# Patient Record
Sex: MALE | Race: WHITE | NOT HISPANIC OR LATINO | Employment: OTHER | URBAN - METROPOLITAN AREA
[De-identification: names, ages, dates, MRNs, and addresses within clinical notes are randomized per-mention and may not be internally consistent; named-entity substitution may affect disease eponyms.]

---

## 2017-01-03 ENCOUNTER — APPOINTMENT (OUTPATIENT)
Dept: PHYSICAL THERAPY | Facility: REHABILITATION | Age: 62
End: 2017-01-03
Attending: ORTHOPAEDIC SURGERY
Payer: COMMERCIAL

## 2017-01-03 ENCOUNTER — HOSPITAL ENCOUNTER (OUTPATIENT)
Dept: RADIOLOGY | Facility: HOSPITAL | Age: 62
Discharge: HOME | End: 2017-01-03
Attending: ORTHOPAEDIC SURGERY
Payer: COMMERCIAL

## 2017-01-03 ENCOUNTER — APPOINTMENT (OUTPATIENT)
Dept: LAB | Facility: HOSPITAL | Age: 62
End: 2017-01-03
Attending: ORTHOPAEDIC SURGERY
Payer: COMMERCIAL

## 2017-01-03 ENCOUNTER — ALLSCRIPTS OFFICE VISIT (OUTPATIENT)
Dept: OTHER | Facility: OTHER | Age: 62
End: 2017-01-03

## 2017-01-03 ENCOUNTER — TRANSCRIBE ORDERS (OUTPATIENT)
Dept: LAB | Facility: HOSPITAL | Age: 62
End: 2017-01-03

## 2017-01-03 DIAGNOSIS — E11.9 TYPE 2 DIABETES MELLITUS WITHOUT COMPLICATION, WITHOUT LONG-TERM CURRENT USE OF INSULIN (HCC): ICD-10-CM

## 2017-01-03 DIAGNOSIS — R07.9 CHEST PAIN: ICD-10-CM

## 2017-01-03 DIAGNOSIS — E11.9 TYPE 2 DIABETES MELLITUS WITHOUT COMPLICATIONS (HCC): ICD-10-CM

## 2017-01-03 DIAGNOSIS — R07.9 CHEST PAIN, UNSPECIFIED: ICD-10-CM

## 2017-01-03 DIAGNOSIS — M17.12 PRIMARY OSTEOARTHRITIS OF LEFT KNEE: ICD-10-CM

## 2017-01-03 DIAGNOSIS — Z47.1 AFTERCARE FOLLOWING JOINT REPLACEMENT SURGERY: ICD-10-CM

## 2017-01-03 DIAGNOSIS — R07.9 CHEST PAIN, UNSPECIFIED: Primary | ICD-10-CM

## 2017-01-03 LAB
ABO GROUP BLD: NORMAL
ALBUMIN SERPL BCP-MCNC: 3.6 G/DL (ref 3.5–5)
ALP SERPL-CCNC: 74 U/L (ref 46–116)
ALT SERPL W P-5'-P-CCNC: 25 U/L (ref 12–78)
ANION GAP SERPL CALCULATED.3IONS-SCNC: 8 MMOL/L (ref 4–13)
AST SERPL W P-5'-P-CCNC: 12 U/L (ref 5–45)
BASOPHILS # BLD AUTO: 0.06 THOUSANDS/ΜL (ref 0–0.1)
BASOPHILS NFR BLD AUTO: 1 % (ref 0–1)
BILIRUB SERPL-MCNC: 0.44 MG/DL (ref 0.2–1)
BLD GP AB SCN SERPL QL: NEGATIVE
BUN SERPL-MCNC: 16 MG/DL (ref 5–25)
CALCIUM SERPL-MCNC: 9.2 MG/DL (ref 8.3–10.1)
CHLORIDE SERPL-SCNC: 103 MMOL/L (ref 100–108)
CO2 SERPL-SCNC: 27 MMOL/L (ref 21–32)
CREAT SERPL-MCNC: 0.94 MG/DL (ref 0.6–1.3)
EOSINOPHIL # BLD AUTO: 0.96 THOUSAND/ΜL (ref 0–0.61)
EOSINOPHIL NFR BLD AUTO: 11 % (ref 0–6)
ERYTHROCYTE [DISTWIDTH] IN BLOOD BY AUTOMATED COUNT: 13.2 % (ref 11.6–15.1)
EST. AVERAGE GLUCOSE BLD GHB EST-MCNC: 160 MG/DL
GFR SERPL CREATININE-BSD FRML MDRD: >60 ML/MIN/1.73SQ M
GLUCOSE SERPL-MCNC: 170 MG/DL (ref 65–140)
HBA1C MFR BLD: 7.2 % (ref 4.2–6.3)
HCT VFR BLD AUTO: 41.5 % (ref 36.5–49.3)
HGB BLD-MCNC: 14.5 G/DL (ref 12–17)
LYMPHOCYTES # BLD AUTO: 2.39 THOUSANDS/ΜL (ref 0.6–4.47)
LYMPHOCYTES NFR BLD AUTO: 27 % (ref 14–44)
MCH RBC QN AUTO: 29.7 PG (ref 26.8–34.3)
MCHC RBC AUTO-ENTMCNC: 34.9 G/DL (ref 31.4–37.4)
MCV RBC AUTO: 85 FL (ref 82–98)
MONOCYTES # BLD AUTO: 0.56 THOUSAND/ΜL (ref 0.17–1.22)
MONOCYTES NFR BLD AUTO: 6 % (ref 4–12)
NEUTROPHILS # BLD AUTO: 4.93 THOUSANDS/ΜL (ref 1.85–7.62)
NEUTS SEG NFR BLD AUTO: 55 % (ref 43–75)
NRBC BLD AUTO-RTO: 0 /100 WBCS
PLATELET # BLD AUTO: 277 THOUSANDS/UL (ref 149–390)
PMV BLD AUTO: 9 FL (ref 8.9–12.7)
POTASSIUM SERPL-SCNC: 4.1 MMOL/L (ref 3.5–5.3)
PROT SERPL-MCNC: 6.9 G/DL (ref 6.4–8.2)
RBC # BLD AUTO: 4.89 MILLION/UL (ref 3.88–5.62)
RH BLD: NEGATIVE
SODIUM SERPL-SCNC: 138 MMOL/L (ref 136–145)
WBC # BLD AUTO: 8.94 THOUSAND/UL (ref 4.31–10.16)

## 2017-01-03 PROCEDURE — 73560 X-RAY EXAM OF KNEE 1 OR 2: CPT

## 2017-01-03 PROCEDURE — 85025 COMPLETE CBC W/AUTO DIFF WBC: CPT

## 2017-01-03 PROCEDURE — 97110 THERAPEUTIC EXERCISES: CPT

## 2017-01-03 PROCEDURE — 97112 NEUROMUSCULAR REEDUCATION: CPT

## 2017-01-03 PROCEDURE — 86901 BLOOD TYPING SEROLOGIC RH(D): CPT

## 2017-01-03 PROCEDURE — 83036 HEMOGLOBIN GLYCOSYLATED A1C: CPT

## 2017-01-03 PROCEDURE — 86850 RBC ANTIBODY SCREEN: CPT

## 2017-01-03 PROCEDURE — 86900 BLOOD TYPING SEROLOGIC ABO: CPT

## 2017-01-03 PROCEDURE — 36415 COLL VENOUS BLD VENIPUNCTURE: CPT

## 2017-01-03 PROCEDURE — 80053 COMPREHEN METABOLIC PANEL: CPT

## 2017-01-05 ENCOUNTER — APPOINTMENT (OUTPATIENT)
Dept: PHYSICAL THERAPY | Facility: REHABILITATION | Age: 62
End: 2017-01-05
Attending: ORTHOPAEDIC SURGERY
Payer: COMMERCIAL

## 2017-01-05 PROCEDURE — 97110 THERAPEUTIC EXERCISES: CPT

## 2017-01-05 PROCEDURE — 97112 NEUROMUSCULAR REEDUCATION: CPT

## 2017-01-10 ENCOUNTER — APPOINTMENT (OUTPATIENT)
Dept: PHYSICAL THERAPY | Facility: REHABILITATION | Age: 62
End: 2017-01-10
Attending: ORTHOPAEDIC SURGERY
Payer: COMMERCIAL

## 2017-01-10 PROCEDURE — 97112 NEUROMUSCULAR REEDUCATION: CPT

## 2017-01-10 PROCEDURE — 97110 THERAPEUTIC EXERCISES: CPT

## 2017-01-12 ENCOUNTER — APPOINTMENT (OUTPATIENT)
Dept: PHYSICAL THERAPY | Facility: REHABILITATION | Age: 62
End: 2017-01-12
Attending: ORTHOPAEDIC SURGERY
Payer: COMMERCIAL

## 2017-01-12 ENCOUNTER — GENERIC CONVERSION - ENCOUNTER (OUTPATIENT)
Dept: OTHER | Facility: OTHER | Age: 62
End: 2017-01-12

## 2017-01-12 PROCEDURE — 97112 NEUROMUSCULAR REEDUCATION: CPT

## 2017-01-12 PROCEDURE — 97140 MANUAL THERAPY 1/> REGIONS: CPT

## 2017-01-12 PROCEDURE — 97110 THERAPEUTIC EXERCISES: CPT

## 2017-01-17 ENCOUNTER — APPOINTMENT (OUTPATIENT)
Dept: PHYSICAL THERAPY | Facility: REHABILITATION | Age: 62
End: 2017-01-17
Attending: ORTHOPAEDIC SURGERY
Payer: COMMERCIAL

## 2017-01-17 PROCEDURE — 97110 THERAPEUTIC EXERCISES: CPT

## 2017-01-17 PROCEDURE — 97112 NEUROMUSCULAR REEDUCATION: CPT

## 2017-01-19 ENCOUNTER — APPOINTMENT (OUTPATIENT)
Dept: PHYSICAL THERAPY | Facility: REHABILITATION | Age: 62
End: 2017-01-19
Attending: ORTHOPAEDIC SURGERY
Payer: COMMERCIAL

## 2017-01-19 ENCOUNTER — GENERIC CONVERSION - ENCOUNTER (OUTPATIENT)
Dept: OTHER | Facility: OTHER | Age: 62
End: 2017-01-19

## 2017-01-19 PROCEDURE — 97110 THERAPEUTIC EXERCISES: CPT

## 2017-01-19 PROCEDURE — 97112 NEUROMUSCULAR REEDUCATION: CPT

## 2017-01-24 ENCOUNTER — APPOINTMENT (OUTPATIENT)
Dept: PHYSICAL THERAPY | Facility: REHABILITATION | Age: 62
End: 2017-01-24
Attending: ORTHOPAEDIC SURGERY
Payer: COMMERCIAL

## 2017-01-24 PROCEDURE — 97110 THERAPEUTIC EXERCISES: CPT

## 2017-01-24 PROCEDURE — 97112 NEUROMUSCULAR REEDUCATION: CPT

## 2017-01-26 ENCOUNTER — APPOINTMENT (OUTPATIENT)
Dept: PHYSICAL THERAPY | Facility: REHABILITATION | Age: 62
End: 2017-01-26
Attending: ORTHOPAEDIC SURGERY
Payer: COMMERCIAL

## 2017-01-26 ENCOUNTER — HOSPITAL ENCOUNTER (OUTPATIENT)
Dept: RADIOLOGY | Age: 62
Discharge: HOME | End: 2017-01-26
Payer: COMMERCIAL

## 2017-01-26 ENCOUNTER — TRANSCRIBE ORDERS (OUTPATIENT)
Dept: ADMINISTRATIVE | Age: 62
End: 2017-01-26

## 2017-01-26 DIAGNOSIS — M17.12 PRIMARY OSTEOARTHRITIS OF LEFT KNEE: ICD-10-CM

## 2017-01-26 PROCEDURE — 97112 NEUROMUSCULAR REEDUCATION: CPT

## 2017-01-26 PROCEDURE — 97110 THERAPEUTIC EXERCISES: CPT

## 2017-01-26 PROCEDURE — 71020 HB CHEST X-RAY 2VW FRONTAL&LATL: CPT

## 2017-01-31 ENCOUNTER — APPOINTMENT (OUTPATIENT)
Dept: PHYSICAL THERAPY | Facility: REHABILITATION | Age: 62
End: 2017-01-31
Attending: ORTHOPAEDIC SURGERY
Payer: COMMERCIAL

## 2017-01-31 PROCEDURE — 97112 NEUROMUSCULAR REEDUCATION: CPT

## 2017-01-31 PROCEDURE — 97110 THERAPEUTIC EXERCISES: CPT

## 2017-02-02 ENCOUNTER — APPOINTMENT (OUTPATIENT)
Dept: PHYSICAL THERAPY | Facility: REHABILITATION | Age: 62
End: 2017-02-02
Attending: ORTHOPAEDIC SURGERY
Payer: COMMERCIAL

## 2017-02-02 PROCEDURE — 97112 NEUROMUSCULAR REEDUCATION: CPT

## 2017-02-02 PROCEDURE — 97110 THERAPEUTIC EXERCISES: CPT

## 2017-02-03 RX ORDER — OXYCODONE HYDROCHLORIDE 5 MG/1
5 CAPSULE ORAL EVERY 4 HOURS PRN
COMMUNITY
End: 2017-02-17 | Stop reason: HOSPADM

## 2017-02-03 RX ORDER — ASCORBIC ACID 500 MG
500 TABLET ORAL
COMMUNITY
End: 2019-05-02 | Stop reason: ALTCHOICE

## 2017-02-03 RX ORDER — FOLIC ACID 1 MG/1
1 TABLET ORAL
COMMUNITY
End: 2017-08-20 | Stop reason: ALTCHOICE

## 2017-02-03 RX ORDER — FERROUS SULFATE 325(65) MG
325 TABLET ORAL
COMMUNITY
End: 2017-08-20 | Stop reason: ALTCHOICE

## 2017-02-03 RX ORDER — EZETIMIBE 10 MG/1
5 TABLET ORAL
COMMUNITY
End: 2018-03-19 | Stop reason: SDUPTHER

## 2017-02-03 RX ORDER — FLUOXETINE HYDROCHLORIDE 20 MG/1
CAPSULE ORAL DAILY
COMMUNITY
End: 2018-04-17 | Stop reason: SDUPTHER

## 2017-02-03 RX ORDER — OLANZAPINE 5 MG/1
5 TABLET ORAL
COMMUNITY
End: 2018-03-02 | Stop reason: SDUPTHER

## 2017-02-07 ENCOUNTER — ALLSCRIPTS OFFICE VISIT (OUTPATIENT)
Dept: OTHER | Facility: OTHER | Age: 62
End: 2017-02-07

## 2017-02-07 ENCOUNTER — APPOINTMENT (OUTPATIENT)
Dept: PHYSICAL THERAPY | Facility: REHABILITATION | Age: 62
End: 2017-02-07
Attending: ORTHOPAEDIC SURGERY
Payer: COMMERCIAL

## 2017-02-07 PROCEDURE — 97110 THERAPEUTIC EXERCISES: CPT

## 2017-02-07 PROCEDURE — 97140 MANUAL THERAPY 1/> REGIONS: CPT

## 2017-02-07 PROCEDURE — 97112 NEUROMUSCULAR REEDUCATION: CPT

## 2017-02-09 ENCOUNTER — APPOINTMENT (OUTPATIENT)
Dept: PHYSICAL THERAPY | Facility: REHABILITATION | Age: 62
End: 2017-02-09
Attending: ORTHOPAEDIC SURGERY
Payer: COMMERCIAL

## 2017-02-10 ENCOUNTER — ANESTHESIA EVENT (OUTPATIENT)
Dept: PERIOP | Facility: HOSPITAL | Age: 62
DRG: 470 | End: 2017-02-10
Payer: COMMERCIAL

## 2017-02-10 ENCOUNTER — GENERIC CONVERSION - ENCOUNTER (OUTPATIENT)
Dept: OTHER | Facility: OTHER | Age: 62
End: 2017-02-10

## 2017-02-10 ENCOUNTER — APPOINTMENT (OUTPATIENT)
Dept: PHYSICAL THERAPY | Facility: REHABILITATION | Age: 62
End: 2017-02-10
Attending: ORTHOPAEDIC SURGERY
Payer: COMMERCIAL

## 2017-02-10 PROCEDURE — 97112 NEUROMUSCULAR REEDUCATION: CPT

## 2017-02-10 PROCEDURE — 97140 MANUAL THERAPY 1/> REGIONS: CPT

## 2017-02-10 PROCEDURE — 97110 THERAPEUTIC EXERCISES: CPT

## 2017-02-15 ENCOUNTER — ANESTHESIA (OUTPATIENT)
Dept: PERIOP | Facility: HOSPITAL | Age: 62
DRG: 470 | End: 2017-02-15
Payer: COMMERCIAL

## 2017-02-15 ENCOUNTER — HOSPITAL ENCOUNTER (INPATIENT)
Facility: HOSPITAL | Age: 62
LOS: 2 days | Discharge: HOME HEALTH | DRG: 470 | End: 2017-02-17
Attending: ORTHOPAEDIC SURGERY | Admitting: ORTHOPAEDIC SURGERY
Payer: COMMERCIAL

## 2017-02-15 DIAGNOSIS — E13.8 DIABETES MELLITUS OF OTHER TYPE WITH COMPLICATION: Primary | ICD-10-CM

## 2017-02-15 LAB
ABO GROUP BLD: NORMAL
BLD GP AB SCN SERPL QL: NEGATIVE
ERYTHROCYTE [DISTWIDTH] IN BLOOD BY AUTOMATED COUNT: 13.4 % (ref 11.6–15.1)
GLUCOSE SERPL-MCNC: 177 MG/DL (ref 65–140)
GLUCOSE SERPL-MCNC: 197 MG/DL (ref 65–140)
HCT VFR BLD AUTO: 39.7 % (ref 36.5–49.3)
HGB BLD-MCNC: 14.1 G/DL (ref 12–17)
MCH RBC QN AUTO: 29.6 PG (ref 26.8–34.3)
MCHC RBC AUTO-ENTMCNC: 35.5 G/DL (ref 31.4–37.4)
MCV RBC AUTO: 83 FL (ref 82–98)
PLATELET # BLD AUTO: 231 THOUSANDS/UL (ref 149–390)
PMV BLD AUTO: 8.8 FL (ref 8.9–12.7)
RBC # BLD AUTO: 4.77 MILLION/UL (ref 3.88–5.62)
RH BLD: NEGATIVE
WBC # BLD AUTO: 6.42 THOUSAND/UL (ref 4.31–10.16)

## 2017-02-15 PROCEDURE — C1776 JOINT DEVICE (IMPLANTABLE): HCPCS | Performed by: ORTHOPAEDIC SURGERY

## 2017-02-15 PROCEDURE — 86923 COMPATIBILITY TEST ELECTRIC: CPT

## 2017-02-15 PROCEDURE — 86901 BLOOD TYPING SEROLOGIC RH(D): CPT | Performed by: ORTHOPAEDIC SURGERY

## 2017-02-15 PROCEDURE — C1713 ANCHOR/SCREW BN/BN,TIS/BN: HCPCS | Performed by: ORTHOPAEDIC SURGERY

## 2017-02-15 PROCEDURE — 82948 REAGENT STRIP/BLOOD GLUCOSE: CPT

## 2017-02-15 PROCEDURE — 0SRD0J9 REPLACEMENT OF LEFT KNEE JOINT WITH SYNTHETIC SUBSTITUTE, CEMENTED, OPEN APPROACH: ICD-10-PCS | Performed by: ORTHOPAEDIC SURGERY

## 2017-02-15 PROCEDURE — 86900 BLOOD TYPING SEROLOGIC ABO: CPT | Performed by: ORTHOPAEDIC SURGERY

## 2017-02-15 PROCEDURE — 86850 RBC ANTIBODY SCREEN: CPT | Performed by: ORTHOPAEDIC SURGERY

## 2017-02-15 PROCEDURE — 85027 COMPLETE CBC AUTOMATED: CPT | Performed by: ANESTHESIOLOGY

## 2017-02-15 DEVICE — ATTUNE KNEE SYSTEM TIBIAL INSERT ROTATING PLATFORM POSTERIOR STABILIZED 8 5MM AOX
Type: IMPLANTABLE DEVICE | Site: KNEE | Status: FUNCTIONAL
Brand: ATTUNE

## 2017-02-15 DEVICE — ATTUNE KNEE SYSTEM FEMORAL POSTERIOR STABILIZED SIZE 8 LEFT CEMENTED
Type: IMPLANTABLE DEVICE | Site: KNEE | Status: FUNCTIONAL
Brand: ATTUNE

## 2017-02-15 DEVICE — SMARTSET HIGH PERFORMANCE MV MEDIUM VISCOSITY BONE CEMENT 40G
Type: IMPLANTABLE DEVICE | Site: KNEE | Status: FUNCTIONAL
Brand: SMARTSET

## 2017-02-15 DEVICE — ATTUNE PATELLA MEDIALIZED DOME 41MM CEMENTED AOX
Type: IMPLANTABLE DEVICE | Site: KNEE | Status: FUNCTIONAL
Brand: ATTUNE

## 2017-02-15 DEVICE — ATTUNE KNEE SYSTEM TIBIAL BASE ROTATING PLATFORM SIZE 7 CEMENTED
Type: IMPLANTABLE DEVICE | Site: KNEE | Status: FUNCTIONAL
Brand: ATTUNE

## 2017-02-15 RX ORDER — OLANZAPINE 5 MG/1
5 TABLET ORAL
Status: DISCONTINUED | OUTPATIENT
Start: 2017-02-15 | End: 2017-02-17 | Stop reason: HOSPADM

## 2017-02-15 RX ORDER — MAGNESIUM HYDROXIDE/ALUMINUM HYDROXICE/SIMETHICONE 120; 1200; 1200 MG/30ML; MG/30ML; MG/30ML
30 SUSPENSION ORAL EVERY 6 HOURS PRN
Status: DISCONTINUED | OUTPATIENT
Start: 2017-02-15 | End: 2017-02-17 | Stop reason: HOSPADM

## 2017-02-15 RX ORDER — ACETAMINOPHEN 325 MG/1
650 TABLET ORAL EVERY 6 HOURS PRN
Status: DISCONTINUED | OUTPATIENT
Start: 2017-02-15 | End: 2017-02-17 | Stop reason: HOSPADM

## 2017-02-15 RX ORDER — FENTANYL CITRATE 50 UG/ML
INJECTION, SOLUTION INTRAMUSCULAR; INTRAVENOUS AS NEEDED
Status: DISCONTINUED | OUTPATIENT
Start: 2017-02-15 | End: 2017-02-15 | Stop reason: SURG

## 2017-02-15 RX ORDER — ROPIVACAINE HYDROCHLORIDE 5 MG/ML
INJECTION, SOLUTION EPIDURAL; INFILTRATION; PERINEURAL AS NEEDED
Status: DISCONTINUED | OUTPATIENT
Start: 2017-02-15 | End: 2017-02-15 | Stop reason: SURG

## 2017-02-15 RX ORDER — PANTOPRAZOLE SODIUM 40 MG/1
40 TABLET, DELAYED RELEASE ORAL
Status: DISCONTINUED | OUTPATIENT
Start: 2017-02-16 | End: 2017-02-17 | Stop reason: HOSPADM

## 2017-02-15 RX ORDER — KETAMINE HYDROCHLORIDE 50 MG/ML
INJECTION, SOLUTION, CONCENTRATE INTRAMUSCULAR; INTRAVENOUS AS NEEDED
Status: DISCONTINUED | OUTPATIENT
Start: 2017-02-15 | End: 2017-02-15 | Stop reason: SURG

## 2017-02-15 RX ORDER — PRAVASTATIN SODIUM 40 MG
40 TABLET ORAL
Status: DISCONTINUED | OUTPATIENT
Start: 2017-02-15 | End: 2017-02-17 | Stop reason: HOSPADM

## 2017-02-15 RX ORDER — SODIUM CHLORIDE 9 MG/ML
INJECTION, SOLUTION INTRAVENOUS CONTINUOUS PRN
Status: DISCONTINUED | OUTPATIENT
Start: 2017-02-15 | End: 2017-02-15 | Stop reason: SURG

## 2017-02-15 RX ORDER — SODIUM CHLORIDE 9 MG/ML
125 INJECTION, SOLUTION INTRAVENOUS CONTINUOUS
Status: DISCONTINUED | OUTPATIENT
Start: 2017-02-15 | End: 2017-02-17 | Stop reason: HOSPADM

## 2017-02-15 RX ORDER — ZOLPIDEM TARTRATE 5 MG/1
10 TABLET ORAL
Status: DISCONTINUED | OUTPATIENT
Start: 2017-02-15 | End: 2017-02-17 | Stop reason: HOSPADM

## 2017-02-15 RX ORDER — HYDROCHLOROTHIAZIDE 12.5 MG/1
12.5 TABLET ORAL DAILY
Status: DISCONTINUED | OUTPATIENT
Start: 2017-02-16 | End: 2017-02-17 | Stop reason: HOSPADM

## 2017-02-15 RX ORDER — FERROUS SULFATE 325(65) MG
325 TABLET ORAL
Status: DISCONTINUED | OUTPATIENT
Start: 2017-02-16 | End: 2017-02-17 | Stop reason: HOSPADM

## 2017-02-15 RX ORDER — ASCORBIC ACID 500 MG
500 TABLET ORAL DAILY
Status: DISCONTINUED | OUTPATIENT
Start: 2017-02-16 | End: 2017-02-17 | Stop reason: HOSPADM

## 2017-02-15 RX ORDER — DOCUSATE SODIUM 100 MG/1
100 CAPSULE, LIQUID FILLED ORAL 2 TIMES DAILY
Status: DISCONTINUED | OUTPATIENT
Start: 2017-02-15 | End: 2017-02-17 | Stop reason: HOSPADM

## 2017-02-15 RX ORDER — MEPERIDINE HYDROCHLORIDE 25 MG/ML
12.5 INJECTION INTRAMUSCULAR; INTRAVENOUS; SUBCUTANEOUS AS NEEDED
Status: DISCONTINUED | OUTPATIENT
Start: 2017-02-15 | End: 2017-02-15 | Stop reason: HOSPADM

## 2017-02-15 RX ORDER — BUPIVACAINE HYDROCHLORIDE 7.5 MG/ML
INJECTION, SOLUTION INTRASPINAL AS NEEDED
Status: DISCONTINUED | OUTPATIENT
Start: 2017-02-15 | End: 2017-02-15 | Stop reason: SURG

## 2017-02-15 RX ORDER — ACETAMINOPHEN 325 MG/1
975 TABLET ORAL ONCE
Status: COMPLETED | OUTPATIENT
Start: 2017-02-15 | End: 2017-02-15

## 2017-02-15 RX ORDER — ONDANSETRON 2 MG/ML
INJECTION INTRAMUSCULAR; INTRAVENOUS AS NEEDED
Status: DISCONTINUED | OUTPATIENT
Start: 2017-02-15 | End: 2017-02-15 | Stop reason: SURG

## 2017-02-15 RX ORDER — GLYCOPYRROLATE 0.2 MG/ML
INJECTION INTRAMUSCULAR; INTRAVENOUS AS NEEDED
Status: DISCONTINUED | OUTPATIENT
Start: 2017-02-15 | End: 2017-02-15 | Stop reason: SURG

## 2017-02-15 RX ORDER — EZETIMIBE 10 MG/1
5 TABLET ORAL
Status: DISCONTINUED | OUTPATIENT
Start: 2017-02-15 | End: 2017-02-17 | Stop reason: HOSPADM

## 2017-02-15 RX ORDER — ONDANSETRON 2 MG/ML
4 INJECTION INTRAMUSCULAR; INTRAVENOUS EVERY 4 HOURS PRN
Status: DISCONTINUED | OUTPATIENT
Start: 2017-02-15 | End: 2017-02-15 | Stop reason: HOSPADM

## 2017-02-15 RX ORDER — ALPRAZOLAM 0.5 MG/1
0.25 TABLET ORAL AS NEEDED
Status: DISCONTINUED | OUTPATIENT
Start: 2017-02-15 | End: 2017-02-17 | Stop reason: HOSPADM

## 2017-02-15 RX ORDER — LOSARTAN POTASSIUM 50 MG/1
100 TABLET ORAL DAILY
Status: DISCONTINUED | OUTPATIENT
Start: 2017-02-16 | End: 2017-02-17 | Stop reason: HOSPADM

## 2017-02-15 RX ORDER — DEXMEDETOMIDINE HYDROCHLORIDE 100 UG/ML
INJECTION, SOLUTION INTRAVENOUS AS NEEDED
Status: DISCONTINUED | OUTPATIENT
Start: 2017-02-15 | End: 2017-02-15 | Stop reason: SURG

## 2017-02-15 RX ORDER — TRANEXAMIC ACID 100 MG/ML
INJECTION, SOLUTION INTRAVENOUS AS NEEDED
Status: DISCONTINUED | OUTPATIENT
Start: 2017-02-15 | End: 2017-02-15 | Stop reason: SURG

## 2017-02-15 RX ORDER — FENTANYL CITRATE/PF 50 MCG/ML
50 SYRINGE (ML) INJECTION
Status: DISCONTINUED | OUTPATIENT
Start: 2017-02-15 | End: 2017-02-15 | Stop reason: HOSPADM

## 2017-02-15 RX ORDER — FLUOXETINE HYDROCHLORIDE 20 MG/1
60 CAPSULE ORAL DAILY
Status: DISCONTINUED | OUTPATIENT
Start: 2017-02-16 | End: 2017-02-17 | Stop reason: HOSPADM

## 2017-02-15 RX ORDER — OXYCODONE HYDROCHLORIDE 10 MG/1
10 TABLET ORAL EVERY 4 HOURS PRN
Status: DISCONTINUED | OUTPATIENT
Start: 2017-02-15 | End: 2017-02-17 | Stop reason: HOSPADM

## 2017-02-15 RX ORDER — METOPROLOL SUCCINATE 25 MG/1
25 TABLET, EXTENDED RELEASE ORAL DAILY
Status: DISCONTINUED | OUTPATIENT
Start: 2017-02-16 | End: 2017-02-17 | Stop reason: HOSPADM

## 2017-02-15 RX ORDER — SODIUM CHLORIDE, SODIUM LACTATE, POTASSIUM CHLORIDE, CALCIUM CHLORIDE 600; 310; 30; 20 MG/100ML; MG/100ML; MG/100ML; MG/100ML
20 INJECTION, SOLUTION INTRAVENOUS CONTINUOUS
Status: DISCONTINUED | OUTPATIENT
Start: 2017-02-15 | End: 2017-02-15

## 2017-02-15 RX ORDER — GABAPENTIN 300 MG/1
300 CAPSULE ORAL ONCE
Status: COMPLETED | OUTPATIENT
Start: 2017-02-15 | End: 2017-02-15

## 2017-02-15 RX ORDER — OXYCODONE HYDROCHLORIDE 5 MG/1
5 TABLET ORAL EVERY 4 HOURS PRN
Status: DISCONTINUED | OUTPATIENT
Start: 2017-02-15 | End: 2017-02-17 | Stop reason: HOSPADM

## 2017-02-15 RX ORDER — GABAPENTIN 100 MG/1
100 CAPSULE ORAL EVERY 8 HOURS SCHEDULED
Status: DISCONTINUED | OUTPATIENT
Start: 2017-02-15 | End: 2017-02-17 | Stop reason: HOSPADM

## 2017-02-15 RX ORDER — MIDAZOLAM HYDROCHLORIDE 1 MG/ML
INJECTION INTRAMUSCULAR; INTRAVENOUS AS NEEDED
Status: DISCONTINUED | OUTPATIENT
Start: 2017-02-15 | End: 2017-02-15 | Stop reason: SURG

## 2017-02-15 RX ORDER — ALBUTEROL SULFATE 2.5 MG/3ML
2.5 SOLUTION RESPIRATORY (INHALATION) EVERY 4 HOURS PRN
Status: DISCONTINUED | OUTPATIENT
Start: 2017-02-15 | End: 2017-02-15 | Stop reason: HOSPADM

## 2017-02-15 RX ORDER — PROPOFOL 10 MG/ML
INJECTION, EMULSION INTRAVENOUS CONTINUOUS PRN
Status: DISCONTINUED | OUTPATIENT
Start: 2017-02-15 | End: 2017-02-15 | Stop reason: SURG

## 2017-02-15 RX ORDER — SODIUM CHLORIDE, SODIUM LACTATE, POTASSIUM CHLORIDE, CALCIUM CHLORIDE 600; 310; 30; 20 MG/100ML; MG/100ML; MG/100ML; MG/100ML
100 INJECTION, SOLUTION INTRAVENOUS CONTINUOUS
Status: DISCONTINUED | OUTPATIENT
Start: 2017-02-15 | End: 2017-02-17 | Stop reason: HOSPADM

## 2017-02-15 RX ORDER — TRAMADOL HYDROCHLORIDE 50 MG/1
50 TABLET ORAL EVERY 6 HOURS SCHEDULED
Status: DISCONTINUED | OUTPATIENT
Start: 2017-02-15 | End: 2017-02-17 | Stop reason: HOSPADM

## 2017-02-15 RX ORDER — MAGNESIUM HYDROXIDE 1200 MG/15ML
LIQUID ORAL AS NEEDED
Status: DISCONTINUED | OUTPATIENT
Start: 2017-02-15 | End: 2017-02-15 | Stop reason: HOSPADM

## 2017-02-15 RX ORDER — FOLIC ACID 1 MG/1
1 TABLET ORAL DAILY
Status: DISCONTINUED | OUTPATIENT
Start: 2017-02-16 | End: 2017-02-17 | Stop reason: HOSPADM

## 2017-02-15 RX ORDER — MORPHINE SULFATE 2 MG/ML
2 INJECTION, SOLUTION INTRAMUSCULAR; INTRAVENOUS EVERY 2 HOUR PRN
Status: DISCONTINUED | OUTPATIENT
Start: 2017-02-15 | End: 2017-02-17 | Stop reason: HOSPADM

## 2017-02-15 RX ORDER — EPHEDRINE SULFATE 50 MG/ML
INJECTION, SOLUTION INTRAVENOUS AS NEEDED
Status: DISCONTINUED | OUTPATIENT
Start: 2017-02-15 | End: 2017-02-15 | Stop reason: SURG

## 2017-02-15 RX ORDER — SENNOSIDES 8.6 MG
1 TABLET ORAL DAILY
Status: DISCONTINUED | OUTPATIENT
Start: 2017-02-16 | End: 2017-02-17 | Stop reason: HOSPADM

## 2017-02-15 RX ADMIN — TRAMADOL HYDROCHLORIDE 50 MG: 50 TABLET, COATED ORAL at 23:40

## 2017-02-15 RX ADMIN — DEXMEDETOMIDINE HYDROCHLORIDE 4 MCG: 100 INJECTION, SOLUTION INTRAVENOUS at 13:57

## 2017-02-15 RX ADMIN — SODIUM CHLORIDE, SODIUM LACTATE, POTASSIUM CHLORIDE, AND CALCIUM CHLORIDE: .6; .31; .03; .02 INJECTION, SOLUTION INTRAVENOUS at 12:26

## 2017-02-15 RX ADMIN — DOCUSATE SODIUM 100 MG: 100 CAPSULE, LIQUID FILLED ORAL at 17:32

## 2017-02-15 RX ADMIN — GABAPENTIN 300 MG: 300 CAPSULE ORAL at 10:49

## 2017-02-15 RX ADMIN — DEXMEDETOMIDINE HYDROCHLORIDE 4 MCG: 100 INJECTION, SOLUTION INTRAVENOUS at 13:10

## 2017-02-15 RX ADMIN — DEXMEDETOMIDINE HYDROCHLORIDE 4 MCG: 100 INJECTION, SOLUTION INTRAVENOUS at 13:29

## 2017-02-15 RX ADMIN — MORPHINE SULFATE 2 MG: 2 INJECTION, SOLUTION INTRAMUSCULAR; INTRAVENOUS at 19:54

## 2017-02-15 RX ADMIN — MIDAZOLAM HYDROCHLORIDE 2 MG: 1 INJECTION, SOLUTION INTRAMUSCULAR; INTRAVENOUS at 12:10

## 2017-02-15 RX ADMIN — EPHEDRINE SULFATE 5 MG: 50 INJECTION, SOLUTION INTRAMUSCULAR; INTRAVENOUS; SUBCUTANEOUS at 13:05

## 2017-02-15 RX ADMIN — PHENYLEPHRINE HYDROCHLORIDE 20 MCG/MIN: 10 INJECTION INTRAVENOUS at 12:48

## 2017-02-15 RX ADMIN — DEXMEDETOMIDINE HYDROCHLORIDE 4 MCG: 100 INJECTION, SOLUTION INTRAVENOUS at 13:05

## 2017-02-15 RX ADMIN — FENTANYL CITRATE 50 MCG: 50 INJECTION, SOLUTION INTRAMUSCULAR; INTRAVENOUS at 12:10

## 2017-02-15 RX ADMIN — EPHEDRINE SULFATE 5 MG: 50 INJECTION, SOLUTION INTRAMUSCULAR; INTRAVENOUS; SUBCUTANEOUS at 12:49

## 2017-02-15 RX ADMIN — GLYCOPYRROLATE 0.2 MG: 0.2 INJECTION INTRAMUSCULAR; INTRAVENOUS at 12:54

## 2017-02-15 RX ADMIN — CEFAZOLIN SODIUM 1000 MG: 1 SOLUTION INTRAVENOUS at 22:19

## 2017-02-15 RX ADMIN — DEXMEDETOMIDINE HYDROCHLORIDE 4 MCG: 100 INJECTION, SOLUTION INTRAVENOUS at 12:50

## 2017-02-15 RX ADMIN — ROPIVACAINE HYDROCHLORIDE 20 ML: 5 INJECTION, SOLUTION EPIDURAL; INFILTRATION; PERINEURAL at 12:11

## 2017-02-15 RX ADMIN — TRANEXAMIC ACID 1 G: 100 INJECTION, SOLUTION INTRAVENOUS at 12:49

## 2017-02-15 RX ADMIN — OXYCODONE HYDROCHLORIDE 10 MG: 10 TABLET ORAL at 17:32

## 2017-02-15 RX ADMIN — DEXMEDETOMIDINE HYDROCHLORIDE 4 MCG: 100 INJECTION, SOLUTION INTRAVENOUS at 13:00

## 2017-02-15 RX ADMIN — OXYCODONE HYDROCHLORIDE 10 MG: 10 TABLET ORAL at 22:18

## 2017-02-15 RX ADMIN — FENTANYL CITRATE 50 MCG: 50 INJECTION, SOLUTION INTRAMUSCULAR; INTRAVENOUS at 13:54

## 2017-02-15 RX ADMIN — DEXMEDETOMIDINE HYDROCHLORIDE 4 MCG: 100 INJECTION, SOLUTION INTRAVENOUS at 14:10

## 2017-02-15 RX ADMIN — PRAVASTATIN SODIUM 40 MG: 40 TABLET ORAL at 17:32

## 2017-02-15 RX ADMIN — ONDANSETRON 4 MG: 2 INJECTION INTRAMUSCULAR; INTRAVENOUS at 15:18

## 2017-02-15 RX ADMIN — EZETIMIBE 5 MG: 10 TABLET ORAL at 22:17

## 2017-02-15 RX ADMIN — OLANZAPINE 5 MG: 5 TABLET, FILM COATED ORAL at 22:19

## 2017-02-15 RX ADMIN — KETAMINE HYDROCHLORIDE 25 MG: 50 INJECTION, SOLUTION INTRAMUSCULAR; INTRAVENOUS at 12:48

## 2017-02-15 RX ADMIN — GLYCOPYRROLATE 0.2 MG: 0.2 INJECTION INTRAMUSCULAR; INTRAVENOUS at 12:59

## 2017-02-15 RX ADMIN — ACETAMINOPHEN 975 MG: 325 TABLET, FILM COATED ORAL at 10:49

## 2017-02-15 RX ADMIN — CEFAZOLIN SODIUM 1000 MG: 2 SOLUTION INTRAVENOUS at 13:20

## 2017-02-15 RX ADMIN — GLYCOPYRROLATE 0.2 MG: 0.2 INJECTION INTRAMUSCULAR; INTRAVENOUS at 12:49

## 2017-02-15 RX ADMIN — SODIUM CHLORIDE, SODIUM LACTATE, POTASSIUM CHLORIDE, AND CALCIUM CHLORIDE 20 ML/HR: .6; .31; .03; .02 INJECTION, SOLUTION INTRAVENOUS at 10:55

## 2017-02-15 RX ADMIN — GLYCOPYRROLATE 0.2 MG: 0.2 INJECTION INTRAMUSCULAR; INTRAVENOUS at 13:04

## 2017-02-15 RX ADMIN — EPHEDRINE SULFATE 5 MG: 50 INJECTION, SOLUTION INTRAMUSCULAR; INTRAVENOUS; SUBCUTANEOUS at 12:58

## 2017-02-15 RX ADMIN — ZOLPIDEM TARTRATE 10 MG: 5 TABLET, FILM COATED ORAL at 22:18

## 2017-02-15 RX ADMIN — PROPOFOL 60 MCG/KG/MIN: 10 INJECTION, EMULSION INTRAVENOUS at 12:48

## 2017-02-15 RX ADMIN — KETAMINE HYDROCHLORIDE 25 MG: 50 INJECTION, SOLUTION INTRAMUSCULAR; INTRAVENOUS at 13:05

## 2017-02-15 RX ADMIN — SODIUM CHLORIDE, SODIUM LACTATE, POTASSIUM CHLORIDE, AND CALCIUM CHLORIDE: .6; .31; .03; .02 INJECTION, SOLUTION INTRAVENOUS at 12:56

## 2017-02-15 RX ADMIN — TRAMADOL HYDROCHLORIDE 50 MG: 50 TABLET, COATED ORAL at 17:32

## 2017-02-15 RX ADMIN — DEXMEDETOMIDINE HYDROCHLORIDE 4 MCG: 100 INJECTION, SOLUTION INTRAVENOUS at 12:55

## 2017-02-15 RX ADMIN — CEFAZOLIN SODIUM 2000 MG: 2 SOLUTION INTRAVENOUS at 12:42

## 2017-02-15 RX ADMIN — BUPIVACAINE HYDROCHLORIDE IN DEXTROSE 1.6 ML: 7.5 INJECTION, SOLUTION SUBARACHNOID at 12:48

## 2017-02-15 RX ADMIN — SODIUM CHLORIDE: 0.9 INJECTION, SOLUTION INTRAVENOUS at 13:06

## 2017-02-15 RX ADMIN — GABAPENTIN 100 MG: 100 CAPSULE ORAL at 22:18

## 2017-02-15 RX ADMIN — DEXMEDETOMIDINE HYDROCHLORIDE 4 MCG: 100 INJECTION, SOLUTION INTRAVENOUS at 13:15

## 2017-02-15 RX ADMIN — DEXMEDETOMIDINE HYDROCHLORIDE 4 MCG: 100 INJECTION, SOLUTION INTRAVENOUS at 14:03

## 2017-02-15 RX ADMIN — SODIUM CHLORIDE 125 ML/HR: 0.9 INJECTION, SOLUTION INTRAVENOUS at 16:30

## 2017-02-15 RX ADMIN — SODIUM CHLORIDE: 0.9 INJECTION, SOLUTION INTRAVENOUS at 12:48

## 2017-02-16 LAB
ANION GAP SERPL CALCULATED.3IONS-SCNC: 9 MMOL/L (ref 4–13)
BUN SERPL-MCNC: 8 MG/DL (ref 5–25)
CALCIUM SERPL-MCNC: 8.4 MG/DL (ref 8.3–10.1)
CHLORIDE SERPL-SCNC: 103 MMOL/L (ref 100–108)
CO2 SERPL-SCNC: 25 MMOL/L (ref 21–32)
CREAT SERPL-MCNC: 0.9 MG/DL (ref 0.6–1.3)
ERYTHROCYTE [DISTWIDTH] IN BLOOD BY AUTOMATED COUNT: 13.5 % (ref 11.6–15.1)
GFR SERPL CREATININE-BSD FRML MDRD: >60 ML/MIN/1.73SQ M
GLUCOSE SERPL-MCNC: 162 MG/DL (ref 65–140)
GLUCOSE SERPL-MCNC: 168 MG/DL (ref 65–140)
GLUCOSE SERPL-MCNC: 251 MG/DL (ref 65–140)
GLUCOSE SERPL-MCNC: 257 MG/DL (ref 65–140)
GLUCOSE SERPL-MCNC: 267 MG/DL (ref 65–140)
HCT VFR BLD AUTO: 36 % (ref 36.5–49.3)
HGB BLD-MCNC: 12.5 G/DL (ref 12–17)
MCH RBC QN AUTO: 29.2 PG (ref 26.8–34.3)
MCHC RBC AUTO-ENTMCNC: 34.7 G/DL (ref 31.4–37.4)
MCV RBC AUTO: 84 FL (ref 82–98)
PLATELET # BLD AUTO: 239 THOUSANDS/UL (ref 149–390)
PMV BLD AUTO: 8.8 FL (ref 8.9–12.7)
POTASSIUM SERPL-SCNC: 4.5 MMOL/L (ref 3.5–5.3)
RBC # BLD AUTO: 4.28 MILLION/UL (ref 3.88–5.62)
SODIUM SERPL-SCNC: 137 MMOL/L (ref 136–145)
WBC # BLD AUTO: 11.13 THOUSAND/UL (ref 4.31–10.16)

## 2017-02-16 PROCEDURE — G8988 SELF CARE GOAL STATUS: HCPCS

## 2017-02-16 PROCEDURE — G8978 MOBILITY CURRENT STATUS: HCPCS

## 2017-02-16 PROCEDURE — 80048 BASIC METABOLIC PNL TOTAL CA: CPT | Performed by: ORTHOPAEDIC SURGERY

## 2017-02-16 PROCEDURE — 82948 REAGENT STRIP/BLOOD GLUCOSE: CPT

## 2017-02-16 PROCEDURE — 97166 OT EVAL MOD COMPLEX 45 MIN: CPT

## 2017-02-16 PROCEDURE — 85027 COMPLETE CBC AUTOMATED: CPT | Performed by: ORTHOPAEDIC SURGERY

## 2017-02-16 PROCEDURE — G8979 MOBILITY GOAL STATUS: HCPCS

## 2017-02-16 PROCEDURE — 97116 GAIT TRAINING THERAPY: CPT

## 2017-02-16 PROCEDURE — G8987 SELF CARE CURRENT STATUS: HCPCS

## 2017-02-16 PROCEDURE — 97530 THERAPEUTIC ACTIVITIES: CPT

## 2017-02-16 PROCEDURE — 97162 PT EVAL MOD COMPLEX 30 MIN: CPT

## 2017-02-16 RX ADMIN — SENNOSIDES 8.6 MG: 8.6 TABLET, FILM COATED ORAL at 09:20

## 2017-02-16 RX ADMIN — OXYCODONE HYDROCHLORIDE 5 MG: 5 TABLET ORAL at 09:21

## 2017-02-16 RX ADMIN — OXYCODONE HYDROCHLORIDE AND ACETAMINOPHEN 500 MG: 500 TABLET ORAL at 09:20

## 2017-02-16 RX ADMIN — PRAVASTATIN SODIUM 40 MG: 40 TABLET ORAL at 17:09

## 2017-02-16 RX ADMIN — INSULIN LISPRO 1 UNITS: 100 INJECTION, SOLUTION INTRAVENOUS; SUBCUTANEOUS at 17:56

## 2017-02-16 RX ADMIN — ACETAMINOPHEN 650 MG: 325 TABLET, FILM COATED ORAL at 11:57

## 2017-02-16 RX ADMIN — OLANZAPINE 5 MG: 5 TABLET, FILM COATED ORAL at 21:21

## 2017-02-16 RX ADMIN — Medication 1 TABLET: at 09:20

## 2017-02-16 RX ADMIN — TRAMADOL HYDROCHLORIDE 50 MG: 50 TABLET, COATED ORAL at 11:58

## 2017-02-16 RX ADMIN — EZETIMIBE 5 MG: 10 TABLET ORAL at 21:21

## 2017-02-16 RX ADMIN — OXYCODONE HYDROCHLORIDE 10 MG: 10 TABLET ORAL at 02:52

## 2017-02-16 RX ADMIN — GABAPENTIN 100 MG: 100 CAPSULE ORAL at 21:21

## 2017-02-16 RX ADMIN — TRAMADOL HYDROCHLORIDE 50 MG: 50 TABLET, COATED ORAL at 17:09

## 2017-02-16 RX ADMIN — METOPROLOL SUCCINATE 25 MG: 25 TABLET, EXTENDED RELEASE ORAL at 09:20

## 2017-02-16 RX ADMIN — INSULIN LISPRO 2 UNITS: 100 INJECTION, SOLUTION INTRAVENOUS; SUBCUTANEOUS at 09:07

## 2017-02-16 RX ADMIN — MORPHINE SULFATE 2 MG: 2 INJECTION, SOLUTION INTRAMUSCULAR; INTRAVENOUS at 20:11

## 2017-02-16 RX ADMIN — FOLIC ACID 1 MG: 1 TABLET ORAL at 09:20

## 2017-02-16 RX ADMIN — LOSARTAN POTASSIUM 100 MG: 50 TABLET, FILM COATED ORAL at 09:20

## 2017-02-16 RX ADMIN — Medication 325 MG: at 09:20

## 2017-02-16 RX ADMIN — OXYCODONE HYDROCHLORIDE 10 MG: 10 TABLET ORAL at 13:34

## 2017-02-16 RX ADMIN — HYDROCHLOROTHIAZIDE 12.5 MG: 12.5 TABLET ORAL at 09:19

## 2017-02-16 RX ADMIN — GABAPENTIN 100 MG: 100 CAPSULE ORAL at 05:25

## 2017-02-16 RX ADMIN — DOCUSATE SODIUM 100 MG: 100 CAPSULE, LIQUID FILLED ORAL at 09:20

## 2017-02-16 RX ADMIN — INSULIN LISPRO 2 UNITS: 100 INJECTION, SOLUTION INTRAVENOUS; SUBCUTANEOUS at 11:53

## 2017-02-16 RX ADMIN — MORPHINE SULFATE 2 MG: 2 INJECTION, SOLUTION INTRAMUSCULAR; INTRAVENOUS at 15:41

## 2017-02-16 RX ADMIN — OXYCODONE HYDROCHLORIDE 10 MG: 10 TABLET ORAL at 18:01

## 2017-02-16 RX ADMIN — CEFAZOLIN SODIUM 1000 MG: 1 SOLUTION INTRAVENOUS at 05:25

## 2017-02-16 RX ADMIN — FLUOXETINE 60 MG: 20 CAPSULE ORAL at 09:11

## 2017-02-16 RX ADMIN — GABAPENTIN 100 MG: 100 CAPSULE ORAL at 13:34

## 2017-02-16 RX ADMIN — DOCUSATE SODIUM 100 MG: 100 CAPSULE, LIQUID FILLED ORAL at 17:09

## 2017-02-16 RX ADMIN — SODIUM CHLORIDE 125 ML/HR: 0.9 INJECTION, SOLUTION INTRAVENOUS at 02:59

## 2017-02-16 RX ADMIN — TRAMADOL HYDROCHLORIDE 50 MG: 50 TABLET, COATED ORAL at 05:25

## 2017-02-16 RX ADMIN — PANTOPRAZOLE SODIUM 40 MG: 40 TABLET, DELAYED RELEASE ORAL at 05:25

## 2017-02-16 RX ADMIN — ENOXAPARIN SODIUM 40 MG: 40 INJECTION SUBCUTANEOUS at 09:10

## 2017-02-17 VITALS
OXYGEN SATURATION: 94 % | HEART RATE: 95 BPM | HEIGHT: 69 IN | BODY MASS INDEX: 45.91 KG/M2 | DIASTOLIC BLOOD PRESSURE: 75 MMHG | RESPIRATION RATE: 18 BRPM | SYSTOLIC BLOOD PRESSURE: 142 MMHG | TEMPERATURE: 99 F | WEIGHT: 310 LBS

## 2017-02-17 LAB
GLUCOSE SERPL-MCNC: 182 MG/DL (ref 65–140)
GLUCOSE SERPL-MCNC: 189 MG/DL (ref 65–140)

## 2017-02-17 PROCEDURE — 97535 SELF CARE MNGMENT TRAINING: CPT

## 2017-02-17 PROCEDURE — 82948 REAGENT STRIP/BLOOD GLUCOSE: CPT

## 2017-02-17 PROCEDURE — 97530 THERAPEUTIC ACTIVITIES: CPT

## 2017-02-17 PROCEDURE — 97116 GAIT TRAINING THERAPY: CPT

## 2017-02-17 RX ORDER — TRAMADOL HYDROCHLORIDE 50 MG/1
50 TABLET ORAL EVERY 6 HOURS SCHEDULED
Qty: 40 TABLET | Refills: 0 | Status: ON HOLD
Start: 2017-02-17 | End: 2017-02-22

## 2017-02-17 RX ORDER — ACETAMINOPHEN 325 MG/1
650 TABLET ORAL EVERY 6 HOURS PRN
Qty: 30 TABLET | Refills: 0
Start: 2017-02-17 | End: 2017-02-18

## 2017-02-17 RX ORDER — OXYCODONE HYDROCHLORIDE 5 MG/1
5 TABLET ORAL EVERY 4 HOURS PRN
Qty: 30 TABLET | Refills: 0
Start: 2017-02-17 | End: 2017-02-22 | Stop reason: HOSPADM

## 2017-02-17 RX ADMIN — OXYCODONE HYDROCHLORIDE 5 MG: 5 TABLET ORAL at 08:43

## 2017-02-17 RX ADMIN — OXYCODONE HYDROCHLORIDE 10 MG: 10 TABLET ORAL at 16:11

## 2017-02-17 RX ADMIN — OXYCODONE HYDROCHLORIDE 5 MG: 5 TABLET ORAL at 10:25

## 2017-02-17 RX ADMIN — Medication 325 MG: at 08:37

## 2017-02-17 RX ADMIN — METOPROLOL SUCCINATE 25 MG: 25 TABLET, EXTENDED RELEASE ORAL at 08:37

## 2017-02-17 RX ADMIN — ZOLPIDEM TARTRATE 10 MG: 5 TABLET, FILM COATED ORAL at 00:03

## 2017-02-17 RX ADMIN — GABAPENTIN 100 MG: 100 CAPSULE ORAL at 14:44

## 2017-02-17 RX ADMIN — INSULIN LISPRO 1 UNITS: 100 INJECTION, SOLUTION INTRAVENOUS; SUBCUTANEOUS at 08:41

## 2017-02-17 RX ADMIN — GABAPENTIN 100 MG: 100 CAPSULE ORAL at 05:46

## 2017-02-17 RX ADMIN — ENOXAPARIN SODIUM 40 MG: 40 INJECTION SUBCUTANEOUS at 08:38

## 2017-02-17 RX ADMIN — Medication 1 TABLET: at 08:37

## 2017-02-17 RX ADMIN — INSULIN LISPRO 1 UNITS: 100 INJECTION, SOLUTION INTRAVENOUS; SUBCUTANEOUS at 12:47

## 2017-02-17 RX ADMIN — TRAMADOL HYDROCHLORIDE 50 MG: 50 TABLET, COATED ORAL at 12:10

## 2017-02-17 RX ADMIN — FOLIC ACID 1 MG: 1 TABLET ORAL at 08:37

## 2017-02-17 RX ADMIN — OXYCODONE HYDROCHLORIDE 10 MG: 10 TABLET ORAL at 04:05

## 2017-02-17 RX ADMIN — OXYCODONE HYDROCHLORIDE 10 MG: 10 TABLET ORAL at 00:04

## 2017-02-17 RX ADMIN — TRAMADOL HYDROCHLORIDE 50 MG: 50 TABLET, COATED ORAL at 05:47

## 2017-02-17 RX ADMIN — OXYCODONE HYDROCHLORIDE AND ACETAMINOPHEN 500 MG: 500 TABLET ORAL at 08:37

## 2017-02-17 RX ADMIN — TRAMADOL HYDROCHLORIDE 50 MG: 50 TABLET, COATED ORAL at 00:04

## 2017-02-17 RX ADMIN — LOSARTAN POTASSIUM 100 MG: 50 TABLET, FILM COATED ORAL at 08:37

## 2017-02-17 RX ADMIN — FLUOXETINE 60 MG: 20 CAPSULE ORAL at 08:40

## 2017-02-17 RX ADMIN — SENNOSIDES 8.6 MG: 8.6 TABLET, FILM COATED ORAL at 08:37

## 2017-02-17 RX ADMIN — HYDROCHLOROTHIAZIDE 12.5 MG: 12.5 TABLET ORAL at 08:38

## 2017-02-17 RX ADMIN — DOCUSATE SODIUM 100 MG: 100 CAPSULE, LIQUID FILLED ORAL at 08:37

## 2017-02-17 RX ADMIN — PANTOPRAZOLE SODIUM 40 MG: 40 TABLET, DELAYED RELEASE ORAL at 05:47

## 2017-02-18 ENCOUNTER — HOSPITAL ENCOUNTER (OUTPATIENT)
Facility: HOSPITAL | Age: 62
Setting detail: OBSERVATION
Discharge: NURSING, REHAB OR LONG-TERM CARE | End: 2017-02-22
Attending: EMERGENCY MEDICINE | Admitting: INTERNAL MEDICINE
Payer: COMMERCIAL

## 2017-02-18 ENCOUNTER — APPOINTMENT (EMERGENCY)
Dept: RADIOLOGY | Facility: HOSPITAL | Age: 62
End: 2017-02-18
Payer: COMMERCIAL

## 2017-02-18 ENCOUNTER — GENERIC CONVERSION - ENCOUNTER (OUTPATIENT)
Dept: OTHER | Facility: OTHER | Age: 62
End: 2017-02-18

## 2017-02-18 DIAGNOSIS — R26.2 AMBULATORY DYSFUNCTION: Primary | ICD-10-CM

## 2017-02-18 PROBLEM — S89.90XA KNEE INJURY: Status: ACTIVE | Noted: 2017-02-18

## 2017-02-18 LAB
ABO GROUP BLD BPU: NORMAL
ABO GROUP BLD BPU: NORMAL
ANION GAP SERPL CALCULATED.3IONS-SCNC: 10 MMOL/L (ref 4–13)
ATRIAL RATE: 106 BPM
BASOPHILS # BLD AUTO: 0.02 THOUSANDS/ΜL (ref 0–0.1)
BASOPHILS NFR BLD AUTO: 0 % (ref 0–1)
BPU ID: NORMAL
BPU ID: NORMAL
BUN SERPL-MCNC: 10 MG/DL (ref 5–25)
CALCIUM SERPL-MCNC: 9.1 MG/DL (ref 8.3–10.1)
CHLORIDE SERPL-SCNC: 99 MMOL/L (ref 100–108)
CO2 SERPL-SCNC: 26 MMOL/L (ref 21–32)
CREAT SERPL-MCNC: 0.88 MG/DL (ref 0.6–1.3)
EOSINOPHIL # BLD AUTO: 0.15 THOUSAND/ΜL (ref 0–0.61)
EOSINOPHIL NFR BLD AUTO: 1 % (ref 0–6)
ERYTHROCYTE [DISTWIDTH] IN BLOOD BY AUTOMATED COUNT: 13.5 % (ref 11.6–15.1)
GFR SERPL CREATININE-BSD FRML MDRD: >60 ML/MIN/1.73SQ M
GLUCOSE SERPL-MCNC: 174 MG/DL (ref 65–140)
HCT VFR BLD AUTO: 35.6 % (ref 36.5–49.3)
HGB BLD-MCNC: 12.3 G/DL (ref 12–17)
LYMPHOCYTES # BLD AUTO: 1.33 THOUSANDS/ΜL (ref 0.6–4.47)
LYMPHOCYTES NFR BLD AUTO: 13 % (ref 14–44)
MCH RBC QN AUTO: 28.9 PG (ref 26.8–34.3)
MCHC RBC AUTO-ENTMCNC: 34.6 G/DL (ref 31.4–37.4)
MCV RBC AUTO: 84 FL (ref 82–98)
MONOCYTES # BLD AUTO: 0.95 THOUSAND/ΜL (ref 0.17–1.22)
MONOCYTES NFR BLD AUTO: 9 % (ref 4–12)
NEUTROPHILS # BLD AUTO: 7.95 THOUSANDS/ΜL (ref 1.85–7.62)
NEUTS SEG NFR BLD AUTO: 77 % (ref 43–75)
NRBC BLD AUTO-RTO: 0 /100 WBCS
P AXIS: 45 DEGREES
PLATELET # BLD AUTO: 240 THOUSANDS/UL (ref 149–390)
PMV BLD AUTO: 9.2 FL (ref 8.9–12.7)
POTASSIUM SERPL-SCNC: 3.6 MMOL/L (ref 3.5–5.3)
PR INTERVAL: 166 MS
QRS AXIS: 31 DEGREES
QRSD INTERVAL: 92 MS
QT INTERVAL: 332 MS
QTC INTERVAL: 441 MS
RBC # BLD AUTO: 4.26 MILLION/UL (ref 3.88–5.62)
SODIUM SERPL-SCNC: 135 MMOL/L (ref 136–145)
SPECIMEN SOURCE: NORMAL
T WAVE AXIS: 28 DEGREES
TROPONIN I BLD-MCNC: 0 NG/ML (ref 0–0.08)
UNIT DISPENSE STATUS: NORMAL
UNIT DISPENSE STATUS: NORMAL
UNIT PRODUCT CODE: NORMAL
UNIT PRODUCT CODE: NORMAL
UNIT RH: NORMAL
UNIT RH: NORMAL
VENTRICULAR RATE: 106 BPM
WBC # BLD AUTO: 10.43 THOUSAND/UL (ref 4.31–10.16)

## 2017-02-18 PROCEDURE — 80048 BASIC METABOLIC PNL TOTAL CA: CPT | Performed by: EMERGENCY MEDICINE

## 2017-02-18 PROCEDURE — 85025 COMPLETE CBC W/AUTO DIFF WBC: CPT | Performed by: EMERGENCY MEDICINE

## 2017-02-18 PROCEDURE — 36415 COLL VENOUS BLD VENIPUNCTURE: CPT | Performed by: EMERGENCY MEDICINE

## 2017-02-18 PROCEDURE — 73560 X-RAY EXAM OF KNEE 1 OR 2: CPT

## 2017-02-18 PROCEDURE — 99285 EMERGENCY DEPT VISIT HI MDM: CPT

## 2017-02-18 PROCEDURE — 93005 ELECTROCARDIOGRAM TRACING: CPT | Performed by: EMERGENCY MEDICINE

## 2017-02-18 PROCEDURE — 84484 ASSAY OF TROPONIN QUANT: CPT

## 2017-02-18 RX ORDER — TRAMADOL HYDROCHLORIDE 50 MG/1
50 TABLET ORAL EVERY 6 HOURS PRN
Status: DISCONTINUED | OUTPATIENT
Start: 2017-02-18 | End: 2017-02-22 | Stop reason: HOSPADM

## 2017-02-18 RX ORDER — LOSARTAN POTASSIUM 50 MG/1
100 TABLET ORAL DAILY
Status: DISCONTINUED | OUTPATIENT
Start: 2017-02-19 | End: 2017-02-22 | Stop reason: HOSPADM

## 2017-02-18 RX ORDER — ZOLPIDEM TARTRATE 5 MG/1
10 TABLET ORAL
Status: DISCONTINUED | OUTPATIENT
Start: 2017-02-18 | End: 2017-02-22 | Stop reason: HOSPADM

## 2017-02-18 RX ORDER — ONDANSETRON 2 MG/ML
4 INJECTION INTRAMUSCULAR; INTRAVENOUS EVERY 6 HOURS PRN
Status: DISCONTINUED | OUTPATIENT
Start: 2017-02-18 | End: 2017-02-22 | Stop reason: HOSPADM

## 2017-02-18 RX ORDER — PANTOPRAZOLE SODIUM 40 MG/1
40 TABLET, DELAYED RELEASE ORAL
Status: DISCONTINUED | OUTPATIENT
Start: 2017-02-19 | End: 2017-02-22 | Stop reason: HOSPADM

## 2017-02-18 RX ORDER — ACETAMINOPHEN 325 MG/1
650 TABLET ORAL EVERY 6 HOURS PRN
Status: DISCONTINUED | OUTPATIENT
Start: 2017-02-18 | End: 2017-02-22 | Stop reason: HOSPADM

## 2017-02-18 RX ORDER — PRAVASTATIN SODIUM 40 MG
40 TABLET ORAL
Status: DISCONTINUED | OUTPATIENT
Start: 2017-02-19 | End: 2017-02-22 | Stop reason: HOSPADM

## 2017-02-18 RX ORDER — EZETIMIBE 10 MG/1
5 TABLET ORAL
Status: DISCONTINUED | OUTPATIENT
Start: 2017-02-18 | End: 2017-02-22 | Stop reason: HOSPADM

## 2017-02-18 RX ORDER — OLANZAPINE 5 MG/1
5 TABLET ORAL
Status: DISCONTINUED | OUTPATIENT
Start: 2017-02-18 | End: 2017-02-22 | Stop reason: HOSPADM

## 2017-02-18 RX ORDER — MAGNESIUM HYDROXIDE/ALUMINUM HYDROXICE/SIMETHICONE 120; 1200; 1200 MG/30ML; MG/30ML; MG/30ML
30 SUSPENSION ORAL EVERY 6 HOURS PRN
Status: DISCONTINUED | OUTPATIENT
Start: 2017-02-18 | End: 2017-02-22 | Stop reason: HOSPADM

## 2017-02-18 RX ORDER — GABAPENTIN 100 MG/1
100 CAPSULE ORAL EVERY 8 HOURS SCHEDULED
Status: DISCONTINUED | OUTPATIENT
Start: 2017-02-18 | End: 2017-02-22 | Stop reason: HOSPADM

## 2017-02-18 RX ORDER — METOPROLOL SUCCINATE 25 MG/1
25 TABLET, EXTENDED RELEASE ORAL DAILY
Status: DISCONTINUED | OUTPATIENT
Start: 2017-02-19 | End: 2017-02-22 | Stop reason: HOSPADM

## 2017-02-18 RX ORDER — FOLIC ACID 1 MG/1
1 TABLET ORAL DAILY
Status: DISCONTINUED | OUTPATIENT
Start: 2017-02-19 | End: 2017-02-22 | Stop reason: HOSPADM

## 2017-02-18 RX ORDER — ALPRAZOLAM 0.25 MG/1
0.25 TABLET ORAL 3 TIMES DAILY PRN
Status: DISCONTINUED | OUTPATIENT
Start: 2017-02-18 | End: 2017-02-22 | Stop reason: HOSPADM

## 2017-02-18 RX ORDER — HYDROCHLOROTHIAZIDE 12.5 MG/1
12.5 TABLET ORAL 2 TIMES DAILY
Status: DISCONTINUED | OUTPATIENT
Start: 2017-02-19 | End: 2017-02-22 | Stop reason: HOSPADM

## 2017-02-18 RX ORDER — CHLORAL HYDRATE 500 MG
1000 CAPSULE ORAL DAILY
Status: DISCONTINUED | OUTPATIENT
Start: 2017-02-19 | End: 2017-02-22 | Stop reason: HOSPADM

## 2017-02-18 RX ORDER — FLUOXETINE HYDROCHLORIDE 20 MG/1
60 CAPSULE ORAL DAILY
Status: DISCONTINUED | OUTPATIENT
Start: 2017-02-19 | End: 2017-02-22 | Stop reason: HOSPADM

## 2017-02-18 RX ORDER — OXYCODONE HYDROCHLORIDE 5 MG/1
5 TABLET ORAL EVERY 4 HOURS PRN
Status: DISCONTINUED | OUTPATIENT
Start: 2017-02-18 | End: 2017-02-22 | Stop reason: HOSPADM

## 2017-02-18 RX ORDER — DOCUSATE SODIUM 100 MG/1
100 CAPSULE, LIQUID FILLED ORAL 2 TIMES DAILY PRN
Status: DISCONTINUED | OUTPATIENT
Start: 2017-02-18 | End: 2017-02-22 | Stop reason: HOSPADM

## 2017-02-18 RX ORDER — FERROUS SULFATE 325(65) MG
325 TABLET ORAL
Status: DISCONTINUED | OUTPATIENT
Start: 2017-02-19 | End: 2017-02-22 | Stop reason: HOSPADM

## 2017-02-18 RX ORDER — ASCORBIC ACID 500 MG
500 TABLET ORAL DAILY
Status: DISCONTINUED | OUTPATIENT
Start: 2017-02-19 | End: 2017-02-22 | Stop reason: HOSPADM

## 2017-02-18 RX ORDER — FLUOXETINE HYDROCHLORIDE 20 MG/1
20 CAPSULE ORAL DAILY
Status: DISCONTINUED | OUTPATIENT
Start: 2017-02-19 | End: 2017-02-18 | Stop reason: DRUGHIGH

## 2017-02-18 RX ADMIN — GABAPENTIN 100 MG: 100 CAPSULE ORAL at 23:17

## 2017-02-18 RX ADMIN — ZOLPIDEM TARTRATE 10 MG: 5 TABLET, FILM COATED ORAL at 23:17

## 2017-02-19 LAB
GLUCOSE SERPL-MCNC: 153 MG/DL (ref 65–140)
GLUCOSE SERPL-MCNC: 187 MG/DL (ref 65–140)
GLUCOSE SERPL-MCNC: 257 MG/DL (ref 65–140)

## 2017-02-19 PROCEDURE — G8979 MOBILITY GOAL STATUS: HCPCS

## 2017-02-19 PROCEDURE — G8978 MOBILITY CURRENT STATUS: HCPCS

## 2017-02-19 PROCEDURE — 82948 REAGENT STRIP/BLOOD GLUCOSE: CPT

## 2017-02-19 PROCEDURE — 97161 PT EVAL LOW COMPLEX 20 MIN: CPT

## 2017-02-19 RX ADMIN — Medication 1 TABLET: at 10:15

## 2017-02-19 RX ADMIN — LOSARTAN POTASSIUM 100 MG: 50 TABLET, FILM COATED ORAL at 10:15

## 2017-02-19 RX ADMIN — OLANZAPINE 5 MG: 5 TABLET, FILM COATED ORAL at 22:25

## 2017-02-19 RX ADMIN — Medication 1000 MG: at 10:16

## 2017-02-19 RX ADMIN — OXYCODONE HYDROCHLORIDE AND ACETAMINOPHEN 500 MG: 500 TABLET ORAL at 12:32

## 2017-02-19 RX ADMIN — FLUOXETINE 60 MG: 20 CAPSULE ORAL at 10:16

## 2017-02-19 RX ADMIN — METOPROLOL SUCCINATE 25 MG: 25 TABLET, EXTENDED RELEASE ORAL at 10:16

## 2017-02-19 RX ADMIN — INSULIN LISPRO 1 UNITS: 100 INJECTION, SOLUTION INTRAVENOUS; SUBCUTANEOUS at 22:27

## 2017-02-19 RX ADMIN — PRAVASTATIN SODIUM 40 MG: 40 TABLET ORAL at 17:16

## 2017-02-19 RX ADMIN — PANTOPRAZOLE SODIUM 40 MG: 40 TABLET, DELAYED RELEASE ORAL at 06:58

## 2017-02-19 RX ADMIN — EZETIMIBE 5 MG: 10 TABLET ORAL at 22:25

## 2017-02-19 RX ADMIN — ACETAMINOPHEN 650 MG: 325 TABLET, FILM COATED ORAL at 22:24

## 2017-02-19 RX ADMIN — OLANZAPINE 5 MG: 5 TABLET, FILM COATED ORAL at 00:34

## 2017-02-19 RX ADMIN — EZETIMIBE 5 MG: 10 TABLET ORAL at 00:35

## 2017-02-19 RX ADMIN — ACETAMINOPHEN 650 MG: 325 TABLET, FILM COATED ORAL at 15:28

## 2017-02-19 RX ADMIN — TRAMADOL HYDROCHLORIDE 50 MG: 50 TABLET, COATED ORAL at 18:13

## 2017-02-19 RX ADMIN — ACETAMINOPHEN 650 MG: 325 TABLET, FILM COATED ORAL at 10:19

## 2017-02-19 RX ADMIN — HYDROCHLOROTHIAZIDE 12.5 MG: 12.5 TABLET ORAL at 10:16

## 2017-02-19 RX ADMIN — GABAPENTIN 100 MG: 100 CAPSULE ORAL at 15:34

## 2017-02-19 RX ADMIN — HYDROCHLOROTHIAZIDE 12.5 MG: 12.5 TABLET ORAL at 17:16

## 2017-02-19 RX ADMIN — INSULIN LISPRO 2 UNITS: 100 INJECTION, SOLUTION INTRAVENOUS; SUBCUTANEOUS at 12:29

## 2017-02-19 RX ADMIN — Medication 325 MG: at 10:16

## 2017-02-19 RX ADMIN — ZOLPIDEM TARTRATE 10 MG: 5 TABLET, FILM COATED ORAL at 22:24

## 2017-02-19 RX ADMIN — GABAPENTIN 100 MG: 100 CAPSULE ORAL at 22:24

## 2017-02-19 RX ADMIN — INSULIN LISPRO 1 UNITS: 100 INJECTION, SOLUTION INTRAVENOUS; SUBCUTANEOUS at 17:16

## 2017-02-19 RX ADMIN — FOLIC ACID 1 MG: 1 TABLET ORAL at 10:16

## 2017-02-19 RX ADMIN — TRAMADOL HYDROCHLORIDE 50 MG: 50 TABLET, COATED ORAL at 12:27

## 2017-02-19 RX ADMIN — ENOXAPARIN SODIUM 40 MG: 40 INJECTION SUBCUTANEOUS at 10:14

## 2017-02-19 RX ADMIN — GABAPENTIN 100 MG: 100 CAPSULE ORAL at 06:58

## 2017-02-20 LAB
GLUCOSE SERPL-MCNC: 144 MG/DL (ref 65–140)
GLUCOSE SERPL-MCNC: 152 MG/DL (ref 65–140)
GLUCOSE SERPL-MCNC: 236 MG/DL (ref 65–140)

## 2017-02-20 PROCEDURE — 97166 OT EVAL MOD COMPLEX 45 MIN: CPT

## 2017-02-20 PROCEDURE — G8987 SELF CARE CURRENT STATUS: HCPCS

## 2017-02-20 PROCEDURE — 97116 GAIT TRAINING THERAPY: CPT

## 2017-02-20 PROCEDURE — 82948 REAGENT STRIP/BLOOD GLUCOSE: CPT

## 2017-02-20 PROCEDURE — G8988 SELF CARE GOAL STATUS: HCPCS

## 2017-02-20 PROCEDURE — 97110 THERAPEUTIC EXERCISES: CPT

## 2017-02-20 PROCEDURE — 97530 THERAPEUTIC ACTIVITIES: CPT

## 2017-02-20 RX ORDER — TRAMADOL HYDROCHLORIDE 50 MG/1
50 TABLET ORAL EVERY 6 HOURS SCHEDULED
Qty: 40 TABLET | Refills: 0 | Status: CANCELLED | OUTPATIENT
Start: 2017-02-20 | End: 2017-03-02

## 2017-02-20 RX ORDER — OXYCODONE HYDROCHLORIDE 5 MG/1
5 TABLET ORAL EVERY 4 HOURS PRN
Qty: 30 TABLET | Refills: 0 | Status: CANCELLED | OUTPATIENT
Start: 2017-02-20 | End: 2017-03-02

## 2017-02-20 RX ADMIN — TRAMADOL HYDROCHLORIDE 50 MG: 50 TABLET, COATED ORAL at 22:09

## 2017-02-20 RX ADMIN — OXYCODONE HYDROCHLORIDE 5 MG: 5 TABLET ORAL at 13:42

## 2017-02-20 RX ADMIN — PRAVASTATIN SODIUM 40 MG: 40 TABLET ORAL at 22:12

## 2017-02-20 RX ADMIN — TRAMADOL HYDROCHLORIDE 50 MG: 50 TABLET, COATED ORAL at 01:37

## 2017-02-20 RX ADMIN — Medication 1000 MG: at 08:30

## 2017-02-20 RX ADMIN — Medication 325 MG: at 08:29

## 2017-02-20 RX ADMIN — HYDROCHLOROTHIAZIDE 12.5 MG: 12.5 TABLET ORAL at 08:32

## 2017-02-20 RX ADMIN — HYDROCHLOROTHIAZIDE 12.5 MG: 12.5 TABLET ORAL at 16:41

## 2017-02-20 RX ADMIN — Medication 1 TABLET: at 08:30

## 2017-02-20 RX ADMIN — INSULIN LISPRO 1 UNITS: 100 INJECTION, SOLUTION INTRAVENOUS; SUBCUTANEOUS at 08:37

## 2017-02-20 RX ADMIN — EZETIMIBE 5 MG: 10 TABLET ORAL at 22:40

## 2017-02-20 RX ADMIN — INSULIN LISPRO 1 UNITS: 100 INJECTION, SOLUTION INTRAVENOUS; SUBCUTANEOUS at 16:42

## 2017-02-20 RX ADMIN — OLANZAPINE 5 MG: 5 TABLET, FILM COATED ORAL at 22:40

## 2017-02-20 RX ADMIN — ENOXAPARIN SODIUM 40 MG: 40 INJECTION SUBCUTANEOUS at 08:29

## 2017-02-20 RX ADMIN — ACETAMINOPHEN 650 MG: 325 TABLET, FILM COATED ORAL at 05:59

## 2017-02-20 RX ADMIN — METFORMIN HYDROCHLORIDE 500 MG: 500 TABLET, FILM COATED ORAL at 08:29

## 2017-02-20 RX ADMIN — FOLIC ACID 1 MG: 1 TABLET ORAL at 08:29

## 2017-02-20 RX ADMIN — FLUOXETINE 60 MG: 20 CAPSULE ORAL at 08:29

## 2017-02-20 RX ADMIN — METOPROLOL SUCCINATE 25 MG: 25 TABLET, EXTENDED RELEASE ORAL at 08:32

## 2017-02-20 RX ADMIN — ZOLPIDEM TARTRATE 10 MG: 5 TABLET, FILM COATED ORAL at 22:08

## 2017-02-20 RX ADMIN — METFORMIN HYDROCHLORIDE 500 MG: 500 TABLET, FILM COATED ORAL at 16:41

## 2017-02-20 RX ADMIN — GABAPENTIN 100 MG: 100 CAPSULE ORAL at 13:42

## 2017-02-20 RX ADMIN — OXYCODONE HYDROCHLORIDE 5 MG: 5 TABLET ORAL at 08:32

## 2017-02-20 RX ADMIN — PANTOPRAZOLE SODIUM 40 MG: 40 TABLET, DELAYED RELEASE ORAL at 05:59

## 2017-02-20 RX ADMIN — INSULIN LISPRO 2 UNITS: 100 INJECTION, SOLUTION INTRAVENOUS; SUBCUTANEOUS at 11:54

## 2017-02-20 RX ADMIN — GABAPENTIN 100 MG: 100 CAPSULE ORAL at 05:59

## 2017-02-20 RX ADMIN — LOSARTAN POTASSIUM 100 MG: 50 TABLET, FILM COATED ORAL at 08:33

## 2017-02-20 RX ADMIN — GABAPENTIN 100 MG: 100 CAPSULE ORAL at 22:08

## 2017-02-21 LAB
GLUCOSE SERPL-MCNC: 142 MG/DL (ref 65–140)
GLUCOSE SERPL-MCNC: 155 MG/DL (ref 65–140)
GLUCOSE SERPL-MCNC: 163 MG/DL (ref 65–140)
GLUCOSE SERPL-MCNC: 253 MG/DL (ref 65–140)

## 2017-02-21 PROCEDURE — 97116 GAIT TRAINING THERAPY: CPT

## 2017-02-21 PROCEDURE — 97110 THERAPEUTIC EXERCISES: CPT

## 2017-02-21 PROCEDURE — 97530 THERAPEUTIC ACTIVITIES: CPT

## 2017-02-21 PROCEDURE — 82948 REAGENT STRIP/BLOOD GLUCOSE: CPT

## 2017-02-21 PROCEDURE — 97535 SELF CARE MNGMENT TRAINING: CPT

## 2017-02-21 RX ORDER — TRAMADOL HYDROCHLORIDE 50 MG/1
TABLET ORAL
Status: COMPLETED
Start: 2017-02-21 | End: 2017-02-21

## 2017-02-21 RX ORDER — GABAPENTIN 100 MG/1
CAPSULE ORAL
Status: COMPLETED
Start: 2017-02-21 | End: 2017-02-21

## 2017-02-21 RX ORDER — ZOLPIDEM TARTRATE 5 MG/1
TABLET ORAL
Status: COMPLETED
Start: 2017-02-21 | End: 2017-02-21

## 2017-02-21 RX ORDER — OXYCODONE HYDROCHLORIDE 5 MG/1
TABLET ORAL
Status: COMPLETED
Start: 2017-02-21 | End: 2017-02-21

## 2017-02-21 RX ORDER — NYSTATIN 100000 [USP'U]/G
1 POWDER TOPICAL 3 TIMES DAILY PRN
Status: DISCONTINUED | OUTPATIENT
Start: 2017-02-21 | End: 2017-02-22 | Stop reason: HOSPADM

## 2017-02-21 RX ADMIN — OXYCODONE HYDROCHLORIDE 5 MG: 5 TABLET ORAL at 21:52

## 2017-02-21 RX ADMIN — GABAPENTIN 100 MG: 100 CAPSULE ORAL at 22:01

## 2017-02-21 RX ADMIN — DOCUSATE SODIUM 100 MG: 100 CAPSULE, LIQUID FILLED ORAL at 08:37

## 2017-02-21 RX ADMIN — Medication 1 TABLET: at 08:35

## 2017-02-21 RX ADMIN — METFORMIN HYDROCHLORIDE 500 MG: 500 TABLET, FILM COATED ORAL at 16:43

## 2017-02-21 RX ADMIN — INSULIN LISPRO 1 UNITS: 100 INJECTION, SOLUTION INTRAVENOUS; SUBCUTANEOUS at 08:30

## 2017-02-21 RX ADMIN — ZOLPIDEM TARTRATE 10 MG: 5 TABLET, FILM COATED ORAL at 22:01

## 2017-02-21 RX ADMIN — OXYCODONE HYDROCHLORIDE 5 MG: 5 TABLET ORAL at 12:48

## 2017-02-21 RX ADMIN — DOCUSATE SODIUM 100 MG: 100 CAPSULE, LIQUID FILLED ORAL at 16:43

## 2017-02-21 RX ADMIN — FOLIC ACID 1 MG: 1 TABLET ORAL at 08:36

## 2017-02-21 RX ADMIN — GABAPENTIN 100 MG: 100 CAPSULE ORAL at 14:16

## 2017-02-21 RX ADMIN — HYDROCHLOROTHIAZIDE 12.5 MG: 12.5 TABLET ORAL at 08:42

## 2017-02-21 RX ADMIN — OXYCODONE HYDROCHLORIDE 5 MG: 5 TABLET ORAL at 16:19

## 2017-02-21 RX ADMIN — OLANZAPINE 5 MG: 5 TABLET, FILM COATED ORAL at 22:00

## 2017-02-21 RX ADMIN — INSULIN LISPRO 1 UNITS: 100 INJECTION, SOLUTION INTRAVENOUS; SUBCUTANEOUS at 16:43

## 2017-02-21 RX ADMIN — HYDROCHLOROTHIAZIDE 12.5 MG: 12.5 TABLET ORAL at 16:45

## 2017-02-21 RX ADMIN — ENOXAPARIN SODIUM 40 MG: 40 INJECTION SUBCUTANEOUS at 08:35

## 2017-02-21 RX ADMIN — PANTOPRAZOLE SODIUM 40 MG: 40 TABLET, DELAYED RELEASE ORAL at 05:47

## 2017-02-21 RX ADMIN — OXYCODONE HYDROCHLORIDE 5 MG: 5 TABLET ORAL at 08:36

## 2017-02-21 RX ADMIN — METFORMIN HYDROCHLORIDE 500 MG: 500 TABLET, FILM COATED ORAL at 08:35

## 2017-02-21 RX ADMIN — NYSTATIN 1 APPLICATION: 100000 POWDER TOPICAL at 16:18

## 2017-02-21 RX ADMIN — Medication 1000 MG: at 08:37

## 2017-02-21 RX ADMIN — LOSARTAN POTASSIUM 100 MG: 50 TABLET, FILM COATED ORAL at 08:42

## 2017-02-21 RX ADMIN — METOPROLOL SUCCINATE 25 MG: 25 TABLET, EXTENDED RELEASE ORAL at 08:42

## 2017-02-21 RX ADMIN — GABAPENTIN 100 MG: 100 CAPSULE ORAL at 05:47

## 2017-02-21 RX ADMIN — PRAVASTATIN SODIUM 40 MG: 40 TABLET ORAL at 16:43

## 2017-02-21 RX ADMIN — EZETIMIBE 5 MG: 10 TABLET ORAL at 22:00

## 2017-02-21 RX ADMIN — Medication 325 MG: at 08:36

## 2017-02-21 RX ADMIN — FLUOXETINE 60 MG: 20 CAPSULE ORAL at 08:37

## 2017-02-21 RX ADMIN — TRAMADOL HYDROCHLORIDE 50 MG: 50 TABLET, COATED ORAL at 19:47

## 2017-02-21 RX ADMIN — INSULIN LISPRO 2 UNITS: 100 INJECTION, SOLUTION INTRAVENOUS; SUBCUTANEOUS at 12:05

## 2017-02-22 VITALS
HEIGHT: 68 IN | TEMPERATURE: 98.7 F | SYSTOLIC BLOOD PRESSURE: 121 MMHG | DIASTOLIC BLOOD PRESSURE: 93 MMHG | BODY MASS INDEX: 46.11 KG/M2 | WEIGHT: 304.24 LBS | RESPIRATION RATE: 18 BRPM | OXYGEN SATURATION: 97 % | HEART RATE: 97 BPM

## 2017-02-22 LAB
GLUCOSE SERPL-MCNC: 172 MG/DL (ref 65–140)
GLUCOSE SERPL-MCNC: 200 MG/DL (ref 65–140)

## 2017-02-22 PROCEDURE — 97116 GAIT TRAINING THERAPY: CPT

## 2017-02-22 PROCEDURE — 82948 REAGENT STRIP/BLOOD GLUCOSE: CPT

## 2017-02-22 PROCEDURE — 97110 THERAPEUTIC EXERCISES: CPT

## 2017-02-22 PROCEDURE — 97535 SELF CARE MNGMENT TRAINING: CPT

## 2017-02-22 RX ORDER — POTASSIUM CHLORIDE 20 MEQ/1
40 TABLET, EXTENDED RELEASE ORAL ONCE
Status: CANCELLED | OUTPATIENT
Start: 2017-02-22 | End: 2017-02-22

## 2017-02-22 RX ORDER — ALPRAZOLAM 0.25 MG/1
0.25 TABLET ORAL 3 TIMES DAILY PRN
Qty: 15 TABLET | Refills: 0 | Status: SHIPPED | OUTPATIENT
Start: 2017-02-22 | End: 2018-04-26 | Stop reason: SDUPTHER

## 2017-02-22 RX ORDER — ZOLPIDEM TARTRATE 10 MG/1
10 TABLET ORAL
Qty: 20 TABLET | Refills: 0 | Status: SHIPPED | OUTPATIENT
Start: 2017-02-22 | End: 2018-04-09 | Stop reason: SDUPTHER

## 2017-02-22 RX ORDER — TRAMADOL HYDROCHLORIDE 50 MG/1
50 TABLET ORAL EVERY 6 HOURS SCHEDULED
Qty: 40 TABLET | Refills: 0
Start: 2017-02-22 | End: 2017-03-04

## 2017-02-22 RX ORDER — GABAPENTIN 100 MG/1
100 CAPSULE ORAL EVERY 8 HOURS SCHEDULED
Qty: 90 CAPSULE | Refills: 0
Start: 2017-02-22 | End: 2017-08-20 | Stop reason: ALTCHOICE

## 2017-02-22 RX ORDER — PANTOPRAZOLE SODIUM 40 MG/1
40 TABLET, DELAYED RELEASE ORAL
Qty: 30 TABLET | Refills: 0 | Status: SHIPPED | OUTPATIENT
Start: 2017-02-22 | End: 2017-08-20 | Stop reason: ALTCHOICE

## 2017-02-22 RX ORDER — OXYCODONE HYDROCHLORIDE 5 MG/1
5 TABLET ORAL EVERY 4 HOURS PRN
Qty: 30 TABLET | Refills: 0 | Status: SHIPPED | OUTPATIENT
Start: 2017-02-22 | End: 2017-02-27

## 2017-02-22 RX ADMIN — HYDROCHLOROTHIAZIDE 12.5 MG: 12.5 TABLET ORAL at 08:20

## 2017-02-22 RX ADMIN — Medication 1 TABLET: at 08:20

## 2017-02-22 RX ADMIN — OXYCODONE HYDROCHLORIDE 5 MG: 5 TABLET ORAL at 11:50

## 2017-02-22 RX ADMIN — METOPROLOL SUCCINATE 25 MG: 25 TABLET, EXTENDED RELEASE ORAL at 08:20

## 2017-02-22 RX ADMIN — TRAMADOL HYDROCHLORIDE 50 MG: 50 TABLET, COATED ORAL at 08:17

## 2017-02-22 RX ADMIN — FLUOXETINE 60 MG: 20 CAPSULE ORAL at 08:20

## 2017-02-22 RX ADMIN — PANTOPRAZOLE SODIUM 40 MG: 40 TABLET, DELAYED RELEASE ORAL at 05:06

## 2017-02-22 RX ADMIN — ACETAMINOPHEN 650 MG: 325 TABLET, FILM COATED ORAL at 11:50

## 2017-02-22 RX ADMIN — INSULIN LISPRO 1 UNITS: 100 INJECTION, SOLUTION INTRAVENOUS; SUBCUTANEOUS at 11:53

## 2017-02-22 RX ADMIN — INSULIN LISPRO 1 UNITS: 100 INJECTION, SOLUTION INTRAVENOUS; SUBCUTANEOUS at 08:21

## 2017-02-22 RX ADMIN — TRAMADOL HYDROCHLORIDE 50 MG: 50 TABLET, COATED ORAL at 01:17

## 2017-02-22 RX ADMIN — Medication 1000 MG: at 08:21

## 2017-02-22 RX ADMIN — OXYCODONE HYDROCHLORIDE AND ACETAMINOPHEN 500 MG: 500 TABLET ORAL at 11:50

## 2017-02-22 RX ADMIN — LOSARTAN POTASSIUM 100 MG: 50 TABLET, FILM COATED ORAL at 08:20

## 2017-02-22 RX ADMIN — DOCUSATE SODIUM 100 MG: 100 CAPSULE, LIQUID FILLED ORAL at 08:27

## 2017-02-22 RX ADMIN — ENOXAPARIN SODIUM 40 MG: 40 INJECTION SUBCUTANEOUS at 08:20

## 2017-02-22 RX ADMIN — GABAPENTIN 100 MG: 100 CAPSULE ORAL at 05:07

## 2017-02-22 RX ADMIN — METFORMIN HYDROCHLORIDE 500 MG: 500 TABLET, FILM COATED ORAL at 08:20

## 2017-02-22 RX ADMIN — Medication 325 MG: at 08:20

## 2017-02-22 RX ADMIN — FOLIC ACID 1 MG: 1 TABLET ORAL at 08:20

## 2017-02-22 RX ADMIN — TRAMADOL HYDROCHLORIDE 50 MG: 50 TABLET, COATED ORAL at 13:59

## 2017-02-24 ENCOUNTER — ALLSCRIPTS OFFICE VISIT (OUTPATIENT)
Dept: OTHER | Facility: OTHER | Age: 62
End: 2017-02-24

## 2017-02-24 ENCOUNTER — HOSPITAL ENCOUNTER (OUTPATIENT)
Dept: RADIOLOGY | Facility: HOSPITAL | Age: 62
Discharge: HOME | End: 2017-02-24
Attending: ORTHOPAEDIC SURGERY
Payer: COMMERCIAL

## 2017-02-24 DIAGNOSIS — Z48.89 ENCOUNTER FOR OTHER SPECIFIED SURGICAL AFTERCARE: ICD-10-CM

## 2017-02-24 PROCEDURE — 73560 X-RAY EXAM OF KNEE 1 OR 2: CPT

## 2017-03-02 ENCOUNTER — ALLSCRIPTS OFFICE VISIT (OUTPATIENT)
Dept: OTHER | Facility: OTHER | Age: 62
End: 2017-03-02

## 2017-03-07 ENCOUNTER — ALLSCRIPTS OFFICE VISIT (OUTPATIENT)
Dept: OTHER | Facility: OTHER | Age: 62
End: 2017-03-07

## 2017-03-14 ENCOUNTER — APPOINTMENT (OUTPATIENT)
Dept: PHYSICAL THERAPY | Facility: REHABILITATION | Age: 62
End: 2017-03-14
Attending: ORTHOPAEDIC SURGERY
Payer: COMMERCIAL

## 2017-03-15 ENCOUNTER — APPOINTMENT (OUTPATIENT)
Dept: PHYSICAL THERAPY | Facility: REHABILITATION | Age: 62
End: 2017-03-15
Attending: ORTHOPAEDIC SURGERY
Payer: COMMERCIAL

## 2017-03-20 ENCOUNTER — APPOINTMENT (OUTPATIENT)
Dept: LAB | Age: 62
End: 2017-03-20
Payer: COMMERCIAL

## 2017-03-20 ENCOUNTER — TRANSCRIBE ORDERS (OUTPATIENT)
Dept: ADMINISTRATIVE | Age: 62
End: 2017-03-20

## 2017-03-20 DIAGNOSIS — I10 ESSENTIAL HYPERTENSION, MALIGNANT: ICD-10-CM

## 2017-03-20 DIAGNOSIS — I10 ESSENTIAL HYPERTENSION, MALIGNANT: Primary | ICD-10-CM

## 2017-03-20 LAB
ALBUMIN SERPL BCP-MCNC: 3.5 G/DL (ref 3.5–5)
ALP SERPL-CCNC: 84 U/L (ref 46–116)
ALT SERPL W P-5'-P-CCNC: 28 U/L (ref 12–78)
ANION GAP SERPL CALCULATED.3IONS-SCNC: 8 MMOL/L (ref 4–13)
AST SERPL W P-5'-P-CCNC: 14 U/L (ref 5–45)
BILIRUB SERPL-MCNC: 0.52 MG/DL (ref 0.2–1)
BUN SERPL-MCNC: 7 MG/DL (ref 5–25)
CALCIUM SERPL-MCNC: 9.8 MG/DL (ref 8.3–10.1)
CHLORIDE SERPL-SCNC: 106 MMOL/L (ref 100–108)
CHOLEST SERPL-MCNC: 134 MG/DL (ref 50–200)
CO2 SERPL-SCNC: 27 MMOL/L (ref 21–32)
CREAT SERPL-MCNC: 0.88 MG/DL (ref 0.6–1.3)
CREAT UR-MCNC: 179 MG/DL
EST. AVERAGE GLUCOSE BLD GHB EST-MCNC: 148 MG/DL
GFR SERPL CREATININE-BSD FRML MDRD: >60 ML/MIN/1.73SQ M
GLUCOSE P FAST SERPL-MCNC: 152 MG/DL (ref 65–99)
HBA1C MFR BLD: 6.8 % (ref 4.2–6.3)
HDLC SERPL-MCNC: 56 MG/DL (ref 40–60)
LDLC SERPL CALC-MCNC: 63 MG/DL (ref 0–100)
MICROALBUMIN UR-MCNC: 5.7 MG/L (ref 0–20)
MICROALBUMIN/CREAT 24H UR: 3 MG/G CREATININE (ref 0–30)
POTASSIUM SERPL-SCNC: 4.5 MMOL/L (ref 3.5–5.3)
PROT SERPL-MCNC: 7.1 G/DL (ref 6.4–8.2)
PSA SERPL-MCNC: <0.1 NG/ML (ref 0–4)
SODIUM SERPL-SCNC: 141 MMOL/L (ref 136–145)
TRIGL SERPL-MCNC: 75 MG/DL

## 2017-03-20 PROCEDURE — 36415 COLL VENOUS BLD VENIPUNCTURE: CPT

## 2017-03-20 PROCEDURE — 82043 UR ALBUMIN QUANTITATIVE: CPT | Performed by: NURSE PRACTITIONER

## 2017-03-20 PROCEDURE — 80061 LIPID PANEL: CPT

## 2017-03-20 PROCEDURE — 83036 HEMOGLOBIN GLYCOSYLATED A1C: CPT

## 2017-03-20 PROCEDURE — 82570 ASSAY OF URINE CREATININE: CPT | Performed by: NURSE PRACTITIONER

## 2017-03-20 PROCEDURE — 84153 ASSAY OF PSA TOTAL: CPT

## 2017-03-20 PROCEDURE — 80053 COMPREHEN METABOLIC PANEL: CPT

## 2017-03-21 ENCOUNTER — APPOINTMENT (OUTPATIENT)
Dept: PHYSICAL THERAPY | Facility: REHABILITATION | Age: 62
End: 2017-03-21
Attending: ORTHOPAEDIC SURGERY
Payer: COMMERCIAL

## 2017-03-21 ENCOUNTER — GENERIC CONVERSION - ENCOUNTER (OUTPATIENT)
Dept: OTHER | Facility: OTHER | Age: 62
End: 2017-03-21

## 2017-03-21 DIAGNOSIS — Z48.89 ENCOUNTER FOR OTHER SPECIFIED SURGICAL AFTERCARE: ICD-10-CM

## 2017-03-21 PROCEDURE — 97161 PT EVAL LOW COMPLEX 20 MIN: CPT

## 2017-03-21 PROCEDURE — 97110 THERAPEUTIC EXERCISES: CPT

## 2017-03-22 ENCOUNTER — APPOINTMENT (OUTPATIENT)
Dept: PHYSICAL THERAPY | Facility: REHABILITATION | Age: 62
End: 2017-03-22
Attending: ORTHOPAEDIC SURGERY
Payer: COMMERCIAL

## 2017-03-23 ENCOUNTER — ALLSCRIPTS OFFICE VISIT (OUTPATIENT)
Dept: OTHER | Facility: OTHER | Age: 62
End: 2017-03-23

## 2017-03-23 ENCOUNTER — APPOINTMENT (OUTPATIENT)
Dept: PHYSICAL THERAPY | Facility: REHABILITATION | Age: 62
End: 2017-03-23
Attending: ORTHOPAEDIC SURGERY
Payer: COMMERCIAL

## 2017-03-23 PROCEDURE — 97110 THERAPEUTIC EXERCISES: CPT

## 2017-03-23 PROCEDURE — 97140 MANUAL THERAPY 1/> REGIONS: CPT

## 2017-03-23 PROCEDURE — 97112 NEUROMUSCULAR REEDUCATION: CPT

## 2017-03-29 ENCOUNTER — GENERIC CONVERSION - ENCOUNTER (OUTPATIENT)
Dept: OTHER | Facility: OTHER | Age: 62
End: 2017-03-29

## 2017-03-29 ENCOUNTER — APPOINTMENT (OUTPATIENT)
Dept: PHYSICAL THERAPY | Facility: REHABILITATION | Age: 62
End: 2017-03-29
Attending: ORTHOPAEDIC SURGERY
Payer: COMMERCIAL

## 2017-03-29 PROCEDURE — 97140 MANUAL THERAPY 1/> REGIONS: CPT

## 2017-03-29 PROCEDURE — 97112 NEUROMUSCULAR REEDUCATION: CPT

## 2017-03-29 PROCEDURE — 97110 THERAPEUTIC EXERCISES: CPT

## 2017-03-30 ENCOUNTER — APPOINTMENT (OUTPATIENT)
Dept: PHYSICAL THERAPY | Facility: REHABILITATION | Age: 62
End: 2017-03-30
Attending: ORTHOPAEDIC SURGERY
Payer: COMMERCIAL

## 2017-04-03 ENCOUNTER — APPOINTMENT (OUTPATIENT)
Dept: PHYSICAL THERAPY | Facility: REHABILITATION | Age: 62
End: 2017-04-03
Attending: ORTHOPAEDIC SURGERY
Payer: COMMERCIAL

## 2017-04-03 PROCEDURE — 97140 MANUAL THERAPY 1/> REGIONS: CPT

## 2017-04-03 PROCEDURE — 97110 THERAPEUTIC EXERCISES: CPT

## 2017-04-03 PROCEDURE — 97112 NEUROMUSCULAR REEDUCATION: CPT

## 2017-04-05 ENCOUNTER — GENERIC CONVERSION - ENCOUNTER (OUTPATIENT)
Dept: OTHER | Facility: OTHER | Age: 62
End: 2017-04-05

## 2017-04-05 ENCOUNTER — APPOINTMENT (OUTPATIENT)
Dept: PHYSICAL THERAPY | Facility: REHABILITATION | Age: 62
End: 2017-04-05
Attending: ORTHOPAEDIC SURGERY
Payer: COMMERCIAL

## 2017-04-05 PROCEDURE — 97112 NEUROMUSCULAR REEDUCATION: CPT

## 2017-04-05 PROCEDURE — 97110 THERAPEUTIC EXERCISES: CPT

## 2017-04-05 PROCEDURE — 97140 MANUAL THERAPY 1/> REGIONS: CPT

## 2017-04-07 ENCOUNTER — APPOINTMENT (OUTPATIENT)
Dept: PHYSICAL THERAPY | Facility: REHABILITATION | Age: 62
End: 2017-04-07
Attending: ORTHOPAEDIC SURGERY
Payer: COMMERCIAL

## 2017-04-07 PROCEDURE — 97110 THERAPEUTIC EXERCISES: CPT

## 2017-04-07 PROCEDURE — 97112 NEUROMUSCULAR REEDUCATION: CPT

## 2017-04-10 ENCOUNTER — APPOINTMENT (OUTPATIENT)
Dept: PHYSICAL THERAPY | Facility: REHABILITATION | Age: 62
End: 2017-04-10
Attending: ORTHOPAEDIC SURGERY
Payer: COMMERCIAL

## 2017-04-10 PROCEDURE — 97112 NEUROMUSCULAR REEDUCATION: CPT

## 2017-04-10 PROCEDURE — 97140 MANUAL THERAPY 1/> REGIONS: CPT

## 2017-04-10 PROCEDURE — 97110 THERAPEUTIC EXERCISES: CPT

## 2017-04-11 ENCOUNTER — GENERIC CONVERSION - ENCOUNTER (OUTPATIENT)
Dept: OTHER | Facility: OTHER | Age: 62
End: 2017-04-11

## 2017-04-11 ENCOUNTER — ALLSCRIPTS OFFICE VISIT (OUTPATIENT)
Dept: OTHER | Facility: OTHER | Age: 62
End: 2017-04-11

## 2017-04-12 ENCOUNTER — APPOINTMENT (OUTPATIENT)
Dept: PHYSICAL THERAPY | Facility: REHABILITATION | Age: 62
End: 2017-04-12
Attending: ORTHOPAEDIC SURGERY
Payer: COMMERCIAL

## 2017-04-12 PROCEDURE — 97110 THERAPEUTIC EXERCISES: CPT

## 2017-04-12 PROCEDURE — 97140 MANUAL THERAPY 1/> REGIONS: CPT

## 2017-04-13 ENCOUNTER — APPOINTMENT (OUTPATIENT)
Dept: PHYSICAL THERAPY | Facility: REHABILITATION | Age: 62
End: 2017-04-13
Attending: ORTHOPAEDIC SURGERY
Payer: COMMERCIAL

## 2017-04-17 ENCOUNTER — GENERIC CONVERSION - ENCOUNTER (OUTPATIENT)
Dept: OTHER | Facility: OTHER | Age: 62
End: 2017-04-17

## 2017-04-17 ENCOUNTER — APPOINTMENT (OUTPATIENT)
Dept: PHYSICAL THERAPY | Facility: REHABILITATION | Age: 62
End: 2017-04-17
Attending: ORTHOPAEDIC SURGERY
Payer: COMMERCIAL

## 2017-04-17 PROCEDURE — 97110 THERAPEUTIC EXERCISES: CPT

## 2017-04-17 PROCEDURE — 97140 MANUAL THERAPY 1/> REGIONS: CPT

## 2017-04-17 PROCEDURE — 97112 NEUROMUSCULAR REEDUCATION: CPT

## 2017-04-18 ENCOUNTER — ALLSCRIPTS OFFICE VISIT (OUTPATIENT)
Dept: OTHER | Facility: OTHER | Age: 62
End: 2017-04-18

## 2017-04-18 ENCOUNTER — HOSPITAL ENCOUNTER (OUTPATIENT)
Dept: RADIOLOGY | Facility: HOSPITAL | Age: 62
Discharge: HOME | End: 2017-04-18
Attending: ORTHOPAEDIC SURGERY
Payer: COMMERCIAL

## 2017-04-18 DIAGNOSIS — Z47.1 AFTERCARE FOLLOWING JOINT REPLACEMENT SURGERY: ICD-10-CM

## 2017-04-18 PROCEDURE — 73560 X-RAY EXAM OF KNEE 1 OR 2: CPT

## 2017-04-19 ENCOUNTER — APPOINTMENT (OUTPATIENT)
Dept: PHYSICAL THERAPY | Facility: REHABILITATION | Age: 62
End: 2017-04-19
Attending: ORTHOPAEDIC SURGERY
Payer: COMMERCIAL

## 2017-04-19 PROCEDURE — 97110 THERAPEUTIC EXERCISES: CPT

## 2017-04-19 PROCEDURE — 97140 MANUAL THERAPY 1/> REGIONS: CPT

## 2017-04-19 PROCEDURE — 97112 NEUROMUSCULAR REEDUCATION: CPT

## 2017-04-20 ENCOUNTER — APPOINTMENT (OUTPATIENT)
Dept: PHYSICAL THERAPY | Facility: REHABILITATION | Age: 62
End: 2017-04-20
Attending: ORTHOPAEDIC SURGERY
Payer: COMMERCIAL

## 2017-04-24 ENCOUNTER — APPOINTMENT (OUTPATIENT)
Dept: PHYSICAL THERAPY | Facility: REHABILITATION | Age: 62
End: 2017-04-24
Attending: ORTHOPAEDIC SURGERY
Payer: COMMERCIAL

## 2017-04-24 ENCOUNTER — GENERIC CONVERSION - ENCOUNTER (OUTPATIENT)
Dept: OTHER | Facility: OTHER | Age: 62
End: 2017-04-24

## 2017-04-24 PROCEDURE — 97110 THERAPEUTIC EXERCISES: CPT

## 2017-04-24 PROCEDURE — 97140 MANUAL THERAPY 1/> REGIONS: CPT

## 2017-04-24 PROCEDURE — 97112 NEUROMUSCULAR REEDUCATION: CPT

## 2017-04-26 ENCOUNTER — APPOINTMENT (OUTPATIENT)
Dept: PHYSICAL THERAPY | Facility: REHABILITATION | Age: 62
End: 2017-04-26
Attending: ORTHOPAEDIC SURGERY
Payer: COMMERCIAL

## 2017-04-26 PROCEDURE — 97112 NEUROMUSCULAR REEDUCATION: CPT

## 2017-04-26 PROCEDURE — 97140 MANUAL THERAPY 1/> REGIONS: CPT

## 2017-04-26 PROCEDURE — 97110 THERAPEUTIC EXERCISES: CPT

## 2017-04-27 ENCOUNTER — ALLSCRIPTS OFFICE VISIT (OUTPATIENT)
Dept: OTHER | Facility: OTHER | Age: 62
End: 2017-04-27

## 2017-04-28 ENCOUNTER — APPOINTMENT (OUTPATIENT)
Dept: PHYSICAL THERAPY | Facility: REHABILITATION | Age: 62
End: 2017-04-28
Attending: ORTHOPAEDIC SURGERY
Payer: COMMERCIAL

## 2017-04-28 PROCEDURE — 97112 NEUROMUSCULAR REEDUCATION: CPT

## 2017-04-28 PROCEDURE — 97140 MANUAL THERAPY 1/> REGIONS: CPT

## 2017-04-28 PROCEDURE — 97110 THERAPEUTIC EXERCISES: CPT

## 2017-05-01 ENCOUNTER — APPOINTMENT (OUTPATIENT)
Dept: PHYSICAL THERAPY | Facility: REHABILITATION | Age: 62
End: 2017-05-01
Attending: ORTHOPAEDIC SURGERY
Payer: COMMERCIAL

## 2017-05-01 PROCEDURE — 97140 MANUAL THERAPY 1/> REGIONS: CPT

## 2017-05-01 PROCEDURE — 97112 NEUROMUSCULAR REEDUCATION: CPT

## 2017-05-01 PROCEDURE — 97110 THERAPEUTIC EXERCISES: CPT

## 2017-05-03 ENCOUNTER — APPOINTMENT (OUTPATIENT)
Dept: PHYSICAL THERAPY | Facility: REHABILITATION | Age: 62
End: 2017-05-03
Attending: ORTHOPAEDIC SURGERY
Payer: COMMERCIAL

## 2017-05-03 PROCEDURE — 97110 THERAPEUTIC EXERCISES: CPT

## 2017-05-03 PROCEDURE — 97112 NEUROMUSCULAR REEDUCATION: CPT

## 2017-05-03 PROCEDURE — 97140 MANUAL THERAPY 1/> REGIONS: CPT

## 2017-05-05 ENCOUNTER — APPOINTMENT (OUTPATIENT)
Dept: PHYSICAL THERAPY | Facility: REHABILITATION | Age: 62
End: 2017-05-05
Attending: ORTHOPAEDIC SURGERY
Payer: COMMERCIAL

## 2017-05-05 PROCEDURE — 97112 NEUROMUSCULAR REEDUCATION: CPT

## 2017-05-05 PROCEDURE — 97110 THERAPEUTIC EXERCISES: CPT

## 2017-05-05 PROCEDURE — 97140 MANUAL THERAPY 1/> REGIONS: CPT

## 2017-05-08 ENCOUNTER — APPOINTMENT (OUTPATIENT)
Dept: PHYSICAL THERAPY | Facility: REHABILITATION | Age: 62
End: 2017-05-08
Attending: ORTHOPAEDIC SURGERY
Payer: COMMERCIAL

## 2017-05-08 PROCEDURE — 97110 THERAPEUTIC EXERCISES: CPT

## 2017-05-08 PROCEDURE — 97140 MANUAL THERAPY 1/> REGIONS: CPT

## 2017-05-08 PROCEDURE — 97112 NEUROMUSCULAR REEDUCATION: CPT

## 2017-05-10 ENCOUNTER — APPOINTMENT (OUTPATIENT)
Dept: PHYSICAL THERAPY | Facility: REHABILITATION | Age: 62
End: 2017-05-10
Attending: ORTHOPAEDIC SURGERY
Payer: COMMERCIAL

## 2017-05-12 ENCOUNTER — APPOINTMENT (OUTPATIENT)
Dept: PHYSICAL THERAPY | Facility: REHABILITATION | Age: 62
End: 2017-05-12
Attending: ORTHOPAEDIC SURGERY
Payer: COMMERCIAL

## 2017-05-12 PROCEDURE — 97140 MANUAL THERAPY 1/> REGIONS: CPT

## 2017-05-12 PROCEDURE — 97110 THERAPEUTIC EXERCISES: CPT

## 2017-05-12 PROCEDURE — 97112 NEUROMUSCULAR REEDUCATION: CPT

## 2017-05-15 ENCOUNTER — APPOINTMENT (OUTPATIENT)
Dept: PHYSICAL THERAPY | Facility: REHABILITATION | Age: 62
End: 2017-05-15
Attending: ORTHOPAEDIC SURGERY
Payer: COMMERCIAL

## 2017-05-15 PROCEDURE — 97140 MANUAL THERAPY 1/> REGIONS: CPT

## 2017-05-15 PROCEDURE — 97112 NEUROMUSCULAR REEDUCATION: CPT

## 2017-05-17 ENCOUNTER — APPOINTMENT (OUTPATIENT)
Dept: PHYSICAL THERAPY | Facility: REHABILITATION | Age: 62
End: 2017-05-17
Attending: ORTHOPAEDIC SURGERY
Payer: COMMERCIAL

## 2017-05-17 PROCEDURE — 97110 THERAPEUTIC EXERCISES: CPT

## 2017-05-17 PROCEDURE — 97140 MANUAL THERAPY 1/> REGIONS: CPT

## 2017-05-19 ENCOUNTER — APPOINTMENT (OUTPATIENT)
Dept: PHYSICAL THERAPY | Facility: REHABILITATION | Age: 62
End: 2017-05-19
Attending: ORTHOPAEDIC SURGERY
Payer: COMMERCIAL

## 2017-05-19 PROCEDURE — 97110 THERAPEUTIC EXERCISES: CPT

## 2017-05-19 PROCEDURE — 97140 MANUAL THERAPY 1/> REGIONS: CPT

## 2017-05-22 ENCOUNTER — APPOINTMENT (OUTPATIENT)
Dept: PHYSICAL THERAPY | Facility: REHABILITATION | Age: 62
End: 2017-05-22
Attending: ORTHOPAEDIC SURGERY
Payer: COMMERCIAL

## 2017-05-22 PROCEDURE — 97110 THERAPEUTIC EXERCISES: CPT

## 2017-05-22 PROCEDURE — 97140 MANUAL THERAPY 1/> REGIONS: CPT

## 2017-05-24 ENCOUNTER — APPOINTMENT (OUTPATIENT)
Dept: PHYSICAL THERAPY | Facility: REHABILITATION | Age: 62
End: 2017-05-24
Attending: ORTHOPAEDIC SURGERY
Payer: COMMERCIAL

## 2017-05-24 PROCEDURE — 97140 MANUAL THERAPY 1/> REGIONS: CPT

## 2017-05-24 PROCEDURE — 97110 THERAPEUTIC EXERCISES: CPT

## 2017-05-26 ENCOUNTER — APPOINTMENT (OUTPATIENT)
Dept: PHYSICAL THERAPY | Facility: REHABILITATION | Age: 62
End: 2017-05-26
Attending: ORTHOPAEDIC SURGERY
Payer: COMMERCIAL

## 2017-05-29 ENCOUNTER — GENERIC CONVERSION - ENCOUNTER (OUTPATIENT)
Dept: OTHER | Facility: OTHER | Age: 62
End: 2017-05-29

## 2017-05-30 ENCOUNTER — APPOINTMENT (OUTPATIENT)
Dept: PHYSICAL THERAPY | Facility: REHABILITATION | Age: 62
End: 2017-05-30
Attending: ORTHOPAEDIC SURGERY
Payer: COMMERCIAL

## 2017-05-30 PROCEDURE — 97140 MANUAL THERAPY 1/> REGIONS: CPT

## 2017-05-30 PROCEDURE — 97110 THERAPEUTIC EXERCISES: CPT

## 2017-06-01 ENCOUNTER — APPOINTMENT (OUTPATIENT)
Dept: PHYSICAL THERAPY | Facility: REHABILITATION | Age: 62
End: 2017-06-01
Attending: ORTHOPAEDIC SURGERY
Payer: COMMERCIAL

## 2017-06-01 PROCEDURE — 97140 MANUAL THERAPY 1/> REGIONS: CPT

## 2017-06-01 PROCEDURE — 97110 THERAPEUTIC EXERCISES: CPT

## 2017-06-06 ENCOUNTER — APPOINTMENT (OUTPATIENT)
Dept: PHYSICAL THERAPY | Facility: REHABILITATION | Age: 62
End: 2017-06-06
Attending: ORTHOPAEDIC SURGERY
Payer: COMMERCIAL

## 2017-06-06 PROCEDURE — 97140 MANUAL THERAPY 1/> REGIONS: CPT

## 2017-06-06 PROCEDURE — 97110 THERAPEUTIC EXERCISES: CPT

## 2017-06-06 PROCEDURE — 97010 HOT OR COLD PACKS THERAPY: CPT

## 2017-06-08 ENCOUNTER — APPOINTMENT (OUTPATIENT)
Dept: PHYSICAL THERAPY | Facility: REHABILITATION | Age: 62
End: 2017-06-08
Attending: ORTHOPAEDIC SURGERY
Payer: COMMERCIAL

## 2017-06-08 PROCEDURE — 97140 MANUAL THERAPY 1/> REGIONS: CPT

## 2017-06-08 PROCEDURE — 97110 THERAPEUTIC EXERCISES: CPT

## 2017-06-13 ENCOUNTER — GENERIC CONVERSION - ENCOUNTER (OUTPATIENT)
Dept: OTHER | Facility: OTHER | Age: 62
End: 2017-06-13

## 2017-06-13 ENCOUNTER — APPOINTMENT (OUTPATIENT)
Dept: PHYSICAL THERAPY | Facility: REHABILITATION | Age: 62
End: 2017-06-13
Attending: ORTHOPAEDIC SURGERY
Payer: COMMERCIAL

## 2017-06-13 PROCEDURE — 97110 THERAPEUTIC EXERCISES: CPT

## 2017-06-13 PROCEDURE — 97140 MANUAL THERAPY 1/> REGIONS: CPT

## 2017-06-15 ENCOUNTER — APPOINTMENT (OUTPATIENT)
Dept: PHYSICAL THERAPY | Facility: REHABILITATION | Age: 62
End: 2017-06-15
Attending: ORTHOPAEDIC SURGERY
Payer: COMMERCIAL

## 2017-06-15 PROCEDURE — 97110 THERAPEUTIC EXERCISES: CPT

## 2017-06-15 PROCEDURE — 97140 MANUAL THERAPY 1/> REGIONS: CPT

## 2017-06-20 ENCOUNTER — APPOINTMENT (OUTPATIENT)
Dept: PHYSICAL THERAPY | Facility: REHABILITATION | Age: 62
End: 2017-06-20
Attending: ORTHOPAEDIC SURGERY
Payer: COMMERCIAL

## 2017-06-20 ENCOUNTER — HOSPITAL ENCOUNTER (OUTPATIENT)
Dept: RADIOLOGY | Facility: HOSPITAL | Age: 62
Discharge: HOME | End: 2017-06-20
Attending: ORTHOPAEDIC SURGERY
Payer: COMMERCIAL

## 2017-06-20 ENCOUNTER — ALLSCRIPTS OFFICE VISIT (OUTPATIENT)
Dept: OTHER | Facility: OTHER | Age: 62
End: 2017-06-20

## 2017-06-20 DIAGNOSIS — Z47.1 AFTERCARE FOLLOWING JOINT REPLACEMENT SURGERY: ICD-10-CM

## 2017-06-20 PROCEDURE — 73560 X-RAY EXAM OF KNEE 1 OR 2: CPT

## 2017-06-20 PROCEDURE — 97140 MANUAL THERAPY 1/> REGIONS: CPT

## 2017-06-20 PROCEDURE — 97110 THERAPEUTIC EXERCISES: CPT

## 2017-06-22 ENCOUNTER — APPOINTMENT (OUTPATIENT)
Dept: PHYSICAL THERAPY | Facility: REHABILITATION | Age: 62
End: 2017-06-22
Attending: ORTHOPAEDIC SURGERY
Payer: COMMERCIAL

## 2017-08-20 ENCOUNTER — HOSPITAL ENCOUNTER (EMERGENCY)
Facility: HOSPITAL | Age: 62
Discharge: HOME | End: 2017-08-20
Attending: EMERGENCY MEDICINE
Payer: COMMERCIAL

## 2017-08-20 ENCOUNTER — APPOINTMENT (EMERGENCY)
Dept: CT IMAGING | Facility: HOSPITAL | Age: 62
End: 2017-08-20
Payer: COMMERCIAL

## 2017-08-20 VITALS
WEIGHT: 310 LBS | RESPIRATION RATE: 18 BRPM | BODY MASS INDEX: 47.14 KG/M2 | TEMPERATURE: 98.7 F | OXYGEN SATURATION: 98 % | HEART RATE: 83 BPM | SYSTOLIC BLOOD PRESSURE: 160 MMHG | DIASTOLIC BLOOD PRESSURE: 89 MMHG

## 2017-08-20 DIAGNOSIS — K57.92 ACUTE DIVERTICULITIS: Primary | ICD-10-CM

## 2017-08-20 LAB
ANION GAP SERPL CALCULATED.3IONS-SCNC: 8 MMOL/L (ref 4–13)
BASOPHILS # BLD AUTO: 0.07 THOUSANDS/ΜL (ref 0–0.1)
BASOPHILS NFR BLD AUTO: 1 % (ref 0–1)
BUN SERPL-MCNC: 14 MG/DL (ref 5–25)
CALCIUM SERPL-MCNC: 9.6 MG/DL (ref 8.3–10.1)
CHLORIDE SERPL-SCNC: 102 MMOL/L (ref 100–108)
CO2 SERPL-SCNC: 27 MMOL/L (ref 21–32)
CREAT SERPL-MCNC: 0.93 MG/DL (ref 0.6–1.3)
EOSINOPHIL # BLD AUTO: 0.65 THOUSAND/ΜL (ref 0–0.61)
EOSINOPHIL NFR BLD AUTO: 6 % (ref 0–6)
ERYTHROCYTE [DISTWIDTH] IN BLOOD BY AUTOMATED COUNT: 13.3 % (ref 11.6–15.1)
GFR SERPL CREATININE-BSD FRML MDRD: 87 ML/MIN/1.73SQ M
GLUCOSE SERPL-MCNC: 168 MG/DL (ref 65–140)
HCT VFR BLD AUTO: 42.8 % (ref 36.5–49.3)
HGB BLD-MCNC: 14.8 G/DL (ref 12–17)
LYMPHOCYTES # BLD AUTO: 2.12 THOUSANDS/ΜL (ref 0.6–4.47)
LYMPHOCYTES NFR BLD AUTO: 19 % (ref 14–44)
MCH RBC QN AUTO: 29.1 PG (ref 26.8–34.3)
MCHC RBC AUTO-ENTMCNC: 34.6 G/DL (ref 31.4–37.4)
MCV RBC AUTO: 84 FL (ref 82–98)
MONOCYTES # BLD AUTO: 1.07 THOUSAND/ΜL (ref 0.17–1.22)
MONOCYTES NFR BLD AUTO: 9 % (ref 4–12)
NEUTROPHILS # BLD AUTO: 7.55 THOUSANDS/ΜL (ref 1.85–7.62)
NEUTS SEG NFR BLD AUTO: 65 % (ref 43–75)
PLATELET # BLD AUTO: 262 THOUSANDS/UL (ref 149–390)
PMV BLD AUTO: 8.8 FL (ref 8.9–12.7)
POTASSIUM SERPL-SCNC: 4.1 MMOL/L (ref 3.5–5.3)
RBC # BLD AUTO: 5.08 MILLION/UL (ref 3.88–5.62)
SODIUM SERPL-SCNC: 137 MMOL/L (ref 136–145)
WBC # BLD AUTO: 11.46 THOUSAND/UL (ref 4.31–10.16)

## 2017-08-20 PROCEDURE — 74177 CT ABD & PELVIS W/CONTRAST: CPT

## 2017-08-20 PROCEDURE — 99284 EMERGENCY DEPT VISIT MOD MDM: CPT

## 2017-08-20 PROCEDURE — 85025 COMPLETE CBC W/AUTO DIFF WBC: CPT | Performed by: EMERGENCY MEDICINE

## 2017-08-20 PROCEDURE — 36415 COLL VENOUS BLD VENIPUNCTURE: CPT | Performed by: EMERGENCY MEDICINE

## 2017-08-20 PROCEDURE — 80048 BASIC METABOLIC PNL TOTAL CA: CPT | Performed by: EMERGENCY MEDICINE

## 2017-08-20 PROCEDURE — 96361 HYDRATE IV INFUSION ADD-ON: CPT

## 2017-08-20 PROCEDURE — 96360 HYDRATION IV INFUSION INIT: CPT

## 2017-08-20 RX ORDER — CIPROFLOXACIN 500 MG/1
500 TABLET, FILM COATED ORAL EVERY 12 HOURS
Qty: 20 TABLET | Refills: 0 | Status: SHIPPED | OUTPATIENT
Start: 2017-08-20 | End: 2017-08-30

## 2017-08-20 RX ORDER — CIPROFLOXACIN 500 MG/1
TABLET, FILM COATED ORAL
Status: DISCONTINUED
Start: 2017-08-20 | End: 2017-08-20 | Stop reason: HOSPADM

## 2017-08-20 RX ORDER — METRONIDAZOLE 500 MG/1
TABLET ORAL
Status: DISCONTINUED
Start: 2017-08-20 | End: 2017-08-20 | Stop reason: HOSPADM

## 2017-08-20 RX ORDER — METRONIDAZOLE 500 MG/1
500 TABLET ORAL ONCE
Status: COMPLETED | OUTPATIENT
Start: 2017-08-20 | End: 2017-08-20

## 2017-08-20 RX ORDER — METRONIDAZOLE 500 MG/1
500 TABLET ORAL 3 TIMES DAILY
Qty: 30 TABLET | Refills: 0 | Status: SHIPPED | OUTPATIENT
Start: 2017-08-20 | End: 2017-08-30

## 2017-08-20 RX ORDER — CIPROFLOXACIN 500 MG/1
500 TABLET, FILM COATED ORAL ONCE
Status: COMPLETED | OUTPATIENT
Start: 2017-08-20 | End: 2017-08-20

## 2017-08-20 RX ADMIN — SODIUM CHLORIDE 1000 ML: 0.9 INJECTION, SOLUTION INTRAVENOUS at 16:54

## 2017-08-20 RX ADMIN — METRONIDAZOLE 500 MG: 500 TABLET ORAL at 18:25

## 2017-08-20 RX ADMIN — CIPROFLOXACIN 500 MG: 500 TABLET, FILM COATED ORAL at 18:25

## 2017-08-20 RX ADMIN — IOHEXOL 100 ML: 350 INJECTION, SOLUTION INTRAVENOUS at 17:38

## 2017-09-01 DIAGNOSIS — I10 ESSENTIAL (PRIMARY) HYPERTENSION: ICD-10-CM

## 2017-09-01 DIAGNOSIS — E11.65 TYPE 2 DIABETES MELLITUS WITH HYPERGLYCEMIA (HCC): ICD-10-CM

## 2017-09-01 DIAGNOSIS — E78.5 HYPERLIPIDEMIA: ICD-10-CM

## 2017-09-13 ENCOUNTER — GENERIC CONVERSION - ENCOUNTER (OUTPATIENT)
Dept: OTHER | Facility: OTHER | Age: 62
End: 2017-09-13

## 2017-10-05 ENCOUNTER — APPOINTMENT (OUTPATIENT)
Dept: LAB | Age: 62
End: 2017-10-05
Payer: COMMERCIAL

## 2017-10-05 ENCOUNTER — TRANSCRIBE ORDERS (OUTPATIENT)
Dept: ADMINISTRATIVE | Age: 62
End: 2017-10-05

## 2017-10-05 DIAGNOSIS — E11.9 TYPE 2 DIABETES MELLITUS WITHOUT COMPLICATIONS (HCC): ICD-10-CM

## 2017-10-05 DIAGNOSIS — Z47.1 AFTERCARE FOLLOWING JOINT REPLACEMENT SURGERY: ICD-10-CM

## 2017-10-05 DIAGNOSIS — R07.9 CHEST PAIN: ICD-10-CM

## 2017-10-05 DIAGNOSIS — E78.5 HYPERLIPIDEMIA: ICD-10-CM

## 2017-10-05 DIAGNOSIS — F41.8 OTHER SPECIFIED ANXIETY DISORDERS: ICD-10-CM

## 2017-10-05 DIAGNOSIS — Z48.89 OTHER SPECIFIED AFTERCARE FOLLOWING SURGERY: ICD-10-CM

## 2017-10-05 DIAGNOSIS — M17.12 PRIMARY OSTEOARTHRITIS OF LEFT KNEE: ICD-10-CM

## 2017-10-05 DIAGNOSIS — Z00.00 ENCOUNTER FOR GENERAL ADULT MEDICAL EXAMINATION WITHOUT ABNORMAL FINDINGS: ICD-10-CM

## 2017-10-05 DIAGNOSIS — I10 ESSENTIAL (PRIMARY) HYPERTENSION: ICD-10-CM

## 2017-10-05 DIAGNOSIS — Z88.9 ALLERGY STATUS TO UNSPECIFIED DRUGS, MEDICAMENTS AND BIOLOGICAL SUBSTANCES STATUS: ICD-10-CM

## 2017-10-05 DIAGNOSIS — E11.65 TYPE 2 DIABETES MELLITUS WITH HYPERGLYCEMIA (HCC): ICD-10-CM

## 2017-10-05 DIAGNOSIS — B37.2 CANDIDIASIS OF SKIN AND NAIL: ICD-10-CM

## 2017-10-05 LAB
ALBUMIN SERPL BCP-MCNC: 3.6 G/DL (ref 3.5–5)
ALP SERPL-CCNC: 64 U/L (ref 46–116)
ALT SERPL W P-5'-P-CCNC: 31 U/L (ref 12–78)
ANION GAP SERPL CALCULATED.3IONS-SCNC: 9 MMOL/L (ref 4–13)
AST SERPL W P-5'-P-CCNC: 19 U/L (ref 5–45)
BILIRUB SERPL-MCNC: 0.58 MG/DL (ref 0.2–1)
BUN SERPL-MCNC: 13 MG/DL (ref 5–25)
CALCIUM SERPL-MCNC: 9 MG/DL (ref 8.3–10.1)
CHLORIDE SERPL-SCNC: 105 MMOL/L (ref 100–108)
CHOLEST SERPL-MCNC: 136 MG/DL (ref 50–200)
CK SERPL-CCNC: 108 U/L (ref 39–308)
CO2 SERPL-SCNC: 24 MMOL/L (ref 21–32)
CREAT SERPL-MCNC: 0.84 MG/DL (ref 0.6–1.3)
GFR SERPL CREATININE-BSD FRML MDRD: 93 ML/MIN/1.73SQ M
GLUCOSE P FAST SERPL-MCNC: 159 MG/DL (ref 65–99)
HDLC SERPL-MCNC: 58 MG/DL (ref 40–60)
LDLC SERPL CALC-MCNC: 64 MG/DL (ref 0–100)
MAGNESIUM SERPL-MCNC: 1.9 MG/DL (ref 1.6–2.6)
POTASSIUM SERPL-SCNC: 3.8 MMOL/L (ref 3.5–5.3)
PROT SERPL-MCNC: 7 G/DL (ref 6.4–8.2)
SODIUM SERPL-SCNC: 138 MMOL/L (ref 136–145)
TRIGL SERPL-MCNC: 70 MG/DL

## 2017-10-05 PROCEDURE — 36415 COLL VENOUS BLD VENIPUNCTURE: CPT

## 2017-10-05 PROCEDURE — 80061 LIPID PANEL: CPT

## 2017-10-05 PROCEDURE — 83036 HEMOGLOBIN GLYCOSYLATED A1C: CPT

## 2017-10-05 PROCEDURE — 82550 ASSAY OF CK (CPK): CPT

## 2017-10-05 PROCEDURE — 83735 ASSAY OF MAGNESIUM: CPT

## 2017-10-05 PROCEDURE — 80053 COMPREHEN METABOLIC PANEL: CPT

## 2017-10-06 ENCOUNTER — TRANSCRIBE ORDERS (OUTPATIENT)
Dept: ADMINISTRATIVE | Facility: HOSPITAL | Age: 62
End: 2017-10-06

## 2017-10-06 DIAGNOSIS — Z47.1 AFTERCARE FOLLOWING JOINT REPLACEMENT SURGERY, UNSPECIFIED JOINT: ICD-10-CM

## 2017-10-06 DIAGNOSIS — B37.2 CANDIDIASIS OF SKIN AND NAILS: ICD-10-CM

## 2017-10-06 DIAGNOSIS — R07.9 CHEST PAIN, UNSPECIFIED TYPE: ICD-10-CM

## 2017-10-06 DIAGNOSIS — Z48.89 AFTERCARE FOLLOWING SURGERY FOR INJURY AND TRAUMA: Primary | ICD-10-CM

## 2017-10-06 DIAGNOSIS — E13.8 DIABETES MELLITUS OF OTHER TYPE WITH COMPLICATION, UNSPECIFIED LONG TERM INSULIN USE STATUS: ICD-10-CM

## 2017-10-06 DIAGNOSIS — M17.12 OSTEOARTHRITIS OF LEFT KNEE, UNSPECIFIED OSTEOARTHRITIS TYPE: ICD-10-CM

## 2017-10-06 LAB
EST. AVERAGE GLUCOSE BLD GHB EST-MCNC: 157 MG/DL
HBA1C MFR BLD: 7.1 % (ref 4.2–6.3)

## 2017-10-07 ENCOUNTER — GENERIC CONVERSION - ENCOUNTER (OUTPATIENT)
Dept: OTHER | Facility: OTHER | Age: 62
End: 2017-10-07

## 2017-10-19 ENCOUNTER — HOSPITAL ENCOUNTER (OUTPATIENT)
Dept: NON INVASIVE DIAGNOSTICS | Facility: CLINIC | Age: 62
Discharge: HOME | End: 2017-10-19
Payer: COMMERCIAL

## 2017-10-19 DIAGNOSIS — E13.8 DIABETES MELLITUS OF OTHER TYPE WITH COMPLICATION, UNSPECIFIED LONG TERM INSULIN USE STATUS: ICD-10-CM

## 2017-10-19 DIAGNOSIS — M17.12 OSTEOARTHRITIS OF LEFT KNEE, UNSPECIFIED OSTEOARTHRITIS TYPE: ICD-10-CM

## 2017-10-19 DIAGNOSIS — Z47.1 AFTERCARE FOLLOWING JOINT REPLACEMENT SURGERY, UNSPECIFIED JOINT: ICD-10-CM

## 2017-10-19 DIAGNOSIS — R07.9 CHEST PAIN, UNSPECIFIED TYPE: ICD-10-CM

## 2017-10-19 DIAGNOSIS — Z48.89 AFTERCARE FOLLOWING SURGERY FOR INJURY AND TRAUMA: ICD-10-CM

## 2017-10-19 DIAGNOSIS — B37.2 CANDIDIASIS OF SKIN AND NAILS: ICD-10-CM

## 2017-10-19 PROCEDURE — 93306 TTE W/DOPPLER COMPLETE: CPT

## 2017-10-26 ENCOUNTER — ALLSCRIPTS OFFICE VISIT (OUTPATIENT)
Dept: OTHER | Facility: OTHER | Age: 62
End: 2017-10-26

## 2017-10-27 NOTE — PROGRESS NOTES
Assessment  1  Controlled type 2 diabetes mellitus without complication (252 89) (M13 1)   2  Benign essential hypertension (401 1) (I10)   3  Hyperlipidemia (272 4) (E78 5)   4  Stuttering (315 35) (F80 81)   5  Need for pneumococcal vaccination (V03 82) (Z23)   6  Need for influenza vaccination (V04 81) (Z23)   7  Morbid or severe obesity due to excess calories (278 01) (E66 01)   8  Pain in left knee (719 46) (M25 562)    Plan  Aftercare following left knee joint replacement surgery    · From  OxyCODONE HCl - 5 MG Oral Tablet take 1 tablet daily prn To  OxyCODONE HCl - 5 MG Oral Tablet TAKE 1 TABLET Daily PRN severe pain  Benign essential hypertension, Controlled type 2 diabetes mellitus without complication    · (1) BASIC METABOLIC PROFILE; Status:Active; Requested for:01Apr2018; Benign essential hypertension, Controlled type 2 diabetes mellitus without complication,  Hyperlipidemia    · Follow-up visit in 6 months Evaluation and Treatment  Follow-up  Status: Hold For -  Scheduling  Requested for: 26Oct2017  Controlled type 2 diabetes mellitus without complication    · MetFORMIN HCl - 500 MG Oral Tablet; Take 1 tablet in morning and 2 tablets in  the evening daily   · (1) HEMOGLOBIN A1C; Status:Active; Requested for:01Apr2018;    · *VB - Foot Exam; Status:Complete;   Done: 02TPC3871 12:00AM   · [facility] (PODIATRY ) Co-Management  *Diabetic foot care  Status: Hold For  - Scheduling  Requested for: 95OZI0186  Care Summary provided  : Yes  Controlled type 2 diabetes mellitus without complication, Onychomycosis    · 2 - Olegario Perez DPM  (Podiatry) Co-Management  *Diabetic foot care  Status: Hold  For - Scheduling  Requested for: 26Oct2017  Care Summary provided  : Yes  Depression with anxiety, Stuttering    · OLANZapine 5 MG Oral Tablet; Take 1 tablet daily  Malignant neoplasm of prostate    · (1) PSA, DIAGNOSTIC (FOLLOW-UP); Status:Active;  Requested for:01Apr2018;   Need for influenza vaccination    · Flulaval Quadrivalent 0 5 ML Intramuscular Suspension Prefilled Syringe;  give 0 5 ml IM; To Be Done: 37LKY4660  Need for pneumococcal vaccination    · Prevnar 13 Intramuscular Suspension; INJECT 0 5  ML Intramuscular; To Be  Done: 21VMH7044    Discussion/Summary    Reviewed his recent labsnot at target at 7  2optimal OK  will increase his metformin to 500mg each morning and 1000mg each eveningwill continue to walk - he has a fit doron struggle but seems motivated  opioid use and new guidelines as he requested a refill on oxycodonewill give him 20 oxycodone for periodic use but cautioned that I cannot support long term use of thiswill still use Tylenol and NSAID meds as first line therapy as well as nonpharmacologic modalitiescontinued issues then he will see ortho again  have suggested he see podiatry as his nails are very thickcourse of Lamisil  shot today13 in the next 2 weeksPneumovax last year  otherwise same  urology in the spring follow-up prostate cancerhim script for his PSA  Self Referrals: No      Chief Complaint  patient is here for a follow up for hyperlipidemia, diabetes and hypertension      Advance Directives  Advance Directive [facility]:   YES - Patient has an advance health care directive  Active Problems  1  Aftercare following left knee joint replacement surgery (V54 81,V43 65) (Z47 1,Z96 652)   2  Aftercare following right knee joint replacement surgery (V54 81,V43 65) (Z47 1,Z96 651)   3  Aftercare following surgery (V58 89) (Z48 89)   4  Benign essential hypertension (401 1) (I10)   5  Candidal intertrigo (112 3) (B37 2)   6  Controlled type 2 diabetes mellitus without complication (522 28) (Y87 0)   7  Depression with anxiety (300 4) (F41 8)   8  History of allergy (V15 09) (Z88 9)   9  Hyperlipidemia (272 4) (E78 5)   10  Incisional hernia, without obstruction or gangrene (553 21) (K43 2)   11  Insomnia (780 52) (G47 00)   12  Malignant neoplasm of prostate (185) (C61)   13   Morbid or severe obesity due to excess calories (278 01) (E66 01)   14  Need for pneumococcal vaccination (V03 82) (Z23)   15  Onychomycosis (110 1) (B35 1)   16  Premature ventricular contraction (427 69) (I49 3)   17  Primary localized osteoarthritis of right knee (715 16) (M17 11)   18  Primary localized osteoarthrosis of the knee, left (715 16) (M17 12)   19  Stuttering (315 35) (F80 81)   20  Tinea pedis of both feet (110 4) (B35 3)    Past Medical History  1  History of Anal fissure (565 0) (K60 2)   2  History of acute pharyngitis (V12 69) (Z87 09)   3  History of cataract (V12 49) (Z86 69)   4  History of chest pain (V13 89) (Z87 898)   5  History of constipation (V12 79) (Z87 19)   6  History of diverticulitis of colon (V12 79) (Z87 19)   7  History of epididymitis (V13 89) (Z87 438)   8  History of hiatal hernia (V12 79) (Z87 19)   9  History of infectious mononucleosis (V12 09) (Z86 19)   10  History of retinal detachment (V12 49) (Z86 69)   11  History of Pain of right heel (729 5) (M79 671)   12  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)   13  History of Prostate Cancer (V10 46)   14  History of Prostate Cancer (V10 46)   15  History of Rectal pain (569 42) (K62 89)    Surgical History  1  History of Cataract Surgery   2  History of Colonoscopy (Fiberoptic)   3  History of Knee Arthroscopy (Therapeutic)   4  History of Laparosc Gastric Restrictive Proc By Adjustable Gastric Band   5  History of Needle Biopsy Of Prostate   6  History of Prostatectomy Robotic-Assisted   7  History of Repair Of Retinal Detachment   8  History of Septoplasty   9  History of Surgery Vas Deferens Vasectomy   10  History of Tonsillectomy Over Age 13   8  History of Total Knee Replacement Right   12  History of Umbilical Hernia Repair    Family History  Mother    1  Family history of Diabetes Mellitus (V18 0)  Father    2  Family history of Coronary Artery Disease (V17 49)   3   Family history of Prostate Cancer (V16 42)  Maternal Grandmother    4  Family history of Diabetes Mellitus (V18 0)  Maternal Grandfather    5  Family history of Coronary Artery Disease (V17 49)  Paternal Grandfather    6  Family history of Coronary Artery Disease (V17 49)    Social History   · Being A Social Drinker   · Denied: History of Drug Use   · Never A Smoker    Current Meds   1  ALPRAZolam 0 25 MG Oral Tablet; My take 1/2 tablet up to TID prn; Therapy: 04Xcw6984 to (Last Rx:25Mar2017)  Requested for: 25Mar2017 Ordered   2  Centrum Oral Tablet; TAKE 1 TABLET DAILY; Therapy: 48TXJ6714 to Recorded   3  Ferrous Sulfate 325 (65 Fe) MG Oral Tablet; TAKE 1 TABLET TWICE DAILY WITH MEALS; Therapy: 70YAA3278 to (Evaluate:25Apr2017)  Requested for: 36NVZ2856; Last   Rx:24Feb2017 Ordered   4  FLUoxetine HCl - 20 MG Oral Capsule; TAKE 3 CAPSULES BY MOUTH EVERY DAY; Therapy: 80XYV8123 to (Evaluate:18Jan2018)  Requested for: 00TAE6309; Last   Rx:23Jan2017 Ordered   5  Folic Acid 1 MG Oral Tablet; TAKE 1 TABLET DAILY; Therapy: 45PWD7962 to (Evaluate:25Apr2017)  Requested for: 03GLF4429; Last   Rx:05Bhp2935 Ordered   6  HydroCHLOROthiazide 12 5 MG Oral Tablet; TAKE 1 TABLET THREE TIMES A WEEK ON   MONDAY, THURSDAY, AND SATURDAY; Therapy: 51ADI7500 to (Last Rx:05Oct2017)  Requested for: 59JNS8617 Ordered   7  Losartan Potassium 100 MG Oral Tablet; Take 1 tablet daily; Therapy: 63KIV0440 to (Last Rx:02Oct2017)  Requested for: 11NWZ6287 Ordered   8  MetFORMIN HCl - 500 MG Oral Tablet; Take 1 tablet twice daily; Therapy: 64RIC4791 to (Evaluate:03Ypr3060)  Requested for: 52NFD4339; Last   Rx:07Mar2017 Ordered   9  Metoprolol Succinate ER 25 MG Oral Tablet Extended Release 24 Hour; Take 1 tablet   daily  Requested for: 99Kjg8555; Last Rx:46Zml6391 Ordered   10  Nystatin 312704 UNIT/GM External Powder; apply sparingly to affected area(s) twice    daily;     Therapy: 24CCS8871 to (Evaluate:93Gzl6299)  Requested for: 55XWA3090; Last    Richard Eagle Ordered   11  OLANZapine 5 MG Oral Tablet; Take 1 tablet daily; Therapy: 50PTJ9492 to (Evaluate:82Hgp0308)  Requested for: 23Mar2017; Last    Rx:23Mar2017 Ordered   12  Omega 3 1200 MG Oral Capsule; Take 2 daily; Therapy: 69QFB3393 to Recorded   13  OxyCODONE HCl - 5 MG Oral Tablet; take 1 tablet daily prn; Therapy: 41NXG0995 to (Evaluate:30Jun2017); Last Rx:20Jun2017 Ordered   14  Rosuvastatin Calcium 5 MG Oral Tablet; TAKE 1 TABLET TWICE WEEKLY, MONDAY AND    FRIDAY; Therapy: 08GHV7927 to (Last Rx:22Mar2017)  Requested for: 22Mar2017 Ordered   15  TraMADol HCl - 50 MG Oral Tablet; Therapy: (Recorded:17Udv7802) to Recorded   16  Vitamin C 500 MG Oral Tablet; Take 1 tablet twice daily; Therapy: 28VNO5408 to (Evaluate:25Apr2017)  Requested for: 47GDU4930; Last    Rx:67Jwn7526 Ordered   17  Zetia 10 MG Oral Tablet; TAKE 1 TABLET DAILY  Requested for: 02Fbn5475; Last    Rx:75Bhg4437 Ordered   18  Zolpidem Tartrate 10 MG Oral Tablet; TAKE 1 TABLET AT BEDTIME AS NEEDED FOR    SLEEP; Therapy: 45LWM1021 to (Evaluate:08Jan2018)  Requested for: 80Nmz1616; Last    Rx:22Kpq3837 Ordered    Allergies  1  No Known Drug Allergies    Vitals  Vital Signs    Recorded: 15GOS0484 03:26PM   Temperature 97 9 F   Heart Rate 60   Respiration 16   Systolic 088   Diastolic 82   Height 5 ft 7 in   Weight 315 lb    BMI Calculated 49 34   BSA Calculated 2 45     Physical Exam    Constitutional   General appearance: No acute distress, well appearing and well nourished  Eyes glasses  Pulmonary   Respiratory effort: No increased work of breathing or signs of respiratory distress  Auscultation of lungs: Clear to auscultation, equal breath sounds bilaterally, no wheezes, no rales, no rhonci  Cardiovascular   Auscultation of heart: Normal rate and rhythm, normal S1 and S2, without murmurs      Examination of extremities for edema and/or varicosities: Normal     Carotid pulses: Normal     Psychiatric   Orientation to person, place and time: Normal     Mood and affect: Normal         Socks and shoes removed, Right Foot Findings: some peeling of skin and odor  The right toes were discolored thickened nails  Normal tactile sensation with monofilament testing throughout the right foot  Socks and shoes removed, Left Foot Findings: some peeling of skin and odor  The left toes were same  Normal tactile sensation with monofilament testing throughout the left foot  Pulses:   2+ in the posterior tibialis on the right   2+ in the dorsalis pedis on the right  Pulses:   2+ in the posterior tibialis on the left   2+ in the dorsalis pedis on the left  Assign Risk Category: 0: No loss of protective sensation, no deformity  No present risk  Results/Data  *VB - Foot Exam 23DBI3950 12:00AM Neel Long     Test Name Result Flag Reference   FOOT EXAM 72STU0934       (1) HEMOGLOBIN A1C 63NEL0967 02:49PM Elvis Ortiz     Test Name Result Flag Reference   HEMOGLOBIN A1C 7 1 % H 4 2-6 3   EST  AVG  GLUCOSE 157 mg/dl       (1) LIPID PANEL, FASTING 95QCE3408 02:49PM Elvis Ortiz     Test Name Result Flag Reference   CHOLESTEROL 136 mg/dL     HDL,DIRECT 58 mg/dL  40-60   Specimen collection should occur prior to Metamizole administration due to the potential for falsley depressed results  LDL CHOLESTEROL CALCULATED 64 mg/dL  0-100   - Patient Instructions: This is a fasting blood test  Water,black tea or black  coffee only after 9:00pm the night before test   Drink 2 glasses of water the morning of test       Triglyceride:        Normal <150 mg/dl   Borderline High 150-199 mg/dl   High 200-499 mg/dl   Very High >499 mg/dl      Cholesterol:       Desirable <200 mg/dl    Borderline High 200-239 mg/dl    High >239 mg/dl      HDL Cholesterol:       High>59 mg/dL    Low <41 mg/dL      This screening LDL is a calculated result     It does not have the accuracy of the Direct Measured LDL in the monitoring of patients with hyperlipidemia and/or statin therapy  Direct Measure LDL (QPF195) must be ordered separately in these patients  TRIGLYCERIDES 70 mg/dL  <=150   Specimen collection should occur prior to N-Acetylcysteine or Metamizole administration due to the potential for falsely depressed results  (1) MAGNESIUM 17UCL3519 02:49PM Lorene Ortiz     Test Name Result Flag Reference   MAGNESIUM 1 9 mg/dL  1 6-2 6     (1) CK (CPK) 42GPB8367 02:49PM [facility]     Test Name Result Flag Reference   CK (CPK) 108 U/L       (1) COMPREHENSIVE METABOLIC PANEL 30SUT6950 81:47RA [facility]     Test Name Result Flag Reference   SODIUM 138 mmol/L  136-145   POTASSIUM 3 8 mmol/L  3 5-5 3   CHLORIDE 105 mmol/L  100-108   CARBON DIOXIDE 24 mmol/L  21-32   ANION GAP (CALC) 9 mmol/L  4-13   BLOOD UREA NITROGEN 13 mg/dL  5-25   CREATININE 0 84 mg/dL  0 60-1 30   Standardized to IDMS reference method   CALCIUM 9 0 mg/dL  8 3-10 1   BILI, TOTAL 0 58 mg/dL  0 20-1 00   ALK PHOSPHATAS 64 U/L     ALT (SGPT) 31 U/L  12-78   Specimen collection should occur prior to Sulfasalazine and/or Sulfapyridine administration due to the potential for falsely depressed results  AST(SGOT) 19 U/L  5-45   Specimen collection should occur prior to Sulfasalazine administration due to the potential for falsely depressed results  ALBUMIN 3 6 g/dL  3 5-5 0   TOTAL PROTEIN 7 0 g/dL  6 4-8 2   eGFR 93 ml/min/1 73sq m     National Kidney Disease Education Program recommendations are as follows:  GFR calculation is accurate only with a steady state creatinine  Chronic Kidney disease less than 60 ml/min/1 73 sq  meters  Kidney failure less than 15 ml/min/1 73 sq  meters  GLUCOSE FASTING 159 mg/dL H 65-99   Specimen collection should occur prior to Sulfasalazine administration due to the potential for falsely depressed results  Specimen collection should occur prior to Sulfapyridine administration due to the potential for falsely elevated results  Health Management  History of Screening for colorectal cancer   COLONOSCOPY; every 5 years; Last 91Kdp3041; Next Due: 77Shp3998; Active    Future Appointments    Date/Time Provider Specialty Site   12/21/2017 11:15 AM COLLEEN Mosley  Orthopedic Surgery [facility] INPATIENT REHABILITATION [address] [place] [address]   11/21/2017 02:30 PM COLLEEN Addison   Cardiology [facility] CARDIOLOGY [place]   04/27/2018 01:15 PM Citlaly Diaz, [address]  Urology [address] FOR UROLOGY [address] [place] [address]     Signatures   Electronically signed by : COLLEEN Chicas ; Oct 26 2017  4:23PM EST                       (Author)

## 2017-11-07 DIAGNOSIS — E78.5 HYPERLIPIDEMIA: ICD-10-CM

## 2017-11-07 DIAGNOSIS — Z47.1 AFTERCARE FOLLOWING JOINT REPLACEMENT SURGERY: ICD-10-CM

## 2017-11-07 DIAGNOSIS — M17.12 PRIMARY OSTEOARTHRITIS OF LEFT KNEE: ICD-10-CM

## 2017-11-07 DIAGNOSIS — I77.89 OTHER SPECIFIED DISORDERS OF ARTERIES AND ARTERIOLES (CODE): ICD-10-CM

## 2017-11-07 DIAGNOSIS — F41.8 OTHER SPECIFIED ANXIETY DISORDERS: ICD-10-CM

## 2017-11-07 DIAGNOSIS — Z00.00 ENCOUNTER FOR GENERAL ADULT MEDICAL EXAMINATION WITHOUT ABNORMAL FINDINGS: ICD-10-CM

## 2017-11-07 DIAGNOSIS — Z88.9 ALLERGY STATUS TO UNSPECIFIED DRUGS, MEDICAMENTS AND BIOLOGICAL SUBSTANCES STATUS: ICD-10-CM

## 2017-11-07 DIAGNOSIS — Z48.89 OTHER SPECIFIED AFTERCARE FOLLOWING SURGERY: ICD-10-CM

## 2017-11-07 DIAGNOSIS — I49.3 VENTRICULAR PREMATURE DEPOLARIZATION: ICD-10-CM

## 2017-11-07 DIAGNOSIS — R07.9 CHEST PAIN: ICD-10-CM

## 2017-11-07 DIAGNOSIS — B37.2 CANDIDIASIS OF SKIN AND NAIL: ICD-10-CM

## 2017-11-07 DIAGNOSIS — E11.9 TYPE 2 DIABETES MELLITUS WITHOUT COMPLICATIONS (HCC): ICD-10-CM

## 2017-11-21 ENCOUNTER — ALLSCRIPTS OFFICE VISIT (OUTPATIENT)
Dept: OTHER | Facility: OTHER | Age: 62
End: 2017-11-21

## 2017-11-21 ENCOUNTER — TRANSCRIBE ORDERS (OUTPATIENT)
Dept: ADMINISTRATIVE | Facility: HOSPITAL | Age: 62
End: 2017-11-21

## 2017-11-21 DIAGNOSIS — I77.89 HYPERPLASIA OF RENAL ARTERY (HCC): Primary | ICD-10-CM

## 2017-11-22 NOTE — PROGRESS NOTES
Assessment  Assessed    1  Benign essential hypertension (401 1) (I10)   2  Hyperlipidemia (272 4) (E78 5)    Plan  Hyperlipidemia    · Follow Up After Tests Complete Evaluation and Treatment  Follow-up  Status: Hold For -Scheduling  Requested for: 09UJC2553   Ordered; Hyperlipidemia; Ordered By: Phoebe Macdonald Performed:  Due: 20XIS9237    Discussion/Summary  Discussion Summary:   As noted above, he is status post gastric binding but continues to have significant weight  He is down to 288 pounds which is a loss of 20 pounds  Hemoglobin A1c 6 is below 6 5  His last cholesterol level was done in 2014 shows an LDL of 86  No vascular disease documented  He is on a losartanjust ordered blood work for next year  seen August 18, 2015  He is asymptomatic  Hemoglobin A1c 6 6  I am going to    start a low dose of statin  His very positive family history his LDL is greater than 100 and is borderline diabetic   seen February 3  Diagnosis listed in the history of present illness  The issue today is gaining weight, intolerant to pravastatin, and chest pain  Studies being ordered  Hopefully he can tolerate Crestor 2 or 3 times a week at a low dose  Thus far we have not documented vascular disease  seen February 22, 2016  The issues are he is moving towards diabetes  his LDL lower  He is statin sensitive  I'm starting the beta blocker causes PVCs and couplets  His studies are summarized in the history of present illness  should see July 18, 2016  The issue his blood pressure to high  I'm adding being hydrochlorothiazide and I'll see him in several months  seen December 19, 2016  Diagnosis in the history of present illness  A1c is down to 7  He lost 4 pounds  Blood pressure is better with the hydrochlorothiazide  I've no changes to make  I did ask him not to take Celebrex with his risk factors  seen April 11, 2017  Blood pressure and cholesterol levels are excellent  His major concern right now is pain in his knee   He still taking some codeine derivatives  seen November 21, 2017  We will get a CTA of the ascending aorta  Echocardiogram showed enlarged aorta root  Labs are summarized in the HPI he is asymptomatic  He is gaining weight despite gastric bypass  Chief Complaint  Chief Complaint Free Text Note Form: Patient presents for a routine follow up with test results  History of Present Illness  HPI: Patient seen on the 25th of thousand 14is a following history significant obesitybypassdiabetesblood work showing a hemoglobin A1c is 6  3under good controlcarcinoma  Status post prostatectomy  Done July 2011  Most recent PSA less than 0 1hyperlipidemia not on medication no diagnosis of vascular disease  been stable  seen August 18, 2015 has a history of gastric binding, borderline diabetes, elevated cholesterol, positive family history coronary disease, mild hypertension, and prostate carcinoma with removal in July of 2011  His LDL is 93  Hemoglobin A1c is 6 6  He is asymptomatic  His medications include a moore  He is not on a statin drug at this time  This is something to consider  I'm going to start him on a low-dose pravastatin 20 mg grams 3 times a week  He has a 10-12% 10 year she is of a coronary event  seen February 3, 2016  His problems have been morbid obesity, status post gastric binding, hyperlipidemia, borderline diabetes, hyperlipidemia, unable to take pravastatin due to muscle aches, very positive family history of coronary disease, history of prostate carcinoma, hypertension,new symptom for him as he is having some chest pain  Does not appear to clearly be related to exertion  However we will do a stress test addition we'll try Crestor 2 5 mg twice a week and see if he can tolerate that before going on to Zetia or T parenteral form of cholesterol treatment  full blood work Holter and stress test  Resting electrocardiogram shows premature ventricular contractions as well  seen July 18, 2016   He has a history of gastric bypass, hyperlipidemia, very positive family history coronary disease, diabetes, prostate carcinoma, hypertension, and PVCs  LDL is 63  His CBC and metabolic panel are normal  A1c 7 1   counter shows PACs and no PVCs  This is much better  only issue is his blood pressures running proximally 150 even at home  Therefore I added hydrochlorothiazide 12 53 times a week  seen December 19, 2016  He is status post gastric bypass, right knee recently done November 2016, hyperlipidemia, family history coronary disease, hypertension and this patient, PVCs, diabetes relatively well controlled, statin intolerant, and he recently lost 7 pounds  His A1c is down to 7  Blood pressures well controlled since I added hydrochlorothiazide  offers no new complaints  seen April 11, 2017  His had 2 knees replaced now  Her gastric bypass  His hyperlipidemia, positive family history coronary disease, hypertension, PVCs, diabetes, and relative statin intolerance  with the increase in statin at the last visit he is doing well with it  His metabolic panel is normal his LDL is down to 63  He will A1c is 6  8 will get labs on him and get an echocardiogram on him before the end of 2017  Last echocardiogram was  seen November 21, 2017 has a diagnosis of 2 knee operations, gastric bypass, hyperlipidemia, hypertension, PVCs, diabetes, and some degree of statin intolerance echocardiogram shows aortic to be enlarged it over 4 cm  We will do a CTA of his chest LDL is 64  He is taking Crestor and Zetia  His A1c is 7  1 echocardiogram shows normal left ventricular function  will see him after his CTA of his chest 2010      Review of Systems  Cardiology Male ROS:    Cardiac: rhythm problems  Skin: No complaints of nonhealing sores or skin rash  Genitourinary: prostate problems-- History of prostate cancer  Psychological: No complaints of feeling depressed, anxiety, panic attacks, or difficulty concentrating    HEENT: No complaints of serious problems, hearing problems, nose problems, throat problems, or snoring  Gastrointestinal: Status post gastric bypass  Musculoskeletal: Status post 2 knee replacements and still taking some pain medication  Active Problems  Problems    1  Aftercare following left knee joint replacement surgery (V54 81,V43 65) (Z47 1,Z96 652)   2  Aftercare following right knee joint replacement surgery (V54 81,V43 65) (Z47 1,Z96 651)   3  Aftercare following surgery (V58 89) (Z48 89)   4  Benign essential hypertension (401 1) (I10)   5  Candidal intertrigo (112 3) (B37 2)   6  Controlled type 2 diabetes mellitus without complication (556 33) (V49 9)   7  Depression with anxiety (300 4) (F41 8)   8  History of allergy (V15 09) (Z88 9)   9  Hyperlipidemia (272 4) (E78 5)   10  Incisional hernia, without obstruction or gangrene (553 21) (K43 2)   11  Insomnia (780 52) (G47 00)   12  Malignant neoplasm of prostate (185) (C61)   13  Morbid or severe obesity due to excess calories (278 01) (E66 01)   14  Need for influenza vaccination (V04 81) (Z23)   15  Need for pneumococcal vaccination (V03 82) (Z23)   16  Onychomycosis (110 1) (B35 1)   17  Pain in left knee (719 46) (M25 562)   18  Premature ventricular contraction (427 69) (I49 3)   19  Primary localized osteoarthritis of right knee (715 16) (M17 11)   20  Primary localized osteoarthrosis of the knee, left (715 16) (M17 12)   21  Stuttering (315 35) (F80 81)   22  Tinea pedis of both feet (110 4) (B35 3)    Past Medical History  Problems    1  History of Anal fissure (565 0) (K60 2)   2  History of acute pharyngitis (V12 69) (Z87 09)   3  History of cataract (V12 49) (Z86 69)   4  History of chest pain (V13 89) (Z87 898)   5  History of constipation (V12 79) (Z87 19)   6  History of diverticulitis of colon (V12 79) (Z87 19)   7  History of epididymitis (V13 89) (Z87 438)   8  History of hiatal hernia (V12 79) (Z87 19)   9  History of infectious mononucleosis (V12 09) (Z86 19)   10  History of retinal detachment (V12 49) (Z86 69)   11  History of Pain of right heel (729 5) (M79 871)   12  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)   13  History of Prostate Cancer (V10 46)   14  History of Prostate Cancer (V10 46)   15  History of Rectal pain (569 42) (K62 89)    Surgical History  Problems    1  History of Cataract Surgery   2  History of Colonoscopy (Fiberoptic)   3  History of Knee Arthroscopy (Therapeutic)   4  History of Laparosc Gastric Restrictive Proc By Adjustable Gastric Band   5  History of Needle Biopsy Of Prostate   6  History of Prostatectomy Robotic-Assisted   7  History of Repair Of Retinal Detachment   8  History of Septoplasty   9  History of Surgery Vas Deferens Vasectomy   10  History of Tonsillectomy Over Age 13   8  History of Total Knee Replacement Right   12  History of Umbilical Hernia Repair    Family History  Mother    1  Family history of Diabetes Mellitus (V18 0)  Father    2  Family history of Coronary Artery Disease (V17 49)   3  Family history of Prostate Cancer (V16 42)  Maternal Grandmother    4  Family history of Diabetes Mellitus (V18 0)  Maternal Grandfather    5  Family history of Coronary Artery Disease (V17 49)  Paternal Grandfather    6  Family history of Coronary Artery Disease (V17 49)    Social History  Problems    · Being A Social Drinker   · Denied: History of Drug Use   · Never A Smoker  Social History Reviewed: The social history was reviewed and is unchanged  Current Meds   1  ALPRAZolam 0 25 MG Oral Tablet; My take 1/2 tablet up to TID prn; Therapy: 63Kro8461 to (Last Rx:25Mar2017)  Requested for: 25Mar2017 Ordered   2  Centrum Oral Tablet; TAKE 1 TABLET DAILY; Therapy: 51VJV7485 to Recorded   3  FLUoxetine HCl - 20 MG Oral Capsule; TAKE 3 CAPSULES BY MOUTH EVERY DAY; Therapy: 52BTH8033 to (Evaluate:18Jan2018)  Requested for: 29YQB8879; Last Rx:23Jan2017 Ordered   4   HydroCHLOROthiazide 12 5 MG Oral Tablet; TAKE 1 TABLET THREE TIMES A WEEK ON MONDAY, [facility], AND SATURDAY; Therapy: 03SAH0819 to (Last Rx:05Oct2017)  Requested for: 37RHQ5422 Ordered   5  Losartan Potassium 100 MG Oral Tablet; Take 1 tablet daily; Therapy: 66PLB2899 to (Last Rx:69Tgd5401)  Requested for: 69AAG1584 Ordered   6  MetFORMIN HCl - 500 MG Oral Tablet; Take 1 tablet in morning and 2 tablets in the evening daily; Therapy: 36OZX9952 to ([phone])  Requested for: 26Oct2017; Last Rx:94Awe1272 Ordered   7  Metoprolol Succinate ER 25 MG Oral Tablet Extended Release 24 Hour; Take 1 tablet daily  Requested for: 54Crv8555; Last Rx:80Amq3759 Ordered   8  OLANZapine 5 MG Oral Tablet; Take 1 tablet daily; Therapy: 81VNI6832 to (Evaluate:24Apr2018)  Requested for: 26Oct2017; Last Rx:44Gqd7047 Ordered   9  Omega 3 1200 MG Oral Capsule; Take 2 daily; Therapy: 43RJQ9074 to Recorded   10  Rosuvastatin Calcium 5 MG Oral Tablet; TAKE 1 TABLET TWICE WEEKLY, MONDAY AND  FRIDAY; Therapy: 25XYK6530 to (Last Rx:22Mar2017)  Requested for: 22Mar2017 Ordered   11  TraMADol HCl - 50 MG Oral Tablet; Therapy: (Recorded:82Dun3836) to Recorded   12  Zetia 10 MG Oral Tablet; TAKE 1 TABLET DAILY  Requested for: 54Xpj8967; Last  Rx:59Ihc3333 Ordered   13  Zolpidem Tartrate 10 MG Oral Tablet; TAKE 1 TABLET AT BEDTIME AS NEEDED FOR  SLEEP; Therapy: 58CCR0266 to (Evaluate:08Jan2018)  Requested for: 80Yrv9431; Last  Rx:87Kad6546 Ordered  Medication List Reviewed: The medication list was reviewed and updated today  Allergies  Medication    1  No Known Drug Allergies    Vitals  Vital Signs    Recorded: 21Nov2017 02:43PM   Heart Rate 65   Systolic 288, LUE, Sitting   Diastolic 72, LUE, Sitting   BP CUFF SIZE Large   Height 5 ft 7 in   Weight 313 lb 1 oz   BMI Calculated 49 03   BSA Calculated 2 45   O2 Saturation 96       Physical Exam   Constitutional  General appearance: Abnormal  -- Severely overweight even after gastric bypass    Pulmonary  Auscultation of lungs: Clear to auscultation, no rales, no rhonchi, no wheezing, good air movement  Cardiovascular  Auscultation of heart: Normal rate and rhythm, normal S1 and S2, no murmurs  Examination of extremities for edema and/or varicosities: Normal    Musculoskeletal Gait and station: Abnormal -- Has had 2 knees done  Skin - Skin and subcutaneous tissue: Normal without rashes or lesions  Skin is warm and well perfused, normal turgor  Psychiatric - Orientation to person, place, and time: Normal -- Mood and affect: Normal       Health Management  History of Screening for colorectal cancer   COLONOSCOPY; every 5 years; Last 01Sbb3137; Next Due: 71Yqy1207; Active    Future Appointments    Date/Time Provider Specialty Site   04/26/2018 03:00 PM COLLEEN Wan  [facility]   12/21/2017 11:15 AM COLLEEN Milton   Orthopedic Surgery [facility] [place]   04/27/2018 01:15 PM Callie An, [address]  Urology [facility] [place]       Signatures   Electronically signed by : COLLEEN Heller ; Nov 21 2017  3:05PM EST                       (Author)

## 2017-11-27 ENCOUNTER — HOSPITAL ENCOUNTER (OUTPATIENT)
Dept: RADIOLOGY | Age: 62
Discharge: HOME | End: 2017-11-27
Payer: COMMERCIAL

## 2017-11-27 DIAGNOSIS — I77.89 HYPERPLASIA OF RENAL ARTERY (HCC): ICD-10-CM

## 2017-11-27 PROCEDURE — 71275 CT ANGIOGRAPHY CHEST: CPT

## 2017-11-27 RX ADMIN — IOHEXOL 100 ML: 350 INJECTION, SOLUTION INTRAVENOUS at 12:22

## 2017-12-02 ENCOUNTER — ALLSCRIPTS OFFICE VISIT (OUTPATIENT)
Dept: OTHER | Facility: OTHER | Age: 62
End: 2017-12-02

## 2017-12-05 NOTE — PROGRESS NOTES
Assessment    1  Acute maxillary sinusitis (461 0) (J01 00)   2  Benign essential hypertension (401 1) (I10)   3  Controlled type 2 diabetes mellitus without complication (939 75) (R88 8)    Plan  Acute maxillary sinusitis    · Moxifloxacin HCl - 400 MG Oral Tablet; TAKE 1 TABLET ONCE DAILY FOR !) DAYS   · PredniSONE 10 MG Oral Tablet; Take daily in decreasing dose of 5 tabs, 4 tabs, 3tabs, 2 tabs, 1 tab    Discussion/Summary    Discussed sinusitissinus / nasal rinsesMucinex  has had poor results in past with many antibiotics and we try to avoid decongestants given HTN, etc and prednisone has been effectiveincreased risk of tendon ruptureproblems in the pastavoid activities that increase risk  aware that prednisone can increase BS  Chief Complaint  Patient presents with a sinus infection that came on Wednesday night  History of Present Illness  HPI: Began with sinus congestion and pressure several days agoimproving and now some facial pain especially his cheeks  turning colored  been taking MucinexNetipot  of sinus infections and has needed antibiotics and prednisone to clear      Review of Systems   Constitutional: feeling poorly, but-- as noted in HPI   ENT: as noted in HPI  Cardiovascular: no chest pain-- and-- no palpitations  Active Problems  1  Aftercare following left knee joint replacement surgery (V54 81,V43 65) (Z47 1,Z96 652)   2  Aftercare following right knee joint replacement surgery (V54 81,V43 65) (Z47 1,Z96 651)   3  Aftercare following surgery (V58 89) (Z48 89)   4  Benign essential hypertension (401 1) (I10)   5  Candidal intertrigo (112 3) (B37 2)   6  Controlled type 2 diabetes mellitus without complication (169 27) (R15 5)   7  Depression with anxiety (300 4) (F41 8)   8  Enlarged thoracic aorta (447 8) (I77 89)   9  History of allergy (V15 09) (Z88 9)   10  Hyperlipidemia (272 4) (E78 5)   11  Incisional hernia, without obstruction or gangrene (553 21) (K43 2)   12   Insomnia (780 52) (G47 00)   13  Malignant neoplasm of prostate (185) (C61)   14  Morbid or severe obesity due to excess calories (278 01) (E66 01)   15  Need for influenza vaccination (V04 81) (Z23)   16  Need for pneumococcal vaccination (V03 82) (Z23)   17  Onychomycosis (110 1) (B35 1)   18  Pain in left knee (719 46) (M25 562)   19  Premature ventricular contraction (427 69) (I49 3)   20  Primary localized osteoarthritis of right knee (715 16) (M17 11)   21  Primary localized osteoarthrosis of the knee, left (715 16) (M17 12)   22  Stuttering (315 35) (F80 81)   23  Tinea pedis of both feet (110 4) (B35 3)    Past Medical History  1  History of Anal fissure (565 0) (K60 2)   2  History of acute pharyngitis (V12 69) (Z87 09)   3  History of cataract (V12 49) (Z86 69)   4  History of chest pain (V13 89) (Z87 898)   5  History of constipation (V12 79) (Z87 19)   6  History of diverticulitis of colon (V12 79) (Z87 19)   7  History of epididymitis (V13 89) (Z87 438)   8  History of hiatal hernia (V12 79) (Z87 19)   9  History of infectious mononucleosis (V12 09) (Z86 19)   10  History of retinal detachment (V12 49) (Z86 69)   11  History of Pain of right heel (729 5) (M79 671)   12  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)   13  History of Prostate Cancer (V10 46)   14  History of Prostate Cancer (V10 46)   15  History of Rectal pain (569 42) (K62 89)    Family History  Mother    1  Family history of Diabetes Mellitus (V18 0)  Father    2  Family history of Coronary Artery Disease (V17 49)   3  Family history of Prostate Cancer (V16 42)  Maternal Grandmother    4  Family history of Diabetes Mellitus (V18 0)  Maternal Grandfather    5  Family history of Coronary Artery Disease (V17 49)  Paternal Grandfather    6  Family history of Coronary Artery Disease (V17 49)    Social History   · Being A Social Drinker   · Denied: History of Drug Use   · Never A Smoker    Surgical History    1   History of Cataract Surgery   2  History of Colonoscopy (Fiberoptic)   3  History of Knee Arthroscopy (Therapeutic)   4  History of Laparosc Gastric Restrictive Proc By Adjustable Gastric Band   5  History of Needle Biopsy Of Prostate   6  History of Prostatectomy Robotic-Assisted   7  History of Repair Of Retinal Detachment   8  History of Septoplasty   9  History of Surgery Vas Deferens Vasectomy   10  History of Tonsillectomy Over Age 13   8  History of Total Knee Replacement Right   12  History of Umbilical Hernia Repair    Current Meds   1  ALPRAZolam 0 25 MG Oral Tablet; My take 1/2 tablet up to TID prn; Therapy: 26Upc4531 to (Last Rx:25Mar2017)  Requested for: 25Mar2017 Ordered   2  Centrum Oral Tablet; TAKE 1 TABLET DAILY; Therapy: 96XWJ0434 to Recorded   3  FLUoxetine HCl - 20 MG Oral Capsule; TAKE 3 CAPSULES BY MOUTH EVERY DAY; Therapy: 06WRG3972 to (Evaluate:18Jan2018)  Requested for: 02ZIF2741; Last Rx:23Jan2017 Ordered   4  HydroCHLOROthiazide 12 5 MG Oral Tablet; TAKE 1 TABLET THREE TIMES A WEEK ON MONDAY, THURSDAY, AND SATURDAY; Therapy: 03GIW8544 to (Last Rx:05Oct2017)  Requested for: 96MPQ1921 Ordered   5  Losartan Potassium 100 MG Oral Tablet; Take 1 tablet daily; Therapy: 27DXM0935 to (Last Rx:02Oct2017)  Requested for: 35LIS5730 Ordered   6  MetFORMIN HCl - 500 MG Oral Tablet; Take 1 tablet in morning and 2 tablets in the evening daily; Therapy: 43XWE0772 to (Jorge Dao)  Requested for: 26Oct2017; Last Rx:26Oct2017 Ordered   7  Metoprolol Succinate ER 25 MG Oral Tablet Extended Release 24 Hour; Take 1 tablet daily  Requested for: 67Alq8828; Last Rx:62Yhk3614 Ordered   8  OLANZapine 5 MG Oral Tablet; Take 1 tablet daily; Therapy: 19WBM4668 to (Evaluate:24Apr2018)  Requested for: 26Oct2017; Last Rx:26Oct2017 Ordered   9  Omega 3 1200 MG Oral Capsule; Take 2 daily; Therapy: 63OZA2314 to Recorded   10  Rosuvastatin Calcium 5 MG Oral Tablet; TAKE 1 TABLET TWICE WEEKLY, MONDAY  AND FRIDAY;   Therapy: 70RQK0011 to (Last Rx:22Mar2017)  Requested for: 22Mar2017 Ordered   11  TraMADol HCl - 50 MG Oral Tablet; Therapy: (Recorded:76Yqf2010) to Recorded   12  Zetia 10 MG Oral Tablet; TAKE 1 TABLET DAILY  Requested for: 92Ucy0915; Last  Rx:34Esk3406 Ordered   13  Zolpidem Tartrate 10 MG Oral Tablet; TAKE 1 TABLET AT BEDTIME AS NEEDED FOR  SLEEP; Therapy: 39SWA0830 to (Evaluate:08Jan2018)  Requested for: 19Qqx1042; Last  Rx:14Yzm4918 Ordered    The medication list was reviewed and updated today  Allergies  1  No Known Drug Allergies    Vitals   Recorded: 02Dec2017 09:03AM   Temperature 100 8 F, Tympanic   Heart Rate 80, L Radial   Pulse Quality Normal, L Radial   Respiration Quality Normal   Respiration 16   Systolic 322, LUE, Sitting   Diastolic 82, LUE, Sitting   Height 5 ft 7 in   Weight 316 lb 2 oz   BMI Calculated 49 51   BSA Calculated 2 46   O2 Saturation 96   Pain Scale 4       Physical Exam   Constitutional  General appearance: No acute distress, well appearing and well nourished  Ears, Nose, Mouth, and Throat  External inspection of ears and nose: Normal    Otoscopic examination: Tympanic membrance translucent with normal light reflex  Canals patent without erythema  Nasal mucosa, septum, and turbinates: Abnormal  -- boggy turbinates  Oropharynx: Abnormal  -- post nasal drip  Pulmonary  Respiratory effort: No increased work of breathing or signs of respiratory distress  Auscultation of lungs: Clear to auscultation, equal breath sounds bilaterally, no wheezes, no rales, no rhonci  Cardiovascular  Auscultation of heart: Normal rate and rhythm, normal S1 and S2, without murmurs  Future Appointments    Date/Time Provider Specialty Site   04/26/2018 03:00 PM COLLEEN Pollard  [address] [facility]   12/21/2017 11:15 AM COLLEEN Hdez  Orthopedic Surgery [facility]   12/06/2017 03:30 PM COLLEEN Tariq   Cardiology [facility] 04/27/2018 01:15 PM [facility] [place]       Signatures   Electronically signed by : COLLEEN Chicas ; Dec  2 2017  9:46AM EST                       (Author)

## 2017-12-06 ENCOUNTER — GENERIC CONVERSION - ENCOUNTER (OUTPATIENT)
Dept: OTHER | Facility: OTHER | Age: 62
End: 2017-12-06

## 2017-12-06 ENCOUNTER — ALLSCRIPTS OFFICE VISIT (OUTPATIENT)
Dept: OTHER | Facility: OTHER | Age: 62
End: 2017-12-06

## 2017-12-21 ENCOUNTER — GENERIC CONVERSION - ENCOUNTER (OUTPATIENT)
Dept: OTHER | Facility: OTHER | Age: 62
End: 2017-12-21

## 2017-12-21 ENCOUNTER — HOSPITAL ENCOUNTER (OUTPATIENT)
Dept: RADIOLOGY | Facility: HOSPITAL | Age: 62
Discharge: HOME | End: 2017-12-21
Attending: ORTHOPAEDIC SURGERY
Payer: COMMERCIAL

## 2017-12-21 DIAGNOSIS — Z47.1 AFTERCARE FOLLOWING JOINT REPLACEMENT SURGERY: ICD-10-CM

## 2017-12-21 PROCEDURE — 73562 X-RAY EXAM OF KNEE 3: CPT

## 2018-01-10 NOTE — PROGRESS NOTES
Chief Complaint  Chief Complaint Free Text Note Form: since last here patient has experienced a severe gambling addiction, but has put both he and his wife on a national list that will not allow them to go to casinos  Upon quitting his gambling, patient experienced a 60 pound weight gain  He is now experiencing difficulties with his knees and is seeing his blood sugar rising  Patient is willing to take necessary action to rectify this situation  he will be meeting with dietitian today also and will be seeing both SW and dietitian at next scheduled appointment on 7/22/2016 at 11am  Patient is able to state that he will be more accountable to himself if he has a scheduled appointment with SW, dietitian or physician  Active Problems    1  Benign essential hypertension (401 1) (I10)   2  Chest pain (786 50) (R07 9)   3  Controlled type 2 diabetes mellitus without complication (310 27) (F86 7)   4  Depression with anxiety (300 4) (F41 8)   5  Gastric banding status (V45 86) (Z98 84)   6  History of allergy (V15 09) (Z88 9)   7  Hyperlipidemia (272 4) (E78 5)   8  Incisional hernia, without obstruction or gangrene (553 21) (K43 2)   9  Insomnia (780 52) (G47 00)   10  Leg pain, posterior, right (729 5) (M79 604)   11  Malignant neoplasm of prostate (185) (C61)   12  Morbid or severe obesity due to excess calories (278 01) (E66 01)   13  Observation For Suspected Condition (V71 89)   14  Onychomycosis (110 1) (B35 1)   15  Premature ventricular contraction (427 69) (I49 3)   16  Stuttering (315 35) (F80 81)   17  Weight gain (783 1) (R63 5)    Past Medical History    1  History of Anal fissure (565 0) (K60 2)   2  History of cataract (V12 49) (Z86 69)   3  History of constipation (V12 79) (Z87 19)   4  History of diverticulitis of colon (V12 79) (Z87 19)   5  History of epididymitis (V13 89) (Z87 438)   6  History of hiatal hernia (V12 79) (Z87 19)   7   History of infectious mononucleosis (V12 09) (Z86 19)   8  History of retinal detachment (V12 49) (Z86 69)   9  History of Need for influenza vaccination (V04 81) (Z23)   10  History of Pain of right heel (729 5) (M79 671)   11  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)   12  History of Prostate Cancer (V10 46)   13  History of Prostate Cancer (V10 46)   14  History of Rectal pain (569 42) (K62 89)    Surgical History    1  History of Cataract Surgery   2  History of Colonoscopy (Fiberoptic)   3  History of Knee Arthroscopy   4  History of Laparosc Gastric Restrictive Proc By Adjustable Gastric Band   5  History of Needle Biopsy Of Prostate   6  Observation For Suspected Condition (V71 89)   7  History of Prostatectomy Robotic-Assisted   8  History of Repair Of Retinal Detachment   9  History of Septoplasty   10  History of Surgery Vas Deferens Vasectomy   11  History of Tonsillectomy Over Age 13   14  History of Umbilical Hernia Repair    Family History  Mother    1  Family history of Diabetes Mellitus (V18 0)  Father    2  Family history of Coronary Artery Disease (V17 49)   3  Family history of Prostate Cancer (V16 42)  Maternal Grandmother    4  Family history of Diabetes Mellitus (V18 0)  Maternal Grandfather    5  Family history of Coronary Artery Disease (V17 49)  Paternal Grandfather    6  Family history of Coronary Artery Disease (V17 49)    Social History    · Being A Social Drinker   · Denied: History of Drug Use   · Never A Smoker    Current Meds   1  ALPRAZolam 0 25 MG Oral Tablet (Xanax); Take 1/2 to 1 tablet 2-3 times daily as needed   for anxiety; Therapy: 02Yny2676 to (Last Rx:19Irm6416)  Requested for: 21Jun2016 Ordered   2  Ambien TABS (Zolpidem Tartrate); Therapy: (Recorded:47Jez8932) to Recorded   3  Centrum Oral Tablet; TAKE 1 TABLET DAILY; Therapy: 58UDC7358 to Recorded   4   Crestor 5 MG Oral Tablet (Rosuvastatin Calcium); take  1/2 tab every Monday, and   Saturday of every week  Requested for: 21Jun2016; Last Rx: 81KFJ1528 Ordered   5  FLUoxetine HCl - 20 MG Oral Capsule; TAKE 3 CAPSULES BY MOUTH EVERY DAY; Therapy: 73QOR3185 to (Evaluate:11Nov2016)  Requested for: 21Jun2016; Last   Rx:41Xio4514 Ordered   6  Losartan Potassium 100 MG Oral Tablet; take 1 tablet every day; Therapy: 53RMG8181 to (Evaluate:02Jan2017)  Requested for: 34TIU2905; Last   Rx:56Eaz1187 Ordered   7  Metoprolol Succinate ER 25 MG Oral Tablet Extended Release 24 Hour; Take 1 tablet   daily  Requested for: 21Jun2016; Last Rx:56Rpp0337 Ordered   8  OLANZapine 2 5 MG Oral Tablet; take 1 to 2 tablets at bedtime; Therapy: 66CFN8740 to (Evaluate:90Zdf3549)  Requested for: 21Jun2016; Last   Rx:10Yop2491 Ordered   9  Omega 3 1200 MG Oral Capsule; Take 2 daily; Therapy: 53AYL1735 to Recorded   10  Zetia 10 MG Oral Tablet; take 1/2 tab daily  Requested for: 21Jun2016; Last    Rx:27Mrm1956 Ordered   11  Zolpidem Tartrate 10 MG Oral Tablet; TAKE 1 TABLET AT BEDTIME AS NEEDED FOR    SLEEP; Therapy: 18AFB5773 to (Evaluate:54Biv2053)  Requested for: 42BEB7524; Last    Rx:94Abv2646 Ordered    Allergies    1  No Known Drug Allergies    Future Appointments    Date/Time Provider Specialty Site   10/20/2016 06:40 PM COLLEEN Fishman  [facility]   07/11/2016 01:00 PM Cardiology, Holter Tyler  [facility]   07/18/2016 02:00 PM Jerie Leventhal, M D  Cardiology [facility]   04/27/2017 01:15 PM Cleve Donovan, [address] [address] [address] [place]     Signatures   Electronically signed by :  HONG ReddingWMSWMSSAIGE BURLESON; Jul 8 2016 11:37AM EST                       (Author)    Electronically signed by : COLLEEN Banda ; Jul 8 2016 12:41PM EST                       (Validation)

## 2018-01-10 NOTE — RESULT NOTES
Message   Please tell him his HbA1C has come down from 7 5 to 7 0   Goal is below 7 and normal below 6   Continue to work on his diet and weight reduction   Good progress on his blood sugar! Verified Results  (1) HEMOGLOBIN A1C 85OWN0221 01:43PM [address]     Test Name Result Flag Reference   HEMOGLOBIN A1C 7 0 % H 4 2-6 3   EST  AVG   GLUCOSE 154 mg/dl

## 2018-01-11 NOTE — MISCELLANEOUS
Assessment    1  Hospital discharge follow-up (V67 59) (Z09)   2  Aftercare following right knee joint replacement surgery (V54 81,V43 65) (Z47 1,Z96 651)   3  Diabetes mellitus type 2 in obese (250 00,278 00) (E11 9,E66 9)   4  Morbid or severe obesity due to excess calories (278 01) (E66 01)   5  Stuttering (315 35) (F80 81)    Plan  Diabetes mellitus type 2 in obese    · MetFORMIN HCl - 500 MG Oral Tablet; Take 1 tablet twice daily   Rx By: Sailaja Ware; Dispense: 90 Days ; #:180 Tablet; Refill: 1; For: Diabetes mellitus type 2 in obese; RADHA = N; Verified Transmission to [employer]; Last Updated By: System, [employer]; 3/7/2017 2:54:19 PM    Discussion/Summary  Discussion Summary:   Continue Metformin 500mg one tab BID  Will check A1C level with blood work he has to get done   Follow a low carb diet, watch sweets  Continue with regular exercise     [facility]NA and Home PT following with patient   Encouraged exercises on own at home    Follow up with Dr Esteban Moscoso as scheduled on 4/6/17    I did reprint blood work which was ordered for 2/2017- he should get this done approximately one week prior to his routine follow up appt with Dr Maya Perea at the end of this month  Medication SE Review and Pt Understands Tx: Possible side effects of new medications were reviewed with the patient/guardian today  The treatment plan was reviewed with the patient/guardian  The patient/guardian understands and agrees with the treatment plan      Chief Complaint  Chief Complaint Free Text Note Form: ambulatory dysfunction      History of Present Illness  TCM Communication [facility]: The patient is being contacted for follow-up after hospitalization  He was hospitalized at [place] [address]  The dates of hospitalization: 4 days, date of admission: 2/18/2017, date of discharge: 2/22/2017  He was discharged to a nursing home, [facility]  Medications were not reviewed today     He scheduled a follow up appointment  Counseling was provided to the patient  Communication performed and completed by Sanjeev Garner   HPI: Pt presents by himself today for a GOPAL visit  He had elective L TKA with Dr Cora Brown on 2/15/17  Was discharged to home on 2/17/17- he unfortunately, had a fall that same day  Was admitted to [facility] on 2/17/17 to 2/28/17 for rehab  During his hospitalization, he was started on Metformin 500mg BID for elevated blood glucoses  He has been tolerating this well, no SE  Has seen Dr Cora Brown on 2/24/17 and 3/2/17 for post op follow ups  He has recently completed a course of Keflex, prophylactically     Is using Loven injections until 3/15/17 per orthopedics     [facility] Home Nurses and PT are coming to his home twice weekly   He will be starting outpatient PT shortly     Today, he reports he is feeling well  A little disappointed with how this surgery went compared to his R TKA  Is taking Tramadol, Oxycodone and Gabapentin prn for pain- has noticed he is requiring less and less of these   Incision is healing well, no drainage, some slight redness  Is trying to watch his diet/ stay active- has lost 11 pounds      Review of Systems  Complete-Male:   Constitutional: no fever, not feeling poorly, no chills and not feeling tired  Cardiovascular: no chest pain, no intermittent leg claudication, no palpitations and no extremity edema  Respiratory: no shortness of breath, no cough and no wheezing  Musculoskeletal: arthralgias and joint stiffness, but as noted in HPI  Integumentary: skin wound, but as noted in HPI  Neurological: no headache, no numbness, no tingling and no dizziness  Psychiatric: anxiety, sleep disturbances and depression, but not suicidal    ROS Reviewed:   ROS reviewed  Active Problems    1  Aftercare following right knee joint replacement surgery (V54 81,V43 65) (Z47 1,Z96 651)   2  Aftercare following surgery (V58 89) (Z48 89)   3   Arthritis of left knee (716 96) (M17 12)   4  Benign essential hypertension (401 1) (I10)   5  Candidal intertrigo (112 3) (B37 2)   6  Chest pain (786 50) (R07 9)   7  Depression with anxiety (300 4) (F41 8)   8  Diabetes mellitus type 2 in obese (250 00,278 00) (E11 9,E66 9)   9  History of allergy (V15 09) (Z88 9)   10  Hyperlipidemia (272 4) (E78 5)   11  Incisional hernia, without obstruction or gangrene (553 21) (K43 2)   12  Insomnia (780 52) (G47 00)   13  Maculopapular rash, localized (782 1) (R21)   14  Malignant neoplasm of prostate (185) (C61)   15  Morbid or severe obesity due to excess calories (278 01) (E66 01)   16  Need for pneumococcal vaccination (V03 82) (Z23)   17  Needs flu shot (V04 81) (Z23)   18  Observation For Suspected Condition (V71 89)   19  Onychomycosis (110 1) (B35 1)   20  Premature ventricular contraction (427 69) (I49 3)   21  Preoperative clearance (V72 84) (Z01 818)   22  Primary localized osteoarthritis of right knee (715 16) (M17 11)   23  Primary localized osteoarthrosis of the knee, left (715 16) (M17 12)   24  Right knee pain (719 46) (M25 561)   25  Stuttering (315 35) (F80 81)   26  Tinea pedis of both feet (110 4) (B35 3)   27  Uncontrolled diabetes mellitus type 2 without complications (705 03) (I05 80)    Past Medical History    1  History of Anal fissure (565 0) (K60 2)   2  History of acute pharyngitis (V12 69) (Z87 09)   3  History of cataract (V12 49) (Z86 69)   4  History of constipation (V12 79) (Z87 19)   5  History of diverticulitis of colon (V12 79) (Z87 19)   6  History of epididymitis (V13 89) (Z87 438)   7  History of hiatal hernia (V12 79) (Z87 19)   8  History of infectious mononucleosis (V12 09) (Z86 19)   9  History of retinal detachment (V12 49) (Z86 69)   10  History of Need for influenza vaccination (V04 81) (Z23)   11  History of Pain of right heel (729 5) (M79 531)   12  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)   13   History of Prostate Cancer (V10 46)   14  History of Prostate Cancer (V10 46)   15  History of Rectal pain (569 42) (K62 89)    Surgical History    1  History of Cataract Surgery   2  History of Colonoscopy (Fiberoptic)   3  History of Knee Arthroscopy (Therapeutic)   4  History of Laparosc Gastric Restrictive Proc By Adjustable Gastric Band   5  History of Needle Biopsy Of Prostate   6  Observation For Suspected Condition (V71 89)   7  History of Prostatectomy Robotic-Assisted   8  History of Repair Of Retinal Detachment   9  History of Septoplasty   10  History of Surgery Vas Deferens Vasectomy   11  History of Tonsillectomy Over Age 13   14  History of Total Knee Replacement Right   13  History of Umbilical Hernia Repair    Family History  Mother    1  Family history of Diabetes Mellitus (V18 0)  Father    2  Family history of Coronary Artery Disease (V17 49)   3  Family history of Prostate Cancer (V16 42)  Maternal Grandmother    4  Family history of Diabetes Mellitus (V18 0)  Maternal Grandfather    5  Family history of Coronary Artery Disease (V17 49)  Paternal Grandfather    6  Family history of Coronary Artery Disease (V17 49)    Social History    · Being A Social Drinker   · Denied: History of Drug Use   · Never A Smoker  Social History Reviewed: The social history was reviewed and updated today  Current Meds   1  ALPRAZolam 0 25 MG Oral Tablet; My take 1/2 tablet up to TID prn; Therapy: 85Laj2947 to (Last Rx:23Jan2017)  Requested for: 23Jan2017 Ordered   2  Ambien TABS; Therapy: (Recorded:95Xsy6950) to Recorded   3  Centrum Oral Tablet; TAKE 1 TABLET DAILY; Therapy: 08KXE5293 to Recorded   4  Ferrous Sulfate 325 (65 Fe) MG Oral Tablet; TAKE 1 TABLET TWICE DAILY WITH MEALS; Therapy: 18WLX8888 to (Evaluate:25Apr2017)  Requested for: 99OZY9999; Last   Rx:19Dew3455 Ordered   5  FLUoxetine HCl - 20 MG Oral Capsule; TAKE 3 CAPSULES BY MOUTH EVERY DAY;    Therapy: 41GMO3634 to (Evaluate:18Jan2018)  Requested for: 64UBS8651; Last   Rx:23Jan2017 Ordered   6  Folic Acid 1 MG Oral Tablet; TAKE 1 TABLET DAILY; Therapy: 03WMF6264 to (Evaluate:25Apr2017)  Requested for: 22PHC1052; Last   Rx:24Feb2017 Ordered   7  Gabapentin 100 MG Oral Capsule; TAKE 1 CAPSULE 3 TIMES DAILY; Therapy: 10XVS0734 to (Lila Schwab)  Requested for: 66MEY5419; Last   Rx:03Jan2017 Ordered   8  Gabapentin 100 MG Oral Capsule; TAKE 1 CAPSULE 3 TIMES DAILY; Therapy: 57OHC9353 to (Evaluate:27Jan2017)  Requested for: 28RXA2626; Last   Rx:28Nov2016 Ordered   9  HydroCHLOROthiazide 12 5 MG Oral Tablet; take 1 tablet three times per week, Monday,   Thursday, and Saturday  Requested for: 74Nhh9762; Last Rx:53Ryh0204 Ordered   10  Losartan Potassium 100 MG Oral Tablet; TAKE 1 TABLET ONCE DAILY; Therapy: 53KOG9826 to (WAOZEGGJ:11LZJ1388)  Requested for: 19MDZ8160; Last    Rx:23Jan2017 Ordered   11  Metoprolol Succinate ER 25 MG Oral Tablet Extended Release 24 Hour; Take 1 tablet    daily  Requested for: 23Dtd2032; Last Rx:19Dec2016 Ordered   12  Nystatin 711648 UNIT/GM External Powder; apply sparingly to affected area(s) twice    daily; Therapy: 60ROB4137 to (Evaluate:16Nov2016)  Requested for: 27Oct2016; Last    Rx:27Oct2016 Ordered   13  OLANZapine 2 5 MG Oral Tablet; TAKE 1 TABLET AT BEDTIME; Therapy: 54BAW2625 to (Evaluate:42Iqd3726)  Requested for: 33XSS3806; Last    Rx:03Feb2017 Ordered   14  Omega 3 1200 MG Oral Capsule; Take 2 daily; Therapy: 79PNK1170 to Recorded   15  OxyCODONE HCl - 5 MG Oral Tablet; TAKE 1 TO 2 TABLETS EVERY 4 TO 6 HOURS AS    NEEDED FOR PAIN;    Therapy: 54QLY7054 to (Evaluate:62Ifc3301); Last Rx:24Ezm7689 Ordered   16  Rosuvastatin Calcium 5 MG Oral Tablet; take 1 tablet twice per week, Monday and Friday     Requested for: 32Iwo8340; Last Rx:67Mqr0734 Ordered   17  Vitamin C 500 MG Oral Tablet; Take 1 tablet twice daily;     Therapy: 22SWC4203 to (Evaluate:25Apr2017)  Requested for: 00GPE1736; Last    Rx:19Cnl0053 Ordered 18  Zetia 10 MG Oral Tablet; TAKE 1 TABLET DAILY  Requested for: 50Gqe3328; Last    Rx:21Mkp5730 Ordered   19  Zolpidem Tartrate 10 MG Oral Tablet; TAKE 1 TABLET AT BEDTIME AS NEEDED FOR    SLEEP; Therapy: 27KRX2622 to (Wisam Shrestha)  Requested for: 96UGF6257; Last    UV:96NLT4656 Ordered  Medication List Reviewed: The medication list was reviewed and updated today  Allergies    1  No Known Drug Allergies    Vitals  Signs   Recorded: 32JVJ3021 02:17PM   Temperature: 97 9 F  Heart Rate: 60  Respiration: 16  Systolic: 611  Diastolic: 80  Height: 5 ft 7 in  Weight: 297 lb   BMI Calculated: 46 52  BSA Calculated: 2 39    Physical Exam    Constitutional   General appearance: No acute distress, well appearing and well nourished  Pulmonary   Respiratory effort: No increased work of breathing or signs of respiratory distress  Auscultation of lungs: Clear to auscultation, equal breath sounds bilaterally, no wheezes, no rales, no rhonci  Cardiovascular   Auscultation of heart: Normal rate and rhythm, normal S1 and S2, without murmurs  Examination of extremities for edema and/or varicosities: Normal     Lymphatic   Palpation of lymph nodes in neck: No lymphadenopathy  Musculoskeletal   Gait and station: Abnormal   Ambulates with a cane  Digits and nails: Normal without clubbing or cyanosis  Inspection/palpation of joints, bones, and muscles: Abnormal   Left knee with mild swelling, no warmth or erythema  Skin   Skin and subcutaneous tissue: Abnormal   Left anterior knee surgical incision is healing well  Psychiatric   Orientation to person, place and time: Normal     Mood and affect: Normal          Health Management  History of Screening for colorectal cancer   COLONOSCOPY; every 5 years; Last 91Odo9839; Next Due: 97Hoh0150;  Active    Attending Note  Collaborating Physician Note: Collaborating Physician: I did not interview and examine the patient and I agree with the Advanced Practitioner note  Future Appointments    Date/Time Provider Specialty Site   03/23/2017 12:20 PM COLLEEN Dumont  [address] [place] [address] PRACTICE   04/06/2017 01:30 PM COLLEEN Echevarria  Orthopedic Surgery [facility]   04/27/2017 01:15 PM Silvia Page, [address]  [facility]rology [facility]     Signatures   Electronically signed by : ED Forman; Mar  7 2017  3:09PM EST                       (Author)    Electronically signed by :  Marla Costa MD; Mar  7 2017  3:32PM EST                       (Author)

## 2018-01-11 NOTE — CONSULTS
Chief Complaint  Patient presents for a routine follow up with test results  History of Present Illness  Patient seen on the 25th of thousand 14  He is a following history significant obesity  Gastric bypass  Borderline diabetes  2014 blood work showing a hemoglobin A1c is 6 3  Hypertension under good control  Prostate carcinoma  Status post prostatectomy  Done July 2011  Most recent PSA less than 0 1  Borderline hyperlipidemia not on medication no diagnosis of vascular disease    His been stable    Patient seen August 18, 2015  He has a history of gastric binding, borderline diabetes, elevated cholesterol, positive family history coronary disease, mild hypertension, and prostate carcinoma with removal in July of 2011  His LDL is 93  Hemoglobin A1c is 6 6  He is asymptomatic  His medications include a moore  He is not on a statin drug at this time  This is something to consider  I'm going to start him on a low-dose pravastatin 20 mg grams 3 times a week  He has a 10-12% 10 year she is of a coronary event  Patient seen February 3, 2016  His problems have been morbid obesity, status post gastric binding, hyperlipidemia, borderline diabetes, hyperlipidemia, unable to take pravastatin due to muscle aches, very positive family history of coronary disease, history of prostate carcinoma, hypertension,  A new symptom for him as he is having some chest pain  Does not appear to clearly be related to exertion  However we will do a stress test   In addition we'll try Crestor 2 5 mg twice a week and see if he can tolerate that before going on to Zetia or T parenteral form of cholesterol treatment  Plan full blood work Holter and stress test  Resting electrocardiogram shows premature ventricular contractions as well    Should seen July 18, 2016  He has a history of gastric bypass, hyperlipidemia, very positive family history coronary disease, diabetes, prostate carcinoma, hypertension, and PVCs  His LDL is 63   His CBC and metabolic panel are normal  A1c 7 1  Holter counter shows PACs and no PVCs  This is much better  The only issue is his blood pressures running proximally 150 even at home  Therefore I added hydrochlorothiazide 12 53 times a week  Patient seen December 19, 2016  He is status post gastric bypass, right knee recently done November 2016, hyperlipidemia, family history coronary disease, hypertension and this patient, PVCs, diabetes relatively well controlled, statin intolerant, and he recently lost 7 pounds  His A1c is down to 7  Blood pressures well controlled since I added hydrochlorothiazide  He offers no new complaints  Patient seen April 11, 2017  His had 2 knees replaced now  Her gastric bypass  His hyperlipidemia, positive family history coronary disease, hypertension, PVCs, diabetes, and relative statin intolerance  However with the increase in statin at the last visit he is doing well with it  His metabolic panel is normal his LDL is down to 63  He will A1c is 6 8  We will get labs on him and get an echocardiogram on him before the end of 2017  Last echocardiogram was     Patient seen November 21, 2017  He has a diagnosis of 2 knee operations, gastric bypass, hyperlipidemia, hypertension, PVCs, diabetes, and some degree of statin intolerance  Recent echocardiogram shows aortic to be enlarged it over 4 cm  We will do a CTA of his chest   His LDL is 64  He is taking Crestor and Zetia  His A1c is 7 1  His echocardiogram shows normal left ventricular function  I will see him after his CTA of his chest 2010       Review of Systems      Cardiac: rhythm problems  Skin: No complaints of nonhealing sores or skin rash  Genitourinary: prostate problems History of prostate cancer  Psychological: No complaints of feeling depressed, anxiety, panic attacks, or difficulty concentrating  HEENT: No complaints of serious problems, hearing problems, nose problems, throat problems, or snoring  Gastrointestinal: Status post gastric bypass  Musculoskeletal: Status post 2 knee replacements and still taking some pain medication  Active Problems    1  Aftercare following left knee joint replacement surgery (V54 81,V43 65) (Z47 1,Z96 652)   2  Aftercare following right knee joint replacement surgery (V54 81,V43 65) (Z47 1,Z96 651)   3  Aftercare following surgery (V58 89) (Z48 89)   4  Benign essential hypertension (401 1) (I10)   5  Candidal intertrigo (112 3) (B37 2)   6  Controlled type 2 diabetes mellitus without complication (061 43) (O17 2)   7  Depression with anxiety (300 4) (F41 8)   8  History of allergy (V15 09) (Z88 9)   9  Hyperlipidemia (272 4) (E78 5)   10  Incisional hernia, without obstruction or gangrene (553 21) (K43 2)   11  Insomnia (780 52) (G47 00)   12  Malignant neoplasm of prostate (185) (C61)   13  Morbid or severe obesity due to excess calories (278 01) (E66 01)   14  Need for influenza vaccination (V04 81) (Z23)   15  Need for pneumococcal vaccination (V03 82) (Z23)   16  Onychomycosis (110 1) (B35 1)   17  Pain in left knee (719 46) (M25 562)   18  Premature ventricular contraction (427 69) (I49 3)   19  Primary localized osteoarthritis of right knee (715 16) (M17 11)   20  Primary localized osteoarthrosis of the knee, left (715 16) (M17 12)   21  Stuttering (315 35) (F80 81)   22   Tinea pedis of both feet (110 4) (B35 3)    Past Medical History    · History of Anal fissure (565 0) (K60 2)   · History of acute pharyngitis (V12 69) (Z87 09)   · History of cataract (V12 49) (Z86 69)   · History of chest pain (V13 89) (R04 186)   · History of constipation (V12 79) (Z87 19)   · History of diverticulitis of colon (V12 79) (Z87 19)   · History of epididymitis (V13 89) (M53 334)   · History of hiatal hernia (V12 79) (Z87 19)   · History of infectious mononucleosis (V12 09) (Z86 19)   · History of retinal detachment (V12 49) (Z86 69)   · History of Pain of right heel (729 5) (A81 410)   · 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)   · History of Prostate Cancer (V10 46)   · History of Prostate Cancer (V10 46)   · History of Rectal pain (569 42) (K62 89)    Surgical History    · History of Cataract Surgery   · History of Colonoscopy (Fiberoptic)   · History of Knee Arthroscopy (Therapeutic)   · History of Laparosc Gastric Restrictive Proc By Adjustable Gastric Band   · History of Needle Biopsy Of Prostate   · History of Prostatectomy Robotic-Assisted   · History of Repair Of Retinal Detachment   · History of Septoplasty   · History of Surgery Vas Deferens Vasectomy   · History of Tonsillectomy Over Age 13   · History of Total Knee Replacement Right   · History of Umbilical Hernia Repair    Family History    · Family history of Diabetes Mellitus (V18 0)    · Family history of Coronary Artery Disease (V17 49)   · Family history of Prostate Cancer (V16 42)    · Family history of Diabetes Mellitus (V18 0)    · Family history of Coronary Artery Disease (V17 49)    · Family history of Coronary Artery Disease (V17 49)    Social History    · Being A Social Drinker   · Denied: History of Drug Use   · Never A Smoker  The social history was reviewed and is unchanged  Current Meds   1  ALPRAZolam 0 25 MG Oral Tablet; My take 1/2 tablet up to TID prn; Therapy: 56Qga7172 to (Last Rx:25Mar2017)  Requested for: 25Mar2017 Ordered   2  Centrum Oral Tablet; TAKE 1 TABLET DAILY; Therapy: 92TVJ8167 to Recorded   3  FLUoxetine HCl - 20 MG Oral Capsule; TAKE 3 CAPSULES BY MOUTH EVERY DAY; Therapy: 31IFL1585 to (Evaluate:18Jan2018)  Requested for: 71CUA3646; Last   Rx:23Jan2017 Ordered   4  HydroCHLOROthiazide 12 5 MG Oral Tablet; TAKE 1 TABLET THREE TIMES A WEEK ON   MONDAY, THURSDAY, AND SATURDAY; Therapy: 69EYM2811 to (Last Rx:05Oct2017)  Requested for: 52KNR7107 Ordered   5  Losartan Potassium 100 MG Oral Tablet; Take 1 tablet daily;    Therapy: 63EBR9098 to (Last Rx:02Oct2017) Requested for: 63KIF6082 Ordered   6  MetFORMIN HCl - 500 MG Oral Tablet; Take 1 tablet in morning and 2 tablets in the   evening daily; Therapy: 44AVL9443 to (78 609 806)  Requested for: 26Oct2017; Last   Rx:26Oct2017 Ordered   7  Metoprolol Succinate ER 25 MG Oral Tablet Extended Release 24 Hour; Take 1 tablet   daily  Requested for: 32Ltq0293; Last Rx:77Pbr4484 Ordered   8  OLANZapine 5 MG Oral Tablet; Take 1 tablet daily; Therapy: 21RKO8727 to (Evaluate:24Apr2018)  Requested for: 26Oct2017; Last   Rx:26Oct2017 Ordered   9  Omega 3 1200 MG Oral Capsule; Take 2 daily; Therapy: 17UAV8710 to Recorded   10  Rosuvastatin Calcium 5 MG Oral Tablet; TAKE 1 TABLET TWICE WEEKLY, MONDAY AND    FRIDAY; Therapy: 84VIM1099 to (Last Rx:22Mar2017)  Requested for: 22Mar2017 Ordered   11  TraMADol HCl - 50 MG Oral Tablet; Therapy: (Recorded:21Kzm2545) to Recorded   12  Zetia 10 MG Oral Tablet; TAKE 1 TABLET DAILY  Requested for: 73Tyw4825; Last    Rx:40Uni9426 Ordered   13  Zolpidem Tartrate 10 MG Oral Tablet; TAKE 1 TABLET AT BEDTIME AS NEEDED FOR    SLEEP; Therapy: 88YFV8260 to (Evaluate:08Jan2018)  Requested for: 43Yjo9758; Last    Rx:26Bbz8324 Ordered    The medication list was reviewed and updated today  Allergies    1  No Known Drug Allergies    Vitals   Recorded: 21Nov2017 02:43PM   Heart Rate 65   Systolic 605, LUE, Sitting   Diastolic 72, LUE, Sitting   BP CUFF SIZE Large   Height 5 ft 7 in   Weight 313 lb 1 oz   BMI Calculated 49 03   BSA Calculated 2 45   O2 Saturation 96     Physical Exam    Constitutional   General appearance: Abnormal   Severely overweight even after gastric bypass  Pulmonary   Auscultation of lungs: Clear to auscultation, no rales, no rhonchi, no wheezing, good air movement  Cardiovascular   Auscultation of heart: Normal rate and rhythm, normal S1 and S2, no murmurs      Examination of extremities for edema and/or varicosities: Normal     Musculoskeletal Gait and station: Abnormal  Has had 2 knees done  Skin - Skin and subcutaneous tissue: Normal without rashes or lesions  Skin is warm and well perfused, normal turgor  Psychiatric - Orientation to person, place, and time: Normal  Mood and affect: Normal       Assessment    1  Benign essential hypertension (401 1) (I10)   2  Hyperlipidemia (272 4) (E78 5)    Plan  Hyperlipidemia    · Follow Up After Tests Complete Evaluation and Treatment  Follow-up  Status: Hold For -  Scheduling  Requested for: 87PNK9224   Ordered; For: Hyperlipidemia; Ordered By: Loli Donahue Performed:  Due: 68NOZ4702    Discussion/Summary    As noted above, he is status post gastric binding but continues to have significant weight  He is down to 288 pounds which is a loss of 20 pounds  Hemoglobin A1c 6 is below 6 5  His last cholesterol level was done in 2014 shows an LDL of 86  No vascular disease documented  He is on a losartan  I just ordered blood work for next year    Patient seen August 18, 2015  He is asymptomatic  Hemoglobin A1c 6 6  I am going to     start a low dose of statin  His very positive family history his LDL is greater than 100 and is borderline diabetic  Patient seen February 3  Diagnosis listed in the history of present illness  The issue today is gaining weight, intolerant to pravastatin, and chest pain  Studies being ordered  Hopefully he can tolerate Crestor 2 or 3 times a week at a low dose  Thus far we have not documented vascular disease    Patient seen February 22, 2016  The issues are he is moving towards diabetes  Get his LDL lower  He is statin sensitive  I'm starting the beta blocker causes PVCs and couplets  His studies are summarized in the history of present illness  He should see July 18, 2016  The issue his blood pressure to high  I'm adding being hydrochlorothiazide and I'll see him in several months  Patient seen December 19, 2016  Diagnosis in the history of present illness  A1c is down to 7   He lost 4 pounds  Blood pressure is better with the hydrochlorothiazide  I've no changes to make  I did ask him not to take Celebrex with his risk factors    Patient seen April 11, 2017  Blood pressure and cholesterol levels are excellent  His major concern right now is pain in his knee  He still taking some codeine derivatives    Patient seen November 21, 2017  We will get a CTA of the ascending aorta  Echocardiogram showed enlarged aorta root  Labs are summarized in the HPI he is asymptomatic  He is gaining weight despite gastric bypass        Future Appointments    Signatures   Electronically signed by : COLLEEN Cabral ; Nov 21 2017  3:05PM EST                       (Author)

## 2018-01-11 NOTE — RESULT NOTES
Verified Results  (1) MICROALBUMIN CREATININE RATIO, RANDOM URINE 60TQR7969 11:35AM Abgott, Isidoro Gottron     Test Name Result Flag Reference   MICROALBUMIN/ CREAT R 3 mg/g creatinine  0-30   MICROALBUMIN,URINE 5 2 mg/L  0 0-20 0   CREATININE URINE 183 0 mg/dL       (1) TSH 43PKQ0545 11:35AM Abgott, Isidoro Gottron     Test Name Result Flag Reference   TSH 1 620 uIU/mL  0 358-3 740   Patients undergoing fluorescein dye angiography may retain small amounts of fluorescein in the body for 48-72 hours post procedure  Samples containing fluorescein can produce falsely depressed TSH values  If the patient had this procedure,a specimen should be resubmitted post fluorescein clearance  (1) LIPID PANEL, FASTING 12YMQ8599 11:35AM Abgott, Isidoro Gottron     Test Name Result Flag Reference   CHOLESTEROL 144 mg/dL     HDL,DIRECT 62 mg/dL H 40-60   Specimen collection should occur prior to Metamizole administration due to the potential for falsely depressed results  LDL CHOLESTEROL CALCULATED 63 mg/dL  0-100   Triglyceride:         Normal              <150 mg/dl       Borderline High    150-199 mg/dl       High               200-499 mg/dl       Very High          >499 mg/dl  Cholesterol:         Desirable        <200 mg/dl      Borderline High  200-239 mg/dl      High             >239 mg/dl  HDL Cholesterol:        High    >59 mg/dL      Low     <41 mg/dL  LDL CALCULATED:    This screening LDL is a calculated result  It does not have the accuracy of the Direct Measured LDL in the monitoring of patients with hyperlipidemia and/or statin therapy  Direct Measure LDL (GZS326) must be ordered separately in these patients  TRIGLYCERIDES 96 mg/dL  <=150   Specimen collection should occur prior to N-Acetylcysteine or Metamizole administration due to the potential for falsely depressed results       (1) CBC/PLT/DIFF 28ABI3055 11:35AM Abgott, Isidoro Gottron     Test Name Result Flag Reference   WBC COUNT 8 71 Thousand/uL  4 31-10 16   RBC COUNT 4 89 Million/uL  3 88-5 62   HEMOGLOBIN 14 8 g/dL  12 0-17 0   HEMATOCRIT 42 1 %  36 5-49 3   MCV 86 fL  82-98   MCH 30 3 pg  26 8-34 3   MCHC 35 2 g/dL  31 4-37 4   RDW 13 2 %  11 6-15 1   MPV 9 4 fL  8 9-12 7   PLATELET COUNT 069 Thousands/uL  149-390   nRBC AUTOMATED 0 /100 WBCs     NEUTROPHILS RELATIVE PERCENT 50 %  43-75   LYMPHOCYTES RELATIVE PERCENT 30 %  14-44   MONOCYTES RELATIVE PERCENT 7 %  4-12   EOSINOPHILS RELATIVE PERCENT 12 % H 0-6   BASOPHILS RELATIVE PERCENT 1 %  0-1   NEUTROPHILS ABSOLUTE COUNT 4 35 Thousands/?L  1 85-7 62   LYMPHOCYTES ABSOLUTE COUNT 2 65 Thousands/?L  0 60-4 47   MONOCYTES ABSOLUTE COUNT 0 62 Thousand/?L  0 17-1 22   EOSINOPHILS ABSOLUTE COUNT 1 00 Thousand/?L H 0 00-0 61   BASOPHILS ABSOLUTE COUNT 0 06 Thousands/?L  0 00-0 10

## 2018-01-11 NOTE — RESULT NOTES
Message   Please let the patient know the U/S of his RLE (which he also had done yesterday) was normal   No solid or cystic mass was seen  They recommended to possibly have an MRI if pain/symptoms persist   He should speak to ortho about this at his appointment today  If he needs us to send copies of the two tests he had done yesterday, we can fax them to ortho  Thank you  Verified Results  US EXTREMITY SOFT TISSUE 21Jun2016 04:19PM Asa Edward Order Number: [id number]   Performing Comments: Please focus on right inferior, posterior tigh and popliteal region   - Patient Instructions: To schedule this appointment, please contact Central Scheduling at [phone]  Test Name Result Flag Reference   US EXTREMITY SOFT TISSUE (Report)     LIMITED/TARGETED ULTRASOUND RIGHT THIGH     TECHNIQUE:  Using a high frequency transducer, the posterior/inferior right thigh was scanned to evaluate a palpable abnormality  INDICATION: Palpable lump  COMPARISON: None available     FINDINGS:   Only normal fascial planes are identified  No solid or cystic mass identified  IMPRESSION:   No solid or cystic mass identified by ultrasound  Management should be based on the clinical scenario: Consider MRI if symptoms persist            Workstation performed: [id number]     Signed by:    [employer]   6/21/16

## 2018-01-11 NOTE — MISCELLANEOUS
Message   Recorded as Task   Date: 10/31/2016 11:00 AM, Created By: Roxanne Ball   Task Name: Care Coordination   Assigned To: Sorin Ortiz   Regarding Patient: Yaron Cleary, Status: Active   Comment:    Roxanne Ball - 31 Oct 2016 11:00 AM     TASK CREATED  Caller: [facility] Care Coordinator; Jeff Al from [facility] called to say patient was discharged from [facility] and will be doing PT starting 11/2          Signatures   Electronically signed by : COLLEEN Fung ; Oct 31 2016 12:50PM EST                       (Author)

## 2018-01-12 VITALS
DIASTOLIC BLOOD PRESSURE: 82 MMHG | BODY MASS INDEX: 49.44 KG/M2 | HEIGHT: 67 IN | TEMPERATURE: 97.9 F | SYSTOLIC BLOOD PRESSURE: 140 MMHG | RESPIRATION RATE: 16 BRPM | HEART RATE: 60 BPM | WEIGHT: 315 LBS

## 2018-01-12 VITALS
HEIGHT: 67 IN | BODY MASS INDEX: 48.53 KG/M2 | WEIGHT: 309.2 LBS | RESPIRATION RATE: 16 BRPM | SYSTOLIC BLOOD PRESSURE: 120 MMHG | DIASTOLIC BLOOD PRESSURE: 80 MMHG | HEART RATE: 72 BPM | TEMPERATURE: 98.7 F

## 2018-01-12 VITALS — HEART RATE: 71 BPM | SYSTOLIC BLOOD PRESSURE: 165 MMHG | HEIGHT: 67 IN | DIASTOLIC BLOOD PRESSURE: 101 MMHG

## 2018-01-12 NOTE — MISCELLANEOUS
Message  Rec'd call from patient stating that he needed to be seen for abdominal hernia consult  (He states not umbilical - but higher ) Patient states that he's had no testing done     is not having pain with hernia  Scheduled consultation with Dr Rafiq Rowland in the [facility] office on 6/16/2016  Patient aware of office location  Mailed packet to patients home address  Active Problems    1  Benign essential hypertension (401 1) (I10)   2  Chest pain (786 50) (R07 9)   3  Controlled type 2 diabetes mellitus without complication (968 03) (L64 6)   4  Depression with anxiety (300 4) (F41 8)   5  Gastric banding status (V45 86) (Z98 84)   6  History of allergy (V15 09) (Z88 9)   7  Hypercholesterolemia (272 0) (E78 0)   8  Hyperlipidemia (272 4) (E78 5)   9  Insomnia (780 52) (G47 00)   10  Malignant neoplasm of prostate (185) (C61)   11  Morbid or severe obesity due to excess calories (278 01) (E66 01)   12  Need for influenza vaccination (V04 81) (Z23)   13  Observation For Suspected Condition (V71 89)   14  Onychomycosis (110 1) (B35 1)   15  Premature ventricular contraction (427 69) (I49 3)   16  Screening for colorectal cancer (V76 51) (Z12 11,Z12 12)   17  Stuttering (315 35) (F80 81)   18  Testicular pain, left (608 9) (N50 8)    Current Meds   1  ALPRAZolam 0 25 MG Oral Tablet (Xanax); Take 1/2 to 1 tablet 2-3 times daily as   needed for anxiety; Therapy: 46Dtm4435 to (Last Rx:31Ooq8636)  Requested for: 59OIB0133 Ordered   2  Centrum Oral Tablet; TAKE 1 TABLET DAILY; Therapy: 44AQT9429 to Recorded   3  Crestor 5 MG Oral Tablet (Rosuvastatin Calcium); take  1/2 tab every Monday, and   Saturday of every week  Requested for: 51Dyg0864; Last Rx:61Cyr0034 Ordered   4  FLUoxetine HCl - 20 MG Oral Capsule; TAKE 3 CAPSULES BY MOUTH EVERY DAY; Therapy: 09BWH4558 to (Evaluate:26Tls4670)  Requested for: 09MNI2047; Last   Rx:75Jip8301 Ordered   5   Losartan Potassium 100 MG Oral Tablet; take 1 tablet every day;   Therapy: 02LWR4204 to (Evaluate:95Kfx2994)  Requested for: 85THV6226; Last   Rx:17Jan2016 Ordered   6  Metoprolol Succinate ER 25 MG Oral Tablet Extended Release 24 Hour; Take 1 tablet   daily  Requested for: 58Fbh5981; Last Rx:22Feb2016 Ordered   7  OLANZapine 2 5 MG Oral Tablet; take 1 to 2 tablets at bedtime; Therapy: 09YEG4464 to (Evaluate:90Mkp8673)  Requested for: 22Feb2016; Last   Rx:22Oyr9108 Ordered   8  Omega 3 1200 MG Oral Capsule; Take 2 daily; Therapy: 15BDT2392 to Recorded   9  Zetia 10 MG Oral Tablet; take 1/2 tab daily  Requested for: 23Feb2016; Last   Rx:22Feb2016 Ordered   10  Zolpidem Tartrate 10 MG Oral Tablet; TAKE 1 TABLET AT BEDTIME AS NEEDED FOR    SLEEP; Therapy: 35EKK9502 to (Evaluate:11Jun2016)  Requested for: 56PSZ9720; Last    Rx:62Laz6170 Ordered    Allergies    1   Pravastatin Sodium TABS    Signatures   Electronically signed by : Kiesha Henry, ; Jun 7 2016  1:46PM EST                       (Author)

## 2018-01-12 NOTE — RESULT NOTES
Verified Results  (1) COMPREHENSIVE METABOLIC PANEL 16FCL5466 09:85MB Reynaldo Schmidt     Test Name Result Flag Reference   GLUCOSE,RANDM 147 mg/dL H    If the patient is fasting, the ADA then defines impaired fasting glucose as > 100 mg/dL and diabetes as > or equal to 123 mg/dL  SODIUM 136 mmol/L  136-145   POTASSIUM 4 1 mmol/L  3 5-5 3   CHLORIDE 102 mmol/L  100-108   CARBON DIOXIDE 27 mmol/L  21-32   ANION GAP (CALC) 7 mmol/L  4-13   BLOOD UREA NITROGEN 13 mg/dL  5-25   CREATININE 0 96 mg/dL  0 60-1 30   Standardized to IDMS reference method   CALCIUM 8 7 mg/dL  8 3-10 1   BILI, TOTAL 0 76 mg/dL  0 20-1 00   ALK PHOSPHATAS 70 U/L     ALT (SGPT) 42 U/L  12-78   AST(SGOT) 20 U/L  5-45   ALBUMIN 3 5 g/dL  3 5-5 0   TOTAL PROTEIN 6 6 g/dL  6 4-8 2   eGFR Non-African American      >60 0 ml/min/1 73sq [facility] [employer] Disease Education Program recommendations are as follows:  GFR calculation is accurate only with a steady state creatinine  Chronic Kidney disease less than 60 ml/min/1 73 sq  meters  Kidney failure less than 15 ml/min/1 73 sq  meters

## 2018-01-12 NOTE — MISCELLANEOUS
Provider Comments  Provider Comments:     Dear Rohit Arce had a scheduled appointment at our office for 10/13/2016 that you called to cancel but did not reschedule for another day  It is very important that you follow up with us so that we can assess your physical and nutritional safety after your surgery  Please call our office at [phone] to reschedule your appointment       Sincerely,     [facility]            Signatures   Electronically signed by : Tereza Julien, ; Oct 12 2016  7:49AM EST                       (Author)

## 2018-01-12 NOTE — RESULT NOTES
Message   Please call   Labs OK except elevate blood sugar which is similar to before    and A1C 6 4   No changes except diet, exercise, weight reduction   Sees cardiology soon and me in May     Verified Results  (1) CBC/PLT/DIFF [id number] 03:31PM Elliott Alcala     Test Name Result Flag Reference   WBC COUNT 7 23 Thousand/uL  4 31-10 16   RBC COUNT 4 95 Million/uL  3 88-5 62   HEMOGLOBIN 15 0 g/dL  12 0-17 0   HEMATOCRIT 43 3 %  36 5-49 3   MCV 88 fL  82-98   MCH 30 3 pg  26 8-34 3   MCHC 34 6 g/dL  31 4-37 4   RDW 13 4 %  11 6-15 1   MPV 9 4 fL  8 9-12 7   PLATELET COUNT 540 Thousands/uL  149-390   nRBC AUTOMATED 0 /100 WBCs     NEUTROPHILS RELATIVE PERCENT 64 %  43-75   LYMPHOCYTES RELATIVE PERCENT 22 %  14-44   MONOCYTES RELATIVE PERCENT 7 %  4-12   EOSINOPHILS RELATIVE PERCENT 6 %  0-6   BASOPHILS RELATIVE PERCENT 1 %  0-1   NEUTROPHILS ABSOLUTE COUNT 4 60 Thousands/µL  1 85-7 62   LYMPHOCYTES ABSOLUTE COUNT 1 61 Thousands/µL  0 60-4 47   MONOCYTES ABSOLUTE COUNT 0 52 Thousand/µL  0 17-1 22   EOSINOPHILS ABSOLUTE COUNT 0 44 Thousand/µL  0 00-0 61   BASOPHILS ABSOLUTE COUNT 0 04 Thousands/µL  0 00-0 10     (1) COMPREHENSIVE METABOLIC PANEL [id number] 89:31MV [facility] Education [facility] recommendations are as follows:  GFR calculation is accurate only with a steady state creatinine  Chronic Kidney disease less than 60 ml/min/1 73 sq  meters  Kidney failure less than 15 ml/min/1 73 sq  meters       Test Name Result Flag Reference   GLUCOSE,RANDM 142 mg/dL H    SODIUM 139 mmol/L  136-145   POTASSIUM 4 4 mmol/L  3 5-5 3   CHLORIDE 105 mmol/L  100-108   CARBON DIOXIDE 26 mmol/L  21-32   ANION GAP (CALC) 8 mmol/L  4-13   BLOOD UREA NITROGEN 12 mg/dL  5-25   CREATININE 1 04 mg/dL  0 60-1 30   Standardized to IDMS reference method   CALCIUM 9 0 mg/dL  8 3-10 1   BILI, TOTAL 0 59 mg/dL  0 20-1 00   ALK PHOSPHATAS 75 U/L     ALT (SGPT) 37 U/L  12-78   AST(SGOT) 18 U/L 5-45   ALBUMIN 4 1 g/dL  3 5-5 0   TOTAL PROTEIN 7 2 g/dL  6 4-8 2   eGFR Non-African American      >60 0 ml/min/1 73sq m     (1) HEMOGLOBIN A1C [id number] 03:31PM Sorin Ortiz   5 7-6 4% impaired fasting glucose  >=6 5% diagnosis of diabetes    Falsely low levels are seen in conditions linked to short RBC life span-  hemolytic anemia, and splenomegaly  Falsely elevated levels are seen in situations where there is an increased production of RBC- receipt of erythropoietin or blood transfusions  Adopted from ADA-Clinical Practice Recommendations     Test Name Result Flag Reference   HEMOGLOBIN A1C 6 4 % H 4 0-5 6   EST  AVG   GLUCOSE 137 mg/dl

## 2018-01-12 NOTE — RESULT NOTES
Verified Results  (1) HEMOGLOBIN A1C [id number] 11:35AM [employer]     Test Name Result Flag Reference   HEMOGLOBIN A1C 7 1 % H 4 2-6 3   EST  AVG   GLUCOSE 157 mg/dl       (1) CK (CPK) [id number] 11:35AM [employer]     Test Name Result Flag Reference   CK (CPK) 87 U/L

## 2018-01-13 VITALS
HEIGHT: 67 IN | BODY MASS INDEX: 46.84 KG/M2 | HEART RATE: 86 BPM | OXYGEN SATURATION: 96 % | DIASTOLIC BLOOD PRESSURE: 62 MMHG | WEIGHT: 298.44 LBS | SYSTOLIC BLOOD PRESSURE: 110 MMHG

## 2018-01-13 VITALS
RESPIRATION RATE: 16 BRPM | HEART RATE: 60 BPM | HEIGHT: 67 IN | SYSTOLIC BLOOD PRESSURE: 124 MMHG | DIASTOLIC BLOOD PRESSURE: 80 MMHG | BODY MASS INDEX: 46.62 KG/M2 | WEIGHT: 297 LBS | TEMPERATURE: 97.9 F

## 2018-01-13 VITALS
HEIGHT: 67 IN | TEMPERATURE: 98 F | DIASTOLIC BLOOD PRESSURE: 80 MMHG | RESPIRATION RATE: 16 BRPM | WEIGHT: 296 LBS | SYSTOLIC BLOOD PRESSURE: 124 MMHG | HEART RATE: 80 BPM | BODY MASS INDEX: 46.46 KG/M2

## 2018-01-13 VITALS
SYSTOLIC BLOOD PRESSURE: 132 MMHG | HEIGHT: 67 IN | DIASTOLIC BLOOD PRESSURE: 80 MMHG | HEART RATE: 84 BPM | WEIGHT: 303 LBS | BODY MASS INDEX: 47.56 KG/M2

## 2018-01-13 VITALS — DIASTOLIC BLOOD PRESSURE: 86 MMHG | SYSTOLIC BLOOD PRESSURE: 136 MMHG | HEIGHT: 67 IN | HEART RATE: 64 BPM

## 2018-01-13 VITALS — HEIGHT: 67 IN | DIASTOLIC BLOOD PRESSURE: 84 MMHG | HEART RATE: 90 BPM | SYSTOLIC BLOOD PRESSURE: 150 MMHG

## 2018-01-13 NOTE — RESULT NOTES
Message   Please let the patient know his venous duplex was negative for any blood clots  He should schedule the U/S of his RLE to assess for any abnormalities  May take Vicodin prn as we discussed  Call our office with any changes  Thank you  Verified Results  VAS LOWER LIMB VENOUS DUPLEX STUDY, UNILATERAL/LIMITED [id number] 03:54PM Radu Arauz     Test Name Result Flag Reference   VAS LOWER LIMB VENOUS DUPLEX STUDY, UNILATERAL/LIMITED (Report)     [facility] REPORT   CLINICAL:   Indications: Edema, unspecified [R60 9]  Operative History:   Cataract   Hernia repair   Rt  Knee surgery   Risk Factors: The patient has history of obesity  He has no history of DVT or   malignancy  The patient current BMI is 48 8, Weight is 321 lb and Height is 68   in  Clinical: There is no edema and tenderness  CONCLUSION:   Impression:   RIGHT LOWER LIMB: NORMAL   No evidence of acute or chronic deep vein thrombosis   No evidence of superficial thrombophlebitis noted  Doppler evaluation shows a normal response to augmentation maneuvers  Popliteal, posterior tibial and anterior tibial arterial Doppler waveforms are   triphasic  LEFT LOWER LIMB LIMITED: NORMAL   Evaluation shows no evidence of thrombus in the common femoral vein  Doppler evaluation shows a normal response to augmentation maneuvers        Tech note: Sharif Leblanc from qualifyor office notified of preliminary results   at 4:25 PM       SIGNATURE:   Electronically Signed by: Lloyd Henson on 2016-06-21 05:05:19 PM

## 2018-01-14 VITALS
HEIGHT: 67 IN | HEART RATE: 65 BPM | SYSTOLIC BLOOD PRESSURE: 126 MMHG | OXYGEN SATURATION: 96 % | WEIGHT: 313.06 LBS | BODY MASS INDEX: 49.13 KG/M2 | DIASTOLIC BLOOD PRESSURE: 72 MMHG

## 2018-01-15 VITALS — SYSTOLIC BLOOD PRESSURE: 131 MMHG | DIASTOLIC BLOOD PRESSURE: 90 MMHG | HEIGHT: 67 IN | HEART RATE: 82 BPM

## 2018-01-15 NOTE — RESULT NOTES
Verified Results  (1) HEMOGLOBIN A1C [id number] 02:49PM Lisa Ortiz     Test Name Result Flag Reference   HEMOGLOBIN A1C 7 1 % H 4 2-6 3   EST  AVG  GLUCOSE 157 mg/dl       (1) LIPID PANEL, FASTING [id number] 02:49PM Lisa Ortiz     Test Name Result Flag Reference   CHOLESTEROL 136 mg/dL     HDL,DIRECT 58 mg/dL  40-60   Specimen collection should occur prior to Metamizole administration due to the potential for falsley depressed results  LDL CHOLESTEROL CALCULATED 64 mg/dL  0-100   - Patient Instructions: This is a fasting blood test  Water,black tea or black  coffee only after 9:00pm the night before test   Drink 2 glasses of water the morning of test       Triglyceride:        Normal <150 mg/dl   Borderline High 150-199 mg/dl   High 200-499 mg/dl   Very High >499 mg/dl      Cholesterol:       Desirable <200 mg/dl    Borderline High 200-239 mg/dl    High >239 mg/dl      HDL Cholesterol:       High>59 mg/dL    Low <41 mg/dL      This screening LDL is a calculated result  It does not have the accuracy of the Direct Measured LDL in the monitoring of patients with hyperlipidemia and/or statin therapy  Direct Measure LDL (RNI790) must be ordered separately in these patients  TRIGLYCERIDES 70 mg/dL  <=150   Specimen collection should occur prior to N-Acetylcysteine or Metamizole administration due to the potential for falsely depressed results       (1) MAGNESIUM [id number] 02:49PM AbLisa rodgers     Test Name Result Flag Reference   MAGNESIUM 1 9 mg/dL  1 6-2 6

## 2018-01-15 NOTE — PROGRESS NOTES
Active Problems    1  Benign essential hypertension (401 1) (I10)   2  Chest pain (786 50) (R07 9)   3  Controlled type 2 diabetes mellitus without complication (095 10) (A36 9)   4  Depression with anxiety (300 4) (F41 8)   5  Gastric banding status (V45 86) (Z98 84)   6  History of allergy (V15 09) (Z88 9)   7  Hyperlipidemia (272 4) (E78 5)   8  Incisional hernia, without obstruction or gangrene (553 21) (K43 2)   9  Insomnia (780 52) (G47 00)   10  Leg pain, posterior, right (729 5) (M79 604)   11  Malignant neoplasm of prostate (185) (C61)   12  Morbid or severe obesity due to excess calories (278 01) (E66 01)   13  Observation For Suspected Condition (V71 89)   14  Onychomycosis (110 1) (B35 1)   15  Premature ventricular contraction (427 69) (I49 3)   16  Stuttering (315 35) (F80 81)   17  Weight gain (783 1) (R63 5)    Current Meds   1  ALPRAZolam 0 25 MG Oral Tablet (Xanax); Take 1/2 to 1 tablet 2-3 times daily as   needed for anxiety; Therapy: 16Fxj9935 to (Last Rx:20Zwp6953)  Requested for: 21Jun2016 Ordered   2  Ambien TABS (Zolpidem Tartrate); Therapy: (Recorded:28Kmv2309) to Recorded   3  Centrum Oral Tablet; TAKE 1 TABLET DAILY; Therapy: 22LMY4540 to Recorded   4  Crestor 5 MG Oral Tablet (Rosuvastatin Calcium); take  1/2 tab every Monday, and   Saturday of every week  Requested for: 21Jun2016; Last Rx:62Lgy2063 Ordered   5  FLUoxetine HCl - 20 MG Oral Capsule; TAKE 3 CAPSULES BY MOUTH EVERY DAY; Therapy: 88LKN4549 to (Evaluate:11Nov2016)  Requested for: 21Jun2016; Last   Rx:78Tyd9262 Ordered   6  Losartan Potassium 100 MG Oral Tablet; take 1 tablet every day; Therapy: 19ZBI7227 to (Evaluate:02Jan2017)  Requested for: 44ERN4631; Last   Rx:17Kpn5121 Ordered   7  Metoprolol Succinate ER 25 MG Oral Tablet Extended Release 24 Hour; Take 1 tablet   daily  Requested for: 21Jun2016; Last Rx:67Ksa8918 Ordered   8  OLANZapine 2 5 MG Oral Tablet; take 1 to 2 tablets at bedtime;    Therapy: 40HEG4307 to (Evaluate:73Cdk1846)  Requested for: 21Jun2016; Last   Rx:49Fqv2662 Ordered   9  Omega 3 1200 MG Oral Capsule; Take 2 daily; Therapy: 24FIS6227 to Recorded   10  Zetia 10 MG Oral Tablet; take 1/2 tab daily  Requested for: 21Jun2016; Last    Rx:55Xlh0892 Ordered   11  Zolpidem Tartrate 10 MG Oral Tablet; TAKE 1 TABLET AT BEDTIME AS NEEDED FOR    SLEEP; Therapy: 29HAH2881 to (Evaluate:94Pbo8028)  Requested for: 98QGJ8399; Last    Rx:13Ist1390 Ordered    Allergies    1  No Known Drug Allergies    Discussion/Summary    Received referral from Dr Ruben Diop to schedule pt for RD+SW follow-up for weight regain  Called and spoke to pt, pt scheduled follow-up for today at 10:30 with SAIGE Garner and 11:00am with myself        Signatures   Electronically signed by : KT Raya RD; Jul 8 2016  9:48AM EST                       (Author)

## 2018-01-16 NOTE — PROGRESS NOTES
Discussion/Summary  Discussion Summary:   Assess: 3 9# wt loss from previous visit  Net 6  2# wt gain  No changes in Dx or Rx noted  Pt reports that he has consistently journaled all foods and beverages consumed in [employer] smartphone sandee since his last appointment  Pt is sticking to approx  9801-3361 kcals per day  Diagnose: Overweight/Obesity related to positive energy balance as evidenced by BMI >30 and pt's self-reported energy intake  (Continued-Improving)  Intervene: Discussed importance of food journaling, measuring portions, and physical activity for weight maintenance  Reviewed post-operative nutrient goals with pt  Goals: Pt to continue to food journal daily for review at next appointment  Pt verbalized understanding, no further questions at present, expect good compliance  Monitor/Evaluate: Will monitor food journals, weight, pt's reported compliance with goals at next appointment  Follow-up scheduled for: Thursday, 8/4/16 at 1:00pm       Active Problems    1  Benign essential hypertension (401 1) (I10)   2  Chest pain (786 50) (R07 9)   3  Controlled type 2 diabetes mellitus without complication (835 89) (O18 3)   4  Depression with anxiety (300 4) (F41 8)   5  Gastric banding status (V45 86) (Z98 84)   6  History of allergy (V15 09) (Z88 9)   7  Hyperlipidemia (272 4) (E78 5)   8  Incisional hernia, without obstruction or gangrene (553 21) (K43 2)   9  Insomnia (780 52) (G47 00)   10  Leg pain, posterior, right (729 5) (M79 604)   11  Malignant neoplasm of prostate (185) (C61)   12  Morbid or severe obesity due to excess calories (278 01) (E66 01)   13  Observation For Suspected Condition (V71 89)   14  Onychomycosis (110 1) (B35 1)   15  Premature ventricular contraction (427 69) (I49 3)   16  Stuttering (315 35) (F80 81)   17  Weight gain (783 1) (R63 5)    Past Medical History    1  History of Anal fissure (565 0) (K60 2)   2  History of cataract (V12 49) (Z86 69)   3   History of constipation (V12 79) (Z87 19)   4  History of diverticulitis of colon (V12 79) (Z87 19)   5  History of epididymitis (V13 89) (Z87 438)   6  History of hiatal hernia (V12 79) (Z87 19)   7  History of infectious mononucleosis (V12 09) (Z86 19)   8  History of retinal detachment (V12 49) (Z86 69)   9  History of Need for influenza vaccination (V04 81) (Z23)   10  History of Pain of right heel (729 5) (M79 671)   11  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)   12  History of Prostate Cancer (V10 46)   13  History of Prostate Cancer (V10 46)   14  History of Rectal pain (569 42) (K62 89)    Surgical History    1  History of Cataract Surgery   2  History of Colonoscopy (Fiberoptic)   3  History of Knee Arthroscopy   4  History of Laparosc Gastric Restrictive Proc By Adjustable Gastric Band   5  History of Needle Biopsy Of Prostate   6  Observation For Suspected Condition (V71 89)   7  History of Prostatectomy Robotic-Assisted   8  History of Repair Of Retinal Detachment   9  History of Septoplasty   10  History of Surgery Vas Deferens Vasectomy   11  History of Tonsillectomy Over Age 13   14  History of Umbilical Hernia Repair    Family History  Mother    1  Family history of Diabetes Mellitus (V18 0)  Father    2  Family history of Coronary Artery Disease (V17 49)   3  Family history of Prostate Cancer (V16 42)  Maternal Grandmother    4  Family history of Diabetes Mellitus (V18 0)  Maternal Grandfather    5  Family history of Coronary Artery Disease (V17 49)  Paternal Grandfather    6  Family history of Coronary Artery Disease (V17 49)    Social History    · Being A Social Drinker   · Denied: History of Drug Use   · Never A Smoker    Current Meds   1  ALPRAZolam 0 25 MG Oral Tablet (Xanax); Take 1/2 to 1 tablet 2-3 times daily as needed   for anxiety; Therapy: 32Sdx6004 to (Last Rx:48Pdd2381)  Requested for: 21Jun2016 Ordered   2  Ambien TABS (Zolpidem Tartrate);    Therapy: (Recorded:96Fim7107) to Recorded   3  Centrum Oral Tablet; TAKE 1 TABLET DAILY; Therapy: 99QMA1102 to Recorded   4  FLUoxetine HCl - 20 MG Oral Capsule; TAKE 3 CAPSULES BY MOUTH EVERY DAY; Therapy: 80UDD8240 to (Evaluate:11Nov2016)  Requested for: 21Jun2016; Last   Rx:24Nji2166 Ordered   5  Hydrochlorothiazide 12 5 MG Oral Tablet; take 1 tablet three times per week, Monday,   Thursday, and Saturday  Requested for: 58UNW3744; Last Rx:56Vsv8541 Ordered   6  Losartan Potassium 100 MG Oral Tablet; take 1 tablet every day; Therapy: 65KYI7486 to (Evaluate:02Jan2017)  Requested for: 10OPE3491; Last   Rx:86Mmf7822 Ordered   7  Metoprolol Succinate ER 25 MG Oral Tablet Extended Release 24 Hour; Take 1 tablet   daily  Requested for: 21Jun2016; Last Rx:55Qel1665 Ordered   8  OLANZapine 2 5 MG Oral Tablet; take 1 to 2 tablets at bedtime; Therapy: 26GTA8584 to (Evaluate:76Pck7181)  Requested for: 21Jun2016; Last   Rx:09Xxr3585 Ordered   9  Omega 3 1200 MG Oral Capsule; Take 2 daily; Therapy: 20ZDL8707 to Recorded   10  Rosuvastatin Calcium 5 MG Oral Tablet; TAKE ONE-HALF (1/2) TABLET THREE TIMES A    WEEK  Requested for: 22Dag4812; Last Rx:48Baj7788 Ordered   11  Zetia 10 MG Oral Tablet; take 1/2 tab daily  Requested for: 21Jun2016; Last    Rx:36Jdd5083 Ordered   12  Zolpidem Tartrate 10 MG Oral Tablet; TAKE 1 TABLET AT BEDTIME AS NEEDED FOR    SLEEP; Therapy: 58GTG9840 to (Evaluate:43Oxf2180)  Requested for: 34ZBO4207; Last    Rx:22Bhj9118 Ordered    Allergies    1  No Known Drug Allergies    Vitals  Signs   Recorded: 22Jul2016 03:30PM   Height: 5 ft 7 in  Weight: 316 lb 9 6 oz  BMI Calculated: 49 59  BSA Calculated: 2 46    Future Appointments    Date/Time Provider Specialty Site   10/20/2016 06:40 PM Leopoldo Dixons, M D  [facility]   08/18/2016 02:00 PM COLLEEN Harley   Bariatric Medicine [facility]   04/27/2017 01:15 PM Monse Abebe, [address]  Urology [facility]   Electronically signed by : KT Manuel RD; Jul 25 2016  1:29PM EST                       (Author)    Electronically signed by : COLLEEN Mckeon ; Jul 28 2016 12:17PM EST                       (Validation)

## 2018-01-16 NOTE — RESULT NOTES
Verified Results  HOLTER MONITOR - 24 HOUR 76LXM8362 08:02AM Wang Smith     Test Name Result Flag Reference   HOLTER MONITOR - 24 HOUR (Report)     Interpreting Physician: Merary Everett  [facility] MD SARAH, [facility]   The patient was monitored for a total of 24 hours  Average heart rate was 72 bpm  Heart rate varied between 48 bpm and 128 bpm      VENTRICULAR ECTOPY - 4 0% of all monitored beats   There were 3715 ventricular ectopic beats, of which, 10 were in couplets, and the rest were single premature ventricular contractions  47 were in bigeminy, and 92 were in trigeminy  SUPRAVENTRICULAR ECTOPY - 0 1% of all monitored beats   There were 60 supraventricular ectopic beats, of which 10 were in couplets, and the rest were single premature supraventricular contractions  BRADYCARDIA   The patient's rhythm included 4 hrs 28 mins and 51 secs of bradycardia  The slowest episode occurred at 7:22 am, with a minimum heart rate of 48 bpm  This corresponded with sinus bradycardia with no evidence of heart block  TACHYCARDIA   The patient's rhythm included 17 mins and 52 secs of tachycardia  The fastest episode occurred at 3:18 pm, with a maximum heart rate of 128 bpm  This corresponded with sinus tachycardia  SYMPTOMS   The patient experienced no significant symptoms during the monitoring time period  1) Borderline Holter monitor of 24 hrs duration  2) Normal burden of supraventricular ectopic beats  3) Slightly increased burden of ventricular ectopic beats

## 2018-01-16 NOTE — MISCELLANEOUS
Message   Recorded as Task   Date: 10/27/2016 04:07 PM, Created By: Tuan Harris   Task Name: Medical Complaint Callback   Assigned To: Mckenzie Cohen   Regarding Patient: Sunil Lara, Status: Active   CommentPearline Fogo - 27 Oct 2016 4:07 PM     TASK CREATED  Caller: Mrs Hennie Mortimer; [phone]  Pt's wife left message on nurse line stating pt developed rash on groin area since hospitalization that are beng treated with cornstarch and baby powder however seems to be the same, no improvement  Requesting and order of Nystatin powder or cream to be send  to [facility]   Please advise!   will try Nystatin powder as likely is candida given his DM and location  Good hygeine    See if persists to reassess      Plan  Candidal intertrigo    · Nystatin 238608 UNIT/GM External Powder; apply sparingly to affected area(s)  twice daily    Signatures   Electronically signed by : COLLEEN Owen ; Oct 27 2016  9:07PM EST                       (Author)

## 2018-01-17 NOTE — MISCELLANEOUS
Message  Patient communicated with me that he has been having increasing issues with his left knee  He was seen at [facility] and received injections that have not been effective  It was suggested that he needed a TKR but they declined surgery based on his weight  He is working with our Bariatric weight management group and has had gastric banding done already in the past (see notes)  He prefers to keep his care within [facility] and requested a referral to our orthopedic group  Subsequent to our conversation he called and has an appt in early September on 9/6 with Dr Myah Sanchez  Plan  Arthritis of left knee    · *1 - [facility] Referral  Consult    **He has called himself and has an appt 9/6/16 already scheduled    Status: Active   Requested for: 10Osj3307  Care Summary provided  : Yes    Signatures   Electronically signed by : COLLEEN Gonzalez ; Aug 25 2016 12:06PM EST                       (Author)

## 2018-01-17 NOTE — PROGRESS NOTES
Discussion/Summary  Discussion Summary:   Assess: 2 5# wt gain from previous office visit  50 5# wt gain from lowest weight of 270# on 4/1/2011  Pt is now 10# heavier than starting weight from 5/27/2010  Pt seen for post-op weigh regain s/p lap band placement on 7/26/2010  Pt currently has 6 5mL fill in band  Pt reports the he had a recent struggle with prostate cancer in the past few years, has since had total prostatectomy and is in remission  Pt states this cause him to develop addiction of going to the casino, eating out at restaurants, drinking, etc, return to old habits and emotionally eating  Pt reports he has not been exercising, mainly due to significant B/L knee osteoarthritis pain  Pt has a stationary bike at home  Pt currently takes a Centrum silver multivitamin, Fish Oil TID, Vitamin A once daily, Vitamin C BID, and Glucosamin BID  Pt states he wakes at noon  At 1:00-2:00pm he will eat lobster bisque soup and diet Coke  At 5:00-6:00pm he will go out to eat at a restaurant with his wife, or will make pulled pork or pasta at home  Pt states he goes out to eat approx 5 days per week  From 10:00pm until bed time at 3:00am pt will graze/snack on pretzels, crackers, ice cream, etc   Diagnose: Overweight/Obesity related to positive energy balance as evidenced by BMI >30 and pt's self-reported energy intake  (Continued)  Intervene: Provided pt with new bariatric manual  Reviewed pre-op bariatric checklist with pt  Reviewed post-op vitamin recommendations  Recommended pt increase to 2-3 servings dairy per day for increased calcium  Reviewed food journaling smartphone apps with pt and provided handout on [employer] sandee for pt  Pt will food journal daily from now until next follow-up  Discussed pt's night grazing  Instructed pt to eat an apple with peanut butter at 10:00pm for his third meal of the day in lieu of grazing  Pt will begin using his stationary bike for 5 minutes 2-3 times per day   Also provided pt with sit and be fit website information  Pt verbalized understanding, no further questions at present, expect good compliance  Monitor/Evaluate: Will monitor food journals, weight, pt's reported compliance with goals at next appointment  Follow-up scheduled for: Friday, 7/22/16 at 11:00am       Active Problems    1  Benign essential hypertension (401 1) (I10)   2  Chest pain (786 50) (R07 9)   3  Controlled type 2 diabetes mellitus without complication (488 87) (P71 0)   4  Depression with anxiety (300 4) (F41 8)   5  Gastric banding status (V45 86) (Z98 84)   6  History of allergy (V15 09) (Z88 9)   7  Hyperlipidemia (272 4) (E78 5)   8  Incisional hernia, without obstruction or gangrene (553 21) (K43 2)   9  Insomnia (780 52) (G47 00)   10  Leg pain, posterior, right (729 5) (M79 604)   11  Malignant neoplasm of prostate (185) (C61)   12  Morbid or severe obesity due to excess calories (278 01) (E66 01)   13  Observation For Suspected Condition (V71 89)   14  Onychomycosis (110 1) (B35 1)   15  Premature ventricular contraction (427 69) (I49 3)   16  Stuttering (315 35) (F80 81)   17  Weight gain (783 1) (R63 5)    Past Medical History    1  History of Anal fissure (565 0) (K60 2)   2  History of cataract (V12 49) (Z86 69)   3  History of constipation (V12 79) (Z87 19)   4  History of diverticulitis of colon (V12 79) (Z87 19)   5  History of epididymitis (V13 89) (Z87 438)   6  History of hiatal hernia (V12 79) (Z87 19)   7  History of infectious mononucleosis (V12 09) (Z86 19)   8  History of retinal detachment (V12 49) (Z86 69)   9  History of Need for influenza vaccination (V04 81) (Z23)   10  History of Pain of right heel (729 5) (M79 671)   11  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)   12  History of Prostate Cancer (V10 46)   13  History of Prostate Cancer (V10 46)   14  History of Rectal pain (569 42) (K62 89)    Surgical History    1  History of Cataract Surgery   2   History of Colonoscopy (Fiberoptic)   3  History of Knee Arthroscopy   4  History of Laparosc Gastric Restrictive Proc By Adjustable Gastric Band   5  History of Needle Biopsy Of Prostate   6  Observation For Suspected Condition (V71 89)   7  History of Prostatectomy Robotic-Assisted   8  History of Repair Of Retinal Detachment   9  History of Septoplasty   10  History of Surgery Vas Deferens Vasectomy   11  History of Tonsillectomy Over Age 13   14  History of Umbilical Hernia Repair    Family History  Mother    1  Family history of Diabetes Mellitus (V18 0)  Father    2  Family history of Coronary Artery Disease (V17 49)   3  Family history of Prostate Cancer (V16 42)  Maternal Grandmother    4  Family history of Diabetes Mellitus (V18 0)  Maternal Grandfather    5  Family history of Coronary Artery Disease (V17 49)  Paternal Grandfather    6  Family history of Coronary Artery Disease (V17 49)    Social History    · Being A Social Drinker   · Denied: History of Drug Use   · Never A Smoker    Current Meds   1  ALPRAZolam 0 25 MG Oral Tablet; Take 1/2 to 1 tablet 2-3 times daily as needed for   anxiety; Therapy: 58Yfw5938 to (Last Rx:21Ewi5824)  Requested for: 21Jun2016 Ordered   2  Ambien TABS; Therapy: (Recorded:57Lma2392) to Recorded   3  Centrum Oral Tablet; TAKE 1 TABLET DAILY; Therapy: 37KIJ6430 to Recorded   4  Crestor 5 MG Oral Tablet; take  1/2 tab every Monday, and Saturday of every week    Requested for: 21Jun2016; Last Rx:43Ame9712 Ordered   5  FLUoxetine HCl - 20 MG Oral Capsule; TAKE 3 CAPSULES BY MOUTH EVERY DAY; Therapy: 96NJD3833 to (Evaluate:11Nov2016)  Requested for: 21Jun2016; Last   Rx:88Ejh3779 Ordered   6  Losartan Potassium 100 MG Oral Tablet; take 1 tablet every day; Therapy: 81KLK4576 to (Evaluate:02Jan2017)  Requested for: 52VWJ2183; Last   Rx:14Qsc3229 Ordered   7  Metoprolol Succinate ER 25 MG Oral Tablet Extended Release 24 Hour;  Take 1 tablet   daily  Requested for: 21Jun2016; Last Rx: 22EAY0231 Ordered   8  OLANZapine 2 5 MG Oral Tablet; take 1 to 2 tablets at bedtime; Therapy: 75RZI4487 to (Evaluate:05Gdx8536)  Requested for: 21Jun2016; Last   Rx:92Unr2504 Ordered   9  Omega 3 1200 MG Oral Capsule; Take 2 daily; Therapy: 52DSU3497 to Recorded   10  Zetia 10 MG Oral Tablet; take 1/2 tab daily  Requested for: 21Jun2016; Last    Rx:26Mvf0844 Ordered   11  Zolpidem Tartrate 10 MG Oral Tablet; TAKE 1 TABLET AT BEDTIME AS NEEDED FOR    SLEEP; Therapy: 55NBG0189 to (Evaluate:66App1116)  Requested for: 75VJK9075; Last    Rx:87Lqa0590 Ordered    Allergies    1  No Known Drug Allergies    Vitals  Signs [Data Includes: Current Encounter]   Recorded: 68OEW0355 12:22PM   Height: 5 ft 9 in  Weight: 320 lb 8 oz  BMI Calculated: 47 33  BSA Calculated: 2 52    Future Appointments    Date/Time Provider Specialty Site   10/20/2016 06:40 PM COLLEEN Sweeney  [address]   07/11/2016 01:00 PM Cardiology, Holter Geoff  [facility]   07/18/2016 02:00 PM Gayleen Siemens, M D  Cardiology [facility]   08/18/2016 02:00 PM COLLEEN Wing   Bariatric Medicine [facility]   04/27/2017 01:15 PM Katlyn Novak, [address] Urology [address]     Signatures   Electronically signed by : KT Gross RD; Jul 8 2016 12:21PM EST                       (Author)    Electronically signed by : COLLEEN Henderson ; Jul 8 2016 12:41PM EST                       (Validation)

## 2018-01-23 VITALS
BODY MASS INDEX: 49.44 KG/M2 | DIASTOLIC BLOOD PRESSURE: 82 MMHG | RESPIRATION RATE: 16 BRPM | HEIGHT: 67 IN | SYSTOLIC BLOOD PRESSURE: 130 MMHG | WEIGHT: 315 LBS | TEMPERATURE: 100.8 F | OXYGEN SATURATION: 96 % | HEART RATE: 80 BPM

## 2018-01-23 NOTE — CONSULTS
Chief Complaint  Patient presents to follow up with CTA results  History of Present Illness  Patient seen on the 25th of thousand 14  He is a following history significant obesity  Gastric bypass  Borderline diabetes  2014 blood work showing a hemoglobin A1c is 6 3  Hypertension under good control  Prostate carcinoma  Status post prostatectomy  Done July 2011  Most recent PSA less than 0 1  Borderline hyperlipidemia not on medication no diagnosis of vascular disease    His been stable    Patient seen August 18, 2015  He has a history of gastric binding, borderline diabetes, elevated cholesterol, positive family history coronary disease, mild hypertension, and prostate carcinoma with removal in July of 2011  His LDL is 93  Hemoglobin A1c is 6 6  He is asymptomatic  His medications include a moore  He is not on a statin drug at this time  This is something to consider  I'm going to start him on a low-dose pravastatin 20 mg grams 3 times a week  He has a 10-12% 10 year she is of a coronary event  Patient seen February 3, 2016  His problems have been morbid obesity, status post gastric binding, hyperlipidemia, borderline diabetes, hyperlipidemia, unable to take pravastatin due to muscle aches, very positive family history of coronary disease, history of prostate carcinoma, hypertension,  A new symptom for him as he is having some chest pain  Does not appear to clearly be related to exertion  However we will do a stress test   In addition we'll try Crestor 2 5 mg twice a week and see if he can tolerate that before going on to Zetia or T parenteral form of cholesterol treatment  Plan full blood work Holter and stress test  Resting electrocardiogram shows premature ventricular contractions as well    Should seen July 18, 2016  He has a history of gastric bypass, hyperlipidemia, very positive family history coronary disease, diabetes, prostate carcinoma, hypertension, and PVCs  His LDL is 63   His CBC and metabolic panel are normal  A1c 7 1  Holter counter shows PACs and no PVCs  This is much better  The only issue is his blood pressures running proximally 150 even at home  Therefore I added hydrochlorothiazide 12 53 times a week  Patient seen December 19, 2016  He is status post gastric bypass, right knee recently done November 2016, hyperlipidemia, family history coronary disease, hypertension and this patient, PVCs, diabetes relatively well controlled, statin intolerant, and he recently lost 7 pounds  His A1c is down to 7  Blood pressures well controlled since I added hydrochlorothiazide  He offers no new complaints  Patient seen April 11, 2017  His had 2 knees replaced now  Her gastric bypass  His hyperlipidemia, positive family history coronary disease, hypertension, PVCs, diabetes, and relative statin intolerance  However with the increase in statin at the last visit he is doing well with it  His metabolic panel is normal his LDL is down to 63  He will A1c is 6 8  We will get labs on him and get an echocardiogram on him before the end of 2017  Last echocardiogram was     Patient seen November 21, 2017  He has a diagnosis of 2 knee operations, gastric bypass, hyperlipidemia, hypertension, PVCs, diabetes, and some degree of statin intolerance  Recent echocardiogram shows aortic to be enlarged it over 4 cm  We will do a CTA of his chest   His LDL is 64  He is taking Crestor and Zetia  His A1c is 7 1  His echocardiogram shows normal left ventricular function  I will see him after his CTA of his chest 2010    Patient seen December 6, 2017  We brought him back to discuss his ascending aorta  His aorta on CTA is 37 millimeters  I cannot reproduce to 41 millimeters noted on echo  He is delighted with these results  We will not see him for 6 months  Active Problems    1  Acute maxillary sinusitis (461 0) (J01 00)   2   Aftercare following left knee joint replacement surgery (V54 81,V43 65) (Z47 1,Z96 652)   3  Aftercare following right knee joint replacement surgery (V54 81,V43 65) (Z47 1,Z96 651)   4  Aftercare following surgery (V58 89) (Z48 89)   5  Benign essential hypertension (401 1) (I10)   6  Candidal intertrigo (112 3) (B37 2)   7  Controlled type 2 diabetes mellitus without complication (645 19) (V26 1)   8  Depression with anxiety (300 4) (F41 8)   9  Enlarged thoracic aorta (447 8) (I77 89)   10  History of allergy (V15 09) (Z88 9)   11  Hyperlipidemia (272 4) (E78 5)   12  Incisional hernia, without obstruction or gangrene (553 21) (K43 2)   13  Insomnia (780 52) (G47 00)   14  Malignant neoplasm of prostate (185) (C61)   15  Morbid or severe obesity due to excess calories (278 01) (E66 01)   16  Need for influenza vaccination (V04 81) (Z23)   17  Need for pneumococcal vaccination (V03 82) (Z23)   18  Onychomycosis (110 1) (B35 1)   19  Pain in left knee (719 46) (M25 562)   20  Premature ventricular contraction (427 69) (I49 3)   21  Primary localized osteoarthritis of right knee (715 16) (M17 11)   22  Primary localized osteoarthrosis of the knee, left (715 16) (M17 12)   23  Stuttering (315 35) (F80 81)   24   Tinea pedis of both feet (110 4) (B35 3)    Past Medical History    · History of Anal fissure (565 0) (K60 2)   · History of acute pharyngitis (V12 69) (Z87 09)   · History of cataract (V12 49) (Z86 69)   · History of chest pain (V13 89) (U66 728)   · History of constipation (V12 79) (Z87 19)   · History of diverticulitis of colon (V12 79) (Z87 19)   · History of epididymitis (V13 89) (I43 539)   · History of hiatal hernia (V12 79) (Z87 19)   · History of infectious mononucleosis (V12 09) (Z86 19)   · History of retinal detachment (V12 49) (Z86 69)   · History of Pain of right heel (729 5) (M79 671)   · 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)   · History of Prostate Cancer (V10 46)   · History of Prostate Cancer (V10 46)   · History of Rectal pain (569 42) (K09 89)    Surgical History    · History of Cataract Surgery   · History of Colonoscopy (Fiberoptic)   · History of Knee Arthroscopy (Therapeutic)   · History of Laparosc Gastric Restrictive Proc By Adjustable Gastric Band   · History of Needle Biopsy Of Prostate   · History of Prostatectomy Robotic-Assisted   · History of Repair Of Retinal Detachment   · History of Septoplasty   · History of Surgery Vas Deferens Vasectomy   · History of Tonsillectomy Over Age 13   · History of Total Knee Replacement Right   · History of Umbilical Hernia Repair    Family History    · Family history of Diabetes Mellitus (V18 0)    · Family history of Coronary Artery Disease (V17 49)   · Family history of Prostate Cancer (V16 42)    · Family history of Diabetes Mellitus (V18 0)    · Family history of Coronary Artery Disease (V17 49)    · Family history of Coronary Artery Disease (V17 49)    Social History    · Being A Social Drinker   · Denied: History of Drug Use   · Never A Smoker    Current Meds   1  ALPRAZolam 0 25 MG Oral Tablet; My take 1/2 tablet up to TID prn; Therapy: 12Fwx5153 to (Last Rx:25Mar2017)  Requested for: 25Mar2017 Ordered   2  Centrum Oral Tablet; TAKE 1 TABLET DAILY; Therapy: 98XCJ6540 to Recorded   3  FLUoxetine HCl - 20 MG Oral Capsule; TAKE 3 CAPSULES BY MOUTH EVERY DAY; Therapy: 00RMC9053 to (Evaluate:18Jan2018)  Requested for: 90GWI9399; Last   Rx:23Jan2017 Ordered   4  HydroCHLOROthiazide 12 5 MG Oral Tablet; TAKE 1 TABLET THREE TIMES A WEEK ON   MONDAY, THURSDAY, AND SATURDAY; Therapy: 90WAE7292 to (Last Rx:05Oct2017)  Requested for: 41DYN5101 Ordered   5  Losartan Potassium 100 MG Oral Tablet; Take 1 tablet daily; Therapy: 92QXR9260 to (Last Rx:02Oct2017)  Requested for: 72OCS3631 Ordered   6  MetFORMIN HCl - 500 MG Oral Tablet; Take 1 tablet in morning and 2 tablets in the   evening daily; Therapy: 14JCO3367 to ([id number])  Requested for: 26Oct2017; Last   Rx:26Oct2017 Ordered   7  Metoprolol Succinate ER 25 MG Oral Tablet Extended Release 24 Hour; Take 1 tablet   daily  Requested for: 70Yhi5361; Last Rx:18Nid7288 Ordered   8  Moxifloxacin HCl - 400 MG Oral Tablet; TAKE 1 TABLET ONCE DAILY FOR !) DAYS; Therapy: 71Zet7688-03Gpz4974  Requested for: 71Ias0803; Last Rx:21Fxg7028   Ordered   9  OLANZapine 5 MG Oral Tablet; Take 1 tablet daily; Therapy: 20LHB4019 to (Evaluate:24Apr2018)  Requested for: 26Oct2017; Last   Rx:26Oct2017 Ordered   10  Omega 3 1200 MG Oral Capsule; Take 2 daily; Therapy: 01BND5424 to Recorded   11  PredniSONE 10 MG Oral Tablet; Take daily in decreasing dose of 5 tabs, 4 tabs, 3 tabs, 2    tabs, 1 tab; Therapy: 87SAB7754 to (Last Rx:28Moh8652)  Requested for: 93Iiu9624 Ordered   12  Rosuvastatin Calcium 5 MG Oral Tablet; TAKE 1 TABLET TWICE WEEKLY, MONDAY AND    FRIDAY; Therapy: 91HQQ1164 to (Last Phyllistine Bas)  Requested for: 22Mar2017 Ordered   13  TraMADol HCl - 50 MG Oral Tablet; Therapy: (Recorded:97Jbu0037) to Recorded   14  Zetia 10 MG Oral Tablet; TAKE 1 TABLET DAILY  Requested for: 98Ifw8763; Last    Rx:74Vyf5462 Ordered   15  Zolpidem Tartrate 10 MG Oral Tablet; TAKE 1 TABLET AT BEDTIME AS NEEDED FOR    SLEEP; Therapy: 40VJH5493 to (Evaluate:08Jan2018)  Requested for: 18Ilo1076; Last    Rx:69Zpq3314 Ordered    Allergies    1  No Known Drug Allergies    Vitals   Recorded: 31ABB6757 03:46PM   Heart Rate 67   Systolic 008, LUE, Sitting   Diastolic 62, LUE, Sitting   BP CUFF SIZE Large   Height 5 ft 7 in   Weight 316 lb 3 oz   BMI Calculated 49 52   BSA Calculated 2 46   O2 Saturation 95     Physical Exam    Cardiovascular   Auscultation of heart: Normal rate and rhythm, normal S1 and S2, no murmurs  Assessment    1  Enlarged thoracic aorta (447 8) (I77 89)   2  Hyperlipidemia (272 4) (E78 5)    Plan  Controlled type 2 diabetes mellitus without complication, Hyperlipidemia    · (1) HEMOGLOBIN A1C; Status:Active;  Requested for:96Kxx0845; Perform:[facility]; [id number];ONZIFTA; For:Controlled type 2 diabetes mellitus without complication, Hyperlipidemia; Ordered By:Tony Bragg; Hyperlipidemia    · (1) CBC/ PLT (NO DIFF); Status:Active; Requested for:2018; Perform:[facility]; WCZ:47WDV2079;UKSMTHL;  [place]; Ordered By:Taco Bragg;   · (1) CK (CPK); Status:Active; Requested for:2018; Perform:[facility]; ROS:77KYH9425;VMEMPET;  [place]; Ordered By:Taco Bragg;   · (1) COMPREHENSIVE METABOLIC PANEL; Status:Active; Requested for:2018; Perform:[facility]; ALB:05QND7076;ZAFVJLU;  [place]; Ordered By:Taco Bragg;   · (1) LIPID PANEL, FASTING; Status:Active; Requested for:2018; Perform:[facility]; NADINE:42THS6455;VGJBADU;  [place]; Ordered By:Taco Bragg;   · (1) URIC ACID; Status:Active; Requested for:2018; Perform:[facility]; KUT:70DFK3307;NGRBJFY;  [place]; Ordered By:Tony Bragg;   · EKG/ECG- POC; Status:Active - Perform Order; Requested for: With next appointment;    Perform: In Office; [phone]; Ordered;  [place]; Ordered By:Taco Bragg;   · Follow Up After Tests Complete Evaluation and Treatment  Follow-up  Status: Hold For -  Scheduling  Requested for: 56GXX6935   Ordered; For: Hyperlipidemia; Ordered By: Mindi Manzano Performed:  Due: 72TAA3435    Discussion/Summary    As noted above, he is status post gastric binding but continues to have significant weight  He is down to 288 pounds which is a loss of 20 pounds  Hemoglobin A1c 6 is below 6 5  His last cholesterol level was done in  shows an LDL of 86  No vascular disease documented  He is on a losartan  I just ordered blood work for next year    Patient seen 2015  He is asymptomatic  Hemoglobin A1c 6 6  I am going to     start a low dose of statin   His very positive family history his LDL is greater than 100 and is borderline diabetic  Patient seen February 3  Diagnosis listed in the history of present illness  The issue today is gaining weight, intolerant to pravastatin, and chest pain  Studies being ordered  Hopefully he can tolerate Crestor 2 or 3 times a week at a low dose  Thus far we have not documented vascular disease    Patient seen February 22, 2016  The issues are he is moving towards diabetes  Get his LDL lower  He is statin sensitive  I'm starting the beta blocker causes PVCs and couplets  His studies are summarized in the history of present illness  He should see July 18, 2016  The issue his blood pressure to high  I'm adding being hydrochlorothiazide and I'll see him in several months  Patient seen December 19, 2016  Diagnosis in the history of present illness  A1c is down to 7  He lost 4 pounds  Blood pressure is better with the hydrochlorothiazide  I've no changes to make  I did ask him not to take Celebrex with his risk factors    Patient seen April 11, 2017  Blood pressure and cholesterol levels are excellent  His major concern right now is pain in his knee  He still taking some codeine derivatives    Patient seen November 21, 2017  We will get a CTA of the ascending aorta  Echocardiogram showed enlarged aorta root  Labs are summarized in the HPI he is asymptomatic  He is gaining weight despite gastric bypass    Patient seen December 6, 2017  CTA of the chest shows aorta 37 millimeters  Will not see him for 6-8 months  He is stable  This was good results for him        Future Appointments    Signatures   Electronically signed by : COLLEEN Dunlap ; Dec  6 2017  3:58PM EST                       (Author)

## 2018-01-24 VITALS
BODY MASS INDEX: 49.44 KG/M2 | SYSTOLIC BLOOD PRESSURE: 110 MMHG | DIASTOLIC BLOOD PRESSURE: 62 MMHG | OXYGEN SATURATION: 95 % | WEIGHT: 315 LBS | HEIGHT: 67 IN | HEART RATE: 67 BPM

## 2018-01-24 VITALS
HEART RATE: 71 BPM | WEIGHT: 315 LBS | HEIGHT: 67 IN | SYSTOLIC BLOOD PRESSURE: 127 MMHG | BODY MASS INDEX: 49.44 KG/M2 | DIASTOLIC BLOOD PRESSURE: 77 MMHG

## 2018-03-02 DIAGNOSIS — F80.81 STUTTERING: ICD-10-CM

## 2018-03-02 DIAGNOSIS — E11.9 CONTROLLED TYPE 2 DIABETES MELLITUS WITHOUT COMPLICATION, WITHOUT LONG-TERM CURRENT USE OF INSULIN (HCC): Primary | ICD-10-CM

## 2018-03-03 RX ORDER — OLANZAPINE 5 MG/1
TABLET ORAL
Qty: 90 TABLET | Refills: 3 | Status: SHIPPED | OUTPATIENT
Start: 2018-03-03 | End: 2019-03-28 | Stop reason: SDUPTHER

## 2018-03-19 DIAGNOSIS — I10 HYPERTENSION, UNSPECIFIED TYPE: ICD-10-CM

## 2018-03-19 DIAGNOSIS — E78.5 HYPERLIPIDEMIA, UNSPECIFIED HYPERLIPIDEMIA TYPE: Primary | ICD-10-CM

## 2018-03-22 RX ORDER — EZETIMIBE 10 MG/1
5 TABLET ORAL DAILY
Qty: 30 TABLET | Refills: 0 | Status: SHIPPED | OUTPATIENT
Start: 2018-03-22 | End: 2018-06-10 | Stop reason: SDUPTHER

## 2018-04-01 DIAGNOSIS — C61 MALIGNANT NEOPLASM OF PROSTATE (HCC): ICD-10-CM

## 2018-04-01 DIAGNOSIS — I10 ESSENTIAL (PRIMARY) HYPERTENSION: ICD-10-CM

## 2018-04-01 DIAGNOSIS — E11.9 TYPE 2 DIABETES MELLITUS WITHOUT COMPLICATIONS (HCC): ICD-10-CM

## 2018-04-03 DIAGNOSIS — I10 ESSENTIAL HYPERTENSION: Primary | ICD-10-CM

## 2018-04-03 RX ORDER — HYDROCHLOROTHIAZIDE 12.5 MG/1
TABLET ORAL
Qty: 45 TABLET | Refills: 1 | Status: SHIPPED | OUTPATIENT
Start: 2018-04-03 | End: 2018-04-17 | Stop reason: SDUPTHER

## 2018-04-09 ENCOUNTER — TELEPHONE (OUTPATIENT)
Dept: FAMILY MEDICINE CLINIC | Facility: CLINIC | Age: 63
End: 2018-04-09

## 2018-04-09 DIAGNOSIS — G47.00 INSOMNIA, UNSPECIFIED TYPE: Primary | ICD-10-CM

## 2018-04-09 RX ORDER — ZOLPIDEM TARTRATE 10 MG/1
10 TABLET ORAL
Qty: 90 TABLET | Refills: 0 | Status: SHIPPED | OUTPATIENT
Start: 2018-04-09 | End: 2018-04-18 | Stop reason: SDUPTHER

## 2018-04-17 ENCOUNTER — OFFICE VISIT (OUTPATIENT)
Dept: FAMILY MEDICINE CLINIC | Facility: CLINIC | Age: 63
End: 2018-04-17
Payer: COMMERCIAL

## 2018-04-17 VITALS
RESPIRATION RATE: 16 BRPM | BODY MASS INDEX: 47.74 KG/M2 | HEIGHT: 68 IN | DIASTOLIC BLOOD PRESSURE: 82 MMHG | HEART RATE: 60 BPM | OXYGEN SATURATION: 97 % | TEMPERATURE: 98.7 F | SYSTOLIC BLOOD PRESSURE: 130 MMHG | WEIGHT: 315 LBS

## 2018-04-17 DIAGNOSIS — F41.8 DEPRESSION WITH ANXIETY: Primary | ICD-10-CM

## 2018-04-17 DIAGNOSIS — E13.8 OTHER SPECIFIED DIABETES MELLITUS WITH COMPLICATION, WITHOUT LONG-TERM CURRENT USE OF INSULIN: ICD-10-CM

## 2018-04-17 DIAGNOSIS — J20.9 ACUTE BRONCHITIS, UNSPECIFIED ORGANISM: Primary | ICD-10-CM

## 2018-04-17 PROCEDURE — 99213 OFFICE O/P EST LOW 20 MIN: CPT | Performed by: FAMILY MEDICINE

## 2018-04-17 RX ORDER — PREDNISONE 10 MG/1
TABLET ORAL
Qty: 20 TABLET | Refills: 0 | Status: SHIPPED | OUTPATIENT
Start: 2018-04-17 | End: 2018-06-26

## 2018-04-17 RX ORDER — MELOXICAM 7.5 MG/1
1 TABLET ORAL DAILY
COMMUNITY
Start: 2017-12-21 | End: 2018-11-01

## 2018-04-17 RX ORDER — MOXIFLOXACIN HYDROCHLORIDE 400 MG/1
400 TABLET ORAL DAILY
Qty: 5 TABLET | Refills: 0 | Status: SHIPPED | OUTPATIENT
Start: 2018-04-17 | End: 2018-04-20 | Stop reason: SDUPTHER

## 2018-04-17 RX ORDER — FLUOXETINE HYDROCHLORIDE 20 MG/1
3 CAPSULE ORAL DAILY
COMMUNITY
Start: 2011-11-30 | End: 2018-04-17 | Stop reason: SDUPTHER

## 2018-04-17 RX ORDER — TRAMADOL HYDROCHLORIDE 50 MG/1
TABLET ORAL
COMMUNITY
End: 2018-04-17 | Stop reason: HOSPADM

## 2018-04-17 NOTE — PROGRESS NOTES
Chief Complaint   Patient presents with    Sinusitis     post-nasal drip, chest and nasal congestion x3 days     Assessment/Plan: We will start a 5 day course of Moxifloxacin (has had poor responses with other abx in the past)  PO Prednisone taper- SE reviewed, take with food, no NSAIDs while on this   We discussed the increase risk of tendon rupture with this combination of meds- he is well aware and will avoid strenuous activities that would increase this risk   He is also aware that Prednisone can increase blood sugars- continue Metformin  I also gave him a sample of Advair 250/50 (one puff BID) for the next week or so given his wheezing today  He is aware of how to properly use this (has done so in the past), rinse mouth after using    Increase PO fluids and rest  Nasal rinses as needed  Warm salt water gargles, throat lozenges   Call office if symptoms worsen or persist   Follow up with Dr Manuel Rosa as scheduled 4/26/18       Diagnoses and all orders for this visit:    Acute bronchitis, unspecified organism  -     moxifloxacin (AVELOX) 400 MG tablet; Take 1 tablet (400 mg total) by mouth daily for 5 days  -     predniSONE 10 mg tablet; Take 4 tabs for 2 days, 3 tabs for 2 days, 2 tabs for 2 days and 1 tab for 2 days    Other specified diabetes mellitus with complication, without long-term current use of insulin (HCC)          Subjective:      Patient ID: Hiram Stanton is a 57 y o  male      HPI   Pt presents by himself today for an acute visit   C/o head and chest congestion for the the past 3 days   Moist cough with PND   Slight wheezing but no SOB  No fevers, chills  No N/V/D, appetite is normal     He does follow with an Allergist regularly, Dr Marci Johns did call their office today but Dr Molly Conway is out of town   Notes this typically happens annually and Dr Molly Conway puts him on Avelox with good results     DM type 2- currently taking Metformin daily   Last A1C at 7 1% in 10/2017- has labs to do for his upcoming appt with Dr Tayler Hamilton at the end of this month     The following portions of the patient's history were reviewed and updated as appropriate: allergies, current medications, past medical history, past social history and problem list     Review of Systems   Constitutional: Negative for appetite change, chills, diaphoresis and fever  HENT: Positive for congestion, postnasal drip, sinus pressure and sore throat  Negative for ear pain and rhinorrhea  Eyes: Negative for discharge and itching  Respiratory: Positive for cough and wheezing  Negative for shortness of breath  Cardiovascular: Negative for chest pain, palpitations and leg swelling  Gastrointestinal: Negative for abdominal pain, constipation, diarrhea and nausea  Genitourinary: Negative for difficulty urinating  Musculoskeletal: Negative for myalgias  Skin: Negative for rash  Neurological: Negative for dizziness and headaches  Hematological: Negative for adenopathy  Objective:      /82 (BP Location: Left arm, Patient Position: Sitting, Cuff Size: Adult)   Pulse 60   Temp 98 7 °F (37 1 °C) (Tympanic)   Resp 16   Ht 5' 8" (1 727 m)   Wt (!) 144 kg (318 lb 8 oz)   SpO2 97%   BMI 48 43 kg/m²          Physical Exam   Constitutional: He is oriented to person, place, and time  He appears well-developed and well-nourished  No distress  HENT:   Head: Normocephalic and atraumatic  Right Ear: Tympanic membrane, external ear and ear canal normal    Left Ear: Tympanic membrane, external ear and ear canal normal    Nose: Rhinorrhea present  Mouth/Throat: Mucous membranes are normal  No posterior oropharyngeal erythema (PND+)  Mild Frontal sinus pressure on palpation    Eyes: Pupils are equal, round, and reactive to light  Neck: Normal range of motion  Neck supple  Cardiovascular: Normal rate, regular rhythm and normal heart sounds  No murmur heard    Pulmonary/Chest: Effort normal  No respiratory distress  He has no decreased breath sounds  He has wheezes (faint scattered wheezes, cleared with coughing )  He has no rhonchi  He has no rales  Abdominal: Soft  Bowel sounds are normal  There is no tenderness  Lymphadenopathy:     He has no cervical adenopathy  Neurological: He is alert and oriented to person, place, and time  Skin: Skin is warm and dry  He is not diaphoretic  Psychiatric: He has a normal mood and affect

## 2018-04-17 NOTE — PATIENT INSTRUCTIONS
Moxifloxacin and Prednisone are at your pharmacy   Take with food, no NSAIDs while on Prednisone- OTC Tylenol is fine if needed (max of 3g/24 hours)  Start Advair if no improvement in a day or two  Nasal rinses as needed  Warm salt water gargles, throat lozenges   Call office if symptoms worsen or persist

## 2018-04-18 DIAGNOSIS — G47.00 INSOMNIA, UNSPECIFIED TYPE: ICD-10-CM

## 2018-04-19 RX ORDER — ZOLPIDEM TARTRATE 10 MG/1
10 TABLET ORAL
Qty: 90 TABLET | Refills: 0 | Status: SHIPPED | OUTPATIENT
Start: 2018-04-19 | End: 2018-07-26 | Stop reason: SDUPTHER

## 2018-04-19 RX ORDER — FLUOXETINE HYDROCHLORIDE 20 MG/1
CAPSULE ORAL
Qty: 270 CAPSULE | Refills: 3 | Status: SHIPPED | OUTPATIENT
Start: 2018-04-19 | End: 2019-05-20 | Stop reason: SDUPTHER

## 2018-04-20 DIAGNOSIS — J20.9 ACUTE BRONCHITIS, UNSPECIFIED ORGANISM: ICD-10-CM

## 2018-04-20 RX ORDER — MOXIFLOXACIN HYDROCHLORIDE 400 MG/1
400 TABLET ORAL DAILY
Qty: 5 TABLET | Refills: 0 | Status: SHIPPED | OUTPATIENT
Start: 2018-04-20 | End: 2018-04-25

## 2018-04-20 RX ORDER — PREDNISONE 10 MG/1
TABLET ORAL
Qty: 20 TABLET | Refills: 0 | OUTPATIENT
Start: 2018-04-20

## 2018-04-20 NOTE — TELEPHONE ENCOUNTER
I sent a refill of the Avelox, but he should really try to avoid using this unless symptoms are worsening- at a higher risk of having GI upset with 2 rounds of this antibiotic  If he ends up needing to take the second round, he should definitely be taking a Probiotic or eating yogurt with a live culture to help replace the good gut bacteria     As for the Prednisone, he should have enough until Tuesday, 4/24/18    If on Monday he still feels that he needs the Prednisone, please have him give us a call     Thank you

## 2018-04-20 NOTE — TELEPHONE ENCOUNTER
I called Stacy Roman, he said he did request those refills as the weekend is coming up, and he is just starting to notice the effects today  He has 1 Avelox pill left and 5 Prednisone left  He did not call his allergist as he was comfortable with your orders and recommendations

## 2018-04-20 NOTE — TELEPHONE ENCOUNTER
Can you please reach out to [facility]? I just filled these meds on Tuesday, 4/17  He shouldn't be out of them? Was he able to reach his allergist's office?

## 2018-04-23 LAB
LEFT EYE DIABETIC RETINOPATHY: NORMAL
RIGHT EYE DIABETIC RETINOPATHY: NORMAL

## 2018-04-24 ENCOUNTER — TRANSCRIBE ORDERS (OUTPATIENT)
Dept: ADMINISTRATIVE | Age: 63
End: 2018-04-24

## 2018-04-24 ENCOUNTER — APPOINTMENT (OUTPATIENT)
Dept: LAB | Age: 63
End: 2018-04-24
Payer: COMMERCIAL

## 2018-04-24 DIAGNOSIS — C61 MALIGNANT NEOPLASM OF PROSTATE (HCC): ICD-10-CM

## 2018-04-24 DIAGNOSIS — K43.2 INCISIONAL HERNIA, WITHOUT OBSTRUCTION OR GANGRENE: ICD-10-CM

## 2018-04-24 DIAGNOSIS — E11.9 TYPE 2 DIABETES MELLITUS WITHOUT COMPLICATIONS (HCC): ICD-10-CM

## 2018-04-24 DIAGNOSIS — I10 ESSENTIAL (PRIMARY) HYPERTENSION: ICD-10-CM

## 2018-04-24 LAB
ANION GAP SERPL CALCULATED.3IONS-SCNC: 7 MMOL/L (ref 4–13)
BUN SERPL-MCNC: 16 MG/DL (ref 5–25)
CALCIUM SERPL-MCNC: 9.7 MG/DL (ref 8.3–10.1)
CHLORIDE SERPL-SCNC: 102 MMOL/L (ref 100–108)
CO2 SERPL-SCNC: 29 MMOL/L (ref 21–32)
CREAT SERPL-MCNC: 0.88 MG/DL (ref 0.6–1.3)
EST. AVERAGE GLUCOSE BLD GHB EST-MCNC: 169 MG/DL
GFR SERPL CREATININE-BSD FRML MDRD: 91 ML/MIN/1.73SQ M
GLUCOSE SERPL-MCNC: 147 MG/DL (ref 65–140)
HBA1C MFR BLD: 7.5 % (ref 4.2–6.3)
POTASSIUM SERPL-SCNC: 4.1 MMOL/L (ref 3.5–5.3)
PSA SERPL-MCNC: <0.1 NG/ML (ref 0–4)
SODIUM SERPL-SCNC: 138 MMOL/L (ref 136–145)

## 2018-04-24 PROCEDURE — 36415 COLL VENOUS BLD VENIPUNCTURE: CPT

## 2018-04-24 PROCEDURE — 80048 BASIC METABOLIC PNL TOTAL CA: CPT

## 2018-04-24 PROCEDURE — 83036 HEMOGLOBIN GLYCOSYLATED A1C: CPT

## 2018-04-24 PROCEDURE — 84153 ASSAY OF PSA TOTAL: CPT

## 2018-04-26 ENCOUNTER — OFFICE VISIT (OUTPATIENT)
Dept: FAMILY MEDICINE CLINIC | Facility: CLINIC | Age: 63
End: 2018-04-26
Payer: COMMERCIAL

## 2018-04-26 VITALS
DIASTOLIC BLOOD PRESSURE: 78 MMHG | HEIGHT: 68 IN | WEIGHT: 315 LBS | BODY MASS INDEX: 47.74 KG/M2 | OXYGEN SATURATION: 94 % | RESPIRATION RATE: 16 BRPM | TEMPERATURE: 98.1 F | SYSTOLIC BLOOD PRESSURE: 130 MMHG | HEART RATE: 62 BPM

## 2018-04-26 DIAGNOSIS — F80.81 STUTTERING: ICD-10-CM

## 2018-04-26 DIAGNOSIS — E66.01 MORBID OBESITY DUE TO EXCESS CALORIES (HCC): ICD-10-CM

## 2018-04-26 DIAGNOSIS — E78.2 MIXED HYPERLIPIDEMIA: ICD-10-CM

## 2018-04-26 DIAGNOSIS — F41.8 DEPRESSION WITH ANXIETY: ICD-10-CM

## 2018-04-26 DIAGNOSIS — F51.04 PSYCHOPHYSIOLOGICAL INSOMNIA: ICD-10-CM

## 2018-04-26 DIAGNOSIS — I10 BENIGN ESSENTIAL HYPERTENSION: ICD-10-CM

## 2018-04-26 DIAGNOSIS — E11.9 CONTROLLED TYPE 2 DIABETES MELLITUS WITHOUT COMPLICATION, WITHOUT LONG-TERM CURRENT USE OF INSULIN (HCC): Primary | ICD-10-CM

## 2018-04-26 PROCEDURE — 99214 OFFICE O/P EST MOD 30 MIN: CPT | Performed by: FAMILY MEDICINE

## 2018-04-26 RX ORDER — ALPRAZOLAM 0.25 MG/1
0.25 TABLET ORAL 3 TIMES DAILY PRN
Qty: 50 TABLET | Refills: 0 | Status: SHIPPED | OUTPATIENT
Start: 2018-04-26 | End: 2018-07-26 | Stop reason: SDUPTHER

## 2018-04-26 NOTE — ASSESSMENT & PLAN NOTE
He requires Ambien on a nightly basis and we have tried to wean him down on this he becomes increasingly insomnic and that aggravates his underlying depression and anxiety  Continue current nightly dose and except the risks involved with this

## 2018-04-26 NOTE — ASSESSMENT & PLAN NOTE
Currently having a mild flare and is being treated by his allergist  He is on a prednisone taper  He has a rescue inhaler which she has not needed to use  He has Symbicort which he uses b i d

## 2018-04-26 NOTE — ASSESSMENT & PLAN NOTE
Had this diagnosis while back and was prescribed CPAP but was not able to tolerate this and has not used    Did advise him in regard to cardiovascular risks of obstructive sleep apnea

## 2018-04-26 NOTE — ASSESSMENT & PLAN NOTE
Recent A1c is not at target and is slightly higher than last time at 7 5  He will increase his metformin to 2 tablets b i d    He will try harder with his diet and exercise  He will recheck labs before next visit

## 2018-04-26 NOTE — PROGRESS NOTES
Assessment/Plan:    He is up-to-date on his immunizations but will need a Prevnar 13 when he turns 63 later this year  He should also consider the new zoster vaccine when available  He will continue his current medications and get labs before next visit    We discussed this chest tightness which may be related to his asthma but was also consider reflux since it occurs often he lays down at night  I have advised him not to eat within 2 hr of going to bed, to eat smaller meals, and to avoid caffeine and alcoholic beverages in excess  If this continues we will consult do workup  I do not believe there is any cardiac related issue causing this  Controlled type 2 diabetes mellitus without complication (HCC)  Recent A1c is not at target and is slightly higher than last time at 7 5  He will increase his metformin to 2 tablets b i d  He will try harder with his diet and exercise  He will recheck labs before next visit    Sleep apnea  Had this diagnosis while back and was prescribed CPAP but was not able to tolerate this and has not used  Did advise him in regard to cardiovascular risks of obstructive sleep apnea    Asthma  Currently having a mild flare and is being treated by his allergist  He is on a prednisone taper  He has a rescue inhaler which she has not needed to use  He has Symbicort which he uses b i d  Stuttering  Stable on current medication    Hyperlipidemia  He is on a statin medication  Will get labs before next visit    Morbid obesity due to excess calories [facility])  He understands the significant impact his morbid obesity has on his overall health as well as specific conditions that he is being treated for  I have suggested he again consider working with the medical weight management    He did have a gastric banding in the past     Insomnia  He requires Ambien on a nightly basis and we have tried to wean him down on this he becomes increasingly insomnic and that aggravates his underlying depression and anxiety  Continue current nightly dose and except the risks involved with this  Depression with anxiety  His mood is actually quite good  Continue current medication       Diagnoses and all orders for this visit:    Controlled type 2 diabetes mellitus without complication, without long-term current use of insulin (HCC)  -     Comprehensive metabolic panel; Future  -     HEMOGLOBIN A1C W/ EAG ESTIMATION; Future  -     Microalbumin / creatinine urine ratio; Future  -     TSH, 3rd generation; Future    Benign essential hypertension  -     Comprehensive metabolic panel; Future    Mixed hyperlipidemia  -     Comprehensive metabolic panel; Future  -     Lipid panel; Future    Stuttering    Psychophysiological insomnia    Depression with anxiety  -     ALPRAZolam (XANAX) 0 25 mg tablet; Take 1 tablet (0 25 mg total) by mouth 3 (three) times a day as needed for anxiety for up to 10 days    Morbid obesity due to excess calories (HCC)          Subjective:      Patient ID: Kd Nina is a 57 y o  male      He is here today for follow-up on his chronic conditions    Reports he is doing generally well    At last visit I had given him a few oxycodone to use for severe pain and he has used minimal amount of this  He reports he continues to use Ambien every night for sleep  He does not use CPAP for his sleep apnea because he is was unable to sleep using the machine    Knee is doing much better on but he does state that he still has a little bit of unsteady gait at times    He recently he has had sinus problems again is currently on prednisone  He also has been using asthma medication  This urea also has been having trouble with allergies which had not bothered him over the last 4-5 years    He remains active and had his 1st round of golf with his score under 100  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 [address] to struggle with his diet and weight        The following portions of the patient's history were reviewed and updated as appropriate: allergies, current medications, past medical history, past social history and problem list     Review of Systems   Constitutional:        See HPI   HENT:        See HPI   Respiratory: Positive for cough, chest tightness (gets some of this when he lays down - no exertional symptoms  ), shortness of breath and wheezing  Cardiovascular: Negative for chest pain, palpitations and leg swelling  Gastrointestinal:        BM regular  No dyspepsia or GERD   Genitourinary:        Urology follows for prostate cancer (2013) on an annual basis   Musculoskeletal: Positive for arthralgias and gait problem  Negative for back pain  Skin: Negative for rash  Neurological: Negative for dizziness, tremors, seizures and headaches  Psychiatric/Behavioral: Negative for agitation, behavioral problems, confusion and decreased concentration  The patient is nervous/anxious  Stuttering is stable    Depression and anxiety is stable on meds         Objective:      /78 (BP Location: Left arm, Patient Position: Sitting, Cuff Size: Large)   Pulse 62   Temp 98 1 °F (36 7 °C) (Tympanic)   Resp 16   Ht 5' 8" (1 727 m)   Wt (!) 143 kg (315 lb 9 6 oz)   SpO2 94%   BMI 47 99 kg/m²          Physical Exam   Constitutional: He is oriented to person, place, and time  He appears well-developed  obese   Neck: No JVD present  No thyromegaly present  Cardiovascular: Normal rate and regular rhythm  No murmur heard  Pulmonary/Chest: Effort normal  He has wheezes (fine end expiratory wheezes)  Musculoskeletal: He exhibits no edema  Lymphadenopathy:     He has no cervical adenopathy  Neurological: He is alert and oriented to person, place, and time  stutters   Skin: No rash noted  Psychiatric: He has a normal mood and affect  His behavior is normal  Judgment and thought content normal    Nursing note and vitals reviewed

## 2018-04-26 NOTE — ASSESSMENT & PLAN NOTE
He understands the significant impact his morbid obesity has on his overall health as well as specific conditions that he is being treated for  I have suggested he again consider working with the medical weight management    He did have a gastric banding in the past

## 2018-04-27 ENCOUNTER — OFFICE VISIT (OUTPATIENT)
Dept: UROLOGY | Facility: AMBULATORY SURGERY CENTER | Age: 63
End: 2018-04-27
Payer: COMMERCIAL

## 2018-04-27 VITALS
HEART RATE: 84 BPM | WEIGHT: 315 LBS | SYSTOLIC BLOOD PRESSURE: 140 MMHG | HEIGHT: 70 IN | DIASTOLIC BLOOD PRESSURE: 80 MMHG | BODY MASS INDEX: 45.1 KG/M2

## 2018-04-27 DIAGNOSIS — E78.5 HYPERLIPIDEMIA: ICD-10-CM

## 2018-04-27 DIAGNOSIS — K43.2 INCISIONAL HERNIA, WITHOUT OBSTRUCTION OR GANGRENE: ICD-10-CM

## 2018-04-27 DIAGNOSIS — C61 MALIGNANT NEOPLASM OF PROSTATE (HCC): Primary | ICD-10-CM

## 2018-04-27 DIAGNOSIS — E11.9 TYPE 2 DIABETES MELLITUS WITHOUT COMPLICATIONS (HCC): ICD-10-CM

## 2018-04-27 DIAGNOSIS — C61 MALIGNANT NEOPLASM OF PROSTATE (HCC): ICD-10-CM

## 2018-04-27 PROCEDURE — 99213 OFFICE O/P EST LOW 20 MIN: CPT | Performed by: NURSE PRACTITIONER

## 2018-04-27 NOTE — PROGRESS NOTES
4/27/2018    Melyssa Lyn  1955  [id number]        Assessment  Gl 7 (3+4) with tertiary 5 prostate cancer s/p DVP (2011)    Omaira Green is a 57 y o  male being managed by Dr Tay  PSA remains undetectable and there is no evidence of disease recurrence  He is doing well with no urinary complaints  He will return in 1 year for follow up with a PSA  All questions answered  History of Present Illness  57 y o  male with a history of Gl 7 (3+4) with tertiary 5 prostate cancer s/p DVP (2011) presents today for 1 year follow up  He denies any lower urinary tract symptoms except for occasional mild stress incontinence  He does not require the use of a sanitary pad  He denies any gross hematuria  He denies any changes in his overall health except for weight gain  Review of Systems  Review of Systems   Constitutional: Negative  HENT: Negative  Respiratory: Negative  Cardiovascular: Negative  Gastrointestinal: Negative  Genitourinary:        As per HPI   Musculoskeletal: Negative  Skin: Negative  Neurological: Negative  Hematological: Negative  Urinary Incontinence Screening      Most Recent Value   Urinary Incontinence   Urinary Incontinence? No   Incomplete emptying? No   Urinary frequency? No   Urinary urgency? No   Urinary hesitancy? No   Dysuria (painful difficult urination)? No   Nocturia (waking up to use the bathroom)? Yes [1 time]   Straining (having to push to go)? No   Weak stream?  No   Intermittent stream?  No   Post void dribbling?   No [Sometimes]          Past Medical History  Past Medical History:   Diagnosis Date    Acute blood loss anemia 10/13/2016    Anxiety     Arthritis     knees    Asthma     stable for > 10 years    Cancer [facility])     prostate    Cataract     Depression     Diabetes mellitus ([address] )     pre diabetes    Diverticulitis of colon     Hiatal hernia     History of anal fissures     Hyperlipidemia     Hypertension     Incisional hernia     Insomnia     Kidney stone     Morbid obesity (HCC)     PONV (postoperative nausea and vomiting)     Prolapsing mitral valve     prolapsing mitral valve leaflet syndrome    Prostate cancer ([address] )     Retinal detachment     treated surgically at [facility] eye; resolved: 10/10/2012    Sleep apnea     diagnosed but unable to tolerate cpap       Stuttering        Past Surgical History  Past Surgical History:   Procedure Laterality Date    ABDOMINAL SURGERY      gastric lap band    ARTHROSCOPIC REPAIR ACL Right     BIOPSY CORE NEEDLE      prostate    CATARACT EXTRACTION Bilateral     COLONOSCOPY      EYE SURGERY      each eye had a detached retina and cataract removed, Right eye has a buckle    GASTRIC BYPASS      pt had LAP BAND SURGERY not gastric bypass    HERNIA REPAIR      naval    JOINT REPLACEMENT      B/L knee    KNEE ARTHROSCOPY Left     TN TOTAL KNEE ARTHROPLASTY Left 2/15/2017    Procedure: TOTAL KNEE ARTHROPLASTY ;  Surgeon: Josephine Dominguez MD;  [facility];  Service: Orthopedics    TN TOTAL KNEE ARTHROPLASTY Right 10/10/2016    Procedure: ARTHROPLASTY KNEE TOTAL;  Surgeon: Josephine Dominguez MD;  [facility];  Service: Orthopedics    PROSTATECTOMY      robotic-assisted; [facility]Dr Jose Delgado RETINAL DETACHMENT SURGERY Bilateral     [facility]    SEPTOPLASTY      SINUS SURGERY      TONSILLECTOMY      over age 13    VASECTOMY      vas deferens        Past Family History  Family History   Problem Relation Age of Onset    Heart disease Father     Coronary artery disease Father     Prostate cancer Father     Coronary artery disease Mother     Diabetes Mother      mellitus    Diabetes Maternal Grandmother      mellitus    Coronary artery disease Maternal Grandfather     Coronary artery disease Paternal Grandfather        Past Social history  Social History     Social History    Marital status: /Civil Union     Spouse name: N/A    Number of children: 0    Years of education: N/A     Occupational History          Social History Main Topics    Smoking status: Never Smoker    Smokeless tobacco: Never Used    Alcohol use 0 6 oz/week     1 Cans of beer per week      Comment: rare ; being a social drinker (as per allscripts)    Drug use: No    Sexual activity: Not on file     Other Topics Concern    Not on file     Social History Narrative    No narrative on file       Current Medications  Current Outpatient Prescriptions   Medication Sig Dispense Refill    ALPRAZolam (XANAX) 0 25 mg tablet Take 1 tablet (0 25 mg total) by mouth 3 (three) times a day as needed for anxiety for up to 10 days 50 tablet 0    ascorbic acid (VITAMIN C) 500 mg tablet Take 500 mg by mouth      Docusate Calcium ([employer] STOOL SOFTENER PO) Take by mouth as needed      ezetimibe (ZETIA) 10 mg tablet Take 0 5 tablets (5 mg total) by mouth daily Pt takes 1/2 pill every day except saturdays 30 tablet 0    FLUoxetine (PROzac) 20 mg capsule TAKE 3 CAPSULES DAILY 270 capsule 3    hydrochlorothiazide (MICROZIDE) 12 5 mg capsule Take 12 5 mg by mouth 3 (three) times a week M-W-F       losartan (COZAAR) 100 MG tablet Take 100 mg by mouth daily      meloxicam (MOBIC) 7 5 mg tablet Take 1 tablet by mouth Daily      metFORMIN (GLUCOPHAGE) 500 mg tablet TAKE 1 TABLET IN THE MORNING AND 2 TABLETS IN THE EVENING DAILY (INCREASED DOSE) 270 tablet 3    metoprolol succinate (TOPROL-XL) 25 mg 24 hr tablet Take 25 mg by mouth daily      Multiple Vitamins-Minerals (CENTRUM ADULTS PO) Take by mouth      MULTIPLE VITAMINS-MINERALS ER PO Take by mouth      OLANZapine (ZyPREXA) 5 mg tablet TAKE 1 TABLET DAILY 90 tablet 3    Omega-3 Fatty Acids (OMEGA 3 PO) Take by mouth      predniSONE 10 mg tablet Take 4 tabs for 2 days, 3 tabs for 2 days, 2 tabs for 2 days and 1 tab for 2 days 20 tablet 0    rosuvastatin (CRESTOR) 5 mg tablet Take 5 mg by mouth once a week Only on saturdays  zolpidem (AMBIEN) 10 mg tablet Take 1 tablet (10 mg total) by mouth daily at bedtime for 90 days 90 tablet 0     No current facility-administered medications for this visit  Allergies  Allergies   Allergen Reactions    Celebrex [Celecoxib] Other (See Comments)     Cardiologist stated he should not take      Prednisone Other (See Comments)     Prednisone eye drops- eye pressure increases       Past Medical History, Social History, Family History, medications and allergies were reviewed  Vitals  Vitals:    04/27/18 1317   BP: 140/80   BP Location: Left arm   Patient Position: Sitting   Cuff Size: Adult   Pulse: 84   Weight: (!) 145 kg (319 lb)   Height: 5' 9 5" (1 765 m)       Physical Exam  Skin: warm, dry, intact  Cardiac: Peripheral edema: negative  Pulmonary: Non-labored breathing  Abdomen: Soft, non-tender, non-distended  Musculoskeletal: AROM with no joint deformity or tenderness    Neurology: Alert and oriented      Results  Lab Results   Component Value Date    PSA <0 1 04/24/2018    PSA <0 1 03/20/2017    PSA <0 1 04/21/2016     Lab Results   Component Value Date    GLUCOSE 147 (H) 04/24/2018    CALCIUM 9 7 04/24/2018     04/24/2018    K 4 1 04/24/2018    CO2 29 04/24/2018     04/24/2018    BUN 16 04/24/2018    CREATININE 0 88 04/24/2018     Lab Results   Component Value Date    WBC 11 46 (H) 08/20/2017    HGB 14 8 08/20/2017    HCT 42 8 08/20/2017    MCV 84 08/20/2017     08/20/2017

## 2018-05-15 ENCOUNTER — OFFICE VISIT (OUTPATIENT)
Dept: OBGYN CLINIC | Facility: HOSPITAL | Age: 63
End: 2018-05-15
Payer: COMMERCIAL

## 2018-05-15 ENCOUNTER — HOSPITAL ENCOUNTER (OUTPATIENT)
Dept: RADIOLOGY | Facility: HOSPITAL | Age: 63
Discharge: HOME | End: 2018-05-15
Attending: ORTHOPAEDIC SURGERY
Payer: COMMERCIAL

## 2018-05-15 VITALS — HEART RATE: 73 BPM | SYSTOLIC BLOOD PRESSURE: 141 MMHG | DIASTOLIC BLOOD PRESSURE: 85 MMHG

## 2018-05-15 DIAGNOSIS — Z47.1 AFTERCARE FOLLOWING LEFT KNEE JOINT REPLACEMENT SURGERY: ICD-10-CM

## 2018-05-15 DIAGNOSIS — Z96.652 STATUS POST TOTAL LEFT KNEE REPLACEMENT: ICD-10-CM

## 2018-05-15 DIAGNOSIS — Z96.652 AFTERCARE FOLLOWING LEFT KNEE JOINT REPLACEMENT SURGERY: Primary | ICD-10-CM

## 2018-05-15 DIAGNOSIS — Z96.652 AFTERCARE FOLLOWING LEFT KNEE JOINT REPLACEMENT SURGERY: ICD-10-CM

## 2018-05-15 DIAGNOSIS — Z47.1 AFTERCARE FOLLOWING LEFT KNEE JOINT REPLACEMENT SURGERY: Primary | ICD-10-CM

## 2018-05-15 PROCEDURE — 99213 OFFICE O/P EST LOW 20 MIN: CPT | Performed by: ORTHOPAEDIC SURGERY

## 2018-05-15 PROCEDURE — 73560 X-RAY EXAM OF KNEE 1 OR 2: CPT

## 2018-05-15 NOTE — PROGRESS NOTES
57 y o male presents to the office 15 months status post left TKA on 02/15/2018  Patient doing well at this time  He denies any pain or problems with the left or right knee replacements  Patient is able to ambulate regularly  He is able to do regular golfing activities and recreational activities  He denies any functional limitations from the knees  Patient denies any other acute or associated complaints  Review of Systems  Review of systems negative unless otherwise specified in HPI    Past Medical History  Past Medical History:   Diagnosis Date    Acute blood loss anemia 10/13/2016    Anxiety     Arthritis     knees    Asthma     stable for > 10 years    Cancer [facility])     prostate    Cataract     Depression     Diabetes mellitus ([facility] [address] )     pre diabetes    Diverticulitis of colon     Hiatal hernia     History of anal fissures     Hyperlipidemia     Hypertension     Incisional hernia     Insomnia     Kidney stone     Morbid obesity (HCC)     PONV (postoperative nausea and vomiting)     Prolapsing mitral valve     prolapsing mitral valve leaflet syndrome    Prostate cancer ([facility] [address] )     Retinal detachment     [facility] at [facility]; resolved: 10/10/2012    Sleep apnea     diagnosed but unable to tolerate cpap       Stuttering        Past Surgical History  Past Surgical History:   Procedure Laterality Date    ABDOMINAL SURGERY      gastric lap band    ARTHROSCOPIC REPAIR ACL Right     BIOPSY CORE NEEDLE      prostate    CATARACT EXTRACTION Bilateral     COLONOSCOPY      EYE SURGERY      each eye had a detached retina and cataract removed, Right eye has a buckle    GASTRIC BYPASS      pt had LAP BAND SURGERY not gastric bypass    HERNIA REPAIR      naval    JOINT REPLACEMENT      B/L knee    KNEE ARTHROSCOPY Left     MS TOTAL KNEE ARTHROPLASTY Left 2/15/2017    Procedure: TOTAL KNEE ARTHROPLASTY ;  Surgeon: Josephine Dominguez MD;  [facility];  Service: Orthopedics    MS TOTAL KNEE ARTHROPLASTY Right 10/10/2016    Procedure: ARTHROPLASTY KNEE TOTAL;  Surgeon: Mustapha Tracey MD;  [facility];  Service: Orthopedics    PROSTATECTOMY      robotic-assisted; [facility]Dr Carlos Gan RETINAL DETACHMENT SURGERY Bilateral     [facility]    SEPTOPLASTY      SINUS SURGERY      TONSILLECTOMY      over age 15    VASECTOMY      vas deferens       Current Medications  Current Outpatient Prescriptions on File Prior to Visit   Medication Sig Dispense Refill    ALPRAZolam (XANAX) 0 25 mg tablet Take 1 tablet (0 25 mg total) by mouth 3 (three) times a day as needed for anxiety for up to 10 days 50 tablet 0    ascorbic acid (VITAMIN C) 500 mg tablet Take 500 mg by mouth      Docusate Calcium ([employer] STOOL SOFTENER PO) Take by mouth as needed      ezetimibe (ZETIA) 10 mg tablet Take 0 5 tablets (5 mg total) by mouth daily Pt takes 1/2 pill every day except saturdays 30 tablet 0    FLUoxetine (PROzac) 20 mg capsule TAKE 3 CAPSULES DAILY 270 capsule 3    hydrochlorothiazide (MICROZIDE) 12 5 mg capsule Take 12 5 mg by mouth 3 (three) times a week M-W-F       losartan (COZAAR) 100 MG tablet Take 100 mg by mouth daily      meloxicam (MOBIC) 7 5 mg tablet Take 1 tablet by mouth Daily      metFORMIN (GLUCOPHAGE) 500 mg tablet TAKE 1 TABLET IN THE MORNING AND 2 TABLETS IN THE EVENING DAILY (INCREASED DOSE) 270 tablet 3    metoprolol succinate (TOPROL-XL) 25 mg 24 hr tablet Take 25 mg by mouth daily      Multiple Vitamins-Minerals (CENTRUM ADULTS PO) Take by mouth      MULTIPLE VITAMINS-MINERALS ER PO Take by mouth      OLANZapine (ZyPREXA) 5 mg tablet TAKE 1 TABLET DAILY 90 tablet 3    Omega-3 Fatty Acids (OMEGA 3 PO) Take by mouth      predniSONE 10 mg tablet Take 4 tabs for 2 days, 3 tabs for 2 days, 2 tabs for 2 days and 1 tab for 2 days 20 tablet 0    rosuvastatin (CRESTOR) 5 mg tablet Take 5 mg by mouth once a week Only on saturdays       zolpidem (AMBIEN) 10 mg tablet Take 1 tablet (10 mg total) by mouth daily at bedtime for 90 days 90 tablet 0     No current facility-administered medications on file prior to visit  Recent Labs [facility])    0  Lab Value Date/Time   HCT 42 8 08/20/2017 1650   HCT 40 2 08/16/2015 1007   HGB 14 8 08/20/2017 1650   HGB 14 2 08/16/2015 1007   WBC 11 46 (H) 08/20/2017 1650   WBC 6 35 08/16/2015 1007   GLUCOSE 147 (H) 04/24/2018 1411   GLUCOSE 137 08/16/2015 1007   HGBA1C 7 5 (H) 04/24/2018 1411   HGBA1C 6 2 08/16/2015 1001         Physical exam  · General: Awake, Alert, Oriented  · Eyes: Pupils equal, round and reactive to light  · Heart: regular rate and rhythm  · Lungs: No audible wheezing  · Abdomen: soft  left lower extremity  · Well-healed anterior surgical scar without erythema or induration  · Extension 0° flexion 120°  · Knee stable to varus and valgus stress  · Motor and sensation intact distally      Imaging  X-rays of left knee demonstrate joint prosthesis in good position    Procedure  None    Assessment/Plan:   62 y o male is 15 months status post left TKA    - continue with full activities as tolerated  - continue antibiotics prior to any dental appointments  - follow up as needed

## 2018-06-10 DIAGNOSIS — E78.5 HYPERLIPIDEMIA, UNSPECIFIED HYPERLIPIDEMIA TYPE: ICD-10-CM

## 2018-06-12 RX ORDER — EZETIMIBE 10 MG/1
5 TABLET ORAL DAILY
Qty: 30 TABLET | Refills: 0 | Status: SHIPPED | OUTPATIENT
Start: 2018-06-12 | End: 2018-08-12 | Stop reason: SDUPTHER

## 2018-06-26 ENCOUNTER — OFFICE VISIT (OUTPATIENT)
Dept: CARDIOLOGY CLINIC | Facility: CLINIC | Age: 63
End: 2018-06-26
Payer: COMMERCIAL

## 2018-06-26 VITALS
DIASTOLIC BLOOD PRESSURE: 84 MMHG | HEIGHT: 67 IN | SYSTOLIC BLOOD PRESSURE: 126 MMHG | HEART RATE: 64 BPM | OXYGEN SATURATION: 95 % | BODY MASS INDEX: 49.44 KG/M2 | WEIGHT: 315 LBS

## 2018-06-26 DIAGNOSIS — I10 ESSENTIAL HYPERTENSION: ICD-10-CM

## 2018-06-26 DIAGNOSIS — E78.5 HYPERLIPIDEMIA, UNSPECIFIED HYPERLIPIDEMIA TYPE: Primary | ICD-10-CM

## 2018-06-26 PROCEDURE — 99214 OFFICE O/P EST MOD 30 MIN: CPT | Performed by: INTERNAL MEDICINE

## 2018-06-26 NOTE — PROGRESS NOTES
Follow-up - Cardiology   Maria L Fletcher 57 y o  male MRN: [id number]        Problems    Problem List Items Addressed This Visit     Hyperlipidemia - Primary      Other Visit Diagnoses     Essential hypertension                Plan because of shortness of breath which could be an anginal equivalent, we will proceed with a nuclear stress test   He can now walk on a treadmill very well because he is at 2 knee surgery these over 300 lb  HPI: Maria L Fletcher is a 57y o  year old male History of Present Illness  Patient seen on the 25th of thousand 14  He is a following history significant obesity  Gastric bypass  Borderline diabetes  2014 blood work showing a hemoglobin A1c is 6 3  Hypertension under good control  Prostate carcinoma  Status post prostatectomy  Done July 2011  Most recent PSA less than 0 1  Borderline hyperlipidemia not on medication no diagnosis of vascular disease     His been stable     Patient seen August 18, 2015  He has a history of gastric binding, borderline diabetes, elevated cholesterol, positive family history coronary disease, mild hypertension, and prostate carcinoma with removal in July of 2011  His LDL is 93  Hemoglobin A1c is 6 6  He is asymptomatic  His medications include a moore  He is not on a statin drug at this time  This is something to consider  I'm going to start him on a low-dose pravastatin 20 mg grams 3 times a week  He has a 10-12% 10 year she is of a coronary event      Patient seen February 3, 2016  His problems have been morbid obesity, status post gastric binding, hyperlipidemia, borderline diabetes, hyperlipidemia, unable to take pravastatin due to muscle aches, very positive family history of coronary disease, history of prostate carcinoma, hypertension,  A new symptom for him as he is having some chest pain  Does not appear to clearly be related to exertion   However we will do a stress test   In addition we'll try Crestor 2 5 mg twice a week and see if he can tolerate that before going on to Zetia or T parenteral form of cholesterol treatment  Plan full blood work Holter and stress test  Resting electrocardiogram shows premature ventricular contractions as well     Should seen July 18, 2016  He has a history of gastric bypass, hyperlipidemia, very positive family history coronary disease, diabetes, prostate carcinoma, hypertension, and PVCs  His LDL is 63  His CBC and metabolic panel are normal  A1c 7 1      Holter counter shows PACs and no PVCs  This is much better  The only issue is his blood pressures running proximally 150 even at home  Therefore I added hydrochlorothiazide 12 53 times a week      Patient seen December 19, 2016  He is status post gastric bypass, right knee recently done November 2016, hyperlipidemia, family history coronary disease, hypertension and this patient, PVCs, diabetes relatively well controlled, statin intolerant, and he recently lost 7 pounds  His A1c is down to 7  Blood pressures well controlled since I added hydrochlorothiazide  He offers no new complaints      Patient seen April 11, 2017  His had 2 knees replaced now  Her gastric bypass  His hyperlipidemia, positive family history coronary disease, hypertension, PVCs, diabetes, and relative statin intolerance  However with the increase in statin at the last visit he is doing well with it  His metabolic panel is normal his LDL is down to 63  He will A1c is 6 8  We will get labs on him and get an echocardiogram on him before the end of 2017  Last echocardiogram was      Patient seen November 21, 2017  He has a diagnosis of 2 knee operations, gastric bypass, hyperlipidemia, hypertension, PVCs, diabetes, and some degree of statin intolerance  Recent echocardiogram shows aortic to be enlarged it over 4 cm  We will do a CTA of his chest   His LDL is 64  He is taking Crestor and Zetia  His A1c is 7 1  His echocardiogram shows normal left ventricular function    I will see him after his CTA of his chest 2010     Patient seen December 6, 2017  We brought him back to discuss his ascending aorta  His aorta on CTA is 37 millimeters  I cannot reproduce to 41 millimeters noted on echo  He is delighted with these results  Patient seen 06/26/2018  All scripts HPI above  First visit with epic  Issue with the are as follows  He has had 2 knees  Very positive family history coronary disease  Hyperlipidemia  Morbid obesity  Hypertension  PVCs  Diabetes  Prostate carcinoma removed  This visit is because he having increasing shortness of breath  I do not think he is having true angina but he is has a change in his limitation of exercise  He will not be able walk on a treadmill very well  Therefore have to get a nuclear stress test   His A1c is 7 5  PSA is less than 0 1  In        Review of Systems   Constitutional: Positive for fatigue  Eyes: Negative  Respiratory: Positive for shortness of breath  Endocrine:        Diabetes         Past Medical History:   Diagnosis Date    Acute blood loss anemia 10/13/2016    Anxiety     Arthritis     knees    Asthma     stable for > 10 years    Cancer [facility])     prostate    Cataract     Depression     Diabetes mellitus ([facility] [address] )     pre diabetes    Diverticulitis of colon     Hiatal hernia     History of anal fissures     Hyperlipidemia     Hypertension     Incisional hernia     Insomnia     Kidney stone     Morbid obesity (HCC)     PONV (postoperative nausea and vomiting)     Prolapsing mitral valve     prolapsing mitral valve leaflet syndrome    Prostate cancer ([facility] [address] )     Retinal detachment     [facility] at [facility]; resolved: 10/10/2012    Sleep apnea     diagnosed but unable to tolerate cpap       Stuttering      History   Alcohol Use    0 6 oz/week    1 Cans of beer per week     Comment: rare ; being a social drinker (as per allscripts)     History   Drug Use No     History   Smoking Status    Never Smoker   Smokeless Tobacco    Never Used       Allergies:   Allergies   Allergen Reactions    Celebrex [Celecoxib] Other (See Comments)     Cardiologist stated he should not take      Prednisone Other (See Comments)     Prednisone eye drops- eye pressure increases       Medications:     Current Outpatient Prescriptions:     ascorbic acid (VITAMIN C) 500 mg tablet, Take 500 mg by mouth, Disp: , Rfl:     Docusate Calcium ([employer] STOOL SOFTENER PO), Take by mouth as needed, Disp: , Rfl:     ezetimibe (ZETIA) 10 mg tablet, TAKE 0 5 TABLETS (5 MG TOTAL) BY MOUTH DAILY PT TAKES 1/2 PILL EVERY DAY EXCEPT SATURDAYS (Patient taking differently: Take 5 mg by mouth daily  ), Disp: 30 tablet, Rfl: 0    FLUoxetine (PROzac) 20 mg capsule, TAKE 3 CAPSULES DAILY, Disp: 270 capsule, Rfl: 3    hydrochlorothiazide (MICROZIDE) 12 5 mg capsule, Take 12 5 mg by mouth 3 (three) times a week M-W-F , Disp: , Rfl:     losartan (COZAAR) 100 MG tablet, Take 100 mg by mouth daily, Disp: , Rfl:     meloxicam (MOBIC) 7 5 mg tablet, Take 1 tablet by mouth Daily, Disp: , Rfl:     metFORMIN (GLUCOPHAGE) 500 mg tablet, TAKE 1 TABLET IN THE MORNING AND 2 TABLETS IN THE EVENING DAILY (INCREASED DOSE) (Patient taking differently: take 2 tablet twice per day), Disp: 270 tablet, Rfl: 3    metoprolol succinate (TOPROL-XL) 25 mg 24 hr tablet, Take 25 mg by mouth daily, Disp: , Rfl:     Multiple Vitamins-Minerals (CENTRUM ADULTS PO), Take by mouth, Disp: , Rfl:     MULTIPLE VITAMINS-MINERALS ER PO, Take by mouth, Disp: , Rfl:     OLANZapine (ZyPREXA) 5 mg tablet, TAKE 1 TABLET DAILY, Disp: 90 tablet, Rfl: 3    Omega-3 Fatty Acids (OMEGA 3 PO), Take by mouth, Disp: , Rfl:     rosuvastatin (CRESTOR) 5 mg tablet, Take 5 mg by mouth once a week Only on saturdays , Disp: , Rfl:     zolpidem (AMBIEN) 10 mg tablet, Take 1 tablet (10 mg total) by mouth daily at bedtime for 90 days, Disp: 90 tablet, Rfl: 0    ALPRAZolam (XANAX) 0 25 mg tablet, Take 1 tablet (0 25 mg total) by mouth 3 (three) times a day as needed for anxiety for up to 10 days, Disp: 50 tablet, Rfl: 0      Physical Exam   Constitutional:   Over 300 lb   Cardiovascular: Regular rhythm  No murmur heard  Pulmonary/Chest: Breath sounds normal    Musculoskeletal: He exhibits no edema  Laboratory Studies:  CMP:    NT-proBNP: No results found for: NTBNP   Coags:    Lipid Profile:   Lab Results   Component Value Date    CHOL 136 10/05/2017     Lab Results   Component Value Date    HDL 58 10/05/2017     Lab Results   Component Value Date    LDLCALC 64 10/05/2017     Lab Results   Component Value Date    TRIG 70 10/05/2017       Cardiac testing:     EKG reviewed personally:     Results for orders placed during the hospital encounter of 10/19/17   Echo complete with contrast if indicated    Narrative [address]  [phone]    Transthoracic Echocardiogram  2D, M-mode, Doppler, and Color Doppler    Study date:  19-Oct-2017    Patient: Marquis Hurtado  MR number: [id number]  Account number: [de-identified]  : 1955  Age: 59 years  Gender: Male  Status: Outpatient  [facility]: [address] [place] [address] [facility]  Height: 67 in  Weight: 303 lb  BP: 136/ 86 mmHg    Indications: Hypertension, hyperlipidemia, chest pain    Diagnoses: E78 5 - Hyperlipidemia, unspecified, I10  - Essential (primary) hypertension, R07 9 - Chest pain, unspecified    Sonographer:  NEAL Hernadez  Primary Physician:  Juanito Talavera MD  Referring Physician:  Josefina Moore MD  Group:  [facility]  Cardiology Fellow:  Lana Orozco MD  Interpreting Physician:  Laura Garrett MD    SUMMARY    LEFT VENTRICLE:  Size was normal   Systolic function was normal  Ejection fraction was estimated to be 65 %  Doppler parameters were consistent with abnormal left ventricular relaxation (grade 1 diastolic dysfunction)      RIGHT VENTRICLE:  The size was at the upper limits of normal   Systolic function was normal     MITRAL VALVE:  There was trace regurgitation  TRICUSPID VALVE:  There was trace regurgitation  Pulmonary artery systolic pressure was within the normal range  AORTA:  There was mild dilatation of the ascending aorta with maximum anteroposterior diameter of 41 mm  HISTORY: PRIOR HISTORY: Hypertension, hyperlipidemia, PVCs, DM2, gastric band    PROCEDURE: The study was performed in the [address]  This was a routine study  The transthoracic approach was used  The study included complete 2D imaging, M-mode, complete spectral Doppler, and color Doppler  Image  quality was adequate  LEFT VENTRICLE: Size was normal  Systolic function was normal  Ejection fraction was estimated to be 65 %  There were no regional wall motion abnormalities  Wall thickness was normal  DOPPLER: Doppler parameters were consistent with  abnormal left ventricular relaxation (grade 1 diastolic dysfunction)  RIGHT VENTRICLE: The size was at the upper limits of normal  Systolic function was normal  Wall thickness was normal     LEFT ATRIUM: Size was normal     RIGHT ATRIUM: Size was normal     MITRAL VALVE: Valve structure was normal  There was normal leaflet separation  DOPPLER: The transmitral velocity was within the normal range  There was no evidence for stenosis  There was trace regurgitation  AORTIC VALVE: The valve was trileaflet  Leaflets exhibited normal thickness and normal cuspal separation  DOPPLER: Transaortic velocity was within the normal range  There was no evidence for stenosis  There was no regurgitation  TRICUSPID VALVE: The valve structure was normal  There was normal leaflet separation  DOPPLER: The transtricuspid velocity was within the normal range  There was no evidence for stenosis  There was trace regurgitation  Pulmonary artery  systolic pressure was within the normal range      PULMONIC VALVE: Leaflets exhibited normal thickness, no calcification, and normal cuspal separation  DOPPLER: The transpulmonic velocity was within the normal range  There was trace regurgitation  PERICARDIUM: There was no pericardial effusion  AORTA: The root exhibited normal size  There was mild dilatation of the ascending aorta with maximum anteroposterior diameter of 41 mm  SYSTEMIC VEINS: IVC: The inferior vena cava was not well visualized  SYSTEM MEASUREMENT TABLES    2D  %FS: 40 77 %  AV Diam: 3 8 cm  EDV(Teich): 136 43 ml  EF(Cube): 79 22 %  EF(Teich): 71 1 %  ESV(Cube): 31 26 ml  ESV(Teich): 39 43 ml  IVSd: 1 1 cm  LA Area: 21 57 cm2  LA Diam: 3 77 cm  LVEDV MOD A4C: 144 51 ml  LVEF MOD A4C: 63 7 %  LVESV MOD A4C: 52 45 ml  LVIDd: 5 32 cm  LVIDs: 3 15 cm  LVLd A4C: 8 79 cm  LVLs A4C: 6 84 cm  LVPWd: 1 15 cm  RA Area: 14 88 cm2  RV Diam : 3 23 cm  SV MOD A4C: 92 06 ml  SV(Cube): 119 17 ml  SV(Teich): 97 ml    CW  TR Vmax: 2 5 m/s  TR maxP 02 mmHg    MM  TAPSE: 1 76 cm    PW  E': 0 05 m/s  E/E': 11 56  MV A Luis Armando: 0 71 m/s  MV Dec Logan: 2 2 m/s2  MV DecT: 249 86 ms  MV E Luis Armando: 0 55 m/s  MV E/A Ratio: 0 77    [employer] Accredited Echocardiography Laboratory    Prepared and electronically signed by    Akila Peters MD  Signed 19-Oct-2017 16:05:16       No results found for this or any previous visit  No results found for this or any previous visit  No results found for this or any previous visit  Sameer Dorman MD    Portions of the record may have been created with voice recognition software   Occasional wrong word or "sound a like" substitutions may have occurred due to the inherent limitations of voice recognition software   Read the chart carefully and recognize, using context, where substitutions have occurred

## 2018-07-02 ENCOUNTER — HOSPITAL ENCOUNTER (OUTPATIENT)
Dept: NON INVASIVE DIAGNOSTICS | Facility: CLINIC | Age: 63
Discharge: HOME | End: 2018-07-02
Payer: COMMERCIAL

## 2018-07-02 DIAGNOSIS — E78.5 HYPERLIPIDEMIA, UNSPECIFIED HYPERLIPIDEMIA TYPE: ICD-10-CM

## 2018-07-02 DIAGNOSIS — I10 ESSENTIAL HYPERTENSION: ICD-10-CM

## 2018-07-02 LAB
CHEST PAIN STATEMENT: NORMAL
MAX DIASTOLIC BP: 80 MMHG
MAX HEART RATE: 120 BPM
MAX PREDICTED HEART RATE: 156 BPM
MAX. SYSTOLIC BP: 152 MMHG
PROTOCOL NAME: NORMAL
REASON FOR TERMINATION: NORMAL
TARGET HR FORMULA: NORMAL
TEST INDICATION: NORMAL
TIME IN EXERCISE PHASE: NORMAL

## 2018-07-02 PROCEDURE — 93017 CV STRESS TEST TRACING ONLY: CPT

## 2018-07-02 PROCEDURE — A9502 TC99M TETROFOSMIN: HCPCS

## 2018-07-02 PROCEDURE — 78452 HT MUSCLE IMAGE SPECT MULT: CPT

## 2018-07-02 RX ADMIN — REGADENOSON 0.4 MG: 0.08 INJECTION, SOLUTION INTRAVENOUS at 13:35

## 2018-07-26 DIAGNOSIS — F41.8 DEPRESSION WITH ANXIETY: ICD-10-CM

## 2018-07-26 DIAGNOSIS — G47.00 INSOMNIA, UNSPECIFIED TYPE: ICD-10-CM

## 2018-07-26 DIAGNOSIS — E78.2 MIXED HYPERLIPIDEMIA: Primary | ICD-10-CM

## 2018-07-26 RX ORDER — ALPRAZOLAM 0.25 MG/1
0.25 TABLET ORAL 3 TIMES DAILY PRN
Qty: 270 TABLET | Refills: 0 | Status: SHIPPED | OUTPATIENT
Start: 2018-07-26 | End: 2019-05-02 | Stop reason: SDUPTHER

## 2018-07-26 RX ORDER — ZOLPIDEM TARTRATE 10 MG/1
10 TABLET ORAL
Qty: 90 TABLET | Refills: 0 | Status: SHIPPED | OUTPATIENT
Start: 2018-07-26 | End: 2018-11-21 | Stop reason: SDUPTHER

## 2018-07-27 RX ORDER — ROSUVASTATIN CALCIUM 5 MG/1
TABLET, COATED ORAL
Qty: 90 TABLET | Refills: 3 | Status: SHIPPED | OUTPATIENT
Start: 2018-07-27 | End: 2019-08-25 | Stop reason: SDUPTHER

## 2018-08-12 DIAGNOSIS — E78.5 HYPERLIPIDEMIA, UNSPECIFIED HYPERLIPIDEMIA TYPE: ICD-10-CM

## 2018-08-13 RX ORDER — EZETIMIBE 10 MG/1
5 TABLET ORAL DAILY
Qty: 30 TABLET | Refills: 0 | Status: SHIPPED | OUTPATIENT
Start: 2018-08-13 | End: 2018-09-27 | Stop reason: SDUPTHER

## 2018-08-27 ENCOUNTER — OFFICE VISIT (OUTPATIENT)
Dept: CARDIOLOGY CLINIC | Facility: CLINIC | Age: 63
End: 2018-08-27
Payer: COMMERCIAL

## 2018-08-27 VITALS
WEIGHT: 312.5 LBS | SYSTOLIC BLOOD PRESSURE: 130 MMHG | HEART RATE: 66 BPM | DIASTOLIC BLOOD PRESSURE: 88 MMHG | OXYGEN SATURATION: 94 % | BODY MASS INDEX: 49.05 KG/M2 | HEIGHT: 67 IN

## 2018-08-27 DIAGNOSIS — E78.2 MIXED HYPERLIPIDEMIA: ICD-10-CM

## 2018-08-27 DIAGNOSIS — E78.5 HYPERLIPIDEMIA, UNSPECIFIED HYPERLIPIDEMIA TYPE: Primary | ICD-10-CM

## 2018-08-27 DIAGNOSIS — I10 ESSENTIAL HYPERTENSION: ICD-10-CM

## 2018-08-27 PROCEDURE — 99214 OFFICE O/P EST MOD 30 MIN: CPT | Performed by: INTERNAL MEDICINE

## 2018-08-27 NOTE — PROGRESS NOTES
Follow-up - Cardiology   Jonah Maxwell 57 y o  male MRN: [id number]        Problems    Problem List Items Addressed This Visit     Hyperlipidemia - Primary      Other Visit Diagnoses     Mixed hyperlipidemia        Essential hypertension                Plan no change in medication  His nuclear stress test was normal  His LDL is 64  His echocardiogram done in 2017 with normal as well  He tells me that his A1c is less than 8 as well  He has no cardiac symptoms        HPI: Jonah Maxwell is a 57y o  year old male    HPI: Jonah Maxwell is a 57y o  year old male History of Present Illness  Patient seen on the 25th of thousand 14  He is a following history significant obesity  Gastric bypass  Borderline diabetes  2014 blood work showing a hemoglobin A1c is 6 3  Hypertension under good control  Prostate carcinoma  Status post prostatectomy  Done July 2011  Most recent PSA less than 0 1  Borderline hyperlipidemia not on medication no diagnosis of vascular disease     His been stable     Patient seen August 18, 2015  He has a history of gastric binding, borderline diabetes, elevated cholesterol, positive family history coronary disease, mild hypertension, and prostate carcinoma with removal in July of 2011  His LDL is 93  Hemoglobin A1c is 6 6  He is asymptomatic  His medications include a moore  He is not on a statin drug at this time  This is something to consider  I'm going to start him on a low-dose pravastatin 20 mg grams 3 times a week  He has a 10-12% 10 year she is of a coronary event      Patient seen February 3, 2016  His problems have been morbid obesity, status post gastric binding, hyperlipidemia, borderline diabetes, hyperlipidemia, unable to take pravastatin due to muscle aches, very positive family history of coronary disease, history of prostate carcinoma, hypertension,  A new symptom for him as he is having some chest pain  Does not appear to clearly be related to exertion   However we will do a stress test   In addition we'll try Crestor 2 5 mg twice a week and see if he can tolerate that before going on to Zetia or T parenteral form of cholesterol treatment  Plan full blood work Holter and stress test  Resting electrocardiogram shows premature ventricular contractions as well     Should seen July 18, 2016  He has a history of gastric bypass, hyperlipidemia, very positive family history coronary disease, diabetes, prostate carcinoma, hypertension, and PVCs  His LDL is 63  His CBC and metabolic panel are normal  A1c 7 1      Holter counter shows PACs and no PVCs  This is much better  The only issue is his blood pressures running proximally 150 even at home  Therefore I added hydrochlorothiazide 12 53 times a week      Patient seen December 19, 2016  He is status post gastric bypass, right knee recently done November 2016, hyperlipidemia, family history coronary disease, hypertension and this patient, PVCs, diabetes relatively well controlled, statin intolerant, and he recently lost 7 pounds  His A1c is down to 7  Blood pressures well controlled since I added hydrochlorothiazide  He offers no new complaints      Patient seen April 11, 2017  His had 2 knees replaced now  Her gastric bypass  His hyperlipidemia, positive family history coronary disease, hypertension, PVCs, diabetes, and relative statin intolerance  However with the increase in statin at the last visit he is doing well with it  His metabolic panel is normal his LDL is down to 63  He will A1c is 6 8  We will get labs on him and get an echocardiogram on him before the end of 2017  Last echocardiogram was      Patient seen November 21, 2017  He has a diagnosis of 2 knee operations, gastric bypass, hyperlipidemia, hypertension, PVCs, diabetes, and some degree of statin intolerance  Recent echocardiogram shows aortic to be enlarged it over 4 cm  We will do a CTA of his chest   His LDL is 64  He is taking Crestor and Zetia  His A1c is 7 1    His echocardiogram shows normal left ventricular function  I will see him after his CTA of his chest 2010     Patient seen December 6, 2017  We brought him back to discuss his ascending aorta  His aorta on CTA is 37 millimeters  I cannot reproduce to 41 millimeters noted on echo  He is delighted with these results      Patient seen 06/26/2018  All scripts HPI above  First visit with epic  Issue with the are as follows  He has had 2 knees  Very positive family history coronary disease  Hyperlipidemia  Morbid obesity  Hypertension  PVCs  Diabetes  Prostate carcinoma removed  This visit is because he having increasing shortness of breath  I do not think he is having true angina but he is has a change in his limitation of exercise  He will not be able walk on a treadmill very well  Therefore have to get a nuclear stress test   His A1c is 7 5  PSA is less than 0 1  In        Review of Systems   Constitutional: Negative  Respiratory: Negative  Endocrine:        Diabetic         Past Medical History:   Diagnosis Date    Acute blood loss anemia 10/13/2016    Anxiety     Arthritis     knees    Asthma     stable for > 10 years    Cancer [facility])     prostate    Cataract     Depression     Diabetes mellitus ([facility] [address] )     pre diabetes    Diverticulitis of colon     Hiatal hernia     History of anal fissures     Hyperlipidemia     Hypertension     Incisional hernia     Insomnia     Kidney stone     Morbid obesity (HCC)     PONV (postoperative nausea and vomiting)     Prolapsing mitral valve     prolapsing mitral valve leaflet syndrome    Prostate cancer ([facility] [address] )     Retinal detachment     [facility]; resolved: 10/10/2012    Sleep apnea     diagnosed but unable to tolerate cpap       Stuttering      History   Alcohol Use    0 6 oz/week    1 Cans of beer per week     Comment: rare ; being a social drinker (as per allscripts)     History   Drug Use No     History   Smoking Status    Never Smoker   Smokeless Tobacco    Never Used       Allergies:   Allergies   Allergen Reactions    Celebrex [Celecoxib] Other (See Comments)     Cardiologist stated he should not take      Prednisone Other (See Comments)     Prednisone eye drops- eye pressure increases       Medications:     Current Outpatient Prescriptions:     ALPRAZolam (XANAX) 0 25 mg tablet, Take 1 tablet (0 25 mg total) by mouth 3 (three) times a day as needed for anxiety for up to 90 days, Disp: 270 tablet, Rfl: 0    ascorbic acid (VITAMIN C) 500 mg tablet, Take 500 mg by mouth, Disp: , Rfl:     Docusate Calcium ([employer] STOOL SOFTENER PO), Take by mouth as needed, Disp: , Rfl:     ezetimibe (ZETIA) 10 mg tablet, TAKE 0 5 TABLETS (5 MG TOTAL) BY MOUTH DAILY PT TAKES 1/2 PILL EVERY DAY EXCEPT SATURDAYS, Disp: 30 tablet, Rfl: 0    FLUoxetine (PROzac) 20 mg capsule, TAKE 3 CAPSULES DAILY, Disp: 270 capsule, Rfl: 3    hydrochlorothiazide (MICROZIDE) 12 5 mg capsule, Take 12 5 mg by mouth 3 (three) times a week M-W-F , Disp: , Rfl:     losartan (COZAAR) 100 MG tablet, Take 100 mg by mouth daily, Disp: , Rfl:     meloxicam (MOBIC) 7 5 mg tablet, Take 1 tablet by mouth Daily, Disp: , Rfl:     metFORMIN (GLUCOPHAGE) 500 mg tablet, TAKE 1 TABLET IN THE MORNING AND 2 TABLETS IN THE EVENING DAILY (INCREASED DOSE) (Patient taking differently: take 2 tablet twice per day), Disp: 270 tablet, Rfl: 3    metoprolol succinate (TOPROL-XL) 25 mg 24 hr tablet, Take 25 mg by mouth daily, Disp: , Rfl:     Multiple Vitamins-Minerals (CENTRUM ADULTS PO), Take by mouth, Disp: , Rfl:     OLANZapine (ZyPREXA) 5 mg tablet, TAKE 1 TABLET DAILY, Disp: 90 tablet, Rfl: 3    Omega-3 Fatty Acids (OMEGA 3 PO), Take by mouth, Disp: , Rfl:     rosuvastatin (CRESTOR) 5 mg tablet, TAKE 1 TABLET TWICE A WEEK ON MONDAY AND FRIDAY, Disp: 90 tablet, Rfl: 3    zolpidem (AMBIEN) 10 mg tablet, Take 1 tablet (10 mg total) by mouth daily at bedtime for 90 days, Disp: 90 tablet, Rfl: 0      Physical Exam   Constitutional: He is oriented to person, place, and time  He appears well-developed  Cardiovascular: Regular rhythm  No murmur heard  Pulmonary/Chest: Breath sounds normal    Musculoskeletal: He exhibits no edema  Neurological: He is oriented to person, place, and time  Laboratory Studies:  CMP:    NT-proBNP: No results found for: NTBNP   Coags:    Lipid Profile:   Lab Results   Component Value Date    CHOL 171 2015     Lab Results   Component Value Date    HDL 58 10/05/2017     Lab Results   Component Value Date    LDLCALC 64 10/05/2017     Lab Results   Component Value Date    TRIG 70 10/05/2017       Cardiac testing:     EKG reviewed personally:     Results for orders placed during the hospital encounter of 10/19/17   Echo complete with contrast if indicated    Narrative [address]  [address]  [phone]    Transthoracic Echocardiogram  2D, M-mode, Doppler, and Color Doppler    Study date:  19-Oct-2017    Patient: Beau Wilson  MR number: [id number]  Account number: [de-identified]  : 1955  Age: 59 years  Gender: Male  Status: Outpatient  Location: [address] [place] [address] [facility]  Height: 67 in  Weight: 303 lb  BP: 136/ 86 mmHg    Indications: Hypertension, hyperlipidemia, chest pain    Diagnoses: E78 5 - Hyperlipidemia, unspecified, I10  - Essential (primary) hypertension, R07 9 - Chest pain, unspecified    Sonographer:  NEAL Mendez  Primary Physician:  Vincenza Lefort, MD  Referring Physician:  Adeline Pruitt MD  Group:  [facility]  Cardiology Fellow:  Alexia Hendricks MD  Interpreting Physician:  Desi Bosch MD    SUMMARY    LEFT VENTRICLE:  Size was normal   Systolic function was normal  Ejection fraction was estimated to be 65 %  Doppler parameters were consistent with abnormal left ventricular relaxation (grade 1 diastolic dysfunction)      RIGHT VENTRICLE:  The size was at the upper limits of normal   Systolic function was normal     MITRAL VALVE:  There was trace regurgitation  TRICUSPID VALVE:  There was trace regurgitation  Pulmonary artery systolic pressure was within the normal range  AORTA:  There was mild dilatation of the ascending aorta with maximum anteroposterior diameter of 41 mm  HISTORY: PRIOR HISTORY: Hypertension, hyperlipidemia, PVCs, DM2, gastric band    PROCEDURE: The study was performed in the [address]  This was a routine study  The transthoracic approach was used  The study included complete 2D imaging, M-mode, complete spectral Doppler, and color Doppler  Image  quality was adequate  LEFT VENTRICLE: Size was normal  Systolic function was normal  Ejection fraction was estimated to be 65 %  There were no regional wall motion abnormalities  Wall thickness was normal  DOPPLER: Doppler parameters were consistent with  abnormal left ventricular relaxation (grade 1 diastolic dysfunction)  RIGHT VENTRICLE: The size was at the upper limits of normal  Systolic function was normal  Wall thickness was normal     LEFT ATRIUM: Size was normal     RIGHT ATRIUM: Size was normal     MITRAL VALVE: Valve structure was normal  There was normal leaflet separation  DOPPLER: The transmitral velocity was within the normal range  There was no evidence for stenosis  There was trace regurgitation  AORTIC VALVE: The valve was trileaflet  Leaflets exhibited normal thickness and normal cuspal separation  DOPPLER: Transaortic velocity was within the normal range  There was no evidence for stenosis  There was no regurgitation  TRICUSPID VALVE: The valve structure was normal  There was normal leaflet separation  DOPPLER: The transtricuspid velocity was within the normal range  There was no evidence for stenosis  There was trace regurgitation  Pulmonary artery  systolic pressure was within the normal range      PULMONIC VALVE: Leaflets exhibited normal thickness, no calcification, and normal cuspal separation  DOPPLER: The transpulmonic velocity was within the normal range  There was trace regurgitation  PERICARDIUM: There was no pericardial effusion  AORTA: The root exhibited normal size  There was mild dilatation of the ascending aorta with maximum anteroposterior diameter of 41 mm  SYSTEMIC VEINS: IVC: The inferior vena cava was not well visualized  SYSTEM MEASUREMENT TABLES    2D  %FS: 40 77 %  AV Diam: 3 8 cm  EDV(Teich): 136 43 ml  EF(Cube): 79 22 %  EF(Teich): 71 1 %  ESV(Cube): 31 26 ml  ESV(Teich): 39 43 ml  IVSd: 1 1 cm  LA Area: 21 57 cm2  LA Diam: 3 77 cm  LVEDV MOD A4C: 144 51 ml  LVEF MOD A4C: 63 7 %  LVESV MOD A4C: 52 45 ml  LVIDd: 5 32 cm  LVIDs: 3 15 cm  LVLd A4C: 8 79 cm  LVLs A4C: 6 84 cm  LVPWd: 1 15 cm  RA Area: 14 88 cm2  RV Diam : 3 23 cm  SV MOD A4C: 92 06 ml  SV(Cube): 119 17 ml  SV(Teich): 97 ml    CW  TR Vmax: 2 5 m/s  TR maxP 02 mmHg    MM  TAPSE: 1 76 cm    PW  E': 0 05 m/s  E/E': 11 56  MV A Luis Armando: 0 71 m/s  MV Dec [place]: 2 2 m/s2  MV DecT: 249 86 ms  MV E Luis Armando: 0 55 m/s  MV E/A Ratio: 0 77    [employer] Accredited Echocardi[facility]    Prepared and electronically signed by    Duyen Montero MD  Signed 19-Oct-2017 16:05:16       No results found for this or any previous visit  No results found for this or any previous visit  No results found for this or any previous visit  Renny Godinez MD    Portions of the record may have been created with voice recognition software   Occasional wrong word or "sound a like" substitutions may have occurred due to the inherent limitations of voice recognition software   Read the chart carefully and recognize, using context, where substitutions have occurred

## 2018-09-25 DIAGNOSIS — I10 ESSENTIAL HYPERTENSION: Primary | ICD-10-CM

## 2018-09-25 PROCEDURE — 4010F ACE/ARB THERAPY RXD/TAKEN: CPT | Performed by: FAMILY MEDICINE

## 2018-09-25 RX ORDER — LOSARTAN POTASSIUM 100 MG/1
TABLET ORAL
Qty: 90 TABLET | Refills: 3 | Status: SHIPPED | OUTPATIENT
Start: 2018-09-25 | End: 2020-11-04 | Stop reason: SDUPTHER

## 2018-09-27 DIAGNOSIS — E78.5 HYPERLIPIDEMIA, UNSPECIFIED HYPERLIPIDEMIA TYPE: ICD-10-CM

## 2018-09-30 DIAGNOSIS — I10 ESSENTIAL HYPERTENSION: ICD-10-CM

## 2018-10-01 RX ORDER — HYDROCHLOROTHIAZIDE 12.5 MG/1
TABLET ORAL
Qty: 45 TABLET | Refills: 1 | Status: SHIPPED | OUTPATIENT
Start: 2018-10-01 | End: 2018-11-01

## 2018-10-01 RX ORDER — EZETIMIBE 10 MG/1
5 TABLET ORAL DAILY
Qty: 30 TABLET | Refills: 11 | Status: SHIPPED | OUTPATIENT
Start: 2018-10-01 | End: 2018-11-15 | Stop reason: SDUPTHER

## 2018-10-17 LAB
LEFT EYE DIABETIC RETINOPATHY: NORMAL
RIGHT EYE DIABETIC RETINOPATHY: NORMAL
SEVERITY (EYE EXAM): NORMAL

## 2018-10-29 ENCOUNTER — APPOINTMENT (OUTPATIENT)
Dept: LAB | Age: 63
End: 2018-10-29

## 2018-10-29 DIAGNOSIS — I10 BENIGN ESSENTIAL HYPERTENSION: ICD-10-CM

## 2018-10-29 DIAGNOSIS — E11.9 CONTROLLED TYPE 2 DIABETES MELLITUS WITHOUT COMPLICATION, WITHOUT LONG-TERM CURRENT USE OF INSULIN (HCC): ICD-10-CM

## 2018-10-29 DIAGNOSIS — E78.2 MIXED HYPERLIPIDEMIA: ICD-10-CM

## 2018-10-29 LAB
ALBUMIN SERPL BCP-MCNC: 3.7 G/DL (ref 3.5–5)
ALP SERPL-CCNC: 77 U/L (ref 46–116)
ALT SERPL W P-5'-P-CCNC: 32 U/L (ref 12–78)
ANION GAP SERPL CALCULATED.3IONS-SCNC: 9 MMOL/L (ref 4–13)
AST SERPL W P-5'-P-CCNC: 15 U/L (ref 5–45)
BILIRUB SERPL-MCNC: 0.61 MG/DL (ref 0.2–1)
BUN SERPL-MCNC: 11 MG/DL (ref 5–25)
CALCIUM SERPL-MCNC: 9.2 MG/DL (ref 8.3–10.1)
CHLORIDE SERPL-SCNC: 103 MMOL/L (ref 100–108)
CHOLEST SERPL-MCNC: 124 MG/DL (ref 50–200)
CO2 SERPL-SCNC: 26 MMOL/L (ref 21–32)
CREAT SERPL-MCNC: 0.97 MG/DL (ref 0.6–1.3)
EST. AVERAGE GLUCOSE BLD GHB EST-MCNC: 146 MG/DL
GFR SERPL CREATININE-BSD FRML MDRD: 82 ML/MIN/1.73SQ M
GLUCOSE P FAST SERPL-MCNC: 143 MG/DL (ref 65–99)
HBA1C MFR BLD: 6.7 % (ref 4.2–6.3)
HDLC SERPL-MCNC: 56 MG/DL (ref 40–60)
LDLC SERPL CALC-MCNC: 53 MG/DL (ref 0–100)
NONHDLC SERPL-MCNC: 68 MG/DL
POTASSIUM SERPL-SCNC: 4.1 MMOL/L (ref 3.5–5.3)
PROT SERPL-MCNC: 6.9 G/DL (ref 6.4–8.2)
SODIUM SERPL-SCNC: 138 MMOL/L (ref 136–145)
TRIGL SERPL-MCNC: 77 MG/DL
TSH SERPL DL<=0.05 MIU/L-ACNC: 1.35 UIU/ML (ref 0.36–3.74)

## 2018-10-29 PROCEDURE — 84443 ASSAY THYROID STIM HORMONE: CPT

## 2018-10-29 PROCEDURE — 83036 HEMOGLOBIN GLYCOSYLATED A1C: CPT

## 2018-10-29 PROCEDURE — 80061 LIPID PANEL: CPT

## 2018-10-29 PROCEDURE — 36415 COLL VENOUS BLD VENIPUNCTURE: CPT

## 2018-10-29 PROCEDURE — 80053 COMPREHEN METABOLIC PANEL: CPT

## 2018-11-01 ENCOUNTER — OFFICE VISIT (OUTPATIENT)
Dept: FAMILY MEDICINE CLINIC | Facility: CLINIC | Age: 63
End: 2018-11-01
Payer: MEDICARE

## 2018-11-01 VITALS
HEIGHT: 68 IN | RESPIRATION RATE: 16 BRPM | TEMPERATURE: 98.5 F | SYSTOLIC BLOOD PRESSURE: 110 MMHG | WEIGHT: 313.2 LBS | BODY MASS INDEX: 47.47 KG/M2 | HEART RATE: 68 BPM | DIASTOLIC BLOOD PRESSURE: 70 MMHG

## 2018-11-01 DIAGNOSIS — F51.04 PSYCHOPHYSIOLOGICAL INSOMNIA: ICD-10-CM

## 2018-11-01 DIAGNOSIS — F41.8 DEPRESSION WITH ANXIETY: ICD-10-CM

## 2018-11-01 DIAGNOSIS — E78.2 MIXED HYPERLIPIDEMIA: ICD-10-CM

## 2018-11-01 DIAGNOSIS — G47.33 OBSTRUCTIVE SLEEP APNEA SYNDROME: ICD-10-CM

## 2018-11-01 DIAGNOSIS — E11.9 CONTROLLED TYPE 2 DIABETES MELLITUS WITHOUT COMPLICATION, WITHOUT LONG-TERM CURRENT USE OF INSULIN (HCC): Primary | ICD-10-CM

## 2018-11-01 DIAGNOSIS — E66.01 MORBID OBESITY DUE TO EXCESS CALORIES (HCC): ICD-10-CM

## 2018-11-01 DIAGNOSIS — F80.81 STUTTERING: ICD-10-CM

## 2018-11-01 DIAGNOSIS — Z23 FLU VACCINE NEED: ICD-10-CM

## 2018-11-01 DIAGNOSIS — I10 BENIGN ESSENTIAL HYPERTENSION: ICD-10-CM

## 2018-11-01 PROCEDURE — 99214 OFFICE O/P EST MOD 30 MIN: CPT | Performed by: FAMILY MEDICINE

## 2018-11-01 PROCEDURE — 90471 IMMUNIZATION ADMIN: CPT

## 2018-11-01 PROCEDURE — 90682 RIV4 VACC RECOMBINANT DNA IM: CPT

## 2018-11-01 NOTE — PROGRESS NOTES
Assessment/Plan:    In regard to the foot odor I suggest that he take vinegar and water foot baths and see if this helps  If it does not then treating his chronic fungal infection is toenails may be of benefit  Would consider using pulse dosing with oral Lamisil  Colonoscopy is up-to-date in 2015    He had a flu shot today  He will turn 63 shortly and will begin with Prevnar 13 and then 1 year later get a Pneumovax as a booster to the 1 he had previously  He is interested in the Shingrix vaccine will check his insurance for coverage and where he can get it  He also will need to check for whether a Tdap would be covered    Labs before next visit    Next visit in 6 months for his Welcome to Medicare exam and we should consider getting a vascular screening study at that time    Controlled type 2 diabetes mellitus without complication ([facility] [address] )  Lab Results   Component Value Date    HGBA1C 6 7 (H) 10/29/2018       No results for input(s): POCGLU in the last 72 hours  Blood Sugar Average: Last 72 hrs:    Improved control with A1c decreased from prior 7 5  Continue same medication    He had an eye exam but report is not in chart so we will get this    He did not see a podiatrist so I did a foot exam today    We will get labs for his next visit in 6 months including A1c and microalbumin      Sleep apnea  He does not use his CPAP as he did not tolerate it    Depression with anxiety  Mood is stable on meds    Hyperlipidemia  Recent total cholesterol was 124 triglycerides 77 HDL 56 and LDL 53 on current statin medication  Continue same    Insomnia  He continues to need nightly dose of ambien    Morbid obesity due to excess calories ([facility] [address] )  He is lost 13 lb on his current diet and exercise regimen    New line continue same    Stuttering  He continues to do reasonably well on Zyprexa  Continue same       Diagnoses and all orders for this visit:    Controlled type 2 diabetes mellitus without complication, without long-term current use of insulin ([facility] [address] )    Benign essential hypertension    Mixed hyperlipidemia    Stuttering    Depression with anxiety    Psychophysiological insomnia    Morbid obesity due to excess calories (HCC)    Flu vaccine need  -     influenza vaccine, [id number], quadrivalent, recombinant, PF, 0 5 mL, for patients 18 yr+ (FLUBLOK)    Obstructive sleep apnea syndrome          Subjective:      Patient ID: Randall Dejesus is a 57 y o  male  He is here today for follow-up on his chronic conditions    Great news is that he has lost 13 lb  He is using a ap on his phone to log calories and the keep on his diet  He also has a fit track his activity  On an average day gets about 3000 steps and on the days that he golfs which is twice a week he gets over 6000 steps    He also notes that he no longer has pain in his knees as he has had bilateral total knee replacements  Sometimes in the morning is a little bit stiff but is relieved that there is no pain is there had been before which allows him to be more active    He complains of foot odor    He and his wife for running a house in [place] for the month of January and he is excited about this            The following portions of the patient's history were reviewed and updated as appropriate: allergies, current medications, past medical history, past social history, past surgical history and problem list     Review of Systems   Constitutional:        See HPI   HENT: Negative for dental problem and hearing loss  Eyes:        He was had 2 eye exams this year 1 by his usual ophthalmologist and another by and retinal specialist since he had bilateral retinal detachments that were fixed  He notes that his visual acuity is 20/20 with correction bilaterally   Respiratory: Negative for cough and shortness of breath  Cardiovascular: Negative for chest pain, palpitations and leg swelling     Gastrointestinal:        He has noted that there has been a change in his bowel movements were he does not go every day anymore but generally every other day  He states that the stool is not harder difficult to evacuate in general but occasionally if he has any trouble to use a stool softener which works  Noted heartburn or GERD symptoms with his change in diet and activity   Genitourinary:        He reports no problems  He sees a urologist for follow-up from his prostate cancer  Recent PSA was less than 0 1   Musculoskeletal:        See HPI   Skin: Negative for rash  He follows with Dr Rebecca Amaro for Dermatology   Neurological: Negative for dizziness, tremors and headaches  Stuttering is same   Psychiatric/Behavioral: Positive for sleep disturbance (His nonmedicated sleep pattern would be to go to sleep at 10 o'clock at night and awake at 1 o'clock in the morning and not be able to go back to sleep and feels if it is about 9 o'clock in the morning  He therefore uses Ambien on a nightly basis in order)  Negative for confusion, decreased concentration and dysphoric mood  The patient is not nervous/anxious  Patient's shoes and socks removed  Right Foot/Ankle   Right Foot Inspection      Sensory       Monofilament testing: intact  Vascular  Capillary refills: < 3 seconds  The right DP pulse is 2+  The right PT pulse is 2+  Left Foot/Ankle  Left Foot Inspection                                           Sensory       Monofilament: intact  Vascular  Capillary refills: < 3 seconds  The left DP pulse is 2+  The left PT pulse is 2+  Assign Risk Category:  No deformity present; No loss of protective sensation; No weak pulses       Risk: 0      Objective:      /70   Pulse 68   Temp 98 5 °F (36 9 °C) (Tympanic)   Resp 16   Ht 5' 8" (1 727 m)   Wt (!) 142 kg (313 lb 3 2 oz)   BMI 47 62 kg/m²          Physical Exam   Constitutional: He is oriented to person, place, and time  He appears well-developed and well-nourished  No distress  Neck: No JVD present   No thyromegaly present  Cardiovascular: Normal rate and regular rhythm  Pulses are no weak pulses  No murmur heard  Pulses:       Dorsalis pedis pulses are 2+ on the right side, and 2+ on the left side  Posterior tibial pulses are 2+ on the right side, and 2+ on the left side  Pulmonary/Chest: Effort normal and breath sounds normal    Musculoskeletal: He exhibits no edema  Neurological: He is alert and oriented to person, place, and time  stutters   Skin: No rash noted  Psychiatric: He has a normal mood and affect  His behavior is normal  Judgment and thought content normal    Nursing note and vitals reviewed  - - -

## 2018-11-01 NOTE — ASSESSMENT & PLAN NOTE
Lab Results   Component Value Date    HGBA1C 6 7 (H) 10/29/2018       No results for input(s): POCGLU in the last 72 hours      Blood Sugar Average: Last 72 hrs:    Improved control with A1c decreased from prior 7 5  Continue same medication    He had an eye exam but report is not in chart so we will get this    He did not see a podiatrist so I did a foot exam today    We will get labs for his next visit in 6 months including A1c and microalbumin

## 2018-11-01 NOTE — ASSESSMENT & PLAN NOTE
Recent total cholesterol was 124 triglycerides 77 HDL 56 and LDL 53 on current statin medication    Continue same

## 2018-11-15 DIAGNOSIS — E78.5 HYPERLIPIDEMIA, UNSPECIFIED HYPERLIPIDEMIA TYPE: ICD-10-CM

## 2018-11-15 RX ORDER — EZETIMIBE 10 MG/1
5 TABLET ORAL DAILY
Qty: 45 TABLET | Refills: 2 | Status: SHIPPED | OUTPATIENT
Start: 2018-11-15 | End: 2019-05-20 | Stop reason: SDUPTHER

## 2018-11-21 DIAGNOSIS — G47.00 INSOMNIA, UNSPECIFIED TYPE: ICD-10-CM

## 2018-11-21 RX ORDER — ZOLPIDEM TARTRATE 10 MG/1
10 TABLET ORAL
Qty: 90 TABLET | Refills: 1 | Status: SHIPPED | OUTPATIENT
Start: 2018-11-21 | End: 2019-05-20 | Stop reason: SDUPTHER

## 2019-02-06 DIAGNOSIS — I10 ESSENTIAL HYPERTENSION: Primary | ICD-10-CM

## 2019-02-06 RX ORDER — METOPROLOL SUCCINATE 25 MG/1
TABLET, EXTENDED RELEASE ORAL
Qty: 90 TABLET | Refills: 3 | Status: SHIPPED | OUTPATIENT
Start: 2019-02-06 | End: 2020-07-06

## 2019-03-11 DIAGNOSIS — I10 ESSENTIAL HYPERTENSION: Primary | ICD-10-CM

## 2019-03-11 RX ORDER — HYDROCHLOROTHIAZIDE 12.5 MG/1
TABLET ORAL
Qty: 45 TABLET | Refills: 1 | Status: SHIPPED | OUTPATIENT
Start: 2019-03-11 | End: 2019-05-02 | Stop reason: ALTCHOICE

## 2019-03-28 DIAGNOSIS — F80.81 STUTTERING: ICD-10-CM

## 2019-03-28 DIAGNOSIS — E11.9 CONTROLLED TYPE 2 DIABETES MELLITUS WITHOUT COMPLICATION, WITHOUT LONG-TERM CURRENT USE OF INSULIN (HCC): ICD-10-CM

## 2019-03-28 RX ORDER — OLANZAPINE 5 MG/1
TABLET ORAL
Qty: 90 TABLET | Refills: 3 | Status: SHIPPED | OUTPATIENT
Start: 2019-03-28 | End: 2020-03-13

## 2019-04-24 ENCOUNTER — APPOINTMENT (OUTPATIENT)
Dept: LAB | Age: 64
End: 2019-04-24
Payer: MEDICARE

## 2019-04-24 DIAGNOSIS — C61 MALIGNANT NEOPLASM OF PROSTATE (HCC): ICD-10-CM

## 2019-04-24 LAB — PSA SERPL-MCNC: <0.1 NG/ML (ref 0–4)

## 2019-04-24 PROCEDURE — 84153 ASSAY OF PSA TOTAL: CPT

## 2019-04-26 ENCOUNTER — APPOINTMENT (OUTPATIENT)
Dept: LAB | Age: 64
End: 2019-04-26
Payer: MEDICARE

## 2019-04-26 DIAGNOSIS — E78.2 MIXED HYPERLIPIDEMIA: ICD-10-CM

## 2019-04-26 DIAGNOSIS — I10 BENIGN ESSENTIAL HYPERTENSION: ICD-10-CM

## 2019-04-26 DIAGNOSIS — E11.9 CONTROLLED TYPE 2 DIABETES MELLITUS WITHOUT COMPLICATION, WITHOUT LONG-TERM CURRENT USE OF INSULIN (HCC): ICD-10-CM

## 2019-04-26 LAB
ALBUMIN SERPL BCP-MCNC: 3.9 G/DL (ref 3.5–5)
ALP SERPL-CCNC: 76 U/L (ref 46–116)
ALT SERPL W P-5'-P-CCNC: 33 U/L (ref 12–78)
ANION GAP SERPL CALCULATED.3IONS-SCNC: 5 MMOL/L (ref 4–13)
AST SERPL W P-5'-P-CCNC: 20 U/L (ref 5–45)
BASOPHILS # BLD AUTO: 0.1 THOUSANDS/ΜL (ref 0–0.1)
BASOPHILS NFR BLD AUTO: 1 % (ref 0–1)
BILIRUB SERPL-MCNC: 0.57 MG/DL (ref 0.2–1)
BUN SERPL-MCNC: 13 MG/DL (ref 5–25)
CALCIUM SERPL-MCNC: 9.5 MG/DL (ref 8.3–10.1)
CHLORIDE SERPL-SCNC: 103 MMOL/L (ref 100–108)
CHOLEST SERPL-MCNC: 177 MG/DL (ref 50–200)
CK SERPL-CCNC: 105 U/L (ref 39–308)
CO2 SERPL-SCNC: 26 MMOL/L (ref 21–32)
CREAT SERPL-MCNC: 1.02 MG/DL (ref 0.6–1.3)
CREAT UR-MCNC: 108 MG/DL
EOSINOPHIL # BLD AUTO: 1.02 THOUSAND/ΜL (ref 0–0.61)
EOSINOPHIL NFR BLD AUTO: 11 % (ref 0–6)
ERYTHROCYTE [DISTWIDTH] IN BLOOD BY AUTOMATED COUNT: 13.2 % (ref 11.6–15.1)
EST. AVERAGE GLUCOSE BLD GHB EST-MCNC: 160 MG/DL
GFR SERPL CREATININE-BSD FRML MDRD: 77 ML/MIN/1.73SQ M
GLUCOSE P FAST SERPL-MCNC: 145 MG/DL (ref 65–99)
HBA1C MFR BLD: 7.2 % (ref 4.2–6.3)
HCT VFR BLD AUTO: 42.5 % (ref 36.5–49.3)
HDLC SERPL-MCNC: 58 MG/DL (ref 40–60)
HGB BLD-MCNC: 14.6 G/DL (ref 12–17)
IMM GRANULOCYTES # BLD AUTO: 0.04 THOUSAND/UL (ref 0–0.2)
IMM GRANULOCYTES NFR BLD AUTO: 0 % (ref 0–2)
LDLC SERPL CALC-MCNC: 104 MG/DL (ref 0–100)
LYMPHOCYTES # BLD AUTO: 1.9 THOUSANDS/ΜL (ref 0.6–4.47)
LYMPHOCYTES NFR BLD AUTO: 21 % (ref 14–44)
MCH RBC QN AUTO: 29.7 PG (ref 26.8–34.3)
MCHC RBC AUTO-ENTMCNC: 34.4 G/DL (ref 31.4–37.4)
MCV RBC AUTO: 86 FL (ref 82–98)
MICROALBUMIN UR-MCNC: <5 MG/L (ref 0–20)
MICROALBUMIN/CREAT 24H UR: <5 MG/G CREATININE (ref 0–30)
MONOCYTES # BLD AUTO: 0.67 THOUSAND/ΜL (ref 0.17–1.22)
MONOCYTES NFR BLD AUTO: 8 % (ref 4–12)
NEUTROPHILS # BLD AUTO: 5.18 THOUSANDS/ΜL (ref 1.85–7.62)
NEUTS SEG NFR BLD AUTO: 59 % (ref 43–75)
NONHDLC SERPL-MCNC: 119 MG/DL
NRBC BLD AUTO-RTO: 0 /100 WBCS
PLATELET # BLD AUTO: 285 THOUSANDS/UL (ref 149–390)
PMV BLD AUTO: 9.3 FL (ref 8.9–12.7)
POTASSIUM SERPL-SCNC: 4.2 MMOL/L (ref 3.5–5.3)
PROT SERPL-MCNC: 7.2 G/DL (ref 6.4–8.2)
RBC # BLD AUTO: 4.92 MILLION/UL (ref 3.88–5.62)
SODIUM SERPL-SCNC: 134 MMOL/L (ref 136–145)
TRIGL SERPL-MCNC: 77 MG/DL
WBC # BLD AUTO: 8.91 THOUSAND/UL (ref 4.31–10.16)

## 2019-04-26 PROCEDURE — 82570 ASSAY OF URINE CREATININE: CPT

## 2019-04-26 PROCEDURE — 80053 COMPREHEN METABOLIC PANEL: CPT

## 2019-04-26 PROCEDURE — 80061 LIPID PANEL: CPT | Performed by: INTERNAL MEDICINE

## 2019-04-26 PROCEDURE — 82550 ASSAY OF CK (CPK): CPT

## 2019-04-26 PROCEDURE — 36415 COLL VENOUS BLD VENIPUNCTURE: CPT

## 2019-04-26 PROCEDURE — 83036 HEMOGLOBIN GLYCOSYLATED A1C: CPT

## 2019-04-26 PROCEDURE — 82043 UR ALBUMIN QUANTITATIVE: CPT

## 2019-04-26 PROCEDURE — 85025 COMPLETE CBC W/AUTO DIFF WBC: CPT

## 2019-04-29 ENCOUNTER — OFFICE VISIT (OUTPATIENT)
Dept: UROLOGY | Facility: AMBULATORY SURGERY CENTER | Age: 64
End: 2019-04-29
Payer: MEDICARE

## 2019-04-29 VITALS
BODY MASS INDEX: 47.74 KG/M2 | WEIGHT: 315 LBS | DIASTOLIC BLOOD PRESSURE: 80 MMHG | HEART RATE: 86 BPM | SYSTOLIC BLOOD PRESSURE: 130 MMHG | HEIGHT: 68 IN

## 2019-04-29 DIAGNOSIS — Z85.46 HISTORY OF PROSTATE CANCER: Primary | ICD-10-CM

## 2019-04-29 DIAGNOSIS — R39.15 URINARY URGENCY: ICD-10-CM

## 2019-04-29 PROCEDURE — 99214 OFFICE O/P EST MOD 30 MIN: CPT | Performed by: NURSE PRACTITIONER

## 2019-05-02 ENCOUNTER — OFFICE VISIT (OUTPATIENT)
Dept: FAMILY MEDICINE CLINIC | Facility: CLINIC | Age: 64
End: 2019-05-02
Payer: MEDICARE

## 2019-05-02 VITALS
RESPIRATION RATE: 18 BRPM | HEART RATE: 62 BPM | WEIGHT: 315 LBS | SYSTOLIC BLOOD PRESSURE: 132 MMHG | DIASTOLIC BLOOD PRESSURE: 78 MMHG | TEMPERATURE: 97.8 F | HEIGHT: 69 IN | BODY MASS INDEX: 46.65 KG/M2

## 2019-05-02 DIAGNOSIS — I77.89 HYPERPLASIA OF RENAL ARTERY (HCC): ICD-10-CM

## 2019-05-02 DIAGNOSIS — G47.33 OBSTRUCTIVE SLEEP APNEA SYNDROME: ICD-10-CM

## 2019-05-02 DIAGNOSIS — B35.1 ONYCHOMYCOSIS: ICD-10-CM

## 2019-05-02 DIAGNOSIS — Z23 NEED FOR PNEUMOCOCCAL VACCINATION: ICD-10-CM

## 2019-05-02 DIAGNOSIS — F51.04 PSYCHOPHYSIOLOGICAL INSOMNIA: ICD-10-CM

## 2019-05-02 DIAGNOSIS — Z00.00 MEDICARE ANNUAL WELLNESS VISIT, INITIAL: ICD-10-CM

## 2019-05-02 DIAGNOSIS — Z11.59 NEED FOR HEPATITIS C SCREENING TEST: ICD-10-CM

## 2019-05-02 DIAGNOSIS — E11.9 CONTROLLED TYPE 2 DIABETES MELLITUS WITHOUT COMPLICATION, WITHOUT LONG-TERM CURRENT USE OF INSULIN (HCC): Primary | ICD-10-CM

## 2019-05-02 DIAGNOSIS — E78.2 MIXED HYPERLIPIDEMIA: ICD-10-CM

## 2019-05-02 DIAGNOSIS — I10 BENIGN ESSENTIAL HYPERTENSION: ICD-10-CM

## 2019-05-02 DIAGNOSIS — F80.81 STUTTERING: ICD-10-CM

## 2019-05-02 DIAGNOSIS — E66.01 MORBID OBESITY DUE TO EXCESS CALORIES (HCC): ICD-10-CM

## 2019-05-02 DIAGNOSIS — F41.8 DEPRESSION WITH ANXIETY: ICD-10-CM

## 2019-05-02 DIAGNOSIS — E13.8 OTHER SPECIFIED DIABETES MELLITUS WITH COMPLICATION, WITHOUT LONG-TERM CURRENT USE OF INSULIN: ICD-10-CM

## 2019-05-02 PROCEDURE — 99214 OFFICE O/P EST MOD 30 MIN: CPT | Performed by: FAMILY MEDICINE

## 2019-05-02 PROCEDURE — 90670 PCV13 VACCINE IM: CPT | Performed by: FAMILY MEDICINE

## 2019-05-02 PROCEDURE — G0009 ADMIN PNEUMOCOCCAL VACCINE: HCPCS | Performed by: FAMILY MEDICINE

## 2019-05-02 PROCEDURE — G0402 INITIAL PREVENTIVE EXAM: HCPCS | Performed by: FAMILY MEDICINE

## 2019-05-02 RX ORDER — ALPRAZOLAM 0.25 MG/1
0.25 TABLET ORAL 3 TIMES DAILY PRN
Qty: 270 TABLET | Refills: 0 | Status: SHIPPED | OUTPATIENT
Start: 2019-05-02 | End: 2019-05-09 | Stop reason: SDUPTHER

## 2019-05-02 RX ORDER — TERBINAFINE HYDROCHLORIDE 250 MG/1
TABLET ORAL
Qty: 28 TABLET | Refills: 0 | Status: SHIPPED | OUTPATIENT
Start: 2019-05-02 | End: 2019-07-02

## 2019-05-06 DIAGNOSIS — F41.8 DEPRESSION WITH ANXIETY: ICD-10-CM

## 2019-05-09 RX ORDER — ALPRAZOLAM 0.25 MG/1
0.25 TABLET ORAL 3 TIMES DAILY PRN
Qty: 270 TABLET | Refills: 0 | Status: SHIPPED | OUTPATIENT
Start: 2019-05-09 | End: 2020-05-14 | Stop reason: SDUPTHER

## 2019-05-20 DIAGNOSIS — E78.5 HYPERLIPIDEMIA, UNSPECIFIED HYPERLIPIDEMIA TYPE: ICD-10-CM

## 2019-05-20 DIAGNOSIS — F41.8 DEPRESSION WITH ANXIETY: ICD-10-CM

## 2019-05-20 DIAGNOSIS — G47.00 INSOMNIA, UNSPECIFIED TYPE: ICD-10-CM

## 2019-05-20 RX ORDER — FLUOXETINE HYDROCHLORIDE 20 MG/1
CAPSULE ORAL
Qty: 270 CAPSULE | Refills: 3 | Status: SHIPPED | OUTPATIENT
Start: 2019-05-20 | End: 2020-05-13

## 2019-05-21 RX ORDER — EZETIMIBE 10 MG/1
TABLET ORAL
Qty: 90 TABLET | Refills: 1 | Status: SHIPPED | OUTPATIENT
Start: 2019-05-21 | End: 2019-12-06 | Stop reason: SDUPTHER

## 2019-05-21 RX ORDER — ZOLPIDEM TARTRATE 10 MG/1
10 TABLET ORAL
Qty: 90 TABLET | Refills: 1 | Status: SHIPPED | OUTPATIENT
Start: 2019-05-21 | End: 2019-10-28 | Stop reason: SDUPTHER

## 2019-05-28 ENCOUNTER — HOSPITAL ENCOUNTER (OUTPATIENT)
Dept: RADIOLOGY | Facility: HOSPITAL | Age: 64
Discharge: HOME | End: 2019-05-28
Attending: ORTHOPAEDIC SURGERY
Payer: MEDICARE

## 2019-05-28 ENCOUNTER — OFFICE VISIT (OUTPATIENT)
Dept: OBGYN CLINIC | Facility: HOSPITAL | Age: 64
End: 2019-05-28
Payer: MEDICARE

## 2019-05-28 VITALS
DIASTOLIC BLOOD PRESSURE: 79 MMHG | HEIGHT: 67 IN | WEIGHT: 315 LBS | HEART RATE: 63 BPM | BODY MASS INDEX: 49.44 KG/M2 | SYSTOLIC BLOOD PRESSURE: 150 MMHG

## 2019-05-28 DIAGNOSIS — Z96.651 HX OF TOTAL KNEE REPLACEMENT, RIGHT: Primary | ICD-10-CM

## 2019-05-28 DIAGNOSIS — Z96.651 HX OF TOTAL KNEE REPLACEMENT, RIGHT: ICD-10-CM

## 2019-05-28 PROCEDURE — 4040F PNEUMOC VAC/ADMIN/RCVD: CPT | Performed by: PHYSICIAN ASSISTANT

## 2019-05-28 PROCEDURE — 3079F DIAST BP 80-89 MM HG: CPT | Performed by: PHYSICIAN ASSISTANT

## 2019-05-28 PROCEDURE — 73560 X-RAY EXAM OF KNEE 1 OR 2: CPT

## 2019-05-28 PROCEDURE — 1036F TOBACCO NON-USER: CPT | Performed by: PHYSICIAN ASSISTANT

## 2019-05-28 PROCEDURE — 99213 OFFICE O/P EST LOW 20 MIN: CPT | Performed by: PHYSICIAN ASSISTANT

## 2019-05-28 PROCEDURE — 3075F SYST BP GE 130 - 139MM HG: CPT | Performed by: PHYSICIAN ASSISTANT

## 2019-06-10 ENCOUNTER — HOSPITAL ENCOUNTER (EMERGENCY)
Facility: HOSPITAL | Age: 64
Discharge: HOME | End: 2019-06-10
Attending: EMERGENCY MEDICINE | Admitting: EMERGENCY MEDICINE
Payer: MEDICARE

## 2019-06-10 VITALS
HEART RATE: 68 BPM | OXYGEN SATURATION: 94 % | TEMPERATURE: 98.6 F | BODY MASS INDEX: 51.45 KG/M2 | SYSTOLIC BLOOD PRESSURE: 163 MMHG | DIASTOLIC BLOOD PRESSURE: 94 MMHG | RESPIRATION RATE: 18 BRPM | WEIGHT: 315 LBS

## 2019-06-10 DIAGNOSIS — K57.92 DIVERTICULITIS: Primary | ICD-10-CM

## 2019-06-10 LAB
ALBUMIN SERPL BCP-MCNC: 3.7 G/DL (ref 3.5–5)
ALP SERPL-CCNC: 81 U/L (ref 46–116)
ALT SERPL W P-5'-P-CCNC: 29 U/L (ref 12–78)
ANION GAP SERPL CALCULATED.3IONS-SCNC: 7 MMOL/L (ref 4–13)
AST SERPL W P-5'-P-CCNC: 23 U/L (ref 5–45)
BASOPHILS # BLD AUTO: 0.08 THOUSANDS/ΜL (ref 0–0.1)
BASOPHILS NFR BLD AUTO: 1 % (ref 0–1)
BILIRUB SERPL-MCNC: 0.4 MG/DL (ref 0.2–1)
BILIRUB UR QL STRIP: NEGATIVE
BUN SERPL-MCNC: 11 MG/DL (ref 5–25)
CALCIUM SERPL-MCNC: 9.4 MG/DL (ref 8.3–10.1)
CHLORIDE SERPL-SCNC: 102 MMOL/L (ref 100–108)
CLARITY UR: CLEAR
CO2 SERPL-SCNC: 29 MMOL/L (ref 21–32)
COLOR UR: YELLOW
CREAT SERPL-MCNC: 0.97 MG/DL (ref 0.6–1.3)
EOSINOPHIL # BLD AUTO: 0.85 THOUSAND/ΜL (ref 0–0.61)
EOSINOPHIL NFR BLD AUTO: 8 % (ref 0–6)
ERYTHROCYTE [DISTWIDTH] IN BLOOD BY AUTOMATED COUNT: 13.2 % (ref 11.6–15.1)
GFR SERPL CREATININE-BSD FRML MDRD: 82 ML/MIN/1.73SQ M
GLUCOSE SERPL-MCNC: 143 MG/DL (ref 65–140)
GLUCOSE UR STRIP-MCNC: NEGATIVE MG/DL
HCT VFR BLD AUTO: 42.5 % (ref 36.5–49.3)
HGB BLD-MCNC: 14.5 G/DL (ref 12–17)
HGB UR QL STRIP.AUTO: NEGATIVE
IMM GRANULOCYTES # BLD AUTO: 0.04 THOUSAND/UL (ref 0–0.2)
IMM GRANULOCYTES NFR BLD AUTO: 0 % (ref 0–2)
KETONES UR STRIP-MCNC: NEGATIVE MG/DL
LEUKOCYTE ESTERASE UR QL STRIP: NEGATIVE
LIPASE SERPL-CCNC: 106 U/L (ref 73–393)
LYMPHOCYTES # BLD AUTO: 2.02 THOUSANDS/ΜL (ref 0.6–4.47)
LYMPHOCYTES NFR BLD AUTO: 18 % (ref 14–44)
MCH RBC QN AUTO: 29.7 PG (ref 26.8–34.3)
MCHC RBC AUTO-ENTMCNC: 34.1 G/DL (ref 31.4–37.4)
MCV RBC AUTO: 87 FL (ref 82–98)
MONOCYTES # BLD AUTO: 0.82 THOUSAND/ΜL (ref 0.17–1.22)
MONOCYTES NFR BLD AUTO: 7 % (ref 4–12)
NEUTROPHILS # BLD AUTO: 7.22 THOUSANDS/ΜL (ref 1.85–7.62)
NEUTS SEG NFR BLD AUTO: 66 % (ref 43–75)
NITRITE UR QL STRIP: NEGATIVE
NRBC BLD AUTO-RTO: 0 /100 WBCS
PH UR STRIP.AUTO: 7 [PH] (ref 4.5–8)
PLATELET # BLD AUTO: 261 THOUSANDS/UL (ref 149–390)
PMV BLD AUTO: 8.6 FL (ref 8.9–12.7)
POTASSIUM SERPL-SCNC: 4.7 MMOL/L (ref 3.5–5.3)
PROT SERPL-MCNC: 7.3 G/DL (ref 6.4–8.2)
PROT UR STRIP-MCNC: NEGATIVE MG/DL
RBC # BLD AUTO: 4.88 MILLION/UL (ref 3.88–5.62)
SODIUM SERPL-SCNC: 138 MMOL/L (ref 136–145)
SP GR UR STRIP.AUTO: 1.01 (ref 1–1.03)
UROBILINOGEN UR QL STRIP.AUTO: 0.2 E.U./DL
WBC # BLD AUTO: 11.03 THOUSAND/UL (ref 4.31–10.16)

## 2019-06-10 PROCEDURE — 99284 EMERGENCY DEPT VISIT MOD MDM: CPT | Performed by: EMERGENCY MEDICINE

## 2019-06-10 PROCEDURE — 99284 EMERGENCY DEPT VISIT MOD MDM: CPT

## 2019-06-10 PROCEDURE — 85025 COMPLETE CBC W/AUTO DIFF WBC: CPT | Performed by: EMERGENCY MEDICINE

## 2019-06-10 PROCEDURE — 36415 COLL VENOUS BLD VENIPUNCTURE: CPT | Performed by: EMERGENCY MEDICINE

## 2019-06-10 PROCEDURE — 83690 ASSAY OF LIPASE: CPT | Performed by: EMERGENCY MEDICINE

## 2019-06-10 PROCEDURE — 80053 COMPREHEN METABOLIC PANEL: CPT | Performed by: EMERGENCY MEDICINE

## 2019-06-10 PROCEDURE — 81003 URINALYSIS AUTO W/O SCOPE: CPT

## 2019-06-10 RX ORDER — CIPROFLOXACIN 500 MG/1
500 TABLET, FILM COATED ORAL 2 TIMES DAILY
Qty: 14 TABLET | Refills: 0 | Status: SHIPPED | OUTPATIENT
Start: 2019-06-10 | End: 2019-06-17

## 2019-06-10 RX ORDER — CIPROFLOXACIN 500 MG/1
500 TABLET, FILM COATED ORAL ONCE
Status: COMPLETED | OUTPATIENT
Start: 2019-06-10 | End: 2019-06-10

## 2019-06-10 RX ORDER — METRONIDAZOLE 500 MG/1
500 TABLET ORAL EVERY 8 HOURS SCHEDULED
Qty: 21 TABLET | Refills: 0 | Status: SHIPPED | OUTPATIENT
Start: 2019-06-10 | End: 2019-06-17

## 2019-06-10 RX ORDER — METRONIDAZOLE 500 MG/1
500 TABLET ORAL ONCE
Status: COMPLETED | OUTPATIENT
Start: 2019-06-10 | End: 2019-06-10

## 2019-06-10 RX ADMIN — METRONIDAZOLE 500 MG: 500 TABLET, FILM COATED ORAL at 22:39

## 2019-06-10 RX ADMIN — CIPROFLOXACIN HYDROCHLORIDE 500 MG: 500 TABLET, FILM COATED ORAL at 22:39

## 2019-06-11 ENCOUNTER — OFFICE VISIT (OUTPATIENT)
Dept: FAMILY MEDICINE CLINIC | Facility: CLINIC | Age: 64
End: 2019-06-11
Payer: MEDICARE

## 2019-06-11 VITALS
HEIGHT: 69 IN | HEART RATE: 92 BPM | TEMPERATURE: 99.7 F | RESPIRATION RATE: 20 BRPM | OXYGEN SATURATION: 94 % | BODY MASS INDEX: 46.65 KG/M2 | WEIGHT: 315 LBS | DIASTOLIC BLOOD PRESSURE: 86 MMHG | SYSTOLIC BLOOD PRESSURE: 150 MMHG

## 2019-06-11 DIAGNOSIS — E11.9 CONTROLLED TYPE 2 DIABETES MELLITUS WITHOUT COMPLICATION, WITHOUT LONG-TERM CURRENT USE OF INSULIN (HCC): ICD-10-CM

## 2019-06-11 DIAGNOSIS — K57.92 DIVERTICULITIS: Primary | ICD-10-CM

## 2019-06-11 PROCEDURE — 99214 OFFICE O/P EST MOD 30 MIN: CPT | Performed by: NURSE PRACTITIONER

## 2019-06-11 RX ORDER — OXYCODONE HCL 10 MG/1
10 TABLET, FILM COATED, EXTENDED RELEASE ORAL EVERY 12 HOURS SCHEDULED
Qty: 14 TABLET | Refills: 0 | Status: SHIPPED | OUTPATIENT
Start: 2019-06-11 | End: 2019-06-11 | Stop reason: ALTCHOICE

## 2019-06-11 RX ORDER — OXYCODONE HYDROCHLORIDE 5 MG/1
5 TABLET ORAL EVERY 6 HOURS PRN
Qty: 28 TABLET | Refills: 0 | Status: SHIPPED | OUTPATIENT
Start: 2019-06-11 | End: 2019-10-16

## 2019-06-13 ENCOUNTER — OFFICE VISIT (OUTPATIENT)
Dept: CARDIOLOGY CLINIC | Facility: CLINIC | Age: 64
End: 2019-06-13
Payer: MEDICARE

## 2019-06-13 VITALS
DIASTOLIC BLOOD PRESSURE: 76 MMHG | SYSTOLIC BLOOD PRESSURE: 130 MMHG | HEART RATE: 76 BPM | OXYGEN SATURATION: 94 % | HEIGHT: 67 IN | WEIGHT: 315 LBS | BODY MASS INDEX: 49.44 KG/M2

## 2019-06-13 DIAGNOSIS — E78.5 HYPERLIPIDEMIA, UNSPECIFIED HYPERLIPIDEMIA TYPE: Primary | ICD-10-CM

## 2019-06-13 DIAGNOSIS — I49.3 FREQUENT PVCS: ICD-10-CM

## 2019-06-13 DIAGNOSIS — I10 ESSENTIAL HYPERTENSION: ICD-10-CM

## 2019-06-13 DIAGNOSIS — E78.2 MIXED HYPERLIPIDEMIA: ICD-10-CM

## 2019-06-13 PROCEDURE — 99214 OFFICE O/P EST MOD 30 MIN: CPT | Performed by: INTERNAL MEDICINE

## 2019-07-26 ENCOUNTER — HOSPITAL ENCOUNTER (OUTPATIENT)
Dept: NON INVASIVE DIAGNOSTICS | Facility: CLINIC | Age: 64
Discharge: HOME | End: 2019-07-26
Payer: MEDICARE

## 2019-07-26 DIAGNOSIS — E78.2 MIXED HYPERLIPIDEMIA: ICD-10-CM

## 2019-07-26 DIAGNOSIS — I10 ESSENTIAL HYPERTENSION: ICD-10-CM

## 2019-07-26 PROCEDURE — 93306 TTE W/DOPPLER COMPLETE: CPT | Performed by: INTERNAL MEDICINE

## 2019-07-26 PROCEDURE — 93306 TTE W/DOPPLER COMPLETE: CPT

## 2019-07-26 PROCEDURE — 93226 XTRNL ECG REC<48 HR SCAN A/R: CPT

## 2019-07-26 PROCEDURE — 93225 XTRNL ECG REC<48 HRS REC: CPT

## 2019-07-31 PROCEDURE — 93227 XTRNL ECG REC<48 HR R&I: CPT | Performed by: INTERNAL MEDICINE

## 2019-08-05 DIAGNOSIS — E11.9 CONTROLLED TYPE 2 DIABETES MELLITUS WITHOUT COMPLICATION, WITHOUT LONG-TERM CURRENT USE OF INSULIN (HCC): ICD-10-CM

## 2019-08-05 NOTE — TELEPHONE ENCOUNTER
Refill Metformin 500 mg   2 tabs morning                                            2 tabs evening     3 month supply  #360   [facility]

## 2019-08-15 ENCOUNTER — TELEPHONE (OUTPATIENT)
Dept: CARDIOLOGY CLINIC | Facility: CLINIC | Age: 64
End: 2019-08-15

## 2019-08-15 NOTE — TELEPHONE ENCOUNTER
Patient called for holter results  He does say his episodes of fluttering is a little worse  Please advise

## 2019-08-16 NOTE — TELEPHONE ENCOUNTER
His last Holter counter was in June  There were very few PVCs    If he is having a lot more palpitations he should have another Holter

## 2019-08-25 DIAGNOSIS — I25.10 CORONARY ARTERY DISEASE INVOLVING NATIVE CORONARY ARTERY OF NATIVE HEART WITHOUT ANGINA PECTORIS: ICD-10-CM

## 2019-08-25 DIAGNOSIS — E78.2 MIXED HYPERLIPIDEMIA: Primary | ICD-10-CM

## 2019-08-26 RX ORDER — HYDROCHLOROTHIAZIDE 12.5 MG/1
TABLET ORAL
Qty: 45 TABLET | Refills: 4 | Status: SHIPPED | OUTPATIENT
Start: 2019-08-26 | End: 2020-12-21

## 2019-08-26 RX ORDER — ROSUVASTATIN CALCIUM 5 MG/1
TABLET, COATED ORAL
Qty: 90 TABLET | Refills: 4 | Status: SHIPPED | OUTPATIENT
Start: 2019-08-26 | End: 2020-10-19

## 2019-10-16 ENCOUNTER — OFFICE VISIT (OUTPATIENT)
Dept: CARDIOLOGY CLINIC | Facility: CLINIC | Age: 64
End: 2019-10-16
Payer: MEDICARE

## 2019-10-16 VITALS
DIASTOLIC BLOOD PRESSURE: 82 MMHG | HEIGHT: 67 IN | OXYGEN SATURATION: 95 % | WEIGHT: 315 LBS | SYSTOLIC BLOOD PRESSURE: 126 MMHG | BODY MASS INDEX: 49.44 KG/M2 | HEART RATE: 73 BPM

## 2019-10-16 DIAGNOSIS — E13.9 DIABETES 1.5, MANAGED AS TYPE 1 (HCC): ICD-10-CM

## 2019-10-16 DIAGNOSIS — I10 ESSENTIAL HYPERTENSION: ICD-10-CM

## 2019-10-16 DIAGNOSIS — E78.5 HYPERLIPIDEMIA, UNSPECIFIED HYPERLIPIDEMIA TYPE: ICD-10-CM

## 2019-10-16 DIAGNOSIS — Z85.46 PERSONAL HISTORY OF PROSTATE CANCER: ICD-10-CM

## 2019-10-16 DIAGNOSIS — E78.2 MIXED HYPERLIPIDEMIA: Primary | ICD-10-CM

## 2019-10-16 DIAGNOSIS — I49.3 FREQUENT PVCS: ICD-10-CM

## 2019-10-16 PROCEDURE — 99214 OFFICE O/P EST MOD 30 MIN: CPT | Performed by: INTERNAL MEDICINE

## 2019-10-16 NOTE — PROGRESS NOTES
Follow-up - Cardiology   Angel Marte 70 y o  male MRN: [id number]        Problems    Problem List Items Addressed This Visit        Other    Hyperlipidemia - Primary      Other Visit Diagnoses     Essential hypertension        Frequent PVCs        Diabetes 1 5, managed as type 1 [facility])        Personal history of prostate cancer                Plan patient seen October 16, 2019  In the past he has had highly symptomatic PVCs  48 hour Holter recently showed only 27 PVCs less than 100 PACs  He does feel each 1 of them  I mostly read short him  He has had gastric bypass be still over 300 lb  He is status post prostate carcinoma which is being followed  He has a positive family history coronary artery disease  His LDL is well controlled at less than 70  He had an echocardiogram done July of 2019  Ejection fraction 65%  His aorta is 4 1 cm but he has no significant valvar disease  Summary therefore from a cardiac standpoint he is really doing very well  Mostly reassured him  He is diabetic  He has no documented coronary disease  He is on a statin  He is on 100 mg of losartan  He is also on a beta-blocker  Will do another Holter counter next year  Do not think he needs another echo  HPI: Angel Marte is a 70y o  year old male    Plan patient seen June 13, 2019  This patient seen for hypertension  Blood pressures been excellent  He is on 100 mg of losartan  He also has diabetes  A1c is very well controlled 7 2  He has had a gastric bypass but his weight is presently over 120 lb  He has had a prostatectomy  His PSA is less than 0 1  The most a common issue that he has it is bothering him is that he has highly symptomatic extrasystoles  With the put a 48 hour Holter  In addition his LDL is 104  He is a diabetic with a for a positive family history  I will increase his Crestor to 5 mg twice a week    He was supposed to be on twice a week but somehow other he is only taking it once a week  After the Holter counter repeat lipid profile will review his meds               Review of Systems   Constitutional: Negative  Respiratory: Negative  Cardiovascular: Positive for palpitations  Past Medical History:   Diagnosis Date    Acute blood loss anemia 10/13/2016    Anxiety     Arthritis     knees    Asthma     stable for > 10 years    Cancer [facility])     prostate    Cataract     Depression     Diabetes mellitus ([facility] [address] )     pre diabetes    Diverticulitis of colon     Hiatal hernia     History of anal fissures     Hyperlipidemia     Hypertension     Incisional hernia     Insomnia     Kidney stone     Morbid obesity (HCC)     PONV (postoperative nausea and vomiting)     Prolapsing mitral valve     prolapsing mitral valve leaflet syndrome    Prostate cancer ([facility] [address] )     Retinal detachment     treated surgically at [facility] eye; resolved: 10/10/2012    Sleep apnea     diagnosed but unable to tolerate cpap   Stuttering      Social History     Substance and Sexual Activity   Alcohol Use Yes    Alcohol/week: 1 0 standard drinks    Types: 1 Cans of beer per week    Comment: rare ; being a social drinker (as per allscripts)     Social History     Substance and Sexual Activity   Drug Use No     Social History     Tobacco Use   Smoking Status Never Smoker   Smokeless Tobacco Never Used       Allergies:   Allergies   Allergen Reactions    Celebrex [Celecoxib] Other (See Comments)     Cardiologist stated he should not take      Prednisone Other (See Comments)     Prednisone eye drops- eye pressure increases       Medications:     Current Outpatient Medications:     ALPRAZolam (XANAX) 0 25 mg tablet, Take 1 tablet (0 25 mg total) by mouth 3 (three) times a day as needed for anxiety for up to 280 days, Disp: 270 tablet, Rfl: 0    ezetimibe (ZETIA) 10 mg tablet, TAKE 1 TABLET DAILY AS DIRECTED, Disp: 90 tablet, Rfl: 1    FLUoxetine (PROzac) 20 mg capsule, TAKE 3 CAPSULES DAILY, Disp: 270 capsule, Rfl: 3    hydrochlorothiazide (HYDRODIURIL) 12 5 mg tablet, TAKE 1 TABLET THREE TIMES A WEEK ON MONDAY, THURSDAY AND SATURDAY, Disp: 45 tablet, Rfl: 4    losartan (COZAAR) 100 MG tablet, TAKE 1 TABLET DAILY, Disp: 90 tablet, Rfl: 3    metFORMIN (GLUCOPHAGE) 500 mg tablet, Take 2 tablets (1,000 mg total) by mouth 2 (two) times a day with meals for 90 days, Disp: 360 tablet, Rfl: 3    metoprolol succinate (TOPROL-XL) 25 mg 24 hr tablet, TAKE 1 TABLET DAILY, Disp: 90 tablet, Rfl: 3    Multiple Vitamins-Minerals (CENTRUM ADULTS PO), Take by mouth, Disp: , Rfl:     OLANZapine (ZyPREXA) 5 mg tablet, TAKE 1 TABLET DAILY, Disp: 90 tablet, Rfl: 3    oxyCODONE (ROXICODONE) 5 mg immediate release tablet, Take 1 tablet (5 mg total) by mouth every 6 (six) hours as needed for severe painMax Daily Amount: 20 mg, Disp: 28 tablet, Rfl: 0    rosuvastatin (CRESTOR) 5 mg tablet, TAKE 1 TABLET TWICE A WEEK ON MONDAY AND FRIDAY, Disp: 90 tablet, Rfl: 4    zolpidem (AMBIEN) 10 mg tablet, Take 1 tablet (10 mg total) by mouth daily at bedtime for 90 days, Disp: 90 tablet, Rfl: 1      Physical Exam   Constitutional: He is oriented to person, place, and time  He appears well-developed and well-nourished  No distress  Overweight   Cardiovascular: Normal rate, regular rhythm and normal heart sounds  Exam reveals no friction rub  No murmur heard  Pulmonary/Chest: Effort normal and breath sounds normal  No stridor  No respiratory distress  Musculoskeletal: Normal range of motion  He exhibits no edema, tenderness or deformity  Neurological: He is oriented to person, place, and time  Skin: Skin is warm and dry  No rash noted  He is not diaphoretic  No erythema  No pallor           Laboratory Studies:  CMP:      Invalid input(s): ALBUMIN  NT-proBNP: No results found for: NTBNP   Coags:    Lipid Profile:   Lab Results   Component Value Date    CHOL 171 08/16/2015     Lab Results   Component Value Date HDL 58 2019     Lab Results   Component Value Date    LDLCALC 104 (H) 2019     Lab Results   Component Value Date    TRIG 77 2019       Cardiac testing:     EKG reviewed personally:     Results for orders placed during the hospital encounter of 19   Echo complete with contrast if indicated    [facility] [address]  [phone]    Transthoracic Echocardiogram  2D, M-mode, Doppler, and Color Doppler    Study date:  2019    Patient: Audrey Izaguirre  MR number: [id number]  Account number: [de-identified]  : 1955  Age: 70 years  Gender: Male  Status: Outpatient  Location: [address] [place] [address] [facility]  Height: 67 in  Weight: 323 lb  BP: 130/ 76 mmHg    Indications: essential hypertension    Diagnoses: I10  - Essential (primary) hypertension    Sonographer:  Lonnie Almanza, [address] [facility]  Primary Physician:  Tequila Navarrete MD  Referring Physician:  Vanessa Carreno MD  Group:  [facility] [id number] [facility]  Cardiology Fellow:  Bryant Metz MD  Interpreting Physician:  Dania Combs MD    SUMMARY    LEFT VENTRICLE:  Systolic function was normal  Ejection fraction was estimated to be 65 %  Doppler parameters were consistent with abnormal left ventricular relaxation (grade 1 diastolic dysfunction)  RIGHT VENTRICLE:  The size was normal   Systolic function was normal     COMPARISONS:  There has been no significant interval change  Comparison was made with the previous study of 19-Oct-2017  HISTORY: PRIOR HISTORY: hypertension, hyperlipidemia, PVC's, DM    PROCEDURE: The study was performed in the [address] [facility]  This was a routine study  The transthoracic approach was used  The study included complete 2D imaging, M-mode, complete spectral Doppler, and color Doppler  The  heart rate was 68 bpm, at the start of the study   Images were obtained from the parasternal, apical, subcostal, and suprasternal notch acoustic windows  Image quality was adequate  LEFT VENTRICLE: Size was normal  Systolic function was normal  Ejection fraction was estimated to be 65 %  There were no regional wall motion abnormalities  Wall thickness was normal  There was no evidence of concentric hypertrophy  DOPPLER: Doppler parameters were consistent with abnormal left ventricular relaxation (grade 1 diastolic dysfunction)  RIGHT VENTRICLE: The size was normal  Systolic function was normal     LEFT ATRIUM: The atrium was mildly dilated  RIGHT ATRIUM: Size was normal     MITRAL VALVE: Valve structure was normal  There was normal leaflet separation  DOPPLER: The transmitral velocity was within the normal range  There was no evidence for stenosis  There was trace regurgitation  AORTIC VALVE: The valve was trileaflet  Leaflets exhibited normal cuspal separation and sclerosis  DOPPLER: Transaortic velocity was within the normal range  There was no evidence for stenosis  There was no regurgitation  TRICUSPID VALVE: The valve structure was normal  There was normal leaflet separation  DOPPLER: The transtricuspid velocity was within the normal range  There was no evidence for stenosis  There was trace regurgitation  The tricuspid jet  envelope definition was inadequate for estimation of RV systolic pressure  PULMONIC VALVE: Leaflets exhibited normal thickness, no calcification, and normal cuspal separation  DOPPLER: The transpulmonic velocity was within the normal range  There was no evidence for stenosis  There was no regurgitation  PERICARDIUM: There was no pericardial effusion  The pericardium was normal in appearance  AORTA: The root exhibited normal size  The ascending aorta was normal in size for body surface area at 4 cm (16 mm/m2)  SYSTEMIC VEINS: IVC: The inferior vena cava was normal in size and course   Respirophasic changes were normal     SYSTEM MEASUREMENT TABLES    2D  %FS: 44 25 %  Ao Diam: 3 61 cm  EDV(Teich): 149 41 ml  EF(Cube): 82 67 %  EF(Teich): 74 93 %  ESV(Cube): 29 34 ml  ESV(Teich): 37 45 ml  IVSd: 1 15 cm  LA Area: 24 76 cm2  LA Diam: 4 25 cm  LVEDV MOD A4C: 157 35 ml  LVEF MOD A4C: 67 57 %  LVESV MOD A4C: 51 03 ml  LVIDd: 5 53 cm  LVIDs: 3 08 cm  LVLd A4C: 8 71 cm  LVLs A4C: 7 1 cm  LVPWd: 1 18 cm  RA Area: 17 62 cm2  RV Diam : 3 14 cm  SV MOD A4C: 106 32 ml  SV(Cube): 139 96 ml  SV(Teich): 111 96 ml    MM  TAPSE: 2 17 cm    PW  AVC: 318 86 ms  E': 0 07 m/s  E/E': 8 26  MV A Luis Armando: 0 71 m/s  MV Dec [place]: 1 85 m/s2  MV DecT: 328 49 ms  MV E Luis Armando: 0 61 m/s  MV E/A Ratio: 0 86    [employer] Accredited Echocardiography [facility]    Prepared and electronically signed by    Julia Purcell MD  Signed 26-Jul-2019 14:19:43       No results found for this or any previous visit  No results found for this or any previous visit  No results found for this or any previous visit  Augusto Vasquez MD    Portions of the record may have been created with voice recognition software   Occasional wrong word or "sound a like" substitutions may have occurred due to the inherent limitations of voice recognition software   Read the chart carefully and recognize, using context, where substitutions have occurred

## 2019-10-21 ENCOUNTER — IMMUNIZATIONS (OUTPATIENT)
Dept: FAMILY MEDICINE CLINIC | Facility: CLINIC | Age: 64
End: 2019-10-21
Payer: MEDICARE

## 2019-10-21 DIAGNOSIS — Z23 ENCOUNTER FOR IMMUNIZATION: ICD-10-CM

## 2019-10-21 PROCEDURE — G0008 ADMIN INFLUENZA VIRUS VAC: HCPCS

## 2019-10-21 PROCEDURE — 90662 IIV NO PRSV INCREASED AG IM: CPT

## 2019-10-28 DIAGNOSIS — G47.00 INSOMNIA, UNSPECIFIED TYPE: ICD-10-CM

## 2019-10-28 RX ORDER — ZOLPIDEM TARTRATE 10 MG/1
10 TABLET ORAL
Qty: 90 TABLET | Refills: 0 | Status: SHIPPED | OUTPATIENT
Start: 2019-10-28 | End: 2020-01-30 | Stop reason: SDUPTHER

## 2019-10-28 NOTE — TELEPHONE ENCOUNTER
Patient wants a 90 day refill on Generic Ambien 10 mg tablets called in to [facility]  Charly Martin can be reached at [phone] any questions

## 2019-11-05 ENCOUNTER — APPOINTMENT (OUTPATIENT)
Dept: LAB | Age: 64
End: 2019-11-05
Payer: MEDICARE

## 2019-11-05 DIAGNOSIS — E11.9 CONTROLLED TYPE 2 DIABETES MELLITUS WITHOUT COMPLICATION, WITHOUT LONG-TERM CURRENT USE OF INSULIN (HCC): ICD-10-CM

## 2019-11-05 DIAGNOSIS — E78.2 MIXED HYPERLIPIDEMIA: ICD-10-CM

## 2019-11-05 LAB
CHOLEST SERPL-MCNC: 133 MG/DL (ref 50–200)
EST. AVERAGE GLUCOSE BLD GHB EST-MCNC: 177 MG/DL
HBA1C MFR BLD: 7.8 % (ref 4.2–6.3)
HDLC SERPL-MCNC: 60 MG/DL
LDLC SERPL CALC-MCNC: 57 MG/DL (ref 0–100)
NONHDLC SERPL-MCNC: 73 MG/DL
TRIGL SERPL-MCNC: 78 MG/DL

## 2019-11-05 PROCEDURE — 36415 COLL VENOUS BLD VENIPUNCTURE: CPT

## 2019-11-05 PROCEDURE — 83036 HEMOGLOBIN GLYCOSYLATED A1C: CPT

## 2019-11-05 PROCEDURE — 80061 LIPID PANEL: CPT

## 2019-11-07 ENCOUNTER — OFFICE VISIT (OUTPATIENT)
Dept: FAMILY MEDICINE CLINIC | Facility: CLINIC | Age: 64
End: 2019-11-07
Payer: MEDICARE

## 2019-11-07 VITALS
RESPIRATION RATE: 16 BRPM | BODY MASS INDEX: 49.44 KG/M2 | SYSTOLIC BLOOD PRESSURE: 120 MMHG | WEIGHT: 315 LBS | DIASTOLIC BLOOD PRESSURE: 80 MMHG | HEIGHT: 67 IN | HEART RATE: 80 BPM | TEMPERATURE: 99.2 F

## 2019-11-07 DIAGNOSIS — F41.8 DEPRESSION WITH ANXIETY: ICD-10-CM

## 2019-11-07 DIAGNOSIS — E66.01 MORBID OBESITY DUE TO EXCESS CALORIES (HCC): ICD-10-CM

## 2019-11-07 DIAGNOSIS — G47.33 OBSTRUCTIVE SLEEP APNEA SYNDROME: ICD-10-CM

## 2019-11-07 DIAGNOSIS — C61 MALIGNANT NEOPLASM OF PROSTATE (HCC): ICD-10-CM

## 2019-11-07 DIAGNOSIS — E78.2 MIXED HYPERLIPIDEMIA: ICD-10-CM

## 2019-11-07 DIAGNOSIS — I10 BENIGN ESSENTIAL HYPERTENSION: Primary | ICD-10-CM

## 2019-11-07 DIAGNOSIS — F51.04 PSYCHOPHYSIOLOGICAL INSOMNIA: ICD-10-CM

## 2019-11-07 DIAGNOSIS — E11.9 CONTROLLED TYPE 2 DIABETES MELLITUS WITHOUT COMPLICATION, WITHOUT LONG-TERM CURRENT USE OF INSULIN (HCC): ICD-10-CM

## 2019-11-07 DIAGNOSIS — F80.81 STUTTERING: ICD-10-CM

## 2019-11-07 PROCEDURE — 99214 OFFICE O/P EST MOD 30 MIN: CPT | Performed by: FAMILY MEDICINE

## 2019-11-07 NOTE — PROGRESS NOTES
Chief Complaint   Patient presents with    Follow-up     review labs     Health Maintenance   Topic Date Due    Hepatitis C Screening  1955    HEPATITIS B VACCINES (1 of 3 - Risk 3-dose series) 11/25/1972    DTaP,Tdap,and Td Vaccines (1 - Tdap) 07/17/1999    DM Eye Exam  10/17/2019    URINE MICROALBUMIN  04/26/2020    Fall Risk  05/02/2020    Medicare Annual Wellness Visit (AWV)  05/02/2020    BMI: Followup Plan  05/02/2020    Diabetic Foot Exam  05/02/2020    HEMOGLOBIN A1C  05/05/2020    CRC Screening: Colonoscopy  08/25/2020    BMI: Adult  10/16/2020    Pneumococcal Vaccine: 65+ Years (2 of 2 - PPSV23) 10/20/2021    INFLUENZA VACCINE  Completed    Pneumococcal Vaccine: Pediatrics (0 to 5 Years) and At-Risk Patients (6 to 59 Years)  Aged Out     Assessment/Plan:    He had his flu shot in October  He has had both pneumococcal vaccines but would be due for a booster for the Pneumovax next year  He had both Shingrix vaccines  He would be due for a Tdap and will have him get this next visit if his insurance covers it  Colonoscopy is up-to-date in 2015    He will get labs before next visit    He continues to follow up with Cardiology and Urology    Controlled type 2 diabetes mellitus without complication ([facility] )    Lab Results   Component Value Date    HGBA1C 7 8 (H) 11/05/2019     A1c is slightly higher than prior 7 3    Diabetic foot exam is current    He has an appointment with Dr Hemanth Martinez for an eye exam later this month  I gave him a form for Dr Hemanth Martinez to complete and return    Labs are ordered for next time including microalbumin    He really needs to watch his diet at this point much better or we may need to add a 2nd agent    Continue current med for now    Sleep apnea  He did not tolerate CPAP and therefore this is untreated  Weight reduction would be the key    Benign essential hypertension  Blood pressure controlled on current meds  Continue same    Labs ordered for next visit    Morbid obesity due to excess calories [facility])  He has gained weight this time    This is been a lifelong struggle for him in even has a gastric band in place  He really needs to make a significant commitment  Can continue to watch diet carefully and consider much more plant based diet  He may also benefit if we need to add another medication for his diabetes to 1 that is weight friendly    Insomnia  Uses Ambien daily  Has poor sleep habits and cycle  Discussed ways to improve this    Hyperlipidemia  Total cholesterol 133 HDL 60 LDL 57 triglycerides 78  Lipids optimal on current statin  Continue same    Depression with anxiety  Remains stable on current medications  Continue same    Stuttering  Remains stable on current medication  Continue same       Diagnoses and all orders for this visit:    Benign essential hypertension  -     CBC; Future  -     Comprehensive metabolic panel; Future    Controlled type 2 diabetes mellitus without complication, without long-term current use of insulin (HCC)  -     Comprehensive metabolic panel; Future  -     Hemoglobin A1C; Future  -     Microalbumin / creatinine urine ratio; Future  -     TSH, 3rd generation; Future    Mixed hyperlipidemia  -     Comprehensive metabolic panel; Future  -     Lipid panel; Future    Morbid obesity due to excess calories ([facility] [address] )    Obstructive sleep apnea syndrome    Depression with anxiety    Psychophysiological insomnia    Stuttering    Malignant neoplasm of prostate ([facility] [address] )          Subjective:      Patient ID: Ivy Mosley is a 70 y o  male      He is here today for follow-up on his chronic conditions    He reports that he has not been very good with his diet and has gained some weight (approximately 5 lb) and subsequently his glucose control has been poor    He tries to be active  He has been playing golf on a regular basis and really enjoys this albeit he states that he is really bad     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  When he is taking pictures there for his own pleasure    In general he is enjoying himself  They are leaving on vacation tomorrow for [place] and [address]    His knees are doing well  He is status post bilateral total knee replacement  He does note that in general he is not as flexible as he use to be and has some difficulty at times twisting turning or getting in and out of things such as better car    No particular change in habits her activities      The following portions of the patient's history were reviewed and updated as appropriate: allergies, current medications, past family history, past medical history, past social history, past surgical history and problem list     Review of Systems   Constitutional:        See HPI   HENT: Negative for dental problem  Eyes:        Eye exam yearly  Will have visual field test   Night vision right eye worsening    Sees opth as well as retinal specialist    He had prior bilateral cataract surgery followed by laser    Respiratory: Negative for cough and shortness of breath  Cardiovascular: Negative for chest pain, palpitations and leg swelling  Gastrointestinal:        BM regular    No dyspepsia or GERD    Had diverticulitis this past summer and was on Cipro  OK since   Genitourinary:        Voids 2 times a night    Urology follows   Musculoskeletal:        See HPI    Skin: Negative for rash  Neurological: Negative for dizziness, light-headedness and headaches  Stutters   Psychiatric/Behavioral: Positive for sleep disturbance (He has continued trouble with with sleepy goes to bed usually around midnight falls asleep but then wakes up around 0200 hours and watches TV then falls asleep again and gets up around 0500 hours gradually going back to sleep somewhere around 0700 hours an)           Objective:      /80   Pulse 80   Temp 99 2 °F (37 3 °C) (Tympanic)   Resp 16   Ht 5' 7" (1 702 m)   Wt (!) 149 kg (328 lb)   BMI 51 37 kg/m²          Physical Exam   Constitutional: He is oriented to person, place, and time  He appears well-developed and well-nourished  Obese   Eyes:   Glasses   Neck: No JVD present  No thyromegaly present  Cardiovascular: Normal rate and regular rhythm  No murmur heard  No carotid bruits   Pulmonary/Chest: Effort normal and breath sounds normal    Musculoskeletal: He exhibits no edema  Lymphadenopathy:     He has no cervical adenopathy  Neurological: He is alert and oriented to person, place, and time  Stutters   Skin: No rash noted  Psychiatric: He has a normal mood and affect  His behavior is normal  Judgment and thought content normal    Nursing note and vitals reviewed

## 2019-11-07 NOTE — ASSESSMENT & PLAN NOTE
Lab Results   Component Value Date    HGBA1C 7 8 (H) 11/05/2019     A1c is slightly higher than prior 7 3    Diabetic foot exam is current    He has an appointment with Dr Nils Priest for an eye exam later this month  I gave him a form for Dr Nils Priest to complete and return    Labs are ordered for next time including microalbumin    He really needs to watch his diet at this point much better or we may need to add a 2nd agent    Continue current med for now

## 2019-11-07 NOTE — ASSESSMENT & PLAN NOTE
He has gained weight this time    This is been a lifelong struggle for him in even has a gastric band in place  He really needs to make a significant commitment  Can continue to watch diet carefully and consider much more plant based diet    He may also benefit if we need to add another medication for his diabetes to 1 that is weight friendly

## 2019-11-07 NOTE — ASSESSMENT & PLAN NOTE
Total cholesterol 133 HDL 60 LDL 57 triglycerides 78  Lipids optimal on current statin  Continue same

## 2019-12-06 DIAGNOSIS — E78.5 HYPERLIPIDEMIA, UNSPECIFIED HYPERLIPIDEMIA TYPE: ICD-10-CM

## 2019-12-10 RX ORDER — EZETIMIBE 10 MG/1
10 TABLET ORAL DAILY
Qty: 90 TABLET | Refills: 1 | Status: SHIPPED | OUTPATIENT
Start: 2019-12-10 | End: 2020-10-19

## 2020-01-30 DIAGNOSIS — G47.00 INSOMNIA, UNSPECIFIED TYPE: ICD-10-CM

## 2020-01-30 RX ORDER — ZOLPIDEM TARTRATE 10 MG/1
10 TABLET ORAL
Qty: 90 TABLET | Refills: 0 | Status: SHIPPED | OUTPATIENT
Start: 2020-01-30 | End: 2020-04-24 | Stop reason: SDUPTHER

## 2020-02-13 ENCOUNTER — OFFICE VISIT (OUTPATIENT)
Dept: FAMILY MEDICINE CLINIC | Facility: CLINIC | Age: 65
End: 2020-02-13
Payer: MEDICARE

## 2020-02-13 VITALS
WEIGHT: 315 LBS | OXYGEN SATURATION: 97 % | SYSTOLIC BLOOD PRESSURE: 130 MMHG | HEART RATE: 60 BPM | RESPIRATION RATE: 16 BRPM | DIASTOLIC BLOOD PRESSURE: 80 MMHG | TEMPERATURE: 97.9 F | BODY MASS INDEX: 49.44 KG/M2 | HEIGHT: 67 IN

## 2020-02-13 DIAGNOSIS — R13.19 OTHER DYSPHAGIA: Primary | ICD-10-CM

## 2020-02-13 DIAGNOSIS — R22.1 LUMP IN NECK: ICD-10-CM

## 2020-02-13 DIAGNOSIS — J45.20 MILD INTERMITTENT ASTHMA WITHOUT COMPLICATION: ICD-10-CM

## 2020-02-13 DIAGNOSIS — R04.2 COUGHING UP BLOOD: ICD-10-CM

## 2020-02-13 DIAGNOSIS — T17.908A ASPIRATION INTO AIRWAY, INITIAL ENCOUNTER: ICD-10-CM

## 2020-02-13 PROCEDURE — 99214 OFFICE O/P EST MOD 30 MIN: CPT | Performed by: NURSE PRACTITIONER

## 2020-02-13 PROCEDURE — 1160F RVW MEDS BY RX/DR IN RCRD: CPT | Performed by: NURSE PRACTITIONER

## 2020-02-13 PROCEDURE — 3008F BODY MASS INDEX DOCD: CPT | Performed by: NURSE PRACTITIONER

## 2020-02-13 PROCEDURE — 1036F TOBACCO NON-USER: CPT | Performed by: NURSE PRACTITIONER

## 2020-02-13 PROCEDURE — 3075F SYST BP GE 130 - 139MM HG: CPT | Performed by: NURSE PRACTITIONER

## 2020-02-13 PROCEDURE — 3079F DIAST BP 80-89 MM HG: CPT | Performed by: NURSE PRACTITIONER

## 2020-02-13 PROCEDURE — 4040F PNEUMOC VAC/ADMIN/RCVD: CPT | Performed by: NURSE PRACTITIONER

## 2020-02-13 NOTE — PROGRESS NOTES
Chief Complaint   Patient presents with    Cough     patient reported coughing up brown flem, but reports no SOB, Chest pain or sinus issues     Health Maintenance   Topic Date Due    Hepatitis C Screening  1955    DTaP,Tdap,and Td Vaccines (1 - Tdap) 11/25/1964    DM Eye Exam  10/17/2019    Fall Risk  05/02/2020    Medicare Annual Wellness Visit (AWV)  05/02/2020    BMI: Followup Plan  05/02/2020    Diabetic Foot Exam  05/02/2020    HEMOGLOBIN A1C  05/05/2020    CRC Screening: Colonoscopy  08/25/2020    BMI: Adult  11/07/2020    Pneumococcal Vaccine: 65+ Years (2 of 2 - PPSV23) 10/20/2021    Influenza Vaccine  Completed    Pneumococcal Vaccine: Pediatrics (0 to 5 Years) and At-Risk Patients (6 to 59 Years)  Aged Out    HIB Vaccine  Aged Out    Hepatitis B Vaccine  Aged Out    IPV Vaccine  Aged Out    Hepatitis A Vaccine  Aged Out    Meningococcal ACWY Vaccine  Aged Out    HPV Vaccine  Aged Out     Assessment/Plan:    Dysphagia- Barium sallow study ordered and referred to GI as he may need an EGD  Recommended sticking with thickened liquids in the interm as these do not seem to be problematic     Coughing up blood- one episode during an episode of incorrectly swallowing a beverage  Likely secondary to forceful coughing  Referred to GI  ED precautions reviewed today     Aspiration- aspiration precautions, recommended thickened liquids until the swallow study/ GI evaluation  Increased risk of aspiration pneumonia  He declined an order for a CXR today  Lungs are CTA, afebrile    Lump of neck- this likely represents a lymph node  He has no signs of an infection on exam today  I recommended watchful waiting but he is quite anxious and would prefer to have an ultrasound which I think is reasonable     Mild, intermittent asthma- stable, no current exacerbation        Diagnoses and all orders for this visit:    Other dysphagia  -     Ambulatory referral to Gastroenterology;  Future  - FL barium swallow; Future  -     US head neck soft tissue; Future    Coughing up blood  -     Ambulatory referral to Gastroenterology; Future  -     FL barium swallow; Future    Aspiration into airway, initial encounter    Lump in neck    Mild intermittent asthma without complication          Subjective:      Patient ID: Moreno Christy is a 75 y o  male  HPI     Pt presents by himself today for an acute visit     C/O feeling like he is beverages are going "down the wrong way" with swallowing  Issue has been going on for the past 1-2 years  He doesn't feel it is happening more frequently but it seems to be more bothersome over the past few weeks  Last evening, he had an episode of swallowing a beverage, ended up choking and coughing and eventually coughed up the liquid but it had the appearance of "old, brown blood"  Reports that he typically has no issue with coughing up the beverage, is able to do so with only a mild to moderate cough   Issue never happens with food  Denies globus sensation   He does note a "lump" in the right side of his throat right above his thyroid  No known thyroid issues   He denies reflux, PND  Denies coughing, SOB, wheezing, fevers, chills  Does not follow with GI    Pt does have asthma, no recent exacerbations     The following portions of the patient's history were reviewed and updated as appropriate: allergies, current medications, past family history, past medical history, past social history and problem list     Review of Systems   Constitutional: Negative for chills, fatigue and fever  Respiratory: Positive for choking  Negative for apnea, cough, chest tightness, shortness of breath, wheezing and stridor  Cardiovascular: Negative for chest pain, palpitations and leg swelling  Gastrointestinal: Negative for abdominal pain, blood in stool, constipation, diarrhea and nausea  Genitourinary: Negative for dysuria  Musculoskeletal: Negative for arthralgias and myalgias  Skin: Negative for rash and wound  Neurological: Negative for dizziness, weakness, numbness and headaches  Psychiatric/Behavioral: Negative for sleep disturbance and suicidal ideas  Objective:      /80   Pulse 60   Temp 97 9 °F (36 6 °C) (Tympanic)   Resp 16   Ht 5' 7" (1 702 m)   Wt (!) 146 kg (322 lb)   SpO2 97%   BMI 50 43 kg/m²          Physical Exam   Constitutional: He is oriented to person, place, and time  He appears well-developed and well-nourished  No distress  Morbidly obese    HENT:   Head: Normocephalic and atraumatic  Mouth/Throat: Uvula is midline, oropharynx is clear and moist and mucous membranes are normal    Eyes: Pupils are equal, round, and reactive to light  Conjunctivae are normal    Neck: Normal range of motion  Neck supple  No thyromegaly present  Cardiovascular: Normal rate, regular rhythm and normal heart sounds  No murmur heard  No carotid bruits    Pulmonary/Chest: Effort normal and breath sounds normal  No accessory muscle usage or stridor  No respiratory distress  He has no decreased breath sounds  He has no wheezes  He has no rhonchi  He has no rales  Abdominal: Soft  Bowel sounds are normal  He exhibits no distension  There is no tenderness  Musculoskeletal: Normal range of motion  Lymphadenopathy:     He has no cervical adenopathy  Neurological: He is alert and oriented to person, place, and time  +stutter    Skin: Skin is warm and dry  He is not diaphoretic  Psychiatric: He has a normal mood and affect

## 2020-02-20 ENCOUNTER — HOSPITAL ENCOUNTER (OUTPATIENT)
Dept: RADIOLOGY | Age: 65
Discharge: HOME | End: 2020-02-20
Payer: MEDICARE

## 2020-02-20 DIAGNOSIS — R13.19 OTHER DYSPHAGIA: ICD-10-CM

## 2020-02-20 PROCEDURE — 76536 US EXAM OF HEAD AND NECK: CPT

## 2020-02-27 ENCOUNTER — HOSPITAL ENCOUNTER (OUTPATIENT)
Dept: RADIOLOGY | Facility: HOSPITAL | Age: 65
Discharge: HOME | End: 2020-02-27
Payer: MEDICARE

## 2020-02-27 DIAGNOSIS — R04.2 COUGHING UP BLOOD: ICD-10-CM

## 2020-02-27 DIAGNOSIS — R13.19 OTHER DYSPHAGIA: ICD-10-CM

## 2020-02-27 PROCEDURE — 74220 X-RAY XM ESOPHAGUS 1CNTRST: CPT

## 2020-03-12 DIAGNOSIS — F80.81 STUTTERING: ICD-10-CM

## 2020-03-13 RX ORDER — OLANZAPINE 5 MG/1
TABLET ORAL
Qty: 90 TABLET | Refills: 3 | Status: SHIPPED | OUTPATIENT
Start: 2020-03-13 | End: 2021-02-15

## 2020-03-16 ENCOUNTER — TRANSCRIBE ORDERS (OUTPATIENT)
Dept: ADMINISTRATIVE | Facility: HOSPITAL | Age: 65
End: 2020-03-16

## 2020-03-16 DIAGNOSIS — R13.10 PROBLEMS WITH SWALLOWING AND MASTICATION: Primary | ICD-10-CM

## 2020-04-23 ENCOUNTER — TELEPHONE (OUTPATIENT)
Dept: UROLOGY | Facility: AMBULATORY SURGERY CENTER | Age: 65
End: 2020-04-23

## 2020-04-24 DIAGNOSIS — G47.00 INSOMNIA, UNSPECIFIED TYPE: ICD-10-CM

## 2020-04-24 RX ORDER — ZOLPIDEM TARTRATE 10 MG/1
10 TABLET ORAL
Qty: 90 TABLET | Refills: 0 | Status: SHIPPED | OUTPATIENT
Start: 2020-04-24 | End: 2020-07-21 | Stop reason: SDUPTHER

## 2020-05-11 ENCOUNTER — APPOINTMENT (OUTPATIENT)
Dept: LAB | Age: 65
End: 2020-05-11
Payer: MEDICARE

## 2020-05-11 DIAGNOSIS — E11.9 CONTROLLED TYPE 2 DIABETES MELLITUS WITHOUT COMPLICATION, WITHOUT LONG-TERM CURRENT USE OF INSULIN (HCC): ICD-10-CM

## 2020-05-11 DIAGNOSIS — I10 ESSENTIAL HYPERTENSION: ICD-10-CM

## 2020-05-11 DIAGNOSIS — E78.2 MIXED HYPERLIPIDEMIA: ICD-10-CM

## 2020-05-11 DIAGNOSIS — I10 BENIGN ESSENTIAL HYPERTENSION: ICD-10-CM

## 2020-05-11 DIAGNOSIS — I49.3 FREQUENT PVCS: ICD-10-CM

## 2020-05-11 LAB
ALBUMIN SERPL BCP-MCNC: 3.6 G/DL (ref 3.5–5)
ALP SERPL-CCNC: 78 U/L (ref 46–116)
ALT SERPL W P-5'-P-CCNC: 37 U/L (ref 12–78)
ANION GAP SERPL CALCULATED.3IONS-SCNC: 7 MMOL/L (ref 4–13)
AST SERPL W P-5'-P-CCNC: 18 U/L (ref 5–45)
BASOPHILS # BLD AUTO: 0.08 THOUSANDS/ΜL (ref 0–0.1)
BASOPHILS NFR BLD AUTO: 1 % (ref 0–1)
BILIRUB SERPL-MCNC: 0.46 MG/DL (ref 0.2–1)
BUN SERPL-MCNC: 15 MG/DL (ref 5–25)
CALCIUM SERPL-MCNC: 9.6 MG/DL (ref 8.3–10.1)
CHLORIDE SERPL-SCNC: 106 MMOL/L (ref 100–108)
CHOLEST SERPL-MCNC: 162 MG/DL (ref 50–200)
CO2 SERPL-SCNC: 25 MMOL/L (ref 21–32)
CREAT SERPL-MCNC: 1.02 MG/DL (ref 0.6–1.3)
CREAT UR-MCNC: 398 MG/DL
EOSINOPHIL # BLD AUTO: 0.89 THOUSAND/ΜL (ref 0–0.61)
EOSINOPHIL NFR BLD AUTO: 11 % (ref 0–6)
ERYTHROCYTE [DISTWIDTH] IN BLOOD BY AUTOMATED COUNT: 12.9 % (ref 11.6–15.1)
EST. AVERAGE GLUCOSE BLD GHB EST-MCNC: 169 MG/DL
GFR SERPL CREATININE-BSD FRML MDRD: 76 ML/MIN/1.73SQ M
GLUCOSE P FAST SERPL-MCNC: 245 MG/DL (ref 65–99)
HBA1C MFR BLD: 7.5 %
HCT VFR BLD AUTO: 42.6 % (ref 36.5–49.3)
HDLC SERPL-MCNC: 53 MG/DL
HGB BLD-MCNC: 14.5 G/DL (ref 12–17)
IMM GRANULOCYTES # BLD AUTO: 0.04 THOUSAND/UL (ref 0–0.2)
IMM GRANULOCYTES NFR BLD AUTO: 1 % (ref 0–2)
LDLC SERPL CALC-MCNC: 95 MG/DL (ref 0–100)
LYMPHOCYTES # BLD AUTO: 1.83 THOUSANDS/ΜL (ref 0.6–4.47)
LYMPHOCYTES NFR BLD AUTO: 24 % (ref 14–44)
MCH RBC QN AUTO: 29.9 PG (ref 26.8–34.3)
MCHC RBC AUTO-ENTMCNC: 34 G/DL (ref 31.4–37.4)
MCV RBC AUTO: 88 FL (ref 82–98)
MICROALBUMIN UR-MCNC: 23.7 MG/L (ref 0–20)
MICROALBUMIN/CREAT 24H UR: 6 MG/G CREATININE (ref 0–30)
MONOCYTES # BLD AUTO: 0.61 THOUSAND/ΜL (ref 0.17–1.22)
MONOCYTES NFR BLD AUTO: 8 % (ref 4–12)
NEUTROPHILS # BLD AUTO: 4.33 THOUSANDS/ΜL (ref 1.85–7.62)
NEUTS SEG NFR BLD AUTO: 55 % (ref 43–75)
NONHDLC SERPL-MCNC: 109 MG/DL
NRBC BLD AUTO-RTO: 0 /100 WBCS
PLATELET # BLD AUTO: 276 THOUSANDS/UL (ref 149–390)
PMV BLD AUTO: 9.2 FL (ref 8.9–12.7)
POTASSIUM SERPL-SCNC: 4.2 MMOL/L (ref 3.5–5.3)
PROT SERPL-MCNC: 7 G/DL (ref 6.4–8.2)
RBC # BLD AUTO: 4.85 MILLION/UL (ref 3.88–5.62)
SODIUM SERPL-SCNC: 138 MMOL/L (ref 136–145)
TRIGL SERPL-MCNC: 72 MG/DL
TSH SERPL DL<=0.05 MIU/L-ACNC: 1.27 UIU/ML (ref 0.36–3.74)
WBC # BLD AUTO: 7.78 THOUSAND/UL (ref 4.31–10.16)

## 2020-05-11 PROCEDURE — 82570 ASSAY OF URINE CREATININE: CPT

## 2020-05-11 PROCEDURE — 82043 UR ALBUMIN QUANTITATIVE: CPT

## 2020-05-11 PROCEDURE — 84443 ASSAY THYROID STIM HORMONE: CPT

## 2020-05-11 PROCEDURE — 36415 COLL VENOUS BLD VENIPUNCTURE: CPT

## 2020-05-11 PROCEDURE — 85025 COMPLETE CBC W/AUTO DIFF WBC: CPT

## 2020-05-11 PROCEDURE — 83036 HEMOGLOBIN GLYCOSYLATED A1C: CPT

## 2020-05-11 PROCEDURE — 80061 LIPID PANEL: CPT

## 2020-05-11 PROCEDURE — 80053 COMPREHEN METABOLIC PANEL: CPT

## 2020-05-12 DIAGNOSIS — F41.8 DEPRESSION WITH ANXIETY: ICD-10-CM

## 2020-05-13 RX ORDER — FLUOXETINE HYDROCHLORIDE 20 MG/1
CAPSULE ORAL
Qty: 270 CAPSULE | Refills: 3 | Status: SHIPPED | OUTPATIENT
Start: 2020-05-13 | End: 2021-04-26

## 2020-05-14 ENCOUNTER — TELEMEDICINE (OUTPATIENT)
Dept: FAMILY MEDICINE CLINIC | Facility: CLINIC | Age: 65
End: 2020-05-14
Payer: MEDICARE

## 2020-05-14 VITALS
OXYGEN SATURATION: 98 % | DIASTOLIC BLOOD PRESSURE: 74 MMHG | WEIGHT: 315 LBS | HEART RATE: 63 BPM | SYSTOLIC BLOOD PRESSURE: 136 MMHG | HEIGHT: 67 IN | TEMPERATURE: 98.5 F | BODY MASS INDEX: 49.44 KG/M2

## 2020-05-14 DIAGNOSIS — E78.2 MIXED HYPERLIPIDEMIA: ICD-10-CM

## 2020-05-14 DIAGNOSIS — F41.8 DEPRESSION WITH ANXIETY: ICD-10-CM

## 2020-05-14 DIAGNOSIS — F80.81 STUTTERING: ICD-10-CM

## 2020-05-14 DIAGNOSIS — F51.04 PSYCHOPHYSIOLOGICAL INSOMNIA: ICD-10-CM

## 2020-05-14 DIAGNOSIS — E66.01 MORBID OBESITY DUE TO EXCESS CALORIES (HCC): ICD-10-CM

## 2020-05-14 DIAGNOSIS — I10 BENIGN ESSENTIAL HYPERTENSION: Primary | ICD-10-CM

## 2020-05-14 DIAGNOSIS — E11.9 CONTROLLED TYPE 2 DIABETES MELLITUS WITHOUT COMPLICATION, WITHOUT LONG-TERM CURRENT USE OF INSULIN (HCC): ICD-10-CM

## 2020-05-14 DIAGNOSIS — C61 MALIGNANT NEOPLASM OF PROSTATE (HCC): ICD-10-CM

## 2020-05-14 DIAGNOSIS — G47.33 OBSTRUCTIVE SLEEP APNEA SYNDROME: ICD-10-CM

## 2020-05-14 PROCEDURE — G0438 PPPS, INITIAL VISIT: HCPCS | Performed by: FAMILY MEDICINE

## 2020-05-14 PROCEDURE — 4040F PNEUMOC VAC/ADMIN/RCVD: CPT | Performed by: FAMILY MEDICINE

## 2020-05-14 PROCEDURE — 1036F TOBACCO NON-USER: CPT | Performed by: FAMILY MEDICINE

## 2020-05-14 PROCEDURE — 1170F FXNL STATUS ASSESSED: CPT | Performed by: FAMILY MEDICINE

## 2020-05-14 PROCEDURE — 3075F SYST BP GE 130 - 139MM HG: CPT | Performed by: FAMILY MEDICINE

## 2020-05-14 PROCEDURE — 3008F BODY MASS INDEX DOCD: CPT | Performed by: FAMILY MEDICINE

## 2020-05-14 PROCEDURE — 3060F POS MICROALBUMINURIA REV: CPT | Performed by: FAMILY MEDICINE

## 2020-05-14 PROCEDURE — 3078F DIAST BP <80 MM HG: CPT | Performed by: FAMILY MEDICINE

## 2020-05-14 PROCEDURE — 1123F ACP DISCUSS/DSCN MKR DOCD: CPT | Performed by: FAMILY MEDICINE

## 2020-05-14 PROCEDURE — 1160F RVW MEDS BY RX/DR IN RCRD: CPT | Performed by: FAMILY MEDICINE

## 2020-05-14 PROCEDURE — 1125F AMNT PAIN NOTED PAIN PRSNT: CPT | Performed by: FAMILY MEDICINE

## 2020-05-14 PROCEDURE — 3051F HG A1C>EQUAL 7.0%<8.0%: CPT | Performed by: FAMILY MEDICINE

## 2020-05-14 PROCEDURE — 99214 OFFICE O/P EST MOD 30 MIN: CPT | Performed by: FAMILY MEDICINE

## 2020-05-14 RX ORDER — ALPRAZOLAM 0.25 MG/1
0.25 TABLET ORAL 3 TIMES DAILY PRN
Qty: 270 TABLET | Refills: 1 | Status: SHIPPED | OUTPATIENT
Start: 2020-05-14 | End: 2020-11-19 | Stop reason: SDUPTHER

## 2020-06-02 ENCOUNTER — HOSPITAL ENCOUNTER (OUTPATIENT)
Dept: RADIOLOGY | Facility: HOSPITAL | Age: 65
Discharge: HOME | End: 2020-06-02
Payer: MEDICARE

## 2020-06-02 DIAGNOSIS — R13.10 PROBLEMS WITH SWALLOWING AND MASTICATION: ICD-10-CM

## 2020-06-02 PROCEDURE — 74230 X-RAY XM SWLNG FUNCJ C+: CPT

## 2020-06-02 PROCEDURE — 92611 MOTION FLUOROSCOPY/SWALLOW: CPT

## 2020-06-17 DIAGNOSIS — Z11.59 SCREENING FOR VIRAL DISEASE: Primary | ICD-10-CM

## 2020-06-24 DIAGNOSIS — Z11.59 SCREENING FOR VIRAL DISEASE: ICD-10-CM

## 2020-06-24 PROCEDURE — U0003 INFECTIOUS AGENT DETECTION BY NUCLEIC ACID (DNA OR RNA); SEVERE ACUTE RESPIRATORY SYNDROME CORONAVIRUS 2 (SARS-COV-2) (CORONAVIRUS DISEASE [COVID-19]), AMPLIFIED PROBE TECHNIQUE, MAKING USE OF HIGH THROUGHPUT TECHNOLOGIES AS DESCRIBED BY CMS-2020-01-R: HCPCS

## 2020-06-25 LAB — SARS-COV-2 RNA SPEC QL NAA+PROBE: NOT DETECTED

## 2020-06-30 ENCOUNTER — ANESTHESIA EVENT (OUTPATIENT)
Dept: GASTROENTEROLOGY | Facility: HOSPITAL | Age: 65
End: 2020-06-30

## 2020-06-30 RX ORDER — SODIUM CHLORIDE 9 MG/ML
100 INJECTION, SOLUTION INTRAVENOUS CONTINUOUS
Status: CANCELLED | OUTPATIENT
Start: 2020-06-30

## 2020-06-30 NOTE — ANESTHESIA PREPROCEDURE EVALUATION
Review of Systems/Medical History  Patient summary reviewed  Chart reviewed  History of anesthetic complications PONV    Cardiovascular  EKG reviewed, Hyperlipidemia, Hypertension controlled,    Pulmonary  Asthma , well controlled/ stable , Sleep apnea (Does not tolerate CPAP) Sleep Study completed,        GI/Hepatic     Hiatal hernia, Bariatric surgery (s/p gastric band),        Negative  ROS        Endo/Other  Diabetes (Hgb A1C 7 5 5/11/20) poorly controlled type 2 Oral agent,   Obesity (BMI 48 66)    GYN       Hematology  Negative hematology ROS      Musculoskeletal    Arthritis     Neurology  Negative neurology ROS      Psychology   Anxiety, Depression , being treated for depression,              Physical Exam    Airway    Mallampati score: II  TM Distance: >3 FB  Neck ROM: full     Dental   No notable dental hx     Cardiovascular  Rhythm: regular, Rate: normal, Cardiovascular exam normal    Pulmonary  Pulmonary exam normal Breath sounds clear to auscultation,     Other Findings        Anesthesia Plan  ASA Score- 3     Anesthesia Type- IV sedation with anesthesia with ASA Monitors  Additional Monitors:   Airway Plan:         Plan Factors-    Induction- intravenous  Postoperative Plan-     Informed Consent- Anesthetic plan and risks discussed with patient  I personally reviewed this patient with the CRNA  Discussed and agreed on the Anesthesia Plan with the CRNA             NPO and allergies verified  Preop glucose was 177 today  Patient normally takes metoprolol XL daily in the afternoon  Last dose was yesterday afternoon, 6/30/20  Plan:  IV sedation/MAC; GA as backup    Risks and benefits discussed with patient including possibility of recall under sedation  Questions answered  Patient consented

## 2020-07-01 ENCOUNTER — HOSPITAL ENCOUNTER (OUTPATIENT)
Dept: GASTROENTEROLOGY | Facility: HOSPITAL | Age: 65
Setting detail: OUTPATIENT SURGERY
Discharge: HOME | End: 2020-07-01
Attending: INTERNAL MEDICINE | Admitting: INTERNAL MEDICINE
Payer: MEDICARE

## 2020-07-01 ENCOUNTER — ANESTHESIA (OUTPATIENT)
Dept: GASTROENTEROLOGY | Facility: HOSPITAL | Age: 65
End: 2020-07-01

## 2020-07-01 VITALS
SYSTOLIC BLOOD PRESSURE: 100 MMHG | RESPIRATION RATE: 16 BRPM | DIASTOLIC BLOOD PRESSURE: 56 MMHG | BODY MASS INDEX: 47.74 KG/M2 | HEART RATE: 57 BPM | HEIGHT: 68 IN | TEMPERATURE: 98.6 F | OXYGEN SATURATION: 97 % | WEIGHT: 315 LBS

## 2020-07-01 DIAGNOSIS — R13.10 DYSPHAGIA, UNSPECIFIED: ICD-10-CM

## 2020-07-01 DIAGNOSIS — Z11.59 SCREENING FOR VIRAL DISEASE: Primary | ICD-10-CM

## 2020-07-01 LAB — GLUCOSE SERPL-MCNC: 177 MG/DL (ref 65–140)

## 2020-07-01 PROCEDURE — 88312 SPECIAL STAINS GROUP 1: CPT | Performed by: PATHOLOGY

## 2020-07-01 PROCEDURE — 82948 REAGENT STRIP/BLOOD GLUCOSE: CPT

## 2020-07-01 PROCEDURE — 88305 TISSUE EXAM BY PATHOLOGIST: CPT | Performed by: PATHOLOGY

## 2020-07-01 RX ORDER — LABETALOL 20 MG/4 ML (5 MG/ML) INTRAVENOUS SYRINGE
AS NEEDED
Status: DISCONTINUED | OUTPATIENT
Start: 2020-07-01 | End: 2020-07-01 | Stop reason: SURG

## 2020-07-01 RX ORDER — LIDOCAINE HYDROCHLORIDE 10 MG/ML
INJECTION, SOLUTION EPIDURAL; INFILTRATION; INTRACAUDAL; PERINEURAL AS NEEDED
Status: DISCONTINUED | OUTPATIENT
Start: 2020-07-01 | End: 2020-07-01 | Stop reason: SURG

## 2020-07-01 RX ORDER — PROPOFOL 10 MG/ML
INJECTION, EMULSION INTRAVENOUS AS NEEDED
Status: DISCONTINUED | OUTPATIENT
Start: 2020-07-01 | End: 2020-07-01 | Stop reason: SURG

## 2020-07-01 RX ORDER — SODIUM CHLORIDE 9 MG/ML
INJECTION, SOLUTION INTRAVENOUS CONTINUOUS PRN
Status: DISCONTINUED | OUTPATIENT
Start: 2020-07-01 | End: 2020-07-01 | Stop reason: SURG

## 2020-07-01 RX ADMIN — PROPOFOL 40 MG: 10 INJECTION, EMULSION INTRAVENOUS at 14:41

## 2020-07-01 RX ADMIN — PROPOFOL 40 MG: 10 INJECTION, EMULSION INTRAVENOUS at 14:47

## 2020-07-01 RX ADMIN — PROPOFOL 40 MG: 10 INJECTION, EMULSION INTRAVENOUS at 14:45

## 2020-07-01 RX ADMIN — PROPOFOL 40 MG: 10 INJECTION, EMULSION INTRAVENOUS at 14:36

## 2020-07-01 RX ADMIN — PROPOFOL 40 MG: 10 INJECTION, EMULSION INTRAVENOUS at 14:34

## 2020-07-01 RX ADMIN — PROPOFOL 40 MG: 10 INJECTION, EMULSION INTRAVENOUS at 14:38

## 2020-07-01 RX ADMIN — PROPOFOL 120 MG: 10 INJECTION, EMULSION INTRAVENOUS at 14:32

## 2020-07-01 RX ADMIN — PROPOFOL 40 MG: 10 INJECTION, EMULSION INTRAVENOUS at 14:43

## 2020-07-01 RX ADMIN — SODIUM CHLORIDE: 0.9 INJECTION, SOLUTION INTRAVENOUS at 14:27

## 2020-07-01 RX ADMIN — LABETALOL 20 MG/4 ML (5 MG/ML) INTRAVENOUS SYRINGE 2.5 MG: at 14:37

## 2020-07-01 RX ADMIN — LIDOCAINE HYDROCHLORIDE 100 MG: 10 INJECTION, SOLUTION EPIDURAL; INFILTRATION; INTRACAUDAL; PERINEURAL at 14:32

## 2020-07-01 NOTE — H&P
History and Physical - [facility]  Peng Mccray 75 y o  male MRN: [id number]                  HPI: Peng Mccray is a 75y o  year old male who presents for upper endoscopy  The indications for the procedure:  Dysphagia, choking      REVIEW OF SYSTEMS: Per the HPI, and otherwise unremarkable  Historical Information   Past Medical History:   Diagnosis Date    Acute blood loss anemia 10/13/2016    Anxiety     Arthritis     knees    Asthma     stable for > 10 years    Cancer [facility])     prostate    Cataract     Depression     Diabetes mellitus ([facility] [address] )     pre diabetes    Diverticulitis of colon     Hiatal hernia     History of anal fissures     Hyperlipidemia     Hypertension     Incisional hernia     Insomnia     Kidney stone     Morbid obesity (HCC)     PONV (postoperative nausea and vomiting)     Prolapsing mitral valve     prolapsing mitral valve leaflet syndrome    Prostate cancer ([facility] )     Retinal detachment     treated surgically at [facility]; resolved: 10/10/2012    Sleep apnea     diagnosed but unable to tolerate cpap       Stuttering      Past Surgical History:   Procedure Laterality Date    ABDOMINAL SURGERY      gastric lap band    ARTHROSCOPIC REPAIR ACL Right     BIOPSY CORE NEEDLE      prostate    CATARACT EXTRACTION Bilateral     COLONOSCOPY      EYE SURGERY      each eye had a detached retina and cataract removed, Right eye has a buckle    GASTRIC BYPASS      pt had LAP BAND SURGERY not gastric bypass    HERNIA REPAIR      naval    JOINT REPLACEMENT      B/L knee    KNEE ARTHROSCOPY Left     ID TOTAL KNEE ARTHROPLASTY Left 2/15/2017    Procedure: TOTAL KNEE ARTHROPLASTY ;  Surgeon: Med Mitchell MD;  Location: [facility];  Service: Orthopedics    ID TOTAL KNEE ARTHROPLASTY Right 10/10/2016    Procedure: ARTHROPLASTY KNEE TOTAL;  Surgeon: Med Mitchell MD;  [facility]: [facility];  Service: Orthopedics    PROSTATECTOMY      robotic-assisted; TEODORO-Dr Destiny Gaona RETINAL DETACHMENT SURGERY Bilateral     [facility]    SEPTOPLASTY      SINUS SURGERY      TONSILLECTOMY      over age 13    VASECTOMY      vas deferens     Social History   Social History     Substance and Sexual Activity   Alcohol Use Yes    Alcohol/week: 1 0 standard drinks    Types: 1 Cans of beer per week    Frequency: Monthly or less    Drinks per session: 1 or 2    Binge frequency: Never    Comment: rare ; being a social drinker (as per allscripts)     Social History     Substance and Sexual Activity   Drug Use No     Social History     Tobacco Use   Smoking Status Former Smoker   Smokeless Tobacco Never Used   Tobacco Comment    quit 45 years ago     Family History   Problem Relation Age of Onset    Heart disease Father     Coronary artery disease Father     Prostate cancer Father     Coronary artery disease Mother     Diabetes Mother         mellitus    Diabetes Maternal Grandmother         mellitus    Coronary artery disease Maternal Grandfather     Coronary artery disease Paternal Grandfather        Meds/Allergies       (Not in a hospital admission)    Allergies   Allergen Reactions    Celebrex [Celecoxib] Other (See Comments)     Cardiologist stated he should not take      Prednisone Other (See Comments)     Prednisone eye drops- eye pressure increases       Objective     /69   Pulse 61   Temp 98 6 °F (37 °C) (Oral)   Resp 16   Ht 5' 8" (1 727 m)   Wt (!) 145 kg (320 lb)   SpO2 94%   BMI 48 66 kg/m²       PHYSICAL EXAM    Gen: NAD  CV: RRR  CHEST: Clear  ABD: soft, NT/ND  EXT: no edema      ASSESSMENT/PLAN:  This is a 75y o  year old male here for EGD, and he is stable and optimized for his procedure

## 2020-07-01 NOTE — ANESTHESIA POSTPROCEDURE EVALUATION
Post-Op Assessment Note    CV Status:  Stable  Pain Score: 0    Pain management: adequate     Mental Status:  Sleepy and arousable   Hydration Status:  Stable   PONV Controlled:  None   Airway Patency:  Patent   Post Op Vitals Reviewed: Yes      Staff: CRNA           BP 97/57 (07/01/20 1453)    Temp      Pulse 64 (07/01/20 1453)   Resp 16 (07/01/20 1453)    SpO2 96

## 2020-07-02 ENCOUNTER — OFFICE VISIT (OUTPATIENT)
Dept: PODIATRY | Facility: CLINIC | Age: 65
End: 2020-07-02
Payer: MEDICARE

## 2020-07-02 VITALS
HEIGHT: 69 IN | BODY MASS INDEX: 46.65 KG/M2 | DIASTOLIC BLOOD PRESSURE: 90 MMHG | WEIGHT: 315 LBS | HEART RATE: 69 BPM | SYSTOLIC BLOOD PRESSURE: 153 MMHG | TEMPERATURE: 98.3 F

## 2020-07-02 DIAGNOSIS — E11.9 CONTROLLED TYPE 2 DIABETES MELLITUS WITHOUT COMPLICATION, WITHOUT LONG-TERM CURRENT USE OF INSULIN (HCC): ICD-10-CM

## 2020-07-02 DIAGNOSIS — R61 HYPERHIDROSIS: Primary | ICD-10-CM

## 2020-07-02 DIAGNOSIS — B35.1 ONYCHOMYCOSIS: ICD-10-CM

## 2020-07-02 PROCEDURE — 99202 OFFICE O/P NEW SF 15 MIN: CPT | Performed by: PODIATRIST

## 2020-07-02 PROCEDURE — 3051F HG A1C>EQUAL 7.0%<8.0%: CPT | Performed by: PODIATRIST

## 2020-07-02 NOTE — PROGRESS NOTES
Assessment/Plan:    Discussed principles of diabetic foot care  Vascular status is within normal limits and sensorium is intact  Discussed treatment options for onychomycosis  Patient will need to return to oral Lamisil for 84 days to treat the mycotic nails  This is not recommended due to paucity of symptoms and the fact that patient is on medications for cholesterol  Treatment consisted of nail trimming  Patient urged to refrain from walking barefoot  He was also told to wear socks and to decrease his perspiration and bed foot odor  He was also placed on Drysol  He is rescheduled in 10 weeks for palliative nail care  No problem-specific Assessment & Plan notes found for this encounter  Diagnoses and all orders for this visit:    Hyperhidrosis  -     aluminum chloride (DRYSOL) 20 % external solution; Apply topically daily at bedtime    Controlled type 2 diabetes mellitus without complication, without long-term current use of insulin ([facility] [address] )  -     Ambulatory referral to Podiatry    Onychomycosis          Subjective:      Patient ID: Javier Chavarria is a 75 y o  male  HPI     Patient, a 49-year-old type 2 diabetic presents for examination and foot care  Chief complaint are thickened yellow great toenails that he is unable to cut  Also a bad foot odor  Patient was diagnosed with diabetes approximately 2 years ago  He relates no numbness or tingling in his feet  Patient states that he had been placed on Lamisil in the past with pulse dosing to described  Unfortunately, each great toenail remains fungal     The following portions of the patient's history were reviewed and updated as appropriate: allergies, current medications, past family history, past medical history, past social history, past surgical history and problem list     Review of Systems   Constitutional: Negative  Respiratory: Negative  Gastrointestinal: Negative  Musculoskeletal: Negative      Neurological: Negative for numbness  Objective:      /90   Pulse 69   Temp 98 3 °F (36 8 °C) (Tympanic)   Ht 5' 8 5" (1 74 m)   Wt (!) 148 kg (326 lb)   BMI 48 85 kg/m²          Physical Exam   Constitutional: He is oriented to person, place, and time  Cardiovascular: Regular rhythm and intact distal pulses  Pulses are no weak pulses  Pulses:       Dorsalis pedis pulses are 2+ on the right side, and 2+ on the left side  Posterior tibial pulses are 2+ on the right side, and 2+ on the left side  Musculoskeletal: Normal range of motion  He exhibits no edema or deformity  Feet:   Right Foot:   Skin Integrity: Negative for ulcer, skin breakdown, erythema, warmth, callus or dry skin  Left Foot:   Skin Integrity: Negative for ulcer, skin breakdown, erythema, warmth, callus or dry skin  Neurological: He is alert and oriented to person, place, and time  No sensory deficit  He exhibits normal muscle tone  Skin: Skin is warm  No erythema  Each great toenail is thickened yellow with subungual debris  There is no pain with palpation  Soles of both feet are moist secondary to hyperhidrosis  Diabetic Foot Exam    Patient's shoes and socks removed  Right Foot/Ankle   Right Foot Inspection  Skin Exam: skin normal and skin intact no dry skin, no warmth, no callus, no erythema, no maceration, no abnormal color, no pre-ulcer, no ulcer and no callus                          Toe Exam: ROM and strength within normal limits  Sensory   Vibration: intact  Proprioception: intact   Monofilament testing: intact  Vascular  Capillary refills: < 3 seconds  The right DP pulse is 2+  The right PT pulse is 2+     Right Toe  - Comprehensive Exam  Ecchymosis: none  Arch: normal  Hammertoes: absent  Claw Toes: absent  Swelling: none   Tenderness: none         Left Foot/Ankle  Left Foot Inspection  Skin Exam: skin normal and skin intactno dry skin, no warmth, no erythema, no maceration, normal color, no pre-ulcer, no ulcer and no callus                         Toe Exam: ROM and strength within normal limits                   Sensory   Vibration: intact  Proprioception: intact  Monofilament: intact  Vascular  Capillary refills: < 3 seconds  The left DP pulse is 2+  The left PT pulse is 2+  Left Toe  - Comprehensive Exam  Ecchymosis: none  Arch: normal  Hammertoes: absent  Claw toes: absent  Swelling: none   Tenderness: none       Assign Risk Category:  No deformity present; No loss of protective sensation;  No weak pulses       Risk: 0

## 2020-07-03 DIAGNOSIS — I10 ESSENTIAL HYPERTENSION: ICD-10-CM

## 2020-07-06 RX ORDER — METOPROLOL SUCCINATE 25 MG/1
TABLET, EXTENDED RELEASE ORAL
Qty: 90 TABLET | Refills: 3 | Status: SHIPPED | OUTPATIENT
Start: 2020-07-06 | End: 2020-11-04 | Stop reason: SDUPTHER

## 2020-07-21 DIAGNOSIS — G47.00 INSOMNIA, UNSPECIFIED TYPE: ICD-10-CM

## 2020-07-21 RX ORDER — ZOLPIDEM TARTRATE 10 MG/1
10 TABLET ORAL
Qty: 90 TABLET | Refills: 0 | Status: SHIPPED | OUTPATIENT
Start: 2020-07-21 | End: 2020-10-26 | Stop reason: SDUPTHER

## 2020-09-02 ENCOUNTER — OFFICE VISIT (OUTPATIENT)
Dept: FAMILY MEDICINE CLINIC | Facility: CLINIC | Age: 65
End: 2020-09-02
Payer: MEDICARE

## 2020-09-02 ENCOUNTER — HOSPITAL ENCOUNTER (OUTPATIENT)
Dept: RADIOLOGY | Age: 65
Discharge: HOME | End: 2020-09-02
Payer: MEDICARE

## 2020-09-02 VITALS
OXYGEN SATURATION: 97 % | WEIGHT: 315 LBS | HEART RATE: 60 BPM | TEMPERATURE: 97.9 F | HEIGHT: 69 IN | DIASTOLIC BLOOD PRESSURE: 78 MMHG | BODY MASS INDEX: 46.65 KG/M2 | RESPIRATION RATE: 16 BRPM | SYSTOLIC BLOOD PRESSURE: 130 MMHG

## 2020-09-02 DIAGNOSIS — N50.812 LEFT TESTICULAR PAIN: Primary | ICD-10-CM

## 2020-09-02 DIAGNOSIS — I10 BENIGN ESSENTIAL HYPERTENSION: ICD-10-CM

## 2020-09-02 DIAGNOSIS — N50.812 LEFT TESTICULAR PAIN: ICD-10-CM

## 2020-09-02 LAB
BACTERIA UR QL AUTO: NORMAL /HPF
BILIRUB UR QL STRIP: NEGATIVE
CLARITY UR: CLEAR
COLOR UR: NORMAL
GLUCOSE UR STRIP-MCNC: NEGATIVE MG/DL
HGB UR QL STRIP.AUTO: NEGATIVE
HYALINE CASTS #/AREA URNS LPF: NORMAL /LPF
KETONES UR STRIP-MCNC: NEGATIVE MG/DL
LEUKOCYTE ESTERASE UR QL STRIP: NEGATIVE
NITRITE UR QL STRIP: NEGATIVE
NON-SQ EPI CELLS URNS QL MICRO: NORMAL /HPF
PH UR STRIP.AUTO: 6 [PH]
PROT UR STRIP-MCNC: NEGATIVE MG/DL
RBC #/AREA URNS AUTO: NORMAL /HPF
SL AMB  POCT GLUCOSE, UA: NORMAL
SL AMB LEUKOCYTE ESTERASE,UA: NORMAL
SL AMB POCT BILIRUBIN,UA: NORMAL
SL AMB POCT BLOOD,UA: NORMAL
SL AMB POCT CLARITY,UA: CLEAR
SL AMB POCT COLOR,UA: YELLOW
SL AMB POCT KETONES,UA: NORMAL
SL AMB POCT NITRITE,UA: NORMAL
SL AMB POCT PH,UA: 6
SL AMB POCT SPECIFIC GRAVITY,UA: 1.03
SL AMB POCT URINE PROTEIN: NORMAL
SL AMB POCT UROBILINOGEN: NORMAL
SP GR UR STRIP.AUTO: 1.02 (ref 1–1.03)
UROBILINOGEN UR QL STRIP.AUTO: 0.2 E.U./DL
WBC #/AREA URNS AUTO: NORMAL /HPF

## 2020-09-02 PROCEDURE — 99213 OFFICE O/P EST LOW 20 MIN: CPT | Performed by: FAMILY MEDICINE

## 2020-09-02 PROCEDURE — 81002 URINALYSIS NONAUTO W/O SCOPE: CPT | Performed by: FAMILY MEDICINE

## 2020-09-02 PROCEDURE — 81001 URINALYSIS AUTO W/SCOPE: CPT | Performed by: FAMILY MEDICINE

## 2020-09-02 PROCEDURE — 76870 US EXAM SCROTUM: CPT

## 2020-09-02 PROCEDURE — 87086 URINE CULTURE/COLONY COUNT: CPT | Performed by: FAMILY MEDICINE

## 2020-09-02 RX ORDER — PANTOPRAZOLE SODIUM 40 MG/1
40 TABLET, DELAYED RELEASE ORAL DAILY
COMMUNITY
Start: 2020-07-02 | End: 2021-05-10

## 2020-09-02 RX ORDER — CIPROFLOXACIN 500 MG/1
TABLET, FILM COATED ORAL
Qty: 20 TABLET | Refills: 0 | Status: SHIPPED | OUTPATIENT
Start: 2020-09-02 | End: 2020-09-12

## 2020-09-02 NOTE — PROGRESS NOTES
Chief Complaint   Patient presents with    Groin Pain     Health Maintenance   Topic Date Due    Hepatitis C Screening  1955    DTaP,Tdap,and Td Vaccines (1 - Tdap) 11/25/1974    DM Eye Exam  10/17/2019    Influenza Vaccine  07/01/2020    Colonoscopy Surveillance  08/25/2020    HEMOGLOBIN A1C  11/11/2020    Fall Risk  05/14/2021    Medicare Annual Wellness Visit (AWV)  05/14/2021    BMI: Followup Plan  05/14/2021    Diabetic Foot Exam  07/02/2021    BMI: Adult  09/02/2021    Pneumococcal Vaccine: 65+ Years (2 of 2 - PPSV23) 10/20/2021    Colorectal Cancer Screening  08/25/2025    HIB Vaccine  Aged Out    Hepatitis B Vaccine  Aged Out    IPV Vaccine  Aged Out    Hepatitis A Vaccine  Aged Out    Meningococcal ACWY Vaccine  Aged Out    HPV Vaccine  Aged Out     Assessment/Plan:    Left testicular pain  R/o epididymitis  Will order ultrasound of the scrotum, check urinalysis  Start Cipro 500 mg 1 tablet twice daily with food 10 days  TakeIbuprofen 200 mg 2 tablets every 8 hours PRN for pain  Call office if symptoms persist or worsen  Recommended to follow-up with urology  Benign essential hypertension  BP is stable  Continue current medical regimen  Diagnoses and all orders for this visit:    Left testicular pain  -     US scrotum and testicles; Future  -     ciprofloxacin (CIPRO) 500 mg tablet; Take 1 tab  twice daily with food  -     Urinalysis with microscopic  -     Urine culture; Future  -     Urine culture  -     POCT urine dip    Benign essential hypertension    Other orders  -     pantoprazole (PROTONIX) 40 mg tablet; Take 40 mg by mouth daily          Subjective:      Patient ID: Ricci Soto is a 75 y o  male  HPI     Patient presents to the office today c/o left testicular pain, swelling,  left lower back pain for the last few days  Reports no fever, chills  Denies blood in urine  No dysuria        Patient states that he had epididymitis in the past       PMHx: HTN, Type 2 DM, Hyperlipidemia, morbid obesity  Reviewed current medications  Last blood test done in May 2020  Creatinine 1 02  Hb A1C 7 5  Patient has H/o prostate, S/P DVP in 2011  [facility]  The following portions of the patient's history were reviewed and updated as appropriate: allergies, current medications, past family history, past social history, past surgical history and problem list     Review of Systems   Constitutional: Negative for activity change, appetite change, chills, fatigue and fever  HENT: Negative for congestion and sore throat  Respiratory: Negative for cough, chest tightness, shortness of breath and wheezing  Cardiovascular: Negative for chest pain, palpitations and leg swelling  Gastrointestinal: Negative for abdominal pain, blood in stool, constipation, diarrhea, nausea and vomiting  Genitourinary: Positive for testicular pain (left testicle)  Negative for difficulty urinating, dysuria, frequency, hematuria and urgency  Musculoskeletal: Positive for arthralgias and back pain (left-sided low back pain)  Negative for joint swelling  Skin: Negative for rash and wound  Neurological: Negative for dizziness and headaches  Hematological: Negative  Psychiatric/Behavioral: Negative  Objective:      /78 (BP Location: Left arm, Patient Position: Sitting, Cuff Size: Large)   Pulse 60   Temp 97 9 °F (36 6 °C) (Oral)   Resp 16   Ht 5' 8 5" (1 74 m)   Wt (!) 145 kg (319 lb)   SpO2 97%   BMI 47 80 kg/m²          Physical Exam  Vitals signs and nursing note reviewed  Constitutional:       Appearance: Normal appearance  Comments: Morbidly obese   HENT:      Head: Normocephalic and atraumatic  Eyes:      Conjunctiva/sclera: Conjunctivae normal       Pupils: Pupils are equal, round, and reactive to light  Cardiovascular:      Rate and Rhythm: Normal rate and regular rhythm  Heart sounds: No murmur  Pulmonary:      Effort: Pulmonary effort is normal       Breath sounds: Normal breath sounds  Abdominal:      General: Bowel sounds are normal  There is no distension  Palpations: Abdomen is soft  Tenderness: There is no abdominal tenderness  There is no left CVA tenderness  Musculoskeletal:      Comments: Low back: decreased ROM with flexion , extension  No spinal tenderness  Skin:     General: Skin is warm and dry  Findings: No rash  Neurological:      Mental Status: He is alert     Psychiatric:         Mood and Affect: Mood normal

## 2020-09-02 NOTE — ASSESSMENT & PLAN NOTE
R/o epididymitis  Will order ultrasound of the scrotum, check urinalysis  Start Cipro 500 mg 1 tablet twice daily with food 10 days  TakeIbuprofen 200 mg 2 tablets every 8 hours PRN for pain  Call office if symptoms persist or worsen  Recommended to follow-up with urology

## 2020-09-03 LAB — BACTERIA UR CULT: NORMAL

## 2020-09-08 ENCOUNTER — ANESTHESIA EVENT (OUTPATIENT)
Dept: GASTROENTEROLOGY | Facility: HOSPITAL | Age: 65
End: 2020-09-08

## 2020-09-08 PROBLEM — E66.01 MORBID OBESITY WITH BMI OF 45.0-49.9, ADULT (HCC): Status: ACTIVE | Noted: 2020-09-08

## 2020-09-09 ENCOUNTER — HOSPITAL ENCOUNTER (OUTPATIENT)
Dept: GASTROENTEROLOGY | Facility: HOSPITAL | Age: 65
Setting detail: OUTPATIENT SURGERY
Discharge: HOME | End: 2020-09-09
Attending: INTERNAL MEDICINE
Payer: MEDICARE

## 2020-09-09 ENCOUNTER — ANESTHESIA (OUTPATIENT)
Dept: GASTROENTEROLOGY | Facility: HOSPITAL | Age: 65
End: 2020-09-09

## 2020-09-09 VITALS
RESPIRATION RATE: 17 BRPM | HEART RATE: 60 BPM | OXYGEN SATURATION: 94 % | SYSTOLIC BLOOD PRESSURE: 125 MMHG | BODY MASS INDEX: 46.65 KG/M2 | TEMPERATURE: 97.6 F | DIASTOLIC BLOOD PRESSURE: 70 MMHG | HEIGHT: 69 IN | WEIGHT: 315 LBS

## 2020-09-09 VITALS — HEART RATE: 60 BPM

## 2020-09-09 DIAGNOSIS — R13.10 DYSPHAGIA, UNSPECIFIED: ICD-10-CM

## 2020-09-09 PROCEDURE — 88312 SPECIAL STAINS GROUP 1: CPT | Performed by: PATHOLOGY

## 2020-09-09 PROCEDURE — 88342 IMHCHEM/IMCYTCHM 1ST ANTB: CPT | Performed by: PATHOLOGY

## 2020-09-09 PROCEDURE — 88305 TISSUE EXAM BY PATHOLOGIST: CPT | Performed by: PATHOLOGY

## 2020-09-09 RX ORDER — PROPOFOL 10 MG/ML
INJECTION, EMULSION INTRAVENOUS CONTINUOUS PRN
Status: DISCONTINUED | OUTPATIENT
Start: 2020-09-09 | End: 2020-09-09

## 2020-09-09 RX ORDER — LIDOCAINE HYDROCHLORIDE 10 MG/ML
INJECTION, SOLUTION EPIDURAL; INFILTRATION; INTRACAUDAL; PERINEURAL AS NEEDED
Status: DISCONTINUED | OUTPATIENT
Start: 2020-09-09 | End: 2020-09-09

## 2020-09-09 RX ORDER — PROPOFOL 10 MG/ML
INJECTION, EMULSION INTRAVENOUS AS NEEDED
Status: DISCONTINUED | OUTPATIENT
Start: 2020-09-09 | End: 2020-09-09

## 2020-09-09 RX ORDER — SODIUM CHLORIDE 9 MG/ML
INJECTION, SOLUTION INTRAVENOUS CONTINUOUS PRN
Status: DISCONTINUED | OUTPATIENT
Start: 2020-09-09 | End: 2020-09-09

## 2020-09-09 RX ORDER — SODIUM CHLORIDE 9 MG/ML
125 INJECTION, SOLUTION INTRAVENOUS CONTINUOUS
Status: DISCONTINUED | OUTPATIENT
Start: 2020-09-09 | End: 2020-09-13 | Stop reason: HOSPADM

## 2020-09-09 RX ADMIN — SODIUM CHLORIDE: 0.9 INJECTION, SOLUTION INTRAVENOUS at 12:56

## 2020-09-09 RX ADMIN — PROPOFOL 100 MCG/KG/MIN: 10 INJECTION, EMULSION INTRAVENOUS at 13:28

## 2020-09-09 RX ADMIN — LIDOCAINE HYDROCHLORIDE 150 MG: 10 INJECTION, SOLUTION EPIDURAL; INFILTRATION; INTRACAUDAL; PERINEURAL at 13:28

## 2020-09-09 RX ADMIN — PHENYLEPHRINE HYDROCHLORIDE 10 MCG: 10 INJECTION INTRAVENOUS at 13:38

## 2020-09-09 RX ADMIN — SODIUM CHLORIDE 125 ML/HR: 0.9 INJECTION, SOLUTION INTRAVENOUS at 13:17

## 2020-09-09 RX ADMIN — PROPOFOL 150 MG: 10 INJECTION, EMULSION INTRAVENOUS at 13:28

## 2020-09-09 NOTE — ANESTHESIA PREPROCEDURE EVALUATION
Procedure:  EGD    Relevant Problems   CARDIO   (+) Benign essential hypertension   (+) Hyperlipidemia      ENDO   (+) Controlled type 2 diabetes mellitus without complication (HCC)      /RENAL   (+) Malignant neoplasm of prostate (HCC)      NEURO/PSYCH   (+) Depression with anxiety      PULMONARY   (+) Asthma   (+) Sleep apnea      Other   (+) Morbid obesity with BMI of 45 0-49 9, adult (HCC)   STUTTERS   2019-EF-60%, NO PROLAPSING MITRAL VALVE BARIATRIC SURGERY >15 YEARS AGO  Physical Exam    Airway    Mallampati score: I  TM Distance: >3 FB  Neck ROM: full     Dental   No notable dental hx     Cardiovascular      Pulmonary  Breath sounds clear to auscultation,     Other Findings        Anesthesia Plan  ASA Score- 3     Anesthesia Type- IV sedation with anesthesia with ASA Monitors  Additional Monitors:   Airway Plan:           Plan Factors-Exercise tolerance (METS): >4 METS  Chart reviewed  Patient is not a current smoker  Patient not instructed to abstain from smoking on day of procedure  Patient did not smoke on day of surgery  Induction- intravenous  Postoperative Plan-     Informed Consent- Anesthetic plan and risks discussed with patient  I personally reviewed this patient with the CRNA  Discussed and agreed on the Anesthesia Plan with the CRNA Gerhardt Sep

## 2020-09-09 NOTE — H&P
History and Physical - [facility]  Moe Carvalho 75 y o  male MRN: [id number]                  HPI: Moe Carvalho is a 75y o  year old male who presents for an upper endoscopy  Indications for the procedure:  Molina's esophagus with abnormal appearing mucosa as seen on a prior study on July 1, 2020  Biopsies from this area showed Molina's mucosa without dysplasia  REVIEW OF SYSTEMS: Per the HPI, and otherwise unremarkable  Historical Information   Past Medical History:   Diagnosis Date    Acute blood loss anemia 10/13/2016    Anxiety     Arthritis     knees    Asthma     stable for > 10 years    Cancer [facility])     prostate    Cataract     Depression     Diabetes mellitus ([facility] )     pre diabetes    Diverticulitis of colon     Hiatal hernia     History of anal fissures     Hyperlipidemia     Hypertension     Incisional hernia     Insomnia     Kidney stone     Morbid obesity (HCC)     PONV (postoperative nausea and vomiting)     Prolapsing mitral valve     prolapsing mitral valve leaflet syndrome    Prostate cancer ([facility] )     Retinal detachment     treated surgically at [facility] eye; resolved: 10/10/2012    Sleep apnea     diagnosed but unable to tolerate cpap       Stuttering      Past Surgical History:   Procedure Laterality Date    ABDOMINAL SURGERY      gastric lap band    ARTHROSCOPIC REPAIR ACL Right     BIOPSY CORE NEEDLE      prostate    CATARACT EXTRACTION Bilateral     COLONOSCOPY      EYE SURGERY      each eye had a detached retina and cataract removed, Right eye has a buckle    GASTRIC BYPASS      pt had LAP BAND SURGERY not gastric bypass    HERNIA REPAIR      naval    JOINT REPLACEMENT      B/L knee    KNEE ARTHROSCOPY Left     RI TOTAL KNEE ARTHROPLASTY Left 2/15/2017    Procedure: TOTAL KNEE ARTHROPLASTY ;  Surgeon: Katherine Villagran MD;  [facility]: [facility];  Service: Orthopedics    RI TOTAL KNEE ARTHROPLASTY Right 10/10/2016    Procedure: ARTHROPLASTY KNEE TOTAL;  Surgeon: Mary Rubio MD;  [facility];  Service: Orthopedics    PROSTATECTOMY      robotic-assisted; [facility] Marlo Alpers RETINAL DETACHMENT SURGERY Bilateral     [facility]    SEPTOPLASTY      SINUS SURGERY      TONSILLECTOMY      over age 13    VASECTOMY      vas deferens     Social History   Social History     Substance and Sexual Activity   Alcohol Use Yes    Alcohol/week: 1 0 standard drinks    Types: 1 Cans of beer per week    Frequency: Monthly or less    Drinks per session: 1 or 2    Binge frequency: Never    Comment: rare ; being a social drinker (as per allscripts)     Social History     Substance and Sexual Activity   Drug Use No     Social History     Tobacco Use   Smoking Status Former Smoker   Smokeless Tobacco Never Used   Tobacco Comment    quit 45 years ago     Family History   Problem Relation Age of Onset    Heart disease Father     Coronary artery disease Father     Prostate cancer Father     Coronary artery disease Mother     Diabetes Mother         mellitus    Diabetes Maternal Grandmother         mellitus    Coronary artery disease Maternal Grandfather     Coronary artery disease Paternal Grandfather        Meds/Allergies     (Not in a hospital admission)      Allergies   Allergen Reactions    Celebrex [Celecoxib] Other (See Comments)     Cardiologist stated he should not take      Prednisone Other (See Comments)     Prednisone eye drops- eye pressure increases       Objective     /76   Pulse 68   Temp 97 6 °F (36 4 °C) (Oral)   Resp 16   Ht 5' 9" (1 753 m)   Wt (!) 145 kg (319 lb)   SpO2 94%   BMI 47 11 kg/m²       PHYSICAL EXAM    Gen: NAD  CV: RRR  CHEST: Clear  ABD: soft, NT/ND  EXT: no edema      ASSESSMENT/PLAN:  This is a 75y o  year old male here for EGD, and he is stable and optimized for his procedure

## 2020-09-09 NOTE — ANESTHESIA POSTPROCEDURE EVALUATION
Post-Op Assessment Note    CV Status:  Stable    Pain management: adequate     Mental Status:  Lethargic and sleepy   Hydration Status:  Stable   PONV Controlled:  None   Airway Patency:  Patent      Post Op Vitals Reviewed: Yes      Staff: CRNA         No complications documented      BP      Temp      Pulse     Resp      SpO2

## 2020-09-11 ENCOUNTER — TELEPHONE (OUTPATIENT)
Dept: FAMILY MEDICINE CLINIC | Facility: CLINIC | Age: 65
End: 2020-09-11

## 2020-09-11 ENCOUNTER — NURSE TRIAGE (OUTPATIENT)
Dept: OTHER | Facility: OTHER | Age: 65
End: 2020-09-11

## 2020-09-11 ENCOUNTER — DOCUMENTATION (OUTPATIENT)
Dept: FAMILY MEDICINE CLINIC | Facility: CLINIC | Age: 65
End: 2020-09-11

## 2020-09-11 DIAGNOSIS — N50.812 LEFT TESTICULAR PAIN: Primary | ICD-10-CM

## 2020-09-11 RX ORDER — CIPROFLOXACIN 500 MG/1
500 TABLET, FILM COATED ORAL EVERY 12 HOURS SCHEDULED
Qty: 8 TABLET | Refills: 0 | Status: SHIPPED | OUTPATIENT
Start: 2020-09-11 | End: 2020-09-15

## 2020-09-11 NOTE — TELEPHONE ENCOUNTER
Reason for Disposition   [1] Pain comes and goes (intermittent) AND [2] present > 24 hours    Answer Assessment - Initial Assessment Questions  1  LOCATION and RADIATION: "Where is the pain located?"       Scrotum Left lower testicle  2  QUALITY: "What does the pain feel like?"  (e g , sharp, dull, aching, burning)      aching  3  SEVERITY: "How bad is the pain?"  (Scale 1-10; or mild, moderate, severe)    - MILD (1-3): doesn't interfere with normal activities     - MODERATE (4-7): interferes with normal activities (e g , work or school) or awakens from sleep    - SEVERE (8-10): excruciating pain, unable to do any normal activities, difficulty walking      3-4  4  ONSET: "When did the pain start?"      10 days ago  5  PATTERN: "Does it come and go, or has it been constant since it started?"      Steady ache  6  SCROTAL APPEARANCE: "What does the scrotum look like?" "Is there any swelling or redness?"       Slightly swollen  7  HERNIA: "Has a doctor ever told you that you have a hernia?"      no  8  OTHER SYMPTOMS: "Do you have any other symptoms?" (e g , fever, abdominal pain, vomiting, difficulty passing urine)     Slight pain in left kidney      Protocols used: SCROTAL PAIN-ADULT-

## 2020-09-11 NOTE — TELEPHONE ENCOUNTER
Seen for epididymitis 10 days ago  States he is 80% better  Finished Cipro  Continue with more Cipro?

## 2020-09-11 NOTE — TELEPHONE ENCOUNTER
Regarding: UTI  ----- Message from Frances Gordillo sent at 9/11/2020  6:12 PM EDT -----  Pt states that they have a UTI and they are on antibiotics that he has finished   He has improved but still isnt feeling 100% would like to know if he can get another order for the antibiotics

## 2020-09-12 ENCOUNTER — TELEPHONE (OUTPATIENT)
Dept: FAMILY MEDICINE CLINIC | Facility: CLINIC | Age: 65
End: 2020-09-12

## 2020-09-12 NOTE — TELEPHONE ENCOUNTER
I saw Dr Ordaz's note and ultrasound  Should be OK with having had the 10 days of antibiotic for infection  Inflammation will take time to completely resolve    Let us know if seems to be worsening again or not gone in another week    Sorry for late reply but I am off / away today

## 2020-09-12 NOTE — TELEPHONE ENCOUNTER
Health Call  Spoke with Yinka Levi who is a pleasant 69yo M with a ho prostate cancer  He was diagnosed with epididymitis 9/2 and was treated with cipro 500mg BID for 10 days  He feels sig better with less left sided testicular pain after completion of abx however is still having mild pain and wants an extension of abx  Reviewed US scrotum which did show epididymitis  He has an appt with urology 9/15  Gave pt an extension of cipro until evaluation by urology

## 2020-09-15 ENCOUNTER — OFFICE VISIT (OUTPATIENT)
Dept: UROLOGY | Facility: AMBULATORY SURGERY CENTER | Age: 65
End: 2020-09-15
Payer: MEDICARE

## 2020-09-15 VITALS
TEMPERATURE: 97.3 F | BODY MASS INDEX: 46.65 KG/M2 | DIASTOLIC BLOOD PRESSURE: 88 MMHG | HEIGHT: 69 IN | HEART RATE: 60 BPM | WEIGHT: 315 LBS | SYSTOLIC BLOOD PRESSURE: 148 MMHG

## 2020-09-15 DIAGNOSIS — C61 MALIGNANT NEOPLASM OF PROSTATE (HCC): Primary | ICD-10-CM

## 2020-09-15 DIAGNOSIS — N50.812 LEFT TESTICULAR PAIN: ICD-10-CM

## 2020-09-15 PROCEDURE — 99214 OFFICE O/P EST MOD 30 MIN: CPT | Performed by: UROLOGY

## 2020-09-15 NOTE — PROGRESS NOTES
Assessment/Plan:    Malignant neoplasm of prostate [facility])  He is overdue for his PSA  He has a script for this  I will order PSA for next urine he will get his PSA for this year done soon  Left testicular pain  We discussed this is likely a resolving epididymitis  I would like to hold off on any further antibiotics at this point and see if his symptoms continue to improve  He is to contact us if they worsen and we can put him back on antibiotics but I would like to switch him to a different medication given the risk of tendon rupture  Diagnoses and all orders for this visit:    Malignant neoplasm of prostate ([facility] )  -     PSA Total, Diagnostic; Future    Left testicular pain          Total visit time was 25 minutes of which over 50% was spent on counseling  Subjective:     Patient ID: Peng Mccray is a 75 y o  male    57-year-old male presents for follow-up prostate cancer status post robotic prostatectomy  The patient also has some urinary urgency  He did recently have an episode of left-sided testicular pain and was placed on a 2 week course of ciprofloxacin which is finishing today  His symptoms have significantly improved but he still does have some mild discomfort  He is otherwise doing well  The following portions of the patient's history were reviewed and updated as appropriate: allergies, current medications, past family history, past medical history, past social history, past surgical history and problem list     Review of Systems   Constitutional: Negative  HENT: Negative  Eyes: Negative  Respiratory: Negative  Cardiovascular: Negative  Gastrointestinal: Negative  Endocrine: Negative  Genitourinary:        As noted per HPI   Musculoskeletal: Negative  Skin: Negative  Allergic/Immunologic: Negative  Neurological: Negative  Hematological: Negative  Psychiatric/Behavioral: Negative          AUA SYMPTOM SCORE      Most Recent Value   AUA SYMPTOM SCORE   How often have you had a sensation of not emptying your bladder completely after you finished urinating? 0   How often have you had to urinate again less than two hours after you finished urinating? 2   How often have you found you stopped and started again several times when you urinate?  0   How often have you found it difficult to postpone urination? 2   How often have you had a weak urinary stream?  0   How often have you had to push or strain to begin urination? 1   How many times did you most typically get up to urinate from the time you went to bed at night until the time you got up in the morning? 2   Quality of Life: If you were to spend the rest of your life with your urinary condition just the way it is now, how would you feel about that?  2   AUA SYMPTOM SCORE  7              Objective:    Physical Exam  Vitals signs reviewed  Constitutional:       Appearance: He is well-developed  Neck:      Musculoskeletal: Normal range of motion  Pulmonary:      Effort: Pulmonary effort is normal    Abdominal:      General: Bowel sounds are normal  There is no distension  Palpations: Abdomen is soft  There is no mass  Tenderness: There is no abdominal tenderness  There is no guarding or rebound  Genitourinary:     Comments: Left epididymis is mildly tender to palpation  Both testes are normal to palpation with no masses  Musculoskeletal: Normal range of motion  Skin:     General: Skin is warm and dry  Neurological:      Mental Status: He is alert and oriented to person, place, and time             Results  Lab Results   Component Value Date    PSA <0 1 04/24/2019    PSA <0 1 04/24/2018    PSA <0 1 03/20/2017     Lab Results   Component Value Date    GLUCOSE 137 08/16/2015    CALCIUM 9 6 05/11/2020     08/16/2015    K 4 2 05/11/2020    CO2 25 05/11/2020     05/11/2020    BUN 15 05/11/2020    CREATININE 1 02 05/11/2020     Lab Results   Component Value Date    WBC 7 78 05/11/2020    HGB 14 5 05/11/2020    HCT 42 6 05/11/2020    MCV 88 05/11/2020     05/11/2020       No results found for this or any previous visit (from the past 1 hour(s)) ]

## 2020-09-15 NOTE — ASSESSMENT & PLAN NOTE
He is overdue for his PSA  He has a script for this  I will order PSA for next urine he will get his PSA for this year done soon

## 2020-09-15 NOTE — ASSESSMENT & PLAN NOTE
We discussed this is likely a resolving epididymitis  I would like to hold off on any further antibiotics at this point and see if his symptoms continue to improve  He is to contact us if they worsen and we can put him back on antibiotics but I would like to switch him to a different medication given the risk of tendon rupture

## 2020-09-28 ENCOUNTER — OFFICE VISIT (OUTPATIENT)
Dept: FAMILY MEDICINE CLINIC | Facility: CLINIC | Age: 65
End: 2020-09-28
Payer: MEDICARE

## 2020-09-28 VITALS
WEIGHT: 313 LBS | HEIGHT: 69 IN | RESPIRATION RATE: 16 BRPM | HEART RATE: 76 BPM | BODY MASS INDEX: 46.36 KG/M2 | DIASTOLIC BLOOD PRESSURE: 100 MMHG | SYSTOLIC BLOOD PRESSURE: 140 MMHG | TEMPERATURE: 97.9 F

## 2020-09-28 DIAGNOSIS — B37.2 YEAST DERMATITIS: Primary | ICD-10-CM

## 2020-09-28 DIAGNOSIS — Z23 NEED FOR INFLUENZA VACCINATION: ICD-10-CM

## 2020-09-28 PROCEDURE — 90662 IIV NO PRSV INCREASED AG IM: CPT

## 2020-09-28 PROCEDURE — G0008 ADMIN INFLUENZA VIRUS VAC: HCPCS

## 2020-09-28 PROCEDURE — 99213 OFFICE O/P EST LOW 20 MIN: CPT | Performed by: NURSE PRACTITIONER

## 2020-09-28 RX ORDER — NYSTATIN 100000 U/G
CREAM TOPICAL 2 TIMES DAILY
Qty: 30 G | Refills: 2 | Status: SHIPPED | OUTPATIENT
Start: 2020-09-28 | End: 2021-10-28 | Stop reason: SDUPTHER

## 2020-09-28 RX ORDER — NYSTATIN 100000 [USP'U]/G
POWDER TOPICAL 4 TIMES DAILY
Qty: 60 G | Refills: 2 | Status: SHIPPED | OUTPATIENT
Start: 2020-09-28 | End: 2021-06-23 | Stop reason: SDUPTHER

## 2020-09-28 NOTE — PROGRESS NOTES
Assessment/Plan:    Yeast dermatitis- for his abdominal region, I asked him to start using Nystatin powder 3-4 times per day  Keep area clean and dry  Once this area dries out more, he can switch to the Nystatin cream   He can also use the cream in his groin region as it is already more dry there  Avoid tight fitting clothing  Encouraged to work on weight loss  No signs of infection today so no need for an antibiotic at this time  We did review signs of an infection and he knows to call us with any  He also just completed a 2 week course of Cipro for epididymitis       Diagnoses and all orders for this visit:    Yeast dermatitis  -     nystatin (MYCOSTATIN) powder; Apply topically 4 (four) times a day  -     nystatin (MYCOSTATIN) cream; Apply topically 2 (two) times a day    Need for influenza vaccination  -     influenza vaccine, high-dose, PF 0 7 mL (FLUZONE HIGH-DOSE)          Subjective:      Patient ID: Nirali Owens is a 75 y o  male  HPI     Pt presents by himself today for an acute visit   C/o a rash underneath his abdominal fold, left side  He isn't sure when it started, but he first noticed it a few weeks ago  He feels it is worsening overall  Area is painful when he tries to wash the rash   The area tends to be moist with sweat but he doesn't think the rash is draining   He also has the same rash lower in his groin, but this site tends to be more dry    He just completed a 2 week course of Cipro for epididymitis with symptom resolution and he is following with Urology now       The following portions of the patient's history were reviewed and updated as appropriate: allergies, current medications, past family history, past medical history, past social history, past surgical history and problem list     Review of Systems   Constitutional: Negative for chills, fatigue and fever  Respiratory: Negative for cough, shortness of breath and wheezing      Cardiovascular: Negative for chest pain, palpitations and leg swelling  Gastrointestinal: Negative for abdominal pain, blood in stool, constipation, diarrhea and nausea  Genitourinary: Negative for dysuria  Musculoskeletal: Positive for arthralgias  Negative for myalgias  Skin: Positive for rash  Negative for wound  Neurological: Negative for dizziness, weakness, numbness and headaches  Psychiatric/Behavioral: Negative for sleep disturbance and suicidal ideas  Objective:      /100   Pulse 76   Temp 97 9 °F (36 6 °C) (Oral)   Resp 16   Ht 5' 9" (1 753 m)   Wt (!) 142 kg (313 lb)   BMI 46 22 kg/m²          Physical Exam  Constitutional:       General: He is not in acute distress  Appearance: He is well-developed  He is obese  He is not ill-appearing, toxic-appearing or diaphoretic  HENT:      Head: Normocephalic and atraumatic  Eyes:      Conjunctiva/sclera: Conjunctivae normal       Pupils: Pupils are equal, round, and reactive to light  Neck:      Musculoskeletal: Normal range of motion and neck supple  Thyroid: No thyromegaly  Cardiovascular:      Rate and Rhythm: Normal rate and regular rhythm  Heart sounds: Normal heart sounds  No murmur  Pulmonary:      Effort: Pulmonary effort is normal  No respiratory distress  Breath sounds: Normal breath sounds  No wheezing  Abdominal:      General: Bowel sounds are normal  There is no distension  Palpations: Abdomen is soft  Tenderness: There is no abdominal tenderness  Musculoskeletal: Normal range of motion  Lymphadenopathy:      Cervical: No cervical adenopathy  Skin:     General: Skin is warm and dry  Comments:  Lower B/L groin region also with mild excoriated skin, more dry compared to his abdomen    Neurological:      General: No focal deficit present  Mental Status: He is alert and oriented to person, place, and time     Psychiatric:         Mood and Affect: Mood normal          Behavior: Behavior normal  Thought Content:  Thought content normal          Judgment: Judgment normal

## 2020-10-01 ENCOUNTER — HOSPITAL ENCOUNTER (OUTPATIENT)
Dept: NON INVASIVE DIAGNOSTICS | Facility: CLINIC | Age: 65
Discharge: HOME | End: 2020-10-01
Payer: MEDICARE

## 2020-10-01 ENCOUNTER — LAB (OUTPATIENT)
Dept: LAB | Age: 65
End: 2020-10-01
Payer: MEDICARE

## 2020-10-01 DIAGNOSIS — E11.9 CONTROLLED TYPE 2 DIABETES MELLITUS WITHOUT COMPLICATION, WITHOUT LONG-TERM CURRENT USE OF INSULIN (HCC): ICD-10-CM

## 2020-10-01 DIAGNOSIS — I10 ESSENTIAL HYPERTENSION: ICD-10-CM

## 2020-10-01 DIAGNOSIS — I49.3 FREQUENT PVCS: ICD-10-CM

## 2020-10-01 DIAGNOSIS — E78.5 HYPERLIPIDEMIA, UNSPECIFIED HYPERLIPIDEMIA TYPE: ICD-10-CM

## 2020-10-01 DIAGNOSIS — E78.2 MIXED HYPERLIPIDEMIA: ICD-10-CM

## 2020-10-01 DIAGNOSIS — C61 MALIGNANT NEOPLASM OF PROSTATE (HCC): ICD-10-CM

## 2020-10-01 DIAGNOSIS — I10 BENIGN ESSENTIAL HYPERTENSION: ICD-10-CM

## 2020-10-01 DIAGNOSIS — E13.9 DIABETES 1.5, MANAGED AS TYPE 1 (HCC): ICD-10-CM

## 2020-10-01 LAB
ALBUMIN SERPL BCP-MCNC: 3.8 G/DL (ref 3.5–5)
ALP SERPL-CCNC: 68 U/L (ref 46–116)
ALT SERPL W P-5'-P-CCNC: 25 U/L (ref 12–78)
ANION GAP SERPL CALCULATED.3IONS-SCNC: 3 MMOL/L (ref 4–13)
AST SERPL W P-5'-P-CCNC: 9 U/L (ref 5–45)
BASOPHILS # BLD AUTO: 0.06 THOUSANDS/ΜL (ref 0–0.1)
BASOPHILS NFR BLD AUTO: 1 % (ref 0–1)
BILIRUB SERPL-MCNC: 0.45 MG/DL (ref 0.2–1)
BUN SERPL-MCNC: 11 MG/DL (ref 5–25)
CALCIUM SERPL-MCNC: 9.6 MG/DL (ref 8.3–10.1)
CHLORIDE SERPL-SCNC: 105 MMOL/L (ref 100–108)
CHOLEST SERPL-MCNC: 138 MG/DL (ref 50–200)
CK SERPL-CCNC: 69 U/L (ref 39–308)
CO2 SERPL-SCNC: 29 MMOL/L (ref 21–32)
CREAT SERPL-MCNC: 1.01 MG/DL (ref 0.6–1.3)
CREAT UR-MCNC: 206 MG/DL
EOSINOPHIL # BLD AUTO: 0.82 THOUSAND/ΜL (ref 0–0.61)
EOSINOPHIL NFR BLD AUTO: 11 % (ref 0–6)
ERYTHROCYTE [DISTWIDTH] IN BLOOD BY AUTOMATED COUNT: 13.2 % (ref 11.6–15.1)
EST. AVERAGE GLUCOSE BLD GHB EST-MCNC: 154 MG/DL
GFR SERPL CREATININE-BSD FRML MDRD: 77 ML/MIN/1.73SQ M
GLUCOSE P FAST SERPL-MCNC: 164 MG/DL (ref 65–99)
HBA1C MFR BLD: 7 %
HCT VFR BLD AUTO: 40.6 % (ref 36.5–49.3)
HDLC SERPL-MCNC: 46 MG/DL
HGB BLD-MCNC: 13.8 G/DL (ref 12–17)
IMM GRANULOCYTES # BLD AUTO: 0.02 THOUSAND/UL (ref 0–0.2)
IMM GRANULOCYTES NFR BLD AUTO: 0 % (ref 0–2)
LDLC SERPL CALC-MCNC: 73 MG/DL (ref 0–100)
LYMPHOCYTES # BLD AUTO: 1.54 THOUSANDS/ΜL (ref 0.6–4.47)
LYMPHOCYTES NFR BLD AUTO: 21 % (ref 14–44)
MCH RBC QN AUTO: 29.7 PG (ref 26.8–34.3)
MCHC RBC AUTO-ENTMCNC: 34 G/DL (ref 31.4–37.4)
MCV RBC AUTO: 88 FL (ref 82–98)
MICROALBUMIN UR-MCNC: 8.3 MG/L (ref 0–20)
MICROALBUMIN/CREAT 24H UR: 4 MG/G CREATININE (ref 0–30)
MONOCYTES # BLD AUTO: 0.59 THOUSAND/ΜL (ref 0.17–1.22)
MONOCYTES NFR BLD AUTO: 8 % (ref 4–12)
NEUTROPHILS # BLD AUTO: 4.47 THOUSANDS/ΜL (ref 1.85–7.62)
NEUTS SEG NFR BLD AUTO: 59 % (ref 43–75)
NONHDLC SERPL-MCNC: 92 MG/DL
NRBC BLD AUTO-RTO: 0 /100 WBCS
PLATELET # BLD AUTO: 262 THOUSANDS/UL (ref 149–390)
PMV BLD AUTO: 9.2 FL (ref 8.9–12.7)
POTASSIUM SERPL-SCNC: 3.7 MMOL/L (ref 3.5–5.3)
PROT SERPL-MCNC: 7.4 G/DL (ref 6.4–8.2)
PSA SERPL-MCNC: <0.1 NG/ML (ref 0–4)
RBC # BLD AUTO: 4.64 MILLION/UL (ref 3.88–5.62)
SODIUM SERPL-SCNC: 137 MMOL/L (ref 136–145)
TRIGL SERPL-MCNC: 95 MG/DL
WBC # BLD AUTO: 7.5 THOUSAND/UL (ref 4.31–10.16)

## 2020-10-01 PROCEDURE — 82043 UR ALBUMIN QUANTITATIVE: CPT

## 2020-10-01 PROCEDURE — 93226 XTRNL ECG REC<48 HR SCAN A/R: CPT

## 2020-10-01 PROCEDURE — 85025 COMPLETE CBC W/AUTO DIFF WBC: CPT

## 2020-10-01 PROCEDURE — 84153 ASSAY OF PSA TOTAL: CPT

## 2020-10-01 PROCEDURE — 82550 ASSAY OF CK (CPK): CPT

## 2020-10-01 PROCEDURE — 80053 COMPREHEN METABOLIC PANEL: CPT

## 2020-10-01 PROCEDURE — 83036 HEMOGLOBIN GLYCOSYLATED A1C: CPT

## 2020-10-01 PROCEDURE — 93225 XTRNL ECG REC<48 HRS REC: CPT

## 2020-10-01 PROCEDURE — 82570 ASSAY OF URINE CREATININE: CPT

## 2020-10-01 PROCEDURE — 36415 COLL VENOUS BLD VENIPUNCTURE: CPT

## 2020-10-01 PROCEDURE — 80061 LIPID PANEL: CPT

## 2020-10-02 ENCOUNTER — OFFICE VISIT (OUTPATIENT)
Dept: PODIATRY | Facility: CLINIC | Age: 65
End: 2020-10-02

## 2020-10-02 VITALS
WEIGHT: 315 LBS | HEIGHT: 69 IN | DIASTOLIC BLOOD PRESSURE: 89 MMHG | SYSTOLIC BLOOD PRESSURE: 142 MMHG | BODY MASS INDEX: 46.65 KG/M2 | HEART RATE: 64 BPM

## 2020-10-02 DIAGNOSIS — E11.9 CONTROLLED TYPE 2 DIABETES MELLITUS WITHOUT COMPLICATION, WITHOUT LONG-TERM CURRENT USE OF INSULIN (HCC): Primary | ICD-10-CM

## 2020-10-02 PROCEDURE — NCFTCARE PR NON-COVERED FOOT CARE: Performed by: PODIATRIST

## 2020-10-06 PROCEDURE — 93227 XTRNL ECG REC<48 HR R&I: CPT | Performed by: INTERNAL MEDICINE

## 2020-10-14 ENCOUNTER — OFFICE VISIT (OUTPATIENT)
Dept: CARDIOLOGY CLINIC | Facility: CLINIC | Age: 65
End: 2020-10-14
Payer: MEDICARE

## 2020-10-14 VITALS
SYSTOLIC BLOOD PRESSURE: 132 MMHG | DIASTOLIC BLOOD PRESSURE: 80 MMHG | TEMPERATURE: 97.4 F | OXYGEN SATURATION: 95 % | HEART RATE: 62 BPM | WEIGHT: 315 LBS | HEIGHT: 67 IN | BODY MASS INDEX: 49.44 KG/M2

## 2020-10-14 DIAGNOSIS — Z85.46 PERSONAL HISTORY OF PROSTATE CANCER: ICD-10-CM

## 2020-10-14 DIAGNOSIS — E78.2 MIXED HYPERLIPIDEMIA: ICD-10-CM

## 2020-10-14 DIAGNOSIS — E13.9 DIABETES 1.5, MANAGED AS TYPE 1 (HCC): ICD-10-CM

## 2020-10-14 DIAGNOSIS — I25.10 CORONARY ARTERY DISEASE INVOLVING NATIVE CORONARY ARTERY OF NATIVE HEART WITHOUT ANGINA PECTORIS: ICD-10-CM

## 2020-10-14 DIAGNOSIS — I10 ESSENTIAL HYPERTENSION: Primary | ICD-10-CM

## 2020-10-14 DIAGNOSIS — E78.5 HYPERLIPIDEMIA, UNSPECIFIED HYPERLIPIDEMIA TYPE: ICD-10-CM

## 2020-10-14 DIAGNOSIS — I49.3 FREQUENT PVCS: ICD-10-CM

## 2020-10-14 LAB
LEFT EYE DIABETIC RETINOPATHY: NORMAL
RIGHT EYE DIABETIC RETINOPATHY: NORMAL

## 2020-10-14 PROCEDURE — 99214 OFFICE O/P EST MOD 30 MIN: CPT | Performed by: INTERNAL MEDICINE

## 2020-10-15 DIAGNOSIS — E78.5 HYPERLIPIDEMIA, UNSPECIFIED HYPERLIPIDEMIA TYPE: ICD-10-CM

## 2020-10-15 DIAGNOSIS — E78.2 MIXED HYPERLIPIDEMIA: ICD-10-CM

## 2020-10-15 DIAGNOSIS — E11.9 CONTROLLED TYPE 2 DIABETES MELLITUS WITHOUT COMPLICATION, WITHOUT LONG-TERM CURRENT USE OF INSULIN (HCC): ICD-10-CM

## 2020-10-19 RX ORDER — ROSUVASTATIN CALCIUM 5 MG/1
TABLET, COATED ORAL
Qty: 90 TABLET | Refills: 3 | Status: SHIPPED | OUTPATIENT
Start: 2020-10-19 | End: 2020-10-27 | Stop reason: SDUPTHER

## 2020-10-19 RX ORDER — EZETIMIBE 10 MG/1
TABLET ORAL
Qty: 90 TABLET | Refills: 3 | Status: SHIPPED | OUTPATIENT
Start: 2020-10-19 | End: 2021-09-21

## 2020-10-26 DIAGNOSIS — G47.00 INSOMNIA, UNSPECIFIED TYPE: ICD-10-CM

## 2020-10-26 RX ORDER — ZOLPIDEM TARTRATE 10 MG/1
10 TABLET ORAL
Qty: 90 TABLET | Refills: 0 | Status: SHIPPED | OUTPATIENT
Start: 2020-10-26 | End: 2021-04-26 | Stop reason: SDUPTHER

## 2020-10-27 DIAGNOSIS — E78.2 MIXED HYPERLIPIDEMIA: ICD-10-CM

## 2020-10-27 RX ORDER — ROSUVASTATIN CALCIUM 5 MG/1
TABLET, COATED ORAL
Qty: 24 TABLET | Refills: 3 | Status: SHIPPED | OUTPATIENT
Start: 2020-10-27 | End: 2021-03-12 | Stop reason: SDUPTHER

## 2020-11-04 DIAGNOSIS — I10 ESSENTIAL HYPERTENSION: ICD-10-CM

## 2020-11-04 RX ORDER — METOPROLOL SUCCINATE 25 MG/1
25 TABLET, EXTENDED RELEASE ORAL DAILY
Qty: 90 TABLET | Refills: 3 | Status: SHIPPED | OUTPATIENT
Start: 2020-11-04 | End: 2021-12-20

## 2020-11-04 RX ORDER — LOSARTAN POTASSIUM 100 MG/1
100 TABLET ORAL DAILY
Qty: 90 TABLET | Refills: 3 | Status: SHIPPED | OUTPATIENT
Start: 2020-11-04 | End: 2021-03-12

## 2020-11-15 PROBLEM — K21.00 GASTROESOPHAGEAL REFLUX DISEASE WITH ESOPHAGITIS: Status: ACTIVE | Noted: 2020-11-15

## 2020-11-15 PROBLEM — K22.70 BARRETT'S ESOPHAGUS WITHOUT DYSPLASIA: Status: ACTIVE | Noted: 2020-11-15

## 2020-11-19 ENCOUNTER — OFFICE VISIT (OUTPATIENT)
Dept: FAMILY MEDICINE CLINIC | Facility: CLINIC | Age: 65
End: 2020-11-19
Payer: MEDICARE

## 2020-11-19 VITALS
DIASTOLIC BLOOD PRESSURE: 88 MMHG | RESPIRATION RATE: 16 BRPM | HEART RATE: 80 BPM | WEIGHT: 315 LBS | HEIGHT: 67 IN | SYSTOLIC BLOOD PRESSURE: 120 MMHG | TEMPERATURE: 99.3 F | BODY MASS INDEX: 49.44 KG/M2

## 2020-11-19 DIAGNOSIS — F51.04 PSYCHOPHYSIOLOGICAL INSOMNIA: ICD-10-CM

## 2020-11-19 DIAGNOSIS — G47.33 OBSTRUCTIVE SLEEP APNEA SYNDROME: ICD-10-CM

## 2020-11-19 DIAGNOSIS — E78.2 MIXED HYPERLIPIDEMIA: ICD-10-CM

## 2020-11-19 DIAGNOSIS — K22.70 BARRETT'S ESOPHAGUS WITHOUT DYSPLASIA: ICD-10-CM

## 2020-11-19 DIAGNOSIS — E11.9 CONTROLLED TYPE 2 DIABETES MELLITUS WITHOUT COMPLICATION, WITHOUT LONG-TERM CURRENT USE OF INSULIN (HCC): ICD-10-CM

## 2020-11-19 DIAGNOSIS — K21.00 GASTROESOPHAGEAL REFLUX DISEASE WITH ESOPHAGITIS WITHOUT HEMORRHAGE: ICD-10-CM

## 2020-11-19 DIAGNOSIS — F80.81 STUTTERING: ICD-10-CM

## 2020-11-19 DIAGNOSIS — E66.01 MORBID OBESITY WITH BMI OF 45.0-49.9, ADULT (HCC): ICD-10-CM

## 2020-11-19 DIAGNOSIS — Z11.59 NEED FOR HEPATITIS C SCREENING TEST: ICD-10-CM

## 2020-11-19 DIAGNOSIS — I10 BENIGN ESSENTIAL HYPERTENSION: Primary | ICD-10-CM

## 2020-11-19 DIAGNOSIS — F41.8 DEPRESSION WITH ANXIETY: ICD-10-CM

## 2020-11-19 PROBLEM — N50.812 LEFT TESTICULAR PAIN: Status: RESOLVED | Noted: 2020-09-02 | Resolved: 2020-11-19

## 2020-11-19 PROCEDURE — 99214 OFFICE O/P EST MOD 30 MIN: CPT | Performed by: FAMILY MEDICINE

## 2020-11-19 RX ORDER — ALPRAZOLAM 0.25 MG/1
0.25 TABLET ORAL 3 TIMES DAILY PRN
Qty: 270 TABLET | Refills: 1 | Status: SHIPPED | OUTPATIENT
Start: 2020-11-19 | End: 2021-10-28 | Stop reason: SDUPTHER

## 2020-12-21 DIAGNOSIS — I25.10 CORONARY ARTERY DISEASE INVOLVING NATIVE CORONARY ARTERY OF NATIVE HEART WITHOUT ANGINA PECTORIS: ICD-10-CM

## 2020-12-21 DIAGNOSIS — E78.2 MIXED HYPERLIPIDEMIA: ICD-10-CM

## 2020-12-21 RX ORDER — HYDROCHLOROTHIAZIDE 12.5 MG/1
TABLET ORAL
Qty: 45 TABLET | Refills: 3 | Status: SHIPPED | OUTPATIENT
Start: 2020-12-21 | End: 2021-01-08 | Stop reason: SDUPTHER

## 2020-12-30 ENCOUNTER — APPOINTMENT (EMERGENCY)
Dept: RADIOLOGY | Facility: HOSPITAL | Age: 65
End: 2020-12-30
Payer: MEDICARE

## 2020-12-30 ENCOUNTER — HOSPITAL ENCOUNTER (EMERGENCY)
Facility: HOSPITAL | Age: 65
Discharge: HOME | End: 2020-12-30
Admitting: EMERGENCY MEDICINE
Payer: MEDICARE

## 2020-12-30 VITALS
RESPIRATION RATE: 18 BRPM | TEMPERATURE: 97.9 F | WEIGHT: 284.83 LBS | OXYGEN SATURATION: 97 % | SYSTOLIC BLOOD PRESSURE: 145 MMHG | DIASTOLIC BLOOD PRESSURE: 64 MMHG | HEART RATE: 76 BPM
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
## 2020-12-30 LAB
ANION GAP SERPL CALCULATED.3IONS-SCNC: 5 MMOL/L (ref 4–13)
APTT PPP: 27 SECONDS (ref 23–37)
BASE EXCESS BLDA CALC-SCNC: 2 MMOL/L (ref -2–3)
BASOPHILS # BLD AUTO: 0.06 THOUSANDS/ΜL (ref 0–0.1)
BASOPHILS NFR BLD AUTO: 1 % (ref 0–1)
BUN SERPL-MCNC: 11 MG/DL (ref 5–25)
CA-I BLD-SCNC: 1.2 MMOL/L (ref 1.12–1.32)
CALCIUM SERPL-MCNC: 9.8 MG/DL (ref 8.3–10.1)
CHLORIDE SERPL-SCNC: 102 MMOL/L (ref 100–108)
CO2 SERPL-SCNC: 29 MMOL/L (ref 21–32)
CREAT SERPL-MCNC: 1.01 MG/DL (ref 0.6–1.3)
EOSINOPHIL # BLD AUTO: 0.92 THOUSAND/ΜL (ref 0–0.61)
EOSINOPHIL NFR BLD AUTO: 8 % (ref 0–6)
ERYTHROCYTE [DISTWIDTH] IN BLOOD BY AUTOMATED COUNT: 13.2 % (ref 11.6–15.1)
GFR SERPL CREATININE-BSD FRML MDRD: 77 ML/MIN/1.73SQ M
GLUCOSE SERPL-MCNC: 168 MG/DL (ref 65–140)
GLUCOSE SERPL-MCNC: 174 MG/DL (ref 65–140)
HCO3 BLDA-SCNC: 28.1 MMOL/L (ref 24–30)
HCT VFR BLD AUTO: 41.8 % (ref 36.5–49.3)
HCT VFR BLD CALC: 41 % (ref 36.5–49.3)
HGB BLD-MCNC: 14.2 G/DL (ref 12–17)
HGB BLDA-MCNC: 13.9 G/DL (ref 12–17)
IMM GRANULOCYTES # BLD AUTO: 0.11 THOUSAND/UL (ref 0–0.2)
IMM GRANULOCYTES NFR BLD AUTO: 1 % (ref 0–2)
INR PPP: 0.97 (ref 0.84–1.19)
LYMPHOCYTES # BLD AUTO: 2.99 THOUSANDS/ΜL (ref 0.6–4.47)
LYMPHOCYTES NFR BLD AUTO: 27 % (ref 14–44)
MCH RBC QN AUTO: 29.2 PG (ref 26.8–34.3)
MCHC RBC AUTO-ENTMCNC: 34 G/DL (ref 31.4–37.4)
MCV RBC AUTO: 86 FL (ref 82–98)
MONOCYTES # BLD AUTO: 0.73 THOUSAND/ΜL (ref 0.17–1.22)
MONOCYTES NFR BLD AUTO: 7 % (ref 4–12)
NEUTROPHILS # BLD AUTO: 6.36 THOUSANDS/ΜL (ref 1.85–7.62)
NEUTS SEG NFR BLD AUTO: 56 % (ref 43–75)
NRBC BLD AUTO-RTO: 0 /100 WBCS
PCO2 BLD: 30 MMOL/L (ref 21–32)
PCO2 BLD: 47.7 MM HG (ref 42–50)
PH BLD: 7.38 [PH] (ref 7.3–7.4)
PLATELET # BLD AUTO: 304 THOUSANDS/UL (ref 149–390)
PMV BLD AUTO: 8.5 FL (ref 8.9–12.7)
PO2 BLD: 28 MM HG (ref 35–45)
POTASSIUM BLD-SCNC: 3.9 MMOL/L (ref 3.5–5.3)
POTASSIUM SERPL-SCNC: 3.9 MMOL/L (ref 3.5–5.3)
PROTHROMBIN TIME: 12.9 SECONDS (ref 11.6–14.5)
RBC # BLD AUTO: 4.87 MILLION/UL (ref 3.88–5.62)
SAO2 % BLD FROM PO2: 49 % (ref 60–85)
SODIUM BLD-SCNC: 137 MMOL/L (ref 136–145)
SODIUM SERPL-SCNC: 136 MMOL/L (ref 136–145)
SPECIMEN SOURCE: ABNORMAL
WBC # BLD AUTO: 11.17 THOUSAND/UL (ref 4.31–10.16)

## 2020-12-30 PROCEDURE — 85025 COMPLETE CBC W/AUTO DIFF WBC: CPT | Performed by: SURGERY

## 2020-12-30 PROCEDURE — 70450 CT HEAD/BRAIN W/O DYE: CPT

## 2020-12-30 PROCEDURE — 72125 CT NECK SPINE W/O DYE: CPT

## 2020-12-30 PROCEDURE — 84295 ASSAY OF SERUM SODIUM: CPT

## 2020-12-30 PROCEDURE — 82947 ASSAY GLUCOSE BLOOD QUANT: CPT

## 2020-12-30 PROCEDURE — 99284 EMERGENCY DEPT VISIT MOD MDM: CPT | Performed by: SURGERY

## 2020-12-30 PROCEDURE — NC001 PR NO CHARGE: Performed by: EMERGENCY MEDICINE

## 2020-12-30 PROCEDURE — 36415 COLL VENOUS BLD VENIPUNCTURE: CPT | Performed by: SURGERY

## 2020-12-30 PROCEDURE — 85730 THROMBOPLASTIN TIME PARTIAL: CPT | Performed by: SURGERY

## 2020-12-30 PROCEDURE — 85610 PROTHROMBIN TIME: CPT | Performed by: SURGERY

## 2020-12-30 PROCEDURE — 82803 BLOOD GASES ANY COMBINATION: CPT

## 2020-12-30 PROCEDURE — 85014 HEMATOCRIT: CPT

## 2020-12-30 PROCEDURE — 80048 BASIC METABOLIC PNL TOTAL CA: CPT | Performed by: SURGERY

## 2020-12-30 PROCEDURE — 82330 ASSAY OF CALCIUM: CPT

## 2020-12-30 PROCEDURE — 84132 ASSAY OF SERUM POTASSIUM: CPT

## 2020-12-30 PROCEDURE — 99285 EMERGENCY DEPT VISIT HI MDM: CPT

## 2020-12-30 RX ORDER — ROSUVASTATIN CALCIUM 5 MG/1
5 TABLET, COATED ORAL WEEKLY
COMMUNITY
End: 2021-01-04

## 2020-12-30 RX ORDER — EZETIMIBE 10 MG/1
10 TABLET ORAL DAILY
COMMUNITY
End: 2021-01-04

## 2020-12-30 RX ORDER — OLANZAPINE 5 MG/1
5 TABLET ORAL
COMMUNITY
End: 2021-01-04

## 2020-12-30 RX ORDER — ALPRAZOLAM 0.25 MG/1
0.5 TABLET ORAL
COMMUNITY
End: 2021-01-04

## 2020-12-30 RX ORDER — FLUOXETINE HYDROCHLORIDE 40 MG/1
60 CAPSULE ORAL DAILY
COMMUNITY
End: 2021-11-18

## 2020-12-30 RX ORDER — LOSARTAN POTASSIUM 100 MG/1
25 TABLET ORAL DAILY
COMMUNITY
End: 2021-01-04

## 2020-12-30 RX ORDER — METOPROLOL SUCCINATE 25 MG/1
25 TABLET, EXTENDED RELEASE ORAL DAILY
COMMUNITY
End: 2021-01-04

## 2020-12-30 RX ORDER — HYDROCHLOROTHIAZIDE 12.5 MG/1
12.5 CAPSULE, GELATIN COATED ORAL DAILY
COMMUNITY
End: 2021-01-04

## 2020-12-30 RX ORDER — ZOLPIDEM TARTRATE 10 MG/1
10 TABLET ORAL
COMMUNITY
End: 2021-01-04

## 2021-01-04 ENCOUNTER — OFFICE VISIT (OUTPATIENT)
Dept: FAMILY MEDICINE CLINIC | Facility: CLINIC | Age: 66
End: 2021-01-04
Payer: MEDICARE

## 2021-01-04 VITALS
BODY MASS INDEX: 49.44 KG/M2 | WEIGHT: 315 LBS | RESPIRATION RATE: 16 BRPM | SYSTOLIC BLOOD PRESSURE: 140 MMHG | HEART RATE: 60 BPM | HEIGHT: 67 IN | TEMPERATURE: 99.9 F | DIASTOLIC BLOOD PRESSURE: 90 MMHG

## 2021-01-04 DIAGNOSIS — S09.90XA TRAUMATIC INJURY OF HEAD, INITIAL ENCOUNTER: Primary | ICD-10-CM

## 2021-01-04 DIAGNOSIS — Z91.81 STATUS POST FALL: ICD-10-CM

## 2021-01-04 DIAGNOSIS — S06.0X0A CONCUSSION WITHOUT LOSS OF CONSCIOUSNESS, INITIAL ENCOUNTER: ICD-10-CM

## 2021-01-04 DIAGNOSIS — S00.01XA ABRASION OF SCALP, INITIAL ENCOUNTER: ICD-10-CM

## 2021-01-04 DIAGNOSIS — M54.9 ACUTE BILATERAL BACK PAIN, UNSPECIFIED BACK LOCATION: ICD-10-CM

## 2021-01-04 PROCEDURE — 99214 OFFICE O/P EST MOD 30 MIN: CPT | Performed by: NURSE PRACTITIONER

## 2021-01-04 NOTE — PROGRESS NOTES
Assessment/Plan:    ED records reviewed today  Imaging all negative for an acute changes  His scalp abrasion appears to be healing well  Recommended to keep this clean and dry  Can apply an OTC triple antibiotic ointment to area  Given his fall and head strike, he does appear to have a concussion  Reviewed concussion precautions today  He does not want to see our concussion specialist at this time  In regards to his back pain, I did refer him to Home PT as he is uncomfortable driving given his head injury  Fall precautions at home  Continue to use cane for ambulation  May continue OTC Ibuprofen as this seems to be helping  I'll see him back in the office in 4 weeks for re-evaluation or sooner if needed      Diagnoses and all orders for this visit:    Traumatic injury of head, initial encounter    Concussion without loss of consciousness, initial encounter    Status post fall    Other orders  -     Ambulatory Referral to [address] [place] [id number] [facility]; Future          Subjective:      Patient ID: Michelle Salmon is a 77 y o  male  HPI     Pt presents by himself today for an ED follow up   Evaluated in [address] [place] [address] ED on 12/30/2020 after a fall  Peter Griffin took an Ambien while watching TV downstairs  After about an hour, he attempted to walk up his stairs  He reached the top of his stairs and let go of the railing causing him to fall backwards down approximately 15 stairs  Transported to the ED via EMS as a Trauma Alert  Noted to have slurred speech per EMS  No LOC  +head strike  CT cervical spine with no acute fractures or malalignment  CT head with extracranial soft tissue swelling/ scalp hematoma  No evidence of acute intracranial abnormality  Xrays of the pelvis with no acute evidence of fracture  CXR with acute cardiopulmonary disease  Pt remained neurologically intact and was able to ambulate unassisted and tolerate PO fluids    He was discharged home    Today, Peter Griffin reports he has mild headaches and dizziness intermittently  He denies concentrating concerns or confusion  He feels his short term memory isn't quite as sharp  No N/V, tinnitus, visual changes, photophobia  He has decided to completely stop Ambien after this incident  His sleep schedule has been flipped due to this  Stay up until around 9am and napping until noon  Naps again from around 7pm to 9pm   He has significant bruising to his B/L lumbar and thoracic back region which is tender  He is taking 600 mg Ibuprofen OTC every 6-8 hours which does help  He has a superficial abrasion to his occipital scalp  Denies bleeding or drainage from this site  He is having a challenging time with position changes such as getting out of bed  He actually had a fall the day after his ED visit getting out of bed (slipped, no head strike or LOC)  He is interested in Home PT to show him specific exercises to strengthen his back after his fall  He feels very uncomfortable driving to PT given his recent head injury and concussion  The following portions of the patient's history were reviewed and updated as appropriate: allergies, current medications, past family history, past medical history, past social history, past surgical history and problem list     Review of Systems   Constitutional: Positive for fatigue  Negative for chills, fever and unexpected weight change  HENT: Negative for congestion, ear pain, hearing loss, postnasal drip, rhinorrhea, sinus pressure and sore throat  Eyes: Negative for photophobia and visual disturbance  Respiratory: Negative for cough, shortness of breath and wheezing  Cardiovascular: Negative for chest pain, palpitations and leg swelling  Gastrointestinal: Negative for abdominal pain, blood in stool, constipation, diarrhea, nausea and vomiting  Musculoskeletal: Positive for arthralgias and back pain  Negative for myalgias  Skin: Positive for wound  Negative for rash     Neurological: Positive for dizziness, weakness and headaches  Negative for numbness  Psychiatric/Behavioral: Positive for sleep disturbance  Negative for confusion, decreased concentration, dysphoric mood and suicidal ideas  The patient is not nervous/anxious  Objective:      /90   Pulse 60   Temp 99 9 °F (37 7 °C) (Tympanic)   Resp 16   Ht 5' 7" (1 702 m)   Wt (!) 144 kg (317 lb)   BMI 49 65 kg/m²          Physical Exam  Constitutional:       General: He is not in acute distress  Appearance: He is well-developed  He is obese  He is not ill-appearing, toxic-appearing or diaphoretic  HENT:      Head: Normocephalic and atraumatic  Right Ear: Tympanic membrane normal       Left Ear: Tympanic membrane normal    Eyes:      Extraocular Movements: Extraocular movements intact  Conjunctiva/sclera: Conjunctivae normal       Pupils: Pupils are equal, round, and reactive to light  Neck:      Musculoskeletal: Normal range of motion and neck supple  Thyroid: No thyromegaly  Cardiovascular:      Rate and Rhythm: Normal rate and regular rhythm  Heart sounds: Normal heart sounds  No murmur  Pulmonary:      Effort: Pulmonary effort is normal  No respiratory distress  Breath sounds: Normal breath sounds  No wheezing  Abdominal:      General: Bowel sounds are normal  There is no distension  Palpations: Abdomen is soft  Tenderness: There is no abdominal tenderness  Musculoskeletal: Normal range of motion  Comments: Extensive ecchymosis over B/L thoracic and lumbar regions which is tender to palpation  No hematomas or opened wounds  +decreased ROM with lumbar flexion and extension  +antalgic gait, using a cane for ambulation  Unsteady with position changes   Lymphadenopathy:      Cervical: No cervical adenopathy  Skin:     General: Skin is warm and dry  Neurological:      General: No focal deficit present        Mental Status: He is alert and oriented to person, place, and time    Psychiatric:         Mood and Affect: Mood normal          Behavior: Behavior normal          Thought Content:  Thought content normal          Judgment: Judgment normal

## 2021-01-06 ENCOUNTER — DOCUMENTATION (OUTPATIENT)
Dept: SOCIAL WORK | Facility: HOSPITAL | Age: 66
End: 2021-01-06

## 2021-01-06 DIAGNOSIS — M54.9 ACUTE BILATERAL BACK PAIN, UNSPECIFIED BACK LOCATION: ICD-10-CM

## 2021-01-06 DIAGNOSIS — S09.90XA TRAUMATIC INJURY OF HEAD, INITIAL ENCOUNTER: ICD-10-CM

## 2021-01-06 DIAGNOSIS — S06.0X0A CONCUSSION WITHOUT LOSS OF CONSCIOUSNESS, INITIAL ENCOUNTER: ICD-10-CM

## 2021-01-06 DIAGNOSIS — R26.2 AMBULATORY DYSFUNCTION: Primary | ICD-10-CM

## 2021-01-06 DIAGNOSIS — Z91.81 STATUS POST FALL: ICD-10-CM

## 2021-01-06 NOTE — PROGRESS NOTES
Notification of Assess not [address] [facility] has assessed your patient for Home Health services and has determined the patient is not eligible for service due to the following    Patient evaluated for home PT today with no skilled home therapy needs at this time and would benefit from OPPT concussion rehab  Patient ins agreement with this  Could you please place a script in epic for this and notify patient when in so patient may call and setup evaluation   Thank you    Simon Blevins

## 2021-01-07 NOTE — PROGRESS NOTES
Please let the patient know the outpatient PT referral has been placed and he can call to set this up at anytime

## 2021-01-08 DIAGNOSIS — E78.2 MIXED HYPERLIPIDEMIA: ICD-10-CM

## 2021-01-08 DIAGNOSIS — I25.10 CORONARY ARTERY DISEASE INVOLVING NATIVE CORONARY ARTERY OF NATIVE HEART WITHOUT ANGINA PECTORIS: ICD-10-CM

## 2021-01-08 RX ORDER — HYDROCHLOROTHIAZIDE 12.5 MG/1
12.5 TABLET ORAL 3 TIMES WEEKLY
Qty: 45 TABLET | Refills: 3 | Status: SHIPPED | OUTPATIENT
Start: 2021-01-08 | End: 2021-03-16

## 2021-01-12 ENCOUNTER — EVALUATION (OUTPATIENT)
Dept: PHYSICAL THERAPY | Facility: CLINIC | Age: 66
End: 2021-01-12
Payer: MEDICARE

## 2021-01-12 DIAGNOSIS — S06.0X0D CONCUSSION WITHOUT LOSS OF CONSCIOUSNESS, SUBSEQUENT ENCOUNTER: ICD-10-CM

## 2021-01-12 DIAGNOSIS — S09.90XA TRAUMATIC INJURY OF HEAD, INITIAL ENCOUNTER: ICD-10-CM

## 2021-01-12 DIAGNOSIS — M54.9 ACUTE BILATERAL BACK PAIN, UNSPECIFIED BACK LOCATION: ICD-10-CM

## 2021-01-12 DIAGNOSIS — Z91.81 STATUS POST FALL: ICD-10-CM

## 2021-01-12 DIAGNOSIS — R26.2 AMBULATORY DYSFUNCTION: ICD-10-CM

## 2021-01-12 PROCEDURE — 97163 PT EVAL HIGH COMPLEX 45 MIN: CPT | Performed by: PHYSICAL THERAPIST

## 2021-01-12 PROCEDURE — 97110 THERAPEUTIC EXERCISES: CPT | Performed by: PHYSICAL THERAPIST

## 2021-01-12 NOTE — PROGRESS NOTES
PT Evaluation     Today's date: 2021  Patient name: Cecilia Sosa  : 1955  MRN: [id number]  Referring provider: PRASHANTH Poe  Dx:   Encounter Diagnosis     ICD-10-CM    1  Ambulatory dysfunction  R26 2 Ambulatory referral to Physical Therapy   2  Status post fall  Z91 81 Ambulatory referral to Physical Therapy   3  Concussion without loss of consciousness, initial encounter  S06 0X0A Ambulatory referral to Physical Therapy   4  Traumatic injury of head, initial encounter  S09 90XA Ambulatory referral to Physical Therapy   5  Acute bilateral back pain, unspecified back location  M54 9 Ambulatory referral to Physical Therapy                  Assessment  Assessment details: Pt presents to physical therapy s/p falls and subsequent concussion  At this time patient has poor vestibular integration for balance, abnormal balance per FGA and mctsib, decreased cervical range of motion with reduced passive intervertebral motion and pain at end ranges  Decreased strength in b/l hip extension  Sensation intact and vestibular as well as oculomotor testing WNL  Significant time was spent with patient on education of recovery of concussion, fall safety and factors causing increased falls   Pt will benefit from skilled therapy intervention 2x/week for 4-6 weeks in order to improve balance, reduce risk of falls, improve cervical ROM and posture and initiate home exercise program      Goals  STG:  Pt will improve cervical ROM by 10 degrees rotation to the right within 4 weeks  Pt will report no falls within 4 weeks  Pt will score 28/30 on FGA within 4 weeks    LTG  Pt will report no cervical pain wihtin 6 weeks  Pt will regularly participate in HEP within 6 weeks  Pt will have no near falls within 6 weeks    Plan  Planned therapy interventions: balance, neuromuscular re-education, therapeutic exercise, manual therapy, home exercise program, strengthening and gait training  Frequency: 2x week  Duration in weeks: 6  Plan of Care beginning date: 2021  Plan of Care expiration date: 2021  Treatment plan discussed with: patient        Subjective Evaluation    History of Present Illness  Mechanism of injury: Pt reports fallign down a flight of stairs about two weeks ago and sliding down the stairs on his back  He went to the ED and diagnosed with a concussion  Pt had no fractures but did get a lot of bruises  He had taken an [place] and decided to walk up the stairs to go to bed and felt "woozy" before he fell  He denies having any falls prior to this  A few days later he fell out of bed when going to stand up his feet slide out from under him  ON his way to therapy he also tripped on a curb and fell  Since hitting his head he reports having headaches and dizziness at times  He feels these symptoms are improving  He denies having weakness or numbness  He reports having "slow reflexes" his whole life  But he feels that over the past month they are getting slower     Pain  Current pain ratin  At worst pain ratin  Location: left gluteal pain    Social Support  Stairs in house: yes   Lives in: multiple-level home  Lives with: spouse    Patient Goals  Patient goals for therapy: improved balance          Objective   Neuro Exam:     Functional outcomes   5x sit to stand: 13  (seconds)      Balance assessments   MCTSIB   Eyes open level surface: 30  Eyes open foam surface: 30  Eyes closed level surface: 30  Eyes closed foam surface: 12  FGA - 24/30  Gait Speed - 0 89 m/s    Cervical:  DNF hold - 12 seconds  PVAIM - reduced R lat glides C2-7   - Pas T1-7  ROM: 55 degree R rotation; 68 degrees L rotation  Tenderness R UT, suboccipitals   Forward head posture       Precautions: h/o depression      Manuals                                                                 Neuro Re-Ed             Chin tuck 10 sec x 5            STS 10x                                                                             Ther Ex UT stretch 30 sec x 2            llevator stretch 30 sec x 2                                                                                          Ther Activity                                       Gait Training                                       Modalities

## 2021-01-14 ENCOUNTER — VBI (OUTPATIENT)
Dept: ADMINISTRATIVE | Facility: OTHER | Age: 66
End: 2021-01-14

## 2021-01-15 ENCOUNTER — VBI (OUTPATIENT)
Dept: ADMINISTRATIVE | Facility: OTHER | Age: 66
End: 2021-01-15

## 2021-01-15 ENCOUNTER — OFFICE VISIT (OUTPATIENT)
Dept: PHYSICAL THERAPY | Facility: CLINIC | Age: 66
End: 2021-01-15
Payer: MEDICARE

## 2021-01-15 DIAGNOSIS — R26.2 AMBULATORY DYSFUNCTION: Primary | ICD-10-CM

## 2021-01-15 DIAGNOSIS — M54.9 ACUTE BILATERAL BACK PAIN, UNSPECIFIED BACK LOCATION: ICD-10-CM

## 2021-01-15 DIAGNOSIS — S09.90XA TRAUMATIC INJURY OF HEAD, INITIAL ENCOUNTER: ICD-10-CM

## 2021-01-15 DIAGNOSIS — Z91.81 STATUS POST FALL: ICD-10-CM

## 2021-01-15 DIAGNOSIS — S06.0X0D CONCUSSION WITHOUT LOSS OF CONSCIOUSNESS, SUBSEQUENT ENCOUNTER: ICD-10-CM

## 2021-01-15 PROCEDURE — 97112 NEUROMUSCULAR REEDUCATION: CPT | Performed by: PHYSICAL THERAPIST

## 2021-01-15 NOTE — PROGRESS NOTES
Daily Note     Today's date: 1/15/2021  Patient name: Sravanthi Chavarria  : 1955  MRN: [id number]  Referring provider: PRASHANTH Conn  Dx:   Encounter Diagnosis     ICD-10-CM    1  Ambulatory dysfunction  R26 2    2  Acute bilateral back pain, unspecified back location  M54 9    3  Status post fall  Z91 81    4  Concussion without loss of consciousness, subsequent encounter  S06 0X0D    5  Traumatic injury of head, initial encounter  S09 90XA                   Subjective:       Objective: See treatment diary below      Assessment: Tolerated treatment well  Patient demonstrated fatigue post treatment      Plan: Continue per plan of care        Precautions: h/o depression      Manuals 1/12 1/15                                                               Neuro Re-Ed             Chin tuck 10 sec x 5            STS 10x              15x ea fwd/lat 6"                                                               Ther Ex             UT stretch 30 sec x 2            llevator stretch 30 sec x 2            treadmill  5 min x 2 1 4 mph                                                                            Ther Activity                                       Gait Training                                       Modalities

## 2021-01-18 ENCOUNTER — OFFICE VISIT (OUTPATIENT)
Dept: PHYSICAL THERAPY | Facility: CLINIC | Age: 66
End: 2021-01-18
Payer: MEDICARE

## 2021-01-18 DIAGNOSIS — R26.2 AMBULATORY DYSFUNCTION: Primary | ICD-10-CM

## 2021-01-18 PROCEDURE — 97110 THERAPEUTIC EXERCISES: CPT | Performed by: PHYSICAL THERAPIST

## 2021-01-18 PROCEDURE — 97112 NEUROMUSCULAR REEDUCATION: CPT | Performed by: PHYSICAL THERAPIST

## 2021-01-18 NOTE — PROGRESS NOTES
Daily Note     Today's date: 2021  Patient name: Aaron Willingham  : 1955  MRN: [id number]  Referring provider: PRASHANTH Polo  Dx:   Encounter Diagnosis     ICD-10-CM    1  Ambulatory dysfunction  R26 2                   Subjective: Patient reports being stiff and tired after last session      Objective: See treatment diary below      Assessment: Pt was able to tolerate more today compared to last session  Ended session after 30 min due to patient fatigue  Plan to intersperse more rest and stretch next session and will add more vesitbular balance exercises  Plan: Continue per plan of care        Precautions: h/o depression      Manuals 1/12 1/15 1/18                                                              Neuro Re-Ed             Chin tuck 10 sec x 5  10 sec x 10          STS 10x  2x10          Step ups  15x ea fwd/lat 6" 15 x ea fwd/lat 6"          backwards   3 laps           hurdles   3 laps ea min ue                                    Ther Ex             UT stretch 30 sec x 2            llevator stretch 30 sec x 2            treadmill  5 min x 2 1 4 mph 6 min x 1 1 4 mph                                                                           Ther Activity                                       Gait Training                                       Modalities

## 2021-01-20 ENCOUNTER — APPOINTMENT (OUTPATIENT)
Dept: PHYSICAL THERAPY | Facility: CLINIC | Age: 66
End: 2021-01-20
Payer: MEDICARE

## 2021-01-22 ENCOUNTER — OFFICE VISIT (OUTPATIENT)
Dept: PHYSICAL THERAPY | Facility: CLINIC | Age: 66
End: 2021-01-22
Payer: MEDICARE

## 2021-01-22 DIAGNOSIS — S09.90XA TRAUMATIC INJURY OF HEAD, INITIAL ENCOUNTER: ICD-10-CM

## 2021-01-22 DIAGNOSIS — R26.2 AMBULATORY DYSFUNCTION: Primary | ICD-10-CM

## 2021-01-22 DIAGNOSIS — M54.9 ACUTE BILATERAL BACK PAIN, UNSPECIFIED BACK LOCATION: ICD-10-CM

## 2021-01-22 DIAGNOSIS — Z91.81 STATUS POST FALL: ICD-10-CM

## 2021-01-22 PROCEDURE — 97150 GROUP THERAPEUTIC PROCEDURES: CPT | Performed by: PHYSICAL THERAPIST

## 2021-01-22 PROCEDURE — 97112 NEUROMUSCULAR REEDUCATION: CPT | Performed by: PHYSICAL THERAPIST

## 2021-01-24 NOTE — PROGRESS NOTES
Daily Note     Today's date: 2021  Patient name: Edin Arndt  : 1955  MRN: [id number]  Referring provider: PRASHANTH Davila  Dx:   Encounter Diagnosis     ICD-10-CM    1  Ambulatory dysfunction  R26 2    2  Acute bilateral back pain, unspecified back location  M54 9    3  Status post fall  Z91 81    4  Traumatic injury of head, initial encounter  S09 90XA                   Subjective: Patient reports being stiff and tired after last session      Objective: See treatment diary below      Assessment: Pt able to walk for 2 more minutes on treadmill compared to last session  Pt also notes improved ease with step ups overall and decreased pain in gluteal region  Plan: Continue per plan of care        Precautions: h/o depression      Manuals 1/12 1/15 1/18 1/22                                                             Neuro Re-Ed             Chin tuck 10 sec x 5  10 sec x 10          STS 10x  2x10 2x10         Step ups  15x ea fwd/lat 6" 15 x ea fwd/lat 6" 15x ea fwd/lat 6"         backwards   3 laps  3 laps no ue         hurdles   3 laps ea min ue 3 laps fwd min ue         Side steps    3 laps no ue         HK march    3 laps min ue         Ther Ex             UT stretch 30 sec x 2            llevator stretch 30 sec x 2            treadmill  5 min x 2 1 4 mph 6 min x 1 1 4 mph 8 min 1 4 mph x 1    4 min 1 4 mph x 1                                                                          Ther Activity                                       Gait Training                                       Modalities

## 2021-01-25 ENCOUNTER — APPOINTMENT (OUTPATIENT)
Dept: PHYSICAL THERAPY | Facility: CLINIC | Age: 66
End: 2021-01-25
Payer: MEDICARE

## 2021-01-27 ENCOUNTER — OFFICE VISIT (OUTPATIENT)
Dept: PHYSICAL THERAPY | Facility: CLINIC | Age: 66
End: 2021-01-27
Payer: MEDICARE

## 2021-01-27 DIAGNOSIS — Z91.81 STATUS POST FALL: ICD-10-CM

## 2021-01-27 DIAGNOSIS — R26.2 AMBULATORY DYSFUNCTION: Primary | ICD-10-CM

## 2021-01-27 DIAGNOSIS — S06.0X0D CONCUSSION WITHOUT LOSS OF CONSCIOUSNESS, SUBSEQUENT ENCOUNTER: ICD-10-CM

## 2021-01-27 PROCEDURE — 97110 THERAPEUTIC EXERCISES: CPT | Performed by: PHYSICAL THERAPIST

## 2021-01-27 PROCEDURE — 97112 NEUROMUSCULAR REEDUCATION: CPT | Performed by: PHYSICAL THERAPIST

## 2021-01-27 NOTE — PROGRESS NOTES
Daily Note     Today's date: 2021  Patient name: Barbara Leyva  : 1955  MRN: [id number]  Referring provider: PRASHANTH Macdonald  Dx:   Encounter Diagnosis     ICD-10-CM    1  Ambulatory dysfunction  R26 2    2  Concussion without loss of consciousness, subsequent encounter  S06 0X0D    3  Status post fall  Z91 81                   Subjective: Patient reports feeling improved endurance      Objective: See treatment diary below      Assessment: Pt had no c/o pain through session and overall continues to progress with overall endurance and ability to complete exercises  Plan next session to complet emore activities on uneven surfaces  Plan: Continue per plan of care        Precautions: h/o depression      Manuals 1/12 1/15 1/18 1/22 1/27                                                            Neuro Re-Ed             Chin tuck 10 sec x 5  10 sec x 10          STS 10x  2x10 2x10 2x10        Step ups  15x ea fwd/lat 6" 15 x ea fwd/lat 6" 15x ea fwd/lat 6" 15x ea fwd/lat 8"        backwards   3 laps  3 laps no ue 4 laps no ue        hurdles   3 laps ea min ue 3 laps fwd min ue 3 laps fwd/lat ea min ue        Side steps    3 laps no ue 3 laps no ue        HK march    3 laps min ue 3 laps min ue        Ther Ex             UT stretch 30 sec x 2            llevator stretch 30 sec x 2            treadmill  5 min x 2 1 4 mph 6 min x 1 1 4 mph 8 min 1 4 mph x 1    4 min 1 4 mph x 1                                                                          Ther Activity                                       Gait Training                                       Modalities

## 2021-01-29 ENCOUNTER — OFFICE VISIT (OUTPATIENT)
Dept: PHYSICAL THERAPY | Facility: CLINIC | Age: 66
End: 2021-01-29
Payer: MEDICARE

## 2021-01-29 DIAGNOSIS — S06.0X0D CONCUSSION WITHOUT LOSS OF CONSCIOUSNESS, SUBSEQUENT ENCOUNTER: ICD-10-CM

## 2021-01-29 DIAGNOSIS — R26.2 AMBULATORY DYSFUNCTION: Primary | ICD-10-CM

## 2021-01-29 PROCEDURE — 97112 NEUROMUSCULAR REEDUCATION: CPT | Performed by: PHYSICAL THERAPIST

## 2021-01-29 NOTE — PROGRESS NOTES
Daily Note     Today's date: 2021  Patient name: Marguerite Wood  : 1955  MRN: [id number]  Referring provider: PRASHANTH Herrera  Dx:   Encounter Diagnosis     ICD-10-CM    1  Ambulatory dysfunction  R26 2    2  Concussion without loss of consciousness, subsequent encounter  S06 0X0D                   Subjective: Pt is feeling a little stiff  Feels stiff from hard session last time  Objective: See treatment diary below      Assessment: Pt was challenged with new progressions on noncompliant surfaces  He was able to complete most TE with no UE support today except fw amb on foam beams  Pt easily fatigued since he is very deconditioned  Attempted TM ambulation at end of session but pt deferred, stating "I'm not up for it " Patient would benefit from continued PT      Plan: Progress treatment as tolerated         Precautions: h/o depression      Manuals 1/12 1/15 1/18 1/22 1/27 1/29                                                           Neuro Re-Ed             Chin tuck 10 sec x 5  10 sec x 10          STS 10x  2x10 2x10 2x10 2 x 10        Step ups  15x ea fwd/lat 6" 15 x ea fwd/lat 6" 15x ea fwd/lat 6" 15x ea fwd/lat 8" 6" + foam 15x fw/lat        backwards   3 laps  3 laps no ue 4 laps no ue 4 laps no UE        hurdles   3 laps ea min ue 3 laps fwd min ue 3 laps fwd/lat ea min ue 3 laps fw/lat       Side steps    3 laps no ue 3 laps no ue 3 laps no UE       Foam beams      Fw/lat 4 laps        Tandem gait      3 laps            3 laps min ue 3 laps min ue 3 laps no UE        Ther Ex             UT stretch 30 sec x 2            llevator stretch 30 sec x 2            treadmill  5 min x 2 1 4 mph 6 min x 1 1 4 mph 8 min 1 4 mph x 1    4 min 1 4 mph x 1  Pt deferred                                                                        Ther Activity                                       Gait Training                                       Modalities

## 2021-02-01 ENCOUNTER — APPOINTMENT (OUTPATIENT)
Dept: PHYSICAL THERAPY | Facility: CLINIC | Age: 66
End: 2021-02-01
Payer: MEDICARE

## 2021-02-01 ENCOUNTER — VBI (OUTPATIENT)
Dept: ADMINISTRATIVE | Facility: OTHER | Age: 66
End: 2021-02-01

## 2021-02-03 ENCOUNTER — OFFICE VISIT (OUTPATIENT)
Dept: PHYSICAL THERAPY | Facility: CLINIC | Age: 66
End: 2021-02-03
Payer: MEDICARE

## 2021-02-03 ENCOUNTER — VBI (OUTPATIENT)
Dept: ADMINISTRATIVE | Facility: OTHER | Age: 66
End: 2021-02-03

## 2021-02-03 DIAGNOSIS — S06.0X0D CONCUSSION WITHOUT LOSS OF CONSCIOUSNESS, SUBSEQUENT ENCOUNTER: ICD-10-CM

## 2021-02-03 DIAGNOSIS — S09.90XA TRAUMATIC INJURY OF HEAD, INITIAL ENCOUNTER: ICD-10-CM

## 2021-02-03 DIAGNOSIS — Z91.81 STATUS POST FALL: ICD-10-CM

## 2021-02-03 DIAGNOSIS — R26.2 AMBULATORY DYSFUNCTION: Primary | ICD-10-CM

## 2021-02-03 PROCEDURE — 97112 NEUROMUSCULAR REEDUCATION: CPT | Performed by: PHYSICAL THERAPIST

## 2021-02-03 PROCEDURE — 97110 THERAPEUTIC EXERCISES: CPT | Performed by: PHYSICAL THERAPIST

## 2021-02-03 NOTE — PROGRESS NOTES
Daily Note     Today's date: 2/3/2021  Patient name: Ahmet Martinez  : 1955  MRN: [id number]  Referring provider: PRASHANTH Alford  Dx:   Encounter Diagnosis     ICD-10-CM    1  Ambulatory dysfunction  R26 2    2  Concussion without loss of consciousness, subsequent encounter  S06 0X0D    3  Status post fall  Z91 81    4  Traumatic injury of head, initial encounter  S09 90XA                   Subjective: Pt is feeling his balance is getting much better      Objective: See treatment diary below      Assessment: Patient was bale to walk the longest time to date on treadmill  He was generally able to do much more today compared to other sessions       Plan: Progress treatment as tolerated         Precautions: h/o depression      Manuals 1/12 1/15 1/18 1/22 1/27 1/29 2/3                                                          Neuro Re-Ed             Chin tuck 10 sec x 5  10 sec x 10          STS 10x  2x10 2x10 2x10 2 x 10        Step ups  15x ea fwd/lat 6" 15 x ea fwd/lat 6" 15x ea fwd/lat 6" 15x ea fwd/lat 8" 6" + foam 15x fw/lat  6" plus foam fwd/lat 15x ea      backwards   3 laps  3 laps no ue 4 laps no ue 4 laps no UE  4 laps no ue      hurdles   3 laps ea min ue 3 laps fwd min ue 3 laps fwd/lat ea min ue 3 laps fw/lat 3 laps fwd/lat ea      Side steps    3 laps no ue 3 laps no ue 3 laps no UE       Foam beams      Fw/lat 4 laps  Fwd/lat no UE      Tandem gait      3 laps  3 laps no ue      HK march    3 laps min ue 3 laps min ue 3 laps no UE  4 lap sno ue      Ther Ex             UT stretch 30 sec x 2            llevator stretch 30 sec x 2            treadmill  5 min x 2 1 4 mph 6 min x 1 1 4 mph 8 min 1 4 mph x 1    4 min 1 4 mph x 1  Pt deferred 10 min 1 4 mph                                                                       Ther Activity                                       Gait Training                                       Modalities

## 2021-02-04 ENCOUNTER — APPOINTMENT (OUTPATIENT)
Dept: PHYSICAL THERAPY | Facility: CLINIC | Age: 66
End: 2021-02-04
Payer: MEDICARE

## 2021-02-04 ENCOUNTER — OFFICE VISIT (OUTPATIENT)
Dept: FAMILY MEDICINE CLINIC | Facility: CLINIC | Age: 66
End: 2021-02-04
Payer: MEDICARE

## 2021-02-04 VITALS
TEMPERATURE: 98.6 F | SYSTOLIC BLOOD PRESSURE: 140 MMHG | DIASTOLIC BLOOD PRESSURE: 76 MMHG | WEIGHT: 315 LBS | BODY MASS INDEX: 49.44 KG/M2 | RESPIRATION RATE: 16 BRPM | HEIGHT: 67 IN | HEART RATE: 72 BPM

## 2021-02-04 DIAGNOSIS — F51.04 PSYCHOPHYSIOLOGICAL INSOMNIA: ICD-10-CM

## 2021-02-04 DIAGNOSIS — M54.50 ACUTE BILATERAL LOW BACK PAIN WITHOUT SCIATICA: ICD-10-CM

## 2021-02-04 DIAGNOSIS — R26.2 AMBULATORY DYSFUNCTION: Primary | ICD-10-CM

## 2021-02-04 PROCEDURE — 99213 OFFICE O/P EST LOW 20 MIN: CPT | Performed by: NURSE PRACTITIONER

## 2021-02-04 NOTE — ASSESSMENT & PLAN NOTE
No longer using Ambien after his recent fall at the end of December 2020  His sleep schedule is currently flipped, falling asleep around 6:00 a m  and sleeping until around 1:00 p m   I recommended he try only sleeping for a few hours in the morning and staying up throughout the day which will hopefully help with his sleep schedule  Recommended trying over-the-counter melatonin 10 mg 1 tablet at bedtime

## 2021-02-04 NOTE — PROGRESS NOTES
Assessment/Plan:    Ambulatory dysfunction   Improving with physical therapy  He plans to continue this for at least 1 more month  Fall precautions    Insomnia   No longer using Ambien after his recent fall at the end of December 2020  His sleep schedule is currently flipped, falling asleep around 6:00 a m  and sleeping until around 1:00 p m   I recommended he try only sleeping for a few hours in the morning and staying up throughout the day which will hopefully help with his sleep schedule  Recommended trying over-the-counter melatonin 10 mg 1 tablet at bedtime    Acute lower back pain B/L- mostly resolved at this time  Continue with PT and home exercises      Diagnoses and all orders for this visit:    Ambulatory dysfunction    Psychophysiological insomnia    Acute bilateral low back pain without sciatica    Other orders  -     Cancel: Hepatitis C Antibody ([facility]); Future  -     Cancel: Ambulatory referral to Gastroenterology; Future  -     Cancel: Hemoglobin A1C ([facility]); Future          Subjective:      Patient ID: Angel Marte is a 77 y o  male  HPI       Patient presents by himself today for a 4 week follow-up  He was evaluated in [facility] on 12/30/2020 after a fall down approximately 15 stairs while at home  Over the past 4 weeks, he has started going to outpatient physical therapy to work on balance and stamina  When he started with physical therapy, he was only able to ambulate on a treadmill for 4 minutes  He is now up to 10 minutes and feels good doing so  When I last saw him, he had significant ecchymosis does entire back region  He notes that back has improved significantly and the bruising has mostly resolved  He does have some back stiffness upon waking in the morning this quickly improves with the movement  From a neurological standpoint, he feels he has improved and is about 90% at his baseline  His headaches and dizziness have almost entirely resolved    He denies trouble concentrating or confusion  No nausea, vomiting, tinnitus, visual changes, photophobia  Since his fall, he has not taken any Ambien  His sleep schedule is still full left at the moment, falls asleep around 6:00 a m  and sleeps until about 1:00 p m  The following portions of the patient's history were reviewed and updated as appropriate: allergies, current medications, past family history, past medical history, past social history, past surgical history and problem list     Review of Systems   Constitutional: Positive for fatigue  Negative for chills, fever and unexpected weight change  HENT: Negative for congestion, ear pain, hearing loss, postnasal drip, rhinorrhea, sinus pressure and sore throat  Respiratory: Negative for cough, shortness of breath and wheezing  Cardiovascular: Negative for chest pain, palpitations and leg swelling  Gastrointestinal: Negative for abdominal pain, blood in stool, constipation, diarrhea, nausea and vomiting  Musculoskeletal: Positive for arthralgias, back pain and gait problem  Negative for myalgias  Skin: Negative for rash and wound  Neurological: Negative for dizziness, weakness, numbness and headaches  Psychiatric/Behavioral: Positive for sleep disturbance  Negative for dysphoric mood and suicidal ideas  The patient is not nervous/anxious  Objective:      /76   Pulse 72   Temp 98 6 °F (37 °C) (Tympanic)   Resp 16   Ht 5' 7" (1 702 m)   Wt (!) 143 kg (315 lb)   BMI 49 34 kg/m²          Physical Exam  Constitutional:       General: He is not in acute distress  Appearance: He is well-developed  He is obese  He is not diaphoretic  HENT:      Head: Normocephalic and atraumatic  Eyes:      Conjunctiva/sclera: Conjunctivae normal       Pupils: Pupils are equal, round, and reactive to light  Neck:      Musculoskeletal: Normal range of motion and neck supple  Thyroid: No thyromegaly     Cardiovascular:      Rate and Rhythm: Normal rate and regular rhythm  Heart sounds: Normal heart sounds  No murmur  Pulmonary:      Effort: Pulmonary effort is normal  No respiratory distress  Breath sounds: Normal breath sounds  No wheezing  Abdominal:      General: Bowel sounds are normal  There is no distension  Palpations: Abdomen is soft  Tenderness: There is no abdominal tenderness  Musculoskeletal: Normal range of motion  Comments: Generalized mild tenderness over the mid lumber region  Very faint ecchymosis noted  Pt is ambulating with no assistive devices    Lymphadenopathy:      Cervical: No cervical adenopathy  Skin:     General: Skin is warm and dry  Neurological:      General: No focal deficit present  Mental Status: He is alert and oriented to person, place, and time  Psychiatric:         Mood and Affect: Mood normal          Behavior: Behavior normal          Thought Content:  Thought content normal          Judgment: Judgment normal

## 2021-02-04 NOTE — ASSESSMENT & PLAN NOTE
Improving with physical therapy  He plans to continue this for at least 1 more month  Fall precautions

## 2021-02-06 ENCOUNTER — APPOINTMENT (OUTPATIENT)
Dept: PHYSICAL THERAPY | Facility: CLINIC | Age: 66
End: 2021-02-06
Payer: MEDICARE

## 2021-02-08 ENCOUNTER — OFFICE VISIT (OUTPATIENT)
Dept: PHYSICAL THERAPY | Facility: CLINIC | Age: 66
End: 2021-02-08
Payer: MEDICARE

## 2021-02-08 DIAGNOSIS — M54.9 ACUTE BILATERAL BACK PAIN, UNSPECIFIED BACK LOCATION: ICD-10-CM

## 2021-02-08 DIAGNOSIS — Z91.81 STATUS POST FALL: ICD-10-CM

## 2021-02-08 DIAGNOSIS — R26.2 AMBULATORY DYSFUNCTION: Primary | ICD-10-CM

## 2021-02-08 DIAGNOSIS — S09.90XA TRAUMATIC INJURY OF HEAD, INITIAL ENCOUNTER: ICD-10-CM

## 2021-02-08 PROCEDURE — 97110 THERAPEUTIC EXERCISES: CPT | Performed by: PHYSICAL THERAPIST

## 2021-02-08 PROCEDURE — 97112 NEUROMUSCULAR REEDUCATION: CPT | Performed by: PHYSICAL THERAPIST

## 2021-02-08 PROCEDURE — 97150 GROUP THERAPEUTIC PROCEDURES: CPT | Performed by: PHYSICAL THERAPIST

## 2021-02-08 NOTE — PROGRESS NOTES
Daily Note     Today's date: 2021  Patient name: Barbara Leyva  : 1955  MRN: [id number]  Referring provider: PRASHANTH Macdonald  Dx:   Encounter Diagnosis     ICD-10-CM    1  Ambulatory dysfunction  R26 2    2  Acute bilateral back pain, unspecified back location  M54 9    3  Traumatic injury of head, initial encounter  S09 90XA    4  Status post fall  Z91 81                   Subjective:       Objective: See treatment diary below      Assessment:       Plan: Progress treatment as tolerated         Precautions: h/o depression      Manuals 1/12 1/15 1/18 1/22 1/27 1/29 2/3 2/8                                                         Neuro Re-Ed             Chin tuck 10 sec x 5  10 sec x 10          STS 10x  2x10 2x10 2x10 2 x 10        Step ups  15x ea fwd/lat 6" 15 x ea fwd/lat 6" 15x ea fwd/lat 6" 15x ea fwd/lat 8" 6" + foam 15x fw/lat  6" plus foam fwd/lat 15x ea 6" plus foam fwd/lat 15xea     backwards   3 laps  3 laps no ue 4 laps no ue 4 laps no UE  4 laps no ue 4 laps no ue     hurdles   3 laps ea min ue 3 laps fwd min ue 3 laps fwd/lat ea min ue 3 laps fw/lat 3 laps fwd/lat ea 3 laps fwd/lat 12' and 4 " ea     Side steps    3 laps no ue 3 laps no ue 3 laps no UE  3 laps no ue     Foam beams      Fw/lat 4 laps  Fwd/lat no UE      Tandem gait      3 laps  3 laps no ue 3 laps no ue     HK march    3 laps min ue 3 laps min ue 3 laps no UE  4 lap sno ue 4 laps no ue     Cone taps         40x no Ue foam                               Ther Ex             UT stretch 30 sec x 2            llevator stretch 30 sec x 2            treadmill  5 min x 2 1 4 mph 6 min x 1 1 4 mph 8 min 1 4 mph x 1    4 min 1 4 mph x 1  Pt deferred 10 min 1 4 mph 3 standing rest breaks    10 min 1 4 mph                                                                      Ther Activity                                       Gait Training                                       Modalities

## 2021-02-09 ENCOUNTER — VBI (OUTPATIENT)
Dept: ADMINISTRATIVE | Facility: OTHER | Age: 66
End: 2021-02-09

## 2021-02-10 ENCOUNTER — OFFICE VISIT (OUTPATIENT)
Dept: PHYSICAL THERAPY | Facility: CLINIC | Age: 66
End: 2021-02-10
Payer: MEDICARE

## 2021-02-10 DIAGNOSIS — M54.9 ACUTE BILATERAL BACK PAIN, UNSPECIFIED BACK LOCATION: ICD-10-CM

## 2021-02-10 DIAGNOSIS — R26.2 AMBULATORY DYSFUNCTION: Primary | ICD-10-CM

## 2021-02-10 DIAGNOSIS — S06.0X0D CONCUSSION WITHOUT LOSS OF CONSCIOUSNESS, SUBSEQUENT ENCOUNTER: ICD-10-CM

## 2021-02-10 DIAGNOSIS — S09.90XA TRAUMATIC INJURY OF HEAD, INITIAL ENCOUNTER: ICD-10-CM

## 2021-02-10 DIAGNOSIS — Z91.81 STATUS POST FALL: ICD-10-CM

## 2021-02-10 PROCEDURE — 97112 NEUROMUSCULAR REEDUCATION: CPT | Performed by: PHYSICAL THERAPIST

## 2021-02-10 PROCEDURE — 97116 GAIT TRAINING THERAPY: CPT | Performed by: PHYSICAL THERAPIST

## 2021-02-10 NOTE — PROGRESS NOTES
Daily Note     Today's date: 2/10/2021  Patient name: Rosanna Freeman  : 1955  MRN: [id number]  Referring provider: PRASHANTH Tolliver  Dx:   Encounter Diagnosis     ICD-10-CM    1  Ambulatory dysfunction  R26 2    2  Acute bilateral back pain, unspecified back location  M54 9    3  Traumatic injury of head, initial encounter  S09 90XA    4  Status post fall  Z91 81    5  Concussion without loss of consciousness, subsequent encounter  S06 0X0D                   Subjective: No complaints of changes upon arrival       Objective: See treatment diary below      Assessment: Continues to be limited by fatigue requiring frequent rest breaks  Requires consistent cues to keep head up and for upright posture during activities  Able to complete most activities in // bars without UE support, occasionally requires to correct LOBs  Plan: Progress update next week       Precautions: h/o depression      Manuals 1/12 1/15 1/18 1/22 1/27 1/29 2/3 2/8 2/10                                                        Neuro Re-Ed             Chin tuck 10 sec x 5  10 sec x 10          STS 10x  2x10 2x10 2x10 2 x 10        Step ups  15x ea fwd/lat 6" 15 x ea fwd/lat 6" 15x ea fwd/lat 6" 15x ea fwd/lat 8" 6" + foam 15x fw/lat  6" plus foam fwd/lat 15x ea 6" plus foam fwd/lat 15xea 8" no UE with holds x20 ea    backwards   3 laps  3 laps no ue 4 laps no ue 4 laps no UE  4 laps no ue 4 laps no ue 3 laps no ue    hurdles   3 laps ea min ue 3 laps fwd min ue 3 laps fwd/lat ea min ue 3 laps fw/lat 3 laps fwd/lat ea 3 laps fwd/lat 12' and 4 " ea 4 laps fwd/lat ea high hurdles    Side steps    3 laps no ue 3 laps no ue 3 laps no UE  3 laps no ue 3 laps no ue    Foam beams      Fw/lat 4 laps  Fwd/lat no UE      Tandem gait      3 laps  3 laps no ue 3 laps no ue 4 laps no ue        3 laps min ue 3 laps min ue 3 laps no UE  4 lap sno ue 4 laps no ue 4 laps no ue    Cone taps         40x no Ue foam Ther Ex             UT stretch 30 sec x 2            llevator stretch 30 sec x 2            treadmill  5 min x 2 1 4 mph 6 min x 1 1 4 mph 8 min 1 4 mph x 1    4 min 1 4 mph x 1  Pt deferred 10 min 1 4 mph 3 standing rest breaks    10 min 1 4 mph 1 4 mph BUE 2x5min                                                                     Ther Activity                                       Gait Training                                       Modalities

## 2021-02-15 ENCOUNTER — EVALUATION (OUTPATIENT)
Dept: PHYSICAL THERAPY | Facility: CLINIC | Age: 66
End: 2021-02-15
Payer: MEDICARE

## 2021-02-15 DIAGNOSIS — S06.0X0D CONCUSSION WITHOUT LOSS OF CONSCIOUSNESS, SUBSEQUENT ENCOUNTER: ICD-10-CM

## 2021-02-15 DIAGNOSIS — F80.81 STUTTERING: ICD-10-CM

## 2021-02-15 DIAGNOSIS — M54.9 ACUTE BILATERAL BACK PAIN, UNSPECIFIED BACK LOCATION: ICD-10-CM

## 2021-02-15 DIAGNOSIS — S09.90XA TRAUMATIC INJURY OF HEAD, INITIAL ENCOUNTER: ICD-10-CM

## 2021-02-15 DIAGNOSIS — R26.2 AMBULATORY DYSFUNCTION: Primary | ICD-10-CM

## 2021-02-15 DIAGNOSIS — Z91.81 STATUS POST FALL: ICD-10-CM

## 2021-02-15 PROCEDURE — 97110 THERAPEUTIC EXERCISES: CPT | Performed by: PHYSICAL THERAPIST

## 2021-02-15 PROCEDURE — 97112 NEUROMUSCULAR REEDUCATION: CPT | Performed by: PHYSICAL THERAPIST

## 2021-02-15 RX ORDER — OLANZAPINE 5 MG/1
TABLET ORAL
Qty: 90 TABLET | Refills: 3 | Status: SHIPPED | OUTPATIENT
Start: 2021-02-15 | End: 2022-05-09

## 2021-02-15 NOTE — PROGRESS NOTES
Progress Note     Today's date: 2/15/2021  Patient name: Ahmet Martinez  : 1955  MRN: [id number]  Referring provider: PRASHANTH Alford  Dx:   Encounter Diagnosis     ICD-10-CM    1  Ambulatory dysfunction  R26 2    2  Acute bilateral back pain, unspecified back location  M54 9    3  Traumatic injury of head, initial encounter  S09 90XA    4  Status post fall  Z91 81    5  Concussion without loss of consciousness, subsequent encounter  S06 0X0D                   Subjective: P tfeels he has made great progress  He reports no neck pain or headaches and is feeling much better  He does still feel limited by endurance and mobility  Objective: See treatment diary below    Functional Outcomes: 2/15  6 mwt - 950 ft   5x sts : 10 26 sec  FGA -   Gait speed: 0 95 m/s    Functional outcomes (IE)  5x sit to stand: 13  (seconds)  FGA -   Gait Speed - 0 89 m/s        Assessment: Pt has made significant improvements with no reports of neck pain, has improved significantly with balance outside of fall risk categories, however he is signifcantly limited by endurance, knowledge of exercises for HEP, continues to have decreased balance and will benefit from continued skilled physical therapy at 2x/week for 4 more weeks  STG:  Pt will improve cervical ROM by 10 degrees rotation to the right within 4 weeks - WNL  Pt will report no falls within 4 weeks - MET  Pt will score 28/30 on FGA within 4 weeks - MET    LTG  Pt will report no cervical pain wihtin 6 weeks -MET  Pt will regularly participate in HEP within 6 weeks - progressing  Pt will have no near falls within 6 weeks - met    Updated goals:2/15/21  Pt will complete 6 mwt in 1200 ft within 4 weeks  Pt will report no near falls within 4 weeks  Pt will reprot greater ease of getting in and out of bed within 4 weeks  Pt will score 30/30 on FGA within 4 weeks    Plan: Progress update next week       Precautions: h/o depression      Manuals 1/12 1/15 1/18 1/22 1/27 1/29 2/3 2/8 2/10 2/12                                                       Neuro Re-Ed             Chin tuck 10 sec x 5  10 sec x 10          STS 10x  2x10 2x10 2x10 2 x 10        Step ups  15x ea fwd/lat 6" 15 x ea fwd/lat 6" 15x ea fwd/lat 6" 15x ea fwd/lat 8" 6" + foam 15x fw/lat  6" plus foam fwd/lat 15x ea 6" plus foam fwd/lat 15xea 8" no UE with holds x20 ea 8" no UE 15x ea   backwards   3 laps  3 laps no ue 4 laps no ue 4 laps no UE  4 laps no ue 4 laps no ue 3 laps no ue 3 laps no ue   hurdles   3 laps ea min ue 3 laps fwd min ue 3 laps fwd/lat ea min ue 3 laps fw/lat 3 laps fwd/lat ea 3 laps fwd/lat 12' and 4 " ea 4 laps fwd/lat ea high hurdles    Side steps    3 laps no ue 3 laps no ue 3 laps no UE  3 laps no ue 3 laps no ue 3 laps no ue   Foam beams      Fw/lat 4 laps  Fwd/lat no UE      Tandem gait      3 laps  3 laps no ue 3 laps no ue 4 laps no ue 4 lap sn oue   HK march    3 laps min ue 3 laps min ue 3 laps no UE  4 lap sno ue 4 laps no ue 4 laps no ue 4 laps no ue   Cone taps         40x no Ue foam                               Ther Ex             UT stretch 30 sec x 2            llevator stretch 30 sec x 2            treadmill  5 min x 2 1 4 mph 6 min x 1 1 4 mph 8 min 1 4 mph x 1    4 min 1 4 mph x 1  Pt deferred 10 min 1 4 mph 3 standing rest breaks    10 min 1 4 mph 1 4 mph BUE 2x5min                                                                     Ther Activity                                       Gait Training                                       Modalities

## 2021-02-17 ENCOUNTER — APPOINTMENT (OUTPATIENT)
Dept: PHYSICAL THERAPY | Facility: CLINIC | Age: 66
End: 2021-02-17
Payer: MEDICARE

## 2021-02-18 NOTE — TELEPHONE ENCOUNTER
02/18/21 2:53 PM     See documentation in the VB [employer] SmartForm       [facility], [address] [facility] [place]

## 2021-02-19 ENCOUNTER — OFFICE VISIT (OUTPATIENT)
Dept: PHYSICAL THERAPY | Facility: CLINIC | Age: 66
End: 2021-02-19
Payer: MEDICARE

## 2021-02-19 DIAGNOSIS — S06.0X0D CONCUSSION WITHOUT LOSS OF CONSCIOUSNESS, SUBSEQUENT ENCOUNTER: Primary | ICD-10-CM

## 2021-02-19 DIAGNOSIS — R26.2 AMBULATORY DYSFUNCTION: ICD-10-CM

## 2021-02-19 DIAGNOSIS — Z91.81 STATUS POST FALL: ICD-10-CM

## 2021-02-19 PROCEDURE — 97110 THERAPEUTIC EXERCISES: CPT | Performed by: PHYSICAL THERAPIST

## 2021-02-19 PROCEDURE — 97112 NEUROMUSCULAR REEDUCATION: CPT | Performed by: PHYSICAL THERAPIST

## 2021-02-20 ENCOUNTER — IMMUNIZATIONS (OUTPATIENT)
Dept: FAMILY MEDICINE CLINIC | Facility: HOSPITAL | Age: 66
End: 2021-02-20

## 2021-02-20 ENCOUNTER — OFFICE VISIT (OUTPATIENT)
Dept: FAMILY MEDICINE CLINIC | Facility: CLINIC | Age: 66
End: 2021-02-20
Payer: MEDICARE

## 2021-02-20 VITALS
HEART RATE: 62 BPM | HEIGHT: 67 IN | TEMPERATURE: 98.7 F | WEIGHT: 315 LBS | DIASTOLIC BLOOD PRESSURE: 78 MMHG | SYSTOLIC BLOOD PRESSURE: 120 MMHG | BODY MASS INDEX: 49.44 KG/M2 | RESPIRATION RATE: 16 BRPM
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
## 2021-02-20 DIAGNOSIS — R26.2 AMBULATORY DYSFUNCTION: ICD-10-CM

## 2021-02-20 DIAGNOSIS — G47.33 OBSTRUCTIVE SLEEP APNEA SYNDROME: ICD-10-CM

## 2021-02-20 DIAGNOSIS — K21.00 GASTROESOPHAGEAL REFLUX DISEASE WITH ESOPHAGITIS WITHOUT HEMORRHAGE: ICD-10-CM

## 2021-02-20 DIAGNOSIS — R53.83 DECREASED STAMINA: ICD-10-CM

## 2021-02-20 DIAGNOSIS — F41.8 DEPRESSION WITH ANXIETY: ICD-10-CM

## 2021-02-20 DIAGNOSIS — R06.00 EXERTIONAL DYSPNEA: Primary | ICD-10-CM

## 2021-02-20 DIAGNOSIS — Z23 ENCOUNTER FOR IMMUNIZATION: Primary | ICD-10-CM

## 2021-02-20 DIAGNOSIS — F80.81 STUTTERING: ICD-10-CM

## 2021-02-20 DIAGNOSIS — E78.2 MIXED HYPERLIPIDEMIA: ICD-10-CM

## 2021-02-20 DIAGNOSIS — I10 BENIGN ESSENTIAL HYPERTENSION: ICD-10-CM

## 2021-02-20 DIAGNOSIS — E66.01 MORBID OBESITY WITH BMI OF 45.0-49.9, ADULT (HCC): ICD-10-CM

## 2021-02-20 DIAGNOSIS — F51.04 PSYCHOPHYSIOLOGICAL INSOMNIA: ICD-10-CM

## 2021-02-20 DIAGNOSIS — E11.9 CONTROLLED TYPE 2 DIABETES MELLITUS WITHOUT COMPLICATION, WITHOUT LONG-TERM CURRENT USE OF INSULIN (HCC): ICD-10-CM

## 2021-02-20 PROCEDURE — 99215 OFFICE O/P EST HI 40 MIN: CPT | Performed by: FAMILY MEDICINE

## 2021-02-20 PROCEDURE — 0011A SARS-COV-2 / COVID-19 MRNA VACCINE (MODERNA) 100 MCG: CPT

## 2021-02-20 PROCEDURE — 91301 SARS-COV-2 / COVID-19 MRNA VACCINE (MODERNA) 100 MCG: CPT

## 2021-02-20 NOTE — ASSESSMENT & PLAN NOTE
Multifactorial including obesity, arthritis, and recent concussion causing balance problems  Continue physical therapy and appropriate caution

## 2021-02-20 NOTE — ASSESSMENT & PLAN NOTE
Lab Results   Component Value Date    HGBA1C 7 0 (H) 23/77/4318      he certainly could improve his control with better diet and weight reduction  He is currently on metformin    He has an interest in beginning a medication such as Ozempic, Trulicity, Saxenda, or Victoza as they would benefit his diabetes and also possible weight reduction  He is going to check with his insurance and we will prescribe based on coverage and preferred drug  He will call back within the week to let me know        He also is interested in getting a glucometer and will prescribe that at the same time

## 2021-02-20 NOTE — PROGRESS NOTES
Progress Note     Today's date: 2021  Patient name: Dora Atkins  : 1955  MRN: [id number]  Referring provider: PRASHANTH Bhatti  Dx:   Encounter Diagnosis     ICD-10-CM    1  Concussion without loss of consciousness, subsequent encounter  S06 0X0D    2  Ambulatory dysfunction  R26 2    3  Status post fall  Z91 81                   Subjective: P tfeels he has made great progress  He reports no neck pain or headaches and is feeling much better  He does still feel limited by endurance and mobility  Objective: See treatment diary below    Functional Outcomes: 2/15  6 mwt - 950 ft   5x sts : 10 26 sec  FGA -   Gait speed: 0 95 m/s    Functional outcomes (IE)  5x sit to stand: 13  (seconds)  FGA -   Gait Speed - 0 89 m/s        Assessment: Pt has made significant improvements with no reports of neck pain, has improved significantly with balance outside of fall risk categories, however he is signifcantly limited by endurance, knowledge of exercises for HEP, continues to have decreased balance and will benefit from continued skilled physical therapy at 2x/week for 4 more weeks  STG:  Pt will improve cervical ROM by 10 degrees rotation to the right within 4 weeks - WNL  Pt will report no falls within 4 weeks - MET  Pt will score 28/30 on FGA within 4 weeks - MET    LTG  Pt will report no cervical pain wihtin 6 weeks -MET  Pt will regularly participate in HEP within 6 weeks - progressing  Pt will have no near falls within 6 weeks - met    Updated goals:2/15/21  Pt will complete 6 mwt in 1200 ft within 4 weeks  Pt will report no near falls within 4 weeks  Pt will reprot greater ease of getting in and out of bed within 4 weeks  Pt will score 30/30 on FGA within 4 weeks    Plan: Progress update next week       Precautions: h/o depression      Manuals 1/12 1/15 1/18 1/22 1/27 1/29 2/3 2/8 2/10 2/12                                                       Neuro Re-Ed             Chin tuck 10 sec x 5  10 sec x 10          STS 10x  2x10 2x10 2x10 2 x 10        Step ups  15x ea fwd/lat 6" 15 x ea fwd/lat 6" 15x ea fwd/lat 6" 15x ea fwd/lat 8" 6" + foam 15x fw/lat  6" plus foam fwd/lat 15x ea 6" plus foam fwd/lat 15xea 8" no UE with holds x20 ea 8" no UE 15x ea   backwards   3 laps  3 laps no ue 4 laps no ue 4 laps no UE  4 laps no ue 4 laps no ue 3 laps no ue 3 laps no ue   hurdles   3 laps ea min ue 3 laps fwd min ue 3 laps fwd/lat ea min ue 3 laps fw/lat 3 laps fwd/lat ea 3 laps fwd/lat 12' and 4 " ea 4 laps fwd/lat ea high hurdles    Side steps    3 laps no ue 3 laps no ue 3 laps no UE  3 laps no ue 3 laps no ue 3 laps no ue   Foam beams      Fw/lat 4 laps  Fwd/lat no UE      Tandem gait      3 laps  3 laps no ue 3 laps no ue 4 laps no ue 4 lap sn oue   HK march    3 laps min ue 3 laps min ue 3 laps no UE  4 lap sno ue 4 laps no ue 4 laps no ue 4 laps no ue   Cone taps         40x no Ue foam                               Ther Ex             UT stretch 30 sec x 2            llevator stretch 30 sec x 2            treadmill  5 min x 2 1 4 mph 6 min x 1 1 4 mph 8 min 1 4 mph x 1    4 min 1 4 mph x 1  Pt deferred 10 min 1 4 mph 3 standing rest breaks    10 min 1 4 mph 1 4 mph BUE 2x5min                                                                     Ther Activity                                       Gait Training                                       Modalities

## 2021-02-20 NOTE — ASSESSMENT & PLAN NOTE
Blood pressure is controlled on current medication including hydrochlorothiazide, losartan, and metoprolol

## 2021-02-20 NOTE — ASSESSMENT & PLAN NOTE
I suspect he continues to have significant ANABELL  It may be impacting his overall sense of fatigue    It was almost 8 years now that he tried the CPAP so he may be more tolerant at this point to some of the newer devices or nasal pillows  He also could explore other options to help with this including a mouth guard and/or some nor devices  He will work on his diet weight reduction at this point and will consider this further and review it another visit

## 2021-02-20 NOTE — ASSESSMENT & PLAN NOTE
We will decrease his Zyprexa from 5 mg daily to initially alternating 5 mg and 2 5 mg  Daily and then decrease to 2 5 mg daily until seen again    We will not change his fluoxetine dose at this point as it was prescribed for both stuttering and depression / anxiety

## 2021-02-20 NOTE — ASSESSMENT & PLAN NOTE
He had a fall earlier this year causing him to strike his head and have a concussion  He continues to have balance problems as well as short-term memory loss  He continues to go to physical therapy at this point

## 2021-02-20 NOTE — ASSESSMENT & PLAN NOTE
This is an ongoing problem for him and he needs to refocus and be very consistently compliant to both his diet and exercise  I suspected his ex significant contributing to his exertional dyspnea and lack of stamina    Strategies were reviewed today

## 2021-02-20 NOTE — ASSESSMENT & PLAN NOTE
This is multifactorial including his anxiety, habits, and obstructive sleep apnea  Continue to avoid use of Ambien  Will discuss further next visit in regard to possible supplement such as melatonin or valerian root

## 2021-02-20 NOTE — PROGRESS NOTES
BMI Counseling: Body mass index is 49 34 kg/m²  The BMI is above normal  Nutrition recommendations include decreasing portion sizes, encouraging healthy choices of fruits and vegetables, consuming healthier snacks, moderation in carbohydrate intake and increasing intake of lean protein  Exercise recommendations include exercising 3-5 times per week  No pharmacotherapy was ordered  Patient referred to PCP due to patient being overweight  He needs to continue to watch overall calories   I explained to him that drinking lots of diet drinks may have an adverse affect of increasing his appetite due to the artificial sweeteners  Decreasing simple carbohydrates and replacing complex carbohydrates      Depression Screening and Follow-up Plan: Patient assessed for underlying major depression  Brief counseling provided and recommend additional follow-up/re-evaluation next office visit  Patient is relatively stable on current medications for both depression and anxiety and will continue the same    Falls Plan of Care: balance, strength, and gait training instructions were provided and referral to physical therapy  Medications that increase falls were reviewed  Assessed feet and footwear    He is currently going to physical therapy       Chief Complaint   Patient presents with   [place]     on going      Health Maintenance   Topic Date Due    Hepatitis C Screening  1955    COVID-19 Vaccine (1 of 2) 11/25/1969    DTaP,Tdap,and Td Vaccines (1 - Tdap) 11/25/1974    Falls: Plan of Care  11/25/2018    DM Eye Exam  10/17/2019    Colonoscopy Surveillance  08/25/2020    HEMOGLOBIN A1C  04/01/2021    Medicare Annual Wellness Visit (AWV)  05/14/2021    BMI: Followup Plan  05/14/2021    PT PLAN OF CARE  03/17/2021    Diabetic Foot Exam  07/02/2021    Pneumococcal Vaccine: 65+ Years (2 of 2 - PPSV23) 10/20/2021    Fall Risk  01/12/2022    BMI: Adult  02/20/2022    Colorectal Cancer Screening  08/25/2025    Influenza Vaccine  Completed    HIB Vaccine  Aged Out    Hepatitis B Vaccine  Aged Out    IPV Vaccine  Aged Out    Hepatitis A Vaccine  Aged Out    Meningococcal ACWY Vaccine  Aged Out    HPV Vaccine  Aged Out     Assessment/Plan:     I believe his exertional dyspnea and lack of stamina are multifactorial but primarily due to obesity and deconditioning  He had cardiac testing done in 2019 including an echocardiogram that showed EF of 65% and normal valves as well as a Holter monitor showing rare ectopy and underlying normal sinus rhythm  He had a nuclear stress test in 2018 that did not show any evidence of ischemia  He has a questionable history of some asthma in the past and consideration of doing pulmonary function testing could be considered in the future but I do not believe his current symptoms are asthmatic in nature  Labs done in October of this year showed normal hemoglobin of 14 2 as well as a normal CMP, microalbumin, PSA, and CBC  We discussed these things at length and spent approximately 45 minutes with this visit  Currently he will check his insurance and call me back next week in regard to starting an injectable medication for his diabetes that might also improve his weight loss efforts  Will order a glucometer at that time as well  Will begin to adjust his Zyprexa as noted  Below  Will continue physical therapy for both balance and conditioning  Will continue to observe in regard other postconcussion symptoms including his short-term memory  He has an appointment scheduled in about 4 months and he will keep that getting blood test before that time  He has increasing or changing symptoms he will call  He is getting his 1st COVID vaccine today through [address] [facility] [address] [facility]      Gastroesophageal reflux disease with esophagitis   Do not believe this is contributing to his current symptoms  Continue daily PPI    Controlled type 2 diabetes mellitus without complication ([facility] [address] )    Lab Results   Component Value Date    HGBA1C 7 0 (H) 06/79/7630      he certainly could improve his control with better diet and weight reduction  He is currently on metformin    He has an interest in beginning a medication such as Ozempic, Trulicity, Saxenda, or Victoza as they would benefit his diabetes and also possible weight reduction  He is going to check with his insurance and we will prescribe based on coverage and preferred drug  He will call back within the week to let me know  He also is interested in getting a glucometer and will prescribe that at the same time    Sleep apnea   I suspect he continues to have significant ANABELL  It may be impacting his overall sense of fatigue    It was almost 8 years now that he tried the CPAP so he may be more tolerant at this point to some of the newer devices or nasal pillows  He also could explore other options to help with this including a mouth guard and/or some nor devices  He will work on his diet weight reduction at this point and will consider this further and review it another visit  Benign essential hypertension   Blood pressure is controlled on current medication including hydrochlorothiazide, losartan, and metoprolol    Stuttering   We will decrease his Zyprexa from 5 mg daily to initially alternating 5 mg and 2 5 mg  Daily and then decrease to 2 5 mg daily until seen again  We will not change his fluoxetine dose at this point as it was prescribed for both stuttering and depression / anxiety    Morbid obesity with BMI of 45 0-49 9, adult [facility])   This is an ongoing problem for him and he needs to refocus and be very consistently compliant to both his diet and exercise  I suspected his ex significant contributing to his exertional dyspnea and lack of stamina  Strategies were reviewed today    Insomnia   This is multifactorial including his anxiety, habits, and obstructive sleep apnea    Continue to avoid use of Ambien  Will discuss further next visit in regard to possible supplement such as melatonin or valerian root  Hyperlipidemia  Lipids are at target on current medication of Zetia and rosuvastatin  Fall    He had a fall earlier this year causing him to strike his head and have a concussion  He continues to have balance problems as well as short-term memory loss  He continues to go to physical therapy at this point  Depression with anxiety    Stable on current medication including fluoxetine and as needed alprazolam    Ambulatory dysfunction   Multifactorial including obesity, arthritis, and recent concussion causing balance problems  Continue physical therapy and appropriate caution  Diagnoses and all orders for this visit:    Exertional dyspnea  Comments:  He had cardiac testing within the last 2 years that was non-remarkable  Questionable history of asthma but no symptoms suggestive of that  Likely multifactoral    Decreased stamina  Comments:   this is multifactorial including obesity and deconditioning  Must rule out other pathology    Controlled type 2 diabetes mellitus without complication, without long-term current use of insulin ([facility])    Obstructive sleep apnea syndrome    Benign essential hypertension    Morbid obesity with BMI of 45 0-49 9, adult [facility])    Ambulatory dysfunction    Psychophysiological insomnia    Stuttering    Depression with anxiety    Gastroesophageal reflux disease with esophagitis without hemorrhage    Mixed hyperlipidemia    Fall, initial encounter          Subjective:      Patient ID: Ivy Mosley is a 77 y o  male       He is here today with a complaint of not having any stamina    He feels this may be in part due to his long term use of Zyprexa which was prescribed years ago for his stuttering  Currently uses 5 mg daily and wonders if decreasing this might help him feel more energetic    He recognizes that his obesity plays a role in his stamina as well as exertional huffing and puffing  He asked about possible use of Ozempic or similar injectable medication for his diabetes which also may benefit obesity  He previously had gastric banding done and it is still in place  His most recent peak weight prior to COVID was around 335# although it had been higher prior to his gastric banding new line he currently has not been going out to eat and his diet has been better since they are eating at home with a current weight of 315# and BMI of 49 34    He had a significant fall with closed head injury earlier this year   He is continuing to go to physical therapy in his balance has been improving  He reports he is exhausted after coming home and takes a nap    He feels a short term memory is very poor    He notes that on a flight of stairs he gets huffing and puffing but is able to continue walking down the hallway and recovers in approximately 15-20 seconds to his baseline breathing  He does not experience any chest / neck / jaw /arm pain or tightness or diaphoresis or weakness related to this      His sleep patterns are poor any does not tend to go to better fall asleep till about 0400 hours and gets up around noon  He has essentially stopped using Ambien and this taken only about 4 since the 1st of the year  He has known obstructive sleep apnea but did not tolerate CPAP in the past    I believe it is about a year or so ago that he retired from his [employer] business    He does not drink coffee   He drinks alcoholic beverages infrequently and never in excess  Denies any current use of marijuana or other substances new line he has never been a smoker      The following portions of the patient's history were reviewed and updated as appropriate: allergies, current medications, past family history, past medical history, past social history, past surgical history and problem list     Review of Systems   Constitutional:         See HPI   Respiratory: Positive for apnea ( has known ANABELL), cough ( when he chokes), choking ( especially when he drinks carbonated beverages) and shortness of breath  Negative for wheezing  Stridor:  with exertion  Cardiovascular: Positive for palpitations  Negative for chest pain and leg swelling ( occasional irregular beat which is not new)  Gastrointestinal:         Has known reflux and Molina's esophagus but has not had any change in symptoms  He continues on daily PPI therapy    Bowel movements are regular and he has not experienced any melena or blood in his stool   Musculoskeletal: Positive for arthralgias, back pain and gait problem  Neurological: Positive for speech difficulty ( he has a chronic stutter which is not new)  Negative for dizziness, light-headedness and headaches  Short-term memory loss    Balance issues but no vertigo   Psychiatric/Behavioral: Positive for sleep disturbance ( were since discontinuation of zolpidem)  Negative for confusion ( short-term memory loss as noted above), decreased concentration ( has some difficulty staying on task since his fall and head injury with concussion), dysphoric mood ( not currently depressed but concern) and suicidal ideas  The patient is not nervous/anxious (  Stable on meds)  Generally stable on medication including Zyprexa, fluoxetine, and alprazolam   The Zyprexa on fluoxetine her used for his stuttering, the fluoxetine for his depression and anxiety, and the alprazolam for rescue related to his anxiety  Objective:      /78   Pulse 62   Temp 98 7 °F (37 1 °C) (Tympanic)   Resp 16   Ht 5' 7" (1 702 m)   Wt (!) 143 kg (315 lb)   BMI 49 34 kg/m²          Physical Exam  Vitals signs and nursing note reviewed  Constitutional:       General: He is not in acute distress  Appearance: Normal appearance  He is obese  Comments:  He is wearing a mask   Eyes:      Comments:  He is wearing glasses   Neck:      Musculoskeletal: Neck supple  Vascular: No carotid bruit  Comments:  No increased JVD  Cardiovascular:      Rate and Rhythm: Normal rate and regular rhythm  Heart sounds: No murmur  Comments:  No ectopy appreciated  Pulmonary:      Effort: Pulmonary effort is normal       Breath sounds: Normal breath sounds  No stridor  No wheezing, rhonchi or rales  Musculoskeletal:      Right lower leg: No edema  Left lower leg: No edema  Neurological:      Mental Status: He is alert and oriented to person, place, and time  Comments:  Stuttering is present and at baseline   Psychiatric:         Mood and Affect: Mood normal          Behavior: Behavior normal          Thought Content:  Thought content normal          Judgment: Judgment normal

## 2021-02-22 ENCOUNTER — APPOINTMENT (OUTPATIENT)
Dept: PHYSICAL THERAPY | Facility: CLINIC | Age: 66
End: 2021-02-22
Payer: MEDICARE

## 2021-02-24 ENCOUNTER — OFFICE VISIT (OUTPATIENT)
Dept: PHYSICAL THERAPY | Facility: CLINIC | Age: 66
End: 2021-02-24
Payer: MEDICARE

## 2021-02-24 DIAGNOSIS — R26.2 AMBULATORY DYSFUNCTION: Primary | ICD-10-CM

## 2021-02-24 DIAGNOSIS — Z91.81 STATUS POST FALL: ICD-10-CM

## 2021-02-24 PROCEDURE — 97112 NEUROMUSCULAR REEDUCATION: CPT | Performed by: PHYSICAL THERAPIST

## 2021-02-24 PROCEDURE — 97150 GROUP THERAPEUTIC PROCEDURES: CPT | Performed by: PHYSICAL THERAPIST

## 2021-02-25 NOTE — PROGRESS NOTES
Daily Note     Today's date: 2021  Patient name: Scout Degroot  : 1955  MRN: [id number]  Referring provider: PRASHANTH Raphale  Dx:   Encounter Diagnosis     ICD-10-CM    1  Ambulatory dysfunction  R26 2    2  Status post fall  Z91 81        Start Time: 410  Stop Time: 0510  Total time in clinic (min): 60 minutes    Subjective: Feeling fine today  No new changes to report from last session  Objective: See treatment diary below      Assessment: Pt was very tired by end of session once again  Was able to wlak for 10 minutes on treadmill consecutively today  Minimal seated rest breaks needed  Plan: Progress update next week       Precautions: h/o depression      Manuals 2/24       2/8 2/10 2/12                                                       Neuro Re-Ed             Chin tuck             STS 3x10            Step ups 8" no ue 15x ea fwd/lat       6" plus foam fwd/lat 15xea 8" no UE with holds x20 ea 8" no UE 15x ea   backwards 3 laps       4 laps no ue 3 laps no ue 3 laps no ue   hurdles 12" fwd/lat 4 laps ea       3 laps fwd/lat 12' and 4 " ea 4 laps fwd/lat ea high hurdles    Side steps 4 laps no ue       3 laps no ue 3 laps no ue 3 laps no ue   Foam beams             Tandem gait 3 laps no ue       3 laps no ue 4 laps no ue 4 lap sn oue   HK march 4 laps no ue       4 laps no ue 4 laps no ue 4 laps no ue   Cone taps  Step taps 30x       40x no Ue foam                               Ther Ex             UT stretch             llevator stretch             treadmill 1 4 mph 10 min       3 standing rest breaks    10 min 1 4 mph 1 4 mph BUE 2x5min                                                                     Ther Activity                                       Gait Training                                       Modalities

## 2021-03-03 ENCOUNTER — OFFICE VISIT (OUTPATIENT)
Dept: PHYSICAL THERAPY | Facility: CLINIC | Age: 66
End: 2021-03-03
Payer: MEDICARE

## 2021-03-03 DIAGNOSIS — R26.2 AMBULATORY DYSFUNCTION: Primary | ICD-10-CM

## 2021-03-03 DIAGNOSIS — S06.0X0D CONCUSSION WITHOUT LOSS OF CONSCIOUSNESS, SUBSEQUENT ENCOUNTER: ICD-10-CM

## 2021-03-03 PROCEDURE — 97112 NEUROMUSCULAR REEDUCATION: CPT | Performed by: PHYSICAL THERAPIST

## 2021-03-03 PROCEDURE — 97110 THERAPEUTIC EXERCISES: CPT | Performed by: PHYSICAL THERAPIST

## 2021-03-03 NOTE — PROGRESS NOTES
Daily Note     Today's date: 3/3/2021  Patient name: Rosanna Freeman  : 1955  MRN: [id number]  Referring provider: PRASHANTH Tolliver  Dx:   Encounter Diagnosis     ICD-10-CM    1  Ambulatory dysfunction  R26 2    2  Concussion without loss of consciousness, subsequent encounter  S06 0X0D                   Subjective: Pt is doing good today  Objective: See treatment diary below      Assessment: All exercises were completed by unilateral bar which pt felt challenged by and enjoyed since he felt less inclined to grab onto bars  He continued to require multiple rest breaks due to fatigue though and required VC for pacing throughout the session  TM was completed at end of session and pt was only able to tolerate 5 min from being fatigue from exercises earlier in the session  Patient would benefit from continued PT      Plan: Progress treatment as tolerated    Continue outside of // bars as able per pt request       Precautions: h/o depression      Manuals 2/24 3/3      2/8 2/10 2/12                                                       Neuro Re-Ed             Chin tuck             STS 3x10 3 x 10            Step ups 8" no ue 15x ea fwd/lat 8" no UE 15x fw/lat        6" plus foam fwd/lat 15xea 8" no UE with holds x20 ea 8" no UE 15x ea   backwards 3 laps 4 laps       4 laps no ue 3 laps no ue 3 laps no ue   hurdles 12" fwd/lat 4 laps ea 12" fw/lat 4 laps       3 laps fwd/lat 12' and 4 " ea 4 laps fwd/lat ea high hurdles    Side steps 4 laps no ue 5 laps no UE       3 laps no ue 3 laps no ue 3 laps no ue   Foam beams             Tandem gait 3 laps no ue 3 laps no UE       3 laps no ue 4 laps no ue 4 lap sn oue   HK march 4 laps no ue 4 laps no UE       4 laps no ue 4 laps no ue 4 laps no ue   Cone taps  Step taps 30x Step taps 30x       40x no Ue foam                               Ther Ex             UT stretch             llevator stretch             treadmill 1 4 mph 10 min 1 4 mph, 5 min       3 standing rest breaks    10 min 1 4 mph 1 4 mph BUE 2x5min                                                                     Ther Activity                                       Gait Training                                       Modalities

## 2021-03-08 ENCOUNTER — OFFICE VISIT (OUTPATIENT)
Dept: PHYSICAL THERAPY | Facility: CLINIC | Age: 66
End: 2021-03-08
Payer: MEDICARE

## 2021-03-08 DIAGNOSIS — S06.0X0D CONCUSSION WITHOUT LOSS OF CONSCIOUSNESS, SUBSEQUENT ENCOUNTER: ICD-10-CM

## 2021-03-08 DIAGNOSIS — R26.2 AMBULATORY DYSFUNCTION: Primary | ICD-10-CM

## 2021-03-08 PROCEDURE — 97110 THERAPEUTIC EXERCISES: CPT | Performed by: PHYSICAL THERAPIST

## 2021-03-08 PROCEDURE — 97112 NEUROMUSCULAR REEDUCATION: CPT | Performed by: PHYSICAL THERAPIST

## 2021-03-08 NOTE — PROGRESS NOTES
Daily Note     Today's date: 3/8/2021  Patient name: Aaron Willingham  : 1955  MRN: [id number]  Referring provider: PRASHANTH Polo  Dx:   Encounter Diagnosis     ICD-10-CM    1  Ambulatory dysfunction  R26 2    2  Concussion without loss of consciousness, subsequent encounter  S06 0X0D                   Subjective: Pt reports feeling good today  Objective: See treatment diary below    Assessment: Pt presents to treatment session on this date with improvements seen in ambulatory endurance with a 2 minute increase on the treadmill and LE functional strength by addition of ankle weights and smoothness of movement during step ups and hurdles  Pt did however demonstrate moderate LOB t/o tandem walking with addition of ankle weights  Will continue to progress pt towards stated goals by improving functional strength and endurance as well as dynamic balance  Plan: Progress treatment as tolerated    Continue outside of // bars as able per pt request       Precautions: h/o depression      Manuals 2/24 3/3 3/8     2/8 2/10 2/12                                                       Neuro Re-Ed             Chin tuck             STS 3x10 3 x 10            Step ups 8" no ue 15x ea fwd/lat 8" no UE 15x fw/lat   8" no UE 15x fwd 2x/ lat     6" plus foam fwd/lat 15xea 8" no UE with holds x20 ea 8" no UE 15x ea   backwards 3 laps 4 laps       4 laps no ue 3 laps no ue 3 laps no ue   hurdles 12" fwd/lat 4 laps ea 12" fw/lat 4 laps  with 2# ankle weights, Fwd/ lateral 6 laps     3 laps fwd/lat 12' and 4 " ea 4 laps fwd/lat ea high hurdles    Side steps 4 laps no ue 5 laps no UE       3 laps no ue 3 laps no ue 3 laps no ue   Foam beams             Tandem gait 3 laps no ue 3 laps no UE  2x3 laps with rest btwn, no UE, 2# ankle weights     3 laps no ue 4 laps no ue 4 lap sn oue    4 laps no ue 4 laps no UE  6 laps no UE, 2# ankle weights      4 laps no ue 4 laps no ue 4 laps no ue   Cone taps  Step taps 30x Step taps 30x  Step taps 30x no UE, 2# ankle weights     40x no Ue foam                               Ther Ex             UT stretch             llevator stretch             treadmill 1 4 mph 10 min 1 4 mph, 5 min  1 5 mph; 12 min     3 standing rest breaks    10 min 1 4 mph 1 4 mph BUE 2x5min                                                                     Ther Activity                                       Gait Training                                       Modalities

## 2021-03-09 ENCOUNTER — ANESTHESIA EVENT (OUTPATIENT)
Dept: GASTROENTEROLOGY | Facility: HOSPITAL | Age: 66
End: 2021-03-09

## 2021-03-10 ENCOUNTER — ANESTHESIA (OUTPATIENT)
Dept: GASTROENTEROLOGY | Facility: HOSPITAL | Age: 66
End: 2021-03-10

## 2021-03-10 ENCOUNTER — HOSPITAL ENCOUNTER (OUTPATIENT)
Dept: GASTROENTEROLOGY | Facility: HOSPITAL | Age: 66
Setting detail: OUTPATIENT SURGERY
Discharge: HOME | End: 2021-03-10
Attending: COLON & RECTAL SURGERY
Payer: MEDICARE

## 2021-03-10 VITALS
OXYGEN SATURATION: 96 % | HEART RATE: 59 BPM | SYSTOLIC BLOOD PRESSURE: 109 MMHG | RESPIRATION RATE: 18 BRPM | DIASTOLIC BLOOD PRESSURE: 66 MMHG | TEMPERATURE: 97.3 F

## 2021-03-10 DIAGNOSIS — C61 MALIGNANT NEOPLASM OF PROSTATE (HCC): ICD-10-CM

## 2021-03-10 DIAGNOSIS — K57.90 DIVERTICULOSIS: ICD-10-CM

## 2021-03-10 PROCEDURE — 99213 OFFICE O/P EST LOW 20 MIN: CPT | Performed by: COLON & RECTAL SURGERY

## 2021-03-10 PROCEDURE — 45378 DIAGNOSTIC COLONOSCOPY: CPT | Performed by: COLON & RECTAL SURGERY

## 2021-03-10 RX ORDER — PROPOFOL 10 MG/ML
INJECTION, EMULSION INTRAVENOUS CONTINUOUS PRN
Status: DISCONTINUED | OUTPATIENT
Start: 2021-03-10 | End: 2021-03-10

## 2021-03-10 RX ORDER — PROPOFOL 10 MG/ML
INJECTION, EMULSION INTRAVENOUS AS NEEDED
Status: DISCONTINUED | OUTPATIENT
Start: 2021-03-10 | End: 2021-03-10

## 2021-03-10 RX ORDER — KETAMINE HCL IN NACL, ISO-OSM 100MG/10ML
SYRINGE (ML) INJECTION AS NEEDED
Status: DISCONTINUED | OUTPATIENT
Start: 2021-03-10 | End: 2021-03-10

## 2021-03-10 RX ORDER — SODIUM CHLORIDE 9 MG/ML
INJECTION, SOLUTION INTRAVENOUS CONTINUOUS PRN
Status: DISCONTINUED | OUTPATIENT
Start: 2021-03-10 | End: 2021-03-10

## 2021-03-10 RX ORDER — LIDOCAINE HYDROCHLORIDE 10 MG/ML
INJECTION, SOLUTION EPIDURAL; INFILTRATION; INTRACAUDAL; PERINEURAL AS NEEDED
Status: DISCONTINUED | OUTPATIENT
Start: 2021-03-10 | End: 2021-03-10

## 2021-03-10 RX ADMIN — LIDOCAINE HYDROCHLORIDE 50 MG: 10 INJECTION, SOLUTION EPIDURAL; INFILTRATION; INTRACAUDAL; PERINEURAL at 09:31

## 2021-03-10 RX ADMIN — PROPOFOL 30 MG: 10 INJECTION, EMULSION INTRAVENOUS at 09:48

## 2021-03-10 RX ADMIN — SODIUM CHLORIDE: 0.9 INJECTION, SOLUTION INTRAVENOUS at 09:25

## 2021-03-10 RX ADMIN — PROPOFOL 20 MG: 10 INJECTION, EMULSION INTRAVENOUS at 09:37

## 2021-03-10 RX ADMIN — Medication 20 MG: at 09:31

## 2021-03-10 RX ADMIN — PROPOFOL 20 MCG/KG/MIN: 10 INJECTION, EMULSION INTRAVENOUS at 09:32

## 2021-03-10 NOTE — ANESTHESIA PREPROCEDURE EVALUATION
Procedure:  COLONOSCOPY    Relevant Problems   CARDIO   (+) Benign essential hypertension   (+) Hyperlipidemia      ENDO   (+) Controlled type 2 diabetes mellitus without complication (HCC)      GI/HEPATIC   (+) Gastroesophageal reflux disease with esophagitis      /RENAL   (+) Malignant neoplasm of prostate (HCC)      NEURO/PSYCH   (+) Depression with anxiety      PULMONARY   (+) Asthma   (+) Sleep apnea   Npo verified ; well controlled GERD   Morbid obesity- BMI 49     Physical Exam    Airway    Mallampati score: III  TM Distance: >3 FB  Neck ROM: full     Dental   No notable dental hx     Cardiovascular      Pulmonary      Other Findings        Anesthesia Plan  ASA Score- 3     Anesthesia Type- IV sedation with anesthesia with ASA Monitors  Additional Monitors:   Airway Plan:     Comment: Plan for IV sedation with GETA as back up          Plan Factors-    Chart reviewed  EKG reviewed  Imaging results reviewed  Existing labs reviewed  Patient summary reviewed  Induction- intravenous  Postoperative Plan-     Informed Consent- Anesthetic plan and risks discussed with patient  I personally reviewed this patient with the CRNA  Discussed and agreed on the Anesthesia Plan with the CRNA  Stan Melendrez

## 2021-03-10 NOTE — ANESTHESIA POSTPROCEDURE EVALUATION
Post-Op Assessment Note    CV Status:  Stable    Pain management: adequate     Mental Status:  Alert and awake   Hydration Status:  Euvolemic   PONV Controlled:  Controlled   Airway Patency:  Patent      Post Op Vitals Reviewed: Yes      Staff: Anesthesiologist, CRNA         No complications documented      /59 (03/10/21 0956)    Temp (!) 97 3 °F (36 3 °C) (03/10/21 0956)    Pulse 58 (03/10/21 0956)   Resp 18 (03/10/21 0956)    SpO2 95 % (03/10/21 0956)

## 2021-03-10 NOTE — H&P
History and Physical   Colon and Rectal Surgery   Marguerite Wood 77 y o  male MRN: [id number]  Unit/Bed#:  Encounter: [id number]  03/10/21   9:28 AM      CC:  Screening  History of Present Illness   HPI:  Marguerite Wood is a 77 y o  male with no GI symptoms  Historical Information   Past Medical History:   Diagnosis Date    Acute blood loss anemia 10/13/2016    Anxiety     Arthritis     knees    Asthma     stable for > 10 years    [facility])     prostate    Cataract     Depression     Diabetes mellitus ([facility] [address] )     pre diabetes    Diverticulitis of colon     Hiatal hernia     History of anal fissures     Hyperlipidemia     Hypertension     Incisional hernia     Insomnia     Kidney stone     Morbid obesity (HCC)     PONV (postoperative nausea and vomiting)     Prolapsing mitral valve     prolapsing mitral valve leaflet syndrome    Prostate cancer ([facility] [address] )     Retinal detachment     treated surgically at [facility]; resolved: 10/10/2012    Sleep apnea     diagnosed but unable to tolerate cpap       Stuttering      Past Surgical History:   Procedure Laterality Date    ABDOMINAL SURGERY      gastric lap band    ARTHROSCOPIC REPAIR ACL Right     BIOPSY CORE NEEDLE      prostate    CATARACT EXTRACTION Bilateral     COLONOSCOPY      EYE SURGERY      each eye had a detached retina and cataract removed, Right eye has a buckle    GASTRIC BYPASS      pt had LAP BAND SURGERY not gastric bypass    HERNIA REPAIR      naval    JOINT REPLACEMENT      B/L knee    KNEE ARTHROSCOPY Left     NJ TOTAL KNEE ARTHROPLASTY Left 2/15/2017    Procedure: TOTAL KNEE ARTHROPLASTY ;  Surgeon: Mable Varela MD;  [facility];  Service: Orthopedics    NJ TOTAL KNEE ARTHROPLASTY Right 10/10/2016    Procedure: ARTHROPLASTY KNEE TOTAL;  Surgeon: Mable Varela MD;  [facility];  Service: Orthopedics    PROSTATECTOMY      robotic-assisted; [facility]-Dr More Garcia RETINAL DETACHMENT SURGERY Bilateral [facility]    SEPTOPLASTY      SINUS SURGERY      TONSILLECTOMY      over age 16    VASECTOMY      vas deferens       Meds/Allergies     (Not in a hospital admission)        Current Outpatient Medications:     ALPRAZolam (XANAX) 0 25 mg tablet, Take 1 tablet (0 25 mg total) by mouth 3 (three) times a day as needed for anxiety, Disp: 270 tablet, Rfl: 1    zolpidem (AMBIEN) 10 mg tablet, Take 1 tablet (10 mg total) by mouth daily at bedtime, Disp: 90 tablet, Rfl: 0    ezetimibe (ZETIA) 10 mg tablet, TAKE 1 TABLET DAILY, Disp: 90 tablet, Rfl: 3    FLUoxetine (PROzac) 20 mg capsule, TAKE 3 CAPSULES DAILY, Disp: 270 capsule, Rfl: 3    FLUoxetine (PROzac) 40 MG capsule, Take 60 mg by mouth daily, Disp: , Rfl:     hydrochlorothiazide (HYDRODIURIL) 12 5 mg tablet, Take 1 tablet (12 5 mg total) by mouth 3 (three) times a week Monday Thursday Saturdau, Disp: 45 tablet, Rfl: 3    losartan (COZAAR) 100 MG tablet, Take 1 tablet (100 mg total) by mouth daily, Disp: 90 tablet, Rfl: 3    metFORMIN (GLUCOPHAGE) 500 mg tablet, TAKE 2 TABLETS TWICE A DAY WITH MEALS (Patient taking differently: 1,000 mg 2 (two) times a day with meals ), Disp: 360 tablet, Rfl: 3    metoprolol succinate (TOPROL-XL) 25 mg 24 hr tablet, Take 1 tablet (25 mg total) by mouth daily, Disp: 90 tablet, Rfl: 3    Multiple Vitamins-Minerals (CENTRUM ADULTS PO), Take by mouth, Disp: , Rfl:     nystatin (MYCOSTATIN) cream, Apply topically 2 (two) times a day, Disp: 30 g, Rfl: 2    nystatin (MYCOSTATIN) powder, Apply topically 4 (four) times a day, Disp: 60 g, Rfl: 2    OLANZapine (ZyPREXA) 5 mg tablet, TAKE 1 TABLET DAILY, Disp: 90 tablet, Rfl: 3    pantoprazole (PROTONIX) 40 mg tablet, Take 40 mg by mouth daily, Disp: , Rfl:     rosuvastatin (CRESTOR) 5 mg tablet, One tablet twice a week on Monday and Friday, Disp: 24 tablet, Rfl: 3  No current facility-administered medications for this encounter       Facility-Administered Medications Ordered in Other Encounters:     sodium chloride 0 9 % infusion, , , Continuous PRN, Jhoana Arndt, CRNA, [place] at 03/10/21 3320    Allergies   Allergen Reactions    Celebrex [Celecoxib] Other (See Comments)     Cardiologist stated he should not take      Other      "steroid eyedrops"      Prednisone Other (See Comments)     Prednisone eye drops- eye pressure increases         Social History   Social History     Substance and Sexual Activity   Alcohol Use Not Currently    Frequency: Monthly or less    Drinks per session: 1 or 2    Binge frequency: Never    Comment: rare ; being a social drinker (as per allscripts)     Social History     Substance and Sexual Activity   Drug Use Not Currently     Social History     Tobacco Use   Smoking Status Never Smoker   Smokeless Tobacco Never Used   Tobacco Comment    quit 38 years ago         Family History:   Family History   Problem Relation Age of Onset    Heart disease Father     Coronary artery disease Father     Prostate cancer Father     Coronary artery disease Mother     Diabetes Mother         mellitus    Diabetes Maternal Grandmother         mellitus    Coronary artery disease Maternal Grandfather     Coronary artery disease Paternal Grandfather      Review of Systems - General ROS: negative  Respiratory ROS: negative  Cardiovascular ROS: negative     Objective     Current Vitals:   Blood Pressure: 135/83 (03/10/21 0826)  Pulse: 64 (03/10/21 0826)  Temperature: 97 9 °F (36 6 °C) (03/10/21 0826)  Temp Source: Tympanic (03/10/21 0826)  Respirations: 18 (03/10/21 0826)  SpO2: 96 % (03/10/21 0826)  No intake or output data in the 24 hours ending 03/10/21 0928    Physical Exam:  General:  Well nourished, no distress  Neuro: Alert and oriented  Eyes:Sclera anicteric, conjunctiva pink  Pulm: Clear to auscultation bilaterally  No respiratory Distress  CV:  Regular rate and rhythm  No murmurs    Abdomen:  Soft, flat, non-tender, without masses or hepatosplenomegaly  Obesity limits the exam     Lab Results:       ASSESSMENT:  Les Mejias is a 77 y o  male for screening  PLAN:  Colonoscopy  Risks , including, but not limited to, bleeding, perforation, missed lesions, and potential need for surgery, were reviewed  Alternatives to colonoscopy were discussed    Bryanna Fernando MD

## 2021-03-12 ENCOUNTER — OFFICE VISIT (OUTPATIENT)
Dept: PHYSICAL THERAPY | Facility: CLINIC | Age: 66
End: 2021-03-12
Payer: MEDICARE

## 2021-03-12 DIAGNOSIS — I10 ESSENTIAL HYPERTENSION: ICD-10-CM

## 2021-03-12 DIAGNOSIS — E78.2 MIXED HYPERLIPIDEMIA: ICD-10-CM

## 2021-03-12 DIAGNOSIS — Z91.81 STATUS POST FALL: ICD-10-CM

## 2021-03-12 DIAGNOSIS — R26.2 AMBULATORY DYSFUNCTION: Primary | ICD-10-CM

## 2021-03-12 DIAGNOSIS — S06.0X0D CONCUSSION WITHOUT LOSS OF CONSCIOUSNESS, SUBSEQUENT ENCOUNTER: ICD-10-CM

## 2021-03-12 PROCEDURE — 97112 NEUROMUSCULAR REEDUCATION: CPT | Performed by: PHYSICAL THERAPIST

## 2021-03-12 PROCEDURE — 97116 GAIT TRAINING THERAPY: CPT | Performed by: PHYSICAL THERAPIST

## 2021-03-14 NOTE — PROGRESS NOTES
Daily Note     Today's date: 3/14/2021  Patient name: Dore Dakin  : 1955  MRN: [id number]  Referring provider: PRASHANTH Handy  Dx:   Encounter Diagnosis     ICD-10-CM    1  Ambulatory dysfunction  R26 2    2  Concussion without loss of consciousness, subsequent encounter  S06 0X0D    3  Status post fall  Z91 81                   Subjective: Pt reports feeling good today  Objective: See treatment diary below    Assessment: Pt presents to treatment session on this date with improvements seen in ambulatory endurance with a 2 minute increase on the treadmill and LE functional strength by addition of ankle weights and smoothness of movement during step ups and hurdles  Pt did however demonstrate moderate LOB t/o tandem walking with addition of ankle weights  Will continue to progress pt towards stated goals by improving functional strength and endurance as well as dynamic balance  Plan: Progress treatment as tolerated    Continue outside of // bars as able per pt request       Precautions: h/o depression      Manuals 2/24 3/3 3/8 3/12                                                             Neuro Re-Ed             Chin tuck             STS 3x10 3 x 10            Step ups 8" no ue 15x ea fwd/lat 8" no UE 15x fw/lat   8" no UE 15x fwd 2x/ lat 8" no ue 15x fwd 2x lat         backwards 3 laps 4 laps   15ft x 2 grass         hurdles 12" fwd/lat 4 laps ea 12" fw/lat 4 laps  with 2# ankle weights, Fwd/ lateral 6 laps          Side steps 4 laps no ue 5 laps no UE   15ft x 2 grass         Foam beams             Tandem gait 3 laps no ue 3 laps no UE  2x3 laps with rest btwn, no UE, 2# ankle weights          HK march 4 laps no ue 4 laps no UE  6 laps no UE, 2# ankle weights  15ft x 2 grass         Cone taps  Step taps 30x Step taps 30x  Step taps 30x no UE, 2# ankle weights                                    Ther Ex             UT stretch             llevator stretch             treadmill 1 4 mph 10 min 1 4 mph, 5 min  1 5 mph; 12 min                                                                           Ther Activity                                       Gait Training             Walking outside curbs, sidewalks    15min                      Modalities

## 2021-03-15 RX ORDER — ROSUVASTATIN CALCIUM 5 MG/1
TABLET, COATED ORAL
Qty: 90 TABLET | Refills: 3 | Status: SHIPPED | OUTPATIENT
Start: 2021-03-15 | End: 2021-12-02

## 2021-03-15 RX ORDER — LOSARTAN POTASSIUM 100 MG/1
100 TABLET ORAL DAILY
Qty: 90 TABLET | Refills: 3 | Status: SHIPPED | OUTPATIENT
Start: 2021-03-15 | End: 2022-05-09

## 2021-03-16 ENCOUNTER — OFFICE VISIT (OUTPATIENT)
Dept: PHYSICAL THERAPY | Facility: CLINIC | Age: 66
End: 2021-03-16
Payer: MEDICARE

## 2021-03-16 DIAGNOSIS — Z91.81 STATUS POST FALL: ICD-10-CM

## 2021-03-16 DIAGNOSIS — R26.2 AMBULATORY DYSFUNCTION: Primary | ICD-10-CM

## 2021-03-16 DIAGNOSIS — I25.10 CORONARY ARTERY DISEASE INVOLVING NATIVE CORONARY ARTERY OF NATIVE HEART WITHOUT ANGINA PECTORIS: ICD-10-CM

## 2021-03-16 DIAGNOSIS — S06.0X0D CONCUSSION WITHOUT LOSS OF CONSCIOUSNESS, SUBSEQUENT ENCOUNTER: ICD-10-CM

## 2021-03-16 DIAGNOSIS — E78.2 MIXED HYPERLIPIDEMIA: ICD-10-CM

## 2021-03-16 PROCEDURE — 97110 THERAPEUTIC EXERCISES: CPT | Performed by: PHYSICAL THERAPIST

## 2021-03-16 PROCEDURE — 97112 NEUROMUSCULAR REEDUCATION: CPT | Performed by: PHYSICAL THERAPIST

## 2021-03-16 RX ORDER — HYDROCHLOROTHIAZIDE 12.5 MG/1
12.5 TABLET ORAL 3 TIMES WEEKLY
Qty: 45 TABLET | Refills: 3 | Status: SHIPPED | OUTPATIENT
Start: 2021-03-17 | End: 2021-05-17 | Stop reason: SDUPTHER

## 2021-03-16 NOTE — PROGRESS NOTES
Daily Note     Today's date: 3/16/2021  Patient name: Barbara Leyva  : 1955  MRN: [id number]  Referring provider: PRASHANTH Macdonald  Dx:   Encounter Diagnosis     ICD-10-CM    1  Ambulatory dysfunction  R26 2    2  Concussion without loss of consciousness, subsequent encounter  S06 0X0D    3  Status post fall  Z91 81                   Subjective:     Objective: See treatment diary below    Functional Outcomes: 2/15  6 mwt - 950 ft   5x sts : 10 26 sec  FGA -   Gait speed: 0 95 m/s     Functional outcomes (IE)  5x sit to stand: 13  (seconds)  FGA -   Gait Speed - 0 89 m/s    Functional Outcomes 3/16  6 mwt - 1100ft     Assessment:  Since intial evaluation patient has made significant progress with balance per FGA, improved overall endurance and gait and overall has returned to previous level of function with improvement in balance at decreased fall risk  Pt is indpeendent with HEP  He will be discharged at this time  Plan: Progress treatment as tolerated    Continue outside of // bars as able per pt request       Precautions: h/o depression      Manuals 2/24 3/3 3/8 3/12 3/16                                                            Neuro Re-Ed             Chin tuck             STS 3x10 3 x 10    3x10        Step ups 8" no ue 15x ea fwd/lat 8" no UE 15x fw/lat   8" no UE 15x fwd 2x/ lat 8" no ue 15x fwd 2x lat 8" 10x ea fwd/lat        backwards 3 laps 4 laps   15ft x 2 grass         hurdles 12" fwd/lat 4 laps ea 12" fw/lat 4 laps  with 2# ankle weights, Fwd/ lateral 6 laps  3 laps no ue        Side steps 4 laps no ue 5 laps no UE   15ft x 2 grass 3 laps no ue        Foam beams             Tandem gait 3 laps no ue 3 laps no UE  2x3 laps with rest btwn, no UE, 2# ankle weights          HK march 4 laps no ue 4 laps no UE  6 laps no UE, 2# ankle weights  15ft x 2 grass 3 laps no ue        Cone taps  Step taps 30x Step taps 30x  Step taps 30x no UE, 2# ankle weights Ther Ex             UT stretch             llevator stretch             treadmill 1 4 mph 10 min 1 4 mph, 5 min  1 5 mph; 12 min                                                                           Ther Activity                                       Gait Training             Walking outside curbs, sidewalks    15min                      Modalities

## 2021-03-19 ENCOUNTER — IMMUNIZATIONS (OUTPATIENT)
Dept: FAMILY MEDICINE CLINIC | Facility: HOSPITAL | Age: 66
End: 2021-03-19

## 2021-03-19 DIAGNOSIS — Z23 ENCOUNTER FOR IMMUNIZATION: Primary | ICD-10-CM

## 2021-03-19 PROCEDURE — 91301 SARS-COV-2 / COVID-19 MRNA VACCINE (MODERNA) 100 MCG: CPT

## 2021-03-19 PROCEDURE — 0012A SARS-COV-2 / COVID-19 MRNA VACCINE (MODERNA) 100 MCG: CPT

## 2021-03-31 ENCOUNTER — TELEPHONE (OUTPATIENT)
Dept: GASTROENTEROLOGY | Facility: CLINIC | Age: 66
End: 2021-03-31

## 2021-03-31 NOTE — TELEPHONE ENCOUNTER
Called and left message for pt to call office to schedule a NP appt with dr Paulie Glass in next 4 weeks

## 2021-04-08 PROBLEM — R13.10 DYSPHAGIA: Status: ACTIVE | Noted: 2021-04-08

## 2021-04-13 ENCOUNTER — EVALUATION (OUTPATIENT)
Dept: SPEECH THERAPY | Facility: CLINIC | Age: 66
End: 2021-04-13
Payer: MEDICARE

## 2021-04-13 DIAGNOSIS — F80.81 STUTTERING: ICD-10-CM

## 2021-04-13 DIAGNOSIS — R13.10 DYSPHAGIA, UNSPECIFIED TYPE: Primary | ICD-10-CM

## 2021-04-13 DIAGNOSIS — K22.70 BARRETT'S ESOPHAGUS WITHOUT DYSPLASIA: ICD-10-CM

## 2021-04-13 PROCEDURE — 92610 EVALUATE SWALLOWING FUNCTION: CPT

## 2021-04-13 NOTE — PROGRESS NOTES
Speech-Language Pathology Initial Evaluation    Today's date: 2021  Patients name: Aixa Florez  : 1955  MRN: [id number]  Safety measures: fall risk (hx of falls)  Referring provider: Augustus Renee PA-C    Primary diagnosis/billing code: R13 10  Secondary diagnosis/billing code: K22 70    Visit tracking:  -Referring provider: Epic  -Billing guidelines: CMS  -Visit #1/10  -Insurance: Medicare/CBC    Subjective comments: "Its mostly liquids" "Going down the wrong pipe"    How did the patient hear about us? Physician    Patient's goal(s): "To learn some tricks to make this better"    Reason for referral: Difficulty swallowing solids or liquids  Prior functional status: Swallowing WNL  Clinically complex situations: N/A    History: Patient is a 77 y o  male who was referred to outpatient skilled Speech Therapy services for a dysphagia evaluation  Patient referred by ENT (Dr Dellar Fabry) secondary to c/o dysphagia with liquids  Patient had barium swallow test and VBSS in  which were unremarkable  Patient reports liquids "going down the wrong pipe" at times  Patient reports noticing swallowing changes approx 1-2 years ago  Hx of Barretts Esophagus (2 EGDs in 2020)  Patient reports; "On a scale of 1-10, its a 5"    Hearing: [facility] for testing  Vision: [facility] for testing      Mental status: Alert  Behavior status: Cooperative  Communication modalities: Verbal  Rehabilitation prognosis: None    Assessments      DYSPHAGIA EVALUATION:    -Reason for referral: Signs/symptoms of dysphagia    -Subjective report of swallowing difficulty: Coughing and Globus sensation     -Eating Assessment Tool (EAT-10) Swallowing Screening Tool is a patient self-assessment tool that rates the percieved impairment a swallowing disorder has on the Functional, Physical, and Emotional aspects of one's life  EAT-10 score: 5/40    -Difficulty swallowing: Liquids    -Current diet (solids): Regular  -Current diet (liquids):  Thin  -Alternative Feeding Method?: none    -Facial appearance Symmetrical   -Dentition Adequate   -Labial function WFL   -Lingual function Decreased strength (right side)   -Velar function Symmetrical       LIQUID CONSISTENCY TESTIN  Liquid Consistency (Thin) - water   Administered by: Cup   Strategies, attempts, and responses: Chin Tuck - and Supraglottic swallow -     CLINICAL FINDINGS:   Oral phase impairments: WFL   Pharyngeal Phase Impairments: WFL    *Laryngeal excursion upon palpation: Appeared WFL      SOLID CONSISTENCY TESTIN  Solid Consistency (Mechanical Soft, Mixed and Puree) soft fruit bar, diced oranges in thin liquid juice, and pudding   Administered by: Ulice Fremont and Self-fed   Strategies, attempts, and responses: None    CLINICAL FINDINGS:   Oral phase impairments: WFL   Pharyngeal Phase Impairments: Complaints of globus sensation - with fruit bar, cleared with liquid wash     *Laryngeal excursion upon palpation: Appeared WFL    SWALLOWING DIAGNOSTIC IMPRESSION:  -Swallowing diagnosis/severity: functional swallow - no dysphagia noted    -Factors affecting performance: None    -Safety concerns: No limitations    -Risk factors: Molina's esophagus    SAFETY PRECAUTIONS:  -Supervision: Independent     -Strategies: Small sips and bites when eating and Alternate liquids and solids    -Positioning: Upright position at least 30 minutes after meal, Upright position during meals and Chin tuck    -Compensatory strategies: Supraglottic swallow - Chin Tuck    -Recommend (solids): Regular    -Recommend (liquids): Thin    -Recommend (medications): whole with thin liquids    REFERRALS: None      Goals    No treatment warranted    Impressions/Recommendations    Impressions: -Patient presents with a functional swallow; with complaints of intermittent coughing with thin liquids and globus with solids   Patient was educated on swallowing anatomy and process; instructed his VBSS was functional  Provided education on safe swallowing guidelines as well as some strategies to try (chin tuck and supraglottic swallow)  Patient was able to demonstrate use during evaluation today and reported "that seems to help " Education handouts provided       Recommendations: No skilled speech therapy warranted at this time      -Frequency: No treatment is warranted at this time   -Duration: N/A

## 2021-04-26 DIAGNOSIS — G47.00 INSOMNIA, UNSPECIFIED TYPE: ICD-10-CM

## 2021-04-26 DIAGNOSIS — F41.8 DEPRESSION WITH ANXIETY: ICD-10-CM

## 2021-04-26 RX ORDER — FLUOXETINE HYDROCHLORIDE 20 MG/1
CAPSULE ORAL
Qty: 270 CAPSULE | Refills: 3 | Status: SHIPPED | OUTPATIENT
Start: 2021-04-26 | End: 2022-05-06

## 2021-04-26 RX ORDER — ZOLPIDEM TARTRATE 10 MG/1
10 TABLET ORAL
Qty: 90 TABLET | Refills: 0 | Status: SHIPPED | OUTPATIENT
Start: 2021-04-26 | End: 2021-08-18 | Stop reason: SDUPTHER

## 2021-04-27 ENCOUNTER — PATIENT OUTREACH (OUTPATIENT)
Dept: FAMILY MEDICINE CLINIC | Facility: CLINIC | Age: 66
End: 2021-04-27

## 2021-04-27 DIAGNOSIS — Z78.9 NEED FOR FOLLOW-UP BY SOCIAL WORKER: Primary | ICD-10-CM

## 2021-04-27 NOTE — PROGRESS NOTES
Patient would like for you to send an order for Ozempic to his mail order pharmacy   He does not qualify for the programs that are out there

## 2021-04-27 NOTE — PROGRESS NOTES
Request received from PCP office to assist patient with Ozempic PAP through [employer] as he is unable to afford cost of Ozempic (patient has Medicare and uses [facility] for his medications)  PAP Application was already submitted and patient was informed he exceeds income limit and was also informed that his insurance requires a prior-authorization (physician # for [facility] is [phone])  Sandeep 57 to clarify determination of initial application  Spoke with Jose who stated patient's application was denied as he exceeds household income limit  OPCM SW inquired about savings/discount card through [employer]  Patient would need to call [phone] and request to speak with savings/copay department for Ozempic  Upon reviewing patient's [place] [employer] PAP Application, patient also exceeds income limit for PACE/PACENET Medication Assistance Program     Outreached patient to inform of the above information  Patient understanding and asked if he would be eligible for the Trulicity PAP through [facility]; reviewed criteria and patient exceeds income limit for Trulicity PAP as well  OPCM SOM provided patient with phone number for Ozempic PAP to check eligibility for coupon/savings card  Also provided patient with Trulicity Coupon pending plan from PCP office for Ozempic vs Trulicity  Per patient, he will still need prior-auth for Ozempic through his insurance if PCP plans to continue with this medication (would be $300 for 90 day supply which patient stated would be feasible, pending authorization)  Patient denied any additional needs at this time and was appreciative for [facility] SW's outreach  Update routed to Dr Le Florez and Pamela Matrinez MA

## 2021-04-28 DIAGNOSIS — E11.9 CONTROLLED TYPE 2 DIABETES MELLITUS WITHOUT COMPLICATION, WITHOUT LONG-TERM CURRENT USE OF INSULIN (HCC): Primary | ICD-10-CM

## 2021-04-28 RX ORDER — LANCETS 33 GAUGE
EACH MISCELLANEOUS DAILY
Qty: 300 EACH | Refills: 1 | Status: SHIPPED | OUTPATIENT
Start: 2021-04-28

## 2021-04-28 RX ORDER — BLOOD-GLUCOSE METER
EACH MISCELLANEOUS DAILY
Qty: 1 KIT | Refills: 0 | Status: SHIPPED | OUTPATIENT
Start: 2021-04-28

## 2021-04-28 RX ORDER — LANCING DEVICE/LANCETS
KIT MISCELLANEOUS DAILY
Qty: 1 EACH | Refills: 0 | Status: SHIPPED | OUTPATIENT
Start: 2021-04-28

## 2021-04-28 NOTE — PROGRESS NOTES
I sent prescription to his mail order [facility]  Dose will be 0 25mg daily - I will reassess does after 3 months use and recheck of A1C, BS log, and weight at that time    I want him to get labs done this week before he starts - last A1C was 10 2020 (orders entered)  He also needs to monitor his BS at home and keep a log

## 2021-04-28 NOTE — PROGRESS NOTES
Spoke with pt made him aware however he does not have any way of checking his BS at home   Please advise

## 2021-04-28 NOTE — PROGRESS NOTES
We can order a glucometer and supplies for him  He should check once a day (FBS on M-W-F-S-S and before dinner on T-Th)  If he misses occasional days but does at least 4 times a week that would be fine    Find out where he would get this and then order for me to sign     Thanks!

## 2021-04-29 ENCOUNTER — APPOINTMENT (OUTPATIENT)
Dept: LAB | Age: 66
End: 2021-04-29
Payer: MEDICARE

## 2021-04-29 DIAGNOSIS — Z11.59 NEED FOR HEPATITIS C SCREENING TEST: ICD-10-CM

## 2021-04-29 DIAGNOSIS — E11.9 CONTROLLED TYPE 2 DIABETES MELLITUS WITHOUT COMPLICATION, WITHOUT LONG-TERM CURRENT USE OF INSULIN (HCC): ICD-10-CM

## 2021-04-29 LAB
ANION GAP SERPL CALCULATED.3IONS-SCNC: 7 MMOL/L (ref 4–13)
BUN SERPL-MCNC: 16 MG/DL (ref 5–25)
CALCIUM SERPL-MCNC: 9.6 MG/DL (ref 8.3–10.1)
CHLORIDE SERPL-SCNC: 103 MMOL/L (ref 100–108)
CO2 SERPL-SCNC: 27 MMOL/L (ref 21–32)
CREAT SERPL-MCNC: 1.06 MG/DL (ref 0.6–1.3)
EST. AVERAGE GLUCOSE BLD GHB EST-MCNC: 174 MG/DL
GFR SERPL CREATININE-BSD FRML MDRD: 72 ML/MIN/1.73SQ M
GLUCOSE SERPL-MCNC: 193 MG/DL (ref 65–140)
HBA1C MFR BLD: 7.7 %
POTASSIUM SERPL-SCNC: 4.2 MMOL/L (ref 3.5–5.3)
SODIUM SERPL-SCNC: 137 MMOL/L (ref 136–145)
TSH SERPL DL<=0.05 MIU/L-ACNC: 1.35 UIU/ML (ref 0.36–3.74)

## 2021-04-29 PROCEDURE — 86803 HEPATITIS C AB TEST: CPT

## 2021-04-29 PROCEDURE — 83036 HEMOGLOBIN GLYCOSYLATED A1C: CPT

## 2021-04-29 PROCEDURE — 84443 ASSAY THYROID STIM HORMONE: CPT

## 2021-04-29 PROCEDURE — 80048 BASIC METABOLIC PNL TOTAL CA: CPT

## 2021-04-29 PROCEDURE — 36415 COLL VENOUS BLD VENIPUNCTURE: CPT

## 2021-04-30 LAB — HCV AB SER QL: NORMAL

## 2021-05-05 ENCOUNTER — PREP FOR PROCEDURE (OUTPATIENT)
Dept: GASTROENTEROLOGY | Facility: CLINIC | Age: 66
End: 2021-05-05

## 2021-05-05 ENCOUNTER — OFFICE VISIT (OUTPATIENT)
Dept: GASTROENTEROLOGY | Facility: CLINIC | Age: 66
End: 2021-05-05
Payer: MEDICARE

## 2021-05-05 VITALS
DIASTOLIC BLOOD PRESSURE: 80 MMHG | SYSTOLIC BLOOD PRESSURE: 140 MMHG | WEIGHT: 315 LBS | BODY MASS INDEX: 46.65 KG/M2 | TEMPERATURE: 97.2 F | HEIGHT: 69 IN | HEART RATE: 75 BPM

## 2021-05-05 DIAGNOSIS — K22.70 BARRETT'S ESOPHAGUS WITHOUT DYSPLASIA: Primary | ICD-10-CM

## 2021-05-05 DIAGNOSIS — R13.19 ESOPHAGEAL DYSPHAGIA: ICD-10-CM

## 2021-05-05 DIAGNOSIS — K21.01 GASTROESOPHAGEAL REFLUX DISEASE WITH ESOPHAGITIS AND HEMORRHAGE: ICD-10-CM

## 2021-05-05 PROCEDURE — 99204 OFFICE O/P NEW MOD 45 MIN: CPT | Performed by: INTERNAL MEDICINE

## 2021-05-05 NOTE — PROGRESS NOTES
[facility] - Outpatient Consultation  Kevin De Luna 77 y o  male MRN: [id number]  Encounter: [id number]          ASSESSMENT AND PLAN:      1  Molina's esophagus without dysplasia  2  Gastroesophageal reflux disease with esophagitis and hemorrhage  3  Esophageal dysphagia     had longstanding reflux disease with long segment Molina's esophagus C7M7  There was a 5-7 mm nodule at 30 cm which was removed with snare  Residual tissue was removed with biopsy forceps  Pathology from this area showed atypical cells indefinite for dysplasia  He was supposed to get a follow-up EGD but this was delayed due to [place] pandemic and he was waiting to get a vaccine  we discussed natural course of Molina's esophagus and its eisk of progression to esophageal adenocarcinoma  In this patient  with white male, obese with BMI of 47, and long segment Molina's esophagus I recommend radiofrequency ablation complete eradication of Molina's esophagus  I am also concerned about the area of nodular seen on previous EGD -  I will schedule him for EGD with EMR if there is any residual nodule  Radiofrequency ablation can be performed on the same day  we discussed risks and benefits of the procedure  Risks include but not limited to infection, bleeding, perforation, dysphagia, odynophagia and esophageal stricture  Patient verbalized understanding and agreed with the plan   continue reflux precautions  Increase Protonix to 40 mg b i d   ______________________________________________________________________    HPI:   29-year-old male here to establish his care for management Molina's esophagus and reflux  He had longstanding reflux disease  Currently taking pantoprazole 40 mg daily  Reflux symptoms are well controlled  He is transitioning his care from Dr Melquiades Beatty  He had EGD on July 1, 2020 for evaluation of dysphagia   C7M7 Molina's esophagus - Z-line 27 cm from the incisors, top of gastric folds 35 cm from the incisors; performed 12 4-quadrant cold biopsies every 2 cm  There was an area of granular appearing mucosa with shallow erosion at 30 cm  This area was  biopsied  On repeat EGD on September 9, 2 020 - C7M7  Molina's esophagus noted again  "There was a small nodule about 5-7 mm in size at about 30 cm, appeared benign  " -   This was removed with snare and residual tissue was removed with biopsy forceps  patient has no new complaints  His dysphagia has improved  He is up-to-date with colonoscopy 12/2020 - normal colon  REVIEW OF SYSTEMS:    CONSTITUTIONAL: Denies any fever, chills, rigors, and weight loss  HEENT: No earache or tinnitus  Denies hearing loss or visual disturbances  CARDIOVASCULAR: No chest pain or palpitations  RESPIRATORY: Denies any cough, hemoptysis, shortness of breath or dyspnea on exertion  GASTROINTESTINAL: As noted in the History of Present Illness  GENITOURINARY: No problems with urination  Denies any hematuria or dysuria  NEUROLOGIC: No dizziness or vertigo, denies headaches  MUSCULOSKELETAL: Denies any muscle or joint pain  SKIN: Denies skin rashes or itching  ENDOCRINE: Denies excessive thirst  Denies intolerance to heat or cold  PSYCHOSOCIAL: Denies depression or anxiety  Denies any recent memory loss         Historical Information   Past Medical History:   Diagnosis Date    Acute blood loss anemia 10/13/2016    Anxiety     Arthritis     knees    Arthritis     Asthma     stable for > 10 years    Cancer 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    Prostate cancer ([address] )     Retinal detachment     treated [facility] at [facility]; resolved: 10/10/2012    Sleep apnea     diagnosed but unable to tolerate cpap       Stuttering      Past Surgical History:   Procedure Laterality Date    ABDOMINAL SURGERY      gastric lap band    ARTHROSCOPIC REPAIR ACL Right     BIOPSY CORE NEEDLE      prostate    CATARACT EXTRACTION Bilateral     COLONOSCOPY      EYE SURGERY      each eye had a detached retina and cataract removed, Right eye has a buckle    GASTRIC BYPASS      pt had LAP BAND SURGERY not gastric bypass    HERNIA REPAIR      naval    JOINT REPLACEMENT      B/L knee    KNEE ARTHROSCOPY Left     MS TOTAL KNEE ARTHROPLASTY Left 2/15/2017    Procedure: TOTAL KNEE ARTHROPLASTY ;  Surgeon: Fiona Kapadia MD;  [facility];  Service: Orthopedics    MS TOTAL KNEE ARTHROPLASTY Right 10/10/2016    Procedure: ARTHROPLASTY KNEE TOTAL;  Surgeon: Fiona Kapadia MD;  [facility];  Service: Orthopedics    PROSTATECTOMY      robotic-assisted; TEODORO-Dr Maximus Nguyen RETINAL DETACHMENT SURGERY Bilateral     [facility]    SEPTOPLASTY      SINUS SURGERY      TONSILLECTOMY      over age 13   [facility] UPPER GASTROINTESTINAL ENDOSCOPY      VASECTOMY      vas deferens     Social History   Social History     Substance and Sexual Activity   Alcohol Use Not Currently    Alcohol/week: 1 0 standard drinks    Types: 1 Cans of beer per week    Frequency: Monthly or less    Drinks per session: 1 or 2    Binge frequency: Never    Comment: rare ; being a social drinker (as per [employer])     Social History     Substance and Sexual Activity   Drug Use Not Currently     Social History     Tobacco Use   Smoking Status Never Smoker   Smokeless Tobacco Never Used   Tobacco Comment    quit 38 years ago     Family History   Problem Relation Age of Onset    Heart disease Father     Coronary artery disease Father     Prostate cancer Father     Coronary artery disease Mother     Diabetes Mother         mellitus    Diabetes Maternal Grandmother         mellitus    Coronary artery disease Maternal Grandfather     Coronary artery disease Paternal Grandfather        Meds/Allergies       Current Outpatient Medications:     ALPRAZolam (XANAX) 0 25 mg tablet    Blood Glucose Monitoring Suppl (OneTouch Verio) w/Device KIT    ezetimibe (ZETIA) 10 mg tablet    FLUoxetine (PROzac) 20 mg capsule    FLUoxetine (PROzac) 40 MG capsule    glucose blood test strip    hydrochlorothiazide (HYDRODIURIL) 12 5 mg tablet    Lancet Devices (ONE TOUCH DELICA LANCING DEV) MISC    losartan (COZAAR) 100 MG tablet    metFORMIN (GLUCOPHAGE) 500 mg tablet    metoprolol succinate (TOPROL-XL) 25 mg 24 hr tablet    Multiple Vitamins-Minerals (CENTRUM ADULTS PO)    nystatin (MYCOSTATIN) cream    nystatin (MYCOSTATIN) powder    OLANZapine (ZyPREXA) 5 mg tablet    OneTouch Delica Lancets 93Y MISC    pantoprazole (PROTONIX) 40 mg tablet    rosuvastatin (CRESTOR) 5 mg tablet    Semaglutide,0 25 or 0 5MG/DOS, 2 MG/1 5ML SOPN    zolpidem (AMBIEN) 10 mg tablet    Allergies   Allergen Reactions    Celebrex [Celecoxib] Other (See Comments)     Cardiologist stated he should not take      Other      "steroid eyedrops"      Prednisone Other (See Comments)     Prednisone eye drops- eye pressure increases           Objective     Blood pressure 140/80, pulse 75, temperature (!) 97 2 °F (36 2 °C), temperature source Tympanic, height 5' 9" (1 753 m), weight (!) 144 kg (318 lb 6 4 oz)  Body mass index is 47 02 kg/m²  PHYSICAL EXAM:      General Appearance:   Alert, cooperative, no distress   HEENT:   Normocephalic, atraumatic, anicteric      Neck:  Supple, symmetrical, trachea midline   Lungs:   Clear to auscultation bilaterally; no rales, rhonchi or wheezing; respirations unlabored    Heart[de-identified]   Regular rate and rhythm; no murmur, rub, or gallop  Abdomen:   Soft, non-tender, non-distended; normal bowel sounds; no masses, no organomegaly    Genitalia:   Deferred    Rectal:   Deferred    Extremities:  No cyanosis, clubbing or edema    Pulses:  2+ and symmetric    Skin:  No jaundice, rashes, or lesions    Lymph nodes:  No palpable cervical lymphadenopathy        Lab Results:   No visits with results within 1 Day(s) from this visit  Latest known visit with results is:   Appointment on 04/29/2021   Component Date Value    Hemoglobin A1C 04/29/2021 7 7*    EAG 04/29/2021 174     Sodium 04/29/2021 137     Potassium 04/29/2021 4 2     Chloride 04/29/2021 103     CO2 04/29/2021 27     ANION GAP 04/29/2021 7     BUN 04/29/2021 16     Creatinine 04/29/2021 1 06     Glucose 04/29/2021 193*    Calcium 04/29/2021 9 6     eGFR 04/29/2021 72     Hepatitis C Ab 04/29/2021 Non-reactive     TSH 3RD GENERATON 04/29/2021 1 350          Radiology Results:   No results found

## 2021-05-05 NOTE — PATIENT INSTRUCTIONS
You have Molina's esophagus  There was a small nodule in the esophagus that was removed  Pathology from this area came back as indefinite for dysplasia  I am going to schedule you for repeat  Upper endoscopy with possible EMR (  This is a special procedure to remove if there is any residual tissue from the  Nodule)   If there is no nodule we will do radiofrequency ablation  Your procedure is scheduled with Dr Jacinta Saucedo in case you need removal of the nodule     new prescription for pantoprazole 40 mg twice a day sent to your pharmacy      EGD/EMR possible RFA scheduled on 6/3/21 with Dr Darby at [facility] gave pt verbal instructions/paper work given

## 2021-05-10 RX ORDER — PANTOPRAZOLE SODIUM 40 MG/1
40 TABLET, DELAYED RELEASE ORAL 2 TIMES DAILY
Qty: 180 TABLET | Refills: 3 | Status: SHIPPED | OUTPATIENT
Start: 2021-05-10 | End: 2022-07-18

## 2021-05-17 DIAGNOSIS — I25.10 CORONARY ARTERY DISEASE INVOLVING NATIVE CORONARY ARTERY OF NATIVE HEART WITHOUT ANGINA PECTORIS: ICD-10-CM

## 2021-05-17 DIAGNOSIS — E78.2 MIXED HYPERLIPIDEMIA: ICD-10-CM

## 2021-05-17 RX ORDER — HYDROCHLOROTHIAZIDE 12.5 MG/1
12.5 TABLET ORAL 3 TIMES WEEKLY
Qty: 36 TABLET | Refills: 5 | Status: SHIPPED | OUTPATIENT
Start: 2021-05-17 | End: 2022-08-10

## 2021-05-27 ENCOUNTER — OFFICE VISIT (OUTPATIENT)
Dept: FAMILY MEDICINE CLINIC | Facility: CLINIC | Age: 66
End: 2021-05-27
Payer: MEDICARE

## 2021-05-27 VITALS
SYSTOLIC BLOOD PRESSURE: 128 MMHG | WEIGHT: 313 LBS | HEART RATE: 64 BPM | HEIGHT: 69 IN | DIASTOLIC BLOOD PRESSURE: 72 MMHG | RESPIRATION RATE: 16 BRPM | BODY MASS INDEX: 46.36 KG/M2 | TEMPERATURE: 98.7 F

## 2021-05-27 DIAGNOSIS — R06.00 EXERTIONAL DYSPNEA: ICD-10-CM

## 2021-05-27 DIAGNOSIS — K22.70 BARRETT'S ESOPHAGUS WITHOUT DYSPLASIA: ICD-10-CM

## 2021-05-27 DIAGNOSIS — Z00.00 MEDICARE ANNUAL WELLNESS VISIT, SUBSEQUENT: ICD-10-CM

## 2021-05-27 DIAGNOSIS — K21.01 GASTROESOPHAGEAL REFLUX DISEASE WITH ESOPHAGITIS AND HEMORRHAGE: ICD-10-CM

## 2021-05-27 DIAGNOSIS — B35.1 ONYCHOMYCOSIS: ICD-10-CM

## 2021-05-27 DIAGNOSIS — F41.8 DEPRESSION WITH ANXIETY: ICD-10-CM

## 2021-05-27 DIAGNOSIS — F51.04 PSYCHOPHYSIOLOGICAL INSOMNIA: ICD-10-CM

## 2021-05-27 DIAGNOSIS — E11.9 CONTROLLED TYPE 2 DIABETES MELLITUS WITHOUT COMPLICATION, WITHOUT LONG-TERM CURRENT USE OF INSULIN (HCC): ICD-10-CM

## 2021-05-27 DIAGNOSIS — G47.33 OBSTRUCTIVE SLEEP APNEA SYNDROME: ICD-10-CM

## 2021-05-27 DIAGNOSIS — E78.2 MIXED HYPERLIPIDEMIA: ICD-10-CM

## 2021-05-27 DIAGNOSIS — I10 BENIGN ESSENTIAL HYPERTENSION: Primary | ICD-10-CM

## 2021-05-27 DIAGNOSIS — F80.81 STUTTERING: ICD-10-CM

## 2021-05-27 DIAGNOSIS — E66.01 MORBID OBESITY WITH BMI OF 45.0-49.9, ADULT (HCC): ICD-10-CM

## 2021-05-27 PROBLEM — R06.09 EXERTIONAL DYSPNEA: Status: ACTIVE | Noted: 2021-05-27

## 2021-05-27 PROCEDURE — 99214 OFFICE O/P EST MOD 30 MIN: CPT | Performed by: FAMILY MEDICINE

## 2021-05-27 PROCEDURE — G0439 PPPS, SUBSEQ VISIT: HCPCS | Performed by: FAMILY MEDICINE

## 2021-05-27 PROCEDURE — 1123F ACP DISCUSS/DSCN MKR DOCD: CPT | Performed by: FAMILY MEDICINE

## 2021-05-27 NOTE — PROGRESS NOTES
Assessment/Plan:    Labs were reviewed including TSH that was normal hepatitis C that was negative and others as noted in the problem oriented charting  In regard to his dyspnea he had a Holter monitor 2020 that showed occasional PVC  An echocardiogram in 2019 that showed a left ventricular ejection fraction of 65% and otherwise normal valves  He had a provoked nuclear stress test in 2017 that did not show any evidence of ischemia  Chest x-ray in December 2020 when he had fallen and that was non-remarkable  He had a CT of his chest in 2017 that was non-remarkable  Given this we will start with the PFTs and see how they show  He does have a history of asthma but does not have inhalers or anything to use at this time    Continue to focus on his diet and weight      Molina's esophagus without dysplasia   On daily PPI therapy  Followed by Dr Stella Baldwin    Gastroesophageal reflux disease with esophagitis   On daily PPI   Followed by Gastroenterology    Controlled type 2 diabetes mellitus without complication ([facility] )    Lab Results   Component Value Date    HGBA1C 7 7 (H) 04/29/2021      blood sugar logs show his fasting sugar and afternoon sugar to be trending downward since he began on the new medication    Eye exam is up-to-date     Foot exam was done today    Microalbumin will be done with next labs    Sleep apnea   No change from prior  Still is not using CPAP   Hopefully will have significant weight loss and this may help    Benign essential hypertension   Blood pressure controlled on current medication including losartan, hydrochlorothiazide, metoprolol    Creatinine 1 06 with GFR 72    Onychomycosis   Will be seeing Podiatry again to get his heels trimmed because they have gotten very thick     Have suggested he try using Bag balm on his nails at least every other and see if this is helpful    Depression with anxiety   Continues on fluoxetine 60 mg daily    He Xanax for p r n  use but does not do this regularly    Is decreased his Zyprexa and his remains stable, sleep is not different, is stuttering remains at baseline control    Hyperlipidemia   He is on Zetia and rosuvastatin    Check lipids with notice blood test    Insomnia   He continues to use Ambien on a fairly regular basis    I have suggested he try to decrease this on to every other day with the hope that he could slowly wean off of it and use only as needed  Will discuss further sleep strategies next visit    Morbid obesity with BMI of 45 0-49 9, adult ([facility] )   He has lost 5 lb in hopes that he is able to continue this downward trend  Initial goal is to get below 300 lb and ideally get down to 250 lb or less    Stuttering   Stable on current meds even with the decrease in Zyprexa       Diagnoses and all orders for this visit:    Benign essential hypertension    Exertional dyspnea  Comments:   complete PFTs and 6 minutes walk test were ordered  Orders:  -     Complete PFT with post Bronchodilator and Six Minute walk; Future    Mixed hyperlipidemia    Controlled type 2 diabetes mellitus without complication, without long-term current use of insulin ([facility])    Morbid obesity with BMI of 45 0-49 9, adult ([facility])    Obstructive sleep apnea syndrome    Gastroesophageal reflux disease with esophagitis and hemorrhage    Molina's esophagus without dysplasia    Depression with anxiety    Stuttering    Psychophysiological insomnia    Medicare annual wellness visit, subsequent    Onychomycosis          Subjective:      Patient ID: Cassy Burgos is a 77 y o  male       He is here today for follow-up on his chronic conditions     He reports that he has generally been doing okay has been working hard on his diet  He is now on some a gluten I had injections for his diabetes and hopefully this will be weight friendly   He also decreased his Zyprexa weaning himself down to 1/2 tablet daily in hopes this may help also    His biggest concern is that he is getting out of breath very quickly and this is significantly limiting to his activity  Denies any chest pain, pressure, tightness, nor neck, jaw or arm pain  He denies any wheezing  He was never a smoker but did have secondhand smoke from his mother who spoke 1+ pack a cigarettes a day and  of COPD new line he has had chemical exposure 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 [employer]          The following portions of the patient's history were reviewed and updated as appropriate: allergies, current medications, past family history, past medical history, past social history, past surgical history and problem list     Review of Systems   Constitutional:        See HPI   Respiratory: Positive for shortness of breath  Negative for cough, choking and chest tightness  Cardiovascular: Negative for chest pain, palpitations and leg swelling  Gastrointestinal:        Bowels are regular     Denies any dyspepsia or GERD at present time and is on daily PPI therapy because of reflux and Molina's esophagus and is followed by GI   Genitourinary:        Follows with urology because of history of prostate cancer   Musculoskeletal:        No specific issues at this time   Neurological: Positive for speech difficulty (He has a chronicStuttering that is been present throughout his life and is stable in  reasonably controlled with current medication)  Negative for dizziness, light-headedness and headaches  Psychiatric/Behavioral: Positive for dysphoric mood and sleep disturbance  Negative for confusion and decreased concentration  The patient is nervous/anxious  These conditions are generally stable         Objective:      /72 (BP Location: Left arm, Cuff Size: Large)   Pulse 64   Temp 98 7 °F (37 1 °C) (Tympanic)   Resp 16   Ht 5' 9" (1 753 m)   Wt (!) 142 kg (313 lb)   BMI 46 22 kg/m²          Physical Exam  Vitals signs and nursing note reviewed  Constitutional:       General: He is not in acute distress  Appearance: Normal appearance  He is obese  He is not ill-appearing or diaphoretic  Comments: He is wearing a mask   Eyes:      Comments: He is wearing glasses   Neck:      Vascular: No carotid bruit  Cardiovascular:      Rate and Rhythm: Normal rate and regular rhythm  Pulses: no weak pulses          Dorsalis pedis pulses are 2+ on the right side and 2+ on the left side  Posterior tibial pulses are 2+ on the right side and 2+ on the left side  Heart sounds: No murmur  Pulmonary:      Effort: Pulmonary effort is normal       Breath sounds: Normal breath sounds  Musculoskeletal:      Right lower leg: Edema ( Trace to 1+ edema lower leg) present  Left lower leg: Edema ( same as right) present  Feet:      Right foot:      Skin integrity: Callus and dry skin present  No ulcer  Left foot:      Skin integrity: Callus and dry skin present  No ulcer  Lymphadenopathy:      Cervical: No cervical adenopathy  Skin:     Coloration: Skin is not jaundiced  Neurological:      Mental Status: He is alert and oriented to person, place, and time  Psychiatric:         Mood and Affect: Mood normal          Behavior: Behavior normal          Thought Content: Thought content normal          Judgment: Judgment normal        Patient's shoes and socks removed  Right Foot/Ankle   Right Foot Inspection  Skin Exam: dry skin, callus and callus skin not intact, no pre-ulcer and no ulcer                          Toe Exam: swelling ( dependent)  Sensory     Proprioception: intact   Monofilament testing: intact  Vascular  Capillary refills: < 3 seconds  The right DP pulse is 2+  The right PT pulse is 2+       Left Foot/Ankle  Left Foot Inspection  Skin Exam: dry skin and callusskin not intact, no pre-ulcer and no ulcer                         Toe Exam: swelling ( mild dependent)                   Sensory     Proprioception: intact  Monofilament: intact  Vascular  Capillary refills: < 3 seconds  The left DP pulse is 2+  The left PT pulse is 2+  Assign Risk Category:  No deformity present; No loss of protective sensation;  No weak pulses       Risk: 0

## 2021-05-27 NOTE — PATIENT INSTRUCTIONS

## 2021-05-27 NOTE — PROGRESS NOTES
Chief Complaint   Patient presents with    Medicare Wellness Visit     AWV and follow up      Health Maintenance   Topic Date Due    DTaP,Tdap,and Td Vaccines (1 - Tdap) 11/25/1974    DM Eye Exam  10/17/2019    PT PLAN OF CARE  03/17/2021    SLP PLAN OF CARE  05/13/2021    Medicare Annual Wellness Visit (AWV)  05/14/2021    Diabetic Foot Exam  07/02/2021    Pneumococcal Vaccine: 65+ Years (2 of 2 - PPSV23) 10/20/2021    HEMOGLOBIN A1C  10/29/2021    BMI: Followup Plan  02/20/2022    Falls: Plan of Care  02/20/2022    Fall Risk  04/13/2022    BMI: Adult  05/06/2022    Colonoscopy Surveillance  03/10/2026    Colorectal Cancer Screening  03/10/2031    Hepatitis C Screening  Completed    Influenza Vaccine  Completed    COVID-19 Vaccine  Completed    HIB Vaccine  Aged Out    Hepatitis B Vaccine  Aged Out    IPV Vaccine  Aged Out    Hepatitis A Vaccine  Aged Out    Meningococcal ACWY Vaccine  Aged Out    HPV Vaccine  Aged Out        Assessment and Plan:       He had both doses a COVID vaccine     He is due for a Tdap and he will check with his insurance and pharmacy in get accordingly  He is eligible for a Shingrix vaccine having previously had a Zostavax and again will check with his insurance and pharmacy and get this accordingly        Colonoscopy was done in December of 2020     He also had an EGD in July of 2020 has been working with Gastroenterology for some issues    Focus will be on a healthy diet and losing weight and becoming more mobile    Problem List Items Addressed This Visit        Digestive    Gastroesophageal reflux disease with esophagitis    Molina's esophagus without dysplasia       Endocrine    Controlled type 2 diabetes mellitus without complication (HCC)       Respiratory    Sleep apnea       Cardiovascular and Mediastinum    Benign essential hypertension - Primary       Other    Stuttering    Hyperlipidemia    Insomnia    Depression with anxiety    Morbid obesity with BMI of 45 0-49 9, adult [facility])      Other Visit Diagnoses     Medicare annual wellness visit, subsequent               Preventive health issues were discussed with patient, and age appropriate screening tests were ordered as noted in patient's After Visit Summary  Personalized health advice and appropriate referrals for health education or preventive services given if needed, as noted in patient's After Visit Summary  History of Present Illness:     Patient presents for Medicare Annual Wellness visit    Continues live independently in a multi story home with his wife  Walks up and down the steps using the railings  He did have a fall earlier this year with significant injuries after losing his balance on the stairs  He continues to drive his car without incident  He does not drink coffee and drinks alcohol occasionally socially  His diet is reasonable but not optimal   He is currently working on trying to lose weight  Exercise is not consistent but he does not play golf finds he can not do 18 holes anymore because he gets tired and gets out of breath when he walks even half a block      Patient Care Team:  Karen Robles MD as PCP - General (Family Medicine)  MD Elaina Stein Cha, MD Scotty Downer, MD Delories Buttery, MD Leia Commander, MD Leia Commander, MD     Problem List:     Patient Active Problem List   Diagnosis    Status post total left knee replacement    Stuttering    Sleep apnea    Hyperlipidemia    Asthma    Ambulatory dysfunction    Benign essential hypertension    Controlled type 2 diabetes mellitus without complication ([facility] )    Insomnia    Depression with anxiety    Onychomycosis    Hyperplasia of renal artery ([facility]    Morbid obesity with BMI of 45 0-49 9, adult ([facility] )    Gastroesophageal reflux disease with esophagitis    Molina's esophagus without dysplasia    Fall    Dysphagia      Past Medical and Surgical History:     Past Medical History:   Diagnosis Date  Acute blood loss anemia 10/13/2016    Anxiety     Arthritis     knees    Arthritis     Asthma     stable for > 10 years    Cancer [facility])     prostate    Cataract     Depression     Diabetes ([facility]     Diabetes mellitus ([facility]     pre diabetes    Diverticulitis of colon     Environmental allergies     Hiatal hernia     History of anal fissures     Hyperlipidemia     Hypertension     Incisional hernia     Insomnia     Kidney stone     Morbid obesity (HCC)     PONV (postoperative nausea and vomiting)     Prolapsing mitral valve     prolapsing mitral valve leaflet syndrome    Prostate cancer ([facility]     Retinal detachment     [facility]; resolved: 10/10/2012    Sleep apnea     diagnosed but unable to tolerate cpap       Stuttering      Past Surgical History:   Procedure Laterality Date    ABDOMINAL SURGERY      gastric lap band    ARTHROSCOPIC REPAIR ACL Right     BIOPSY CORE NEEDLE      prostate    CATARACT EXTRACTION Bilateral     COLONOSCOPY      EYE SURGERY      each eye had a detached retina and cataract removed, Right eye has a buckle    GASTRIC BYPASS      pt had LAP BAND SURGERY not gastric bypass    HERNIA REPAIR      naval    JOINT REPLACEMENT      B/L knee    KNEE ARTHROSCOPY Left     NV TOTAL KNEE ARTHROPLASTY Left 2/15/2017    Procedure: TOTAL KNEE ARTHROPLASTY ;  Surgeon: Marcel Ramirez MD;  [facility];  Service: Orthopedics    NV TOTAL KNEE ARTHROPLASTY Right 10/10/2016    Procedure: ARTHROPLASTY KNEE TOTAL;  Surgeon: Marcel Ramirez MD;  [facility];  Service: Orthopedics    PROSTATECTOMY      robotic-assisted; Israel Briseno RETINAL DETACHMENT SURGERY Bilateral     [facility]    SEPTOPLASTY      SINUS SURGERY      TONSILLECTOMY      over age 13   Reg[facility] Li UPPER GASTROINTESTINAL ENDOSCOPY      VASECTOMY      vas deferens      Family History:     Family History   Problem Relation Age of Onset    Heart disease Father    Regulo Li Coronary artery disease Father     Prostate cancer Father     Coronary artery disease Mother     Diabetes Mother         mellitus    Diabetes Maternal Grandmother         mellitus    Coronary artery disease Maternal Grandfather     Coronary artery disease Paternal Grandfather       Social History:     E-Cigarette/Vaping    E-Cigarette Use Never User      E-Cigarette/Vaping Substances    Nicotine No     THC No     CBD No     Flavoring No     Other No     Unknown No      Social History     Socioeconomic History    Marital status: /Civil Union     Spouse name: None    Number of children: 0    Years of education: None    Highest education level: None   Occupational History    Occupation:    Social Needs    Financial resource strain: Not hard at all   [employer] insecurity     Worry: Never true     Inability: Never true    Transportation needs     Medical: No     Non-medical: No   Tobacco Use    Smoking status: Never Smoker    Smokeless tobacco: Never Used    Tobacco comment: quit 38 years ago   Substance and Sexual Activity    Alcohol use: Not Currently     Alcohol/week: 1 0 standard drinks     Types: 1 Cans of beer per week     Frequency: Monthly or less     Drinks per session: 1 or 2     Binge frequency: Never     Comment: rare ; being a social drinker (as per allscripts)    Drug use: Not Currently    Sexual activity: Yes     Partners: Female   Lifestyle    Physical activity     Days per week: 1 day     Minutes per session: 50 min    Stress: Not at all   Relationships    Social connections     Talks on phone:  Three times a week     Gets together: More than three times a week     Attends [employer] service: Never     Active member of club or organization: No     Attends meetings of clubs or organizations: Never     Relationship status:     Intimate partner violence     Fear of current or ex partner: No     Emotionally abused: No     Physically abused: No     Forced sexual activity: No   Other Topics Concern    None   Social History Narrative    ** Merged History Encounter **           Medications and Allergies:     Current Outpatient Medications   Medication Sig Dispense Refill    ALPRAZolam (XANAX) 0 25 mg tablet Take 1 tablet (0 25 mg total) by mouth 3 (three) times a day as needed for anxiety 270 tablet 1    Blood Glucose Monitoring Suppl (OneTouch Verio) w/Device KIT Use daily 1 kit 0    ezetimibe (ZETIA) 10 mg tablet TAKE 1 TABLET DAILY 90 tablet 3    FLUoxetine (PROzac) 20 mg capsule TAKE 3 CAPSULES DAILY 270 capsule 3    FLUoxetine (PROzac) 40 MG capsule Take 60 mg by mouth daily      glucose blood test strip Test Daily 200 each 1    hydrochlorothiazide (HYDRODIURIL) 12 5 mg tablet Take 1 tablet (12 5 mg total) by mouth 3 (three) times a week Monday, Thursday, Saturday 36 tablet 5    Lancet Devices (ONE TOUCH DELICA LANCING DEV) MISC Use daily 1 each 0    losartan (COZAAR) 100 MG tablet Take 1 tablet (100 mg total) by mouth daily 90 tablet 3    metFORMIN (GLUCOPHAGE) 500 mg tablet TAKE 2 TABLETS TWICE A DAY WITH MEALS (Patient taking differently: 1,000 mg 2 (two) times a day with meals ) 360 tablet 3    metoprolol succinate (TOPROL-XL) 25 mg 24 hr tablet Take 1 tablet (25 mg total) by mouth daily 90 tablet 3    Multiple Vitamins-Minerals (CENTRUM ADULTS PO) Take by mouth      nystatin (MYCOSTATIN) cream Apply topically 2 (two) times a day 30 g 2    nystatin (MYCOSTATIN) powder Apply topically 4 (four) times a day 60 g 2    OLANZapine (ZyPREXA) 5 mg tablet TAKE 1 TABLET DAILY 90 tablet 3    OneTouch Delica Lancets 29Z MISC Use daily 300 each 1    pantoprazole (PROTONIX) 40 mg tablet Take 1 tablet (40 mg total) by mouth 2 (two) times a day 180 tablet 3    rosuvastatin (CRESTOR) 5 mg tablet One tablet twice a week on Monday and Friday 90 tablet 3    Semaglutide,0 25 or 0 5MG/DOS, 2 MG/1 5ML SOPN Inject 0 25mg under the skin weekly 3 pen 3    zolpidem (AMBIEN) 10 mg tablet Take 1 tablet (10 mg total) by mouth daily at bedtime 90 tablet 0     No current facility-administered medications for this visit  Allergies   Allergen Reactions    Celebrex [Celecoxib] Other (See Comments)     Cardiologist stated he should not take      Other      "steroid eyedrops"      Prednisone Other (See Comments)     Prednisone eye drops- eye pressure increases      Immunizations:     Immunization History   Administered Date(s) Administered    Influenza Quadrivalent Preservative Free 3 years and older IM 10/20/2016, 10/26/2017    Influenza, high dose seasonal 0 7 mL 10/21/2019, 09/28/2020    Influenza, recombinant, quadrivalent,injectable, preservative free 11/01/2018    Influenza, seasonal, injectable 12/03/2009, 12/12/2012, 11/18/2013, 11/01/2014, 11/05/2015    Pneumococcal Conjugate 13-Valent 05/02/2019    Pneumococcal Polysaccharide PPV23 10/20/2016    SARS-CoV-2 / COVID-19 mRNA IM ([place] [facility]) 02/20/2021, 03/19/2021    Td (adult), adsorbed 07/16/1999    Zoster Vaccine Recombinant 12/06/2018, 02/04/2019      Health Maintenance:         Topic Date Due    Colonoscopy Surveillance  03/10/2026    Colorectal Cancer Screening  03/10/2031    Hepatitis C Screening  Completed         Topic Date Due    DTaP,Tdap,and Td Vaccines (1 - Tdap) 11/25/1974      Medicare Health Risk Assessment:     There were no vitals taken for this visit  Esther Wood is here for his Subsequent Wellness visit  Last Medicare Wellness visit information reviewed, patient interviewed and updates made to the record today  Health Risk Assessment:   Patient rates overall health as good  Patient feels that their physical health rating is slightly worse  Patient is satisfied with their life  Eyesight was rated as same  Hearing was rated as same  Patient feels that their emotional and mental health rating is slightly better  Patients states they are never, rarely angry   Patient states they are sometimes unusually tired/fatigued  Pain experienced in the last 7 days has been none  Patient states that he has experienced no weight loss or gain in last 6 months  Depression Screening:   PHQ-2 Score: 0  PHQ-9 Score: 0      Fall Risk Screening: In the past year, patient has experienced: history of falling in past year    Number of falls: 2 or more  Injured during fall?: Yes    Feels unsteady when standing or walking?: No    Worried about falling?: No      Home Safety:  Patient does not have trouble with stairs inside or outside of their home  Patient has working smoke alarms and has working carbon monoxide detector  Home safety hazards include: none  Nutrition:   Current diet is Diabetic and Unhealthy  Medications:   Patient is currently taking over-the-counter supplements  OTC medications include: see medication list  Patient is able to manage medications  Activities of Daily Living (ADLs)/Instrumental Activities of Daily Living (IADLs):   Walk and transfer into and out of bed and chair?: Yes  Dress and groom yourself?: Yes    Bathe or shower yourself?: Yes    Feed yourself? Yes  Do your laundry/housekeeping?: Yes  Manage your money, pay your bills and track your expenses?: Yes  Make your own meals?: Yes    Do your own shopping?: Yes    Previous Hospitalizations:   Any hospitalizations or ED visits within the last 12 months?: Yes    How many hospitalizations have you had in the last year?: 1-2    Advance Care Planning:   Living will: Yes    Durable POA for healthcare:  Yes    Advanced directive: Yes    End of Life Decisions reviewed with patient: Yes      Comments: Wife    Cognitive Screening:   Provider or family/friend/caregiver concerned regarding cognition?: No    PREVENTIVE SCREENINGS      Cardiovascular Screening:    General: Screening Not Indicated and History Lipid Disorder      Diabetes Screening:     General: Screening Not Indicated and History Diabetes      Colorectal Cancer Screening: General: Screening Current      Prostate Cancer Screening:    General: History Prostate Cancer      Abdominal Aortic Aneurysm (AAA) Screening:    Risk factors include: age between 73-67 yo        Lung Cancer Screening:     General: Screening Not Indicated      Hepatitis C Screening:    General: Screening Current    Screening, Brief Intervention, and Referral to Treatment (SBIRT)    Screening      AUDIT-C Screenin) How often did you have a drink containing alcohol in the past year? monthly or less  2) How many drinks did you have on a typical day when you were drinking in the past year?  1 to 2  3) How often did you have 6 or more drinks on one occasion in the past year? never    AUDIT-C Score: 1  Interpretation: Score 0-3 (male): Negative screen for alcohol misuse    Single Item Drug Screening:  How often have you used an illegal drug (including marijuana) or a prescription medication for non-medical reasons in the past year? never    Single Item Drug Screen Score: 0  Interpretation: Negative screen for possible drug use disorder      Maureen Lebron MD

## 2021-05-28 NOTE — ASSESSMENT & PLAN NOTE
He has lost 5 lb in hopes that he is able to continue this downward trend    Initial goal is to get below 300 lb and ideally get down to 250 lb or less

## 2021-05-28 NOTE — ASSESSMENT & PLAN NOTE
Will be seeing Podiatry again to get his heels trimmed because they have gotten very thick     Have suggested he try using Bag balm on his nails at least every other and see if this is helpful

## 2021-05-28 NOTE — ASSESSMENT & PLAN NOTE
Lab Results   Component Value Date    HGBA1C 7 7 (H) 04/29/2021      blood sugar logs show his fasting sugar and afternoon sugar to be trending downward since he began on the new medication    Eye exam is up-to-date     Foot exam was done today    Microalbumin will be done with next labs

## 2021-05-28 NOTE — ASSESSMENT & PLAN NOTE
He continues to use Ambien on a fairly regular basis    I have suggested he try to decrease this on to every other day with the hope that he could slowly wean off of it and use only as needed    Will discuss further sleep strategies next visit

## 2021-05-28 NOTE — ASSESSMENT & PLAN NOTE
Continues on fluoxetine 60 mg daily    He Xanax for p r n  use but does not do this regularly    Is decreased his Zyprexa and his remains stable, sleep is not different, is stuttering remains at baseline control

## 2021-05-28 NOTE — ASSESSMENT & PLAN NOTE
Blood pressure controlled on current medication including losartan, hydrochlorothiazide, metoprolol    Creatinine 1 06 with GFR 72

## 2021-05-28 NOTE — ASSESSMENT & PLAN NOTE
No change from prior  Still is not using CPAP   Hopefully will have significant weight loss and this may help

## 2021-06-02 ENCOUNTER — TELEPHONE (OUTPATIENT)
Dept: GASTROENTEROLOGY | Facility: HOSPITAL | Age: 66
End: 2021-06-02

## 2021-06-02 DIAGNOSIS — E11.9 CONTROLLED TYPE 2 DIABETES MELLITUS WITHOUT COMPLICATION, WITHOUT LONG-TERM CURRENT USE OF INSULIN (HCC): ICD-10-CM

## 2021-06-03 ENCOUNTER — ANESTHESIA EVENT (OUTPATIENT)
Dept: GASTROENTEROLOGY | Facility: HOSPITAL | Age: 66
End: 2021-06-03

## 2021-06-03 ENCOUNTER — ANESTHESIA (OUTPATIENT)
Dept: GASTROENTEROLOGY | Facility: HOSPITAL | Age: 66
End: 2021-06-03

## 2021-06-03 ENCOUNTER — HOSPITAL ENCOUNTER (OUTPATIENT)
Dept: GASTROENTEROLOGY | Facility: HOSPITAL | Age: 66
Setting detail: OUTPATIENT SURGERY
Discharge: HOME | End: 2021-06-03
Attending: INTERNAL MEDICINE | Admitting: INTERNAL MEDICINE
Payer: MEDICARE

## 2021-06-03 VITALS
HEART RATE: 71 BPM | HEIGHT: 69 IN | DIASTOLIC BLOOD PRESSURE: 71 MMHG | RESPIRATION RATE: 74 BRPM | SYSTOLIC BLOOD PRESSURE: 136 MMHG | TEMPERATURE: 97.3 F | WEIGHT: 314 LBS | OXYGEN SATURATION: 95 % | BODY MASS INDEX: 46.51 KG/M2

## 2021-06-03 DIAGNOSIS — K21.01 GASTROESOPHAGEAL REFLUX DISEASE WITH ESOPHAGITIS AND HEMORRHAGE: ICD-10-CM

## 2021-06-03 DIAGNOSIS — K22.70 BARRETT'S ESOPHAGUS WITHOUT DYSPLASIA: ICD-10-CM

## 2021-06-03 DIAGNOSIS — K31.84 GASTROPARESIS: Primary | ICD-10-CM

## 2021-06-03 PROCEDURE — 43235 EGD DIAGNOSTIC BRUSH WASH: CPT | Performed by: INTERNAL MEDICINE

## 2021-06-03 RX ORDER — KETAMINE HYDROCHLORIDE 50 MG/ML
INJECTION, SOLUTION, CONCENTRATE INTRAMUSCULAR; INTRAVENOUS AS NEEDED
Status: DISCONTINUED | OUTPATIENT
Start: 2021-06-03 | End: 2021-06-03

## 2021-06-03 RX ORDER — PROPOFOL 10 MG/ML
INJECTION, EMULSION INTRAVENOUS CONTINUOUS PRN
Status: DISCONTINUED | OUTPATIENT
Start: 2021-06-03 | End: 2021-06-03

## 2021-06-03 RX ORDER — LIDOCAINE HYDROCHLORIDE 10 MG/ML
INJECTION, SOLUTION EPIDURAL; INFILTRATION; INTRACAUDAL; PERINEURAL AS NEEDED
Status: DISCONTINUED | OUTPATIENT
Start: 2021-06-03 | End: 2021-06-03

## 2021-06-03 RX ORDER — SODIUM CHLORIDE 9 MG/ML
INJECTION, SOLUTION INTRAVENOUS CONTINUOUS PRN
Status: DISCONTINUED | OUTPATIENT
Start: 2021-06-03 | End: 2021-06-03

## 2021-06-03 RX ORDER — PROPOFOL 10 MG/ML
INJECTION, EMULSION INTRAVENOUS AS NEEDED
Status: DISCONTINUED | OUTPATIENT
Start: 2021-06-03 | End: 2021-06-03

## 2021-06-03 RX ADMIN — LIDOCAINE HYDROCHLORIDE 100 MG: 10 INJECTION, SOLUTION EPIDURAL; INFILTRATION; INTRACAUDAL; PERINEURAL at 10:57

## 2021-06-03 RX ADMIN — PROPOFOL 30 MG: 10 INJECTION, EMULSION INTRAVENOUS at 11:00

## 2021-06-03 RX ADMIN — KETAMINE HYDROCHLORIDE 20 MG: 50 INJECTION, SOLUTION INTRAMUSCULAR; INTRAVENOUS at 10:58

## 2021-06-03 RX ADMIN — PROPOFOL 50 MG: 10 INJECTION, EMULSION INTRAVENOUS at 10:59

## 2021-06-03 RX ADMIN — PROPOFOL 110 MCG/KG/MIN: 10 INJECTION, EMULSION INTRAVENOUS at 10:58

## 2021-06-03 RX ADMIN — SODIUM CHLORIDE: 0.9 INJECTION, SOLUTION INTRAVENOUS at 10:15

## 2021-06-03 NOTE — H&P
History and Physical -  [facility]  Kevin Teran 77 y o  male MRN: [id number]    HPI: Kevin Teran is a 77y o  year old male who presents with Molina's esophagus with indef dysplasia  Review of Systems    Historical Information   Past Medical History:   Diagnosis Date    Acute blood loss anemia 10/13/2016    Anxiety     Arthritis     knees    Arthritis     Asthma     stable for > 10 years    Cancer [facility])     prostate    Cataract     Depression     Diabetes [facility]     Diabetes mellitus ([facility] )     pre diabetes    Diverticulitis of colon     Environmental allergies     Hiatal hernia     History of anal fissures     Hyperlipidemia     Hypertension     Incisional hernia     Insomnia     Kidney stone     Morbid obesity (HCC)     PONV (postoperative nausea and vomiting)     Prolapsing mitral valve     prolapsing mitral valve leaflet syndrome    Prostate cancer ([facility]     Retinal detachment     [facility] at [facility]; resolved: 10/10/2012    Sleep apnea     diagnosed but unable to tolerate cpap       Stuttering      Past Surgical History:   Procedure Laterality Date    ABDOMINAL SURGERY      gastric lap band    ARTHROSCOPIC REPAIR ACL Right     BIOPSY CORE NEEDLE      prostate    CATARACT EXTRACTION Bilateral     COLONOSCOPY      EYE SURGERY      each eye had a detached retina and cataract removed, Right eye has a buckle    GASTRIC BYPASS      pt had LAP BAND SURGERY not gastric bypass    HERNIA REPAIR      naval    JOINT REPLACEMENT      B/L knee    KNEE ARTHROSCOPY Left     MI TOTAL KNEE ARTHROPLASTY Left 2/15/2017    Procedure: TOTAL KNEE ARTHROPLASTY ;  Surgeon: Kenyetta Prasad MD;  [facility];  Service: Orthopedics    MI TOTAL KNEE ARTHROPLASTY Right 10/10/2016    Procedure: ARTHROPLASTY KNEE TOTAL;  Surgeon: Kenyetta Prasad MD;  [facility];  Service: Orthopedics    PROSTATECTOMY      robotic-assisted; [facility] RETINAL DETACHMENT SURGERY Bilateral     [facility]    SEPTOPLASTY      SINUS SURGERY      TONSILLECTOMY      over age 13   [facility] UPPER GASTROINTESTINAL ENDOSCOPY      VASECTOMY      vas deferens     Social History   Social History     Substance and Sexual Activity   Alcohol Use Not Currently    Alcohol/week: 1 0 standard drinks    Types: 1 Cans of beer per week    Frequency: Monthly or less    Drinks per session: 1 or 2    Binge frequency: Never    Comment: rare ; being a social drinker (as per allscripts)     Social History     Substance and Sexual Activity   Drug Use Not Currently     Social History     Tobacco Use   Smoking Status Never Smoker   Smokeless Tobacco Never Used   Tobacco Comment    quit 45 years ago     Family History   Problem Relation Age of Onset    Heart disease Father     Coronary artery disease Father     Prostate cancer Father     Coronary artery disease Mother     Diabetes Mother         mellitus    Diabetes Maternal Grandmother         mellitus    Coronary artery disease Maternal Grandfather     Coronary artery disease Paternal Grandfather        Meds/Allergies     (Not in a hospital admission)      Allergies   Allergen Reactions    Celebrex [Celecoxib] Other (See Comments)     Cardiologist stated he should not take      Other      "steroid eyedrops"      Prednisone Other (See Comments)     Prednisone eye drops- eye pressure increases       Objective     /79   Pulse 61   Temp 98 3 °F (36 8 °C) (Tympanic)   Resp 18   Ht 5' 9" (1 753 m)   Wt (!) 142 kg (314 lb)   SpO2 95%   BMI 46 37 kg/m²       PHYSICAL EXAM    Gen: NAD  CV: RRR  CHEST: Clear  ABD: soft, NT/ND  EXT: no edema  Neuro: AAO      ASSESSMENT/PLAN:  This is a 77y o  year old male here for EGD for evaluation of nodular Molina's esophagus with indef dysplasia  Possible RFA/ EMR  PLAN:   Procedure: EGD/ RFA/ EMR

## 2021-06-03 NOTE — ANESTHESIA PREPROCEDURE EVALUATION
Procedure:  EGD    GASTROPARESIS  Food found during EGD 14 hours after last meal - case aborted    New SOB with suspected pulmonary etiology undergoing workup    EF 65%, no sig valvular issues    Hx of PONV    Relevant Problems   ANESTHESIA   (+) PONV (postoperative nausea and vomiting)      CARDIO   (+) Benign essential hypertension   (+) Exertional dyspnea   (+) Hyperlipidemia      ENDO   (+) Controlled type 2 diabetes mellitus without complication (HCC)      GI/HEPATIC   (+) Dysphagia   (+) Gastroesophageal reflux disease with esophagitis      NEURO/PSYCH   (+) Depression with anxiety      PULMONARY   (+) Asthma   (+) Exertional dyspnea   (+) Sleep apnea      Other   (+) Morbid obesity with BMI of 45 0-49 9, adult (HCC)        Physical Exam    Airway    Mallampati score: II  TM Distance: >3 FB  Neck ROM: full     Dental   No notable dental hx     Cardiovascular  Rhythm: regular, Rate: normal, Cardiovascular exam normal    Pulmonary  Pulmonary exam normal Breath sounds clear to auscultation,     Other Findings        Anesthesia Plan  ASA Score- 3     Anesthesia Type- IV sedation with anesthesia with ASA Monitors  Additional Monitors:   Airway Plan:     Comment: Discussed risks/benefits, including medication reactions, awareness, aspiration, and serious/life threatening complications  Plan to maintain native airway with IVGA, monitored with EtCO2  Plan Factors-Exercise tolerance (METS): <4 METS  Patient summary reviewed  Patient instructed to abstain from smoking on day of procedure  Patient did not smoke on day of surgery  Induction- intravenous  Postoperative Plan-     Informed Consent- Anesthetic plan and risks discussed with patient  I personally reviewed this patient with the CRNA  Discussed and agreed on the Anesthesia Plan with the CRNA  John Maxwell

## 2021-06-03 NOTE — ANESTHESIA POSTPROCEDURE EVALUATION
Post-Op Assessment Note    CV Status:  Stable    Pain management: adequate     Mental Status:  Sleepy   Hydration Status:  Euvolemic   PONV Controlled:  Controlled   Airway Patency:  Patent      Post Op Vitals Reviewed: Yes      Staff: CRNA         No complications documented      /59 (06/03/21 1113)    Temp (!) 97 3 °F (36 3 °C) (06/03/21 1113)    Pulse 59 (06/03/21 1113)   Resp 18 (06/03/21 1113)    SpO2 97 % (06/03/21 1113)

## 2021-06-08 ENCOUNTER — OFFICE VISIT (OUTPATIENT)
Dept: GASTROENTEROLOGY | Facility: CLINIC | Age: 66
End: 2021-06-08
Payer: MEDICARE

## 2021-06-08 ENCOUNTER — PREP FOR PROCEDURE (OUTPATIENT)
Dept: GASTROENTEROLOGY | Facility: CLINIC | Age: 66
End: 2021-06-08

## 2021-06-08 VITALS
BODY MASS INDEX: 46.36 KG/M2 | HEIGHT: 69 IN | SYSTOLIC BLOOD PRESSURE: 140 MMHG | TEMPERATURE: 97.7 F | WEIGHT: 313 LBS | DIASTOLIC BLOOD PRESSURE: 70 MMHG | HEART RATE: 79 BPM

## 2021-06-08 DIAGNOSIS — K31.84 GASTROPARESIS: ICD-10-CM

## 2021-06-08 DIAGNOSIS — K22.70 BARRETT'S ESOPHAGUS WITHOUT DYSPLASIA: Primary | ICD-10-CM

## 2021-06-08 DIAGNOSIS — K21.00 GASTROESOPHAGEAL REFLUX DISEASE WITH ESOPHAGITIS WITHOUT HEMORRHAGE: ICD-10-CM

## 2021-06-08 PROCEDURE — 99214 OFFICE O/P EST MOD 30 MIN: CPT | Performed by: INTERNAL MEDICINE

## 2021-06-08 NOTE — PROGRESS NOTES
[facility] - Outpatient Consultation  Alvino Reddy 77 y o  male MRN: [id number]  Encounter: [id number]          ASSESSMENT AND PLAN:      1  Molina's esophagus without dysplasia  2  Gastroesophageal reflux disease with esophagitis and hemorrhage  3  Esophageal dysphagia    He has long segment Molina's esophagus C7M8  Prior 5-7 mm nodule at 30 cm which was removed with snare  Residual tissue was removed with biopsy forceps  Pathology from this area showed atypical cells indefinite for dysplasia  He had follow-up EGD by Dr Princess Pineda-  Confirmed Molina's esophagus C7M8  No nodules  Procedure was aborted due to food in the stomach   This is likely secondary to delayed gastric emptying from  [facility]  Gastric emptying was ordered but patient would like to hold off  I recommend gastroparesis diet  Low-fat and low-fiber diet  Eat small and frequent diet  If symptoms persist or worsens we will consider gastric emptying study   I will schedule him for EGD with radiofrequency ablation  He is going to need multiple procedures for complete eradication of Molina's esophagus  ______________________________________________________________________    HPI:   49-year-old male here to establish his care for management Molina's esophagus and reflux  He had longstanding reflux disease  Currently taking pantoprazole 40 mg daily  Reflux symptoms are well controlled  He is transitioning his care from Dr Meg Anand  He had EGD on July 1, 2020 for evaluation of dysphagia  C7M7 Molina's esophagus - Z-line 27 cm from the incisors, top of gastric folds 35 cm from the incisors; performed 12 4-quadrant cold biopsies every 2 cm  There was an area of granular appearing mucosa with shallow erosion at 30 cm  This area was  biopsied  On repeat EGD on September 9, 2 020 - C7M7  Molina's esophagus noted again   "There was a small nodule about 5-7 mm in size at about 30 cm, appeared benign  " -   This was removed with snare and residual tissue was removed with biopsy forceps  patient has no new complaints  His dysphagia has improved  He is up-to-date with colonoscopy 12/2020 - normal colon       Interval note    6/8/21     He is here to discuss further plan after recent EGD  Long segment Molina's esophagus without nodule  Procedure was aborted due to large amount of food in the stomach  Patient reports abdominal bloating, gas and early satiety  He attributes this to semaglutide  gastric emptying study was ordered but patient will like to hold off gastric emptying study for now  REVIEW OF SYSTEMS:    CONSTITUTIONAL: Denies any fever, chills, rigors, and weight loss  HEENT: No earache or tinnitus  Denies hearing loss or visual disturbances  CARDIOVASCULAR: No chest pain or palpitations  RESPIRATORY: Denies any cough, hemoptysis, shortness of breath or dyspnea on exertion  GASTROINTESTINAL: As noted in the History of Present Illness  GENITOURINARY: No problems with urination  Denies any hematuria or dysuria  NEUROLOGIC: No dizziness or vertigo, denies headaches  MUSCULOSKELETAL: Denies any muscle or joint pain  SKIN: Denies skin rashes or itching  ENDOCRINE: Denies excessive thirst  Denies intolerance to heat or cold  PSYCHOSOCIAL: Denies depression or anxiety  Denies any recent memory loss         Historical Information   Past Medical History:   Diagnosis Date    Acute blood loss anemia 10/13/2016    Anxiety     Arthritis     knees    Arthritis     Asthma     stable for > 10 years    Cancer [facility])     prostate    Cataract     Depression     Diabetes ([facility] [address] )     Diabetes mellitus ([facility] [address] )     pre diabetes    Diverticulitis of colon     Environmental allergies     Hiatal hernia     History of anal fissures     Hyperlipidemia     Hypertension     Incisional hernia     Insomnia     Kidney stone     Morbid obesity ([facility] [address] )     PONV (postoperative nausea and vomiting)     Prolapsing mitral valve     prolapsing mitral valve leaflet syndrome    Prostate cancer ([address] )     Retinal detachment     treated surgically at [facility]; resolved: 10/10/2012    Sleep apnea     diagnosed but unable to tolerate cpap       Stuttering      Past Surgical History:   Procedure Laterality Date    ABDOMINAL SURGERY      gastric lap band    ARTHROSCOPIC REPAIR ACL Right     BIOPSY CORE NEEDLE      prostate    CATARACT EXTRACTION Bilateral     COLONOSCOPY      EYE SURGERY      each eye had a detached retina and cataract removed, Right eye has a buckle    GASTRIC BYPASS      pt had LAP BAND SURGERY not gastric bypass    HERNIA REPAIR      naval    JOINT REPLACEMENT      B/L knee    KNEE ARTHROSCOPY Left     IL TOTAL KNEE ARTHROPLASTY Left 2/15/2017    Procedure: TOTAL KNEE ARTHROPLASTY ;  Surgeon: Shirin Trent MD;  [facility];  Service: Orthopedics    IL TOTAL KNEE ARTHROPLASTY Right 10/10/2016    Procedure: ARTHROPLASTY KNEE TOTAL;  Surgeon: Shirin Trent MD;  [facility];  Service: Orthopedics    PROSTATECTOMY      robotic-assisted; [facility]Dr Ashley Bowden RETINAL DETACHMENT SURGERY Bilateral     [facility]    SEPTOPLASTY      SINUS SURGERY      TONSILLECTOMY      over age 13   [facility] [place] UPPER GASTROINTESTINAL ENDOSCOPY      VASECTOMY      vas deferens     Social History   Social History     Substance and Sexual Activity   Alcohol Use Not Currently    Alcohol/week: 1 0 standard drinks    Types: 1 Cans of beer per week    Frequency: Monthly or less    Drinks per session: 1 or 2    Binge frequency: Never    Comment: rare ; being a social drinker (as per allscripts)     Social History     Substance and Sexual Activity   Drug Use Not Currently     Social History     Tobacco Use   Smoking Status Never Smoker   Smokeless Tobacco Never Used   Tobacco Comment    quit 38 years ago     Family History   Problem Relation Age of Onset    Heart disease Father     Coronary artery disease Father     Prostate cancer Father     Coronary artery disease Mother     Diabetes Mother         mellitus    Diabetes Maternal Grandmother         mellitus    Coronary artery disease Maternal Grandfather     Coronary artery disease Paternal Grandfather        Meds/Allergies       Current Outpatient Medications:     ALPRAZolam (XANAX) 0 25 mg tablet    Blood Glucose Monitoring Suppl (OneTouch Verio) w/Device KIT    ezetimibe (ZETIA) 10 mg tablet    FLUoxetine (PROzac) 20 mg capsule    FLUoxetine (PROzac) 40 MG capsule    glucose blood test strip    hydrochlorothiazide (HYDRODIURIL) 12 5 mg tablet    Lancet Devices (ONE TOUCH DELICA LANCING DEV) MISC    losartan (COZAAR) 100 MG tablet    metFORMIN (GLUCOPHAGE) 500 mg tablet    metoprolol succinate (TOPROL-XL) 25 mg 24 hr tablet    Multiple Vitamins-Minerals (CENTRUM ADULTS PO)    nystatin (MYCOSTATIN) cream    nystatin (MYCOSTATIN) powder    OLANZapine (ZyPREXA) 5 mg tablet    OneTouch Delica Lancets 26M MISC    pantoprazole (PROTONIX) 40 mg tablet    rosuvastatin (CRESTOR) 5 mg tablet    Semaglutide,0 25 or 0 5MG/DOS, 2 MG/1 5ML SOPN    zolpidem (AMBIEN) 10 mg tablet    Allergies   Allergen Reactions    Celebrex [Celecoxib] Other (See Comments)     Cardiologist stated he should not take      Other      "steroid eyedrops"      Prednisone Other (See Comments)     Prednisone eye drops- eye pressure increases           Objective     Blood pressure 140/70, pulse 79, temperature 97 7 °F (36 5 °C), temperature source Tympanic, height 5' 9" (1 753 m), weight (!) 142 kg (313 lb)  Body mass index is 46 22 kg/m²          PHYSICAL EXAM:      General Appearance:   Alert, cooperative, no distress   HEENT:   Normocephalic, atraumatic, anicteric      Neck:  Supple, symmetrical, trachea midline   Lungs:   Clear to auscultation bilaterally; no rales, rhonchi or wheezing; respirations unlabored    Heart[de-identified]   Regular rate and rhythm; no murmur, rub, or gallop  Abdomen:   Soft, non-tender, non-distended; normal bowel sounds; no masses, no organomegaly    Genitalia:   Deferred    Rectal:   Deferred    Extremities:  No cyanosis, clubbing or edema    Pulses:  2+ and symmetric    Skin:  No jaundice, rashes, or lesions    Lymph nodes:  No palpable cervical lymphadenopathy        Lab Results:   No visits with results within 1 Day(s) from this visit  Latest known visit with results is:   Appointment on 04/29/2021   Component Date Value    Hemoglobin A1C 04/29/2021 7 7*    EAG 04/29/2021 174     Sodium 04/29/2021 137     Potassium 04/29/2021 4 2     Chloride 04/29/2021 103     CO2 04/29/2021 27     ANION GAP 04/29/2021 7     BUN 04/29/2021 16     Creatinine 04/29/2021 1 06     Glucose 04/29/2021 193*    Calcium 04/29/2021 9 6     eGFR 04/29/2021 72     Hepatitis C Ab 04/29/2021 Non-reactive     TSH 3RD GENERATON 04/29/2021 1 350          Radiology Results:   No results found

## 2021-06-14 ENCOUNTER — TELEPHONE (OUTPATIENT)
Dept: GASTROENTEROLOGY | Facility: CLINIC | Age: 66
End: 2021-06-14

## 2021-06-23 DIAGNOSIS — B37.2 YEAST DERMATITIS: ICD-10-CM

## 2021-06-24 RX ORDER — NYSTATIN 100000 [USP'U]/G
POWDER TOPICAL 4 TIMES DAILY
Qty: 60 G | Refills: 2 | Status: SHIPPED | OUTPATIENT
Start: 2021-06-24

## 2021-06-28 ENCOUNTER — HOSPITAL ENCOUNTER (OUTPATIENT)
Dept: PULMONOLOGY | Facility: HOSPITAL | Age: 66
Discharge: HOME | End: 2021-06-28
Attending: FAMILY MEDICINE
Payer: MEDICARE

## 2021-06-28 DIAGNOSIS — R06.00 EXERTIONAL DYSPNEA: ICD-10-CM

## 2021-06-28 PROCEDURE — 94060 EVALUATION OF WHEEZING: CPT

## 2021-06-28 PROCEDURE — 94729 DIFFUSING CAPACITY: CPT

## 2021-06-28 PROCEDURE — 94060 EVALUATION OF WHEEZING: CPT | Performed by: INTERNAL MEDICINE

## 2021-06-28 PROCEDURE — 94726 PLETHYSMOGRAPHY LUNG VOLUMES: CPT

## 2021-06-28 PROCEDURE — 94729 DIFFUSING CAPACITY: CPT | Performed by: INTERNAL MEDICINE

## 2021-06-28 PROCEDURE — 94726 PLETHYSMOGRAPHY LUNG VOLUMES: CPT | Performed by: INTERNAL MEDICINE

## 2021-06-28 PROCEDURE — 94618 PULMONARY STRESS TESTING: CPT | Performed by: INTERNAL MEDICINE

## 2021-06-28 PROCEDURE — 94761 N-INVAS EAR/PLS OXIMETRY MLT: CPT

## 2021-06-28 RX ORDER — ALBUTEROL SULFATE 2.5 MG/3ML
2.5 SOLUTION RESPIRATORY (INHALATION) ONCE
Status: COMPLETED | OUTPATIENT
Start: 2021-06-28 | End: 2021-06-28

## 2021-06-28 RX ADMIN — ALBUTEROL SULFATE 2.5 MG: 2.5 SOLUTION RESPIRATORY (INHALATION) at 18:28

## 2021-07-29 ENCOUNTER — OFFICE VISIT (OUTPATIENT)
Dept: FAMILY MEDICINE CLINIC | Facility: CLINIC | Age: 66
End: 2021-07-29
Payer: MEDICARE

## 2021-07-29 VITALS
WEIGHT: 305 LBS | BODY MASS INDEX: 45.18 KG/M2 | DIASTOLIC BLOOD PRESSURE: 88 MMHG | HEIGHT: 69 IN | TEMPERATURE: 97.6 F | HEART RATE: 60 BPM | SYSTOLIC BLOOD PRESSURE: 120 MMHG

## 2021-07-29 DIAGNOSIS — F80.81 STUTTERING: ICD-10-CM

## 2021-07-29 DIAGNOSIS — E78.2 MIXED HYPERLIPIDEMIA: ICD-10-CM

## 2021-07-29 DIAGNOSIS — E11.9 CONTROLLED TYPE 2 DIABETES MELLITUS WITHOUT COMPLICATION, WITHOUT LONG-TERM CURRENT USE OF INSULIN (HCC): Primary | ICD-10-CM

## 2021-07-29 DIAGNOSIS — I10 BENIGN ESSENTIAL HYPERTENSION: ICD-10-CM

## 2021-07-29 DIAGNOSIS — R06.00 EXERTIONAL DYSPNEA: ICD-10-CM

## 2021-07-29 DIAGNOSIS — F41.8 DEPRESSION WITH ANXIETY: ICD-10-CM

## 2021-07-29 LAB — SL AMB POCT HEMOGLOBIN AIC: 6 (ref ?–6.5)

## 2021-07-29 PROCEDURE — 83036 HEMOGLOBIN GLYCOSYLATED A1C: CPT | Performed by: FAMILY MEDICINE

## 2021-07-29 PROCEDURE — 99214 OFFICE O/P EST MOD 30 MIN: CPT | Performed by: FAMILY MEDICINE

## 2021-07-29 NOTE — PROGRESS NOTES
Chief Complaint   Patient presents with   [address] [facility]   Topic Date Due    DTaP,Tdap,and Td Vaccines (1 - Tdap) 11/25/1976    DM Eye Exam  10/17/2019    PT PLAN OF CARE  03/17/2021    SLP PLAN OF CARE  05/13/2021    Influenza Vaccine (1) 09/01/2021    HEMOGLOBIN A1C  01/29/2022    BMI: Followup Plan  02/20/2022    Falls: Plan of Care  02/20/2022    Fall Risk  05/27/2022    Medicare Annual Wellness Visit (AWV)  05/27/2022    Diabetic Foot Exam  05/27/2022    BMI: Adult  07/29/2022    Pneumococcal Vaccine: 65+ Years (2 of 2 - PPSV23) 05/02/2024    Colorectal Cancer Screening  03/10/2026    Hepatitis C Screening  Completed    COVID-19 Vaccine  Completed    HIB Vaccine  Aged Out    Hepatitis B Vaccine  Aged Out    IPV Vaccine  Aged Out    Hepatitis A Vaccine  Aged Out    Meningococcal ACWY Vaccine  Aged Out    HPV Vaccine  Aged Out     BMI Counseling: Body mass index is 45 04 kg/m²  The BMI is above normal  Nutrition recommendations include decreasing portion sizes, encouraging healthy choices of fruits and vegetables, decreasing fast food intake, consuming healthier snacks, moderation in carbohydrate intake and increasing intake of lean protein  Exercise recommendations include exercising 3-5 times per week  Pharmacotherapy was ordered to help aid in weight loss  Patient referred to PCP due to patient being overweight   He is making progress having gone down from 318 lb to 304 lb since beginning on Ozempic  He states he had gotten down to 299 lb but slip back up a little bit to current 304 lb  He also has been walking on a regular basis  He is quite motivated to continue to lose weight with a goal to get down to 250 lb  Continue same       Assessment/Plan:     continue current medications    Continue focus on diet exercise weight reduction     Will get labs before next visit     Will continue to see an on every 3 month basis    Controlled type 2 diabetes mellitus without complication [facility])    Lab Results   Component Value Date    HGBA1C 6 0 07/29/2021      A1c is improved from prior 7 7    He will increase his Ozempic to 0 75 daily    He will continue monitor his sugar daily    He will schedule his diabetic eye exam    He also is on metformin a 1000 mg b i d  Depending on what his sugars are doing will consider decreasing this so that he can continue the Ozempic which also benefits his weight reduction    Benign essential hypertension   Blood pressure continues controlled on his current medication    Depression with anxiety   Stable on current medication   He did try to cut back Prozac somewhat and develops symptoms so went back to his current dose    Exertional dyspnea   Cardiac and pulmonary workup so far non-remarkable     I believe this is mostly due to his weight and deconditioning    Continue diet and weight reduction as well as conditioning    No further workup this time    Hyperlipidemia   Remains on statin    Will recheck lipids before next visit to see what impact his change in diet weight reduction has had    Morbid obesity with BMI of 45 0-49 9, adult ([address] )  Continues to make progress on weight reduction  He has made behavior modifications including diet and exercise   Continue Ozempic    Stuttering   Stable on decreased dose of Zyprexa   Continue same       Diagnoses and all orders for this visit:    Controlled type 2 diabetes mellitus without complication, without long-term current use of insulin ([facility] [address] )  -     POCT hemoglobin A1c  -     Semaglutide, 1 MG/DOSE, 2 MG/1 5ML SOPN; Inject SQ 0 75mg - 1 0mg weekly as directed  -     Comprehensive metabolic panel; Future  -     Hemoglobin A1C; Future  -     Microalbumin / creatinine urine ratio; Future    Benign essential hypertension  -     Comprehensive metabolic panel; Future    Exertional dyspnea    Depression with anxiety    Mixed hyperlipidemia  -     Comprehensive metabolic panel;  Future  -     Lipid panel; Future    Stuttering          Subjective:      Patient ID: Pura Richter is a 77 y o  male  He is here today for follow-up on his chronic conditions and mostly to follow-up on his diabetes weight loss program    He had a gastric band placed in the past and at that time weighed about 310 lb and got down to the 252 lb  Over the years he gained weight back and hit a peak in December of 2020 of 335 lb  He is impulsive eater and has good insight into this  In order to improve his diabetic control and to enhance his ability to lose weight we began him on  Ozempic a couple months ago  Since that time he came down from 318 lb to 314 lb and now 304 lb  He is pleased with this progress and is motivated to continue  Goal is 250 lb     He monitors his blood sugars on a daily basis and his fasting sugars been running between 120 and 180  A1c today was 6 0    His exertional his eyes  Workup at this point including EKG, echocardiogram, PFTs, and 6 minute walk were non-remarkable in regard to explaining his symptoms   he notes that he was in [employer] walking around a lot as well as the [employer] where he walks around a lot and felt better than he had been  We discussed that his obesity and deconditioning alone can cause him to be dyspneic with exertion    He also notes that he decreased his Zyprexa 5 mg to 1/2 tablet (2 5 mg) daily  He has noticed no increase arm more difficulty with his stuttering  He did this because being it can cause weight gain   He also tried to decrease his Prozac but felt poorly and went back to his normal daily goes    He reports no other changes or concerns      The following portions of the patient's history were reviewed and updated as appropriate: allergies, current medications, past medical history, past surgical history and problem list     Review of Systems   Constitutional:        See HPI   Respiratory: Positive for shortness of breath           See HPI   Cardiovascular: Negative for chest pain, palpitations ( occasional PVCs which he can feel) and leg swelling  Neurological: Negative for dizziness, weakness, light-headedness and headaches  Psychiatric/Behavioral:        Stable with no changes         Objective:      /88   Pulse 60   Temp 97 6 °F (36 4 °C) (Tympanic)   Ht 5' 9" (1 753 m)   Wt (!) 138 kg (305 lb)   BMI 45 04 kg/m²          Physical Exam  Vitals and nursing note reviewed  Constitutional:       Comments: He is wearing a mask   Eyes:      Comments: Is wearing glasses   Neck:      Vascular: No carotid bruit  Cardiovascular:      Rate and Rhythm: Normal rate and regular rhythm  Heart sounds: No murmur heard  Pulmonary:      Effort: Pulmonary effort is normal  No respiratory distress  Breath sounds: Normal breath sounds  No wheezing or rales  Musculoskeletal:      Right lower leg: No edema  Left lower leg: No edema  Neurological:      Mental Status: He is oriented to person, place, and time     Psychiatric:         Mood and Affect: Mood normal          Behavior: Behavior normal

## 2021-07-30 NOTE — ASSESSMENT & PLAN NOTE
Lab Results   Component Value Date    HGBA1C 6 0 07/29/2021      A1c is improved from prior 7 7    He will increase his Ozempic to 0 75 daily    He will continue monitor his sugar daily    He will schedule his diabetic eye exam    He also is on metformin a 1000 mg b i d    Depending on what his sugars are doing will consider decreasing this so that he can continue the Ozempic which also benefits his weight reduction

## 2021-07-30 NOTE — ASSESSMENT & PLAN NOTE
Stable on current medication   He did try to cut back Prozac somewhat and develops symptoms so went back to his current dose

## 2021-07-30 NOTE — ASSESSMENT & PLAN NOTE
Continues to make progress on weight reduction  He has made behavior modifications including diet and exercise   Continue Ozempic

## 2021-07-30 NOTE — ASSESSMENT & PLAN NOTE
Cardiac and pulmonary workup so far non-remarkable     I believe this is mostly due to his weight and deconditioning    Continue diet and weight reduction as well as conditioning    No further workup this time

## 2021-07-30 NOTE — ASSESSMENT & PLAN NOTE
Remains on statin    Will recheck lipids before next visit to see what impact his change in diet weight reduction has had

## 2021-08-04 ENCOUNTER — TELEPHONE (OUTPATIENT)
Dept: FAMILY MEDICINE CLINIC | Facility: CLINIC | Age: 66
End: 2021-08-04

## 2021-08-04 NOTE — TELEPHONE ENCOUNTER
Expressed script call to let us know that it is not possible to do 0 75mg, it is either 0 5mg or 1 0mg  Please advise if you want to keep the dose the same or increase to the 1 0mg    # to call to clarify is [phone] with reference # [id number]

## 2021-08-18 DIAGNOSIS — G47.00 INSOMNIA, UNSPECIFIED TYPE: ICD-10-CM

## 2021-08-18 RX ORDER — ZOLPIDEM TARTRATE 10 MG/1
10 TABLET ORAL
Qty: 90 TABLET | Refills: 0 | Status: SHIPPED | OUTPATIENT
Start: 2021-08-18 | End: 2021-12-06 | Stop reason: SDUPTHER

## 2021-08-24 ENCOUNTER — TELEPHONE (OUTPATIENT)
Dept: GASTROENTEROLOGY | Facility: HOSPITAL | Age: 66
End: 2021-08-24

## 2021-08-25 ENCOUNTER — ANESTHESIA (OUTPATIENT)
Dept: GASTROENTEROLOGY | Facility: HOSPITAL | Age: 66
End: 2021-08-25

## 2021-08-25 ENCOUNTER — HOSPITAL ENCOUNTER (OUTPATIENT)
Dept: GASTROENTEROLOGY | Facility: HOSPITAL | Age: 66
Setting detail: OUTPATIENT SURGERY
Discharge: HOME | End: 2021-08-25
Attending: INTERNAL MEDICINE | Admitting: INTERNAL MEDICINE
Payer: MEDICARE

## 2021-08-25 ENCOUNTER — ANESTHESIA EVENT (OUTPATIENT)
Dept: GASTROENTEROLOGY | Facility: HOSPITAL | Age: 66
End: 2021-08-25

## 2021-08-25 VITALS
OXYGEN SATURATION: 94 % | HEIGHT: 69 IN | DIASTOLIC BLOOD PRESSURE: 69 MMHG | SYSTOLIC BLOOD PRESSURE: 141 MMHG | HEART RATE: 59 BPM | WEIGHT: 298 LBS | TEMPERATURE: 98.4 F | RESPIRATION RATE: 18 BRPM | BODY MASS INDEX: 44.14 KG/M2

## 2021-08-25 DIAGNOSIS — K21.00 GASTROESOPHAGEAL REFLUX DISEASE WITH ESOPHAGITIS WITHOUT HEMORRHAGE: ICD-10-CM

## 2021-08-25 DIAGNOSIS — K20.90 ESOPHAGITIS: Primary | ICD-10-CM

## 2021-08-25 DIAGNOSIS — K22.70 BARRETT'S ESOPHAGUS WITHOUT DYSPLASIA: ICD-10-CM

## 2021-08-25 LAB — GLUCOSE SERPL-MCNC: 103 MG/DL (ref 65–140)

## 2021-08-25 PROCEDURE — C1769 GUIDE WIRE: HCPCS

## 2021-08-25 PROCEDURE — C1885 CATH, TRANSLUMIN ANGIO LASER: HCPCS

## 2021-08-25 PROCEDURE — 43270 EGD LESION ABLATION: CPT | Performed by: INTERNAL MEDICINE

## 2021-08-25 PROCEDURE — 82948 REAGENT STRIP/BLOOD GLUCOSE: CPT

## 2021-08-25 RX ORDER — SUCRALFATE 1 G/1
1 TABLET ORAL 4 TIMES DAILY
Qty: 60 TABLET | Refills: 0 | Status: SHIPPED | OUTPATIENT
Start: 2021-08-25 | End: 2021-11-17 | Stop reason: ALTCHOICE

## 2021-08-25 RX ORDER — OXYCODONE HYDROCHLORIDE AND ACETAMINOPHEN 5; 325 MG/1; MG/1
1 TABLET ORAL EVERY 6 HOURS PRN
Qty: 10 TABLET | Refills: 0 | Status: SHIPPED | OUTPATIENT
Start: 2021-08-25 | End: 2021-08-25 | Stop reason: SDUPTHER

## 2021-08-25 RX ORDER — SUCCINYLCHOLINE/SOD CL,ISO/PF 100 MG/5ML
SYRINGE (ML) INTRAVENOUS AS NEEDED
Status: DISCONTINUED | OUTPATIENT
Start: 2021-08-25 | End: 2021-08-25

## 2021-08-25 RX ORDER — SODIUM CHLORIDE 9 MG/ML
INJECTION, SOLUTION INTRAVENOUS CONTINUOUS PRN
Status: DISCONTINUED | OUTPATIENT
Start: 2021-08-25 | End: 2021-08-25

## 2021-08-25 RX ORDER — OXYCODONE HYDROCHLORIDE AND ACETAMINOPHEN 5; 325 MG/1; MG/1
1 TABLET ORAL EVERY 6 HOURS PRN
Qty: 10 TABLET | Refills: 0 | Status: SHIPPED | OUTPATIENT
Start: 2021-08-25 | End: 2021-09-04

## 2021-08-25 RX ORDER — SODIUM CHLORIDE 9 MG/ML
125 INJECTION, SOLUTION INTRAVENOUS CONTINUOUS
Status: CANCELLED | OUTPATIENT
Start: 2021-08-25

## 2021-08-25 RX ORDER — FENTANYL CITRATE/PF 50 MCG/ML
25 SYRINGE (ML) INJECTION
Status: CANCELLED | OUTPATIENT
Start: 2021-08-25

## 2021-08-25 RX ORDER — FENTANYL CITRATE 50 UG/ML
INJECTION, SOLUTION INTRAMUSCULAR; INTRAVENOUS AS NEEDED
Status: DISCONTINUED | OUTPATIENT
Start: 2021-08-25 | End: 2021-08-25

## 2021-08-25 RX ORDER — METOCLOPRAMIDE HYDROCHLORIDE 5 MG/ML
10 INJECTION INTRAMUSCULAR; INTRAVENOUS ONCE AS NEEDED
Status: CANCELLED | OUTPATIENT
Start: 2021-08-25

## 2021-08-25 RX ORDER — EPHEDRINE SULFATE 50 MG/ML
INJECTION INTRAVENOUS AS NEEDED
Status: DISCONTINUED | OUTPATIENT
Start: 2021-08-25 | End: 2021-08-25

## 2021-08-25 RX ORDER — GLYCOPYRROLATE 0.2 MG/ML
INJECTION INTRAMUSCULAR; INTRAVENOUS AS NEEDED
Status: DISCONTINUED | OUTPATIENT
Start: 2021-08-25 | End: 2021-08-25

## 2021-08-25 RX ORDER — LIDOCAINE HYDROCHLORIDE 10 MG/ML
INJECTION, SOLUTION EPIDURAL; INFILTRATION; INTRACAUDAL; PERINEURAL AS NEEDED
Status: DISCONTINUED | OUTPATIENT
Start: 2021-08-25 | End: 2021-08-25

## 2021-08-25 RX ORDER — SUCRALFATE 1 G/1
1 TABLET ORAL 4 TIMES DAILY
Qty: 120 TABLET | Refills: 0 | Status: SHIPPED | OUTPATIENT
Start: 2021-08-25 | End: 2021-08-25 | Stop reason: SDUPTHER

## 2021-08-25 RX ORDER — PROPOFOL 10 MG/ML
INJECTION, EMULSION INTRAVENOUS AS NEEDED
Status: DISCONTINUED | OUTPATIENT
Start: 2021-08-25 | End: 2021-08-25

## 2021-08-25 RX ORDER — ONDANSETRON 2 MG/ML
INJECTION INTRAMUSCULAR; INTRAVENOUS AS NEEDED
Status: DISCONTINUED | OUTPATIENT
Start: 2021-08-25 | End: 2021-08-25

## 2021-08-25 RX ADMIN — Medication 100 MG: at 08:20

## 2021-08-25 RX ADMIN — SODIUM CHLORIDE: 0.9 INJECTION, SOLUTION INTRAVENOUS at 08:45

## 2021-08-25 RX ADMIN — ONDANSETRON 4 MG: 2 INJECTION INTRAMUSCULAR; INTRAVENOUS at 09:03

## 2021-08-25 RX ADMIN — EPHEDRINE SULFATE 5 MG: 50 INJECTION, SOLUTION INTRAVENOUS at 08:53

## 2021-08-25 RX ADMIN — EPHEDRINE SULFATE 5 MG: 50 INJECTION, SOLUTION INTRAVENOUS at 08:48

## 2021-08-25 RX ADMIN — GLYCOPYRROLATE 0.1 MG: 0.2 INJECTION, SOLUTION INTRAMUSCULAR; INTRAVENOUS at 08:10

## 2021-08-25 RX ADMIN — SODIUM CHLORIDE: 0.9 INJECTION, SOLUTION INTRAVENOUS at 07:55

## 2021-08-25 RX ADMIN — LIDOCAINE HYDROCHLORIDE 100 MG: 10 INJECTION, SOLUTION EPIDURAL; INFILTRATION; INTRACAUDAL; PERINEURAL at 08:20

## 2021-08-25 RX ADMIN — EPHEDRINE SULFATE 5 MG: 50 INJECTION, SOLUTION INTRAVENOUS at 08:35

## 2021-08-25 RX ADMIN — PROPOFOL 50 MG: 10 INJECTION, EMULSION INTRAVENOUS at 08:37

## 2021-08-25 RX ADMIN — FENTANYL CITRATE 100 MCG: 50 INJECTION INTRAMUSCULAR; INTRAVENOUS at 08:18

## 2021-08-25 RX ADMIN — PROPOFOL 150 MG: 10 INJECTION, EMULSION INTRAVENOUS at 08:20

## 2021-08-25 NOTE — H&P
History and Physical - [facility]  Elham Frost 77 y o  male MRN: [id number]                  HPI: Elham Frost is a 77y o  year old male who presents for EGD with RFA for history of Molina's  REVIEW OF SYSTEMS: Per the HPI, and otherwise unremarkable  Historical Information   Past Medical History:   Diagnosis Date    Acute blood loss anemia 10/13/2016    Anxiety     Arthritis     knees    Arthritis     Asthma     stable for > 10 years    Molina esophagus     Cancer ([facility] [address] [facility]     prostate    Cataract     Depression     Diabetes ([facility] [address] [facility]     Diabetes mellitus ([facility] [address] )     pre diabetes    Diverticulitis of colon     Environmental allergies     Hiatal hernia     History of anal fissures     Hyperlipidemia     Hypertension     Incisional hernia     Insomnia     Kidney stone     Morbid obesity (HCC)     PONV (postoperative nausea and vomiting)     Prolapsing mitral valve     prolapsing mitral valve leaflet syndrome    Prostate cancer ([facility] [address] )     Retinal detachment     [facility] at [facility]; resolved: 10/10/2012    Sleep apnea     diagnosed but unable to tolerate cpap       Stuttering      Past Surgical History:   Procedure Laterality Date    ABDOMINAL SURGERY      gastric lap band    ARTHROSCOPIC REPAIR ACL Right     BIOPSY CORE NEEDLE      prostate    CATARACT EXTRACTION Bilateral     COLONOSCOPY      EYE SURGERY      each eye had a detached retina and cataract removed, Right eye has a buckle    GASTRIC BYPASS      pt had LAP BAND SURGERY not gastric bypass    HERNIA REPAIR      naval    JOINT REPLACEMENT      B/L knee    KNEE ARTHROSCOPY Left     AL TOTAL KNEE ARTHROPLASTY Left 2/15/2017    Procedure: TOTAL KNEE ARTHROPLASTY ;  Surgeon: Steve Almeida MD;  [facility];  Service: Orthopedics    AL TOTAL KNEE ARTHROPLASTY Right 10/10/2016    Procedure: ARTHROPLASTY KNEE TOTAL;  Surgeon: Steve Almeida MD;  [facility];  Service: Orthopedics    PROSTATECTOMY      robotic-assisted; [facility] RETINAL DETACHMENT SURGERY Bilateral     [facility]    SEPTOPLASTY      SINUS SURGERY      TONSILLECTOMY      over age 13   [facility] UPPER GASTROINTESTINAL ENDOSCOPY      VASECTOMY      vas deferens     Social History   Social History     Substance and Sexual Activity   Alcohol Use Not Currently    Alcohol/week: 1 0 standard drinks    Types: 1 Cans of beer per week    Comment: rare ; being a social drinker (as per allscripts)     Social History     Substance and Sexual Activity   Drug Use Not Currently     Social History     Tobacco Use   Smoking Status Never Smoker   Smokeless Tobacco Never Used   Tobacco Comment    quit 45 years ago     Family History   Problem Relation Age of Onset    Heart disease Father     Coronary artery disease Father     Prostate cancer Father     Coronary artery disease Mother     Diabetes Mother         mellitus    Diabetes Maternal Grandmother         mellitus    Coronary artery disease Maternal Grandfather     Coronary artery disease Paternal Grandfather        Meds/Allergies       Current Outpatient Medications:     ALPRAZolam (XANAX) 0 25 mg tablet    FLUoxetine (PROzac) 20 mg capsule    FLUoxetine (PROzac) 40 MG capsule    hydrochlorothiazide (HYDRODIURIL) 12 5 mg tablet    losartan (COZAAR) 100 MG tablet    metFORMIN (GLUCOPHAGE) 500 mg tablet    metoprolol succinate (TOPROL-XL) 25 mg 24 hr tablet    Multiple Vitamins-Minerals (CENTRUM ADULTS PO)    OLANZapine (ZyPREXA) 5 mg tablet    pantoprazole (PROTONIX) 40 mg tablet    rosuvastatin (CRESTOR) 5 mg tablet    Blood Glucose Monitoring Suppl (OneTouch Verio) w/Device KIT    ezetimibe (ZETIA) 10 mg tablet    glucose blood test strip    Lancet Devices (ONE TOUCH DELICA LANCING DEV) MISC    nystatin (MYCOSTATIN) cream    nystatin (MYCOSTATIN) powder    OneTouch Delica Lancets 92J MISC    Semaglutide, 1 MG/DOSE, 2 MG/1 5ML SOPN    zolpidem (AMBIEN) 10 mg tablet    Allergies   Allergen Reactions    Celebrex [Celecoxib] Other (See Comments)     Cardiologist stated he should not take      Other      "steroid eyedrops"      Prednisone Other (See Comments)     Prednisone eye drops- eye pressure increases       Objective     /77   Pulse 66   Temp 97 8 °F (36 6 °C) (Tympanic)   Resp 18   Ht 5' 9" (1 753 m)   Wt 135 kg (298 lb)   SpO2 99%   BMI 44 01 kg/m²       PHYSICAL EXAM    Gen: NAD  Head: NCAT  CV: RRR  CHEST: Clear  ABD: soft, NT/ND  EXT: no edema      ASSESSMENT/PLAN:  Tena Kyle is a 77y o  year old male who presents for EGD with RFA for history of Molina's  The patient is stable and optimized for the procedure, we reviewed risk and benefits  Risk include but not limited to infection, bleeding, perforation and missing a lesion

## 2021-08-25 NOTE — ANESTHESIA PREPROCEDURE EVALUATION
Procedure:  EGD    Procedure aborted last time due to Food found during EGD 14 hours after last meal - patient has gastroparesis    EF 65%, no sig valvular issues    Hx of PONV    Relevant Problems   ANESTHESIA   (+) PONV (postoperative nausea and vomiting)      CARDIO   (+) Benign essential hypertension   (+) Exertional dyspnea   (+) Hyperlipidemia      ENDO   (+) Controlled type 2 diabetes mellitus without complication (HCC)      GI/HEPATIC   (+) Dysphagia   (+) Gastroesophageal reflux disease with esophagitis      NEURO/PSYCH   (+) Depression with anxiety      PULMONARY   (+) Asthma   (+) Exertional dyspnea   (+) Sleep apnea        Physical Exam    Airway    Mallampati score: II  TM Distance: >3 FB  Neck ROM: full     Dental   No notable dental hx     Cardiovascular  Rhythm: regular, Rate: normal, Cardiovascular exam normal    Pulmonary  Pulmonary exam normal Breath sounds clear to auscultation,     Other Findings        Anesthesia Plan  ASA Score- 3     Anesthesia Type- general with ASA Monitors  Additional Monitors:   Airway Plan: ETT  Comment: Discussed risks/benefits, including medication reactions, awareness, aspiration, and serious/life threatening complications          Plan Factors-Exercise tolerance (METS): <4 METS  Chart reviewed  Patient summary reviewed  Patient is not a current smoker  Induction- intravenous  Postoperative Plan-   Planned trial extubation    Informed Consent- Anesthetic plan and risks discussed with patient  I personally reviewed this patient with the CRNA  Discussed and agreed on the Anesthesia Plan with the CRNA  Nohemi Castillo

## 2021-08-25 NOTE — ANESTHESIA POSTPROCEDURE EVALUATION
Post-Op Assessment Note    CV Status:  Stable  Pain Score: 0    Pain management: adequate     Mental Status:  Alert and awake   Hydration Status:  Stable   PONV Controlled:  None   Airway Patency:  Patent      Post Op Vitals Reviewed: Yes      Staff: Anesthesiologist, CRNA         No complications documented      /63 (08/25/21 0920)    Temp 98 4 °F (36 9 °C) (08/25/21 0915)    Pulse 86 (08/25/21 0920)   Resp 18 (08/25/21 0920)    SpO2 95 % (08/25/21 0920)

## 2021-08-27 ENCOUNTER — TELEPHONE (OUTPATIENT)
Dept: GASTROENTEROLOGY | Facility: CLINIC | Age: 66
End: 2021-08-27

## 2021-08-27 NOTE — TELEPHONE ENCOUNTER
----- Message from Lewis Daly MD sent at 8/25/2021  9:18 AM EDT -----  Please schedule patient for repeat EGD in 3 months with RFA with Dr Amilcar Betancourt for [place]  Thank you  COLLEEN Good    Gastroenterology Fellow PGY-4  8/25/2021 9:19 AM

## 2021-08-27 NOTE — TELEPHONE ENCOUNTER
Spoke with patient requested to schedule early Dec  Patient aware I will call him when Dec schedule available

## 2021-08-30 ENCOUNTER — TELEPHONE (OUTPATIENT)
Dept: UROLOGY | Facility: MEDICAL CENTER | Age: 66
End: 2021-08-30

## 2021-09-09 NOTE — TELEPHONE ENCOUNTER
Pt called back to schedule procedure in Nov  EGD [facility] scheduled on 11/17 at [facility] with Dr Lerma  I gave pt verbal instructions/mailed

## 2021-09-20 ENCOUNTER — APPOINTMENT (OUTPATIENT)
Dept: LAB | Age: 66
End: 2021-09-20
Payer: MEDICARE

## 2021-09-20 DIAGNOSIS — E78.2 MIXED HYPERLIPIDEMIA: ICD-10-CM

## 2021-09-20 DIAGNOSIS — E78.5 HYPERLIPIDEMIA, UNSPECIFIED HYPERLIPIDEMIA TYPE: ICD-10-CM

## 2021-09-20 DIAGNOSIS — C61 MALIGNANT NEOPLASM OF PROSTATE (HCC): ICD-10-CM

## 2021-09-20 DIAGNOSIS — I10 BENIGN ESSENTIAL HYPERTENSION: ICD-10-CM

## 2021-09-20 DIAGNOSIS — E11.9 CONTROLLED TYPE 2 DIABETES MELLITUS WITHOUT COMPLICATION, WITHOUT LONG-TERM CURRENT USE OF INSULIN (HCC): ICD-10-CM

## 2021-09-20 DIAGNOSIS — E13.9 DIABETES 1.5, MANAGED AS TYPE 1 (HCC): ICD-10-CM

## 2021-09-20 LAB
ALBUMIN SERPL BCP-MCNC: 3.5 G/DL (ref 3.5–5)
ALP SERPL-CCNC: 79 U/L (ref 46–116)
ALT SERPL W P-5'-P-CCNC: 30 U/L (ref 12–78)
ANION GAP SERPL CALCULATED.3IONS-SCNC: 8 MMOL/L (ref 4–13)
AST SERPL W P-5'-P-CCNC: 17 U/L (ref 5–45)
BASOPHILS # BLD AUTO: 0.11 THOUSANDS/ΜL (ref 0–0.1)
BASOPHILS NFR BLD AUTO: 1 % (ref 0–1)
BILIRUB SERPL-MCNC: 0.53 MG/DL (ref 0.2–1)
BUN SERPL-MCNC: 10 MG/DL (ref 5–25)
CALCIUM SERPL-MCNC: 9.2 MG/DL (ref 8.3–10.1)
CHLORIDE SERPL-SCNC: 106 MMOL/L (ref 100–108)
CHOLEST SERPL-MCNC: 126 MG/DL (ref 50–200)
CK SERPL-CCNC: 47 U/L (ref 39–308)
CO2 SERPL-SCNC: 26 MMOL/L (ref 21–32)
CREAT SERPL-MCNC: 0.93 MG/DL (ref 0.6–1.3)
EOSINOPHIL # BLD AUTO: 0.96 THOUSAND/ΜL (ref 0–0.61)
EOSINOPHIL NFR BLD AUTO: 10 % (ref 0–6)
ERYTHROCYTE [DISTWIDTH] IN BLOOD BY AUTOMATED COUNT: 13.2 % (ref 11.6–15.1)
GFR SERPL CREATININE-BSD FRML MDRD: 85 ML/MIN/1.73SQ M
GLUCOSE P FAST SERPL-MCNC: 122 MG/DL (ref 65–99)
HCT VFR BLD AUTO: 42.6 % (ref 36.5–49.3)
HDLC SERPL-MCNC: 54 MG/DL
HGB BLD-MCNC: 14.4 G/DL (ref 12–17)
IMM GRANULOCYTES # BLD AUTO: 0.03 THOUSAND/UL (ref 0–0.2)
IMM GRANULOCYTES NFR BLD AUTO: 0 % (ref 0–2)
LDLC SERPL CALC-MCNC: 60 MG/DL (ref 0–100)
LYMPHOCYTES # BLD AUTO: 1.87 THOUSANDS/ΜL (ref 0.6–4.47)
LYMPHOCYTES NFR BLD AUTO: 19 % (ref 14–44)
MCH RBC QN AUTO: 29.6 PG (ref 26.8–34.3)
MCHC RBC AUTO-ENTMCNC: 33.8 G/DL (ref 31.4–37.4)
MCV RBC AUTO: 88 FL (ref 82–98)
MONOCYTES # BLD AUTO: 0.58 THOUSAND/ΜL (ref 0.17–1.22)
MONOCYTES NFR BLD AUTO: 6 % (ref 4–12)
NEUTROPHILS # BLD AUTO: 6.28 THOUSANDS/ΜL (ref 1.85–7.62)
NEUTS SEG NFR BLD AUTO: 64 % (ref 43–75)
NONHDLC SERPL-MCNC: 72 MG/DL
NRBC BLD AUTO-RTO: 0 /100 WBCS
PLATELET # BLD AUTO: 329 THOUSANDS/UL (ref 149–390)
PMV BLD AUTO: 9.2 FL (ref 8.9–12.7)
POTASSIUM SERPL-SCNC: 3.9 MMOL/L (ref 3.5–5.3)
PROT SERPL-MCNC: 7 G/DL (ref 6.4–8.2)
PSA SERPL-MCNC: <0.1 NG/ML (ref 0–4)
RBC # BLD AUTO: 4.86 MILLION/UL (ref 3.88–5.62)
SODIUM SERPL-SCNC: 140 MMOL/L (ref 136–145)
TRIGL SERPL-MCNC: 60 MG/DL
WBC # BLD AUTO: 9.83 THOUSAND/UL (ref 4.31–10.16)

## 2021-09-20 PROCEDURE — 36415 COLL VENOUS BLD VENIPUNCTURE: CPT

## 2021-09-20 PROCEDURE — 80053 COMPREHEN METABOLIC PANEL: CPT

## 2021-09-20 PROCEDURE — 84153 ASSAY OF PSA TOTAL: CPT

## 2021-09-20 PROCEDURE — 82550 ASSAY OF CK (CPK): CPT

## 2021-09-20 PROCEDURE — 85025 COMPLETE CBC W/AUTO DIFF WBC: CPT

## 2021-09-20 PROCEDURE — 80061 LIPID PANEL: CPT

## 2021-09-20 NOTE — TELEPHONE ENCOUNTER
Lorie Kawasaki from [employer] called in stating patient is at the lab for his psa but the collection date is for 11/1/21   Lorie Kawasaki stated she will cancel and reenter so patient will get the psa test prior to his appt 9/21/21/

## 2021-09-21 ENCOUNTER — OFFICE VISIT (OUTPATIENT)
Dept: UROLOGY | Facility: AMBULATORY SURGERY CENTER | Age: 66
End: 2021-09-21
Payer: MEDICARE

## 2021-09-21 VITALS
HEIGHT: 69 IN | BODY MASS INDEX: 44.43 KG/M2 | DIASTOLIC BLOOD PRESSURE: 82 MMHG | HEART RATE: 80 BPM | SYSTOLIC BLOOD PRESSURE: 138 MMHG | WEIGHT: 300 LBS

## 2021-09-21 DIAGNOSIS — C61 MALIGNANT NEOPLASM OF PROSTATE (HCC): ICD-10-CM

## 2021-09-21 DIAGNOSIS — N32.81 OAB (OVERACTIVE BLADDER): Primary | ICD-10-CM

## 2021-09-21 DIAGNOSIS — E78.5 HYPERLIPIDEMIA, UNSPECIFIED HYPERLIPIDEMIA TYPE: ICD-10-CM

## 2021-09-21 LAB
POST-VOID RESIDUAL VOLUME, ML POC: 52 ML
SL AMB  POCT GLUCOSE, UA: NORMAL
SL AMB LEUKOCYTE ESTERASE,UA: NORMAL
SL AMB POCT BILIRUBIN,UA: NORMAL
SL AMB POCT BLOOD,UA: NORMAL
SL AMB POCT CLARITY,UA: CLEAR
SL AMB POCT COLOR,UA: YELLOW
SL AMB POCT KETONES,UA: NORMAL
SL AMB POCT NITRITE,UA: NORMAL
SL AMB POCT PH,UA: 5
SL AMB POCT SPECIFIC GRAVITY,UA: 1.01
SL AMB POCT URINE PROTEIN: NORMAL
SL AMB POCT UROBILINOGEN: 0.2

## 2021-09-21 PROCEDURE — 51798 US URINE CAPACITY MEASURE: CPT | Performed by: NURSE PRACTITIONER

## 2021-09-21 PROCEDURE — 81002 URINALYSIS NONAUTO W/O SCOPE: CPT | Performed by: NURSE PRACTITIONER

## 2021-09-21 PROCEDURE — 99213 OFFICE O/P EST LOW 20 MIN: CPT | Performed by: NURSE PRACTITIONER

## 2021-09-21 RX ORDER — SEMAGLUTIDE 1.34 MG/ML
0.75 INJECTION, SOLUTION SUBCUTANEOUS WEEKLY
COMMUNITY
Start: 2021-07-29

## 2021-09-21 RX ORDER — EZETIMIBE 10 MG/1
TABLET ORAL
Qty: 90 TABLET | Refills: 3 | Status: SHIPPED | OUTPATIENT
Start: 2021-09-21

## 2021-09-21 NOTE — PROGRESS NOTES
9/21/2021      No chief complaint on file  Assessment and Plan    77 y o  male previously managed by Dr Jing Wilhelm    1  Prostate cancer  ·  status post robotic prostatectomy 07/2011  ·   PSA performed 09/20/2021 resulted 0 1  ·  repeat PSA in 1 year    History of Present Illness  Yenny Cardoza is a 77 y o  male here for follow up evaluation of  [place] 7 (3+4) prostate cancer status post Davinci prostatectomy by Dr Helen Lombardi in 07/2011  PSA trend below  Patient reports rare episodes of urinary incontinence with exertion otherwise he reports to be 90% continent  He denies changes to his general health since his prior evaluation  Component PSA, Total   Latest Ref Rng & Units 0 0 - 4 0 ng/mL   3/20/2017 <0 1   4/24/2018 <0 1   4/24/2019 <0 1   10/1/2020 <0 1   9/20/2021 <0 1     Review of Systems   Constitutional: Negative for chills and fever  Respiratory: Negative for cough and shortness of breath  Cardiovascular: Negative for chest pain  Gastrointestinal: Negative for abdominal distention, abdominal pain, blood in stool, nausea and vomiting  Genitourinary: Negative for difficulty urinating, dysuria, enuresis, flank pain, frequency, hematuria and urgency  Skin: Negative for rash       Past Medical History  Past Medical History:   Diagnosis Date    Acute blood loss anemia 10/13/2016    Anxiety     Arthritis     knees    Arthritis     Asthma     stable for > 10 years    Molina esophagus     Cancer ([address] )     prostate    Cataract     Depression     [facility]     Diabetes mellitus ([facility]     pre diabetes    Diverticulitis of colon     Environmental allergies     Hiatal hernia     History of anal fissures     Hyperlipidemia     Hypertension     Incisional hernia     Insomnia     Kidney stone     Morbid obesity (HCC)     PONV (postoperative nausea and vomiting)     Prolapsing mitral valve     prolapsing mitral valve leaflet syndrome    Prostate cancer ([address] )     Retinal detachment     treated [facility] at [facility]; resolved: 10/10/2012    Sleep apnea     diagnosed but unable to tolerate cpap       Stuttering        Past Social History  Past Surgical History:   Procedure Laterality Date    ABDOMINAL SURGERY      gastric lap band    ARTHROSCOPIC REPAIR ACL Right     BIOPSY CORE NEEDLE      prostate    CATARACT EXTRACTION Bilateral     COLONOSCOPY      EYE SURGERY      each eye had a detached retina and cataract removed, Right eye has a buckle    GASTRIC BYPASS      pt had LAP BAND SURGERY not gastric bypass    HERNIA REPAIR      naval    JOINT REPLACEMENT      B/L knee    KNEE ARTHROSCOPY Left     MT TOTAL KNEE ARTHROPLASTY Left 2/15/2017    Procedure: TOTAL KNEE ARTHROPLASTY ;  Surgeon: Josephine Dominguez MD;  [facility];  Service: Orthopedics    MT TOTAL KNEE ARTHROPLASTY Right 10/10/2016    Procedure: ARTHROPLASTY KNEE TOTAL;  Surgeon: Josephine Dominguez MD;  [facility];  Service: Orthopedics    PROSTATECTOMY      robotic-assisted; [facility] Jose Delgado RETINAL DETACHMENT SURGERY Bilateral     [facility]    SEPTOPLASTY      SINUS SURGERY      TONSILLECTOMY      over age 13   [facility] UPPER GASTROINTESTINAL ENDOSCOPY      VASECTOMY      vas deferens     Social History     Tobacco Use   Smoking Status Never Smoker   Smokeless Tobacco Never Used   Tobacco Comment    quit 45 years ago       Past Family History  Family History   Problem Relation Age of Onset    Heart disease Father     Coronary artery disease Father     Prostate cancer Father     Coronary artery disease Mother     Diabetes Mother         mellitus    Diabetes Maternal Grandmother         mellitus    Coronary artery disease Maternal Grandfather     Coronary artery disease Paternal Grandfather        Past Social history  Social History     Socioeconomic History    Marital status: /Civil Union     Spouse name: Not on file    Number of children: 0    Years of education: Not on file  Highest education level: Not on file   Occupational History    Occupation:    Tobacco Use    Smoking status: Never Smoker    Smokeless tobacco: Never Used    Tobacco comment: quit 38 years ago   Vaping Use    Vaping Use: Never used   Substance and Sexual Activity    Alcohol use: Not Currently     Alcohol/week: 1 0 standard drinks     Types: 1 Cans of beer per week     Comment: rare ; being a social drinker (as per allscripts)    Drug use: Not Currently    Sexual activity: Yes     Partners: Female   Other Topics Concern    Not on file   Social History Narrative    ** Merged History Encounter **          Social Determinants of Health     Financial Resource Strain: Low Risk     Difficulty of Paying Living Expenses: Not hard at all   Food Insecurity: No Food Insecurity    Worried About Running Out of Food in the Last Year: Never true    Lisa of Food in the Last Year: Never true   Transportation Needs: No Transportation Needs    Lack of Transportation (Medical): No    Lack of Transportation (Non-Medical):  No   Physical Activity:     Days of Exercise per Week:     Minutes of Exercise per Session:    Stress:     Feeling of Stress :    Social Connections:     Frequency of Communication with Friends and Family:     Frequency of Social Gatherings with Friends and Family:     Attends Rastafarian Services:     Active Member of Clubs or Organizations:     Attends Club or Organization Meetings:     Marital Status:    Intimate Partner Violence:     Fear of Current or Ex-Partner:     Emotionally Abused:     Physically Abused:     Sexually Abused:        Current Medications  Current Outpatient Medications   Medication Sig Dispense Refill    al mag oxide-diphenhydramine-lidocaine viscous (MAGIC MOUTHWASH) 1:1:1 suspension Swish and spit 10 mL every 6 (six) hours as needed for mouth pain or discomfort 1200 mL 3    Blood Glucose Monitoring Suppl (OneTouch Verio) w/Device KIT Use daily 1 kit 0    ezetimibe (ZETIA) 10 mg tablet TAKE 1 TABLET DAILY 90 tablet 3    FLUoxetine (PROzac) 20 mg capsule TAKE 3 CAPSULES DAILY 270 capsule 3    FLUoxetine (PROzac) 40 MG capsule Take 60 mg by mouth daily      glucose blood test strip Test Daily 200 each 1    Lancet Devices (ONE TOUCH DELICA LANCING DEV) MISC Use daily 1 each 0    losartan (COZAAR) 100 MG tablet Take 1 tablet (100 mg total) by mouth daily 90 tablet 3    metFORMIN (GLUCOPHAGE) 500 mg tablet TAKE 2 TABLETS TWICE A DAY WITH MEALS (Patient taking differently: 1,000 mg 2 (two) times a day with meals ) 360 tablet 3    metoprolol succinate (TOPROL-XL) 25 mg 24 hr tablet Take 1 tablet (25 mg total) by mouth daily 90 tablet 3    Multiple Vitamins-Minerals (CENTRUM ADULTS PO) Take by mouth      nystatin (MYCOSTATIN) cream Apply topically 2 (two) times a day 30 g 2    nystatin (MYCOSTATIN) powder Apply topically 4 (four) times a day 60 g 2    OLANZapine (ZyPREXA) 5 mg tablet TAKE 1 TABLET DAILY 90 tablet 3    OneTouch Delica Lancets 45Y MISC Use daily 300 each 1    Ozempic, 1 MG/DOSE, 4 MG/3ML SOPN injection pen       pantoprazole (PROTONIX) 40 mg tablet Take 1 tablet (40 mg total) by mouth 2 (two) times a day 180 tablet 3    rosuvastatin (CRESTOR) 5 mg tablet One tablet twice a week on Monday and Friday 90 tablet 3    Semaglutide, 1 MG/DOSE, 2 MG/1 5ML SOPN Inject SQ 0 75mg - 1 0mg weekly as directed 9 mL 3    sucralfate (CARAFATE) 1 g tablet Take 1 tablet (1 g total) by mouth 4 (four) times a day 60 tablet 0    zolpidem (AMBIEN) 10 mg tablet Take 1 tablet (10 mg total) by mouth daily at bedtime 90 tablet 0    ALPRAZolam (XANAX) 0 25 mg tablet Take 1 tablet (0 25 mg total) by mouth 3 (three) times a day as needed for anxiety 270 tablet 1    hydrochlorothiazide (HYDRODIURIL) 12 5 mg tablet Take 1 tablet (12 5 mg total) by mouth 3 (three) times a week Monday, Thursday, Saturday 36 tablet 5     No current facility-administered medications for this visit  Allergies  Allergies   Allergen Reactions    Celebrex [Celecoxib] Other (See Comments)     Cardiologist stated he should not take      Other      "steroid eyedrops"      Prednisone Other (See Comments)     Prednisone eye drops- eye pressure increases         The following portions of the patient's history were reviewed and updated as appropriate: allergies, current medications, past medical history, past social history, past surgical history and problem list       Vitals  Vitals:    09/21/21 1433   BP: 138/82   Pulse: 80   Weight: 136 kg (300 lb)   Height: 5' 9" (1 753 m)           Physical Exam  Physical Exam  Vitals reviewed  Constitutional:       General: He is not in acute distress  Appearance: Normal appearance  He is normal weight  HENT:      Head: Normocephalic  Pulmonary:      Effort: No respiratory distress  Breath sounds: Normal breath sounds  Skin:     General: Skin is warm and dry  Neurological:      General: No focal deficit present  Mental Status: He is alert and oriented to person, place, and time     Psychiatric:         Mood and Affect: Mood normal          Behavior: Behavior normal            Results  Recent Results (from the past 1 hour(s))   POCT urine dip    Collection Time: 09/21/21  2:35 PM   Result Value Ref Range    LEUKOCYTE ESTERASE,UA -     NITRITE,UA -     SL AMB POCT UROBILINOGEN 0 2     POCT URINE PROTEIN -      PH,UA 5 0     BLOOD,UA -     SPECIFIC GRAVITY,UA 1 010     KETONES,UA -     BILIRUBIN,UA -     GLUCOSE, UA -      COLOR,UA yellow     CLARITY,UA clear    POCT Measure PVR    Collection Time: 09/21/21  2:35 PM   Result Value Ref Range    POST-VOID RESIDUAL VOLUME, ML POC 52 mL   ]  Lab Results   Component Value Date    PSA <0 1 09/20/2021    PSA <0 1 10/01/2020    PSA <0 1 04/24/2019     Lab Results   Component Value Date    GLUCOSE 174 (H) 12/30/2020    CALCIUM 9 2 09/20/2021     08/16/2015    K 3 9 09/20/2021    CO2 26 09/20/2021     09/20/2021    BUN 10 09/20/2021    CREATININE 0 93 09/20/2021     Lab Results   Component Value Date    WBC 9 83 09/20/2021    HGB 14 4 09/20/2021    HCT 42 6 09/20/2021    MCV 88 09/20/2021     09/20/2021           Orders  Orders Placed This Encounter   Procedures    PSA Total, Diagnostic     Standing Status:   Future     Standing Expiration Date:   9/21/2022    POCT urine dip    POCT Measure PVR       ED Isaacs

## 2021-10-18 DIAGNOSIS — E11.9 CONTROLLED TYPE 2 DIABETES MELLITUS WITHOUT COMPLICATION, WITHOUT LONG-TERM CURRENT USE OF INSULIN (HCC): ICD-10-CM

## 2021-10-19 ENCOUNTER — OFFICE VISIT (OUTPATIENT)
Dept: CARDIOLOGY CLINIC | Facility: CLINIC | Age: 66
End: 2021-10-19
Payer: MEDICARE

## 2021-10-19 VITALS
HEIGHT: 69 IN | WEIGHT: 300.1 LBS | DIASTOLIC BLOOD PRESSURE: 74 MMHG | HEART RATE: 84 BPM | SYSTOLIC BLOOD PRESSURE: 124 MMHG | BODY MASS INDEX: 44.45 KG/M2 | OXYGEN SATURATION: 95 %

## 2021-10-19 DIAGNOSIS — E13.9 DIABETES 1.5, MANAGED AS TYPE 1 (HCC): ICD-10-CM

## 2021-10-19 DIAGNOSIS — I49.3 FREQUENT PVCS: Primary | ICD-10-CM

## 2021-10-19 DIAGNOSIS — I77.89 HYPERPLASIA OF RENAL ARTERY (HCC): ICD-10-CM

## 2021-10-19 DIAGNOSIS — I10 ESSENTIAL HYPERTENSION: ICD-10-CM

## 2021-10-19 PROCEDURE — 99214 OFFICE O/P EST MOD 30 MIN: CPT | Performed by: INTERNAL MEDICINE

## 2021-10-28 ENCOUNTER — OFFICE VISIT (OUTPATIENT)
Dept: FAMILY MEDICINE CLINIC | Facility: CLINIC | Age: 66
End: 2021-10-28
Payer: MEDICARE

## 2021-10-28 VITALS
WEIGHT: 298 LBS | BODY MASS INDEX: 44.14 KG/M2 | DIASTOLIC BLOOD PRESSURE: 68 MMHG | TEMPERATURE: 99.5 F | SYSTOLIC BLOOD PRESSURE: 138 MMHG | RESPIRATION RATE: 16 BRPM | HEIGHT: 69 IN | HEART RATE: 72 BPM

## 2021-10-28 DIAGNOSIS — E78.2 MIXED HYPERLIPIDEMIA: ICD-10-CM

## 2021-10-28 DIAGNOSIS — B37.2 YEAST DERMATITIS: ICD-10-CM

## 2021-10-28 DIAGNOSIS — F80.81 STUTTERING: ICD-10-CM

## 2021-10-28 DIAGNOSIS — F41.8 DEPRESSION WITH ANXIETY: ICD-10-CM

## 2021-10-28 DIAGNOSIS — I10 BENIGN ESSENTIAL HYPERTENSION: ICD-10-CM

## 2021-10-28 DIAGNOSIS — K22.70 BARRETT'S ESOPHAGUS WITHOUT DYSPLASIA: ICD-10-CM

## 2021-10-28 DIAGNOSIS — E11.9 CONTROLLED TYPE 2 DIABETES MELLITUS WITHOUT COMPLICATION, WITHOUT LONG-TERM CURRENT USE OF INSULIN (HCC): Primary | ICD-10-CM

## 2021-10-28 PROCEDURE — 99214 OFFICE O/P EST MOD 30 MIN: CPT | Performed by: NURSE PRACTITIONER

## 2021-10-28 RX ORDER — ALPRAZOLAM 0.25 MG/1
0.25 TABLET ORAL 3 TIMES DAILY PRN
Qty: 270 TABLET | Refills: 1 | Status: SHIPPED | OUTPATIENT
Start: 2021-10-28 | End: 2022-08-10

## 2021-10-28 RX ORDER — NYSTATIN 100000 U/G
CREAM TOPICAL 2 TIMES DAILY
Qty: 30 G | Refills: 2 | Status: SHIPPED | OUTPATIENT
Start: 2021-10-28 | End: 2022-04-21 | Stop reason: ALTCHOICE

## 2021-11-02 DIAGNOSIS — E11.9 CONTROLLED TYPE 2 DIABETES MELLITUS WITHOUT COMPLICATION, WITHOUT LONG-TERM CURRENT USE OF INSULIN (HCC): Primary | ICD-10-CM

## 2021-11-02 RX ORDER — SEMAGLUTIDE 1.34 MG/ML
INJECTION, SOLUTION SUBCUTANEOUS
Qty: 6 ML | Refills: 0 | Status: SHIPPED | COMMUNITY
Start: 2021-11-02 | End: 2022-04-21 | Stop reason: DRUGHIGH

## 2021-11-08 ENCOUNTER — TELEPHONE (OUTPATIENT)
Dept: GASTROENTEROLOGY | Facility: CLINIC | Age: 66
End: 2021-11-08

## 2021-11-16 ENCOUNTER — TELEPHONE (OUTPATIENT)
Dept: GASTROENTEROLOGY | Facility: HOSPITAL | Age: 66
End: 2021-11-16

## 2021-11-17 ENCOUNTER — HOSPITAL ENCOUNTER (INPATIENT)
Facility: HOSPITAL | Age: 66
LOS: 1 days | Discharge: HOME | DRG: 641 | End: 2021-11-18
Attending: EMERGENCY MEDICINE | Admitting: HOSPITALIST
Payer: MEDICARE

## 2021-11-17 ENCOUNTER — HOSPITAL ENCOUNTER (OUTPATIENT)
Dept: GASTROENTEROLOGY | Facility: HOSPITAL | Age: 66
Setting detail: OUTPATIENT SURGERY
Discharge: HOME | DRG: 641 | End: 2021-11-17
Attending: INTERNAL MEDICINE | Admitting: INTERNAL MEDICINE
Payer: MEDICARE

## 2021-11-17 ENCOUNTER — APPOINTMENT (EMERGENCY)
Dept: RADIOLOGY | Facility: HOSPITAL | Age: 66
DRG: 641 | End: 2021-11-17
Payer: MEDICARE

## 2021-11-17 ENCOUNTER — ANESTHESIA (OUTPATIENT)
Dept: GASTROENTEROLOGY | Facility: HOSPITAL | Age: 66
End: 2021-11-17

## 2021-11-17 ENCOUNTER — ANESTHESIA EVENT (OUTPATIENT)
Dept: GASTROENTEROLOGY | Facility: HOSPITAL | Age: 66
End: 2021-11-17

## 2021-11-17 VITALS
DIASTOLIC BLOOD PRESSURE: 87 MMHG | RESPIRATION RATE: 18 BRPM | SYSTOLIC BLOOD PRESSURE: 168 MMHG | WEIGHT: 298 LBS | TEMPERATURE: 97.3 F | HEIGHT: 69 IN | BODY MASS INDEX: 44.14 KG/M2 | OXYGEN SATURATION: 93 % | HEART RATE: 65 BPM

## 2021-11-17 DIAGNOSIS — K22.70 BARRETT'S ESOPHAGUS WITHOUT DYSPLASIA: ICD-10-CM

## 2021-11-17 DIAGNOSIS — K20.90 ESOPHAGITIS: ICD-10-CM

## 2021-11-17 DIAGNOSIS — K21.01 GASTROESOPHAGEAL REFLUX DISEASE WITH ESOPHAGITIS AND HEMORRHAGE: Primary | ICD-10-CM

## 2021-11-17 DIAGNOSIS — A41.9 SEPSIS (HCC): ICD-10-CM

## 2021-11-17 DIAGNOSIS — K22.70 BARRETT'S ESOPHAGUS WITHOUT DYSPLASIA: Primary | ICD-10-CM
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
## 2021-11-17 LAB
ALBUMIN SERPL BCP-MCNC: 3.6 G/DL (ref 3.5–5)
ALP SERPL-CCNC: 76 U/L (ref 46–116)
ALT SERPL W P-5'-P-CCNC: 32 U/L (ref 12–78)
ANION GAP SERPL CALCULATED.3IONS-SCNC: 10 MMOL/L (ref 4–13)
APTT PPP: 24 SECONDS (ref 23–37)
AST SERPL W P-5'-P-CCNC: 19 U/L (ref 5–45)
BASOPHILS # BLD MANUAL: 0 THOUSAND/UL (ref 0–0.1)
BASOPHILS NFR MAR MANUAL: 0 % (ref 0–1)
BILIRUB SERPL-MCNC: 0.71 MG/DL (ref 0.2–1)
BUN SERPL-MCNC: 9 MG/DL (ref 5–25)
CALCIUM SERPL-MCNC: 9.6 MG/DL (ref 8.3–10.1)
CARDIAC TROPONIN I PNL SERPL HS: 6 NG/L
CHLORIDE SERPL-SCNC: 100 MMOL/L (ref 100–108)
CO2 SERPL-SCNC: 24 MMOL/L (ref 21–32)
CREAT SERPL-MCNC: 1.13 MG/DL (ref 0.6–1.3)
EOSINOPHIL # BLD MANUAL: 0 THOUSAND/UL (ref 0–0.4)
EOSINOPHIL NFR BLD MANUAL: 0 % (ref 0–6)
ERYTHROCYTE [DISTWIDTH] IN BLOOD BY AUTOMATED COUNT: 13.2 % (ref 11.6–15.1)
GFR SERPL CREATININE-BSD FRML MDRD: 67 ML/MIN/1.73SQ M
GLUCOSE SERPL-MCNC: 184 MG/DL (ref 65–140)
HCT VFR BLD AUTO: 41.4 % (ref 36.5–49.3)
HGB BLD-MCNC: 14.4 G/DL (ref 12–17)
INR PPP: 0.99 (ref 0.84–1.19)
LACTATE SERPL-SCNC: 2.3 MMOL/L (ref 0.5–2)
LYMPHOCYTES # BLD AUTO: 1.22 THOUSAND/UL (ref 0.6–4.47)
LYMPHOCYTES # BLD AUTO: 7 % (ref 14–44)
MCH RBC QN AUTO: 29.7 PG (ref 26.8–34.3)
MCHC RBC AUTO-ENTMCNC: 34.8 G/DL (ref 31.4–37.4)
MCV RBC AUTO: 85 FL (ref 82–98)
MONOCYTES # BLD AUTO: 0 THOUSAND/UL (ref 0–1.22)
MONOCYTES NFR BLD: 0 % (ref 4–12)
NEUTROPHILS # BLD MANUAL: 16.28 THOUSAND/UL (ref 1.85–7.62)
NEUTS BAND NFR BLD MANUAL: 7 % (ref 0–8)
NEUTS SEG NFR BLD AUTO: 86 % (ref 43–75)
PLATELET # BLD AUTO: 261 THOUSANDS/UL (ref 149–390)
PLATELET BLD QL SMEAR: ADEQUATE
PLATELET CLUMP BLD QL SMEAR: PRESENT
PMV BLD AUTO: 8.6 FL (ref 8.9–12.7)
POTASSIUM SERPL-SCNC: 3.8 MMOL/L (ref 3.5–5.3)
PROCALCITONIN SERPL-MCNC: 0.17 NG/ML
PROT SERPL-MCNC: 7.3 G/DL (ref 6.4–8.2)
PROTHROMBIN TIME: 12.7 SECONDS (ref 11.6–14.5)
RBC # BLD AUTO: 4.85 MILLION/UL (ref 3.88–5.62)
RBC MORPH BLD: NORMAL
SODIUM SERPL-SCNC: 134 MMOL/L (ref 136–145)
WBC # BLD AUTO: 17.5 THOUSAND/UL (ref 4.31–10.16)

## 2021-11-17 PROCEDURE — 87040 BLOOD CULTURE FOR BACTERIA: CPT | Performed by: EMERGENCY MEDICINE

## 2021-11-17 PROCEDURE — 93005 ELECTROCARDIOGRAM TRACING: CPT

## 2021-11-17 PROCEDURE — 83605 ASSAY OF LACTIC ACID: CPT | Performed by: EMERGENCY MEDICINE

## 2021-11-17 PROCEDURE — 99291 CRITICAL CARE FIRST HOUR: CPT | Performed by: EMERGENCY MEDICINE

## 2021-11-17 PROCEDURE — 84145 PROCALCITONIN (PCT): CPT | Performed by: EMERGENCY MEDICINE

## 2021-11-17 PROCEDURE — C1885 CATH, TRANSLUMIN ANGIO LASER: HCPCS

## 2021-11-17 PROCEDURE — 80053 COMPREHEN METABOLIC PANEL: CPT | Performed by: EMERGENCY MEDICINE

## 2021-11-17 PROCEDURE — 99223 1ST HOSP IP/OBS HIGH 75: CPT | Performed by: HOSPITALIST

## 2021-11-17 PROCEDURE — 99285 EMERGENCY DEPT VISIT HI MDM: CPT

## 2021-11-17 PROCEDURE — 85007 BL SMEAR W/DIFF WBC COUNT: CPT | Performed by: EMERGENCY MEDICINE

## 2021-11-17 PROCEDURE — 36415 COLL VENOUS BLD VENIPUNCTURE: CPT

## 2021-11-17 PROCEDURE — 43270 EGD LESION ABLATION: CPT | Performed by: INTERNAL MEDICINE

## 2021-11-17 PROCEDURE — 85610 PROTHROMBIN TIME: CPT | Performed by: EMERGENCY MEDICINE

## 2021-11-17 PROCEDURE — 84484 ASSAY OF TROPONIN QUANT: CPT | Performed by: EMERGENCY MEDICINE

## 2021-11-17 PROCEDURE — C1769 GUIDE WIRE: HCPCS

## 2021-11-17 PROCEDURE — 96365 THER/PROPH/DIAG IV INF INIT: CPT

## 2021-11-17 PROCEDURE — 96367 TX/PROPH/DG ADDL SEQ IV INF: CPT

## 2021-11-17 PROCEDURE — 0D538ZZ DESTRUCTION OF LOWER ESOPHAGUS, VIA NATURAL OR ARTIFICIAL OPENING ENDOSCOPIC: ICD-10-PCS | Performed by: INTERNAL MEDICINE

## 2021-11-17 PROCEDURE — 85730 THROMBOPLASTIN TIME PARTIAL: CPT | Performed by: EMERGENCY MEDICINE

## 2021-11-17 PROCEDURE — 85027 COMPLETE CBC AUTOMATED: CPT | Performed by: EMERGENCY MEDICINE

## 2021-11-17 RX ORDER — OXYCODONE HYDROCHLORIDE AND ACETAMINOPHEN 5; 325 MG/1; MG/1
1 TABLET ORAL EVERY 4 HOURS PRN
Qty: 20 TABLET | Refills: 0 | Status: SHIPPED | OUTPATIENT
Start: 2021-11-17 | End: 2021-11-27

## 2021-11-17 RX ORDER — ACETYLCYSTEINE 200 MG/ML
SOLUTION ORAL; RESPIRATORY (INHALATION) CODE/TRAUMA/SEDATION MEDICATION
Status: COMPLETED | OUTPATIENT
Start: 2021-11-17 | End: 2021-11-17

## 2021-11-17 RX ORDER — PROPOFOL 10 MG/ML
INJECTION, EMULSION INTRAVENOUS AS NEEDED
Status: DISCONTINUED | OUTPATIENT
Start: 2021-11-17 | End: 2021-11-17

## 2021-11-17 RX ORDER — SUCRALFATE 1 G/1
1 TABLET ORAL 4 TIMES DAILY
Qty: 40 TABLET | Refills: 2 | Status: SHIPPED | OUTPATIENT
Start: 2021-11-17

## 2021-11-17 RX ORDER — SODIUM CHLORIDE 9 MG/ML
INJECTION, SOLUTION INTRAVENOUS CONTINUOUS PRN
Status: DISCONTINUED | OUTPATIENT
Start: 2021-11-17 | End: 2021-11-17

## 2021-11-17 RX ORDER — LIDOCAINE HYDROCHLORIDE 10 MG/ML
INJECTION, SOLUTION EPIDURAL; INFILTRATION; INTRACAUDAL; PERINEURAL AS NEEDED
Status: DISCONTINUED | OUTPATIENT
Start: 2021-11-17 | End: 2021-11-17

## 2021-11-17 RX ORDER — KETAMINE HCL IN NACL, ISO-OSM 100MG/10ML
SYRINGE (ML) INJECTION AS NEEDED
Status: DISCONTINUED | OUTPATIENT
Start: 2021-11-17 | End: 2021-11-17

## 2021-11-17 RX ORDER — GLYCOPYRROLATE 0.2 MG/ML
INJECTION INTRAMUSCULAR; INTRAVENOUS AS NEEDED
Status: DISCONTINUED | OUTPATIENT
Start: 2021-11-17 | End: 2021-11-17

## 2021-11-17 RX ADMIN — PROPOFOL 50 MG: 10 INJECTION, EMULSION INTRAVENOUS at 10:02

## 2021-11-17 RX ADMIN — LIDOCAINE HYDROCHLORIDE 100 MG: 10 INJECTION, SOLUTION EPIDURAL; INFILTRATION; INTRACAUDAL; PERINEURAL at 09:59

## 2021-11-17 RX ADMIN — METRONIDAZOLE 500 MG: 500 INJECTION, SOLUTION INTRAVENOUS at 22:04

## 2021-11-17 RX ADMIN — PROPOFOL 50 MG: 10 INJECTION, EMULSION INTRAVENOUS at 10:08

## 2021-11-17 RX ADMIN — GLYCOPYRROLATE 0.2 MG: 0.2 INJECTION, SOLUTION INTRAMUSCULAR; INTRAVENOUS at 09:51

## 2021-11-17 RX ADMIN — Medication 30 MG: at 09:59

## 2021-11-17 RX ADMIN — IOHEXOL 50 ML: 240 INJECTION, SOLUTION INTRATHECAL; INTRAVASCULAR; INTRAVENOUS; ORAL at 22:23

## 2021-11-17 RX ADMIN — PROPOFOL 50 MG: 10 INJECTION, EMULSION INTRAVENOUS at 10:23

## 2021-11-17 RX ADMIN — PROPOFOL 50 MG: 10 INJECTION, EMULSION INTRAVENOUS at 10:05

## 2021-11-17 RX ADMIN — PROPOFOL 50 MG: 10 INJECTION, EMULSION INTRAVENOUS at 10:13

## 2021-11-17 RX ADMIN — DEXTROSE 2000 MG: 50 INJECTION, SOLUTION INTRAVENOUS at 22:28

## 2021-11-17 RX ADMIN — PROPOFOL 50 MG: 10 INJECTION, EMULSION INTRAVENOUS at 10:18

## 2021-11-17 RX ADMIN — SODIUM CHLORIDE: 0.9 INJECTION, SOLUTION INTRAVENOUS at 09:51

## 2021-11-17 RX ADMIN — PROPOFOL 100 MG: 10 INJECTION, EMULSION INTRAVENOUS at 09:59

## 2021-11-17 RX ADMIN — ACETYLCYSTEINE 600 MG: 200 SOLUTION ORAL; RESPIRATORY (INHALATION) at 10:11

## 2021-11-17 RX ADMIN — PROPOFOL 50 MG: 10 INJECTION, EMULSION INTRAVENOUS at 10:28

## 2021-11-18 ENCOUNTER — APPOINTMENT (EMERGENCY)
Dept: RADIOLOGY | Facility: HOSPITAL | Age: 66
DRG: 641 | End: 2021-11-18
Payer: MEDICARE

## 2021-11-18 VITALS
HEART RATE: 70 BPM | BODY MASS INDEX: 44.14 KG/M2 | HEIGHT: 69 IN | SYSTOLIC BLOOD PRESSURE: 119 MMHG | WEIGHT: 298 LBS | TEMPERATURE: 98.3 F | RESPIRATION RATE: 18 BRPM | OXYGEN SATURATION: 94 % | DIASTOLIC BLOOD PRESSURE: 56 MMHG

## 2021-11-18 PROBLEM — K22.719 BARRETT'S ESOPHAGUS WITH DYSPLASIA: Status: ACTIVE | Noted: 2020-11-15

## 2021-11-18 PROBLEM — A41.9 SEPSIS (HCC): Status: ACTIVE | Noted: 2021-11-18

## 2021-11-18 LAB
2HR DELTA HS TROPONIN: 1 NG/L
4HR DELTA HS TROPONIN: 0 NG/L
ANION GAP SERPL CALCULATED.3IONS-SCNC: 7 MMOL/L (ref 4–13)
BASOPHILS # BLD AUTO: 0.06 THOUSANDS/ΜL (ref 0–0.1)
BASOPHILS NFR BLD AUTO: 1 % (ref 0–1)
BILIRUB UR QL STRIP: NEGATIVE
BUN SERPL-MCNC: 10 MG/DL (ref 5–25)
CALCIUM SERPL-MCNC: 7.8 MG/DL (ref 8.3–10.1)
CARDIAC TROPONIN I PNL SERPL HS: 6 NG/L
CARDIAC TROPONIN I PNL SERPL HS: 7 NG/L
CHLORIDE SERPL-SCNC: 105 MMOL/L (ref 100–108)
CLARITY UR: CLEAR
CO2 SERPL-SCNC: 24 MMOL/L (ref 21–32)
COLOR UR: YELLOW
CREAT SERPL-MCNC: 0.94 MG/DL (ref 0.6–1.3)
EOSINOPHIL # BLD AUTO: 0.41 THOUSAND/ΜL (ref 0–0.61)
EOSINOPHIL NFR BLD AUTO: 3 % (ref 0–6)
ERYTHROCYTE [DISTWIDTH] IN BLOOD BY AUTOMATED COUNT: 13.4 % (ref 11.6–15.1)
GFR SERPL CREATININE-BSD FRML MDRD: 84 ML/MIN/1.73SQ M
GLUCOSE SERPL-MCNC: 106 MG/DL (ref 65–140)
GLUCOSE SERPL-MCNC: 163 MG/DL (ref 65–140)
GLUCOSE UR STRIP-MCNC: NEGATIVE MG/DL
HCT VFR BLD AUTO: 36.6 % (ref 36.5–49.3)
HGB BLD-MCNC: 12.7 G/DL (ref 12–17)
HGB UR QL STRIP.AUTO: NEGATIVE
IMM GRANULOCYTES # BLD AUTO: 0.04 THOUSAND/UL (ref 0–0.2)
IMM GRANULOCYTES NFR BLD AUTO: 0 % (ref 0–2)
KETONES UR STRIP-MCNC: NEGATIVE MG/DL
LACTATE SERPL-SCNC: 2 MMOL/L (ref 0.5–2)
LACTATE SERPL-SCNC: 2.6 MMOL/L (ref 0.5–2)
LEUKOCYTE ESTERASE UR QL STRIP: NEGATIVE
LYMPHOCYTES # BLD AUTO: 1.7 THOUSANDS/ΜL (ref 0.6–4.47)
LYMPHOCYTES NFR BLD AUTO: 13 % (ref 14–44)
MCH RBC QN AUTO: 30.2 PG (ref 26.8–34.3)
MCHC RBC AUTO-ENTMCNC: 34.7 G/DL (ref 31.4–37.4)
MCV RBC AUTO: 87 FL (ref 82–98)
MONOCYTES # BLD AUTO: 0.84 THOUSAND/ΜL (ref 0.17–1.22)
MONOCYTES NFR BLD AUTO: 7 % (ref 4–12)
NEUTROPHILS # BLD AUTO: 9.77 THOUSANDS/ΜL (ref 1.85–7.62)
NEUTS SEG NFR BLD AUTO: 76 % (ref 43–75)
NITRITE UR QL STRIP: NEGATIVE
NRBC BLD AUTO-RTO: 0 /100 WBCS
PH UR STRIP.AUTO: 7.5 [PH]
PLATELET # BLD AUTO: 221 THOUSANDS/UL (ref 149–390)
PMV BLD AUTO: 8.6 FL (ref 8.9–12.7)
POTASSIUM SERPL-SCNC: 3.2 MMOL/L (ref 3.5–5.3)
PROCALCITONIN SERPL-MCNC: 0.34 NG/ML
PROT UR STRIP-MCNC: NEGATIVE MG/DL
RBC # BLD AUTO: 4.21 MILLION/UL (ref 3.88–5.62)
SODIUM SERPL-SCNC: 136 MMOL/L (ref 136–145)
SP GR UR STRIP.AUTO: 1.02 (ref 1–1.03)
UROBILINOGEN UR QL STRIP.AUTO: 1 E.U./DL
WBC # BLD AUTO: 12.82 THOUSAND/UL (ref 4.31–10.16)

## 2021-11-18 PROCEDURE — C9113 INJ PANTOPRAZOLE SODIUM, VIA: HCPCS | Performed by: HOSPITALIST

## 2021-11-18 PROCEDURE — 71260 CT THORAX DX C+: CPT

## 2021-11-18 PROCEDURE — 84145 PROCALCITONIN (PCT): CPT | Performed by: EMERGENCY MEDICINE

## 2021-11-18 PROCEDURE — 99222 1ST HOSP IP/OBS MODERATE 55: CPT | Performed by: INTERNAL MEDICINE

## 2021-11-18 PROCEDURE — 84484 ASSAY OF TROPONIN QUANT: CPT | Performed by: EMERGENCY MEDICINE

## 2021-11-18 PROCEDURE — 96365 THER/PROPH/DIAG IV INF INIT: CPT

## 2021-11-18 PROCEDURE — 74177 CT ABD & PELVIS W/CONTRAST: CPT

## 2021-11-18 PROCEDURE — 85025 COMPLETE CBC W/AUTO DIFF WBC: CPT | Performed by: INTERNAL MEDICINE

## 2021-11-18 PROCEDURE — 81003 URINALYSIS AUTO W/O SCOPE: CPT | Performed by: EMERGENCY MEDICINE

## 2021-11-18 PROCEDURE — 99239 HOSP IP/OBS DSCHRG MGMT >30: CPT | Performed by: INTERNAL MEDICINE

## 2021-11-18 PROCEDURE — 80048 BASIC METABOLIC PNL TOTAL CA: CPT | Performed by: INTERNAL MEDICINE

## 2021-11-18 PROCEDURE — 82948 REAGENT STRIP/BLOOD GLUCOSE: CPT

## 2021-11-18 PROCEDURE — 92610 EVALUATE SWALLOWING FUNCTION: CPT

## 2021-11-18 PROCEDURE — 97163 PT EVAL HIGH COMPLEX 45 MIN: CPT

## 2021-11-18 PROCEDURE — G1004 CDSM NDSC: HCPCS

## 2021-11-18 PROCEDURE — 36415 COLL VENOUS BLD VENIPUNCTURE: CPT | Performed by: EMERGENCY MEDICINE

## 2021-11-18 PROCEDURE — 83605 ASSAY OF LACTIC ACID: CPT | Performed by: EMERGENCY MEDICINE

## 2021-11-18 RX ORDER — ALPRAZOLAM 0.25 MG/1
0.25 TABLET ORAL 3 TIMES DAILY PRN
Status: DISCONTINUED | OUTPATIENT
Start: 2021-11-18 | End: 2021-11-18 | Stop reason: HOSPADM

## 2021-11-18 RX ORDER — ONDANSETRON 2 MG/ML
4 INJECTION INTRAMUSCULAR; INTRAVENOUS EVERY 4 HOURS PRN
Status: DISCONTINUED | OUTPATIENT
Start: 2021-11-18 | End: 2021-11-18 | Stop reason: HOSPADM

## 2021-11-18 RX ORDER — LOSARTAN POTASSIUM 50 MG/1
100 TABLET ORAL DAILY
Status: DISCONTINUED | OUTPATIENT
Start: 2021-11-18 | End: 2021-11-18 | Stop reason: HOSPADM

## 2021-11-18 RX ORDER — OLANZAPINE 5 MG/1
5 TABLET ORAL DAILY
Status: DISCONTINUED | OUTPATIENT
Start: 2021-11-18 | End: 2021-11-18 | Stop reason: HOSPADM

## 2021-11-18 RX ORDER — SODIUM CHLORIDE, SODIUM GLUCONATE, SODIUM ACETATE, POTASSIUM CHLORIDE, MAGNESIUM CHLORIDE, SODIUM PHOSPHATE, DIBASIC, AND POTASSIUM PHOSPHATE .53; .5; .37; .037; .03; .012; .00082 G/100ML; G/100ML; G/100ML; G/100ML; G/100ML; G/100ML; G/100ML
1000 INJECTION, SOLUTION INTRAVENOUS ONCE
Status: COMPLETED | OUTPATIENT
Start: 2021-11-18 | End: 2021-11-18

## 2021-11-18 RX ORDER — SODIUM CHLORIDE, SODIUM LACTATE, POTASSIUM CHLORIDE, CALCIUM CHLORIDE 600; 310; 30; 20 MG/100ML; MG/100ML; MG/100ML; MG/100ML
100 INJECTION, SOLUTION INTRAVENOUS CONTINUOUS
Status: DISCONTINUED | OUTPATIENT
Start: 2021-11-18 | End: 2021-11-18 | Stop reason: HOSPADM

## 2021-11-18 RX ORDER — FLUOXETINE HYDROCHLORIDE 20 MG/1
60 CAPSULE ORAL DAILY
Status: DISCONTINUED | OUTPATIENT
Start: 2021-11-18 | End: 2021-11-18 | Stop reason: HOSPADM

## 2021-11-18 RX ORDER — SUCRALFATE 1 G/1
1 TABLET ORAL 4 TIMES DAILY
Status: DISCONTINUED | OUTPATIENT
Start: 2021-11-18 | End: 2021-11-18 | Stop reason: HOSPADM

## 2021-11-18 RX ORDER — IPRATROPIUM BROMIDE AND ALBUTEROL SULFATE 2.5; .5 MG/3ML; MG/3ML
3 SOLUTION RESPIRATORY (INHALATION) EVERY 4 HOURS PRN
Status: DISCONTINUED | OUTPATIENT
Start: 2021-11-18 | End: 2021-11-18 | Stop reason: HOSPADM

## 2021-11-18 RX ORDER — AMOXICILLIN AND CLAVULANATE POTASSIUM 875; 125 MG/1; MG/1
1 TABLET, FILM COATED ORAL EVERY 12 HOURS SCHEDULED
Qty: 14 TABLET | Refills: 0 | Status: SHIPPED | OUTPATIENT
Start: 2021-11-18 | End: 2021-11-25

## 2021-11-18 RX ORDER — ZOLPIDEM TARTRATE 5 MG/1
5 TABLET ORAL
Status: DISCONTINUED | OUTPATIENT
Start: 2021-11-18 | End: 2021-11-18 | Stop reason: HOSPADM

## 2021-11-18 RX ORDER — PANTOPRAZOLE SODIUM 40 MG/1
40 INJECTION, POWDER, FOR SOLUTION INTRAVENOUS
Status: DISCONTINUED | OUTPATIENT
Start: 2021-11-18 | End: 2021-11-18

## 2021-11-18 RX ORDER — ACETAMINOPHEN 325 MG/1
650 TABLET ORAL EVERY 4 HOURS PRN
Status: DISCONTINUED | OUTPATIENT
Start: 2021-11-18 | End: 2021-11-18 | Stop reason: HOSPADM

## 2021-11-18 RX ORDER — PANTOPRAZOLE SODIUM 40 MG/1
40 INJECTION, POWDER, FOR SOLUTION INTRAVENOUS EVERY 12 HOURS SCHEDULED
Status: DISCONTINUED | OUTPATIENT
Start: 2021-11-18 | End: 2021-11-18 | Stop reason: HOSPADM

## 2021-11-18 RX ORDER — METOPROLOL SUCCINATE 25 MG/1
25 TABLET, EXTENDED RELEASE ORAL DAILY
Status: DISCONTINUED | OUTPATIENT
Start: 2021-11-18 | End: 2021-11-18 | Stop reason: HOSPADM

## 2021-11-18 RX ADMIN — SODIUM CHLORIDE 3 G: 9 INJECTION, SOLUTION INTRAVENOUS at 02:43

## 2021-11-18 RX ADMIN — IOHEXOL 100 ML: 350 INJECTION, SOLUTION INTRAVENOUS at 00:18

## 2021-11-18 RX ADMIN — SODIUM CHLORIDE, SODIUM LACTATE, POTASSIUM CHLORIDE, AND CALCIUM CHLORIDE 100 ML/HR: .6; .31; .03; .02 INJECTION, SOLUTION INTRAVENOUS at 04:25

## 2021-11-18 RX ADMIN — ALPRAZOLAM 0.25 MG: 0.25 TABLET ORAL at 04:55

## 2021-11-18 RX ADMIN — PANTOPRAZOLE SODIUM 40 MG: 40 INJECTION, POWDER, FOR SOLUTION INTRAVENOUS at 02:38

## 2021-11-18 RX ADMIN — SODIUM CHLORIDE, SODIUM GLUCONATE, SODIUM ACETATE, POTASSIUM CHLORIDE, MAGNESIUM CHLORIDE, SODIUM PHOSPHATE, DIBASIC, AND POTASSIUM PHOSPHATE 1000 ML: .53; .5; .37; .037; .03; .012; .00082 INJECTION, SOLUTION INTRAVENOUS at 00:51

## 2021-11-19 ENCOUNTER — TRANSITIONAL CARE MANAGEMENT (OUTPATIENT)
Dept: FAMILY MEDICINE CLINIC | Facility: CLINIC | Age: 66
End: 2021-11-19

## 2021-11-19 LAB
ATRIAL RATE: 96 BPM
P AXIS: 55 DEGREES
PR INTERVAL: 216 MS
QRS AXIS: -30 DEGREES
QRSD INTERVAL: 88 MS
QT INTERVAL: 334 MS
QTC INTERVAL: 421 MS
T WAVE AXIS: 26 DEGREES
VENTRICULAR RATE: 96 BPM

## 2021-11-19 PROCEDURE — 93010 ELECTROCARDIOGRAM REPORT: CPT | Performed by: INTERNAL MEDICINE

## 2021-11-22 ENCOUNTER — TELEPHONE (OUTPATIENT)
Dept: ADMINISTRATIVE | Facility: OTHER | Age: 66
End: 2021-11-22

## 2021-11-23 ENCOUNTER — OFFICE VISIT (OUTPATIENT)
Dept: FAMILY MEDICINE CLINIC | Facility: CLINIC | Age: 66
End: 2021-11-23
Payer: MEDICARE

## 2021-11-23 ENCOUNTER — TELEPHONE (OUTPATIENT)
Dept: ADMINISTRATIVE | Facility: OTHER | Age: 66
End: 2021-11-23

## 2021-11-23 VITALS
OXYGEN SATURATION: 95 % | DIASTOLIC BLOOD PRESSURE: 72 MMHG | HEART RATE: 76 BPM | RESPIRATION RATE: 16 BRPM | BODY MASS INDEX: 43.55 KG/M2 | WEIGHT: 294 LBS | HEIGHT: 69 IN | SYSTOLIC BLOOD PRESSURE: 118 MMHG | TEMPERATURE: 98.1 F

## 2021-11-23 DIAGNOSIS — E66.01 CLASS 3 SEVERE OBESITY DUE TO EXCESS CALORIES WITHOUT SERIOUS COMORBIDITY WITH BODY MASS INDEX (BMI) OF 40.0 TO 44.9 IN ADULT (HCC): Primary | ICD-10-CM

## 2021-11-23 DIAGNOSIS — K43.9 VENTRAL HERNIA WITHOUT OBSTRUCTION OR GANGRENE: ICD-10-CM

## 2021-11-23 DIAGNOSIS — A41.9 SEPSIS, DUE TO UNSPECIFIED ORGANISM, UNSPECIFIED WHETHER ACUTE ORGAN DYSFUNCTION PRESENT (HCC): ICD-10-CM

## 2021-11-23 LAB
BACTERIA BLD CULT: NORMAL
BACTERIA BLD CULT: NORMAL

## 2021-11-23 PROCEDURE — 99496 TRANSJ CARE MGMT HIGH F2F 7D: CPT | Performed by: FAMILY MEDICINE

## 2021-12-02 DIAGNOSIS — E78.2 MIXED HYPERLIPIDEMIA: ICD-10-CM

## 2021-12-02 RX ORDER — ROSUVASTATIN CALCIUM 5 MG/1
TABLET, COATED ORAL
Qty: 24 TABLET | Refills: 3 | Status: SHIPPED | OUTPATIENT
Start: 2021-12-02 | End: 2022-05-12 | Stop reason: SDUPTHER

## 2021-12-06 DIAGNOSIS — G47.00 INSOMNIA, UNSPECIFIED TYPE: ICD-10-CM

## 2021-12-07 RX ORDER — ZOLPIDEM TARTRATE 10 MG/1
10 TABLET ORAL
Qty: 90 TABLET | Refills: 0 | Status: SHIPPED | OUTPATIENT
Start: 2021-12-07 | End: 2022-04-21 | Stop reason: SDUPTHER

## 2021-12-18 DIAGNOSIS — I10 ESSENTIAL HYPERTENSION: ICD-10-CM

## 2021-12-20 RX ORDER — METOPROLOL SUCCINATE 25 MG/1
TABLET, EXTENDED RELEASE ORAL
Qty: 90 TABLET | Refills: 3 | Status: SHIPPED | OUTPATIENT
Start: 2021-12-20

## 2022-01-04 ENCOUNTER — CLINICAL SUPPORT (OUTPATIENT)
Dept: FAMILY MEDICINE CLINIC | Facility: CLINIC | Age: 67
End: 2022-01-04
Payer: MEDICARE

## 2022-01-04 DIAGNOSIS — Z23 ENCOUNTER FOR IMMUNIZATION: Primary | ICD-10-CM

## 2022-01-04 PROCEDURE — 90662 IIV NO PRSV INCREASED AG IM: CPT

## 2022-01-04 PROCEDURE — G0008 ADMIN INFLUENZA VIRUS VAC: HCPCS

## 2022-01-12 ENCOUNTER — OFFICE VISIT (OUTPATIENT)
Dept: GASTROENTEROLOGY | Facility: CLINIC | Age: 67
End: 2022-01-12
Payer: MEDICARE

## 2022-01-12 VITALS
SYSTOLIC BLOOD PRESSURE: 124 MMHG | OXYGEN SATURATION: 96 % | TEMPERATURE: 98.6 F | HEART RATE: 84 BPM | WEIGHT: 301.6 LBS | BODY MASS INDEX: 44.54 KG/M2 | DIASTOLIC BLOOD PRESSURE: 75 MMHG

## 2022-01-12 DIAGNOSIS — R13.19 ESOPHAGEAL DYSPHAGIA: ICD-10-CM

## 2022-01-12 DIAGNOSIS — K22.719 BARRETT'S ESOPHAGUS WITH DYSPLASIA: Primary | ICD-10-CM

## 2022-01-12 DIAGNOSIS — K31.84 GASTROPARESIS: ICD-10-CM

## 2022-01-12 PROCEDURE — 99214 OFFICE O/P EST MOD 30 MIN: CPT | Performed by: INTERNAL MEDICINE

## 2022-01-12 NOTE — PATIENT INSTRUCTIONS
Scheduled date of EGD/RFA(as of today):02/11/22  Physician performing EGD:DR MORRISON  Location of EGD:  Instructions reviewed with patient by:SITA IN THE OFFICE  Clearances: N/A

## 2022-01-18 NOTE — PROGRESS NOTES
[facility] - Outpatient Follow-up Note  Vladislav Lynn 74 y o  male MRN: [id number]  Encounter: [id number]          ASSESSMENT AND PLAN:      1  Molina's esophagus with dysplasia and gastroesophageal reflux disease  Her he had indefinite dysplasia  He underwent 2 EGDs with RFA  On most recent EGD he had aspiration pneumonia which required admission  Fortunately he recovered quickly  We discussed about further management options he is concerned about procedure related complication  I gave him an option regarding expectant management versus repeat EGD with RFA for complete eradication of Molina's  Her he agrees to proceed with EGD with RFA  He needs to be intubated for next procedure is to prevent aspiration  Continue PPI    2  Gastroparesis  This is secondary to Ozempic  Continue small and frequent diet     ______________________________________________________________________    SUBJECTIVE:    77-year-old male with long segment Molina's esophagus [id number] status post radiofrequency ablation x2 here for follow-up visit  On initial biopsy he had atypical cells indefinite for dysplasia  This was removed by snare and remnant tissue was removed by biopsy forceps  He had questions regarding complication from recent EGD and future plans  REVIEW OF SYSTEMS IS OTHERWISE NEGATIVE        Historical Information   Past Medical History:   Diagnosis Date    Acute blood loss anemia 10/13/2016    Anxiety     Arthritis     knees    Arthritis     Asthma     stable for > 10 years    Molina esophagus     [facility]     prostate    Cataract     Depression     Diabetes ([facility]     Diabetes mellitus ([facility]     pre diabetes    Diverticulitis of colon     Environmental allergies     Hiatal hernia     History of anal fissures     Hyperlipidemia     Hypertension     Incisional hernia     Insomnia     Kidney stone     Morbid obesity (HCC)     PONV (postoperative nausea and vomiting)     Prolapsing mitral valve     prolapsing mitral valve leaflet syndrome    Prostate cancer ([address]     Retinal detachment     treated surgically at [facility]; resolved: 10/10/2012    Sleep apnea     diagnosed but unable to tolerate cpap       Stuttering      Past Surgical History:   Procedure Laterality Date    ABDOMINAL SURGERY      gastric lap band    ARTHROSCOPIC REPAIR ACL Right     BIOPSY CORE NEEDLE      prostate    CATARACT EXTRACTION Bilateral     COLONOSCOPY      EYE SURGERY      each eye had a detached retina and cataract removed, Right eye has a buckle    GASTRIC BYPASS      pt had LAP BAND SURGERY not gastric bypass    HERNIA REPAIR      naval    JOINT REPLACEMENT      B/L knee    KNEE ARTHROSCOPY Left     MI TOTAL KNEE ARTHROPLASTY Left 2/15/2017    Procedure: TOTAL KNEE ARTHROPLASTY ;  Surgeon: Casper Hamilton MD;  [facility];  Service: Orthopedics    MI TOTAL KNEE ARTHROPLASTY Right 10/10/2016    Procedure: ARTHROPLASTY KNEE TOTAL;  Surgeon: Casper Hamilton MD;  [facility];  Service: Orthopedics    PROSTATECTOMY      robotic-assisted; [facility] RETINAL DETACHMENT SURGERY Bilateral     [facility]    SEPTOPLASTY      SINUS SURGERY      TONSILLECTOMY      over age 13   [facility] UPPER GASTROINTESTINAL ENDOSCOPY      VASECTOMY      vas deferens     Social History   Social History     Substance and Sexual Activity   Alcohol Use Not Currently    Alcohol/week: 1 0 standard drink    Types: 1 Cans of beer per week    Comment: rare ; being a social drinker (as per allscripts)     Social History     Substance and Sexual Activity   Drug Use Not Currently     Social History     Tobacco Use   Smoking Status Never Smoker   Smokeless Tobacco Never Used   Tobacco Comment    quit 38 years ago     Family History   Problem Relation Age of Onset    Heart disease Father     Coronary artery disease Father     Prostate cancer Father     Coronary artery disease Mother  Diabetes Mother         mellitus    Diabetes Maternal Grandmother         mellitus    Coronary artery disease Maternal Grandfather     Coronary artery disease Paternal Grandfather        Meds/Allergies       Current Outpatient Medications:     al mag oxide-diphenhydramine-lidocaine viscous (MAGIC MOUTHWASH) 1:1:1 suspension    ALPRAZolam (XANAX) 0 25 mg tablet    Blood Glucose Monitoring Suppl (OneTouch Verio) w/Device KIT    ezetimibe (ZETIA) 10 mg tablet    FLUoxetine (PROzac) 20 mg capsule    glucose blood test strip    hydrochlorothiazide (HYDRODIURIL) 12 5 mg tablet    Lancet Devices (ONE TOUCH DELICA LANCING DEV) MISC    losartan (COZAAR) 100 MG tablet    metFORMIN (GLUCOPHAGE) 500 mg tablet    metoprolol succinate (TOPROL-XL) 25 mg 24 hr tablet    Multiple Vitamins-Minerals (CENTRUM ADULTS PO)    nystatin (MYCOSTATIN) cream    nystatin (MYCOSTATIN) powder    OLANZapine (ZyPREXA) 5 mg tablet    OneTouch Delica Lancets 58N MISC    Ozempic, 1 MG/DOSE, 4 MG/3ML SOPN injection pen    pantoprazole (PROTONIX) 40 mg tablet    rosuvastatin (CRESTOR) 5 mg tablet    Semaglutide, 1 MG/DOSE, 2 MG/1 5ML SOPN    Semaglutide,0 25 or 0 5MG/DOS, (Ozempic, 0 25 or 0 5 MG/DOSE,) 2 MG/1 5ML SOPN    sucralfate (CARAFATE) 1 g tablet    zolpidem (AMBIEN) 10 mg tablet    Allergies   Allergen Reactions    Celebrex [Celecoxib] Other (See Comments)     Cardiologist stated he should not take      Other      "steroid eyedrops"      Prednisone Other (See Comments)     Prednisone eye drops- eye pressure increases           Objective     Blood pressure 124/75, pulse 84, temperature 98 6 °F (37 °C), weight (!) 137 kg (301 lb 9 6 oz), SpO2 96 %  Body mass index is 44 54 kg/m²        PHYSICAL EXAM:      General Appearance:   Alert, cooperative, no distress   HEENT:   Normocephalic, atraumatic, anicteric      Neck:  Supple, symmetrical, trachea midline   Lungs:   Clear to auscultation bilaterally; no rales, rhonchi or wheezing; respirations unlabored    Heart[de-identified]   Regular rate and rhythm; no murmur, rub, or gallop  Abdomen:   Soft, non-tender, non-distended; normal bowel sounds; no masses, no organomegaly    Genitalia:   Deferred    Rectal:   Deferred    Extremities:  No cyanosis, clubbing or edema    Pulses:  2+ and symmetric    Skin:  No jaundice, rashes, or lesions    Lymph nodes:  No palpable cervical lymphadenopathy        Lab Results:   No visits with results within 1 Day(s) from this visit  Latest known visit with results is:   Telephone on 11/23/2021   Component Date Value    Right Eye Diabetic Retin* 10/14/2020 None     Left Eye Diabetic Retino* 10/14/2020 None          Radiology Results:   No results found

## 2022-01-20 ENCOUNTER — OFFICE VISIT (OUTPATIENT)
Dept: PODIATRY | Facility: CLINIC | Age: 67
End: 2022-01-20
Payer: MEDICARE

## 2022-01-20 VITALS
HEIGHT: 69 IN | BODY MASS INDEX: 44.58 KG/M2 | SYSTOLIC BLOOD PRESSURE: 160 MMHG | DIASTOLIC BLOOD PRESSURE: 94 MMHG | HEART RATE: 61 BPM | WEIGHT: 301 LBS

## 2022-01-20 DIAGNOSIS — E11.9 CONTROLLED TYPE 2 DIABETES MELLITUS WITHOUT COMPLICATION, WITHOUT LONG-TERM CURRENT USE OF INSULIN (HCC): Primary | ICD-10-CM

## 2022-01-20 DIAGNOSIS — B35.1 ONYCHOMYCOSIS: ICD-10-CM

## 2022-01-20 DIAGNOSIS — L60.1 ONYCHOLYSIS: ICD-10-CM

## 2022-01-20 PROCEDURE — 11730 AVULSION NAIL PLATE SIMPLE 1: CPT | Performed by: PODIATRIST

## 2022-01-20 PROCEDURE — 99212 OFFICE O/P EST SF 10 MIN: CPT | Performed by: PODIATRIST

## 2022-01-20 RX ORDER — LIDOCAINE HYDROCHLORIDE AND EPINEPHRINE 10; 10 MG/ML; UG/ML
1 INJECTION, SOLUTION INFILTRATION; PERINEURAL ONCE
Status: COMPLETED | OUTPATIENT
Start: 2022-01-20 | End: 2022-01-20

## 2022-01-20 RX ORDER — LIDOCAINE HYDROCHLORIDE 10 MG/ML
1 INJECTION, SOLUTION EPIDURAL; INFILTRATION; INTRACAUDAL; PERINEURAL ONCE
Status: COMPLETED | OUTPATIENT
Start: 2022-01-20 | End: 2022-01-20

## 2022-01-20 RX ADMIN — LIDOCAINE HYDROCHLORIDE 1 ML: 10 INJECTION, SOLUTION EPIDURAL; INFILTRATION; INTRACAUDAL; PERINEURAL at 16:57

## 2022-01-20 RX ADMIN — LIDOCAINE HYDROCHLORIDE AND EPINEPHRINE 1 ML: 10; 10 INJECTION, SOLUTION INFILTRATION; PERINEURAL at 16:57

## 2022-01-20 NOTE — PROGRESS NOTES
Patient, a 58-year-old type 2 diabetic presents with and partially auto avulsed left great toenail  Patient tried to trim nail too aggressively a few days back and the nail became detached  It is holding on through the lateral border  Patient states that is painful and he desires it removed  Pedal pulses are palpable  Recommended total avulsion  Procedure as follows  Anesthesia via 3 cc of a 1:1 mixture of 1 percent xylocaine with epinephrine and 1 percent xylocaine plain  Betadine prep performed  The entire toenail was removed  No evidence of infection  Bacitracin dressing applied  Patient to soak b i d  In warm water followed by Neosporin dressing  Reappoint only if procedure does not heal properly  Patient understands that nail should recur in 4-6 months  Nail removal    Date/Time: 1/20/2022 5:05 PM  Performed by: Marge Howell DPM  Authorized by: Marge Howell DPM     Patient location:  ClinicUniversal Protocol:  Consent: Verbal consent obtained  Risks and benefits: risks, benefits and alternatives were discussed  Consent given by: patient  Patient understanding: patient states understanding of the procedure being performed  Patient identity confirmed: verbally with patient      Location:     Foot:  L big toe  Pre-procedure details:     Skin preparation:  Betadine    Preparation: Patient was prepped and draped in the usual sterile fashion    Anesthesia (see MAR for exact dosages):      Anesthesia method:  Nerve block    Block needle gauge:  25 G    Block anesthetic:  Lidocaine 1% WITH epi and lidocaine 1% w/o epi    Block injection procedure:  Anatomic landmarks identified    Block outcome:  Anesthesia achieved  Nail Removal:     Nail removed:  Complete    Nail bed sutured: no    Ingrown nail:     Wedge excision of skin: no      Nail matrix removed or ablated:  None  Post-procedure details:     Dressing:  4x4 sterile gauze, antibiotic ointment and gauze roll    Patient tolerance of procedure:   Tolerated well, no immediate complications

## 2022-02-08 NOTE — TELEPHONE ENCOUNTER
Pt is requesting for his amoxicillin due to his EGD procedure on 02/11/21  Pt would like a call once medication is called in

## 2022-02-10 ENCOUNTER — TELEPHONE (OUTPATIENT)
Dept: GASTROENTEROLOGY | Facility: HOSPITAL | Age: 67
End: 2022-02-10

## 2022-02-11 ENCOUNTER — ANESTHESIA (OUTPATIENT)
Dept: GASTROENTEROLOGY | Facility: HOSPITAL | Age: 67
End: 2022-02-11

## 2022-02-11 ENCOUNTER — HOSPITAL ENCOUNTER (OUTPATIENT)
Dept: GASTROENTEROLOGY | Facility: HOSPITAL | Age: 67
Setting detail: OUTPATIENT SURGERY
Discharge: HOME | End: 2022-02-11
Attending: INTERNAL MEDICINE | Admitting: INTERNAL MEDICINE
Payer: MEDICARE

## 2022-02-11 ENCOUNTER — ANESTHESIA EVENT (OUTPATIENT)
Dept: GASTROENTEROLOGY | Facility: HOSPITAL | Age: 67
End: 2022-02-11

## 2022-02-11 VITALS
TEMPERATURE: 97.5 F | SYSTOLIC BLOOD PRESSURE: 148 MMHG | RESPIRATION RATE: 18 BRPM | OXYGEN SATURATION: 94 % | HEART RATE: 90 BPM | DIASTOLIC BLOOD PRESSURE: 83 MMHG

## 2022-02-11 DIAGNOSIS — K22.70 BARRETT'S ESOPHAGUS WITHOUT DYSPLASIA: ICD-10-CM

## 2022-02-11 PROCEDURE — 43270 EGD LESION ABLATION: CPT | Performed by: INTERNAL MEDICINE

## 2022-02-11 PROCEDURE — C1885 CATH, TRANSLUMIN ANGIO LASER: HCPCS

## 2022-02-11 RX ORDER — LIDOCAINE HYDROCHLORIDE 10 MG/ML
INJECTION, SOLUTION EPIDURAL; INFILTRATION; INTRACAUDAL; PERINEURAL AS NEEDED
Status: DISCONTINUED | OUTPATIENT
Start: 2022-02-11 | End: 2022-02-11

## 2022-02-11 RX ORDER — GLYCOPYRROLATE 0.2 MG/ML
INJECTION INTRAMUSCULAR; INTRAVENOUS AS NEEDED
Status: DISCONTINUED | OUTPATIENT
Start: 2022-02-11 | End: 2022-02-11

## 2022-02-11 RX ORDER — FENTANYL CITRATE 50 UG/ML
INJECTION, SOLUTION INTRAMUSCULAR; INTRAVENOUS AS NEEDED
Status: DISCONTINUED | OUTPATIENT
Start: 2022-02-11 | End: 2022-02-11

## 2022-02-11 RX ORDER — SUCCINYLCHOLINE/SOD CL,ISO/PF 100 MG/5ML
SYRINGE (ML) INTRAVENOUS AS NEEDED
Status: DISCONTINUED | OUTPATIENT
Start: 2022-02-11 | End: 2022-02-11

## 2022-02-11 RX ORDER — SODIUM CHLORIDE 9 MG/ML
INJECTION, SOLUTION INTRAVENOUS CONTINUOUS PRN
Status: DISCONTINUED | OUTPATIENT
Start: 2022-02-11 | End: 2022-02-11

## 2022-02-11 RX ORDER — PROPOFOL 10 MG/ML
INJECTION, EMULSION INTRAVENOUS AS NEEDED
Status: DISCONTINUED | OUTPATIENT
Start: 2022-02-11 | End: 2022-02-11

## 2022-02-11 RX ORDER — DEXAMETHASONE SODIUM PHOSPHATE 10 MG/ML
INJECTION, SOLUTION INTRAMUSCULAR; INTRAVENOUS AS NEEDED
Status: DISCONTINUED | OUTPATIENT
Start: 2022-02-11 | End: 2022-02-11

## 2022-02-11 RX ORDER — EPHEDRINE SULFATE 50 MG/ML
INJECTION INTRAVENOUS AS NEEDED
Status: DISCONTINUED | OUTPATIENT
Start: 2022-02-11 | End: 2022-02-11

## 2022-02-11 RX ORDER — ONDANSETRON 2 MG/ML
INJECTION INTRAMUSCULAR; INTRAVENOUS AS NEEDED
Status: DISCONTINUED | OUTPATIENT
Start: 2022-02-11 | End: 2022-02-11

## 2022-02-11 RX ADMIN — EPHEDRINE SULFATE 10 MG: 50 INJECTION INTRAVENOUS at 09:29

## 2022-02-11 RX ADMIN — EPHEDRINE SULFATE 10 MG: 50 INJECTION INTRAVENOUS at 09:31

## 2022-02-11 RX ADMIN — FENTANYL CITRATE 100 MCG: 50 INJECTION INTRAMUSCULAR; INTRAVENOUS at 09:13

## 2022-02-11 RX ADMIN — DEXAMETHASONE SODIUM PHOSPHATE 10 MG: 10 INJECTION, SOLUTION INTRAMUSCULAR; INTRAVENOUS at 09:25

## 2022-02-11 RX ADMIN — Medication 200 MG: at 09:13

## 2022-02-11 RX ADMIN — PROPOFOL 200 MG: 10 INJECTION, EMULSION INTRAVENOUS at 09:13

## 2022-02-11 RX ADMIN — LIDOCAINE HYDROCHLORIDE 50 MG: 10 INJECTION, SOLUTION EPIDURAL; INFILTRATION; INTRACAUDAL; PERINEURAL at 09:13

## 2022-02-11 RX ADMIN — EPHEDRINE SULFATE 10 MG: 50 INJECTION INTRAVENOUS at 09:08

## 2022-02-11 RX ADMIN — GLYCOPYRROLATE 0.4 MG: 0.2 INJECTION, SOLUTION INTRAMUSCULAR; INTRAVENOUS at 09:28

## 2022-02-11 RX ADMIN — SODIUM CHLORIDE: 0.9 INJECTION, SOLUTION INTRAVENOUS at 09:09

## 2022-02-11 RX ADMIN — ONDANSETRON 4 MG: 2 INJECTION INTRAMUSCULAR; INTRAVENOUS at 09:25

## 2022-02-11 NOTE — ANESTHESIA PREPROCEDURE EVALUATION
Procedure:  EGD    Relevant Problems   ANESTHESIA   (+) PONV (postoperative nausea and vomiting)      CARDIO   (+) Benign essential hypertension   (+) Exertional dyspnea      ENDO   (+) Controlled type 2 diabetes mellitus without complication (HCC)      GI/HEPATIC   (+) Dysphagia      NEURO/PSYCH   (+) Depression with anxiety      PULMONARY   (+) Asthma   (+) Exertional dyspnea   (+) Sleep apnea     Lab Results   Component Value Date     08/16/2015    SODIUM 136 11/18/2021    K 3 2 (L) 11/18/2021     11/18/2021    CO2 24 11/18/2021    ANIONGAP 7 08/16/2015    AGAP 7 11/18/2021    BUN 10 11/18/2021    CREATININE 0 94 11/18/2021    GLUC 163 (H) 11/18/2021    GLUF 122 (H) 09/20/2021    CALCIUM 7 8 (L) 11/18/2021    AST 19 11/17/2021    ALT 32 11/17/2021    ALKPHOS 76 11/17/2021    PROT 6 9 05/04/2015    TP 7 3 11/17/2021    BILITOT 0 60 05/04/2015    TBILI 0 71 11/17/2021    EGFR 84 11/18/2021     Lab Results   Component Value Date    WBC 12 82 (H) 11/18/2021    HGB 12 7 11/18/2021    HCT 36 6 11/18/2021    MCV 87 11/18/2021     11/18/2021 7/26/19 TTE  LEFT VENTRICLE: Size was normal  Systolic function was normal  Ejection fraction was estimated to be 65 %  There were no regional wall motion abnormalities  Wall thickness was normal  There was no evidence of concentric hypertrophy  DOPPLER: Doppler parameters were consistent with abnormal left ventricular relaxation (grade 1 diastolic dysfunction)      RIGHT VENTRICLE: The size was normal  Systolic function was normal      LEFT ATRIUM: The atrium was mildly dilated      RIGHT ATRIUM: Size was normal      MITRAL VALVE: Valve structure was normal  There was normal leaflet separation  DOPPLER: The transmitral velocity was within the normal range  There was no evidence for stenosis  There was trace regurgitation      AORTIC VALVE: The valve was trileaflet  Leaflets exhibited normal cuspal separation and sclerosis   DOPPLER: Transaortic velocity was within the normal range  There was no evidence for stenosis  There was no regurgitation      TRICUSPID VALVE: The valve structure was normal  There was normal leaflet separation  DOPPLER: The transtricuspid velocity was within the normal range  There was no evidence for stenosis  There was trace regurgitation  The tricuspid jet  envelope definition was inadequate for estimation of RV systolic pressure      PULMONIC VALVE: Leaflets exhibited normal thickness, no calcification, and normal cuspal separation  DOPPLER: The transpulmonic velocity was within the normal range  There was no evidence for stenosis  There was no regurgitation      PERICARDIUM: There was no pericardial effusion  The pericardium was normal in appearance      AORTA: The root exhibited normal size  The ascending aorta was normal in size for body surface area at 4 cm (16 mm/m2)      SYSTEMIC VEINS: IVC: The inferior vena cava was normal in size and course  Respirophasic changes were normal          Physical Exam    Airway    Mallampati score: II  TM Distance: >3 FB  Neck ROM: full     Dental   No notable dental hx     Cardiovascular      Pulmonary      Other Findings        Anesthesia Plan  ASA Score- 3     Anesthesia Type- general with ASA Monitors  Additional Monitors:   Airway Plan:           Plan Factors-Exercise tolerance (METS): <4 METS  Chart reviewed  EKG reviewed  Imaging results reviewed  Existing labs reviewed  Patient summary reviewed  Induction- intravenous and rapid sequence induction  Postoperative Plan-   Planned trial extubation    Informed Consent- Anesthetic plan and risks discussed with patient and spouse  I personally reviewed this patient with the CRNA  Discussed and agreed on the Anesthesia Plan with the CRNA  Taty Coronado

## 2022-02-11 NOTE — ANESTHESIA POSTPROCEDURE EVALUATION
Post-Op Assessment Note    CV Status:  Stable  Pain Score: 0    Pain management: adequate     Mental Status:  Alert   Hydration Status:  Euvolemic   PONV Controlled:  None   Airway Patency:  Patent      Post Op Vitals Reviewed: Yes      Staff: CRNA         No complications documented      BP   167/81   Temp  97 5F   Pulse  104   Resp   16   SpO2   94%

## 2022-02-11 NOTE — H&P
History and Physical -  [facility]  Landy Peoples 74 y o  male MRN: [id number]    HPI: Landy Peoples is a 74y o  year old male who presents for EGD with radiofrequency ablation of Molina's esophagus      Review of Systems    Historical Information   Past Medical History:   Diagnosis Date    Acute blood loss anemia 10/13/2016    Anxiety     Arthritis     knees    Arthritis     Asthma     stable for > 10 years    Molina esophagus     Cancer [facility]     prostate    Cataract     Depression     Diabetes [facility]     Diabetes mellitus ([facility]     pre diabetes    Diverticulitis of colon     Environmental allergies     Hiatal hernia     History of anal fissures     Hyperlipidemia     Hypertension     Incisional hernia     Insomnia     Kidney stone     Morbid obesity ([facility]     PONV (postoperative nausea and vomiting)     Prolapsing mitral valve     prolapsing mitral valve leaflet syndrome    [facility]     Retinal detachment     treated surgically at [facility]; resolved: 10/10/2012    Sleep apnea     diagnosed but unable to tolerate cpap       Stuttering      Past Surgical History:   Procedure Laterality Date    ABDOMINAL SURGERY      gastric lap band    ARTHROSCOPIC REPAIR ACL Right     BIOPSY CORE NEEDLE      prostate    CATARACT EXTRACTION Bilateral     COLONOSCOPY      EYE SURGERY      each eye had a detached retina and cataract removed, Right eye has a buckle    GASTRIC BYPASS      pt had LAP BAND SURGERY not gastric bypass    HERNIA REPAIR      naval    JOINT REPLACEMENT      B/L knee    KNEE ARTHROSCOPY Left     [facility] TOTAL KNEE ARTHROPLASTY Left 2/15/2017    Procedure: TOTAL KNEE ARTHROPLASTY ;  Surgeon: Eugenio Gustafson MD;  [facility];  Service: Orthopedics    VA TOTAL KNEE ARTHROPLASTY Right 10/10/2016    Procedure: ARTHROPLASTY KNEE TOTAL;  Surgeon: Eugenio Gustafson MD;  [facility];  Service: Orthopedics    PROSTATECTOMY robotic-assisted; [place] RETINAL DETACHMENT SURGERY Bilateral     [facility]    SEPTOPLASTY      SINUS SURGERY      TONSILLECTOMY      over age 13   [facility] P[facility] UPPER GASTROINTESTINAL ENDOSCOPY      VASECTOMY      vas deferens     Social History   Social History     Substance and Sexual Activity   Alcohol Use Not Currently    Alcohol/week: 1 0 standard drink    Types: 1 Cans of beer per week    Comment: rare ; being a social drinker (as per allscripts)     Social History     Substance and Sexual Activity   Drug Use Not Currently     Social History     Tobacco Use   Smoking Status Never Smoker   Smokeless Tobacco Never Used   Tobacco Comment    quit 45 years ago     Family History   Problem Relation Age of Onset    Heart disease Father     Coronary artery disease Father     Prostate cancer Father     Coronary artery disease Mother     Diabetes Mother         mellitus    Diabetes Maternal Grandmother         mellitus    Coronary artery disease Maternal Grandfather     Coronary artery disease Paternal Grandfather        Meds/Allergies     (Not in a hospital admission)      Allergies   Allergen Reactions    Celebrex [Celecoxib] Other (See Comments)     Cardiologist stated he should not take      Other      "steroid eyedrops"      Prednisone Other (See Comments)     Prednisone eye drops- eye pressure increases       Objective     There were no vitals taken for this visit        PHYSICAL EXAM    Gen: NAD  CV: RRR  CHEST: Clear  ABD: soft, NT/ND  EXT: no edema  Neuro: AAO      ASSESSMENT/PLAN:  This is a 74y o  year old male here for EGD with radiofrequency ablation of Molina's esophagus    PLAN:   Procedure:  EGD with radiofrequency ablation of Molina's esophagus

## 2022-02-17 ENCOUNTER — TELEPHONE (OUTPATIENT)
Dept: FAMILY MEDICINE CLINIC | Facility: CLINIC | Age: 67
End: 2022-02-17

## 2022-02-21 NOTE — TELEPHONE ENCOUNTER
I entered orders for blood and urine - he has an appointment in April with me  Did not order PSA as is not due until September  Please let him know  Thanks

## 2022-02-22 ENCOUNTER — OFFICE VISIT (OUTPATIENT)
Dept: BARIATRICS | Facility: CLINIC | Age: 67
End: 2022-02-22
Payer: MEDICARE

## 2022-02-22 VITALS
HEIGHT: 69 IN | SYSTOLIC BLOOD PRESSURE: 122 MMHG | HEART RATE: 70 BPM | DIASTOLIC BLOOD PRESSURE: 80 MMHG | WEIGHT: 296.5 LBS | BODY MASS INDEX: 43.92 KG/M2 | TEMPERATURE: 98 F

## 2022-02-22 DIAGNOSIS — E66.01 OBESITY, CLASS III, BMI 40-49.9 (MORBID OBESITY) (HCC): ICD-10-CM

## 2022-02-22 DIAGNOSIS — E11.9 CONTROLLED TYPE 2 DIABETES MELLITUS WITHOUT COMPLICATION, WITHOUT LONG-TERM CURRENT USE OF INSULIN (HCC): ICD-10-CM

## 2022-02-22 DIAGNOSIS — Z48.815 ENCOUNTER FOR SURGICAL AFTERCARE FOLLOWING SURGERY OF DIGESTIVE SYSTEM: Primary | ICD-10-CM

## 2022-02-22 DIAGNOSIS — K22.719 BARRETT'S ESOPHAGUS WITH DYSPLASIA: ICD-10-CM

## 2022-02-22 DIAGNOSIS — Z98.84 BARIATRIC SURGERY STATUS: ICD-10-CM

## 2022-02-22 DIAGNOSIS — R11.10 VOMITING: ICD-10-CM

## 2022-02-22 PROCEDURE — 99204 OFFICE O/P NEW MOD 45 MIN: CPT | Performed by: NURSE PRACTITIONER

## 2022-02-22 NOTE — PROGRESS NOTES
ADDENDUM:  Patient's UGI performed on 03/16/2022 -     "FINDINGS:  abdominal radiograph demonstrates unchanged satisfactory lap band      The esophagus is normal in caliber  Esophageal motility is normal and emptying of contrast from the esophagus is prompt  No mucosal lesion, ulceration or diverticulum  Mild fold thickening is seen       Gastroesophageal reflux was observed      The patient has a lap band which appears in satisfactory location, very similar to the previous exam with a small gastric pouch above the lap band  The stomach is otherwise incompletely but adequately distended without significant rugal fold thickening   allowing for the underdistention  No penetrating ulcers  There is prompt emptying into the duodenum through a normal-appearing pyloric canal   Duodenum demonstrates a normal mucosal pattern and normally located ligament of Treitz         IMPRESSION:  Unchanged lap band in satisfactory location  Normal esophageal and gastric emptying  Fold thickening distal esophagus suggesting mild esophagitis  Gastric folds within normal limits  No obvious ulceration  Spontaneous gastroesophageal reflux observed "      PLAN - Follow up with Dr Dalton Conner in office to review  Assessment/Plan:     Patient ID: Steven Key is a 74 y o  male  Bariatric Surgery Status/Vomiting    -s/p Gastric Band insertion with Dr Dalton Conner approximately 07/26/2010 with hx of Molina's esophagus, prostate cancer with prostatectomy, Type II DM, and umbilical hernia repair  Presents to the office today for OD annual  Reports concerns for chronic vomiting after meals ever since he had the gastric band inserted  Per patient, it was recommended by ophthalmology at [facility] and Gastroenterology to see bariatrics for possible adjustment or removal of gastric band  States he thought his vomiting is related to his habit of overeating  Was vomiting 8-10 times a month at that time   Noticed his vomiting improved after starting the ozempic, down to vomiting approximately 4-6 times per month  Vomiting undigested food  Denies associated nausea, diarrhea, or reflux  Drinks diet soda at the same time with eating  Currently eats small portion meals  Takes his PPI and MVI daily  Admits to having constipation since starting on ozempic to help with his diabetes  Of note, he also developed retinal detachment and follows with [facility]  Per patient, providers at [facility] recommended to follow up with bariatric team to discuss removal due to possible retinal detachment may have been an adverse effect from chronic vomiting  PLAN:     - UGI ordered  - Routine follow up in office after UGI results to speak to Dr Hannah Nolasco  Patient is agreeable to plan  - Continue with PPI daily  Follow up with GI as scheduled  - Continue with ozempic as this is helping with his weight lost and his Type II DM    - Continue with healthy lifestyle, adequate protein intake of 60 gm, fluid intake of at least 64 oz  - Continue with MVI daily  - Activity as tolerated  - Follow up with RD and SW as needed  Molina's esophagus with dysplasia -   Of note, patient is following closely with Dr Buck White with GI team for his Molina's esophagus without dysplasia  He is currently getting EGD [id number] for close monitoring  His last EGD was performed on 02/11/22 which shows: FINDINGS:  · Molina's esophagus observed where the Z-line is 31 cm from the incisors and top of gastric folds are 38 cm from the incisors; ablated residual tissue with radiofrequency  Patchy areas of Molina's esophagus in mid and distal esophagus with intervening normal squamous mucosa  No nodule present  Radiofrequency ablation was performed using through the scope ablation catheter  On initial around 82 ablations were performed  Then the scope was removed  The coagulum was removed using distal attachment cap    Through the scope ablation catheter was reintroduced and repeat ablation was performed  Total of 120 ablations performed  Photodocumentation was obtained  · Medium sliding hiatal hernia (type I hiatal hernia)  · The stomach and duodenum appeared normal      IMPRESSION:  Patchy areas of Metz's esophagus in mid and distal esophagus  Successful radiofrequency ablation using through the scope ablation catheter  Medium hiatal hernia  Normal stomach and duodenum     RECOMMENDATION:  Maximize acid suppression medication  Magic mouthwash as needed  Will provide further pain control if needed  Okay to use Advil/ibuprofen/Tylenol as needed for pain  Full liquid diet for 2 days  Soft diet for additional 5 days  Repeat EGD in 3 months  PLAN:   Follow up with GI as scheduled  PPI PO BID        Type II DM - Last A1C- 6 0% from 7 7% on 07/29/21  On ozempic injections  Denies hypo/hyperglycemia episodes  Continue ozempic injections as directed  Advise to follow up with endocrinology/PCP as needed/scheduled  · Continued/Maintain healthy weight loss with good nutrition intakes  · Adequate hydration with at least 64oz  fluid intake  · Follow diet as discussed  · Follow vitamin and mineral recommendations as reviewed with you  · Exercise as tolerated  · Colonoscopy referral made: UTD   · Had EGDs due to metz's esophagus - last one on 02/11/2022  Gets an EGD every 3 months  · Follow-up in PRN after UGI    We kindly ask that your arrive 15 minutes before your scheduled appointment time with your provider to allow our staff to room you, get your vital signs and update your chart  · Get lab work done  Please call the office if you need a script  It is recommended to check with your insurance BEFORE getting labs done to make sure they are covered by your policy  · Call our office if you have any problems with abdominal pain especially associated with fever, chills, nausea, vomiting or any other concerns      · All  Post-bariatric surgery patients should be aware that very small quantities of any alcohol can cause impairment and it is very possible not to feel the effect  The effect can be in the system for several hours  It is also a stomach irritant  · It is advised to AVOID alcohol, Nonsteroidal antiinflammatory drugs (NSAIDS) and nicotine of all forms   Any of these can cause stomach irritation/pain  · Discussed the effects of alcohol on a bariatric patient and the increased impairment risk  · Keep up the good work! Diagnoses and all orders for this visit:    Encounter for surgical aftercare following surgery of digestive system  -     FL UPPER GI UGI; Future    Bariatric surgery status  -     FL UPPER GI UGI; Future    Molina's esophagus with dysplasia  -     FL UPPER GI UGI; Future    Controlled type 2 diabetes mellitus without complication, without long-term current use of insulin (HCC)  -     FL UPPER GI UGI; Future    Obesity, Class III, BMI 40-49 9 (morbid obesity) (HCC)  -     FL UPPER GI UGI; Future    Vomiting  -     FL UPPER GI UGI; Future         Subjective:      Patient ID: Cornelius Alanis is a 74 y o  male  -s/p Gastric Band insertion with Dr Hough Asa approximately 07/26/2010 with hx of Molina's esophagus, prostate cancer with prostatectomy, Type II DM, and umbilical hernia repair  Presents to the office today for OD annual  Reports concerns for chronic vomiting after meals ever since he had the gastric band inserted  Per patient, it was recommended by ophthalmology at [facility] and Gastroenterology to see bariatrics for possible adjustment or removal of gastric band  Initial: 298  LBS  Current: 296 LBS   EWL: (Weight loss is ahead of schedule at this post surgical period )  [place]: 256 lbs  Current BMI is Body mass index is 43 79 kg/m²  Initial weight 298 lbs since gastric band insertion and was able to achieve jaiden 256 lbs   Then had prostate cancer and had weight regain since stopping dietary modifications secondary to stress of diagnosis and was highest 338 lbs  Patient was started on ozempic approximately 6 months ago and has been losing weight  · Tolerating a regular diet-yes  · Eating at least 60 grams of protein per day-yes  · Following 30/60 minute rule with liquids-yes  · Drinking at least 64 ounces of fluid per day-no  · Drinking carbonated beverages-yes - diet soda daily  · Sufficient exercise-no  · Using NSAIDs regularly-no  · Using nicotine-no  · Using alcohol-RARELY   · Supplements: Multivitamins    · EWL is 10%, which places the patient ahead of schedule for expected post surgical weight loss at this time  The following portions of the patient's history were reviewed and updated as appropriate: allergies, current medications, past family history, past medical history, past social history, past surgical history and problem list     Review of Systems   Constitutional: Positive for fatigue (increase activity)  Respiratory: Positive for shortness of breath (at baseline - saw pulmonology but no etiology  )  Gastrointestinal: Positive for constipation (SINCE STARTING OZEMPIC - TAKES STOOL SOFTENER AS NEEDED) and vomiting  Musculoskeletal: Positive for arthralgias (at baseline ) and back pain (at baseline)  Neurological: Positive for dizziness (encourage fluids) and tremors (Advised to see PCP  )  Psychiatric/Behavioral: Negative  Objective:    /80 (BP Location: Left arm, Patient Position: Sitting, Cuff Size: Standard)   Pulse 70   Temp 98 °F (36 7 °C) (Tympanic)   Ht 5' 9" (1 753 m)   Wt 134 kg (296 lb 8 oz)   BMI 43 79 kg/m²      Physical Exam  Vitals and nursing note reviewed  Constitutional:       Appearance: Normal appearance  He is obese  Cardiovascular:      Rate and Rhythm: Normal rate and regular rhythm  Pulses: Normal pulses  Heart sounds: Normal heart sounds     Pulmonary:      Effort: Pulmonary effort is normal       Breath sounds: Normal breath sounds  Comments: Decreased breath sounds  Abdominal:      General: Bowel sounds are normal       Palpations: Abdomen is soft  Tenderness: There is no abdominal tenderness  Hernia: A hernia (abdominal hernia above umbilicus) is present  Comments: Gastric band port palpable midepigastric region   Musculoskeletal:         General: Normal range of motion  Skin:     General: Skin is warm and dry  Neurological:      General: No focal deficit present  Mental Status: He is alert and oriented to person, place, and time  Psychiatric:         Mood and Affect: Mood normal          Behavior: Behavior normal          Thought Content: Thought content normal          Judgment: Judgment normal            I have spent 50 minutes to review patient's medical history, labs, dietary education, possible interventions and risks involve with patient during this visit

## 2022-02-24 ENCOUNTER — TELEPHONE (OUTPATIENT)
Dept: NEUROLOGY | Facility: CLINIC | Age: 67
End: 2022-02-24

## 2022-02-24 ENCOUNTER — OFFICE VISIT (OUTPATIENT)
Dept: FAMILY MEDICINE CLINIC | Facility: CLINIC | Age: 67
End: 2022-02-24
Payer: MEDICARE

## 2022-02-24 VITALS
SYSTOLIC BLOOD PRESSURE: 132 MMHG | HEART RATE: 68 BPM | RESPIRATION RATE: 16 BRPM | WEIGHT: 297 LBS | BODY MASS INDEX: 43.99 KG/M2 | TEMPERATURE: 98.2 F | DIASTOLIC BLOOD PRESSURE: 80 MMHG | HEIGHT: 69 IN

## 2022-02-24 DIAGNOSIS — Z82.0 FAMILY HISTORY OF PARKINSON DISEASE: ICD-10-CM

## 2022-02-24 DIAGNOSIS — R41.3 MEMORY CHANGE: ICD-10-CM

## 2022-02-24 DIAGNOSIS — R25.1 TREMOR: ICD-10-CM

## 2022-02-24 DIAGNOSIS — R26.9 GAIT DISORDER: Primary | ICD-10-CM

## 2022-02-24 DIAGNOSIS — E55.9 VITAMIN D DEFICIENCY: ICD-10-CM

## 2022-02-24 PROCEDURE — 99214 OFFICE O/P EST MOD 30 MIN: CPT | Performed by: NURSE PRACTITIONER

## 2022-02-24 NOTE — PROGRESS NOTES
Assessment/Plan:    Pt presents with various concerns today including gait instability, short term memory changes, tremor  He does have a family h/o Parkinson's  Symptoms started approximately 16 months ago, becoming more noticeable over the past few weeks to months  He does have an intentional tremor in the right hand today, no resting tremor or pill rolling  Gait does appear somewhat unstable with some mild shuffling noticeable with a few steps (but not all)  His Mini-cog score today was 4/5  He notes being more forgetful and having trouble finding the correct word when speaking  He does have routine lab work ordered by Dr Citlali Rogel, PCP and I added on a Vitamin B12 and Vitamin D to these  Referred to Neurology for further evaluation  Depending on timing of Neurology evaluation, consider MRI of the brain      Diagnoses and all orders for this visit:    Gait disorder  -     Ambulatory Referral to Neurology; Future  -     Vitamin D 25 hydroxy; Future    Memory change  -     Ambulatory Referral to Neurology; Future  -     Vitamin B12; Future  -     Vitamin D 25 hydroxy; Future    Tremor  -     Ambulatory Referral to Neurology; Future  -     Vitamin D 25 hydroxy; Future    Family history of Parkinson disease  -     Ambulatory Referral to Neurology; Future    Vitamin D deficiency  -     Vitamin D 25 hydroxy; Future          Subjective:      Patient ID: Peng Mccray is a 74 y o  male  HPI     Pt presents by himself today for an acute visit   C/o feeling "off balance" in regards to his gait and reports having trouble with his short term memory  He feels these issues started approximately 16 months ago and he has been ignoring them to some degree  He notes a family h/o parkinson's (maternal uncle and paternal uncle)  Emil Holm had a significant fall down several stairs 12/2020 and he feels he never fully returned to his baseline    He notes his right hand shakes at times- gives the example of when using a mouse with his computer or holding a glass  Has some left hand shaking as well but not as noticeable compared to his right hand  He feels his gait is "sloppy and slow", possibly shuffling  Short term memory not as great, he has started to write down what he has to do for the following day in order to remember everything  He also gives the example of his wife telling him somewhat earlier in the day and he can't recall the details later that afternoon  Having trouble find the correct words -- couldn't think of the word "chair" when speaking to his wife the other day  Domitila Kiser also reports that his wife tells him he has been "mumbling" more, but not always     He denies weakness, numbness/tinlging, tinnitus, visual changes, dizziness, muffled sounds  Domitila Kiser does have a stutter and takes Prozac and Zyprexa daily for this    He does take Ambien prn for insomnia and Xanax prn for anxiety     Of note, pt is following with Bariatrics, s/p gastric band insertion with Dr Jenae Miranda July of 2010  Pt has been having chronic vomiting after meals since this band was inserted  UGI ordered via Bariatrics     He dose have routine lab work ordered for PCP, Dr Peng Benítez     Pt notes he has significant claustrophobia and requires IV sedation for any type of head imaging  CT of the head from 12/30/2020 (performed in the ED after his fall) with no evidence of acute intracranial abnormality     The following portions of the patient's history were reviewed and updated as appropriate: allergies, current medications, past family history, past medical history, past social history, past surgical history and problem list     Review of Systems   Constitutional: Negative for activity change, appetite change, chills, diaphoresis, fatigue, fever and unexpected weight change  HENT: Negative for ear pain and sore throat  Eyes: Negative for pain and visual disturbance  Respiratory: Negative for cough, shortness of breath and wheezing  Cardiovascular: Negative for chest pain, palpitations and leg swelling  Gastrointestinal: Positive for nausea (following with GI) and vomiting  Negative for abdominal pain and constipation  Genitourinary: Negative for dysuria and hematuria  Musculoskeletal: Positive for arthralgias and gait problem  Negative for back pain  Skin: Negative for color change and rash  Neurological: Positive for tremors  Negative for dizziness, seizures, syncope, facial asymmetry, weakness, light-headedness, numbness and headaches  Hematological: Negative for adenopathy  Does not bruise/bleed easily  Psychiatric/Behavioral: Positive for sleep disturbance  Negative for dysphoric mood, self-injury and suicidal ideas  The patient is nervous/anxious  All other systems reviewed and are negative  Objective:      /80   Pulse 68   Temp 98 2 °F (36 8 °C) (Tympanic)   Resp 16   Ht 5' 9" (1 753 m)   Wt 135 kg (297 lb)   BMI 43 86 kg/m²          Physical Exam  Constitutional:       General: He is not in acute distress  Appearance: He is well-developed  He is obese  He is not ill-appearing, toxic-appearing or diaphoretic  HENT:      Head: Normocephalic and atraumatic  Eyes:      General: No visual field deficit  Extraocular Movements: Extraocular movements intact  Conjunctiva/sclera: Conjunctivae normal       Pupils: Pupils are equal, round, and reactive to light  Neck:      Thyroid: No thyromegaly  Vascular: No carotid bruit  Cardiovascular:      Rate and Rhythm: Normal rate and regular rhythm  Heart sounds: Normal heart sounds  No murmur heard  Pulmonary:      Effort: Pulmonary effort is normal  No respiratory distress  Breath sounds: Normal breath sounds  No wheezing  Abdominal:      General: Bowel sounds are normal  There is no distension  Palpations: Abdomen is soft  Tenderness: There is no abdominal tenderness     Musculoskeletal:         General: Normal range of motion  Cervical back: Normal range of motion and neck supple  No rigidity or tenderness  Lymphadenopathy:      Cervical: No cervical adenopathy  Skin:     General: Skin is warm and dry  Neurological:      General: No focal deficit present  Mental Status: He is alert and oriented to person, place, and time  Cranial Nerves: No cranial nerve deficit, dysarthria or facial asymmetry  Sensory: Sensation is intact  Motor: Tremor (intentional tremor right hand, no tremor at rest, no pill rolling) present  Gait: Gait abnormal (mildly unstable gait with very minimal shuffling with a few steps )  Psychiatric:         Mood and Affect: Mood normal          Behavior: Behavior normal          Thought Content:  Thought content normal          Judgment: Judgment normal

## 2022-02-24 NOTE — PROGRESS NOTES
Assessment/Plan:    No problem-specific Assessment & Plan notes found for this encounter  {[facility]:37169}      Subjective:      Patient ID: Yolande Cavazos is a 74 y o  male      HPI      Family h/o parkinson's maternal uncle and paternal uncle   Big fall down 16 stairs several months ago-- he feels he never fully returned to his baseline   He feels his right hand shakes at times- gives the example of when using a mouse with his computer or holding a glass   Feels his gait is "sloppy and slow"   Short term memory not as great, he has started to write down what he has to do for the following day   Having trouble find the correct words -- couldn't think of the word "chair" when speaking to his wife the other day   His wife feels he "mumbles" more, but not always       {Common ambulatory [employer]:39984}    Review of Systems      Objective:      /80   Pulse 68   Temp 98 2 °F (36 8 °C) (Tympanic)   Resp 16   Ht 5' 9" (1 753 m)   Wt 135 kg (297 lb)   BMI 43 86 kg/m²          Physical Exam

## 2022-02-24 NOTE — TELEPHONE ENCOUNTER
Patient calling to schedule new patient appointment for memory issues and a family history of Parkinson's  No testing done  Triage intake sent

## 2022-03-01 ENCOUNTER — OFFICE VISIT (OUTPATIENT)
Dept: GASTROENTEROLOGY | Facility: CLINIC | Age: 67
End: 2022-03-01
Payer: MEDICARE

## 2022-03-01 VITALS
DIASTOLIC BLOOD PRESSURE: 70 MMHG | WEIGHT: 299 LBS | BODY MASS INDEX: 44.28 KG/M2 | TEMPERATURE: 98.4 F | RESPIRATION RATE: 18 BRPM | HEART RATE: 71 BPM | SYSTOLIC BLOOD PRESSURE: 128 MMHG | OXYGEN SATURATION: 97 % | HEIGHT: 69 IN

## 2022-03-01 DIAGNOSIS — K22.719 BARRETT'S ESOPHAGUS WITH DYSPLASIA: Primary | ICD-10-CM

## 2022-03-01 DIAGNOSIS — K21.9 GASTROESOPHAGEAL REFLUX DISEASE WITHOUT ESOPHAGITIS: ICD-10-CM

## 2022-03-01 PROCEDURE — 99213 OFFICE O/P EST LOW 20 MIN: CPT | Performed by: INTERNAL MEDICINE

## 2022-03-01 NOTE — PATIENT INSTRUCTIONS
Scheduled date of EGD/RFA(as of today):05 11 22  Physician performing EGD:DR MORRISON  [facility] of [facility]  Instructions reviewed with patient by:JOEY IN THE OFFICE  Clearances: N/A  ok'd by yamilet in gi lab and dr Caprice Cole 4

## 2022-03-01 NOTE — PROGRESS NOTES
[facility] - Outpatient Follow-up Note  Feli Soliman 74 y o  male MRN: [id number]  Encounter: [id number]          ASSESSMENT AND PLAN:      1  Molina's esophagus with dysplasia  -[id number] Molina's esophagus without dysplasia  Status post EGD with radiofrequency ablation x3  He had good response but Molina's is not completely eradicated yet  Repeat EGD with radiofrequency ablation in 3 months  Continue pantoprazole 40 mg twice a day  Continue reflux precautions  - EGD Barrx/RFA; Future    2  Gastroesophageal reflux disease without esophagitis  We reviewed reflux precautions  Continue pantoprazole 40 mg daily    3  BMI 44 15 - status post gastric band insertion in 2010  Upper GI study ordered by Bariatric surgery team   Follow-up with Dr Hannah Nolasco     ______________________________________________________________________    SUBJECTIVE:  40-year-old male with type 2 diabetes, BMI 44 status post lap band in 2010 here for follow-up visit for Molina's esophagus and reflux  He has long segment Molina's esophagus status post radiofrequency ablation x3  He has good response but not completely eradicated  His reflux are well controlled with pantoprazole 40 mg daily  REVIEW OF SYSTEMS IS OTHERWISE NEGATIVE        Historical Information   Past Medical History:   Diagnosis Date    Acute blood loss anemia 10/13/2016    Anxiety     Arthritis     knees    Arthritis     Asthma     stable for > 10 years    Molina esophagus     Cancer [facility] [address] [facility]     prostate    Cataract     Depression     [facility]     [facility] mellitus [facility]     pre diabetes    Diverticulitis of colon     Environmental allergies     Hiatal hernia     History of anal fissures     Hyperlipidemia     Hypertension     Incisional hernia     Insomnia     Kidney stone     Morbid obesity (HCC)     PONV (postoperative nausea and vomiting)     Prolapsing mitral valve     prolapsing mitral valve leaflet syndrome  Prostate cancer ([facility] [address] [facility]     Retinal detachment     [facility]; resolved: 10/10/2012    Sleep apnea     diagnosed but unable to tolerate cpap       Stuttering      Past Surgical History:   Procedure Laterality Date    ABDOMINAL SURGERY      gastric lap band    ARTHROSCOPIC REPAIR ACL Right     BIOPSY CORE NEEDLE      prostate    CATARACT EXTRACTION Bilateral     COLONOSCOPY      EYE SURGERY      each eye had a detached retina and cataract removed, Right eye has a buckle    GASTRIC BYPASS      pt had LAP BAND SURGERY not gastric bypass    HERNIA REPAIR      naval    JOINT REPLACEMENT      B/L knee    KNEE ARTHROSCOPY Left     CO TOTAL KNEE ARTHROPLASTY Left 2/15/2017    Procedure: TOTAL KNEE ARTHROPLASTY ;  Surgeon: Jim Bhandari MD;  Location: [facility];  Service: Orthopedics    CO TOTAL KNEE ARTHROPLASTY Right 10/10/2016    Procedure: ARTHROPLASTY KNEE TOTAL;  Surgeon: Jim Bhandari MD;  [facility]: [facility];  Service: Orthopedics    PROSTATECTOMY      robotic-assisted; [facility]Dr Garrett Phillips RETINAL DETACHMENT SURGERY Bilateral     [facility]    SEPTOPLASTY      SINUS SURGERY      TONSILLECTOMY      over age 13   [facility] UPPER GASTROINTESTINAL ENDOSCOPY      VASECTOMY      vas deferens     Social History   Social History     Substance and Sexual Activity   Alcohol Use Not Currently    Alcohol/week: 1 0 standard drink    Types: 1 Cans of beer per week    Comment: rare ; being a social drinker (as per allscripts)     Social History     Substance and Sexual Activity   Drug Use Not Currently     Social History     Tobacco Use   Smoking Status Never Smoker   Smokeless Tobacco Never Used   Tobacco Comment    quit 38 years ago     Family History   Problem Relation Age of Onset    Heart disease Father     Coronary artery disease Father     Prostate cancer Father     Coronary artery disease Mother     Diabetes Mother         mellitus    Diabetes Maternal Grandmother mellitus    Coronary artery disease Maternal Grandfather     Coronary artery disease Paternal Grandfather        Meds/Allergies       Current Outpatient Medications:     ALPRAZolam (XANAX) 0 25 mg tablet    Blood Glucose Monitoring Suppl (OneTouch Verio) w/Device KIT    ezetimibe (ZETIA) 10 mg tablet    FLUoxetine (PROzac) 20 mg capsule    glucose blood test strip    Lancet Devices (ONE TOUCH DELICA LANCING DEV) MISC    losartan (COZAAR) 100 MG tablet    metFORMIN (GLUCOPHAGE) 500 mg tablet    metoprolol succinate (TOPROL-XL) 25 mg 24 hr tablet    Multiple Vitamins-Minerals (CENTRUM ADULTS PO)    nystatin (MYCOSTATIN) cream    nystatin (MYCOSTATIN) powder    OneTouch Delica Lancets 35U MISC    Ozempic, 1 MG/DOSE, 4 MG/3ML SOPN injection pen    pantoprazole (PROTONIX) 40 mg tablet    rosuvastatin (CRESTOR) 5 mg tablet    Semaglutide, 1 MG/DOSE, 2 MG/1 5ML SOPN    Semaglutide,0 25 or 0 5MG/DOS, (Ozempic, 0 25 or 0 5 MG/DOSE,) 2 MG/1 5ML SOPN    sucralfate (CARAFATE) 1 g tablet    zolpidem (AMBIEN) 10 mg tablet    al mag oxide-diphenhydramine-lidocaine viscous (MAGIC MOUTHWASH) 1:1:1 suspension    hydrochlorothiazide (HYDRODIURIL) 12 5 mg tablet    OLANZapine (ZyPREXA) 5 mg tablet    Allergies   Allergen Reactions    Celebrex [Celecoxib] Other (See Comments)     Cardiologist stated he should not take      Other      "steroid eyedrops"      Prednisone Other (See Comments)     Prednisone eye drops- eye pressure increases           Objective     Blood pressure 128/70, pulse 71, temperature 98 4 °F (36 9 °C), temperature source Tympanic, resp  rate 18, height 5' 9" (1 753 m), weight 136 kg (299 lb), SpO2 97 %  Body mass index is 44 15 kg/m²        PHYSICAL EXAM:      General Appearance:   Alert, cooperative, no distress   HEENT:   Normocephalic, atraumatic, anicteric      Neck:  Supple, symmetrical, trachea midline   Lungs:   Clear to auscultation bilaterally; no rales, rhonchi or wheezing; respirations unlabored    Heart[de-identified]   Regular rate and rhythm; no murmur, rub, or gallop  Abdomen:   Soft, non-tender, non-distended; normal bowel sounds; no masses, no organomegaly    Genitalia:   Deferred    Rectal:   Deferred    Extremities:  No cyanosis, clubbing or edema    Pulses:  2+ and symmetric    Skin:  No jaundice, rashes, or lesions    Lymph nodes:  No palpable cervical lymphadenopathy        Lab Results:   No visits with results within 1 Day(s) from this visit  Latest known visit with results is:   Telephone on 11/23/2021   Component Date Value    Right Eye Diabetic Retin* 10/14/2020 None     Left Eye Diabetic Retino* 10/14/2020 None          Radiology Results:   EGD Barrx/RFA    Result Date: 2/11/2022  Narrative: [address] [place] [address] [phone] DATE OF SERVICE: 2/11/22 PHYSICIAN(S): Leora Bartlett MD Proceduralist Luca Britt DO Fellow INDICATION: Molina's esophagus without dysplasia POST-OP DIAGNOSIS: See the impression below  PREPROCEDURE: Informed consent was obtained for the procedure, including sedation  Risks of perforation, hemorrhage, adverse drug reaction and aspiration were discussed  The patient was placed in the left lateral decubitus position  Patient was explained about the risks and benefits of the procedure  Risks including but not limited to bleeding, infection, and perforation were explained in detail  Also explained about less than 100% sensitivity with the exam and other alternatives  DETAILS OF PROCEDURE: Patient was taken to the procedure room where a time out was performed to confirm correct patient and correct procedure  The patient underwent monitored anesthesia care, which was administered by an anesthesia professional  The patient's blood pressure, heart rate, level of consciousness, respirations and oxygen were monitored throughout the procedure  The scope was advanced to the second part of the duodenum  Retroflexion was performed in the fundus  The patient experienced no blood loss  The procedure was not difficult  The patient tolerated the procedure well  There were no apparent complications  ANESTHESIA INFORMATION: ASA: III Anesthesia Type: General MEDICATIONS: No administrations occurring from 0908 to 0951 on 02/11/22 FINDINGS: Molina's esophagus observed where the Z-line is 31 cm from the incisors and top of gastric folds are 38 cm from the incisors; ablated residual tissue with radiofrequency  Patchy areas of Molina's esophagus in mid and distal esophagus with intervening normal squamous mucosa  No nodule present  Radiofrequency ablation was performed using through the scope ablation catheter  On initial around 82 ablations were performed  Then the scope was removed  The coagulum was removed using distal attachment cap  Through the scope ablation catheter was reintroduced and repeat ablation was performed  Total of 120 ablations performed  Photodocumentation was obtained  Medium sliding hiatal hernia (type I hiatal hernia) The stomach and duodenum appeared normal  SPECIMENS: * No specimens in log *     Impression: Patchy areas of Molina's esophagus in mid and distal esophagus  Successful radiofrequency ablation using through the scope ablation catheter  Medium hiatal hernia  Normal stomach and duodenum RECOMMENDATION: Maximize acid suppression medication  Magic mouthwash as needed Will provide further pain control if needed  Okay to use Advil/ibuprofen/Tylenol as needed for pain Full liquid diet for 2 days  Soft diet for additional 5 days  Repeat EGD in 3 months  ATTENDING ATTESTATION:  I was present throughout the entire procedure from insertion to complete withdrawal of the scope  I performed all interventions myself or oversaw the fellow     Elliott Loaiza MD

## 2022-03-06 ENCOUNTER — APPOINTMENT (OUTPATIENT)
Dept: LAB | Age: 67
End: 2022-03-06
Payer: MEDICARE

## 2022-03-06 DIAGNOSIS — I10 BENIGN ESSENTIAL HYPERTENSION: ICD-10-CM

## 2022-03-06 DIAGNOSIS — R26.9 GAIT DISORDER: ICD-10-CM

## 2022-03-06 DIAGNOSIS — E55.9 VITAMIN D DEFICIENCY: ICD-10-CM

## 2022-03-06 DIAGNOSIS — K22.719 BARRETT'S ESOPHAGUS WITH DYSPLASIA: ICD-10-CM

## 2022-03-06 DIAGNOSIS — E11.9 CONTROLLED TYPE 2 DIABETES MELLITUS WITHOUT COMPLICATION, WITHOUT LONG-TERM CURRENT USE OF INSULIN (HCC): ICD-10-CM

## 2022-03-06 DIAGNOSIS — R41.3 MEMORY CHANGE: ICD-10-CM

## 2022-03-06 DIAGNOSIS — R25.1 TREMOR: ICD-10-CM

## 2022-03-06 DIAGNOSIS — C61 MALIGNANT NEOPLASM OF PROSTATE (HCC): ICD-10-CM

## 2022-03-06 LAB
25(OH)D3 SERPL-MCNC: 36.7 NG/ML (ref 30–100)
ALBUMIN SERPL BCP-MCNC: 3.7 G/DL (ref 3.5–5)
ALP SERPL-CCNC: 67 U/L (ref 46–116)
ALT SERPL W P-5'-P-CCNC: 29 U/L (ref 12–78)
ANION GAP SERPL CALCULATED.3IONS-SCNC: 4 MMOL/L (ref 4–13)
AST SERPL W P-5'-P-CCNC: 13 U/L (ref 5–45)
BILIRUB SERPL-MCNC: 0.55 MG/DL (ref 0.2–1)
BUN SERPL-MCNC: 11 MG/DL (ref 5–25)
CALCIUM SERPL-MCNC: 10 MG/DL (ref 8.3–10.1)
CHLORIDE SERPL-SCNC: 105 MMOL/L (ref 100–108)
CHOLEST SERPL-MCNC: 146 MG/DL
CO2 SERPL-SCNC: 27 MMOL/L (ref 21–32)
CREAT SERPL-MCNC: 1.01 MG/DL (ref 0.6–1.3)
ERYTHROCYTE [DISTWIDTH] IN BLOOD BY AUTOMATED COUNT: 13.2 % (ref 11.6–15.1)
EST. AVERAGE GLUCOSE BLD GHB EST-MCNC: 131 MG/DL
GFR SERPL CREATININE-BSD FRML MDRD: 76 ML/MIN/1.73SQ M
GLUCOSE P FAST SERPL-MCNC: 148 MG/DL (ref 65–99)
HBA1C MFR BLD: 6.2 %
HCT VFR BLD AUTO: 44.6 % (ref 36.5–49.3)
HDLC SERPL-MCNC: 56 MG/DL
HGB BLD-MCNC: 15 G/DL (ref 12–17)
LDLC SERPL CALC-MCNC: 71 MG/DL (ref 0–100)
MCH RBC QN AUTO: 29.6 PG (ref 26.8–34.3)
MCHC RBC AUTO-ENTMCNC: 33.6 G/DL (ref 31.4–37.4)
MCV RBC AUTO: 88 FL (ref 82–98)
NONHDLC SERPL-MCNC: 90 MG/DL
PLATELET # BLD AUTO: 310 THOUSANDS/UL (ref 149–390)
PMV BLD AUTO: 8.7 FL (ref 8.9–12.7)
POTASSIUM SERPL-SCNC: 3.8 MMOL/L (ref 3.5–5.3)
PROT SERPL-MCNC: 7.2 G/DL (ref 6.4–8.2)
PSA SERPL-MCNC: <0.1 NG/ML (ref 0–4)
RBC # BLD AUTO: 5.06 MILLION/UL (ref 3.88–5.62)
SODIUM SERPL-SCNC: 136 MMOL/L (ref 136–145)
TRIGL SERPL-MCNC: 95 MG/DL
TSH SERPL DL<=0.05 MIU/L-ACNC: 1.64 UIU/ML (ref 0.36–3.74)
VIT B12 SERPL-MCNC: 328 PG/ML (ref 100–900)
WBC # BLD AUTO: 7.9 THOUSAND/UL (ref 4.31–10.16)

## 2022-03-06 PROCEDURE — 80053 COMPREHEN METABOLIC PANEL: CPT

## 2022-03-06 PROCEDURE — 82306 VITAMIN D 25 HYDROXY: CPT

## 2022-03-06 PROCEDURE — 84443 ASSAY THYROID STIM HORMONE: CPT

## 2022-03-06 PROCEDURE — 82607 VITAMIN B-12: CPT

## 2022-03-06 PROCEDURE — 85027 COMPLETE CBC AUTOMATED: CPT

## 2022-03-06 PROCEDURE — 84153 ASSAY OF PSA TOTAL: CPT

## 2022-03-06 PROCEDURE — 80061 LIPID PANEL: CPT

## 2022-03-06 PROCEDURE — 36415 COLL VENOUS BLD VENIPUNCTURE: CPT

## 2022-03-06 PROCEDURE — 83036 HEMOGLOBIN GLYCOSYLATED A1C: CPT

## 2022-03-16 ENCOUNTER — HOSPITAL ENCOUNTER (OUTPATIENT)
Dept: RADIOLOGY | Facility: HOSPITAL | Age: 67
Discharge: HOME | End: 2022-03-16
Payer: MEDICARE

## 2022-03-16 DIAGNOSIS — E66.01 OBESITY, CLASS III, BMI 40-49.9 (MORBID OBESITY) (HCC): ICD-10-CM

## 2022-03-16 DIAGNOSIS — K22.719 BARRETT'S ESOPHAGUS WITH DYSPLASIA: ICD-10-CM

## 2022-03-16 DIAGNOSIS — Z48.815 ENCOUNTER FOR SURGICAL AFTERCARE FOLLOWING SURGERY OF DIGESTIVE SYSTEM: ICD-10-CM

## 2022-03-16 DIAGNOSIS — R11.10 VOMITING: ICD-10-CM

## 2022-03-16 DIAGNOSIS — E11.9 CONTROLLED TYPE 2 DIABETES MELLITUS WITHOUT COMPLICATION, WITHOUT LONG-TERM CURRENT USE OF INSULIN (HCC): ICD-10-CM

## 2022-03-16 DIAGNOSIS — Z98.84 BARIATRIC SURGERY STATUS: ICD-10-CM

## 2022-03-16 PROCEDURE — 74240 X-RAY XM UPR GI TRC 1CNTRST: CPT

## 2022-03-18 ENCOUNTER — CONSULT (OUTPATIENT)
Dept: NEUROLOGY | Facility: CLINIC | Age: 67
End: 2022-03-18
Payer: MEDICARE

## 2022-03-18 VITALS
HEIGHT: 69 IN | TEMPERATURE: 97.6 F | SYSTOLIC BLOOD PRESSURE: 124 MMHG | WEIGHT: 298 LBS | BODY MASS INDEX: 44.14 KG/M2 | HEART RATE: 71 BPM | DIASTOLIC BLOOD PRESSURE: 78 MMHG

## 2022-03-18 DIAGNOSIS — Z82.0 FAMILY HISTORY OF PARKINSON DISEASE: ICD-10-CM

## 2022-03-18 DIAGNOSIS — G60.9 HEREDITARY AND IDIOPATHIC NEUROPATHY, UNSPECIFIED: ICD-10-CM

## 2022-03-18 DIAGNOSIS — G62.9 NEUROPATHY: Primary | ICD-10-CM

## 2022-03-18 DIAGNOSIS — R41.3 MEMORY CHANGE: ICD-10-CM

## 2022-03-18 DIAGNOSIS — R26.9 GAIT DISORDER: ICD-10-CM

## 2022-03-18 DIAGNOSIS — R25.1 TREMOR: ICD-10-CM

## 2022-03-18 PROCEDURE — 99204 OFFICE O/P NEW MOD 45 MIN: CPT | Performed by: PSYCHIATRY & NEUROLOGY

## 2022-03-18 NOTE — ASSESSMENT & PLAN NOTE
Pt here today for initial neuro eval  Pt with several neuro issues which he was very helpful to enumerate  One issue is tremor in hands for past year      Pt notes sxs mostly with action, very fine tremor and with use of mouse on computer station  Pt notes occ issues with eating but minimal impact  Pt recalls 2 uncles with PD  Pt current sxs most consistent with benign essential tremor  No PD features at this time  Current tremor only with action and min with eating  No other associated PD features  No meds at this time due to minimal findings and impact on adls  Will continue to follow clinically  Will update mri head due to tremor, as well as balance to ensure no structural etiology

## 2022-03-18 NOTE — PROGRESS NOTES
Patient ID: Calixto Weathers is a 74 y o  male  Assessment/Plan:    Tremor  Pt here today for initial neuro eval  Pt with several neuro issues which he was very helpful to enumerate  One issue is tremor in hands for past year  Pt notes sxs mostly with action, very fine tremor and with use of mouse on computer station  Pt notes occ issues with eating but minimal impact  Pt recalls 2 uncles with PD  Pt current sxs most consistent with benign essential tremor  No PD features at this time  Current tremor only with action and min with eating  No other associated PD features  No meds at this time due to minimal findings and impact on adls  Will continue to follow clinically  Will update mri head due to tremor, as well as balance to ensure no structural etiology     Neuropathy  Pt here today for initial evaluation  Pt notes ongoing issues with balance off an on for the past 1-2 years  Pt exam with evidence of underlying neuropathy with dec vib of lower ext and decreased temp lower 2/3 calves  Rev typical pathophysiology of neuropathy with pt and etiology of disease  Pt with known DM and also prior lap band procedure  Will check nutritional labs as well  Check emg of lowers  No painful component at this time, no need for meds  Neuropathy contributing to balance issues  Rev fall safety precautions as well  Pt also notes slower getting up from low chair  Will also check ck for any prox myopathic component related to statin  emg will be helpful for this as well  No localizing muscle weakness on clinical exam  Pt able to ascend from chair without use of arms         Diagnoses and all orders for this visit:    Neuropathy  -     Protein electrophoresis, serum; Future  -     Vitamin B6; Future  -     Vitamin B1, whole blood; Future  -     Methylmalonic acid, serum; Future  -     Folate; Future  -     Lyme Antibody Profile with reflex to WB; Future  -     Sjogren's Antibodies;  Future    Gait disorder  -     Ambulatory Referral to Neurology  -     MRI brain without contrast; Future  -     EMG 2 limb lower extremity; Future    Memory change  -     Ambulatory Referral to Neurology  -     MRI brain without contrast; Future  -     EMG 2 limb lower extremity; Future    Tremor  -     Ambulatory Referral to Neurology  -     MRI brain without contrast; Future  -     EMG 2 limb lower extremity; Future    Family history of Parkinson disease  -     Ambulatory Referral to Neurology    Hereditary and idiopathic neuropathy, unspecified   -     Vitamin B6; Future  -     Folate; Future           Subjective:    HPI    Pt is a 73 yo m with pmh of barretts esophagus, gastroparesis, DM type 2, stuttering, HTN, ANABELL who presents today for evaluation of several neurological issues  Pt did well to bring list of active issues with him which we addressed in full at visit  Pt notes concern about his balance  Pt feels off balance intermittently while walking, in shower or on uneven surfaces  Pt had a fall in dec 2020 after taking an [place] and waiting too long to go right to sleep  Pt notes he fell down 16 steps and his back of head  Pt was evaluated and release with concussion but no associated internal injury or fracture  Pt feels a bit slower since fall  Pt notes he feels stamina is significantly decreased  Pt notes overall mobility and range of motion more limited as well  Pt denies any pain in hands or feet  No nocturnal sxs  On exam pt with decreased vib and temp in lower ext  Rev dx of neuropathy with pt in office and likely contributor to his balance issues  Pt also with issues with getting up from low chairs  Pt was able to get up in office wihout use of arms or chair arms as well  Pt has been on crestor over the years  No clear proximal weakness in arms or legs during exam today  Will check ck and emg for both neuropathy and this ongoing issue  Pt also with concerns for parkinsons disease due to history in family with 2 uncles   Pt notes sxs mostly with action  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  Pt notes no issues with adls  Pt notes rare issue while eating  No head titibation  Pt recalls uncle with shaking of head  Wonder if actual essential tremor rather than PD  Pt also on zyprexa and prozac due to stuttering  Question if zyprexa any impact on tremor  Pt has decreased doses and not sure of any direct impact on tremor  Pt had lap band surgery about 10 yrs ago  Pt lost about 40 lbs  Pt then later dx with prostate cancer and surgeon deflated the band  Pt notes he gained back the 40 lbs   Pt feels however his DM meds really helping with his sugar and weight control presently  Pt denies any falls recently  No change in vision  No loss of vision  No change in bowel or bladder  No loc  No sz  Rev pathophysiology of both PD and essential tremor  At current exam and based on current sxs, pt fits best with essential tremor  No need for meds for his tremor or neuropathy at this time  No painful neuropathic component to neuropathy and no impact on adls from tremor  Pt is in agreement  Will cont to follow clinically  The following portions of the patient's history were reviewed and updated as appropriate: allergies, current medications, past family history, past medical history, past social history, past surgical history and problem list and med rec and ros  Objective:    Blood pressure 124/78, pulse 71, temperature 97 6 °F (36 4 °C), height 5' 9" (1 753 m), weight 135 kg (298 lb)  Physical Exam  Constitutional:       General: He is not in acute distress  Appearance: He is not ill-appearing  Eyes:      General: Lids are normal       Extraocular Movements: Extraocular movements intact  Pupils: Pupils are equal, round, and reactive to light  Musculoskeletal:      Right lower leg: No edema  Left lower leg: No edema     Neurological:      Mental Status: He is alert  Deep Tendon Reflexes: Strength normal       Reflex Scores:       Tricep reflexes are 2+ on the right side and 2+ on the left side  Bicep reflexes are 2+ on the right side and 2+ on the left side  Brachioradialis reflexes are 2+ on the right side and 2+ on the left side  Patellar reflexes are 1+ on the right side and 1+ on the left side  Achilles reflexes are 1+ on the right side and 1+ on the left side  Psychiatric:         Speech: Speech normal          Neurological Exam  Mental Status  Alert  Recent and remote memory are intact  Speech is normal  Language is fluent with no aphasia  Attention and concentration are normal  Fund of knowledge is appropriate for level of education  Cranial Nerves  CN II: Visual acuity is normal  Visual fields full to confrontation  CN III, IV, VI: Extraocular movements intact bilaterally  Normal lids and orbits bilaterally  Pupils equal round and reactive to light bilaterally  CN V: Facial sensation is normal   CN VII: Full and symmetric facial movement  CN VIII: Hearing is normal   CN IX, X: Palate elevates symmetrically  Normal gag reflex  CN XI: Shoulder shrug strength is normal   CN XII: Tongue midline without atrophy or fasciculations  Motor  Normal muscle bulk throughout  Normal muscle tone  The following abnormal movements were seen: Strength is 5/5 throughout all four extremities  Min action tremor  No cogwheeling  No rigidity of the ext  No bradyphrenia  Pos stuttering  No dysarthria  No masked facies  No resting tremor  No head titibation  Sensory  Dec vib mejia lowers nickolas in comparision to upper ext  Dec temp lower 2/3 calves  Intact prop mejia      Reflexes                                           Right                      Left  Brachioradialis                    2+                         2+  Biceps                                 2+                         2+  Triceps                                2+ 2+  Finger flex                           2+                         2+  Hamstring                            2+                         2+  Patellar                                1+                         1+  Achilles                                1+                         1+  Plantar                           Downgoing                Downgoing    Coordination  Right: Finger-to-nose normal  Rapid alternating movement normal   Left: Finger-to-nose normal  Heel-to-shin normal     Gait  Casual gait: Wide stance  ROS:    Review of Systems   Constitutional: Negative  Negative for appetite change and fever  HENT: Negative  Negative for hearing loss, tinnitus, trouble swallowing and voice change  Eyes: Negative  Negative for photophobia and pain  Respiratory: Negative  Negative for shortness of breath  Cardiovascular: Negative  Negative for palpitations  Gastrointestinal: Negative  Negative for nausea and vomiting  Molina's Esophagus goes every 3 mos to get Cancer lesions burned off   Endocrine: Negative  Negative for cold intolerance  Genitourinary: Negative  Negative for dysuria, frequency and urgency  Musculoskeletal: Positive for gait problem  Negative for myalgias and neck pain  Chapito Fernándezwers down stairs in Dec 2020, concussion and has never recovered  Took Ambien & went upstairs to bed after it kicked in, fell backwards down stairs, wife said he was foaming at the mouth  Called 911, went OP Trauma, dx: Concussion, sent him home  Started to shuffle (2 Uncles had PD)     Skin: Negative  Negative for rash  Neurological: Positive for tremors and weakness  Negative for dizziness, seizures, syncope, facial asymmetry, speech difficulty, light-headedness, numbness and headaches  L tremor worse then R   Hematological: Negative  Does not bruise/bleed easily  Psychiatric/Behavioral: Negative  Negative for confusion, hallucinations and sleep disturbance          Short term non existent, Long term starting to fail   All other systems reviewed and are negative

## 2022-03-18 NOTE — ASSESSMENT & PLAN NOTE
Pt here today for initial evaluation  Pt notes ongoing issues with balance off an on for the past 1-2 years  Pt exam with evidence of underlying neuropathy with dec vib of lower ext and decreased temp lower 2/3 calves  Rev typical pathophysiology of neuropathy with pt and etiology of disease  Pt with known DM and also prior lap band procedure  Will check nutritional labs as well  Check emg of lowers  No painful component at this time, no need for meds  Neuropathy contributing to balance issues  Rev fall safety precautions as well  Pt also notes slower getting up from low chair  Will also check ck for any prox myopathic component related to statin  emg will be helpful for this as well  No localizing muscle weakness on clinical exam  Pt able to ascend from chair without use of arms

## 2022-03-24 ENCOUNTER — OFFICE VISIT (OUTPATIENT)
Dept: BARIATRICS | Facility: CLINIC | Age: 67
End: 2022-03-24
Payer: MEDICARE

## 2022-03-24 VITALS
BODY MASS INDEX: 43.62 KG/M2 | SYSTOLIC BLOOD PRESSURE: 124 MMHG | RESPIRATION RATE: 95 BRPM | DIASTOLIC BLOOD PRESSURE: 80 MMHG | HEIGHT: 69 IN | HEART RATE: 64 BPM | WEIGHT: 294.5 LBS | TEMPERATURE: 97.4 F

## 2022-03-24 DIAGNOSIS — R11.10 VOMITING: ICD-10-CM

## 2022-03-24 DIAGNOSIS — Z98.84 BARIATRIC SURGERY STATUS: Primary | ICD-10-CM

## 2022-03-24 DIAGNOSIS — K22.70 BARRETT'S ESOPHAGUS: ICD-10-CM

## 2022-03-24 DIAGNOSIS — E66.01 MORBID (SEVERE) OBESITY DUE TO EXCESS CALORIES (HCC): ICD-10-CM

## 2022-03-24 PROCEDURE — 99213 OFFICE O/P EST LOW 20 MIN: CPT | Performed by: SURGERY

## 2022-03-24 NOTE — PROGRESS NOTES
OFFICE VISIT - BARIATRIC SURGERY  Law Martinez 74 y o  male MRN: [id number]  Unit/Bed#:  Encounter: [id number]      HPI:  Law Martinez is a 74 y o  male status post Gastric Band by Dr Tenisha Enriquez on 7/26/10  Comes to the office today with complaints of vomiting S/P lap band for which may have contributed to recent retinal detachments  Recently improving A1C and losing weight with ozempic  Subjective     Patient has history of Molina's esophagus, prostate cancer with prostatectomy, Type II DM, and umbilical hernia repair  Reports concerns for chronic vomiting after meals ever since he had the gastric band inserted  He believes his vomiting is related to his habit of overeating  Was vomiting 8-10 times a month at that time  Noticed his vomiting improved after starting the ozempic, down to vomiting approximately 4-6 times per month  Vomiting undigested food and started vomiting blood a year ago  Denies associated nausea, diarrhea, or reflux  Drinks diet soda at the same time with eating  Currently eats small portion meals  Takes his PPI and MVI daily  Admits to having constipation since starting on ozempic to help with his diabetes  Per patient, providers at [facility] recommended to follow up with bariatric team to discuss removal due to possible retinal detachment may have been an adverse effect from chronic vomiting  Patient is following closely with Dr Negro Yun with GI team for his Molina's esophagus without dysplasia  He is currently getting EGD every 3 Months for close monitoring  His last EGD was on 1/12/22 which showed Molina's for which he had ablation  Patient was able to drop down as low as 254lb  Today, their weight is 294lb, with a BMI of 43 49  He notes A1C is improved due to ozempic    Down to 5 7      Smoking: Denies  Alcohol use: 1 case of beer will last a year  NSAID use: Rare  Caffeine use: Diet pepsi, no coffee  Current treatment: 80mg pantoprazole  Previous EGD: January 2022  Previous Upper GI: No   Previous Manometry: No  Ambulation is not impaired  Review of Systems   Constitutional: Negative for chills and fever  HENT: Negative for ear pain and sore throat  Eyes: Negative for pain and visual disturbance  Respiratory: Negative for cough and shortness of breath  Cardiovascular: Negative for chest pain and palpitations  Gastrointestinal: Positive for vomiting  Negative for abdominal pain  Genitourinary: Negative for dysuria and hematuria  Musculoskeletal: Negative for arthralgias and back pain  Skin: Negative for color change and rash  Neurological: Negative for seizures and syncope  All other systems reviewed and are negative  Historical Information   Past Medical History:   Diagnosis Date    Acute blood loss anemia 10/13/2016    Anxiety     Arthritis     knees    Arthritis     Asthma     stable for > 10 years    Molina esophagus     Cancer [facility] [address] [facility]     prostate    Cataract     Depression     [facility]     [facility] mellitus [facility]     pre diabetes    Diverticulitis of colon     Environmental allergies     Hiatal hernia     History of anal fissures     Hyperlipidemia     Hypertension     Incisional hernia     Insomnia     Kidney stone     Morbid obesity (HCC)     PONV (postoperative nausea and vomiting)     Prolapsing mitral valve     prolapsing mitral valve leaflet syndrome    Prostate cancer ([facility]     Retinal detachment     [facility]; resolved: 10/10/2012    Sleep apnea     diagnosed but unable to tolerate cpap       Stuttering      Past Surgical History:   Procedure Laterality Date    ABDOMINAL SURGERY      gastric lap band    ARTHROSCOPIC REPAIR ACL Right     BIOPSY CORE NEEDLE      prostate    CATARACT EXTRACTION Bilateral     COLONOSCOPY      EYE SURGERY      each eye had a detached retina and cataract removed, Right eye has a buckle    GASTRIC BYPASS      pt had LAP BAND SURGERY not gastric bypass    HERNIA REPAIR      naval    JOINT REPLACEMENT      B/L knee    KNEE ARTHROSCOPY Left     MI TOTAL KNEE ARTHROPLASTY Left 2/15/2017    Procedure: TOTAL KNEE ARTHROPLASTY ;  Surgeon: Jordan Segovia MD;  [facility]: [facility];  Service: Orthopedics    MI TOTAL KNEE ARTHROPLASTY Right 10/10/2016    Procedure: ARTHROPLASTY KNEE TOTAL;  Surgeon: Jordan Segovia MD;  Location: [facility];  Service: Orthopedics    PROSTATECTOMY      robotic-assisted; TEODORO-Dr Deon Mendez RETINAL DETACHMENT SURGERY Bilateral     [facility]    SEPTOPLASTY      SINUS SURGERY      TONSILLECTOMY      over age 13   Guanakito Yanes UPPER GASTROINTESTINAL ENDOSCOPY      VASECTOMY      vas deferens     Social History   Social History     Substance and Sexual Activity   Alcohol Use Not Currently    Alcohol/week: 1 0 standard drink    Types: 1 Cans of beer per week    Comment: rare ; being a social drinker (as per allscripts)     Social History     Substance and Sexual Activity   Drug Use Not Currently     Social History     Tobacco Use   Smoking Status Never Smoker   Smokeless Tobacco Never Used   Tobacco Comment    quit 38 years ago       Objective       Current Vitals:   Blood Pressure: 124/80 (03/24/22 1058)  Pulse: 64 (03/24/22 1058)  Temperature: (!) 97 4 °F (36 3 °C) (03/24/22 1058)  Temp Source: Tympanic (03/24/22 1058)  Respirations: (!) 95 (03/24/22 1058)  Height: 5' 9" (175 3 cm) (03/24/22 1058)  Weight - Scale: 134 kg (294 lb 8 oz) (03/24/22 1058)    Invasive Devices  Report    None                 Physical Exam  Constitutional:       Appearance: Normal appearance  He is obese  HENT:      Head: Normocephalic and atraumatic  Nose: Nose normal       Mouth/Throat:      Mouth: Mucous membranes are moist    Eyes:      Extraocular Movements: Extraocular movements intact  Pupils: Pupils are equal, round, and reactive to light  Cardiovascular:      Rate and Rhythm: Normal rate and regular rhythm        Pulses: Normal pulses  Heart sounds: Normal heart sounds  Pulmonary:      Effort: Pulmonary effort is normal       Breath sounds: Normal breath sounds  Abdominal:      Palpations: Abdomen is soft  Comments: Well healed incisions   Musculoskeletal:      Cervical back: Normal range of motion  Skin:     General: Skin is warm  Neurological:      General: No focal deficit present  Mental Status: He is alert and oriented to person, place, and time  Psychiatric:         Mood and Affect: Mood normal          Behavior: Behavior normal            Pathology, and Other Studies: I have personally reviewed pertinent reports  Assessment/PLAN:    Alvino Reddy is a 74 y o  male status post Gastric Band by Dr Rayfield Boeck on 7/26/10  Comes to the office today with complaints of vomiting S/P lap band for which may have contributed to recent retinal detachments  Recently improving A1C and losing weight with ozempic  Workup thus far reviewed and discussed with patient  Workup includes:     UGI (3/16/22)  UPPER GI SERIES  DOUBLE CONTRAST     INDICATION:  Status post lap band with frequent vomiting  History of Molina's esophagus status post radiofrequency ablation     COMPARISON:  February 27, 2020     IMAGES:  abdominal radiograph  90 fluoroscopic images recorded     FLUOROSCOPY TIME:   2 minutes 53 seconds     TECHNIQUE:  The patient was given effervescent granules and barium  by mouth and images of the esophagus, stomach, and small bowel were obtained       FINDINGS:  abdominal radiograph demonstrates unchanged satisfactory lap band      The esophagus is normal in caliber  Esophageal motility is normal and emptying of contrast from the esophagus is prompt  No mucosal lesion, ulceration or diverticulum    Mild fold thickening is seen       Gastroesophageal reflux was observed      The patient has a lap band which appears in satisfactory location, very similar to the previous exam with a small gastric pouch above the lap band  The stomach is otherwise incompletely but adequately distended without significant rugal fold thickening   allowing for the underdistention  No penetrating ulcers  There is prompt emptying into the duodenum through a normal-appearing pyloric canal   Duodenum demonstrates a normal mucosal pattern and normally located ligament of Treitz         IMPRESSION:  Unchanged lap band in satisfactory location  Normal esophageal and gastric emptying  Fold thickening distal esophagus suggesting mild esophagitis  Gastric folds within normal limits  No obvious ulceration  Spontaneous gastroesophageal reflux observed      Workstation performed: [id number]      EGD (2/11/22 with Dr Juwan Montero)  FINDINGS:  · Molina's esophagus observed where the Z-line is 31 cm from the incisors and top of gastric folds are 38 cm from the incisors; ablated residual tissue with radiofrequency  Patchy areas of Molina's esophagus in mid and distal esophagus with intervening normal squamous mucosa   No nodule present  Radiofrequency ablation was performed using through the scope ablation catheter   On initial around 82 ablations were performed  Navdeep Shakir the scope was removed   The coagulum was removed using distal attachment cap   Through the scope ablation catheter was reintroduced and repeat ablation was performed   Total of 120 ablations performed   Photodocumentation was obtained  · Medium sliding hiatal hernia (type I hiatal hernia)  · The stomach and duodenum appeared normal      IMPRESSION:  Patchy areas of Molina's esophagus in mid and distal esophagus   Successful radiofrequency ablation using through the scope ablation catheter   Medium hiatal hernia  Normal stomach and duodenum     RECOMMENDATION:  Maximize acid suppression medication  Magic mouthwash as needed  Will provide further pain control if needed  Okay to use Advil/ibuprofen/Tylenol as needed for pain  Full liquid diet for 2 days   Soft diet for additional 5 days  Repeat EGD in 3 months      PLAN:   Follow up with GI as scheduled  PPI PO BID    --------------------------------------------------------------------    We will have patient meet with  and will plan for gastric band removal as patient is experiencing chronic vomiting which is leading to many other problems such as retinal detachments in addition to Molina's esophagus  We will hold on hiatal hernia repair due to patient's BMI and risk of reoccurrence  We will further discuss that after weight loss  Follow up with GI as scheduled for Molina's    Continue to follow with RD and SW     Continue ozempic     We will refer for medical clearance preoperatively  Follow up after insurance approval              Kasandra Lindsay PA-C  Bariatric Surgery  3/24/2022  11:35 AM

## 2022-03-28 ENCOUNTER — APPOINTMENT (OUTPATIENT)
Dept: LAB | Age: 67
End: 2022-03-28
Payer: MEDICARE

## 2022-03-28 DIAGNOSIS — G62.9 NEUROPATHY: ICD-10-CM

## 2022-03-28 DIAGNOSIS — R26.2 AMBULATORY DYSFUNCTION: ICD-10-CM

## 2022-03-28 DIAGNOSIS — G60.9 HEREDITARY AND IDIOPATHIC NEUROPATHY, UNSPECIFIED: ICD-10-CM

## 2022-03-28 LAB
CK SERPL-CCNC: 54 U/L (ref 39–308)
FOLATE SERPL-MCNC: >20 NG/ML (ref 3.1–17.5)

## 2022-03-28 PROCEDURE — 86618 LYME DISEASE ANTIBODY: CPT

## 2022-03-28 PROCEDURE — 86235 NUCLEAR ANTIGEN ANTIBODY: CPT

## 2022-03-28 PROCEDURE — 84165 PROTEIN E-PHORESIS SERUM: CPT

## 2022-03-28 PROCEDURE — 36415 COLL VENOUS BLD VENIPUNCTURE: CPT

## 2022-03-28 PROCEDURE — 82550 ASSAY OF CK (CPK): CPT

## 2022-03-28 PROCEDURE — 84425 ASSAY OF VITAMIN B-1: CPT

## 2022-03-28 PROCEDURE — 82746 ASSAY OF FOLIC ACID SERUM: CPT

## 2022-03-28 PROCEDURE — 83918 ORGANIC ACIDS TOTAL QUANT: CPT

## 2022-03-28 PROCEDURE — 84165 PROTEIN E-PHORESIS SERUM: CPT | Performed by: PATHOLOGY

## 2022-03-28 PROCEDURE — 84207 ASSAY OF VITAMIN B-6: CPT

## 2022-03-29 ENCOUNTER — TELEPHONE (OUTPATIENT)
Dept: GASTROENTEROLOGY | Facility: CLINIC | Age: 67
End: 2022-03-29

## 2022-03-29 LAB
ALBUMIN SERPL ELPH-MCNC: 3.99 G/DL (ref 3.5–5)
ALBUMIN SERPL ELPH-MCNC: 59.6 % (ref 52–65)
ALPHA1 GLOB SERPL ELPH-MCNC: 0.29 G/DL (ref 0.1–0.4)
ALPHA1 GLOB SERPL ELPH-MCNC: 4.4 % (ref 2.5–5)
ALPHA2 GLOB SERPL ELPH-MCNC: 0.81 G/DL (ref 0.4–1.2)
ALPHA2 GLOB SERPL ELPH-MCNC: 12.1 % (ref 7–13)
BETA GLOB ABNORMAL SERPL ELPH-MCNC: 0.42 G/DL (ref 0.4–0.8)
BETA1 GLOB SERPL ELPH-MCNC: 6.2 % (ref 5–13)
BETA2 GLOB SERPL ELPH-MCNC: 4.5 % (ref 2–8)
BETA2+GAMMA GLOB SERPL ELPH-MCNC: 0.3 G/DL (ref 0.2–0.5)
GAMMA GLOB ABNORMAL SERPL ELPH-MCNC: 0.88 G/DL (ref 0.5–1.6)
GAMMA GLOB SERPL ELPH-MCNC: 13.2 % (ref 12–22)
IGG/ALB SER: 1.48 {RATIO} (ref 1.1–1.8)
PROT PATTERN SERPL ELPH-IMP: NORMAL
PROT SERPL-MCNC: 6.7 G/DL (ref 6.4–8.2)

## 2022-03-29 NOTE — TELEPHONE ENCOUNTER
Procedure for 05 11 22 has been rescheduled for 05 17 22 due to change in Dr Chichi Barba schedule  Lm for pt with change and gave direct ext if he has any questions

## 2022-03-30 LAB
ENA SS-A AB SER-ACNC: <0.2 AI (ref 0–0.9)
ENA SS-B AB SER-ACNC: <0.2 AI (ref 0–0.9)

## 2022-04-04 LAB — VIT B6 SERPL-MCNC: 8.1 UG/L (ref 5.3–46.7)

## 2022-04-05 LAB — METHYLMALONATE SERPL-SCNC: 260 NMOL/L (ref 0–378)

## 2022-04-07 LAB — B BURGDOR IGG+IGM SER-ACNC: 20

## 2022-04-09 LAB — VIT B1 BLD-SCNC: 129.9 NMOL/L (ref 66.5–200)

## 2022-04-15 DIAGNOSIS — E11.9 CONTROLLED TYPE 2 DIABETES MELLITUS WITHOUT COMPLICATION, WITHOUT LONG-TERM CURRENT USE OF INSULIN (HCC): ICD-10-CM
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
## 2022-04-15 PROBLEM — A41.9 SEPSIS (HCC): Status: RESOLVED | Noted: 2021-11-18 | Resolved: 2022-04-15

## 2022-04-15 RX ORDER — BLOOD SUGAR DIAGNOSTIC
STRIP MISCELLANEOUS
Qty: 200 STRIP | Refills: 1 | Status: SHIPPED | OUTPATIENT
Start: 2022-04-15

## 2022-04-21 ENCOUNTER — OFFICE VISIT (OUTPATIENT)
Dept: FAMILY MEDICINE CLINIC | Facility: CLINIC | Age: 67
End: 2022-04-21
Payer: MEDICARE

## 2022-04-21 VITALS
SYSTOLIC BLOOD PRESSURE: 132 MMHG | BODY MASS INDEX: 44.14 KG/M2 | HEIGHT: 69 IN | HEART RATE: 86 BPM | DIASTOLIC BLOOD PRESSURE: 66 MMHG | TEMPERATURE: 97.4 F | RESPIRATION RATE: 16 BRPM | WEIGHT: 298 LBS

## 2022-04-21 DIAGNOSIS — R25.1 TREMOR: ICD-10-CM

## 2022-04-21 DIAGNOSIS — C61 MALIGNANT NEOPLASM OF PROSTATE (HCC): ICD-10-CM

## 2022-04-21 DIAGNOSIS — I10 BENIGN ESSENTIAL HYPERTENSION: Primary | ICD-10-CM

## 2022-04-21 DIAGNOSIS — K22.719 BARRETT'S ESOPHAGUS WITH DYSPLASIA: ICD-10-CM

## 2022-04-21 DIAGNOSIS — E66.01 CLASS 3 SEVERE OBESITY DUE TO EXCESS CALORIES WITHOUT SERIOUS COMORBIDITY WITH BODY MASS INDEX (BMI) OF 40.0 TO 44.9 IN ADULT (HCC): ICD-10-CM

## 2022-04-21 DIAGNOSIS — R26.2 AMBULATORY DYSFUNCTION: ICD-10-CM

## 2022-04-21 DIAGNOSIS — F80.81 STUTTERING: ICD-10-CM

## 2022-04-21 DIAGNOSIS — G62.9 NEUROPATHY: ICD-10-CM

## 2022-04-21 DIAGNOSIS — E11.42 CONTROLLED TYPE 2 DIABETES MELLITUS WITH DIABETIC POLYNEUROPATHY, WITHOUT LONG-TERM CURRENT USE OF INSULIN (HCC): ICD-10-CM

## 2022-04-21 DIAGNOSIS — K21.00 GASTROESOPHAGEAL REFLUX DISEASE WITH ESOPHAGITIS WITHOUT HEMORRHAGE: ICD-10-CM

## 2022-04-21 DIAGNOSIS — F51.04 PSYCHOPHYSIOLOGICAL INSOMNIA: ICD-10-CM

## 2022-04-21 DIAGNOSIS — E78.00 PURE HYPERCHOLESTEROLEMIA: ICD-10-CM

## 2022-04-21 DIAGNOSIS — Z96.652 STATUS POST TOTAL LEFT KNEE REPLACEMENT: ICD-10-CM

## 2022-04-21 DIAGNOSIS — F41.8 DEPRESSION WITH ANXIETY: ICD-10-CM

## 2022-04-21 DIAGNOSIS — G47.00 INSOMNIA, UNSPECIFIED TYPE: ICD-10-CM

## 2022-04-21 PROCEDURE — 99214 OFFICE O/P EST MOD 30 MIN: CPT | Performed by: FAMILY MEDICINE

## 2022-04-21 RX ORDER — ZOLPIDEM TARTRATE 5 MG/1
5 TABLET ORAL
Qty: 90 TABLET | Refills: 0 | Status: SHIPPED | OUTPATIENT
Start: 2022-04-21 | End: 2022-08-10

## 2022-04-21 NOTE — ASSESSMENT & PLAN NOTE
Total cholesterol 146 HDL 56 LDL 71 triglycerides 95    He is currently on rosuvastatin 5 mg once a week and Zetia 1/2 tab daily

## 2022-04-21 NOTE — ASSESSMENT & PLAN NOTE
He is working on weight reduction    He is currently on Ozempic and metformin that have to will role for his diabetic control and hopefully weight reduction

## 2022-04-21 NOTE — ASSESSMENT & PLAN NOTE
He is receiving radiofrequency ablation therapy  He is on daily PPI therapy and Carafate new     He is followed by Gastroenterology

## 2022-04-21 NOTE — ASSESSMENT & PLAN NOTE
Blood pressure is controlled on current medication metoprolol, losartan, hydrochlorothiazide    Creatinine 1 01 GFR 76   Marino moses

## 2022-04-21 NOTE — PROGRESS NOTES
Chief Complaint   Patient presents with    Follow-up     6 month      Health Maintenance   Topic Date Due    DTaP,Tdap,and Td Vaccines (1 - Tdap) 07/17/1999    PT PLAN OF CARE  03/17/2021    SLP PLAN OF CARE  05/13/2021    COVID-19 Vaccine (3 - Booster for Deryl Flies series) 08/19/2021    DM Eye Exam  10/14/2021    Falls: Plan of Care  02/20/2022    Fall Risk  05/27/2022    Medicare Annual Wellness Visit (AWV)  05/27/2022    Diabetic Foot Exam  05/27/2022    HEMOGLOBIN A1C  09/06/2022    BMI: Followup Plan  03/24/2023    BMI: Adult  04/21/2023    Pneumococcal Vaccine: 65+ Years (2 of 2 - PPSV23) 05/02/2024    Colorectal Cancer Screening  03/10/2026    Hepatitis C Screening  Completed    Influenza Vaccine  Completed    HIB Vaccine  Aged Out    Hepatitis B Vaccine  Aged Out    IPV Vaccine  Aged Out    Hepatitis A Vaccine  Aged Out    Meningococcal ACWY Vaccine  Aged Out    HPV Vaccine  Aged Out       Assessment/Plan:    He had 2 primary doses and 2 booster doses of COVID vaccine    He has had pneumococcal 23 in 2016 prior to being 70years old and he had a pneumococcal 15 in 2019 at age 70  He will receive a pneumococcal 20 at his next visit and he is agreeable to this  He had 2 doses of Shingrix vaccine     It would appear he is due for a Tdap and he should check with pharmacy and his insurance for coverage in get accordingly  Reviewed his labs most of which are noted in the problem oriented charting but also included a normal TSH and CBC    Discussed that I am cutting back my clinical office hours in anticipation of [facility]  I thanked him for the privilege of taking care of him for all of these years  He will continue his care at our office with Rehana Galeana is his primary and the other physicians partner  and that care      He will continue his current medications    Gastroesophageal reflux disease with esophagitis without hemorrhage  He is on daily PPI therapy and Carafate    He is followed by Gastroenterology    Molina's esophagus with dysplasia  He is receiving radiofrequency ablation therapy  He is on daily PPI therapy and Carafate new     He is followed by Gastroenterology    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, without long-term current use of insulin ([facility] )    Lab Results   Component Value Date    HGBA1C 6 2 (H) 03/06/2022     His A1c is slightly higher than prior 6 0     His microalbumin was normal     He had an eye exam with Dr Anna Wilson and also with [facility] retinal    Foot exam was done today     Continue current medication had efforts at lifestyle modification weight reduction    Benign essential hypertension  Blood pressure is controlled on current medication metoprolol, losartan, hydrochlorothiazide    Creatinine 1 01 GFR 76   Lytes okay    Neuropathy  Being evaluated by Neurology and is having EMG is done in the near future    Tremor  Being evaluated by Neurology    Stuttering  Stable on current medication Zyprexa    Insomnia  He has been able to reduce his Ambien to 5 mg nightly and even occasionally skips a night    Depression with anxiety  Stable on fluoxetine, Zyprexa, and Xanax    Class 3 severe obesity due to excess calories without serious comorbidity with body mass index (BMI) of 40 0 to 44 9 in adult [facility])  He is working on weight reduction    He is currently on Ozempic and metformin that have to will role for his diabetic control and hopefully weight reduction    Ambulatory dysfunction  Multifactorial including arthritis, neuropathy, obesity    Pure hypercholesterolemia  Total cholesterol 146 HDL 56 LDL 71 triglycerides 95    He is currently on rosuvastatin 5 mg once a week and Zetia 1/2 tab daily       Diagnoses and all orders for this visit:    Benign essential hypertension    Controlled type 2 diabetes mellitus with diabetic polyneuropathy, without long-term current use of insulin ([facility] )    Neuropathy    Ambulatory dysfunction    Class 3 severe obesity due to excess calories without serious comorbidity with body mass index (BMI) of 40 0 to 44 9 in adult [facility])    Pure hypercholesterolemia    Tremor    Stuttering    Psychophysiological insomnia    Depression with anxiety    Gastroesophageal reflux disease with esophagitis without hemorrhage    Molina's esophagus with dysplasia    Malignant neoplasm of prostate ([facility] )  Comments: Followed by urology / most recent PSA less than 0 1 / no evidence of recurrence    Status post total left knee replacement    Insomnia, unspecified type  -     zolpidem (AMBIEN) 5 mg tablet; Take 1 tablet (5 mg total) by mouth daily at bedtime as needed for sleep          Subjective:      Patient ID: Ahmet Martinez is a 74 y o  male  He is here today for follow-up on his chronic conditions     He recently saw Neurology for evaluation of balance and tremor (see note)  He had an MRI of his brain at an outside imaging center because he needed a larger MRI machine due to his weight   He had a report with him today and it showed some nonspecific periventricular white matter changes  There also was what appeared to be a typographical error in it said 'significant abnormal findings which I believe was meant to say no other significant abnormal findings  He will call the radiology group where he had the image done ask them to clarify or correct    He is having EMG ease of his lower extremities done at [facility] as they were able to schedule him much sooner than Adilene Lam's was   He has a follow-up with [address] [place] [address] Neurology after the tests are done    He continues to follow with gastroenterology for his GERD and Molina's esophagus and will be having another radiofrequency ablation done  He is going to have his lap band removed as GI thinks it may be aggravating his reflux and esophagitis    Follows with urology having a history of prostate cancer and everything has been stable He follows with cardiology as well    He notes he has been able to decrease his Ambien to 1/2 tablet nightly and occasionally skips a night   I told him that when I renewed his Ambien we would given the 5 mg tablets instead of 10 mg and this would be more compliant with recommendations for his age group    Uses Xanax approximately 1 daily    He also has been able to decrease his Zyprexa to 1/2 tablet daily    He said he has not been using marijuana recently and does so only when he is somewhere where it is legally available    He had a partially avulsed great toenail removed by podiatry    He is enjoying being retired      The following portions of the patient's history were reviewed and updated as appropriate: allergies, current medications, past family history, past medical history, past social history, past surgical history and problem list     Review of Systems   Constitutional:        Generally feels pretty good   HENT: Negative for dental problem (Sees dentist every 6 months)  Eyes:        Sees both Ophthalmology and retinal specialist   Respiratory: Negative for cough, chest tightness and shortness of breath  Cardiovascular: Negative for chest pain, palpitations and leg swelling  Gastrointestinal:        See HPI    Bowel movements are regular   Genitourinary:        Follows with urology in is doing okay   Musculoskeletal: Positive for arthralgias, back pain and gait problem  He does has some problems with arthritis and various aches and pains but none of which is significantly impacting him at this point  His gait is abnormal in part due to neuropathy as well as some orthopedic issues  He has been somewhat forward in posture and gait  Skin:        Has not noted any suspicious lesions   Neurological: Positive for tremors ( has some tremors being evaluated by Neurology)  Negative for dizziness, light-headedness and headaches          Balance issues   Psychiatric/Behavioral: Positive for sleep disturbance (Generally requires nightly Ambien)  Negative for confusion, decreased concentration, dysphoric mood ( mood is stable on current meds) and suicidal ideas  The patient is not nervous/anxious  Objective:      /66   Pulse 86   Temp (!) 97 4 °F (36 3 °C) (Tympanic)   Resp 16   Ht 5' 9" (1 753 m)   Wt 135 kg (298 lb)   BMI 44 01 kg/m²          Physical Exam  Vitals and nursing note reviewed  Constitutional:       Appearance: Normal appearance  He is obese  Comments: He is wearing a mask   Eyes:      Comments: He is wearing glasses   Neck:      Vascular: No carotid bruit  Cardiovascular:      Rate and Rhythm: Normal rate and regular rhythm  Pulses: no weak pulses          Dorsalis pedis pulses are 2+ on the right side and 2+ on the left side  Posterior tibial pulses are 1+ on the right side and 1+ on the left side  Heart sounds: No murmur heard  Pulmonary:      Effort: Pulmonary effort is normal       Breath sounds: Normal breath sounds  Musculoskeletal:      Right lower leg: Edema ( same as left) present  Left lower leg: Edema (1+ edema lower leg) present  Skin:     Coloration: Skin is not jaundiced  Findings: No bruising  Neurological:      Mental Status: He is alert and oriented to person, place, and time  Comments: Stutters which is not new and is acceptably controlled   Psychiatric:         Mood and Affect: Mood normal          Behavior: Behavior normal          Thought Content: Thought content normal          Judgment: Judgment normal        Patient's shoes and socks removed  Right Foot/Ankle   Right Foot Inspection      Toe Exam: swelling ( 1+)  Sensory   Vibration: absent  Monofilament testing: diminished    Vascular  Capillary refills: < 3 seconds  The right DP pulse is 2+  The right PT pulse is 1+  Left Foot/Ankle  Left Foot Inspection      Toe Exam: swelling ( 1+)       Sensory   Vibration: absent  Monofilament testing: diminished    Vascular  Capillary refills: < 3 seconds  The left DP pulse is 2+  The left PT pulse is 1+       Assign Risk Category  No deformity present  Loss of protective sensation  No weak pulses  Risk: 1

## 2022-04-21 NOTE — ASSESSMENT & PLAN NOTE
Lab Results   Component Value Date    HGBA1C 6 2 (H) 03/06/2022     His A1c is slightly higher than prior 6 0     His microalbumin was normal     He had an eye exam with Dr Winsome Rosas and also with [facility] retinal    Foot exam was done today     Continue current medication had efforts at lifestyle modification weight reduction

## 2022-05-06 DIAGNOSIS — F41.8 DEPRESSION WITH ANXIETY: ICD-10-CM

## 2022-05-06 DIAGNOSIS — F80.81 STUTTERING: ICD-10-CM

## 2022-05-06 DIAGNOSIS — I10 ESSENTIAL HYPERTENSION: ICD-10-CM

## 2022-05-06 RX ORDER — FLUOXETINE HYDROCHLORIDE 20 MG/1
CAPSULE ORAL
Qty: 270 CAPSULE | Refills: 3 | Status: SHIPPED | OUTPATIENT
Start: 2022-05-06

## 2022-05-09 RX ORDER — LOSARTAN POTASSIUM 100 MG/1
TABLET ORAL
Qty: 90 TABLET | Refills: 3 | Status: SHIPPED | OUTPATIENT
Start: 2022-05-09

## 2022-05-09 RX ORDER — OLANZAPINE 5 MG/1
2.5 TABLET ORAL DAILY
Qty: 90 TABLET | Refills: 3 | Status: SHIPPED | OUTPATIENT
Start: 2022-05-09 | End: 2023-05-04

## 2022-05-12 DIAGNOSIS — E78.2 MIXED HYPERLIPIDEMIA: ICD-10-CM

## 2022-05-12 RX ORDER — ROSUVASTATIN CALCIUM 5 MG/1
5 TABLET, COATED ORAL WEEKLY
Qty: 12 TABLET | Refills: 3 | Status: SHIPPED | OUTPATIENT
Start: 2022-05-12

## 2022-05-27 ENCOUNTER — HOSPITAL ENCOUNTER (OUTPATIENT)
Dept: GASTROENTEROLOGY | Facility: HOSPITAL | Age: 67
Setting detail: OUTPATIENT SURGERY
Discharge: HOME | End: 2022-05-27
Attending: INTERNAL MEDICINE | Admitting: INTERNAL MEDICINE
Payer: MEDICARE

## 2022-05-27 ENCOUNTER — ANESTHESIA EVENT (OUTPATIENT)
Dept: GASTROENTEROLOGY | Facility: HOSPITAL | Age: 67
End: 2022-05-27

## 2022-05-27 ENCOUNTER — ANESTHESIA (OUTPATIENT)
Dept: GASTROENTEROLOGY | Facility: HOSPITAL | Age: 67
End: 2022-05-27

## 2022-05-27 VITALS
DIASTOLIC BLOOD PRESSURE: 79 MMHG | RESPIRATION RATE: 18 BRPM | HEART RATE: 69 BPM | TEMPERATURE: 97.9 F | SYSTOLIC BLOOD PRESSURE: 146 MMHG | OXYGEN SATURATION: 96 %

## 2022-05-27 DIAGNOSIS — K22.719 BARRETT'S ESOPHAGUS WITH DYSPLASIA: ICD-10-CM

## 2022-05-27 LAB — GLUCOSE SERPL-MCNC: 122 MG/DL (ref 65–140)

## 2022-05-27 PROCEDURE — 43270 EGD LESION ABLATION: CPT | Performed by: INTERNAL MEDICINE

## 2022-05-27 PROCEDURE — 82948 REAGENT STRIP/BLOOD GLUCOSE: CPT

## 2022-05-27 PROCEDURE — C1885 CATH, TRANSLUMIN ANGIO LASER: HCPCS

## 2022-05-27 RX ORDER — GLYCOPYRROLATE 0.2 MG/ML
INJECTION INTRAMUSCULAR; INTRAVENOUS AS NEEDED
Status: DISCONTINUED | OUTPATIENT
Start: 2022-05-27 | End: 2022-05-27

## 2022-05-27 RX ORDER — ONDANSETRON 2 MG/ML
INJECTION INTRAMUSCULAR; INTRAVENOUS AS NEEDED
Status: DISCONTINUED | OUTPATIENT
Start: 2022-05-27 | End: 2022-05-27

## 2022-05-27 RX ORDER — SODIUM CHLORIDE 9 MG/ML
INJECTION, SOLUTION INTRAVENOUS CONTINUOUS PRN
Status: DISCONTINUED | OUTPATIENT
Start: 2022-05-27 | End: 2022-05-27

## 2022-05-27 RX ORDER — SUCCINYLCHOLINE/SOD CL,ISO/PF 100 MG/5ML
SYRINGE (ML) INTRAVENOUS AS NEEDED
Status: DISCONTINUED | OUTPATIENT
Start: 2022-05-27 | End: 2022-05-27

## 2022-05-27 RX ORDER — PROPOFOL 10 MG/ML
INJECTION, EMULSION INTRAVENOUS AS NEEDED
Status: DISCONTINUED | OUTPATIENT
Start: 2022-05-27 | End: 2022-05-27

## 2022-05-27 RX ORDER — FENTANYL CITRATE 50 UG/ML
INJECTION, SOLUTION INTRAMUSCULAR; INTRAVENOUS AS NEEDED
Status: DISCONTINUED | OUTPATIENT
Start: 2022-05-27 | End: 2022-05-27

## 2022-05-27 RX ORDER — DEXAMETHASONE SODIUM PHOSPHATE 10 MG/ML
INJECTION, SOLUTION INTRAMUSCULAR; INTRAVENOUS AS NEEDED
Status: DISCONTINUED | OUTPATIENT
Start: 2022-05-27 | End: 2022-05-27

## 2022-05-27 RX ORDER — LIDOCAINE HYDROCHLORIDE 10 MG/ML
INJECTION, SOLUTION EPIDURAL; INFILTRATION; INTRACAUDAL; PERINEURAL AS NEEDED
Status: DISCONTINUED | OUTPATIENT
Start: 2022-05-27 | End: 2022-05-27

## 2022-05-27 RX ADMIN — FENTANYL CITRATE 50 MCG: 50 INJECTION, SOLUTION INTRAMUSCULAR; INTRAVENOUS at 12:57

## 2022-05-27 RX ADMIN — Medication 100 MG: at 12:57

## 2022-05-27 RX ADMIN — LIDOCAINE HYDROCHLORIDE 50 MG: 10 INJECTION, SOLUTION EPIDURAL; INFILTRATION; INTRACAUDAL at 12:57

## 2022-05-27 RX ADMIN — FENTANYL CITRATE 25 MCG: 50 INJECTION, SOLUTION INTRAMUSCULAR; INTRAVENOUS at 13:23

## 2022-05-27 RX ADMIN — FENTANYL CITRATE 25 MCG: 50 INJECTION, SOLUTION INTRAMUSCULAR; INTRAVENOUS at 13:24

## 2022-05-27 RX ADMIN — SODIUM CHLORIDE: 0.9 INJECTION, SOLUTION INTRAVENOUS at 12:53

## 2022-05-27 RX ADMIN — SODIUM CHLORIDE: 0.9 INJECTION, SOLUTION INTRAVENOUS at 13:19

## 2022-05-27 RX ADMIN — PROPOFOL 170 MG: 10 INJECTION, EMULSION INTRAVENOUS at 12:57

## 2022-05-27 RX ADMIN — ONDANSETRON 4 MG: 2 INJECTION INTRAMUSCULAR; INTRAVENOUS at 13:28

## 2022-05-27 RX ADMIN — DEXAMETHASONE SODIUM PHOSPHATE 10 MG: 10 INJECTION, SOLUTION INTRAMUSCULAR; INTRAVENOUS at 13:05

## 2022-05-27 RX ADMIN — GLYCOPYRROLATE 0.2 MCG: 0.2 INJECTION, SOLUTION INTRAMUSCULAR; INTRAVENOUS at 13:15

## 2022-05-27 NOTE — ANESTHESIA PREPROCEDURE EVALUATION
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, without long-term current use of insulin (HCC)      GI/HEPATIC   (+) Dysphagia   (+) Gastroesophageal reflux disease with esophagitis without hemorrhage      NEURO/PSYCH   (+) Depression with anxiety      PULMONARY   (+) Asthma   (+) Exertional dyspnea   (+) Sleep apnea        Physical Exam    Airway    Mallampati score: III  TM Distance: >3 FB  Neck ROM: full     Dental   No notable dental hx     Cardiovascular  Cardiovascular exam normal    Pulmonary  Pulmonary exam normal     Other Findings    PVCs, followed by cardiology, stable  Normal echo 2019  Anesthesia Plan  ASA Score- 3     Anesthesia Type- general with ASA Monitors  Additional Monitors:   Airway Plan: ETT  Comment: History of aspiration on previous sedation endoscopies          Plan Factors-Exercise tolerance (METS): >4 METS  Chart reviewed  EKG reviewed  Existing labs reviewed  Patient summary reviewed  Patient is not a current smoker  Induction- intravenous  Postoperative Plan-     Informed Consent- Anesthetic plan and risks discussed with patient  I personally reviewed this patient with the CRNA  Discussed and agreed on the Anesthesia Plan with the CRNA  Shivam Ramirez

## 2022-05-27 NOTE — H&P
History and Physical - [facility]  Giorgio Sosa 74 y o  male MRN: [id number]    HPI: Giorgio Sosa is a 74y o  year old male with gastroesophageal reflux disease, Molina's esophagus with indefinite for dysplasia here for follow-up EGD with radiofrequency ablation      Review of Systems    Historical Information   Past Medical History:   Diagnosis Date    Acute blood loss anemia 10/13/2016    Anxiety     Arthritis     knees    Arthritis     Asthma     stable for > 10 years    Molina esophagus     Cancer ([facility] )     prostate    Cataract     Depression     Diabetes ([facility] )     Diabetes mellitus ([facility] )     pre diabetes    Diverticulitis of colon     Environmental allergies     Fall 12/30/2020    Hiatal hernia     History of anal fissures     Hyperlipidemia     Hypertension     Incisional hernia     Insomnia     Kidney stone     Morbid obesity ([facility] )     PONV (postoperative nausea and vomiting)     Prolapsing mitral valve     prolapsing mitral valve leaflet syndrome    Prostate cancer ([facility] )     Retinal detachment     treated surgically at [facility]; resolved: 10/10/2012    Sleep apnea     diagnosed but unable to tolerate cpap       Stuttering      Past Surgical History:   Procedure Laterality Date    ABDOMINAL SURGERY      gastric lap band    ARTHROSCOPIC REPAIR ACL Right     BIOPSY CORE NEEDLE      prostate    CATARACT EXTRACTION Bilateral     COLONOSCOPY      EYE SURGERY      each eye had a detached retina and cataract removed, Right eye has a buckle    GASTRIC BYPASS      pt had LAP BAND SURGERY not gastric bypass    HERNIA REPAIR      naval    JOINT REPLACEMENT      B/L knee    KNEE ARTHROSCOPY Left     RI TOTAL KNEE ARTHROPLASTY Left 2/15/2017    Procedure: TOTAL KNEE ARTHROPLASTY ;  Surgeon: Remberto Mota MD;  Location: [facility];  Service: Orthopedics    RI TOTAL KNEE ARTHROPLASTY Right 10/10/2016    Procedure: ARTHROPLASTY KNEE TOTAL;  Surgeon: Viviane Borges MD;  Location: [facility];  Service: Orthopedics    PROSTATECTOMY      robotic-assisted; JOHNNYB-Dr Deandra Greco RETINAL DETACHMENT SURGERY Bilateral     [facility]    SEPTOPLASTY      SINUS SURGERY      TONSILLECTOMY      over age 13   Claudio Bhatt UPPER GASTROINTESTINAL ENDOSCOPY      VASECTOMY      vas deferens     Social History   Social History     Substance and Sexual Activity   Alcohol Use Not Currently    Alcohol/week: 1 0 standard drink    Types: 1 Cans of beer per week    Comment: rare ; being a social drinker (as per allscripts)     Social History     Substance and Sexual Activity   Drug Use Not Currently     Social History     Tobacco Use   Smoking Status Never Smoker   Smokeless Tobacco Never Used   Tobacco Comment    quit 45 years ago     Family History   Problem Relation Age of Onset    Heart disease Father     Coronary artery disease Father     Prostate cancer Father     Coronary artery disease Mother     Diabetes Mother         mellitus    Diabetes Maternal Grandmother         mellitus    Coronary artery disease Maternal Grandfather     Coronary artery disease Paternal Grandfather        Meds/Allergies     (Not in a hospital admission)      Allergies   Allergen Reactions    Celebrex [Celecoxib] Other (See Comments)     Cardiologist stated he should not take      Other      "steroid eyedrops"      Prednisone Other (See Comments)     Prednisone eye drops- eye pressure increases       Objective     There were no vitals taken for this visit        PHYSICAL EXAM    Gen: NAD  CV: RRR  CHEST: Clear  ABD: soft, NT/ND  EXT: no edema  Neuro: AAO      ASSESSMENT/PLAN:  This is a 74y o  year old male here for EGD with possible radiofrequency ablation    PLAN:   Procedure:  EGD with radiofrequency ablation for Molina's esophagus

## 2022-05-27 NOTE — ANESTHESIA POSTPROCEDURE EVALUATION
Post-Op Assessment Note    CV Status:  Stable  Pain Score: 0    Pain management: adequate     Mental Status:  Alert and awake   Hydration Status:  Stable   PONV Controlled:  None   Airway Patency:  Patent      Post Op Vitals Reviewed: Yes      Staff: Anesthesiologist, CRNA         No complications documented      BP   172/85   Temp      Pulse  73   Resp   22   SpO2   95

## 2022-05-31 ENCOUNTER — OFFICE VISIT (OUTPATIENT)
Dept: FAMILY MEDICINE CLINIC | Facility: CLINIC | Age: 67
End: 2022-05-31
Payer: MEDICARE

## 2022-05-31 VITALS
WEIGHT: 293 LBS | HEART RATE: 80 BPM | TEMPERATURE: 98.3 F | BODY MASS INDEX: 43.4 KG/M2 | HEIGHT: 69 IN | RESPIRATION RATE: 16 BRPM

## 2022-05-31 DIAGNOSIS — L30.9 DERMATITIS: Primary | ICD-10-CM

## 2022-05-31 PROCEDURE — 99213 OFFICE O/P EST LOW 20 MIN: CPT | Performed by: FAMILY MEDICINE

## 2022-05-31 RX ORDER — PREDNISONE 20 MG/1
20 TABLET ORAL DAILY
Qty: 7 TABLET | Refills: 0 | Status: SHIPPED | OUTPATIENT
Start: 2022-05-31 | End: 2022-06-06 | Stop reason: SDUPTHER

## 2022-05-31 NOTE — PROGRESS NOTES
Assessment/Plan:    Dermatitis  Patient has rash on the right forearm  Unclear etiology  Did have a EGD procedure 4 days prior  Unclear if this is the cause  Will treat as a inflammatory process  Prescribe prednisone 20 mg daily for the next 7 days  Given reduced dose given history of diabetes, currently on metformin and Ozempic  Advised patient that if he checks his blood sugars sugars will be elevated due to the steroids  Advised patient follow-up in 2 weeks if no improvement  Diagnoses and all orders for this visit:    Dermatitis  -     predniSONE 20 mg tablet; Take 1 tablet (20 mg total) by mouth daily for 7 days        Subjective:   No chief complaint on file  Health Maintenance   Topic Date Due    DTaP,Tdap,and Td Vaccines (1 - Tdap) 07/17/1999    PT PLAN OF CARE  03/17/2021    SLP PLAN OF CARE  05/13/2021    COVID-19 Vaccine (3 - Booster for Moderna series) 08/19/2021    DM Eye Exam  10/14/2021    Pneumococcal Vaccine: 65+ Years (3 - PPSV23 or PCV20) 10/20/2021    Fall Risk  05/27/2022    Medicare Annual Wellness Visit (AWV)  05/27/2022    HEMOGLOBIN A1C  09/06/2022    BMI: Followup Plan  03/24/2023    Diabetic Foot Exam  04/21/2023    BMI: Adult  05/31/2023    Colorectal Cancer Screening  03/10/2026    Hepatitis C Screening  Completed    Influenza Vaccine  Completed    HIB Vaccine  Aged Out    Hepatitis B Vaccine  Aged Out    IPV Vaccine  Aged Out    Hepatitis A Vaccine  Aged Out    Meningococcal ACWY Vaccine  Aged Out    HPV Vaccine  Aged Out        Patient ID: Sravanthi Chavarria is a 74 y o  male  HPI    Patient reports having a rash on right arm for the past 2 days  Started as two macule/pauple on the arm  Now having hives spreading to the hand and proximal forearm  Nontender, non pruritic  No otc medications  No other alleviating or exacerbating factors  Recently in the hospital for EGD  IV access was located near the rash    Denies fevers, chills, shortness of breath, chest pain, difficulty breathing, palpitations, nausea, vomiting or diarrhea  No recent travel  Other individuals with anything similar  The following portions of the patient's history were reviewed and updated as appropriate: allergies, current medications, past family history, past medical history, past social history, past surgical history, and problem list     Review of Systems   Constitutional: Negative for chills and fever  HENT: Negative for congestion  Eyes: Negative for visual disturbance  Respiratory: Negative for cough and shortness of breath  Cardiovascular: Negative for chest pain and palpitations  Gastrointestinal: Negative for diarrhea, nausea and vomiting  Genitourinary: Negative for dysuria  Musculoskeletal: Negative for myalgias  Skin: Negative for rash  Neurological: Negative for dizziness and headaches  Objective:  Pulse 80   Temp 98 3 °F (36 8 °C) (Tympanic)   Resp 16   Ht 5' 9" (1 753 m)   Wt 133 kg (293 lb)   BMI 43 27 kg/m²      Physical Exam  Vitals and nursing note reviewed  Constitutional:       General: He is not in acute distress  Appearance: He is obese  HENT:      Head: Normocephalic and atraumatic  Eyes:      Conjunctiva/sclera: Conjunctivae normal    Cardiovascular:      Rate and Rhythm: Normal rate and regular rhythm  Pulses: Normal pulses  Heart sounds: No murmur heard  Pulmonary:      Effort: Pulmonary effort is normal  No respiratory distress  Breath sounds: No wheezing or rales  Musculoskeletal:      Comments: Slightly raise macules a with largest being 1 cm in diameter on right forearm  Rest are very diffusely spread throughout right forearm  Nontender  Nonpruritic  Lymphadenopathy:      Cervical: No cervical adenopathy  Neurological:      Mental Status: He is alert  This note has been constructed using a voice recognition system   There may be translation, syntax, or grammatical errors  If you have an questions, please contact the dictating provider

## 2022-05-31 NOTE — ASSESSMENT & PLAN NOTE
Patient has rash on the right forearm  Unclear etiology  Did have a EGD procedure 4 days prior  Unclear if this is the cause  Will treat as a inflammatory process  Prescribe prednisone 20 mg daily for the next 7 days  Given reduced dose given history of diabetes, currently on metformin and Ozempic  Advised patient that if he checks his blood sugars sugars will be elevated due to the steroids  Advised patient follow-up in 2 weeks if no improvement

## 2022-06-06 DIAGNOSIS — L30.9 DERMATITIS: ICD-10-CM

## 2022-06-07 RX ORDER — PREDNISONE 20 MG/1
20 TABLET ORAL DAILY
Qty: 7 TABLET | Refills: 0 | Status: SHIPPED | OUTPATIENT
Start: 2022-06-07 | End: 2022-06-14

## 2022-07-05 NOTE — PROGRESS NOTES
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  Roxanne Astudillo explained that he is a person who is an over achiever and feels that he " failed"  with the surgery process  because he needed to follow the guidelines and recommendations, but was aware he did not and now he is ready to make healthy changes for himself  Roxanne Astudillo is currently seeing a neurologist for some medical issues he is experiencing with his balance and reports that this has slowed him down from being as active as he would like  He currently walks 25 hundred steps and shared that he was educated by the RD on areas he needs to change in his diet  Roxanne Astudillo expressed  that he enjoys his diet sodas four  a day understanding he needs to increase water less diet soda  SW educated Roxanne Astudillo on strategies to help him control triggers of unhealthy food  Roxanne Astudillo  has a wonderful support system and is happy to share that when he needs help they are willing to be there for him  Sleep for Ch[place] is difficult as he explained he normally goes to sleep at 4:00a m  and gets up at noon  His high peek for doing bills, activities and is inspired is after 10:00p m  Roxanne Astudillo presented enthusiastic to want to make healthy changes in his life  Goal: 1-decrease diet sodas and increasing water intake     Next appointment: 8/18 Dr Poornima Walter

## 2022-07-06 ENCOUNTER — OFFICE VISIT (OUTPATIENT)
Dept: BARIATRICS | Facility: CLINIC | Age: 67
End: 2022-07-06

## 2022-07-06 VITALS — WEIGHT: 293 LBS | BODY MASS INDEX: 43.4 KG/M2 | HEIGHT: 69 IN

## 2022-07-06 DIAGNOSIS — Z01.818 PRE-OPERATIVE CLEARANCE: Primary | ICD-10-CM

## 2022-07-06 DIAGNOSIS — I10 BENIGN ESSENTIAL HYPERTENSION: ICD-10-CM

## 2022-07-06 DIAGNOSIS — R13.10 DYSPHAGIA: ICD-10-CM

## 2022-07-06 DIAGNOSIS — E11.9 CONTROLLED TYPE 2 DIABETES MELLITUS WITHOUT COMPLICATION, WITHOUT LONG-TERM CURRENT USE OF INSULIN (HCC): ICD-10-CM

## 2022-07-06 DIAGNOSIS — E66.01 OBESITY, CLASS III, BMI 40-49.9 (MORBID OBESITY) (HCC): ICD-10-CM

## 2022-07-06 DIAGNOSIS — K22.719 BARRETT'S ESOPHAGUS WITH DYSPLASIA: Primary | ICD-10-CM

## 2022-07-06 DIAGNOSIS — K31.84 GASTROPARESIS: ICD-10-CM

## 2022-07-06 DIAGNOSIS — R11.10 VOMITING: ICD-10-CM

## 2022-07-06 DIAGNOSIS — K22.719 BARRETT'S ESOPHAGUS WITH DYSPLASIA: ICD-10-CM

## 2022-07-06 DIAGNOSIS — Z98.84 BARIATRIC SURGERY STATUS: ICD-10-CM

## 2022-07-06 PROCEDURE — RECHECK: Performed by: DIETITIAN, REGISTERED

## 2022-07-06 NOTE — PATIENT INSTRUCTIONS
Goals  Reduce diet soda to only 1-2 cans per day  Limit diet tea to one to two per day   Eliminate late night snack before bed  Recommend wedge pillow to sleep   Recommend vitamin B12 sublingual 500-1000 mcg per day   Small frequent meals   Diet for Stomach Ulcers and Gastritis   WHAT YOU NEED TO KNOW:   What is a diet for stomach ulcers and gastritis? A diet for ulcers and gastritis is a meal plan that limits foods that irritate your stomach  Certain foods may worsen symptoms such as stomach pain, bloating, heartburn, or indigestion  Which foods should I limit or avoid? You may need to avoid acidic, spicy, or high-fat foods  Not all foods affect everyone the same way  You will need to learn which foods worsen your symptoms and limit those foods  The following are some foods that may worsen ulcer or gastritis symptoms:  Beverages:      Whole milk and chocolate milk    Hot cocoa and cola    Any beverage with caffeine    Regular and decaffeinated coffee    Peppermint and spearmint tea    Green and black tea, with or without caffeine    Orange and grapefruit juices    Drinks that contain alcohol    Spices and seasonings:      Black and red pepper    Chili powder    Mustard seed and nutmeg    Other foods:      Dairy foods made from whole milk or cream    Chocolate    Spicy or strongly flavored cheeses, such as jalapeno or black pepper    Highly seasoned, high-fat meats, such as sausage, salami, garber, ham, and cold cuts    Hot chiles and peppers    Tomato products, such as tomato paste, tomato sauce, or tomato juice    Which foods can I eat and drink? Eat a variety of healthy foods from all the food groups  Eat fruits, vegetables, whole grains, and fat-free or low-fat dairy foods  Whole grains include whole-wheat breads, cereals, pasta, and brown rice  Choose lean meats, poultry (chicken and turkey), fish, beans, eggs, and nuts  A healthy meal plan is low in unhealthy fats, salt, and added sugar   Healthy fats include olive oil and canola oil  Ask your dietitian for more information about a healthy meal plan  What other guidelines may be helpful? Do not eat right before bedtime  Stop eating at least 2 hours before bedtime  Eat small, frequent meals  Your stomach may tolerate small, frequent meals better than large meals  CARE AGREEMENT:   You have the right to help plan your care  Discuss treatment options with your healthcare provider to decide what care you want to receive  You always have the right to refuse treatment  The above information is an  only  It is not intended as medical advice for individual conditions or treatments  Talk to your doctor, nurse or pharmacist before following any medical regimen to see if it is safe and effective for you  © Copyright [employer] 2022 Information is for End User's use only and may not be sold, redistributed or otherwise used for commercial purposes  All illustrations and images included in CareNotes® are the copyrighted property of A D A M , Inc  or [address] [place] [address] [place]   GERD (Gastroesophageal Reflux Disease)   WHAT YOU NEED TO KNOW:   What is gastroesophageal reflux disease (GERD)? GERD is reflux that happens more than 2 times a week for a few weeks  Reflux means acid and food in your stomach back up into your esophagus  GERD can cause other health problems over time if it is not treated  What causes GERD? GERD often happens because the lower muscle (sphincter) of the esophagus does not close properly  The sphincter normally opens to let food into the stomach  It then closes to keep food and stomach acid in the stomach  If the sphincter does not close properly, stomach acid and food back up (reflux) into the esophagus   The following may increase your risk for GERD:  Certain foods such as spicy foods, chocolate, foods that contain caffeine, peppermint, and fried foods    Hiatal hernia    Certain medicines such as calcium channel blockers (used to treat high blood pressure), allergy medicines, sedatives, or antidepressants    Pregnancy, obesity, or scleroderma    Lying down after a meal    Drinking alcohol or smoking cigarettes    What are the signs and symptoms of GERD? Heartburn (burning pain in your chest)    Pain after meals that spreads to your neck, jaw, or shoulder    Pain that gets better when you change positions    Bitter or acid taste in your mouth    A dry cough    Trouble swallowing or pain with swallowing    Hoarseness or a sore throat    Burping or hiccups    Feeling full soon after you start eating    How is GERD diagnosed? Your healthcare provider will ask about your symptoms and when they started  Tell him or her about other medical conditions you have, your eating habits, and your activities  You may also need any of the following: The amount of acid  in your esophagus and stomach may be checked  A small probe is used to check the amount  An endoscopy  is a procedure used to look at the inside of your esophagus and stomach  An endoscope is a bendable tube with a light and camera on the end  Your healthcare provider may remove a small sample of tissue and send it to a lab for tests  X-ray  pictures may be taken of your stomach and intestines (bowel)  You may be given a chalky liquid to drink before the pictures are taken  This liquid helps your stomach and intestines show up better on the x-rays  Pressure and function  tests of your esophagus can help find problems such as a hiatal hernia  How is GERD treated? Medicines  are used to decrease stomach acid  Medicine may also be used to help your lower esophageal sphincter and stomach contract (tighten) more  Surgery  is done to wrap the upper part of the stomach around the esophageal sphincter  This will strengthen the sphincter and prevent reflux  How can I manage GERD? Do not have foods or drinks that may increase heartburn    These include chocolate, peppermint, fried or fatty foods, drinks that contain caffeine, or carbonated drinks (soda)  Other foods include spicy foods, onions, tomatoes, and tomato-based foods  Do not have foods or drinks that can irritate your esophagus, such as citrus fruits, juices, and alcohol  Do not eat large meals  When you eat a lot of food at one time, your stomach needs more acid to digest it  Eat 6 small meals each day instead of 3 large ones, and eat slowly  Do not eat meals 2 to 3 hours before bedtime  Elevate the head of your bed  Place 6-inch blocks under the head of your bed frame  You may also use more than one pillow under your head and shoulders while you sleep  Maintain a healthy weight  If you are overweight, weight loss may help relieve symptoms of GERD  Do not smoke  Smoking weakens the lower esophageal sphincter and increases the risk of GERD  Ask your healthcare provider for information if you currently smoke and need help to quit  E-cigarettes or smokeless tobacco still contain nicotine  Talk to your healthcare provider before you use these products  Do not put pressure on your abdomen  Pressure pushes acid up into your esophagus  Do not wear clothing that is tight around your waist  Do not bend over  Bend at the knees if you need to pick something up  Call your local emergency number (911 in the [facility]) if:   You have severe chest pain and sudden trouble breathing  When should I seek immediate care? You have trouble breathing after you vomit  You have trouble swallowing, or pain with swallowing  Your bowel movements are black, bloody, or tarry-looking  Your vomit looks like coffee grounds or has blood in it  When should I call my doctor? You feel full and cannot burp or vomit  You vomit large amounts, or you vomit often  You are losing weight without trying  Your symptoms get worse or do not improve with treatment      You have questions or concerns about your condition or care  CARE AGREEMENT:   You have the right to help plan your care  Learn about your health condition and how it may be treated  Discuss treatment options with your healthcare providers to decide what care you want to receive  You always have the right to refuse treatment  The above information is an  only  It is not intended as medical advice for individual conditions or treatments  Talk to your doctor, nurse or pharmacist before following any medical regimen to see if it is safe and effective for you  © Copyright [employer] 2022 Information is for End User's use only and may not be sold, redistributed or otherwise used for commercial purposes   All illustrations and images included in CareNotes® are the copyrighted property of A D A M , Inc  or [address] [place] [address]

## 2022-07-06 NOTE — PROGRESS NOTES
Bariatric  Nutrition Assessment Note    Preop  None- patient to have band removed and work on weight loss   After weight loss - plan for hernia repair       Type of surgery  Adjustable gastric band  Surgery Date: 7/26/2010  12 years  post-op  Surgeon: Dr Vivek Jaquez    Pt is scheduled for August 30th for band removal     Nutrition Assessment   Den Aceves  74 y o   male  Ht 5' 9" (1 753 m)   Wt 133 kg (293 lb)   BMI 43 27 kg/m²     Wt Readings from Last 3 Encounters:   05/31/22 133 kg (293 lb)   04/21/22 135 kg (298 lb)   03/24/22 134 kg (294 lb 8 oz)     Pt reports that he was up to 336 lbs 6-7 months ago ( lost 43# intentionally )   Was placed on Ozempic and was able to decrease his portions and the food he was eating leading to weigh tloss     [place]  [place] Fonder Equation:  2096 kcal per day   Estimated calories for weight maintenance:  2515 kcal per day   ( sedentary )   Estimated calories for weight loss  grams 1602-0438 kcal  ( 1-2# per wk wt loss - sedentary )  Estimated protein needs 75-90 (1 0-1 2 gms/kg IBW )   Estimated fluid needs 64-75 ounces per day  (25-30 ml/kg IBW )      Weight on Day of Weight Loss Surgery: 310 5#  Weight in (lb) to have BMI = 25: 164 9#  Hank wt = 254#  Post-Op Wt Loss: 16 1#/ 11% EBWL in 12 year(s)    Review of History and Medications   Past Medical History:   Diagnosis Date    Acute blood loss anemia 10/13/2016    Anxiety     Arthritis     knees    Arthritis     Asthma     stable for > 10 years    Molina esophagus     Cancer ([address]     prostate    Cataract     Depression     Diabetes ([address]     Diabetes mellitus ([address]     pre diabetes    Diverticulitis of colon     Environmental allergies     Fall 12/30/2020    Hiatal hernia     History of anal fissures     Hyperlipidemia     Hypertension     Incisional hernia     Insomnia     Kidney stone     Morbid obesity ([address] )     PONV (postoperative nausea and vomiting)     Prolapsing mitral valve prolapsing mitral valve leaflet syndrome    Prostate cancer ([address] )     Retinal detachment     [facility]; resolved: 10/10/2012    Sleep apnea     diagnosed but unable to tolerate cpap       Stuttering      Past Surgical History:   Procedure Laterality Date    ABDOMINAL SURGERY      gastric lap band    ARTHROSCOPIC REPAIR ACL Right     BIOPSY CORE NEEDLE      prostate    CATARACT EXTRACTION Bilateral     COLONOSCOPY      EYE SURGERY      each eye had a detached retina and cataract removed, Right eye has a buckle    GASTRIC BYPASS      pt had LAP BAND SURGERY not gastric bypass    HERNIA REPAIR      naval    JOINT REPLACEMENT      B/L knee    KNEE ARTHROSCOPY Left     OH TOTAL KNEE ARTHROPLASTY Left 2/15/2017    Procedure: TOTAL KNEE ARTHROPLASTY ;  Surgeon: Lauren Amor MD;  [facility]: [facility];  Service: Orthopedics    OH TOTAL KNEE ARTHROPLASTY Right 10/10/2016    Procedure: ARTHROPLASTY KNEE TOTAL;  Surgeon: Lauren Amor MD;  [facility];  Service: Orthopedics    PROSTATECTOMY      robotic-assisted; [facility]-Dr Aria Bowie RETINAL DETACHMENT SURGERY Bilateral     [facility]    SEPTOPLASTY      SINUS SURGERY      TONSILLECTOMY      over age 13   [facility] UPPER GASTROINTESTINAL ENDOSCOPY      VASECTOMY      vas deferens     Social History     Socioeconomic History    Marital status: /Civil Union     Spouse name: Not on file    Number of children: 0    Years of education: Not on file    Highest education level: Not on file   Occupational History    Occupation:    Tobacco Use    Smoking status: Never Smoker    Smokeless tobacco: Never Used    Tobacco comment: quit 38 years ago   Vaping Use    Vaping Use: Never used   Substance and Sexual Activity    Alcohol use: Not Currently     Alcohol/week: 1 0 standard drink     Types: 1 Cans of beer per week     Comment: rare ; being a social drinker (as per allscripts)    Drug use: Not Currently    Sexual activity: Yes     Partners: Female   Other Topics Concern    Not on file   Social History Narrative    ** Merged History Encounter **          Social Determinants of Health     Financial Resource Strain: Not on file   Food Insecurity: Not on file   Transportation Needs: Not on file   Physical Activity: Not on file   Stress: Not on file   Social Connections: Not on file   Intimate Partner Violence: Not on file   Housing Stability: Not on file       Current Outpatient Medications:     ALPRAZolam (XANAX) 0 25 mg tablet, Take 1 tablet (0 25 mg total) by mouth 3 (three) times a day as needed for anxiety, Disp: 270 tablet, Rfl: 1    Blood Glucose Monitoring Suppl (OneTouch Verio) w/Device KIT, Use daily (Patient taking differently: Use as needed), Disp: 1 kit, Rfl: 0    ezetimibe (ZETIA) 10 mg tablet, TAKE 1 TABLET DAILY (Patient taking differently: Take 0 5 mg by mouth in the morning), Disp: 90 tablet, Rfl: 3    FLUoxetine (PROzac) 20 mg capsule, TAKE 3 CAPSULES DAILY, Disp: 270 capsule, Rfl: 3    glucose blood (OneTouch Verio) test strip, USE TO TEST DAILY, Disp: 200 strip, Rfl: 1    hydrochlorothiazide (HYDRODIURIL) 12 5 mg tablet, Take 1 tablet (12 5 mg total) by mouth 3 (three) times a week Monday, Thursday, Saturday (Patient taking differently: Take 12 5 mg by mouth 3 (three) times a week Monday, Wednesday, Friday), Disp: 36 tablet, Rfl: 5    Lancet Devices (ONE TOUCH DELICA LANCING DEV) MISC, Use daily, Disp: 1 each, Rfl: 0    losartan (COZAAR) 100 MG tablet, TAKE 1 TABLET DAILY, Disp: 90 tablet, Rfl: 3    metFORMIN (GLUCOPHAGE) 500 mg tablet, TAKE 2 TABLETS TWICE A DAY WITH MEALS, Disp: 360 tablet, Rfl: 3    metoprolol succinate (TOPROL-XL) 25 mg 24 hr tablet, TAKE 1 TABLET DAILY, Disp: 90 tablet, Rfl: 3    Multiple Vitamins-Minerals (CENTRUM ADULTS PO), Take by mouth, Disp: , Rfl:     nystatin (MYCOSTATIN) powder, Apply topically 4 (four) times a day, Disp: 60 g, Rfl: 2    OLANZapine (ZyPREXA) 5 mg tablet, Take 0 5 tablets (2 5 mg total) by mouth daily, Disp: 90 tablet, Rfl: 3    OneTouch Delica Lancets 99B MISC, Use daily, Disp: 300 each, Rfl: 1    Ozempic, 1 MG/DOSE, 4 MG/3ML SOPN injection pen, Inject 0 75 mg under the skin once a week  , Disp: , Rfl:     pantoprazole (PROTONIX) 40 mg tablet, Take 1 tablet (40 mg total) by mouth 2 (two) times a day (Patient taking differently: Take 40 mg by mouth 2 (two) times a day 1 tab in AM, 2 tabs in PM), Disp: 180 tablet, Rfl: 3    rosuvastatin (CRESTOR) 5 mg tablet, Take 1 tablet (5 mg total) by mouth once a week, Disp: 12 tablet, Rfl: 3    Semaglutide, 1 MG/DOSE, 2 MG/1 5ML SOPN, Inject SQ 0 75mg - 1 0mg weekly as directed, Disp: 9 mL, Rfl: 3    sucralfate (CARAFATE) 1 g tablet, Take 1 tablet (1 g total) by mouth 4 (four) times a day, Disp: 40 tablet, Rfl: 2    zolpidem (AMBIEN) 5 mg tablet, Take 1 tablet (5 mg total) by mouth daily at bedtime as needed for sleep, Disp: 90 tablet, Rfl: 0    Food Intake and Lifestyle Assessment   Food Intake Assessment completed via usual diet recall  Wakes at 1 pm   Breakfast: 2 pm - english muffin with butter, sometimes with 1 tsp jelly  1 can diet pepsi( left open overnight so that it is flat )   Snack: 0   Lunch: 6 pm to 8 pm   Will  a meal from [place] or go out to eat or get take out   Focuses on seafood - shrimp lobster, sometimes steak with veggies sometimes veal parm  Carrots/ grean beans /peas  Occasional soups and salads   Snack: 0  Dinner: midnight to 1 am - snack of peanut butter crackers and flat diet pepsi   Snack:2- 3 am   Sugar free [place] bar or glass of Orange juice over ice  Beverage intake: water, juice, diet soda and diet iced tea   Diet texture/stage: regular  Protein supplement: no  Estimated protein intake per day: 50-60 grams   Estimated fluid intake per day: 3-4 cans flat diet pepsi ( 48 ounces ) , juice and iced tea about 16 ounces, 8 ounces of water   Meals eaten away from home: daily Typical meal pattern: 3 meals per day and 0-1 snacks per day  Eating Behaviors: Appropriate diet advancement, Mindless eating and Emotional eating  Drinks diet pepsi with meals   Does not follow 30/30 rule     Vitamin and Mineral regimen: take a multivitamin and mineral     Food allergies or intolerances: used to vomit daily due to overeating with the band   Patient now eats smaller portions   Cultural or Roman Catholic considerations: n/a     Physical Assessment  Lab Results   Component Value Date    HGBA1C 6 2 (H) 03/06/2022     Lab Results   Component Value Date    [id number] 328 03/06/2022     With normal MMA level     Nutrition Related Findings  Constipation, Vomiting and Return of Hunger  Pt reports now that he eats smaller portions he only vomits about twice per month now   Constipation since starting Ozempic although loves Ozempic as he has been able to reduce his portions and food in take and lose weight     Physical Activity  Types of exercise: Tracks steps  Averages 2000 per day, on a good day 9895-0748 steps per day   Current physical limitations: joint pain, neuropathy   Fell backwards down stairs last year with severe concussion - feels he is back 60%  Has f/u with neuro  Would like to return to playing golf    Psychosocial Assessment   Support systems: spouse  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  Sleeps 4 am to 1 pm     Nutrition Diagnosis  Diagnosis: Overweight / Obesity (NC-3 3)  Related to: Food and nutrition-related knowledge deficit, Physical inactivity and Excessive energy intake  As Evidenced by: BMI >25 and Expected anthropometric outcomes are not achieved     Nutrition Prescription: Recommend the following diet  Low fat, Low sugar, High protein and GERD diet     Interventions and Teaching   Patient educated on post-op nutrition guidelines  Patient educated and handouts provided    Capacity of post-surgery stomach  Diet progression  Adequate hydration  Expected weight loss  Weight loss plateaus/ possibility of weight regain  Nutrition considerations after surgery  Protein supplements  Dietary and lifestyle changes  Possible problems with poor eating habits  Intuitive eating  Potential for food intolerance after surgery, and ways to deal with them including: lactose intolerance, nausea, reflux, vomiting, diarrhea, food intolerance, appetite changes, gas  Vitamin / Mineral supplementation of Multivitamin with minerals, Calcium, Vitamin B12, Iron, Fat Soluble vitamins and Vitamin D    Education provided to: patient    Barriers to learning: No barriers identified    Readiness to change: preparation    Comprehension: needs reinforcement and verbalizes understanding     Expected Compliance: good    Evaluation/Monitoring   Eating pattern as discussed Tolerance of nutrition prescription Body weight Lab values Physical activity Bowel pattern    Goals  Complete lession plans 1-6, Eat 3 meals per day and Eliminate mindless snacking   Reduce diet soda to only 1-2 cans per day  Limit diet tea to one to two per day   Increase water intake to at least 48 ounces per day ( can use  beverages 10 calories or less )  Eliminate late night snack before bed  Recommend wedge pillow to sleep   Recommend vitamin B12 sublingual 500-1000 mcg per day     F/U in one month to review pre op liver shrinking diet      Time Spent:   1 Hour

## 2022-07-18 DIAGNOSIS — E11.9 CONTROLLED TYPE 2 DIABETES MELLITUS WITHOUT COMPLICATION, WITHOUT LONG-TERM CURRENT USE OF INSULIN (HCC): ICD-10-CM

## 2022-07-18 DIAGNOSIS — K22.70 BARRETT'S ESOPHAGUS WITHOUT DYSPLASIA: ICD-10-CM

## 2022-07-18 RX ORDER — PANTOPRAZOLE SODIUM 40 MG/1
40 TABLET, DELAYED RELEASE ORAL 2 TIMES DAILY
Qty: 180 TABLET | Refills: 0 | Status: SHIPPED | OUTPATIENT
Start: 2022-07-18 | End: 2022-07-28 | Stop reason: SDUPTHER

## 2022-07-28 DIAGNOSIS — K22.70 BARRETT'S ESOPHAGUS WITHOUT DYSPLASIA: ICD-10-CM

## 2022-07-28 RX ORDER — PANTOPRAZOLE SODIUM 40 MG/1
40 TABLET, DELAYED RELEASE ORAL 2 TIMES DAILY
Qty: 180 TABLET | Refills: 3 | Status: SHIPPED | OUTPATIENT
Start: 2022-07-28

## 2022-07-29 ENCOUNTER — TELEPHONE (OUTPATIENT)
Dept: NEUROLOGY | Facility: CLINIC | Age: 67
End: 2022-07-29

## 2022-08-02 ENCOUNTER — OFFICE VISIT (OUTPATIENT)
Dept: CARDIOLOGY CLINIC | Facility: CLINIC | Age: 67
End: 2022-08-02
Payer: MEDICARE

## 2022-08-02 ENCOUNTER — OFFICE VISIT (OUTPATIENT)
Dept: PODIATRY | Facility: CLINIC | Age: 67
End: 2022-08-02
Payer: MEDICARE

## 2022-08-02 VITALS
BODY MASS INDEX: 43.4 KG/M2 | DIASTOLIC BLOOD PRESSURE: 85 MMHG | HEIGHT: 69 IN | SYSTOLIC BLOOD PRESSURE: 130 MMHG | HEART RATE: 62 BPM | WEIGHT: 293 LBS

## 2022-08-02 VITALS
BODY MASS INDEX: 43.44 KG/M2 | WEIGHT: 293.3 LBS | HEART RATE: 66 BPM | HEIGHT: 69 IN | DIASTOLIC BLOOD PRESSURE: 70 MMHG | SYSTOLIC BLOOD PRESSURE: 112 MMHG | OXYGEN SATURATION: 94 %

## 2022-08-02 DIAGNOSIS — E78.2 MIXED HYPERLIPIDEMIA: ICD-10-CM

## 2022-08-02 DIAGNOSIS — K22.719 BARRETT'S ESOPHAGUS WITH DYSPLASIA: ICD-10-CM

## 2022-08-02 DIAGNOSIS — E11.9 CONTROLLED TYPE 2 DIABETES MELLITUS WITHOUT COMPLICATION, WITHOUT LONG-TERM CURRENT USE OF INSULIN (HCC): Primary | ICD-10-CM

## 2022-08-02 DIAGNOSIS — Z01.810 PREOP CARDIOVASCULAR EXAM: Primary | ICD-10-CM

## 2022-08-02 DIAGNOSIS — E78.5 HYPERLIPIDEMIA, UNSPECIFIED HYPERLIPIDEMIA TYPE: ICD-10-CM

## 2022-08-02 DIAGNOSIS — E13.9 DIABETES 1.5, MANAGED AS TYPE 1 (HCC): ICD-10-CM

## 2022-08-02 DIAGNOSIS — I10 ESSENTIAL HYPERTENSION: ICD-10-CM

## 2022-08-02 DIAGNOSIS — I77.89 HYPERPLASIA OF RENAL ARTERY (HCC): ICD-10-CM

## 2022-08-02 DIAGNOSIS — B35.1 ONYCHOMYCOSIS: ICD-10-CM

## 2022-08-02 PROCEDURE — 99212 OFFICE O/P EST SF 10 MIN: CPT | Performed by: PODIATRIST

## 2022-08-02 PROCEDURE — 99213 OFFICE O/P EST LOW 20 MIN: CPT | Performed by: INTERNAL MEDICINE

## 2022-08-02 PROCEDURE — 93000 ELECTROCARDIOGRAM COMPLETE: CPT | Performed by: INTERNAL MEDICINE

## 2022-08-02 NOTE — PROGRESS NOTES
Follow-up - Cardiology   Kevin Teran 74 y o  male MRN: [id number]        Problems    Problem List Items Addressed This Visit        Digestive    Molina's esophagus with dysplasia       Cardiovascular and Mediastinum    Hyperplasia of renal artery [facility])      Other Visit Diagnoses     Preop cardiovascular exam    -  Primary    Relevant Orders    POCT ECG    Mixed hyperlipidemia        Essential hypertension        Diabetes 1 5, managed as type 1 ([address] )        Hyperlipidemia, unspecified hyperlipidemia type                Plan and discussion   Patient seen August 2, 2022  He is here for cardiac clearance for to remove his gastric sleeve  He has a history of diabetes  A1c less than 7   Blood pressure well controlled  He has had frequent PVCs in the past and they are doing very well with beta-blocker  He has no cardiac complaints  Prostate carcinoma years ago  History gastric bypass  Positive family history coronary disease   Molina's esophagus esophagus   Presently seeing Neurology  We did clear him for his surgery  He has no cardiac complaints          HPI: Kevin Teran is a 74y o  year old male      PlanAnd discussion  Patient seen October 19, 2021  Blood pressure is well controlled  Diabetes much better controlled in the past   A1c is 6  He has lost 16 lb  He has a prostate carcinoma history with a PSA less than 0 1  His gastric bypass in the past     Positive family history coronary disease  His aorta 4 1 cm  He had some shortness of breath and did get a pulmonary function study  It was normal     The new event is that he has Molina's esophagus  I am cutting back his ED a the 5 mg a day  His LDL is 60  He is on a statin ACE-inhibitor beta-blocker  We will check his number of PVCs with a Holter counter before his next visit     Plan patient seen October 16, 2019  In the past he has had highly symptomatic PVCs    48 hour Holter recently showed only 27 PVCs less than 100 PACs  He does feel each 1 of them  I mostly read short him  He has had gastric bypass be still over 300 lb  He is status post prostate carcinoma which is being followed  He has a positive family history coronary artery disease      His LDL is well controlled at less than 70      He had an echocardiogram done July of 2019  Ejection fraction 65%   His aorta is 4 1 cm but he has no significant valvar disease  Summary therefore from a cardiac standpoint he is really doing very well   Mostly reassured him  Kira Hopper is diabetic  Kira Hopper has no documented coronary disease   He is on a statin  He is on 100 mg of losartan   He is also on a beta-blocker  Will do another Holter counter next year   Do not think he needs another echo               HPI: Marcellus Fregoso is a 63 y  o  year old male    Plan patient seen June 13, 2019  This patient seen for hypertension  Blood pressures been excellent   He is on 100 mg of losartan  He also has diabetes  A1c is very well controlled 7 2  He has had a gastric bypass but his weight is presently over 120 lb  He has had a prostatectomy  His PSA is less than 0 1  The most a common issue that he has it is bothering him is that he has highly symptomatic extrasystoles  With the put a 48 hour Holter  In addition his LDL is 104   He is a diabetic with a for a positive family history   I will increase his Crestor to 5 mg twice a week  Kira Hopper was supposed to be on twice a week but somehow other he is only taking it once a week  After the Holter counter repeat lipid profile will review his meds            Review of Systems   Constitutional: Positive for fatigue  Respiratory: Negative  Cardiovascular: Negative            Past Medical History:   Diagnosis Date    Acute blood loss anemia 10/13/2016    Anxiety     Arthritis     knees    Arthritis     Asthma     stable for > 10 years    Molina esophagus     [facility]     prostate    Cataract     Depression     Diabetes [facility]     Diabetes mellitus ([facility] )     pre diabetes    Diverticulitis of colon     Environmental allergies     Fall 12/30/2020    Hiatal hernia     History of anal fissures     Hyperlipidemia     Hypertension     Incisional hernia     Insomnia     Kidney stone     Morbid obesity (HCC)     PONV (postoperative nausea and vomiting)     Prolapsing mitral valve     prolapsing mitral valve leaflet syndrome    Prostate cancer ([facility] )     Retinal detachment     [facility] at [facility]; resolved: 10/10/2012    Sleep apnea     diagnosed but unable to tolerate cpap   Stuttering      Social History     Substance and Sexual Activity   Alcohol Use Not Currently    Alcohol/week: 1 0 standard drink    Types: 1 Cans of beer per week    Comment: rare ; being a social drinker (as per allscripts)     Social History     Substance and Sexual Activity   Drug Use Not Currently     Social History     Tobacco Use   Smoking Status Never Smoker   Smokeless Tobacco Never Used   Tobacco Comment    quit 38 years ago       Allergies:   Allergies   Allergen Reactions    Celebrex [Celecoxib] Other (See Comments)     Cardiologist stated he should not take      Other      "steroid eyedrops"      Prednisone Other (See Comments)     Prednisone eye drops- eye pressure increases       Medications:     Current Outpatient Medications:     ALPRAZolam (XANAX) 0 25 mg tablet, Take 1 tablet (0 25 mg total) by mouth 3 (three) times a day as needed for anxiety, Disp: 270 tablet, Rfl: 1    Blood Glucose Monitoring Suppl (OneTouch Verio) w/Device KIT, Use daily (Patient taking differently: Use as needed), Disp: 1 kit, Rfl: 0    ezetimibe (ZETIA) 10 mg tablet, TAKE 1 TABLET DAILY (Patient taking differently: Take 0 5 mg by mouth in the morning), Disp: 90 tablet, Rfl: 3    FLUoxetine (PROzac) 20 mg capsule, TAKE 3 CAPSULES DAILY, Disp: 270 capsule, Rfl: 3    glucose blood (OneTouch Verio) test strip, USE TO TEST DAILY, Disp: 200 strip, Rfl: 1    Lancet Devices (ONE TOUCH DELICA LANCING DEV) MISC, Use daily, Disp: 1 each, Rfl: 0    losartan (COZAAR) 100 MG tablet, TAKE 1 TABLET DAILY, Disp: 90 tablet, Rfl: 3    metFORMIN (GLUCOPHAGE) 500 mg tablet, TAKE 2 TABLETS TWICE A DAY WITH MEALS, Disp: 360 tablet, Rfl: 3    metoprolol succinate (TOPROL-XL) 25 mg 24 hr tablet, TAKE 1 TABLET DAILY, Disp: 90 tablet, Rfl: 3    Multiple Vitamins-Minerals (CENTRUM ADULTS PO), Take by mouth, Disp: , Rfl:     nystatin (MYCOSTATIN) powder, Apply topically 4 (four) times a day, Disp: 60 g, Rfl: 2    OLANZapine (ZyPREXA) 5 mg tablet, Take 0 5 tablets (2 5 mg total) by mouth daily, Disp: 90 tablet, Rfl: 3    OneTouch Delica Lancets 49C MISC, Use daily, Disp: 300 each, Rfl: 1    Ozempic, 1 MG/DOSE, 4 MG/3ML SOPN injection pen, Inject 0 75 mg under the skin once a week  , Disp: , Rfl:     pantoprazole (PROTONIX) 40 mg tablet, Take 1 tablet (40 mg total) by mouth 2 (two) times a day 1 tab in AM, 1 tabs in PM, Disp: 180 tablet, Rfl: 3    rosuvastatin (CRESTOR) 5 mg tablet, Take 1 tablet (5 mg total) by mouth once a week, Disp: 12 tablet, Rfl: 3    Semaglutide, 1 MG/DOSE, 2 MG/1 5ML SOPN, Inject SQ 0 75mg - 1 0mg weekly as directed, Disp: 9 mL, Rfl: 3    sucralfate (CARAFATE) 1 g tablet, Take 1 tablet (1 g total) by mouth 4 (four) times a day, Disp: 40 tablet, Rfl: 2    hydrochlorothiazide (HYDRODIURIL) 12 5 mg tablet, Take 1 tablet (12 5 mg total) by mouth 3 (three) times a week Monday, Thursday, Saturday (Patient taking differently: Take 12 5 mg by mouth 3 (three) times a week Monday, Wednesday, Friday), Disp: 36 tablet, Rfl: 5    zolpidem (AMBIEN) 5 mg tablet, Take 1 tablet (5 mg total) by mouth daily at bedtime as needed for sleep, Disp: 90 tablet, Rfl: 0      Physical Exam  Constitutional:       Appearance: He is obese  Cardiovascular:      Rate and Rhythm: Normal rate and regular rhythm  Pulses: Normal pulses        Heart sounds: No murmur heard  Pulmonary:      Effort: Pulmonary effort is normal    Musculoskeletal:      Right lower leg: No edema  Left lower leg: No edema  Skin:     General: Skin is warm and dry  Neurological:      Mental Status: He is alert  Laboratory Studies:  CMP:      Invalid input(s): ALBUMIN  NT-proBNP: No results found for: NTBNP   Coags:    Lipid Profile:   Lab Results   Component Value Date    CHOL 171 2015     Lab Results   Component Value Date    HDL 56 2022     Lab Results   Component Value Date    LDLCALC 71 2022     Lab Results   Component Value Date    TRIG 95 2022       Cardiac testing:     EKG reviewed personally:  Normal sinus rhythm left axis deviation    Results for orders placed during the hospital encounter of 19    Echo complete with contrast if indicated    Narrative  [facility] [address] [place] [address]  [phone]    Transthoracic Echocardiogram  2D, M-mode, Doppler, and Color Doppler    Study date:  2019    Patient: Pushpa Young  MR number: [id number]  Account number: [de-identified]  : 1955  Age: 70 years  Gender: Male  Status: Outpatient  [facility] [address] [place] [address] [facility]  Height: 67 in  Weight: 323 lb  BP: 130/ 76 mmHg    Indications: essential hypertension    Diagnoses: I10  - Essential (primary) hypertension    Sonographer:  Navdeep Lozano, [address] [place] [address]  Primary Physician:  Kendall De Luna MD  Referring Physician:  Bard Sharri MD  Group[facility]  [facility]  Cardiology Fellow:  Waylon Garland MD  Interpreting Physician:  Hank Tripathi MD    SUMMARY    LEFT VENTRICLE:  Systolic function was normal  Ejection fraction was estimated to be 65 %  Doppler parameters were consistent with abnormal left ventricular relaxation (grade 1 diastolic dysfunction)      RIGHT VENTRICLE:  The size was normal   Systolic function was normal     COMPARISONS:  There has been no significant interval change  Comparison was made with the previous study of 19-Oct-2017  HISTORY: PRIOR HISTORY: hypertension, hyperlipidemia, PVC's, DM    PROCEDURE: The study was performed in the [address] [facility] [place]  This was a routine study  The transthoracic approach was used  The study included complete 2D imaging, M-mode, complete spectral Doppler, and color Doppler  The  heart rate was 68 bpm, at the start of the study  Images were obtained from the parasternal, apical, subcostal, and suprasternal notch acoustic windows  Image quality was adequate  LEFT VENTRICLE: Size was normal  Systolic function was normal  Ejection fraction was estimated to be 65 %  There were no regional wall motion abnormalities  Wall thickness was normal  There was no evidence of concentric hypertrophy  DOPPLER: Doppler parameters were consistent with abnormal left ventricular relaxation (grade 1 diastolic dysfunction)  RIGHT VENTRICLE: The size was normal  Systolic function was normal     LEFT ATRIUM: The atrium was mildly dilated  RIGHT ATRIUM: Size was normal     MITRAL VALVE: Valve structure was normal  There was normal leaflet separation  DOPPLER: The transmitral velocity was within the normal range  There was no evidence for stenosis  There was trace regurgitation  AORTIC VALVE: The valve was trileaflet  Leaflets exhibited normal cuspal separation and sclerosis  DOPPLER: Transaortic velocity was within the normal range  There was no evidence for stenosis  There was no regurgitation  TRICUSPID VALVE: The valve structure was normal  There was normal leaflet separation  DOPPLER: The transtricuspid velocity was within the normal range  There was no evidence for stenosis  There was trace regurgitation  The tricuspid jet  envelope definition was inadequate for estimation of RV systolic pressure      PULMONIC VALVE: Leaflets exhibited normal thickness, no calcification, and normal cuspal separation  DOPPLER: The transpulmonic velocity was within the normal range  There was no evidence for stenosis  There was no regurgitation  PERICARDIUM: There was no pericardial effusion  The pericardium was normal in appearance  AORTA: The root exhibited normal size  The ascending aorta was normal in size for body surface area at 4 cm (16 mm/m2)  SYSTEMIC VEINS: IVC: The inferior vena cava was normal in size and course  Respirophasic changes were normal     SYSTEM MEASUREMENT TABLES    2D  %FS: 44 25 %  Ao Diam: 3 61 cm  EDV(Teich): 149 41 ml  EF(Cube): 82 67 %  EF(Teich): 74 93 %  ESV(Cube): 29 34 ml  ESV(Teich): 37 45 ml  IVSd: 1 15 cm  LA Area: 24 76 cm2  LA Diam: 4 25 cm  LVEDV MOD A4C: 157 35 ml  LVEF MOD A4C: 67 57 %  LVESV MOD A4C: 51 03 ml  LVIDd: 5 53 cm  LVIDs: 3 08 cm  LVLd A4C: 8 71 cm  LVLs A4C: 7 1 cm  LVPWd: 1 18 cm  RA Area: 17 62 cm2  RV Diam : 3 14 cm  SV MOD A4C: 106 32 ml  SV(Cube): 139 96 ml  SV(Teich): 111 96 ml    MM  TAPSE: 2 17 cm    PW  AVC: 318 86 ms  E': 0 07 m/s  E/E': 8 26  MV A Luis Armando: 0 71 m/s  MV Dec Harrison: 1 85 m/s2  MV DecT: 328 49 ms  MV E Luis Armando: 0 61 m/s  MV E/A Ratio: 0 86    [facility]    Prepared and electronically signed by    Daysi Thacker MD  Signed 26-Jul-2019 14:19:43    No results found for this or any previous visit  No results found for this or any previous visit  No results found for this or any previous visit  Kita Marvin MD    Portions of the record may have been created with voice recognition software   Occasional wrong word or "sound a like" substitutions may have occurred due to the inherent limitations of voice recognition software   Read the chart carefully and recognize, using context, where substitutions have occurred

## 2022-08-02 NOTE — PROGRESS NOTES
Patient presents with concern regarding each great toenail  Each nail is thickened yellow secondary to onychomycosis  The nails are not painful but patient states that they have a bed odor  Patient is a 52-year-old type 2 diabetic  On exam, pedal pulses are palpable  Each great toenail is mycotic but not painful with palpation  It is noted that patient is wearing loafers without socks causing increased perspiration  Treatment:  Contrasted treatment options for mycotic great toenails  Do not recommend nail removal with matrixectomy due to paucity of symptoms  Patient told to wear socks to reduce perspiration  No additional treatment needed at this time

## 2022-08-05 ENCOUNTER — APPOINTMENT (OUTPATIENT)
Dept: LAB | Age: 67
End: 2022-08-05
Payer: MEDICARE

## 2022-08-05 DIAGNOSIS — R13.10 DYSPHAGIA: ICD-10-CM

## 2022-08-05 DIAGNOSIS — I10 ESSENTIAL HYPERTENSION: ICD-10-CM

## 2022-08-05 DIAGNOSIS — K22.719 BARRETT'S ESOPHAGUS WITH DYSPLASIA: ICD-10-CM

## 2022-08-05 DIAGNOSIS — E11.9 CONTROLLED TYPE 2 DIABETES MELLITUS WITHOUT COMPLICATION, WITHOUT LONG-TERM CURRENT USE OF INSULIN (HCC): ICD-10-CM

## 2022-08-05 DIAGNOSIS — I49.3 FREQUENT PVCS: ICD-10-CM

## 2022-08-05 DIAGNOSIS — E66.01 OBESITY, CLASS III, BMI 40-49.9 (MORBID OBESITY) (HCC): ICD-10-CM

## 2022-08-05 DIAGNOSIS — Z01.818 PRE-OPERATIVE CLEARANCE: ICD-10-CM

## 2022-08-05 DIAGNOSIS — Z98.84 BARIATRIC SURGERY STATUS: ICD-10-CM

## 2022-08-05 DIAGNOSIS — R11.10 VOMITING: ICD-10-CM

## 2022-08-05 DIAGNOSIS — I10 BENIGN ESSENTIAL HYPERTENSION: ICD-10-CM

## 2022-08-05 DIAGNOSIS — K31.84 GASTROPARESIS: ICD-10-CM

## 2022-08-05 DIAGNOSIS — E13.9 DIABETES 1.5, MANAGED AS TYPE 1 (HCC): ICD-10-CM

## 2022-08-05 LAB
ALBUMIN SERPL BCP-MCNC: 3.7 G/DL (ref 3.5–5)
ALP SERPL-CCNC: 63 U/L (ref 46–116)
ALT SERPL W P-5'-P-CCNC: 28 U/L (ref 12–78)
ANION GAP SERPL CALCULATED.3IONS-SCNC: 6 MMOL/L (ref 4–13)
AST SERPL W P-5'-P-CCNC: 19 U/L (ref 5–45)
BASOPHILS # BLD AUTO: 0.07 THOUSANDS/ΜL (ref 0–0.1)
BASOPHILS NFR BLD AUTO: 1 % (ref 0–1)
BILIRUB SERPL-MCNC: 0.57 MG/DL (ref 0.2–1)
BUN SERPL-MCNC: 13 MG/DL (ref 5–25)
CALCIUM SERPL-MCNC: 9.2 MG/DL (ref 8.3–10.1)
CHLORIDE SERPL-SCNC: 107 MMOL/L (ref 96–108)
CHOLEST SERPL-MCNC: 163 MG/DL
CK SERPL-CCNC: 57 U/L (ref 39–308)
CO2 SERPL-SCNC: 27 MMOL/L (ref 21–32)
CREAT SERPL-MCNC: 0.99 MG/DL (ref 0.6–1.3)
CREAT UR-MCNC: 247 MG/DL
EOSINOPHIL # BLD AUTO: 0.85 THOUSAND/ΜL (ref 0–0.61)
EOSINOPHIL NFR BLD AUTO: 12 % (ref 0–6)
ERYTHROCYTE [DISTWIDTH] IN BLOOD BY AUTOMATED COUNT: 13.3 % (ref 11.6–15.1)
EST. AVERAGE GLUCOSE BLD GHB EST-MCNC: 126 MG/DL
GFR SERPL CREATININE-BSD FRML MDRD: 77 ML/MIN/1.73SQ M
GLUCOSE P FAST SERPL-MCNC: 145 MG/DL (ref 65–99)
HBA1C MFR BLD: 6 %
HCT VFR BLD AUTO: 42.8 % (ref 36.5–49.3)
HDLC SERPL-MCNC: 50 MG/DL
HGB BLD-MCNC: 14.7 G/DL (ref 12–17)
IMM GRANULOCYTES # BLD AUTO: 0.02 THOUSAND/UL (ref 0–0.2)
IMM GRANULOCYTES NFR BLD AUTO: 0 % (ref 0–2)
LDLC SERPL CALC-MCNC: 96 MG/DL (ref 0–100)
LYMPHOCYTES # BLD AUTO: 1.7 THOUSANDS/ΜL (ref 0.6–4.47)
LYMPHOCYTES NFR BLD AUTO: 23 % (ref 14–44)
MCH RBC QN AUTO: 30 PG (ref 26.8–34.3)
MCHC RBC AUTO-ENTMCNC: 34.3 G/DL (ref 31.4–37.4)
MCV RBC AUTO: 87 FL (ref 82–98)
MICROALBUMIN UR-MCNC: 9.4 MG/L (ref 0–20)
MICROALBUMIN/CREAT 24H UR: 4 MG/G CREATININE (ref 0–30)
MONOCYTES # BLD AUTO: 0.6 THOUSAND/ΜL (ref 0.17–1.22)
MONOCYTES NFR BLD AUTO: 8 % (ref 4–12)
NEUTROPHILS # BLD AUTO: 4.15 THOUSANDS/ΜL (ref 1.85–7.62)
NEUTS SEG NFR BLD AUTO: 56 % (ref 43–75)
NONHDLC SERPL-MCNC: 113 MG/DL
NRBC BLD AUTO-RTO: 0 /100 WBCS
PLATELET # BLD AUTO: 273 THOUSANDS/UL (ref 149–390)
PMV BLD AUTO: 8.7 FL (ref 8.9–12.7)
POTASSIUM SERPL-SCNC: 3.8 MMOL/L (ref 3.5–5.3)
PROT SERPL-MCNC: 7 G/DL (ref 6.4–8.4)
RBC # BLD AUTO: 4.9 MILLION/UL (ref 3.88–5.62)
SODIUM SERPL-SCNC: 140 MMOL/L (ref 135–147)
TRIGL SERPL-MCNC: 86 MG/DL
WBC # BLD AUTO: 7.39 THOUSAND/UL (ref 4.31–10.16)

## 2022-08-05 PROCEDURE — 36415 COLL VENOUS BLD VENIPUNCTURE: CPT

## 2022-08-05 PROCEDURE — 82570 ASSAY OF URINE CREATININE: CPT

## 2022-08-05 PROCEDURE — 85025 COMPLETE CBC W/AUTO DIFF WBC: CPT

## 2022-08-05 PROCEDURE — 80053 COMPREHEN METABOLIC PANEL: CPT

## 2022-08-05 PROCEDURE — 82550 ASSAY OF CK (CPK): CPT

## 2022-08-05 PROCEDURE — 82043 UR ALBUMIN QUANTITATIVE: CPT

## 2022-08-05 PROCEDURE — 83036 HEMOGLOBIN GLYCOSYLATED A1C: CPT

## 2022-08-05 PROCEDURE — 80061 LIPID PANEL: CPT

## 2022-08-10 ENCOUNTER — OFFICE VISIT (OUTPATIENT)
Dept: NEUROLOGY | Facility: CLINIC | Age: 67
End: 2022-08-10
Payer: MEDICARE

## 2022-08-10 VITALS
HEIGHT: 69 IN | TEMPERATURE: 96.4 F | SYSTOLIC BLOOD PRESSURE: 138 MMHG | HEART RATE: 68 BPM | WEIGHT: 293 LBS | BODY MASS INDEX: 43.4 KG/M2 | DIASTOLIC BLOOD PRESSURE: 88 MMHG

## 2022-08-10 DIAGNOSIS — R25.1 TREMOR: ICD-10-CM

## 2022-08-10 DIAGNOSIS — G62.9 NEUROPATHY: Primary | ICD-10-CM

## 2022-08-10 PROCEDURE — 99215 OFFICE O/P EST HI 40 MIN: CPT | Performed by: PSYCHIATRY & NEUROLOGY

## 2022-08-10 NOTE — ASSESSMENT & PLAN NOTE
Pt here today with spouse for follow up visit  Pt seen one time to date  Pt s/p fall from dec 2020  Likely contributory is underlying neuropathy as evidenced clinically on exam  Pt awaiting emg at end of this month for furhter quantification  Exam stable at this time  Pt reminded about fall risk  Rev pathophysiology of neuropathy and typical causal factors  Pt with known DM  Also checked other labs including nl ck, neg lyme, neg sjogrens, nl vit b1, vit b6, vit b12 of 328  Neg spep  Pt follows with gastric team, pt is going for removal of adjustable gastric band at end of month  Pt is also following with nutritionist  Referral to balance therapy also placed for pt for any role for assistive cane and balance correction therapy

## 2022-08-10 NOTE — PROGRESS NOTES
Patient ID: Landy Peoples is a 74 y o  male  Assessment/Plan:    Neuropathy  Pt here today with spouse for follow up visit  Pt seen one time to date  Pt s/p fall from dec 2020  Likely contributory is underlying neuropathy as evidenced clinically on exam  Pt awaiting emg at end of this month for furhter quantification  Exam stable at this time  Pt reminded about fall risk  Rev pathophysiology of neuropathy and typical causal factors  Pt with known DM  Also checked other labs including nl ck, neg lyme, neg sjogrens, nl vit b1, vit b6, vit b12 of 328  Neg spep  Pt follows with gastric team, pt is going for removal of adjustable gastric band at end of month  Pt is also following with nutritionist  Referral to balance therapy also placed for pt for any role for assistive cane and balance correction therapy      Tremor  Pt with fine tremor noted  No affect on adls  No parkinsonian features noted at this time  Exam stable  Pt on zyprexa for stuttering history  Cont to follow cllinically        Diagnoses and all orders for this visit:    Neuropathy  -     Ambulatory Referral to Physical Therapy; Future    Tremor           Subjective:    HPI    Pt is a 75 yo m with pmh of barretts esophagus, gastroparesis, DM type 2, stuttering, HTN, ANABELL who presents today for evaluation of several neurological issues  pt last seen on 3/18/22 for initial appt  Per my last visit " Pt did well to bring list of active issues with him which we addressed in full at visit  Pt notes concern about his balance  Pt feels off balance intermittently while walking, in shower or on uneven surfaces  Pt had a fall in dec 2020 after taking an Neida Rho and waiting too long to go right to sleep  Pt notes he fell down 16 steps and his back of head  Pt was evaluated and release with concussion but no associated internal injury or fracture  Pt feels a bit slower since fall  Pt notes he feels stamina is significantly decreased     Pt notes overall mobility and range of motion more limited as well  Pt denies any pain in hands or feet  No nocturnal sxs  On exam pt with decreased vib and temp in lower ext  Rev dx of neuropathy with pt in office and likely contributor to his balance issues  Pt also with issues with getting up from low chairs  Pt was able to get up in office wihout use of arms or chair arms as well  Pt has been on crestor over the years  No clear proximal weakness in arms or legs during exam today  Will check ck and emg for both neuropathy and this ongoing issue  Pt also with concerns for parkinsons disease due to history in family with 2 uncles  Pt notes sxs mostly with action  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  Pt notes no issues with adls  Pt notes rare issue while eating  No head titibation  Pt recalls uncle with shaking of head  Wonder if actual essential tremor rather than PD  Pt also on zyprexa and prozac due to stuttering  Question if zyprexa any impact on tremor  Pt has decreased doses and not sure of any direct impact on tremor  Pt had lap band surgery about 10 yrs ago  Pt lost about 40 lbs  Pt then later dx with prostate cancer and surgeon deflated the band  Pt notes he gained back the 40 lbs   Pt feels however his DM meds really helping with his sugar and weight control presently "  Kept above paragraph due to complexities of pt case  Overall pt doing well since last visit  Extended appt due to multiplicity of issues  Pt notes no new sxs currently  Pt notes no falls or trips  No change in bowel or bladder  No change in vision  No diplopia  Pt denies any ha or n or v  Pt wife also present to help with history and details  Pt going at end of month for removal of adjustable gastric band  Pt had updated neuropathy labs since last visit with ck nl at 54, vit b1, B6  Ok   sjogren neg  Lyme neg  Vit b12 at 328    Pt will discuss with nutritional team after bariatric procedure as well for healthy options  Pt still awaiting emg  Pt originally scheduled at [facility] and then got earlier slot at [facility] and then that was cancelled  Pt now getting done at end of month  Pt to call me about 3 days after study  Pt notes last hba1c at 6  Pt is following closely for his blood sugars with pcp  Also pt had updated mri head in light of balance and fall history  Mri head from march 29 2022 mild pv and subcortical non specific wm changes, otherwise neg dwi  Post fossa normal   Pt notes tremor is about the same  Min fine tremor with some action  No other associated sxs  Pt on zyprexa for stuttering  Pt feels this med has been helpful  Rev pathophysiology of small vessel disease, not contributory to his tremor or his balance  Also rev pathophysiology of neuropathy, typical etiology  anuel from DM  Pt to have emg to help further clarify clinical findings  Currently no PD features and this was reviewed with pt as well                The following portions of the patient's history were reviewed and updated as appropriate: allergies, current medications, past family history, past medical history, past social history, past surgical history and problem list and med rec and ros rev  Objective:    Blood pressure 138/88, pulse 68, temperature (!) 96 4 °F (35 8 °C), height 5' 9" (1 753 m), weight 133 kg (293 lb)  Physical Exam  Constitutional:       General: He is not in acute distress  Appearance: He is not ill-appearing  Eyes:      General: Lids are normal       Extraocular Movements: Extraocular movements intact  Pupils: Pupils are equal, round, and reactive to light  Musculoskeletal:      Right lower leg: No edema  Left lower leg: No edema  Neurological:      Mental Status: He is alert  Deep Tendon Reflexes: Strength normal       Reflex Scores:       Tricep reflexes are 2+ on the right side and 2+ on the left side         Bicep reflexes are 2+ on the right side and 2+ on the left side  Brachioradialis reflexes are 2+ on the right side and 2+ on the left side  Patellar reflexes are 1+ on the right side and 1+ on the left side  Achilles reflexes are 1+ on the right side and 1+ on the left side  Neurological Exam  Mental Status  Alert  Fund of knowledge is appropriate for level of education  Stuttering speech  No tics  Cranial Nerves  CN II: Visual acuity is normal  Visual fields full to confrontation  CN III, IV, VI: Extraocular movements intact bilaterally  Normal lids and orbits bilaterally  Pupils equal round and reactive to light bilaterally  CN V: Facial sensation is normal   CN VII: Full and symmetric facial movement  CN VIII: Hearing is normal   CN IX, X: Palate elevates symmetrically  Normal gag reflex  CN XI: Shoulder shrug strength is normal   CN XII: Tongue midline without atrophy or fasciculations  Motor  Normal muscle bulk throughout  Normal muscle tone  The following abnormal movements were seen: Strength is 5/5 throughout all four extremities  Min postural tremor in digits only  No cogwheeling rigidity  No bradykinesia  Normal tone of ext  Sensory  Dec vib mejia lowers      Reflexes                                            Right                      Left  Brachioradialis                    2+                         2+  Biceps                                 2+                         2+  Triceps                                2+                         2+  Finger flex                           2+                         2+  Hamstring                            1+                         1+  Patellar                                1+                         1+  Achilles                                1+                         1+  Plantar                           Downgoing                Downgoing    Coordination  Right: Finger-to-nose normal  Rapid alternating movement normal Left: Finger-to-nose normal  Heel-to-shin normal     Gait  Casual gait: Wide stance  ROS:    Review of Systems   Constitutional: Negative  Negative for appetite change and fever  HENT: Negative  Negative for hearing loss, tinnitus, trouble swallowing and voice change  Eyes: Negative  Negative for photophobia and pain  Respiratory: Negative  Negative for shortness of breath  Cardiovascular: Negative  Negative for palpitations  Gastrointestinal: Negative  Negative for nausea and vomiting  Endocrine: Negative  Negative for cold intolerance  Genitourinary: Negative  Negative for dysuria, frequency and urgency  Musculoskeletal: Positive for gait problem  Negative for myalgias and neck pain  Balance is off not fallen but very unsteady at times   Skin: Negative  Negative for rash  Allergic/Immunologic: Negative  Neurological: Negative for dizziness, tremors, seizures, syncope, facial asymmetry, speech difficulty, weakness, light-headedness, numbness and headaches  Hematological: Negative  Does not bruise/bleed easily  Psychiatric/Behavioral: Negative  Negative for confusion, hallucinations and sleep disturbance  All other systems reviewed and are negative        Patient wants to f/u MRI results

## 2022-08-10 NOTE — ASSESSMENT & PLAN NOTE
Pt with fine tremor noted  No affect on adls  No parkinsonian features noted at this time  Exam stable  Pt on zyprexa for stuttering history  Cont to follow cllinically

## 2022-08-11 ENCOUNTER — TELEPHONE (OUTPATIENT)
Dept: NEUROLOGY | Facility: CLINIC | Age: 67
End: 2022-08-11

## 2022-08-16 NOTE — PRE-PROCEDURE INSTRUCTIONS
Pre-Surgery Instructions:   Medication Instructions    ALPRAZolam (XANAX) 0 25 mg tablet Uses PRN- OK to take day of surgery    ezetimibe (ZETIA) 10 mg tablet Take day of surgery   FLUoxetine (PROzac) 20 mg capsule Take day of surgery   hydrochlorothiazide (HYDRODIURIL) 12 5 mg tablet Hold day of surgery   losartan (COZAAR) 100 MG tablet Hold day of surgery   metFORMIN (GLUCOPHAGE) 500 mg tablet Hold day of surgery   metoprolol succinate (TOPROL-XL) 25 mg 24 hr tablet Take day of surgery   nystatin (MYCOSTATIN) powder Hold day of surgery   OLANZapine (ZyPREXA) 5 mg tablet Take night before surgery    pantoprazole (PROTONIX) 40 mg tablet Hold day of surgery   rosuvastatin (CRESTOR) 5 mg tablet takes Sundays    Semaglutide, 1 MG/DOSE, 2 MG/1 5ML SOPN takes Saturdays    zolpidem (AMBIEN) 5 mg tablet Take night before surgery if needed, not after MN   Covid screening negative as per patient  Reviewed pre op medicine and showering instructions with patient via phone call, verbalizes understanding  Advised patient to stop taking non prescribed vitamins, herbal meds and NSAID's 7 days pre op  Advised may take Tylenol products if needed  Advised to take DOS medicine with a small sip water  Advised NPO after MN prior to surgery and surgical services will call (8/29) with scheduled time of hospital arrival     Pt verbalized understanding of all instructions

## 2022-08-18 ENCOUNTER — OFFICE VISIT (OUTPATIENT)
Dept: BARIATRICS | Facility: CLINIC | Age: 67
End: 2022-08-18
Payer: MEDICARE

## 2022-08-18 ENCOUNTER — OFFICE VISIT (OUTPATIENT)
Dept: BARIATRICS | Facility: CLINIC | Age: 67
End: 2022-08-18

## 2022-08-18 VITALS — HEIGHT: 69 IN | BODY MASS INDEX: 43.17 KG/M2 | WEIGHT: 291.5 LBS

## 2022-08-18 VITALS
TEMPERATURE: 97.7 F | HEART RATE: 69 BPM | SYSTOLIC BLOOD PRESSURE: 116 MMHG | HEIGHT: 69 IN | DIASTOLIC BLOOD PRESSURE: 70 MMHG | WEIGHT: 291.5 LBS | BODY MASS INDEX: 43.17 KG/M2

## 2022-08-18 DIAGNOSIS — Z98.84 HX OF LAPAROSCOPIC ADJUSTABLE GASTRIC BANDING: ICD-10-CM

## 2022-08-18 DIAGNOSIS — Z98.84 BARIATRIC SURGERY STATUS: Primary | ICD-10-CM

## 2022-08-18 DIAGNOSIS — E66.01 OBESITY, CLASS III, BMI 40-49.9 (MORBID OBESITY) (HCC): Primary | ICD-10-CM

## 2022-08-18 DIAGNOSIS — K22.70 BARRETT'S ESOPHAGUS DETERMINED BY BIOPSY: ICD-10-CM

## 2022-08-18 PROBLEM — E66.813 OBESITY, CLASS III, BMI 40-49.9 (MORBID OBESITY): Status: ACTIVE | Noted: 2022-08-18

## 2022-08-18 PROCEDURE — RECHECK: Performed by: DIETITIAN, REGISTERED

## 2022-08-18 PROCEDURE — 99213 OFFICE O/P EST LOW 20 MIN: CPT | Performed by: SURGERY

## 2022-08-18 RX ORDER — ACETAMINOPHEN 325 MG/1
975 TABLET ORAL ONCE
Status: CANCELLED | OUTPATIENT
Start: 2022-08-30 | End: 2022-08-18

## 2022-08-18 RX ORDER — CEFAZOLIN SODIUM 2 G/50ML
2000 SOLUTION INTRAVENOUS ONCE
Status: CANCELLED | OUTPATIENT
Start: 2022-08-30 | End: 2022-08-18

## 2022-08-18 RX ORDER — SCOLOPAMINE TRANSDERMAL SYSTEM 1 MG/1
1 PATCH, EXTENDED RELEASE TRANSDERMAL ONCE
Status: CANCELLED | OUTPATIENT
Start: 2022-08-30 | End: 2022-08-18

## 2022-08-18 RX ORDER — OMEPRAZOLE 20 MG/1
20 CAPSULE, DELAYED RELEASE ORAL DAILY
Qty: 30 CAPSULE | Refills: 3 | Status: SHIPPED | OUTPATIENT
Start: 2022-08-31 | End: 2022-09-23 | Stop reason: ALTCHOICE

## 2022-08-18 RX ORDER — HEPARIN SODIUM 5000 [USP'U]/ML
5000 INJECTION, SOLUTION INTRAVENOUS; SUBCUTANEOUS
Status: CANCELLED | OUTPATIENT
Start: 2022-08-31 | End: 2022-09-01

## 2022-08-18 RX ORDER — OXYCODONE HYDROCHLORIDE 5 MG/1
5 TABLET ORAL EVERY 4 HOURS PRN
Qty: 10 TABLET | Refills: 0 | Status: SHIPPED | OUTPATIENT
Start: 2022-08-31 | End: 2022-08-31

## 2022-08-18 NOTE — PROGRESS NOTES
Weight Management Nutrition Note      Bariatric Surgeon: Dr Pati Jenkins    Surgery: pre op band removal       Topics discussed today include:     Pt  educated on the pre operative liver shrinking diet  Pt understands that the diet needs to be followed for 2 weeks prior to surgery  Handout reviewed  Emphasized the need to drink 80 ounces of fluid per day while on the diet  Patient will not eat any solid food with diagnosis of gastroparesis  Decided on following meal plan :  4 JBN shakes mixed with water per day  4 sugar free jello   4 sugar free popsicles   1-2 calorie free electrolyte drinks  Total of 80 ounces or more of fluid     Reminded patient to start Miralax 3 days prior to surgery date  Contact information provided for any questions/concerns    Patient was able to verbalize basic diet (protein, fluid, vitamin and mineral) recommendations and possible nutrition-related complications   Yes

## 2022-08-18 NOTE — H&P (VIEW-ONLY)
BARIATRIC H&P - BARIATRIC SURGERY  Kevin De Luna 74 y o  male MRN: [id number]  Unit/Bed#:  Encounter: [id number]      HPI:  Kevin De Luna is a 74 y o  male who presents with a history of laparoscopic adjustable gastric band placed in 2010  He has developed Barretts esophagus in the band is to be removed  He is here today to discuss details of his surgery  Review of Systems   Gastrointestinal:        Heartburn   All other systems reviewed and are negative  Historical Information   Past Medical History:   Diagnosis Date    Acute blood loss anemia 10/13/2016    Anxiety     Arthritis     knees    Arthritis     Asthma     stable for > 10 years    Molina esophagus     with BARRX/RFA    [facility]     prostate    Cataract     Depression     [facility]     [facility] mellitus ([facility] )     pre diabetes    Diverticulitis of colon     Environmental allergies     Fall 12/30/2020    GERD (gastroesophageal reflux disease)     Hiatal hernia     History of anal fissures     Hyperlipidemia     Hypertension     Incisional hernia     Insomnia     Kidney stone     Morbid obesity (HCC)     Peripheral neuropathy     PONV (postoperative nausea and vomiting)     Prolapsing mitral valve     prolapsing mitral valve leaflet syndrome    [facility]     Retinal detachment     treated surgically at [facility]; resolved: 10/10/2012    Sleep apnea     diagnosed but unable to tolerate cpap       Stuttering      Past Surgical History:   Procedure Laterality Date    ARTHROSCOPIC REPAIR ACL Right     BIOPSY CORE NEEDLE      prostate    CATARACT EXTRACTION Bilateral     COLONOSCOPY      HERNIA REPAIR      naval    JOINT REPLACEMENT      B/L knee    KNEE ARTHROSCOPY Left     LAPAROSCOPIC GASTRIC BANDING      [place] TOTAL KNEE ARTHROPLASTY Left 02/15/2017    Procedure: TOTAL KNEE ARTHROPLASTY ;  Surgeon: Marcel Ramirez MD;  [facility];  Service: Orthopedics    [place] TOTAL KNEE ARTHROPLASTY Right 10/10/2016    Procedure: ARTHROPLASTY KNEE TOTAL;  Surgeon: Usama Jeronimo MD;  [facility];  Service: Orthopedics    PROSTATECTOMY      robotic-assisted; [facility]-Dr Tasia Aguayo RETINAL DETACHMENT SURGERY Bilateral     [facility]    SEPTOPLASTY      SINUS SURGERY      TONSILLECTOMY      over age 13   [employer] UPPER GASTROINTESTINAL ENDOSCOPY      mutiple with BARRX/RFA    VASECTOMY      vas deferens     Social History   Social History     Substance and Sexual Activity   Alcohol Use Not Currently    Alcohol/week: 1 0 standard drink    Types: 1 Cans of beer per week    Comment: rare ; being a social drinker (as per allscripts)     Social History     Substance and Sexual Activity   Drug Use Not Currently     Social History     Tobacco Use   Smoking Status Never Smoker   Smokeless Tobacco Never Used   Tobacco Comment    quit 38 years ago     Family History: non-contributory    Meds/Allergies   all medications and allergies reviewed  Allergies   Allergen Reactions    Celebrex [Celecoxib] Other (See Comments)     Cardiologist stated he should not take      Other      "steroid eyedrops"      Prednisone Other (See Comments)     Prednisone eye drops- eye pressure increases       Objective     Current Vitals:   Blood Pressure: 116/70 (08/18/22 1318)  Pulse: 69 (08/18/22 1318)  Temperature: 97 7 °F (36 5 °C) (08/18/22 1318)  Temp Source: Tympanic (08/18/22 1318)  Height: 5' 9" (175 3 cm) (08/18/22 1318)  Weight - Scale: 132 kg (291 lb 8 oz) (08/18/22 1318)      Invasive Devices  Report    None                 Physical Exam  Vitals and nursing note reviewed  Constitutional:       General: He is not in acute distress  Appearance: Normal appearance  He is well-developed  He is not diaphoretic  HENT:      Head: Normocephalic and atraumatic  Nose: Nose normal    Eyes:      General: No scleral icterus  Right eye: No discharge  Left eye: No discharge        Conjunctiva/sclera: Conjunctivae normal    Cardiovascular:      Rate and Rhythm: Normal rate and regular rhythm  Heart sounds: Normal heart sounds  Pulmonary:      Effort: Pulmonary effort is normal  No respiratory distress  Breath sounds: Normal breath sounds  No stridor  No wheezing or rales  Chest:      Chest wall: No tenderness  Abdominal:      General: Bowel sounds are normal       Palpations: Abdomen is soft  Tenderness: There is no abdominal tenderness  There is no guarding or rebound  Comments: Abdomen is obese, soft and benign  Band port underneath the skin on the anterior abdominal wall inferior to the xiphoid   Musculoskeletal:         General: No deformity  Normal range of motion  Cervical back: Normal range of motion and neck supple  Lymphadenopathy:      Cervical: No cervical adenopathy  Skin:     General: Skin is warm and dry  Findings: No erythema or rash  Neurological:      Mental Status: He is alert and oriented to person, place, and time  Psychiatric:         Behavior: Behavior normal          Thought Content: Thought content normal          Judgment: Judgment normal          Lab Results: I have personally reviewed pertinent lab results  Imaging: I have personally reviewed pertinent reports  EKG, Pathology, and Other Studies: I have personally reviewed pertinent reports  The endoscopy showed Molina's esophagus  The biopsies revealed  Final Diagnosis   A  Esophageal nodule:  - Squamocolumnar junctional mucosa with chronic active inflammation   - Goblet cell intestinal metaplasia is present, consistent with Molina's esophagus  See note  - Atypia identified, indefinite for dysplasia  - Special stain for fungal organisms is negative  Assessment/PLAN:    74 y o  male morbidly obese who underwent a laparoscopic adjustable gastric band placement back in 2010 with me  At 1 point he had lost almost 50 lb and he was doing okay    But eventually regained a significant amount of weight and developed heartburn  He was lost to follow-up since 2017 and came back to us couple months ago after he was diagnosed with Molina's esophagus by GI  His symptomatology is not improving and has not responded to therapy and for this reason we decided to move forward and remove his band  He is not interested in having a revision at this time  Patient has a long history of morbid obesity and is presenting to discuss the surgical weight loss options  Despite the patient best efforts patient was unable to lose any meaningful or sustainable weight using nonsurgical means  We had a long discussion regarding all the surgical weight-loss options at our disposal at this point and reviewed the risks and benefits of each procedure in details as it relates to his age, BMI and medical conditions  He has been pre certified to undergo a Laparoscopic gastric band removal     Here today to review his pre op test results  Has been medically cleared for the procedure  I have discussed with him at length the risks and benefits of the operation and reiterated the components of our multidisciplinary program and the importance of compliance and follow up in the post operative period  Although there is a great statistical chance of improvement or even resolution of most of his associated comorbidities, the results vary from patient to patient and they largely depend on his commitment  The patient was also instructed with regards to the importance of behavior modification, nutritional counseling, support meeting attendance and lifestyle changes that are important to ensure success  He was given the opportunity to ask questions and I have answered all of them  I have addressed with the patient the level of CODE STATUS for this hospital stay and after explaining the different options currently he wishes to be a Level I  He understands and wishes to proceed      He will continue to lose weight in preparation for surgery

## 2022-08-18 NOTE — H&P
BARIATRIC H&P - BARIATRIC SURGERY  Kevin De Luna 74 y o  male MRN: [id number]  Unit/Bed#:  Encounter: [id number]      HPI:  Kevin De Luna is a 74 y o  male who presents with a history of laparoscopic adjustable gastric band placed in 2010  He has developed Barretts esophagus in the band is to be removed  He is here today to discuss details of his surgery  Review of Systems   Gastrointestinal:        Heartburn   All other systems reviewed and are negative  Historical Information   Past Medical History:   Diagnosis Date    Acute blood loss anemia 10/13/2016    Anxiety     Arthritis     knees    Arthritis     Asthma     stable for > 10 years    Molina esophagus     with BARRX/RFA    [facility]     [facility]    Cataract     Depression     [facility]     [facility] )     pre diabetes    Diverticulitis of colon     Environmental allergies     Fall 12/30/2020    GERD (gastroesophageal reflux disease)     Hiatal hernia     History of anal fissures     Hyperlipidemia     Hypertension     Incisional hernia     Insomnia     Kidney stone     Morbid obesity (HCC)     Peripheral neuropathy     PONV (postoperative nausea and vomiting)     Prolapsing mitral valve     prolapsing mitral valve leaflet syndrome    Prostate cancer ([facility] )     Retinal detachment     treated surgically at [facility]; resolved: 10/10/2012    Sleep apnea     diagnosed but unable to tolerate cpap       Stuttering      Past Surgical History:   Procedure Laterality Date    ARTHROSCOPIC REPAIR ACL Right     BIOPSY CORE NEEDLE      prostate    CATARACT EXTRACTION Bilateral     COLONOSCOPY      HERNIA REPAIR      naval    JOINT REPLACEMENT      B/L knee    KNEE ARTHROSCOPY Left     LAPAROSCOPIC GASTRIC BANDING      [place] TOTAL KNEE ARTHROPLASTY Left 02/15/2017    Procedure: TOTAL KNEE ARTHROPLASTY ;  Surgeon: Marcel Ramirez MD;  [facility];  Service: Orthopedics    [place] TOTAL KNEE ARTHROPLASTY Right 10/10/2016    Procedure: ARTHROPLASTY KNEE TOTAL;  Surgeon: Lauren Amor MD;  [facility];  Service: Orthopedics    PROSTATECTOMY      robotic-assisted; [facility]-Dr Aria Bowie RETINAL DETACHMENT SURGERY Bilateral     [facility]    SEPTOPLASTY      SINUS SURGERY      TONSILLECTOMY      over age 13   [facility] UPPER GASTROINTESTINAL ENDOSCOPY      mutiple with BARRX/RFA    VASECTOMY      vas deferens     Social History   Social History     Substance and Sexual Activity   Alcohol Use Not Currently    Alcohol/week: 1 0 standard drink    Types: 1 Cans of beer per week    Comment: rare ; being a social drinker (as per allscripts)     Social History     Substance and Sexual Activity   Drug Use Not Currently     Social History     Tobacco Use   Smoking Status Never Smoker   Smokeless Tobacco Never Used   Tobacco Comment    quit 38 years ago     Family History: non-contributory    Meds/Allergies   all medications and allergies reviewed  Allergies   Allergen Reactions    Celebrex [Celecoxib] Other (See Comments)     Cardiologist stated he should not take      Other      "steroid eyedrops"      Prednisone Other (See Comments)     Prednisone eye drops- eye pressure increases       Objective     Current Vitals:   Blood Pressure: 116/70 (08/18/22 1318)  Pulse: 69 (08/18/22 1318)  Temperature: 97 7 °F (36 5 °C) (08/18/22 1318)  Temp Source: Tympanic (08/18/22 1318)  Height: 5' 9" (175 3 cm) (08/18/22 1318)  Weight - Scale: 132 kg (291 lb 8 oz) (08/18/22 1318)      Invasive Devices  Report    None                 Physical Exam  Vitals and nursing note reviewed  Constitutional:       General: He is not in acute distress  Appearance: Normal appearance  He is well-developed  He is not diaphoretic  HENT:      Head: Normocephalic and atraumatic  Nose: Nose normal    Eyes:      General: No scleral icterus  Right eye: No discharge  Left eye: No discharge        Conjunctiva/sclera: Conjunctivae normal    Cardiovascular:      Rate and Rhythm: Normal rate and regular rhythm  Heart sounds: Normal heart sounds  Pulmonary:      Effort: Pulmonary effort is normal  No respiratory distress  Breath sounds: Normal breath sounds  No stridor  No wheezing or rales  Chest:      Chest wall: No tenderness  Abdominal:      General: Bowel sounds are normal       Palpations: Abdomen is soft  Tenderness: There is no abdominal tenderness  There is no guarding or rebound  Comments: Abdomen is obese, soft and benign  Band port underneath the skin on the anterior abdominal wall inferior to the xiphoid   Musculoskeletal:         General: No deformity  Normal range of motion  Cervical back: Normal range of motion and neck supple  Lymphadenopathy:      Cervical: No cervical adenopathy  Skin:     General: Skin is warm and dry  Findings: No erythema or rash  Neurological:      Mental Status: He is alert and oriented to person, place, and time  Psychiatric:         Behavior: Behavior normal          Thought Content: Thought content normal          Judgment: Judgment normal          Lab Results: I have personally reviewed pertinent lab results  Imaging: I have personally reviewed pertinent reports  EKG, Pathology, and Other Studies: I have personally reviewed pertinent reports  The endoscopy showed Molina's esophagus  The biopsies revealed  Final Diagnosis   A  Esophageal nodule:  - Squamocolumnar junctional mucosa with chronic active inflammation   - Goblet cell intestinal metaplasia is present, consistent with Molina's esophagus  See note  - Atypia identified, indefinite for dysplasia  - Special stain for fungal organisms is negative  Assessment/PLAN:    74 y o  male morbidly obese who underwent a laparoscopic adjustable gastric band placement back in 2010 with me  At 1 point he had lost almost 50 lb and he was doing okay    But eventually regained a significant amount of weight and developed heartburn  He was lost to follow-up since 2017 and came back to us couple months ago after he was diagnosed with Molina's esophagus by GI  His symptomatology is not improving and has not responded to therapy and for this reason we decided to move forward and remove his band  He is not interested in having a revision at this time  Patient has a long history of morbid obesity and is presenting to discuss the surgical weight loss options  Despite the patient best efforts patient was unable to lose any meaningful or sustainable weight using nonsurgical means  We had a long discussion regarding all the surgical weight-loss options at our disposal at this point and reviewed the risks and benefits of each procedure in details as it relates to his age, BMI and medical conditions  He has been pre certified to undergo a Laparoscopic gastric band removal     Here today to review his pre op test results  Has been medically cleared for the procedure  I have discussed with him at length the risks and benefits of the operation and reiterated the components of our multidisciplinary program and the importance of compliance and follow up in the post operative period  Although there is a great statistical chance of improvement or even resolution of most of his associated comorbidities, the results vary from patient to patient and they largely depend on his commitment  The patient was also instructed with regards to the importance of behavior modification, nutritional counseling, support meeting attendance and lifestyle changes that are important to ensure success  He was given the opportunity to ask questions and I have answered all of them  I have addressed with the patient the level of CODE STATUS for this hospital stay and after explaining the different options currently he wishes to be a Level I  He understands and wishes to proceed      He will continue to lose weight in preparation for surgery

## 2022-08-22 ENCOUNTER — PROCEDURE VISIT (OUTPATIENT)
Dept: NEUROLOGY | Facility: CLINIC | Age: 67
End: 2022-08-22
Payer: MEDICARE

## 2022-08-22 ENCOUNTER — TELEPHONE (OUTPATIENT)
Dept: BARIATRICS | Facility: CLINIC | Age: 67
End: 2022-08-22

## 2022-08-22 DIAGNOSIS — R41.3 MEMORY CHANGE: ICD-10-CM

## 2022-08-22 DIAGNOSIS — R26.9 GAIT DISORDER: ICD-10-CM

## 2022-08-22 DIAGNOSIS — R25.1 TREMOR: ICD-10-CM

## 2022-08-22 PROCEDURE — 95911 NRV CNDJ TEST 9-10 STUDIES: CPT | Performed by: PHYSICAL MEDICINE & REHABILITATION

## 2022-08-22 PROCEDURE — 95886 MUSC TEST DONE W/N TEST COMP: CPT | Performed by: PHYSICAL MEDICINE & REHABILITATION

## 2022-08-22 NOTE — PROGRESS NOTES
EMG 2 limb lower extremity     Date/Time 8/22/2022 10:54 AM     Performed by  Robert Flor MD     Authorized by Kiko Dan MD              [facility] AT [address]  [phone]    Electromyography & Nerve Conduction Studies Report          Full Name: Shani Waggoner Gender: Male  MRN: [id number] YOB: 1955      Visit Date: 8/22/2022 10:42 AM  Age: 74 Years  Examining Physician: Robert Flor MD   Referring Physician: DR Walter Garcia History: 78-year-old morbidly obese male with prior history of bilateral TKA presents with complaints of balance issues off and on for the past 2 years  He has a known history of diabetes mellitus and a prior lap band procedure  Denies any radicular symptoms  Patient is being evaluated for a peripheral neuropathy  On exam, normal tone and bulk on motor examination  Sensations are diminished to light touch and pinprick in the glove and stocking distribution  TEMP 32       Sensory Nerve Conduction Study       Nerve / Sites Rec  Site Onset Lat Peak Lat  Amp Segments Distance Velocity     ms ms µV  cm m/s   R Sural - (Antidromic)      Calf Ankle 2 6 3 3 5 3 Calf - Ankle 14 54      Ref  ?4 4 ? 6 0 Ref  ?40   L Sural - (Antidromic)      Calf Ankle 2 9 3 6 4 7 Calf - Ankle 14 48      Ref  ?4 4 ? 6 0 Ref  ?40   R Superficial peroneal - (Antidromic)      Lat leg Ankle 3 1 3 8 1 8 Lat leg - Ankle 14 45      Ref  ?4 4 ? 6 0 Ref  ?40   L Superficial peroneal - (Antidromic)      Lat leg Ankle 2 9 3 6 3 1 Lat leg - Ankle 14 49      Ref  ?4 4 ? 6 0 Ref  ?40       Motor Nerve Conduction Study       Nerve / Sites Muscle Latency Ref  Amplitude Ref  Segments Distance Lat Diff Velocity Ref  ms ms mV mV  cm ms m/s m/s   R Peroneal - EDB      Ankle EDB 7 0 ?6 5 2 4 ?2 0 Ankle - EDB 9         B  Fib Head EDB 15 4  2 3  B  Fib Head - Ankle 30 8 35 36 ?44      A  Fib Head EDB 17 8  2 3  A   Fib Head - B  Fib Head 10 2 38 42 ?44   L Peroneal - EDB      Ankle EDB 6 3 ?6 5 4 9 ?2 0 Ankle - EDB 9         B  Fib Head EDB 14 7  4 6  B  Fib Head - Ankle 31 8 42 37 ?44      A  Fib Head EDB 16 3  4 1  A  Fib Head - B  Fib Head 10 1 56 64 ?44   R Tibial - AH      Ankle AH 5 7 ?5 8 6 3 ?4 0 Ankle - AH 9         Knee AH 14 6  4 8  Knee - Ankle 36 8 90 40 ?41   L Tibial - AH      Ankle AH 6 4 ?5 8 5 9 ?4 0 Ankle - AH 9         Knee AH 15 8  4 6  Knee - Ankle 37 9 42 39 ?41   R Peroneal - Tib Ant      Fib Head Tib Ant 4 5 ?6 7 4 2 ?3 0 Fib Head - Tib Ant          Pop fossa Tib Ant 6 4  3 8  Pop fossa - Fib Head 10 1 83 55 ?44   L Peroneal - Tib Ant      Fib Head Tib Ant 2 8 ?6 7 3 8 ?3 0 Fib Head - Tib Ant          Pop fossa Tib Ant 5 0  3 5  Pop fossa - Fib Head 10 2 27 44 ?44       F Waves       Nerve F Latency Ref  ms ms   R Peroneal - EDB 55 7 ? 56 0   R Tibial - AH 56 2 ? 56 0   L Peroneal - EDB 56 0 ?56 0   L Tibial - AH 55 9 ?56 0       H Reflex       Nerve H Latency    ms   R Tibial - Soleus 33 6   L Tibial - Soleus 34  2       EMG Summary Table     Spontaneous MUAP Recruitment   Muscle Nerve Roots IA Fib PSW Fasc H F  Dur  Amp PPP Config Pattern   L  Tibialis anterior Deep peroneal (Fibular) L4-L5 NL None None None None NL Sl  Incr  Few NL Reduced   R  Tibialis anterior Deep peroneal (Fibular) L4-L5 NL None None None None NL NL None NL NL   L  Quadriceps Femoral L2-L4 NL None None None None NL NL None NL NL   R  Quadriceps Femoral L2-L4 NL None None None None NL NL None NL NL   L  Lumbar paraspinals Spinal L1-L5 NL None None None None NL NL None NL NL   R  Lumbar paraspinals Spinal L1-L5 NL None None None None NL NL None NL NL   L  Gastrocnemius (Medial head) Tibial S1-S2 NL None None None None NL NL None NL NL   R  Gastrocnemius (Medial head) Tibial S1-S2 NL None None None None NL NL None NL NL   L  Extensor digitorum brevis Tibial L5-S1 NL None None None None NL NL Many NL Reduced   R   Extensor digitorum brevis Tibial L5-S1 NL None None None None Gr  Incr  Gr  Incr  None NL Reduced   L  Abductor hallucis Tibial S1-S2 NL None None None None NL Gr  Incr  None NL Reduced   R  Abductor hallucis Tibial S1-S2 NL None None None None NL NL Few NL Reduced                                       Summary      Motor and sensory conduction studies were performed on the bilateral peroneal, tibial and sural nerves  The right peroneal motor terminal latency while recording from the extensor digitorum brevis was prolonged with normal compound motor action potential amplitude and a slow conduction velocity distally and across the fibular head  The left peroneal motor terminal latency while recording from the extensor digitorum brevis was normal with normal compound motor action potential amplitude and a slow conduction velocity distally but a normal conduction velocity across the fibular head  The bilateral peroneal compound motor action potential while recording from the tibialis anterior was normal   The right tibial motor terminal latency was normal with normal compound motor action potential amplitude and a slow conduction velocity across the ankle  The left tibial motor terminal latency was prolonged with normal compound motor action potential amplitude and a slow conduction velocity across the ankle  Bilateral peroneal and left tibial F waves were normal   The right tibial F-wave was prolonged  Bilateral sural distal sensory latencies were normal with normal sensory action potential amplitudes  The right H soleus response was normal   The left H soleus response was prolonged  Concentric needle EMG was performed in various distal and proximal muscles of the lower extremities bilaterally including EDB, tibialis anterior, gastrocnemius medius, vastus lateralis, gluteus medius and the low lumbar paraspinal regions  There was no evidence of spontaneous activity seen    Mild to moderate decreased recruitment of giant and polyphasic motor units was noted in the bilateral extensor digitorum brevis, abductor hallucis and left tibialis anterior  The compound motor unit potentials were of normal configuration and interference patterns were full or full for effort in the remaining muscles tested  Impression:    Abnormal study  There is electrophysiologic evidence of a:    1  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

## 2022-08-22 NOTE — TELEPHONE ENCOUNTER
Patient called back  Answered questions concerning pre op diet  1 Can he have tomato soup - Yes, if mixed with water ( not cream of tomato soup )  2  Can he have ice cream - NO  3  Does he  Have to take miralax three days prior if he is not constipated ?   No, since patient is only have lap band removed and will be able to eat normally shortly after surgery     Pt appreciative of the call

## 2022-08-26 ENCOUNTER — TELEPHONE (OUTPATIENT)
Dept: NEUROLOGY | Facility: CLINIC | Age: 67
End: 2022-08-26

## 2022-08-26 NOTE — TELEPHONE ENCOUNTER
----- Message from Franco Neil PA-C sent at 8/22/2022 12:07 PM EDT -----  Reviewing for Dr Leslie Harvey  EMG showing evidence of peripheral neuropathy, which is consistent with what is being questioned clinically based on exam/presentation  He has a follow up with her in November and can further discuss

## 2022-08-26 NOTE — TELEPHONE ENCOUNTER
Appears mild  Typically only use meds like gabapentin if effect on activities of daily life including ie pain or nocturnal sxs preventing pt from sleeping  The gabapentin is treating sxs not correcting the disease  If pt would like med for pain, or nocturnal sxs we would start med at bedtime

## 2022-08-26 NOTE — TELEPHONE ENCOUNTER
Spoke w/patient  Advised him of results and recommendations below  Patient verbalized understanding  Patient had two questions:    1) how severe is his neuropathy    2) could medication be prescribed for his neuropathy? He has taken gabapentin in the past (after knee surgery) and did well with it  If so, please send to [facility]  Dr Biswas Pro - please advise  Thanks!

## 2022-08-28 ENCOUNTER — ANESTHESIA EVENT (OUTPATIENT)
Dept: PERIOP | Facility: HOSPITAL | Age: 67
End: 2022-08-28
Payer: MEDICARE

## 2022-08-29 NOTE — TELEPHONE ENCOUNTER
Spoke w/patient  Advised him of below  Patient verbalized understanding  He does not have issues w/sleeping  The neuropathy is obvious during the day; however, patient will hold off on medication for now  [facility]  Advised patient to call office if he changes his mind prior to appt

## 2022-08-30 ENCOUNTER — HOSPITAL ENCOUNTER (OUTPATIENT)
Facility: HOSPITAL | Age: 67
Setting detail: OUTPATIENT SURGERY
Discharge: HOME | End: 2022-08-31
Attending: SURGERY | Admitting: SURGERY
Payer: MEDICARE

## 2022-08-30 ENCOUNTER — ANESTHESIA (OUTPATIENT)
Dept: PERIOP | Facility: HOSPITAL | Age: 67
End: 2022-08-30
Payer: MEDICARE

## 2022-08-30 DIAGNOSIS — R11.10 CHRONIC VOMITING: ICD-10-CM

## 2022-08-30 DIAGNOSIS — K21.9 GERD (GASTROESOPHAGEAL REFLUX DISEASE): ICD-10-CM

## 2022-08-30 DIAGNOSIS — Z98.84 STATUS POST BARIATRIC SURGERY: ICD-10-CM

## 2022-08-30 DIAGNOSIS — K95.09 OTHER COMPLICATIONS OF GASTRIC BAND PROCEDURE: ICD-10-CM

## 2022-08-30 DIAGNOSIS — K22.70 BARRETT ESOPHAGUS: ICD-10-CM

## 2022-08-30 LAB
GLUCOSE SERPL-MCNC: 109 MG/DL (ref 65–140)
GLUCOSE SERPL-MCNC: 109 MG/DL (ref 65–140)
GLUCOSE SERPL-MCNC: 152 MG/DL (ref 65–140)
GLUCOSE SERPL-MCNC: 255 MG/DL (ref 65–140)

## 2022-08-30 PROCEDURE — 43774 LAP RMVL GASTR ADJ ALL PARTS: CPT | Performed by: STUDENT IN AN ORGANIZED HEALTH CARE EDUCATION/TRAINING PROGRAM

## 2022-08-30 PROCEDURE — 88300 SURGICAL PATH GROSS: CPT | Performed by: STUDENT IN AN ORGANIZED HEALTH CARE EDUCATION/TRAINING PROGRAM

## 2022-08-30 PROCEDURE — 88341 IMHCHEM/IMCYTCHM EA ADD ANTB: CPT | Performed by: STUDENT IN AN ORGANIZED HEALTH CARE EDUCATION/TRAINING PROGRAM

## 2022-08-30 PROCEDURE — 82948 REAGENT STRIP/BLOOD GLUCOSE: CPT

## 2022-08-30 PROCEDURE — 43774 LAP RMVL GASTR ADJ ALL PARTS: CPT | Performed by: SURGERY

## 2022-08-30 PROCEDURE — C9290 INJ, BUPIVACAINE LIPOSOME: HCPCS | Performed by: SURGERY

## 2022-08-30 PROCEDURE — 88342 IMHCHEM/IMCYTCHM 1ST ANTB: CPT | Performed by: STUDENT IN AN ORGANIZED HEALTH CARE EDUCATION/TRAINING PROGRAM

## 2022-08-30 PROCEDURE — 88307 TISSUE EXAM BY PATHOLOGIST: CPT | Performed by: STUDENT IN AN ORGANIZED HEALTH CARE EDUCATION/TRAINING PROGRAM

## 2022-08-30 RX ORDER — MORPHINE SULFATE 4 MG/ML
4 INJECTION, SOLUTION INTRAMUSCULAR; INTRAVENOUS EVERY 4 HOURS PRN
Status: DISCONTINUED | OUTPATIENT
Start: 2022-08-30 | End: 2022-08-31 | Stop reason: HOSPADM

## 2022-08-30 RX ORDER — SODIUM CHLORIDE 9 MG/ML
INJECTION INTRAVENOUS AS NEEDED
Status: DISCONTINUED | OUTPATIENT
Start: 2022-08-30 | End: 2022-08-30 | Stop reason: HOSPADM

## 2022-08-30 RX ORDER — FENTANYL CITRATE 50 UG/ML
INJECTION, SOLUTION INTRAMUSCULAR; INTRAVENOUS AS NEEDED
Status: DISCONTINUED | OUTPATIENT
Start: 2022-08-30 | End: 2022-08-30

## 2022-08-30 RX ORDER — ACETAMINOPHEN 160 MG/5ML
975 SUSPENSION, ORAL (FINAL DOSE FORM) ORAL EVERY 8 HOURS SCHEDULED
Status: DISCONTINUED | OUTPATIENT
Start: 2022-08-30 | End: 2022-08-31 | Stop reason: HOSPADM

## 2022-08-30 RX ORDER — LIDOCAINE HYDROCHLORIDE 10 MG/ML
INJECTION, SOLUTION EPIDURAL; INFILTRATION; INTRACAUDAL; PERINEURAL AS NEEDED
Status: DISCONTINUED | OUTPATIENT
Start: 2022-08-30 | End: 2022-08-30

## 2022-08-30 RX ORDER — LOSARTAN POTASSIUM 50 MG/1
100 TABLET ORAL EVERY EVENING
Status: DISCONTINUED | OUTPATIENT
Start: 2022-08-30 | End: 2022-08-31 | Stop reason: HOSPADM

## 2022-08-30 RX ORDER — ONDANSETRON 2 MG/ML
4 INJECTION INTRAMUSCULAR; INTRAVENOUS EVERY 6 HOURS PRN
Status: DISCONTINUED | OUTPATIENT
Start: 2022-08-30 | End: 2022-08-31 | Stop reason: HOSPADM

## 2022-08-30 RX ORDER — METOCLOPRAMIDE HYDROCHLORIDE 5 MG/ML
10 INJECTION INTRAMUSCULAR; INTRAVENOUS EVERY 6 HOURS PRN
Status: DISCONTINUED | OUTPATIENT
Start: 2022-08-30 | End: 2022-08-31 | Stop reason: HOSPADM

## 2022-08-30 RX ORDER — METOPROLOL SUCCINATE 25 MG/1
25 TABLET, EXTENDED RELEASE ORAL DAILY
Status: DISCONTINUED | OUTPATIENT
Start: 2022-08-31 | End: 2022-08-31 | Stop reason: HOSPADM

## 2022-08-30 RX ORDER — OXYCODONE HCL 5 MG/5 ML
10 SOLUTION, ORAL ORAL EVERY 4 HOURS PRN
Status: DISCONTINUED | OUTPATIENT
Start: 2022-08-30 | End: 2022-08-31 | Stop reason: HOSPADM

## 2022-08-30 RX ORDER — OLANZAPINE 5 MG/1
2.5 TABLET ORAL
Status: DISCONTINUED | OUTPATIENT
Start: 2022-08-30 | End: 2022-08-31 | Stop reason: HOSPADM

## 2022-08-30 RX ORDER — MAGNESIUM HYDROXIDE 1200 MG/15ML
LIQUID ORAL AS NEEDED
Status: DISCONTINUED | OUTPATIENT
Start: 2022-08-30 | End: 2022-08-30 | Stop reason: HOSPADM

## 2022-08-30 RX ORDER — SODIUM CHLORIDE, SODIUM LACTATE, POTASSIUM CHLORIDE, CALCIUM CHLORIDE 600; 310; 30; 20 MG/100ML; MG/100ML; MG/100ML; MG/100ML
50 INJECTION, SOLUTION INTRAVENOUS CONTINUOUS
Status: DISCONTINUED | OUTPATIENT
Start: 2022-08-30 | End: 2022-08-31 | Stop reason: HOSPADM

## 2022-08-30 RX ORDER — SUCCINYLCHOLINE/SOD CL,ISO/PF 100 MG/5ML
SYRINGE (ML) INTRAVENOUS AS NEEDED
Status: DISCONTINUED | OUTPATIENT
Start: 2022-08-30 | End: 2022-08-30

## 2022-08-30 RX ORDER — FENTANYL CITRATE/PF 50 MCG/ML
25 SYRINGE (ML) INJECTION
Status: DISCONTINUED | OUTPATIENT
Start: 2022-08-30 | End: 2022-08-30 | Stop reason: HOSPADM

## 2022-08-30 RX ORDER — MEPERIDINE HYDROCHLORIDE 25 MG/ML
12.5 INJECTION INTRAMUSCULAR; INTRAVENOUS; SUBCUTANEOUS
Status: DISCONTINUED | OUTPATIENT
Start: 2022-08-30 | End: 2022-08-30 | Stop reason: HOSPADM

## 2022-08-30 RX ORDER — MORPHINE SULFATE 10 MG/ML
4 INJECTION, SOLUTION INTRAMUSCULAR; INTRAVENOUS EVERY 4 HOURS PRN
Status: DISCONTINUED | OUTPATIENT
Start: 2022-08-30 | End: 2022-08-30 | Stop reason: SDUPTHER

## 2022-08-30 RX ORDER — LABETALOL HYDROCHLORIDE 5 MG/ML
INJECTION, SOLUTION INTRAVENOUS AS NEEDED
Status: DISCONTINUED | OUTPATIENT
Start: 2022-08-30 | End: 2022-08-30

## 2022-08-30 RX ORDER — MIDAZOLAM HYDROCHLORIDE 2 MG/2ML
INJECTION, SOLUTION INTRAMUSCULAR; INTRAVENOUS AS NEEDED
Status: DISCONTINUED | OUTPATIENT
Start: 2022-08-30 | End: 2022-08-30

## 2022-08-30 RX ORDER — ROCURONIUM BROMIDE 10 MG/ML
INJECTION, SOLUTION INTRAVENOUS AS NEEDED
Status: DISCONTINUED | OUTPATIENT
Start: 2022-08-30 | End: 2022-08-30

## 2022-08-30 RX ORDER — PROPOFOL 10 MG/ML
INJECTION, EMULSION INTRAVENOUS AS NEEDED
Status: DISCONTINUED | OUTPATIENT
Start: 2022-08-30 | End: 2022-08-30

## 2022-08-30 RX ORDER — ONDANSETRON 2 MG/ML
4 INJECTION INTRAMUSCULAR; INTRAVENOUS ONCE AS NEEDED
Status: DISCONTINUED | OUTPATIENT
Start: 2022-08-30 | End: 2022-08-30 | Stop reason: HOSPADM

## 2022-08-30 RX ORDER — HEPARIN SODIUM 5000 [USP'U]/ML
5000 INJECTION, SOLUTION INTRAVENOUS; SUBCUTANEOUS
Status: DISCONTINUED | OUTPATIENT
Start: 2022-08-31 | End: 2022-08-30

## 2022-08-30 RX ORDER — DIPHENHYDRAMINE HCL 25 MG
25 TABLET ORAL
Status: DISCONTINUED | OUTPATIENT
Start: 2022-08-30 | End: 2022-08-31 | Stop reason: HOSPADM

## 2022-08-30 RX ORDER — CEFAZOLIN SODIUM 2 G/50ML
SOLUTION INTRAVENOUS AS NEEDED
Status: DISCONTINUED | OUTPATIENT
Start: 2022-08-30 | End: 2022-08-30

## 2022-08-30 RX ORDER — ZOLPIDEM TARTRATE 5 MG/1
5 TABLET ORAL
Status: DISCONTINUED | OUTPATIENT
Start: 2022-08-30 | End: 2022-08-31 | Stop reason: HOSPADM

## 2022-08-30 RX ORDER — HYDROMORPHONE HCL/PF 1 MG/ML
0.5 SYRINGE (ML) INJECTION
Status: DISCONTINUED | OUTPATIENT
Start: 2022-08-30 | End: 2022-08-30 | Stop reason: HOSPADM

## 2022-08-30 RX ORDER — ONDANSETRON 2 MG/ML
INJECTION INTRAMUSCULAR; INTRAVENOUS AS NEEDED
Status: DISCONTINUED | OUTPATIENT
Start: 2022-08-30 | End: 2022-08-30

## 2022-08-30 RX ORDER — HEPARIN SODIUM 5000 [USP'U]/ML
5000 INJECTION, SOLUTION INTRAVENOUS; SUBCUTANEOUS
Status: COMPLETED | OUTPATIENT
Start: 2022-08-30 | End: 2022-08-30

## 2022-08-30 RX ORDER — ALPRAZOLAM 0.25 MG/1
0.25 TABLET ORAL 3 TIMES DAILY PRN
Status: DISCONTINUED | OUTPATIENT
Start: 2022-08-30 | End: 2022-08-31 | Stop reason: HOSPADM

## 2022-08-30 RX ORDER — INSULIN LISPRO 100 [IU]/ML
1-6 INJECTION, SOLUTION INTRAVENOUS; SUBCUTANEOUS
Status: DISCONTINUED | OUTPATIENT
Start: 2022-08-30 | End: 2022-08-31 | Stop reason: HOSPADM

## 2022-08-30 RX ORDER — SIMETHICONE 80 MG
80 TABLET,CHEWABLE ORAL 4 TIMES DAILY PRN
Status: DISCONTINUED | OUTPATIENT
Start: 2022-08-30 | End: 2022-08-31 | Stop reason: HOSPADM

## 2022-08-30 RX ORDER — SODIUM CHLORIDE, SODIUM LACTATE, POTASSIUM CHLORIDE, CALCIUM CHLORIDE 600; 310; 30; 20 MG/100ML; MG/100ML; MG/100ML; MG/100ML
125 INJECTION, SOLUTION INTRAVENOUS CONTINUOUS
Status: DISCONTINUED | OUTPATIENT
Start: 2022-08-30 | End: 2022-08-30 | Stop reason: SDUPTHER

## 2022-08-30 RX ORDER — OXYCODONE HCL 5 MG/5 ML
5 SOLUTION, ORAL ORAL EVERY 4 HOURS PRN
Status: DISCONTINUED | OUTPATIENT
Start: 2022-08-30 | End: 2022-08-31 | Stop reason: HOSPADM

## 2022-08-30 RX ORDER — SCOLOPAMINE TRANSDERMAL SYSTEM 1 MG/1
1 PATCH, EXTENDED RELEASE TRANSDERMAL ONCE
Status: DISCONTINUED | OUTPATIENT
Start: 2022-08-30 | End: 2022-08-31 | Stop reason: HOSPADM

## 2022-08-30 RX ORDER — EPHEDRINE SULFATE 50 MG/ML
INJECTION INTRAVENOUS AS NEEDED
Status: DISCONTINUED | OUTPATIENT
Start: 2022-08-30 | End: 2022-08-30

## 2022-08-30 RX ORDER — OLANZAPINE 5 MG/1
2.5 TABLET ORAL EVERY EVENING
Status: DISCONTINUED | OUTPATIENT
Start: 2022-08-30 | End: 2022-08-30

## 2022-08-30 RX ORDER — ACETAMINOPHEN 325 MG/1
975 TABLET ORAL EVERY 8 HOURS SCHEDULED
Status: DISCONTINUED | OUTPATIENT
Start: 2022-08-30 | End: 2022-08-31 | Stop reason: HOSPADM

## 2022-08-30 RX ORDER — FLUOXETINE HYDROCHLORIDE 20 MG/1
60 CAPSULE ORAL EVERY EVENING
Status: DISCONTINUED | OUTPATIENT
Start: 2022-08-30 | End: 2022-08-31 | Stop reason: HOSPADM

## 2022-08-30 RX ORDER — CEFAZOLIN SODIUM 2 G/50ML
2000 SOLUTION INTRAVENOUS ONCE
Status: DISCONTINUED | OUTPATIENT
Start: 2022-08-30 | End: 2022-08-30 | Stop reason: HOSPADM

## 2022-08-30 RX ORDER — EZETIMIBE 10 MG/1
5 TABLET ORAL DAILY
Status: DISCONTINUED | OUTPATIENT
Start: 2022-08-31 | End: 2022-08-31 | Stop reason: HOSPADM

## 2022-08-30 RX ORDER — SODIUM CHLORIDE 9 MG/ML
INJECTION, SOLUTION INTRAVENOUS AS NEEDED
Status: DISCONTINUED | OUTPATIENT
Start: 2022-08-30 | End: 2022-08-30 | Stop reason: HOSPADM

## 2022-08-30 RX ORDER — ACETAMINOPHEN 325 MG/1
975 TABLET ORAL ONCE
Status: COMPLETED | OUTPATIENT
Start: 2022-08-30 | End: 2022-08-30

## 2022-08-30 RX ORDER — BUPIVACAINE HYDROCHLORIDE 5 MG/ML
INJECTION, SOLUTION EPIDURAL; INTRACAUDAL AS NEEDED
Status: DISCONTINUED | OUTPATIENT
Start: 2022-08-30 | End: 2022-08-30 | Stop reason: HOSPADM

## 2022-08-30 RX ORDER — DEXAMETHASONE SODIUM PHOSPHATE 10 MG/ML
INJECTION, SOLUTION INTRAMUSCULAR; INTRAVENOUS AS NEEDED
Status: DISCONTINUED | OUTPATIENT
Start: 2022-08-30 | End: 2022-08-30

## 2022-08-30 RX ORDER — PROMETHAZINE HYDROCHLORIDE 25 MG/ML
25 INJECTION, SOLUTION INTRAMUSCULAR; INTRAVENOUS EVERY 6 HOURS PRN
Status: DISCONTINUED | OUTPATIENT
Start: 2022-08-30 | End: 2022-08-31 | Stop reason: HOSPADM

## 2022-08-30 RX ORDER — FAMOTIDINE 10 MG/ML
20 INJECTION, SOLUTION INTRAVENOUS EVERY 12 HOURS SCHEDULED
Status: DISCONTINUED | OUTPATIENT
Start: 2022-08-30 | End: 2022-08-31 | Stop reason: HOSPADM

## 2022-08-30 RX ADMIN — ROCURONIUM BROMIDE 40 MG: 50 INJECTION, SOLUTION INTRAVENOUS at 14:16

## 2022-08-30 RX ADMIN — FLUOXETINE 60 MG: 20 CAPSULE ORAL at 18:42

## 2022-08-30 RX ADMIN — SUGAMMADEX 300 MG: 100 INJECTION, SOLUTION INTRAVENOUS at 15:27

## 2022-08-30 RX ADMIN — LABETALOL HYDROCHLORIDE 5 MG: 5 INJECTION, SOLUTION INTRAVENOUS at 14:59

## 2022-08-30 RX ADMIN — FENTANYL CITRATE 25 MCG: 50 INJECTION, SOLUTION INTRAMUSCULAR; INTRAVENOUS at 16:08

## 2022-08-30 RX ADMIN — FENTANYL CITRATE 50 MCG: 50 INJECTION INTRAMUSCULAR; INTRAVENOUS at 14:54

## 2022-08-30 RX ADMIN — LIDOCAINE HYDROCHLORIDE 100 MG: 10 INJECTION, SOLUTION EPIDURAL; INFILTRATION; INTRACAUDAL; PERINEURAL at 14:14

## 2022-08-30 RX ADMIN — CEFAZOLIN SODIUM 2000 MG: 2 SOLUTION INTRAVENOUS at 14:10

## 2022-08-30 RX ADMIN — EPHEDRINE SULFATE 10 MG: 50 INJECTION, SOLUTION INTRAVENOUS at 14:28

## 2022-08-30 RX ADMIN — FENTANYL CITRATE 25 MCG: 50 INJECTION, SOLUTION INTRAMUSCULAR; INTRAVENOUS at 16:14

## 2022-08-30 RX ADMIN — SCOPALAMINE 1 PATCH: 1 PATCH, EXTENDED RELEASE TRANSDERMAL at 12:44

## 2022-08-30 RX ADMIN — SODIUM CHLORIDE, SODIUM LACTATE, POTASSIUM CHLORIDE, AND CALCIUM CHLORIDE 50 ML/HR: .6; .31; .03; .02 INJECTION, SOLUTION INTRAVENOUS at 18:38

## 2022-08-30 RX ADMIN — SODIUM CHLORIDE, SODIUM LACTATE, POTASSIUM CHLORIDE, AND CALCIUM CHLORIDE 125 ML/HR: .6; .31; .03; .02 INJECTION, SOLUTION INTRAVENOUS at 12:57

## 2022-08-30 RX ADMIN — EPHEDRINE SULFATE 5 MG: 50 INJECTION, SOLUTION INTRAVENOUS at 14:25

## 2022-08-30 RX ADMIN — ALPRAZOLAM 0.25 MG: 0.25 TABLET ORAL at 23:24

## 2022-08-30 RX ADMIN — PROPOFOL 50 MG: 10 INJECTION, EMULSION INTRAVENOUS at 14:43

## 2022-08-30 RX ADMIN — FENTANYL CITRATE 25 MCG: 50 INJECTION, SOLUTION INTRAMUSCULAR; INTRAVENOUS at 16:20

## 2022-08-30 RX ADMIN — ONDANSETRON 4 MG: 2 INJECTION INTRAMUSCULAR; INTRAVENOUS at 15:07

## 2022-08-30 RX ADMIN — OLANZAPINE 2.5 MG: 5 TABLET, FILM COATED ORAL at 21:52

## 2022-08-30 RX ADMIN — FENTANYL CITRATE 50 MCG: 50 INJECTION INTRAMUSCULAR; INTRAVENOUS at 14:14

## 2022-08-30 RX ADMIN — Medication 200 MG: at 14:14

## 2022-08-30 RX ADMIN — HEPARIN SODIUM 5000 UNITS: 5000 INJECTION INTRAVENOUS; SUBCUTANEOUS at 12:44

## 2022-08-30 RX ADMIN — PROPOFOL 160 MG: 10 INJECTION, EMULSION INTRAVENOUS at 14:14

## 2022-08-30 RX ADMIN — FENTANYL CITRATE 50 MCG: 50 INJECTION INTRAMUSCULAR; INTRAVENOUS at 14:44

## 2022-08-30 RX ADMIN — MIDAZOLAM 2 MG: 1 INJECTION INTRAMUSCULAR; INTRAVENOUS at 14:00

## 2022-08-30 RX ADMIN — FENTANYL CITRATE 50 MCG: 50 INJECTION INTRAMUSCULAR; INTRAVENOUS at 14:50

## 2022-08-30 RX ADMIN — PROPOFOL 50 MG: 10 INJECTION, EMULSION INTRAVENOUS at 14:40

## 2022-08-30 RX ADMIN — ACETAMINOPHEN 975 MG: 325 SUSPENSION ORAL at 21:52

## 2022-08-30 RX ADMIN — LOSARTAN POTASSIUM 100 MG: 50 TABLET, FILM COATED ORAL at 18:42

## 2022-08-30 RX ADMIN — PROPOFOL 50 MG: 10 INJECTION, EMULSION INTRAVENOUS at 14:47

## 2022-08-30 RX ADMIN — FAMOTIDINE 20 MG: 10 INJECTION, SOLUTION INTRAVENOUS at 21:56

## 2022-08-30 RX ADMIN — ZOLPIDEM TARTRATE 5 MG: 5 TABLET, COATED ORAL at 23:24

## 2022-08-30 RX ADMIN — DEXAMETHASONE SODIUM PHOSPHATE 6 MG: 10 INJECTION, SOLUTION INTRAMUSCULAR; INTRAVENOUS at 14:30

## 2022-08-30 RX ADMIN — ACETAMINOPHEN 975 MG: 325 TABLET ORAL at 12:44

## 2022-08-30 NOTE — ANESTHESIA PREPROCEDURE EVALUATION
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, without long-term current use of insulin (HCC)      GI/HEPATIC   (+) Dysphagia   (+) Gastroesophageal reflux disease with esophagitis without hemorrhage      NEURO/PSYCH   (+) Depression with anxiety      PULMONARY   (+) Asthma       (+) Sleep apnea      Digestive   (+) Molina's esophagus determined by biopsy   (+) Molina's esophagus with dysplasia      Other   (+) Class 3 severe obesity due to excess calories without serious comorbidity with body mass index (BMI) of 40 0 to 44 9 in adult (HCC)   (+) Hx of laparoscopic adjustable gastric banding   (+) Obesity, Class III, BMI 40-49 9 (morbid [facility]) [facility]     7/2019 ECHO     LEFT VENTRICLE: Size was normal  Systolic function was normal  Ejection fraction was estimated to be 65 %  There were no regional wall motion abnormalities  Wall thickness was normal  There was no evidence of concentric hypertrophy  DOPPLER: Doppler parameters were consistent with abnormal left ventricular relaxation (grade 1 diastolic dysfunction)      RIGHT VENTRICLE: The size was normal  Systolic function was normal      LEFT ATRIUM: The atrium was mildly dilated      RIGHT ATRIUM: Size was normal      MITRAL VALVE: Valve structure was normal  There was normal leaflet separation  DOPPLER: The transmitral velocity was within the normal range  There was no evidence for stenosis  There was trace regurgitation      AORTIC VALVE: The valve was trileaflet  Leaflets exhibited normal cuspal separation and sclerosis  DOPPLER: Transaortic velocity was within the normal range  There was no evidence for stenosis   There was no regurgitation      TRICUSPID VALVE: The valve structure was normal  There was normal leaflet separation  DOPPLER: The transtricuspid velocity was within the normal range  There was no evidence for stenosis  There was trace regurgitation  The tricuspid jet  envelope definition was inadequate for estimation of RV systolic pressure      PULMONIC VALVE: Leaflets exhibited normal thickness, no calcification, and normal cuspal separation  DOPPLER: The transpulmonic velocity was within the normal range  There was no evidence for stenosis  There was no regurgitation      PERICARDIUM: There was no pericardial effusion  The pericardium was normal in appearance      AORTA: The root exhibited normal size  The ascending aorta was normal in size for body surface area at 4 cm (16 mm/m2)      SYSTEMIC VEINS: IVC: The inferior vena cava was normal in size and course  Respirophasic changes were normal        Physical Exam    Airway    Mallampati score: II  TM Distance: >3 FB  Neck ROM: full     Dental       Cardiovascular  Rhythm: regular, Rate: normal, Cardiovascular exam normal    Pulmonary  Pulmonary exam normal     Other Findings        Anesthesia Plan  ASA Score- 3     Anesthesia Type- general with ASA Monitors  Additional Monitors:   Airway Plan: ETT  Plan Factors-Exercise tolerance (METS): >4 METS  Chart reviewed  Existing labs reviewed  Patient summary reviewed  Patient is not a current smoker  Patient did not smoke on day of surgery  Obstructive sleep apnea risk education given perioperatively  Induction- rapid sequence induction  Postoperative Plan- Plan for postoperative opioid use  Planned trial extubation    Informed Consent- Anesthetic plan and risks discussed with patient

## 2022-08-30 NOTE — INTERVAL H&P NOTE
H&P reviewed  After examining the patient I find no changes in the patients condition since the H&P had been written      Vitals:    08/30/22 1253   BP: 122/63   Pulse: 56   Resp: 20   Temp: 98 1 °F (36 7 °C)   SpO2: 94%

## 2022-08-30 NOTE — OP NOTE
[facility]   [address] [facility] [address], [address]  [phone] (Fax)      Operative Report  LAPAROSCOPIC REMOVAL OF ADJUSTABLE GASTRIC BAND AND PORT  WITH INTRAOPERATIVE EGD     Patient Name: Zulma Gutierrez    :  1955  MRN: [id number]  Patient Location: [facility] OR ROOM 07  Surgery Date : 2022  Surgeons:  Surgeon(s) and Role:     Acacia Triana MD - Primary     * Arielle White DO - Assisting    Diagnosis:    Pre-Op Diagnosis Codes:  Other complications of gastric band procedure [K95 09]  Molina esophagus [K22 70]  GERD (gastroesophageal reflux disease) [K21 9]  Chronic vomiting [R11 10]  Status post bariatric surgery [Z98 84]  Body mass index is 41 97 kg/m²  Post-Op Diagnosis Codes:     * Other complications of gastric band procedure [K95 09]     * Molina esophagus [K22 70]     * GERD (gastroesophageal reflux disease) [K21 9]     * Chronic vomiting [R11 10]     * Status post bariatric surgery [Z98 84]     * Body mass index is 41 97 kg/m²  Procedure  1  Diagnostic Laparoscopy  2  Extensive Lysis of Adhesions  3  Laparoscopic Removal of Adjustable Gastric Band  4  Intraoperative Endoscopy  5  Gastric Band Port Explantation    Specimen(s):  ID Type Source Tests Collected by Time Destination   1 : Gastric band and Port  Other Foreign body TISSUE EXAM Francine Robertson MD 2022 1513    2 : Gastric Band Capsule  Tissue Soft Tissue, Other TISSUE EXAM Francine Robertson MD 2022 1514        Estimated Blood Loss:    20 mL    Anesthesia Type:     General    Operative Indications: Other complications of gastric band procedure [K95 09]  Molina esophagus [K22 70]  GERD (gastroesophageal reflux disease) [K21 9]  Chronic vomiting [R11 10]  Status post bariatric surgery [Z98 84]  Body mass index is 41 97 kg/m²  Operative Findings:    Multiple dense adhesions involving the band to the undersurface of the liver    Large amount of adhesions involving the omentum to the anterior abdominal wall where hernia repair was done with mesh  Possible transverse colon involved inside of it  Complications:     None    Procedure and Technique:    INDICATION:    74 y o  male morbidly obese who underwent a laparoscopic adjustable gastric band placement back in 2010 with me  At 1 point he had lost almost 50 lb and he was doing okay  But eventually regained a significant amount of weight and developed heartburn  He was lost to follow-up since 2017 and came back to us couple months ago after he was diagnosed with Molina's esophagus by GI      His symptomatology is not improving and has not responded to therapy and for this reason we decided to move forward and remove his band      He is not interested in having a revision at this time      Patient has a long history of morbid obesity and is presenting to discuss the surgical weight loss options  Despite the patient best efforts patient was unable to lose any meaningful or sustainable weight using nonsurgical means  We had a long discussion regarding all the surgical weight-loss options at our disposal at this point and reviewed the risks and benefits of each procedure in details as it relates to his age, BMI and medical conditions      He has been pre certified to undergo a Laparoscopic gastric band removal     OPERATIVE TECHNIQUE    The patient was taken to the operating room and placed in a supine position  A dose of IV antibiotic prophylaxis that consisted of Ancef 2gwas given  Also, 5000 units of subcutaneous unfractionated heparin to prevent DVT were administered  Sequential compression devices were placed on both lower extremities  After satisfactory general anesthesia induction and endotracheal intubation was achieved, the extremities were secured to prevent neurovascular and musculoskeletal injuries as best as possible   Subsequently, the abdominal wall was prepped and draped in a surgical standard sterile fashion  After a timeout was done and the patient was properly identified and the type of procedure was confirmed a supra-umbilical transverse skin incision was made, and the subcutaneous tissues dissected  Access to the peritoneal cavity was gained with an optical trocar  With this device, we were able to visualize the layers of the abdominal wall, and enter the peritoneal cavity under direct visualization  Pneumoperitoneum was then established with CO2 insufflation  A four quadrant transversus abdominis plane block was performed under direct laparoscopic vision  Four additional trocars were placed: a 12 mm in the right upper quadrant subcostal position in the anterior axillary line, a 12-mm port was placed in the right flank midclavicular line, a 5-mm port was placed in the left upper quadrant subcostal position in the midclavicular line and another 5-mm port was placed in the left quadrant anterior axillary line lateral to the supraumbilical port  Large amount of adhesions involving the omentum to the anterior abdominal wall where the hernia repair was done with mesh  Possible transverse colon involved inside of it  Our trocars were positioned away from this area so we left it alone  At this point we repositioned the patient into a reverse Trendelenburg  The band was identified underneath the liver with a significant amount of adhesions that were taken down with a combination of the hook electrocautery and sharp dissection  We continued to do this until we freed the undersurface of the left lobe of the liver and the lateral aspect of the caudate lobe that were severely adherent to the capsule of the band  This process was long and tedious and took over 40 minutes to complete as it appeared that the band had partially eroded to the undersurface of the left lobe of liver  The capsule was dissected with the hook electrocautery and the buckle of the band was cut     The band was removed from around the stomach and the tubing was cut close to the abdominal wall  The plication of the band was taken down and the capsule was partially dissected anteriorly  An EGD as well as an air insufflation test were performed  No intraoperative bleeding nor leaks were detected  The sponge, needle and instrument count was reported complete  The 12-mm port opical trocar site was then closed with the use of a suture closure device and a 0 absorbable suture  The liver retractor was removed under direct laparoscopic visualization, and no bleeding was noted  The remaining ports were then also removed under laparoscopic visualization  The skin incisions were all closed with 4-0 absorbable subcuticular suture  We then turned our attention to the subcutaneous port  An incision was made with a 15 blade on top of of the previous port site scar and the tissues were dissected down to the port  The capsule surrounding the port was transected and excised and the port was freed from the abdominal fascia  The port was removed with the remainder portion of the tubing that connected to the intra-abdominal band  This was also sent to pathology for further examination  The patient tolerated the procedure well, was extubated uneventfully and was transferred to the recovery room in stable condition  I was present for the entire length of the procedure as the attending of record  No qualified resident was available to assist   The presence of an assistant was necessary for camera holding, traction and counter traction and for help with removing the band in addition to performing the intraop-EGD      Patient Disposition:    PACU     Signature: Brian Weinstein MD  Date: August 30, 2022  Time: 3:24 PM

## 2022-08-30 NOTE — ANESTHESIA POSTPROCEDURE EVALUATION
Post-Op Assessment Note    CV Status:  Stable  Pain Score: 2    Pain management: adequate     Mental Status:  Alert and awake   Hydration Status:  Euvolemic   PONV Controlled:  Controlled   Airway Patency:  Patent      Post Op Vitals Reviewed: Yes      Staff: Anesthesiologist         No complications documented      /61 (08/30/22 1709)    Temp     Pulse 68 (08/30/22 1709)   Resp 15 (08/30/22 1709)    SpO2 94 % (08/30/22 1709)

## 2022-08-30 NOTE — PLAN OF CARE
Problem: Potential for Falls  Goal: Patient will remain free of falls  Description: INTERVENTIONS:  - Educate patient/family on patient safety including physical limitations  - Instruct patient to call for assistance with activity   - Consult OT/PT to assist with strengthening/mobility   - Keep Call bell within reach  - Keep bed low and locked with side rails adjusted as appropriate  - Keep care items and personal belongings within reach  - Initiate and maintain comfort rounds  - Make Fall Risk Sign visible to staff  - Offer Toileting every  Hours, in advance of need  - Initiate/Maintain alarm  - Obtain necessary fall risk management equipment:   - Apply yellow socks and bracelet for high fall risk patients  - Consider moving patient to room near nurses station  Outcome: Progressing     Problem: PAIN - ADULT  Goal: Verbalizes/displays adequate comfort level or baseline comfort level  Description: Interventions:  - Encourage patient to monitor pain and request assistance  - Assess pain using appropriate pain scale  - Administer analgesics based on type and severity of pain and evaluate response  - Implement non-pharmacological measures as appropriate and evaluate response  - Consider cultural and social influences on pain and pain management  - Notify physician/advanced practitioner if interventions unsuccessful or patient reports new pain  Outcome: Progressing     Problem: INFECTION - ADULT  Goal: Absence or prevention of progression during hospitalization  Description: INTERVENTIONS:  - Assess and monitor for signs and symptoms of infection  - Monitor lab/diagnostic results  - Monitor all insertion sites, i e  indwelling lines, tubes, and drains  - Monitor endotracheal if appropriate and nasal secretions for changes in amount and color  - [place] appropriate cooling/warming therapies per order  - Administer medications as ordered  - Instruct and encourage patient and family to use good hand hygiene technique  - Identify and instruct in appropriate isolation precautions for identified infection/condition  Outcome: Progressing  Goal: Absence of fever/infection during neutropenic period  Description: INTERVENTIONS:  - Monitor WBC    Outcome: Progressing     Problem: SAFETY ADULT  Goal: Patient will remain free of falls  Description: INTERVENTIONS:  - Educate patient/family on patient safety including physical limitations  - Instruct patient to call for assistance with activity   - Consult OT/PT to assist with strengthening/mobility   - Keep Call bell within reach  - Keep bed low and locked with side rails adjusted as appropriate  - Keep care items and personal belongings within reach  - Initiate and maintain comfort rounds  - Make Fall Risk Sign visible to staff  - Offer Toileting every  Hours, in advance of need  - Initiate/Maintain alarm  - Obtain necessary fall risk management equipment:   - Apply yellow socks and bracelet for high fall risk patients  - Consider moving patient to room near nurses station  Outcome: Progressing  Goal: Maintain or return to baseline ADL function  Description: INTERVENTIONS:  -  Assess patient's ability to carry out ADLs; assess patient's baseline for ADL function and identify physical deficits which impact ability to perform ADLs (bathing, care of mouth/teeth, toileting, grooming, dressing, etc )  - Assess/evaluate cause of self-care deficits   - Assess range of motion  - Assess patient's mobility; develop plan if impaired  - Assess patient's need for assistive devices and provide as appropriate  - Encourage maximum independence but intervene and supervise when necessary  - Involve family in performance of ADLs  - Assess for home care needs following discharge   - Consider OT consult to assist with ADL evaluation and planning for discharge  - Provide patient education as appropriate  Outcome: Progressing  Goal: Maintains/Returns to pre admission functional level  Description: INTERVENTIONS:  - Perform BMAT or MOVE assessment daily    - Set and communicate daily mobility goal to care team and patient/family/caregiver  - Collaborate with rehabilitation services on mobility goals if consulted  - Perform Range of Motion  times a day  - Reposition patient every  hours  - Dangle patient  times a day  - Stand patient  times a day  - Ambulate patient  times a day  - Out of bed to chair  times a day   - Out of bed for meals times a day  - Out of bed for toileting  - Record patient progress and toleration of activity level   Outcome: Progressing     Problem: DISCHARGE PLANNING  Goal: Discharge to home or other facility with appropriate resources  Description: INTERVENTIONS:  - Identify barriers to discharge w/patient and caregiver  - Arrange for needed discharge resources and transportation as appropriate  - Identify discharge learning needs (meds, wound care, etc )  - Arrange for interpretive services to assist at discharge as needed  - Refer to Case Management Department for coordinating discharge planning if the patient needs post-hospital services based on physician/advanced practitioner order or complex needs related to functional status, cognitive ability, or social support system  Outcome: Progressing     Problem: Knowledge Deficit  Goal: Patient/family/caregiver demonstrates understanding of disease process, treatment plan, medications, and discharge instructions  Description: Complete learning assessment and assess knowledge base    Interventions:  - Provide teaching at level of understanding  - Provide teaching via preferred learning methods  Outcome: Progressing

## 2022-08-31 VITALS
HEIGHT: 69 IN | HEART RATE: 58 BPM | SYSTOLIC BLOOD PRESSURE: 125 MMHG | WEIGHT: 284.17 LBS | DIASTOLIC BLOOD PRESSURE: 73 MMHG | OXYGEN SATURATION: 93 % | BODY MASS INDEX: 42.09 KG/M2 | TEMPERATURE: 97.6 F | RESPIRATION RATE: 18 BRPM

## 2022-08-31 LAB
ANION GAP SERPL CALCULATED.3IONS-SCNC: 11 MMOL/L (ref 4–13)
BUN SERPL-MCNC: 8 MG/DL (ref 5–25)
CALCIUM SERPL-MCNC: 9.2 MG/DL (ref 8.3–10.1)
CHLORIDE SERPL-SCNC: 100 MMOL/L (ref 96–108)
CO2 SERPL-SCNC: 22 MMOL/L (ref 21–32)
CREAT SERPL-MCNC: 1.01 MG/DL (ref 0.6–1.3)
ERYTHROCYTE [DISTWIDTH] IN BLOOD BY AUTOMATED COUNT: 13.3 % (ref 11.6–15.1)
GFR SERPL CREATININE-BSD FRML MDRD: 76 ML/MIN/1.73SQ M
GLUCOSE P FAST SERPL-MCNC: 168 MG/DL (ref 65–99)
GLUCOSE SERPL-MCNC: 168 MG/DL (ref 65–140)
GLUCOSE SERPL-MCNC: 191 MG/DL (ref 65–140)
GLUCOSE SERPL-MCNC: 65 MG/DL (ref 65–140)
HCT VFR BLD AUTO: 38.7 % (ref 36.5–49.3)
HGB BLD-MCNC: 13.6 G/DL (ref 12–17)
MCH RBC QN AUTO: 30.8 PG (ref 26.8–34.3)
MCHC RBC AUTO-ENTMCNC: 35.1 G/DL (ref 31.4–37.4)
MCV RBC AUTO: 88 FL (ref 82–98)
PLATELET # BLD AUTO: 244 THOUSANDS/UL (ref 149–390)
PMV BLD AUTO: 8.8 FL (ref 8.9–12.7)
POTASSIUM SERPL-SCNC: 4.2 MMOL/L (ref 3.5–5.3)
RBC # BLD AUTO: 4.41 MILLION/UL (ref 3.88–5.62)
SODIUM SERPL-SCNC: 133 MMOL/L (ref 135–147)
WBC # BLD AUTO: 10.02 THOUSAND/UL (ref 4.31–10.16)

## 2022-08-31 PROCEDURE — 85027 COMPLETE CBC AUTOMATED: CPT | Performed by: STUDENT IN AN ORGANIZED HEALTH CARE EDUCATION/TRAINING PROGRAM

## 2022-08-31 PROCEDURE — 99024 POSTOP FOLLOW-UP VISIT: CPT | Performed by: SURGERY

## 2022-08-31 PROCEDURE — 80048 BASIC METABOLIC PNL TOTAL CA: CPT | Performed by: STUDENT IN AN ORGANIZED HEALTH CARE EDUCATION/TRAINING PROGRAM

## 2022-08-31 PROCEDURE — 82948 REAGENT STRIP/BLOOD GLUCOSE: CPT

## 2022-08-31 PROCEDURE — NC001 PR NO CHARGE: Performed by: SURGERY

## 2022-08-31 RX ADMIN — FAMOTIDINE 20 MG: 10 INJECTION, SOLUTION INTRAVENOUS at 08:22

## 2022-08-31 RX ADMIN — INSULIN LISPRO 2 UNITS: 100 INJECTION, SOLUTION INTRAVENOUS; SUBCUTANEOUS at 07:56

## 2022-08-31 RX ADMIN — ACETAMINOPHEN 975 MG: 325 SUSPENSION ORAL at 05:38

## 2022-08-31 RX ADMIN — METOPROLOL SUCCINATE 25 MG: 25 TABLET, EXTENDED RELEASE ORAL at 08:21

## 2022-08-31 RX ADMIN — EZETIMIBE 5 MG: 10 TABLET ORAL at 08:21

## 2022-08-31 NOTE — DISCHARGE SUMMARY
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]     * GERD (gastroesophageal reflux disease) [K21 9]     * Chronic vomiting [R11 10]     * Status post bariatric surgery [Z98 84]    Procedures Performed:  Procedure(s):  LAPAROSCOPIC REMOVAL OF ADJUSTABLE GASTRIC BAND AND PORT  WITH INTRAOPERATIVE EGD    Surgeon: Jae oRn MD    See H & P for full details of admission and Operative Note for full details of operations performed  Patient tolerated surgery well without complications  In the morning postoperative Day 1, the patient had mild nausea and abdominal pain  Tolerated a clear liquid diet without vomiting  Able to ambulate and voiding independently  Patient was deemed ready for discharge home  Patient was seen and examined prior to discharge  Provisions for Follow-Up Care:  See After Visit Summary/Discharge Instructions for information related to follow-up care and home orders  Disposition: Home, in stable condition  Planned Readmission: No    Discharge Medications:  See After Visit Summary/Discharge Instructions for reconciled discharge medications provided to patient and family  Post Operative instructions: Reviewed with patient and/or family      Signature:   Stefania Singletary DO  Date: 8/31/2022 Time: 9:11 AM

## 2022-08-31 NOTE — PLAN OF CARE
Problem: Potential for Falls  Goal: Patient will remain free of falls  Description: INTERVENTIONS:  - Educate patient/family on patient safety including physical limitations  - Instruct patient to call for assistance with activity   - Consult OT/PT to assist with strengthening/mobility   - Keep Call bell within reach  - Keep bed low and locked with side rails adjusted as appropriate  - Keep care items and personal belongings within reach  - Initiate and maintain comfort rounds  - Make Fall Risk Sign visible to staff  - Apply yellow socks and bracelet for high fall risk patients  - Consider moving patient to room near nurses station  Outcome: Progressing     Problem: PAIN - ADULT  Goal: Verbalizes/displays adequate comfort level or baseline comfort level  Description: Interventions:  - Encourage patient to monitor pain and request assistance  - Assess pain using appropriate pain scale  - Administer analgesics based on type and severity of pain and evaluate response  - Implement non-pharmacological measures as appropriate and evaluate response  - Consider cultural and social influences on pain and pain management  - Notify physician/advanced practitioner if interventions unsuccessful or patient reports new pain  Outcome: Progressing     Problem: INFECTION - ADULT  Goal: Absence or prevention of progression during hospitalization  Description: INTERVENTIONS:  - Assess and monitor for signs and symptoms of infection  - Monitor lab/diagnostic results  - Monitor all insertion sites, i e  indwelling lines, tubes, and drains  - Monitor endotracheal if appropriate and nasal secretions for changes in amount and color  - [place] appropriate cooling/warming therapies per order  - Administer medications as ordered  - Instruct and encourage patient and family to use good hand hygiene technique  - Identify and instruct in appropriate isolation precautions for identified infection/condition  Outcome: Progressing  Goal: Absence of fever/infection during neutropenic period  Description: INTERVENTIONS:  - Monitor WBC    Outcome: Progressing     Problem: SAFETY ADULT  Goal: Patient will remain free of falls  Description: INTERVENTIONS:  - Educate patient/family on patient safety including physical limitations  - Instruct patient to call for assistance with activity   - Consult OT/PT to assist with strengthening/mobility   - Keep Call bell within reach  - Keep bed low and locked with side rails adjusted as appropriate  - Keep care items and personal belongings within reach  - Initiate and maintain comfort rounds  - Make Fall Risk Sign visible to staff  - Apply yellow socks and bracelet for high fall risk patients  - Consider moving patient to room near nurses station  Outcome: Progressing  Goal: Maintain or return to baseline ADL function  Description: INTERVENTIONS:  -  Assess patient's ability to carry out ADLs; assess patient's baseline for ADL function and identify physical deficits which impact ability to perform ADLs (bathing, care of mouth/teeth, toileting, grooming, dressing, etc )  - Assess/evaluate cause of self-care deficits   - Assess range of motion  - Assess patient's mobility; develop plan if impaired  - Assess patient's need for assistive devices and provide as appropriate  - Encourage maximum independence but intervene and supervise when necessary  - Involve family in performance of ADLs  - Assess for home care needs following discharge   - Consider OT consult to assist with ADL evaluation and planning for discharge  - Provide patient education as appropriate  Outcome: Progressing  Goal: Maintains/Returns to pre admission functional level  Description: INTERVENTIONS:  - Perform BMAT or MOVE assessment daily    - Set and communicate daily mobility goal to care team and patient/family/caregiver     - Collaborate with rehabilitation services on mobility goals if consulted  - Out of bed for toileting  - Record patient progress and toleration of activity level   Outcome: Progressing     Problem: DISCHARGE PLANNING  Goal: Discharge to home or other facility with appropriate resources  Description: INTERVENTIONS:  - Identify barriers to discharge w/patient and caregiver  - Arrange for needed discharge resources and transportation as appropriate  - Identify discharge learning needs (meds, wound care, etc )  - Arrange for interpretive services to assist at discharge as needed  - Refer to Case Management Department for coordinating discharge planning if the patient needs post-hospital services based on physician/advanced practitioner order or complex needs related to functional status, cognitive ability, or social support system  Outcome: Progressing     Problem: Knowledge Deficit  Goal: Patient/family/caregiver demonstrates understanding of disease process, treatment plan, medications, and discharge instructions  Description: Complete learning assessment and assess knowledge base    Interventions:  - Provide teaching at level of understanding  - Provide teaching via preferred learning methods  Outcome: Progressing

## 2022-08-31 NOTE — DISCHARGE INSTR - AVS FIRST PAGE
your medications from [address] in [facility] [address]   Take Tylenol every 8 hours around the clock, unless instructed otherwise  Take your omeprazole daily  It is important to stay hydrated and follow your discharge diet progression   Mild nausea is ok as long as you can drink fluids, sip very slowly and get up and walk during any periods of nausea  You may shower normally after 48 hours, but do not scrub incision sites, blot gently with clean towel to dry incisions  Take home medications as usual unless instructed otherwise while in hospital  Follow up with Dr Sandra Rao and your PCP within the next week

## 2022-08-31 NOTE — DISCHARGE INSTRUCTIONS
Bariatric/Weight Loss Surgery  Hospital Discharge Instructions  ACTIVITY:  Progress as feels comfortable - a good rule is:  if you are doing something and it begins to hurt, stop doing the activity  Walk every hour while at home  You may walk stairs if you do so slowly  You may shower 48 hours after surgery  Do not scrub incision sites  Blot gently with clean towel to dry incisions  (see #4 below)   Use your incentive spirometer 10 times per hour while awake for 1 week after surgery  Do NOT drive for 48 hours after surgery  No driving 24 hours after taking certain prescription pain medications  Examples of such medication are Percocet, Darvocet, Oxycodone, Tylenol #3, and Tylenol with Codeine  DIET  Stay on a liquid diet for 7 days after your surgery date, sipping slowly  Refer to your manual for examples of choices  Remember to keep your liquids sugar free or low calorie  You may have protein drinks  Make sure to drink 48 to 64 ounces per day of fluids  You may advance to a pureed diet one week after surgery  Once you get approval from your surgeon at your first post operative visit, you may advance to the soft diet and remain on soft diet for 8 weeks unless otherwise instructed  MEDICATIONS:  The abdominal nerve block will wear off during the first 1-2 days that you are home, and you may become sore (especially over incision site/sites where abdominal wall is sutured)  This may create a pulling sensation, especially while moving around, and will fade over time  Continue to take your Tylenol and your pain medication as instructed  Anti-acid Medication as per prescription  Other medications as indicated on the Physician Patient Discharge Instructions form given to you at the time of discharge  Make sure that you are splitting your pill or tablet medications in halves or fourths or even crushing them before you take them  Capsules should be opened and mixed with water or jello   You need to do this for at least 4 weeks after surgery  Eventually you will be able to take your medications the regular way as they were prescribed  You will need to consult with your Family Doctor in regards to all your prescribed medication, particularly those for blood pressure and diabetes  As you lose weight, medical conditions may change, requiring an alteration or elimination of the drug dose  Monitor blood pressure closely and call PCP with any concerns  Sleeve Gastrectomy patients ONLY:  Complete full course of lovenox injections! DO NOT TAKE BIRTH CONTROL(BC) MEDICATIONS, INSERT BC VAGINAL RINGS, OR PLACE IUD OR ANY OTHER BC METHODS UNTIL 31 DAYS FROM DAY OF DISCHARGE FROM HOSPITAL  THIS PLACES YOU AT HIGH RISK FOR A POTENTIALLY LIFE THREATENING BLOOD CLOT  Remember to always use barrier methods for birth control and speak to your GYN about using two forms of birth control to start 31 days after surgery  It is very important to avoid pregnancy until at least 18-24 months after surgery  INCISION CARE  You may shower and get incisions wet 2 days after surgery  No soaking tub baths or swimming for 30 days after surgery  Keep abdominal area and incisions clean  Use soap and water to create a good lather and rinse off  Do not scrub incisions  If you have a drain, empty the drain as the nurses instructed  FOLLOW-UP APPOINTMENT should be made for one week after discharge  Call surgeons office at [phone] to schedule an appointment      CALL YOUR DOCTOR FOR:  pain not controlled by pain medications, a temperature greater than 101 5° F, any increase or change in drainage or redness from any incision, any vomiting or inability to keep liquids down, shortness of breath, shoulder pain, or bleeding

## 2022-08-31 NOTE — PROGRESS NOTES
Progress Note - Bariatric Surgery   Kevin De Luna 74 y o  male MRN: [id number]  Unit/Bed#: E5 -01 Encounter: [id number]      Subjective/Objective     Subjective:  Patient was seen and evaluated this morning at bedside  Patient is POD1 s/p Laparoscopic Removal of Lap Band  Patient denies fevers, chills, sweats, SOB, CP, calf pain  Pain adequately controlled on oral pain medication  Ambulating without assistance, voiding well, and using incentive spirometer  Tolerating liquid diet without nausea or vomiting today  Objective:    /73   Pulse 58   Temp 97 6 °F (36 4 °C)   Resp 18   Ht 5' 9" (1 753 m)   Wt 129 kg (284 lb 2 8 oz)   SpO2 93%   BMI 41 97 kg/m²       Intake/Output Summary (Last 24 hours) at 8/31/2022 0814  Last data filed at 8/30/2022 1724  Gross per 24 hour   Intake 1700 ml   Output 20 ml   Net 1680 ml       Invasive Devices  Report    Peripheral Intravenous Line  Duration           Peripheral IV 08/30/22 Dorsal (posterior); Left Hand <1 day                ROS: 10-point system completed  All negative except see HPI  Physical Exam    General Appearance:    Alert, cooperative, no distress, appears stated age   Head:    Normocephalic, without obvious abnormality, atraumatic   Lungs:     Respirations unlabored   Heart:    Regular rate and rhythm   Abdomen:     Soft, appropriate tenderness, no masses, no organomegaly, non-distended   Extremities:   Extremities normal, atraumatic, no cyanosis or edema   Neurologic:  Incision:  Psych:   Normal strength and sensation    Clean, dry, and intact    Normal mood and affect       Lab, Imaging and other studies:  I have personally reviewed pertinent lab results    , CBC:   Lab Results   Component Value Date    WBC 10 02 08/31/2022    HGB 13 6 08/31/2022    HCT 38 7 08/31/2022    MCV 88 08/31/2022     08/31/2022    MCH 30 8 08/31/2022    MCHC 35 1 08/31/2022    RDW 13 3 08/31/2022    MPV 8 8 (L) 08/31/2022   , CMP:   Lab Results Component Value Date    SODIUM 133 (L) 08/31/2022    K 4 2 08/31/2022     08/31/2022    CO2 22 08/31/2022    BUN 8 08/31/2022    CREATININE 1 01 08/31/2022    CALCIUM 9 2 08/31/2022    EGFR 76 08/31/2022        VTE Mechanical Prophylaxis: sequential compression device    Assessment/Plan  1)  Patient with Morbid Obesity s/p Laparoscopic Removal of Lap Band with stable post op course  Patient afebrile and hemodynamically stable  - Encourage PO fluids  - Recommend ambulation, use of SCDs when not ambulating, and incentive spirometry  - Multimodal pain control  - Plan to D/C patient home today pending anticipated progression    Plan of care was discussed with patient    Care plan discussed with Dr Bo Jiménez DO  Bariatric Surgery Fellow  8/31/2022  8:14 AM

## 2022-09-01 ENCOUNTER — TELEPHONE (OUTPATIENT)
Dept: MEDSURG UNIT | Facility: HOSPITAL | Age: 67
End: 2022-09-01

## 2022-09-01 NOTE — TELEPHONE ENCOUNTER
Post op follow up phone call completed   Pt is tolerating liquids   Using IS as instructed, reinforced importance of using IS to help prevent pneumonia  Ambulating about home without difficulty   Pain controlled with analgesia   Reaffirmed examples of liquid diet over the next week   Pt stated understanding about discharge instructions and medication adjustments   Follow up appt with surgeon scheduled for next week    Instructed to call with any additional questions or concerns

## 2022-09-09 ENCOUNTER — OFFICE VISIT (OUTPATIENT)
Dept: BARIATRICS | Facility: CLINIC | Age: 67
End: 2022-09-09

## 2022-09-09 VITALS — BODY MASS INDEX: 43.1 KG/M2 | WEIGHT: 291 LBS | HEIGHT: 69 IN

## 2022-09-09 VITALS
HEART RATE: 63 BPM | WEIGHT: 291 LBS | BODY MASS INDEX: 43.1 KG/M2 | TEMPERATURE: 98.3 F | DIASTOLIC BLOOD PRESSURE: 78 MMHG | SYSTOLIC BLOOD PRESSURE: 130 MMHG | HEIGHT: 69 IN

## 2022-09-09 DIAGNOSIS — Z48.815 ENCOUNTER FOR SURGICAL AFTERCARE FOLLOWING SURGERY OF DIGESTIVE SYSTEM: Primary | ICD-10-CM

## 2022-09-09 DIAGNOSIS — E66.01 OBESITY, CLASS III, BMI 40-49.9 (MORBID OBESITY) (HCC): Primary | ICD-10-CM

## 2022-09-09 PROCEDURE — 99024 POSTOP FOLLOW-UP VISIT: CPT | Performed by: SURGERY

## 2022-09-09 PROCEDURE — RECHECK: Performed by: DIETITIAN, REGISTERED

## 2022-09-09 RX ORDER — OXYCODONE HYDROCHLORIDE 5 MG/1
TABLET ORAL
COMMUNITY
Start: 2022-08-31 | End: 2022-09-23 | Stop reason: ALTCHOICE

## 2022-09-09 NOTE — PROGRESS NOTES
Bariatric  Nutrition Assessment Note    Patient had band removed and will  work on weight loss   After weight loss - plan for hernia repair     Pt will work with [facility] program for 3-6 month and will then reconsider further weight loss surgery     Type of surgery  Adjustable gastric band  Surgery Date: 7/26/2010  12 years  post-op  Surgeon: Dr Yanes Failing  S/P band removal 8/30/2022    Nutrition Assessment   Ricci Soto  74 y o   male  Ht 5' 9" (1 753 m)   Wt 132 kg (291 lb)   BMI 42 97 kg/m²    Pt lost 7# following pre op diet, has regained since band removal      Wt Readings from Last 3 Encounters:   09/09/22 132 kg (291 lb)   08/30/22 129 kg (284 lb 2 8 oz)   08/18/22 132 kg (291 lb 8 oz)     Pt reports that he was up to 336 lbs 6-7 months ago ( lost 43# intentionally )   Was placed on Ozempic and was able to decrease his portions and the food he was eating leading to weigh tloss     [facility] Equation:  2096 kcal per day   Estimated calories for weight maintenance:  2515 kcal per day   ( sedentary )   Estimated calories for weight loss  grams 7637-6787 kcal  ( 1-2# per wk wt loss - sedentary )  Estimated protein needs 75-90 (1 0-1 2 gms/kg IBW )   Estimated fluid needs 64-75 ounces per day  (25-30 ml/kg IBW )      Weight on Day of Weight Loss Surgery: 310 5#  Weight in (lb) to have BMI = 25: 164 9#  Hank wt = 254#  Post-Op Wt Loss: 16 1#/ 11% EBWL in 12 year(s)    Review of History and Medications   Past Medical History:   Diagnosis Date    Acute blood loss anemia 10/13/2016    Anxiety     Arthritis     knees    Arthritis     Asthma     stable for > 10 years    Molina esophagus     with BARRX/RFA    Cancer ([facility]     prostate    Cataract     Depression     Diabetes ([facility]     Diabetes mellitus ([facility] )     pre diabetes    Diverticulitis of colon     Environmental allergies     Fall 12/30/2020    GERD (gastroesophageal reflux disease)     Hiatal hernia     History of anal fissures  Hyperlipidemia     Hypertension     Incisional hernia     Insomnia     Kidney stone     Morbid obesity (HCC)     Peripheral neuropathy     PONV (postoperative nausea and vomiting)     Prolapsing mitral valve     prolapsing mitral valve leaflet syndrome    Prostate cancer ([address]     Retinal detachment     [facility]; resolved: 10/10/2012    Sleep apnea     diagnosed but unable to tolerate cpap       Stuttering      Past Surgical History:   Procedure Laterality Date    ARTHROSCOPIC REPAIR ACL Right     BIOPSY CORE NEEDLE      prostate    CATARACT EXTRACTION Bilateral     COLONOSCOPY      HERNIA REPAIR      naval    JOINT REPLACEMENT      B/L knee    KNEE ARTHROSCOPY Left     LAPAROSCOPIC GASTRIC BANDING      MS TOTAL KNEE ARTHROPLASTY Left 02/15/2017    Procedure: TOTAL KNEE ARTHROPLASTY ;  Surgeon: Med Mitchell MD;  [facility];  Service: Orthopedics    MS TOTAL KNEE ARTHROPLASTY Right 10/10/2016    Procedure: ARTHROPLASTY KNEE TOTAL;  Surgeon: Med Mitchell MD;  [facility];  Service: Orthopedics    PROSTATECTOMY      robotic-assisted; [facility]-Dr Tom Rear REMOVAL GASTRIC BAND LAPAROSCOPIC N/A 8/30/2022    Procedure: LAPAROSCOPIC REMOVAL OF ADJUSTABLE GASTRIC BAND AND PORT  WITH INTRAOPERATIVE EGD;  Surgeon: Tavo Soler MD;  [facility];  Service: [address] [place] [address] Road Bilateral     Lemus Eye    SEPTOPLASTY      SINUS SURGERY      TONSILLECTOMY      over age 13   Manpreet Jiang UPPER GASTROINTESTINAL ENDOSCOPY      mutiple with BARRX/RFA    VASECTOMY      vas deferens     Social History     Socioeconomic History    Marital status: /Civil Union     Spouse name: Not on file    Number of children: 0    Years of education: Not on file    Highest education level: Not on file   Occupational History    Occupation:    Tobacco Use    Smoking status: Never Smoker    Smokeless tobacco: Never Used    Tobacco comment: quit 38 years ago   Vaping Use    Vaping Use: Never used   Substance and Sexual Activity    Alcohol use: Not Currently     Alcohol/week: 1 0 standard drink     Types: 1 Cans of beer per week     Comment: rare ; being a social drinker (as per allscripts)    Drug use: Not Currently    Sexual activity: Not Currently     Partners: Female   Other Topics Concern    Not on file   Social History Narrative    ** Merged History Encounter **          Social Determinants of Health     Financial Resource Strain: Not on file   Food Insecurity: Not on file   Transportation Needs: Not on file   Physical Activity: Not on file   Stress: Not on file   Social Connections: Not on file   Intimate Partner Violence: Not on file   Housing Stability: Not on file       Current Outpatient Medications:     ALPRAZolam (XANAX) 0 25 mg tablet, Take 1 tablet (0 25 mg total) by mouth 3 (three) times a day as needed for anxiety, Disp: 270 tablet, Rfl: 1    Blood Glucose Monitoring Suppl (OneTouch Verio) w/Device KIT, Use daily (Patient taking differently: Use as needed), Disp: 1 kit, Rfl: 0    ezetimibe (ZETIA) 10 mg tablet, TAKE 1 TABLET DAILY (Patient taking differently: Take 5 mg by mouth in the morning), Disp: 90 tablet, Rfl: 3    FLUoxetine (PROzac) 20 mg capsule, TAKE 3 CAPSULES DAILY, Disp: 270 capsule, Rfl: 3    glucose blood (OneTouch Verio) test strip, USE TO TEST DAILY, Disp: 200 strip, Rfl: 1    hydrochlorothiazide (HYDRODIURIL) 12 5 mg tablet, Take 1 tablet (12 5 mg total) by mouth 3 (three) times a week Monday, Thursday, Saturday (Patient taking differently: Take 12 5 mg by mouth 3 (three) times a week Monday, Wednesday, Friday), Disp: 36 tablet, Rfl: 5    Lancet Devices (ONE TOUCH DELICA LANCING DEV) MISC, Use daily, Disp: 1 each, Rfl: 0    losartan (COZAAR) 100 MG tablet, TAKE 1 TABLET DAILY, Disp: 90 tablet, Rfl: 3    metFORMIN (GLUCOPHAGE) 500 mg tablet, TAKE 2 TABLETS TWICE A DAY WITH MEALS, Disp: 360 tablet, Rfl: 3    metoprolol succinate (TOPROL-XL) 25 mg 24 hr tablet, TAKE 1 TABLET DAILY, Disp: 90 tablet, Rfl: 3    Multiple Vitamins-Minerals (CENTRUM ADULTS PO), Take by mouth in the morning, Disp: , Rfl:     nystatin (MYCOSTATIN) powder, Apply topically 4 (four) times a day (Patient taking differently: Apply topically if needed), Disp: 60 g, Rfl: 2    OLANZapine (ZyPREXA) 5 mg tablet, Take 0 5 tablets (2 5 mg total) by mouth daily (Patient taking differently: Take 2 5 mg by mouth every evening), Disp: 90 tablet, Rfl: 3    omeprazole (PriLOSEC) 20 mg delayed release capsule, Take 1 capsule (20 mg total) by mouth daily (Patient not taking: Reported on 9/9/2022), Disp: 30 capsule, Rfl: 3    OneTouch Delica Lancets 48D MISC, Use daily, Disp: 300 each, Rfl: 1    oxyCODONE (ROXICODONE) 5 immediate release tablet, , Disp: , Rfl:     Ozempic, 1 MG/DOSE, 4 MG/3ML SOPN injection pen, Inject 0 75 mg under the skin once a week  , Disp: , Rfl:     pantoprazole (PROTONIX) 40 mg tablet, Take 1 tablet (40 mg total) by mouth 2 (two) times a day 1 tab in AM, 1 tabs in PM, Disp: 180 tablet, Rfl: 3    rosuvastatin (CRESTOR) 5 mg tablet, Take 1 tablet (5 mg total) by mouth once a week, Disp: 12 tablet, Rfl: 3    Semaglutide, 1 MG/DOSE, 2 MG/1 5ML SOPN, Inject SQ 0 75mg - 1 0mg weekly as directed (Patient not taking: Reported on 9/9/2022), Disp: 9 mL, Rfl: 3    sucralfate (CARAFATE) 1 g tablet, Take 1 tablet (1 g total) by mouth 4 (four) times a day (Patient not taking: No sig reported), Disp: 40 tablet, Rfl: 2    zolpidem (AMBIEN) 5 mg tablet, Take 1 tablet (5 mg total) by mouth daily at bedtime as needed for sleep, Disp: 90 tablet, Rfl: 0    Food Intake and Lifestyle Assessment   Food Intake Assessment completed via usual diet recall  Wakes at 1 pm   Breakfast: 2 pm - english muffin with butter, sometimes with 1 tsp jelly  1 can diet pepsi( left open overnight so that it is flat )   Snack: 0   Lunch: 6 pm to 8 pm   Will pick up a meal from [facility] or go out to eat or get take out   Focuses on seafood - shrimp lobster, sometimes steak with veggies sometimes veal parm  Carrots/ grean beans /peas  Occasional soups and salads   Snack: 0  Dinner: midnight to 1 am - snack of peanut butter crackers and flat diet pepsi   Snack:2- 3 am   Sugar free Floris bar or glass of Orange juice over ice  Beverage intake: water, juice, diet soda and diet iced tea   Diet texture/stage: regular  Protein supplement: no  Estimated protein intake per day: 50-60 grams   Estimated fluid intake per day: 3-4 cans flat diet pepsi ( 48 ounces ) , juice and iced tea about 16 ounces, 8 ounces of water   Meals eaten away from home: daily   Typical meal pattern: 3 meals per day and 0-1 snacks per day  Eating Behaviors: Appropriate diet advancement, Mindless eating and Emotional eating  Drinks diet pepsi with meals   Does not follow 30/30 rule     Vitamin and Mineral regimen: take a multivitamin and mineral     Food allergies or intolerances: used to vomit daily due to overeating with the band   Patient now eats smaller portions   Cultural or Tenriism considerations: n/a     Physical Assessment  Lab Results   Component Value Date    HGBA1C 6 0 (H) 08/05/2022     Lab Results   Component Value Date    [id number] 328 03/06/2022     With normal MMA level     Nutrition Related Findings  Constipation,  and Return of Hunger  Pt reports now that his band was removed he can eat more frequently and has less satiety  He has restarted his Ozempic and is titrating up in dose  Experiences  constipation since starting Ozempic    Although lhe oves Ozempic as he has been able to reduce his portions and food in take and lose weight     Physical Activity  Types of exercise: Tracks steps  Averages 2000 per day, on a good day 4604-8669 steps per day   Current physical limitations: joint pain, neuropathy   Santos Apple backwards down stairs last year with severe concussion - feels he is back 60%  Has f/u with neuro  Would like to return to playing golf    Psychosocial Assessment   Support systems: spouse  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  Sleeps 4 am to 1 pm   Likes to work on his photography at night     Nutrition Diagnosis  Diagnosis: Overweight / Obesity (NC-3 3)  Related to: Food and nutrition-related knowledge deficit, Physical inactivity and Excessive energy intake  As Evidenced by: BMI >25 and Expected anthropometric outcomes are not achieved     Nutrition Prescription: Recommend the following diet  9925-0270 calories per day 110-115 grams protein(25%) / 180-200(40%)  grams carb per day, 75-80 grams fat ( 35%)  20-25 grams of fiber  Meal Plan ( Lm/Pro/Carb)   Breakfast: 450-500/30/45  Snack: 150/10/15  Lunch: 450-500/30/45  Snack: 150/10/15  Dinner: 450-500/30/45  Snack: 150/10/15    Interventions and Teaching   Patient educated on post-op nutrition guidelines  Patient educated and handouts provided    Capacity of post-surgery stomach  Diet progression  Adequate hydration  Expected weight loss  Weight loss plateaus/ possibility of weight regain  Nutrition considerations after surgery  Protein supplements  Dietary and lifestyle changes  Possible problems with poor eating habits  Intuitive eating  Potential for food intolerance after surgery, and ways to deal with them including: lactose intolerance, nausea, reflux, vomiting, diarrhea, food intolerance, appetite changes, gas  Vitamin / Mineral supplementation of Multivitamin with minerals, Calcium, Vitamin B12, Iron, Fat Soluble vitamins and Vitamin D    Education provided to: patient    Barriers to learning: No barriers identified    Readiness to change: preparation    Comprehension: needs reinforcement and verbalizes understanding     Expected Compliance: good    Evaluation/Monitoring   Eating pattern as discussed Tolerance of nutrition prescription Body weight Lab values Physical activity Bowel pattern    Goals  Complete lession plans 1-6, Eat 3 meals per day and Eliminate mindless snacking   Reduce diet soda to only 1-2 cans per day  Limit diet tea to one to two per day   Increase water intake to at least 48 ounces per day ( can use  beverages 10 calories or less )    Food Log - My Fitness Pal - provided with handout   Follow meal plan :  4611-6996 calories per day  110-115 grams protein(25%) /  180-200(40%)  grams carb per day,  75-80 grams fat ( 35%)  20-25 grams of fiber        Meal Plan ( Lm/Pro/Carb)   Breakfast: 450-500/30/45  Snack: 150/10/15  Lunch: 450-500/30/45  Snack: 150/10/15  Dinner: 450-500/30/45  Snack: 150/10/15    Referral to [facility] program for further weight loss   Patient wants to consider a secondary bariatric surgery possibly in 3-6 months ( unsure )     Time Spent:   30 minutes

## 2022-09-09 NOTE — PROGRESS NOTES
POST OP UP VISIT - BARIATRIC SURGERY  Joslyn Lopez 74 y o  male MRN: [id number]  Unit/Bed#:  Encounter: [id number]      HPI:  Joslyn Lopez is a 74 y o  male status post Lap Band Removal performed by Dr Paz Santos on 8/30 returning to office for first post op visit since surgery  Tolerating diet well without any issues  Said he was able to even eat a dinner of twin lobster tails  Denies nausea and vomiting  No other complaints    Review of Systems    Historical Information   Past Medical History:   Diagnosis Date    Acute blood loss anemia 10/13/2016    Anxiety     Arthritis     knees    Arthritis     Asthma     stable for > 10 years    Molina esophagus     with BARRX/RFA    Cancer ([facility]     prostate    Cataract     Depression     Diabetes ([facility]     Diabetes mellitus ([facility]     pre diabetes    Diverticulitis of colon     Environmental allergies     Fall 12/30/2020    GERD (gastroesophageal reflux disease)     Hiatal hernia     History of anal fissures     Hyperlipidemia     Hypertension     Incisional hernia     Insomnia     Kidney stone     Morbid obesity (HCC)     Peripheral neuropathy     PONV (postoperative nausea and vomiting)     Prolapsing mitral valve     prolapsing mitral valve leaflet syndrome    Prostate cancer ([facility]     Retinal detachment     [facility] eye; resolved: 10/10/2012    Sleep apnea     diagnosed but unable to tolerate cpap       Stuttering      Past Surgical History:   Procedure Laterality Date    ARTHROSCOPIC REPAIR ACL Right     BIOPSY CORE NEEDLE      prostate    CATARACT EXTRACTION Bilateral     COLONOSCOPY      HERNIA REPAIR      naval    JOINT REPLACEMENT      B/L knee    KNEE ARTHROSCOPY Left     LAPAROSCOPIC GASTRIC BANDING      OK TOTAL KNEE ARTHROPLASTY Left 02/15/2017    Procedure: TOTAL KNEE ARTHROPLASTY ;  Surgeon: Jah Gutierres MD;  [facility];  Service: Orthopedics    OK TOTAL KNEE ARTHROPLASTY Right 10/10/2016    Procedure: ARTHROPLASTY KNEE TOTAL;  Surgeon: Mary Rubio MD;  [facility];  Service: Orthopedics    PROSTATECTOMY      robotic-assisted; [facility]-Dr Marlo Alpers REMOVAL GASTRIC BAND LAPAROSCOPIC N/A 8/30/2022    Procedure: LAPAROSCOPIC REMOVAL OF ADJUSTABLE GASTRIC BAND AND PORT  WITH INTRAOPERATIVE EGD;  Surgeon: William Agrawal MD;  [facility];  Service: Bariatrics    RETINAL DETACHMENT SURGERY Bilateral     [facility]us Eye    SEPTOPLASTY      SINUS SURGERY      TONSILLECTOMY      over age 13   Leticia Calvo UPPER GASTROINTESTINAL ENDOSCOPY      mutiple with BARRX/RFA    VASECTOMY      vas deferens     Social History   Social History     Substance and Sexual Activity   Alcohol Use Not Currently    Alcohol/week: 1 0 standard drink    Types: 1 Cans of beer per week    Comment: rare ; being a social drinker (as per allscripts)     Social History     Substance and Sexual Activity   Drug Use Not Currently     Social History     Tobacco Use   Smoking Status Never Smoker   Smokeless Tobacco Never Used   Tobacco Comment    quit 38 years ago     Family History: non-contributory    Meds/Allergies   PTA meds:   Cannot display prior to admission medications because the patient has not been admitted in this contact       Allergies   Allergen Reactions    Celebrex [Celecoxib] Other (See Comments)     Cardiologist stated he should not take      Chlorhexidine Hives     Is able to wash with hibiclens but not use michi cloths    Other      "steroid eyedrops"      Prednisone Other (See Comments)     Prednisone eye drops- eye pressure increases       Objective       Current Vitals:   Blood Pressure: 130/78 (09/09/22 1313)  Pulse: 63 (09/09/22 1313)  Temperature: 98 3 °F (36 8 °C) (09/09/22 1313)  Temp Source: Tympanic (09/09/22 1313)  Height: 5' 9" (175 3 cm) (09/09/22 1313)  Weight - Scale: 132 kg (291 lb) (09/09/22 1313)      Invasive Devices  Report    None                 Physical Exam        Assessment/PLAN:    74 y o  male status post Lap Band removal done on 8/30 by Dr Roger Christina, doing well post op  No major issues and healing well  - Increase physical activity slowly as tolerated and instructed    Advance diet as instructed by our dietitians today and as indicated in the binder   -Continue PPI  - Will enroll him with the Medical Division of the [facility] [address] [place] [address]  Bariatric Surgery Fellow  [facility]  9/9/2022  1:27 PM

## 2022-09-12 PROCEDURE — 88342 IMHCHEM/IMCYTCHM 1ST ANTB: CPT | Performed by: STUDENT IN AN ORGANIZED HEALTH CARE EDUCATION/TRAINING PROGRAM

## 2022-09-12 PROCEDURE — 88300 SURGICAL PATH GROSS: CPT | Performed by: STUDENT IN AN ORGANIZED HEALTH CARE EDUCATION/TRAINING PROGRAM

## 2022-09-12 PROCEDURE — 88341 IMHCHEM/IMCYTCHM EA ADD ANTB: CPT | Performed by: STUDENT IN AN ORGANIZED HEALTH CARE EDUCATION/TRAINING PROGRAM

## 2022-09-12 PROCEDURE — 88307 TISSUE EXAM BY PATHOLOGIST: CPT | Performed by: STUDENT IN AN ORGANIZED HEALTH CARE EDUCATION/TRAINING PROGRAM

## 2022-09-15 ENCOUNTER — OFFICE VISIT (OUTPATIENT)
Dept: FAMILY MEDICINE CLINIC | Facility: CLINIC | Age: 67
End: 2022-09-15
Payer: MEDICARE

## 2022-09-15 VITALS
RESPIRATION RATE: 16 BRPM | DIASTOLIC BLOOD PRESSURE: 60 MMHG | TEMPERATURE: 99 F | HEART RATE: 80 BPM | WEIGHT: 291 LBS | SYSTOLIC BLOOD PRESSURE: 130 MMHG | HEIGHT: 69 IN | BODY MASS INDEX: 43.1 KG/M2

## 2022-09-15 DIAGNOSIS — R32 URINARY INCONTINENCE, UNSPECIFIED TYPE: Primary | ICD-10-CM

## 2022-09-15 DIAGNOSIS — R39.9 UTI SYMPTOMS: ICD-10-CM

## 2022-09-15 LAB
SL AMB  POCT GLUCOSE, UA: NORMAL
SL AMB LEUKOCYTE ESTERASE,UA: NORMAL
SL AMB POCT BILIRUBIN,UA: NORMAL
SL AMB POCT BLOOD,UA: NORMAL
SL AMB POCT CLARITY,UA: CLEAR
SL AMB POCT COLOR,UA: NORMAL
SL AMB POCT KETONES,UA: NORMAL
SL AMB POCT NITRITE,UA: NORMAL
SL AMB POCT PH,UA: 6
SL AMB POCT SPECIFIC GRAVITY,UA: 1.02
SL AMB POCT URINE PROTEIN: NORMAL
SL AMB POCT UROBILINOGEN: 0.2

## 2022-09-15 PROCEDURE — 99213 OFFICE O/P EST LOW 20 MIN: CPT | Performed by: NURSE PRACTITIONER

## 2022-09-15 PROCEDURE — 81002 URINALYSIS NONAUTO W/O SCOPE: CPT | Performed by: NURSE PRACTITIONER

## 2022-09-15 NOTE — PROGRESS NOTES
Name: Kevin De Luna      : 1955      MRN: [id number]  Encounter Provider: ED Craig  Encounter Date: 9/15/2022   Encounter department: [address] [place] [address]       Pt with urinary incontinence  Started about 6 years ago after his prostate was removed, relatively mild up until 6 months ago  Now occurring daily   Has a "spasm" and then the immediate urge to urinate  Urine dip is entirely normal today  No traumas/falls recently  He will be seeing Urology in October and we'll call and see if he can be evaluated any sooner  I did ask him to back off any caffeine in the interim     1  Urinary incontinence, unspecified type    2  UTI symptoms  -     POCT urine dip           Subjective     HPI     Pt presents by himself today for an acute visit   Pt notes he has had a very mild degree of urinary incontinence following his prostate removal approximately 6 years ago  He notes that over the past 6 months, this has progressively worsened, now daily  He reports feeling a "spasm" and then urgently needing to urinate, often does not make it to the restroom  He always has the urge to urinate  Denies dysuria, hematuria, malodorous urine, dribbling, trouble with the flow of urine  He feels he mostly empties his bladder  He will be seeing Urology in October but would like to r/o a UTI today     Kidney function from 22 with BUN 8, Cr 1 01, eGFR 76  PSA at <0 1 from 3/6/22 with Urology     H/o malignant neoplasm of the prostate s/p robotic prostatectomy (managed by Urology) and epididymitis (from 2020, managed by Urology)    Review of Systems   Constitutional: Negative for chills and fever  HENT: Negative for ear pain and sore throat  Eyes: Negative for pain and visual disturbance  Respiratory: Negative for cough and shortness of breath  Cardiovascular: Negative for chest pain, palpitations and leg swelling     Gastrointestinal: Negative for abdominal pain and vomiting  Genitourinary: Negative for decreased urine volume, difficulty urinating, dysuria, enuresis, flank pain, frequency, genital sores, hematuria, penile discharge, penile pain, penile swelling, scrotal swelling and testicular pain  Musculoskeletal: Negative for arthralgias and back pain  Skin: Negative for color change and rash  Neurological: Negative for seizures and syncope  Hematological: Negative for adenopathy  Does not bruise/bleed easily  Psychiatric/Behavioral: Negative for self-injury, sleep disturbance and suicidal ideas  All other systems reviewed and are negative  Past Medical History:   Diagnosis Date    Acute blood loss anemia 10/13/2016    Anxiety     Arthritis     knees    Arthritis     Asthma     stable for > 10 years    [place] esophagus     with BARRX/RFA    Cancer ([facility] )     prostate    Cataract     Depression     Diabetes ([facility] )     Diabetes mellitus ([facility] )     pre diabetes    Diverticulitis of colon     Environmental allergies     Fall 12/30/2020    GERD (gastroesophageal reflux disease)     Hiatal hernia     History of anal fissures     Hyperlipidemia     Hypertension     Incisional hernia     Insomnia     Kidney stone     Morbid obesity (HCC)     Peripheral neuropathy     PONV (postoperative nausea and vomiting)     Prolapsing mitral valve     prolapsing mitral valve leaflet syndrome    Prostate cancer ([facility] )     Retinal detachment     [facility]; resolved: 10/10/2012    Sleep apnea     diagnosed but unable to tolerate cpap       Stuttering      Past Surgical History:   Procedure Laterality Date    ARTHROSCOPIC REPAIR ACL Right     BIOPSY CORE NEEDLE      prostate    CATARACT EXTRACTION Bilateral     COLONOSCOPY      HERNIA REPAIR      naval    JOINT REPLACEMENT      B/L knee    KNEE ARTHROSCOPY Left     LAPAROSCOPIC GASTRIC BANDING      IA TOTAL KNEE ARTHROPLASTY Left 02/15/2017    Procedure: TOTAL KNEE ARTHROPLASTY ;  Surgeon: Kenyetta Prasad MD;  [facility]: [facility];  Service: Orthopedics    AK TOTAL KNEE ARTHROPLASTY Right 10/10/2016    Procedure: ARTHROPLASTY KNEE TOTAL;  Surgeon: Kenyetta Prasad MD;  [facility];  Service: Orthopedics    PROSTATECTOMY      robotic-assisted; [facility]-Dr Mata Comes REMOVAL GASTRIC BAND LAPAROSCOPIC N/A 8/30/2022    Procedure: LAPAROSCOPIC REMOVAL OF ADJUSTABLE GASTRIC BAND AND PORT  WITH INTRAOPERATIVE EGD;  Surgeon: Alber Callahan MD;  Location: [facility];  Service[facility] [facility] [address] Bilateral     [facility]    SEPTOPLASTY      SINUS SURGERY      TONSILLECTOMY      over age 13   Patricia Betancur UPPER GASTROINTESTINAL ENDOSCOPY      mutiple with BARRX/RFA    VASECTOMY      vas deferens     Family History   Problem Relation Age of Onset    Heart disease Father     Coronary artery disease Father     Prostate cancer Father     Coronary artery disease Mother     Diabetes Mother         mellitus    Diabetes Maternal Grandmother         mellitus    Coronary artery disease Maternal Grandfather     Coronary artery disease Paternal Grandfather      Social History     Socioeconomic History    Marital status: /Civil Union     Spouse name: None    Number of children: 0    Years of education: None    Highest education level: None   Occupational History    Occupation:    Tobacco Use    Smoking status: Never Smoker    Smokeless tobacco: Never Used    Tobacco comment: quit 38 years ago   Vaping Use    Vaping Use: Never used   Substance and Sexual Activity    Alcohol use: Not Currently     Alcohol/week: 1 0 standard drink     Types: 1 Cans of beer per week     Comment: rare ; being a social drinker (as per allscripts)    Drug use: Not Currently    Sexual activity: Not Currently     Partners: Female   Other Topics Concern    None   Social History Narrative    ** Merged History Encounter **          Social Determinants of Health Financial Resource Strain: Not on file   Food Insecurity: Not on file   Transportation Needs: Not on file   Physical Activity: Not on file   Stress: Not on file   Social Connections: Not on file   Intimate Partner Violence: Not on file   Housing Stability: Not on file     Current Outpatient Medications on File Prior to Visit   Medication Sig    ALPRAZolam (XANAX) 0 25 mg tablet Take 1 tablet (0 25 mg total) by mouth 3 (three) times a day as needed for anxiety    Blood Glucose Monitoring Suppl (OneTouch Verio) w/Device KIT Use daily (Patient taking differently: Use as needed)    ezetimibe (ZETIA) 10 mg tablet TAKE 1 TABLET DAILY (Patient taking differently: Take 5 mg by mouth in the morning)    FLUoxetine (PROzac) 20 mg capsule TAKE 3 CAPSULES DAILY    glucose blood (OneTouch Verio) test strip USE TO TEST DAILY    hydrochlorothiazide (HYDRODIURIL) 12 5 mg tablet Take 1 tablet (12 5 mg total) by mouth 3 (three) times a week Monday, Thursday, Saturday (Patient taking differently: Take 12 5 mg by mouth 3 (three) times a week Monday, Wednesday, Friday)    Lancet Devices (ONE TOUCH DELICA LANCING DEV) MISC Use daily    losartan (COZAAR) 100 MG tablet TAKE 1 TABLET DAILY    metFORMIN (GLUCOPHAGE) 500 mg tablet TAKE 2 TABLETS TWICE A DAY WITH MEALS    metoprolol succinate (TOPROL-XL) 25 mg 24 hr tablet TAKE 1 TABLET DAILY    Multiple Vitamins-Minerals (CENTRUM ADULTS PO) Take by mouth in the morning    nystatin (MYCOSTATIN) powder Apply topically 4 (four) times a day (Patient taking differently: Apply topically if needed)    OLANZapine (ZyPREXA) 5 mg tablet Take 0 5 tablets (2 5 mg total) by mouth daily (Patient taking differently: Take 2 5 mg by mouth every evening)    OneTouch Delica Lancets 11Q MISC Use daily    Ozempic, 1 MG/DOSE, 4 MG/3ML SOPN injection pen Inject 0 75 mg under the skin once a week      pantoprazole (PROTONIX) 40 mg tablet Take 1 tablet (40 mg total) by mouth 2 (two) times a day 1 tab in AM, 1 tabs in PM    rosuvastatin (CRESTOR) 5 mg tablet Take 1 tablet (5 mg total) by mouth once a week    zolpidem (AMBIEN) 5 mg tablet Take 1 tablet (5 mg total) by mouth daily at bedtime as needed for sleep    omeprazole (PriLOSEC) 20 mg delayed release capsule Take 1 capsule (20 mg total) by mouth daily (Patient not taking: No sig reported)    oxyCODONE (ROXICODONE) 5 immediate release tablet  (Patient not taking: No sig reported)    Semaglutide, 1 MG/DOSE, 2 MG/1 5ML SOPN Inject SQ 0 75mg - 1 0mg weekly as directed (Patient not taking: No sig reported)    sucralfate (CARAFATE) 1 g tablet Take 1 tablet (1 g total) by mouth 4 (four) times a day (Patient not taking: No sig reported)     Allergies   Allergen Reactions    Celebrex [Celecoxib] Other (See Comments)     Cardiologist stated he should not take      Chlorhexidine Hives     Is able to wash with hibiclens but not use michi cloths    Other      "steroid eyedrops"      Prednisone Other (See Comments)     Prednisone eye drops- eye pressure increases     Immunization History   Administered Date(s) Administered    COVID-19 MODERNA VACC 0 5 ML IM 02/20/2021, 03/19/2021    Influenza Quadrivalent Preservative Free 3 years and older IM 10/20/2016, 10/26/2017    Influenza, high dose seasonal 0 7 mL 10/21/2019, 09/28/2020, 01/04/2022    Influenza, recombinant, quadrivalent,injectable, preservative free 11/01/2018    Influenza, seasonal, injectable 12/03/2009, 12/12/2012, 11/18/2013, 11/01/2014, 11/05/2015    Pneumococcal Conjugate 13-Valent 05/02/2019    Pneumococcal Polysaccharide PPV23 10/20/2016    Td (adult), adsorbed 07/16/1999    Zoster Vaccine Recombinant 12/06/2018, 02/04/2019       Objective     /60   Pulse 80   Temp 99 °F (37 2 °C) (Tympanic)   Resp 16   Ht 5' 9" (1 753 m)   Wt 132 kg (291 lb)   BMI 42 97 kg/m²     Physical Exam  Constitutional:       General: He is not in acute distress       Appearance: He is well-developed  He is obese  He is not ill-appearing, toxic-appearing or diaphoretic  HENT:      Head: Normocephalic and atraumatic  Eyes:      Extraocular Movements: Extraocular movements intact  Conjunctiva/sclera: Conjunctivae normal       Pupils: Pupils are equal, round, and reactive to light  Neck:      Thyroid: No thyromegaly  Cardiovascular:      Rate and Rhythm: Normal rate and regular rhythm  Heart sounds: Normal heart sounds  No murmur heard  Pulmonary:      Effort: Pulmonary effort is normal  No respiratory distress  Breath sounds: Normal breath sounds  No wheezing  Abdominal:      General: Bowel sounds are normal  There is no distension  Palpations: Abdomen is soft  Tenderness: There is no abdominal tenderness  Musculoskeletal:         General: Normal range of motion  Cervical back: Normal range of motion and neck supple  No rigidity or tenderness  Lymphadenopathy:      Cervical: No cervical adenopathy  Skin:     General: Skin is warm and dry  Neurological:      General: No focal deficit present  Mental Status: He is alert and oriented to person, place, and time  Psychiatric:         Mood and Affect: Mood normal          Behavior: Behavior normal          Thought Content:  Thought content normal          Judgment: Judgment normal        ED Martinez

## 2022-09-21 DIAGNOSIS — E11.42 CONTROLLED TYPE 2 DIABETES MELLITUS WITH DIABETIC POLYNEUROPATHY, WITHOUT LONG-TERM CURRENT USE OF INSULIN (HCC): Primary | ICD-10-CM

## 2022-09-21 RX ORDER — SEMAGLUTIDE 1.34 MG/ML
0.75 INJECTION, SOLUTION SUBCUTANEOUS WEEKLY
Qty: 9 ML | Refills: 0 | Status: SHIPPED | OUTPATIENT
Start: 2022-09-21 | End: 2022-09-23 | Stop reason: ALTCHOICE

## 2022-09-23 DIAGNOSIS — E11.9 TYPE 2 DIABETES MELLITUS WITHOUT COMPLICATION, WITHOUT LONG-TERM CURRENT USE OF INSULIN (HCC): Primary | ICD-10-CM

## 2022-09-23 DIAGNOSIS — E11.42 CONTROLLED TYPE 2 DIABETES MELLITUS WITH DIABETIC POLYNEUROPATHY, WITHOUT LONG-TERM CURRENT USE OF INSULIN (HCC): ICD-10-CM

## 2022-10-03 ENCOUNTER — OFFICE VISIT (OUTPATIENT)
Dept: CARDIOLOGY CLINIC | Facility: CLINIC | Age: 67
End: 2022-10-03
Payer: MEDICARE

## 2022-10-03 VITALS
BODY MASS INDEX: 43.65 KG/M2 | DIASTOLIC BLOOD PRESSURE: 90 MMHG | HEART RATE: 86 BPM | SYSTOLIC BLOOD PRESSURE: 140 MMHG | WEIGHT: 294.7 LBS | HEIGHT: 69 IN | OXYGEN SATURATION: 95 %

## 2022-10-03 DIAGNOSIS — I25.10 CORONARY ARTERY DISEASE INVOLVING NATIVE CORONARY ARTERY OF NATIVE HEART WITHOUT ANGINA PECTORIS: ICD-10-CM

## 2022-10-03 DIAGNOSIS — E78.2 MIXED HYPERLIPIDEMIA: ICD-10-CM

## 2022-10-03 DIAGNOSIS — K22.719 BARRETT'S ESOPHAGUS WITH DYSPLASIA: Primary | ICD-10-CM

## 2022-10-03 DIAGNOSIS — I10 ESSENTIAL HYPERTENSION: ICD-10-CM

## 2022-10-03 DIAGNOSIS — E13.9 DIABETES 1.5, MANAGED AS TYPE 1 (HCC): ICD-10-CM

## 2022-10-03 PROCEDURE — 99214 OFFICE O/P EST MOD 30 MIN: CPT | Performed by: INTERNAL MEDICINE

## 2022-10-03 NOTE — PROGRESS NOTES
Follow-up - Cardiology   Cornelius Alanis 74 y o  male MRN: [id number]        Problems    Problem List Items Addressed This Visit        Digestive    Molina's esophagus with dysplasia - Primary      Other Visit Diagnoses     Mixed hyperlipidemia        Essential hypertension        Coronary artery disease involving native coronary artery of native heart without angina pectoris        Diabetes 1 5, managed as type 1 [facility])                Plan and discussion   Patient seen October 3, 2022  He has had his band removed from his gastric surgery  It was causing him problems as Molina's esophagus  He is maintaining his weight although as he said he can eat a lot more but he is trying not to be  Diabetes is well controlled with an A1c in the 6 5 range   Blood pressure is well controlled   His PVCs have improved dramatically and he will try dropping his metoprolol to 12 5 daily  Prostate carcinoma years ago   Very positive history coronary disease  He is having some peripheral neuropathy secondary to diabetes  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           HPI: Cornelius Alanis is a 74y o  year old male         Plan and discussion   Patient seen August 2, 2022  He is here for cardiac clearance for to remove his gastric sleeve  He has a history of diabetes  A1c less than 7   Blood pressure well controlled  He has had frequent PVCs in the past and they are doing very well with beta-blocker  He has no cardiac complaints  Prostate carcinoma years ago  History gastric bypass  Positive family history coronary disease   Molina's esophagus esophagus   Presently seeing Neurology  We did clear him for his surgery  He has no cardiac complaints              HPI: Cornelius Alanis is a 74y o  year old male      PlanAnd discussion     Patient seen October 19, 2021     Blood pressure is well controlled  Diabetes much better controlled in the past   A1c is 6     He has lost 16 lb     He has a prostate carcinoma history with a PSA less than 0 1     His gastric bypass in the past     Positive family history coronary disease     His aorta 4 1 cm     He had some shortness of breath and did get a pulmonary function study   It was normal     The new event is that he has Molina's esophagus     I am cutting back his ED a the 5 mg a day   His LDL is 60     He is on a statin ACE-inhibitor beta-blocker     We will check his number of PVCs with a Holter counter before his next visit     Plan patient seen October 16, 2019  In the past he has had highly symptomatic PVCs  48 hour Holter recently showed only 27 PVCs less than 100 PACs  He does feel each 1 of them  I mostly read short him  He has had gastric bypass be still over 300 lb  He is status post prostate carcinoma which is being followed  He has a positive family history coronary artery disease      His LDL is well controlled at less than 70      He had an echocardiogram done July of 2019  Ejection fraction 65%   His aorta is 4 1 cm but he has no significant valvar disease  Summary therefore from a cardiac standpoint he is really doing very well   Mostly reassured him  [facility] is diabetic  [facility] has no documented coronary disease   He is on a statin  He is on 100 mg of losartan   He is also on a beta-blocker  Will do another Holter counter next year   Do not think he needs another echo               HPI: Marcellus Fregoso is a 63 y  o  year old male    Plan patient seen June 13, 2019  This patient seen for hypertension  Blood pressures been excellent   He is on 100 mg of losartan  He also has diabetes  A1c is very well controlled 7 2  He has had a gastric bypass but his weight is presently over 120 lb  He has had a prostatectomy  His PSA is less than 0 1  The most a common issue that he has it is bothering him is that he has highly symptomatic extrasystoles  With the put a 48 hour Holter  In addition his LDL is 104  Sebastián Okeefe is a diabetic with a for a positive family history   I will increase his Crestor to 5 mg twice a week  Sebastián Okeefe was supposed to be on twice a week but somehow other he is only taking it once a week  After the Holter counter repeat lipid profile will review his meds            Review of Systems   Constitutional: Negative  Respiratory: Negative  Cardiovascular: Negative  Past Medical History:   Diagnosis Date    Acute blood loss anemia 10/13/2016    Anxiety     Arthritis     knees    Arthritis     Asthma     stable for > 10 years    Molina esophagus     with BARRX/RFA    Cancer ([facility] )     prostate    Cataract     Depression     Diabetes ([facility] )     Diabetes mellitus ([facility] )     pre diabetes    Diverticulitis of colon     Environmental allergies     Fall 12/30/2020    GERD (gastroesophageal reflux disease)     Hiatal hernia     History of anal fissures     Hyperlipidemia     Hypertension     Incisional hernia     Insomnia     Kidney stone     Morbid obesity (HCC)     Peripheral neuropathy     PONV (postoperative nausea and vomiting)     Prolapsing mitral valve     prolapsing mitral valve leaflet syndrome    Prostate cancer ([facility] )     Retinal detachment     treated surgically at [facility]; resolved: 10/10/2012    Sleep apnea     diagnosed but unable to tolerate cpap   Stuttering      Social History     Substance and Sexual Activity   Alcohol Use Not Currently    Alcohol/week: 1 0 standard drink    Types: 1 Cans of beer per week    Comment: rare ; being a social drinker (as per allscripts)     Social History     Substance and Sexual Activity   Drug Use Not Currently     Social History     Tobacco Use   Smoking Status Never Smoker   Smokeless Tobacco Never Used   Tobacco Comment    quit 38 years ago       Allergies:   Allergies   Allergen Reactions    Celebrex [Celecoxib] Other (See Comments)     Cardiologist stated he should not take      Chlorhexidine Hives     Is able to wash with hibiclens but not use michi cloths    Other      "steroid eyedrops"      Prednisone Other (See Comments)     Prednisone eye drops- eye pressure increases       Medications:     Current Outpatient Medications:     Blood Glucose Monitoring Suppl (OneTouch Verio) w/Device KIT, Use daily (Patient taking differently: Use as needed), Disp: 1 kit, Rfl: 0    ezetimibe (ZETIA) 10 mg tablet, TAKE 1 TABLET DAILY (Patient taking differently: Take 5 mg by mouth in the morning), Disp: 90 tablet, Rfl: 3    FLUoxetine (PROzac) 20 mg capsule, TAKE 3 CAPSULES DAILY, Disp: 270 capsule, Rfl: 3    glucose blood (OneTouch Verio) test strip, USE TO TEST DAILY, Disp: 200 strip, Rfl: 1    hydrochlorothiazide (HYDRODIURIL) 12 5 mg tablet, Take 1 tablet (12 5 mg total) by mouth 3 (three) times a week Monday, Thursday, Saturday (Patient taking differently: Take 12 5 mg by mouth 3 (three) times a week Monday, Wednesday, Friday), Disp: 36 tablet, Rfl: 5    Lancet Devices (ONE TOUCH DELICA LANCING DEV) MISC, Use daily, Disp: 1 each, Rfl: 0    losartan (COZAAR) 100 MG tablet, TAKE 1 TABLET DAILY, Disp: 90 tablet, Rfl: 3    metFORMIN (GLUCOPHAGE) 500 mg tablet, TAKE 2 TABLETS TWICE A DAY WITH MEALS, Disp: 360 tablet, Rfl: 3    metoprolol succinate (TOPROL-XL) 25 mg 24 hr tablet, TAKE 1 TABLET DAILY, Disp: 90 tablet, Rfl: 3    Multiple Vitamins-Minerals (CENTRUM ADULTS PO), Take by mouth in the morning, Disp: , Rfl:     nystatin (MYCOSTATIN) powder, Apply topically 4 (four) times a day (Patient taking differently: Apply topically if needed), Disp: 60 g, Rfl: 2    OLANZapine (ZyPREXA) 5 mg tablet, Take 0 5 tablets (2 5 mg total) by mouth daily (Patient taking differently: Take 2 5 mg by mouth every evening), Disp: 90 tablet, Rfl: 3    OneTouch Delica Lancets 87Q MISC, Use daily, Disp: 300 each, Rfl: 1    pantoprazole (PROTONIX) 40 mg tablet, Take 1 tablet (40 mg total) by mouth 2 (two) times a day 1 tab in AM, 1 tabs in PM, Disp: 180 tablet, Rfl: 3    rosuvastatin (CRESTOR) 5 mg tablet, Take 1 tablet (5 mg total) by mouth once a week, Disp: 12 tablet, Rfl: 3    semaglutide, 1 mg/dose, (Ozempic) 4 MG/3ML SOPN injection pen, Inject 0 75 mL (1 mg total) under the skin once a week, Disp: 3 mL, Rfl: 5    ALPRAZolam (XANAX) 0 25 mg tablet, Take 1 tablet (0 25 mg total) by mouth 3 (three) times a day as needed for anxiety, Disp: 270 tablet, Rfl: 1    zolpidem (AMBIEN) 5 mg tablet, Take 1 tablet (5 mg total) by mouth daily at bedtime as needed for sleep, Disp: 90 tablet, Rfl: 0      Physical Exam  Constitutional:       Appearance: He is obese  Cardiovascular:      Rate and Rhythm: Normal rate and regular rhythm  Pulses: Normal pulses  Heart sounds: No murmur heard  Pulmonary:      Effort: Pulmonary effort is normal    Musculoskeletal:      Right lower leg: No edema  Left lower leg: No edema  Skin:     General: Skin is warm and dry  Neurological:      Mental Status: He is alert and oriented to person, place, and time             Laboratory Studies:  CMP:      Invalid input(s): ALBUMIN  NT-proBNP: No results found for: NTBNP   Coags:    Lipid Profile:   Lab Results   Component Value Date    CHOL 171 2015     Lab Results   Component Value Date    HDL 50 2022     Lab Results   Component Value Date    LDLCALC 96 2022     Lab Results   Component Value Date    TRIG 86 2022       Cardiac testing:     EKG reviewed personally:     Results for orders placed during the hospital encounter of 19    Echo complete with contrast if indicated    [facility] [facility] [address]  [address]  [phone]    Transthoracic Echocardiogram  2D, M-mode, Doppler, and Color Doppler    Study date:  2019    Patient: Kassidy Ramos  MR number: [id number]  Account number: [de-identified]  : 1955  Age: 70 years  Gender: Male  Status: Outpatient  Location: [facility] [address] [facility]  Height: 67 in  Weight: 323 lb  BP: 130/ 76 mmHg    Indications: essential hypertension    Diagnoses: I10  - Essential (primary) hypertension    Sonographer:  Hemanth Murray, [address] [facility]  Primary Physician:  Ruthie Coreas MD  Referring Physician:  Brad Harkins MD  Group:  [facility]  Cardiology Fellow:  Guilherme Hernandez MD  Interpreting Physician:  Helena Bosch MD    SUMMARY    LEFT VENTRICLE:  Systolic function was normal  Ejection fraction was estimated to be 65 %  Doppler parameters were consistent with abnormal left ventricular relaxation (grade 1 diastolic dysfunction)  RIGHT VENTRICLE:  The size was normal   Systolic function was normal     COMPARISONS:  There has been no significant interval change  Comparison was made with the previous study of 19-Oct-2017  HISTORY: PRIOR HISTORY: hypertension, hyperlipidemia, PVC's, DM    PROCEDURE: The study was performed in the [address]  This was a routine study  The transthoracic approach was used  The study included complete 2D imaging, M-mode, complete spectral Doppler, and color Doppler  The  heart rate was 68 bpm, at the start of the study  Images were obtained from the parasternal, apical, subcostal, and suprasternal notch acoustic windows  Image quality was adequate  LEFT VENTRICLE: Size was normal  Systolic function was normal  Ejection fraction was estimated to be 65 %  There were no regional wall motion abnormalities  Wall thickness was normal  There was no evidence of concentric hypertrophy  DOPPLER: Doppler parameters were consistent with abnormal left ventricular relaxation (grade 1 diastolic dysfunction)  RIGHT VENTRICLE: The size was normal  Systolic function was normal     LEFT ATRIUM: The atrium was mildly dilated  RIGHT ATRIUM: Size was normal     MITRAL VALVE: Valve structure was normal  There was normal leaflet separation   DOPPLER: The transmitral velocity was within the normal range  There was no evidence for stenosis  There was trace regurgitation  AORTIC VALVE: The valve was trileaflet  Leaflets exhibited normal cuspal separation and sclerosis  DOPPLER: Transaortic velocity was within the normal range  There was no evidence for stenosis  There was no regurgitation  TRICUSPID VALVE: The valve structure was normal  There was normal leaflet separation  DOPPLER: The transtricuspid velocity was within the normal range  There was no evidence for stenosis  There was trace regurgitation  The tricuspid jet  envelope definition was inadequate for estimation of RV systolic pressure  PULMONIC VALVE: Leaflets exhibited normal thickness, no calcification, and normal cuspal separation  DOPPLER: The transpulmonic velocity was within the normal range  There was no evidence for stenosis  There was no regurgitation  PERICARDIUM: There was no pericardial effusion  The pericardium was normal in appearance  AORTA: The root exhibited normal size  The ascending aorta was normal in size for body surface area at 4 cm (16 mm/m2)  SYSTEMIC VEINS: IVC: The inferior vena cava was normal in size and course   Respirophasic changes were normal     SYSTEM MEASUREMENT TABLES    2D  %FS: 44 25 %  Ao Diam: 3 61 cm  EDV(Teich): 149 41 ml  EF(Cube): 82 67 %  EF(Teich): 74 93 %  ESV(Cube): 29 34 ml  ESV(Teich): 37 45 ml  IVSd: 1 15 cm  LA Area: 24 76 cm2  LA Diam: 4 25 cm  LVEDV MOD A4C: 157 35 ml  LVEF MOD A4C: 67 57 %  LVESV MOD A4C: 51 03 ml  LVIDd: 5 53 cm  LVIDs: 3 08 cm  LVLd A4C: 8 71 cm  LVLs A4C: 7 1 cm  LVPWd: 1 18 cm  RA Area: 17 62 cm2  RV Diam : 3 14 cm  SV MOD A4C: 106 32 ml  SV(Cube): 139 96 ml  SV(Teich): 111 96 ml    MM  TAPSE: 2 17 cm    PW  AVC: 318 86 ms  E': 0 07 m/s  E/E': 8 26  MV A Luis Armando: 0 71 m/s  MV Dec Divide: 1 85 m/s2  MV DecT: 328 49 ms  MV E Luis Armando: 0 61 m/s  MV E/A Ratio: 0 86    [facility]    Prepared and electronically signed by    Marcia Soulier, MD  Signed 26-Jul-2019 14:19:43    No results found for this or any previous visit  No results found for this or any previous visit  No results found for this or any previous visit  Jostin Boucher MD    Portions of the record may have been created with voice recognition software   Occasional wrong word or "sound a like" substitutions may have occurred due to the inherent limitations of voice recognition software   Read the chart carefully and recognize, using context, where substitutions have occurred

## 2022-10-06 ENCOUNTER — OFFICE VISIT (OUTPATIENT)
Dept: UROLOGY | Facility: AMBULATORY SURGERY CENTER | Age: 67
End: 2022-10-06
Payer: MEDICARE

## 2022-10-06 VITALS
OXYGEN SATURATION: 98 % | HEART RATE: 76 BPM | BODY MASS INDEX: 43.55 KG/M2 | DIASTOLIC BLOOD PRESSURE: 88 MMHG | WEIGHT: 294 LBS | HEIGHT: 69 IN | SYSTOLIC BLOOD PRESSURE: 138 MMHG

## 2022-10-06 DIAGNOSIS — C61 MALIGNANT NEOPLASM OF PROSTATE (HCC): Primary | ICD-10-CM

## 2022-10-06 DIAGNOSIS — N32.81 OAB (OVERACTIVE BLADDER): ICD-10-CM

## 2022-10-06 DIAGNOSIS — N39.8 VOIDING DYSFUNCTION: ICD-10-CM

## 2022-10-06 DIAGNOSIS — R39.15 URGENCY OF URINATION: ICD-10-CM

## 2022-10-06 PROCEDURE — 99214 OFFICE O/P EST MOD 30 MIN: CPT | Performed by: UROLOGY

## 2022-10-06 NOTE — PROGRESS NOTES
10/6/2022    Zulma Cue  1955  [id number]        Assessment  Prostate cancer status post RALP with Dr Melody Pitts  Overactive bladder symptoms    Plan  We discussed his symptoms and complaints  I recommend urinalysis and culture to evaluate and rule out infection, the symptoms not quite consistent with this  Also recommend ultrasound of the urinary tract including postvoid residual to evaluate for any abnormality or pathology  If nothing identified, consider cystoscopy to evaluate the bladder outlet and possibly overactive bladder medications depending on PVR  Patient understands and agrees with plan  History of Present Illness  Sapna Molina is a 74 y o  male with the above history, Angélica 7 prostate cancer status post RALP in 2011  PSA is undetectable  He does have urinary complaints which have become worse over the past couple of years  He reports urgency and frequency  There has not been a urine sample  He does have stress incontinence but this is relatively rare and stable          AUA SYMPTOM SCORE    Flowsheet Row Most Recent Value   AUA SYMPTOM SCORE    How often have you had a sensation of not emptying your bladder completely after you finished urinating? 3 (P)     How often have you had to urinate again less than two hours after you finished urinating? 3 (P)     How often have you found you stopped and started again several times when you urinate? 1 (P)     How often have you found it difficult to postpone urination? 4 (P)     How often have you had a weak urinary stream? 1 (P)     How often have you had to push or strain to begin urination? 1 (P)     How many times did you most typically get up to urinate from the time you went to bed at night until the time you got up in the morning? 2 (P)     Quality of Life: If you were to spend the rest of your life with your urinary condition just the way it is now, how would you feel about that? 3 (P)     AUA SYMPTOM SCORE 15 (P) Review of Systems  Review of Systems   Constitutional: Negative  HENT: Negative  Respiratory: Negative  Cardiovascular: Negative  Gastrointestinal: Negative  Genitourinary:        As per HPI   Musculoskeletal: Negative  Skin: Negative  Neurological: Negative  Hematological: Negative  Past Medical History  Past Medical History:   Diagnosis Date    Acute blood loss anemia 10/13/2016    Anxiety     Arthritis     knees    Arthritis     Asthma     stable for > 10 years    Molina esophagus     with BARRX/RFA    Cancer [facility]     prostate    Cataract     Depression     Diabetes ([facility]     [facility] mellitus [facility]     pre diabetes    Diverticulitis of colon     Environmental allergies     Fall 12/30/2020    GERD (gastroesophageal reflux disease)     Hiatal hernia     History of anal fissures     Hyperlipidemia     Hypertension     Incisional hernia     Insomnia     Kidney stone     Morbid obesity (HCC)     Peripheral neuropathy     PONV (postoperative nausea and vomiting)     Prolapsing mitral valve     prolapsing mitral valve leaflet syndrome    [facility]     Retinal detachment     treated [facility] at [facility]; resolved: 10/10/2012    Sleep apnea     diagnosed but unable to tolerate cpap       Stuttering        Past Social History  Past Surgical History:   Procedure Laterality Date    ARTHROSCOPIC REPAIR ACL Right     BIOPSY CORE NEEDLE      prostate    CATARACT EXTRACTION Bilateral     COLONOSCOPY      HERNIA REPAIR      naval    JOINT REPLACEMENT      B/L knee    KNEE ARTHROSCOPY Left     LAPAROSCOPIC GASTRIC BANDING      IL TOTAL KNEE ARTHROPLASTY Left 02/15/2017    Procedure: TOTAL KNEE ARTHROPLASTY ;  Surgeon: Norah Fletcher MD;  [facility]: [facility];  Service: Orthopedics    IL TOTAL KNEE ARTHROPLASTY Right 10/10/2016    Procedure: ARTHROPLASTY KNEE TOTAL;  Surgeon: Norah Fletcher MD;  [facility];  Service: Orthopedics    PROSTATECTOMY      robotic-assisted; [facility]-Dr Bebe Griffiths REMOVAL GASTRIC BAND LAPAROSCOPIC N/A 8/30/2022    Procedure: LAPAROSCOPIC REMOVAL OF ADJUSTABLE GASTRIC BAND AND PORT  WITH INTRAOPERATIVE EGD;  Surgeon: Gregorio Sebastian MD;  Location: [facility];  Service: [employer] Bilateral     [facility]    SEPTOPLASTY      SINUS SURGERY      TONSILLECTOMY      over age 13   Saurabh Mercedes UPPER GASTROINTESTINAL ENDOSCOPY      mutiple with BARRX/RFA    VASECTOMY      vas deferens       Past Family History  Family History   Problem Relation Age of Onset    Heart disease Father     Coronary artery disease Father     Prostate cancer Father     Coronary artery disease Mother     Diabetes Mother         mellitus    Diabetes Maternal Grandmother         mellitus    Coronary artery disease Maternal Grandfather     Coronary artery disease Paternal Grandfather        Past Social history  Social History     Socioeconomic History    Marital status: /Civil Union     Spouse name: Not on file    Number of children: 0    Years of education: Not on file    Highest education level: Not on file   Occupational History    Occupation:    Tobacco Use    Smoking status: Never Smoker    Smokeless tobacco: Never Used    Tobacco comment: quit 38 years ago   Vaping Use    Vaping Use: Never used   Substance and Sexual Activity    Alcohol use: Not Currently     Alcohol/week: 1 0 standard drink     Types: 1 Cans of beer per week     Comment: rare ; being a social drinker (as per allscripts)    Drug use: Not Currently    Sexual activity: Not Currently     Partners: Female   Other Topics Concern    Not on file   Social History Narrative    ** Merged History Encounter **          Social Determinants of Health     Financial Resource Strain: Not on file   Food Insecurity: Not on file   Transportation Needs: Not on file   Physical Activity: Not on file   Stress: Not on file Social Connections: Not on file   Intimate Partner Violence: Not on file   Housing Stability: Not on file     Social History     Tobacco Use   Smoking Status Never Smoker   Smokeless Tobacco Never Used   Tobacco Comment    quit 38 years ago       Current Medications  Current Outpatient Medications   Medication Sig Dispense Refill    Blood Glucose Monitoring Suppl (OneTouch Verio) w/Device KIT Use daily (Patient taking differently: Use as needed) 1 kit 0    ezetimibe (ZETIA) 10 mg tablet TAKE 1 TABLET DAILY (Patient taking differently: Take 5 mg by mouth in the morning) 90 tablet 3    FLUoxetine (PROzac) 20 mg capsule TAKE 3 CAPSULES DAILY 270 capsule 3    glucose blood (OneTouch Verio) test strip USE TO TEST DAILY 200 strip 1    Lancet Devices (ONE TOUCH DELICA LANCING DEV) MISC Use daily 1 each 0    losartan (COZAAR) 100 MG tablet TAKE 1 TABLET DAILY 90 tablet 3    metFORMIN (GLUCOPHAGE) 500 mg tablet TAKE 2 TABLETS TWICE A DAY WITH MEALS 360 tablet 3    metoprolol succinate (TOPROL-XL) 25 mg 24 hr tablet TAKE 1 TABLET DAILY (Patient taking differently: Per pt starting 10/9 he will be taking half a tablet) 90 tablet 3    Multiple Vitamins-Minerals (CENTRUM ADULTS PO) Take by mouth in the morning      nystatin (MYCOSTATIN) powder Apply topically 4 (four) times a day (Patient taking differently: Apply topically if needed) 60 g 2    OLANZapine (ZyPREXA) 5 mg tablet Take 0 5 tablets (2 5 mg total) by mouth daily (Patient taking differently: Take 2 5 mg by mouth every evening) 90 tablet 3    OneTouch Delica Lancets 35H MISC Use daily 300 each 1    pantoprazole (PROTONIX) 40 mg tablet Take 1 tablet (40 mg total) by mouth 2 (two) times a day 1 tab in AM, 1 tabs in  tablet 3    rosuvastatin (CRESTOR) 5 mg tablet Take 1 tablet (5 mg total) by mouth once a week 12 tablet 3    semaglutide, 1 mg/dose, (Ozempic) 4 MG/3ML SOPN injection pen Inject 0 75 mL (1 mg total) under the skin once a week 3 mL 5    ALPRAZolam (XANAX) 0 25 mg tablet Take 1 tablet (0 25 mg total) by mouth 3 (three) times a day as needed for anxiety 270 tablet 1    hydrochlorothiazide (HYDRODIURIL) 12 5 mg tablet Take 1 tablet (12 5 mg total) by mouth 3 (three) times a week Monday, Thursday, Saturday (Patient taking differently: Take 12 5 mg by mouth 3 (three) times a week Monday, Wednesday, Friday) 36 tablet 5    zolpidem (AMBIEN) 5 mg tablet Take 1 tablet (5 mg total) by mouth daily at bedtime as needed for sleep 90 tablet 0     No current facility-administered medications for this visit  Allergies  Allergies   Allergen Reactions    Celebrex [Celecoxib] Other (See Comments)     Cardiologist stated he should not take      Chlorhexidine Hives     Is able to wash with hibiclens but not use michi cloths    Other      "steroid eyedrops"      Prednisone Other (See Comments)     Prednisone eye drops- eye pressure increases       Past Medical History, Social History, Family History, medications and allergies were reviewed  Vitals  Vitals:    10/06/22 1028   BP: 138/88   BP Location: Left arm   Patient Position: Sitting   Cuff Size: Adult   Pulse: 76   SpO2: 98%   Weight: 133 kg (294 lb)   Height: 5' 9" (1 753 m)       Physical Exam  Physical Exam  Vitals reviewed  Constitutional:       Appearance: He is well-developed  HENT:      Head: Normocephalic and atraumatic  Eyes:      Conjunctiva/sclera: Conjunctivae normal    Cardiovascular:      Rate and Rhythm: Normal rate  Pulmonary:      Effort: Pulmonary effort is normal    Abdominal:      Comments: Abdominal obesity   Musculoskeletal:         General: Normal range of motion  Skin:     General: Skin is warm and dry  Neurological:      Mental Status: He is alert and oriented to person, place, and time     Psychiatric:         Mood and Affect: Mood normal            Results  Lab Results   Component Value Date    PSA <0 1 03/06/2022    PSA <0 1 09/20/2021    PSA <0 1 10/01/2020 Lab Results   Component Value Date    GLUCOSE 174 (H) 12/30/2020    CALCIUM 9 2 08/31/2022     08/16/2015    K 4 2 08/31/2022    CO2 22 08/31/2022     08/31/2022    BUN 8 08/31/2022    CREATININE 1 01 08/31/2022     Lab Results   Component Value Date    WBC 10 02 08/31/2022    HGB 13 6 08/31/2022    HCT 38 7 08/31/2022    MCV 88 08/31/2022     08/31/2022

## 2022-10-10 ENCOUNTER — HOSPITAL ENCOUNTER (OUTPATIENT)
Dept: RADIOLOGY | Age: 67
Discharge: HOME | End: 2022-10-10
Payer: MEDICARE

## 2022-10-10 DIAGNOSIS — N32.81 OAB (OVERACTIVE BLADDER): ICD-10-CM

## 2022-10-10 PROCEDURE — 76770 US EXAM ABDO BACK WALL COMP: CPT

## 2022-10-12 DIAGNOSIS — G47.00 INSOMNIA, UNSPECIFIED TYPE: ICD-10-CM

## 2022-10-12 NOTE — TELEPHONE ENCOUNTER
Zolpidem (ambien) 5mg tablet- patient tried at 5mg per Dr Jerome Mack recommendations-states 5mg did not help with sleep issues  Asks if possible to return to 10mg

## 2022-10-14 ENCOUNTER — IMMUNIZATIONS (OUTPATIENT)
Dept: FAMILY MEDICINE CLINIC | Facility: CLINIC | Age: 67
End: 2022-10-14
Payer: MEDICARE

## 2022-10-14 DIAGNOSIS — Z23 ENCOUNTER FOR IMMUNIZATION: Primary | ICD-10-CM

## 2022-10-14 PROCEDURE — 90662 IIV NO PRSV INCREASED AG IM: CPT

## 2022-10-14 PROCEDURE — G0008 ADMIN INFLUENZA VIRUS VAC: HCPCS

## 2022-10-14 RX ORDER — ZOLPIDEM TARTRATE 5 MG/1
5 TABLET ORAL
Qty: 90 TABLET | Refills: 0 | Status: SHIPPED | OUTPATIENT
Start: 2022-10-14 | End: 2022-10-27 | Stop reason: SDUPTHER

## 2022-10-18 ENCOUNTER — OFFICE VISIT (OUTPATIENT)
Dept: FAMILY MEDICINE CLINIC | Facility: CLINIC | Age: 67
End: 2022-10-18
Payer: MEDICARE

## 2022-10-18 ENCOUNTER — APPOINTMENT (OUTPATIENT)
Dept: RADIOLOGY | Age: 67
End: 2022-10-18
Payer: MEDICARE

## 2022-10-18 VITALS
TEMPERATURE: 98.2 F | SYSTOLIC BLOOD PRESSURE: 160 MMHG | BODY MASS INDEX: 44.25 KG/M2 | HEART RATE: 72 BPM | WEIGHT: 298.8 LBS | OXYGEN SATURATION: 96 % | DIASTOLIC BLOOD PRESSURE: 92 MMHG | HEIGHT: 69 IN | RESPIRATION RATE: 20 BRPM

## 2022-10-18 DIAGNOSIS — R05.1 ACUTE COUGH: Primary | ICD-10-CM

## 2022-10-18 DIAGNOSIS — R05.1 ACUTE COUGH: ICD-10-CM

## 2022-10-18 PROCEDURE — 99214 OFFICE O/P EST MOD 30 MIN: CPT | Performed by: NURSE PRACTITIONER

## 2022-10-18 PROCEDURE — 71046 X-RAY EXAM CHEST 2 VIEWS: CPT

## 2022-10-18 NOTE — ASSESSMENT & PLAN NOTE
The patient was several month history of a cough  This is not productive in nature  His wife states that he occasionally wheezes however the patient does not appreciate this  He feels the cough is worsened when he lies down  He was recently diagnosed with Molina's esophagus and is taking Protonix  He would like a chest x-ray performed and this has been ordered  This cough may be related to his acid reflux and I did recommend to the patient that he not lie flat for at least 2 hours after eating and eats smaller meals

## 2022-10-18 NOTE — PROGRESS NOTES
[facility]    NAME: Ata Gil  AGE: 74 y o  SEX: male  : 1955     DATE: 10/18/2022     Assessment and Plan:     Problem List Items Addressed This Visit        Other    Acute cough - Primary     The patient was several month history of a cough  This is not productive in nature  His wife states that he occasionally wheezes however the patient does not appreciate this  He feels the cough is worsened when he lies down  He was recently diagnosed with Molina's esophagus and is taking Protonix  He would like a chest x-ray performed and this has been ordered  This cough may be related to his acid reflux and I did recommend to the patient that he not lie flat for at least 2 hours after eating and eats smaller meals  Relevant Orders    XR chest pa & lateral              No follow-ups on file  Chief Complaint:     Chief Complaint   Patient presents with   • Wheezing     With cough on and off for about 1 month  History of Present Illness: The patient presents to the office today with concerns of a cough that has been present for several months  This is discussed above  Chest x-ray ordered at the request of the patient  This may be related to acid reflux information was provided to him  Review of Systems:     Review of Systems   Constitutional: Negative  Negative for fatigue  HENT: Negative  Negative for congestion, postnasal drip, rhinorrhea and trouble swallowing  Eyes: Negative  Negative for visual disturbance  Respiratory: Positive for cough  Negative for choking and shortness of breath  Cardiovascular: Negative  Negative for chest pain  Gastrointestinal: Negative  Endocrine: Negative  Genitourinary: Negative  Musculoskeletal: Negative  Negative for arthralgias, back pain, myalgias and neck pain  Skin: Negative  Neurological: Negative for dizziness and headaches     Psychiatric/Behavioral: Negative           Problem List:     Patient Active Problem List   Diagnosis   • Status post total left knee replacement   • Stuttering   • Sleep apnea   • Diabetes mellitus ([facility] )   • Asthma   • Class 3 severe obesity due to excess calories without serious comorbidity with body mass index (BMI) of 40 0 to 44 9 in [facility])   • Ambulatory dysfunction   • Benign essential hypertension   • 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, without long-term current use of insulin ([facility])   • Insomnia   • Depression with anxiety   • Onychomycosis   • Hyperplasia of renal artery (HCC)   • Molina's esophagus with dysplasia   • Dysphagia   • Exertional dyspnea   • PONV (postoperative nausea and vomiting)   • Gastroparesis   • Ventral hernia without obstruction or gangrene   • Gastroesophageal reflux disease with esophagitis without hemorrhage   • Tremor   • Neuropathy   • Pure hypercholesterolemia   • Dermatitis   • Obesity, Class III, BMI 40-49 9 (morbid obesity) ([facility] )   • Hx of laparoscopic adjustable gastric banding   • Molina's esophagus determined by biopsy   • Acute cough        Objective:     /92 (BP Location: Left arm, Patient Position: Sitting, Cuff Size: Large)   Pulse 72   Temp 98 2 °F (36 8 °C) (Tympanic)   Resp 20   Ht 5' 9" (1 753 m)   Wt 136 kg (298 lb 12 8 oz)   SpO2 96%   BMI 44 13 kg/m²     Current Outpatient Medications   Medication Sig Dispense Refill   • ALPRAZolam (XANAX) 0 25 mg tablet Take 1 tablet (0 25 mg total) by mouth 3 (three) times a day as needed for anxiety 270 tablet 1   • Blood Glucose Monitoring Suppl (OneTouch Verio) w/Device KIT Use daily (Patient taking differently: Use as needed) 1 kit 0   • ezetimibe (ZETIA) 10 mg tablet TAKE 1 TABLET DAILY (Patient taking differently: Take 5 mg by mouth in the morning) 90 tablet 3   • FLUoxetine (PROzac) 20 mg capsule TAKE 3 CAPSULES DAILY 270 capsule 3   • glucose blood (OneTouch Verio) test strip USE TO TEST DAILY 200 strip 1   • hydrochlorothiazide (HYDRODIURIL) 12 5 mg tablet Take 1 tablet (12 5 mg total) by mouth 3 (three) times a week Monday, Thursday, Saturday (Patient taking differently: Take 12 5 mg by mouth 3 (three) times a week Monday, Wednesday, Friday) 36 tablet 5   • Lancet Devices (ONE TOUCH DELICA LANCING DEV) MISC Use daily 1 each 0   • losartan (COZAAR) 100 MG tablet TAKE 1 TABLET DAILY 90 tablet 3   • metFORMIN (GLUCOPHAGE) 500 mg tablet TAKE 2 TABLETS TWICE A DAY WITH MEALS 360 tablet 3   • metoprolol succinate (TOPROL-XL) 25 mg 24 hr tablet TAKE 1 TABLET DAILY (Patient taking differently: Per pt starting 10/9 he will be taking half a tablet) 90 tablet 3   • Multiple Vitamins-Minerals (CENTRUM ADULTS PO) Take by mouth in the morning     • nystatin (MYCOSTATIN) powder Apply topically 4 (four) times a day (Patient taking differently: Apply topically if needed) 60 g 2   • OLANZapine (ZyPREXA) 5 mg tablet Take 0 5 tablets (2 5 mg total) by mouth daily (Patient taking differently: Take 2 5 mg by mouth every evening) 90 tablet 3   • OneTouch Delica Lancets 95A MISC Use daily 300 each 1   • pantoprazole (PROTONIX) 40 mg tablet Take 1 tablet (40 mg total) by mouth 2 (two) times a day 1 tab in AM, 1 tabs in  tablet 3   • rosuvastatin (CRESTOR) 5 mg tablet Take 1 tablet (5 mg total) by mouth once a week 12 tablet 3   • semaglutide, 1 mg/dose, (Ozempic) 4 MG/3ML SOPN injection pen Inject 0 75 mL (1 mg total) under the skin once a week 3 mL 5   • zolpidem (AMBIEN) 5 mg tablet Take 1 tablet (5 mg total) by mouth daily at bedtime as needed for sleep 90 tablet 0     No current facility-administered medications for this visit  Physical Exam  Vitals reviewed  Constitutional:       Appearance: Normal appearance  He is obese  HENT:      Head: Normocephalic and atraumatic        Nose: Nose normal       Mouth/Throat:      Mouth: Mucous membranes are moist    Eyes:      Extraocular Movements: Extraocular movements intact  Pupils: Pupils are equal, round, and reactive to light  Cardiovascular:      Rate and Rhythm: Normal rate and regular rhythm  Pulses: Normal pulses  Heart sounds: Normal heart sounds  Pulmonary:      Effort: Pulmonary effort is normal       Breath sounds: Normal breath sounds  Musculoskeletal:         General: Normal range of motion  Skin:     General: Skin is warm  Neurological:      General: No focal deficit present  Mental Status: He is alert and oriented to person, place, and time  Psychiatric:         Mood and Affect: Mood normal          Behavior: Behavior normal          Thought Content:  Thought content normal          Judgment: Judgment normal          Bre Wood

## 2022-10-19 ENCOUNTER — OFFICE VISIT (OUTPATIENT)
Dept: BARIATRICS | Facility: CLINIC | Age: 67
End: 2022-10-19
Payer: MEDICARE

## 2022-10-19 VITALS
SYSTOLIC BLOOD PRESSURE: 136 MMHG | HEIGHT: 69 IN | DIASTOLIC BLOOD PRESSURE: 84 MMHG | RESPIRATION RATE: 18 BRPM | BODY MASS INDEX: 44.14 KG/M2 | HEART RATE: 67 BPM | OXYGEN SATURATION: 97 % | WEIGHT: 298 LBS

## 2022-10-19 DIAGNOSIS — F80.81 STUTTERING: ICD-10-CM

## 2022-10-19 DIAGNOSIS — G47.33 OBSTRUCTIVE SLEEP APNEA SYNDROME: ICD-10-CM

## 2022-10-19 DIAGNOSIS — E66.01 OBESITY, CLASS III, BMI 40-49.9 (MORBID OBESITY) (HCC): Primary | ICD-10-CM

## 2022-10-19 DIAGNOSIS — E11.9 TYPE 2 DIABETES MELLITUS WITHOUT COMPLICATION, WITHOUT LONG-TERM CURRENT USE OF INSULIN (HCC): ICD-10-CM

## 2022-10-19 DIAGNOSIS — Z98.84 HX OF LAPAROSCOPIC ADJUSTABLE GASTRIC BANDING: ICD-10-CM

## 2022-10-19 DIAGNOSIS — E78.00 PURE HYPERCHOLESTEROLEMIA: ICD-10-CM

## 2022-10-19 DIAGNOSIS — F41.8 DEPRESSION WITH ANXIETY: ICD-10-CM

## 2022-10-19 PROCEDURE — 99214 OFFICE O/P EST MOD 30 MIN: CPT | Performed by: NURSE PRACTITIONER

## 2022-10-19 NOTE — ASSESSMENT & PLAN NOTE
- Taking metformin and Ozempic  May improve with weight loss and lifestyle modification  Continue management with prescribing provider         Lab Results   Component Value Date    HGBA1C 6 0 (H) 08/05/2022

## 2022-10-19 NOTE — ASSESSMENT & PLAN NOTE
- Taking Crestor and Zetia  May improve with weight loss and lifestyle modification  Continue management with prescribing provider

## 2022-10-19 NOTE — ASSESSMENT & PLAN NOTE
S/P Lap Band placement on 7/26/2010, with removal of band by Dr Janeth Oviedo on 8/30/2022 due to Molina's esophagus

## 2022-10-19 NOTE — PROGRESS NOTES
Assessment/Plan:    Obesity, Class III, BMI 40-49 9 (morbid obesity) ([facility] [address] )  - Discussed options of HealthyCORE-Intensive Lifestyle Intervention Program, Very Low Calorie Diet-VLCD and Conservative Program and the role of weight loss medications  - Already on metformin 1000 mg twice a day and Ozempic 1 mg weekly through primary care provider  Start weight on Ozempic was 318 lbs  Feels that it is helping with appetite, but not as much as when he first started it  Thought of increasing it further to 2 mg weekly, but he reports he is currently in the donut hole with Medicare and has already paid out of pocket for the current dose (has enough on hand to get him through until the end of the year)  Therefore, will hold off on adjusting, but can considering increasing in the future  - Patient denies personal history of pancreatitis  Patient also denies personal and family history of medullary thyroid cancer and multiple endocrine neoplasia type 2 (MEN 2 tumor)  - Patient is interested in pursuing Conservative Program  - Initial weight loss goal of 5-10% weight loss for improved health  - Weight loss can improve patient's co-morbid conditions and/or prevent weight-related complications  - Labs reviewed: A1C, lipid panel, and CMP 8/5/2022  A1C controlled at 6 0 and fasting glucose elevated, which will all likely improve with weight loss  Otherwise, labs within acceptable range  TSH 3/6/2022 was within normal range  Goals:  Do not skip meals  Food log (ie ) www myfitnesspal com,sparkpeople  com,loseit com,calorieking  com,etc  baritastic (use skinnytaste  com, dietdoctor  com or smartphone sandee [employer] for recipes)  No sugary beverages  At least 64oz of water daily  Increase physical activity by 10 minutes daily   Gradually increase physical activity to a goal of 5 days per week for 30 minutes of MODERATE intensity PLUS 2 days per week of FULL BODY resistance training (use smartphone apps [address], Home Workout, etc )  Start food logging, weighing and measuring food  Nutrition recommendations per surgical dietician  4514-0297 calories per day  Increase water to at least 64 oz daily  Start reducing diet Pepsi  Increase walking as tolerated - currently being evaluated by neurology for possible Parkinson's  Hx of laparoscopic adjustable gastric banding  S/P Lap Band placement on 7/26/2010, with removal of band by Dr Nelly Nichole on 8/30/2022 due to Molina's esophagus  Pure hypercholesterolemia  - Taking Crestor and Zetia  May improve with weight loss and lifestyle modification  Continue management with prescribing provider  Depression with anxiety  - Taking fluoxetine  Continue management with prescribing provider  Stuttering  - Taking Zyprexa  Continue management with prescribing provider  Sleep apnea  - Could not tolerate CPAP  Diabetes mellitus ([address] )  - Taking metformin and Ozempic  May improve with weight loss and lifestyle modification  Continue management with prescribing provider  Lab Results   Component Value Date    HGBA1C 6 0 (H) 08/05/2022          Lexis Monsivais was seen today for consult  Diagnoses and all orders for this visit:    Obesity, Class III, BMI 40-49 9 (morbid obesity) (HCC)  -     semaglutide, 1 mg/dose, (Ozempic) 4 MG/3ML SOPN injection pen; Inject 0 75 mL (1 mg total) under the skin once a week    Type 2 diabetes mellitus without complication, without long-term current use of insulin (HCC)  -     semaglutide, 1 mg/dose, (Ozempic) 4 MG/3ML SOPN injection pen; Inject 0 75 mL (1 mg total) under the skin once a week    Hx of laparoscopic adjustable gastric banding    Pure hypercholesterolemia    Depression with anxiety    Stuttering    Obstructive sleep apnea syndrome               Follow up in approximately 2 months with Non-Surgical Physician/Advanced Practitioner      Subjective:   Chief Complaint   Patient presents with   • Consult     MWM Pt has sleep apnea, waist 55, goal wt 258       Patient ID: Elias Pedraza  is a 74 y o  male with excess weight/obesity here to pursue weight management  Previous notes and records have been reviewed  Past Medical History:   Diagnosis Date   • Acute blood loss anemia 10/13/2016   • Anxiety    • Arthritis     knees   • Arthritis    • Asthma     stable for > 10 years   • Molina esophagus     with BARRX/RFA   • Cancer [facility])     prostate   • Cataract    • Depression    • Diabetes ([facility] [address] )    • Diabetes mellitus ([facility] [address] )     pre diabetes   • Diverticulitis of colon    • Environmental allergies    • Fall 12/30/2020   • GERD (gastroesophageal reflux disease)    • Hiatal hernia    • History of anal fissures    • Hyperlipidemia    • Hypertension    • Incisional hernia    • Insomnia    • Kidney stone    • Morbid obesity ([facility] [address] )    • Peripheral neuropathy    • PONV (postoperative nausea and vomiting)    • Prolapsing mitral valve     prolapsing mitral valve leaflet syndrome   • Prostate cancer ([facility] [address] )    • Retinal detachment     treated surgically at [facility]; resolved: 10/10/2012   • Sleep apnea     diagnosed but unable to tolerate cpap      • Stuttering      Past Surgical History:   Procedure Laterality Date   • ARTHROSCOPIC REPAIR ACL Right    • BIOPSY CORE NEEDLE      prostate   • CATARACT EXTRACTION Bilateral    • COLONOSCOPY     • HERNIA REPAIR      naval   • JOINT REPLACEMENT      B/L knee   • KNEE ARTHROSCOPY Left    • LAPAROSCOPIC GASTRIC BANDING     • AR TOTAL KNEE ARTHROPLASTY Left 02/15/2017    Procedure: TOTAL KNEE ARTHROPLASTY ;  Surgeon: Jose Rafael Juares MD;  Location: [facility];  Service: Orthopedics   • AR TOTAL KNEE ARTHROPLASTY Right 10/10/2016    Procedure: ARTHROPLASTY KNEE TOTAL;  Surgeon: Jose Rafael Juares MD;  Location: [facility];  Service: Orthopedics   • PROSTATECTOMY      robotic-assisted; SLBEULAH-Dr Marsha Guevara   • REMOVAL GASTRIC BAND LAPAROSCOPIC N/A 8/30/2022    Procedure: LAPAROSCOPIC REMOVAL OF ADJUSTABLE GASTRIC BAND AND PORT  WITH INTRAOPERATIVE EGD;  Surgeon: Lisette Renteria MD;  Location: [facility];  Service: Bariatrics   • RETINAL DETACHMENT SURGERY Bilateral     Lemus Eye   • SEPTOPLASTY     • SINUS SURGERY     • TONSILLECTOMY      over age 13   • UPPER GASTROINTESTINAL ENDOSCOPY      mutiple with BARRX/RFA   • VASECTOMY      vas deferens       HPI:  Wt Readings from Last 20 Encounters:   10/19/22 135 kg (298 lb)   10/18/22 136 kg (298 lb 12 8 oz)   10/06/22 133 kg (294 lb)   10/03/22 134 kg (294 lb 11 2 oz)   09/15/22 132 kg (291 lb)   09/09/22 132 kg (291 lb)   09/09/22 132 kg (291 lb)   08/30/22 129 kg (284 lb 2 8 oz)   08/18/22 132 kg (291 lb 8 oz)   08/18/22 132 kg (291 lb 8 oz)   08/10/22 133 kg (293 lb)   08/02/22 133 kg (293 lb)   08/02/22 133 kg (293 lb 4 8 oz)   07/06/22 133 kg (293 lb)   05/31/22 133 kg (293 lb)   04/21/22 135 kg (298 lb)   03/24/22 134 kg (294 lb 8 oz)   03/18/22 135 kg (298 lb)   03/01/22 136 kg (299 lb)   02/24/22 135 kg (297 lb)     Had Lap Band placement on 7/26/2010  He had developed Molina's esophagus and therefore band removal performed by Dr Lisette Renteria on 8/30/2022  No further weight loss surgery planned  Weight prior to band was 300 lbs and jaiden 258 lbs  Highest weight was 335 lbs 1 5 years ago  He was referred for MWM due to weight regain  Started Ozempic through PCP 1 5 years ago  Lost about 40 lbs  Currently taking Ozempic 1 mg weekly  No negative side effects  Feels that it is not helping as much with appetite as it did initially  Already on metformin 1000 mg twice a day  Was on Phen fen years ago and lost weight       Hydration: 3 bottles of water, 5-6 cans diet pepsi daily, OJ occ  Alcohol: few beers over 1 year (less than one case)  Smoking: denies  Exercise: having difficulty walking - being worked up for parkinson's disease, getting 2628-2229 steps per day  Occupation: retired -  [employer], [employer], [employer]   Sleep: 8-9 hours, but broken up  STOP bang: Has ANABELL, tried CPAP, but could not tolerate it  Seeing neurologist for possible parkinson's     Highest weight: 335 lbs 1 5 years ago  Current weight: 298 lbs  Goal weight: 257 5 lbs LTG, 280 lbs STG    Colonoscopy: UTD, due 2026    The following portions of the patient's history were reviewed and updated as appropriate: allergies, current medications, past family history, past medical history, past social history, past surgical history, and problem list     Family History   Problem Relation Age of Onset   • Heart disease Father    • Coronary artery disease Father    • Prostate cancer Father    • Coronary artery disease Mother    • Diabetes Mother         mellitus   • Diabetes Maternal Grandmother         mellitus   • Coronary artery disease Maternal Grandfather    • Coronary artery disease Paternal Grandfather         Review of Systems   HENT: Negative for sore throat  Respiratory: Positive for cough (allergies)  Negative for shortness of breath  Cardiovascular: Negative for chest pain and palpitations  Gastrointestinal: Positive for constipation (when not getting enough water)  Negative for abdominal pain, diarrhea, nausea and vomiting         + GERD on Protonix - followed by GI  Musculoskeletal: Positive for arthralgias (knee)  Negative for back pain  Skin: Negative for rash  Psychiatric/Behavioral: Negative for suicidal ideas         + anxiety and depression controlled with medication       Objective:  /84   Pulse 67   Resp 18   Ht 5' 9" (1 753 m)   Wt 135 kg (298 lb)   SpO2 97%   BMI 44 01 kg/m²     Physical Exam  Vitals and nursing note reviewed  Constitutional   General appearance: Abnormal   well developed and morbidly obese  Eyes No conjunctival injection  Ears, Nose, Mouth, and Throat Oral mucosa moist  Stuttering speech  Pulmonary   Respiratory effort: No increased work of breathing or signs of respiratory distress  Cardiovascular    Examination of extremities for edema and/or varicosities: Normal   no edema  Abdomen   Abdomen: Abnormal   The abdomen was obese  Musculoskeletal   Normal range of motion  Neurological   Gait and station: Somewhat wide based, but ambulated independently      Psychiatric   Orientation to person, place and time: Normal     Affect: appropriate

## 2022-10-19 NOTE — ASSESSMENT & PLAN NOTE
- Discussed options of HealthyCORE-Intensive Lifestyle Intervention Program, Very Low Calorie Diet-VLCD and Conservative Program and the role of weight loss medications  - Already on metformin 1000 mg twice a day and Ozempic 1 mg weekly through primary care provider  Start weight on Ozempic was 318 lbs  Feels that it is helping with appetite, but not as much as when he first started it  Thought of increasing it further to 2 mg weekly, but he reports he is currently in the donut hole with Medicare and has already paid out of pocket for the current dose (has enough on hand to get him through until the end of the year)  Therefore, will hold off on adjusting, but can considering increasing in the future  - Patient denies personal history of pancreatitis  Patient also denies personal and family history of medullary thyroid cancer and multiple endocrine neoplasia type 2 (MEN 2 tumor)  - Patient is interested in pursuing Conservative Program  - Initial weight loss goal of 5-10% weight loss for improved health  - Weight loss can improve patient's co-morbid conditions and/or prevent weight-related complications  - Labs reviewed: A1C, lipid panel, and CMP 8/5/2022  A1C controlled at 6 0 and fasting glucose elevated, which will all likely improve with weight loss  Otherwise, labs within acceptable range  TSH 3/6/2022 was within normal range  Goals:  Do not skip meals  Food log (ie ) www myfitnesspal com,sparkpeople  com,loseit com,calorieking  com,etc  baritastic (use skinnytaste  com, dietdoctor  com or smartphone sandee [employer] for recipes)  No sugary beverages  At least 64oz of water daily  Increase physical activity by 10 minutes daily  Gradually increase physical activity to a goal of 5 days per week for 30 minutes of MODERATE intensity PLUS 2 days per week of FULL BODY resistance training (use smartphone apps [employer], Home Workout, etc )  Start food logging, weighing and measuring food    Nutrition recommendations per surgical dietician  2975-9861 calories per day  Increase water to at least 64 oz daily  Start reducing diet Pepsi  Increase walking as tolerated - currently being evaluated by neurology for possible Parkinson's

## 2022-10-20 ENCOUNTER — APPOINTMENT (OUTPATIENT)
Dept: LAB | Age: 67
End: 2022-10-20
Payer: MEDICARE

## 2022-10-20 DIAGNOSIS — R39.15 URGENCY OF URINATION: ICD-10-CM

## 2022-10-20 DIAGNOSIS — N39.8 VOIDING DYSFUNCTION: ICD-10-CM

## 2022-10-20 DIAGNOSIS — N32.81 OAB (OVERACTIVE BLADDER): ICD-10-CM

## 2022-10-20 LAB
BACTERIA UR QL AUTO: NORMAL /HPF
BILIRUB UR QL STRIP: NEGATIVE
CLARITY UR: CLEAR
COLOR UR: YELLOW
GLUCOSE UR STRIP-MCNC: NEGATIVE MG/DL
HGB UR QL STRIP.AUTO: NEGATIVE
KETONES UR STRIP-MCNC: NEGATIVE MG/DL
LEUKOCYTE ESTERASE UR QL STRIP: NEGATIVE
NITRITE UR QL STRIP: NEGATIVE
NON-SQ EPI CELLS URNS QL MICRO: NORMAL /HPF
PH UR STRIP.AUTO: 6 [PH]
PROT UR STRIP-MCNC: NEGATIVE MG/DL
RBC #/AREA URNS AUTO: NORMAL /HPF
SP GR UR STRIP.AUTO: 1.02 (ref 1–1.03)
UROBILINOGEN UR STRIP-ACNC: <2 MG/DL
WBC #/AREA URNS AUTO: NORMAL /HPF

## 2022-10-20 PROCEDURE — 81001 URINALYSIS AUTO W/SCOPE: CPT

## 2022-10-20 PROCEDURE — 87086 URINE CULTURE/COLONY COUNT: CPT

## 2022-10-21 LAB — BACTERIA UR CULT: NORMAL

## 2022-10-24 ENCOUNTER — OFFICE VISIT (OUTPATIENT)
Dept: UROLOGY | Facility: AMBULATORY SURGERY CENTER | Age: 67
End: 2022-10-24
Payer: MEDICARE

## 2022-10-24 VITALS
OXYGEN SATURATION: 97 % | DIASTOLIC BLOOD PRESSURE: 82 MMHG | SYSTOLIC BLOOD PRESSURE: 148 MMHG | BODY MASS INDEX: 44.14 KG/M2 | HEART RATE: 62 BPM | WEIGHT: 298 LBS | HEIGHT: 69 IN

## 2022-10-24 DIAGNOSIS — N32.81 OAB (OVERACTIVE BLADDER): Primary | ICD-10-CM

## 2022-10-24 DIAGNOSIS — C61 MALIGNANT NEOPLASM OF PROSTATE (HCC): ICD-10-CM

## 2022-10-24 PROCEDURE — 99214 OFFICE O/P EST MOD 30 MIN: CPT | Performed by: NURSE PRACTITIONER

## 2022-10-24 RX ORDER — TROSPIUM CHLORIDE 20 MG/1
20 TABLET, FILM COATED ORAL 2 TIMES DAILY
Qty: 90 TABLET | Refills: 3 | Status: SHIPPED | OUTPATIENT
Start: 2022-10-24 | End: 2022-10-27 | Stop reason: CLARIF

## 2022-10-24 RX ORDER — TROSPIUM CHLORIDE 20 MG/1
20 TABLET, FILM COATED ORAL 2 TIMES DAILY
Qty: 90 TABLET | Refills: 3 | Status: SHIPPED | OUTPATIENT
Start: 2022-10-24 | End: 2022-10-24

## 2022-10-24 NOTE — PATIENT INSTRUCTIONS
PSA in 1 year   Trospium as directed   Make sure you are drinking enough water during the day- up to 3 bottles   Change to Ginger Ale or Sprite instead of Pepsi   Call the office for concerns or questions

## 2022-10-24 NOTE — PROGRESS NOTES
10/24/2022    Assessment and Plan    74 y o  male managed by our office    1  Prostate Cancer  · Status post robot prostatectomy 07/2011  ·  PSA performed 03/06/2022 resulted less than 0 1  ·  repeat PSA  ·  follow up the office in 1 year    2  Urinary Urgency/Urge Incontinence   · Discussed need to increase water intake upwards to 40 ounces of water intake per day while avoiding irritating foods and beverages  · Prescription for trospium provided  Side effects discussed  Patient verbalized understanding  If side effects too bothersome, may consider taking once daily  · Follow up in the office in 6-8 weeks for bladder scan PVR and re-evaluation of symptoms    History of Present Illness  Nahum Willson is a 74 y o  male here for follow up evaluation of  Angélica 7 (3+4) prostate cancer status post Davinci prostatectomy by Dr Sangeeta Cheek in 07/2011  PSA trend below  Patient reports rare episodes of urinary incontinence with exertion otherwise he reports to be 90% continent  He denies changes to his general health since his prior evaluation  Patient reports since his last office evaluation he has worsening urinary urgency and urge incontinence  He reports drinking 20 out bottles of water per day approximately 3 cans of diet Pepsi  He denies burning with urination  He reports sensation of complete bladder emptying with urination  He denies suprapubic abdominal pain and flank pain  Component       PSA, Total   Latest Ref Rng & Units       0 0 - 4 0 ng/mL   4/24/2018      2:11 PM <0 1   4/24/2019      4:03 PM <0 1   10/1/2020      11:55 AM <0 1   9/20/2021      3:19 PM <0 1   3/6/2022      11:36 AM <0 1       Review of Systems   Constitutional: Negative for chills and fever  Respiratory: Negative for cough and shortness of breath  Cardiovascular: Negative for chest pain  Gastrointestinal: Negative for abdominal distention, abdominal pain, blood in stool, nausea and vomiting     Genitourinary: Positive for urgency  Negative for difficulty urinating, dysuria, enuresis, flank pain, frequency and hematuria  Skin: Negative for rash  AUA SYMPTOM SCORE    Flowsheet Row Most Recent Value   AUA SYMPTOM SCORE    How often have you had a sensation of not emptying your bladder completely after you finished urinating? 1   How often have you had to urinate again less than two hours after you finished urinating? 4   How often have you found you stopped and started again several times when you urinate? 1   How often have you found it difficult to postpone urination? 4   How often have you had a weak urinary stream? 1   How often have you had to push or strain to begin urination? 0   How many times did you most typically get up to urinate from the time you went to bed at night until the time you got up in the morning?  3   Quality of Life: If you were to spend the rest of your life with your urinary condition just the way it is now, how would you feel about that? 4   AUA SYMPTOM SCORE 14             Past Medical History  Past Medical History:   Diagnosis Date   • Acute blood loss anemia 10/13/2016   • Anxiety    • Arthritis     knees   • Arthritis    • Asthma     stable for > 10 years   • [place] esophagus     with BARRX/RFA   • Cancer [facility])     prostate   • Cataract    • Depression    • Diabetes ([facility] )    • Diabetes mellitus ([facility] )     pre diabetes   • Diverticulitis of colon    • Environmental allergies    • Fall 12/30/2020   • GERD (gastroesophageal reflux disease)    • Hiatal hernia    • History of anal fissures    • Hyperlipidemia    • Hypertension    • Incisional hernia    • Insomnia    • Kidney stone    • Morbid obesity ([facility] )    • Peripheral neuropathy    • PONV (postoperative nausea and vomiting)    • Prolapsing mitral valve     prolapsing mitral valve leaflet syndrome   • Prostate cancer ([facility] )    • Retinal detachment     treated surgically at [facility] eye; resolved: 10/10/2012   • Sleep apnea     diagnosed but unable to tolerate cpap      • Stuttering        Past Social History  Past Surgical History:   Procedure Laterality Date   • ARTHROSCOPIC REPAIR ACL Right    • BIOPSY CORE NEEDLE      prostate   • CATARACT EXTRACTION Bilateral    • COLONOSCOPY     • HERNIA REPAIR      [facility]   • JOINT REPLACEMENT      B/L knee   • KNEE ARTHROSCOPY Left    • LAPAROSCOPIC GASTRIC BANDING     • WA TOTAL KNEE ARTHROPLASTY Left 02/15/2017    Procedure: TOTAL KNEE ARTHROPLASTY ;  Surgeon: Cecil Brandt MD;  Location: [facility];  Service: Orthopedics   • WA TOTAL KNEE ARTHROPLASTY Right 10/10/2016    Procedure: ARTHROPLASTY KNEE TOTAL;  Surgeon: Cecil Brandt MD;  Location: [facility];  Service: Orthopedics   • PROSTATECTOMY      robotic-assisted; [facility]-Dr Wen Borjas   • REMOVAL GASTRIC BAND LAPAROSCOPIC N/A 8/30/2022    Procedure: LAPAROSCOPIC REMOVAL OF ADJUSTABLE GASTRIC BAND AND PORT  WITH INTRAOPERATIVE EGD;  Surgeon: Osie Nageotte, MD;  Location: [facility];  Service: Bariatrics   • RETINAL DETACHMENT SURGERY Bilateral     [facility]   • SEPTOPLASTY     • SINUS SURGERY     • TONSILLECTOMY      over age 13   • UPPER GASTROINTESTINAL ENDOSCOPY      mutiple with BARRX/RFA   • VASECTOMY      vas deferens     Social History     Tobacco Use   Smoking Status Never Smoker   Smokeless Tobacco Never Used   Tobacco Comment    quit 45 years ago       Past Family History  Family History   Problem Relation Age of Onset   • Heart disease Father    • Coronary artery disease Father    • Prostate cancer Father    • Coronary artery disease Mother    • Diabetes Mother         mellitus   • Diabetes Maternal Grandmother         mellitus   • Coronary artery disease Maternal Grandfather    • Coronary artery disease Paternal Grandfather        Past Social history  Social History     Socioeconomic History   • Marital status: /Civil Union     Spouse name: Not on file   • Number of children: 0   • Years of education: Not on file   • Highest education level: Not on file   Occupational History   • Occupation:    Tobacco Use   • Smoking status: Never Smoker   • Smokeless tobacco: Never Used   • Tobacco comment: quit 38 years ago   Vaping Use   • Vaping Use: Never used   Substance and Sexual Activity   • Alcohol use: Not Currently     Alcohol/week: 1 0 standard drink     Types: 1 Cans of beer per week     Comment: rare ; being a social drinker (as per allscripts)   • Drug use: Not Currently   • Sexual activity: Not Currently     Partners: Female   Other Topics Concern   • Not on file   Social History Narrative    ** Merged History Encounter **          Social Determinants of Health     Financial Resource Strain: Not on file   Food Insecurity: Not on file   Transportation Needs: Not on file   Physical Activity: Not on file   Stress: Not on file   Social Connections: Not on file   Intimate Partner Violence: Not on file   Housing Stability: Not on file       Current Medications  Current Outpatient Medications   Medication Sig Dispense Refill   • Blood Glucose Monitoring Suppl (OneTouch Verio) w/Device KIT Use daily (Patient taking differently: Use as needed) 1 kit 0   • ezetimibe (ZETIA) 10 mg tablet TAKE 1 TABLET DAILY (Patient taking differently: Take 5 mg by mouth in the morning) 90 tablet 3   • FLUoxetine (PROzac) 20 mg capsule TAKE 3 CAPSULES DAILY 270 capsule 3   • glucose blood (OneTouch Verio) test strip USE TO TEST DAILY 200 strip 1   • Lancet Devices (ONE TOUCH DELICA LANCING DEV) MISC Use daily 1 each 0   • losartan (COZAAR) 100 MG tablet TAKE 1 TABLET DAILY 90 tablet 3   • metFORMIN (GLUCOPHAGE) 500 mg tablet TAKE 2 TABLETS TWICE A DAY WITH MEALS 360 tablet 3   • metoprolol succinate (TOPROL-XL) 25 mg 24 hr tablet TAKE 1 TABLET DAILY (Patient taking differently: Per pt starting 10/9 he will be taking half a tablet) 90 tablet 3   • Multiple Vitamins-Minerals (CENTRUM ADULTS PO) Take by mouth in the morning     • nystatin (MYCOSTATIN) powder Apply topically 4 (four) times a day (Patient taking differently: Apply topically if needed) 60 g 2   • OLANZapine (ZyPREXA) 5 mg tablet Take 0 5 tablets (2 5 mg total) by mouth daily (Patient taking differently: Take 2 5 mg by mouth every evening) 90 tablet 3   • OneTouch Delica Lancets 09T MISC Use daily 300 each 1   • pantoprazole (PROTONIX) 40 mg tablet Take 1 tablet (40 mg total) by mouth 2 (two) times a day 1 tab in AM, 1 tabs in  tablet 3   • rosuvastatin (CRESTOR) 5 mg tablet Take 1 tablet (5 mg total) by mouth once a week 12 tablet 3   • semaglutide, 1 mg/dose, (Ozempic) 4 MG/3ML SOPN injection pen Inject 0 75 mL (1 mg total) under the skin once a week 9 mL 1   • trospium chloride (SANCTURA) 20 mg tablet Take 1 tablet (20 mg total) by mouth 2 (two) times a day 90 tablet 3   • zolpidem (AMBIEN) 5 mg tablet Take 1 tablet (5 mg total) by mouth daily at bedtime as needed for sleep 90 tablet 0   • ALPRAZolam (XANAX) 0 25 mg tablet Take 1 tablet (0 25 mg total) by mouth 3 (three) times a day as needed for anxiety 270 tablet 1   • hydrochlorothiazide (HYDRODIURIL) 12 5 mg tablet Take 1 tablet (12 5 mg total) by mouth 3 (three) times a week Monday, Thursday, Saturday (Patient taking differently: Take 12 5 mg by mouth 3 (three) times a week Monday, Wednesday, Friday) 36 tablet 5     No current facility-administered medications for this visit         Allergies  Allergies   Allergen Reactions   • Celebrex [Celecoxib] Other (See Comments)     Cardiologist stated he should not take     • Chlorhexidine Hives     Is able to wash with hibiclens but not use michi cloths   • Other      "steroid eyedrops"     • Prednisone Other (See Comments)     Prednisone eye drops- eye pressure increases         The following portions of the patient's history were reviewed and updated as appropriate: allergies, current medications, past medical history, past social history, past surgical history and problem list       Vitals  Vitals: 10/24/22 1318   BP: 148/82   BP Location: Left arm   Patient Position: Sitting   Cuff Size: Adult   Pulse: 62   SpO2: 97%   Weight: 135 kg (298 lb)   Height: 5' 9" (1 753 m)           Physical Exam  Physical Exam  Vitals reviewed  Constitutional:       General: He is not in acute distress  Appearance: Normal appearance  He is normal weight  HENT:      Head: Normocephalic  Pulmonary:      Effort: No respiratory distress  Breath sounds: Normal breath sounds  Skin:     General: Skin is warm and dry  Neurological:      General: No focal deficit present  Mental Status: He is alert and oriented to person, place, and time     Psychiatric:         Mood and Affect: Mood normal          Behavior: Behavior normal            Results  No results found for this or any previous visit (from the past 1 hour(s)) ]  Lab Results   Component Value Date    PSA <0 1 03/06/2022    PSA <0 1 09/20/2021    PSA <0 1 10/01/2020     Lab Results   Component Value Date    GLUCOSE 174 (H) 12/30/2020    CALCIUM 9 2 08/31/2022     08/16/2015    K 4 2 08/31/2022    CO2 22 08/31/2022     08/31/2022    BUN 8 08/31/2022    CREATININE 1 01 08/31/2022     Lab Results   Component Value Date    WBC 10 02 08/31/2022    HGB 13 6 08/31/2022    HCT 38 7 08/31/2022    MCV 88 08/31/2022     08/31/2022           Orders  Orders Placed This Encounter   Procedures   • PSA Total, Diagnostic     Standing Status:   Future     Standing Expiration Date:   10/24/2023       ED Boss

## 2022-10-25 DIAGNOSIS — N32.81 OAB (OVERACTIVE BLADDER): ICD-10-CM

## 2022-10-25 NOTE — TELEPHONE ENCOUNTER
Pt under care of: Gurmeet Girard   Last Seen: 10/24/2022    Reason for call:  Patient needs a prior auth for medication   trospium chloride (SANCTURA) 20 mg tablet [[id number]]     Order Details  Dose: 20 mg Route: Oral Frequency: 2 times daily   Dispense Quantity: 90 tablet Refills: 3          Sig: Take 1 tablet (20 mg total) by mouth 2 (two) times a day     [address] [facility], [address] [place] [address]   Phone:  [phone]  Fax:  [phone]   YOLANDA #:  --         Pt can be reached at: [phone]

## 2022-10-26 NOTE — TELEPHONE ENCOUNTER
Pt called the office to check on the status of prior auth  Pt requested a call back to discuss prior auth

## 2022-10-27 ENCOUNTER — TELEPHONE (OUTPATIENT)
Dept: OTHER | Facility: OTHER | Age: 67
End: 2022-10-27

## 2022-10-27 ENCOUNTER — OFFICE VISIT (OUTPATIENT)
Dept: FAMILY MEDICINE CLINIC | Facility: CLINIC | Age: 67
End: 2022-10-27
Payer: MEDICARE

## 2022-10-27 VITALS
OXYGEN SATURATION: 96 % | BODY MASS INDEX: 46.05 KG/M2 | HEIGHT: 67 IN | SYSTOLIC BLOOD PRESSURE: 148 MMHG | WEIGHT: 293.4 LBS | DIASTOLIC BLOOD PRESSURE: 82 MMHG | HEART RATE: 68 BPM | TEMPERATURE: 98.2 F | RESPIRATION RATE: 20 BRPM

## 2022-10-27 DIAGNOSIS — E78.00 PURE HYPERCHOLESTEROLEMIA: ICD-10-CM

## 2022-10-27 DIAGNOSIS — E11.42 CONTROLLED TYPE 2 DIABETES MELLITUS WITH DIABETIC POLYNEUROPATHY, WITHOUT LONG-TERM CURRENT USE OF INSULIN (HCC): Primary | ICD-10-CM

## 2022-10-27 DIAGNOSIS — I10 BENIGN ESSENTIAL HYPERTENSION: ICD-10-CM

## 2022-10-27 DIAGNOSIS — Z00.00 MEDICARE ANNUAL WELLNESS VISIT, SUBSEQUENT: ICD-10-CM

## 2022-10-27 DIAGNOSIS — G47.00 INSOMNIA, UNSPECIFIED TYPE: ICD-10-CM

## 2022-10-27 DIAGNOSIS — F41.8 DEPRESSION WITH ANXIETY: ICD-10-CM

## 2022-10-27 DIAGNOSIS — E66.01 OBESITY, CLASS III, BMI 40-49.9 (MORBID OBESITY) (HCC): ICD-10-CM

## 2022-10-27 DIAGNOSIS — F80.81 STUTTERING: ICD-10-CM

## 2022-10-27 PROBLEM — R05.1 ACUTE COUGH: Status: RESOLVED | Noted: 2022-10-18 | Resolved: 2022-10-27

## 2022-10-27 PROCEDURE — 99214 OFFICE O/P EST MOD 30 MIN: CPT | Performed by: FAMILY MEDICINE

## 2022-10-27 PROCEDURE — G0439 PPPS, SUBSEQ VISIT: HCPCS | Performed by: FAMILY MEDICINE

## 2022-10-27 RX ORDER — ZOLPIDEM TARTRATE 10 MG/1
10 TABLET ORAL
Qty: 90 TABLET | Refills: 0
Start: 2022-10-27 | End: 2023-01-25

## 2022-10-27 RX ORDER — TROSPIUM CHLORIDE 20 MG/1
20 TABLET, FILM COATED ORAL 2 TIMES DAILY
Qty: 60 TABLET | Refills: 3 | Status: SHIPPED | OUTPATIENT
Start: 2022-10-27

## 2022-10-27 NOTE — PROGRESS NOTES
Chief Complaint   Patient presents with   • Medicare Wellness Visit     Subsequent  • Follow-up     6 months  Health Maintenance   Topic Date Due   • PT PLAN OF CARE  03/17/2021   • COVID-19 Vaccine (3 - Booster for Moderna series) 08/19/2021   • DM Eye Exam  10/14/2021   • Pneumococcal Vaccine: 65+ Years (3 - PPSV23 or PCV20) 10/20/2021   • Falls: Plan of Care  02/20/2022   • SLP PLAN OF CARE  10/18/2023 (Originally 5/13/2021)   • HEMOGLOBIN A1C  02/05/2023   • Diabetic Foot Exam  04/21/2023   • BMI: Followup Plan  10/19/2023   • Fall Risk  10/27/2023   • Medicare Annual Wellness Visit (AWV)  10/27/2023   • BMI: Adult  10/27/2023   • Colorectal Cancer Screening  03/10/2026   • Hepatitis C Screening  Completed   • Influenza Vaccine  Completed   • HIB Vaccine  Aged Out   • Hepatitis B Vaccine  Aged Out   • IPV Vaccine  Aged Out   • Hepatitis A Vaccine  Aged Out   • Meningococcal ACWY Vaccine  Aged Out   • HPV Vaccine  Aged Out      Assessment and Plan:     Problem List Items Addressed This Visit        Endocrine    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, without long-term current use of insulin ([facility] [address] ) - Primary       Lab Results   Component Value Date    HGBA1C 6 0 (H) 08/05/2022     Controlled  Low carb diet  Continue metformin 1000mg bid and ozempic 1mg weekly  Relevant Orders    Comprehensive metabolic panel    Hemoglobin A1C    Lipid panel    TSH, 3rd generation with Free T4 reflex       Cardiovascular and Mediastinum    Benign essential hypertension     DASH diet  Continue losartan 100mg QD and metoprolol 12 5mg QD  FU cardiology  Relevant Orders    Comprehensive metabolic panel    Hemoglobin A1C    Lipid panel    TSH, 3rd generation with Free T4 reflex       Other    Stuttering     Continue prozac and zyprexa  Insomnia     Pt states he cannot sleep on ambien 5mg  Would like to go back to 10mg qhs  SE educated pt            Relevant Medications    zolpidem (AMBIEN) 10 mg tablet    Other Relevant Orders    Comprehensive metabolic panel    Hemoglobin A1C    Lipid panel    TSH, 3rd generation with Free T4 reflex    Depression with anxiety     Stable  Continue prozac 20mg QD and xanax 0 25mg tid prn  SE educated pt  Relevant Medications    zolpidem (AMBIEN) 10 mg tablet    Other Relevant Orders    Comprehensive metabolic panel    Hemoglobin A1C    Lipid panel    TSH, 3rd generation with Free T4 reflex    Pure hypercholesterolemia     Low fat diet  Continue crestor 5mg weekly and zetia 5mg QD per cardiology  Relevant Orders    Comprehensive metabolic panel    Hemoglobin A1C    Lipid panel    TSH, 3rd generation with Free T4 reflex    Obesity, Class III, BMI 40-49 9 (morbid obesity) ([facility])     FU wt loss program             Other Visit Diagnoses     Medicare annual wellness visit, subsequent              Falls Plan of Care: balance, strength, and gait training instructions were provided  Got flu shot this season  Got PCV13 at age of 75 and pneumovax at age of 59  Need PCV20 when we have it  Got shingrix  Got covid19 shots and booster  Colonoscopy 3/2021, repeat in 10 years  RTO in 6 months  Preventive health issues were discussed with patient, and age appropriate screening tests were ordered as noted in patient's After Visit Summary  Personalized health advice and appropriate referrals for health education or preventive services given if needed, as noted in patient's After Visit Summary  History of Present Illness:     Patient presents for a Medicare Wellness Visit    HPI     Pt is here by himself  Urine incontinence----FU urology  Will try new medications  Hx of prostate cancer s/p surgery---FU urology  3/2022 PSA <0 1 good  DM---8/2022 hgA1C 6 0 controlled  He is on ozempic 1mg weekly  He is on metformin 1000mg bid  Denies side effects  Neuropathy in hands  FU ophthalmology yearly  FU podiatrist sometimes  HTN---He is on HCTZ 12 5mg 3 times/week  He is on losartan 100mg QD, metoprolol 12 5mg daily  Hyperlipidemia---He is on zetia 5mg QD and crestor 5mg weekly  FU cardiology regularly  Anxiety/depression---Stable  He is on prozac 60mg QD  He use xanax 0 25mg tid prn  Insomnia---He was on ambien 10mg qhs which helped  He tried to use 5mg but cannot sleep recently  Would like to go back to 10mg qhs  Stuttering---Started Prozac and zyprexa per psychiatrist 10 years ago  Helped a lot per pt  Tremor/neuropathy/unsteady gait---Stable  FU neurology  Uncles had Parkinson's  GERD/Molina's---FU GI  He is pantoprazole 40mg bid  BMI 45 61 today  FU wt loss program      NO smoking  No alcohol  Lives with wife  Does all ADL's  [address] 2/year  No injury  Patient Care Team:  ED Og as PCP - General (Family Medicine)  MD Fab Luna MD Rollo Adu, MD Ima Clan, MD Linell Marrow, MD Linell Marrow, MD     Review of Systems:     Review of Systems   Constitutional: Negative for appetite change, chills and fever  HENT: Negative for congestion, ear pain, sinus pain and sore throat  Eyes: Negative for discharge and itching  Respiratory: Negative for apnea, cough, chest tightness, shortness of breath and wheezing  Cardiovascular: Negative for chest pain, palpitations and leg swelling  Gastrointestinal: Negative for abdominal pain, anal bleeding, constipation, diarrhea, nausea and vomiting  Endocrine: Negative for cold intolerance, heat intolerance and polyuria  Genitourinary: Negative for difficulty urinating and dysuria  Musculoskeletal: Negative for arthralgias, back pain and myalgias  Skin: Negative for rash  Neurological: Negative for dizziness and headaches  Psychiatric/Behavioral: Negative for agitation          Problem List:     Patient Active Problem List   Diagnosis   • Status post total left knee replacement • Stuttering   • Sleep apnea   • Diabetes mellitus ([place] [address] )   • Asthma   • Class 3 severe obesity due to excess calories without serious comorbidity with body mass index (BMI) of 40 0 to 44 9 in adult [facility])   • Ambulatory dysfunction   • Benign essential hypertension   • 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, without long-term current use of insulin ([facility])   • Insomnia   • Depression with anxiety   • Onychomycosis   • Hyperplasia of renal artery (HCC)   • Molina's esophagus with dysplasia   • Dysphagia   • Exertional dyspnea   • PONV (postoperative nausea and vomiting)   • Gastroparesis   • Ventral hernia without obstruction or gangrene   • Gastroesophageal reflux disease with esophagitis without hemorrhage   • Tremor   • Neuropathy   • Pure hypercholesterolemia   • Dermatitis   • Obesity, Class III, BMI 40-49 9 (morbid obesity) ([place] [address] )   • Hx of laparoscopic adjustable gastric banding   • Molina's esophagus determined by biopsy      Past Medical and Surgical History:     Past Medical History:   Diagnosis Date   • Acute blood loss anemia 10/13/2016   • Anxiety    • Arthritis     knees   • Arthritis    • Asthma     stable for > 10 years   • Molina esophagus     with BARRX/RFA   • Cancer [facility])     prostate   • Cataract    • Depression    • Diabetes ([facility] )    • Diabetes mellitus ([place] [address] )     pre diabetes   • Diverticulitis of colon    • Environmental allergies    • Fall 12/30/2020   • GERD (gastroesophageal reflux disease)    • Hiatal hernia    • History of anal fissures    • Hyperlipidemia    • Hypertension    • Incisional hernia    • Insomnia    • Kidney stone    • Morbid obesity ([facility])    • Peripheral neuropathy    • PONV (postoperative nausea and vomiting)    • Prolapsing mitral valve     prolapsing mitral valve leaflet syndrome   • Prostate cancer ([place] [address] )    • Retinal detachment     treated surgically at [facility]; resolved: 10/10/2012   • Sleep apnea     diagnosed but unable to tolerate cpap  • Stuttering      Past Surgical History:   Procedure Laterality Date   • ARTHROSCOPIC REPAIR ACL Right    • BIOPSY CORE NEEDLE      prostate   • CATARACT EXTRACTION Bilateral    • COLONOSCOPY     • HERNIA REPAIR      [facility]   • JOINT REPLACEMENT      B/L knee   • KNEE ARTHROSCOPY Left    • LAPAROSCOPIC GASTRIC BANDING     • HI TOTAL KNEE ARTHROPLASTY Left 02/15/2017    Procedure: TOTAL KNEE ARTHROPLASTY ;  Surgeon: Sanchez Li MD;  Location: [facility];  Service: Orthopedics   • HI TOTAL KNEE ARTHROPLASTY Right 10/10/2016    Procedure: ARTHROPLASTY KNEE TOTAL;  Surgeon: Sanchez Li MD;  Location: [facility];  Service: Orthopedics   • PROSTATECTOMY      robotic-assisted; [facility]-Dr Digna Mcnulty   • REMOVAL GASTRIC BAND LAPAROSCOPIC N/A 8/30/2022    Procedure: LAPAROSCOPIC REMOVAL OF ADJUSTABLE GASTRIC BAND AND PORT  WITH INTRAOPERATIVE EGD;  Surgeon: Swati Thurman MD;  Location: [facility];  Service: Bariatrics   • RETINAL DETACHMENT SURGERY Bilateral     [facility]   • SEPTOPLASTY     • SINUS SURGERY     • TONSILLECTOMY      over age 13   • UPPER GASTROINTESTINAL ENDOSCOPY      mutiple with BARRX/RFA   • VASECTOMY      vas deferens      Family History:     Family History   Problem Relation Age of Onset   • Heart disease Father    • Coronary artery disease Father    • Prostate cancer Father    • Coronary artery disease Mother    • Diabetes Mother         mellitus   • Diabetes Maternal Grandmother         mellitus   • Coronary artery disease Maternal Grandfather    • Coronary artery disease Paternal Grandfather       Social History:     Social History     Socioeconomic History   • Marital status: /Civil Union     Spouse name: None   • Number of children: 0   • Years of education: None   • Highest education level: None   Occupational History   • Occupation:    Tobacco Use   • Smoking status: Never Smoker   • Smokeless tobacco: Never Used   • Tobacco comment: quit 38 years ago   Vaping Use   • Vaping Use: Never used   Substance and Sexual Activity   • Alcohol use: Not Currently     Comment: very rarely, 12 cans of beer/year   • Drug use: Not Currently   • Sexual activity: Not Currently     Partners: Female   Other Topics Concern   • None   Social History Narrative    ** Merged History Encounter **          Social Determinants of Health     Financial Resource Strain: Not on file   Food Insecurity: Not on file   Transportation Needs: Not on file   Physical Activity: Not on file   Stress: Not on file   Social Connections: Not on file   Intimate Partner Violence: Not on file   Housing Stability: Not on file      Medications and Allergies:     Current Outpatient Medications   Medication Sig Dispense Refill   • zolpidem (AMBIEN) 10 mg tablet Take 1 tablet (10 mg total) by mouth daily at bedtime as needed for sleep 90 tablet 0   • ALPRAZolam (XANAX) 0 25 mg tablet Take 1 tablet (0 25 mg total) by mouth 3 (three) times a day as needed for anxiety 270 tablet 1   • Blood Glucose Monitoring Suppl (OneTouch Verio) w/Device KIT Use daily (Patient taking differently: Use as needed) 1 kit 0   • ezetimibe (ZETIA) 10 mg tablet TAKE 1 TABLET DAILY (Patient taking differently: Take 5 mg by mouth in the morning) 90 tablet 3   • FLUoxetine (PROzac) 20 mg capsule TAKE 3 CAPSULES DAILY 270 capsule 3   • glucose blood (OneTouch Verio) test strip USE TO TEST DAILY 200 strip 1   • hydrochlorothiazide (HYDRODIURIL) 12 5 mg tablet Take 1 tablet (12 5 mg total) by mouth 3 (three) times a week Monday, Thursday, Saturday (Patient taking differently: Take 12 5 mg by mouth 3 (three) times a week Monday, Wednesday, Friday) 36 tablet 5   • Lancet Devices (ONE TOUCH DELICA LANCING DEV) MISC Use daily 1 each 0   • losartan (COZAAR) 100 MG tablet TAKE 1 TABLET DAILY 90 tablet 3   • metFORMIN (GLUCOPHAGE) 500 mg tablet TAKE 2 TABLETS TWICE A DAY WITH MEALS 360 tablet 3   • metoprolol succinate (TOPROL-XL) 25 mg 24 hr tablet TAKE 1 TABLET DAILY (Patient taking differently: Per pt starting 10/9 he will be taking half a tablet) 90 tablet 3   • Multiple Vitamins-Minerals (CENTRUM ADULTS PO) Take by mouth in the morning     • nystatin (MYCOSTATIN) powder Apply topically 4 (four) times a day (Patient taking differently: Apply topically if needed) 60 g 2   • OLANZapine (ZyPREXA) 5 mg tablet Take 0 5 tablets (2 5 mg total) by mouth daily (Patient taking differently: Take 2 5 mg by mouth every evening) 90 tablet 3   • OneTouch Delica Lancets 80R MISC Use daily 300 each 1   • pantoprazole (PROTONIX) 40 mg tablet Take 1 tablet (40 mg total) by mouth 2 (two) times a day 1 tab in AM, 1 tabs in  tablet 3   • rosuvastatin (CRESTOR) 5 mg tablet Take 1 tablet (5 mg total) by mouth once a week 12 tablet 3   • semaglutide, 1 mg/dose, (Ozempic) 4 MG/3ML SOPN injection pen Inject 0 75 mL (1 mg total) under the skin once a week 9 mL 1   • trospium chloride (SANCTURA) 20 mg tablet Take 1 tablet (20 mg total) by mouth 2 (two) times a day 60 tablet 3     No current facility-administered medications for this visit       Allergies   Allergen Reactions   • Celebrex [Celecoxib] Other (See Comments)     Cardiologist stated he should not take     • Chlorhexidine Hives     Is able to wash with hibiclens but not use michi cloths   • Other      "steroid eyedrops"     • Prednisone Other (See Comments)     Prednisone eye drops- eye pressure increases      Immunizations:     Immunization History   Administered Date(s) Administered   • COVID-19 MODERNA VACC 0 5 ML IM 02/20/2021, 03/19/2021   • INFLUENZA 10/14/2022   • Influenza Quadrivalent Preservative Free 3 years and older IM 10/20/2016, 10/26/2017   • Influenza, high dose seasonal 0 7 mL 10/21/2019, 09/28/2020, 01/04/2022, 10/14/2022   • Influenza, recombinant, quadrivalent,injectable, preservative free 11/01/2018   • Influenza, seasonal, injectable 12/03/2009, 12/12/2012, 11/18/2013, 11/01/2014, 11/05/2015   • Pneumococcal Conjugate 13-Valent 05/02/2019   • Pneumococcal Polysaccharide PPV23 10/20/2016   • Td (adult), adsorbed 07/16/1999   • Zoster Vaccine Recombinant 12/06/2018, 02/04/2019      Health Maintenance:         Topic Date Due   • Colorectal Cancer Screening  03/10/2026   • Hepatitis C Screening  Completed         Topic Date Due   • COVID-19 Vaccine (3 - Booster for Moderna series) 08/19/2021   • Pneumococcal Vaccine: 65+ Years (3 - PPSV23 or PCV20) 10/20/2021      Medicare Screening Tests and Risk Assessments:     Shannan Huitron is here for his Subsequent Wellness visit  Health Risk Assessment:   Patient rates overall health as fair  Patient feels that their physical health rating is slightly worse  Patient is satisfied with their life  Eyesight was rated as same  Hearing was rated as same  Patient feels that their emotional and mental health rating is same  Patients states they are never, rarely angry  Patient states they are sometimes unusually tired/fatigued  Pain experienced in the last 7 days has been none  Patient states that he has experienced no weight loss or gain in last 6 months  Dizziness  Depression Screening:   PHQ-9 Score: 4      Fall Risk Screening: In the past year, patient has experienced: history of falling in past year    Number of falls: 2 or more  Injured during fall?: No    Feels unsteady when standing or walking?: Yes    Worried about falling?: No      Home Safety:  Patient does not have trouble with stairs inside or outside of their home  Patient has working smoke alarms and has working carbon monoxide detector  Home safety hazards include: none  Nutrition:   Current diet is Unhealthy and Frequent junk food  Medications:   Patient is currently taking over-the-counter supplements  OTC medications include: see medication list  Patient is able to manage medications       Activities of Daily Living (ADLs)/Instrumental Activities of Daily Living (IADLs):   Walk and transfer into and out of bed and chair?: Yes  Dress and groom yourself?: Yes    Bathe or shower yourself?: Yes    Feed yourself? Yes  Do your laundry/housekeeping?: Yes  Manage your money, pay your bills and track your expenses?: Yes  Make your own meals?: Yes    Do your own shopping?: Yes    Previous Hospitalizations:   Any hospitalizations or ED visits within the last 12 months?: Yes    How many hospitalizations have you had in the last year?: 1-2    PREVENTIVE SCREENINGS      Cardiovascular Screening:    General: Screening Not Indicated and History Lipid Disorder      Diabetes Screening:     General: Screening Not Indicated and History Diabetes      Colorectal Cancer Screening:     General: Screening Current      Prostate Cancer Screening:    General: History Prostate Cancer      Abdominal Aortic Aneurysm (AAA) Screening:    Risk factors include: age between 73-69 yo        Lung Cancer Screening:     General: Screening Not Indicated      Hepatitis C Screening:    General: Screening Current    Screening, Brief Intervention, and Referral to Treatment (SBIRT)    Screening  Typical number of drinks in a day: 0  Typical number of drinks in a week: 0  Interpretation: Low risk drinking behavior  Single Item Drug Screening:  How often have you used an illegal drug (including marijuana) or a prescription medication for non-medical reasons in the past year? never    Single Item Drug Screen Score: 0  Interpretation: Negative screen for possible drug use disorder    No exam data present     Physical Exam:     /82 (BP Location: Left arm, Patient Position: Sitting, Cuff Size: Large)   Pulse 68   Temp 98 2 °F (36 8 °C) (Tympanic)   Resp 20   Ht 5' 7 25" (1 708 m)   Wt 133 kg (293 lb 6 4 oz)   SpO2 96%   BMI 45 61 kg/m²     Physical Exam  Vitals reviewed  Constitutional:       Appearance: Normal appearance  HENT:      Head: Normocephalic and atraumatic  Eyes:      General:         Right eye: No discharge  Left eye: No discharge  Conjunctiva/sclera: Conjunctivae normal    Neck:      Vascular: No carotid bruit  Cardiovascular:      Rate and Rhythm: Normal rate and regular rhythm  Pulses: no weak pulses          Dorsalis pedis pulses are 2+ on the right side and 2+ on the left side  Heart sounds: Normal heart sounds  No murmur heard  No friction rub  No gallop  Pulmonary:      Effort: Pulmonary effort is normal  No respiratory distress  Breath sounds: Normal breath sounds  No wheezing or rales  Abdominal:      General: Bowel sounds are normal  There is no distension  Palpations: Abdomen is soft  Tenderness: There is no abdominal tenderness  Musculoskeletal:         General: No swelling, tenderness or deformity  Normal range of motion  Cervical back: Normal range of motion and neck supple  No muscular tenderness  Feet:    Feet:      Right foot:      Skin integrity: No ulcer, skin breakdown, erythema, warmth, callus or dry skin  Left foot:      Skin integrity: No ulcer, skin breakdown, erythema, warmth, callus or dry skin  Lymphadenopathy:      Cervical: No cervical adenopathy  Neurological:      Mental Status: He is alert  Psychiatric:         Mood and Affect: Mood normal       Patient's shoes and socks removed  Right Foot/Ankle   Right Foot Inspection  Skin Exam: skin normal and skin intact  No dry skin, no warmth, no callus, no erythema, no maceration, no abnormal color, no pre-ulcer, no ulcer and no callus  Sensory   Monofilament testing: absent    Vascular  The right DP pulse is 2+  Left Foot/Ankle  Left Foot Inspection  Skin Exam: skin normal and skin intact  No dry skin, no warmth, no erythema, no maceration, normal color, no pre-ulcer, no ulcer and no callus  Sensory   Monofilament testing: absent    Vascular  The left DP pulse is 2+       Assign Risk Category  No deformity present  Loss of protective sensation  No weak pulses  Risk: 1            Tatianna Larson MD

## 2022-10-27 NOTE — TELEPHONE ENCOUNTER
I reached out to the patient  His current plan through [employer], does NOT cover this drug  Patient is anxious to start treatment as he is very symptomatic  Plan alternatives are:  Oxybutynin IR/ER, Tolterodine IR/ER and Myrbetriq  As the patient hasn't tried or failed any alternative medications, I have no basis by which to successfully get a drug prior authorization approved  Mr Lazarus Palomo and I discussed cost-effective pricing and various pharmacy vendors  Avinash recommended for best cash pay pricing  Cost of #60 tablets (30 day supply) of Trospium Chloride IR 20mg using [facility] is only $24 17  GoodRx coupon was included on the prescription  Should prior authorization need to be obtained moving forward, pharmacy benefits were verified as: BIN#:  [id number] - PCN#:  Jones Hamilton GRP#:  [id number] - ID#:  [id number] through [facility] [place]      Script for the requested medication was queued and forwarded to the Advanced Practitioner covering the [facility] for approval

## 2022-10-27 NOTE — TELEPHONE ENCOUNTER
PT called the office to check the status of the prior auth     Pt requesting a call back        Please call back at [phone]

## 2022-10-27 NOTE — ASSESSMENT & PLAN NOTE
Lab Results   Component Value Date    HGBA1C 6 0 (H) 08/05/2022     Controlled  Low carb diet  Continue metformin 1000mg bid and ozempic 1mg weekly

## 2022-10-27 NOTE — TELEPHONE ENCOUNTER
trospium is not covered by insurance and they cover oxybutin or the ER version instead  Pharmacist also said the medication might interact with the patient's barretts esophagus  Please call back      Reference number [id number]

## 2022-10-27 NOTE — PATIENT INSTRUCTIONS
Medicare Preventive Visit Patient Instructions  Thank you for completing your Welcome to Medicare Visit or Medicare Annual Wellness Visit today  Your next wellness visit will be due in one year (10/28/2023)  The screening/preventive services that you may require over the next 5-10 years are detailed below  Some tests may not apply to you based off risk factors and/or age  Screening tests ordered at today's visit but not completed yet may show as past due  Also, please note that scanned in results may not display below  Preventive Screenings:  Service Recommendations Previous Testing/Comments   Colorectal Cancer Screening  · Colonoscopy    · Fecal Occult Blood Test (FOBT)/Fecal Immunochemical Test (FIT)  · Fecal DNA/Cologuard Test  · Flexible Sigmoidoscopy Age: 39-70 years old   Colonoscopy: every 10 years (May be performed more frequently if at higher risk)  OR  FOBT/FIT: every 1 year  OR  Cologuard: every 3 years  OR  Sigmoidoscopy: every 5 years  Screening may be recommended earlier than age 39 if at higher risk for colorectal cancer  Also, an individualized decision between you and your healthcare provider will decide whether screening between the ages of 74-80 would be appropriate  Colonoscopy: 03/10/2021  FOBT/FIT: Not on file  Cologuard: Not on file  Sigmoidoscopy: Not on file          Prostate Cancer Screening Individualized decision between patient and health care provider in men between ages of 53-78   Medicare will cover every 12 months beginning on the day after your 50th birthday PSA: <0 1 ng/mL           Hepatitis C Screening Once for adults born between 1945 and 1965  More frequently in patients at high risk for Hepatitis C Hep C Antibody: 04/29/2021        Diabetes Screening 1-2 times per year if you're at risk for diabetes or have pre-diabetes Fasting glucose: 168 mg/dL (8/31/2022)  A1C: 6 0 % (8/5/2022)      Cholesterol Screening Once every 5 years if you don't have a lipid disorder   May order more often based on risk factors  Lipid panel: 08/05/2022         Other Preventive Screenings Covered by Medicare:  1  Abdominal Aortic Aneurysm (AAA) Screening: covered once if your at risk  You're considered to be at risk if you have a family history of AAA or a male between the age of 73-68 who smoking at least 100 cigarettes in your lifetime  2  Lung Cancer Screening: covers low dose CT scan once per year if you meet all of the following conditions: (1) Age 50-69; (2) No signs or symptoms of lung cancer; (3) Current smoker or have quit smoking within the last 15 years; (4) You have a tobacco smoking history of at least 20 pack years (packs per day x number of years you smoked); (5) You get a written order from a healthcare provider  3  Glaucoma Screening: covered annually if you're considered high risk: (1) You have diabetes OR (2) Family history of glaucoma OR (3)  aged 48 and older OR (3)  American aged 72 and older  3  Osteoporosis Screening: covered every 2 years if you meet one of the following conditions: (1) Have a vertebral abnormality; (2) On glucocorticoid therapy for more than 3 months; (3) Have primary hyperparathyroidism; (4) On osteoporosis medications and need to assess response to drug therapy  5  HIV Screening: covered annually if you're between the age of 12-76  Also covered annually if you are younger than 13 and older than 72 with risk factors for HIV infection  For pregnant patients, it is covered up to 3 times per pregnancy      Immunizations:  Immunization Recommendations   Influenza Vaccine Annual influenza vaccination during flu season is recommended for all persons aged >= 6 months who do not have contraindications   Pneumococcal Vaccine   * Pneumococcal conjugate vaccine = PCV13 (Prevnar 13), PCV15 (Vaxneuvance), PCV20 (Prevnar 20)  * Pneumococcal polysaccharide vaccine = PPSV23 (Pneumovax) Adults 25-60 years old: 1-3 doses may be recommended based on certain risk factors  Adults 72 years old: 1-2 doses may be recommended based off what pneumonia vaccine you previously received   Hepatitis B Vaccine 3 dose series if at intermediate or high risk (ex: diabetes, end stage renal disease, liver disease)   Tetanus (Td) Vaccine - COST NOT COVERED BY MEDICARE PART B Following completion of primary series, a booster dose should be given every 10 years to maintain immunity against tetanus  Td may also be given as tetanus wound prophylaxis  Tdap Vaccine - COST NOT COVERED BY MEDICARE PART B Recommended at least once for all adults  For pregnant patients, recommended with each pregnancy  Shingles Vaccine (Shingrix) - COST NOT COVERED BY MEDICARE PART B  2 shot series recommended in those aged 48 and above     Health Maintenance Due:      Topic Date Due   • Colorectal Cancer Screening  03/10/2026   • Hepatitis C Screening  Completed     Immunizations Due:      Topic Date Due   • COVID-19 Vaccine (3 - Booster for Moderna series) 08/19/2021   • Pneumococcal Vaccine: 65+ Years (3 - PPSV23 or PCV20) 10/20/2021     Advance Directives   What are advance directives? Advance directives are legal documents that state your wishes and plans for medical care  These plans are made ahead of time in case you lose your ability to make decisions for yourself  Advance directives can apply to any medical decision, such as the treatments you want, and if you want to donate organs  What are the types of advance directives? There are many types of advance directives, and each state has rules about how to use them  You may choose a combination of any of the following:  · Living will: This is a written record of the treatment you want  You can also choose which treatments you do not want, which to limit, and which to stop at a certain time  This includes surgery, medicine, IV fluid, and tube feedings  · Durable power of  for healthcare [facility]):   This is a written record that states who you want to make healthcare choices for you when you are unable to make them for yourself  This person, called a proxy, is usually a family member or a friend  You may choose more than 1 proxy  · Do not resuscitate (DNR) order:  A DNR order is used in case your heart stops beating or you stop breathing  It is a request not to have certain forms of treatment, such as CPR  A DNR order may be included in other types of advance directives  · Medical directive: This covers the care that you want if you are in a coma, near death, or unable to make decisions for yourself  You can list the treatments you want for each condition  Treatment may include pain medicine, surgery, blood transfusions, dialysis, IV or tube feedings, and a ventilator (breathing machine)  · Values history: This document has questions about your views, beliefs, and how you feel and think about life  This information can help others choose the care that you would choose  Why are advance directives important? An advance directive helps you control your care  Although spoken wishes may be used, it is better to have your wishes written down  Spoken wishes can be misunderstood, or not followed  Treatments may be given even if you do not want them  An advance directive may make it easier for your family to make difficult choices about your care  Weight Management   Why it is important to manage your weight:  Being overweight increases your risk of health conditions such as heart disease, high blood pressure, type 2 diabetes, and certain types of cancer  It can also increase your risk for osteoarthritis, sleep apnea, and other respiratory problems  Aim for a slow, steady weight loss  Even a small amount of weight loss can lower your risk of health problems  How to lose weight safely:  A safe and healthy way to lose weight is to eat fewer calories and get regular exercise   You can lose up about 1 pound a week by decreasing the number of calories you eat by 500 calories each day  Healthy meal plan for weight management:  A healthy meal plan includes a variety of foods, contains fewer calories, and helps you stay healthy  A healthy meal plan includes the following:  · Eat whole-grain foods more often  A healthy meal plan should contain fiber  Fiber is the part of grains, fruits, and vegetables that is not broken down by your body  Whole-grain foods are healthy and provide extra fiber in your diet  Some examples of whole-grain foods are whole-wheat breads and pastas, oatmeal, brown rice, and bulgur  · Eat a variety of vegetables every day  Include dark, leafy greens such as spinach, kale, amanda greens, and mustard greens  Eat yellow and orange vegetables such as carrots, sweet potatoes, and winter squash  · Eat a variety of fruits every day  Choose fresh or canned fruit (canned in its own juice or light syrup) instead of juice  Fruit juice has very little or no fiber  · Eat low-fat dairy foods  Drink fat-free (skim) milk or 1% milk  Eat fat-free yogurt and low-fat cottage cheese  Try low-fat cheeses such as mozzarella and other reduced-fat cheeses  · Choose meat and other protein foods that are low in fat  Choose beans or other legumes such as split peas or lentils  Choose fish, skinless poultry (chicken or turkey), or lean cuts of red meat (beef or pork)  Before you cook meat or poultry, cut off any visible fat  · Use less fat and oil  Try baking foods instead of frying them  Add less fat, such as margarine, sour cream, regular salad dressing and mayonnaise to foods  Eat fewer high-fat foods  Some examples of high-fat foods include french fries, doughnuts, ice cream, and cakes  · Eat fewer sweets  Limit foods and drinks that are high in sugar  This includes candy, cookies, regular soda, and sweetened drinks  Exercise:  Exercise at least 30 minutes per day on most days of the week   Some examples of exercise include walking, biking, dancing, and swimming  You can also fit in more physical activity by taking the stairs instead of the elevator or parking farther away from stores  Ask your healthcare provider about the best exercise plan for you  © Copyright [employer] 2018 Information is for End User's use only and may not be sold, redistributed or otherwise used for commercial purposes   All illustrations and images included in CareNotes® are the copyrighted property of [employer]  or [address] [place] [address] [email or web address]

## 2022-10-28 NOTE — TELEPHONE ENCOUNTER
Called and spoke with patient  Patient stated that he spoke with [facility] yesterday and she is handling it

## 2022-10-28 NOTE — TELEPHONE ENCOUNTER
Oxybutynin an anticholinergic  Would recommend patient check which medications are covered by his insurance

## 2022-10-31 NOTE — TELEPHONE ENCOUNTER
Pharmacist stated they received a cancellation notice through Epic and would like to verify if that is correct   Same reference number as before

## 2022-11-03 ENCOUNTER — TELEPHONE (OUTPATIENT)
Dept: NEUROLOGY | Facility: CLINIC | Age: 67
End: 2022-11-03

## 2022-11-03 NOTE — TELEPHONE ENCOUNTER
HANSEL to confirm your upcoming appt, 11/10/22 @ 8:30am at the [facility], to confirm/RS if you are unable to keep this appt please call [phone]

## 2022-11-10 ENCOUNTER — OFFICE VISIT (OUTPATIENT)
Dept: NEUROLOGY | Facility: CLINIC | Age: 67
End: 2022-11-10

## 2022-11-10 VITALS
BODY MASS INDEX: 45.99 KG/M2 | HEIGHT: 67 IN | SYSTOLIC BLOOD PRESSURE: 130 MMHG | DIASTOLIC BLOOD PRESSURE: 82 MMHG | HEART RATE: 61 BPM | WEIGHT: 293 LBS | TEMPERATURE: 97.3 F

## 2022-11-10 DIAGNOSIS — G62.9 NEUROPATHY: Primary | ICD-10-CM

## 2022-11-10 NOTE — ASSESSMENT & PLAN NOTE
Pt here today for neuro follow up  Overall doing fairly well  No new sxs  Pt did have 2 falls while getting up out of bed and chair  Pt notes mostly issue with balance  No tremor noted on exam today  No head titibation  No bradyphrenia or cogwheeling rigidity  Pt's neuropathy confirmed by emg done in aug  Rev study in depth with pt at appt  Due to neuropathy pt is at fall risk  rec PT eval for neuropathy, balance , fall risk as well as possible help with correct assistive device  Pt is in ageement  Following with pcp for sugars

## 2022-11-10 NOTE — PROGRESS NOTES
Patient ID: Scout Degroot is a 74 y o  male  Assessment/Plan:    Neuropathy  Pt here today for neuro follow up  Overall doing fairly well  No new sxs  Pt did have 2 falls while getting up out of bed and chair  Pt notes mostly issue with balance  No tremor noted on exam today  No head titibation  No bradyphrenia or cogwheeling rigidity  Pt's neuropathy confirmed by emg done in aug  Rev study in depth with pt at appt  Due to neuropathy pt is at fall risk  rec PT eval for neuropathy, balance , fall risk as well as possible help with correct assistive device  Pt is in ageement  Following with pcp for sugars       Diagnoses and all orders for this visit:    Neuropathy  -     Ambulatory Referral to Physical Therapy; Future           Subjective:    HPI    Pt is a 75 yo m with pmh of barretts esophagus, gastroparesis, DM type 2, stuttering, HTN, ANABELL who presents today for evaluation of several neurological issues   pt last seen on 3/18/22 for initial appt  Pt last seen on 8/10/22  Per my last note " Pt did well to bring list of active issues with him which we addressed in full at visit  Pt notes concern about his balance   Pt feels off balance intermittently while walking, in shower or on uneven surfaces    Pt had a fall in dec 2020 after taking an [place] and waiting too long to go right to sleep  Pt notes he fell down 16 steps and his back of head  Pt was evaluated and release with concussion but no associated internal injury or fracture  Pt feels a bit slower since fall    Pt notes he feels stamina is significantly decreased    Pt notes overall mobility and range of motion more limited as well    Pt denies any pain in hands or feet   No nocturnal sxs   On exam pt with decreased vib and temp in lower ext  Eual Vanessa dx of neuropathy with pt in office and likely contributor to his balance issues   Pt also with issues with getting up from low chairs   Pt was able to get up in office wihout use of arms or chair arms as well   Pt has been on crestor over the years    No clear proximal weakness in arms or legs during exam today   Will check ck and emg for both neuropathy and this ongoing issue   Pt also with concerns for parkinsons disease due to history in family with 2 uncles  Pt notes sxs mostly with action   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   Pt notes no issues with adls   Pt notes rare issue while eating   No head titibation  Pt recalls uncle with shaking of head   Wonder if actual essential tremor rather than PD  Pt also on zyprexa and prozac due to stuttering   Question if zyprexa any impact on tremor  Pt has decreased doses and not sure of any direct impact on tremor   Pt had lap band surgery about 10 yrs ago    Pt lost about 40 lbs   Pt then later dx with prostate cancer and surgeon deflated the band   Pt notes he gained back the 40 lbs    Pt feels however his DM meds really helping with his sugar and weight control presently "  Kept above paragraph due to complexities of pt case  Pt notes overall doing well  No new sxs since last visit  Pt had one overnight stay from surgery for removal of gastric band  Pt notes no infections  Pt had 2 falls one out of chair and one out of bed  Pt notes fall onto buttock  No head injury  Pt notes at times diff with balance  Pt notes occ issues with maintaince of sleep  Pt following with pcp for his dm and sugars under fairly good control  Pt had emg of the lower ext in aug 2022- per report 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  No evidence of mejia lumbosacral radiculopathy  Study rev in detail with pt  Re rev pathophysiology of neuropathy and natural course  Pt remains fall risk  No new neuropathic sxs  Pt notes at times feels like he shuffles  No PD like sxs  No tremor noted today , no changes in tone     Cont to keep eye for any parkinsonian features  Referral for PT placed specifically for therapy to help with balance and fall risk as well as need for assistive device likely as well  Pt is in agreement  Pt without tremor or head titibation on exam today                 The following portions of the patient's history were reviewed and updated as appropriate: allergies, current medications, past family history, past medical history, past social history, past surgical history and problem list and med rec and ros rev  Objective:    Blood pressure 130/82, pulse 61, temperature (!) 97 3 °F (36 3 °C), height 5' 7 25" (1 708 m), weight 133 kg (293 lb)  Physical Exam  Constitutional:       General: He is not in acute distress  Appearance: He is not ill-appearing  Eyes:      General: Lids are normal       Extraocular Movements: Extraocular movements intact  Pupils: Pupils are equal, round, and reactive to light  Musculoskeletal:      Right lower leg: No edema  Left lower leg: No edema  Neurological:      Mental Status: He is alert  Deep Tendon Reflexes: Strength normal       Reflex Scores:       Tricep reflexes are 2+ on the right side and 2+ on the left side  Bicep reflexes are 2+ on the right side and 2+ on the left side  Brachioradialis reflexes are 2+ on the right side and 2+ on the left side  Patellar reflexes are 1+ on the right side and 1+ on the left side  Achilles reflexes are 1+ on the right side and 1+ on the left side  Psychiatric:         Speech: Speech normal          Neurological Exam  Mental Status  Alert  Recent and remote memory are intact  Speech is normal  Language is fluent with no aphasia  Attention and concentration are normal  Fund of knowledge is appropriate for level of education  Cranial Nerves  CN II: Visual acuity is normal  Visual fields full to confrontation  CN III, IV, VI: Extraocular movements intact bilaterally  Normal lids and orbits bilaterally   Pupils equal round and reactive to light bilaterally  CN V: Facial sensation is normal   CN VII: Full and symmetric facial movement  CN VIII: Hearing is normal   CN IX, X: Palate elevates symmetrically  Normal gag reflex  CN XI: Shoulder shrug strength is normal   CN XII: Tongue midline without atrophy or fasciculations  Motor  Normal muscle bulk throughout  Normal muscle tone  No abnormal involuntary movements  Strength is 5/5 throughout all four extremities  Sensory  Dec vib mejia lowers  Reflexes                                            Right                      Left  Brachioradialis                    2+                         2+  Biceps                                 2+                         2+  Triceps                                2+                         2+  Finger flex                           2+                         2+  Hamstring                            2+                         2+  Patellar                                1+                         1+  Achilles                                1+                         1+  Plantar                           Downgoing                Downgoing    Coordination  Right: Finger-to-nose normal  Rapid alternating movement normal Left: Finger-to-nose normal  Heel-to-shin normal     Gait  Normal casual, toe, heel and tandem gait  ROS:    Review of Systems   Constitutional: Negative  Negative for appetite change and fever  HENT: Negative  Negative for hearing loss, tinnitus, trouble swallowing and voice change  Eyes: Negative  Negative for photophobia, pain and visual disturbance  Respiratory: Negative  Negative for shortness of breath  Cardiovascular: Negative  Negative for palpitations  Gastrointestinal: Negative  Negative for nausea and vomiting  Endocrine: Negative  Negative for cold intolerance  Genitourinary: Negative  Negative for dysuria, frequency and urgency  Musculoskeletal: Positive for gait problem   Negative for myalgias and neck pain  Shuffles more then before   Skin: Negative  Negative for rash  Allergic/Immunologic: Negative  Neurological: Positive for weakness  Negative for dizziness, tremors, seizures, syncope, facial asymmetry, speech difficulty, light-headedness, numbness and headaches  Hematological: Negative  Does not bruise/bleed easily  Psychiatric/Behavioral: Negative  Negative for confusion, hallucinations and sleep disturbance  All other systems reviewed and are negative

## 2022-11-16 ENCOUNTER — EVALUATION (OUTPATIENT)
Dept: PHYSICAL THERAPY | Facility: CLINIC | Age: 67
End: 2022-11-16

## 2022-11-16 DIAGNOSIS — R53.1 GENERALIZED WEAKNESS: ICD-10-CM

## 2022-11-16 DIAGNOSIS — G62.9 NEUROPATHY: ICD-10-CM

## 2022-11-16 DIAGNOSIS — R26.89 BALANCE DISORDER: Primary | ICD-10-CM

## 2022-11-16 NOTE — PROGRESS NOTES
PT Evaluation     Today's date: 2022  Patient name: Joslyn Lopez  : 1955  MRN: [id number]  Referring provider: Regina Edmond MD  Dx:   Encounter Diagnosis     ICD-10-CM    1  Balance disorder  R26 89       2  Neuropathy  G62 9 Ambulatory Referral to Physical Therapy      3  Generalized weakness  R53 1           Start Time: 1735  Stop Time: 1840  Total time in clinic (min): 65 minutes    Assessment  Assessment details: Burak Vogel is a 73 y/o male presenting to OPPT for evaluation regarding fall risk and balance issues  Patient has fallen >4 times this year and is concerned he that he believes he is showing signs for early-Parkinson's  Upon evaluation, patient demonstrates high fall risk as evidenced by gait speed of 0 32 m/s being below fall risk cutoff, FGA score being well below 23/30  Patient also shows largely decreased endurance and reduced activity tolerance as evidenced by his score on 6 minute walk test and inability to complete test in its entirety  In addition, patient shows decreased coordination in upper and lower extremity with poor neuromuscular control  Without skilled OPPT services, patient will continue to have high fall risk leading to increased likelihood of falls and increased healthcare cost and burden  Pt requires continued skilled PT services to improve impairments so that patient is able to return to daily activities with decreased fall risk and improved confidence during balance activities to resume normal life activities     Impairments: abnormal coordination, abnormal or restricted ROM, activity intolerance, impaired balance, impaired physical strength and lacks appropriate home exercise program  Barriers to therapy: None  Understanding of Dx/Px/POC: good   Prognosis: good    Goals  STG (3 weeks)   Improve (reduce) TUG score by 4 seconds indicating meaningful improvement in fall risk   Improve FGA score by 4 indicating meaningful improvement in fall risk   Improve/Reduce 5x STS score by 5 seconds indicating meaningful improvement in fall risk and power generation   Improve generalized strength to 4+/5 to demonstrate improvement in facilitation of efficiency of functional activities and reduced fall risk     LTG (3 weeks)   Improve (reduce) TUG score to 10 5 seconds indicating meaningful improvement in fall risk   Improve FGA score to > 24/30 indicating meaningful improvement in fall risk   Improve/Reduce 5x STS score to < 10 seconds seconds indicating meaningful improvement in fall risk and power generation   Improve generalized strength to 5/5 on RLE to demonstrate improvement in facilitation of efficiency of functional activities and reduced fall risk   Have no falls in 8 week period once beginning therapy   Demonstrate independence in home walking protocol and HEP    Plan  Patient would benefit from: skilled physical therapy  Planned modality interventions: cryotherapy and thermotherapy: hydrocollator packs  Planned therapy interventions: neuromuscular re-education, strengthening, stretching, therapeutic activities, therapeutic exercise, home exercise program, graded activity, gait training, flexibility, balance, balance/weight bearing training and abdominal trunk stabilization  Frequency: 2x week  Duration in visits: 16  Duration in weeks: 8  Plan of Care beginning date: 11/16/2022  Plan of Care expiration date: 1/16/2023  Treatment plan discussed with: patient        Subjective Evaluation    History of Present Illness  Date of onset: 6/16/2022  Mechanism of injury: Rebekah Cabello presents to OPPT evaluation with referral regarding balance concerns  - by Dr Kalyani Vasquez on 11/10/22  Pt has neuropathy based on EMG performed in August  Pt has significant PMH and PSH - all listed below for convenience  "I have gone downhill recently"  In 2020, fell down the stairs backwards where he had a concussion and was deconditioned   But, now he feels that his balance has gotten substantially worse, and has fallen much more often  Has a low sofa and has a tough time to get out of it  Sometimes falls when he gets out of his bed and he falls  Reports that he has become "a bit incontinent" - not as focused, feels like he has lost coordination recently  "balance is awful" and sometimes feels that he gets a tremor  States that he is upset with his neurologist, stating that he believes he has early stage Parkinson's based on what is going on  Feels that since he has had recent surgeries and sepsis has led to a decrease in function  Is scared to go on the treadmill and afraid of falling  Per Chart Review 11/10 with neurologist:  Pt had one overnight stay from surgery for removal of gastric band  Pt notes no infections  Pt had 2 falls one out of chair and one out of bed  Pt notes fall onto buttock  No head injury  Pt notes at times diff with balance  Pt notes occ issues with maintaince of sleep  Pt following with pcp for his dm and sugars under fairly good control  Pt had emg of the lower ext in aug 2022- per report 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  No evidence of mejia lumbosacral radiculopathy  Study rev in detail with pt  Re rev pathophysiology of neuropathy and natural course  Pt remains fall risk  No new neuropathic sxs  Pt notes at times feels like he shuffles  No PD like sxs  No tremor noted today , no changes in tone  Cont to keep eye for any parkinsonian features  Referral for PT placed specifically for therapy to help with balance and fall risk as well as need for assistive device likely as well  Pt is in agreement  Pt without tremor or head titibation on exam today       Pain  No pain reported    Social Support  Steps to enter house: yes  2  Stairs in house: yes (Requires a rail)   12  Lives in: multiple-level home  Lives with: spouse    Exercise history: Sedentary      Diagnostic Tests  EMG: abnormal  Treatments  No previous or current treatments  Patient Goals  Patient goals for therapy: improved balance, independence with ADLs/IADLs, increased strength, return to sport/leisure activities and return to work  Patient'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)     prostate   • Cataract    • Depression    • Diabetes ([facility] )    • Diabetes mellitus ([place] [address] )     pre diabetes   • Diverticulitis of colon    • Environmental allergies    • Fall 12/30/2020   • GERD (gastroesophageal reflux disease)    • Hiatal hernia    • History of anal fissures    • Hyperlipidemia    • Hypertension    • Incisional hernia    • Insomnia    • Kidney stone    • Morbid obesity ([place] [address] )    • Peripheral neuropathy    • PONV (postoperative nausea and vomiting)    • Prolapsing mitral valve     prolapsing mitral valve leaflet syndrome   • Prostate cancer ([facility] )    • Retinal detachment     treated surgically at [facility] eye; resolved: 10/10/2012   • Sleep apnea     diagnosed but unable to tolerate cpap      • Stuttering      Past Surgical History:   Procedure Laterality Date   • ARTHROSCOPIC REPAIR ACL Right    • BIOPSY CORE NEEDLE      prostate   • CATARACT EXTRACTION Bilateral    • COLONOSCOPY     • HERNIA REPAIR      [facility]   • JOINT REPLACEMENT      B/L knee   • KNEE ARTHROSCOPY Left    • LAPAROSCOPIC GASTRIC BANDING     • MS TOTAL KNEE ARTHROPLASTY Left 02/15/2017    Procedure: TOTAL KNEE ARTHROPLASTY ;  Surgeon: Norah Fletcher MD;  [facility]: [facility];  Service: Orthopedics   • MS TOTAL KNEE ARTHROPLASTY Right 10/10/2016    Procedure: ARTHROPLASTY KNEE TOTAL;  Surgeon: Norah Fletcher MD;  Location: [facility];  Service: Orthopedics   • PROSTATECTOMY      robotic-assisted; JOHNNYB-Dr Temo Samayoa   • REMOVAL GASTRIC BAND LAPAROSCOPIC N/A 8/30/2022    Procedure: LAPAROSCOPIC REMOVAL OF ADJUSTABLE GASTRIC BAND AND PORT  WITH INTRAOPERATIVE EGD;  Surgeon: Claudean Jubilee, MD;  Location: [facility];  Service: Bariatrics   • RETINAL DETACHMENT SURGERY Bilateral     [facility]   • SEPTOPLASTY     • SINUS SURGERY     • TONSILLECTOMY      over age 13   • UPPER GASTROINTESTINAL ENDOSCOPY      mutiple with BARRX/RFA   • VASECTOMY      vas deferens       RLE weaker than the LLE  Objective    Vitals: 152/94 mmHg rest seated L arm, 96% SPo2, 69 bpm     Balance Test    6 Minute Walk Test (ft): 3 minutes lapsed before patient had dyspnea on exertion (93% SPo2): 375 feet    2 Minute Walk Test (ft):    Gait Speed (ft/s): 0 3 m/s    5x Sit To Stand (s): 15 21 seconds  (IE 11/16)    Dual-Task (P) TUG 13 31 sec (L) // 12 seconds (R)   TUG: Avg 11 1 (IE 11/16)         MCTSIB Number of Seconds   Feet Together, Eyes Open    Feet Together, Eyes Closed    Feet Together, Eyes Open Foam    Feet Together, Eyes Closed Foam      Flowsheet Rows    Flowsheet Row Most Recent Value   Functional Gait Assessment    Gait Level Surface  3   Change in GaiT Speed 2   Gait with horizontal head turns 2   Gait with vertical head turns 2   Gait and pivot tuen  2   Step over obstacle 1   Gait with narrow base of supprt 0   Gait with eyes closed 1   Ambulating backwards 1   Steps 2   Total score  16          Coordination Left Right   Heel To Shin     Finger To Nose Hypermetric Hypermetric   Rapid Alternating Movement Normal  Normal    UE     LE         Sensation Left Right   Kinesthesia normal Abnormal - 4/5 with increased time to answer   Light Touch Normal  Decreased on heel and plantar aspect   Sharp/Dull     2 Point Discrimination         Muscle Tone Left Right   Modified Odette Scale 0 throughout BLE 0 throughout BLE   Hamstring     Gastroc     Quad       Briseno (-) both hands, clonus (-) bilaterally   NO signs of UMN      (-) cogwheel or lead pipe rigidity     Manual Muscle Testing - Hip Left Right   Flexion 4+ 3+   Extension NT NT   Abduction 4- 4-   External Rotation NT NT     Manual Muscle Testing - Knee Left Right   Flexion 3+ 4   Extension 4 4-     Manual Muscle Testing - Ankle Left Right   Doriflexion 4 4   Plantarflexion (seated) 5 5        Transfers    Sit To Stand Independent   W/C To Bed Independent   Sit To Supine Independent   Roll Independent       Gait Assessment: Pt has wide MARQUITA while walking, externally rotated  Shuffling and slow gait pattern - no evidence of imbalance during level ground training or on treadmill            Precautions: Hypertension, FALL RISK, Depression with anxiety    Next visit: [address] [facility] 11/16                                            Neuro Re-Ed         Patient Education  education regarding signs and symptoms of parkinson's balance interactions, fall risk outcome measures x15 mins        HKM          Hurdles         Side Stepping         Backwards Stepping         Foam Activities         MCTSIB         Ther Ex         Sit <> Stand                                                                        Ther Activity                           Gait Training         TM  0 6 mph x2 mins 0% incline     0 9 mph x 2 mins no incline     1 1 mph x 2 mins no incline    8 mins total with rest and vital measurements (166/92 mmHg post)                   Modalities

## 2022-11-21 ENCOUNTER — OFFICE VISIT (OUTPATIENT)
Dept: PHYSICAL THERAPY | Facility: CLINIC | Age: 67
End: 2022-11-21

## 2022-11-21 DIAGNOSIS — R26.89 BALANCE DISORDER: Primary | ICD-10-CM

## 2022-11-21 DIAGNOSIS — R53.1 GENERALIZED WEAKNESS: ICD-10-CM

## 2022-11-21 DIAGNOSIS — G62.9 NEUROPATHY: ICD-10-CM

## 2022-11-21 NOTE — PROGRESS NOTES
Daily Note     Today's date: 2022  Patient name: Derek Colon  : 1955  MRN: [id number]  Referring provider: Veronica Wells MD  Dx: No diagnosis found  Subjective: Reports that he has been feeling better  Feels that he has been more active around the house, "a little bit, not a lot but more"  Objective: See treatment diary below      Assessment: Tolerated treatment well  Improved confidence during therapy session today, able to perform with less frequent rest breaks than prior session although still requires a good amount throughout the session  Good trial of standing balance exercises with SOLO to give patient improved confidence that he won't fall  Patient is reliant on visual information for balance - will benefit from variable surface training for foam and uneven surfaces  Introduce step ups or stair training to improve strength and power generation  Patient demonstrated fatigue post treatment and would benefit from continued PT      Plan: Continue per plan of care  Progress treatment as tolerated  Progress generalized strengthening and balance exercises to reduce fall risk  Ensure proper form and performance of HEP     Precautions: Hypertension, FALL RISK, Depression with anxiety      Access Code: [email or web address]  URL: [email or web address]  Date: 2022  Prepared by: Brooke Pap    Exercises  • Sit to Stand - 1 x daily - 5 x weekly - 3 sets - 12 reps  • Standing Hip Abduction with Counter Support - 1 x daily - 5 x weekly - 3 sets - 10 reps  • Standing Hip Extension with Counter Support - 1 x daily - 5 x weekly - 3 sets - 10 reps  • Side Stepping with Counter Support - 1 x daily - 5 x weekly - 3 sets - 10 reps  • Standing March with Counter Support - 1 x daily - 5 x weekly - 3 sets - 10 reps      Manuals                                            Neuro Re-Ed         Patient Education  education regarding signs and symptoms of parkinson's balance interactions, fall risk outcome measures x15 mins MCTSIB   EO firm: 30 seconds  EO foam: 30 seconds - increased sway   EC firm: 30 seconds moderate sway  EC foam: 7 8 seconds, 9 34 seconds (two trials)        HKM   SOLO 1/2 length   2x laps 2# BAW       Hurdles  NV       Side Stepping  SOLO 1/4 length   2x laps 2# BAW        Backwards Stepping  NV        Foam Activities  Standing March on airex SOLO   2x20 tot  #2 BAW        MCTSIB         Ther Ex         Sit <> Stand  3x12 no UE SOLO       Standing Hip ABD and Ext  2x15 ea side //bar 2# AW bilaterally                                                             Ther Activity                           Gait Training         TM  SOLO  0 6 mph x2 mins 0% incline     0 9 mph x 2 mins no incline     1 1 mph x 2 mins no incline    8 mins total with rest and vital measurements (166/92 mmHg post)   SOLO   0 8 mph x 3 mins 0% incline     1 0 mph x 2 mins 0% incline     Break     1 2 mph x 3 mins 0% incline     1 1 mph x 2 mins 0% incline    12 mins total with rest breaks   VC for improved step length bilaterally                 Modalities

## 2022-11-25 ENCOUNTER — APPOINTMENT (OUTPATIENT)
Dept: PHYSICAL THERAPY | Facility: CLINIC | Age: 67
End: 2022-11-25

## 2022-11-29 ENCOUNTER — APPOINTMENT (OUTPATIENT)
Dept: PHYSICAL THERAPY | Facility: CLINIC | Age: 67
End: 2022-11-29

## 2022-11-30 ENCOUNTER — OFFICE VISIT (OUTPATIENT)
Dept: PHYSICAL THERAPY | Facility: CLINIC | Age: 67
End: 2022-11-30

## 2022-11-30 DIAGNOSIS — G62.9 NEUROPATHY: ICD-10-CM

## 2022-11-30 DIAGNOSIS — R53.1 GENERALIZED WEAKNESS: ICD-10-CM

## 2022-11-30 DIAGNOSIS — R26.89 BALANCE DISORDER: Primary | ICD-10-CM

## 2022-11-30 NOTE — PROGRESS NOTES
Daily Note     Today's date: 2022  Patient name: Adriel Meehan  : 1955  MRN: [id number]  Referring provider: Frank Rojas MD  Dx:   Encounter Diagnosis     ICD-10-CM    1  Balance disorder  R26 89       2  Neuropathy  G62 9       3  Generalized weakness  R53 1           Start Time: 1745  Stop Time: 1645  Total time in clinic (min): 1380 minutes    Subjective: No new changes upon arrival to session      Objective: See treatment diary below      Assessment: Today's session focused on generalized balance and endurance  The pt required frequent rest breaks throughout the session  He required some encouragement to perform exercises  He demonstrated some instability with semitandem and mati pad the airex  Plan: Continue per plan of care  Progress treatment as tolerated  Precautions: Hypertension, FALL RISK, Depression with anxiety      Access Code: [email or web address]  URL: [email or web address]  Date: 2022  Prepared by: Nii Michel    Exercises  • Sit to Stand - 1 x daily - 5 x weekly - 3 sets - 12 reps  • Standing Hip Abduction with Counter Support - 1 x daily - 5 x weekly - 3 sets - 10 reps  • Standing Hip Extension with Counter Support - 1 x daily - 5 x weekly - 3 sets - 10 reps  • Side Stepping with Counter Support - 1 x daily - 5 x weekly - 3 sets - 10 reps  • Standing March with Counter Support - 1 x daily - 5 x weekly - 3 sets - 10 reps      Manuals                                           Neuro Re-Ed         Patient Education  education regarding signs and symptoms of parkinson's balance interactions, fall risk outcome measures x15 mins MCTSIB   EO firm: 30 seconds  EO foam: 30 seconds - increased sway   EC firm: 30 seconds moderate sway  EC foam: 7 8 seconds, 9 34 seconds (two trials)        HKM   SOLO 1/2 length   2x laps 2# BAW       Hurdles  NV SOLO  6" hurdles   FWD x 3 laps  Lat x 3 laps      Side Stepping  SOLO 1/4 length   2x laps 2# BAW Backwards Stepping  NV        Foam Activities  Standing March on airex SOLO   2x20 tot  #2 BAW        MCTSIB         Step ups   //bars   8" step   Fwd/lat   x10 ea LE      Airex mati pad   SOLO  3 airex   FWD x4 laps  Lat x 2 laps      Semi Tandem   SOLO  1/4 track x 2 laps                         Ther Ex         Sit <> Stand  3x12 no UE SOLO       Standing Hip ABD and Ext  2x15 ea side //bar 2# AW bilaterally                                                             Ther Activity                           Gait Training         TM  SOLO  0 6 mph x2 mins 0% incline     0 9 mph x 2 mins no incline     1 1 mph x 2 mins no incline    8 mins total with rest and vital measurements (166/92 mmHg post)   SOLO   0 8 mph x 3 mins 0% incline     1 0 mph x 2 mins 0% incline     Break     1 2 mph x 3 mins 0% incline     1 1 mph x 2 mins 0% incline    12 mins total with rest breaks   VC for improved step length bilaterally  SOLO  1 0 mph   x10 minutes   2 standing rest breaks               Modalities

## 2022-12-02 ENCOUNTER — OFFICE VISIT (OUTPATIENT)
Dept: PHYSICAL THERAPY | Facility: CLINIC | Age: 67
End: 2022-12-02

## 2022-12-02 DIAGNOSIS — G62.9 NEUROPATHY: ICD-10-CM

## 2022-12-02 DIAGNOSIS — R26.89 BALANCE DISORDER: Primary | ICD-10-CM

## 2022-12-02 DIAGNOSIS — R53.1 GENERALIZED WEAKNESS: ICD-10-CM

## 2022-12-02 NOTE — PROGRESS NOTES
Daily Note     Today's date: 2022  Patient name: Derek Colon  : 1955  MRN: [id number]  Referring provider: Veronica Wells MD  Dx:   Encounter Diagnosis     ICD-10-CM    1  Balance disorder  R26 89       2  Neuropathy  G62 9       3  Generalized weakness  R53 1                      Subjective: Pt stated he was sore for 2 days after last session, and his R knee was bothering him  Objective: See treatment diary below  Access Code: Bonnie Santiago: [email or web address]  Date: 2022  Prepared by: Autumn Ramirez    Exercises  • Sit to Stand - 1 x daily - 5 x weekly - 3 sets - 12 reps  • Standing March with Counter Support - 1 x daily - 5 x weekly - 3 sets - 10 reps  • Standing Hip Extension with Counter Support - 1 x daily - 5 x weekly - 3 sets - 10 reps  • Sidestepping - 1 x daily - 5 x weekly - 3 sets - 10 reps  • Tandem Walking with Counter Support - 1 x daily - 5 x weekly - 3 sets - 10 reps        Assessment: Today's session focused on endurance and generalized balance  Pt started off with 6" hurdles and progressed to 12" hurdles with some instability demonstrated  Per pt's request, he did STS from a low table with a pillow to mimic his couch, and he demonstrated minimal difficulty  By the end of the session, the pt demonstrated increased fatigue  Plan: Continue per plan of care  Progress treatment as tolerated  Precautions: Hypertension, FALL RISK, Depression with anxiety      Access Code: [email or web address]  URL: [email or web address]  Date: 2022  Prepared by: Brooke Pap    Exercises  • Sit to Stand - 1 x daily - 5 x weekly - 3 sets - 12 reps  • Standing Hip Abduction with Counter Support - 1 x daily - 5 x weekly - 3 sets - 10 reps  • Standing Hip Extension with Counter Support - 1 x daily - 5 x weekly - 3 sets - 10 reps  • Side Stepping with Counter Support - 1 x daily - 5 x weekly - 3 sets - 10 reps  • Standing March with Counter Support - 1 x daily - 5 x weekly - 3 sets - 10 reps      Manuals 11/16 11/21 11/30 12/2                                         Neuro Re-Ed         Patient Education  education regarding signs and symptoms of parkinson's balance interactions, fall risk outcome measures x15 mins MCTSIB   EO firm: 30 seconds  EO foam: 30 seconds - increased sway   EC firm: 30 seconds moderate sway  EC foam: 7 8 seconds, 9 34 seconds (two trials)        HKM   SOLO 1/2 length   2x laps 2# BAW       Hurdles  NV SOLO  6" hurdles   FWD x 3 laps  Lat x 3 laps SOLO  6" hurdles   FWD x 2 laps  12"  FWD x 2 laps  Lat x 2 laps     Side Stepping  SOLO 1/4 length   2x laps 2# BAW        Backwards Stepping  NV        Foam Activities  Standing March on airex SOLO   2x20 tot  #2 BAW        MCTSIB         Step ups   //bars   8" step   Fwd/lat   x10 ea LE //bars   8" step   Fwd/lat   x10 ea LE     Airex mati pad   SOLO  3 airex   FWD x4 laps  Lat x 2 laps      Semi Tandem   SOLO  1/4 track x 2 laps       STS    Low table with pillow  2 x 15              Ther Ex         Sit <> Stand  3x12 no UE SOLO       Standing Hip ABD and Ext  2x15 ea side //bar 2# AW bilaterally                                                             Ther Activity                           Gait Training         TM  SOLO  0 6 mph x2 mins 0% incline     0 9 mph x 2 mins no incline     1 1 mph x 2 mins no incline    8 mins total with rest and vital measurements (166/92 mmHg post)   SOLO   0 8 mph x 3 mins 0% incline     1 0 mph x 2 mins 0% incline     Break     1 2 mph x 3 mins 0% incline     1 1 mph x 2 mins 0% incline    12 mins total with rest breaks   VC for improved step length bilaterally  SOLO  1 0 mph   x10 minutes   2 standing rest breaks SOLO  BUE  1 0 mph  X 10 minute              Modalities

## 2022-12-06 ENCOUNTER — OFFICE VISIT (OUTPATIENT)
Dept: PHYSICAL THERAPY | Facility: CLINIC | Age: 67
End: 2022-12-06

## 2022-12-06 DIAGNOSIS — R53.1 GENERALIZED WEAKNESS: ICD-10-CM

## 2022-12-06 DIAGNOSIS — G62.9 NEUROPATHY: ICD-10-CM

## 2022-12-06 DIAGNOSIS — R26.89 BALANCE DISORDER: Primary | ICD-10-CM

## 2022-12-06 NOTE — PROGRESS NOTES
Daily Note     Today's date: 2022  Patient name: Calixto Weathers  : 1955  MRN: [id number]  Referring provider: Gin Mcmahan MD  Dx:   Encounter Diagnosis     ICD-10-CM    1  Balance disorder  R26 89       2  Neuropathy  G62 9       3  Generalized weakness  R53 1           Start Time: 1505  Stop Time: 1605  Total time in clinic (min): 60 minutes    Subjective: Pt arrived 5 minutes late to session today  He was a little stiff after last session, but it only lasted for a day  Objective: See treatment diary below      Assessment: Today's session focused on endurance, improving balance confidence, and LE strength  The pt attempted to do the session with #4 ankle weights, but they were too heavy for the pt, which resulted in a changed to #3 aw  When performing the hurdles laterally with #4 aw, the pt had 2 anterior LOB, and he needed Santino-modA for recovery from therapist        Plan: Continue per plan of care  Progress treatment as tolerated  Precautions: Hypertension, FALL RISK, Depression with anxiety      Access Code: [email or web address]  URL: [email or web address]  Date: 2022  Prepared by: Mine Reyes    Exercises  • Sit to Stand - 1 x daily - 5 x weekly - 3 sets - 12 reps  • Standing Hip Abduction with Counter Support - 1 x daily - 5 x weekly - 3 sets - 10 reps  • Standing Hip Extension with Counter Support - 1 x daily - 5 x weekly - 3 sets - 10 reps  • Side Stepping with Counter Support - 1 x daily - 5 x weekly - 3 sets - 10 reps  • Standing March with Counter Support - 1 x daily - 5 x weekly - 3 sets - 10 reps      Manuals                                         Neuro Re-Ed         Patient Education  education regarding signs and symptoms of parkinson's balance interactions, fall risk outcome measures x15 mins MCTSIB   EO firm: 30 seconds  EO foam: 30 seconds - increased sway   EC firm: 30 seconds moderate sway  EC foam: 7 8 seconds, 9 34 seconds (two trials)        HKM   SOLO 1/2 length   2x laps 2# BAW       Hurdles  NV SOLO  6" hurdles   FWD x 3 laps  Lat x 3 laps SOLO  6" hurdles   FWD x 2 laps  12"  FWD x 2 laps  Lat x 2 laps SOLO  12" hurdles   #4 aw B/L  FWD x 2 laps   Lat x 1 lap    #3 aw B/L   FWD x 1 lap  Lat x 1 lap    Side Stepping  SOLO 1/4 length   2x laps 2# BAW        Backwards Stepping  NV        Foam Activities  Standing March on airex SOLO   2x20 tot  #2 BAW        MCTSIB         Step ups   //bars   8" step   Fwd/lat   x10 ea LE //bars   8" step   Fwd/lat   x10 ea LE     Airex mati pad   SOLO  3 airex   FWD x4 laps  Lat x 2 laps      Semi Tandem   SOLO  1/4 track x 2 laps   SOLO  3# aw B/L   1/4 track x 2 laps     STS    Low table with pillow  2 x 15 Low table with pink aerodisk  3 x 10             Ther Ex         Sit <> Stand  3x12 no UE SOLO       Standing Hip ABD and Ext  2x15 ea side //bar 2# AW bilaterally                                                             Ther Activity                           Gait Training         TM  SOLO  0 6 mph x2 mins 0% incline     0 9 mph x 2 mins no incline     1 1 mph x 2 mins no incline    8 mins total with rest and vital measurements (166/92 mmHg post)   SOLO   0 8 mph x 3 mins 0% incline     1 0 mph x 2 mins 0% incline     Break     1 2 mph x 3 mins 0% incline     1 1 mph x 2 mins 0% incline    12 mins total with rest breaks   VC for improved step length bilaterally  SOLO  1 0 mph   x10 minutes   2 standing rest breaks SOLO  BUE  1 0 mph  X 10 minute SOLO  BUE  1 1 mph   7 min    Seated rest break     1 1 mph   4 minutes    11 minutes total               Modalities

## 2022-12-09 ENCOUNTER — OFFICE VISIT (OUTPATIENT)
Dept: PHYSICAL THERAPY | Facility: CLINIC | Age: 67
End: 2022-12-09

## 2022-12-09 DIAGNOSIS — R53.1 GENERALIZED WEAKNESS: ICD-10-CM

## 2022-12-09 DIAGNOSIS — G62.9 NEUROPATHY: ICD-10-CM

## 2022-12-09 DIAGNOSIS — R26.89 BALANCE DISORDER: Primary | ICD-10-CM

## 2022-12-09 NOTE — PROGRESS NOTES
Daily Note     Today's date: 2022  Patient name: Sisi Bradley  : 1955  MRN: [id number]  Referring provider: Polly Gifford MD  Dx:   Encounter Diagnosis     ICD-10-CM    1  Balance disorder  R26 89       2  Neuropathy  G62 9       3  Generalized weakness  R53 1                      Subjective: Patient reports no new changes since last session      Objective: See treatment diary below      Assessment: Patient struggled with cone taps on foam without UE  Plan to return to solo step next session  Plan: Continue per plan of care  Precautions: Hypertension, FALL RISK, Depression with anxiety      Access Code: [email or web address]  URL: [email or web address]  Date: 2022  Prepared by: Susana Cleary    Exercises  • Sit to Stand - 1 x daily - 5 x weekly - 3 sets - 12 reps  • Standing Hip Abduction with Counter Support - 1 x daily - 5 x weekly - 3 sets - 10 reps  • Standing Hip Extension with Counter Support - 1 x daily - 5 x weekly - 3 sets - 10 reps  • Side Stepping with Counter Support - 1 x daily - 5 x weekly - 3 sets - 10 reps  • Standing March with Counter Support - 1 x daily - 5 x weekly - 3 sets - 10 reps      Manuals                                        Neuro Re-Ed         Patient Education  education regarding signs and symptoms of parkinson's balance interactions, fall risk outcome measures x15 mins MCTSIB   EO firm: 30 seconds  EO foam: 30 seconds - increased sway   EC firm: 30 seconds moderate sway  EC foam: 7 8 seconds, 9 34 seconds (two trials)        HKM   SOLO 1/2 length   2x laps 2# BAW    3 laps //bars   Hurdles  NV SOLO  6" hurdles   FWD x 3 laps  Lat x 3 laps SOLO  6" hurdles   FWD x 2 laps  12"  FWD x 2 laps  Lat x 2 laps SOLO  12" hurdles   #4 aw B/L  FWD x 2 laps   Lat x 1 lap    #3 aw B/L   FWD x 1 lap  Lat x 1 lap 12" hurdles //bars 2 5# aw 3 laps fwd/lat   Side Stepping  SOLO 1/4 length   2x laps 2# BAW              3 laps   Foam Activities  Standing March on airex SOLO   2x20 tot  #2 BAW     Side stepping foam 3 laps // bars   MCTSIB         Step ups   //bars   8" step   Fwd/lat   x10 ea LE //bars   8" step   Fwd/lat   x10 ea LE  8" fwd/lat 10x ea    Airex mati pad   SOLO  3 airex   FWD x4 laps  Lat x 2 laps      Semi Tandem   SOLO  1/4 track x 2 laps   SOLO  3# aw B/L   1/4 track x 2 laps     STS    Low table with pillow  2 x 15 Low table with pink aerodisk  3 x 10 3x10            Ther Ex         Sit <> Stand  3x12 no UE SOLO       Standing Hip ABD and Ext  2x15 ea side //bar 2# AW bilaterally                                                             Ther Activity                           Gait Training         TM  SOLO  0 6 mph x2 mins 0% incline     0 9 mph x 2 mins no incline     1 1 mph x 2 mins no incline    8 mins total with rest and vital measurements (166/92 mmHg post)   SOLO   0 8 mph x 3 mins 0% incline     1 0 mph x 2 mins 0% incline     Break     1 2 mph x 3 mins 0% incline     1 1 mph x 2 mins 0% incline    12 mins total with rest breaks   VC for improved step length bilaterally  SOLO  1 0 mph   x10 minutes   2 standing rest breaks SOLO  BUE  1 0 mph  X 10 minute SOLO  BUE  1 1 mph   7 min    Seated rest break     1 1 mph   4 minutes    11 minutes total               Modalities

## 2022-12-13 ENCOUNTER — OFFICE VISIT (OUTPATIENT)
Dept: PHYSICAL THERAPY | Facility: CLINIC | Age: 67
End: 2022-12-13

## 2022-12-13 DIAGNOSIS — G62.9 NEUROPATHY: ICD-10-CM

## 2022-12-13 DIAGNOSIS — R26.89 BALANCE DISORDER: Primary | ICD-10-CM

## 2022-12-16 ENCOUNTER — OFFICE VISIT (OUTPATIENT)
Dept: PHYSICAL THERAPY | Facility: CLINIC | Age: 67
End: 2022-12-16

## 2022-12-16 DIAGNOSIS — R26.89 BALANCE DISORDER: Primary | ICD-10-CM

## 2022-12-16 DIAGNOSIS — G62.9 NEUROPATHY: ICD-10-CM

## 2022-12-16 DIAGNOSIS — R53.1 GENERALIZED WEAKNESS: ICD-10-CM

## 2022-12-16 NOTE — PROGRESS NOTES
Daily Note     Today's date: 2022  Patient name: Francia Sandra  : 1955  MRN: [id number]  Referring provider: Elkin Gordon MD  Dx:   Encounter Diagnosis     ICD-10-CM    1  Balance disorder  R26 89       2  Neuropathy  G62 9                      Subjective: Feeling well today, feels he is making improvmeents with function at home      Objective: See treatment diary below      Assessment: Patient overall more fatigued today compared to previous session  Patient has improved with step up and overs with steadiness and with strength  Plan next session to attempt more unstable surface practice  Plan: Continue per plan of care  Precautions: Hypertension, FALL RISK, Depression with anxiety      Access Code: [email or web address]  URL: [email or web address]  Date: 2022  Prepared by: Spencer Lozano    Exercises  • Sit to Stand - 1 x daily - 5 x weekly - 3 sets - 12 reps  • Standing Hip Abduction with Counter Support - 1 x daily - 5 x weekly - 3 sets - 10 reps  • Standing Hip Extension with Counter Support - 1 x daily - 5 x weekly - 3 sets - 10 reps  • Side Stepping with Counter Support - 1 x daily - 5 x weekly - 3 sets - 10 reps  • Standing March with Counter Support - 1 x daily - 5 x weekly - 3 sets - 10 reps      Manuals                                        Neuro Re-Ed         Patient Education          HKM  3# aw 20 ft x 6     3 laps //bars   Hurdles 12" hurdles 3# aw solo 3 laps fwd  SOLO  6" hurdles   FWD x 3 laps  Lat x 3 laps SOLO  6" hurdles   FWD x 2 laps  12"  FWD x 2 laps  Lat x 2 laps SOLO  12" hurdles   #4 aw B/L  FWD x 2 laps   Lat x 1 lap    #3 aw B/L   FWD x 1 lap  Lat x 1 lap 12" hurdles //bars 2 5# aw 3 laps fwd/lat   Side Stepping 3 laps solo step 3# aw               3 laps   Foam Activities      Side stepping foam 3 laps // bars   MCTSIB         Step ups 8" fwd/lat 3 laps solo step  //bars   8" step   Fwd/lat   x10 ea LE //bars   8" step Fwd/lat   x10 ea LE  8" fwd/lat 10x ea    Airex mati pad   SOLO  3 airex   FWD x4 laps  Lat x 2 laps      Semi Tandem   SOLO  1/4 track x 2 laps   SOLO  3# aw B/L   1/4 track x 2 laps     STS airex under feet - 3x10   Low table with pillow  2 x 15 Low table with pink aerodisk  3 x 10 3x10            Ther Ex         Sit <> Stand         Standing Hip ABD and Ext                                                               Ther Activity                           Gait Training         TM  1 2 mph 5% incline - 5 min x 2  SOLO  1 0 mph   x10 minutes   2 standing rest breaks SOLO  BUE  1 0 mph  X 10 minute SOLO  BUE  1 1 mph   7 min    Seated rest break     1 1 mph   4 minutes    11 minutes total               Modalities

## 2022-12-16 NOTE — PROGRESS NOTES
Daily Note     Today's date: 2022  Patient name: Kevin De Luna  : 1955  MRN: [id number]  Referring provider: Katrina Lutz MD  Dx:   Encounter Diagnosis     ICD-10-CM    1  Balance disorder  R26 89       2  Neuropathy  G62 9       3  Generalized weakness  R53 1                      Subjective: Patient reports feeling tired overall today, not having a good day      Objective: See treatment diary below      Assessment:   Pt improved with semi tandem walking this date  Continues to struggle with reduced knee flex ROM with tall hurdles  Plan to return to shorter hurdles at next session  Plan: Continue per plan of care  Precautions: Hypertension, FALL RISK, Depression with anxiety      Access Code: [email or web address]  URL: [email or web address]  Date: 2022  Prepared by: Maximilian Carter    Exercises  • Sit to Stand - 1 x daily - 5 x weekly - 3 sets - 12 reps  • Standing Hip Abduction with Counter Support - 1 x daily - 5 x weekly - 3 sets - 10 reps  • Standing Hip Extension with Counter Support - 1 x daily - 5 x weekly - 3 sets - 10 reps  • Side Stepping with Counter Support - 1 x daily - 5 x weekly - 3 sets - 10 reps  • Standing March with Counter Support - 1 x daily - 5 x weekly - 3 sets - 10 reps      Manuals                                        Neuro Re-Ed         Patient Education          HKM  3# aw 20 ft x 6     3 laps //bars   Hurdles 12" hurdles 3# aw solo 3 laps fwd 12" hurdles 3# aw //bars 3 laps fwd SOLO  6" hurdles   FWD x 3 laps  Lat x 3 laps SOLO  6" hurdles   FWD x 2 laps  12"  FWD x 2 laps  Lat x 2 laps SOLO  12" hurdles   #4 aw B/L  FWD x 2 laps   Lat x 1 lap    #3 aw B/L   FWD x 1 lap  Lat x 1 lap 12" hurdles //bars 2 5# aw 3 laps fwd/lat   Side Stepping 3 laps solo step 3# aw               3 laps   Foam Activities      Side stepping foam 3 laps // bars   MCTSIB         Step ups 8" fwd/lat 3 laps solo step 8" fwd/lat 15 x [place] //bars //bars   8" step   Fwd/lat   x10 ea LE //bars   8" step   Fwd/lat   x10 ea LE  8" fwd/lat 10x ea    Airex mati pad   SOLO  3 airex   FWD x4 laps  Lat x 2 laps      Semi Tandem  3 laps //bars SOLO  1/4 track x 2 laps   SOLO  3# aw B/L   1/4 track x 2 laps     STS airex under feet - 3x10 3x10 airex pad  Low table with pillow  2 x 15 Low table with pink aerodisk  3 x 10 3x10            Ther Ex         Sit <> Stand         Standing Hip ABD and Ext                                                               Ther Activity                           Gait Training         TM  1 2 mph 5% incline - 5 min x 2  SOLO  1 0 mph   x10 minutes   2 standing rest breaks SOLO  BUE  1 0 mph  X 10 minute SOLO  BUE  1 1 mph   7 min    Seated rest break     1 1 mph   4 minutes    11 minutes total               Modalities

## 2022-12-19 ENCOUNTER — OFFICE VISIT (OUTPATIENT)
Dept: PHYSICAL THERAPY | Facility: CLINIC | Age: 67
End: 2022-12-19

## 2022-12-19 DIAGNOSIS — G62.9 NEUROPATHY: ICD-10-CM

## 2022-12-19 DIAGNOSIS — R53.1 GENERALIZED WEAKNESS: ICD-10-CM

## 2022-12-19 DIAGNOSIS — R26.89 BALANCE DISORDER: Primary | ICD-10-CM

## 2022-12-19 NOTE — PROGRESS NOTES
Daily Note     Today's date: 2022  Patient name: Jamila Taylor  : 1955  MRN: [id number]  Referring provider: Therese Whitaker MD  Dx:   Encounter Diagnosis     ICD-10-CM    1  Balance disorder  R26 89       2  Neuropathy  G62 9       3  Generalized weakness  R53 1                      Subjective: Pt reports no new changes since last session  Objective: See treatment diary below    Balance Test     6 Minute Walk Test (ft): 3 minutes lapsed before patient had dyspnea on exertion (93% SPo2): 375 feet    PN: 700 ft    2 Minute Walk Test (ft):     Gait Speed (ft/s): 0 3 m/s    PN:0 96   5x Sit To Stand (s): 15 21 seconds  (IE )    PN: 11 72 sec   Dual-Task (P) TUG 13 31 sec (L) // 12 seconds (R)   TUG: Avg 11 1 (IE )   PN:                Assessment: Since initial evaluation patient has made significnat progress with  Balance, endurance, walking speed and overall improved confidence in balance  Pt will continue to benefit from skilled therapy intervention 2x/week for 4 more weeks per initial POC as he remains below age referenced normative values and with in a fall risk category for balance         STG (3 weeks)   Improve FGA score by 4 indicating meaningful improvement in fall risk   Improve/Reduce 5x STS score by 5 seconds indicating meaningful improvement in fall risk and power generation   Improve generalized strength to 4+/5 to demonstrate improvement in facilitation of efficiency of functional activities and reduced fall risk     LTG (3 weeks)   Improve (reduce) TUG score to 10 5 seconds indicating meaningful improvement in fall risk   Improve FGA score to > 24/30 indicating meaningful improvement in fall risk   Improve/Reduce 5x STS score to < 10 seconds seconds indicating meaningful improvement in fall risk and power generation   Improve generalized strength to 5/5 on RLE to demonstrate improvement in facilitation of efficiency of functional activities and reduced fall risk   Have no falls in 8 week period once beginning therapy   Demonstrate independence in home walking protocol and HEP    Plan: Continue per plan of care  Precautions: Hypertension, FALL RISK, Depression with anxiety      Access Code: [email or web address]  URL: [email or web address]  Date: 11/21/2022  Prepared by: Lady Rodriguez    Exercises  • Sit to Stand - 1 x daily - 5 x weekly - 3 sets - 12 reps  • Standing Hip Abduction with Counter Support - 1 x daily - 5 x weekly - 3 sets - 10 reps  • Standing Hip Extension with Counter Support - 1 x daily - 5 x weekly - 3 sets - 10 reps  • Side Stepping with Counter Support - 1 x daily - 5 x weekly - 3 sets - 10 reps  • Standing March with Counter Support - 1 x daily - 5 x weekly - 3 sets - 10 reps      Manuals 12/13 12/16 12/19 12/6 12/9                                       Neuro Re-Ed         Patient Education          HKM  3# aw 20 ft x 6     3 laps //bars   Hurdles 12" hurdles 3# aw solo 3 laps fwd 12" hurdles 3# aw //bars 3 laps fwd   SOLO  12" hurdles   #4 aw B/L  FWD x 2 laps   Lat x 1 lap    #3 aw B/L   FWD x 1 lap  Lat x 1 lap 12" hurdles //bars 2 5# aw 3 laps fwd/lat   Side Stepping 3 laps solo step 3# aw               3 laps   Foam Activities      Side stepping foam 3 laps // bars   MCTSIB         Step ups 8" fwd/lat 3 laps solo step 8" fwd/lat 15 x ea //bars    8" fwd/lat 10x ea    Airex mati pad         Semi Tandem  3 laps //bars   SOLO  3# aw B/L   1/4 track x 2 laps     STS airex under feet - 3x10 3x10 airex pad 3x10 pillow on 12" step  Low table with pink aerodisk  3 x 10 3x10            Ther Ex         Sit <> Stand         Standing Hip ABD and Ext                                                               Ther Activity                           Gait Training         TM  1 2 mph 5% incline - 5 min x 2  1 2 mph 5% incline 5 min x 2  SOLO  BUE  1 1 mph   7 min    Seated rest break     1 1 mph   4 minutes    11 minutes total Modalities

## 2022-12-21 ENCOUNTER — APPOINTMENT (OUTPATIENT)
Dept: PHYSICAL THERAPY | Facility: CLINIC | Age: 67
End: 2022-12-21

## 2022-12-22 DIAGNOSIS — M25.561 RIGHT KNEE PAIN, UNSPECIFIED CHRONICITY: Primary | ICD-10-CM

## 2022-12-23 ENCOUNTER — OFFICE VISIT (OUTPATIENT)
Dept: PHYSICAL THERAPY | Facility: CLINIC | Age: 67
End: 2022-12-23

## 2022-12-23 DIAGNOSIS — R26.89 BALANCE DISORDER: Primary | ICD-10-CM

## 2022-12-23 DIAGNOSIS — R53.1 GENERALIZED WEAKNESS: ICD-10-CM

## 2022-12-23 DIAGNOSIS — G62.9 NEUROPATHY: ICD-10-CM

## 2022-12-23 NOTE — PROGRESS NOTES
Daily Note     Today's date: 2022  Patient name: Violet Brunner  : 1955  MRN: [id number]  Referring provider: Monae Ponce MD  Dx:   Encounter Diagnosis     ICD-10-CM    1  Balance disorder  R26 89       2  Neuropathy  G62 9       3  Generalized weakness  R53 1                      Subjective:Felt too stiff last session to attend therapy  R/s to today  Objective: See treatment diary below      Assessment: Pt stll frustrated with difficulty getting off of lower surface  Pt continues to be able to tolerate more activities compared to initial few sessions  Plan to continue per current POC    Plan: Continue per plan of care  Precautions: Hypertension, FALL RISK, Depression with anxiety      Access Code: [email or web address]  URL: [email or web address]  Date: 2022  Prepared by: Raquel Newell    Exercises  • Sit to Stand - 1 x daily - 5 x weekly - 3 sets - 12 reps  • Standing Hip Abduction with Counter Support - 1 x daily - 5 x weekly - 3 sets - 10 reps  • Standing Hip Extension with Counter Support - 1 x daily - 5 x weekly - 3 sets - 10 reps  • Side Stepping with Counter Support - 1 x daily - 5 x weekly - 3 sets - 10 reps  • Standing March with Counter Support - 1 x daily - 5 x weekly - 3 sets - 10 reps      Manuals                                        Neuro Re-Ed         Patient Education          HKM  3# aw 20 ft x 6     3 laps //bars   Hurdles 12" hurdles 3# aw solo 3 laps fwd 12" hurdles 3# aw //bars 3 laps fwd  3 laps 6" hurdles  SOLO  12" hurdles   #4 aw B/L  FWD x 2 laps   Lat x 1 lap    #3 aw B/L   FWD x 1 lap  Lat x 1 lap 12" hurdles //bars 2 5# aw 3 laps fwd/lat   Side Stepping 3 laps solo step 3# aw               3 laps   Foam Activities      Side stepping foam 3 laps // bars   MCTSIB         Step ups 8" fwd/lat 3 laps solo step 8" fwd/lat 15 x ea //bars    8" fwd/lat 10x ea    Airex mati pad         Semi Tandem  3 laps //bars   SOLO  3# aw B/L   1/4 track x 2 laps     STS airex under feet - 3x10 3x10 airex pad 3x10 pillow on 12" step 5x2 with pillow on 12" step    40x from mat Low table with pink aerodisk  3 x 10 3x10   hurdles    4 laps solo step fwd/lat ea     Ther Ex         Sit <> Stand         Standing Hip ABD and Ext                                                               Ther Activity                           Gait Training         TM  1 2 mph 5% incline - 5 min x 2  1 2 mph 5% incline 5 min x 2 1 2 mph 8 min  SOLO  BUE  1 1 mph   7 min    Seated rest break     1 1 mph   4 minutes    11 minutes total               Modalities

## 2022-12-27 ENCOUNTER — OFFICE VISIT (OUTPATIENT)
Dept: PHYSICAL THERAPY | Facility: CLINIC | Age: 67
End: 2022-12-27

## 2022-12-27 DIAGNOSIS — R26.89 BALANCE DISORDER: Primary | ICD-10-CM

## 2022-12-27 DIAGNOSIS — R53.1 GENERALIZED WEAKNESS: ICD-10-CM

## 2022-12-27 DIAGNOSIS — G62.9 NEUROPATHY: ICD-10-CM

## 2022-12-27 NOTE — PROGRESS NOTES
Daily Note     Today's date: 2022  Patient name: Violet Brunner  : 1955  MRN: [id number]  Referring provider: Monae Ponce MD  Dx:   Encounter Diagnosis     ICD-10-CM    1  Balance disorder  R26 89       2  Neuropathy  G62 9       3  Generalized weakness  R53 1                      Subjective: Patient reports no new changes since last session      Objective: See treatment diary below      Assessment: Patient was fatigued throughout session and required frequent encouragement to push to more repititions  Did utilize 2 5# aw through session which may have caused fatigue quicker  Plan: Continue per plan of care  Precautions: Hypertension, FALL RISK, Depression with anxiety      Access Code: [email or web address]  URL: [email or web address]  Date: 2022  Prepared by: Raquel Newell    Exercises  • Sit to Stand - 1 x daily - 5 x weekly - 3 sets - 12 reps  • Standing Hip Abduction with Counter Support - 1 x daily - 5 x weekly - 3 sets - 10 reps  • Standing Hip Extension with Counter Support - 1 x daily - 5 x weekly - 3 sets - 10 reps  • Side Stepping with Counter Support - 1 x daily - 5 x weekly - 3 sets - 10 reps  • Standing March with Counter Support - 1 x daily - 5 x weekly - 3 sets - 10 reps      Manuals                                         Neuro Re-Ed         Patient Education          HKM  3# aw 20 ft x 6        Hurdles 12" hurdles 3# aw solo 3 laps fwd 12" hurdles 3# aw //bars 3 laps fwd  3 laps 6" hurdles  3 laps 6" hurdles wit hfoam    Side Stepping 3 laps solo step 3# aw     Foam beam - 1 laps             Foam Activities         MCTSIB         Step ups 8" fwd/lat 3 laps solo step 8" fwd/lat 15 x ea //bars   Up and over     Airex mati pad         Semi Tandem  3 laps //bars       STS airex under feet - 3x10 3x10 airex pad 3x10 pillow on 12" step 5x2 with pillow on 12" step    40x from mat 5x10 from chair    hurdles    4 laps solo step fwd/lat ea From foam 3 laps    Ther Ex         Sit <> Stand         Standing Hip ABD and Ext                                                               Ther Activity                           Gait Training         TM  1 2 mph 5% incline - 5 min x 2  1 2 mph 5% incline 5 min x 2 1 2 mph 8 min  1 2 mph 7 min             Modalities no fever and no chills.

## 2022-12-30 ENCOUNTER — APPOINTMENT (OUTPATIENT)
Dept: PHYSICAL THERAPY | Facility: CLINIC | Age: 67
End: 2022-12-30

## 2023-01-03 ENCOUNTER — OFFICE VISIT (OUTPATIENT)
Dept: UROLOGY | Facility: AMBULATORY SURGERY CENTER | Age: 68
End: 2023-01-03

## 2023-01-03 VITALS
SYSTOLIC BLOOD PRESSURE: 120 MMHG | DIASTOLIC BLOOD PRESSURE: 80 MMHG | HEART RATE: 66 BPM | WEIGHT: 301 LBS | OXYGEN SATURATION: 96 % | BODY MASS INDEX: 46.79 KG/M2

## 2023-01-03 DIAGNOSIS — N32.81 OAB (OVERACTIVE BLADDER): ICD-10-CM

## 2023-01-03 DIAGNOSIS — N39.8 VOIDING DYSFUNCTION: Primary | ICD-10-CM

## 2023-01-03 LAB — POST-VOID RESIDUAL VOLUME, ML POC: 195 ML

## 2023-01-03 RX ORDER — TROSPIUM CHLORIDE 20 MG/1
20 TABLET, FILM COATED ORAL 2 TIMES DAILY
Qty: 180 TABLET | Refills: 3 | Status: SHIPPED | OUTPATIENT
Start: 2023-01-03

## 2023-01-03 NOTE — PROGRESS NOTES
1/3/2023    Assessment and Plan    69 y o  male managed by our office    1  Prostate Cancer  ? Status post robot prostatectomy 07/2011  ? PSA performed 03/06/2022 resulted less than 0 1  ? repeat PSA 10/2023  ? follow up the office 10/2023     2  Urinary Urgency/Urge Incontinence   ? Discussed need to increase water intake upwards to 40 ounces of water intake per day while avoiding irritating foods and beverages  ? Continue  trospium-referral provided    History of Present Illness  Law Martinez is a 69 y o  male here for follow up evaluation of  [facility] (3+4) prostate cancer status post Kaylee Troy prostatectomy by Dr Cade Love in 07/2011   PSA trend below  Patient reports rare episodes of urinary incontinence with exertion otherwise he reports to be 90% continent  [facility] denies changes to his general health since his prior evaluation      Patient reports since his last office evaluation he has worsening urinary urgency and urge incontinence  He reports drinking 20 out bottles of water per day approximately 3 cans of diet Pepsi  He denies burning with urination  He reports sensation of complete bladder emptying with urination  He denies suprapubic abdominal pain and flank pain  At his prior office evaluation, he was started on trospium for his continued lower urinary tract symptoms of urgency and urge incontinence  He reports significant reduction in irritative symptoms and is very pleased with his current urinary status  He wishes to continue with medication  The only side effect he complains of is a slight dry mouth  Review of Systems   Constitutional: Negative for chills and fever  Respiratory: Negative for cough and shortness of breath  Cardiovascular: Negative for chest pain  Gastrointestinal: Negative for abdominal distention, abdominal pain, blood in stool, nausea and vomiting  Genitourinary: Positive for frequency and urgency   Negative for difficulty urinating, dysuria, enuresis, flank pain and hematuria  Skin: Negative for rash  Past Medical History  Past Medical History:   Diagnosis Date   • Acute blood loss anemia 10/13/2016   • Anxiety    • Arthritis     knees   • Arthritis    • Asthma     stable for > 10 years   • Molina esophagus     with BARRX/RFA   • Cancer [facility])     prostate   • Cataract    • Depression    • Diabetes ([facility] [address] )    • Diabetes mellitus ([facility] )     pre diabetes   • Diverticulitis of colon    • Environmental allergies    • Fall 12/30/2020   • GERD (gastroesophageal reflux disease)    • Hiatal hernia    • History of anal fissures    • Hyperlipidemia    • Hypertension    • Incisional hernia    • Insomnia    • Kidney stone    • Morbid obesity ([facility] )    • Peripheral neuropathy    • PONV (postoperative nausea and vomiting)    • Prolapsing mitral valve     prolapsing mitral valve leaflet syndrome   • Prostate cancer ([place] [address] )    • Retinal detachment     treated surgically at [facility] eye; resolved: 10/10/2012   • Sleep apnea     diagnosed but unable to tolerate cpap      • Stuttering        Past Social History  Past Surgical History:   Procedure Laterality Date   • ARTHROSCOPIC REPAIR ACL Right    • BIOPSY CORE NEEDLE      prostate   • CATARACT EXTRACTION Bilateral    • COLONOSCOPY     • HERNIA REPAIR      [facility]   • JOINT REPLACEMENT      B/L knee   • KNEE ARTHROSCOPY Left    • LAPAROSCOPIC GASTRIC BANDING     • SD ARTHRP KNE CONDYLE&PLATU MEDIAL&LAT COMPARTMENTS Left 02/15/2017    Procedure: TOTAL KNEE ARTHROPLASTY ;  Surgeon: Barrett Parrish MD;  [facility];  Service: Orthopedics   • SD ARTHRP KNE CONDYLE&PLATU MEDIAL&LAT COMPARTMENTS Right 10/10/2016    Procedure: ARTHROPLASTY KNEE TOTAL;  Surgeon: Barrett Parrish MD;  [facility];  Service: Orthopedics   • PROSTATECTOMY      robotic-assisted; [facility]-Dr Anayeli Solitario   • REMOVAL GASTRIC BAND LAPAROSCOPIC N/A 8/30/2022    Procedure: LAPAROSCOPIC REMOVAL OF ADJUSTABLE GASTRIC BAND AND PORT  WITH INTRAOPERATIVE EGD;  Surgeon: Sheba Briseno MD;  [facility];  Service: Bariatrics   • RETINAL DETACHMENT SURGERY Bilateral     [facility]   • SEPTOPLASTY     • SINUS SURGERY     • TONSILLECTOMY      over age 13   • UPPER GASTROINTESTINAL ENDOSCOPY      mutiple with BARRX/RFA   • VASECTOMY      vas deferens     Social History     Tobacco Use   Smoking Status Never   Smokeless Tobacco Never   Tobacco Comments    quit 45 years ago       Past Family History  Family History   Problem Relation Age of Onset   • Heart disease Father    • Coronary artery disease Father    • Prostate cancer Father    • Coronary artery disease Mother    • Diabetes Mother         mellitus   • Diabetes Maternal Grandmother         mellitus   • Coronary artery disease Maternal Grandfather    • Coronary artery disease Paternal Grandfather        Past Social history  Social History     Socioeconomic History   • Marital status: /Civil Union     Spouse name: Not on file   • Number of children: 0   • Years of education: Not on file   • Highest education level: Not on file   Occupational History   • Occupation:    Tobacco Use   • Smoking status: Never   • Smokeless tobacco: Never   • Tobacco comments:     quit 38 years ago   Vaping Use   • Vaping Use: Never used   Substance and Sexual Activity   • Alcohol use: Not Currently     Comment: very rarely, 12 cans of beer/year   • Drug use: Not Currently   • Sexual activity: Not Currently     Partners: Female   Other Topics Concern   • Not on file   Social History Narrative    ** Merged History Encounter **          Social Determinants of Health     Financial Resource Strain: Not on file   Food Insecurity: Not on file   Transportation Needs: Not on file   Physical Activity: Not on file   Stress: Not on file   Social Connections: Not on file   Intimate Partner Violence: Not on file   Housing Stability: Not on file       Current Medications  Current Outpatient Medications   Medication Sig Dispense Refill   • ALPRAZolam (XANAX) 0 25 mg tablet Take 1 tablet (0 25 mg total) by mouth 3 (three) times a day as needed for anxiety 270 tablet 1   • Blood Glucose Monitoring Suppl (OneTouch Verio) w/Device KIT Use daily (Patient taking differently: Use as needed) 1 kit 0   • ezetimibe (ZETIA) 10 mg tablet TAKE 1 TABLET DAILY (Patient taking differently: Take 5 mg by mouth in the morning) 90 tablet 3   • FLUoxetine (PROzac) 20 mg capsule TAKE 3 CAPSULES DAILY 270 capsule 3   • glucose blood (OneTouch Verio) test strip USE TO TEST DAILY 200 strip 1   • hydrochlorothiazide (HYDRODIURIL) 12 5 mg tablet Take 1 tablet (12 5 mg total) by mouth 3 (three) times a week Monday, Thursday, Saturday (Patient taking differently: Take 12 5 mg by mouth 3 (three) times a week Monday, Wednesday, Friday) 36 tablet 5   • Lancet Devices (ONE TOUCH DELICA LANCING DEV) MISC Use daily 1 each 0   • losartan (COZAAR) 100 MG tablet TAKE 1 TABLET DAILY 90 tablet 3   • metFORMIN (GLUCOPHAGE) 500 mg tablet TAKE 2 TABLETS TWICE A DAY WITH MEALS 360 tablet 3   • metoprolol succinate (TOPROL-XL) 25 mg 24 hr tablet TAKE 1 TABLET DAILY (Patient taking differently: Take 25 mg by mouth daily) 90 tablet 3   • Multiple Vitamins-Minerals (CENTRUM ADULTS PO) Take by mouth in the morning     • OLANZapine (ZyPREXA) 5 mg tablet Take 0 5 tablets (2 5 mg total) by mouth daily (Patient taking differently: Take 2 5 mg by mouth every evening) 90 tablet 3   • OneTouch Delica Lancets 39J MISC Use daily 300 each 1   • pantoprazole (PROTONIX) 40 mg tablet Take 1 tablet (40 mg total) by mouth 2 (two) times a day 1 tab in AM, 1 tabs in  tablet 3   • rosuvastatin (CRESTOR) 5 mg tablet Take 1 tablet (5 mg total) by mouth once a week 12 tablet 3   • semaglutide, 1 mg/dose, (Ozempic) 4 MG/3ML SOPN injection pen Inject 0 75 mL (1 mg total) under the skin once a week 9 mL 1   • trospium chloride (SANCTURA) 20 mg tablet Take 1 tablet (20 mg total) by mouth 2 (two) times a day 180 tablet 3   • zolpidem (AMBIEN) 10 mg tablet Take 1 tablet (10 mg total) by mouth daily at bedtime as needed for sleep 90 tablet 0     No current facility-administered medications for this visit  Allergies  Allergies   Allergen Reactions   • Celebrex [Celecoxib] Other (See Comments)     Cardiologist stated he should not take     • Chlorhexidine Hives     Is able to wash with hibiclens but not use michi cloths   • Other      "steroid eyedrops"     • Prednisone Other (See Comments)     Prednisone eye drops- eye pressure increases         The following portions of the patient's history were reviewed and updated as appropriate: allergies, current medications, past medical history, past social history, past surgical history and problem list       Vitals  Vitals:    01/03/23 1322   BP: 120/80   BP Location: Right arm   Patient Position: Sitting   Cuff Size: Large   Pulse: 66   SpO2: 96%   Weight: (!) 137 kg (301 lb)           Physical Exam  Physical Exam  Vitals reviewed  Constitutional:       General: He is not in acute distress  Appearance: Normal appearance  He is normal weight  HENT:      Head: Normocephalic  Pulmonary:      Effort: No respiratory distress  Breath sounds: Normal breath sounds  Skin:     General: Skin is warm and dry  Neurological:      General: No focal deficit present  Mental Status: He is alert and oriented to person, place, and time     Psychiatric:         Mood and Affect: Mood normal          Behavior: Behavior normal            Results  Recent Results (from the past 1 hour(s))   POCT Measure PVR    Collection Time: 01/03/23  1:26 PM   Result Value Ref Range    POST-VOID RESIDUAL VOLUME, ML  mL   ]  Lab Results   Component Value Date    PSA <0 1 03/06/2022    PSA <0 1 09/20/2021    PSA <0 1 10/01/2020     Lab Results   Component Value Date    GLUCOSE 174 (H) 12/30/2020    CALCIUM 9 2 08/31/2022     08/16/2015    K 4 2 08/31/2022    CO2 22 08/31/2022     08/31/2022    BUN 8 08/31/2022    CREATININE 1 01 08/31/2022     Lab Results   Component Value Date    WBC 10 02 08/31/2022    HGB 13 6 08/31/2022    HCT 38 7 08/31/2022    MCV 88 08/31/2022     08/31/2022           Orders  Orders Placed This Encounter   Procedures   • POCT Measure PVR       ED Mathis

## 2023-01-06 ENCOUNTER — OFFICE VISIT (OUTPATIENT)
Dept: PHYSICAL THERAPY | Facility: CLINIC | Age: 68
End: 2023-01-06

## 2023-01-06 DIAGNOSIS — R53.1 GENERALIZED WEAKNESS: ICD-10-CM

## 2023-01-06 DIAGNOSIS — R26.89 BALANCE DISORDER: Primary | ICD-10-CM

## 2023-01-06 DIAGNOSIS — G62.9 NEUROPATHY: ICD-10-CM

## 2023-01-06 NOTE — PROGRESS NOTES
Daily Note     Today's date: 2023  Patient name: Alvino Reddy  : 1955  MRN: [id number]  Referring provider: Yash Smith MD  Dx:   Encounter Diagnosis     ICD-10-CM    1  Balance disorder  R26 89       2  Neuropathy  G62 9       3  Generalized weakness  R53 1                      Subjective:       Objective: See treatment diary below      Assessment: Tolerated treatment well  Patient demonstrated fatigue post treatment      Plan: Continue per plan of care  Precautions: Hypertension, FALL RISK, Depression with anxiety      Access Code: [email or web address]  URL: [email or web address]  Date: 2022  Prepared by: Dianne Ni    Exercises  • Sit to Stand - 1 x daily - 5 x weekly - 3 sets - 12 reps  • Standing Hip Abduction with Counter Support - 1 x daily - 5 x weekly - 3 sets - 10 reps  • Standing Hip Extension with Counter Support - 1 x daily - 5 x weekly - 3 sets - 10 reps  • Side Stepping with Counter Support - 1 x daily - 5 x weekly - 3 sets - 10 reps  • Standing March with Counter Support - 1 x daily - 5 x weekly - 3 sets - 10 reps      Manuals                                         Neuro Re-Ed         Patient Education          HKM  3# aw 20 ft x 6        Hurdles 12" hurdles 3# aw solo 3 laps fwd 12" hurdles 3# aw //bars 3 laps fwd  3 laps 6" hurdles  3 laps 6" hurdles wit hfoam    Side Stepping 3 laps solo step 3# aw     Foam beam - 1 laps             Foam Activities         MCTSIB         Step ups 8" fwd/lat 3 laps solo step 8" fwd/lat 15 x ea //bars   Up and over     Airex mati pad         Semi Tandem  3 laps //bars       STS airex under feet - 3x10 3x10 airex pad 3x10 pillow on 12" step 5x2 with pillow on 12" step    40x from mat 5x10 from chair    hurdles    4 laps solo step fwd/lat ea From foam 3 laps    Ther Ex         Sit <> Stand         Standing Hip ABD and Ext                                                               Ther Activity Gait Training         TM  1 2 mph 5% incline - 5 min x 2  1 2 mph 5% incline 5 min x 2 1 2 mph 8 min  1 2 mph 7 min             Modalities

## 2023-01-09 ENCOUNTER — OFFICE VISIT (OUTPATIENT)
Dept: PHYSICAL THERAPY | Facility: CLINIC | Age: 68
End: 2023-01-09

## 2023-01-09 DIAGNOSIS — R26.89 BALANCE DISORDER: Primary | ICD-10-CM

## 2023-01-09 DIAGNOSIS — G62.9 NEUROPATHY: ICD-10-CM

## 2023-01-09 DIAGNOSIS — R53.1 GENERALIZED WEAKNESS: ICD-10-CM

## 2023-01-10 ENCOUNTER — OFFICE VISIT (OUTPATIENT)
Dept: OBGYN CLINIC | Facility: HOSPITAL | Age: 68
End: 2023-01-10

## 2023-01-10 ENCOUNTER — HOSPITAL ENCOUNTER (OUTPATIENT)
Dept: RADIOLOGY | Facility: HOSPITAL | Age: 68
Discharge: HOME | End: 2023-01-10
Attending: ORTHOPAEDIC SURGERY

## 2023-01-10 VITALS
SYSTOLIC BLOOD PRESSURE: 125 MMHG | HEART RATE: 72 BPM | DIASTOLIC BLOOD PRESSURE: 84 MMHG | WEIGHT: 302.03 LBS | BODY MASS INDEX: 47.4 KG/M2 | HEIGHT: 67 IN

## 2023-01-10 DIAGNOSIS — M25.561 ACUTE PAIN OF RIGHT KNEE: ICD-10-CM

## 2023-01-10 DIAGNOSIS — Z96.651 HISTORY OF TOTAL KNEE ARTHROPLASTY, RIGHT: Primary | ICD-10-CM

## 2023-01-10 DIAGNOSIS — M25.561 RIGHT KNEE PAIN, UNSPECIFIED CHRONICITY: ICD-10-CM

## 2023-01-10 NOTE — PROGRESS NOTES
Assessment:  1  History of total knee arthroplasty, right        2  Acute pain of right knee            Plan:  · Patient has an essentially normal exam today  · Recommend patient continue physical therapy at this time  To do next visit:  Return if symptoms worsen or fail to improve  The above stated was discussed in layman's terms and the patient expressed understanding  All questions were answered to the patient's satisfaction  Subjective:   Mike Morley is a 69 y o  male who presents today for evaluation of right knee pain  Patient has a history of bilateral knee replacements by Dr Alexandr Al, Left TKA 2/15/17, Right TKA 10/10/16  Patient denies any injury  He is currently in PT for balance issues  Past Medical History:   Diagnosis Date   • Acute blood loss anemia 10/13/2016   • Anxiety    • Arthritis     knees   • Arthritis    • Asthma     stable for > 10 years   • [place] esophagus     with BARRX/RFA   • Cancer [facility])     prostate   • Cataract    • Depression    • Diabetes ([facility] [address] )    • Diabetes mellitus ([facility] [facility] )     pre diabetes   • Diverticulitis of colon    • Environmental allergies    • Fall 12/30/2020   • GERD (gastroesophageal reflux disease)    • Hiatal hernia    • History of anal fissures    • Hyperlipidemia    • Hypertension    • Incisional hernia    • Insomnia    • Kidney stone    • Morbid obesity ([address] )    • Peripheral neuropathy    • PONV (postoperative nausea and vomiting)    • Prolapsing mitral valve     prolapsing mitral valve leaflet syndrome   • Prostate cancer ([facility] [facility] )    • Retinal detachment     treated surgically at [facility] eye; resolved: 10/10/2012   • Sleep apnea     diagnosed but unable to tolerate cpap      • Stuttering        Past Surgical History:   Procedure Laterality Date   • ARTHROSCOPIC REPAIR ACL Right    • BIOPSY CORE NEEDLE      prostate   • CATARACT EXTRACTION Bilateral    • COLONOSCOPY     • HERNIA REPAIR      [facility]   • JOINT REPLACEMENT      B/L knee   • KNEE ARTHROSCOPY Left    • LAPAROSCOPIC GASTRIC BANDING     • VA ARTHRP KNE CONDYLE&PLATU MEDIAL&LAT COMPARTMENTS Left 02/15/2017    Procedure: TOTAL KNEE ARTHROPLASTY ;  Surgeon: Usama Jeronimo MD;  [facility];  Service: Orthopedics   • VA ARTHRP KNE CONDYLE&PLATU MEDIAL&LAT COMPARTMENTS Right 10/10/2016    Procedure: ARTHROPLASTY KNEE TOTAL;  Surgeon: Usama Jeronimo MD;  Location[facility];  Service: Orthopedics   • PROSTATECTOMY      robotic-assisted; [facility]-Dr Casi Lugo   • REMOVAL GASTRIC BAND LAPAROSCOPIC N/A 8/30/2022    Procedure: LAPAROSCOPIC REMOVAL OF ADJUSTABLE GASTRIC BAND AND PORT  WITH INTRAOPERATIVE EGD;  Surgeon: Elizabeth Crespo MD;  [facility];  Service: Bariatrics   • RETINAL DETACHMENT SURGERY Bilateral     [facility]   • SEPTOPLASTY     • SINUS SURGERY     • TONSILLECTOMY      over age 13   • UPPER GASTROINTESTINAL ENDOSCOPY      mutiple with BARRX/RFA   • VASECTOMY      vas deferens       Family History   Problem Relation Age of Onset   • Heart disease Father    • Coronary artery disease Father    • Prostate cancer Father    • Coronary artery disease Mother    • Diabetes Mother         mellitus   • Diabetes Maternal Grandmother         mellitus   • Coronary artery disease Maternal Grandfather    • Coronary artery disease Paternal Grandfather        Social History     Occupational History   • Occupation:    Tobacco Use   • Smoking status: Never   • Smokeless tobacco: Never   • Tobacco comments:     quit 38 years ago   Vaping Use   • Vaping Use: Never used   Substance and Sexual Activity   • Alcohol use: Not Currently     Comment: very rarely, 12 cans of beer/year   • Drug use: Not Currently   • Sexual activity: Not Currently     Partners: Female         Current Outpatient Medications:   •  Blood Glucose Monitoring Suppl (OneTouch Verio) w/Device KIT, Use daily (Patient taking differently: Use as needed), Disp: 1 kit, Rfl: 0  •  ezetimibe (ZETIA) 10 mg tablet, TAKE 1 TABLET DAILY (Patient taking differently: Take 5 mg by mouth in the morning), Disp: 90 tablet, Rfl: 3  •  FLUoxetine (PROzac) 20 mg capsule, TAKE 3 CAPSULES DAILY, Disp: 270 capsule, Rfl: 3  •  glucose blood (OneTouch Verio) test strip, USE TO TEST DAILY, Disp: 200 strip, Rfl: 1  •  Lancet Devices (ONE TOUCH DELICA LANCING DEV) MISC, Use daily, Disp: 1 each, Rfl: 0  •  losartan (COZAAR) 100 MG tablet, TAKE 1 TABLET DAILY, Disp: 90 tablet, Rfl: 3  •  metFORMIN (GLUCOPHAGE) 500 mg tablet, TAKE 2 TABLETS TWICE A DAY WITH MEALS, Disp: 360 tablet, Rfl: 3  •  metoprolol succinate (TOPROL-XL) 25 mg 24 hr tablet, TAKE 1 TABLET DAILY (Patient taking differently: Take 25 mg by mouth daily), Disp: 90 tablet, Rfl: 3  •  Multiple Vitamins-Minerals (CENTRUM ADULTS PO), Take by mouth in the morning, Disp: , Rfl:   •  OLANZapine (ZyPREXA) 5 mg tablet, Take 0 5 tablets (2 5 mg total) by mouth daily (Patient taking differently: Take 2 5 mg by mouth every evening), Disp: 90 tablet, Rfl: 3  •  OneTouch Delica Lancets 37K MISC, Use daily, Disp: 300 each, Rfl: 1  •  pantoprazole (PROTONIX) 40 mg tablet, Take 1 tablet (40 mg total) by mouth 2 (two) times a day 1 tab in AM, 1 tabs in PM, Disp: 180 tablet, Rfl: 3  •  rosuvastatin (CRESTOR) 5 mg tablet, Take 1 tablet (5 mg total) by mouth once a week, Disp: 12 tablet, Rfl: 3  •  semaglutide, 1 mg/dose, (Ozempic) 4 MG/3ML SOPN injection pen, Inject 0 75 mL (1 mg total) under the skin once a week, Disp: 9 mL, Rfl: 1  •  trospium chloride (SANCTURA) 20 mg tablet, Take 1 tablet (20 mg total) by mouth 2 (two) times a day, Disp: 180 tablet, Rfl: 3  •  zolpidem (AMBIEN) 10 mg tablet, Take 1 tablet (10 mg total) by mouth daily at bedtime as needed for sleep, Disp: 90 tablet, Rfl: 0  •  ALPRAZolam (XANAX) 0 25 mg tablet, Take 1 tablet (0 25 mg total) by mouth 3 (three) times a day as needed for anxiety, Disp: 270 tablet, Rfl: 1  •  hydrochlorothiazide (HYDRODIURIL) 12 5 mg tablet, Take 1 tablet (12 5 mg total) by mouth 3 (three) times a week Monday, Thursday, Saturday (Patient taking differently: Take 12 5 mg by mouth 3 (three) times a week Monday, Wednesday, Friday), Disp: 36 tablet, Rfl: 5    Allergies   Allergen Reactions   • Celebrex [Celecoxib] Other (See Comments)     Cardiologist stated he should not take     • Chlorhexidine Hives     Is able to wash with hibiclens but not use michi cloths   • Other      "steroid eyedrops"     • Prednisone Other (See Comments)     Prednisone eye drops- eye pressure increases         Objective:  Vitals:    01/10/23 0856   BP: 125/84   Pulse: 72       Review of Systems   Constitutional: Negative for chills and fever  HENT: Negative for drooling and hearing loss  Eyes: Negative for visual disturbance  Respiratory: Negative for cough and shortness of breath  Cardiovascular: Negative for chest pain  Gastrointestinal: Negative for abdominal pain  Skin: Negative for rash  Psychiatric/Behavioral: Negative for agitation  Right Knee Exam     Tenderness   The patient is experiencing no tenderness  Range of Motion   The patient has normal right knee ROM  Other   Erythema: absent  Sensation: normal  Pulse: present  Swelling: none  Effusion: no effusion present    Comments: Well healed incision            Physical Exam  Vitals reviewed  Constitutional:       Appearance: He is well-developed  HENT:      Head: Normocephalic  Eyes:      Pupils: Pupils are equal, round, and reactive to light  Pulmonary:      Effort: Pulmonary effort is normal    Abdominal:      General: Abdomen is flat  There is no distension  Musculoskeletal:      Right knee: No effusion  Skin:     General: Skin is warm and dry  Diagnostics, reviewed and taken today if performed as documented:     The attending physician has personally reviewed the pertinent films in PACS and interpretation is as follows:  Right knee x-rays demonstrates prothesis remain in acceptable alignment and position  No fracture or dislocation     Procedures, if performed today:    Procedures    None performed            Portions of the record may have been created with voice recognition software  Occasional wrong word or "sound a like" substitutions may have occurred due to the inherent limitations of voice recognition software  Read the chart carefully and recognize, using context, where substitutions have occurred

## 2023-01-11 ENCOUNTER — APPOINTMENT (OUTPATIENT)
Dept: PHYSICAL THERAPY | Facility: CLINIC | Age: 68
End: 2023-01-11

## 2023-01-13 ENCOUNTER — OFFICE VISIT (OUTPATIENT)
Dept: PHYSICAL THERAPY | Facility: CLINIC | Age: 68
End: 2023-01-13

## 2023-01-13 DIAGNOSIS — R26.89 BALANCE DISORDER: Primary | ICD-10-CM

## 2023-01-13 DIAGNOSIS — G62.9 NEUROPATHY: ICD-10-CM

## 2023-01-13 DIAGNOSIS — R53.1 GENERALIZED WEAKNESS: ICD-10-CM

## 2023-01-13 NOTE — PROGRESS NOTES
Daily Note     Today's date: 2023  Patient name: Aixa Florez  : 1955  MRN: [id number]  Referring provider: Farzaneh Riley MD  Dx:   Encounter Diagnosis     ICD-10-CM    1  Balance disorder  R26 89       2  Neuropathy  G62 9       3  Generalized weakness  R53 1                      Subjective: My goal is to walk for 10 min on the treadmill today at 1 2 mph      Objective: See treatment diary below      Assessment:Was unable to tolerate as much activity this session as he was hoping to  Fatigued overall this session  Did well on unstable surface plan to advance next session  Plan: Continue per plan of care  Precautions: Hypertension, FALL RISK, Depression with anxiety      Access Code: [email or web address]  URL: [email or web address]  Date: 2022  Prepared by: Ailyn Ocasio  • Sit to Stand - 1 x daily - 5 x weekly - 3 sets - 12 reps  • Standing Hip Abduction with Counter Support - 1 x daily - 5 x weekly - 3 sets - 10 reps  • Standing Hip Extension with Counter Support - 1 x daily - 5 x weekly - 3 sets - 10 reps  • Side Stepping with Counter Support - 1 x daily - 5 x weekly - 3 sets - 10 reps  • Standing March with Counter Support - 1 x daily - 5 x weekly - 3 sets - 10 reps      Manuals                                        Neuro Re-Ed         Patient Education          HKM  3# aw 20 ft x 6        Hurdles 12" hurdles 3# aw solo 3 laps fwd 12" hurdles 3# aw //bars 3 laps fwd  3 laps 6" hurdles  3 laps 6" hurdles wit hfoam Hurdles 6" 3 laps with 3# aw   Side Stepping 3 laps solo step 3# aw     Foam beam - 1 laps             Foam Activities         MCTSIB         Step ups 8" fwd/lat 3 laps solo step 8" fwd/lat 15 x ea //bars   Up and over  Up ad over steps 6" x2 and 8" x 2   Airex mait pad      3 laps solo step with 3# aw   Semi Tandem  3 laps //bars       STS airex under feet - 3x10 3x10 airex pad 3x10 pillow on 12" step 5x2 with pillow on 12" step    40x from mat 5x10 from chair 3x10 from chair   hurdles    4 laps solo step fwd/lat ea From foam 3 laps    Ther Ex         Sit <> Stand         Standing Hip ABD and Ext                                                               Ther Activity                           Gait Training         TM  1 2 mph 5% incline - 5 min x 2  1 2 mph 5% incline 5 min x 2 1 2 mph 8 min  1 2 mph 7 min 1 2 mph 7 min            Modalities

## 2023-01-13 NOTE — PROGRESS NOTES
Daily Note     Today's date: 2023  Patient name: Mike Morley  : 1955  MRN: [id number]  Referring provider: Radha Vila MD  Dx:   Encounter Diagnosis     ICD-10-CM    1  Balance disorder  R26 89       2  Neuropathy  G62 9       3  Generalized weakness  R53 1                      Subjective: Patient reports no new changes since last session      Objective: See treatment diary below      Assessment:       Plan: Continue per plan of care  Precautions: Hypertension, FALL RISK, Depression with anxiety      Access Code: [email or web address]  URL: [email or web address]  Date: 2022  Prepared by: Zoey Lamb    Exercises  • Sit to Stand - 1 x daily - 5 x weekly - 3 sets - 12 reps  • Standing Hip Abduction with Counter Support - 1 x daily - 5 x weekly - 3 sets - 10 reps  • Standing Hip Extension with Counter Support - 1 x daily - 5 x weekly - 3 sets - 10 reps  • Side Stepping with Counter Support - 1 x daily - 5 x weekly - 3 sets - 10 reps  • Standing March with Counter Support - 1 x daily - 5 x weekly - 3 sets - 10 reps      Manuals                                        Neuro Re-Ed         Patient Education          HKM  3# aw 20 ft x 6        Hurdles 12" hurdles 3# aw solo 3 laps fwd 12" hurdles 3# aw //bars 3 laps fwd  3 laps 6" hurdles  3 laps 6" hurdles wit hfoam    Side Stepping 3 laps solo step 3# aw     Foam beam - 1 laps 1 lap solo step foam beam            Foam Activities         MCTSIB         Step ups 8" fwd/lat 3 laps solo step 8" fwd/lat 15 x ea //bars   Up and over  Up and over 6" and 2 8" steps with airex pads 3 laps fwd/lat ea   Airex mati pad         Semi Tandem  3 laps //bars       STS airex under feet - 3x10 3x10 airex pad 3x10 pillow on 12" step 5x2 with pillow on 12" step    40x from mat 5x10 from chair 5 sets of 10    hurdles    4 laps solo step fwd/lat ea From foam 3 laps    unevn surfaces      theradisks under at - 3 laps fwd, 3 laps lateral solo step                              Ther Ex         Sit <> Stand         Standing Hip ABD and Ext                                                               Ther Activity                           Gait Training         TM  1 2 mph 5% incline - 5 min x 2  1 2 mph 5% incline 5 min x 2 1 2 mph 8 min  1 2 mph 7 min 1 2 mph 10 min            Modalities

## 2023-01-16 ENCOUNTER — APPOINTMENT (OUTPATIENT)
Dept: PHYSICAL THERAPY | Facility: CLINIC | Age: 68
End: 2023-01-16

## 2023-01-17 ENCOUNTER — NEW PATIENT COMPREHENSIVE (OUTPATIENT)
Dept: URBAN - METROPOLITAN AREA CLINIC 6 | Facility: CLINIC | Age: 68
End: 2023-01-17

## 2023-01-17 DIAGNOSIS — Z96.1: ICD-10-CM

## 2023-01-17 DIAGNOSIS — H40.053: ICD-10-CM

## 2023-01-17 PROCEDURE — 92002 INTRM OPH EXAM NEW PATIENT: CPT

## 2023-01-17 ASSESSMENT — TONOMETRY
OD_IOP_MMHG: 26
OD_IOP_MMHG: 26
OS_IOP_MMHG: 27
OS_IOP_MMHG: 27

## 2023-01-17 ASSESSMENT — VISUAL ACUITY
OD_CC: 20/30-1
OS_CC: 20/25+2
OU_CC: J1+

## 2023-01-18 ENCOUNTER — OFFICE VISIT (OUTPATIENT)
Dept: PHYSICAL THERAPY | Facility: CLINIC | Age: 68
End: 2023-01-18

## 2023-01-18 ENCOUNTER — OFFICE VISIT (OUTPATIENT)
Dept: FAMILY MEDICINE CLINIC | Facility: CLINIC | Age: 68
End: 2023-01-18

## 2023-01-18 VITALS
DIASTOLIC BLOOD PRESSURE: 80 MMHG | SYSTOLIC BLOOD PRESSURE: 128 MMHG | HEART RATE: 72 BPM | BODY MASS INDEX: 46.68 KG/M2 | WEIGHT: 297.4 LBS | RESPIRATION RATE: 20 BRPM | TEMPERATURE: 97.7 F | HEIGHT: 67 IN | OXYGEN SATURATION: 95 %

## 2023-01-18 DIAGNOSIS — R26.89 BALANCE DISORDER: Primary | ICD-10-CM

## 2023-01-18 DIAGNOSIS — E11.42 CONTROLLED TYPE 2 DIABETES MELLITUS WITH DIABETIC POLYNEUROPATHY, WITHOUT LONG-TERM CURRENT USE OF INSULIN (HCC): Primary | ICD-10-CM

## 2023-01-18 DIAGNOSIS — R53.1 GENERALIZED WEAKNESS: ICD-10-CM

## 2023-01-18 DIAGNOSIS — G62.9 NEUROPATHY: ICD-10-CM

## 2023-01-18 DIAGNOSIS — F51.04 PSYCHOPHYSIOLOGICAL INSOMNIA: ICD-10-CM

## 2023-01-18 LAB — SL AMB POCT HEMOGLOBIN AIC: 5.8 (ref ?–6.5)

## 2023-01-18 RX ORDER — TRAZODONE HYDROCHLORIDE 50 MG/1
50 TABLET ORAL
Qty: 30 TABLET | Refills: 0 | Status: SHIPPED | OUTPATIENT
Start: 2023-01-18

## 2023-01-18 NOTE — ASSESSMENT & PLAN NOTE
Educated pt about sleep hygiene  Give trazodone 50mg qhs  SE educated pt  Continue ambien 10mg qhs prn  SE educated pt

## 2023-01-18 NOTE — PROGRESS NOTES
Name: Tino Luna      : 1955      MRN: [id number]  Encounter Provider: Daniel Zaldivar MD  Encounter Date: 2023   Encounter department: [facility] [address]    Chief Complaint   Patient presents with   • Medication Management     Health Maintenance   Topic Date Due   • COVID-19 Vaccine (3 - Booster for Moderna series) 2021   • DM Eye Exam  10/14/2021   • Pneumococcal Vaccine: 65+ Years (3 - PPSV23 if available, else PCV20) 10/20/2021   • PT PLAN OF CARE  2022   • SLP PLAN OF CARE  10/18/2023 (Originally 2021)   • HEMOGLOBIN A1C  2023   • Kidney Health Evaluation: Microalbumin/Creatinine Ratio  2023   • Kidney Health Evaluation: GFR  2023   • BMI: Followup Plan  10/19/2023   • Medicare Annual Wellness Visit (AWV)  10/27/2023   • Falls: Plan of Care  10/27/2023   • Diabetic Foot Exam  10/27/2023   • Fall Risk  2023   • BMI: Adult  2024   • Colorectal Cancer Screening  03/10/2026   • Hepatitis C Screening  Completed   • Influenza Vaccine  Completed   • HIB Vaccine  Aged Out   • IPV Vaccine  Aged Out   • Hepatitis A Vaccine  Aged Out   • Meningococcal ACWY Vaccine  Aged Out   • HPV Vaccine  Aged Out     Assessment & Plan     1  Controlled type 2 diabetes mellitus with diabetic polyneuropathy, without long-term current use of insulin [facility])  Assessment & Plan:    Lab Results   Component Value Date    HGBA1C 5 8 2023     Controlled  Low carb diet  Continue metformin and ozempic  Orders:  -     POCT hemoglobin A1c    2  Psychophysiological insomnia  Assessment & Plan:  Educated pt about sleep hygiene  Give trazodone 50mg qhs  SE educated pt  Continue ambien 10mg qhs prn  SE educated pt  Orders:  -     traZODone (DESYREL) 50 mg tablet; Take 1 tablet (50 mg total) by mouth daily at bedtime         Subjective      HPI     Pt is here by himself  Insomnia---Pt states he tried OTC melatonin but no help     He tried ambien 5mg but cannot sleep  He tried ambien 10mg but still wake up during the night  Pt ask for lunesta  Hx of ANABELL  Pt states he lost 40 lbs and does not use CPAP  Anxiety/depression---Stable  He is on prozac 60mg QD  He is trying to wean off prozac per pt  He use xanax 0 25mg tid as needed, not everyday  DM---Today hgA1C 5 8 controlled  He is on ozempic 1mg weekly  He is on metformin 1000mg bid  Denies side effects           Review of Systems   Constitutional: Negative for appetite change, chills and fever  HENT: Negative for congestion, ear pain, sinus pain and sore throat  Eyes: Negative for discharge and itching  Respiratory: Negative for apnea, cough, chest tightness, shortness of breath and wheezing  Cardiovascular: Negative for chest pain, palpitations and leg swelling  Gastrointestinal: Negative for abdominal pain, anal bleeding, constipation, diarrhea, nausea and vomiting  Endocrine: Negative for cold intolerance, heat intolerance and polyuria  Genitourinary: Negative for difficulty urinating and dysuria  Musculoskeletal: Negative for arthralgias, back pain and myalgias  Skin: Negative for rash  Neurological: Negative for dizziness and headaches  Psychiatric/Behavioral: Positive for sleep disturbance  Negative for agitation, self-injury and suicidal ideas  The patient is not nervous/anxious          Current Outpatient Medications on File Prior to Visit   Medication Sig   • ALPRAZolam (XANAX) 0 25 mg tablet Take 1 tablet (0 25 mg total) by mouth 3 (three) times a day as needed for anxiety   • Blood Glucose Monitoring Suppl (OneTouch Verio) w/Device KIT Use daily (Patient taking differently: Use as needed)   • ezetimibe (ZETIA) 10 mg tablet TAKE 1 TABLET DAILY (Patient taking differently: Take 5 mg by mouth in the morning)   • FLUoxetine (PROzac) 20 mg capsule TAKE 3 CAPSULES DAILY   • glucose blood (OneTouch Verio) test strip USE TO TEST DAILY   • hydrochlorothiazide (HYDRODIURIL) 12 5 mg tablet Take 1 tablet (12 5 mg total) by mouth 3 (three) times a week Monday, Thursday, Saturday (Patient taking differently: Take 12 5 mg by mouth 3 (three) times a week Monday, Wednesday, Friday)   • Lancet Devices (ONE TOUCH DELICA LANCING DEV) MISC Use daily   • losartan (COZAAR) 100 MG tablet TAKE 1 TABLET DAILY   • metFORMIN (GLUCOPHAGE) 500 mg tablet TAKE 2 TABLETS TWICE A DAY WITH MEALS   • metoprolol succinate (TOPROL-XL) 25 mg 24 hr tablet TAKE 1 TABLET DAILY (Patient taking differently: Take 25 mg by mouth daily)   • Multiple Vitamins-Minerals (CENTRUM ADULTS PO) Take by mouth in the morning   • OLANZapine (ZyPREXA) 5 mg tablet Take 0 5 tablets (2 5 mg total) by mouth daily (Patient taking differently: Take 2 5 mg by mouth every evening)   • OneTouch Delica Lancets 06C MISC Use daily   • pantoprazole (PROTONIX) 40 mg tablet Take 1 tablet (40 mg total) by mouth 2 (two) times a day 1 tab in AM, 1 tabs in PM   • rosuvastatin (CRESTOR) 5 mg tablet Take 1 tablet (5 mg total) by mouth once a week   • semaglutide, 1 mg/dose, (Ozempic) 4 MG/3ML SOPN injection pen Inject 0 75 mL (1 mg total) under the skin once a week   • trospium chloride (SANCTURA) 20 mg tablet Take 1 tablet (20 mg total) by mouth 2 (two) times a day   • zolpidem (AMBIEN) 10 mg tablet Take 1 tablet (10 mg total) by mouth daily at bedtime as needed for sleep       Objective     /80 (BP Location: Left arm, Patient Position: Sitting, Cuff Size: Large)   Pulse 72   Temp 97 7 °F (36 5 °C) (Tympanic)   Resp 20   Ht 5' 7 25" (1 708 m)   Wt 135 kg (297 lb 6 4 oz)   SpO2 95%   BMI 46 23 kg/m²     Physical Exam  Constitutional:       Appearance: He is well-developed  HENT:      Head: Normocephalic and atraumatic  Eyes:      General:         Right eye: No discharge  Left eye: No discharge  Conjunctiva/sclera: Conjunctivae normal    Cardiovascular:      Rate and Rhythm: Normal rate and regular rhythm        Heart sounds: Normal heart sounds  Pulmonary:      Effort: Pulmonary effort is normal  No respiratory distress  Breath sounds: Normal breath sounds  No wheezing or rales  Abdominal:      General: Bowel sounds are normal  There is no distension  Palpations: Abdomen is soft  Tenderness: There is no abdominal tenderness  There is no guarding  Musculoskeletal:         General: Normal range of motion  Cervical back: Normal range of motion and neck supple  No tenderness  Lymphadenopathy:      Cervical: No cervical adenopathy  Neurological:      Mental Status: He is alert         Patrick Alvarez MD

## 2023-01-18 NOTE — PROGRESS NOTES
Daily Note     Today's date: 2023  Patient name: Violet Brunner  : 1955  MRN: [id number]  Referring provider: Monae Ponce MD  Dx:   Encounter Diagnosis     ICD-10-CM    1  Balance disorder  R26 89       2  Neuropathy  G62 9       3  Generalized weakness  R53 1                      Subjective: Patient reports to physical therapy this date with no new changes no report      Objective: See treatment diary below      Assessment:   Discussed possible d/c at next session, will complete progress update next session  He did very well with step ups without UE and has been able to tolerate full 10 min on treadmill this date  Discussed possible HEP transitoin and completing gym program and the importance of continued exercise to improve endurance and balance  Plan: Continue per plan of care  Precautions: Hypertension, FALL RISK, Depression with anxiety      Access Code: [email or web address]  URL: [email or web address]  Date: 2022  Prepared by: Raquel Newell    Exercises  • Sit to Stand - 1 x daily - 5 x weekly - 3 sets - 12 reps  • Standing Hip Abduction with Counter Support - 1 x daily - 5 x weekly - 3 sets - 10 reps  • Standing Hip Extension with Counter Support - 1 x daily - 5 x weekly - 3 sets - 10 reps  • Side Stepping with Counter Support - 1 x daily - 5 x weekly - 3 sets - 10 reps  • Standing March with Counter Support - 1 x daily - 5 x weekly - 3 sets - 10 reps      Manuals                                        Neuro Re-Ed         Patient Education          HKM  With 7 5# bolster 3 laps //bars        Hurdles    3 laps 6" hurdles  3 laps 6" hurdles wit hfoam    Side Stepping     Foam beam - 1 laps 1 lap solo step foam beam            Foam Activities         MCTSIB         Step ups 8" 15x ea fwd/lat     Up and over  Up and over 6" and 2 8" steps with airex pads 3 laps fwd/lat ea   Airex mati pad         Semi Tandem         STS    5x2 with pillow on 12" step    40x from mat 5x10 from chair 5 sets of 10    hurdles    4 laps solo step fwd/lat ea From foam 3 laps    unevn surfaces      theradisks under at - 3 laps fwd, 3 laps lateral solo step   backwards 3 laps //bars 7 5# bolster in arms                          Ther Ex         Sit <> Stand         Standing Hip ABD and Ext                                                               Ther Activity                           Gait Training         TM  10 min 1 2 mph b/l UE   1 2 mph 8 min  1 2 mph 7 min 1 2 mph 10 min            Modalities

## 2023-01-20 ENCOUNTER — OFFICE VISIT (OUTPATIENT)
Dept: PHYSICAL THERAPY | Facility: CLINIC | Age: 68
End: 2023-01-20

## 2023-01-20 DIAGNOSIS — R26.89 BALANCE DISORDER: Primary | ICD-10-CM

## 2023-01-20 DIAGNOSIS — G62.9 NEUROPATHY: ICD-10-CM

## 2023-01-20 DIAGNOSIS — R53.1 GENERALIZED WEAKNESS: ICD-10-CM

## 2023-01-20 NOTE — PROGRESS NOTES
Discharge Note     Today's date: 2023  Patient name: Laisha Layton  : 1955  MRN: [id number]  Referring provider: Ana Tamez MD  Dx:   Encounter Diagnosis     ICD-10-CM    1  Balance disorder  R26 89       2  Neuropathy  G62 9       3  Generalized weakness  R53 1                      Subjective: Would like to stay in therapy  Plan to complete fitness program 3 days a week  Objective: See treatment diary below    Balance Test     6 Minute Walk Test (ft): 3 minutes lapsed before patient had dyspnea on exertion (93% SPo2): 375 feet    PN: 700 ft   : 1,000 ft   2 Minute Walk Test (ft):     Gait Speed (ft/s): 0 3 m/s    PN:0 96   5x Sit To Stand (s): 15 21 seconds  (IE )    PN: 11 72 sec  : 11 sec   Dual-Task (P) TUG 13 31 sec (L) // 12 seconds (R)   TUG: Avg 11 1 (IE )   PN:   : 7 9  Sec             Assessment: Since initial evaluation karlee has made significant improvements with endurance, strength, balance and independence with home program  Discussed with patient d/c to Hep which pt was agreeable  Plan to complete fitness program 3 days a week and walk on the treadmill       Goals  STG (3 weeks)   Improve (reduce) TUG score by 4 seconds indicating meaningful improvement in fall risk - met  Improve FGA score by 4 indicating meaningful improvement in fall risk - met  Improve/Reduce 5x STS score by 5 seconds indicating meaningful improvement in fall risk and power generation  - met  Improve generalized strength to 4+/5 to demonstrate improvement in facilitation of efficiency of functional activities and reduced fall risk - met    LTG (3 weeks)   Improve (reduce) TUG score to 10 5 seconds indicating meaningful improvement in fall risk - met  Improve FGA score to > 24/30 indicating meaningful improvement in fall risk - progressed  Improve/Reduce 5x STS score to < 10 seconds seconds indicating meaningful improvement in fall risk and power generation - progressed  Improve generalized strength to 5/5 on RLE to demonstrate improvement in facilitation of efficiency of functional activities and reduced fall risk - progressed  Have no falls in 8 week period once beginning therapy - met  Demonstrate independence in home walking protocol and HEP - plan in place    Plan: Continue per plan of care  Precautions: Hypertension, FALL RISK, Depression with anxiety      Access Code: [email or web address]  URL: [email or web address]  Date: 11/21/2022  Prepared by: Felipe Braun    Exercises  • Sit to Stand - 1 x daily - 5 x weekly - 3 sets - 12 reps  • Standing Hip Abduction with Counter Support - 1 x daily - 5 x weekly - 3 sets - 10 reps  • Standing Hip Extension with Counter Support - 1 x daily - 5 x weekly - 3 sets - 10 reps  • Side Stepping with Counter Support - 1 x daily - 5 x weekly - 3 sets - 10 reps  • Standing March with Counter Support - 1 x daily - 5 x weekly - 3 sets - 10 reps      Manuals 1/18 1/20 12/23 12/27 1/13                                       Neuro Re-Ed         Patient Education          HKM  With 7 5# bolster 3 laps //bars        Hurdles    3 laps 6" hurdles  3 laps 6" hurdles wit hfoam    Side Stepping     Foam beam - 1 laps 1 lap solo step foam beam            Foam Activities         MCTSIB         Step ups 8" 15x ea fwd/lat  8" 15x ea fwd/lat   Up and over  Up and over 6" and 2 8" steps with airex pads 3 laps fwd/lat ea   Airex mati pad         Semi Tandem         STS  40x  5x2 with pillow on 12" step    40x from mat 5x10 from chair 5 sets of 10    hurdles    4 laps solo step fwd/lat ea From foam 3 laps    unevn surfaces      theradisks under at - 3 laps fwd, 3 laps lateral solo step   backwards 3 laps //bars 7 5# bolster in arms                          Ther Ex         Sit <> Stand         Standing Hip ABD and Ext                                                               Ther Activity                           Gait Training         TM 10 min 1 2 mph b/l UE 4 min 1 2 mph b/l UE  1 2 mph 8 min  1 2 mph 7 min 1 2 mph 10 min            Modalities

## 2023-02-02 ENCOUNTER — OFFICE VISIT (OUTPATIENT)
Dept: BARIATRICS | Facility: CLINIC | Age: 68
End: 2023-02-02

## 2023-02-02 VITALS
DIASTOLIC BLOOD PRESSURE: 84 MMHG | OXYGEN SATURATION: 96 % | BODY MASS INDEX: 46.51 KG/M2 | WEIGHT: 299.2 LBS | SYSTOLIC BLOOD PRESSURE: 124 MMHG | HEART RATE: 73 BPM

## 2023-02-02 DIAGNOSIS — E66.01 OBESITY, CLASS III, BMI 40-49.9 (MORBID OBESITY) (HCC): Primary | ICD-10-CM

## 2023-02-02 DIAGNOSIS — I10 BENIGN ESSENTIAL HYPERTENSION: ICD-10-CM

## 2023-02-02 DIAGNOSIS — F80.81 STUTTERING: ICD-10-CM

## 2023-02-02 DIAGNOSIS — G47.33 OBSTRUCTIVE SLEEP APNEA SYNDROME: ICD-10-CM

## 2023-02-02 DIAGNOSIS — F41.8 DEPRESSION WITH ANXIETY: ICD-10-CM

## 2023-02-02 DIAGNOSIS — E78.00 PURE HYPERCHOLESTEROLEMIA: ICD-10-CM

## 2023-02-02 DIAGNOSIS — E11.9 TYPE 2 DIABETES MELLITUS WITHOUT COMPLICATION, WITHOUT LONG-TERM CURRENT USE OF INSULIN (HCC): ICD-10-CM

## 2023-02-02 DIAGNOSIS — Z98.84 HX OF LAPAROSCOPIC ADJUSTABLE GASTRIC BANDING: ICD-10-CM

## 2023-02-02 NOTE — ASSESSMENT & PLAN NOTE
- Taking losartan  May improve with weight loss and lifestyle modification  Continue management with prescribing provider

## 2023-02-02 NOTE — ASSESSMENT & PLAN NOTE
S/P Lap Band placement on 7/26/2010, with removal of band by Dr Chelsie Kim on 8/30/2022 due to Molina's esophagus

## 2023-02-02 NOTE — ASSESSMENT & PLAN NOTE
- Patient is pursuing Conservative Program  - Initial weight loss goal of 5-10% weight loss for improved health  - Already on metformin 1000 mg twice a day through primary care  - Has been on Ozempic for about 1 5 years  Start weight was 318 lbs  Currently taking 1 mg weekly  Tolerating well overall  He is interested in increasing dose - initially was helping more with appetite  - Increase Ozempic to 2 mg weekly  - Patient denies personal history of pancreatitis  Patient also denies personal and family history of thyroid cancer and multiple endocrine neoplasia type 2 (MEN 2 tumor)  - Labs reviewed: A1C 5 8 on 1/18/2023  Initial MWM: 298 lbs  Current: 299 1  lbs  Change: +1 1 lbs  Goal: 257 5 lbs LTG, 280 lbs STG    Goals:  Do not skip meals  Get protein with each meal - try a protein shake or bar  Reduce starch to 1/2 cup per meal   Great job reducing diet soda! Keep up the great work with water intake - at least 64 oz daily  Gradually increase walking as tolerated  Start food logging, weighing and measuring food  2000 calories per day

## 2023-02-02 NOTE — PROGRESS NOTES
Assessment/Plan:     Obesity, Class III, BMI 40-49 9 (morbid obesity) ([facility] [address] )  - Patient is pursuing Conservative Program  - Initial weight loss goal of 5-10% weight loss for improved health  - Already on metformin 1000 mg twice a day through primary care  - Has been on Ozempic for about 1 5 years  Start weight was 318 lbs  Currently taking 1 mg weekly  Tolerating well overall  He is interested in increasing dose - initially was helping more with appetite  - Increase Ozempic to 2 mg weekly  - Patient denies personal history of pancreatitis  Patient also denies personal and family history of thyroid cancer and multiple endocrine neoplasia type 2 (MEN 2 tumor)  - Labs reviewed: A1C 5 8 on 1/18/2023  Initial MWM: 298 lbs  Current: 299 1  lbs  Change: +1 1 lbs  Goal: 257 5 lbs LTG, 280 lbs STG    Goals:  Do not skip meals  Get protein with each meal - try a protein shake or bar  Reduce starch to 1/2 cup per meal   Great job reducing diet soda! Keep up the great work with water intake - at least 64 oz daily  Gradually increase walking as tolerated  Start food logging, weighing and measuring food  2000 calories per day  Hx of laparoscopic adjustable gastric banding  S/P Lap Band placement on 7/26/2010, with removal of band by Dr Dipak Guerrero on 8/30/2022 due to Molina's esophagus  Pure hypercholesterolemia  - Taking Crestor and Zetia  May improve with weight loss and lifestyle modification  Continue management with prescribing provider  Stuttering  - Taking Zyprexa  Continue management with prescribing provider  Depression with anxiety  - Taking fluoxetine and xanax as needed  Continue management with prescribing provider  Benign essential hypertension  - Taking losartan  May improve with weight loss and lifestyle modification  Continue management with prescribing provider  Di[facility] mellitus ([facility] )  - Taking metformin and Ozempic   May improve with weight loss and lifestyle modification  Continue management with prescribing provider  Lab Results   Component Value Date    HGBA1C 5 8 01/18/2023       Sleep apnea  - Could not tolerate CPAP  Eddie Monsivais was seen today for follow-up  Diagnoses and all orders for this visit:    Obesity, Class III, BMI 40-49 9 (morbid obesity) (HCC)  -     semaglutide, 2 mg/dose, (Ozempic, 2 MG/DOSE,) 8 mg/ mL injection pen; Inject 0 75 mL (2 mg total) under the skin every 7 days    Type 2 diabetes mellitus without complication, without long-term current use of insulin (HCC)  -     semaglutide, 2 mg/dose, (Ozempic, 2 MG/DOSE,) 8 mg/ mL injection pen; Inject 0 75 mL (2 mg total) under the skin every 7 days    Pure hypercholesterolemia  -     semaglutide, 2 mg/dose, (Ozempic, 2 MG/DOSE,) 8 mg/ mL injection pen; Inject 0 75 mL (2 mg total) under the skin every 7 days    Hx of laparoscopic adjustable gastric banding    Stuttering    Depression with anxiety    Benign essential hypertension    Obstructive sleep apnea syndrome          Follow up in approximately 2 months with Non-Surgical Physician/Advanced Practitioner  Subjective:   Chief Complaint   Patient presents with   • Follow-up     Mwm 2 mth fu, has questions about ozempic       Patient ID: Austin Drummond  is a 69 y o  male with excess weight/obesity here to pursue weight management  Patient is pursuing Conservative Program    Most recent notes and records were reviewed  HPI    Wt Readings from Last 10 Encounters:   02/02/23 136 kg (299 lb 3 2 oz)   01/18/23 135 kg (297 lb 6 4 oz)   01/10/23 (!) 137 kg (302 lb 0 5 oz)   01/03/23 (!) 137 kg (301 lb)   11/10/22 133 kg (293 lb)   10/27/22 133 kg (293 lb 6 4 oz)   10/24/22 135 kg (298 lb)   10/19/22 135 kg (298 lb)   10/18/22 136 kg (298 lb 12 8 oz)   10/06/22 133 kg (294 lb)     Had Lap Band placement on 7/26/2010  He had developed Molina's esophagus and therefore band removal performed by Dr Natividad Taveras 8/30/2022  No further weight loss surgery planned  Weight prior to band was 300 lbs and jaiden 258 lbs  Highest weight was 335 lbs 1 5 years ago  Following with MWM due to weight regain  Gained about 10 lbs over the holidays and working on losing it       Started Ozempic through PCP 1 5 years ago  Lost about 40 lbs  Currently taking Ozempic 1 mg weekly  Slight nausea after taking the second dose and constipation, but quickly resolves  Feels that it is not helping as much with appetite as it did initially  He is interested in increasing the dose        Already on metformin 1000 mg twice a day       Was on Phen fen years ago and lost weight  Has not been food logging  Wakes up at 12:30 pm for the day      B - 1:30 pm - fruit  S- none  L- 6 pm - shrimp or crab dylan or pot roast with carrots    S- crackers or pretzels 120 calories  D 8-9 pm - hoagie or coconut shrimp  S- crackers or ice cream or SF ice pops      Hydration: 4 bottles of water, 2 cans diet pepsi daily, diet 1/4 bottle cranberry juice, 1 bottle daily SF Gatorade   Alcohol: few beers over 1 year (less than one case)  Smoking: denies  Exercise: Treadmill twice a week for 15 minutes  Occupation: retired -  [employer], [employer], [employer]   Sleep: 8-9 hours, but broken up  STOP bang: Has ANABELL, tried CPAP, but could not tolerate it       Seeing neurologist for possible parkinson's        Colonoscopy: UTD, due 2026           The following portions of the patient's history were reviewed and updated as appropriate: allergies, current medications, past family history, past medical history, past social history, past surgical history, and problem list     Family History   Problem Relation Age of Onset   • Heart disease Father    • Coronary artery disease Father    • Prostate cancer Father    • Coronary artery disease Mother    • Diabetes Mother         mellitus   • Diabetes Maternal Grandmother         mellitus   • Coronary artery disease Maternal Grandfather    • Coronary artery disease Paternal Grandfather         Review of Systems   HENT: Negative for sore throat  Respiratory: Positive for cough (intermittent due to GERD)  Negative for shortness of breath  Cardiovascular: Negative for chest pain and palpitations  Gastrointestinal: Positive for constipation (PRN medication helps)  Negative for abdominal pain, diarrhea, nausea and vomiting         + GERD taking Protonix, seeing GI  Musculoskeletal: Negative for arthralgias and back pain  Skin: Negative for rash  Psychiatric/Behavioral: Negative for suicidal ideas  Denies anxiety and depression       Objective:  /84   Pulse 73   Wt 136 kg (299 lb 3 2 oz)   SpO2 96%   BMI 46 51 kg/m²     Physical Exam  Vitals and nursing note reviewed  Constitutional   General appearance: Abnormal   well developed and morbidly obese  Eyes No conjunctival injection  Ears, Nose, Mouth, and Throat Oral mucosa moist    Pulmonary   Respiratory effort: No increased work of breathing or signs of respiratory distress  Cardiovascular    Examination of extremities for edema and/or varicosities: Normal   no edema  Abdomen   Abdomen: Abnormal   The abdomen was obese  Musculoskeletal   Normal range of motion  Neurological   Gait and station: wide based, but ambulated independently     Psychiatric   Orientation to person, place and time: Normal     Affect: appropriate

## 2023-02-02 NOTE — ASSESSMENT & PLAN NOTE
- Taking metformin and Ozempic  May improve with weight loss and lifestyle modification  Continue management with prescribing provider         Lab Results   Component Value Date    HGBA1C 5 8 01/18/2023

## 2023-02-03 DIAGNOSIS — I25.10 CORONARY ARTERY DISEASE INVOLVING NATIVE CORONARY ARTERY OF NATIVE HEART WITHOUT ANGINA PECTORIS: ICD-10-CM

## 2023-02-03 DIAGNOSIS — E78.2 MIXED HYPERLIPIDEMIA: ICD-10-CM

## 2023-02-06 RX ORDER — HYDROCHLOROTHIAZIDE 12.5 MG/1
12.5 TABLET ORAL 3 TIMES WEEKLY
Qty: 36 TABLET | Refills: 1 | Status: SHIPPED | OUTPATIENT
Start: 2023-02-06 | End: 2023-05-07

## 2023-02-17 ENCOUNTER — FOLLOW UP (OUTPATIENT)
Dept: URBAN - METROPOLITAN AREA CLINIC 6 | Facility: CLINIC | Age: 68
End: 2023-02-17

## 2023-02-17 DIAGNOSIS — H35.371: ICD-10-CM

## 2023-02-17 DIAGNOSIS — H40.053: ICD-10-CM

## 2023-02-17 DIAGNOSIS — Z96.1: ICD-10-CM

## 2023-02-17 DIAGNOSIS — E11.9: ICD-10-CM

## 2023-02-17 DIAGNOSIS — H35.413: ICD-10-CM

## 2023-02-17 LAB
LEFT EYE DIABETIC RETINOPATHY: NORMAL
RIGHT EYE DIABETIC RETINOPATHY: NORMAL

## 2023-02-17 PROCEDURE — 92133 CPTRZD OPH DX IMG PST SGM ON: CPT

## 2023-02-17 PROCEDURE — 92014 COMPRE OPH EXAM EST PT 1/>: CPT

## 2023-02-17 ASSESSMENT — VISUAL ACUITY
OD_CC: 20/25-2
OS_CC: 20/30

## 2023-02-17 ASSESSMENT — TONOMETRY
OS_IOP_MMHG: 19
OD_IOP_MMHG: 19
OS_IOP_MMHG: 21
OD_IOP_MMHG: 21

## 2023-02-21 ENCOUNTER — APPOINTMENT (EMERGENCY)
Dept: RADIOLOGY | Facility: HOSPITAL | Age: 68
End: 2023-02-21

## 2023-02-21 ENCOUNTER — HOSPITAL ENCOUNTER (OUTPATIENT)
Facility: HOSPITAL | Age: 68
Setting detail: OBSERVATION
Discharge: HOME HEALTH | End: 2023-02-22
Attending: EMERGENCY MEDICINE | Admitting: STUDENT IN AN ORGANIZED HEALTH CARE EDUCATION/TRAINING PROGRAM

## 2023-02-21 DIAGNOSIS — R53.1 GENERALIZED WEAKNESS: Primary | ICD-10-CM

## 2023-02-21 DIAGNOSIS — E78.2 MIXED HYPERLIPIDEMIA: ICD-10-CM

## 2023-02-21 DIAGNOSIS — F80.81 STUTTERING: ICD-10-CM

## 2023-02-21 DIAGNOSIS — R26.2 AMBULATORY DYSFUNCTION: ICD-10-CM

## 2023-02-21 DIAGNOSIS — I25.10 CORONARY ARTERY DISEASE INVOLVING NATIVE CORONARY ARTERY OF NATIVE HEART WITHOUT ANGINA PECTORIS: ICD-10-CM

## 2023-02-21 DIAGNOSIS — R11.2 NAUSEA AND VOMITING: ICD-10-CM

## 2023-02-21 PROBLEM — R55 SYNCOPE: Status: ACTIVE | Noted: 2023-02-21

## 2023-02-21 LAB
2HR DELTA HS TROPONIN: 1 NG/L
ALBUMIN SERPL BCP-MCNC: 3.7 G/DL (ref 3.5–5)
ALP SERPL-CCNC: 72 U/L (ref 46–116)
ALT SERPL W P-5'-P-CCNC: 32 U/L (ref 12–78)
ANION GAP SERPL CALCULATED.3IONS-SCNC: 8 MMOL/L (ref 4–13)
AST SERPL W P-5'-P-CCNC: 19 U/L (ref 5–45)
BASOPHILS # BLD AUTO: 0.04 THOUSANDS/ÂΜL (ref 0–0.1)
BASOPHILS NFR BLD AUTO: 0 % (ref 0–1)
BILIRUB SERPL-MCNC: 0.64 MG/DL (ref 0.2–1)
BUN SERPL-MCNC: 14 MG/DL (ref 5–25)
CALCIUM SERPL-MCNC: 9.6 MG/DL (ref 8.3–10.1)
CARDIAC TROPONIN I PNL SERPL HS: 3 NG/L
CARDIAC TROPONIN I PNL SERPL HS: 4 NG/L
CHLORIDE SERPL-SCNC: 104 MMOL/L (ref 96–108)
CO2 SERPL-SCNC: 24 MMOL/L (ref 21–32)
CREAT SERPL-MCNC: 1.05 MG/DL (ref 0.6–1.3)
EOSINOPHIL # BLD AUTO: 0.54 THOUSAND/ÂΜL (ref 0–0.61)
EOSINOPHIL NFR BLD AUTO: 5 % (ref 0–6)
ERYTHROCYTE [DISTWIDTH] IN BLOOD BY AUTOMATED COUNT: 13.2 % (ref 11.6–15.1)
FLUAV RNA RESP QL NAA+PROBE: NEGATIVE
FLUBV RNA RESP QL NAA+PROBE: NEGATIVE
GFR SERPL CREATININE-BSD FRML MDRD: 72 ML/MIN/1.73SQ M
GLUCOSE SERPL-MCNC: 151 MG/DL (ref 65–140)
GLUCOSE SERPL-MCNC: 184 MG/DL (ref 65–140)
GLUCOSE SERPL-MCNC: 186 MG/DL (ref 65–140)
HCT VFR BLD AUTO: 44.4 % (ref 36.5–49.3)
HGB BLD-MCNC: 15.4 G/DL (ref 12–17)
IMM GRANULOCYTES # BLD AUTO: 0.05 THOUSAND/UL (ref 0–0.2)
IMM GRANULOCYTES NFR BLD AUTO: 0 % (ref 0–2)
LYMPHOCYTES # BLD AUTO: 0.83 THOUSANDS/ÂΜL (ref 0.6–4.47)
LYMPHOCYTES NFR BLD AUTO: 7 % (ref 14–44)
MCH RBC QN AUTO: 29.8 PG (ref 26.8–34.3)
MCHC RBC AUTO-ENTMCNC: 34.7 G/DL (ref 31.4–37.4)
MCV RBC AUTO: 86 FL (ref 82–98)
MONOCYTES # BLD AUTO: 0.63 THOUSAND/ÂΜL (ref 0.17–1.22)
MONOCYTES NFR BLD AUTO: 6 % (ref 4–12)
NEUTROPHILS # BLD AUTO: 9.25 THOUSANDS/ÂΜL (ref 1.85–7.62)
NEUTS SEG NFR BLD AUTO: 82 % (ref 43–75)
NRBC BLD AUTO-RTO: 0 /100 WBCS
PLATELET # BLD AUTO: 244 THOUSANDS/UL (ref 149–390)
PMV BLD AUTO: 8.8 FL (ref 8.9–12.7)
POTASSIUM SERPL-SCNC: 3.6 MMOL/L (ref 3.5–5.3)
PROT SERPL-MCNC: 7.1 G/DL (ref 6.4–8.4)
RBC # BLD AUTO: 5.16 MILLION/UL (ref 3.88–5.62)
RSV RNA RESP QL NAA+PROBE: NEGATIVE
SARS-COV-2 RNA RESP QL NAA+PROBE: NEGATIVE
SODIUM SERPL-SCNC: 136 MMOL/L (ref 135–147)
TSH SERPL DL<=0.05 MIU/L-ACNC: 1.19 UIU/ML (ref 0.45–4.5)
WBC # BLD AUTO: 11.34 THOUSAND/UL (ref 4.31–10.16)

## 2023-02-21 RX ORDER — ONDANSETRON 2 MG/ML
4 INJECTION INTRAMUSCULAR; INTRAVENOUS ONCE
Status: COMPLETED | OUTPATIENT
Start: 2023-02-21 | End: 2023-02-21

## 2023-02-21 RX ORDER — ACETAMINOPHEN 325 MG/1
650 TABLET ORAL EVERY 6 HOURS PRN
Status: DISCONTINUED | OUTPATIENT
Start: 2023-02-21 | End: 2023-02-22 | Stop reason: HOSPADM

## 2023-02-21 RX ORDER — FLUOXETINE HYDROCHLORIDE 20 MG/1
60 CAPSULE ORAL DAILY
Status: DISCONTINUED | OUTPATIENT
Start: 2023-02-22 | End: 2023-02-22 | Stop reason: HOSPADM

## 2023-02-21 RX ORDER — EZETIMIBE 10 MG/1
5 TABLET ORAL
Status: DISCONTINUED | OUTPATIENT
Start: 2023-02-21 | End: 2023-02-22 | Stop reason: HOSPADM

## 2023-02-21 RX ORDER — ALPRAZOLAM 0.25 MG/1
0.25 TABLET ORAL 3 TIMES DAILY PRN
Status: DISCONTINUED | OUTPATIENT
Start: 2023-02-21 | End: 2023-02-22 | Stop reason: HOSPADM

## 2023-02-21 RX ORDER — ONDANSETRON 2 MG/ML
1 INJECTION INTRAMUSCULAR; INTRAVENOUS ONCE
Status: COMPLETED | OUTPATIENT
Start: 2023-02-21 | End: 2023-02-21

## 2023-02-21 RX ORDER — ONDANSETRON 2 MG/ML
4 INJECTION INTRAMUSCULAR; INTRAVENOUS EVERY 6 HOURS PRN
Status: DISCONTINUED | OUTPATIENT
Start: 2023-02-21 | End: 2023-02-22 | Stop reason: HOSPADM

## 2023-02-21 RX ORDER — OXYBUTYNIN CHLORIDE 5 MG/1
5 TABLET ORAL 2 TIMES DAILY
Status: DISCONTINUED | OUTPATIENT
Start: 2023-02-21 | End: 2023-02-22 | Stop reason: HOSPADM

## 2023-02-21 RX ORDER — OLANZAPINE 2.5 MG/1
2.5 TABLET ORAL EVERY EVENING
Status: DISCONTINUED | OUTPATIENT
Start: 2023-02-21 | End: 2023-02-22 | Stop reason: HOSPADM

## 2023-02-21 RX ORDER — PANTOPRAZOLE SODIUM 40 MG/1
40 TABLET, DELAYED RELEASE ORAL
Status: DISCONTINUED | OUTPATIENT
Start: 2023-02-21 | End: 2023-02-22 | Stop reason: HOSPADM

## 2023-02-21 RX ORDER — METOPROLOL SUCCINATE 25 MG/1
25 TABLET, EXTENDED RELEASE ORAL DAILY
Status: DISCONTINUED | OUTPATIENT
Start: 2023-02-22 | End: 2023-02-22 | Stop reason: HOSPADM

## 2023-02-21 RX ORDER — ENOXAPARIN SODIUM 100 MG/ML
40 INJECTION SUBCUTANEOUS 2 TIMES DAILY
Status: DISCONTINUED | OUTPATIENT
Start: 2023-02-21 | End: 2023-02-22 | Stop reason: HOSPADM

## 2023-02-21 RX ORDER — INSULIN LISPRO 100 [IU]/ML
1-5 INJECTION, SOLUTION INTRAVENOUS; SUBCUTANEOUS
Status: DISCONTINUED | OUTPATIENT
Start: 2023-02-21 | End: 2023-02-22 | Stop reason: HOSPADM

## 2023-02-21 RX ORDER — TRAZODONE HYDROCHLORIDE 50 MG/1
50 TABLET ORAL
Status: DISCONTINUED | OUTPATIENT
Start: 2023-02-21 | End: 2023-02-22 | Stop reason: HOSPADM

## 2023-02-21 RX ORDER — INSULIN LISPRO 100 [IU]/ML
1-5 INJECTION, SOLUTION INTRAVENOUS; SUBCUTANEOUS
Status: DISCONTINUED | OUTPATIENT
Start: 2023-02-22 | End: 2023-02-22 | Stop reason: HOSPADM

## 2023-02-21 RX ORDER — LOSARTAN POTASSIUM 50 MG/1
100 TABLET ORAL DAILY
Status: DISCONTINUED | OUTPATIENT
Start: 2023-02-22 | End: 2023-02-22 | Stop reason: HOSPADM

## 2023-02-21 RX ORDER — METOCLOPRAMIDE HYDROCHLORIDE 5 MG/ML
10 INJECTION INTRAMUSCULAR; INTRAVENOUS EVERY 6 HOURS PRN
Status: DISCONTINUED | OUTPATIENT
Start: 2023-02-21 | End: 2023-02-22 | Stop reason: HOSPADM

## 2023-02-21 RX ORDER — ZOLPIDEM TARTRATE 5 MG/1
10 TABLET ORAL
Status: DISCONTINUED | OUTPATIENT
Start: 2023-02-21 | End: 2023-02-22 | Stop reason: HOSPADM

## 2023-02-21 RX ADMIN — OXYBUTYNIN CHLORIDE 5 MG: 5 TABLET ORAL at 21:31

## 2023-02-21 RX ADMIN — ONDANSETRON HYDROCHLORIDE 4 MG: 2 INJECTION, SOLUTION INTRAMUSCULAR; INTRAVENOUS at 19:09

## 2023-02-21 RX ADMIN — SODIUM CHLORIDE 1000 ML: 0.9 INJECTION, SOLUTION INTRAVENOUS at 14:28

## 2023-02-21 RX ADMIN — TRAZODONE HYDROCHLORIDE 50 MG: 50 TABLET ORAL at 21:31

## 2023-02-21 RX ADMIN — ONDANSETRON HYDROCHLORIDE 4 MG: 2 INJECTION, SOLUTION INTRAMUSCULAR; INTRAVENOUS at 16:15

## 2023-02-21 RX ADMIN — ENOXAPARIN SODIUM 40 MG: 40 INJECTION SUBCUTANEOUS at 21:30

## 2023-02-21 RX ADMIN — EZETIMIBE 5 MG: 10 TABLET ORAL at 21:30

## 2023-02-21 RX ADMIN — PANTOPRAZOLE SODIUM 40 MG: 40 TABLET, DELAYED RELEASE ORAL at 21:31

## 2023-02-21 RX ADMIN — INSULIN LISPRO 1 UNITS: 100 INJECTION, SOLUTION INTRAVENOUS; SUBCUTANEOUS at 21:33

## 2023-02-21 RX ADMIN — OLANZAPINE 2.5 MG: 2.5 TABLET, FILM COATED ORAL at 21:31

## 2023-02-21 NOTE — H&P
[address] 1955, 69 y o  male MRN: [id number]  Unit/Bed#: ED 10 Encounter: [id number]  Primary Care Provider: Micaela Luis MD   Date and time admitted to hospital: 2/21/2023  1:54 PM    * Syncope  Assessment & Plan  · Vasovagal 2/2 n/v  · Tele  · Unlikely cardiac so IP echo not warranted  · No orthostatics as pt not dizzy when standing    Nausea and vomiting  Assessment & Plan  · Likely viral gastroenteritis in setting of Ozempic dose increased  · Zofran and Reglan prn  · Pt should talk to wt mgmt about Ozempic    Morbid obesity ([facility] )  Assessment & Plan  · RD protocol  · Pt should talk to wt mgmt about n/v and Ozempic    Depression with anxiety  Assessment & Plan  · C/w SSRI and Zyprexa and Trazadone  · Xanax and Ambien prn as pt takes at home    Benign essential hypertension  Assessment & Plan  · C/w home BB and ARB  · Hold HCTZ due to poor po intake    Diabetes mellitus ([facility] )  Assessment & Plan  Lab Results   Component Value Date    HGBA1C 5 8 01/18/2023       Recent Labs     02/21/23  1403   POCGLU 184*       Blood Sugar Average: Last 72 hrs:  (P) 184     · Hold home meds   · ISS    VTE Pharmacologic Prophylaxis: VTE Score: 4 Moderate Risk (Score 3-4) - Pharmacological DVT Prophylaxis Ordered: enoxaparin (Lovenox)  Code Status: Full  Discussion with family: Patient declined call to   Anticipated Length of Stay: Patient will be admitted on an observation basis with an anticipated length of stay of less than 2 midnights secondary to need for n/v      Total Time Spent on Date of Encounter in care of patient: 65 minutes This time was spent on one or more of the following: performing physical exam; counseling and coordination of care; obtaining or reviewing history; documenting in the medical record; reviewing/ordering tests, medications or procedures; communicating with other healthcare professionals and discussing with patient's family/caregivers  Chief Complaint: nausea    History of Present Illness:  Aixa Florez is a 69 y o  male with a PMH of DM who presents with 5 days of n/v   Patient says starting on Friday that he felt nauseous  On Saturday he took an increased dose of Ozempic as directed by his weight management doctor  He then experienced more nausea and vomiting having a few episodes of vomiting today  Today he noted about 3 episodes of vomiting  During 1 episode of vomiting, he felt lightheaded sat down and said he briefly had a loss of consciousness  He denies orthostasis  He denies chest pain or shortness of breath  He has not taken anything for his nausea or vomiting  He does admit to some relief from Zofran given in the ER  Review of Systems:  Review of Systems   Constitutional: Negative  HENT: Negative  Eyes: Negative  Respiratory: Negative  Cardiovascular: Negative  Gastrointestinal: Negative  Endocrine: Negative  Genitourinary: Negative  Musculoskeletal: Negative  Skin: Negative  Allergic/Immunologic: Negative  Hematological: Negative  Psychiatric/Behavioral: Negative          Past Medical and Surgical History:   Past Medical History:   Diagnosis Date   • Acute blood loss anemia 10/13/2016   • Anxiety    • Arthritis     knees   • Arthritis    • Asthma     stable for > 10 years   • [place]ina esophagus     with BARRX/RFA   • Cancer [facility])     prostate   • Cataract    • Depression    • Diabetes ([facility] )    • Diabetes mellitus ([facility] )     pre diabetes   • Diverticulitis of colon    • Environmental allergies    • Fall 12/30/2020   • GERD (gastroesophageal reflux disease)    • Hiatal hernia    • History of anal fissures    • Hyperlipidemia    • Hypertension    • Incisional hernia    • Insomnia    • Kidney stone    • Morbid obesity ([facility] )    • Peripheral neuropathy    • PONV (postoperative nausea and vomiting)    • Prolapsing mitral valve     prolapsing mitral valve leaflet syndrome • Prostate cancer [facility])    • Retinal detachment     treated surgically at [facility] eye; resolved: 10/10/2012   • Sleep apnea     diagnosed but unable to tolerate cpap  • Stuttering        Past Surgical History:   Procedure Laterality Date   • ARTHROSCOPIC REPAIR ACL Right    • BIOPSY CORE NEEDLE      prostate   • CATARACT EXTRACTION Bilateral    • COLONOSCOPY     • HERNIA REPAIR      naval   • JOINT REPLACEMENT      B/L knee   • KNEE ARTHROSCOPY Left    • LAPAROSCOPIC GASTRIC BANDING     • ME ARTHRP KNE CONDYLE&PLATU MEDIAL&LAT COMPARTMENTS Left 02/15/2017    Procedure: TOTAL KNEE ARTHROPLASTY ;  Surgeon: Hector Frey MD;  Location: [facility];  Service: Orthopedics   • ME ARTHRP KNE CONDYLE&PLATU MEDIAL&LAT COMPARTMENTS Right 10/10/2016    Procedure: ARTHROPLASTY KNEE TOTAL;  Surgeon: Hector Frey MD;  Location: [facility];  Service: Orthopedics   • PROSTATECTOMY      robotic-assisted; [facility] [place]   • REMOVAL GASTRIC BAND LAPAROSCOPIC N/A 8/30/2022    Procedure: LAPAROSCOPIC REMOVAL OF ADJUSTABLE GASTRIC BAND AND PORT  WITH INTRAOPERATIVE EGD;  Surgeon: Viki Call MD;  Location: [facility];  Service: Bariatrics   • RETINAL DETACHMENT SURGERY Bilateral     [facility]   • SEPTOPLASTY     • SINUS SURGERY     • TONSILLECTOMY      over age 13   • UPPER GASTROINTESTINAL ENDOSCOPY      mutiple with BARRX/RFA   • VASECTOMY      vas deferens       Meds/Allergies:  Prior to Admission medications    Medication Sig Start Date End Date Taking?  Authorizing Provider   ALPRAZolam Forestine Guitar) 0 25 mg tablet Take 1 tablet (0 25 mg total) by mouth 3 (three) times a day as needed for anxiety 10/28/21 2/2/23  ED Escalante   Blood Glucose Monitoring Suppl (OneTouch Verio) w/Device KIT Use daily  Patient taking differently: Use as needed 4/28/21   John Zelaya MD   ezetimibe (ZETIA) 10 mg tablet TAKE 1 TABLET DAILY  Patient taking differently: Take 5 mg by mouth in the morning 9/21/21   Gagandeep Walters MD FLUoxetine (PROzac) 20 mg capsule TAKE 3 CAPSULES DAILY 5/6/22   Dg Rodarte MD   glucose blood [facility]) test strip USE TO TEST DAILY 4/15/22   Dg Rodarte MD   hydrochlorothiazide (HYDRODIURIL) 12 5 mg tablet Take 1 tablet (12 5 mg total) by mouth 3 (three) times a week Monday, Wednesday, Friday 2/6/23 5/7/23  Ava Andujar MD   Lancet Devices (ONE TOUCH DELICA LANCING DEV) MISC Use daily 4/28/21   Dg Rodarte MD   losartan (COZAAR) 100 MG tablet TAKE 1 TABLET DAILY 5/9/22   Olvin Young MD   metFORMIN (GLUCOPHAGE) 500 mg tablet TAKE 2 TABLETS TWICE A DAY WITH MEALS 7/18/22   Dg Rodarte MD   metoprolol succinate (TOPROL-XL) 25 mg 24 hr tablet TAKE 1 TABLET DAILY  Patient taking differently: Take 25 mg by mouth daily 12/20/21   Olvin Young MD   Multiple Vitamins-Minerals (CENTRUM ADULTS PO) Take by mouth in the morning    Historical Provider, MD   OLANZapine (ZyPREXA) 5 mg tablet Take 0 5 tablets (2 5 mg total) by mouth daily  Patient taking differently: Take 2 5 mg by mouth every evening 5/9/22 5/4/23  ED Coronel   OneTouch Delica Lancets 74Y MISC Use daily 4/28/21   Dg Rodarte MD   pantoprazole (PROTONIX) 40 mg tablet Take 1 tablet (40 mg total) by mouth 2 (two) times a day 1 tab in AM, 1 tabs in PM 7/28/22   Miguel Goldman MD   rosuvastatin (CRESTOR) 5 mg tablet Take 1 tablet (5 mg total) by mouth once a week 5/12/22   Olvin Young MD   semaglutide, 2 mg/dose, (Ozempic, 2 MG/DOSE,) 8 mg/ mL injection pen Inject 0 75 mL (2 mg total) under the skin every 7 days 2/2/23   ED Horta   traZODone (DESYREL) 50 mg tablet Take 1 tablet (50 mg total) by mouth daily at bedtime 1/18/23   Ava Andujar MD   trospium chloride (SANCTURA) 20 mg tablet Take 1 tablet (20 mg total) by mouth 2 (two) times a day 1/3/23   ED Webb   zolpidem (AMBIEN) 10 mg tablet Take 1 tablet (10 mg total) by mouth daily at bedtime as needed for sleep 10/27/22 2/2/23  Ava Andujar MD I have reviewed home medications with patient personally  Allergies: Allergies   Allergen Reactions   • Celebrex [Celecoxib] Other (See Comments)     Cardiologist stated he should not take     • Chlorhexidine Hives     Is able to wash with hibiclens but not use michi cloths   • Other      "steroid eyedrops"     • Prednisone Other (See Comments)     Prednisone eye drops- eye pressure increases       Social History:  Marital Status: /Civil Union     Patient Pre-hospital Living Situation: Home  Patient Pre-hospital Level of Mobility: walks    Substance Use History:   Social History     Substance and Sexual Activity   Alcohol Use Not Currently    Comment: very rarely, 12 cans of beer/year     Social History     Tobacco Use   Smoking Status Never   Smokeless Tobacco Never   Tobacco Comments    quit 38 years ago     Social History     Substance and Sexual Activity   Drug Use Not Currently       Family History:  Family History   Problem Relation Age of Onset   • Heart disease Father    • Coronary artery disease Father    • Prostate cancer Father    • Coronary artery disease Mother    • Diabetes Mother         mellitus   • Diabetes Maternal Grandmother         mellitus   • Coronary artery disease Maternal Grandfather    • Coronary artery disease Paternal Grandfather        Physical Exam:     Vitals:   Blood Pressure: 161/81 (02/21/23 1359)  Pulse: 80 (02/21/23 1359)  Temperature: 98 8 °F (37 1 °C) (02/21/23 1359)  Temp Source: Oral (02/21/23 1359)  Respirations: 20 (02/21/23 1359)  SpO2: 93 % (02/21/23 1359)    Physical Exam  HENT:      Head: Normocephalic and atraumatic  Nose: Nose normal       Mouth/Throat:      Mouth: Mucous membranes are moist    Eyes:      Extraocular Movements: Extraocular movements intact  Conjunctiva/sclera: Conjunctivae normal    Cardiovascular:      Rate and Rhythm: Normal rate and regular rhythm     Pulmonary:      Effort: Pulmonary effort is normal       Breath sounds: Normal breath sounds  Abdominal:      General: Bowel sounds are normal       Palpations: Abdomen is soft  Musculoskeletal:         General: Normal range of motion  Cervical back: Normal range of motion and neck supple  Right lower leg: No edema  Left lower leg: No edema  Skin:     General: Skin is warm and dry  Neurological:      Mental Status: He is alert and oriented to person, place, and time  Additional Data:     Lab Results:  Results from last 7 days   Lab Units 02/21/23  1425   WBC Thousand/uL 11 34*   HEMOGLOBIN g/dL 15 4   HEMATOCRIT % 44 4   PLATELETS Thousands/uL 244   NEUTROS PCT % 82*   LYMPHS PCT % 7*   MONOS PCT % 6   EOS PCT % 5     Results from last 7 days   Lab Units 02/21/23  1425   SODIUM mmol/L 136   POTASSIUM mmol/L 3 6   CHLORIDE mmol/L 104   CO2 mmol/L 24   BUN mg/dL 14   CREATININE mg/dL 1 05   ANION GAP mmol/L 8   CALCIUM mg/dL 9 6   ALBUMIN g/dL 3 7   TOTAL BILIRUBIN mg/dL 0 64   ALK PHOS U/L 72   ALT U/L 32   AST U/L 19   GLUCOSE RANDOM mg/dL 186*         Results from last 7 days   Lab Units 02/21/23  1403   POC GLUCOSE mg/dl 184*               Lines/Drains:  Invasive Devices     Peripheral Intravenous Line  Duration           Peripheral IV 02/21/23 Distal;Right;Upper;Ventral (anterior) Arm <1 day                    Imaging: Reviewed radiology reports from this admission including: CT head  CT head without contrast   Final Result by Consuelo Sigala DO (02/21 1538)      No acute intracranial abnormality  Chronic microangiopathic changes  Workstation performed: [id number]             EKG and Other Studies Reviewed on Admission:   · EKG: NSR     ** Please Note: This note has been constructed using a voice recognition system   **

## 2023-02-21 NOTE — ED PROVIDER NOTES
History  Chief Complaint   Patient presents with   • Weakness - Generalized     Per EMS pt having generalized weakness due to not feeling well the past few days, n/v started today  66-year-old male patient with history of HTN, HLD, diabetes presenting with generalized weakness onset 3 days ago  Patient states that he believes he took an extra dose of his Diabetes medications accidentally  Patient states that he Gets episodes of generalized weakness and lightheadedness  Today he had another episode and had nausea vomiting  States only 1 episode of nausea vomiting  States he currently feels weakness  Denies any fevers, chest pain, shortness of breath, abdominal pain, and urinary symptoms  patient has a speech impediment  Prior to Admission Medications   Prescriptions Last Dose Informant Patient Reported? Taking?    ALPRAZolam (XANAX) 0 25 mg tablet   No No   Sig: Take 1 tablet (0 25 mg total) by mouth 3 (three) times a day as needed for anxiety   Blood Glucose Monitoring Suppl (OneTouch Verio) w/Device KIT   No No   Sig: Use daily   Patient taking differently: Use as needed   FLUoxetine (PROzac) 20 mg capsule   No No   Sig: TAKE 3 CAPSULES DAILY   Lancet Devices (ONE TOUCH DELICA LANCING DEV) MISC   No No   Sig: Use daily   Multiple Vitamins-Minerals (CENTRUM ADULTS PO)   Yes No   Sig: Take by mouth in the morning   OLANZapine (ZyPREXA) 5 mg tablet   No No   Sig: Take 0 5 tablets (2 5 mg total) by mouth daily   Patient taking differently: Take 2 5 mg by mouth every evening   OneTouch Delica Lancets 30K MISC   No No   Sig: Use daily   ezetimibe (ZETIA) 10 mg tablet   No No   Sig: TAKE 1 TABLET DAILY   Patient taking differently: Take 5 mg by mouth in the morning   glucose blood (OneTouch Verio) test strip   No No   Sig: USE TO TEST DAILY   hydrochlorothiazide (HYDRODIURIL) 12 5 mg tablet   No No   Sig: Take 1 tablet (12 5 mg total) by mouth 3 (three) times a week Monday, Wednesday, Friday   losartan (COZAAR) 100 MG tablet   No No   Sig: TAKE 1 TABLET DAILY   metFORMIN (GLUCOPHAGE) 500 mg tablet   No No   Sig: TAKE 2 TABLETS TWICE A DAY WITH MEALS   metoprolol succinate (TOPROL-XL) 25 mg 24 hr tablet   No No   Sig: TAKE 1 TABLET DAILY   Patient taking differently: Take 25 mg by mouth daily   pantoprazole (PROTONIX) 40 mg tablet   No No   Sig: Take 1 tablet (40 mg total) by mouth 2 (two) times a day 1 tab in AM, 1 tabs in PM   rosuvastatin (CRESTOR) 5 mg tablet   No No   Sig: Take 1 tablet (5 mg total) by mouth once a week   semaglutide, 2 mg/dose, (Ozempic, 2 MG/DOSE,) 8 mg/ mL injection pen   No No   Sig: Inject 0 75 mL (2 mg total) under the skin every 7 days   traZODone (DESYREL) 50 mg tablet   No No   Sig: Take 1 tablet (50 mg total) by mouth daily at bedtime   trospium chloride (SANCTURA) 20 mg tablet   No No   Sig: Take 1 tablet (20 mg total) by mouth 2 (two) times a day   zolpidem (AMBIEN) 10 mg tablet   No No   Sig: Take 1 tablet (10 mg total) by mouth daily at bedtime as needed for sleep      Facility-Administered Medications: None       Past Medical History:   Diagnosis Date   • Acute blood loss anemia 10/13/2016   • Anxiety    • Arthritis     knees   • Arthritis    • Asthma     stable for > 10 years   • [place] esophagus     with BARRX/RFA   • Cancer [facility])     prostate   • Cataract    • Depression    • Diabetes ([facility] [address] )    • Diabetes mellitus ([facility] [address] )     pre diabetes   • Diverticulitis of colon    • Environmental allergies    • Fall 12/30/2020   • GERD (gastroesophageal reflux disease)    • Hiatal hernia    • History of anal fissures    • Hyperlipidemia    • Hypertension    • Incisional hernia    • Insomnia    • Kidney stone    • Morbid obesity (HCC)    • Peripheral neuropathy    • PONV (postoperative nausea and vomiting)    • Prolapsing mitral valve     prolapsing mitral valve leaflet syndrome   • Prostate cancer ([facility] [address] )    • Retinal detachment     treated surgically at [facility]; resolved: 10/10/2012   • Sleep apnea     diagnosed but unable to tolerate cpap  • Stuttering        Past Surgical History:   Procedure Laterality Date   • ARTHROSCOPIC REPAIR ACL Right    • BIOPSY CORE NEEDLE      prostate   • CATARACT EXTRACTION Bilateral    • COLONOSCOPY     • HERNIA REPAIR      nav[facility]   • JOINT REPLACEMENT      B/L knee   • KNEE ARTHROSCOPY Left    • LAPAROSCOPIC GASTRIC BANDING     • RI ARTHRP KNE CONDYLE&PLATU MEDIAL&LAT COMPARTMENTS Left 02/15/2017    Procedure: TOTAL KNEE ARTHROPLASTY ;  Surgeon: Nuris Golden MD;  [facility]: [facility];  Service: Orthopedics   • RI ARTHRP KNE CONDYLE&PLATU MEDIAL&LAT COMPARTMENTS Right 10/10/2016    Procedure: ARTHROPLASTY KNEE TOTAL;  Surgeon: Nuris Golden MD;  Location: [facility];  Service: Orthopedics   • PROSTATECTOMY      robotic-assisted; [facility]-Dr Ewa Kumar   • REMOVAL GASTRIC BAND LAPAROSCOPIC N/A 8/30/2022    Procedure: LAPAROSCOPIC REMOVAL OF ADJUSTABLE GASTRIC BAND AND PORT  WITH INTRAOPERATIVE EGD;  Surgeon: Raisa Guillen MD;  Location: [facility];  Service: Bariatrics   • RETINAL DETACHMENT SURGERY Bilateral     [facility]   • SEPTOPLASTY     • SINUS SURGERY     • TONSILLECTOMY      over age 13   • UPPER GASTROINTESTINAL ENDOSCOPY      mutiple with BARRX/RFA   • VASECTOMY      vas deferens       Family History   Problem Relation Age of Onset   • Heart disease Father    • Coronary artery disease Father    • Prostate cancer Father    • Coronary artery disease Mother    • Diabetes Mother         mellitus   • Diabetes Maternal Grandmother         mellitus   • Coronary artery disease Maternal Grandfather    • Coronary artery disease Paternal Grandfather      I have reviewed and agree with the history as documented      E-Cigarette/Vaping   • E-Cigarette Use Never User      E-Cigarette/Vaping Substances   • Nicotine No    • THC No    • CBD No    • Flavoring No    • Other No    • Unknown No      Social History     Tobacco Use   • Smoking status: Never   • Smokeless tobacco: Never   • Tobacco comments:     quit 38 years ago   Vaping Use   • Vaping Use: Never used   Substance Use Topics   • Alcohol use: Not Currently     Comment: very rarely, 12 cans of beer/year   • Drug use: Not Currently        Review of Systems   Gastrointestinal: Positive for nausea and vomiting  Neurological: Positive for weakness and light-headedness  All other systems reviewed and are negative  Physical Exam  ED Triage Vitals   Temperature Pulse Respirations Blood Pressure SpO2   02/21/23 1359 02/21/23 1359 02/21/23 1359 02/21/23 1359 02/21/23 1359   98 8 °F (37 1 °C) 80 20 161/81 93 %      Temp Source Heart Rate Source Patient Position - Orthostatic VS BP Location FiO2 (%)   02/21/23 1359 02/21/23 1359 02/21/23 2100 02/21/23 2100 --   Oral Monitor Lying Right arm       Pain Score       02/21/23 1359       No Pain             Orthostatic Vital Signs  Vitals:    02/21/23 2230 02/21/23 2336 02/22/23 0542 02/22/23 0709   BP: 140/94 159/86  123/73   Pulse: 93 98 83 95   Patient Position - Orthostatic VS:    Lying       Physical Exam  Vitals reviewed  Constitutional:       Appearance: Normal appearance  He is obese  HENT:      Head: Normocephalic and atraumatic  Nose: Nose normal       Mouth/Throat:      Mouth: Mucous membranes are moist       Pharynx: Oropharynx is clear  Eyes:      Extraocular Movements: Extraocular movements intact  Conjunctiva/sclera: Conjunctivae normal       Pupils: Pupils are equal, round, and reactive to light  Cardiovascular:      Rate and Rhythm: Normal rate and regular rhythm  Pulses: Normal pulses  Heart sounds: Normal heart sounds  Pulmonary:      Effort: Pulmonary effort is normal       Breath sounds: Normal breath sounds  Abdominal:      General: Bowel sounds are normal       Palpations: Abdomen is soft  Tenderness: There is no abdominal tenderness  Hernia: A hernia (ventral hernia) is present     Musculoskeletal:         General: Normal range of motion  Cervical back: Normal range of motion  Skin:     General: Skin is warm and dry  Neurological:      General: No focal deficit present  Mental Status: He is alert and oriented to person, place, and time  Mental status is at baseline  Comments: Speech impediment at baseline    Mild tremors resting to left upper extremity         ED Medications  Medications   ondansetron (FOR EMS ONLY) (ZOFRAN) 4 mg/2 mL injection 4 mg (0 mg Does not apply Given to EMS 2/21/23 1359)   sodium chloride 0 9 % bolus 1,000 mL (0 mL Intravenous Stopped 2/21/23 1618)   ondansetron (ZOFRAN) injection 4 mg (4 mg Intravenous Given 2/21/23 1615)   sodium chloride 0 9 % bolus 250 mL (250 mL Intravenous New Bag 2/22/23 1125)       Diagnostic Studies  Results Reviewed     Procedure Component Value Units Date/Time    Basic metabolic panel [[id number]] Collected: 02/22/23 0642    Lab Status: Final result Specimen: Blood from Arm, Right Updated: 02/22/23 0726     Sodium 136 mmol/L      Potassium 3 6 mmol/L      Chloride 105 mmol/L      CO2 24 mmol/L      ANION GAP 7 mmol/L      BUN 18 mg/dL      Creatinine 1 03 mg/dL      Glucose 129 mg/dL      Calcium 8 8 mg/dL      eGFR 73 ml/min/1 73sq m     Narrative:      [place] guidelines for Chronic Kidney Disease (CKD):   •  Stage 1 with normal or high GFR (GFR > 90 mL/min/1 73 square meters)  •  Stage 2 Mild CKD (GFR = 60-89 mL/min/1 73 square meters)  •  Stage 3A Moderate CKD (GFR = 45-59 mL/min/1 73 square meters)  •  Stage 3B Moderate CKD (GFR = 30-44 mL/min/1 73 square meters)  •  Stage 4 Severe CKD (GFR = 15-29 mL/min/1 73 square meters)  •  Stage 5 End Stage CKD (GFR <15 mL/min/1 73 square meters)  Note: GFR calculation is accurate only with a steady state creatinine    Magnesium [[id number]]  (Normal) Collected: 02/22/23 0642    Lab Status: Final result Specimen: Blood from Arm, Right Updated: 02/22/23 0726     Magnesium 1 9 mg/dL Phosphorus [[id number]]  (Normal) Collected: 02/22/23 0642    Lab Status: Final result Specimen: Blood from Arm, Right Updated: 02/22/23 0726     Phosphorus 2 4 mg/dL     Fingerstick Glucose (POCT) [[id number]]  (Abnormal) Collected: 02/21/23 2131    Lab Status: Final result Updated: 02/21/23 2132     POC Glucose 151 mg/dl     HS Troponin I 2hr [[id number]]  (Normal) Collected: 02/21/23 1615    Lab Status: Final result Specimen: Blood from Arm, Right Updated: 02/21/23 1709     hs TnI 2hr 4 ng/L      Delta 2hr hsTnI 1 ng/L     COVID/FLU/RSV [[id number]]  (Normal) Collected: 02/21/23 1425    Lab Status: Final result Specimen: Nares from Nose Updated: 02/21/23 1540     SARS-CoV-2 Negative     INFLUENZA A PCR Negative     INFLUENZA B PCR Negative     RSV PCR Negative    Narrative:      FOR PEDIATRIC PATIENTS - copy/paste COVID Guidelines URL to browser: [email or web address]    SARS-CoV-2 assay is a Nucleic Acid Amplification assay intended for the  qualitative detection of nucleic acid from SARS-CoV-2 in nasopharyngeal  swabs  Results are for the presumptive identification of SARS-CoV-2 RNA  Positive results are indicative of infection with SARS-CoV-2, the virus  causing COVID-19, but do not rule out bacterial infection or co-infection  with other viruses  Laboratories within the [place] and its  territories are required to report all positive results to the appropriate  public health authorities  Negative results do not preclude SARS-CoV-2  infection and should not be used as the sole basis for treatment or other  patient management decisions  Negative results must be combined with  clinical observations, patient history, and epidemiological information  This test has not been FDA cleared or approved  This test has been authorized by FDA under an Emergency Use Authorization  (EUA)   This test is only authorized for the duration of time the  declaration that circumstances exist justifying the authorization of the  emergency use of an in vitro diagnostic tests for detection of SARS-CoV-2  virus and/or diagnosis of COVID-19 infection under section 564(b)(1) of  the Act, 21 U  S C  665YMS-3(N)(2), unless the authorization is terminated  or revoked sooner  The test has been validated but independent review by FDA  and CLIA is pending  Test performed using [employer] GeneXpert: This RT-PCR assay targets N2,  a region unique to SARS-CoV-2  A conserved region in the E-gene was chosen  for pan-Sarbecovirus detection which includes SARS-CoV-2  According to CMS-2020-01-R, this platform meets the definition of high-throughput technology      HS Troponin 0hr (reflex protocol) [[id number]]  (Normal) Collected: 02/21/23 1425    Lab Status: Final result Specimen: Blood from Arm, Right Updated: 02/21/23 1517     hs TnI 0hr 3 ng/L     Comprehensive metabolic panel [[id number]]  (Abnormal) Collected: 02/21/23 1425    Lab Status: Final result Specimen: Blood from Arm, Right Updated: 02/21/23 1512     Sodium 136 mmol/L      Potassium 3 6 mmol/L      Chloride 104 mmol/L      CO2 24 mmol/L      ANION GAP 8 mmol/L      BUN 14 mg/dL      Creatinine 1 05 mg/dL      Glucose 186 mg/dL      Calcium 9 6 mg/dL      AST 19 U/L      ALT 32 U/L      Alkaline Phosphatase 72 U/L      Total Protein 7 1 g/dL      Albumin 3 7 g/dL      Total Bilirubin 0 64 mg/dL      eGFR 72 ml/min/1 73sq m     Narrative:      [place] guidelines for Chronic Kidney Disease (CKD):   •  Stage 1 with normal or high GFR (GFR > 90 mL/min/1 73 square meters)  •  Stage 2 Mild CKD (GFR = 60-89 mL/min/1 73 square meters)  •  Stage 3A Moderate CKD (GFR = 45-59 mL/min/1 73 square meters)  •  Stage 3B Moderate CKD (GFR = 30-44 mL/min/1 73 square meters)  •  Stage 4 Severe CKD (GFR = 15-29 mL/min/1 73 square meters)  •  Stage 5 End Stage CKD (GFR <15 mL/min/1 73 square meters)  Note: GFR calculation is accurate only with a steady state creatinine    TSH, 3rd generation with Free T4 reflex [[id number]]  (Normal) Collected: 02/21/23 1425    Lab Status: Final result Specimen: Blood from Arm, Right Updated: 02/21/23 1512     TSH 3RD GENERATON 1 190 uIU/mL     Narrative:      Patients undergoing fluorescein dye angiography may retain small amounts of fluorescein in the body for 48-72 hours post procedure  Samples containing fluorescein can produce falsely depressed TSH values  If the patient had this procedure,a specimen should be resubmitted post fluorescein clearance  CBC and differential [[id number]]  (Abnormal) Collected: 02/21/23 1425    Lab Status: Final result Specimen: Blood from Arm, Right Updated: 02/21/23 1440     WBC 11 34 Thousand/uL      RBC 5 16 Million/uL      Hemoglobin 15 4 g/dL      Hematocrit 44 4 %      MCV 86 fL      MCH 29 8 pg      MCHC 34 7 g/dL      RDW 13 2 %      MPV 8 8 fL      Platelets 053 Thousands/uL      nRBC 0 /100 WBCs      Neutrophils Relative 82 %      Immat GRANS % 0 %      Lymphocytes Relative 7 %      Monocytes Relative 6 %      Eosinophils Relative 5 %      Basophils Relative 0 %      Neutrophils Absolute 9 25 Thousands/µL      Immature Grans Absolute 0 05 Thousand/uL      Lymphocytes Absolute 0 83 Thousands/µL      Monocytes Absolute 0 63 Thousand/µL      Eosinophils Absolute 0 54 Thousand/µL      Basophils Absolute 0 04 Thousands/µL     Fingerstick Glucose (POCT) [[id number]]  (Abnormal) Collected: 02/21/23 1403    Lab Status: Final result Updated: 02/21/23 1404     POC Glucose 184 mg/dl                  CT head without contrast   Final Result by Penelope Underwood DO (02/21 1538)      No acute intracranial abnormality  Chronic microangiopathic changes                    Workstation performed: [id number]               Procedures  Procedures      ED Course                                       Medical Decision Making  68-year-old male patient presenting with episodes of lightheadedness and nausea vomiting  Patient states that at home he has been feeling weak and generalized lightheadedness  No chest pain or shortness of breath  Patient treated with fluids and Zofran with some mild improvement of symptoms  Labs within normal limits troponin negative  Patient not hypoglycemic  Possibly vasovagal, however patient has been having the symptoms for couple days  Spoke with patient about possible admission for observation, patient agrees for admission  Admitted to medicine for observation for lightheadedness versus near syncope  Generalized weakness: self-limited or minor problem     Details: Generalized weak, episodes of lightheadedness  Admission for observation  Nausea and vomiting: self-limited or minor problem     Details: Patient having multiple episodes of nausea vomiting today  Patient remained nauseous after Zofran  Will admit for intractable nausea  Amount and/or Complexity of Data Reviewed  Labs: ordered  Details: Within normal limits  Radiology: ordered  Details: CT head negative  Discussion of management or test interpretation with external provider(s): Stable for discharge with follow-up    Risk  Prescription drug management  Decision regarding hospitalization              Disposition  Final diagnoses:   Generalized weakness   Nausea and vomiting     Time reflects when diagnosis was documented in both MDM as applicable and the Disposition within this note     Time User Action Codes Description Comment    2/21/2023  4:17 PM [facility] [R53 1] Generalized weakness     2/21/2023  4:17 PM Madeleine Milligan Add [E45 40NC,  R11 2] Nausea and vomiting after administration of anesthetic agent     2/21/2023  4:17 PM Madeleine Milligan Remove [[id number],  R11 2] Nausea and vomiting after administration of anesthetic agent     2/21/2023  4:17 PM Madeleine Milligan Add [R11 2] Nausea and vomiting     2/22/2023  1:44 PM Billy Nice Add [E78 2] Mixed hyperlipidemia 2/22/2023  1:44 PM Held, [place] Guardado Add [I25 10] Coronary artery disease involving native coronary artery of native heart without angina pectoris     2/22/2023  1:45 PM Held, [place] Guardado Add [F80 81] Stuttering     2/22/2023  1:47 PM Held, Rachel Batres [R26 2] Ambulatory dysfunction       ED Disposition     ED Disposition   Admit    Condition   Stable    Date/Time   Tue Feb 21, 2023  4:17 PM    Comment   Case was discussed with Dr Karlene De Los Santos and the patient's admission status was agreed to be Admission Status: observation status to the service of Dr Karlene De Los Santos              Follow-up Information     Follow up With Specialties Details Why Contact Info    Lulu Pena [facility]  Follow up [facility] 35  Requested: nursing/PT/OT [address] [facility]  K[address], MD Family Medicine Schedule an appointment as soon as possible for a visit in 1 week(s)  [facility] [address] [facility]  [phone]            Discharge Medication List as of 2/22/2023  2:26 PM      START taking these medications    Details   ondansetron (ZOFRAN-ODT) 4 mg disintegrating tablet Take 1 tablet (4 mg total) by mouth every 6 (six) hours as needed for nausea or vomiting, Starting Wed 2/22/2023, Normal         CONTINUE these medications which have CHANGED    Details   OLANZapine (ZyPREXA) 5 mg tablet Take 0 5 tablets (2 5 mg total) by mouth every evening, Starting Wed 2/22/2023, Until Sat 2/17/2024, No Print         CONTINUE these medications which have NOT CHANGED    Details   ALPRAZolam (XANAX) 0 25 mg tablet Take 1 tablet (0 25 mg total) by mouth 3 (three) times a day as needed for anxiety, Starting Thu 10/28/2021, Until Thu 2/2/2023 at 2359, Normal      Blood Glucose Monitoring Suppl (OneTouch Verio) w/Device KIT Use daily, Starting Wed 4/28/2021, Normal      ezetimibe (ZETIA) 10 mg tablet TAKE 1 TABLET DAILY, Normal      FLUoxetine (PROzac) 20 mg capsule TAKE 3 CAPSULES DAILY, Normal      glucose blood (OneTouch Verio) test strip USE TO TEST DAILY, Normal      hydrochlorothiazide (HYDRODIURIL) 12 5 mg tablet Take 1 tablet (12 5 mg total) by mouth 3 (three) times a week Monday, Wednesday, Friday, Starting Mon 2/6/2023, Until Sun 5/7/2023, Normal      Lancet Devices (ONE TOUCH DELICA LANCING DEV) MISC Use daily, Starting Wed 4/28/2021, Normal      losartan (COZAAR) 100 MG tablet TAKE 1 TABLET DAILY, Normal      metFORMIN (GLUCOPHAGE) 500 mg tablet TAKE 2 TABLETS TWICE A DAY WITH MEALS, Normal      metoprolol succinate (TOPROL-XL) 25 mg 24 hr tablet TAKE 1 TABLET DAILY, Normal      Multiple Vitamins-Minerals (CENTRUM ADULTS PO) Take by mouth in the morning, Historical Med      OneTouch Delica Lancets 73O MISC Use daily, Starting Wed 4/28/2021, Normal      pantoprazole (PROTONIX) 40 mg tablet Take 1 tablet (40 mg total) by mouth 2 (two) times a day 1 tab in AM, 1 tabs in PM, Starting Thu 7/28/2022, Normal      rosuvastatin (CRESTOR) 5 mg tablet Take 1 tablet (5 mg total) by mouth once a week, Starting Thu 5/12/2022, Normal      semaglutide, 2 mg/dose, (Ozempic, 2 MG/DOSE,) 8 mg/ mL injection pen Inject 0 75 mL (2 mg total) under the skin every 7 days, Starting Thu 2/2/2023, Normal      traZODone (DESYREL) 50 mg tablet Take 1 tablet (50 mg total) by mouth daily at bedtime, Starting Wed 1/18/2023, Normal      trospium chloride (SANCTURA) 20 mg tablet Take 1 tablet (20 mg total) by mouth 2 (two) times a day, Starting Tue 1/3/2023, Normal      zolpidem (AMBIEN) 10 mg tablet Take 1 tablet (10 mg total) by mouth daily at bedtime as needed for sleep, Starting Thu 10/27/2022, Until Thu 2/2/2023 at 2359, No Print               PDMP Review       Value Time User    PDMP Reviewed  Yes 10/14/2022  3:20 PM Walker Paz           ED Provider  Attending physically available and evaluated Madison Alfredo I managed the patient along with the ED Attending      Electronically Signed by         Joycelyn Haskins MD  02/24/23 1411 [address] [place] [address]

## 2023-02-21 NOTE — ASSESSMENT & PLAN NOTE
· Vasovagal 2/2 n/v  · Tele  · Unlikely cardiac so IP echo not warranted  · No orthostatics as pt not dizzy when standing

## 2023-02-21 NOTE — ASSESSMENT & PLAN NOTE
Lab Results   Component Value Date    HGBA1C 5 8 01/18/2023       Recent Labs     02/21/23  1403   POCGLU 184*       Blood Sugar Average: Last 72 hrs:  (P) 184     · Hold home meds   · ISS

## 2023-02-21 NOTE — ASSESSMENT & PLAN NOTE
· Likely viral gastroenteritis in setting of Ozempic dose increased  · Zofran and Reglan prn  · Pt should talk to wt mgmt about Ozempic

## 2023-02-22 ENCOUNTER — HOME HEALTH ADMISSION (OUTPATIENT)
Dept: HOME HEALTH SERVICES | Facility: HOME HEALTHCARE | Age: 68
End: 2023-02-22

## 2023-02-22 ENCOUNTER — TRANSITIONAL CARE MANAGEMENT (OUTPATIENT)
Dept: FAMILY MEDICINE CLINIC | Facility: CLINIC | Age: 68
End: 2023-02-22

## 2023-02-22 VITALS
RESPIRATION RATE: 18 BRPM | DIASTOLIC BLOOD PRESSURE: 73 MMHG | SYSTOLIC BLOOD PRESSURE: 123 MMHG | WEIGHT: 294.4 LBS | TEMPERATURE: 98.1 F | HEART RATE: 95 BPM | BODY MASS INDEX: 45.77 KG/M2 | OXYGEN SATURATION: 91 %

## 2023-02-22 PROBLEM — R11.2 NAUSEA AND VOMITING: Status: RESOLVED | Noted: 2023-02-21 | Resolved: 2023-02-22

## 2023-02-22 PROBLEM — R39.15 URINARY URGENCY: Status: ACTIVE | Noted: 2023-02-22

## 2023-02-22 PROBLEM — R53.1 GENERALIZED WEAKNESS: Status: ACTIVE | Noted: 2023-02-22

## 2023-02-22 LAB
ANION GAP SERPL CALCULATED.3IONS-SCNC: 7 MMOL/L (ref 4–13)
BACTERIA UR QL AUTO: ABNORMAL /HPF
BILIRUB UR QL STRIP: NEGATIVE
BUN SERPL-MCNC: 18 MG/DL (ref 5–25)
CALCIUM SERPL-MCNC: 8.8 MG/DL (ref 8.3–10.1)
CHLORIDE SERPL-SCNC: 105 MMOL/L (ref 96–108)
CLARITY UR: CLEAR
CO2 SERPL-SCNC: 24 MMOL/L (ref 21–32)
COLOR UR: YELLOW
CREAT SERPL-MCNC: 1.03 MG/DL (ref 0.6–1.3)
GFR SERPL CREATININE-BSD FRML MDRD: 73 ML/MIN/1.73SQ M
GLUCOSE SERPL-MCNC: 129 MG/DL (ref 65–140)
GLUCOSE SERPL-MCNC: 131 MG/DL (ref 65–140)
GLUCOSE SERPL-MCNC: 133 MG/DL (ref 65–140)
GLUCOSE UR STRIP-MCNC: NEGATIVE MG/DL
GRAN CASTS #/AREA URNS LPF: ABNORMAL /[LPF]
HGB UR QL STRIP.AUTO: NEGATIVE
HYALINE CASTS #/AREA URNS LPF: ABNORMAL /LPF
KETONES UR STRIP-MCNC: NEGATIVE MG/DL
LEUKOCYTE ESTERASE UR QL STRIP: NEGATIVE
MAGNESIUM SERPL-MCNC: 1.9 MG/DL (ref 1.6–2.6)
MUCOUS THREADS UR QL AUTO: ABNORMAL
NITRITE UR QL STRIP: NEGATIVE
NON-SQ EPI CELLS URNS QL MICRO: ABNORMAL /HPF
PH UR STRIP.AUTO: 5.5 [PH]
PHOSPHATE SERPL-MCNC: 2.4 MG/DL (ref 2.3–4.1)
POTASSIUM SERPL-SCNC: 3.6 MMOL/L (ref 3.5–5.3)
PROT UR STRIP-MCNC: ABNORMAL MG/DL
RBC #/AREA URNS AUTO: ABNORMAL /HPF
SODIUM SERPL-SCNC: 136 MMOL/L (ref 135–147)
SP GR UR STRIP.AUTO: 1.03 (ref 1–1.03)
UROBILINOGEN UR STRIP-ACNC: 2 MG/DL
WBC #/AREA URNS AUTO: ABNORMAL /HPF

## 2023-02-22 RX ORDER — OLANZAPINE 5 MG/1
2.5 TABLET ORAL EVERY EVENING
Start: 2023-02-22 | End: 2024-02-17

## 2023-02-22 RX ORDER — AMOXICILLIN 250 MG
1 CAPSULE ORAL
Status: DISCONTINUED | OUTPATIENT
Start: 2023-02-22 | End: 2023-02-22 | Stop reason: HOSPADM

## 2023-02-22 RX ORDER — POLYETHYLENE GLYCOL 3350 17 G/17G
17 POWDER, FOR SOLUTION ORAL DAILY
Status: DISCONTINUED | OUTPATIENT
Start: 2023-02-22 | End: 2023-02-22 | Stop reason: HOSPADM

## 2023-02-22 RX ORDER — ONDANSETRON 4 MG/1
4 TABLET, ORALLY DISINTEGRATING ORAL EVERY 6 HOURS PRN
Qty: 20 TABLET | Refills: 0 | Status: SHIPPED | OUTPATIENT
Start: 2023-02-22 | End: 2023-02-27 | Stop reason: ALTCHOICE

## 2023-02-22 RX ADMIN — OXYBUTYNIN CHLORIDE 5 MG: 5 TABLET ORAL at 08:06

## 2023-02-22 RX ADMIN — METOPROLOL SUCCINATE 25 MG: 25 TABLET, EXTENDED RELEASE ORAL at 08:06

## 2023-02-22 RX ADMIN — SODIUM CHLORIDE 250 ML: 0.9 INJECTION, SOLUTION INTRAVENOUS at 11:25

## 2023-02-22 RX ADMIN — PANTOPRAZOLE SODIUM 40 MG: 40 TABLET, DELAYED RELEASE ORAL at 05:14

## 2023-02-22 RX ADMIN — POLYETHYLENE GLYCOL 3350 17 G: 17 POWDER, FOR SOLUTION ORAL at 08:06

## 2023-02-22 RX ADMIN — ACETAMINOPHEN 650 MG: 325 TABLET ORAL at 01:34

## 2023-02-22 RX ADMIN — ENOXAPARIN SODIUM 40 MG: 40 INJECTION SUBCUTANEOUS at 08:08

## 2023-02-22 RX ADMIN — FLUOXETINE 60 MG: 20 CAPSULE ORAL at 08:07

## 2023-02-22 NOTE — OCCUPATIONAL THERAPY NOTE
Occupational Therapy Evaluation     Patient Name: Ashu Martinez  XLVVN'I Date: 2/22/2023  Problem List  Principal Problem:    Syncope  Active Problems:    Type 2 diabetes mellitus, without long-term current use of insulin ([facility] [address] )    Benign essential hypertension    Depression with anxiety    Morbid obesity (HCC)    Nausea and vomiting    Urinary urgency    Generalized weakness    Past Medical History  Past Medical History:   Diagnosis Date    Acute blood loss anemia 10/13/2016    Anxiety     Arthritis     knees    Arthritis     Asthma     stable for > 10 years    Molina esophagus     with BARRX/RFA    Cancer ([facility] [address] )     prostate    Cataract     Depression     Diabetes ([facility] [address] )     Diabetes mellitus ([facility] )     pre diabetes    Diverticulitis of colon     Environmental allergies     Fall 12/30/2020    GERD (gastroesophageal reflux disease)     Hiatal hernia     History of anal fissures     Hyperlipidemia     Hypertension     Incisional hernia     Insomnia     Kidney stone     Morbid obesity (HCC)     Peripheral neuropathy     PONV (postoperative nausea and vomiting)     Prolapsing mitral valve     prolapsing mitral valve leaflet syndrome    Prostate cancer ([facility] [address] )     Retinal detachment     treated surgically at [facility]; resolved: 10/10/2012    Sleep apnea     diagnosed but unable to tolerate cpap       Stuttering      Past Surgical History  Past Surgical History:   Procedure Laterality Date    ARTHROSCOPIC REPAIR ACL Right     BIOPSY CORE NEEDLE      prostate    CATARACT EXTRACTION Bilateral     COLONOSCOPY      HERNIA REPAIR      naval    JOINT REPLACEMENT      B/L knee    KNEE ARTHROSCOPY Left     LAPAROSCOPIC GASTRIC BANDING      NJ ARTHRP KNE CONDYLE&PLATU MEDIAL&LAT COMPARTMENTS Left 02/15/2017    Procedure: TOTAL KNEE ARTHROPLASTY ;  Surgeon: Hector Frey MD;  Location: [facility];  Service: Orthopedics    NJ ARTHRP KNE CONDYLE&PLATU MEDIAL&LAT COMPARTMENTS Right 10/10/2016    Procedure: ARTHROPLASTY KNEE TOTAL;  Surgeon: Med Mitchell MD;  Location:  MAIN OR;  Service: Orthopedics    PROSTATECTOMY      robotic-assisted; [facility]-Dr Mariano Seed    REMOVAL GASTRIC BAND LAPAROSCOPIC N/A 8/30/2022    Procedure: LAPAROSCOPIC REMOVAL OF ADJUSTABLE GASTRIC BAND AND PORT  WITH INTRAOPERATIVE EGD;  Surgeon: Tavo Soler MD;  Location: [facility] Main OR;  Service: Bariatrics    RETINAL DETACHMENT SURGERY Bilateral     [facility]    SEPTOPLASTY      SINUS SURGERY      TONSILLECTOMY      over age 13    UPPER GASTROINTESTINAL ENDOSCOPY      mutiple with BARRX/RFA    VASECTOMY      vas deferens           02/22/23 1025   OT Last Visit   OT Visit Date 02/22/23   Note Type   Note type Evaluation   Pain Assessment   Pain Assessment Tool 0-10   Pain Score No Pain   Hospital Pain Intervention(s) Repositioned; Ambulation/increased activity; Emotional support   Restrictions/Precautions   Weight Bearing Precautions Per Order No   Other Precautions Chair Alarm; Bed Alarm; Fall Risk;Telemetry   Home Living   Type of Home House  (3 SH with basement w/ 2 FREDIS)   Home Layout Multi-level;1/2 bath on main level;Bed/bath upstairs; Access   Bathroom Shower/Tub Walk-in shower   Bathroom Toilet Standard   Bathroom Equipment Shower chair   Bathroom Accessibility Accessible   Home Equipment Walker   Additional Comments Pt reports living with his wife in a 3  with basement with 2 FREDIS; PTA, pt reporting no use of DME but has access to 2 RW's; pt reporting @ baseline that he has difficulty getting into/out of bed and getting up from a recliner; reports that he recently bought an apdaptive bedrail/equipment to assist with getting in/out of bed   Prior Function   Level of [place] Independent with ADLs; Independent with functional mobility; Independent with IADLS   Lives With Spouse   Receives Help From Family   IADLs Independent with driving; Independent with meal prep; Independent with medication management   Falls in the last 6 months 1 to 4  (Reporting 1 fall) Vocational Retired   Comments (+) driving; reporting that his wife is able to assist with all functional needs prn; reports that wife mainly completes IADLs   Lifestyle   Autonomy PTA, pt was independent in ADLs/IADLs and functional mobility w/ no DME; (+) driving   Reciprocal Relationships Lives with his wife   Service to Others Retired; reports being a    Intrinsic Gratification Enjoys photography   Subjective   Subjective "I would love to get up out of this bed and to a recliner "   ADL   Where Assessed Edge of bed   Eating Assistance 7  [address] 5  Supervision/Setup   LB [address] 4  [address] 5  [address] 4  [address] 4  Minimal Assistance   Bed Mobility   Supine to Sit 4  Minimal assistance   Additional items Assist x 1;HOB elevated; Bedrails; Increased time required;Verbal cues   Sit to Supine Unable to assess   Additional Comments At end of session, pt left sitting upright in recliner with all functional needs in reach with chair alarm activated   Transfers   Sit to Stand 4  Minimal assistance   Additional items Assist x 1; Increased time required;Verbal cues   Stand to Sit 4  Minimal assistance   Additional items Assist x 1; Increased time required;Verbal cues   Additional Comments w/ RW for safety and support with verbal cues for proper technique/hand placement on RW   Functional Mobility   Functional Mobility 4  Minimal assistance  (CGA)   Additional Comments Pt completed short household functional mobility distances within room to recliner with CGA x1 w/ RW for safety and support with verbal cues for RW management and safety/hand placement to control descent when returning to a seated position in recliner   Additional items Rolling walker   Balance   Static Sitting Good   Dynamic Sitting Fair +   Static Standing Fair   Dynamic Standing Fair -   Ambulatory Fair -   Activity Tolerance   Activity Tolerance Patient tolerated treatment well;Patient limited by fatigue   Nurse Made Aware RN cleared   RUE Assessment   RUE Assessment WFL  (grossly 4/5 MMT; observed WFL during functional activity)   LUE Assessment   LUE Assessment WFL  (grossly 4/5 MMT; observed WFL during functional activity)   Hand Function   Gross Motor Coordination Functional   Fine Motor Coordination Functional   Psychosocial   Psychosocial (WDL) WDL   Perception   Inattention/Neglect Appears intact   Cognition   Overall Cognitive Status WFL   Arousal/Participation Responsive;Arousable; Cooperative   Attention Within functional limits   Orientation Level Oriented X4   Memory Within functional limits   Following Commands Follows all commands and directions without difficulty   Comments Pt pleasant and cooperative   Assessment   Limitation Decreased ADL status; Decreased endurance;Decreased self-care trans;Decreased high-level ADLs   Prognosis Fair   Assessment Pt is a 68 yo male who presented to [facility] with nausea, vomitting, and lightheadedness in which per chart review, pt w/ brief loss of consciousness  Pt dx w/ syncope  Pt  has a past medical history of Acute blood loss anemia (10/13/2016), Anxiety, Arthritis, Arthritis, Asthma, Molina esophagus, Cancer ([address] ), Cataract, Depression, Diabetes ([address] ), Diabetes mellitus ([address] ), Diverticulitis of colon, Environmental allergies, Fall (12/30/2020), GERD (gastroesophageal reflux disease), Hiatal hernia, History of anal fissures, Hyperlipidemia, Hypertension, Incisional hernia, Insomnia, Kidney stone, Morbid obesity ([address] ), Peripheral neuropathy, PONV (postoperative nausea and vomiting), Prolapsing mitral valve, Prostate cancer ([address] ), Retinal detachment, Sleep apnea, and Stuttering   Pt with active OT orders and OT consulted to assess pt's functional status and occupational performance to determine safe d/c needs  Pt lives with his wife in a 3 SH + basement with 2 FREDIS  PTA, pt was independent in ADLs/IADLs and functional mobility w/ no DME  (+) driving  Currently, pt performing bed mobility w/ Min A, functional transfers w/ Min A and RW, functional mobility w/ CGA and RW, UB ADLs w/ supervision, and LB ADLs w/ Min A  Pt demonstrates the following limitations/impairments which impact the pt's ability to engage in valued occupations: endurance/activity tolerance, standing tolerance, functional reach, and strength  From an OT standpoint, recommend discharge to home w/ home health, once medically stable  The patient's raw score on the AM-PAC Daily Activity Inpatient Short Form is 21  A raw score of greater than or equal to 19 suggests the patient may benefit from discharge to home  Please refer to the recommendation of the Occupational Therapist for safe discharge planning  Pt would benefit from skilled OT services 2-3x/wk to address immediate acute care needs and underlying performance skills to promote safety, decrease fall risk, and enhance occupational performance to return to PLOF  Goals to be met within the next 10-14 days  Goals   Patient Goals To feel better   LTG Time Frame 10-14   Long Term Goal #1 See OT goals listed below   Plan   Treatment Interventions ADL retraining;Functional transfer training;UE strengthening/ROM; Endurance training;Patient/family training;Equipment evaluation/education; Compensatory technique education;Continued evaluation; Energy conservation; Activityengagement   Goal Expiration Date 03/08/23   OT Frequency 2-3x/wk   Recommendation   OT Discharge Recommendation Home with home health rehabilitation  (w/ increased support/assistance upon d/c)   Equipment Recommended Other (comment)  (Pt declining BSC @ this time)   AM-PAC Daily Activity Inpatient   Lower Body Dressing 3   Bathing 3   Toileting 3   Upper Body Dressing 4   Grooming 4   Eating 4   Daily Activity Raw Score 21 Daily Activity Standardized Score (Calc for Raw Score >=11) 44 27   AM-PAC Applied Cognition Inpatient   Following a Speech/Presentation 4   Understanding Ordinary Conversation 4   Taking Medications 4   Remembering Where Things Are Placed or Put Away 4   Remembering List of 4-5 Errands 4   Taking Care of Complicated Tasks 4   Applied Cognition Raw Score 24   Applied Cognition Standardized Score 62 21   End of Consult   Education Provided Yes   Patient Position at End of Consult Bedside chair;Bed/Chair alarm activated; All needs within reach   Nurse Communication Nurse aware of consult       OT GOALS:    Pt will improve functional mobility during ADL/IADL/leisure tasks with Mod I using AE/DME prn  Pt will improve activity tolerance/functional endurance during ADL/IADL/leisure tasks for at least 20 minutes to improve occupational performance and engagement in valued occupations using AE/DME prn  Pt will engage in ongoing functional/formal cognitive assessments to assist with safe d/c planning and increase safety during functional tasks  Pt will improve dynamic standing balance for at least 15 minutes with Mod I during functional tasks to decrease fall risk and improve independence and engagement in ADL/IADL/leisure activities  Pt will follow 100% of multi-step commands in ADL/IADL/leisure activities to improve functional cognition used in functional daily routines  Pt will complete functional transfers on and off all surfaces used in daily routines with Mod I for safety to maximize functional/occupational performance  Pt will complete all bed mobility tasks with Mod I to serve as a prerequisite for EOB/OOB ADL/IADL/leisure tasks, optimize positioning/comfort, and increase functional independence      Pt will independently demonstrate good carryover of safety precautions and education/training during ADL/IADL/leisure tasks with energy conservation techniques s/p skilled instruction without verbal cues     Pt will increase UE/LE MMT strength by 1/2 grade to improve bilateral coordination in ADL/IADL/leisure tasks with S  Pt will complete UB ADL tasks with Mod I using AE/DME prn to increase functional independence in ADL/IADL/leisure tasks  Pt will complete LB ADL tasks with Mod I using AE/DME prn to increase functional independence in ADL/IADL/leisure tasks  Pt will complete toileting tasks with Mod I and good hygiene/thoroughness using AE/DME prn to increase functional independence  Pt will independently identify and utilize 2-3 positive coping strategies to enhance overall wellbeing and engagement in valued occupations        Sasha Almeida MS, OTR/L

## 2023-02-22 NOTE — DISCHARGE SUMMARY
[facility]  Discharge- Mike Holding 1955, 69 y o  male MRN: [id number]  Unit/Bed#: CW2 212-01 Encounter: [id number]  Primary Care Provider: Hipolito Shaffer MD   Date and time admitted to hospital: 2/21/2023  1:54 PM    * Syncope  Assessment & Plan  Suspect vasovagal 2/2 n/v  · Telemetry review unremarkable   · Unlikely cardiac so IP echo not warranted  · No orthostatics as pt not dizzy when standing  · PT/OT recommending [facility] which was arranged via CM    Nausea and vomiting-resolved as of 2/22/2023  Assessment & Plan  Likely viral gastroenteritis in setting of Ozempic dose increased  · Now resolved and tolerating a regular diet   · PRN Zofran script provided on discharge   · Outpatient follow-up with weight management regarding Ozempic    Generalized weakness  Assessment & Plan  Likely due to dehydration and poor oral intake   · Rule out UTI given reports of urgency   · S/p 250 cc IV fluid bolus   · Encourage oral intake as tolerated   · PT/OT recommending [facility]     Urinary urgency  Assessment & Plan  Patient reporting urinary urgency and requesting UA   · UA benign     Benign essential hypertension  Assessment & Plan  · BP acceptable, monitor routinely   · C/w home BB and ARB  · Hold HCTZ x 24-48 hours after discharge due to poor po intake    Type 2 diabetes mellitus, without long-term current use of insulin [facility])  Assessment & Plan  Lab Results   Component Value Date    HGBA1C 5 8 01/18/2023       Recent Labs     02/21/23  1403 02/21/23  2131 02/22/23  0528 02/22/23  1101   POCGLU 184* 151* 131 133       Blood Sugar Average: Last 72 hrs:  (P) 149 75     · Well controlled outpatient   · Resume home medical management on discharge     Morbid obesity ([address] )  Assessment & Plan  · Patient advised to resume prior dose Ozempic on discharge rather than new increased dose, he verbalized understanding  · Outpatient follow-up as above     Depression with anxiety  Assessment & Plan  · Continue home meds including SSRI, Zyprexa, Trazadone as well as Xanax and Ambien prn     Medical Problems     Resolved Problems  Date Reviewed: 2/22/2023          Resolved    Nausea and vomiting 2/22/2023     Resolved by  Priscila Frey PA-C        Discharging Physician / Practitioner: Priscila Frey PA-C  PCP: Micaela Luis MD  Admission Date:   Admission Orders (From admission, onward)     Ordered        02/21/23 1618  Place in Observation  Once                      Discharge Date: 02/22/23    Consultations During Hospital Stay:  · None     Procedures Performed:   · None     Significant Findings / Test Results:   · Outlined above     Incidental Findings:   · None      Test Results Pending at Discharge (will require follow up): · None      Outpatient Tests Requested:  · Follow-up with PCP and weight management provider     Complications:  None     Reason for Admission: syncope     Hospital Course:   Joslyn Lopez is a 69 y o  male patient who originally presented to the hospital on 2/21/2023 due to syncopal episode, which is felt to be vasovagal in the setting of dehydration with 3 day history of nausea/vomiting with little oral intake  Patient recently had ozempic dose increased and started with nausea and vomiting following this dose adjustment  There is low suspicion for cardiac etiology of his syncope and therefore echo was not obtained  Telemetry monitoring unremarkable  He received IV fluids and Zofran with improvement  He is now tolerating regular diet  He was seen by physical therapy and cleared to return home with home health care  CM arranged patient with [facility]  Patient was advised to resume prior dose of ozempic on discharge and follow-up with his weight management provider for further instruction  He was provided script for Zofran to use as needed  He verbalized understanding and is in agreement with this plan of care       Please see above list of diagnoses and related plan for additional information  Condition at Discharge: fair    Discharge Day Visit / Exam:   * Please refer to separate progress note for these details *    Discussion with Family: Attempted to update  (wife) via phone  Left voicemail  Discharge instructions/Information to patient and family:   See after visit summary for information provided to patient and family  Provisions for Follow-Up Care:  See after visit summary for information related to follow-up care and any pertinent home health orders  Disposition:   Home with [facility] Services (Reminder: Complete face to face encounter)    Planned Readmission: no     Discharge Statement:  I spent 35 minutes discharging the patient  This time was spent on the day of discharge  I had direct contact with the patient on the day of discharge  Greater than 50% of the total time was spent examining patient, answering all patient questions, arranging and discussing plan of care with patient as well as directly providing post-discharge instructions  Additional time then spent on discharge activities  Discharge Medications:  See after visit summary for reconciled discharge medications provided to patient and/or family        **Please Note: This note may have been constructed using a voice recognition system**

## 2023-02-22 NOTE — ASSESSMENT & PLAN NOTE
Likely viral gastroenteritis in setting of Ozempic dose increased  · Diet as tolerated   · Zofran and Reglan prn  · Outpatient follow-up with weight management regarding Ozempic

## 2023-02-22 NOTE — ASSESSMENT & PLAN NOTE
· BP acceptable, monitor routinely   · C/w home BB and ARB  · Hold HCTZ x 24-48 hours after discharge due to poor po intake

## 2023-02-22 NOTE — ASSESSMENT & PLAN NOTE
Likely viral gastroenteritis in setting of Ozempic dose increased  · Now resolved and tolerating a regular diet   · PRN Zofran script provided on discharge   · Outpatient follow-up with weight management regarding Ozempic

## 2023-02-22 NOTE — DISCHARGE INSTR - AVS FIRST PAGE
Please resume prior dose of Ozempic (not the new increased dose) and follow-up with your weight management provider for further instruction

## 2023-02-22 NOTE — ASSESSMENT & PLAN NOTE
Suspect vasovagal 2/2 n/v  · Telemetry review   · Unlikely cardiac so IP echo not warranted  · No orthostatics as pt not dizzy when standing  · PT/OT evaluations per patient request

## 2023-02-22 NOTE — CASE MANAGEMENT
Case Management Discharge Planning Note    Patient name Elias Pedraza  Location CW2 145/TM8 212-01 MRN [id number]  : 1955 Date 2023       Current Admission Date: 2023  Current Admission Diagnosis:Syncope   Patient Active Problem List    Diagnosis Date Noted   • Urinary urgency 2023   • Generalized weakness 2023   • Syncope 2023   • Nausea and vomiting 2023   • History of total knee arthroplasty, right 01/10/2023   • Morbid obesity ([facility] ) 2022   • Hx of laparoscopic adjustable gastric banding 2022   • Molina's esophagus determined by biopsy 2022   • Dermatitis 2022   • Pure hypercholesterolemia 2022   • Tremor 2022   • Neuropathy 2022   • Gastroesophageal reflux disease with esophagitis without hemorrhage 2022   • Ventral hernia without obstruction or gangrene 2021   • Gastroparesis 2021   • PONV (postoperative nausea and vomiting)    • Exertional dyspnea 2021   • Dysphagia 2021   • Molina's esophagus with dysplasia 11/15/2020   • Onychomycosis 2019   • Hyperplasia of renal artery ([facility] ) 2019   • Ambulatory dysfunction 2017   • Status post total left knee replacement 10/11/2016   • Stuttering    • Sleep apnea    • Type 2 diabetes mellitus, without long-term current use of insulin ([facility])    • Class 3 severe obesity due to excess calories without serious comorbidity with body mass index (BMI) of 40 0 to 44 9 in [facility])    • 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, without long-term current use of insulin ([facility] [address] ) 2015   • Depression with anxiety 2015   • Insomnia 2013   • Benign essential hypertension 2012      LOS (days): 0  Geometric Mean LOS (GMLOS) (days):   Days to GMLOS:     OBJECTIVE:            Current admission status: Observation   Preferred Pharmacy:   [facility], [place] - [address] [facility] [address]  Phone: [phone] Fax: [phone], [facility] [address]  Phone: [phone] Fax: [phone]    [place] [address], [address]  [address]  [facility] [address]  Phone: [phone] Fax: [phone]    Primary Care Provider: Artemio Connor MD    Primary Insurance: MEDICARE  Secondary Insurance: BLUE CROSS    DISCHARGE DETAILS:                    Treatment Team Recommendation: [address]  Discharge Destination Plan[de-identified] Home with [place] at Discharge : Family               Additional Comments: Met with patient at bedside, discussed current recommendation for home health/PT/OT, patient agreed and asked for referral to [facility]  Referral accepted  Patient's wife will provide d/c transportation

## 2023-02-22 NOTE — ASSESSMENT & PLAN NOTE
Lab Results   Component Value Date    HGBA1C 5 8 01/18/2023       Recent Labs     02/21/23  1403 02/21/23  2131 02/22/23  0528 02/22/23  1101   POCGLU 184* 151* 131 133       Blood Sugar Average: Last 72 hrs:  (P) 149 75     · Well controlled outpatient   · Resume home medical management on discharge

## 2023-02-22 NOTE — ASSESSMENT & PLAN NOTE
Lab Results   Component Value Date    HGBA1C 5 8 01/18/2023       Recent Labs     02/21/23  1403 02/21/23  2131 02/22/23  0528   POCGLU 184* 151* 131       Blood Sugar Average: Last 72 hrs:  (P) 644 [id number]     · Well controlled outpatient   · Hold home metformin   · QID accuchecks with ISS  · Carb controlled diet

## 2023-02-22 NOTE — ASSESSMENT & PLAN NOTE
Likely due to dehydration and poor oral intake   · Rule out UTI given reports of urgency   · S/p 250 cc IV fluid bolus   · Encourage oral intake as tolerated   · PT/OT recommending [facility]

## 2023-02-22 NOTE — PLAN OF CARE
Problem: PAIN - ADULT  Goal: Verbalizes/displays adequate comfort level or baseline comfort level  Description: Interventions:  - Encourage patient to monitor pain and request assistance  - Assess pain using appropriate pain scale  - Administer analgesics based on type and severity of pain and evaluate response  - Implement non-pharmacological measures as appropriate and evaluate response  - Consider cultural and social influences on pain and pain management  - Notify physician/advanced practitioner if interventions unsuccessful or patient reports new pain  2/22/2023 0049 by Claribel Guevara RN  Outcome: Progressing  2/22/2023 0048 by Claribel Guevara RN  Outcome: Progressing     Problem: CARDIOVASCULAR - ADULT  Goal: Maintains optimal cardiac output and hemodynamic stability  Description: INTERVENTIONS:  - Monitor I/O, vital signs and rhythm  - Monitor for S/S and trends of decreased cardiac output  - Administer and titrate ordered vasoactive medications to optimize hemodynamic stability  - Assess quality of pulses, skin color and temperature  - Assess for signs of decreased coronary artery perfusion  - Instruct patient to report change in severity of symptoms  Outcome: Progressing     Problem: GASTROINTESTINAL - ADULT  Goal: Maintains adequate nutritional intake  Description: INTERVENTIONS:  - Monitor percentage of each meal consumed  - Identify factors contributing to decreased intake, treat as appropriate  - Assist with meals as needed  - Monitor I&O, weight, and lab values if indicated  - Obtain nutrition services referral as needed  2/22/2023 0049 by Claribel Guevara RN  Outcome: Progressing  2/22/2023 0048 by Claribel Guevara RN  Outcome: Progressing  Goal: Minimal or absence of nausea and/or vomiting  Description: INTERVENTIONS:  - Administer IV fluids if ordered to ensure adequate hydration  - Maintain NPO status until nausea and vomiting are resolved  - Nasogastric tube if ordered  - Administer ordered antiemetic medications as needed  - Provide nonpharmacologic comfort measures as appropriate  - Advance diet as tolerated, if ordered  - Consider nutrition services referral to assist patient with adequate nutrition and appropriate food choices  Outcome: Progressing

## 2023-02-22 NOTE — ASSESSMENT & PLAN NOTE
Suspect vasovagal 2/2 n/v  · Telemetry review unremarkable   · Unlikely cardiac so IP echo not warranted  · No orthostatics as pt not dizzy when standing  · PT/OT recommending HHC which was arranged via CM

## 2023-02-22 NOTE — ASSESSMENT & PLAN NOTE
Likely due to dehydration and poor oral intake   · Rule out UTI given reports of urgency   · Give 250 cc IV fluid bolus   · Encourage oral intake as tolerated   · PT/OT evaluations

## 2023-02-22 NOTE — PLAN OF CARE
Problem: OCCUPATIONAL THERAPY ADULT  Goal: Performs self-care activities at highest level of function for planned discharge setting  See evaluation for individualized goals  Description: Treatment Interventions: ADL retraining, Functional transfer training, UE strengthening/ROM, Endurance training, Patient/family training, Equipment evaluation/education, Compensatory technique education, Continued evaluation, Energy conservation, Activityengagement  Equipment Recommended: Other (comment) (Pt declining BSC @ this time)       See flowsheet documentation for full assessment, interventions and recommendations  Note: Limitation: Decreased ADL status, Decreased endurance, Decreased self-care trans, Decreased high-level ADLs  Prognosis: Fair  Assessment: Pt is a 68 yo male who presented to [facility] with nausea, vomitting, and lightheadedness in which per chart review, pt w/ brief loss of consciousness  Pt dx w/ syncope  Pt  has a past medical history of Acute blood loss anemia (10/13/2016), Anxiety, Arthritis, Arthritis, Asthma, Molina esophagus, Cancer ([facility] [address] ), Cataract, Depression, Diabetes ([facility] [address] ), Diabetes mellitus ([facility] [address] ), Diverticulitis of colon, Environmental allergies, Fall (12/30/2020), GERD (gastroesophageal reflux disease), Hiatal hernia, History of anal fissures, Hyperlipidemia, Hypertension, Incisional hernia, Insomnia, Kidney stone, Morbid obesity ([address] ), Peripheral neuropathy, PONV (postoperative nausea and vomiting), Prolapsing mitral valve, Prostate cancer ([facility] [address] ), Retinal detachment, Sleep apnea, and Stuttering  Pt with active OT orders and OT consulted to assess pt's functional status and occupational performance to determine safe d/c needs  Pt lives with his wife in a 3 SH + basement with 2 FREDIS  PTA, pt was independent in ADLs/IADLs and functional mobility w/ no DME  (+) driving   Currently, pt performing bed mobility w/ Min A, functional transfers w/ Min A and RW, functional mobility w/ CGA and RW, UB ADLs w/ supervision, and LB ADLs w/ Min A  Pt demonstrates the following limitations/impairments which impact the pt's ability to engage in valued occupations: endurance/activity tolerance, standing tolerance, functional reach, and strength  From an OT standpoint, recommend discharge to home w/ home health, once medically stable  The patient's raw score on the AM-PAC Daily Activity Inpatient Short Form is 21  A raw score of greater than or equal to 19 suggests the patient may benefit from discharge to home  Please refer to the recommendation of the Occupational Therapist for safe discharge planning  Pt would benefit from skilled OT services 2-3x/wk to address immediate acute care needs and underlying performance skills to promote safety, decrease fall risk, and enhance occupational performance to return to PLOF  Goals to be met within the next 10-14 days       OT Discharge Recommendation: Home with home health rehabilitation (w/ increased support/assistance upon d/c)

## 2023-02-22 NOTE — PHYSICAL THERAPY NOTE
PHYSICAL THERAPY EVALUATION  NAME:  Cassy Burgos  DATE: 02/22/23    AGE:   69 y o   Mrn:   [id number]  ADMIT DX:  Nausea and vomiting [R11 2]  Generalized weakness [R53 1]    Past Medical History:   Diagnosis Date    Acute blood loss anemia 10/13/2016    Anxiety     Arthritis     knees    Arthritis     Asthma     stable for > 10 years    Molina esophagus     with BARRX/RFA    Cancer ([facility] [address] )     prostate    Cataract     Depression     Diabetes ([facility] [address] )     Diabetes mellitus ([facility] [address] )     pre diabetes    Diverticulitis of colon     Environmental allergies     Fall 12/30/2020    GERD (gastroesophageal reflux disease)     Hiatal hernia     History of anal fissures     Hyperlipidemia     Hypertension     Incisional hernia     Insomnia     Kidney stone     Morbid obesity (HCC)     Peripheral neuropathy     PONV (postoperative nausea and vomiting)     Prolapsing mitral valve     prolapsing mitral valve leaflet syndrome    [facility]     Retinal detachment     treated surgically at [facility]; resolved: 10/10/2012    Sleep apnea     diagnosed but unable to tolerate cpap       Stuttering      Past Surgical History:   Procedure Laterality Date    ARTHROSCOPIC REPAIR ACL Right     BIOPSY CORE NEEDLE      prostate    CATARACT EXTRACTION Bilateral     COLONOSCOPY      HERNIA REPAIR      [facility]    JOINT REPLACEMENT      B/L knee    KNEE ARTHROSCOPY Left     LAPAROSCOPIC GASTRIC BANDING      NE ARTHRP KNE CONDYLE&PLATU MEDIAL&LAT COMPARTMENTS Left 02/15/2017    Procedure: TOTAL KNEE ARTHROPLASTY ;  Surgeon: Jordan Segovia MD;  [facility]: [facility];  Service: Orthopedics    NE ARTHRP KNE CONDYLE&PLATU MEDIAL&LAT COMPARTMENTS Right 10/10/2016    Procedure: ARTHROPLASTY KNEE TOTAL;  Surgeon: Jordan Segovia MD;  Location: [facility];  Service: Orthopedics    PROSTATECTOMY      robotic-assisted; JOHNNYB-Dr López Fine    REMOVAL GASTRIC BAND LAPAROSCOPIC N/A 8/30/2022    Procedure: LAPAROSCOPIC REMOVAL OF ADJUSTABLE GASTRIC BAND AND PORT WITH INTRAOPERATIVE EGD;  Surgeon: Gregorio Sebastian MD;  Location: [facility];  Service: Bariatrics    RETINAL DETACHMENT SURGERY Bilateral     [facility]    SEPTOPLASTY      SINUS SURGERY      TONSILLECTOMY      over age 13    UPPER GASTROINTESTINAL ENDOSCOPY      mutiple with BARRX/RFA    VASECTOMY      vas deferens       Length Of Stay: 0  PHYSICAL THERAPY EVALUATION :    02/22/23 1140   Note Type   Note type Evaluation   Pain Assessment   Pain Assessment Tool 0-10   Pain Score No Pain   Restrictions/Precautions   Weight Bearing Precautions Per Order No   Other Precautions Multiple lines;O2;Fall Risk;Pain   Home Living   Type of Home House  (w/ 2STE + HRs)   Home Layout Multi-level;1/2 bath on main level;Bed/bath upstairs  (FF to main bed and bathroom)   Bathroom Shower/Tub Walk-in shower   Bathroom Toilet Standard   Bathroom Equipment Shower chair   [address]   Additional Comments Pt reports living with his wife in a 3 SH with basement with 2 FREDIS; PTA, pt reporting no use of DME but has access to 2 RW's; pt reporting @ baseline that he has difficulty getting into/out of bed and getting up from a recliner; reports that he recently bought an apdaptive bedrail/equipment to assist with getting in/out of bed   Prior Function   Level of [place] Independent with ADLs; Independent with functional mobility; Independent with IADLS   Lives With Spouse   Receives Help From Family   IADLs Independent with driving; Independent with meal prep; Independent with medication management   Falls in the last 6 months 1 to 4  (Reporting 1 fall)   Vocational Retired   Comments (+) - spouse A w/ ADL's- both retired- pt was receiving OPPT [address]- reports increased difficulty getting there + increasing diffiulty leaving home + getting in/out of bed/ chairs w/o arm rests recently - is being followed by neurology     Cognition   Overall Cognitive Status WFL   Attention Within functional limits   Orientation Level Oriented X4   Memory Within functional limits   Following Commands Follows all commands and directions without difficulty   RUE Assessment   RUE Assessment WFL  (grossly 4/5 MMT; observed WFL during functional activity)   LUE Assessment   LUE Assessment WFL  (grossly 4/5 MMT; observed WFL during functional activity)   RLE Assessment   RLE Assessment WFL   LLE Assessment   LLE Assessment WFL   Bed Mobility   Supine to Sit 4  Minimal assistance   Sit to Supine 4  Minimal assistance   Additional Comments (reports spouse A w/ bed mobility PTA- recently bought ? rail for assist into and out of bed   Transfers   Sit to Stand 5  Supervision   Additional items Assist x 1; Increased time required;Verbal cues   Stand to Sit 5  Supervision   Additional items Assist x 1; Increased time required;Verbal cues   Ambulation/Elevation   Gait pattern Improper Weight shift; Forward Flexion; Excessively slow;Decreased foot clearance   Gait Assistance 5  Supervision   Assistive Device Rolling walker   Distance 80+70' w/ slow catalino and 1 episode of difficulty advancing R  LE (? Freezing episode )   Stair Management Assistance 5  Supervision   Stair Management Technique One rail R;Step to pattern; Foreward   Balance   Static Sitting Good   Dynamic Sitting Fair +   Static Standing Fair   Dynamic Standing Fair   Ambulatory Fair  (rw)   Activity Tolerance   Activity Tolerance Patient tolerated treatment well;Patient limited by fatigue   Medical Staff Made Aware yes   Nurse Made Aware yes- cleared for session   Assessment   Prognosis Good   Problem List Decreased strength;Decreased range of motion;Decreased endurance; Impaired balance;Decreased mobility;Obesity   Assessment Pt is 69 y o  male seen for PT evaluation s/p admit to [facility] on 2/21/2023  Pt presenting w/ nausea, vomitting, and lightheadedness in which per chart review, pt w/ brief loss of consciousness  Pt dx w/ syncope   Pt  has a past medical history of Acute blood loss anemia (10/13/2016), Anxiety, Arthritis, Arthritis, Asthma, Molina esophagus, Cancer ([facility] ), Cataract, Depression, Diabetes ([facility] ), Diabetes mellitus ([facility] ), Diverticulitis of colon, Environmental allergies, Fall (12/30/2020), GERD (gastroesophageal reflux disease), Hiatal hernia, History of anal fissures, Hyperlipidemia, Hypertension, Incisional hernia, Insomnia, Kidney stone, Morbid obesity ([facility] ), Peripheral neuropathy, PONV (postoperative nausea and vomiting), Prolapsing mitral valve, Prostate cancer ([facility] ), Retinal detachment, Sleep apnea, and Stuttering  Prior to admission, pt reports living w/ spouse in a 3SH w/ 2 FREDIS + FF to main bed and bath on 2nd floor  (+) driving; 0 falls (besides this syncopal episode prompting admission  Pt reports recent inc in difficulty getting into and out of bed + off lower chairs and couch  Currently pt  is requiring min/ CGA A for bed skills; S for functional transfers and S for ambulation w/ RW 80+70' w/ slow catalino and 1 episode of difficulty advancing R  LE (? Freezing episode )   Pt presents currently w/ overall mobility deficits 2* to:  limited flexibility;  generalized weakness/ deconditioning; decreased endurance; decreased activity tolerance; obesity;  impaired balance; gait deviations;  fatigue; impaired safety and judgement; limited insight into current deficits; bed/ chair alarms; multiple lines  Pt will continue to benefit from skilled PT interventions to address stated impairments; to maximize functional potential; for ongoing pt/ family training; and DME needs  PT is currently recommending d/c to home w/ [facility] PT and ongoing family support for d/c  - recommend use of RW at home + ongoing f/u w/ neurology (pt reports ongoing w/u w/ neuro as outpt + was receiving OPPT prior at [address]- but now unable o leave home 2* recent  decline in functional mobility and ADL's  Pt/ family agreeable to plan and goals as stated on evaluation  Barriers to Discharge None   Goals   Patient Goals get better- regain independence and have more therapy   STG Expiration Date 03/08/23   Short Term Goal #1 In 14 days pt will complete: 1) Bed mobility skills with indep/ MI to facilitate safe return to previous living environment and decrease burden on caregivers  2) Functional transfers with indep/ MI  to facilitate safe return to previous living environment  3) Ambulation with least restrictive AD > 300 all terrains MI/ indep' without LOB and stable vitals for safe ambulation in home/ community environment  4) Stair training up/ down flight step/s with 1 rail/s  and MI for safe access to previous living environment and to increase community access  5) Improve balance by 1 grade in order to decrease fall risk  6) ) PT for ongoing pt and family education; DME needs and D/C planning to promote highest level of function in least restrictive environment  PT Treatment Day 0   Plan   Treatment/Interventions ADL retraining;LE strengthening/ROM; Functional transfer training;Elevations; Therapeutic exercise; Endurance training;Patient/family training;Equipment eval/education; Bed mobility;Gait training;Spoke to nursing;Spoke to case management;Spoke to advanced practitioner   PT Frequency 2-3x/wk   Recommendation   PT Discharge Recommendation Home with home health rehabilitation   Equipment Recommended Alejandro Church  (has x2)   [employer] Recommended Wheeled walker   AM-PAC Basic Mobility Inpatient   Turning in Flat Bed Without Bedrails 3   Lying on Back to Sitting on Edge of Flat Bed Without Bedrails 3   Moving Bed to Chair 4   Standing Up From Chair Using Arms 4   Walk in Room 4   Climb 3-5 Stairs With Railing 4   Basic Mobility Inpatient Raw Score 22   Basic Mobility Standardized Score 47 4   Highest Level Of Mobility   JH-HLM Goal 7: Walk 25 feet or more   JH-HLM Achieved 7: Walk 25 feet or more   Additional Treatment Session   Start Time 1130   End Time 1140   Treatment Assessment Pt seen for skilled PT session following evaluation consisting of instruction in seated therex/ HEP instruction; for increased LE strengthening to assist w/ increasing independence w/ transfers and gait  Pt tolerates therex w/o complaints or increase in pain  Will continue to  benefit from repeated instruction for correct technique and compliance  Exercises   Hip Flexion Sitting;Standing;15 reps   Knee AROM Long Arc Quad Sitting;15 reps;AROM   Ankle Pumps 25 reps   Balance training  STS w/ then w/o arm rests x4 total   End of Consult   Patient Position at End of Consult Bedside chair;Bed/Chair alarm activated; All needs within reach       The patient's AM-PAC Basic Mobility Inpatient Short Form Raw Score is 22  A Raw score of greater than 16 suggests the patient may benefit from discharge to home  Please also refer to the recommendation of the Physical Therapist for safe discharge planning

## 2023-02-22 NOTE — PROGRESS NOTES
[address] [facility]  Progress Note Agustin Hale 1955, 69 y o  male MRN: [id number]  Unit/Bed#: CW2 212-01 Encounter: [id number]  Primary Care Provider: Paty Clements MD   Date and time admitted to hospital: 2/21/2023  1:54 PM    * Syncope  Assessment & Plan  Suspect vasovagal 2/2 n/v  · Telemetry review unremarkable, will d/c  · Unlikely cardiac so IP echo not warranted  · No orthostatics as pt not dizzy when standing  · PT/OT evaluations per patient request     Nausea and vomiting  Assessment & Plan  Likely viral gastroenteritis in setting of Ozempic dose increased  · Diet as tolerated   · Zofran and Reglan prn  · Outpatient follow-up with weight management regarding Ozempic    Generalized weakness  Assessment & Plan  Likely due to dehydration and poor oral intake   · Rule out UTI given reports of urgency   · Give 250 cc IV fluid bolus   · Encourage oral intake as tolerated   · PT/OT evaluations    Urinary urgency  Assessment & Plan  Patient reporting urinary urgency and requesting UA   · Follow-up UA with reflex to culture     Benign essential hypertension  Assessment & Plan  · BP acceptable, monitor routinely   · C/w home BB and ARB  · Hold HCTZ due to poor po intake    Type 2 diabetes mellitus, without long-term current use of insulin [facility])  Assessment & Plan  Lab Results   Component Value Date    HGBA1C 5 8 01/18/2023       Recent Labs     02/21/23  1403 02/21/23  2131 02/22/23  0528   POCGLU 184* 151* 131       Blood Sugar Average: Last 72 hrs:  (P) 334 [id number]     · Well controlled outpatient   · Hold home metformin   · QID accuchecks with ISS  · Carb controlled diet     Morbid obesity ([address] )  Assessment & Plan  · RD protocol  · Outpatient follow-up as above     Depression with anxiety  Assessment & Plan  · Continue home meds including SSRI, Zyprexa, Trazadone as well as Xanax and Ambien prn       VTE Pharmacologic Prophylaxis: VTE Score: 4 Moderate Risk (Score 3-4) - Pharmacological DVT Prophylaxis Ordered: enoxaparin (Lovenox)  Patient Centered Rounds: I performed bedside rounds with nursing staff today  Discussions with Specialists or Other Care Team Provider: primary RN, case management      Education and Discussions with Family / Patient: Attempted to update  (wife) via phone  Left voicemail  Total Time Spent on Date of Encounter in care of patient: 25 minutes This time was spent on one or more of the following: performing physical exam; counseling and coordination of care; obtaining or reviewing history; documenting in the medical record; reviewing/ordering tests, medications or procedures; communicating with other healthcare professionals and discussing with patient's family/caregivers  Current Length of Stay: 0 day(s)  Current Patient Status: Observation   Certification Statement: The patient will continue to require additional inpatient hospital stay due to pending symptomatic improvement and therapy evaluations   Discharge Plan: Anticipate discharge later today or tomorrow to discharge location to be determined pending rehab evaluations  Code Status: Level 1 - Full Code    Subjective:   Patient reports feeling better today although still with generalized weakness and mild nausea  He denies any episodes of vomiting  Tells me he was only able to eat a piece of toast this morning  Notes he has not been out of bed and is requesting therapy evaluation prior to discharge  Also mentions he has been having urinary urgency with episodes of incontinence and is requesting we rule out a UTI  Tells me he knows someone who presented to the hospital after passing out and he ended up dying of a UTI       Objective:     Vitals:   Temp (24hrs), Av 8 °F (37 1 °C), Min:98 1 °F (36 7 °C), Max:99 °F (37 2 °C)    Temp:  [98 1 °F (36 7 °C)-99 °F (37 2 °C)] 98 1 °F (36 7 °C)  HR:  [] 95  Resp:  [18-20] 18  BP: (123-166)/(73-94) 123/73  SpO2:  [91 %-96 %] 91 %  Body mass index is 45 77 kg/m²  Input and Output Summary (last 24 hours): Intake/Output Summary (Last 24 hours) at 2/22/2023 0958  Last data filed at 2/22/2023 0858  Gross per 24 hour   Intake 120 ml   Output 300 ml   Net -180 ml       Physical Exam:   Physical Exam  Constitutional:       Appearance: He is morbidly obese  Cardiovascular:      Rate and Rhythm: Normal rate and regular rhythm  Pulmonary:      Effort: Pulmonary effort is normal    Neurological:      General: No focal deficit present  Mental Status: He is alert and oriented to person, place, and time  Mental status is at baseline  Cranial Nerves: Dysarthria (chronic stutter per patient) present            Additional Data:     Labs:  Results from last 7 days   Lab Units 02/21/23  1425   WBC Thousand/uL 11 34*   HEMOGLOBIN g/dL 15 4   HEMATOCRIT % 44 4   PLATELETS Thousands/uL 244   NEUTROS PCT % 82*   LYMPHS PCT % 7*   MONOS PCT % 6   EOS PCT % 5     Results from last 7 days   Lab Units 02/22/23  0642 02/21/23  1425   SODIUM mmol/L 136 136   POTASSIUM mmol/L 3 6 3 6   CHLORIDE mmol/L 105 104   CO2 mmol/L 24 24   BUN mg/dL 18 14   CREATININE mg/dL 1 03 1 05   ANION GAP mmol/L 7 8   CALCIUM mg/dL 8 8 9 6   ALBUMIN g/dL  --  3 7   TOTAL BILIRUBIN mg/dL  --  0 64   ALK PHOS U/L  --  72   ALT U/L  --  32   AST U/L  --  19   GLUCOSE RANDOM mg/dL 129 186*         Results from last 7 days   Lab Units 02/22/23  0528 02/21/23  2131 02/21/23  1403   POC GLUCOSE mg/dl 131 151* 184*               Lines/Drains:  Invasive Devices     Peripheral Intravenous Line  Duration           Peripheral IV 02/22/23 Right;Ventral (anterior) Forearm <1 day                  Telemetry:  Telemetry Orders (From admission, onward)             24 Hour Telemetry Monitoring  Continuous x 24 Hours (Telem)        References:    Telemetry Guidelines   Question:  Reason for 24 Hour Telemetry  Answer:  Syncope suspected to be cardiac in origin Telemetry Reviewed: Normal Sinus Rhythm  Indication for Continued Telemetry Use: No indication for continued use  Will discontinue  Imaging: No pertinent imaging reviewed  Recent Cultures (last 7 days):         Last 24 Hours Medication List:   Current Facility-Administered Medications   Medication Dose Route Frequency Provider Last Rate   • acetaminophen  650 mg Oral Q6H PRN Rendell Wes, DO     • ALPRAZolam  0 25 mg Oral TID PRN Rendell Wes, DO     • enoxaparin  40 mg Subcutaneous BID Rendell Wes, DO     • ezetimibe  5 mg Oral HS Rendell Wes, DO     • FLUoxetine  60 mg Oral Daily Rendell Wes, DO     • insulin lispro  1-5 Units Subcutaneous TID AC Rendell Wes, DO     • insulin lispro  1-5 Units Subcutaneous HS Rendell Wes, DO     • losartan  100 mg Oral Daily Rendell Wes, DO     • metoclopramide  10 mg Intravenous Q6H PRN Rendell Wes, DO     • metoprolol succinate  25 mg Oral Daily Rendell Wes, DO     • OLANZapine  2 5 mg Oral QPM Rendell Wes, DO     • ondansetron  4 mg Intravenous Q6H PRN Rendell Wes, DO     • oxybutynin  5 mg Oral BID Rendell Wes, DO     • pantoprazole  40 mg Oral Early Morning Rendell Wes, DO     • polyethylene glycol  17 g Oral Daily Nazanin Reddy PA-C     • senna-docusate sodium  1 tablet Oral HS Sherry Martínez PA-C     • sodium chloride  250 mL Intravenous Once Rachel Nice PA-C     • traZODone  50 mg Oral HS Rendell Wes, DO     • zolpidem  10 mg Oral HS PRN Rendell Wes, DO          Today, Patient Was Seen By: Kiara Zaragoza PA-C    **Please Note: This note may have been constructed using a voice recognition system  **

## 2023-02-22 NOTE — ASSESSMENT & PLAN NOTE
· Patient advised to resume prior dose Ozempic on discharge rather than new increased dose, he verbalized understanding  · Outpatient follow-up as above

## 2023-02-22 NOTE — PLAN OF CARE
Problem: PHYSICAL THERAPY ADULT  Goal: Performs mobility at highest level of function for planned discharge setting  See evaluation for individualized goals  Description: Treatment/Interventions: ADL retraining, LE strengthening/ROM, Functional transfer training, Elevations, Therapeutic exercise, Endurance training, Patient/family training, Equipment eval/education, Bed mobility, Gait training, Spoke to nursing, Spoke to case management, Spoke to advanced practitioner  Equipment Recommended: Alejandro Church (has x2)       See flowsheet documentation for full assessment, interventions and recommendations  Note: Prognosis: Good  Problem List: Decreased strength, Decreased range of motion, Decreased endurance, Impaired balance, Decreased mobility, Obesity  Assessment: Pt is 69 y o  male seen for PT evaluation s/p admit to [facility] on 2/21/2023  Pt presenting w/ nausea, vomitting, and lightheadedness in which per chart review, pt w/ brief loss of consciousness  Pt dx w/ syncope  Pt  has a past medical history of Acute blood loss anemia (10/13/2016), Anxiety, Arthritis, Arthritis, Asthma, Molina esophagus, Cancer ([address] ), Cataract, Depression, Diabetes ([address] ), Diabetes mellitus ([address] ), Diverticulitis of colon, Environmental allergies, Fall (12/30/2020), GERD (gastroesophageal reflux disease), Hiatal hernia, History of anal fissures, Hyperlipidemia, Hypertension, Incisional hernia, Insomnia, Kidney stone, Morbid obesity ([address] ), Peripheral neuropathy, PONV (postoperative nausea and vomiting), Prolapsing mitral valve, Prostate cancer ([address] ), Retinal detachment, Sleep apnea, and Stuttering  Prior to admission, pt reports living w/ spouse in a 3SH w/ 2 FREDIS + FF to main bed and bath on 2nd floor  (+) driving; 0 falls (besides this syncopal episode prompting admission  Pt reports recent inc in difficulty getting into and out of bed + off lower chairs and couch   Currently pt  is requiring min/ CGA A for bed skills; S for functional transfers and S for ambulation w/ RW 80+70' w/ slow catalino and 1 episode of difficulty advancing R  LE (? Freezing episode )   Pt presents currently w/ overall mobility deficits 2* to:  limited flexibility;  generalized weakness/ deconditioning; decreased endurance; decreased activity tolerance; obesity;  impaired balance; gait deviations;  fatigue; impaired safety and judgement; limited insight into current deficits; bed/ chair alarms; multiple lines  Pt will continue to benefit from skilled PT interventions to address stated impairments; to maximize functional potential; for ongoing pt/ family training; and DME needs  PT is currently recommending d/c to home w/ C PT and ongoing family support for d/c  - recommend use of RW at home + ongoing f/u w/ neurology (pt reports ongoing w/u w/ neuro as outpt + was receiving OPPT prior [address]- but now unable o leave home 2* recent  decline in functional mobility and ADL's  Pt/ family agreeable to plan and goals as stated on evaluation  Barriers to Discharge: None     PT Discharge Recommendation: Home with home health rehabilitation    See flowsheet documentation for full assessment

## 2023-02-23 ENCOUNTER — HOME CARE VISIT (OUTPATIENT)
Dept: HOME HEALTH SERVICES | Facility: HOME HEALTHCARE | Age: 68
End: 2023-02-23

## 2023-02-23 VITALS
HEART RATE: 61 BPM | RESPIRATION RATE: 18 BRPM | TEMPERATURE: 97.1 F | SYSTOLIC BLOOD PRESSURE: 132 MMHG | DIASTOLIC BLOOD PRESSURE: 82 MMHG | OXYGEN SATURATION: 96 %

## 2023-02-23 LAB
ATRIAL RATE: 75 BPM
P AXIS: 48 DEGREES
PR INTERVAL: 198 MS
QRS AXIS: -41 DEGREES
QRSD INTERVAL: 94 MS
QT INTERVAL: 368 MS
QTC INTERVAL: 410 MS
T WAVE AXIS: 47 DEGREES
VENTRICULAR RATE: 75 BPM

## 2023-02-24 ENCOUNTER — TELEPHONE (OUTPATIENT)
Dept: FAMILY MEDICINE CLINIC | Facility: CLINIC | Age: 68
End: 2023-02-24

## 2023-02-24 ENCOUNTER — HOME CARE VISIT (OUTPATIENT)
Dept: HOME HEALTH SERVICES | Facility: HOME HEALTHCARE | Age: 68
End: 2023-02-24

## 2023-02-24 NOTE — TELEPHONE ENCOUNTER
Patient ([phone]) called with question regarding medication Ozempic  Patient reports he  takes medication once per week and usually takes on saturday  Patient has TCM scheduled for Monday, 2/27/23  Asks if he should take Ozempic this Saturday as usual or wait until appointment on Monday  Requests call back to confirm

## 2023-02-27 ENCOUNTER — HOME CARE VISIT (OUTPATIENT)
Dept: HOME HEALTH SERVICES | Facility: HOME HEALTHCARE | Age: 68
End: 2023-02-27

## 2023-02-27 ENCOUNTER — OFFICE VISIT (OUTPATIENT)
Dept: FAMILY MEDICINE CLINIC | Facility: CLINIC | Age: 68
End: 2023-02-27

## 2023-02-27 VITALS — DIASTOLIC BLOOD PRESSURE: 84 MMHG | HEART RATE: 67 BPM | OXYGEN SATURATION: 96 % | SYSTOLIC BLOOD PRESSURE: 128 MMHG

## 2023-02-27 VITALS
TEMPERATURE: 98.9 F | SYSTOLIC BLOOD PRESSURE: 132 MMHG | BODY MASS INDEX: 46.93 KG/M2 | HEIGHT: 67 IN | RESPIRATION RATE: 14 BRPM | DIASTOLIC BLOOD PRESSURE: 84 MMHG | HEART RATE: 64 BPM | WEIGHT: 299 LBS

## 2023-02-27 DIAGNOSIS — E66.01 OBESITY, CLASS III, BMI 40-49.9 (MORBID OBESITY) (HCC): ICD-10-CM

## 2023-02-27 DIAGNOSIS — Z09 HOSPITAL DISCHARGE FOLLOW-UP: Primary | ICD-10-CM

## 2023-02-27 DIAGNOSIS — E11.641 TYPE 2 DIABETES MELLITUS WITH HYPOGLYCEMIA AND COMA, WITHOUT LONG-TERM CURRENT USE OF INSULIN (HCC): ICD-10-CM

## 2023-02-27 DIAGNOSIS — I10 BENIGN ESSENTIAL HYPERTENSION: ICD-10-CM

## 2023-02-27 DIAGNOSIS — G47.00 INSOMNIA, UNSPECIFIED TYPE: ICD-10-CM

## 2023-02-27 DIAGNOSIS — E78.00 PURE HYPERCHOLESTEROLEMIA: ICD-10-CM

## 2023-02-27 DIAGNOSIS — F41.8 DEPRESSION WITH ANXIETY: ICD-10-CM

## 2023-02-27 DIAGNOSIS — F80.81 STUTTERING: ICD-10-CM

## 2023-02-27 RX ORDER — ZOLPIDEM TARTRATE 10 MG/1
10 TABLET ORAL
Qty: 90 TABLET | Refills: 0 | Status: SHIPPED | OUTPATIENT
Start: 2023-02-27 | End: 2023-05-28

## 2023-02-27 NOTE — CASE COMMUNICATION
Pt was seen and performed a PT IE home assessment on Monday 02/27/23    Pt is currently (I) with entering and exiting the home  Pt able to perform sit<->stand transfers to and from various surfaces (I)ly  Pt able to perform gait without use of DME and bed mobility needing S level of A  Pt able to go up and down 15 steps with use of HR needing S level of A  Pt is (I) with bathing and dressing  Pt reports "getting stronger and moving b luly" since being home from the hospital   Pt reports driving to MD appointment prior to PT IE assessment  pt currently is not homebound  Pt would benefit from outpt PT services for higher level balance, core and back strengthening and BLE strengthening  Pt is agreeable  Please order a script for outpt PT services  No skilled home PT and no skilled home OT services needed at this time, DC pt from skilled home PT and skilled [facility] OT      Thank you,  Melinda Jacome  DPT

## 2023-02-27 NOTE — CASE COMMUNICATION
no skilled home PT and no skilled home OT needs at this time  Pt is (I) with bathing and dressing  (I) with bed mobility and needs S for transfers and gait without use of DME  Pt is able to exit and enter home (I)ly and reports driving at this time  DC pt from skilled home PT and skilled home OT    MD made aware

## 2023-02-27 NOTE — ASSESSMENT & PLAN NOTE
Lab Results   Component Value Date    HGBA1C 5 8 01/18/2023     Advised pt to continue ozempic 1mg weekly  Continue metformin 1000mg bid  Refer to endocrinology

## 2023-02-27 NOTE — Clinical Note
great, thank you!  ----- Message -----  From: Eric Najera MD  Sent: 2/28/2023   8:25 AM EST  To: Melissa Lo PT      Order done      ----- Message -----  From: Melissa Lo PT  Sent: 2/27/2023   6:29 PM EST  To: Kathia De Leon RN, Eric Najera MD, *    Pt was seen and performed a PT IE home assessment on Monday 02/27/23    Pt is currently (I) with entering and exiting the home  Pt able to perform sit<->stand transfers to and from various surfaces (I)ly  Pt able to perform gait without use of DME and bed mobility needing S level of A  Pt able to go up and down 15 steps with use of HR needing S level of A  Pt is (I) with bathing and dressing  Pt reports "getting stronger and moving better" since being home from the hospital   Pt reports driving to MD appointment prior to PT IE assessment  pt currently is not homebound  Pt would benefit from outpt PT services for higher level balance, core and back strengthening and BLE strengthening  Pt is agreeable  Please order a script for outpt PT services  No skilled home PT and no skilled home OT services needed at this time, DC pt from skilled home PT and skilled home OT      Thank you,  Melissa Lo  DPT

## 2023-02-27 NOTE — ED ATTENDING ATTESTATION
2/21/2023  IArias MD, saw and evaluated the patient  I have discussed the patient with the resident/non-physician practitioner and agree with the resident's/non-physician practitioner's findings, Plan of Care, and MDM as documented in the resident's/non-physician practitioner's note, except where noted  All available labs and Radiology studies were reviewed  I was present for key portions of any procedure(s) performed by the resident/non-physician practitioner and I was immediately available to provide assistance  At this point I agree with the current assessment done in the Emergency Department  I have conducted an independent evaluation of this patient a history and physical is as follows:    ED Course     Patient presents for evaluation due to 3 days of generalized weakness and lightheadedness  Additionally reports 1 episode of nausea and vomiting  No additional complaints  DDx: Dehydration, cardiac, weakness    Plan: We will check cardiac labs, treat with Zofran and IV fluids, and reassess  ECG 12 Lead Documentation Only    Date/Time: 2/21/2023 1428  Performed by: Arias Navarrete MD  Authorized by:  Arias Navarrete MD     Indications / Diagnosis:  Chest pain  Patient location:  ED  Previous ECG:     Previous ECG:  Compared to current    Similarity:  No change  Interpretation:     Interpretation: normal    Rate:     ECG rate:  75    ECG rate assessment: normal    Rhythm:     Rhythm: sinus rhythm    Ectopy:     Ectopy: none    QRS:     QRS axis:  Normal  Conduction:     Conduction: normal    ST segments:     ST segments:  Normal  T waves:     T waves: normal          Critical Care Time  Procedures

## 2023-02-27 NOTE — PROGRESS NOTES
Chief Complaint   Patient presents with   • Transition of Care Management     Discharged 02/22/23  Health Maintenance   Topic Date Due   • COVID-19 Vaccine (3 - Booster for Moderna series) 05/14/2021   • DM Eye Exam  10/14/2021   • Pneumococcal Vaccine: 65+ Years (3 - PPSV23 if available, else PCV20) 10/20/2021   • PT PLAN OF CARE  12/16/2022   • SLP PLAN OF CARE  10/18/2023 (Originally 5/13/2021)   • HEMOGLOBIN A1C  07/18/2023   • Kidney Health Evaluation: Microalbumin/Creatinine Ratio  08/05/2023   • Medicare Annual Wellness Visit (AWV)  10/27/2023   • Falls: Plan of Care  10/27/2023   • Diabetic Foot Exam  10/27/2023   • Fall Risk  11/16/2023   • BMI: Followup Plan  02/02/2024   • Kidney Health Evaluation: GFR  02/22/2024   • BMI: Adult  02/27/2024   • Colorectal Cancer Screening  03/10/2026   • Hepatitis C Screening  Completed   • Influenza Vaccine  Completed   • HIB Vaccine  Aged Out   • IPV Vaccine  Aged Out   • Hepatitis A Vaccine  Aged Out   • Meningococcal ACWY Vaccine  Aged Out   • HPV Vaccine  Aged Out     Assessment & Plan     1  Hospital discharge follow-up  Comments:  Reviewed hospital records  2  Type 2 diabetes mellitus with hypoglycemia and coma, without long-term current use of insulin [facility])  Assessment & Plan:    Lab Results   Component Value Date    HGBA1C 5 8 01/18/2023     Advised pt to continue ozempic 1mg weekly  Continue metformin 1000mg bid  Refer to endocrinology  Orders:  -     Ambulatory Referral to Endocrinology; Future    3  Obesity, Class III, BMI 40-49 9 (morbid obesity) ([facility] [address] )    4  Insomnia, unspecified type  Assessment & Plan:  Cannot tolerate trazodone  Continue ambien 10mg qhs prn  SE educated pt  Orders:  -     zolpidem (AMBIEN) 10 mg tablet; Take 1 tablet (10 mg total) by mouth daily at bedtime as needed for sleep    5  Depression with anxiety  Assessment & Plan:  Continue prozac and zyprexa   He is on xanax 0 25mg tid prn        6  Stuttering  Assessment & Plan:  Continue prozac and zyprexa  7  Benign essential hypertension  Assessment & Plan:  Continue HCTZ 12 5mg 3 time/week  8  Pure hypercholesterolemia  Assessment & Plan:  Continue crestor and zetia  Subjective     Transitional Care Management Review:   Jenae Alberts is a 69 y o  male here for TCM follow up  During the TCM phone call patient stated:  TCM Call     Date and time call was made  2/22/2023  5:12 PM    Hospital care reviewed  Records reviewed    Patient was hospitialized at  [facility]    Date of Admission  02/21/23    Date of discharge  02/22/23    Diagnosis  Syncope    Disposition  Home    Were the patients medications reviewed and updated  No    Current Symptoms  None      TCM Call     Post hospital issues  None    Should patient be enrolled in anticoag monitoring? No    Scheduled for follow up? Yes    Did you obtain your prescribed medications  Yes    Do you need help managing your prescriptions or medications  No    Is transportation to your appointment needed  No    I have advised the patient to call PCP with any new or worsening symptoms  [address] or [address] other    Support System  Spouse    The type of support provided  Physical; Emotional    Do you have social support  Yes, as much as I need    Are you recieving any outpatient services  No    Are you recieving home care services  Yes    Types of home care services  Other (comment)    Comment  To be determined    Are you using any community resources  No    Current waiver services  No    Have you fallen in the last 12 months  No    Interperter language line needed  No    Counseling  Patient        HPI     Pt is here by himself  Was in hospital 2/21-2/22/2023 for syncope  Pt had nausea/vomiting since increased ozempic dose  Possible from vasovagal    Telemetry monitor unremarkable  Pt got IVF and zofran which helped  Discharged home  Will get VNA per pt  Feels ok today  DM---1/2023 hgA1C 5 8 controlled    He is on ozempic 1mg weekly  Increased to 2mg /week per wt loss program before hospitalization  He is on metformin 1000mg bid  Denies side effects  Pt would like to see endocrinology  Insomnia---Pt states he tried OTC melatonin but no help  He tried ambien 5mg but cannot sleep  He tried trazodone 50mg qhs but felt "foggy" next day  He uses ambien 10mg qhs  Educated pt about side effects  Hx of ANABELL  Pt states he lost 40 lbs and does not use CPAP       Anxiety/depression---Stable  He is on prozac 60mg QD except Friday/Saturday 40mg QD  He use xanax 0 25mg tid as needed, not everyday  Stuttering---He started zyprexa 2 5mg qhs and prozac per psychiatrist long time ago which helped  Not follow psychiatrist any more  HTN---He is on HCTZ 12 5mg 3 times/week  He is on losartan 100mg QD, metoprolol 12 5mg daily       Hyperlipidemia---He is on zetia 5mg QD and crestor 5mg weekly  FU cardiology regularly       Tremor/neuropathy/unsteady gait---Stable  FU neurology  Uncles had Parkinson's       GERD/Molina's---FU GI  He is pantoprazole 40mg bid       BMI 46 48 today  FU wt loss program      Hx of prostate cancer---s/p prostatectomy 7/2011  FU urology yearly  3/2022 PSA <0 1       Review of Systems   Constitutional: Negative for appetite change, chills and fever  HENT: Negative for congestion, ear pain, sinus pain and sore throat  Eyes: Negative for discharge and itching  Respiratory: Negative for apnea, cough, chest tightness, shortness of breath and wheezing  Cardiovascular: Negative for chest pain, palpitations and leg swelling  Gastrointestinal: Negative for abdominal pain, anal bleeding, constipation, diarrhea, nausea and vomiting  Endocrine: Negative for cold intolerance, heat intolerance and polyuria  Genitourinary: Negative for difficulty urinating and dysuria     Musculoskeletal: Positive for gait problem  Negative for arthralgias, back pain and myalgias  Skin: Negative for rash  Neurological: Negative for dizziness and headaches  Psychiatric/Behavioral: Negative for agitation  Objective     /84 (BP Location: Left arm, Patient Position: Sitting, Cuff Size: Large)   Pulse 64   Temp 98 9 °F (37 2 °C) (Tympanic)   Resp 14   Ht 5' 7 25" (1 708 m)   Wt 136 kg (299 lb)   BMI 46 48 kg/m²      Physical Exam  Vitals reviewed  Constitutional:       Appearance: Normal appearance  He is obese  HENT:      Head: Normocephalic and atraumatic  Eyes:      General:         Right eye: No discharge  Left eye: No discharge  Conjunctiva/sclera: Conjunctivae normal    Neck:      Vascular: No carotid bruit  Cardiovascular:      Rate and Rhythm: Normal rate and regular rhythm  Heart sounds: Normal heart sounds  No murmur heard  No friction rub  No gallop  Pulmonary:      Effort: Pulmonary effort is normal  No respiratory distress  Breath sounds: Normal breath sounds  No wheezing or rales  Abdominal:      General: Bowel sounds are normal  There is no distension  Palpations: Abdomen is soft  Tenderness: There is no abdominal tenderness  Musculoskeletal:         General: No swelling, tenderness or deformity  Normal range of motion  Cervical back: Normal range of motion and neck supple  No muscular tenderness  Lymphadenopathy:      Cervical: No cervical adenopathy  Neurological:      Mental Status: He is alert     Psychiatric:         Mood and Affect: Mood normal        Medications have been reviewed by provider in current encounter    Apollo Arce MD

## 2023-02-28 ENCOUNTER — HOME CARE VISIT (OUTPATIENT)
Dept: HOME HEALTH SERVICES | Facility: HOME HEALTHCARE | Age: 68
End: 2023-02-28

## 2023-02-28 VITALS
HEART RATE: 66 BPM | OXYGEN SATURATION: 96 % | DIASTOLIC BLOOD PRESSURE: 92 MMHG | RESPIRATION RATE: 18 BRPM | SYSTOLIC BLOOD PRESSURE: 154 MMHG | TEMPERATURE: 97.8 F

## 2023-02-28 DIAGNOSIS — E11.42 CONTROLLED TYPE 2 DIABETES MELLITUS WITH DIABETIC POLYNEUROPATHY, WITHOUT LONG-TERM CURRENT USE OF INSULIN (HCC): ICD-10-CM

## 2023-02-28 DIAGNOSIS — R26.2 AMBULATORY DYSFUNCTION: Primary | ICD-10-CM

## 2023-03-01 ENCOUNTER — TELEPHONE (OUTPATIENT)
Dept: ADMINISTRATIVE | Facility: OTHER | Age: 68
End: 2023-03-01

## 2023-03-01 ENCOUNTER — CONSULT (OUTPATIENT)
Dept: ENDOCRINOLOGY | Facility: CLINIC | Age: 68
End: 2023-03-01

## 2023-03-01 VITALS
HEART RATE: 62 BPM | SYSTOLIC BLOOD PRESSURE: 142 MMHG | DIASTOLIC BLOOD PRESSURE: 90 MMHG | HEIGHT: 67 IN | BODY MASS INDEX: 46.65 KG/M2 | WEIGHT: 297.2 LBS

## 2023-03-01 DIAGNOSIS — E66.01 CLASS 3 SEVERE OBESITY DUE TO EXCESS CALORIES WITH SERIOUS COMORBIDITY AND BODY MASS INDEX (BMI) OF 45.0 TO 49.9 IN ADULT (HCC): ICD-10-CM

## 2023-03-01 DIAGNOSIS — E11.641 TYPE 2 DIABETES MELLITUS WITH HYPOGLYCEMIA AND COMA, WITHOUT LONG-TERM CURRENT USE OF INSULIN (HCC): Primary | ICD-10-CM

## 2023-03-01 PROBLEM — E66.813 CLASS 3 SEVERE OBESITY DUE TO EXCESS CALORIES WITH SERIOUS COMORBIDITY AND BODY MASS INDEX (BMI) OF 45.0 TO 49.9 IN ADULT (HCC): Status: ACTIVE | Noted: 2023-03-01

## 2023-03-01 NOTE — LETTER
Diabetic Eye Exam Form    Date Requested: 23  Patient: Nguyen Lira  Patient : 1955   Referring Provider: Nikolas Alexis MD      DIABETIC Eye Exam Date _______________________________      Type of Exam MUST be documented for Diabetic Eye Exams  Please CHECK ONE  Retinal Exam       Dilated Retinal Exam       OCT       Optomap-Iris Exam      Fundus Photography       Left Eye - Please check Retinopathy or No Retinopathy        Exam did show retinopathy    Exam did not show retinopathy       Right Eye - Please check Retinopathy or No Retinopathy       Exam did show retinopathy    Exam did not show retinopathy       Comments __________________________________________________________    Practice Providing Exam ______________________________________________    Exam Performed By (print name) _______________________________________      Provider Signature ___________________________________________________      These reports are needed for  compliance  Please fax this completed form and a copy of the Diabetic Eye Exam report to our office located at [place] [address] as soon as possible via Fax [phone] symone Daniel Denita: Phone [phone]  We thank you for your assistance in treating our mutual patient

## 2023-03-01 NOTE — TELEPHONE ENCOUNTER
----- Message from [address] sent at 3/1/2023 11:27 AM EST -----  Regarding: DM EYE EXAM  03/01/23 11:27 AM    Hello, our patient Paul Zambrano has had a DM Eye Exam performed by 2 different places  He goes to [place]  Their number is [phone]  He also see's [facility]  Their number is [phone]      Thank you,  Sandra Alvarado  [facility]

## 2023-03-01 NOTE — ASSESSMENT & PLAN NOTE
Lab Results   Component Value Date    HGBA1C 5 8 01/18/2023   Well-controlled-he is tolerating metformin and Ozempic at current dose    Suggest to continue the same, referred for diabetes education

## 2023-03-01 NOTE — PROGRESS NOTES
Paul Zambrano 69 y o  male MRN: [id number]    Encounter: [id number]      Assessment/Plan     Problem List Items Addressed This Visit        Endocrine    Type 2 diabetes mellitus, without long-term current use of insulin ([facility] [address] ) - Primary       Lab Results   Component Value Date    HGBA1C 5 8 01/18/2023   Well-controlled-he is tolerating metformin and Ozempic at current dose  Suggest to continue the same, referred for diabetes education         Relevant Orders    Ambulatory referral to Diabetic Education    Comprehensive metabolic panel Lab Collect    HEMOGLOBIN A1C W/ EAG ESTIMATION Lab Collect    Microalbumin / creatinine urine ratio Lab Collect       Other    Class 3 severe obesity due to excess calories with serious comorbidity and body mass index (BMI) of 45 0 to 49 9 in [facility])     Referred for MNT         Relevant Orders    Ambulatory referral to Diabetic Education    Comprehensive metabolic panel Lab Collect     CC: Diabetes    History of Present Illness     HPI:  25-year-old male here for evaluation of type 2 diabetes  He has had Type 2 DM for the past few years   Currently on ozempic and metformin  - 1 mg weekly   Ozempic was increased from 1 mg to 2 mg weekly by weight management however he thinks that by mistake he took double the dose-1/2 hour after taking ? 4 mg ozempic he felt sick, was diaphoretic and had an episode of vomiting   Was admitted to the hospital-at that time reported 3-day history of nausea and vomiting with poor oral intake    Since discharge has felt well and took 1 mg of Ozempic last Saturday and felt fine  Checks f s occasionally - now checking f s fasting 118-130s  checks in the evening if feels woozy 170-220s   - checks f s 20 mins after eating     Lost 50 lbs in the  1 1/2 year     +neuropathy   No CAD/CVA       C/o dry mouth , polyuria , incontinence , nocturia          Review of Systems    Historical Information   Past Medical History:   Diagnosis Date   • Acute blood loss anemia 10/13/2016   • Anxiety    • Arthritis     knees   • Arthritis    • Asthma     stable for > 10 years   • [place]ina esophagus     with BARRX/RFA   • Cancer [facility])     prostate   • Cataract    • Depression    • Diabetes ([facility])    • Diabetes mellitus ([facility])     pre diabetes   • Diverticulitis of colon    • Environmental allergies    • Fall 12/30/2020   • GERD (gastroesophageal reflux disease)    • Hiatal hernia    • History of anal fissures    • Hyperlipidemia    • Hypertension    • Incisional hernia    • Insomnia    • Kidney stone    • Malignant neoplasm of prostate ([facility])    • Morbid obesity (H[facility])    • Peripheral neuropathy    • PONV (postoperative nausea and vomiting)    • Prolapsing mitral valve     prolapsing mitral valve leaflet syndrome   • Prostate cancer ([facility])    • Retinal detachment     treated surgically at [facility] eye; resolved: 10/10/2012   • Sleep apnea     diagnosed but unable to tolerate cpap      • Stuttering      Past Surgical History:   Procedure Laterality Date   • ARTHROSCOPIC REPAIR ACL Right    • BIOPSY CORE NEEDLE      prostate   • CATARACT EXTRACTION Bilateral    • COLONOSCOPY     • HERNIA REPAIR      nav[facility]   • JOINT REPLACEMENT      B/L knee   • KNEE ARTHROSCOPY Left    • LAPAROSCOPIC GASTRIC BANDING     • ND ARTHRP KNE CONDYLE&PLATU MEDIAL&LAT COMPARTMENTS Left 02/15/2017    Procedure: TOTAL KNEE ARTHROPLASTY ;  Surgeon: Miguel Angel Daniel MD;  Location: [facility];  Service: Orthopedics   • ND ARTHRP KNE CONDYLE&PLATU MEDIAL&LAT COMPARTMENTS Right 10/10/2016    Procedure: ARTHROPLASTY KNEE TOTAL;  Surgeon: Miguel Angel Daniel MD;  Location: [facility];  Service: Orthopedics   • PROSTATECTOMY      robotic-assisted; [facility]   • REMOVAL GASTRIC BAND LAPAROSCOPIC N/A 8/30/2022    Procedure: LAPAROSCOPIC REMOVAL OF ADJUSTABLE GASTRIC BAND AND PORT  WITH INTRAOPERATIVE EGD;  Surgeon: Rod Fraser MD;  Location: [facility];  Service: Bariatrics   • RETINAL DETACHMENT SURGERY Bilateral     [facility]   • SEPTOPLASTY     • SINUS SURGERY     • TONSILLECTOMY      over age 13   • UPPER GASTROINTESTINAL ENDOSCOPY      mutiple with BARRX/RFA   • VASECTOMY      vas deferens     Social History   Social History     Substance and Sexual Activity   Alcohol Use Not Currently   • Alcohol/week: 1 0 standard drink   • Types: 1 Cans of beer per week    Comment: 1 case of beer per year     Social History     Substance and Sexual Activity   Drug Use Never     Social History     Tobacco Use   Smoking Status Never   Smokeless Tobacco Never   Tobacco Comments    quit 38 years ago     Family History:   Family History   Problem Relation Age of Onset   • Heart disease Father    • Coronary artery disease Father    • Prostate cancer Father    • Coronary artery disease Mother    • Diabetes Mother         mellitus   • Diabetes type II Mother    • Diabetes Maternal Grandmother         mellitus   • Coronary artery disease Maternal Grandfather    • Coronary artery disease Paternal Grandfather        Meds/Allergies   Current Outpatient Medications   Medication Sig Dispense Refill   • ALPRAZolam (XANAX) 0 25 mg tablet Take 1 tablet (0 25 mg total) by mouth 3 (three) times a day as needed for anxiety 270 tablet 1   • Blood Glucose Monitoring Suppl (OneTouch Verio) w/Device KIT Use daily (Patient taking differently: Use as needed) 1 kit 0   • ezetimibe (ZETIA) 10 mg tablet TAKE 1 TABLET DAILY (Patient taking differently: Take 5 mg by mouth in the morning) 90 tablet 3   • FLUoxetine (PROzac) 20 mg capsule TAKE 3 CAPSULES DAILY 270 capsule 3   • glucose blood (OneTouch Verio) test strip USE TO TEST DAILY 200 strip 1   • hydrochlorothiazide (HYDRODIURIL) 12 5 mg tablet Take 1 tablet (12 5 mg total) by mouth 3 (three) times a week Monday, Wednesday, Friday 36 tablet 1   • Lancet Devices (ONE TOUCH DELICA LANCING DEV) MISC Use daily 1 each 0   • losartan (COZAAR) 100 MG tablet TAKE 1 TABLET DAILY 90 tablet 3   • metFORMIN (GLUCOPHAGE) 500 mg tablet TAKE 2 TABLETS TWICE A DAY WITH MEALS 360 tablet 3   • metoprolol succinate (TOPROL-XL) 25 mg 24 hr tablet TAKE 1 TABLET DAILY (Patient taking differently: Take 25 mg by mouth daily) 90 tablet 3   • Multiple Vitamins-Minerals (CENTRUM ADULTS PO) Take by mouth in the morning     • OLANZapine (ZyPREXA) 5 mg tablet Take 0 5 tablets (2 5 mg total) by mouth every evening     • OneTouch Delica Lancets 29S MISC Use daily 300 each 1   • pantoprazole (PROTONIX) 40 mg tablet Take 1 tablet (40 mg total) by mouth 2 (two) times a day 1 tab in AM, 1 tabs in  tablet 3   • rosuvastatin (CRESTOR) 5 mg tablet Take 1 tablet (5 mg total) by mouth once a week 12 tablet 3   • semaglutide, 2 mg/dose, (Ozempic, 2 MG/DOSE,) 8 mg/ mL injection pen Inject 0 75 mL (2 mg total) under the skin every 7 days 9 mL 0   • zolpidem (AMBIEN) 10 mg tablet Take 1 tablet (10 mg total) by mouth daily at bedtime as needed for sleep 90 tablet 0   • trospium chloride (SANCTURA) 20 mg tablet Take 1 tablet (20 mg total) by mouth 2 (two) times a day (Patient not taking: Reported on 3/1/2023) 180 tablet 3     No current facility-administered medications for this visit  Allergies   Allergen Reactions   • Celebrex [Celecoxib] Other (See Comments)     Cardiologist stated he should not take     • Chlorhexidine Hives     Is able to wash with hibiclens but not use michi cloths   • Other      "steroid eyedrops"     • Prednisone Other (See Comments)     Prednisone eye drops- eye pressure increases       Objective   Vitals: Blood pressure 142/90, pulse 62, height 5' 7 25" (1 708 m), weight 135 kg (297 lb 3 2 oz)  Physical Exam  Vitals reviewed  Constitutional:       General: He is not in acute distress  Appearance: Normal appearance  He is obese  He is not ill-appearing or toxic-appearing  HENT:      Head: Normocephalic  Eyes:      General: No scleral icterus  Extraocular Movements: Extraocular movements intact     Cardiovascular:      Rate and Rhythm: Normal rate and regular rhythm  Heart sounds: Normal heart sounds  No murmur heard  Pulmonary:      Effort: Pulmonary effort is normal  No respiratory distress  Breath sounds: Normal breath sounds  No wheezing or rales  Abdominal:      General: There is no distension  Palpations: Abdomen is soft  Tenderness: There is no abdominal tenderness  There is no guarding  Musculoskeletal:      Cervical back: Neck supple  Right lower leg: Edema present  Left lower leg: Edema present  Lymphadenopathy:      Cervical: No cervical adenopathy  Skin:     General: Skin is warm and dry  Neurological:      General: No focal deficit present  Mental Status: He is alert and oriented to person, place, and time  Psychiatric:         Mood and Affect: Mood normal          Behavior: Behavior normal          Thought Content: Thought content normal          Judgment: Judgment normal          The history was obtained from the review of the chart, patient      Lab Results:   Lab Results   Component Value Date/Time    Hemoglobin A1C 5 8 01/18/2023 02:51 PM    Hemoglobin A1C 6 0 (H) 08/05/2022 02:55 PM    Hemoglobin A1C 6 2 (H) 03/06/2022 11:36 AM    WBC 11 34 (H) 02/21/2023 02:25 PM    WBC 10 02 08/31/2022 06:04 AM    WBC 7 39 08/05/2022 02:55 PM    Hemoglobin 15 4 02/21/2023 02:25 PM    Hemoglobin 13 6 08/31/2022 06:04 AM    Hemoglobin 14 7 08/05/2022 02:55 PM    Hematocrit 44 4 02/21/2023 02:25 PM    Hematocrit 38 7 08/31/2022 06:04 AM    Hematocrit 42 8 08/05/2022 02:55 PM    MCV 86 02/21/2023 02:25 PM    MCV 88 08/31/2022 06:04 AM    MCV 87 08/05/2022 02:55 PM    Platelets 780 90/24/6845 02:25 PM    Platelets 245 21/25/9356 06:04 AM    Platelets 468 76/86/0673 02:55 PM    BUN 18 02/22/2023 06:42 AM    BUN 14 02/21/2023 02:25 PM    BUN 8 08/31/2022 06:04 AM    Potassium 3 6 02/22/2023 06:42 AM    Potassium 3 6 02/21/2023 02:25 PM    Potassium 4 2 08/31/2022 06:04 AM    Chloride 105 02/22/2023 06:42 AM    Chloride 104 02/21/2023 02:25 PM    Chloride 100 08/31/2022 06:04 AM    CO2 24 02/22/2023 06:42 AM    CO2 24 02/21/2023 02:25 PM    CO2 22 08/31/2022 06:04 AM    Creatinine 1 03 02/22/2023 06:42 AM    Creatinine 1 05 02/21/2023 02:25 PM    Creatinine 1 01 08/31/2022 06:04 AM    AST 19 02/21/2023 02:25 PM    AST 19 08/05/2022 02:55 PM    AST 13 03/06/2022 11:36 AM    ALT 32 02/21/2023 02:25 PM    ALT 28 08/05/2022 02:55 PM    ALT 29 03/06/2022 11:36 AM    Albumin 3 7 02/21/2023 02:25 PM    Albumin 3 7 08/05/2022 02:55 PM    Albumin 3 7 03/06/2022 11:36 AM    HDL, Direct 50 08/05/2022 02:55 PM    HDL, Direct 56 03/06/2022 11:36 AM    Triglycerides 86 08/05/2022 02:55 PM    Triglycerides 95 03/06/2022 11:36 AM         Portions of the record may have been created with voice recognition software  Occasional wrong word or "sound a like" substitutions may have occurred due to the inherent limitations of voice recognition software  Read the chart carefully and recognize, using context, where substitutions have occurred

## 2023-03-01 NOTE — LETTER
Diabetic Eye Exam Form    Date Requested: 23  Patient: Christian Marvin  Patient : 1955   Referring Provider: Beverlee Felty, MD      DIABETIC Eye Exam Date _______________________________      Type of Exam MUST be documented for Diabetic Eye Exams  Please CHECK ONE  Retinal Exam       Dilated Retinal Exam       OCT       Optomap-Iris Exam      Fundus Photography       Left Eye - Please check Retinopathy or No Retinopathy        Exam did show retinopathy    Exam did not show retinopathy       Right Eye - Please check Retinopathy or No Retinopathy       Exam did show retinopathy    Exam did not show retinopathy       Comments __________________________________________________________    Practice Providing Exam ______________________________________________    Exam Performed By (print name) _______________________________________      Provider Signature ___________________________________________________      These reports are needed for  compliance  Please fax this completed form and a copy of the Diabetic Eye Exam report to our office located at [place] [address] as soon as possible via Fax [phone] symone Gomez Mac: Phone [phone]  We thank you for your assistance in treating our mutual patient

## 2023-03-02 NOTE — TELEPHONE ENCOUNTER
Upon review of the In Basket request and the patient's chart, initial outreach has been made via fax to facility  Please see Contacts section for details       Thank you  Abhinav Cazares MA

## 2023-03-03 ENCOUNTER — TELEPHONE (OUTPATIENT)
Dept: FAMILY MEDICINE CLINIC | Facility: CLINIC | Age: 68
End: 2023-03-03

## 2023-03-03 ENCOUNTER — HOME CARE VISIT (OUTPATIENT)
Dept: HOME HEALTH SERVICES | Facility: HOME HEALTHCARE | Age: 68
End: 2023-03-03

## 2023-03-03 ENCOUNTER — OFFICE VISIT (OUTPATIENT)
Dept: GASTROENTEROLOGY | Facility: CLINIC | Age: 68
End: 2023-03-03

## 2023-03-03 VITALS
SYSTOLIC BLOOD PRESSURE: 116 MMHG | DIASTOLIC BLOOD PRESSURE: 74 MMHG | WEIGHT: 297 LBS | BODY MASS INDEX: 46.62 KG/M2 | TEMPERATURE: 98.6 F | HEIGHT: 67 IN

## 2023-03-03 VITALS
OXYGEN SATURATION: 96 % | TEMPERATURE: 96.3 F | RESPIRATION RATE: 16 BRPM | SYSTOLIC BLOOD PRESSURE: 152 MMHG | DIASTOLIC BLOOD PRESSURE: 80 MMHG | HEART RATE: 58 BPM

## 2023-03-03 DIAGNOSIS — K31.84 GASTROPARESIS: ICD-10-CM

## 2023-03-03 DIAGNOSIS — K22.719 BARRETT'S ESOPHAGUS WITH DYSPLASIA: Primary | ICD-10-CM

## 2023-03-03 DIAGNOSIS — K21.00 GASTROESOPHAGEAL REFLUX DISEASE WITH ESOPHAGITIS WITHOUT HEMORRHAGE: ICD-10-CM

## 2023-03-03 PROBLEM — K22.70 BARRETT'S ESOPHAGUS DETERMINED BY BIOPSY: Status: RESOLVED | Noted: 2022-08-18 | Resolved: 2023-03-03

## 2023-03-03 NOTE — CASE COMMUNICATION
TC to PCP left vm to report bradycardia HR 55-60 patient has slight headache and didn't have a meal yet today  Will continue to minitor

## 2023-03-03 NOTE — TELEPHONE ENCOUNTER
Upon review of the In Basket request and the patient's chart, initial outreach has been made via fax to facility  Please see Contacts section for details       Thank you  Edin Sylvester MA

## 2023-03-03 NOTE — TELEPHONE ENCOUNTER
Darcy Allen - nurse with [facility] ([phone]) called to report patient presented with Bradycardia at home visit  Reports heart rate 55-60  Slight headache  Patient has not had full meal  Will continue to monitor  Advised patient to request appointment or go to ER if light headed, dizzy or experiencing chest pain

## 2023-03-03 NOTE — PROGRESS NOTES
[facility] - Outpatient Follow-up Note  Daron De Luna 69 y o  male MRN: [id number]  Encounter: [id number]          ASSESSMENT AND PLAN:      1  Molina's esophagus with dysplasia  -He had long segment Molina's esophagus C8M8 status post successful radiofrequency ablation  -Schedule him for surveillance EGD  -Continue pantoprazole 40 mg twice daily  -Reflux precautions  - EGD; Future    2  Gastroesophageal reflux disease with esophagitis without hemorrhage  Reflux precautions  Continue pantoprazole 40 mg twice daily  Carafate as needed  3  Gastroparesis  Likely secondary to underlying diabetes and worsen by Ozempic  Small and frequent diet  Optimize blood glucose control    ______________________________________________________________________    SUBJECTIVE: 29-year-old male with gastroesophageal reflux disease, Molina's esophagus (P4Q6) status post radiofrequency ablation here for follow-up visit  He had eradication of Molina's esophagus  His last EGD was in May 2022  Few islands in the distal esophagus were successfully ablated  He reported recent worsening of reflux and intermittent vomiting  This was in the setting of poorly controlled diabetes  He is currently taking pantoprazole 40 mg twice a day and Carafate as needed for reflux  He is seeing endocrinologist and his blood sugar is well controlled now  Hemoglobin A1c is 5 8  He takes Ozempic 1 mg injection every week      REVIEW OF SYSTEMS IS OTHERWISE NEGATIVE        Historical Information   Past Medical History:   Diagnosis Date   • Acute blood loss anemia 10/13/2016   • Anxiety    • Arthritis     knees   • Arthritis    • Asthma     stable for > 10 years   • Molina esophagus     with BARRX/RFA   • Cancer [facility])     prostate   • Cataract    • Depression    • Diabetes ([facility] [address] )    • Diabetes mellitus [facility] )     pre diabetes   • Diverticulitis of colon    • Environmental allergies    • Fall 12/30/2020   • GERD (gastroesophageal reflux disease)    • Hiatal hernia    • History of anal fissures    • Hyperlipidemia    • Hypertension    • Incisional hernia    • Insomnia    • Kidney stone    • Malignant neoplasm of prostate ([place] [address] )    • Morbid obesity (HCC)    • Peripheral neuropathy    • PONV (postoperative nausea and vomiting)    • Prolapsing mitral valve     prolapsing mitral valve leaflet syndrome   • Prostate cancer ([facility] [address] )    • Retinal detachment     treated surgically at [facility] eye; resolved: 10/10/2012   • Sleep apnea     diagnosed but unable to tolerate cpap      • Stuttering      Past Surgical History:   Procedure Laterality Date   • ARTHROSCOPIC REPAIR ACL Right    • BIOPSY CORE NEEDLE      prostate   • CATARACT EXTRACTION Bilateral    • COLONOSCOPY     • HERNIA REPAIR      [facility]   • JOINT REPLACEMENT      B/L knee   • KNEE ARTHROSCOPY Left    • LAPAROSCOPIC GASTRIC BANDING     • DE ARTHRP KNE CONDYLE&PLATU MEDIAL&LAT COMPARTMENTS Left 02/15/2017    Procedure: TOTAL KNEE ARTHROPLASTY ;  Surgeon: Mamie Lima MD;  [facility]: [facility];  Service: Orthopedics   • DE ARTHRP KNE CONDYLE&PLATU MEDIAL&LAT COMPARTMENTS Right 10/10/2016    Procedure: ARTHROPLASTY KNEE TOTAL;  Surgeon: Mamie Lima MD;  Location: [facility];  Service: Orthopedics   • PROSTATECTOMY      robotic-assisted; [facility]-Dr Willian Doherty   • REMOVAL GASTRIC BAND LAPAROSCOPIC N/A 8/30/2022    Procedure: LAPAROSCOPIC REMOVAL OF ADJUSTABLE GASTRIC BAND AND PORT  WITH INTRAOPERATIVE EGD;  Surgeon: Lety Teran MD;  Location: [facility];  Service: Bariatrics   • RETINAL DETACHMENT SURGERY Bilateral     [facility]   • SEPTOPLASTY     • SINUS SURGERY     • TONSILLECTOMY      over age 13   • UPPER GASTROINTESTINAL ENDOSCOPY      mutiple with BARRX/RFA   • VASECTOMY      vas deferens     Social History   Social History     Substance and Sexual Activity   Alcohol Use Not Currently   • Alcohol/week: 1 0 standard drink   • Types: 1 Cans of beer per week    Comment: 1 case of beer per year Social History     Substance and Sexual Activity   Drug Use Never     Social History     Tobacco Use   Smoking Status Never   Smokeless Tobacco Never   Tobacco Comments    quit 38 years ago     Family History   Problem Relation Age of Onset   • Heart disease Father    • Coronary artery disease Father    • Prostate cancer Father    • Coronary artery disease Mother    • Diabetes Mother         mellitus   • Diabetes type II Mother    • Diabetes Maternal Grandmother         mellitus   • Coronary artery disease Maternal Grandfather    • Coronary artery disease Paternal Grandfather        Meds/Allergies       Current Outpatient Medications:   •  ALPRAZolam (XANAX) 0 25 mg tablet  •  Blood Glucose Monitoring Suppl (OneTouch Verio) w/Device KIT  •  ezetimibe (ZETIA) 10 mg tablet  •  FLUoxetine (PROzac) 20 mg capsule  •  glucose blood (OneTouch Verio) test strip  •  hydrochlorothiazide (HYDRODIURIL) 12 5 mg tablet  •  Lancet Devices (ONE TOUCH DELICA LANCING DEV) MISC  •  losartan (COZAAR) 100 MG tablet  •  metFORMIN (GLUCOPHAGE) 500 mg tablet  •  metoprolol succinate (TOPROL-XL) 25 mg 24 hr tablet  •  Multiple Vitamins-Minerals (CENTRUM ADULTS PO)  •  OLANZapine (ZyPREXA) 5 mg tablet  •  OneTouch Delica Lancets 85H MISC  •  pantoprazole (PROTONIX) 40 mg tablet  •  rosuvastatin (CRESTOR) 5 mg tablet  •  semaglutide, 2 mg/dose, (Ozempic, 2 MG/DOSE,) 8 mg/ mL injection pen  •  zolpidem (AMBIEN) 10 mg tablet  •  trospium chloride (SANCTURA) 20 mg tablet    Allergies   Allergen Reactions   • Celebrex [Celecoxib] Other (See Comments)     Cardiologist stated he should not take     • Chlorhexidine Hives     Is able to wash with hibiclens but not use michi cloths   • Other      "steroid eyedrops"     • Prednisone Other (See Comments)     Prednisone eye drops- eye pressure increases           Objective     Blood pressure 116/74, temperature 98 6 °F (37 °C), temperature source Tympanic, height 5' 7" (1 702 m), weight 135 kg (297 lb)  Body mass index is 46 52 kg/m²  PHYSICAL EXAM:      General Appearance:   Alert, cooperative, no distress   HEENT:   Normocephalic, atraumatic, anicteric      Neck:  Supple, symmetrical, trachea midline   Lungs:   Clear to auscultation bilaterally; no rales, rhonchi or wheezing; respirations unlabored    Heart[de-identified]   Regular rate and rhythm; no murmur, rub, or gallop  Abdomen:   Soft, non-tender, non-distended; normal bowel sounds; no masses, no organomegaly    Genitalia:   Deferred    Rectal:   Deferred    Extremities:  No cyanosis, clubbing or edema    Pulses:  2+ and symmetric    Skin:  No jaundice, rashes, or lesions    Lymph nodes:  No palpable cervical lymphadenopathy        Lab Results:   No visits with results within 1 Day(s) from this visit     Latest known visit with results is:   Admission on 02/21/2023, Discharged on 02/22/2023   Component Date Value   • POC Glucose 02/21/2023 184 (H)    • WBC 02/21/2023 11 34 (H)    • RBC 02/21/2023 5 16    • Hemoglobin 02/21/2023 15 4    • Hematocrit 02/21/2023 44 4    • MCV 02/21/2023 86    • MCH 02/21/2023 29 8    • MCHC 02/21/2023 34 7    • RDW 02/21/2023 13 2    • MPV 02/21/2023 8 8 (L)    • Platelets 13/71/4861 244    • nRBC 02/21/2023 0    • Neutrophils Relative 02/21/2023 82 (H)    • Immat GRANS % 02/21/2023 0    • Lymphocytes Relative 02/21/2023 7 (L)    • Monocytes Relative 02/21/2023 6    • Eosinophils Relative 02/21/2023 5    • Basophils Relative 02/21/2023 0    • Neutrophils Absolute 02/21/2023 9 25 (H)    • Immature Grans Absolute 02/21/2023 0 05    • Lymphocytes Absolute 02/21/2023 0 83    • Monocytes Absolute 02/21/2023 0 63    • Eosinophils Absolute 02/21/2023 0 54    • Basophils Absolute 02/21/2023 0 04    • Sodium 02/21/2023 136    • Potassium 02/21/2023 3 6    • Chloride 02/21/2023 104    • CO2 02/21/2023 24    • ANION GAP 02/21/2023 8    • BUN 02/21/2023 14    • Creatinine 02/21/2023 1 05    • Glucose 02/21/2023 186 (H)    • Calcium 02/21/2023 9 6    • AST 02/21/2023 19    • ALT 02/21/2023 32    • Alkaline Phosphatase 02/21/2023 72    • Total Protein 02/21/2023 7 1    • Albumin 02/21/2023 3 7    • Total Bilirubin 02/21/2023 0 64    • eGFR 02/21/2023 72    • hs TnI 0hr 02/21/2023 3    • TSH 3RD GENERATON 02/21/2023 1 190    • SARS-CoV-2 02/21/2023 Negative    • INFLUENZA A PCR 02/21/2023 Negative    • INFLUENZA B PCR 02/21/2023 Negative    • RSV PCR 02/21/2023 Negative    • Ventricular Rate 02/21/2023 75    • Atrial Rate 02/21/2023 75    • CA Interval 02/21/2023 198    • QRSD Interval 02/21/2023 94    • QT Interval 02/21/2023 368    • QTC Interval 02/21/2023 410    • P Axis 02/21/2023 48    • QRS Axis 02/21/2023 -41    • T Wave Axis 02/21/2023 47    • hs TnI 2hr 02/21/2023 4    • Delta 2hr hsTnI 02/21/2023 1    • POC Glucose 02/21/2023 151 (H)    • Sodium 02/22/2023 136    • Potassium 02/22/2023 3 6    • Chloride 02/22/2023 105    • CO2 02/22/2023 24    • ANION GAP 02/22/2023 7    • BUN 02/22/2023 18    • Creatinine 02/22/2023 1 03    • Glucose 02/22/2023 129    • Calcium 02/22/2023 8 8    • eGFR 02/22/2023 73    • Magnesium 02/22/2023 1 9    • Phosphorus 02/22/2023 2 4    • POC Glucose 02/22/2023 131    • Color, UA 02/22/2023 Yellow    • Clarity, UA 02/22/2023 Clear    • Specific Gravity, UA 02/22/2023 1 029    • pH, UA 02/22/2023 5 5    • Leukocytes, UA 02/22/2023 Negative    • Nitrite, UA 02/22/2023 Negative    • Protein, UA 02/22/2023 30 (1+) (A)    • Glucose, UA 02/22/2023 Negative    • Ketones, UA 02/22/2023 Negative    • Urobilinogen, UA 02/22/2023 2 0 (A)    • Bilirubin, UA 02/22/2023 Negative    • Occult Blood, UA 02/22/2023 Negative    • POC Glucose 02/22/2023 133    • RBC, UA 02/22/2023 None Seen    • WBC, UA 02/22/2023 1-2    • Epithelial Cells 02/22/2023 Occasional    • Bacteria, UA 02/22/2023 Occasional    • MUCUS THREADS 02/22/2023 Innumerable (A)    • Hyaline Casts, UA 02/22/2023 25-50 (A)    • Granular Casts, UA 02/22/2023 0-3 (A)          Radiology Results:   CT head without contrast    Result Date: 2/21/2023  Narrative: CT BRAIN - WITHOUT CONTRAST INDICATION:  Dizziness, non-specific lightheadedness  COMPARISON:  CT head 12/30/2020, MRI brain 5/9/2010 TECHNIQUE:  CT examination of the brain was performed  In addition to axial images, sagittal and coronal 2D reformatted images were created and submitted for interpretation  Radiation dose length product (DLP) for this visit:  969 96 mGy-cm  This examination, like all CT scans performed in the [facility], was performed utilizing techniques to minimize radiation dose exposure, including the use of iterative reconstruction and automated exposure control  IMAGE QUALITY:  Diagnostic  FINDINGS: PARENCHYMA:  No intracranial mass, mass effect or midline shift  No CT signs of acute infarction  No acute parenchymal hemorrhage  Age-related cortical atrophy  Periventricular white matter hypodensity which is nonspecific although most compatible with chronic small vessel ischemic disease  VENTRICLES AND EXTRA-AXIAL SPACES:  Normal for the patient's age  VISUALIZED ORBITS: Bilateral lens implants  PARANASAL SINUSES: Mild mucosal thickening maxillary sinuses  No fluid levels  The mastoid air cells are clear  CALVARIUM AND EXTRACRANIAL SOFT TISSUES:  Normal      Impression: No acute intracranial abnormality  Chronic microangiopathic changes   Workstation performed: [id number]

## 2023-03-03 NOTE — PATIENT INSTRUCTIONS
Scheduled date of EGD(as of today):05 02 23  Physician performing EGD:DR MORRISON  [facility] of EGD:[facility]  Instructions reviewed with patient by:SARITHA  Clearances:  N/A  OK'D BY IRENA RIOJAS

## 2023-03-06 ENCOUNTER — HOME CARE VISIT (OUTPATIENT)
Dept: HOME HEALTH SERVICES | Facility: HOME HEALTHCARE | Age: 68
End: 2023-03-06

## 2023-03-06 NOTE — CASE COMMUNICATION
In basket message recieved from PAULINO Francis RN   TC from patient reporting that he is starting outpatient PT tomorrow 3/7/23, explained to patient that he can not have outpatient PT and home nursing services at the same time it needs to be one or the other due to Medicare guidelines  Patient verblalized understanding and since he is set up with outpatient Diabetes educator on 3/14 wishes to pursue outpatient PT  Sco tt please do D/C [facility] without visit dated 3/6/23 as you were last clinician to see patient       Discharged from [place] [id number] today 3/6/23

## 2023-03-07 ENCOUNTER — EVALUATION (OUTPATIENT)
Dept: PHYSICAL THERAPY | Age: 68
End: 2023-03-07

## 2023-03-07 VITALS — HEART RATE: 67 BPM | SYSTOLIC BLOOD PRESSURE: 150 MMHG | DIASTOLIC BLOOD PRESSURE: 78 MMHG

## 2023-03-07 DIAGNOSIS — R26.2 AMBULATORY DYSFUNCTION: Primary | ICD-10-CM

## 2023-03-08 NOTE — PROGRESS NOTES
PT Evaluation     Today's date: 3/7/2023  Patient name: Hughie Goldberg  : 1955  MRN: [id number]  Referring provider: Artur Dill MD  Dx:   Encounter Diagnosis     ICD-10-CM    1  Ambulatory dysfunction  R26 2 Ambulatory Referral to Physical Therapy                     Assessment/Plan    Subjective Evaluation    History of Present Illness  Onset date: gait abnormality, worsening gait and balance past 6-8 months           Recurrent probem    Quality of life: fair          Objective           Precautions:  Allergies  Reviewed by Giorgio Veloz at 11:19 PM   Severity Reactions Comments   Celebrex [celecoxib] Not Specified Other (See Comments) Cardiologist stated he should not take   Chlorhexidine Not Specified Hives Is able to wash with hibiclens but not use michi cloths   Other Not Specified  "steroid eyedrops"   Prednisone Not Specified Other (See Comments) Prednisone eye drops- eye pressure increases           Manuals 3/7/23             I stefany                                                    Neuro Re-Ed                                                                                                        Ther Ex                                                                                                                     Ther Activity                                       Gait Training                                       Modalities

## 2023-03-08 NOTE — TELEPHONE ENCOUNTER
Upon review of the In Basket request we were able to locate, review, and update the patient chart as requested for Diabetic Eye Exam     Any additional questions or concerns should be emailed to the Practice Liaisons via the appropriate education email address, please do not reply via In Basket      Thank you  Claudia Macias MA

## 2023-03-10 ENCOUNTER — OFFICE VISIT (OUTPATIENT)
Dept: PHYSICAL THERAPY | Age: 68
End: 2023-03-10

## 2023-03-10 DIAGNOSIS — R26.2 AMBULATORY DYSFUNCTION: Primary | ICD-10-CM

## 2023-03-11 NOTE — PROGRESS NOTES
Daily Note     Today's date: 3/10/2023  Patient name: Chip Knight  : 1955  MRN: [id number]  Referring provider: Queenie Monroy MD  Dx:   Encounter Diagnosis     ICD-10-CM    1  Ambulatory dysfunction  R26 2                      Subjective: Pt states he has 2 rolling walkers but does not utilize them  Pt 's goal to not use an assistive device  Objective: See treatment diary below      Assessment: Tolerated treatment well  Patient would benefit from continued PT  Issued written home exer program        Plan: Continue per plan of care  Precautions:  Allergies  Reviewed by Kathi Lo at 11:19 PM   Severity Reactions Comments   Celebrex [celecoxib] Not Specified Other (See Comments) Cardiologist stated he should not take   Chlorhexidine Not Specified Hives Is able to wash with hibiclens but not use michi cloths   Other Not Specified  "steroid eyedrops"   Prednisone Not Specified Other (See Comments) Prednisone eye drops- eye pressure increases       Watch balance, cueing to maintain upright posturing , avoid excessive forward trunk lean    Manuals 3/7/23 3/10            I eval                                                    Neuro Re-Ed             Long sit hamstring stretch  20 sec x 5           heelcord stretch 1min x 1with strap             sidelying quad stretch with strap   1 min x 1           ltr  5 reps hold 10 sec           Bridging   3 x 10           One legged bridging   1 set of 10            Squats hi low mat table   3 x 10           Ther Ex ( nu step )              Hip flexion slr, and tke  2# 2 x 10           Standing hip abd, hip ext and knee flexion at ll bars             Sidestepping in ll bars             Fwd, bwd walking in ll bars             Hurdles in ll bars cg of 1             Hand to opposite knee gait cg/ min assist of 1             Heelraises, toe raises,              Step ups             berg rows, sh ext, paloff exer             Berg walking with cord resistance Gait Training             Trial activator upright walking poles   Tactile and verbal cueing for 2 point gait 80 ft x 4                        Modalities

## 2023-03-13 ENCOUNTER — TELEPHONE (OUTPATIENT)
Dept: NEUROLOGY | Facility: CLINIC | Age: 68
End: 2023-03-13

## 2023-03-14 ENCOUNTER — OFFICE VISIT (OUTPATIENT)
Dept: CARDIOLOGY CLINIC | Facility: CLINIC | Age: 68
End: 2023-03-14

## 2023-03-14 ENCOUNTER — OFFICE VISIT (OUTPATIENT)
Dept: DIABETES SERVICES | Facility: CLINIC | Age: 68
End: 2023-03-14

## 2023-03-14 VITALS
DIASTOLIC BLOOD PRESSURE: 76 MMHG | HEART RATE: 72 BPM | HEIGHT: 69 IN | OXYGEN SATURATION: 94 % | WEIGHT: 295.1 LBS | BODY MASS INDEX: 43.71 KG/M2 | SYSTOLIC BLOOD PRESSURE: 130 MMHG

## 2023-03-14 VITALS — BODY MASS INDEX: 43.58 KG/M2 | WEIGHT: 295.1 LBS

## 2023-03-14 DIAGNOSIS — E13.9 DIABETES 1.5, MANAGED AS TYPE 1 (HCC): ICD-10-CM

## 2023-03-14 DIAGNOSIS — E78.2 MIXED HYPERLIPIDEMIA: ICD-10-CM

## 2023-03-14 DIAGNOSIS — E11.641 TYPE 2 DIABETES MELLITUS WITH HYPOGLYCEMIA AND COMA, WITHOUT LONG-TERM CURRENT USE OF INSULIN (HCC): Primary | ICD-10-CM

## 2023-03-14 DIAGNOSIS — E11.42 CONTROLLED TYPE 2 DIABETES MELLITUS WITH DIABETIC POLYNEUROPATHY, WITHOUT LONG-TERM CURRENT USE OF INSULIN (HCC): ICD-10-CM

## 2023-03-14 DIAGNOSIS — I10 ESSENTIAL HYPERTENSION: ICD-10-CM

## 2023-03-14 DIAGNOSIS — K22.719 BARRETT'S ESOPHAGUS WITH DYSPLASIA: Primary | ICD-10-CM

## 2023-03-14 DIAGNOSIS — I25.10 CORONARY ARTERY DISEASE INVOLVING NATIVE CORONARY ARTERY OF NATIVE HEART WITHOUT ANGINA PECTORIS: ICD-10-CM

## 2023-03-14 DIAGNOSIS — Z01.810 PREOP CARDIOVASCULAR EXAM: ICD-10-CM

## 2023-03-14 DIAGNOSIS — I77.89 HYPERPLASIA OF RENAL ARTERY (HCC): ICD-10-CM

## 2023-03-14 DIAGNOSIS — E78.5 HYPERLIPIDEMIA, UNSPECIFIED HYPERLIPIDEMIA TYPE: ICD-10-CM

## 2023-03-14 NOTE — PROGRESS NOTES
Medical Nutrition Therapy        Assessment    Visit Type: Initial visit  Chief complaint/Medical Diagnosis/reason for visit E11 641 Type 2 diabetes mellitus with hypoglycemia and coma, without long-term current use of insulin    HPI Drew Aviles was seen today for his initial MNT visit  Patient informed that he has lost 50 lbs in the 1 1/2 year since he started with ozempic  He checks his BG levels 2 times a day and varies in between 118-200+  Patient shared his sleep pattern (sleeps in between 4am-12pm) and his eat pattern today (tends to snack later at night time since awake)  He has been on this schedule since many years due to his occupation  Problems identified in food recall include inconsistent and excessive carbohydrate intake with poor meal timings and consumption of midnight snacks  Provided patient with a 2000 calorie meal plan to assist with consistency, balance and portion control  Encouraged the consumption of regular meals at regular times  Advised patient to keep carbohydrate intake to 45-60 grams per meal and 15-30 grams HS  snack to assist with glycemic control  Suggested keeping protein intake to 10 ounces a day and fat to 5 servings daily to assist with lipid management and calorie control  Advised patient to include more non starchy vegetables and have at least 2 servings of fruits in a day, and to cut back on his diet soda intake  Portion booklet and food labels were used to teach basic carbohydrate counting  Patient agreed to keep daily food logs and return them in one week for assessment  RD will remain available for further dietary questions/concerns       Ht Readings from Last 1 Encounters:   03/14/23 5' 9" (1 753 m)     Wt Readings from Last 3 Encounters:   03/14/23 134 kg (295 lb 1 6 oz)   03/14/23 134 kg (295 lb 1 6 oz)   03/03/23 135 kg (297 lb)     Weight Change: No    Barriers to Learning: physical, speech    Do you follow any special diet presently?: No  Who shops: patient and spouse  Who cooks: patient and spouse    Food Log: Completed via the method of food recall    Wake up: 12:00pm   - Sleep hours: 4am -12 pm   Breakfast 1pm: diet pepsi 8 oz can, 1 fruit - 1 small banana usually, sometimes protein shake - powdered one mixed with water  Morning Snack:none   Lunch 5pm: pre-made wegman's meals - 1/2 hoagie roll with some meat, a small bag of chips/ cheeze-it / spaghetti with meatballs, mostly water and diet pepsi 8 oz  Afternoon Snack: none   Dinner 7-8 pm: pizza slices 2/ taco salad/ soups 2 cups/ saladworks salad with meat/ turkey and dressing 2 tbsp   Evening Snack: diet pepsi 8 oz usually snack from 10pm -2am: peanut butter crackers/ peanut crackers/ diet ice cream  Beverages: diet pepsi 3-5 cans a day, 3 bottles of water 500ml each  Eating out/Take out: mostly everyday   Exercise PT sessions - neurology     Calorie needs 2000 kcals/day Carbs: 45-60 g/meal, 15-30 g/snack     Fat: 5 servings/day    Protein:10 ounces/day    Nutrition Diagnosis:  Food and nutrition related knowledge deficit  related to Imparied cognitive ability, including learning disabilities, neurological or sensory impairment, and/ or dementia as evidenced by Conditions associated with diagnosis or treatment    Intervention: increased fiber intake, label reading, behavior modification strategies, carbohydrate counting, meal timing, meal planning, individualized meal plan and monitoring portion control     Treatment Goals: Patient understands education and recommendations, Patient will consume 3 meals a day, Patient will monitor portion control, Patient will consume snacks, Patient will consume 25-35 grams of fiber a day, Patient will count carbohydrates and Patient will monitor blood glucose    Monitoring and evaluation:    Term code indicator  FH 4 4 Mealtime Behavior Criteria: Consume 3 meals a day, 4-5 hours apart  Try not to skip any meals    Term code indicator  FH 1 6 3 Carbohydrate Intake Criteria: Aim for 45-60 grams of carbohydrates per meal and 15 -30 grams of carbohydrates at post dinner snack  Term code indicator  FH 1 6 4 Fiber Intake Criteria: Include more non starchy vegetables and have at least 2 servings of fruits in a day  Materials Provided: portion booklet, food log    Patient’s Response to Instruction:  Comprehensiongood  Motivationgood  Expected Compliancegood    Start- Stop: 12:54-2:08  Total Minutes: 74 Minutes  Group or Individual Instruction: MNT-I  Other: Damir Griffiths MD      Thank you for coming to the [facility] [address] [place] [address] for education today  Please feel free to call with any questions or concerns      [address]  [address] [id number]

## 2023-03-14 NOTE — PROGRESS NOTES
Follow-up - Cardiology   Steven Doherty 69 y o  male MRN: [id number]        Problems    Problem List Items Addressed This Visit        Digestive    Molina's esophagus with dysplasia - Primary       Cardiovascular and Mediastinum    Hyperplasia of renal artery (HCC)   Other Visit Diagnoses     Coronary artery disease involving native coronary artery of native heart without angina pectoris        Mixed hyperlipidemia        Diabetes 1 5, managed as type 1 ([facility] [address] )        Essential hypertension        Preop cardiovascular exam        Hyperlipidemia, unspecified hyperlipidemia type                Plan discussion  Since I last saw him he thinks he has been developing some Parkinson disease and now under the care of neurology  He did have an MRI  He also tells me he has some peripheral neuropathy  From a cardiac standpoint he has been stable  Blood pressures been fine  His gastric sleeve is been removed  He is maintaining his weight  His last LDL was above 90  Therefore I am increasing his Zetia to 10 mg daily with a repeat lipid profile  Fortunately his renal function is excellent with a GFR 73  His last A1c is 5 8  PVCs have responded to a very low-dose of beta-blocker  He has a positive family history of coronary disease  He has no angina  He had a stress test and an echocardiogram in the last several years which was negative for ischemia and left ventricle remains normal   Essentially has been blood pressure PVCs and lipid levels because he is a diabetic  HPI: Steven Doherty is a 69y o  year old male    Plan and discussion   Patient seen October 3, 2022  He has had his band removed from his gastric surgery  It was causing him problems as Molina's esophagus      He is maintaining his weight although as he said he can eat a lot more but he is trying not to be  Diabetes is well controlled with an A1c in the 6 5 range   Blood pressure is well controlled   His PVCs have improved dramatically and he will try dropping his metoprolol to 12 5 daily  Prostate carcinoma years ago   Very positive history coronary disease  He is having some peripheral neuropathy secondary to diabetes  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               HPI: Daron De Luna is a 74y o  year old male         Plan and discussion   Patient seen August 2, 2022     He is here for cardiac clearance for to remove his gastric sleeve     He has a history of diabetes     A1c less than 7   Blood pressure well controlled     He has had frequent PVCs in the past and they are doing very well with beta-blocker     He has no cardiac complaints     Prostate carcinoma years ago  History gastric bypass  Positive family history coronary disease   Molina's esophagus esophagus   Presently seeing Neurology  We did clear him for his surgery  Eduin Cardona has no cardiac complaints              HPI: Marcellus Fregoso is a 66 y  o  year old male      PlanAnd discussion     Patient seen October 19, 2021     Blood pressure is well controlled  Diabetes much better controlled in the past   A1c is 6     He has lost 16 lb     He has a prostate carcinoma history with a PSA less than 0 1     His gastric bypass in the past     Positive family history coronary disease     His aorta 4 1 cm     He had some shortness of breath and did get a pulmonary function study   It was normal     The new event is that he has Molina's esophagus     I am cutting back his ED a the 5 mg a day   His LDL is 60     He is on a statin ACE-inhibitor beta-blocker     We will check his number of PVCs with a Holter counter before his next visit     Plan patient seen October 16, 2019  In the past he has had highly symptomatic PVCs  48 hour Holter recently showed only 27 PVCs less than 100 PACs  He does feel each 1 of them  I mostly read short him  He has had gastric bypass be still over 300 lb    He is status post prostate carcinoma which is being followed  He has a positive family history coronary artery disease      His LDL is well controlled at less than 70      He had an echocardiogram done July of 2019  Ejection fraction 65%   His aorta is 4 1 cm but he has no significant valvar disease  Summary therefore from a cardiac standpoint he is really doing very well   Mostly reassured him  Anjelica Bell is diabetic  Anjelica Bell has no documented coronary disease   He is on a statin  He is on 100 mg of losartan   He is also on a beta-blocker  Will do another Holter counter next year   Do not think he needs another echo               HPI: Marcellus Fregoso is a 63 y  o  year old male    Plan patient seen June 13, 2019  This patient seen for hypertension  Blood pressures been excellent   He is on 100 mg of losartan  He also has diabetes  A1c is very well controlled 7 2  He has had a gastric bypass but his weight is presently over 120 lb  He has had a prostatectomy  His PSA is less than 0 1  The most a common issue that he has it is bothering him is that he has highly symptomatic extrasystoles  With the put a 48 hour Holter  In addition his LDL is 104   He is a diabetic with a for a positive family history   I will increase his Crestor to 5 mg twice a week  Anjelica Bell was supposed to be on twice a week but somehow other he is only taking it once a week  After the Holter counter repeat lipid profile will review his meds            Review of Systems   Constitutional: Negative  Respiratory: Negative  Musculoskeletal: Positive for gait problem     Neurological:        Peripheral neuropathy         Past Medical History:   Diagnosis Date   • Acute blood loss anemia 10/13/2016   • Anxiety    • Arthritis     knees   • Arthritis    • Asthma     stable for > 10 years   • Molina esophagus     with BARRX/RFA   • Cancer [facility])     prostate   • Cataract    • Depression    • Diabetes ([facility] )    • Diabetes mellitus ([facility] )     pre diabetes   • Diverticulitis of colon    • Environmental allergies    • Fall 12/30/2020   • GERD (gastroesophageal reflux disease)    • Hiatal hernia    • History of anal fissures    • Hyperlipidemia    • Hypertension    • Incisional hernia    • Insomnia    • Kidney stone    • Malignant neoplasm of prostate ([facility] [address] )    • Morbid obesity (HCC)    • Peripheral neuropathy    • PONV (postoperative nausea and vomiting)    • Prolapsing mitral valve     prolapsing mitral valve leaflet syndrome   • Prostate cancer ([facility] )    • Retinal detachment     treated surgically at [facility]; resolved: 10/10/2012   • Sleep apnea     diagnosed but unable to tolerate cpap  • Stuttering      Social History     Substance and Sexual Activity   Alcohol Use Not Currently   • Alcohol/week: 1 0 standard drink   • Types: 1 Cans of beer per week    Comment: 1 case of beer per year     Social History     Substance and Sexual Activity   Drug Use Never     Social History     Tobacco Use   Smoking Status Never   Smokeless Tobacco Never   Tobacco Comments    quit 38 years ago       Allergies:   Allergies   Allergen Reactions   • Celebrex [Celecoxib] Other (See Comments)     Cardiologist stated he should not take     • Chlorhexidine Hives     Is able to wash with hibiclens but not use michi cloths   • Other      "steroid eyedrops"     • Prednisone Other (See Comments)     Prednisone eye drops- eye pressure increases       Medications:     Current Outpatient Medications:   •  ALPRAZolam (XANAX) 0 25 mg tablet, Take 1 tablet (0 25 mg total) by mouth 3 (three) times a day as needed for anxiety, Disp: 270 tablet, Rfl: 1  •  Blood Glucose Monitoring Suppl (OneTouch Verio) w/Device KIT, Use daily (Patient taking differently: Use as needed), Disp: 1 kit, Rfl: 0  •  ezetimibe (ZETIA) 10 mg tablet, TAKE 1 TABLET DAILY (Patient taking differently: Take 5 mg by mouth in the morning), Disp: 90 tablet, Rfl: 3  •  FLUoxetine (PROzac) 20 mg capsule, TAKE 3 CAPSULES DAILY, Disp: 270 capsule, Rfl: 3  •  glucose blood (OneTouch Verio) test strip, USE TO TEST DAILY, Disp: 200 strip, Rfl: 1  •  hydrochlorothiazide (HYDRODIURIL) 12 5 mg tablet, Take 1 tablet (12 5 mg total) by mouth 3 (three) times a week Monday, Wednesday, Friday, Disp: 36 tablet, Rfl: 1  •  Lancet Devices (ONE TOUCH DELICA LANCING DEV) MISC, Use daily, Disp: 1 each, Rfl: 0  •  losartan (COZAAR) 100 MG tablet, TAKE 1 TABLET DAILY, Disp: 90 tablet, Rfl: 3  •  metFORMIN (GLUCOPHAGE) 500 mg tablet, TAKE 2 TABLETS TWICE A DAY WITH MEALS, Disp: 360 tablet, Rfl: 3  •  metoprolol succinate (TOPROL-XL) 25 mg 24 hr tablet, TAKE 1 TABLET DAILY (Patient taking differently: Take 25 mg by mouth daily), Disp: 90 tablet, Rfl: 3  •  Multiple Vitamins-Minerals (CENTRUM ADULTS PO), Take by mouth in the morning, Disp: , Rfl:   •  OLANZapine (ZyPREXA) 5 mg tablet, Take 0 5 tablets (2 5 mg total) by mouth every evening, Disp: , Rfl:   •  OneTouch Delica Lancets 80Z MISC, Use daily, Disp: 300 each, Rfl: 1  •  pantoprazole (PROTONIX) 40 mg tablet, Take 1 tablet (40 mg total) by mouth 2 (two) times a day 1 tab in AM, 1 tabs in PM, Disp: 180 tablet, Rfl: 3  •  rosuvastatin (CRESTOR) 5 mg tablet, Take 1 tablet (5 mg total) by mouth once a week, Disp: 12 tablet, Rfl: 3  •  semaglutide, 2 mg/dose, (Ozempic, 2 MG/DOSE,) 8 mg/ mL injection pen, Inject 0 75 mL (2 mg total) under the skin every 7 days, Disp: 9 mL, Rfl: 0  •  trospium chloride (SANCTURA) 20 mg tablet, Take 1 tablet (20 mg total) by mouth 2 (two) times a day (Patient not taking: Reported on 3/1/2023), Disp: 180 tablet, Rfl: 3  •  zolpidem (AMBIEN) 10 mg tablet, Take 1 tablet (10 mg total) by mouth daily at bedtime as needed for sleep, Disp: 90 tablet, Rfl: 0      Physical Exam      Laboratory Studies:  CMP:      Invalid input(s): ALBUMIN  NT-proBNP: No results found for: NTBNP   Coags:    Lipid Profile:   Lab Results   Component Value Date    CHOL 171 08/16/2015     Lab Results   Component Value Date    HDL 50 08/05/2022     Lab Results Component Value Date    LDLCALC 96 2022     Lab Results   Component Value Date    TRIG 86 2022       Cardiac testing:     EKG reviewed personally: Recent EKG in 2023 while in the emergency room shows sinus rhythm with left axis deviation    Results for orders placed during the hospital encounter of 19    Echo complete with contrast if indicated    Narrative  [facility] [address]  [phone]    Transthoracic Echocardiogram  2D, M-mode, Doppler, and Color Doppler    Study date:  2019    Patient: Ezio Romo  MR number: [id number]  Account number: [de-identified]  : 1955  Age: 70 years  Gender: Male  Status: Outpatient  Location: [address] [place] [address] [facility] [place]  Height: 67 in  Weight: 323 lb  BP: 130/ 76 mmHg    Indications: essential hypertension    Diagnoses: I10  - Essential (primary) hypertension    Sonographer:  Tiffanie Almeida, [address] [facility] [place]  Primary Physician:  Karen Guerra MD  Referring Physician:  Frances Mcdonald MD  Group:  [facility] [id number] [facility]  Cardiology Fellow:  Yeny Turcios MD  Interpreting Physician:  Magdalena Nageotte, MD    SUMMARY    LEFT VENTRICLE:  Systolic function was normal  Ejection fraction was estimated to be 65 %  Doppler parameters were consistent with abnormal left ventricular relaxation (grade 1 diastolic dysfunction)  RIGHT VENTRICLE:  The size was normal   Systolic function was normal     COMPARISONS:  There has been no significant interval change  Comparison was made with the previous study of 19-Oct-2017  HISTORY: PRIOR HISTORY: hypertension, hyperlipidemia, PVC's, DM    PROCEDURE: The study was performed in the [address]  This was a routine study  The transthoracic approach was used  The study included complete 2D imaging, M-mode, complete spectral Doppler, and color Doppler  The  heart rate was 68 bpm, at the start of the study  Images were obtained from the parasternal, apical, subcostal, and suprasternal notch acoustic windows  Image quality was adequate  LEFT VENTRICLE: Size was normal  Systolic function was normal  Ejection fraction was estimated to be 65 %  There were no regional wall motion abnormalities  Wall thickness was normal  There was no evidence of concentric hypertrophy  DOPPLER: Doppler parameters were consistent with abnormal left ventricular relaxation (grade 1 diastolic dysfunction)  RIGHT VENTRICLE: The size was normal  Systolic function was normal     LEFT ATRIUM: The atrium was mildly dilated  RIGHT ATRIUM: Size was normal     MITRAL VALVE: Valve structure was normal  There was normal leaflet separation  DOPPLER: The transmitral velocity was within the normal range  There was no evidence for stenosis  There was trace regurgitation  AORTIC VALVE: The valve was trileaflet  Leaflets exhibited normal cuspal separation and sclerosis  DOPPLER: Transaortic velocity was within the normal range  There was no evidence for stenosis  There was no regurgitation  TRICUSPID VALVE: The valve structure was normal  There was normal leaflet separation  DOPPLER: The transtricuspid velocity was within the normal range  There was no evidence for stenosis  There was trace regurgitation  The tricuspid jet  envelope definition was inadequate for estimation of RV systolic pressure  PULMONIC VALVE: Leaflets exhibited normal thickness, no calcification, and normal cuspal separation  DOPPLER: The transpulmonic velocity was within the normal range  There was no evidence for stenosis  There was no regurgitation  PERICARDIUM: There was no pericardial effusion  The pericardium was normal in appearance  AORTA: The root exhibited normal size  The ascending aorta was normal in size for body surface area at 4 cm (16 mm/m2)  SYSTEMIC VEINS: IVC: The inferior vena cava was normal in size and course   Respirophasic changes were normal     SYSTEM MEASUREMENT TABLES    2D  %FS: 44 25 %  Ao Diam: 3 61 cm  EDV(Teich): 149 41 ml  EF(Cube): 82 67 %  EF(Teich): 74 93 %  ESV(Cube): 29 34 ml  ESV(Teich): 37 45 ml  IVSd: 1 15 cm  LA Area: 24 76 cm2  LA Diam: 4 25 cm  LVEDV MOD A4C: 157 35 ml  LVEF MOD A4C: 67 57 %  LVESV MOD A4C: 51 03 ml  LVIDd: 5 53 cm  LVIDs: 3 08 cm  LVLd A4C: 8 71 cm  LVLs A4C: 7 1 cm  LVPWd: 1 18 cm  RA Area: 17 62 cm2  RV Diam : 3 14 cm  SV MOD A4C: 106 32 ml  SV(Cube): 139 96 ml  SV(Teich): 111 96 ml    MM  TAPSE: 2 17 cm    PW  AVC: 318 86 ms  E': 0 07 m/s  E/E': 8 26  MV A Luis Armando: 0 71 m/s  MV Dec Throckmorton: 1 85 m/s2  MV DecT: 328 49 ms  MV E Luis Armando: 0 61 m/s  MV E/A Ratio: 0 86    [employer] Accredited E[facility]ocardi[facility]    Prepared and electronically signed by    Willian Cisneros MD  Signed 26-Jul-2019 14:19:43    No results found for this or any previous visit  No results found for this or any previous visit  No results found for this or any previous visit  Shweta Russell MD    Portions of the record may have been created with voice recognition software   Occasional wrong word or "sound a like" substitutions may have occurred due to the inherent limitations of voice recognition software   Read the chart carefully and recognize, using context, where substitutions have occurred

## 2023-03-14 NOTE — PATIENT INSTRUCTIONS
Consume 3 meals a day, 4-5 hours apart  Try not to skip any meals  Aim for 45-60 grams of carbohydrates per meal and 15 -30 grams of carbohydrates at post dinner snack  Include more non starchy vegetables and have at least 2 servings of fruits in a day

## 2023-03-17 ENCOUNTER — OFFICE VISIT (OUTPATIENT)
Dept: NEUROLOGY | Facility: CLINIC | Age: 68
End: 2023-03-17

## 2023-03-17 VITALS
BODY MASS INDEX: 43.69 KG/M2 | SYSTOLIC BLOOD PRESSURE: 120 MMHG | DIASTOLIC BLOOD PRESSURE: 68 MMHG | WEIGHT: 295 LBS | TEMPERATURE: 97.4 F | HEIGHT: 69 IN | HEART RATE: 68 BPM

## 2023-03-17 DIAGNOSIS — M79.602 PARESTHESIA AND PAIN OF BOTH UPPER EXTREMITIES: Primary | ICD-10-CM

## 2023-03-17 DIAGNOSIS — G62.9 NEUROPATHY: ICD-10-CM

## 2023-03-17 DIAGNOSIS — M79.601 PARESTHESIA AND PAIN OF BOTH UPPER EXTREMITIES: Primary | ICD-10-CM

## 2023-03-17 DIAGNOSIS — R20.2 PARESTHESIA AND PAIN OF BOTH UPPER EXTREMITIES: Primary | ICD-10-CM

## 2023-03-17 NOTE — ASSESSMENT & PLAN NOTE
Pt here today for neuro follow up  No new neuropathic sxs  No new nocturnal sxs  Findings c/w emg studies of lower ext  Pt denies any falls or trips related to neuorpathy  Pt with one bout of generalized weakness and loc related to increased dosing of ozempic  Pt notes on occasion issues with opening bottle cap and decreased   On exam 5-/5 right apb weakness  Pt already had emg of lowers  rec emg of upper ext to eval any progression of neuropathy to upper ext vs focal compressive neuropathy like cts  Pt notes occ issues with endurance in his walking  Pt also with more fatigue of the right leg which is long standing  Question superimposed neurogenic claudication from spinal stenosis, rec follow up with pcp and consideration for ortho eval  Pt notes balance relatively ok at this time  Pt following with pcp for his DM  Pt feels in the past month he is being more attentive to his sugars and feeling better overall, decreased caffeine as well

## 2023-03-17 NOTE — PROGRESS NOTES
Patient ID: Sadie Kwong is a 69 y o  male  Assessment/Plan:    Neuropathy  Pt here today for neuro follow up  No new neuropathic sxs  No new nocturnal sxs  Findings c/w emg studies of lower ext  Pt denies any falls or trips related to neuorpathy  Pt with one bout of generalized weakness and loc related to increased dosing of ozempic  Pt notes on occasion issues with opening bottle cap and decreased   On exam 5-/5 right apb weakness  Pt already had emg of lowers  rec emg of upper ext to eval any progression of neuropathy to upper ext vs focal compressive neuropathy like cts  Pt notes occ issues with endurance in his walking  Pt also with more fatigue of the right leg which is long standing  Question superimposed neurogenic claudication from spinal stenosis, rec follow up with pcp and consideration for ortho eval  Pt notes balance relatively ok at this time  Pt following with pcp for his DM  Pt feels in the past month he is being more attentive to his sugars and feeling better overall, decreased caffeine as well         Diagnoses and all orders for this visit:    Paresthesia and pain of both upper extremities  -     EMG 2 Limb Upper Extremity; Future    Neuropathy           Subjective:    HPI    Pt is a 68 yo m with pmh of barretts esophagus, gastroparesis, DM type 2, stuttering, HTN, ANABELL who presents today for evaluation of several neurological issues   pt last seen on 11/10/22  Per my last note " Pt did well to bring list of active issues with him which we addressed in full at visit  Pt notes concern about his balance   Pt feels off balance intermittently while walking, in shower or on uneven surfaces    Pt had a fall in dec 2020 after taking an [place] and waiting too long to go right to sleep  Pt notes he fell down 16 steps and his back of head  Pt was evaluated and release with concussion but no associated internal injury or fracture   Pt feels a bit slower since fall    Pt notes he feels stamina is significantly decreased    Pt notes overall mobility and range of motion more limited as well    Pt denies any pain in hands or feet   No nocturnal sxs   On exam pt with decreased vib and temp in lower ext  Melvina Mccallum dx of neuropathy with pt in office and likely contributor to his balance issues   Pt also with issues with getting up from low chairs   Pt was able to get up in office wihout use of arms or chair arms as well   Pt has been on crestor over the years    No clear proximal weakness in arms or legs during exam today   Will check ck and emg for both neuropathy and this ongoing issue   Pt also with concerns for parkinsons disease due to history in family with 2 uncles  Pt notes sxs mostly with action   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   Pt notes no issues with adls "  Kept above paragraph due to complexities of pt case  Pt last seen in nov  Since last visit, pt doing fairly well  No recent infections  Pt did have one er visit in feb for 3 days of generalized weakness and lightheadedness  Pt had 3 episodes of vomitting as well  Per notes, likely related to increase intake of ozempic by pt  Pt notes no recurrence of syncope  Currently no sz history  No recent falls or trips  No change in vision  No loss of vision  No change in bowel or bladder  No change in speech or swallowing  Pt notes zyprexa was really beneficial when started about 20 yrs ago off label by psychiatrist   That doc no longer in practice  Pt was on med off label for stuttering history  Pt rec to see psych here for updates on long term use of zyprexa  Pt notes some issue with endurance with walking  Also long standing right leg weakness at times transient and fatiguable  Pt rec to discuss with pcp as possibly related to spinal stenosis  Pt notes no acute radicular or myelopathic sxs at this time  Pt with DM for past 5 yrs   Pt notes over past 5 weeks more attentive himself to dx   Pt has cut out caffeine and also working on checking sugars more regularly  Pt notes improvement in bladder frequency as well  No recent infections  No recent fever or chills  No cough or sob  Pt notes no new neuropathic sxs  Pt notes no new nocturnal sxs into feet  Of note, some paresthesias in finger tips  Also some decreased dexterity in right hand  Rev typical pathophysiology of neuropathy  Due to hand sxs, rec emg updated of upper ext to eval for progression of neuropathy vs focal compressive neruopathy like cts nickolas with dec strength only noted in right apb  Pt is left handed  Pt is in agreement with updated emg of upper ext  No parkinsonian features at this time  No resting tremor  No head tiitbation  No bradykinesia  No cogwheeling  Re rev- emg of the lower ext in aug 2022- per report 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  No evidence of mejia lumbosacral radiculopathy  Study rev in detail with pt           The following portions of the patient's history were reviewed and updated as appropriate: allergies, current medications, past family history, past medical history, past social history, past surgical history and problem list and med rec and ros rev  Total time spent today 35 minutes  Greater than 50% of total time was spent with the patient and / or family counseling and / or coordination of care       Objective:    Blood pressure 120/68, pulse 68, temperature (!) 97 4 °F (36 3 °C), height 5' 9" (1 753 m), weight 134 kg (295 lb)  Physical Exam  Constitutional:       General: He is not in acute distress  Appearance: He is not ill-appearing  Eyes:      General: Lids are normal       Extraocular Movements: Extraocular movements intact  Pupils: Pupils are equal, round, and reactive to light  Musculoskeletal:      Right lower leg: No edema  Left lower leg: No edema     Neurological: Mental Status: He is alert  Motor: Motor strength is normal       Deep Tendon Reflexes:      Reflex Scores:       Tricep reflexes are 2+ on the right side and 2+ on the left side  Bicep reflexes are 2+ on the right side and 2+ on the left side  Brachioradialis reflexes are 2+ on the right side and 2+ on the left side  Patellar reflexes are 2+ on the right side and 1+ on the left side  Achilles reflexes are 1+ on the right side and 1+ on the left side  Psychiatric:         Speech: Speech normal          Neurological Exam  Mental Status  Alert  Recent and remote memory are intact  Speech is normal  Language is fluent with no aphasia  Attention and concentration are normal  Fund of knowledge is appropriate for level of education  Cranial Nerves  CN II: Visual acuity is normal  Visual fields full to confrontation  CN III, IV, VI: Extraocular movements intact bilaterally  Normal lids and orbits bilaterally  Pupils equal round and reactive to light bilaterally  CN V: Facial sensation is normal   CN VII: Full and symmetric facial movement  CN VIII: Hearing is normal   CN IX, X: Palate elevates symmetrically  Normal gag reflex  CN XI: Shoulder shrug strength is normal   CN XII: Tongue midline without atrophy or fasciculations  Motor  Normal muscle bulk throughout  Normal muscle tone  No abnormal involuntary movements  Strength is 5/5 throughout all four extremities  Sensory  Dec vib mejia lowers      Reflexes                                            Right                      Left  Brachioradialis                    2+                         2+  Biceps                                 2+                         2+  Triceps                                2+                         2+  Finger flex                           2+                         2+  Hamstring                            2+                         2+  Patellar                                2+ 1+  Achilles                                1+                         1+  Plantar                           Downgoing                Downgoing    Coordination  Right: Finger-to-nose normal  Rapid alternating movement normal Left: Finger-to-nose normal  Heel-to-shin normal     Gait  Casual gait: Wide stance  ROS:    Review of Systems   Constitutional: Negative  Negative for appetite change and fever  HENT: Negative  Negative for hearing loss, tinnitus, trouble swallowing and voice change  Eyes: Negative  Negative for photophobia, pain and visual disturbance  Respiratory: Negative  Negative for shortness of breath  Cardiovascular: Negative  Negative for palpitations  Gastrointestinal: Negative  Negative for nausea and vomiting  Endocrine: Negative  Negative for cold intolerance  Genitourinary: Negative  Negative for dysuria, frequency and urgency  Musculoskeletal: Positive for gait problem  Negative for myalgias and neck pain  Shuffles a little still, fell out of bed twice, off a chair once  Was going to PT, got DC in January  Started going thru PCP and doing well   Skin: Negative  Negative for rash  Allergic/Immunologic: Negative  Neurological: Positive for tremors and syncope  Negative for dizziness, seizures, facial asymmetry, speech difficulty, weakness, light-headedness, numbness and headaches  Tremor in L hand started 2 mos ago, occasionally R hand  Patient was in hospital for accidental OD on Ozempic 2wks ago (Switched doses and didn't realize pen turns were 2ml instead of 1ml)   Hematological: Negative  Does not bruise/bleed easily  Psychiatric/Behavioral: Negative  Negative for confusion, hallucinations and sleep disturbance  All other systems reviewed and are negative

## 2023-03-20 ENCOUNTER — OFFICE VISIT (OUTPATIENT)
Dept: PHYSICAL THERAPY | Age: 68
End: 2023-03-20

## 2023-03-20 DIAGNOSIS — R26.2 AMBULATORY DYSFUNCTION: Primary | ICD-10-CM

## 2023-03-20 NOTE — PROGRESS NOTES
Daily Note     Today's date: 3/20/2023  Patient name: Adi Olvera  : 1955  MRN: [id number]  Referring provider: Neena Renae MD  Dx:   Encounter Diagnosis     ICD-10-CM    1  Ambulatory dysfunction  R26 2                      Subjective: HEP given and demonstrated  Instructed in floor to stand  Objective: See treatment diary below      Assessment: Tolerated treatment well  Patient would benefit from continued PT      Plan: Continue per plan of care  Precautions:  Allergies  Reviewed by Vilma Arreola at 11:19 PM   Severity Reactions Comments   Celebrex [celecoxib] Not Specified Other (See Comments) Cardiologist stated he should not take   Chlorhexidine Not Specified Hives Is able to wash with hibiclens but not use michi cloths   Other Not Specified  "steroid eyedrops"   Prednisone Not Specified Other (See Comments) Prednisone eye drops- eye pressure increases       Watch balance, cueing to maintain upright posturing , avoid excessive forward trunk lean    Manuals 3/7/23 3/10 3/20                          I eval                                                    Neuro Re-Ed             Long sit hamstring stretch  20 sec x 5 20sec x 5          heelcord stretch 1min x 1with strap   20sec x 5          sidelying quad stretch with strap   1 min x 1 20sec x 5          ltr  5 reps hold 10 sec 20x          Bridging   3 x 10 3 x 10          One legged bridging   1 set of 10            Squats hi low mat table   3 x 10           Ther Ex ( nu step )              Hip flexion slr, and tke  2# 2 x 10           Standing hip abd, hip ext and knee flexion at ll bars             Sidestepping in ll bars             Fwd, bwd walking in ll bars             Hurdles in ll bars cg of 1             Hand to opposite knee gait cg/ min assist of 1             Heelraises, toe raises,              Step ups             berg rows, sh ext, paloff exer             Berg walking with cord resistance             Nu-step   15 min Gait Training             Trial activator upright walking poles   Tactile and verbal cueing for 2 point gait 80 ft x 4                        Modalities             Floor to sit     x1

## 2023-03-22 ENCOUNTER — OFFICE VISIT (OUTPATIENT)
Dept: PHYSICAL THERAPY | Age: 68
End: 2023-03-22

## 2023-03-22 DIAGNOSIS — R26.2 AMBULATORY DYSFUNCTION: Primary | ICD-10-CM

## 2023-03-22 NOTE — PROGRESS NOTES
`    Daily Note     Today's date: 3/22/2023  Patient name: Lizeth Velarde  : 1955  MRN: [id number]  Referring provider: Suni Jrery MD  Dx:   Encounter Diagnosis     ICD-10-CM    1  Ambulatory dysfunction  R26 2                      Subjective: Pt  Reports he feels he can get out of bed easier  Objective: See treatment diary below      Assessment: Tolerated treatment well  Patient would benefit from continued PT      Plan: Continue per plan of care  Precautions:  Allergies  Reviewed by Albin Baca at 11:19 PM   Severity Reactions Comments   Celebrex [celecoxib] Not Specified Other (See Comments) Cardiologist stated he should not take   Chlorhexidine Not Specified Hives Is able to wash with hibiclens but not use michi cloths   Other Not Specified  "steroid eyedrops"   Prednisone Not Specified Other (See Comments) Prednisone eye drops- eye pressure increases       Watch balance, cueing to maintain upright posturing , avoid excessive forward trunk lean    Manuals 3/7/23 3/10 3/20                3/22          I eval                                                    Neuro Re-Ed             Long sit hamstring stretch  20 sec x 5 20sec x 5 20sec x 5         heelcord stretch 1min x 1with strap   20sec x 5 20sec x 5         sidelying quad stretch with strap   1 min x 1 20sec x 5 20sec x 5         ltr  5 reps hold 10 sec 20x 30x         Bridging   3 x 10 3 x 10 30x         One legged bridging   1 set of 10            Squats hi low mat table   3 x 10  30x         Ther Ex ( nu step )              Hip flexion slr, and tke  2# 2 x 10  2# 30x         Standing hip abd, hip ext and knee flexion at ll bars             Sidestepping in ll bars             Fwd, bwd walking in ll bars             Hurdles in ll bars cg of 1             Hand to opposite knee gait cg/ min assist of 1             Heelraises, toe raises,              Step ups             berg rows, sh ext, paloff exer             Berg walking with cord resistance             Nu-step   15 min 20 min         Gait Training             Trial activator upright walking poles   Tactile and verbal cueing for 2 point gait 80 ft x 4                        Modalities             Floor to sit     x1

## 2023-03-24 ENCOUNTER — FOLLOW UP (OUTPATIENT)
Dept: URBAN - METROPOLITAN AREA CLINIC 6 | Facility: CLINIC | Age: 68
End: 2023-03-24

## 2023-03-24 DIAGNOSIS — H40.053: ICD-10-CM

## 2023-03-24 DIAGNOSIS — Z96.1: ICD-10-CM

## 2023-03-24 PROCEDURE — 92012 INTRM OPH EXAM EST PATIENT: CPT

## 2023-03-24 ASSESSMENT — TONOMETRY
OD_IOP_MMHG: 20
OS_IOP_MMHG: 20

## 2023-03-24 ASSESSMENT — VISUAL ACUITY
OS_CC: 20/25
OD_CC: 20/25

## 2023-03-27 ENCOUNTER — OFFICE VISIT (OUTPATIENT)
Dept: PHYSICAL THERAPY | Age: 68
End: 2023-03-27

## 2023-03-27 DIAGNOSIS — R26.2 AMBULATORY DYSFUNCTION: Primary | ICD-10-CM

## 2023-03-27 DIAGNOSIS — E11.9 CONTROLLED TYPE 2 DIABETES MELLITUS WITHOUT COMPLICATION, WITHOUT LONG-TERM CURRENT USE OF INSULIN (HCC): ICD-10-CM

## 2023-03-27 NOTE — PROGRESS NOTES
"Daily Note     Today's date: 3/27/2023  Patient name: Sami Shah  : 1955  MRN: [id number]  Referring provider: Monae Franco MD  Dx:   Encounter Diagnosis     ICD-10-CM    1  Ambulatory dysfunction  R26 2                      Subjective: Anya Garcia Reports sit to stand is easier  Objective: See treatment diary below      Assessment: Tolerated treatment well  Patient would benefit from continued PT      Plan: Continue per plan of care  Precautions:  Allergies  Reviewed by Arlene Pak at 11:19 PM   Severity Reactions Comments   Celebrex [celecoxib] Not Specified Other (See Comments) Cardiologist stated he should not take   Chlorhexidine Not Specified Hives Is able to wash with hibiclens but not use michi cloths   Other Not Specified  \"steroid eyedrops\"   Prednisone Not Specified Other (See Comments) Prednisone eye drops- eye pressure increases       Watch balance, cueing to maintain upright posturing , avoid excessive forward trunk lean    Manuals 3/7/23 3/10 3/20                3/22 3/27         I eval                                                    Neuro Re-Ed             Long sit hamstring stretch  20 sec x 5 20sec x 5 20sec x 5         heelcord stretch 1min x 1with strap   20sec x 5 20sec x 5         sidelying quad stretch with strap   1 min x 1 20sec x 5 20sec x 5         ltr  5 reps hold 10 sec 20x 30x         Bridging   3 x 10 3 x 10 30x         One legged bridging   1 set of 10            Squats hi low mat table   3 x 10  30x         Ther Ex ( nu step )              Hip flexion slr, and tke  2# 2 x 10  2# 30x         Standing hip abd, hip ext and knee flexion at ll bars             Sidestepping in ll bars             Fwd, bwd walking in ll bars             Hurdles in ll bars cg of 1             Hand to opposite knee gait cg/ min assist of 1             Heelraises, toe raises,              Step ups             berg rows, sh ext, paloff exer             Berg walking with cord resistance           " Nu-step   15 min 20 min         Gait Training             Trial activator upright walking poles   Tactile and verbal cueing for 2 point gait 80 ft x 4                        Modalities             Floor to sit     x1

## 2023-03-29 ENCOUNTER — APPOINTMENT (OUTPATIENT)
Dept: PHYSICAL THERAPY | Age: 68
End: 2023-03-29

## 2023-03-30 ENCOUNTER — APPOINTMENT (OUTPATIENT)
Dept: PHYSICAL THERAPY | Age: 68
End: 2023-03-30

## 2023-04-03 ENCOUNTER — OFFICE VISIT (OUTPATIENT)
Dept: PHYSICAL THERAPY | Age: 68
End: 2023-04-03

## 2023-04-03 DIAGNOSIS — R26.2 AMBULATORY DYSFUNCTION: Primary | ICD-10-CM

## 2023-04-03 NOTE — PROGRESS NOTES
"Daily Note     Today's date: 4/3/2023  Patient name: Louise Carlson  : 1955  MRN: [id number]  Referring provider: Efrain Sow MD  Dx:   Encounter Diagnosis     ICD-10-CM    1  Ambulatory dysfunction  R26 2                      Subjective: Pt  Would like to do a timed walking in the future  Objective: See treatment diary below      Assessment: Tolerated treatment well  Patient would benefit from continued PT      Plan: Continue per plan of care  Precautions:  Allergies  Reviewed by Lesia Anand at 11:19 PM   Severity Reactions Comments   Celebrex [celecoxib] Not Specified Other (See Comments) Cardiologist stated he should not take   Chlorhexidine Not Specified Hives Is able to wash with hibiclens but not use michi cloths   Other Not Specified  \"steroid eyedrops\"   Prednisone Not Specified Other (See Comments) Prednisone eye drops- eye pressure increases       Watch balance, cueing to maintain upright posturing , avoid excessive forward trunk lean    Manuals 3/7/23 3/10 3/20                3/22 3/27 4/3        I eval                                                    Neuro Re-Ed             Long sit hamstring stretch  20 sec x 5 20sec x 5 20sec x 5  20sec x 5       heelcord stretch 1min x 1with strap   20sec x 5 20sec x 5  20sec x 5       sidelying quad stretch with strap   1 min x 1 20sec x 5 20sec x 5  20sec x 5       ltr  5 reps hold 10 sec 20x 30x  30x       Bridging   3 x 10 3 x 10 30x  20x       One legged bridging   1 set of 10            Squats hi low mat table   3 x 10  30x  20x       Ther Ex ( nu step )              Hip flexion slr, and tke  2# 2 x 10  2# 30x         Standing hip abd, hip ext and knee flexion at ll bars      2# 20x       Sidestepping in ll bars             Fwd, bwd walking in ll bars             Hurdles in ll bars cg of 1             Hand to opposite knee gait cg/ min assist of 1             Heelraises, toe raises,              Step ups             berg rows, sh ext, paloff exer    " Norm walking with cord resistance             Nu-step   15 min 20 min  20 min       Gait Training             Trial activator upright walking poles   Tactile and verbal cueing for 2 point gait 80 ft x 4                        Modalities             Floor to sit     x1          Bed mobility    3x

## 2023-04-06 ENCOUNTER — OFFICE VISIT (OUTPATIENT)
Dept: BARIATRICS | Facility: CLINIC | Age: 68
End: 2023-04-06

## 2023-04-06 VITALS
BODY MASS INDEX: 44.11 KG/M2 | HEIGHT: 69 IN | DIASTOLIC BLOOD PRESSURE: 78 MMHG | WEIGHT: 297.8 LBS | HEART RATE: 67 BPM | SYSTOLIC BLOOD PRESSURE: 128 MMHG

## 2023-04-06 DIAGNOSIS — E66.01 OBESITY, CLASS III, BMI 40-49.9 (MORBID OBESITY) (HCC): Primary | ICD-10-CM

## 2023-04-06 DIAGNOSIS — E78.00 PURE HYPERCHOLESTEROLEMIA: ICD-10-CM

## 2023-04-06 DIAGNOSIS — I10 BENIGN ESSENTIAL HYPERTENSION: ICD-10-CM

## 2023-04-06 DIAGNOSIS — G47.33 OBSTRUCTIVE SLEEP APNEA SYNDROME: ICD-10-CM

## 2023-04-06 DIAGNOSIS — Z98.84 HX OF LAPAROSCOPIC ADJUSTABLE GASTRIC BANDING: ICD-10-CM

## 2023-04-06 DIAGNOSIS — E11.9 TYPE 2 DIABETES MELLITUS WITHOUT COMPLICATION, WITHOUT LONG-TERM CURRENT USE OF INSULIN (HCC): ICD-10-CM

## 2023-04-06 NOTE — ASSESSMENT & PLAN NOTE
S/P Lap Band placement on 7/26/2010, with removal of band by Dr Slime Courtney on 8/30/2022 due to Molina's esophagus

## 2023-04-06 NOTE — ASSESSMENT & PLAN NOTE
- Patient is pursuing Conservative Program  - Initial weight loss goal of 5-10% weight loss for improved health  - Already on metformin 1000 mg twice a day through primary care  - Has been on Ozempic for about 1 5 years  Start weight was 318 lbs  Continue Ozempic 2 mg weekly  Has some nausea, but not bothersome and tolerating well overall  Helping with appetite  - Patient denies personal history of pancreatitis  Patient also denies personal and family history of thyroid cancer and multiple endocrine neoplasia type 2 (MEN 2 tumor)  - He has been working with the diabetes educator through endocrinology and he has found that very helpful  He has been making changes to his diet  - Labs reviewed: CMP and TSH completed February 21, 2023  Glucose was elevated and will likely improve with weight loss  The remainder of the blood work was within acceptable range  Initial MWM: 298 lbs  Last visit: 299 1  Lbs  Current: 297 8 lbs BMI 43 98  Change: -0 2 lbs (-1 3 lbs since the last office visit)  Goal: 257 5 lbs LTG, 280 lbs STG    Goals:  Do not skip meals  Keep up the great work with protein shakes rather than skipping  Continue reducing diet soda  Keep up the great work with water intake - at least 64 oz daily  Increase activity as tolerated - currently in PT for ambulatory dysfunction  Start food logging, weighing and measuring food  2000 calories per day

## 2023-04-06 NOTE — PROGRESS NOTES
Assessment/Plan:     Obesity, Class III, BMI 40-49 9 (morbid obesity) ([facility] [address] )  - Patient is pursuing Conservative Program  - Initial weight loss goal of 5-10% weight loss for improved health  - Already on metformin 1000 mg twice a day through primary care  - Has been on Ozempic for about 1 5 years  Start weight was 318 lbs  Continue Ozempic 2 mg weekly  Has some nausea, but not bothersome and tolerating well overall  Helping with appetite  - Patient denies personal history of pancreatitis  Patient also denies personal and family history of thyroid cancer and multiple endocrine neoplasia type 2 (MEN 2 tumor)  - He has been working with the diabetes educator through endocrinology and he has found that very helpful  He has been making changes to his diet  - Labs reviewed: CMP and TSH completed February 21, 2023  Glucose was elevated and will likely improve with weight loss  The remainder of the blood work was within acceptable range  Initial MWM: 298 lbs  Last visit: 299 1  Lbs  Current: 297 8 lbs BMI 43 98  Change: -0 2 lbs (-1 3 lbs since the last office visit)  Goal: 257 5 lbs LTG, 280 lbs STG    Goals:  Do not skip meals  Keep up the great work with protein shakes rather than skipping  Continue reducing diet soda  Keep up the great work with water intake - at least 64 oz daily  Increase activity as tolerated - currently in PT for ambulatory dysfunction  Start food logging, weighing and measuring food  2000 calories per day  Pure hypercholesterolemia  - Taking Crestor and Zetia  May improve with weight loss and lifestyle modification  Continue management with prescribing provider  Hx of laparoscopic adjustable gastric banding  S/P Lap Band placement on 7/26/2010, with removal of band by Dr Alberto Silvestre on 8/30/2022 due to Molina's esophagus  Type 2 diabetes mellitus, without long-term current use of insulin ([facility] [address] )  - Taking metformin and Ozempic   May improve with weight loss and lifestyle modification  Continue management with prescribing provider  Lab Results   Component Value Date    HGBA1C 5 8 01/18/2023       Sleep apnea  - Could not tolerate CPAP  Benign essential hypertension  - Taking losartan  May improve with weight loss and lifestyle modification  Continue management with prescribing provider  Aster Lorenzostephani was seen today for follow-up  Diagnoses and all orders for this visit:    Obesity, Class III, BMI 40-49 9 (morbid obesity) (HCC)  -     semaglutide, 2 mg/dose, (Ozempic, 2 MG/DOSE,) 8 mg/ mL injection pen; Inject 0 75 mL (2 mg total) under the skin every 7 days    Type 2 diabetes mellitus without complication, without long-term current use of insulin (HCC)  -     semaglutide, 2 mg/dose, (Ozempic, 2 MG/DOSE,) 8 mg/ mL injection pen; Inject 0 75 mL (2 mg total) under the skin every 7 days    Pure hypercholesterolemia  -     semaglutide, 2 mg/dose, (Ozempic, 2 MG/DOSE,) 8 mg/ mL injection pen; Inject 0 75 mL (2 mg total) under the skin every 7 days    Hx of laparoscopic adjustable gastric banding    Obstructive sleep apnea syndrome    Benign essential hypertension          Follow up in approximately 2-3 months with Non-Surgical Physician/Advanced Practitioner  Subjective:   Chief Complaint   Patient presents with   • Follow-up     MWM  2 month F/U  waist 53 in        Patient ID: Abhinav Rhodes  is a 69 y o  male with excess weight/obesity here to pursue weight management  Patient is pursuing Conservative Program    Most recent notes and records were reviewed      HPI    Wt Readings from Last 10 Encounters:   04/06/23 135 kg (297 lb 12 8 oz)   03/17/23 134 kg (295 lb)   03/14/23 134 kg (295 lb 1 6 oz)   03/14/23 134 kg (295 lb 1 6 oz)   03/03/23 135 kg (297 lb)   03/01/23 135 kg (297 lb 3 2 oz)   02/27/23 136 kg (299 lb)   02/21/23 134 kg (294 lb 6 4 oz)   02/02/23 136 kg (299 lb 3 2 oz)   01/18/23 135 kg (297 lb 6 4 oz)     Had Lap Band placement on 7/26/2010  He had developed Molina's esophagus and therefore band removal performed by Dr Rosalia Stout 8/30/2022  No further weight loss surgery planned  Weight prior to band was 300 lbs and jaiden 258 lbs  Highest weight was 335 lbs 1 5 years ago  Following with MWM due to weight regain       Started Ozempic through PCP 1 5 years ago  Ozempic increased last office visit from 1 mg to 2 mg weekly  He was not paying attention and have given himself a total of 4 mg, following which he developed nausea, vomiting and hypoglycemia  He had an episode of syncope and was hospitalized  He is currently on Ozempic 2 mg weekly and doing well  Some slight nausea, but not bothersome  Helping much more with appetite compared to 1 mg dose  Now following with endocrinology and seeing diabetes educator, which he has found very helpful with dietary recommendations  Appetite and cravings are under good control  Not food logging, but being mindful of food choices and portions         Taking metformin 1000 mg twice a day through primary care       Was on Phen fen years ago and lost weight  B 11 am - Protein shake  S- none  L- 4 pm - granola bar and carrots and banana      S- none  D around 7 pm - pasta fagoule soup or turkey club salad and lobster bisque or steak and shrimp  S- cheese crackers and yogurt raisins     Hydration: 3-5 bottles of water, 1-2 cans diet pepsi daily, diet cranberry juice 5 snehal, 1 bottle SF Gatorade every other day  Alcohol: very rare  Smoking: denies  Exercise: currently enrolled in PT for ambulatory dysfunction     Occupation: retired -  [employer], [employer], [employer]   Sleep: 8-9 hours  STOP bang: Has ANABELL, tried CPAP, but could not tolerate it         Colonoscopy: UTD, due 2026         The following portions of the patient's history were reviewed and updated as appropriate: allergies, current medications, past family history, past medical history, past social "history, past surgical history, and problem list     Family History   Problem Relation Age of Onset   • Heart disease Father    • Coronary artery disease Father    • Prostate cancer Father    • Coronary artery disease Mother    • Diabetes Mother         mellitus   • Diabetes type II Mother    • Diabetes Maternal Grandmother         mellitus   • Coronary artery disease Maternal Grandfather    • Coronary artery disease Paternal Grandfather         Review of Systems   HENT: Negative for sore throat  Respiratory: Negative for cough and shortness of breath  Cardiovascular: Negative for chest pain and palpitations  Gastrointestinal: Positive for nausea (mild, occ)  Negative for abdominal pain, constipation, diarrhea and vomiting         + GERD taking Protonix, seeing GI  Musculoskeletal: Negative for arthralgias and back pain  Skin: Negative for rash  Psychiatric/Behavioral: Negative for suicidal ideas  Denies anxiety and depression       Objective:  /78   Pulse 67   Ht 5' 9\" (1 753 m)   Wt 135 kg (297 lb 12 8 oz)   BMI 43 98 kg/m²     Physical Exam  Vitals and nursing note reviewed  Constitutional   General appearance: Abnormal   well developed and morbidly obese  Eyes No conjunctival injection  Ears, Nose, Mouth, and Throat Oral mucosa moist    Pulmonary   Respiratory effort: No increased work of breathing or signs of respiratory distress  Cardiovascular    Examination of extremities for edema and/or varicosities: Normal   no edema  Abdomen   Abdomen: Abnormal   The abdomen was obese  Musculoskeletal   Normal range of motion  Neurological   Gait and station: wide based, but ambulated independently     Psychiatric   Orientation to person, place and time: Normal     Affect: appropriate      "

## 2023-04-07 ENCOUNTER — OFFICE VISIT (OUTPATIENT)
Dept: PHYSICAL THERAPY | Age: 68
End: 2023-04-07

## 2023-04-07 DIAGNOSIS — R26.2 AMBULATORY DYSFUNCTION: Primary | ICD-10-CM

## 2023-04-07 NOTE — PROGRESS NOTES
"Daily Note     Today's date: 2023  Patient name: Dahlia Ring  : 1955  MRN: [id number]  Referring provider: Jessie Schneider MD  Dx:   Encounter Diagnosis     ICD-10-CM    1  Ambulatory dysfunction  R26 2                      Subjective: no new c/o's      Objective: See treatment diary below      Assessment: Tolerated treatment well  Patient would benefit from continued PT  Anterior thigh pain reported with walking also pt requiring periodic rests with activity  720 ft in 4 minutes achieved with standing break required at two times  Plan: Continue per plan of care  Precautions:  Allergies  Reviewed by Mika Garcia at 11:19 PM   Severity Reactions Comments   Celebrex [celecoxib] Not Specified Other (See Comments) Cardiologist stated he should not take   Chlorhexidine Not Specified Hives Is able to wash with hibiclens but not use michi cloths   Other Not Specified  \"steroid eyedrops\"   Prednisone Not Specified Other (See Comments) Prednisone eye drops- eye pressure increases       Watch balance, cueing to maintain upright posturing , avoid excessive forward trunk lean    Manuals 3/7/23 3/10 3/20                3/22 3/27 4/3 4/7       I eval                                                    Neuro Re-Ed             Long sit hamstring stretch  20 sec x 5 20sec x 5 20sec x 5  20sec x 5 20 sec x 5      heelcord stretch 1min x 1with strap   20sec x 5 20sec x 5  20sec x 5 1 min x 1       sidelying quad stretch with strap   1 min x 1 20sec x 5 20sec x 5  20sec x 5 1 minx 1      ltr  5 reps hold 10 sec 20x 30x  30x       Bridging   3 x 10 3 x 10 30x  20x 3 x 10       One legged bridging   1 set of 10      1 set of 10      Squats hi low mat table   3 x 10  30x  20x 3 x 10      Ther Ex ( nu step )              Hip flexion slr, and tke  2# 2 x 10  2# 30x   3 x 10       Standing hip abd, hip ext and knee flexion at ll bars      2# 20x       Sidestepping in ll bars       10 ft x 4      Fwd, bwd walking in ll bars       " 10 ft x 4 and crossovers and braiding 10 ft x 2 close s      Hurdles in ll bars cg of 1             Hand to opposite knee gait cg/ min assist of 1             Heelraises, toe raises,        2 sets 10      Step ups             berg rows, sh ext, paloff exer             Berg walking with cord resistance             Nu-step   15 min 20 min  20 min 15 min r 5      Gait Training             Trial activator upright walking poles   Tactile and verbal cueing for 2 point gait 80 ft x 4                        Modalities             Floor to sit     x1          Bed mobility    3x

## 2023-04-13 ENCOUNTER — APPOINTMENT (OUTPATIENT)
Dept: PHYSICAL THERAPY | Age: 68
End: 2023-04-13

## 2023-04-20 ENCOUNTER — APPOINTMENT (OUTPATIENT)
Dept: LAB | Age: 68
End: 2023-04-20

## 2023-04-20 DIAGNOSIS — I25.10 CORONARY ARTERY DISEASE INVOLVING NATIVE CORONARY ARTERY OF NATIVE HEART WITHOUT ANGINA PECTORIS: ICD-10-CM

## 2023-04-20 DIAGNOSIS — K22.719 BARRETT'S ESOPHAGUS WITH DYSPLASIA: ICD-10-CM

## 2023-04-20 DIAGNOSIS — I77.89 HYPERPLASIA OF RENAL ARTERY (HCC): ICD-10-CM

## 2023-04-20 DIAGNOSIS — E13.9 DIABETES 1.5, MANAGED AS TYPE 1 (HCC): ICD-10-CM

## 2023-04-20 DIAGNOSIS — I10 ESSENTIAL HYPERTENSION: ICD-10-CM

## 2023-04-20 DIAGNOSIS — Z01.810 PREOP CARDIOVASCULAR EXAM: ICD-10-CM

## 2023-04-20 DIAGNOSIS — C61 MALIGNANT NEOPLASM OF PROSTATE (HCC): ICD-10-CM

## 2023-04-20 DIAGNOSIS — I10 BENIGN ESSENTIAL HYPERTENSION: ICD-10-CM

## 2023-04-20 DIAGNOSIS — E78.5 HYPERLIPIDEMIA, UNSPECIFIED HYPERLIPIDEMIA TYPE: ICD-10-CM

## 2023-04-20 DIAGNOSIS — E78.00 PURE HYPERCHOLESTEROLEMIA: ICD-10-CM

## 2023-04-20 DIAGNOSIS — E78.2 MIXED HYPERLIPIDEMIA: ICD-10-CM

## 2023-04-20 DIAGNOSIS — F41.8 DEPRESSION WITH ANXIETY: ICD-10-CM

## 2023-04-20 DIAGNOSIS — E11.42 CONTROLLED TYPE 2 DIABETES MELLITUS WITH DIABETIC POLYNEUROPATHY, WITHOUT LONG-TERM CURRENT USE OF INSULIN (HCC): ICD-10-CM

## 2023-04-20 DIAGNOSIS — G47.00 INSOMNIA, UNSPECIFIED TYPE: ICD-10-CM

## 2023-04-20 LAB
ALBUMIN SERPL BCP-MCNC: 3.5 G/DL (ref 3.5–5)
ALP SERPL-CCNC: 75 U/L (ref 46–116)
ALT SERPL W P-5'-P-CCNC: 32 U/L (ref 12–78)
ANION GAP SERPL CALCULATED.3IONS-SCNC: 6 MMOL/L (ref 4–13)
AST SERPL W P-5'-P-CCNC: 17 U/L (ref 5–45)
BASOPHILS # BLD AUTO: 0.07 THOUSANDS/ΜL (ref 0–0.1)
BASOPHILS NFR BLD AUTO: 1 % (ref 0–1)
BILIRUB SERPL-MCNC: 0.57 MG/DL (ref 0.2–1)
BUN SERPL-MCNC: 13 MG/DL (ref 5–25)
CALCIUM SERPL-MCNC: 9.2 MG/DL (ref 8.3–10.1)
CHLORIDE SERPL-SCNC: 103 MMOL/L (ref 96–108)
CHOLEST SERPL-MCNC: 105 MG/DL
CK SERPL-CCNC: 59 U/L (ref 39–308)
CO2 SERPL-SCNC: 25 MMOL/L (ref 21–32)
CREAT SERPL-MCNC: 1.07 MG/DL (ref 0.6–1.3)
EOSINOPHIL # BLD AUTO: 0.67 THOUSAND/ΜL (ref 0–0.61)
EOSINOPHIL NFR BLD AUTO: 8 % (ref 0–6)
ERYTHROCYTE [DISTWIDTH] IN BLOOD BY AUTOMATED COUNT: 13.1 % (ref 11.6–15.1)
GFR SERPL CREATININE-BSD FRML MDRD: 70 ML/MIN/1.73SQ M
GLUCOSE P FAST SERPL-MCNC: 147 MG/DL (ref 65–99)
HCT VFR BLD AUTO: 41.6 % (ref 36.5–49.3)
HDLC SERPL-MCNC: 43 MG/DL
HGB BLD-MCNC: 14.1 G/DL (ref 12–17)
IMM GRANULOCYTES # BLD AUTO: 0.03 THOUSAND/UL (ref 0–0.2)
IMM GRANULOCYTES NFR BLD AUTO: 0 % (ref 0–2)
LDLC SERPL CALC-MCNC: 48 MG/DL (ref 0–100)
LYMPHOCYTES # BLD AUTO: 1.77 THOUSANDS/ΜL (ref 0.6–4.47)
LYMPHOCYTES NFR BLD AUTO: 21 % (ref 14–44)
MCH RBC QN AUTO: 29.6 PG (ref 26.8–34.3)
MCHC RBC AUTO-ENTMCNC: 33.9 G/DL (ref 31.4–37.4)
MCV RBC AUTO: 87 FL (ref 82–98)
MONOCYTES # BLD AUTO: 0.62 THOUSAND/ΜL (ref 0.17–1.22)
MONOCYTES NFR BLD AUTO: 7 % (ref 4–12)
NEUTROPHILS # BLD AUTO: 5.28 THOUSANDS/ΜL (ref 1.85–7.62)
NEUTS SEG NFR BLD AUTO: 63 % (ref 43–75)
NONHDLC SERPL-MCNC: 62 MG/DL
NRBC BLD AUTO-RTO: 0 /100 WBCS
PLATELET # BLD AUTO: 267 THOUSANDS/UL (ref 149–390)
PMV BLD AUTO: 9 FL (ref 8.9–12.7)
POTASSIUM SERPL-SCNC: 3.7 MMOL/L (ref 3.5–5.3)
PROT SERPL-MCNC: 6.8 G/DL (ref 6.4–8.4)
PSA SERPL-MCNC: <0.1 NG/ML (ref 0–4)
RBC # BLD AUTO: 4.76 MILLION/UL (ref 3.88–5.62)
SODIUM SERPL-SCNC: 134 MMOL/L (ref 135–147)
TRIGL SERPL-MCNC: 70 MG/DL
TSH SERPL DL<=0.05 MIU/L-ACNC: 1.53 UIU/ML (ref 0.45–4.5)
WBC # BLD AUTO: 8.44 THOUSAND/UL (ref 4.31–10.16)

## 2023-04-21 LAB
EST. AVERAGE GLUCOSE BLD GHB EST-MCNC: 123 MG/DL
HBA1C MFR BLD: 5.9 %

## 2023-04-24 ENCOUNTER — OFFICE VISIT (OUTPATIENT)
Dept: PHYSICAL THERAPY | Age: 68
End: 2023-04-24

## 2023-04-24 DIAGNOSIS — R26.2 AMBULATORY DYSFUNCTION: Primary | ICD-10-CM

## 2023-04-24 NOTE — PROGRESS NOTES
"Daily Note     Today's date: 2023  Patient name: Yanet Lebron  : 1955  MRN: [id number]  Referring provider: Johanny Aviles MD  Dx:   Encounter Diagnosis     ICD-10-CM    1  Ambulatory dysfunction  R26 2                      Subjective:       Objective: See treatment diary below  Progressing well with strengthening  Assessment: Tolerated treatment well  Patient would benefit from continued PT      Plan: Continue per plan of care  Precautions:  Allergies  Reviewed by Vishal Ann at 11:19 PM   Severity Reactions Comments   Celebrex [celecoxib] Not Specified Other (See Comments) Cardiologist stated he should not take   Chlorhexidine Not Specified Hives Is able to wash with hibiclens but not use michi cloths   Other Not Specified  \"steroid eyedrops\"   Prednisone Not Specified Other (See Comments) Prednisone eye drops- eye pressure increases       Watch balance, cueing to maintain upright posturing , avoid excessive forward trunk lean    Manuals 3/7/23 3/10 3/20                3/22 3/27 4/3 4/7 4/10 4/17 4/20    I eval                                                    Neuro Re-Ed             Long sit hamstring stretch  20 sec x 5 20sec x 5 20sec x 5  20sec x 5 20 sec x 5 20x5  20 sec x 5 20sec x 5   heelcord stretch 1min x 1with strap   20sec x 5 20sec x 5  20sec x 5 1 min x 1  1 min x 1 1 min x 1 1 min   sidelying quad stretch with strap   1 min x 1 20sec x 5 20sec x 5  20sec x 5 1 minx 1  1minx 1 1 min x 1 1 min   ltr  5 reps hold 10 sec 20x 30x  30x  5 reps 10 sechold 5 reps 10 sec hold  20x   Bridging   3 x 10 3 x 10 30x  20x 3 x 10  3x 10 3  x10 30x   One legged bridging   1 set of 10      1 set of 10 2 x 10  1 set of 5 3 sets of 15 30x   Squats hi low mat table   3 x 10  30x  20x 3 x 10 3 x 10 3 x 10 30x   Ther Ex ( nu step )              Hip flexion slr, and tke  2# 2 x 10  2# 30x   3 x 10  3 x 10  30x   Standing hip abd, hip ext and knee flexion at ll bars      2# 20x   3 x 10  30x   Sidestepping in ll " bars       10 ft x 4  10 ft x 4    Fwd, bwd walking in ll bars       10 ft x 4 and crossovers and braiding 10 ft x 2 close s  10 ft x 4    Hurdles in ll bars cg of 1 sidestepping and forward walking         5 colored hurdles holding on to bar emphasis on height clearance 10 ft x 4    Hand to opposite knee gait cg/ min assist of 1         40 ft x 2    Heelraises, toe raises,        2 sets 10  3 x 10    Step ups             berg rows, sh ext, [employer] app exer        Rows 15 lb 3 sets 10, paloff exer 1 set of 10 10# with close sup/cg of1     Berg walking with cord resistance             Nu-step/ treadmill   15 min 20 min  20 min 15 min r 5 treadmill 1 2 mph 2 min x 3 with 1 min rest in standing only  Nu step 15min r 5  ending at r 10    Gait Training             Trial activator upright walking poles   Tactile and verbal cueing for 2 point gait 80 ft x 4                        Modalities             Floor to sit     x1          Bed mobility    3x

## 2023-04-25 ENCOUNTER — OFFICE VISIT (OUTPATIENT)
Dept: DIABETES SERVICES | Facility: CLINIC | Age: 68
End: 2023-04-25

## 2023-04-25 VITALS — WEIGHT: 293 LBS | BODY MASS INDEX: 43.27 KG/M2

## 2023-04-25 DIAGNOSIS — E11.641 TYPE 2 DIABETES MELLITUS WITH HYPOGLYCEMIA AND COMA, WITHOUT LONG-TERM CURRENT USE OF INSULIN (HCC): Primary | ICD-10-CM

## 2023-04-25 NOTE — PROGRESS NOTES
"Medical Nutrition Therapy        Assessment    Visit Type: Follow-up visit  Chief complaint/Medical Diagnosis/reason for visit E11 641 Type 2 diabetes mellitus with hypoglycemia and coma, without long-term current use of insulin    HPI Benjamin Ramirez was present today for his 6 weeks follow up MNT visit  Patient informed that he has been doing mindful eating and trying to make healthier food choices  Benjamin Ramirez has cut back on his salad dressings and on his soda intake  He is watching his steps for the day and keeping a track on it  Patient is trying to opt for healthier food choices and substitutes when eating out like side salad instead of fries etc or apple slices with hoagie or eating baby carrots as a snack  Patient will continue with a 2000 calorie meal plan to assist with consistency, balance and portion control  Encouraged the consumption of regular meals at regular times  Advised patient to keep carbohydrate intake to 45-60 grams per meal and 15 grams per snack to assist with glycemic control  Suggested keeping protein intake to 10 ounces a day and fat to 5 servings daily to assist with lipid management and calorie control  RD will remain available for further dietary questions/concerns  Ht Readings from Last 1 Encounters:   04/06/23 5' 9\" (1 753 m)     Wt Readings from Last 3 Encounters:   04/25/23 133 kg (293 lb)   04/06/23 135 kg (297 lb 12 8 oz)   03/17/23 134 kg (295 lb)     Weight Change: Yes lost 4 lbs in past 6 weeks  Calorie needs 2000 kcals/day Carbs: 45-60 g/meal, 15g/snack     Fat: 5 servings/day    Protein:10 ounce/day      Monitoring and evaluation:    Term code indicator  FH 4 4 Mealtime Behavior Criteria: Consume 3 meals a day, 4-5 hours apart  Try not to skip any meals  Term code indicator  FH 1 6 3 Carbohydrate Intake Criteria: Aim for 45-60 grams of carbohydrates per meal and 15 -30 grams of carbohydrates at post dinner snack    Term code indicator  FH 1 6 4 Fiber Intake Criteria: " Include more non starchy vegetables and have at least 2 servings of fruits in a day  Patient’s Response to Instruction:  Joselito Knowles  Expected Compliancegood    Start- Stop: 1:37-2:13  Total Minutes: 35 Minutes  Group or Individual Instruction: MIKE HERNANDEZ  Other: Nicole Torres MD      Thank you for coming to the [address]  for education today  Please feel free to call with any questions or concerns      Margaret Nash  [address] [place] [address] Drive [id number]

## 2023-04-27 ENCOUNTER — APPOINTMENT (OUTPATIENT)
Dept: PHYSICAL THERAPY | Age: 68
End: 2023-04-27

## 2023-05-01 ENCOUNTER — OFFICE VISIT (OUTPATIENT)
Dept: PHYSICAL THERAPY | Age: 68
End: 2023-05-01

## 2023-05-01 DIAGNOSIS — R26.2 AMBULATORY DYSFUNCTION: Primary | ICD-10-CM

## 2023-05-01 NOTE — PROGRESS NOTES
"Daily Note     Today's date: 2023  Patient name: Ariela Anderson  : 1955  MRN: [id number]  Referring provider: Indu Bustamante MD  Dx:   Encounter Diagnosis     ICD-10-CM    1  Ambulatory dysfunction  R26 2                      Subjective: Improved b/l leg strength and ambulation endurance  Objective: See treatment diary below      Assessment: Tolerated treatment well  Patient would benefit from continued PT      Plan: Continue per plan of care  Precautions:  Allergies  Reviewed by Molly Cummings at 11:19 PM   Severity Reactions Comments   Celebrex [celecoxib] Not Specified Other (See Comments) Cardiologist stated he should not take   Chlorhexidine Not Specified Hives Is able to wash with hibiclens but not use michi cloths   Other Not Specified  \"steroid eyedrops\"   Prednisone Not Specified Other (See Comments) Prednisone eye drops- eye pressure increases       Watch balance, cueing to maintain upright posturing , avoid excessive forward trunk lean    Manuals 5/1 3/10 3/20                3/22 3/27 4/3 4/7 4/10 4/17 4/20                                                       Neuro Re-Ed             Long sit hamstring stretch 20sec x 5 20 sec x 5 20sec x 5 20sec x 5  20sec x 5 20 sec x 5 20x5  20 sec x 5 20sec x 5   heelcord stretch 1min x 1with strap 1 min  20sec x 5 20sec x 5  20sec x 5 1 min x 1  1 min x 1 1 min x 1 1 min   sidelying quad stretch with strap   1 min x 1 20sec x 5 20sec x 5  20sec x 5 1 minx 1  1minx 1 1 min x 1 1 min   ltr 20x 5 reps hold 10 sec 20x 30x  30x  5 reps 10 sechold 5 reps 10 sec hold  20x   Bridging   3 x 10 3 x 10 30x  20x 3 x 10  3x 10 3  x10 30x   One legged bridging   1 set of 10      1 set of 10 2 x 10  1 set of 5 3 sets of 15 30x   Squats hi low mat table   3 x 10  30x  20x 3 x 10 3 x 10 3 x 10 30x   Ther Ex ( nu step )              Hip flexion slr, and tke 5# 30x 2# 2 x 10  2# 30x   3 x 10  3 x 10  30x   Standing hip abd, hip ext and knee flexion at ll bars 5x     2# 20x   3 x " 10  30x   Sidestepping in ll bars 5x      10 ft x 4  10 ft x 4    Fwd, bwd walking in ll bars   5x        10 ft x 4 and crossovers and braiding 10 ft x 2 close s  10 ft x 4    Hurdles in ll bars cg of 1 sidestepping and forward walking 5x        5 colored hurdles holding on to bar emphasis on height clearance 10 ft x 4    Hand to opposite knee gait cg/ min assist of 1         40 ft x 2    Heelraises, toe raises,        2 sets 10  3 x 10    Step ups             berg rows, sh ext, paloff exer        Rows 15 lb 3 sets 10, paloff exer 1 set of 10 10# with close sup/cg of1     Ebrg walking with cord resistance             Nu-step/ treadmill T-mill  4 min/1 min rest/3 min @ 1 2 mph  15 min 20 min  20 min 15 min r 5 treadmill 1 2 mph 2 min x 3 with 1 min rest in standing only  Nu step 15min r 5  ending at r 10    Gait Training             Trial activator upright walking poles   Tactile and verbal cueing for 2 point gait 80 ft x 4                        Modalities             Floor to sit     x1          Bed mobility    3x

## 2023-05-02 RX ORDER — AMOXICILLIN 500 MG/1
CAPSULE ORAL
COMMUNITY
Start: 2023-04-14

## 2023-05-02 RX ORDER — CHLORHEXIDINE GLUCONATE ORAL RINSE 1.2 MG/ML
SOLUTION DENTAL
COMMUNITY
Start: 2023-04-14

## 2023-05-04 ENCOUNTER — OFFICE VISIT (OUTPATIENT)
Dept: FAMILY MEDICINE CLINIC | Facility: CLINIC | Age: 68
End: 2023-05-04

## 2023-05-04 VITALS
WEIGHT: 299 LBS | SYSTOLIC BLOOD PRESSURE: 130 MMHG | OXYGEN SATURATION: 94 % | RESPIRATION RATE: 16 BRPM | HEART RATE: 71 BPM | HEIGHT: 69 IN | TEMPERATURE: 97.7 F | DIASTOLIC BLOOD PRESSURE: 80 MMHG | BODY MASS INDEX: 44.28 KG/M2

## 2023-05-04 DIAGNOSIS — E11.9 TYPE 2 DIABETES MELLITUS WITHOUT COMPLICATION, WITHOUT LONG-TERM CURRENT USE OF INSULIN (HCC): Primary | ICD-10-CM

## 2023-05-04 DIAGNOSIS — K22.719 BARRETT'S ESOPHAGUS WITH DYSPLASIA: ICD-10-CM

## 2023-05-04 DIAGNOSIS — E66.01 CLASS 3 SEVERE OBESITY DUE TO EXCESS CALORIES WITH SERIOUS COMORBIDITY AND BODY MASS INDEX (BMI) OF 45.0 TO 49.9 IN ADULT (HCC): ICD-10-CM

## 2023-05-04 DIAGNOSIS — F80.81 STUTTERING: ICD-10-CM

## 2023-05-04 DIAGNOSIS — F41.8 DEPRESSION WITH ANXIETY: ICD-10-CM

## 2023-05-04 DIAGNOSIS — G62.9 NEUROPATHY: ICD-10-CM

## 2023-05-04 DIAGNOSIS — E66.01 OBESITY, CLASS III, BMI 40-49.9 (MORBID OBESITY) (HCC): ICD-10-CM

## 2023-05-04 DIAGNOSIS — E78.00 PURE HYPERCHOLESTEROLEMIA: ICD-10-CM

## 2023-05-04 DIAGNOSIS — G47.33 OBSTRUCTIVE SLEEP APNEA SYNDROME: ICD-10-CM

## 2023-05-04 DIAGNOSIS — I10 BENIGN ESSENTIAL HYPERTENSION: ICD-10-CM

## 2023-05-04 RX ORDER — ALPRAZOLAM 0.25 MG/1
0.25 TABLET ORAL 3 TIMES DAILY PRN
Qty: 270 TABLET | Refills: 1 | Status: SHIPPED | OUTPATIENT
Start: 2023-05-04 | End: 2024-02-08

## 2023-05-04 RX ORDER — OLANZAPINE 5 MG/1
5 TABLET ORAL EVERY EVENING
Qty: 30 TABLET | Refills: 11
Start: 2023-05-04 | End: 2024-04-28

## 2023-05-04 NOTE — PROGRESS NOTES
Name: Josselin Navarro      : 1955      MRN: [id number]  Encounter Provider: ED Schaefer  Encounter Date: 2023   Encounter department: [address] [place] [address]  Type 2 diabetes mellitus without complication, without long-term current use of insulin (HCC)  Assessment & Plan:    Lab Results   Component Value Date    HGBA1C 5 9 (H) 2023     Stable, managed by Endo  Continue with Ozempic and Metformin   Continue with lifestyle modifications     Orders:  -     CBC and differential; Future; Expected date: 2023  -     Comprehensive metabolic panel; Future; Expected date: 2023  -     Hemoglobin A1C; Future; Expected date: 2023    2  Class 3 severe obesity due to excess calories with serious comorbidity and body mass index (BMI) of 45 0 to 49 9 in adult ([facility] [address] )    3  Benign essential hypertension  Assessment & Plan:  Stable, continue with daily Losartan and HCTZ 3 x weekly   Follow a low sodium diet  F/u with Cardiology as scheduled       4  Obstructive sleep apnea syndrome  Assessment & Plan:  Pt unable to tolerate CPAP machine      5  Pure hypercholesterolemia  Assessment & Plan:  Stable, managed by Cardiology  Continue Crestor and Zetia     Orders:  -     Lipid panel; Future; Expected date: 2023  -     TSH, 3rd generation with Free T4 reflex; Future; Expected date: 2023    6  Molina's esophagus with dysplasia  Assessment & Plan:  Stable and managed by GI  On daily Protonix        7  Stuttering  Assessment & Plan:  Stable, continue Prozac and Zyprexa  No longer follows with Psychiatry     Orders:  -     OLANZapine (ZyPREXA) 5 mg tablet; Take 1 tablet (5 mg total) by mouth every evening    8  Depression with anxiety  Assessment & Plan:  Stable, continue Prozac and Zyprexa daily   May also continue Xanax 0 25mg TID prn    Orders:  -     ALPRAZolam (XANAX) 0 25 mg tablet;  Take 1 tablet (0 25 mg total) by mouth 3 (three) times a day as needed for anxiety    9  Neuropathy  Assessment & Plan:  Being managed by Neurology       10  Obesity, Class III, BMI 40-49 9 (morbid obesity) ([facility] [address] )  Assessment & Plan:  Follows with Weight Management  Now on [address] [facility]: balance, strength, and gait training instructions were provided  Subjective     HPI     Pt presents by himself today for a routine follow up  Doing pretty good overall, no acute concerns today     Still seeing Weight Management, Neurology, Cardiology, Gastroenterology  and Endocrinology     He is doing well on the Ozempic and seems to be tolerating this well  He did have some nausea when this dosage was increased but this has evened out  He is being much more mindful of his dietary choices and is even walking more    Earlinebni Jackson has a h/o prostate CA, s/p prostatectomy in7/2011  He does see Urology annually, most recent PSA <0 1    Review of Systems   Constitutional: Negative for activity change, appetite change, chills, diaphoresis, fatigue, fever and unexpected weight change  HENT: Negative for ear pain and sore throat  Eyes: Negative for pain and visual disturbance  Respiratory: Negative for cough, shortness of breath and wheezing  Cardiovascular: Negative for chest pain, palpitations and leg swelling  Gastrointestinal: Negative for abdominal pain, constipation, diarrhea, nausea and vomiting  Genitourinary: Negative for dysuria and hematuria  Musculoskeletal: Negative for arthralgias and back pain  Skin: Negative for color change and rash  Neurological: Negative for seizures and syncope  Hematological: Negative for adenopathy  Does not bruise/bleed easily  Psychiatric/Behavioral: Positive for sleep disturbance  Negative for dysphoric mood, self-injury and suicidal ideas  The patient is nervous/anxious  All other systems reviewed and are negative        Past Medical History:   Diagnosis Date   • Acute blood loss anemia 10/13/2016   • Anxiety    • Arthritis     knees   • Arthritis    • Asthma     stable for > 10 years   • Molina esophagus     with BARRX/RFA   • Cancer [facility])     prostate   • Cataract    • Depression    • Diabetes ([facility] [address] )    • Diabetes mellitus ([facility] [address] )     pre diabetes   • Diverticulitis of colon    • Environmental allergies    • Fall 12/30/2020   • GERD (gastroesophageal reflux disease)    • Hiatal hernia    • History of anal fissures    • Hyperlipidemia    • Hypertension    • Incisional hernia    • Insomnia    • Kidney stone    • Malignant neoplasm of prostate ([facility] [address] )    • Morbid obesity ([facility] [address] )    • Peripheral neuropathy    • PONV (postoperative nausea and vomiting)    • Prolapsing mitral valve     prolapsing mitral valve leaflet syndrome   • [facility]    • Retinal detachment     treated surgically at [facility]; resolved: 10/10/2012   • Sleep apnea     diagnosed but unable to tolerate cpap      • Stuttering      Past Surgical History:   Procedure Laterality Date   • ARTHROSCOPIC REPAIR ACL Right    • BIOPSY CORE NEEDLE      prostate   • CATARACT EXTRACTION Bilateral    • COLONOSCOPY     • HERNIA REPAIR      [facility]   • JOINT REPLACEMENT      B/L knee   • KNEE ARTHROSCOPY Left    • LAPAROSCOPIC GASTRIC BANDING     • OK ARTHRP KNE CONDYLE&PLATU MEDIAL&LAT COMPARTMENTS Left 02/15/2017    Procedure: TOTAL KNEE ARTHROPLASTY ;  Surgeon: Liset Gray MD;  Location: [facility];  Service: Orthopedics   • OK ARTHRP KNE CONDYLE&PLATU MEDIAL&LAT COMPARTMENTS Right 10/10/2016    Procedure: ARTHROPLASTY KNEE TOTAL;  Surgeon: Liset Gray MD;  Location: [facility];  Service: Orthopedics   • PROSTATECTOMY      robotic-assisted; [facility]-Dr Norris Rockwell   • REMOVAL GASTRIC BAND LAPAROSCOPIC N/A 8/30/2022    Procedure: LAPAROSCOPIC REMOVAL OF ADJUSTABLE GASTRIC BAND AND PORT  WITH INTRAOPERATIVE EGD;  Surgeon: Donna Espino MD;  Location: [facility];  Service: Bariatrics   • RETINAL DETACHMENT SURGERY Bilateral     [facility]   • SEPTOPLASTY • SINUS SURGERY     • TONSILLECTOMY      over age 13   • UPPER GASTROINTESTINAL ENDOSCOPY      mutiple with BARRX/RFA   • VASECTOMY      vas deferens     Family History   Problem Relation Age of Onset   • Heart disease Father    • Coronary artery disease Father    • Prostate cancer Father    • Coronary artery disease Mother    • Diabetes Mother         mellitus   • Diabetes type II Mother    • Diabetes Maternal Grandmother         mellitus   • Coronary artery disease Maternal Grandfather    • Coronary artery disease Paternal Grandfather      Social History     Socioeconomic History   • Marital status: /Civil Union     Spouse name: None   • Number of children: 0   • Years of education: None   • Highest education level: None   Occupational History   • Occupation:    Tobacco Use   • Smoking status: Never   • Smokeless tobacco: Never   • Tobacco comments:     quit 38 years ago   Vaping Use   • Vaping Use: Never used   Substance and Sexual Activity   • Alcohol use: Not Currently     Alcohol/week: 1 0 standard drink     Types: 1 Cans of beer per week     Comment: 1 case of beer per year   • Drug use: Never   • Sexual activity: Yes     Partners: Female     Birth control/protection: Inserts, Post-menopausal, Male Sterilization   Other Topics Concern   • None   Social History Narrative    ** Merged History Encounter **          Social Determinants of Health     Financial Resource Strain: Not on file   Food Insecurity: Not on file   Transportation Needs: Not on file   Physical Activity: Not on file   Stress: Not on file   Social Connections: Not on file   Intimate Partner Violence: Not on file   Housing Stability: Not on file     Current Outpatient Medications on File Prior to Visit   Medication Sig   • amoxicillin (AMOXIL) 500 mg capsule TAKE 4 CAPSULES BY MOUTH 1 HOUR PRIOR TO PROCEDURE   • Blood Glucose Monitoring Suppl (OneTouch Verio) w/Device KIT Use daily (Patient taking differently: Use as needed) "  • chlorhexidine (PERIDEX) 0 12 % solution RINSE WITH 1/2 OUNCE FOR 30 SECONDS THEN SPIT OUT FOR TWICE A DAY   • ezetimibe (ZETIA) 10 mg tablet TAKE 1 TABLET DAILY (Patient taking differently: Take 5 mg by mouth daily)   • FLUoxetine (PROzac) 20 mg capsule TAKE 3 CAPSULES DAILY   • glucose blood (OneTouch Verio) test strip USE TO TEST DAILY   • hydrochlorothiazide (HYDRODIURIL) 12 5 mg tablet Take 1 tablet (12 5 mg total) by mouth 3 (three) times a week Monday, Wednesday, Friday   • Lancet Devices (ONE TOUCH DELICA LANCING DEV) MISC Use daily   • metFORMIN (GLUCOPHAGE) 500 mg tablet Take 2 tablets (1,000 mg total) by mouth 2 (two) times a day with meals   • Multiple Vitamins-Minerals (CENTRUM ADULTS PO) Take by mouth in the morning   • OneTouch Delica Lancets 44M MISC Use daily   • pantoprazole (PROTONIX) 40 mg tablet Take 1 tablet (40 mg total) by mouth 2 (two) times a day 1 tab in AM, 1 tabs in PM   • rosuvastatin (CRESTOR) 5 mg tablet Take 1 tablet (5 mg total) by mouth once a week   • semaglutide, 2 mg/dose, (Ozempic, 2 MG/DOSE,) 8 mg/ mL injection pen Inject 0 75 mL (2 mg total) under the skin every 7 days   • zolpidem (AMBIEN) 10 mg tablet Take 1 tablet (10 mg total) by mouth daily at bedtime as needed for sleep   • trospium chloride (SANCTURA) 20 mg tablet Take 1 tablet (20 mg total) by mouth 2 (two) times a day     Allergies   Allergen Reactions   • Celebrex [Celecoxib] Other (See Comments)     Cardiologist stated he should not take     • Chlorhexidine Hives     Is able to wash with hibiclens but not use michi cloths   • Other      \"steroid eyedrops\"     • Prednisone Other (See Comments)     Prednisone eye drops- eye pressure increases     Immunization History   Administered Date(s) Administered   • COVID-19 MODERNA VACC 0 5 ML IM 02/20/2021, 03/19/2021   • INFLUENZA 10/14/2022   • Influenza Quadrivalent Preservative Free 3 years and older IM 10/20/2016, 10/26/2017   • Influenza, high dose seasonal 0 7 mL " "10/21/2019, 09/28/2020, 01/04/2022, 10/14/2022   • Influenza, recombinant, quadrivalent,injectable, preservative free 11/01/2018   • Influenza, seasonal, injectable 12/03/2009, 12/12/2012, 11/18/2013, 11/01/2014, 11/05/2015   • Pneumococcal Conjugate 13-Valent 05/02/2019   • Pneumococcal Polysaccharide PPV23 10/20/2016   • Td (adult), adsorbed 07/16/1999   • Zoster Vaccine Recombinant 12/06/2018, 02/04/2019       Objective     /80   Pulse 71   Temp 97 7 °F (36 5 °C) (Temporal)   Resp 16   Ht 5' 9\" (1 753 m)   Wt 136 kg (299 lb)   SpO2 94%   BMI 44 15 kg/m²     Physical Exam  Constitutional:       General: He is not in acute distress  Appearance: He is well-developed  He is obese  He is not ill-appearing, toxic-appearing or diaphoretic  HENT:      Head: Normocephalic and atraumatic  Eyes:      Extraocular Movements: Extraocular movements intact  Conjunctiva/sclera: Conjunctivae normal       Pupils: Pupils are equal, round, and reactive to light  Neck:      Thyroid: No thyromegaly  Vascular: No carotid bruit  Cardiovascular:      Rate and Rhythm: Normal rate and regular rhythm  Heart sounds: Normal heart sounds  No murmur heard  Pulmonary:      Effort: Pulmonary effort is normal  No respiratory distress  Breath sounds: Normal breath sounds  No wheezing  Abdominal:      General: Bowel sounds are normal  There is no distension  Palpations: Abdomen is soft  Tenderness: There is no abdominal tenderness  Musculoskeletal:         General: Normal range of motion  Cervical back: Normal range of motion and neck supple  No rigidity or tenderness  Lymphadenopathy:      Cervical: No cervical adenopathy  Skin:     General: Skin is warm and dry  Neurological:      General: No focal deficit present  Mental Status: He is alert and oriented to person, place, and time        Comments: +stutter   Psychiatric:         Mood and Affect: Mood normal          " Behavior: Behavior normal          Thought Content:  Thought content normal          Judgment: Judgment normal        ED Marr

## 2023-05-05 ENCOUNTER — APPOINTMENT (OUTPATIENT)
Dept: PHYSICAL THERAPY | Age: 68
End: 2023-05-05
Payer: MEDICARE

## 2023-05-07 DIAGNOSIS — I10 ESSENTIAL HYPERTENSION: ICD-10-CM

## 2023-05-08 ENCOUNTER — OFFICE VISIT (OUTPATIENT)
Dept: CARDIOLOGY CLINIC | Facility: CLINIC | Age: 68
End: 2023-05-08

## 2023-05-08 VITALS
HEIGHT: 69 IN | OXYGEN SATURATION: 94 % | WEIGHT: 297.8 LBS | HEART RATE: 94 BPM | BODY MASS INDEX: 44.11 KG/M2 | DIASTOLIC BLOOD PRESSURE: 82 MMHG | SYSTOLIC BLOOD PRESSURE: 144 MMHG

## 2023-05-08 DIAGNOSIS — K22.719 BARRETT'S ESOPHAGUS WITH DYSPLASIA: ICD-10-CM

## 2023-05-08 DIAGNOSIS — I10 ESSENTIAL HYPERTENSION: Primary | ICD-10-CM

## 2023-05-08 DIAGNOSIS — E78.2 MIXED HYPERLIPIDEMIA: ICD-10-CM

## 2023-05-08 DIAGNOSIS — E13.9 DIABETES 1.5, MANAGED AS TYPE 1 (HCC): ICD-10-CM

## 2023-05-08 DIAGNOSIS — I25.10 CORONARY ARTERY DISEASE INVOLVING NATIVE CORONARY ARTERY OF NATIVE HEART WITHOUT ANGINA PECTORIS: ICD-10-CM

## 2023-05-08 DIAGNOSIS — I10 ESSENTIAL HYPERTENSION: ICD-10-CM

## 2023-05-08 DIAGNOSIS — I49.3 FREQUENT PVCS: ICD-10-CM

## 2023-05-08 RX ORDER — METOPROLOL SUCCINATE 25 MG/1
TABLET, EXTENDED RELEASE ORAL
Qty: 90 TABLET | Refills: 3 | Status: SHIPPED | OUTPATIENT
Start: 2023-05-08 | End: 2023-05-08 | Stop reason: SDUPTHER

## 2023-05-08 RX ORDER — METOPROLOL SUCCINATE 25 MG/1
25 TABLET, EXTENDED RELEASE ORAL DAILY
Qty: 90 TABLET | Refills: 3 | Status: SHIPPED | OUTPATIENT
Start: 2023-05-08

## 2023-05-08 RX ORDER — LOSARTAN POTASSIUM 100 MG/1
100 TABLET ORAL DAILY
Qty: 90 TABLET | Refills: 3 | Status: SHIPPED | OUTPATIENT
Start: 2023-05-08

## 2023-05-08 NOTE — PROGRESS NOTES
Follow-up - Cardiology   Shiloh Pike 69 y o  male MRN: [id number]        Problems    Problem List Items Addressed This Visit        Digestive    Molina's esophagus with dysplasia   Other Visit Diagnoses     Essential hypertension    -  Primary    Coronary artery disease involving native coronary artery of native heart without angina pectoris        Mixed hyperlipidemia        Frequent PVCs        Diabetes 1 5, managed as type 1 [facility])                Plan plan and discussion  He remains fairly asymptomatic  He has gastric sleeve removed  He is maintaining his weight  He may be developing early Parkinson  He has been seeing neurology  He has some peripheral neuropathy  Blood pressure is controlled  Renal function is well-maintained  A1c is less than 6  Very positive history of coronary disease  PVCs have responded very well to low-dose beta-blocker  He is asymptomatic at the moment  He is a professional   No change in medication          HPI: Shiloh Pike is a 69y o  year old male    Plan discussion  Since I last saw him he thinks he has been developing some Parkinson disease and now under the care of neurology  He did have an MRI  He also tells me he has some peripheral neuropathy  From a cardiac standpoint he has been stable  Blood pressures been fine  His gastric sleeve is been removed  He is maintaining his weight  His last LDL was above 90  Therefore I am increasing his Zetia to 10 mg daily with a repeat lipid profile  Fortunately his renal function is excellent with a GFR 73  His last A1c is 5 8  PVCs have responded to a very low-dose of beta-blocker  He has a positive family history of coronary disease  He has no angina    He had a stress test and an echocardiogram in the last several years which was negative for ischemia and left ventricle remains normal   Essentially has been blood pressure PVCs and lipid levels because he is a diabetic               HPI: Haley Ceja Gabrielle Castañeda is a 69y o  year old male    Plan and discussion   Patient seen October 3, 2022     He has had his band removed from his gastric surgery     It was causing him problems as Molina's esophagus     He is maintaining his weight although as he said he can eat a lot more but he is trying not to be  Diabetes is well controlled with an A1c in the 6 5 range   Blood pressure is well controlled   His PVCs have improved dramatically and he will try dropping his metoprolol to 12 5 daily     Prostate carcinoma years ago   Very positive history coronary disease  He is having some peripheral neuropathy secondary to diabetes     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               HPI: Marcellus Troncoso [id number] y  o  year old male         Plan and discussion   Patient seen August 2, 2022     He is here for cardiac clearance for to remove his gastric sleeve     He has a history of diabetes     A1c less than 7   Blood pressure well controlled     He has had frequent PVCs in the past and they are doing very well with beta-blocker     He has no cardiac complaints     Prostate carcinoma years ago  History gastric bypass  Positive family history coronary disease   Molina's esophagus esophagus   Presently seeing Neurology  We did clear him for his surgery  Wendy Torre has no cardiac complaints              HPI: Marcellus Fregoso is a 66 y  o  year old male      PlanAnd discussion     Patient seen October 19, 2021     Blood pressure is well controlled  Diabetes much better controlled in the past   A1c is 6     He has lost 16 lb     He has a prostate carcinoma history with a PSA less than 0 1     His gastric bypass in the past     Positive family history coronary disease     His aorta 4 1 cm     He had some shortness of breath and did get a pulmonary function study   It was normal     The new event is that he has Molina's esophagus     I am cutting back his ED a the 5 mg a day   His LDL is 60     He is on a statin ACE-inhibitor beta-blocker     We will check his number of PVCs with a Holter counter before his next visit     Plan patient seen October 16, 2019  In the past he has had highly symptomatic PVCs  48 hour Holter recently showed only 27 PVCs less than 100 PACs  He does feel each 1 of them  I mostly read short him  He has had gastric bypass be still over 300 lb  He is status post prostate carcinoma which is being followed  He has a positive family history coronary artery disease      His LDL is well controlled at less than 70      He had an echocardiogram done July of 2019  Ejection fraction 65%   His aorta is 4 1 cm but he has no significant valvar disease  Summary therefore from a cardiac standpoint he is really doing very well   Mostly reassured him  [facility] is diabetic  [facility] has no documented coronary disease   He is on a statin  He is on 100 mg of losartan   He is also on a beta-blocker  Will do another Holter counter next year   Do not think he needs another echo               HPI: Marcellus Fregoso is a 63 y  o  year old male    Plan patient seen June 13, 2019  This patient seen for hypertension  Blood pressures been excellent   He is on 100 mg of losartan  He also has diabetes  A1c is very well controlled 7 2  He has had a gastric bypass but his weight is presently over 120 lb  He has had a prostatectomy  His PSA is less than 0 1  The most a common issue that he has it is bothering him is that he has highly symptomatic extrasystoles  With the put a 48 hour Holter  In addition his LDL is 104   He is a diabetic with a for a positive family history   I will increase his Crestor to 5 mg twice a week  [facility] was supposed to be on twice a week but somehow other he is only taking it once a week  After the Holter counter repeat lipid profile will review his meds            Review of Systems   Constitutional: Negative  Respiratory: Negative  Cardiovascular: Negative            Past Medical "History:   Diagnosis Date   • Acute blood loss anemia 10/13/2016   • Anxiety    • Arthritis     knees   • Arthritis    • Asthma     stable for > 10 years   • Molina esophagus     with BARRX/RFA   • Cancer [facility])     prostate   • Cataract    • Depression    • Diabetes ([facility] )    • Diabetes mellitus ([place] [address] )     pre diabetes   • Diverticulitis of colon    • Environmental allergies    • Fall 12/30/2020   • GERD (gastroesophageal reflux disease)    • Hiatal hernia    • History of anal fissures    • Hyperlipidemia    • Hypertension    • Incisional hernia    • Insomnia    • Kidney stone    • Malignant neoplasm of prostate ([facility] )    • Morbid obesity ([place] [address] )    • Peripheral neuropathy    • PONV (postoperative nausea and vomiting)    • Prolapsing mitral valve     prolapsing mitral valve leaflet syndrome   • Prostate cancer ([place] [address] )    • Retinal detachment     treated surgically at [facility] eye; resolved: 10/10/2012   • Sleep apnea     diagnosed but unable to tolerate cpap  • Stuttering      Social History     Substance and Sexual Activity   Alcohol Use Not Currently   • Alcohol/week: 1 0 standard drink   • Types: 1 Cans of beer per week    Comment: 1 case of beer per year     Social History     Substance and Sexual Activity   Drug Use Never     Social History     Tobacco Use   Smoking Status Never   Smokeless Tobacco Never   Tobacco Comments    quit 38 years ago       Allergies:   Allergies   Allergen Reactions   • Celebrex [Celecoxib] Other (See Comments)     Cardiologist stated he should not take     • Chlorhexidine Hives     Is able to wash with hibiclens but not use michi cloths   • Other      \"steroid eyedrops\"     • Prednisone Other (See Comments)     Prednisone eye drops- eye pressure increases       Medications:     Current Outpatient Medications:   •  ALPRAZolam (XANAX) 0 25 mg tablet, Take 1 tablet (0 25 mg total) by mouth 3 (three) times a day as needed for anxiety, Disp: 270 tablet, Rfl: 1  •  ezetimibe (ZETIA) 10 mg tablet, " TAKE 1 TABLET DAILY (Patient taking differently: Take 5 mg by mouth daily), Disp: 90 tablet, Rfl: 3  •  FLUoxetine (PROzac) 20 mg capsule, TAKE 3 CAPSULES DAILY, Disp: 270 capsule, Rfl: 3  •  glucose blood (OneTouch Verio) test strip, USE TO TEST DAILY, Disp: 200 strip, Rfl: 1  •  hydrochlorothiazide (HYDRODIURIL) 12 5 mg tablet, Take 1 tablet (12 5 mg total) by mouth 3 (three) times a week Monday, Wednesday, Friday, Disp: 36 tablet, Rfl: 1  •  metFORMIN (GLUCOPHAGE) 500 mg tablet, Take 2 tablets (1,000 mg total) by mouth 2 (two) times a day with meals, Disp: 360 tablet, Rfl: 3  •  Multiple Vitamins-Minerals (CENTRUM ADULTS PO), Take by mouth in the morning, Disp: , Rfl:   •  OLANZapine (ZyPREXA) 5 mg tablet, Take 1 tablet (5 mg total) by mouth every evening, Disp: 30 tablet, Rfl: 11  •  OneTouch Delica Lancets 88I MISC, Use daily, Disp: 300 each, Rfl: 1  •  pantoprazole (PROTONIX) 40 mg tablet, Take 1 tablet (40 mg total) by mouth 2 (two) times a day 1 tab in AM, 1 tabs in PM, Disp: 180 tablet, Rfl: 3  •  rosuvastatin (CRESTOR) 5 mg tablet, Take 1 tablet (5 mg total) by mouth once a week, Disp: 12 tablet, Rfl: 3  •  semaglutide, 2 mg/dose, (Ozempic, 2 MG/DOSE,) 8 mg/ mL injection pen, Inject 0 75 mL (2 mg total) under the skin every 7 days, Disp: 9 mL, Rfl: 0  •  trospium chloride (SANCTURA) 20 mg tablet, Take 1 tablet (20 mg total) by mouth 2 (two) times a day, Disp: 180 tablet, Rfl: 3  •  zolpidem (AMBIEN) 10 mg tablet, Take 1 tablet (10 mg total) by mouth daily at bedtime as needed for sleep, Disp: 90 tablet, Rfl: 0  •  amoxicillin (AMOXIL) 500 mg capsule, TAKE 4 CAPSULES BY MOUTH 1 HOUR PRIOR TO PROCEDURE, Disp: , Rfl:   •  Blood Glucose Monitoring Suppl (OneTouch Verio) w/Device KIT, Use daily (Patient taking differently: Use as needed), Disp: 1 kit, Rfl: 0  •  chlorhexidine (PERIDEX) 0 12 % solution, RINSE WITH 1/2 OUNCE FOR 30 SECONDS THEN SPIT OUT FOR TWICE A DAY, Disp: , Rfl:   •  Lancet Devices (ONE TOUCH ISIS FIERRO) MISC, Use daily, Disp: 1 each, Rfl: 0  •  losartan (COZAAR) 100 MG tablet, Take 1 tablet (100 mg total) by mouth daily, Disp: 90 tablet, Rfl: 3  •  metoprolol succinate (TOPROL-XL) 25 mg 24 hr tablet, Take 1 tablet (25 mg total) by mouth daily, Disp: 90 tablet, Rfl: 3      Physical Exam  Constitutional:       Appearance: He is obese  Cardiovascular:      Rate and Rhythm: Normal rate and regular rhythm  Pulses: Normal pulses  Heart sounds: No murmur heard  Pulmonary:      Effort: Pulmonary effort is normal    Musculoskeletal:      Right lower leg: No edema  Left lower leg: No edema  Skin:     General: Skin is warm and dry  Neurological:      Mental Status: He is alert and oriented to person, place, and time             Laboratory Studies:  CMP:      Invalid input(s): ALBUMIN  NT-proBNP: No results found for: NTBNP   Coags:    Lipid Profile:   Lab Results   Component Value Date    CHOL 171 2015     Lab Results   Component Value Date    HDL 43 2023     Lab Results   Component Value Date    LDLCALC 48 2023     Lab Results   Component Value Date    TRIG 70 2023       Cardiac testing:     EKG reviewed personally:     Results for orders placed during the hospital encounter of 19    Echo complete with contrast if indicated    Narrative  [facility] [address]  [address] [place] [address]  [phone]    Transthoracic Echocardiogram  2D, M-mode, Doppler, and Color Doppler    Study date:  2019    Patient: Lisa Singh  MR number: [id number]  Account number: [de-identified]  : 1955  Age: 70 years  Gender: Male  Status: Outpatient  Location: [address] [place] [address] [facility]  Height: 67 in  Weight: 323 lb  BP: 130/ 76 mmHg    Indications: essential hypertension    Diagnoses: I10  - Essential (primary) hypertension    Sonographer:  Marco Antonio Cleary, [address] [facility]  Primary Physician:  Marylen Chalk, MD  Referring Physician: Rodolfo Potter MD  Group:  [id number]  Cardiology Fellow:  Estella Estrada MD  Interpreting Physician:  Marcin Garcia MD    SUMMARY    LEFT VENTRICLE:  Systolic function was normal  Ejection fraction was estimated to be 65 %  Doppler parameters were consistent with abnormal left ventricular relaxation (grade 1 diastolic dysfunction)  RIGHT VENTRICLE:  The size was normal   Systolic function was normal     COMPARISONS:  There has been no significant interval change  Comparison was made with the previous study of 19-Oct-2017  HISTORY: PRIOR HISTORY: hypertension, hyperlipidemia, PVC's, DM    PROCEDURE: The study was performed in the [address] [facility] [place]  This was a routine study  The transthoracic approach was used  The study included complete 2D imaging, M-mode, complete spectral Doppler, and color Doppler  The  heart rate was 68 bpm, at the start of the study  Images were obtained from the parasternal, apical, subcostal, and suprasternal notch acoustic windows  Image quality was adequate  LEFT VENTRICLE: Size was normal  Systolic function was normal  Ejection fraction was estimated to be 65 %  There were no regional wall motion abnormalities  Wall thickness was normal  There was no evidence of concentric hypertrophy  DOPPLER: Doppler parameters were consistent with abnormal left ventricular relaxation (grade 1 diastolic dysfunction)  RIGHT VENTRICLE: The size was normal  Systolic function was normal     LEFT ATRIUM: The atrium was mildly dilated  RIGHT ATRIUM: Size was normal     MITRAL VALVE: Valve structure was normal  There was normal leaflet separation  DOPPLER: The transmitral velocity was within the normal range  There was no evidence for stenosis  There was trace regurgitation  AORTIC VALVE: The valve was trileaflet  Leaflets exhibited normal cuspal separation and sclerosis  DOPPLER: Transaortic velocity was within the normal range   There was no evidence for stenosis  There was no regurgitation  TRICUSPID VALVE: The valve structure was normal  There was normal leaflet separation  DOPPLER: The transtricuspid velocity was within the normal range  There was no evidence for stenosis  There was trace regurgitation  The tricuspid jet  envelope definition was inadequate for estimation of RV systolic pressure  PULMONIC VALVE: Leaflets exhibited normal thickness, no calcification, and normal cuspal separation  DOPPLER: The transpulmonic velocity was within the normal range  There was no evidence for stenosis  There was no regurgitation  PERICARDIUM: There was no pericardial effusion  The pericardium was normal in appearance  AORTA: The root exhibited normal size  The ascending aorta was normal in size for body surface area at 4 cm (16 mm/m2)  SYSTEMIC VEINS: IVC: The inferior vena cava was normal in size and course  Respirophasic changes were normal     SYSTEM MEASUREMENT TABLES    2D  %FS: 44 25 %  Ao Diam: 3 61 cm  EDV(Teich): 149 41 ml  EF(Cube): 82 67 %  EF(Teich): 74 93 %  ESV(Cube): 29 34 ml  ESV(Teich): 37 45 ml  IVSd: 1 15 cm  LA Area: 24 76 cm2  LA Diam: 4 25 cm  LVEDV MOD A4C: 157 35 ml  LVEF MOD A4C: 67 57 %  LVESV MOD A4C: 51 03 ml  LVIDd: 5 53 cm  LVIDs: 3 08 cm  LVLd A4C: 8 71 cm  LVLs A4C: 7 1 cm  LVPWd: 1 18 cm  RA Area: 17 62 cm2  RV Diam : 3 14 cm  SV MOD A4C: 106 32 ml  SV(Cube): 139 96 ml  SV(Teich): 111 96 ml    MM  TAPSE: 2 17 cm    PW  AVC: 318 86 ms  E': 0 07 m/s  E/E': 8 26  MV A Luis Armando: 0 71 m/s  MV Dec [place]: 1 85 m/s2  MV DecT: 328 49 ms  MV E Luis Armando: 0 61 m/s  MV E/A Ratio: 0 86    [employer] Accredited E[facility]    Prepared and electronically signed by    Sapna Abreu MD  Signed 26-Jul-2019 14:19:43    No results found for this or any previous visit  No results found for this or any previous visit  No results found for this or any previous visit        Jake Brownlee "MD    Portions of the record may have been created with voice recognition software   Occasional wrong word or \"sound a like\" substitutions may have occurred due to the inherent limitations of voice recognition software   Read the chart carefully and recognize, using context, where substitutions have occurred    "

## 2023-05-10 NOTE — PATIENT INSTRUCTIONS
Type 2 Diabetes Management for Adults   AMBULATORY CARE:   Type 2 diabetes  is a disease that affects how your body uses glucose (sugar)  Either your body cannot make enough insulin, or it cannot use the insulin correctly  It is important to keep diabetes controlled to prevent damage to your heart, blood vessels, and other organs  Management will help you feel well and enjoy your daily activities  Your diabetes care team providers can help you make a plan to fit diabetes care into your schedule  Your plan can change over time to fit your needs and your family's needs  Have someone call your local emergency number (911 in the [address]) if:   • You cannot be woken  • You have signs of diabetic ketoacidosis:     ? confusion, fatigue    ? vomiting    ? rapid heartbeat    ? fruity smelling breath    ? extreme thirst    ? dry mouth and skin    • You have any of the following signs of a heart attack:      ? Squeezing, pressure, or pain in your chest    ? You may  also have any of the following:     - Discomfort or pain in your back, neck, jaw, stomach, or arm    - Shortness of breath    - Nausea or vomiting    - Lightheadedness or a sudden cold sweat    • You have any of the following signs of a stroke:      ? Numbness or drooping on one side of your face     ? Weakness in an arm or leg    ? Confusion or difficulty speaking    ? Dizziness, a severe headache, or vision loss    Call your doctor or diabetes care team provider if:   • You have a sore or wound that will not heal     • You have a change in the amount you urinate  • Your blood sugar levels are higher than your target goals  • You often have lower blood sugar levels than your target goals  • Your skin is red, dry, warm, or swollen  • You have trouble coping with diabetes, or you feel anxious or depressed  • You have questions or concerns about your condition or care      What you need to know about high blood sugar levels:  High blood sugar levels may not cause any symptoms  You may feel more thirsty or urinate more often than usual  Over time, high blood sugar levels can damage your nerves, blood vessels, tissues, and organs  The following can increase your blood sugar levels:  • Large meals or large amounts of carbohydrates at one time    • Less physical activity    • Stress    • Illness    • A lower dose of diabetes medicine or insulin, or a late dose    What you need to know about low blood sugar levels:  Symptoms include feeling shaky, dizzy, irritable, or confused  You can prevent symptoms by keeping your blood sugar levels from going too low  • Treat a low blood sugar level right away:      ? Drink 4 ounces of juice or have 1 tube of glucose gel  ? Check your blood sugar level again 10 to 15 minutes later  ? When the level goes back to normal, eat a meal or snack to prevent another decrease  • Keep glucose gel, raisins, or hard candy with you at all times to treat a low blood sugar level  • Your blood sugar level can get too low if you take diabetes medicine or insulin and do not eat enough food  • If you use insulin, check your blood sugar level before you exercise  ? If your blood sugar level is below 100 mg/dL, eat 4 crackers or 2 ounces of raisins, or drink 4 ounces of juice  ? Check your level every 30 minutes if you exercise longer than 1 hour  ? You may need a snack during or after exercise  What you can do to manage your blood sugar levels:   • Check your blood sugar levels as directed and as needed  Several items are available to use to check your levels  You may need to check by testing a drop of blood in a glucose monitor  You may instead be given a continuous glucose monitoring (CGM) device  The device is worn at all times  The CGM checks your blood sugar level every 5 minutes  It sends results to an electronic device such as a smart phone  A CGM can be used with or without an insulin pump   You and your diabetes care team providers will decide on the best method for you  The goal for blood sugar levels before meals  is between 80 and 130 mg/dL and 2 hours after eating  is lower than 180 mg/dL  • Make healthy food choices  Work with a dietitian to develop a meal plan that works for you and your schedule  A dietitian can help you learn how to eat the right amount of carbohydrates during your meals and snacks  Carbohydrates can raise your blood sugar level if you eat too many at one time  Examples of foods that contain carbohydrates are breads, cereals, rice, pasta, and sweets  • Eat high-fiber foods as directed  Fiber helps improve blood sugar levels  Fiber also lowers your risk for heart disease and other problems diabetes can cause  Examples of high-fiber foods include vegetables, whole-grain bread, and beans such as doss beans  Your dietitian can tell you how much fiber to have each day  • Get regular physical activity  Physical activity can help you get to your target blood sugar level goal and manage your weight  Get at least 150 minutes of moderate to vigorous aerobic physical activity each week  Do not miss more than 2 days in a row  Do not sit longer than 30 minutes at a time  Your healthcare provider can help you create an activity plan  The plan can include the best activities for you and can help you build your strength and endurance  • Maintain a healthy weight  Ask your team what a healthy weight is for you  A healthy weight can help you control diabetes and prevent heart disease  Ask your team to help you create a weight loss plan, if needed  Weight loss can help make a difference in managing diabetes  Your team will help you set a weight-loss goal, such as 10 to 15 pounds, or 5% of your extra weight  Together you and your team can set manageable weight loss goals  • Take your diabetes medicine or insulin as directed    You may need diabetes medicine, insulin, or both to help control your blood sugar levels  Your healthcare provider will teach you how and when to take your diabetes medicine or insulin  You will also be taught about side effects oral diabetes medicine can cause  Insulin may be injected or given through a pump or pen  You and your providers will decide on the best method for you:    ? An insulin pump  is an implanted device that gives your insulin 24 hours a day  An insulin pump prevents the need for multiple insulin injections in a day  ? An insulin pen  is a device prefilled with the right amount of insulin  ? You and your family members will be taught how to draw up and give insulin  if this is the best method for you  Your providers will also teach you how to dispose of needles and syringes  ? You will learn how much insulin you need  and when to give it  You will be taught when not to give insulin  You will also be taught what to do if your blood sugar level drops too low  This may happen if you take insulin and do not eat the right amount of carbohydrates  More ways to manage type 2 diabetes:   • Wear medical alert identification  Wear medical alert jewelry or carry a card that says you have diabetes  Ask your provider where to get these items  • Do not smoke  Nicotine and other chemicals in cigarettes and cigars can cause lung and blood vessel damage  It also makes it more difficult to manage your diabetes  Ask your provider for information if you currently smoke and need help to quit  Do not use e-cigarettes or smokeless tobacco in place of cigarettes or to help you quit  They still contain nicotine  • Check your feet each day for cuts, scratches, calluses, or other wounds  Look for redness and swelling, and feel for warmth  Wear shoes that fit well  Check your shoes for rocks or other objects that can hurt your feet  Do not walk barefoot or wear shoes without socks   Wear cotton socks to help keep your feet dry  • Ask about vaccines you may need  You have a higher risk for serious illness if you get the flu, pneumonia, COVID-19, or hepatitis  Ask your provider if you should get vaccines to prevent these or other diseases, and when to get the vaccines  • Talk to your provider if you become stressed about diabetes care  Sometimes being able to fit diabetes care into your life can cause increased stress  The stress can cause you not to take care of yourself properly  Your care team providers can help by offering tips about self-care  Your providers may suggest you talk to a mental health provider who can listen and offer help with self-care issues  • Have your A1c checked as directed  Your provider may check your A1c every 3 months, or 2 times each year if your diabetes is controlled  An A1c test shows the average amount of sugar in your blood over the past 2 to 3 months  Your provider will tell you what your A1c level should be  • Have screening tests as directed  Your provider may recommend screening for complications of diabetes and other conditions that may develop  Some screenings may begin right away and some may happen within the first 5 years of diagnosis:    ? Examples of diabetes complications  include kidney problems, high cholesterol, high blood pressure, blood vessel problems, eye problems, and sleep apnea  ? You may be screened for a low vitamin B level  if you take oral diabetes medicine for a long time  ? Women of childbearing years may be screened  for polycystic ovarian syndrome (PCOS)  Follow up with your doctor or diabetes care team providers as directed: You may need to have blood tests done before your follow-up visit  The test results will show if changes need to be made in your treatment or self-care  Talk to your provider if you cannot afford your medicine  Write down your questions so you remember to ask them during your visits    © Copyright [employer] 2022 Information is for End User's use only and may not be sold, redistributed or otherwise used for commercial purposes  The above information is an  only  It is not intended as medical advice for individual conditions or treatments  Talk to your doctor, nurse or pharmacist before following any medical regimen to see if it is safe and effective for you

## 2023-05-10 NOTE — ASSESSMENT & PLAN NOTE
Lab Results   Component Value Date    HGBA1C 5 9 (H) 04/20/2023     Stable, managed by Endo  Continue with Ozempic and Metformin   Continue with lifestyle modifications

## 2023-05-10 NOTE — ASSESSMENT & PLAN NOTE
Stable, continue with daily Losartan and HCTZ 3 x weekly   Follow a low sodium diet  F/u with Cardiology as scheduled

## 2023-05-11 ENCOUNTER — OFFICE VISIT (OUTPATIENT)
Dept: PHYSICAL THERAPY | Age: 68
End: 2023-05-11

## 2023-05-11 DIAGNOSIS — R26.2 AMBULATORY DYSFUNCTION: Primary | ICD-10-CM

## 2023-05-11 NOTE — PROGRESS NOTES
"Daily Note     Today's date: 2023  Patient name: Dayo Jean  : 1955  MRN: [id number]  Referring provider: Michelle Tay MD  Dx:   Encounter Diagnosis     ICD-10-CM    1  Ambulatory dysfunction  R26 2                      Subjective: Pt  Reports being able to stand from sofa easier  Objective: See treatment diary below      Assessment: Tolerated treatment well  Patient would benefit from continued PT      Plan: Continue per plan of care  Precautions:  Allergies  Reviewed by Carlos Smith at 11:19 PM   Severity Reactions Comments   Celebrex [celecoxib] Not Specified Other (See Comments) Cardiologist stated he should not take   Chlorhexidine Not Specified Hives Is able to wash with hibiclens but not use michi cloths   Other Not Specified  \"steroid eyedrops\"   Prednisone Not Specified Other (See Comments) Prednisone eye drops- eye pressure increases       Watch balance, cueing to maintain upright posturing , avoid excessive forward trunk lean    Manuals 5/1 5/11 3/20                3/22 3/27 4/3 4/7 4/10 4/17 4/20                                                       Neuro Re-Ed             Long sit hamstring stretch 20sec x 5 20 sec x 5 20sec x 5 20sec x 5  20sec x 5 20 sec x 5 20x5  20 sec x 5 20sec x 5   heelcord stretch 1min x 1with strap 1 min  20sec x 5 20sec x 5  20sec x 5 1 min x 1  1 min x 1 1 min x 1 1 min   sidelying quad stretch with strap   1 min x 1 20sec x 5 20sec x 5  20sec x 5 1 minx 1  1minx 1 1 min x 1 1 min   ltr 20x 30x 20x 30x  30x  5 reps 10 sechold 5 reps 10 sec hold  20x   Bridging   3 x 10 3 x 10 30x  20x 3 x 10  3x 10 3  x10 30x   One legged bridging   1 set of 10      1 set of 10 2 x 10  1 set of 5 3 sets of 15 30x   Squats hi low mat table   3 x 10  30x  20x 3 x 10 3 x 10 3 x 10 30x   Ther Ex ( nu step )              Hip flexion slr, and tke 5# 30x 2# 2 x 10  2# 30x   3 x 10  3 x 10  30x   Standing hip abd, hip ext and knee flexion at ll bars 5x     2# 20x   3 x 10  30x " "  Sidestepping in ll bars 5x      10 ft x 4  10 ft x 4    Fwd, bwd walking in ll bars   5x        10 ft x 4 and crossovers and braiding 10 ft x 2 close s  10 ft x 4    Hurdles in ll bars cg of 1 sidestepping and forward walking 5x        5 colored hurdles holding on to bar emphasis on height clearance 10 ft x 4    Hand to opposite knee gait cg/ min assist of 1         40 ft x 2    Heelraises, toe raises, /heel dips 6\" box  5 x 4     2 sets 10  3 x 10    Step ups             berg rows, sh ext, paloff exer        Rows 15 lb 3 sets 10, paloff exer 1 set of 10 10# with close sup/cg of1     Berg walking with cord resistance             Nu-step/ treadmill T-mill  4 min/1 min rest/3 min @ 1 2 mph  15 min 20 min  20 min 15 min r 5 treadmill 1 2 mph 2 min x 3 with 1 min rest in standing only  Nu step 15min r 5  ending at r 10    Gait Training             Trial activator upright walking poles   Tactile and verbal cueing for 2 point gait 80 ft x 4                        Modalities             Floor to sit     x1          Bed mobility    3x               "

## 2023-05-15 ENCOUNTER — OFFICE VISIT (OUTPATIENT)
Dept: PHYSICAL THERAPY | Age: 68
End: 2023-05-15

## 2023-05-15 DIAGNOSIS — R26.2 AMBULATORY DYSFUNCTION: Primary | ICD-10-CM

## 2023-05-15 NOTE — PROGRESS NOTES
"Daily Note     Today's date: 5/15/2023  Patient name: Xavier Rincon  : 1955  MRN: [id number]  Referring provider: Nehemias Pressley MD  Dx:   Encounter Diagnosis     ICD-10-CM    1  Ambulatory dysfunction  R26 2                      Subjective: Pt  Reports he is able to walk with better spatial awareness  Better upright posture with ambulation  Objective: See treatment diary below      Assessment: Tolerated treatment well  Patient would benefit from continued PT      Plan: Continue per plan of care  Precautions:  Allergies  Reviewed by [facility] Nurse at 11:19 PM   Severity Reactions Comments   Celebrex [celecoxib] Not Specified Other (See Comments) Cardiologist stated he should not take   Chlorhexidine Not Specified Hives Is able to wash with hibiclens but not use michi cloths   Other Not Specified  \"steroid eyedrops\"   Prednisone Not Specified Other (See Comments) Prednisone eye drops- eye pressure increases       Watch balance, cueing to maintain upright posturing , avoid excessive forward trunk lean    Manuals 5/1 5/11 5/15                3/22 3/27 4/3 4/7 4/10 4/17 4/20                                                       Neuro Re-Ed             Long sit hamstring stretch 20sec x 5 20 sec x 5 20sec x 5 20sec x 5  20sec x 5 20 sec x 5 20x5  20 sec x 5 20sec x 5   heelcord stretch 1min x 1with strap 1 min  20sec x 5 20sec x 5  20sec x 5 1 min x 1  1 min x 1 1 min x 1 1 min   sidelying quad stretch with strap   1 min x 1 20sec x 5 20sec x 5  20sec x 5 1 minx 1  1minx 1 1 min x 1 1 min   ltr 20x 30x 20x 30x  30x  5 reps 10 sechold 5 reps 10 sec hold  20x   Bridging   3 x 10 3 x 10 30x  20x 3 x 10  3x 10 3  x10 30x   One legged bridging   1 set of 10      1 set of 10 2 x 10  1 set of 5 3 sets of 15 30x   Squats hi low mat table   3 x 10 30x 30x  20x 3 x 10 3 x 10 3 x 10 30x   Ther Ex ( nu step )              Hip flexion slr, and tke 5# 30x 2# 2 x 10  2# 30x   3 x 10  3 x 10  30x   Standing hip abd, hip ext and " "knee flexion at ll bars 5x     2# 20x   3 x 10  30x   Sidestepping in ll bars 5x      10 ft x 4  10 ft x 4    Fwd, bwd walking in ll bars   5x        10 ft x 4 and crossovers and braiding 10 ft x 2 close s  10 ft x 4    Hurdles in ll bars cs/side stepping of 1 sidestepping and forward walking 5x  4x      5 colored hurdles holding on to bar emphasis on height clearance 10 ft x 4    Hand to opposite knee gait cg/ min assist of 1         40 ft x 2    Heelraises, toe raises 3 ways /heel dips 6\" box  5 x 4 30x    2 sets 10  3 x 10    Step ups             berg rows, sh ext, paloff exer        Rows 15 lb 3 sets 10, paloff exer 1 set of 10 10# with close sup/cg of1     Berg walking with cord resistance             Nu-step/ treadmill T-mill  4 min/1 min rest/3 min @ 1 2 mph  15 min 20 min  20 min 15 min r 5 treadmill 1 2 mph 2 min x 3 with 1 min rest in standing only  Nu step 15min r 5  ending at r 10    Gait Training             Trial activator upright walking poles   Tactile and verbal cueing for 2 point gait 80 ft x 4                        Modalities             Floor to sit     x1          Bed mobility    3x               "

## 2023-05-18 ENCOUNTER — OFFICE VISIT (OUTPATIENT)
Dept: PHYSICAL THERAPY | Age: 68
End: 2023-05-18

## 2023-05-18 DIAGNOSIS — R26.2 AMBULATORY DYSFUNCTION: Primary | ICD-10-CM

## 2023-05-18 NOTE — PROGRESS NOTES
"Daily Note     Today's date: 2023  Patient name: Ramakrishna Mccormick  : 1955  MRN: [id number]  Referring provider: America Pena MD  Dx:   Encounter Diagnosis     ICD-10-CM    1  Ambulatory dysfunction  R26 2                      Subjective: Timed ambulation today, see in manual   Improved upright posture with ambulation  Continued progress with strength and endurance  Objective: See treatment diary below      Assessment: Tolerated treatment well  Patient would benefit from continued PT      Plan: Continue per plan of care  Precautions:  Allergies  Reviewed by [place] Ann at 11:19 PM   Severity Reactions Comments   Celebrex [celecoxib] Not Specified Other (See Comments) Cardiologist stated he should not take   Chlorhexidine Not Specified Hives Is able to wash with hibiclens but not use michi cloths   Other Not Specified  \"steroid eyedrops\"   Prednisone Not Specified Other (See Comments) Prednisone eye drops- eye pressure increases       Watch balance, cueing to maintain upright posturing , avoid excessive forward trunk lean    Manuals 5/1 5/11 5/15                5/18 3/27 4/3 4/7 4/10 4/17 4/20   Walk test    4:35/3:35                                                Neuro Re-Ed             Long sit hamstring stretch 20sec x 5 20 sec x 5 20sec x 5 20sec x 5  20sec x 5 20 sec x 5 20x5  20 sec x 5 20sec x 5   heelcord stretch 1min x 1with strap 1 min  20sec x 5 20sec x 5  20sec x 5 1 min x 1  1 min x 1 1 min x 1 1 min   sidelying quad stretch with strap   1 min x 1 20sec x 5 20sec x 5  20sec x 5 1 minx 1  1minx 1 1 min x 1 1 min   ltr 20x 30x 20x 30x  30x  5 reps 10 sechold 5 reps 10 sec hold  20x   Bridging   3 x 10 3 x 10 30x  20x 3 x 10  3x 10 3  x10 30x   One legged bridging   1 set of 10      1 set of 10 2 x 10  1 set of 5 3 sets of 15 30x   Squats hi low mat table   3 x 10 30x 30x  20x 3 x 10 3 x 10 3 x 10 30x   Ther Ex ( nu step )              Hip flexion slr, and tke 5# 30x 2# 2 x 10  2# 30x   3 x 10  3 " "x 10  30x   Standing hip abd, hip ext and knee flexion at ll bars 5x     2# 20x   3 x 10  30x   Sidestepping in ll bars 5x      10 ft x 4  10 ft x 4    Fwd, bwd walking in ll bars   5x        10 ft x 4 and crossovers and braiding 10 ft x 2 close s  10 ft x 4    Hurdles in ll bars cs/side stepping of 1 sidestepping and forward walking 5x  4x      5 colored hurdles holding on to bar emphasis on height clearance 10 ft x 4    Hand to opposite knee gait cg/ min assist of 1         40 ft x 2    Heelraises, toe raises 3 ways /heel dips 6\" box  5 x 4 30x    2 sets 10  3 x 10    Step ups             berg rows, sh ext, paloff exer        Rows 15 lb 3 sets 10, paloff exer 1 set of 10 10# with close sup/cg of1     Berg walking with cord resistance             Nu-step/ treadmill T-mill  4 min/1 min rest/3 min @ 1 2 mph  15 min 20 min  20 min 15 min r 5 treadmill 1 2 mph 2 min x 3 with 1 min rest in standing only  Nu step 15min r 5  ending at r 10    Gait Training             Trial activator upright walking poles   Tactile and verbal cueing for 2 point gait 80 ft x 4                        Modalities             Floor to sit     x1          Bed mobility    3x               "

## 2023-05-22 ENCOUNTER — APPOINTMENT (OUTPATIENT)
Dept: PHYSICAL THERAPY | Age: 68
End: 2023-05-22
Payer: MEDICARE

## 2023-05-24 ENCOUNTER — EVALUATION (OUTPATIENT)
Dept: PHYSICAL THERAPY | Age: 68
End: 2023-05-24

## 2023-05-24 DIAGNOSIS — R26.2 AMBULATORY DYSFUNCTION: Primary | ICD-10-CM

## 2023-05-25 NOTE — PROGRESS NOTES
"Daily Note     Today's date: 2023  Patient name: Bea Litten  : 1955  MRN: [id number]  Referring provider: Svetlana Maria MD  Dx:   Encounter Diagnosis     ICD-10-CM    1  Ambulatory dysfunction  R26 2                      Subjective: \"I didn't feel well for several days but today I am okay  \"      Objective: See treatment diary below    TUG 10 95 seconds     Sit to stand testing 30 seconds,  10 repetitions from 18 inch chair height  Functional gait assessment       Assessment: Tolerated treatment well  Patient demonstrated fatigue post treatment      Plan: Continue per plan of care  One final session then d/c of PT with pt to transition to [address]  Precautions:  Allergies  Reviewed by Rehan Mcintosh at 11:19 PM   Severity Reactions Comments   Celebrex [celecoxib] Not Specified Other (See Comments) Cardiologist stated he should not take   Chlorhexidine Not Specified Hives Is able to wash with hibiclens but not use michi cloths   Other Not Specified  \"steroid eyedrops\"   Prednisone Not Specified Other (See Comments) Prednisone eye drops- eye pressure increases       Watch balance, cueing to maintain upright posturing , avoid excessive forward trunk lean    Manuals 5/1 5/11 5/15                5/18 5/24 4/3 4/7 4/10 4/17 4/20   Walk test    4:35/3:35              reassessment of status testing today                                  Neuro Re-Ed             Long sit hamstring stretch 20sec x 5 20 sec x 5 20sec x 5 20sec x 5  20sec x 5 20 sec x 5 20x5  20 sec x 5 20sec x 5   heelcord stretch 1min x 1with strap 1 min  20sec x 5 20sec x 5  20sec x 5 1 min x 1  1 min x 1 1 min x 1 1 min   sidelying quad stretch with strap   1 min x 1 20sec x 5 20sec x 5  20sec x 5 1 minx 1  1minx 1 1 min x 1 1 min   ltr 20x 30x 20x 30x  30x  5 reps 10 sechold 5 reps 10 sec hold  20x   Bridging   3 x 10 3 x 10 30x  20x 3 x 10  3x 10 3  x10 30x   One legged bridging   1 set of 10      1 set of 10 2 x 10  1 set of 5 3 " "sets of 15 30x   Squats hi low mat table   3 x 10 30x 30x  20x 3 x 10 3 x 10 3 x 10 30x   Ther Ex ( nu step )              Hip flexion slr, and tke 5# 30x 2# 2 x 10  2# 30x   3 x 10  3 x 10  30x   Standing hip abd, hip ext and knee flexion at ll bars 5x     2# 20x   3 x 10  30x   Sidestepping in ll bars 5x    10 ft x 4  10 ft x 4  10 ft x 4    Fwd, bwd walking in ll bars   5x      braiding 10 ft x 4      10 ft x 4 and crossovers and braiding 10 ft x 2 close s  10 ft x 4    Hurdles in ll bars cs/side stepping of 1 sidestepping and forward walking 5x  4x      5 colored hurdles holding on to bar emphasis on height clearance 10 ft x 4    Hand to opposite knee gait cg/ min assist of 1     40 ft x 2 close sup    40 ft x 2    Heelraises, toe raises 3 ways /heel dips 6\" box  5 x 4 30x    2 sets 10  3 x 10    Step ups     X 6         berg rows, sh ext, paloff exer        Rows 15 lb 3 sets 10, paloff exer 1 set of 10 10# with close sup/cg of1     Berg walking with cord resistance             Nu-step/ treadmill T-mill  4 min/1 min rest/3 min @ 1 2 mph  15 min 20 min 20 min r 4 last 2 min r6 20 min 15 min r 5 treadmill 1 2 mph 2 min x 3 with 1 min rest in standing only  Nu step 15min r 5  ending at r 10    Gait Training             Trial activator upright walking poles   Tactile and verbal cueing for 2 point gait 80 ft x 4                        Modalities             Floor to sit     x1          Bed mobility    3x               "

## 2023-06-01 DIAGNOSIS — E11.9 CONTROLLED TYPE 2 DIABETES MELLITUS WITHOUT COMPLICATION, WITHOUT LONG-TERM CURRENT USE OF INSULIN (HCC): ICD-10-CM

## 2023-06-01 RX ORDER — BLOOD SUGAR DIAGNOSTIC
STRIP MISCELLANEOUS
Qty: 100 STRIP | Refills: 3 | Status: SHIPPED | OUTPATIENT
Start: 2023-06-01

## 2023-06-05 ENCOUNTER — TELEPHONE (OUTPATIENT)
Dept: GASTROENTEROLOGY | Facility: HOSPITAL | Age: 68
End: 2023-06-05

## 2023-06-06 ENCOUNTER — ANESTHESIA EVENT (OUTPATIENT)
Dept: GASTROENTEROLOGY | Facility: HOSPITAL | Age: 68
End: 2023-06-06

## 2023-06-06 ENCOUNTER — HOSPITAL ENCOUNTER (OUTPATIENT)
Dept: GASTROENTEROLOGY | Facility: HOSPITAL | Age: 68
Setting detail: OUTPATIENT SURGERY
Discharge: HOME | End: 2023-06-06
Attending: INTERNAL MEDICINE
Payer: MEDICARE

## 2023-06-06 ENCOUNTER — ANESTHESIA (OUTPATIENT)
Dept: GASTROENTEROLOGY | Facility: HOSPITAL | Age: 68
End: 2023-06-06

## 2023-06-06 VITALS
SYSTOLIC BLOOD PRESSURE: 135 MMHG | HEART RATE: 75 BPM | DIASTOLIC BLOOD PRESSURE: 64 MMHG | TEMPERATURE: 96.8 F | OXYGEN SATURATION: 95 % | HEIGHT: 69 IN | RESPIRATION RATE: 18 BRPM | BODY MASS INDEX: 43.99 KG/M2 | WEIGHT: 297 LBS

## 2023-06-06 DIAGNOSIS — K22.719 BARRETT'S ESOPHAGUS WITH DYSPLASIA: ICD-10-CM

## 2023-06-06 PROBLEM — I25.10 CAD (CORONARY ARTERY DISEASE): Status: ACTIVE | Noted: 2023-06-06

## 2023-06-06 LAB — GLUCOSE SERPL-MCNC: 109 MG/DL (ref 65–140)

## 2023-06-06 PROCEDURE — 82948 REAGENT STRIP/BLOOD GLUCOSE: CPT

## 2023-06-06 RX ORDER — SUCCINYLCHOLINE/SOD CL,ISO/PF 100 MG/5ML
SYRINGE (ML) INTRAVENOUS AS NEEDED
Status: DISCONTINUED | OUTPATIENT
Start: 2023-06-06 | End: 2023-06-06

## 2023-06-06 RX ORDER — FENTANYL CITRATE 50 UG/ML
INJECTION, SOLUTION INTRAMUSCULAR; INTRAVENOUS AS NEEDED
Status: DISCONTINUED | OUTPATIENT
Start: 2023-06-06 | End: 2023-06-06

## 2023-06-06 RX ORDER — ONDANSETRON 2 MG/ML
INJECTION INTRAMUSCULAR; INTRAVENOUS AS NEEDED
Status: DISCONTINUED | OUTPATIENT
Start: 2023-06-06 | End: 2023-06-06

## 2023-06-06 RX ORDER — SODIUM CHLORIDE, SODIUM LACTATE, POTASSIUM CHLORIDE, CALCIUM CHLORIDE 600; 310; 30; 20 MG/100ML; MG/100ML; MG/100ML; MG/100ML
INJECTION, SOLUTION INTRAVENOUS CONTINUOUS PRN
Status: DISCONTINUED | OUTPATIENT
Start: 2023-06-06 | End: 2023-06-06

## 2023-06-06 RX ORDER — GLYCOPYRROLATE 0.2 MG/ML
INJECTION INTRAMUSCULAR; INTRAVENOUS AS NEEDED
Status: DISCONTINUED | OUTPATIENT
Start: 2023-06-06 | End: 2023-06-06

## 2023-06-06 RX ORDER — LIDOCAINE HYDROCHLORIDE 10 MG/ML
INJECTION, SOLUTION EPIDURAL; INFILTRATION; INTRACAUDAL; PERINEURAL AS NEEDED
Status: DISCONTINUED | OUTPATIENT
Start: 2023-06-06 | End: 2023-06-06

## 2023-06-06 RX ORDER — EPHEDRINE SULFATE 50 MG/ML
INJECTION INTRAVENOUS AS NEEDED
Status: DISCONTINUED | OUTPATIENT
Start: 2023-06-06 | End: 2023-06-06

## 2023-06-06 RX ORDER — PROPOFOL 10 MG/ML
INJECTION, EMULSION INTRAVENOUS AS NEEDED
Status: DISCONTINUED | OUTPATIENT
Start: 2023-06-06 | End: 2023-06-06

## 2023-06-06 RX ADMIN — GLYCOPYRROLATE 0.2 MG: 0.2 INJECTION, SOLUTION INTRAMUSCULAR; INTRAVENOUS at 07:59

## 2023-06-06 RX ADMIN — FENTANYL CITRATE 50 MCG: 50 INJECTION, SOLUTION INTRAMUSCULAR; INTRAVENOUS at 07:40

## 2023-06-06 RX ADMIN — SODIUM CHLORIDE, SODIUM LACTATE, POTASSIUM CHLORIDE, AND CALCIUM CHLORIDE: .6; .31; .03; .02 INJECTION, SOLUTION INTRAVENOUS at 08:04

## 2023-06-06 RX ADMIN — ONDANSETRON 4 MG: 2 INJECTION INTRAMUSCULAR; INTRAVENOUS at 07:47

## 2023-06-06 RX ADMIN — PROPOFOL 180 MG: 10 INJECTION, EMULSION INTRAVENOUS at 07:40

## 2023-06-06 RX ADMIN — Medication 100 MG: at 07:41

## 2023-06-06 RX ADMIN — EPHEDRINE SULFATE 10 MG: 50 INJECTION INTRAVENOUS at 07:57

## 2023-06-06 RX ADMIN — SODIUM CHLORIDE, SODIUM LACTATE, POTASSIUM CHLORIDE, AND CALCIUM CHLORIDE: .6; .31; .03; .02 INJECTION, SOLUTION INTRAVENOUS at 07:34

## 2023-06-06 RX ADMIN — LIDOCAINE HYDROCHLORIDE 50 MG: 10 INJECTION, SOLUTION EPIDURAL; INFILTRATION; INTRACAUDAL; PERINEURAL at 07:40

## 2023-06-06 NOTE — ANESTHESIA PREPROCEDURE EVALUATION
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, without long-term current use of insulin (HCC)   (+) Type 2 diabetes mellitus, without long-term current use of insulin (HCC)      GI/HEPATIC   (+) Dysphagia   (+) Gastroesophageal reflux disease with esophagitis without hemorrhage      NEURO/PSYCH   (+) Depression with anxiety      PULMONARY   (+) Sleep apnea (not using CPAP)      Other   (+) Obesity, Class III, BMI 40-49 9 (morbid obesity) (HCC)        Physical Exam    Airway    Mallampati score: II  TM Distance: >3 FB  Neck ROM: full     Dental       Cardiovascular      Pulmonary      Other Findings        Anesthesia Plan  ASA Score- 3     Anesthesia Type- general with ASA Monitors  Additional Monitors:   Airway Plan: ETT  Comment: Pt aspirated during EGD under sedation in 2021  Pt would like to proceed with GETA  We discussed that [place] does not completely negate the risk of aspiration  Additionally, it can increased other risks s/a cardiac/pulmonary complications  Plan Factors-Exercise tolerance (METS): <4 METS  Chart reviewed  EKG reviewed  Existing labs reviewed  Patient summary reviewed  Induction- intravenous  Postoperative Plan-     Informed Consent- Anesthetic plan and risks discussed with patient  I personally reviewed this patient with the CRNA  Discussed and agreed on the Anesthesia Plan with the MARIBELL Maher

## 2023-06-06 NOTE — H&P
History and Physical - [facility]  Emerald Quintana 69 y o  male MRN: [id number]                  HPI: Emerald Quintana is a 69y o  year old male who presents for egd for barretts  REVIEW OF SYSTEMS: Per the HPI, and otherwise unremarkable  Historical Information   Past Medical History:   Diagnosis Date   • Acute blood loss anemia 10/13/2016   • Anxiety    • Arthritis     knees   • Arthritis    • Asthma     stable for > 10 years   • Molina esophagus     with BARRX/RFA   • Cancer [facility])     prostate   • Cataract    • Depression    • Diabetes ([facility] [address] )    • Diabetes mellitus ([facility] [address] )     pre diabetes   • Diverticulitis of colon    • Environmental allergies    • Fall 12/30/2020   • GERD (gastroesophageal reflux disease)    • Hiatal hernia    • History of anal fissures    • Hyperlipidemia    • Hypertension    • Incisional hernia    • Insomnia    • Kidney stone    • Malignant neoplasm of prostate ([facility] )    • Morbid obesity ([facility] )    • Peripheral neuropathy    • PONV (postoperative nausea and vomiting)    • Prolapsing mitral valve     prolapsing mitral valve leaflet syndrome   • Prostate [facility] ([facility] )    • Retinal detachment     treated surgically at [facility] eye; resolved: 10/10/2012   • Sleep apnea     diagnosed but unable to tolerate cpap      • Stuttering      Past Surgical History:   Procedure Laterality Date   • ARTHROSCOPIC REPAIR ACL Right    • BIOPSY CORE NEEDLE      prostate   • CATARACT EXTRACTION Bilateral    • COLONOSCOPY     • HERNIA REPAIR      naval   • JOINT REPLACEMENT      B/L knee   • KNEE ARTHROSCOPY Left    • LAPAROSCOPIC GASTRIC BANDING     • VA ARTHRP KNE CONDYLE&PLATU MEDIAL&LAT COMPARTMENTS Left 02/15/2017    Procedure: TOTAL KNEE ARTHROPLASTY ;  Surgeon: Marc Arita MD;  Location: [facility];  Service: Orthopedics   • VA ARTHRP KNE CONDYLE&PLATU MEDIAL&LAT COMPARTMENTS Right 10/10/2016    Procedure: ARTHROPLASTY KNEE TOTAL;  Surgeon: Marc Arita MD;  Location: [facility]; Service: Orthopedics   • PROSTATECTOMY      robotic-assisted; [facility]-Dr Sandra Rose   • REMOVAL GASTRIC BAND LAPAROSCOPIC N/A 8/30/2022    Procedure: LAPAROSCOPIC REMOVAL OF ADJUSTABLE GASTRIC BAND AND PORT  WITH INTRAOPERATIVE EGD;  Surgeon: Paul Foster MD;  Location: [facility];  Service: Bariatrics   • RETINAL DETACHMENT SURGERY Bilateral     [facility]   • SEPTOPLASTY     • SINUS SURGERY     • TONSILLECTOMY      over age 13   • UPPER GASTROINTESTINAL ENDOSCOPY      mutiple with BARRX/RFA   • VASECTOMY      vas deferens     Social History   Social History     Substance and Sexual Activity   Alcohol Use Not Currently   • Alcohol/week: 1 0 standard drink of alcohol   • Types: 1 Cans of beer per week    Comment: 1 case of beer per year     Social History     Substance and Sexual Activity   Drug Use Never     Social History     Tobacco Use   Smoking Status Never   Smokeless Tobacco Never   Tobacco Comments    quit 38 years ago     Family History   Problem Relation Age of Onset   • Heart disease Father    • Coronary artery disease Father    • Prostate cancer Father    • Coronary artery disease Mother    • Diabetes Mother         mellitus   • Diabetes type II Mother    • Diabetes Maternal Grandmother         mellitus   • Coronary artery disease Maternal Grandfather    • Coronary artery disease Paternal Grandfather        Meds/Allergies       Current Outpatient Medications:   •  ALPRAZolam (XANAX) 0 25 mg tablet  •  amoxicillin (AMOXIL) 500 mg capsule  •  Blood Glucose Monitoring Suppl (OneTouch Verio) w/Device KIT  •  ezetimibe (ZETIA) 10 mg tablet  •  FLUoxetine (PROzac) 20 mg capsule  •  hydrochlorothiazide (HYDRODIURIL) 12 5 mg tablet  •  Lancet Devices (ONE TOUCH DELICA LANCING DEV) MISC  •  losartan (COZAAR) 100 MG tablet  •  metFORMIN (GLUCOPHAGE) 500 mg tablet  •  metoprolol succinate (TOPROL-XL) 25 mg 24 hr tablet  •  Multiple Vitamins-Minerals (CENTRUM ADULTS PO)  •  OLANZapine (ZyPREXA) 5 mg tablet  •  OneTouch "Delica Lancets 13D MISC  •  OneTouch Verio test strip  •  pantoprazole (PROTONIX) 40 mg tablet  •  rosuvastatin (CRESTOR) 5 mg tablet  •  semaglutide, 2 mg/dose, (Ozempic, 2 MG/DOSE,) 8 mg/ mL injection pen  •  trospium chloride (SANCTURA) 20 mg tablet  •  chlorhexidine (PERIDEX) 0 12 % solution  •  zolpidem (AMBIEN) 10 mg tablet    Allergies   Allergen Reactions   • Celebrex [Celecoxib] Other (See Comments)     Cardiologist stated he should not take     • Chlorhexidine Hives     Is able to wash with hibiclens but not use mcihi cloths   • Other      \"steroid eyedrops\"     • Prednisone Other (See Comments)     Prednisone eye drops- eye pressure increases       Objective     /91   Pulse 64   Temp 98 4 °F (36 9 °C) (Tympanic)   Resp 18   Ht 5' 9\" (1 753 m)   Wt 135 kg (297 lb)   SpO2 95%   BMI 43 86 kg/m²        PHYSICAL EXAM    Gen: NAD  Head: NCAT  CV: RRR  CHEST: Clear  ABD: soft, NT/ND  EXT: no edema      ASSESSMENT/PLAN:  This is a 69y o  year old male here for egd, and he is stable and optimized for his procedure          "

## 2023-06-06 NOTE — ANESTHESIA POSTPROCEDURE EVALUATION
Post-Op Assessment Note    CV Status:  Stable    Pain management: adequate     Mental Status:  Arousable   Hydration Status:  Euvolemic   PONV Controlled:  Controlled   Airway Patency:  Patent      Post Op Vitals Reviewed: Yes            No notable events documented      BP (!) 178/91 (06/06/23 0813)    Temp (!) 96 8 °F (36 °C) (06/06/23 0813)    Pulse 76 (06/06/23 0813)   Resp 18 (06/06/23 0813)    SpO2 97 % (06/06/23 0813)

## 2023-06-26 ENCOUNTER — PROCEDURE VISIT (OUTPATIENT)
Dept: NEUROLOGY | Facility: CLINIC | Age: 68
End: 2023-06-26
Payer: MEDICARE

## 2023-06-26 DIAGNOSIS — M79.601 PARESTHESIA AND PAIN OF BOTH UPPER EXTREMITIES: ICD-10-CM

## 2023-06-26 DIAGNOSIS — R20.2 PARESTHESIA AND PAIN OF BOTH UPPER EXTREMITIES: ICD-10-CM

## 2023-06-26 DIAGNOSIS — M79.602 PARESTHESIA AND PAIN OF BOTH UPPER EXTREMITIES: ICD-10-CM

## 2023-06-26 PROCEDURE — 95911 NRV CNDJ TEST 9-10 STUDIES: CPT | Performed by: PHYSICAL MEDICINE & REHABILITATION

## 2023-06-26 PROCEDURE — 95886 MUSC TEST DONE W/N TEST COMP: CPT | Performed by: PHYSICAL MEDICINE & REHABILITATION

## 2023-06-26 NOTE — PROGRESS NOTES
EMG 2 Limb Upper Extremity     Date/Time 6/26/2023 11:15 AM     Performed by  Satinder Gomez MD   Authorized by Tuan Chappell MD           EMG bilateral upper extremity completed today

## 2023-06-29 ENCOUNTER — TELEPHONE (OUTPATIENT)
Dept: NEUROLOGY | Facility: CLINIC | Age: 68
End: 2023-06-29

## 2023-06-29 NOTE — TELEPHONE ENCOUNTER
1/15/2019    Sukhjinder Peñaloza  [address] [facility] [address]        Dear Sukhjinder,       It is with great pleasure I'm sending this letter to you. My name is Zahra Angelia,  and I am one of the RN Care Coordinators with [facility].  Because you have a chronic health condition, you are at greater risk for having health problems that may limit what you are able to do each day.  You may also be at higher risk for needing to be admitted to the hospital. We want to work with you to decrease these risks and keep you enjoying life to the fullest.    For us to partner with you to meet these goals, we are starting a new team approach to your care at no added cost to you.  It will involve me calling you by phone, helping you to manage your health with your primary care doctor, Job Whitlock MD., and other specialists, and support you in taking an active role in your own care.  This team approach has been shown to improve the quality of life for people like you who have a chronic health condition.     As a first step, I will call you within the next few weeks to introduce myself and tell you what I can do and give you a chance to ask any questions that you may have.  I also want you to know that, like all health care matters, all of your information is kept confidential.  I look forward to working with you and your primary care team as you optimize your health.    Sincerely,      STEPHENIE Ford, RN  Telephonic RN Care Coordinator  Phone: [phone]     Spoke w/pt  Advised him of results and recommendations  Patient verbalized understanding  Confirmed 7/7/23 f/u bernadette/Mary Alice

## 2023-06-29 NOTE — TELEPHONE ENCOUNTER
----- Message from Salvador Brown MD sent at 6/26/2023  5:36 PM EDT -----  Let pt know emg upper ext shows moderate right carpal tunnel  No evidence of cervical or ulnar nerve compression mejia  We will review in detail at time of his July appt

## 2023-06-30 ENCOUNTER — TELEPHONE (OUTPATIENT)
Dept: NEUROLOGY | Facility: CLINIC | Age: 68
End: 2023-06-30

## 2023-06-30 NOTE — TELEPHONE ENCOUNTER
I called and left a voicemail message reminding the patient of there upcoming appointment with Zenobia Burger PA-C on 7/7/23 @ 3:15pm in the [facility]  I requested a call back to our office to confirm the appointment or if the patient has any issues or concerns or cannot keep this appointment

## 2023-07-03 ENCOUNTER — TELEPHONE (OUTPATIENT)
Dept: FAMILY MEDICINE CLINIC | Facility: CLINIC | Age: 68
End: 2023-07-03

## 2023-07-03 NOTE — TELEPHONE ENCOUNTER
Patient is requesting a refill of Nystatin cream 30 g to [facility] [address]. This was last ordered 10/2021 by Shaquille Momin.

## 2023-07-05 DIAGNOSIS — L30.9 DERMATITIS: Primary | ICD-10-CM

## 2023-07-05 RX ORDER — NYSTATIN 100000 U/G
CREAM TOPICAL 2 TIMES DAILY
Qty: 30 G | Refills: 0 | Status: SHIPPED | OUTPATIENT
Start: 2023-07-05

## 2023-07-07 ENCOUNTER — OFFICE VISIT (OUTPATIENT)
Dept: NEUROLOGY | Facility: CLINIC | Age: 68
End: 2023-07-07
Payer: MEDICARE

## 2023-07-07 VITALS
OXYGEN SATURATION: 95 % | SYSTOLIC BLOOD PRESSURE: 138 MMHG | DIASTOLIC BLOOD PRESSURE: 62 MMHG | HEART RATE: 72 BPM | BODY MASS INDEX: 44.88 KG/M2 | WEIGHT: 303 LBS | RESPIRATION RATE: 18 BRPM | TEMPERATURE: 97.1 F | HEIGHT: 69 IN

## 2023-07-07 DIAGNOSIS — G62.9 NEUROPATHY: Primary | ICD-10-CM

## 2023-07-07 DIAGNOSIS — R25.1 TREMOR: ICD-10-CM

## 2023-07-07 DIAGNOSIS — G56.01 CARPAL TUNNEL SYNDROME OF RIGHT WRIST: ICD-10-CM

## 2023-07-07 PROCEDURE — 99214 OFFICE O/P EST MOD 30 MIN: CPT | Performed by: PHYSICIAN ASSISTANT

## 2023-07-07 NOTE — PROGRESS NOTES
Patient ID: Gay Medina is a 69 y.o. male. Assessment/Plan:    Neuropathy  Patient presented with balance impairment with evidence of peripheral neuropathy on exam.  Bilateral LE EMG confirmed a chronic, length dependent peripheral neuropathy. This is likely 2/2 DM. We discussed that balance difficulty is likely from his neuropathy. Explained in detail. He has no painful symptoms of neuropathy that would warrant medication. He completed balance therapy a few months ago. Encouraged HEP. We discussed the importance of controlling his diabetes under the direction of his PCP. Proper foot care and hygiene advised. Carpal tunnel syndrome of right wrist  Patient recently had UE EMG due to noting paresthesias in the fingertips and decreased dexterity in the right hand. EMG consistent with R CTS, moderate. No evidence of peripheral neuropathy in the UE on EMG. We discussed referral to hand surgery for further evaluation and management. Tremor  Patient has been reporting UE tremor. There are no overt parkinsonian features on exam.  I noticed a slight tremor of the fingers on his left hand at rest today, as well as a variable, intermittent tremor of both UE with certain positions only. Patient continues to question PD. He has long term use of antipsychotic (Zyprexa) which was prescribed by a psychiatrist approx 20 years ago for his stuttering. This could contribute to tremor. He has made himself an appt with a movement disorder specialist at [facility] and I encouraged him to see them due to his ongoing concerns. He can discuss with the prescriber of Zyprexa regarding trial of coming off the medication to see if this assists with reduction in tremor. Patient will follow up in 6 months or sooner if needed. Diagnoses and all orders for this visit:    Neuropathy    Carpal tunnel syndrome of right wrist  -     Ambulatory Referral to Orthopedic Surgery;  Future    Tremor Subjective:    BRIANA Bingham is a 69year old male with PMH of prior lap band, Molina’s esophagus, gastroparesis, DM type 2, depression and anxiety, stuttering, HTN, ANABELL, and prostate cancer s/p prostatectomy 2011, who presents today for follow up evaluation of several neurological issues. Patient was first seen by Dr. Ismael Doss in March 2022. He brought a list of active issues which were all addressed. Patient reported concern about his balance, feels off balance intermittently while walking, in the shower, and especially on uneven surfaces. Patient had a fall in Dec 2020 after taking Ambien and waiting too long to get into bed. Patient reported falling down 16 steps and hitting the back of his head. He was evaluated and diagnosed with concussion but no associated internal injury or fracture. Patient noted overall stamina has significantly decreased. Patient noted overall mobility and range of motion more limited as well. He denied any pain in hands or feet. He reported difficulty getting up from low chairs. Patient himself also brought up concern for Parkinson’s disease. He notes a tremor mainly with action, mentioned trouble using a computer mouse at times. He reported 2 uncles with PD, although description of the symptoms more concerning for ET. Patient also has been on Zyprexa for many years for stuttering, prescribed by a psychiatrist previously. Question if this impacts tremor. On exam patient noted to have decreased vibratory sensation and temp sensation in lower extremities concerning for peripheral neuropathy. No clear proximal weakness in arms or legs. No parkinsonian features on exam.      Labs completed 3/28/23. Normal SPEP, CK, B1, B6, folate. B12 slightly low at 328. Negative Lyme, Sjogren's. EMG bilateral LE 8/22/22 demonstrated a chronic length dependent sensory motor peripheral neuropathy with axonal and demyelinative changes.     He was seen by Dr. Rigoberto Gould in Aug 2022 and Nov 2022 and discussed tremor most consistent with ET, balance difficulty likely 2/2 neuropathy. No parkinsonian features on exam.  PT was recommended for balance therapy. At timing of last visit with Dr. Deven Rodriguez in March 2023, patient reported ongoing issues with balance, endurance with walking. He reported longstanding right leg weakness, encouraged to see PCP to investigate if related to lumbar spinal stenosis. He reported paresthesias in the finger tips and decreased dexterity in the right hand. He noted trouble opening bottles at times. He is left handed. Again, no parkinsonian features on exam.  She recommended seeing psych to discuss Zyprexa, unclear of benefit for stuttering and patient on for many years (over 20 he estimates). EMG of the mejia UE completed 6/26/23. This demonstrated moderate compression of the median nerve at the wrist, c/w CTS. Otherwise unremarkable study. Today, patient reports he has ongoing concerns with his symptoms of balance difficulty, endurance issues, tremor and concerned he "still does not have a diagnosis" despite multiple appts reviewing studies and giving diagnosis of peripheral neuropathy, essential tremor possibly exacerbated by longterm antipsychotic use, and now CTS. He wanted to know today if he had "CMT" but was confusing this with Parkinson's disease. He reports occasional numbness in the feet, no painful symptoms. Still having balance difficulty. He did recently complete PT in May 2023 working on gait and balance. He reports a "new" LUE tremor x 3 months, but this has been noted before. He made an appt to see a movement disorder specialist at [facility], appt not until December 2023.     The following portions of the patient's history were reviewed and updated as appropriate: current medications, past family history, past medical history, past social history, past surgical history and problem list.      Objective:    Blood pressure 138/62, pulse 72, temperature (!) 97.1 °F (36.2 °C), temperature source Temporal, resp. rate 18, height 5' 9" (1.753 m), weight (!) 137 kg (303 lb), SpO2 95 %. Physical Exam  Constitutional:       Appearance: He is obese. HENT:      Head: Normocephalic and atraumatic. Eyes:      Extraocular Movements: EOM normal.      Pupils: Pupils are equal, round, and reactive to light. Neurological:      Motor: Motor strength is normal.     Deep Tendon Reflexes:      Reflex Scores:       Bicep reflexes are 2+ on the right side and 2+ on the left side. Brachioradialis reflexes are 2+ on the right side and 2+ on the left side. Psychiatric:         Mood and Affect: Mood normal.         Speech: Speech normal.         Behavior: Behavior normal.         Neurological Exam  Mental Status   Oriented to person, place, time and situation. Speech is normal. Language is fluent with no aphasia. Attention and concentration are normal.  Significant stuttering . Cranial Nerves  CN II: Visual fields full to confrontation. CN III, IV, VI: Extraocular movements intact bilaterally. Pupils equal round and reactive to light bilaterally. CN V: Facial sensation is normal.  CN VII: Full and symmetric facial movement. CN VIII: Hearing is normal.  CN IX, X: Palate elevates symmetrically  CN XI: Shoulder shrug strength is normal.  CN XII: Tongue midline without atrophy or fasciculations. Motor   There was an occasional tremor of the UEs seen on exam today, mainly positional especially with arms crossed or when the left hand was on his lap (fingers tremoring). No significant intention tremor. This tremor would be variable and go away with changes in position. There is no bradykinesia, cognwheeling or increased tone. .   Strength is 5/5 throughout all four extremities. Sensory  Light touch is normal in upper and lower extremities.  Vibration abnormality: Decreased at the level of the toes bilaterally, worse on the left compared to the right.  Normal in the hands . Reflexes                                            Right                      Left  Brachioradialis                    2+                         2+  Biceps                                 2+                         2+  Patellar                                Tr                         Tr  Achilles                                Tr                         Tr    Coordination  Right: Finger-to-nose normal.Left: Finger-to-nose normal.    Gait    Wide based gait, no shuffling, no ataxia, normal arm swing . ROS:    Review of Systems   Constitutional: Negative for chills and fever. HENT: Negative for ear pain and sore throat. Eyes: Negative for pain and visual disturbance. Respiratory: Negative for cough and shortness of breath. Cardiovascular: Negative for chest pain and palpitations. Gastrointestinal: Negative for abdominal pain and vomiting. Genitourinary: Positive for frequency and urgency. Negative for dysuria and hematuria. Musculoskeletal: Positive for back pain (lower back) and gait problem. Negative for arthralgias. Skin: Negative for color change and rash. Neurological: Positive for dizziness (minor), tremors, speech difficulty, weakness and numbness (feet at times). Negative for seizures and syncope. Hematological: Bruises/bleeds easily. Psychiatric/Behavioral: Positive for sleep disturbance. The patient is nervous/anxious. All other systems reviewed and are negative.     I personally reviewed and updated the ROS as appropriate

## 2023-07-07 NOTE — PATIENT INSTRUCTIONS
Referral placed to hand surgery for right carpal tunnel syndrome  Continue with home exercises learned in PT  Can proceed with seeing movement specialist at [facility]--I will call down there and ask that you be added to the wait list for sooner appt  Follow up in 6 months or sooner if needed

## 2023-07-13 ENCOUNTER — OFFICE VISIT (OUTPATIENT)
Dept: BARIATRICS | Facility: CLINIC | Age: 68
End: 2023-07-13
Payer: MEDICARE

## 2023-07-13 VITALS
HEART RATE: 66 BPM | RESPIRATION RATE: 18 BRPM | DIASTOLIC BLOOD PRESSURE: 70 MMHG | WEIGHT: 298.6 LBS | BODY MASS INDEX: 44.23 KG/M2 | HEIGHT: 69 IN | SYSTOLIC BLOOD PRESSURE: 136 MMHG

## 2023-07-13 DIAGNOSIS — I10 BENIGN ESSENTIAL HYPERTENSION: ICD-10-CM

## 2023-07-13 DIAGNOSIS — E78.00 PURE HYPERCHOLESTEROLEMIA: ICD-10-CM

## 2023-07-13 DIAGNOSIS — E11.9 TYPE 2 DIABETES MELLITUS WITHOUT COMPLICATION, WITHOUT LONG-TERM CURRENT USE OF INSULIN (HCC): ICD-10-CM

## 2023-07-13 DIAGNOSIS — Z98.84 HX OF LAPAROSCOPIC ADJUSTABLE GASTRIC BANDING: ICD-10-CM

## 2023-07-13 DIAGNOSIS — E66.01 CLASS 3 SEVERE OBESITY DUE TO EXCESS CALORIES WITH SERIOUS COMORBIDITY AND BODY MASS INDEX (BMI) OF 40.0 TO 44.9 IN ADULT (HCC): Primary | ICD-10-CM

## 2023-07-13 DIAGNOSIS — K21.00 GASTROESOPHAGEAL REFLUX DISEASE WITH ESOPHAGITIS WITHOUT HEMORRHAGE: ICD-10-CM

## 2023-07-13 DIAGNOSIS — G47.33 OBSTRUCTIVE SLEEP APNEA SYNDROME: ICD-10-CM

## 2023-07-13 PROCEDURE — 99214 OFFICE O/P EST MOD 30 MIN: CPT | Performed by: NURSE PRACTITIONER

## 2023-07-13 NOTE — ASSESSMENT & PLAN NOTE
- Taking Crestor and Zetia. May improve with weight loss and lifestyle modification. Continue management with prescribing provider.

## 2023-07-13 NOTE — ASSESSMENT & PLAN NOTE
- Patient is pursuing Conservative Program  - Initial weight loss goal of 5-10% weight loss for improved health  - Already on metformin 1000 mg twice a day through primary care. - Has been on Ozempic for about 2 years. Start weight was 318 lbs. Continue Ozempic 2 mg weekly. No negative side effects and helping with appetite. - Patient denies personal history of pancreatitis. Patient also denies personal and family history of thyroid cancer and multiple endocrine neoplasia type 2 (MEN 2 tumor). - He has been working with the diabetes educator through endocrinology and he has found that very helpful. - Labs reviewed: TSH, lipid panel, A1c, and CMP completed April 20, 2023. A1c controlled at 5.9. Fasting glucose elevated, and will likely improve with weight loss. Initial MWM: 298 lbs  Last visit: 297.8 lbs BMI 43.98  Current: 298.6 lbs BMI 44.10  Change: +0.6 lbs (+0.8 lbs since the last office visit)  Goal: 257.5 lbs LTG, 280 lbs STG    Goals:  Do not skip meals. Start food logging, weighing, and measuring food. Food journals given to assist with food logging. 2000 snehal/day. Reviewed the importance of getting protein with each meal.  Can use protein shakes rather than skipping meals. Currently his diet is too carbohydrate based. Advised to reduce starch to 1/2 cup per meal.  Protein-based snacks discussed. Try to get 1 to 2 cups of nonstarchy vegetables with each meal. Try to limit the amount of Berto ice, or change to sugar-free. Keep up the good work with water intake, at least 64 ounces daily. Keep up the great work with gym workouts and gradually increase as tolerated.

## 2023-07-13 NOTE — ASSESSMENT & PLAN NOTE
- Taking metformin and Ozempic. May improve with weight loss and lifestyle modification. Continue management with prescribing provider.        Lab Results   Component Value Date    HGBA1C 5.9 (H) 04/20/2023

## 2023-07-13 NOTE — ASSESSMENT & PLAN NOTE
- Taking losartan. May improve with weight loss and lifestyle modification. Continue management with prescribing provider.

## 2023-07-13 NOTE — ASSESSMENT & PLAN NOTE
- Taking protonix. May improve with weight loss and lifestyle modification. Continue management with prescribing provider.

## 2023-07-13 NOTE — ASSESSMENT & PLAN NOTE
S/P Lap Band placement on 7/26/2010, with removal of band by Dr. Ana Rosa Bowser on 8/30/2022 due to Molina's esophagus.

## 2023-07-13 NOTE — PROGRESS NOTES
Assessment/Plan:     Class 3 severe obesity due to excess calories with serious comorbidity and body mass index (BMI) of 40.0 to 44.9 in adult [facility])  - Patient is pursuing Conservative Program  - Initial weight loss goal of 5-10% weight loss for improved health  - Already on metformin 1000 mg twice a day through primary care. - Has been on Ozempic for about 2 years. Start weight was 318 lbs. Continue Ozempic 2 mg weekly. No negative side effects and helping with appetite. - Patient denies personal history of pancreatitis. Patient also denies personal and family history of thyroid cancer and multiple endocrine neoplasia type 2 (MEN 2 tumor). - He has been working with the diabetes educator through endocrinology and he has found that very helpful. - Labs reviewed: TSH, lipid panel, A1c, and CMP completed April 20, 2023. A1c controlled at 5.9. Fasting glucose elevated, and will likely improve with weight loss. Initial MWM: 298 lbs  Last visit: 297.8 lbs BMI 43.98  Current: 298.6 lbs BMI 44.10  Change: +0.6 lbs (+0.8 lbs since the last office visit)  Goal: 257.5 lbs LTG, 280 lbs STG    Goals:  Do not skip meals. Start food logging, weighing, and measuring food. Food journals given to assist with food logging. 2000 snehal/day. Reviewed the importance of getting protein with each meal.  Can use protein shakes rather than skipping meals. Currently his diet is too carbohydrate based. Advised to reduce starch to 1/2 cup per meal.  Protein-based snacks discussed. Try to get 1 to 2 cups of nonstarchy vegetables with each meal. Try to limit the amount of Berto ice, or change to sugar-free. Keep up the good work with water intake, at least 64 ounces daily. Keep up the great work with gym workouts and gradually increase as tolerated. Pure hypercholesterolemia  - Taking Crestor and Zetia. May improve with weight loss and lifestyle modification. Continue management with prescribing provider. Hx of laparoscopic adjustable gastric banding  S/P Lap Band placement on 7/26/2010, with removal of band by Dr. Rod Ramirez on 8/30/2022 due to Molina's esophagus. Type 2 diabetes mellitus, without long-term current use of insulin ([address])  - Taking metformin and Ozempic. May improve with weight loss and lifestyle modification. Continue management with prescribing provider. Lab Results   Component Value Date    HGBA1C 5.9 (H) 04/20/2023       Gastroesophageal reflux disease with esophagitis without hemorrhage  - Taking protonix. May improve with weight loss and lifestyle modification. Continue management with prescribing provider. Sleep apnea  - Could not tolerate CPAP. Benign essential hypertension  - Taking losartan. May improve with weight loss and lifestyle modification. Continue management with prescribing provider. Jerman Mancia was seen today for follow-up. Diagnoses and all orders for this visit:    Class 3 severe obesity due to excess calories with serious comorbidity and body mass index (BMI) of 40.0 to 44.9 in adult [facility])    Pure hypercholesterolemia    Hx of laparoscopic adjustable gastric banding    Type 2 diabetes mellitus without complication, without long-term current use of insulin (HCC)    Gastroesophageal reflux disease with esophagitis without hemorrhage    Obstructive sleep apnea syndrome    Benign essential hypertension          Follow up in approximately 4-5 months with Non-Surgical Physician/Advanced Practitioner. Subjective:   Chief Complaint   Patient presents with   • Follow-up     MWM 3mth f/u; waist       Patient ID: Clerance Rubinstein  is a 69 y.o. male with excess weight/obesity here to pursue weight management. Patient is pursuing Conservative Program.   Most recent notes and records were reviewed.     HPI    Wt Readings from Last 10 Encounters:   07/13/23 135 kg (298 lb 9.6 oz)   07/07/23 (!) 137 kg (303 lb)   06/06/23 135 kg (297 lb) 05/08/23 135 kg (297 lb 12.8 oz)   05/04/23 136 kg (299 lb)   04/25/23 133 kg (293 lb)   04/06/23 135 kg (297 lb 12.8 oz)   03/17/23 134 kg (295 lb)   03/14/23 134 kg (295 lb 1.6 oz)   03/14/23 134 kg (295 lb 1.6 oz)     Had Lap Band placement on 7/26/2010. He had developed Molina's esophagus and therefore band removal performed by Dr. Grady 8/30/2022. No further weight loss surgery planned. Weight prior to band was 300 lbs and jaiden 258 lbs. Highest weight was 335 lbs about 2 years ago. Following with MWM due to weight regain.      Started Ozempic through PCP about 2 years ago. Taking Ozempic 2 mg weekly. No negative side effects. Helping with appetite. Seeing dietician through diabetes program and finds that helpful. Not food logging.         Taking metformin 1000 mg twice a day through primary care.      Was on Phen fen years ago and lost weight. Having more difficulty with mobility. Will be going to [facility] neurology movement disorder center in December for evaluation. Gets up at 12 noon. 2 pm - Protein shake or bagel with whipped light cream cheese   S- none  6-7 pm - veggies       10 pm to 2 am - bagel with whipped light cream cheese and pretzels sometimes Berto ice and pack of PB crackers        Hydration: 3-5 bottles of water, 1-2 cups iced tea diet, diet cranberry juice 5 snehal,   Alcohol: very rare  Smoking: denies  Exercise: 2-3 times per week gym - hand bike, squats, sit to stands and machines.   Occupation: retired -  [employer], [employer], [employer]   Sleep: 8-9 hours  STOP bang: Has ANABELL, tried CPAP, but could not tolerate it.        Colonoscopy: UTD, due 2026         The following portions of the patient's history were reviewed and updated as appropriate: allergies, current medications, past family history, past medical history, past social history, past surgical history, and problem list.    Family History   Problem Relation Age of Onset   • Heart disease Father    • Coronary artery disease Father    • Prostate cancer Father    • Coronary artery disease Mother    • Diabetes Mother         mellitus   • Diabetes type II Mother    • Diabetes Maternal Grandmother         mellitus   • Coronary artery disease Maternal Grandfather    • Coronary artery disease Paternal Grandfather         Review of Systems   HENT: Negative for sore throat. Respiratory: Negative for cough and shortness of breath. Cardiovascular: Negative for chest pain and palpitations. Gastrointestinal: Negative for abdominal pain, constipation, diarrhea, nausea and vomiting.        + GERD taking Protonix, seeing GI. Musculoskeletal: Negative for arthralgias and back pain. Skin: Positive for rash (using nystatin). Psychiatric/Behavioral: Negative for suicidal ideas. Denies anxiety and depression       Objective:  /70   Pulse 66   Resp 18   Ht 5' 9" (1.753 m)   Wt 135 kg (298 lb 9.6 oz)   BMI 44.10 kg/m²     Physical Exam  Vitals and nursing note reviewed. Constitutional   General appearance: Abnormal.  well developed and morbidly obese. Eyes No conjunctival injection. Ears, Nose, Mouth, and Throat Oral mucosa moist.   Pulmonary   Respiratory effort: No increased work of breathing or signs of respiratory distress. Cardiovascular    Examination of extremities for edema and/or varicosities: Normal.  no edema. Abdomen   Abdomen: Abnormal.  The abdomen was obese. Musculoskeletal   Normal range of motion  Neurological   Gait and station: wide based, but ambulated independently.    Psychiatric   Orientation to person, place and time: Normal.    Affect: appropriate

## 2023-07-14 ENCOUNTER — TELEPHONE (OUTPATIENT)
Dept: UROLOGY | Facility: AMBULATORY SURGERY CENTER | Age: 68
End: 2023-07-14

## 2023-07-14 ENCOUNTER — TELEPHONE (OUTPATIENT)
Dept: PSYCHIATRY | Facility: CLINIC | Age: 68
End: 2023-07-14

## 2023-07-14 DIAGNOSIS — F41.8 DEPRESSION WITH ANXIETY: Primary | ICD-10-CM

## 2023-07-14 DIAGNOSIS — R39.15 URGENCY OF URINATION: Primary | ICD-10-CM

## 2023-07-14 DIAGNOSIS — N32.81 OAB (OVERACTIVE BLADDER): ICD-10-CM

## 2023-07-14 NOTE — TELEPHONE ENCOUNTER
Patient has been added to the Medication Management wait list with a referral.    Insurance: medicare  Insurance Type:    Commercial []   Medicaid []   [place] (if applicable)   Medicare [x]  Location Preference: [place]  Provider Preference: Dr Jj Reason  Virtual: Yes [] No [x]

## 2023-07-14 NOTE — TELEPHONE ENCOUNTER
Patient last seen on 1/3/23 with Cha Cole in [facility]    Patient calling for recommendations. Patient stated he having bladder spasms and increased frequency. He said he is going about 30 times a day and started increasing about a week and half ago. Patient is not having any fevers, burning, or pain.      Patient requesting a call back at [phone]

## 2023-07-24 ENCOUNTER — APPOINTMENT (OUTPATIENT)
Dept: LAB | Age: 68
End: 2023-07-24
Payer: MEDICARE

## 2023-07-24 DIAGNOSIS — K22.70 BARRETT'S ESOPHAGUS WITHOUT DYSPLASIA: ICD-10-CM

## 2023-07-24 LAB
BACTERIA UR QL AUTO: NORMAL /HPF
BILIRUB UR QL STRIP: NEGATIVE
CLARITY UR: CLEAR
COLOR UR: NORMAL
GLUCOSE UR STRIP-MCNC: NEGATIVE MG/DL
HGB UR QL STRIP.AUTO: NEGATIVE
KETONES UR STRIP-MCNC: NEGATIVE MG/DL
LEUKOCYTE ESTERASE UR QL STRIP: NEGATIVE
NITRITE UR QL STRIP: NEGATIVE
NON-SQ EPI CELLS URNS QL MICRO: NORMAL /HPF
PH UR STRIP.AUTO: 6.5 [PH]
PROT UR STRIP-MCNC: NEGATIVE MG/DL
RBC #/AREA URNS AUTO: NORMAL /HPF
SP GR UR STRIP.AUTO: 1.02 (ref 1–1.03)
UROBILINOGEN UR STRIP-ACNC: <2 MG/DL
WBC #/AREA URNS AUTO: NORMAL /HPF

## 2023-07-24 PROCEDURE — 81001 URINALYSIS AUTO W/SCOPE: CPT

## 2023-07-24 PROCEDURE — 87086 URINE CULTURE/COLONY COUNT: CPT

## 2023-07-24 RX ORDER — PANTOPRAZOLE SODIUM 40 MG/1
TABLET, DELAYED RELEASE ORAL
Qty: 180 TABLET | Refills: 3 | Status: SHIPPED | OUTPATIENT
Start: 2023-07-24

## 2023-07-25 LAB — BACTERIA UR CULT: NORMAL

## 2023-07-28 NOTE — ASSESSMENT & PLAN NOTE
Patient has been reporting UE tremor. There are no overt parkinsonian features on exam.  I noticed a slight tremor of the fingers on his left hand at rest today, as well as a variable, intermittent tremor of both UE with certain positions only. Patient continues to question PD. He has long term use of antipsychotic (Zyprexa) which was prescribed by a psychiatrist approx 20 years ago for his stuttering. This could contribute to tremor. He has made himself an appt with a movement disorder specialist at [facility] and I encouraged him to see them due to his ongoing concerns. He can discuss with the prescriber of Zyprexa regarding trial of coming off the medication to see if this assists with reduction in tremor.

## 2023-07-28 NOTE — ASSESSMENT & PLAN NOTE
Patient recently had UE EMG due to noting paresthesias in the fingertips and decreased dexterity in the right hand. EMG consistent with R CTS, moderate. No evidence of peripheral neuropathy in the UE on EMG. We discussed referral to hand surgery for further evaluation and management.

## 2023-07-28 NOTE — ASSESSMENT & PLAN NOTE
Patient presented with balance impairment with evidence of peripheral neuropathy on exam.  Bilateral LE EMG confirmed a chronic, length dependent peripheral neuropathy. This is likely 2/2 DM. We discussed that balance difficulty is likely from his neuropathy. Explained in detail. He has no painful symptoms of neuropathy that would warrant medication. He completed balance therapy a few months ago. Encouraged HEP. We discussed the importance of controlling his diabetes under the direction of his PCP. Proper foot care and hygiene advised.

## 2023-08-02 ENCOUNTER — OFFICE VISIT (OUTPATIENT)
Dept: FAMILY MEDICINE CLINIC | Facility: CLINIC | Age: 68
End: 2023-08-02
Payer: MEDICARE

## 2023-08-02 ENCOUNTER — PROCEDURE VISIT (OUTPATIENT)
Dept: UROLOGY | Facility: AMBULATORY SURGERY CENTER | Age: 68
End: 2023-08-02
Payer: MEDICARE

## 2023-08-02 VITALS
DIASTOLIC BLOOD PRESSURE: 86 MMHG | RESPIRATION RATE: 18 BRPM | BODY MASS INDEX: 44.08 KG/M2 | TEMPERATURE: 97.2 F | WEIGHT: 297.6 LBS | OXYGEN SATURATION: 96 % | SYSTOLIC BLOOD PRESSURE: 138 MMHG | HEART RATE: 88 BPM | HEIGHT: 69 IN

## 2023-08-02 VITALS
HEART RATE: 86 BPM | WEIGHT: 298 LBS | HEIGHT: 69 IN | SYSTOLIC BLOOD PRESSURE: 114 MMHG | DIASTOLIC BLOOD PRESSURE: 62 MMHG | BODY MASS INDEX: 44.14 KG/M2

## 2023-08-02 DIAGNOSIS — Z82.0 FAMILY HISTORY OF PARKINSON DISEASE: ICD-10-CM

## 2023-08-02 DIAGNOSIS — R29.898 WEAKNESS OF LOWER EXTREMITY, UNSPECIFIED LATERALITY: Primary | ICD-10-CM

## 2023-08-02 DIAGNOSIS — R25.1 TREMOR: ICD-10-CM

## 2023-08-02 DIAGNOSIS — R26.2 AMBULATORY DYSFUNCTION: ICD-10-CM

## 2023-08-02 DIAGNOSIS — N32.81 OAB (OVERACTIVE BLADDER): Primary | ICD-10-CM

## 2023-08-02 LAB — POST-VOID RESIDUAL VOLUME, ML POC: 60 ML

## 2023-08-02 PROCEDURE — 51798 US URINE CAPACITY MEASURE: CPT

## 2023-08-02 PROCEDURE — 99214 OFFICE O/P EST MOD 30 MIN: CPT | Performed by: FAMILY MEDICINE

## 2023-08-02 NOTE — PROGRESS NOTES
Name: Xiomy Graf      : 1955      MRN: [id number]  Encounter Provider: Dodie Claude, MD  Encounter Date: 2023   Encounter department: [address] [place] [address] [address]     1. Weakness of lower extremity, unspecified laterality  -     XR spine lumbar minimum 4 views non injury; Future; Expected date: 2023    2. Ambulatory dysfunction  Assessment & Plan:  Weakness of lower extremities come and go. Will check lumbar xray. 3. Tremor  Assessment & Plan:  Worse recently. Will see movement disorder specialist at [facility] next Tuesday. 4. Family history of Parkinson disease         Subjective      HPI     Pt is here by himself. Tremor/neuropathy/unsteady gait---Left hand tremor worse in last 6 months. Pt also feels b/l lower extremities weakness come and go. Shaffle a lot, balance is off per pt. Did not fall per pt. Sometimes lower back pain. Pt is on zyprexa and prozac for years for stuttering per psychiatrist. Does not see psychiatrist any more. Pt is afraid of side effects of zyprexa, so he is trying to wean off zyprexa. He only takes zyprexa 2.5mg 5 days/week now. FU [facility] neurology. 3/2022 MRI brain ok. 2 Uncles had Parkinson's.   Will see movement specialist at [facility] next Tuesday.          Review of Systems   Constitutional: Negative for appetite change, chills and fever. HENT: Negative for congestion, ear pain, sinus pain and sore throat. Eyes: Negative for discharge and itching. Respiratory: Negative for apnea, cough, chest tightness, shortness of breath and wheezing. Cardiovascular: Negative for chest pain, palpitations and leg swelling. Gastrointestinal: Negative for abdominal pain, anal bleeding, constipation, diarrhea, nausea and vomiting. Endocrine: Negative for cold intolerance, heat intolerance and polyuria. Genitourinary: Negative for difficulty urinating and dysuria. Musculoskeletal: Positive for back pain.  Negative for arthralgias and myalgias. Skin: Negative for rash. Neurological: Positive for weakness. Negative for dizziness and headaches. Psychiatric/Behavioral: Negative for agitation.        Current Outpatient Medications on File Prior to Visit   Medication Sig   • ALPRAZolam (XANAX) 0.25 mg tablet Take 1 tablet (0.25 mg total) by mouth 3 (three) times a day as needed for anxiety   • amoxicillin (AMOXIL) 500 mg capsule    • Blood Glucose Monitoring Suppl (OneTouch Verio) w/Device KIT Use daily (Patient taking differently: Use as needed)   • chlorhexidine (PERIDEX) 0.12 % solution RINSE WITH 1/2 OUNCE FOR 30 SECONDS THEN SPIT OUT FOR TWICE A DAY   • ezetimibe (ZETIA) 10 mg tablet TAKE 1 TABLET DAILY (Patient taking differently: Take 5 mg by mouth daily)   • FLUoxetine (PROzac) 20 mg capsule TAKE 3 CAPSULES DAILY   • hydrochlorothiazide (HYDRODIURIL) 12.5 mg tablet Take 1 tablet (12.5 mg total) by mouth 3 (three) times a week Monday, Wednesday, Friday   • Lancet Devices (ONE TOUCH DELICA LANCING DEV) MISC Use daily   • losartan (COZAAR) 100 MG tablet Take 1 tablet (100 mg total) by mouth daily   • metFORMIN (GLUCOPHAGE) 500 mg tablet Take 2 tablets (1,000 mg total) by mouth 2 (two) times a day with meals   • metoprolol succinate (TOPROL-XL) 25 mg 24 hr tablet Take 1 tablet (25 mg total) by mouth daily   • Multiple Vitamins-Minerals (CENTRUM ADULTS PO) Take by mouth in the morning   • nystatin (MYCOSTATIN) cream Apply topically 2 (two) times a day   • OLANZapine (ZyPREXA) 5 mg tablet Take 1 tablet (5 mg total) by mouth every evening   • OneTouch Delica Lancets 53H MISC Use daily   • OneTouch Verio test strip USE TO TEST DAILY   • pantoprazole (PROTONIX) 40 mg tablet TAKE 1 TABLET TWICE A DAY (1 TABLET IN THE MORNING AND 1 TABLET IN THE EVENING)   • rosuvastatin (CRESTOR) 5 mg tablet Take 1 tablet (5 mg total) by mouth once a week   • semaglutide, 2 mg/dose, (Ozempic, 2 MG/DOSE,) 8 mg/ mL injection pen Inject 0.75 mL (2 mg total) under the skin every 7 days   • trospium chloride (SANCTURA) 20 mg tablet Take 1 tablet (20 mg total) by mouth 2 (two) times a day   • zolpidem (AMBIEN) 10 mg tablet Take 1 tablet (10 mg total) by mouth daily at bedtime as needed for sleep       Objective     /86   Pulse 88   Temp (!) 97.2 °F (36.2 °C) (Temporal)   Resp 18   Ht 5' 9" (1.753 m)   Wt 135 kg (297 lb 9.6 oz)   SpO2 96%   BMI 43.95 kg/m²     Physical Exam  Constitutional:       Appearance: He is well-developed. HENT:      Head: Normocephalic and atraumatic. Eyes:      General:         Right eye: No discharge. Left eye: No discharge. Conjunctiva/sclera: Conjunctivae normal.   Cardiovascular:      Rate and Rhythm: Normal rate and regular rhythm. Heart sounds: Normal heart sounds. No murmur heard. No friction rub. No gallop. Pulmonary:      Effort: Pulmonary effort is normal. No respiratory distress. Breath sounds: Normal breath sounds. No wheezing or rales. Abdominal:      General: Bowel sounds are normal.      Palpations: Abdomen is soft. Musculoskeletal:         General: No tenderness. Normal range of motion. Cervical back: Normal range of motion and neck supple. Right lower leg: No edema. Left lower leg: No edema. Comments: Lower extremity strength 5/5 in office   Neurological:      Mental Status: He is alert.        Nickie Meckel, MD

## 2023-08-02 NOTE — PROGRESS NOTES
8/2/2023  Bryson Man is a 69 y.o. male  [id number]    Diagnosis:  Chief Complaint    pvr         Patient presents for follow up post void residual s/p complaints of OAB managed by Dr. Kaur Place:  Follow up as scheduled  Referral to pelvic floor PT as previously recommended  Kegel Exercises  Knows to call the office in the meantime with questions or concerns. Assessment:  Patient presents for PVR after complaints of increased frequency, negative urine testing, and feeling of retention. He is on Sanctura 20 mg twice a day. He was previously referred to pelvic floor PT which she has not scheduled. Vitals:    08/02/23 1336   BP: 114/62   Pulse: 86   Weight: 135 kg (298 lb)   Height: 5' 9" (1.753 m)       Patient voided while in the office. Post void residual measured via bladder scanner to be 60 mL. Handout on Kegel exercises provided and reviewed with patient. Advised him to follow-up as previously recommended with pelvic floor physical therapy. All questions answered, and patient is agreeable to plan.     Recent Results (from the past 6 hour(s))   POCT Measure PVR    Collection Time: 08/02/23  1:40 PM   Result Value Ref Range    POST-VOID RESIDUAL VOLUME, ML POC 60 mL         Mina Stock RN

## 2023-08-03 ENCOUNTER — APPOINTMENT (OUTPATIENT)
Dept: RADIOLOGY | Age: 68
End: 2023-08-03
Payer: MEDICARE

## 2023-08-03 DIAGNOSIS — R29.898 WEAKNESS OF LOWER EXTREMITY, UNSPECIFIED LATERALITY: ICD-10-CM

## 2023-08-03 PROCEDURE — 72110 X-RAY EXAM L-2 SPINE 4/>VWS: CPT

## 2023-08-15 ENCOUNTER — OFFICE VISIT (OUTPATIENT)
Dept: DIABETES SERVICES | Facility: CLINIC | Age: 68
End: 2023-08-15
Payer: MEDICARE

## 2023-08-15 VITALS — WEIGHT: 292 LBS | BODY MASS INDEX: 43.12 KG/M2

## 2023-08-15 DIAGNOSIS — E11.641 TYPE 2 DIABETES MELLITUS WITH HYPOGLYCEMIA AND COMA, WITHOUT LONG-TERM CURRENT USE OF INSULIN (HCC): Primary | ICD-10-CM

## 2023-08-15 PROCEDURE — 97803 MED NUTRITION INDIV SUBSEQ: CPT

## 2023-08-15 NOTE — PROGRESS NOTES
Medical Nutrition Therapy        Assessment    Visit Type: Follow-up visit  Chief complaint/Medical Diagnosis/reason for visit E11.641 Type 2 diabetes mellitus with hypoglycemia and coma, without long-term current use of insulin    HPI Roya Mcmansu was seen today for his 3 months follow up MNT visit. Patient informed that he was recently diagnosed with Parkinson's and will be starting with sinemet soon. He informed that he is buying healthier food and is trying to include more whole grains and non starchy vegetables in his diet. His dinners are still not very structured and he is working on it. He usually sleeps from 3:00am -12:00pm and his meal timings differ due to it. Patient will continue with a 2000 calorie meal plan to assist with consistency, balance and portion control. Encouraged the consumption of regular meals at regular times. Advised patient to keep carbohydrate intake to 45-60 grams per meal and 15 grams per snack to assist with glycemic control. Suggested keeping protein intake to 10 ounces a day and fat to 5 servings daily to assist with lipid management and calorie control. RD will remain available for further dietary questions/concerns. Ht Readings from Last 1 Encounters:   08/02/23 5' 9" (1.753 m)     Wt Readings from Last 3 Encounters:   08/15/23 132 kg (292 lb)   08/02/23 135 kg (297 lb 9.6 oz)   08/02/23 135 kg (298 lb)     Weight Change: Yes pt lost 3 lbs since his last visit    Monitoring and evaluation:    Term code indicator  FH 4.4 Mealtime Behavior Criteria: Consume 3 meals a day, 4-5 hours apart. Try not to skip any meals. Term code indicator  FH 1.6.3 Carbohydrate Intake Criteria: Aim for 45-60 grams of carbohydrates per meal and 15 -30 grams of carbohydrates at post dinner snack. Term code indicator  FH 1.6.4 Fiber Intake Criteria: Include more non starchy vegetables and have at least 2 servings of fruits in a day.   Term code indicator  FH 1.3.1 Fluid/Beverage Intake Criteria: Try to stay away from diet beverages. Patient’s Response to Instruction:  Comprehensiongood  Motivationgood  Expected Compliancegood     Start- Stop: 2:25-2:55  Total Minutes: 27 Minutes  Group or Individual Instruction: MIKE HERNANDEZ  Other: Tavia Cameron MD    Thank you for coming to the [address] [place] [address] for education today. Please feel free to call with any questions or concerns.     Irina Maria  [address] [place] [phone]

## 2023-08-15 NOTE — PATIENT INSTRUCTIONS
Consume 3 meals a day, 4-5 hours apart. Try not to skip any meals. Aim for 45-60 grams of carbohydrates per meal and 15 -30 grams of carbohydrates at post dinner snack. Include more non starchy vegetables and have at least 2 servings of fruits in a day. Try to stay away from diet beverages.

## 2023-08-16 ENCOUNTER — TELEPHONE (OUTPATIENT)
Age: 68
End: 2023-08-16

## 2023-08-16 NOTE — TELEPHONE ENCOUNTER
Caller: Nimo To     Doctor: Dr WALTER ULLOA [facility]     Reason for call: appt/checking chart to see date last seen to sched correctly     Call back#: na

## 2023-08-18 ENCOUNTER — OFFICE VISIT (OUTPATIENT)
Dept: FAMILY MEDICINE CLINIC | Facility: CLINIC | Age: 68
End: 2023-08-18
Payer: MEDICARE

## 2023-08-18 VITALS
SYSTOLIC BLOOD PRESSURE: 140 MMHG | BODY MASS INDEX: 43.32 KG/M2 | TEMPERATURE: 97.7 F | DIASTOLIC BLOOD PRESSURE: 80 MMHG | HEART RATE: 88 BPM | HEIGHT: 69 IN | RESPIRATION RATE: 16 BRPM | OXYGEN SATURATION: 95 % | WEIGHT: 292.5 LBS

## 2023-08-18 DIAGNOSIS — E11.641 TYPE 2 DIABETES MELLITUS WITH HYPOGLYCEMIA AND COMA, WITHOUT LONG-TERM CURRENT USE OF INSULIN (HCC): ICD-10-CM

## 2023-08-18 DIAGNOSIS — I10 BENIGN ESSENTIAL HYPERTENSION: ICD-10-CM

## 2023-08-18 DIAGNOSIS — F41.8 DEPRESSION WITH ANXIETY: ICD-10-CM

## 2023-08-18 DIAGNOSIS — G20 PARKINSON DISEASE (HCC): Primary | ICD-10-CM

## 2023-08-18 PROBLEM — G20.A1 PARKINSON DISEASE: Status: ACTIVE | Noted: 2023-08-18

## 2023-08-18 LAB — SL AMB POCT HEMOGLOBIN AIC: 5.7 (ref ?–6.5)

## 2023-08-18 PROCEDURE — 99214 OFFICE O/P EST MOD 30 MIN: CPT | Performed by: FAMILY MEDICINE

## 2023-08-18 PROCEDURE — 83036 HEMOGLOBIN GLYCOSYLATED A1C: CPT | Performed by: FAMILY MEDICINE

## 2023-08-18 NOTE — ASSESSMENT & PLAN NOTE
Stop HCTZ because urine problem. Continue losatan and metoprolol. Advised pt to monitor BP at home. AB positive

## 2023-08-18 NOTE — ASSESSMENT & PLAN NOTE
Stop zyprexa already. Wean off prozac because pt needs to do Mile scan after 45 days weaning off prozac. Pt may use xanax 0.25mg 4 times per day for next 1-2 month. SE educated pt including addiction. After Mile scan will consider other antidepressant and reduce xanax usage. Pt agreed.

## 2023-08-18 NOTE — PROGRESS NOTES
Name: Zuleika Simmons      : 1955      MRN: [id number]  Encounter Provider: Calvin Desir MD  Encounter Date: 2023   Encounter department: [address] [place] [address],[address]     1. Parkinson disease [facility])  Assessment & Plan:  Continue Sinemet per [facility] neurologist.       2. Depression with anxiety  Assessment & Plan:  Stop zyprexa already. Wean off prozac because pt needs to do Mile scan after 45 days weaning off prozac. Pt may use xanax 0.25mg 4 times per day for next 1-2 month. SE educated pt including addiction. After Mile scan will consider other antidepressant and reduce xanax usage. Pt agreed. 3. Type 2 diabetes mellitus with hypoglycemia and coma, without long-term current use of insulin [facility])  Assessment & Plan:    Lab Results   Component Value Date    HGBA1C 5.7 2023     Controlled. FU endocrinology. Orders:  -     POCT hemoglobin A1c    4. Benign essential hypertension  Assessment & Plan:  Stop HCTZ because urine problem. Continue losatan and metoprolol. Advised pt to monitor BP at home. Subjective      HPI   Pt is here by himself. Pt was on zyprexa and fluoxetine for stuttering for years. Pt Saw [place] neurologist recently for his gait/tremor concerning drug induced Parkinson vs idiopathic Parkinson. Plan to do Mile scan to help differentiate but he needs to taper off fluoxetine first.   He also start sinemet per neurology. Pt feels it helped his gait noticeably. Anxiety/depression---- He was on prozac 60mg QD for 25 years. He weaned off to 20mg QD now. Pt feels he needs xanax 0.25mg more than before. He was on xanax 0.25mg tid before, recently he used 4 times per day. Urine incontinence---Pt is on HCTZ 12.5mg 3 times per week. He noticed urine incontinence is much worse while he is on HCTZ. Would like to stop HCTZ. Review of Systems   Constitutional: Negative for appetite change, chills and fever.    HENT: Negative for congestion, ear pain, sinus pain and sore throat. Eyes: Negative for discharge and itching. Respiratory: Negative for apnea, cough, chest tightness, shortness of breath and wheezing. Cardiovascular: Negative for chest pain, palpitations and leg swelling. Gastrointestinal: Negative for abdominal pain, anal bleeding, constipation, diarrhea, nausea and vomiting. Endocrine: Negative for cold intolerance, heat intolerance and polyuria. Genitourinary: Negative for difficulty urinating and dysuria. Musculoskeletal: Positive for gait problem. Negative for arthralgias, back pain and myalgias. Skin: Negative for rash. Neurological: Negative for dizziness and headaches. Psychiatric/Behavioral: Negative for agitation. The patient is nervous/anxious.         Current Outpatient Medications on File Prior to Visit   Medication Sig   • ALPRAZolam (XANAX) 0.25 mg tablet Take 1 tablet (0.25 mg total) by mouth 3 (three) times a day as needed for anxiety   • Blood Glucose Monitoring Suppl (OneTouch Verio) w/Device KIT Use daily (Patient taking differently: Use as needed)   • carbidopa-levodopa (SINEMET)  mg per tablet Take 1 tablet by mouth Three times a day   • ezetimibe (ZETIA) 10 mg tablet TAKE 1 TABLET DAILY (Patient taking differently: Take 5 mg by mouth daily)   • FLUoxetine (PROzac) 20 mg capsule TAKE 3 CAPSULES DAILY   • Lancet Devices (ONE TOUCH DELICA LANCING DEV) MISC Use daily   • losartan (COZAAR) 100 MG tablet Take 1 tablet (100 mg total) by mouth daily   • metFORMIN (GLUCOPHAGE) 500 mg tablet Take 2 tablets (1,000 mg total) by mouth 2 (two) times a day with meals   • metoprolol succinate (TOPROL-XL) 25 mg 24 hr tablet Take 1 tablet (25 mg total) by mouth daily   • Multiple Vitamins-Minerals (CENTRUM ADULTS PO) Take by mouth in the morning   • nystatin (MYCOSTATIN) cream Apply topically 2 (two) times a day   • OneTouch Delica Lancets 34Z MISC Use daily   • OneTouch Verio test strip USE TO TEST DAILY   • pantoprazole (PROTONIX) 40 mg tablet TAKE 1 TABLET TWICE A DAY (1 TABLET IN THE MORNING AND 1 TABLET IN THE EVENING)   • rosuvastatin (CRESTOR) 5 mg tablet Take 1 tablet (5 mg total) by mouth once a week   • semaglutide, 2 mg/dose, (Ozempic, 2 MG/DOSE,) 8 mg/ mL injection pen Inject 0.75 mL (2 mg total) under the skin every 7 days   • trospium chloride (SANCTURA) 20 mg tablet Take 1 tablet (20 mg total) by mouth 2 (two) times a day   • amoxicillin (AMOXIL) 500 mg capsule    • chlorhexidine (PERIDEX) 0.12 % solution RINSE WITH 1/2 OUNCE FOR 30 SECONDS THEN SPIT OUT FOR TWICE A DAY   • zolpidem (AMBIEN) 10 mg tablet Take 1 tablet (10 mg total) by mouth daily at bedtime as needed for sleep   • [DISCONTINUED] hydrochlorothiazide (HYDRODIURIL) 12.5 mg tablet Take 1 tablet (12.5 mg total) by mouth 3 (three) times a week Monday, Wednesday, Friday   • [DISCONTINUED] OLANZapine (ZyPREXA) 5 mg tablet Take 1 tablet (5 mg total) by mouth every evening       Objective     /80   Pulse 88   Temp 97.7 °F (36.5 °C) (Temporal)   Resp 16   Ht 5' 9" (1.753 m)   Wt 133 kg (292 lb 8 oz)   SpO2 95%   BMI 43.19 kg/m²     Physical Exam  Constitutional:       Appearance: He is well-developed. HENT:      Head: Normocephalic and atraumatic. Eyes:      General:         Right eye: No discharge. Left eye: No discharge. Conjunctiva/sclera: Conjunctivae normal.   Cardiovascular:      Rate and Rhythm: Normal rate and regular rhythm. Pulses: Pulses are weak. Dorsalis pedis pulses are 2+ on the right side and 2+ on the left side. Heart sounds: Normal heart sounds. No murmur heard. No friction rub. No gallop. Pulmonary:      Effort: Pulmonary effort is normal. No respiratory distress. Breath sounds: Normal breath sounds. No wheezing or rales. Abdominal:      General: Bowel sounds are normal. There is no distension. Palpations: Abdomen is soft. Tenderness:  There is no abdominal tenderness. There is no guarding. Musculoskeletal:         General: Normal range of motion. Cervical back: Normal range of motion and neck supple. No tenderness. Feet:    Feet:      Right foot:      Skin integrity: No ulcer, skin breakdown, erythema, warmth, callus or dry skin. Left foot:      Skin integrity: No ulcer, skin breakdown, erythema, warmth, callus or dry skin. Lymphadenopathy:      Cervical: No cervical adenopathy. Neurological:      Mental Status: He is alert. Patient's shoes and socks removed. Right Foot/Ankle   Right Foot Inspection  Skin Exam: skin normal and skin intact. No dry skin, no warmth, no callus, no erythema, no maceration, no abnormal color, no pre-ulcer, no ulcer and no callus. Sensory   Monofilament testing: absent    Vascular  The right DP pulse is 2+. Left Foot/Ankle  Left Foot Inspection  Skin Exam: skin normal and skin intact. No dry skin, no warmth, no erythema, no maceration, normal color, no pre-ulcer, no ulcer and no callus. Sensory   Monofilament testing: absent    Vascular  The left DP pulse is 2+.      Assign Risk Category  No deformity present  Loss of protective sensation  Weak pulses  Risk: 2            Kimberley Mcgregor MD

## 2023-08-21 ENCOUNTER — OFFICE VISIT (OUTPATIENT)
Dept: ENDOCRINOLOGY | Facility: CLINIC | Age: 68
End: 2023-08-21
Payer: MEDICARE

## 2023-08-21 VITALS
SYSTOLIC BLOOD PRESSURE: 136 MMHG | HEIGHT: 69 IN | WEIGHT: 295.4 LBS | HEART RATE: 62 BPM | BODY MASS INDEX: 43.75 KG/M2 | DIASTOLIC BLOOD PRESSURE: 90 MMHG

## 2023-08-21 DIAGNOSIS — E78.5 HYPERLIPIDEMIA, UNSPECIFIED HYPERLIPIDEMIA TYPE: ICD-10-CM

## 2023-08-21 DIAGNOSIS — I10 BENIGN ESSENTIAL HYPERTENSION: ICD-10-CM

## 2023-08-21 DIAGNOSIS — E66.01 CLASS 3 SEVERE OBESITY DUE TO EXCESS CALORIES WITH SERIOUS COMORBIDITY AND BODY MASS INDEX (BMI) OF 40.0 TO 44.9 IN ADULT (HCC): ICD-10-CM

## 2023-08-21 DIAGNOSIS — E11.9 TYPE 2 DIABETES MELLITUS WITHOUT COMPLICATION, WITHOUT LONG-TERM CURRENT USE OF INSULIN (HCC): Primary | ICD-10-CM

## 2023-08-21 DIAGNOSIS — E11.42 CONTROLLED TYPE 2 DIABETES MELLITUS WITH DIABETIC POLYNEUROPATHY, WITHOUT LONG-TERM CURRENT USE OF INSULIN (HCC): ICD-10-CM

## 2023-08-21 PROCEDURE — 99214 OFFICE O/P EST MOD 30 MIN: CPT | Performed by: INTERNAL MEDICINE

## 2023-08-21 NOTE — ASSESSMENT & PLAN NOTE
Blood pressure has been a little high since stopping hydrochlorothiazide a few weeks back-he will follow-up with his PCP to see if he requires any other medication adjustment

## 2023-08-21 NOTE — ASSESSMENT & PLAN NOTE
Lab Results   Component Value Date    HGBA1C 5.7 08/18/2023   Diabetes is well controlled-continue metformin and Ozempic, continue to focus on dietary modifications and follow-up with PCP

## 2023-08-21 NOTE — PROGRESS NOTES
Niya Barger 69 y.o. male MRN: [id number]    Encounter: [id number]      Assessment/Plan     Problem List Items Addressed This Visit        Endocrine    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, without long-term current use of insulin ([address])    Type 2 diabetes mellitus, without long-term current use of insulin ([address]) - Primary       Lab Results   Component Value Date    HGBA1C 5.7 08/18/2023   Diabetes is well controlled-continue metformin and Ozempic, continue to focus on dietary modifications and follow-up with PCP            Cardiovascular and Mediastinum    Benign essential hypertension     Blood pressure has been a little high since stopping hydrochlorothiazide a few weeks back-he will follow-up with his PCP to see if he requires any other medication adjustment            Other    Class 3 severe obesity due to excess calories with serious comorbidity and body mass index (BMI) of 40.0 to 44.9 in adult [facility])     Focus on dietary and lifestyle modification and weight loss         Hyperlipidemia     Continue statins on Zetia            CC: Diabetes    History of Present Illness     HPI:  68-year-old male with type 2 diabetes, obesity, hypertension and hyperlipidemia seen in follow-up    Started sinemet for parkinson and tapering off prozac and zyprexa     Current regimen - metformin , ozempic   No GI SE   checks f.s daily - fasting - 130s   No polyuria , polydipsia , no blurry vision , + numbness and tingling in feet        Review of Systems    Historical Information   Past Medical History:   Diagnosis Date   • Acute blood loss anemia 10/13/2016   • Anxiety    • Arthritis     knees   • Arthritis    • Asthma     stable for > 10 years   • [place] esophagus     with BARRX/RFA   • Cancer [facility])     prostate   • Cataract    • Depression    • Diabetes ([address])    • Diabetes mellitus ([address])     pre diabetes   • Diverticulitis of colon    • Environmental allergies    • Fall 12/30/2020   • GERD (gastroesophageal reflux disease)    • Hiatal hernia    • History of anal fissures    • Hyperlipidemia    • Hypertension    • Incisional hernia    • Insomnia    • Kidney stone    • Malignant neoplasm of prostate ([address])    • Morbid obesity (HCC)    • Peripheral neuropathy    • PONV (postoperative nausea and vomiting)    • Prolapsing mitral valve     prolapsing mitral valve leaflet syndrome   • Prostate cancer ([address])    • Retinal detachment     treated surgically at [facility]; resolved: 10/10/2012   • Sleep apnea     diagnosed but unable to tolerate cpap.     • Stuttering      Past Surgical History:   Procedure Laterality Date   • ARTHROSCOPIC REPAIR ACL Right    • BIOPSY CORE NEEDLE      prostate   • CATARACT EXTRACTION Bilateral    • COLONOSCOPY     • HERNIA REPAIR      [facility]   • JOINT REPLACEMENT      B/L knee   • KNEE ARTHROSCOPY Left    • LAPAROSCOPIC GASTRIC BANDING     • NM ARTHRP KNE CONDYLE&PLATU MEDIAL&LAT COMPARTMENTS Left 02/15/2017    Procedure: TOTAL KNEE ARTHROPLASTY ;  Surgeon: Brittaney Moss MD;  Location: [facility];  Service: Orthopedics   • NM ARTHRP KNE CONDYLE&PLATU MEDIAL&LAT COMPARTMENTS Right 10/10/2016    Procedure: ARTHROPLASTY KNEE TOTAL;  Surgeon: Brittaney Moss MD;  Location: [facility];  Service: Orthopedics   • PROSTATECTOMY      robotic-assisted; [facility]-Dr. Kenna Love   • REMOVAL GASTRIC BAND LAPAROSCOPIC N/A 8/30/2022    Procedure: LAPAROSCOPIC REMOVAL OF ADJUSTABLE GASTRIC BAND AND PORT  WITH INTRAOPERATIVE EGD;  Surgeon: Ashlee Cardoso MD;  Location: [facility];  Service: Bariatrics   • RETINAL DETACHMENT SURGERY Bilateral     [facility]   • SEPTOPLASTY     • SINUS SURGERY     • TONSILLECTOMY      over age 13   • UPPER GASTROINTESTINAL ENDOSCOPY      mutiple with BARRX/RFA   • VASECTOMY      vas deferens     Social History   Social History     Substance and Sexual Activity   Alcohol Use Not Currently   • Alcohol/week: 1.0 standard drink of alcohol   • Types: 1 Cans of beer per week Comment: 1 case of beer per year     Social History     Substance and Sexual Activity   Drug Use Never     Social History     Tobacco Use   Smoking Status Never   Smokeless Tobacco Never   Tobacco Comments    quit 38 years ago     Family History:   Family History   Problem Relation Age of Onset   • Heart disease Father    • Coronary artery disease Father    • Prostate cancer Father    • Coronary artery disease Mother    • Diabetes Mother         mellitus   • Diabetes type II Mother    • Diabetes Maternal Grandmother         mellitus   • Coronary artery disease Maternal Grandfather    • Coronary artery disease Paternal Grandfather        Meds/Allergies   Current Outpatient Medications   Medication Sig Dispense Refill   • ALPRAZolam (XANAX) 0.25 mg tablet Take 1 tablet (0.25 mg total) by mouth 3 (three) times a day as needed for anxiety 270 tablet 1   • amoxicillin (AMOXIL) 500 mg capsule      • Blood Glucose Monitoring Suppl (OneTouch Verio) w/Device KIT Use daily (Patient taking differently: Use as needed) 1 kit 0   • carbidopa-levodopa (SINEMET)  mg per tablet Take 1 tablet by mouth Three times a day     • ezetimibe (ZETIA) 10 mg tablet TAKE 1 TABLET DAILY (Patient taking differently: Take 5 mg by mouth daily) 90 tablet 3   • FLUoxetine (PROzac) 20 mg capsule TAKE 3 CAPSULES DAILY (Patient taking differently: 10 mg) 270 capsule 3   • Lancet Devices (ONE TOUCH DELICA LANCING DEV) MISC Use daily 1 each 0   • losartan (COZAAR) 100 MG tablet Take 1 tablet (100 mg total) by mouth daily 90 tablet 3   • metFORMIN (GLUCOPHAGE) 500 mg tablet Take 2 tablets (1,000 mg total) by mouth 2 (two) times a day with meals 360 tablet 3   • metoprolol succinate (TOPROL-XL) 25 mg 24 hr tablet Take 1 tablet (25 mg total) by mouth daily 90 tablet 3   • Multiple Vitamins-Minerals (CENTRUM ADULTS PO) Take by mouth in the morning     • nystatin (MYCOSTATIN) cream Apply topically 2 (two) times a day 30 g 0   • OneTouch Delica Lancets 33G MISC Use daily 300 each 1   • OneTouch Verio test strip USE TO TEST DAILY 100 strip 3   • pantoprazole (PROTONIX) 40 mg tablet TAKE 1 TABLET TWICE A DAY (1 TABLET IN THE MORNING AND 1 TABLET IN THE EVENING) 180 tablet 3   • rosuvastatin (CRESTOR) 5 mg tablet Take 1 tablet (5 mg total) by mouth once a week 12 tablet 3   • semaglutide, 2 mg/dose, (Ozempic, 2 MG/DOSE,) 8 mg/ mL injection pen Inject 0.75 mL (2 mg total) under the skin every 7 days 9 mL 0   • trospium chloride (SANCTURA) 20 mg tablet Take 1 tablet (20 mg total) by mouth 2 (two) times a day 180 tablet 3   • zolpidem (AMBIEN) 10 mg tablet Take 1 tablet (10 mg total) by mouth daily at bedtime as needed for sleep 90 tablet 0   • chlorhexidine (PERIDEX) 0.12 % solution RINSE WITH 1/2 OUNCE FOR 30 SECONDS THEN SPIT OUT FOR TWICE A DAY (Patient not taking: Reported on 8/21/2023)       No current facility-administered medications for this visit. Allergies   Allergen Reactions   • Celebrex [Celecoxib] Other (See Comments)     Cardiologist stated he should not take     • Chlorhexidine Hives     Is able to wash with hibiclens but not use michi cloths   • Other      "steroid eyedrops"     • Prednisone Other (See Comments)     Prednisone eye drops- eye pressure increases       Objective   Vitals: Blood pressure 136/90, pulse 62, height 5' 9" (1.753 m), weight 134 kg (295 lb 6.4 oz). Physical Exam  Vitals reviewed. Constitutional:       General: He is not in acute distress. Appearance: Normal appearance. He is obese. He is not ill-appearing, toxic-appearing or diaphoretic. HENT:      Head: Normocephalic and atraumatic. Eyes:      General: No scleral icterus. Extraocular Movements: Extraocular movements intact. Cardiovascular:      Rate and Rhythm: Normal rate and regular rhythm. Heart sounds: Normal heart sounds. No murmur heard. Pulmonary:      Effort: Pulmonary effort is normal. No respiratory distress.       Breath sounds: Normal breath sounds. No wheezing or rales. Abdominal:      General: There is no distension. Palpations: Abdomen is soft. Tenderness: There is no abdominal tenderness. Musculoskeletal:      Cervical back: Neck supple. Right lower leg: No edema. Left lower leg: No edema. Lymphadenopathy:      Cervical: No cervical adenopathy. Skin:     General: Skin is warm and dry. Neurological:      General: No focal deficit present. Mental Status: He is alert and oriented to person, place, and time. Gait: Gait normal.   Psychiatric:         Mood and Affect: Mood normal.         Behavior: Behavior normal.         Thought Content: Thought content normal.         Judgment: Judgment normal.         The history was obtained from the review of the chart, patient.     Lab Results:   Lab Results   Component Value Date/Time    Hemoglobin A1C 5.7 08/18/2023 02:37 PM    Hemoglobin A1C 5.9 (H) 04/20/2023 12:59 PM    Hemoglobin A1C 5.8 01/18/2023 02:51 PM    WBC 8.44 04/20/2023 12:59 PM    WBC 11.34 (H) 02/21/2023 02:25 PM    WBC 10.02 08/31/2022 06:04 AM    Hemoglobin 14.1 04/20/2023 12:59 PM    Hemoglobin 15.4 02/21/2023 02:25 PM    Hemoglobin 13.6 08/31/2022 06:04 AM    Hematocrit 41.6 04/20/2023 12:59 PM    Hematocrit 44.4 02/21/2023 02:25 PM    Hematocrit 38.7 08/31/2022 06:04 AM    MCV 87 04/20/2023 12:59 PM    MCV 86 02/21/2023 02:25 PM    MCV 88 08/31/2022 06:04 AM    Platelets 168 53/13/1221 12:59 PM    Platelets 370 35/29/3763 02:25 PM    Platelets 145 43/37/2591 06:04 AM    BUN 13 04/20/2023 12:59 PM    BUN 18 02/22/2023 06:42 AM    BUN 14 02/21/2023 02:25 PM    Potassium 3.7 04/20/2023 12:59 PM    Potassium 3.6 02/22/2023 06:42 AM    Potassium 3.6 02/21/2023 02:25 PM    Chloride 103 04/20/2023 12:59 PM    Chloride 105 02/22/2023 06:42 AM    Chloride 104 02/21/2023 02:25 PM    CO2 25 04/20/2023 12:59 PM    CO2 24 02/22/2023 06:42 AM    CO2 24 02/21/2023 02:25 PM    Creatinine 1.07 04/20/2023 12:59 PM Creatinine 1.03 02/22/2023 06:42 AM    Creatinine 1.05 02/21/2023 02:25 PM    AST 17 04/20/2023 12:59 PM    AST 19 02/21/2023 02:25 PM    ALT 32 04/20/2023 12:59 PM    ALT 32 02/21/2023 02:25 PM    Total Protein 6.8 04/20/2023 12:59 PM    Total Protein 7.1 02/21/2023 02:25 PM    Albumin 3.5 04/20/2023 12:59 PM    Albumin 3.7 02/21/2023 02:25 PM    HDL, Direct 43 04/20/2023 12:59 PM    Triglycerides 70 04/20/2023 12:59 PM           Portions of the record may have been created with voice recognition software. Occasional wrong word or "sound a like" substitutions may have occurred due to the inherent limitations of voice recognition software. Read the chart carefully and recognize, using context, where substitutions have occurred.

## 2023-09-19 DIAGNOSIS — F41.8 DEPRESSION WITH ANXIETY: ICD-10-CM

## 2023-09-19 RX ORDER — ALPRAZOLAM 0.25 MG/1
0.25 TABLET ORAL 3 TIMES DAILY PRN
Qty: 270 TABLET | Refills: 0 | Status: SHIPPED | OUTPATIENT
Start: 2023-09-19 | End: 2024-06-25

## 2023-10-03 ENCOUNTER — OFFICE VISIT (OUTPATIENT)
Dept: PODIATRY | Facility: CLINIC | Age: 68
End: 2023-10-03

## 2023-10-03 VITALS
BODY MASS INDEX: 43.62 KG/M2 | RESPIRATION RATE: 18 BRPM | HEIGHT: 69 IN | DIASTOLIC BLOOD PRESSURE: 71 MMHG | SYSTOLIC BLOOD PRESSURE: 149 MMHG | HEART RATE: 62 BPM

## 2023-10-03 DIAGNOSIS — B35.1 ONYCHOMYCOSIS: ICD-10-CM

## 2023-10-03 DIAGNOSIS — E11.9 CONTROLLED TYPE 2 DIABETES MELLITUS WITHOUT COMPLICATION, WITHOUT LONG-TERM CURRENT USE OF INSULIN (HCC): Primary | ICD-10-CM

## 2023-10-03 PROCEDURE — NCFTCARE PR NON-COVERED FOOT CARE: Performed by: PODIATRIST

## 2023-10-05 ENCOUNTER — FOLLOW UP (OUTPATIENT)
Dept: URBAN - METROPOLITAN AREA CLINIC 6 | Facility: CLINIC | Age: 68
End: 2023-10-05

## 2023-10-05 DIAGNOSIS — H40.053: ICD-10-CM

## 2023-10-05 DIAGNOSIS — H35.413: ICD-10-CM

## 2023-10-05 DIAGNOSIS — H35.371: ICD-10-CM

## 2023-10-05 DIAGNOSIS — H33.013: ICD-10-CM

## 2023-10-05 DIAGNOSIS — E11.9: ICD-10-CM

## 2023-10-05 PROCEDURE — 92014 COMPRE OPH EXAM EST PT 1/>: CPT

## 2023-10-05 ASSESSMENT — VISUAL ACUITY
OD_CC: 20/25
OS_CC: 20/20

## 2023-10-05 ASSESSMENT — TONOMETRY
OD_IOP_MMHG: 20
OS_IOP_MMHG: 20

## 2023-10-10 ENCOUNTER — OFFICE VISIT (OUTPATIENT)
Dept: FAMILY MEDICINE CLINIC | Facility: CLINIC | Age: 68
End: 2023-10-10
Payer: MEDICARE

## 2023-10-10 VITALS
BODY MASS INDEX: 44.23 KG/M2 | OXYGEN SATURATION: 96 % | SYSTOLIC BLOOD PRESSURE: 140 MMHG | HEIGHT: 69 IN | TEMPERATURE: 98.6 F | DIASTOLIC BLOOD PRESSURE: 88 MMHG | WEIGHT: 298.6 LBS | HEART RATE: 83 BPM | RESPIRATION RATE: 18 BRPM

## 2023-10-10 DIAGNOSIS — G20.A2 PARKINSON'S DISEASE WITHOUT DYSKINESIA, WITH FLUCTUATING MANIFESTATIONS: Primary | ICD-10-CM

## 2023-10-10 PROCEDURE — 99214 OFFICE O/P EST MOD 30 MIN: CPT | Performed by: NURSE PRACTITIONER

## 2023-10-10 NOTE — PROGRESS NOTES
[facility] - [facility]    NAME: Katy Sylvester  AGE: 69 y.o. SEX: male  : 1955     DATE: 10/10/2023     Assessment and Plan:     Problem List Items Addressed This Visit        Nervous and Auditory    Parkinson disease - Primary     Patient was evaluated at [facility] by neurology and is currently being treated for Parkinson's. He remains on Sinemet. He is tolerating this medication well however he states he is having issues with increased libido. He is scheduled for an dopamine MRI next week and was instructed he needed to come off of his Prozac for that scan. He has been weaned off of that medication. Since that time he has noticed an increase in his libido. He states that his wife is not interested in sex at this time. He states there are days he wakes up with an erection which has not happened in many years. It is becoming concerning to the patient at this time. I did recommend the patient discuss this with his neurologist next week as I do not see that it is related to his Sinemet however he states it is a very rare side effect. It may be more due to him coming off the Prozac which may have decreased his libido for years. He will send me a message next week letting me know if he is able to go back on his Prozac. No follow-ups on file. Chief Complaint:     Chief Complaint   Patient presents with   • Medication Problem     Pt states he has medication issues, hasn't had covid or flu shot yet. Also hasn't gotten blood work done will get it done before yearly visit         History of Present Illness:     Patient was seeing Dr. Carlos Flor in neurology. Went for second opinion at [employer]. Given diagnosis of parkinson's. Started on Carvidpoa-levodopa. Has side effect. Increased libido. Had prostatectomy several years ago. Waking up with erection. Now with increased sexual thoughts. Started when he began taking carvidopa-levidopa.  In addition, he had been on prozac for about 20 years and needed to wean off of this  For his dopamine MRI next week. The increased libido may be due to his weaning off the prozac as decreased libido is common with this class of medications. He will have his dopamine scan next week and speak to his neurologist. If he is able to start back on his prozac at that time he will restart. In addition, incontinence issues have improved with sinemet. Review of Systems:     Review of Systems   Constitutional: Negative. Negative for fatigue. HENT: Positive for voice change (stuttering). Negative for congestion, postnasal drip, rhinorrhea and trouble swallowing. Eyes: Negative. Negative for visual disturbance. Respiratory: Negative. Negative for choking and shortness of breath. Cardiovascular: Negative. Negative for chest pain. Gastrointestinal: Negative. Endocrine: Negative. Genitourinary: Negative. Musculoskeletal: Negative. Negative for arthralgias, back pain, myalgias and neck pain. Skin: Negative. Neurological: Positive for tremors and speech difficulty. Negative for dizziness and headaches. Psychiatric/Behavioral: Negative.          Problem List:     Patient Active Problem List   Diagnosis   • Status post total left knee replacement   • Stuttering   • Sleep apnea   • Type 2 diabetes mellitus, without long-term current use of insulin ([facility])   • Ambulatory dysfunction   • Benign essential hypertension   • 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, without long-term current use of insulin ([facility])   • Insomnia   • Depression with anxiety   • Onychomycosis   • Hyperplasia of renal artery (HCC)   • Molina's esophagus with dysplasia   • Dysphagia   • Exertional dyspnea   • PONV (postoperative nausea and vomiting)   • Gastroparesis   • Ventral hernia without obstruction or gangrene   • Gastroesophageal reflux disease with esophagitis without hemorrhage   • Tremor   • Neuropathy   • Pure hypercholesterolemia   • Dermatitis   • Obesity, Class III, BMI 40-49.9 (morbid obesity) (HCC)   • Hx of laparoscopic adjustable gastric banding   • History of total knee arthroplasty, right   • Syncope   • Urinary urgency   • Generalized weakness   • Class 3 severe obesity due to excess calories with serious comorbidity and body mass index (BMI) of 40.0 to 44.9 in adult [facility])   • CAD (coronary artery disease)   • Carpal tunnel syndrome of right wrist   • Family history of Parkinson disease   • Parkinson disease   • Hyperlipidemia        Objective:     /88   Pulse 83   Temp 98.6 °F (37 °C) (Tympanic)   Resp 18   Ht 5' 9" (1.753 m)   Wt 135 kg (298 lb 9.6 oz)   SpO2 96%   BMI 44.10 kg/m²     Current Outpatient Medications   Medication Sig Dispense Refill   • ALPRAZolam (XANAX) 0.25 mg tablet Take 1 tablet (0.25 mg total) by mouth 3 (three) times a day as needed for anxiety 270 tablet 0   • Blood Glucose Monitoring Suppl (OneTouch Verio) w/Device KIT Use daily (Patient taking differently: Use as needed) 1 kit 0   • carbidopa-levodopa (SINEMET)  mg per tablet Take 1 tablet by mouth Three times a day     • chlorhexidine (PERIDEX) 0.12 % solution      • ezetimibe (ZETIA) 10 mg tablet TAKE 1 TABLET DAILY (Patient taking differently: Take 5 mg by mouth daily) 90 tablet 3   • Lancet Devices (ONE TOUCH DELICA LANCING DEV) MISC Use daily 1 each 0   • losartan (COZAAR) 100 MG tablet Take 1 tablet (100 mg total) by mouth daily 90 tablet 3   • metFORMIN (GLUCOPHAGE) 500 mg tablet Take 2 tablets (1,000 mg total) by mouth 2 (two) times a day with meals 360 tablet 3   • metoprolol succinate (TOPROL-XL) 25 mg 24 hr tablet Take 1 tablet (25 mg total) by mouth daily 90 tablet 3   • Multiple Vitamins-Minerals (CENTRUM ADULTS PO) Take by mouth in the morning     • nystatin (MYCOSTATIN) cream Apply topically 2 (two) times a day (Patient taking differently: Apply topically 2 (two) times a day as needed) 30 g 0   • OneTouch Delica Lancets 35E MISC Use daily 300 each 1   • OneTouch Verio test strip USE TO TEST DAILY 100 strip 3   • pantoprazole (PROTONIX) 40 mg tablet TAKE 1 TABLET TWICE A DAY (1 TABLET IN THE MORNING AND 1 TABLET IN THE EVENING) 180 tablet 3   • rosuvastatin (CRESTOR) 5 mg tablet Take 1 tablet (5 mg total) by mouth once a week 12 tablet 3   • semaglutide, 2 mg/dose, (Ozempic, 2 MG/DOSE,) 8 mg/ mL injection pen Inject 0.75 mL (2 mg total) under the skin every 7 days 9 mL 0   • trospium chloride (SANCTURA) 20 mg tablet Take 1 tablet (20 mg total) by mouth 2 (two) times a day 180 tablet 3   • zolpidem (AMBIEN) 10 mg tablet Take 1 tablet (10 mg total) by mouth daily at bedtime as needed for sleep 90 tablet 0     No current facility-administered medications for this visit. Physical Exam  Vitals reviewed. Constitutional:       Appearance: Normal appearance. He is obese. HENT:      Head: Normocephalic and atraumatic. Nose: Nose normal.      Mouth/Throat:      Mouth: Mucous membranes are moist.   Eyes:      Extraocular Movements: Extraocular movements intact. Pupils: Pupils are equal, round, and reactive to light. Cardiovascular:      Rate and Rhythm: Normal rate and regular rhythm. Pulses: Normal pulses. Heart sounds: Normal heart sounds. Pulmonary:      Effort: Pulmonary effort is normal.      Breath sounds: Normal breath sounds. Musculoskeletal:         General: Normal range of motion. Skin:     General: Skin is warm. Neurological:      General: No focal deficit present. Mental Status: He is alert and oriented to person, place, and time. Psychiatric:         Mood and Affect: Mood normal.         Behavior: Behavior normal.         Thought Content:  Thought content normal.         Judgment: Judgment normal.         Corine Costello, [address]

## 2023-10-10 NOTE — ASSESSMENT & PLAN NOTE
Patient was evaluated at [facility] by neurology and is currently being treated for Parkinson's. He remains on Sinemet. He is tolerating this medication well however he states he is having issues with increased libido. He is scheduled for an dopamine MRI next week and was instructed he needed to come off of his Prozac for that scan. He has been weaned off of that medication. Since that time he has noticed an increase in his libido. He states that his wife is not interested in sex at this time. He states there are days he wakes up with an erection which has not happened in many years. It is becoming concerning to the patient at this time. I did recommend the patient discuss this with his neurologist next week as I do not see that it is related to his Sinemet however he states it is a very rare side effect. It may be more due to him coming off the Prozac which may have decreased his libido for years. He will send me a message next week letting me know if he is able to go back on his Prozac.

## 2023-10-19 ENCOUNTER — OFFICE VISIT (OUTPATIENT)
Dept: FAMILY MEDICINE CLINIC | Facility: CLINIC | Age: 68
End: 2023-10-19
Payer: MEDICARE

## 2023-10-19 VITALS
BODY MASS INDEX: 44.23 KG/M2 | WEIGHT: 298.6 LBS | SYSTOLIC BLOOD PRESSURE: 124 MMHG | DIASTOLIC BLOOD PRESSURE: 88 MMHG | TEMPERATURE: 99.6 F | HEIGHT: 69 IN | RESPIRATION RATE: 16 BRPM

## 2023-10-19 DIAGNOSIS — F41.8 DEPRESSION WITH ANXIETY: ICD-10-CM

## 2023-10-19 DIAGNOSIS — F80.81 STUTTERING: Primary | ICD-10-CM

## 2023-10-19 DIAGNOSIS — G20.A2 PARKINSON'S DISEASE WITHOUT DYSKINESIA, WITH FLUCTUATING MANIFESTATIONS: ICD-10-CM

## 2023-10-19 DIAGNOSIS — G89.29 CHRONIC BILATERAL LOW BACK PAIN WITHOUT SCIATICA: ICD-10-CM

## 2023-10-19 DIAGNOSIS — M54.50 CHRONIC BILATERAL LOW BACK PAIN WITHOUT SCIATICA: ICD-10-CM

## 2023-10-19 PROCEDURE — 99214 OFFICE O/P EST MOD 30 MIN: CPT | Performed by: FAMILY MEDICINE

## 2023-10-19 NOTE — PROGRESS NOTES
Name: Yared White      : 1955      MRN: [id number]  Encounter Provider: Kinjal Kurtz MD  Encounter Date: 10/19/2023   Encounter department: [address] [place] [address],[address]     {There are no diagnoses linked to this encounter.  (Refresh or delete this SmartLink)}       Subjective      HPI  Review of Systems    Current Outpatient Medications on File Prior to Visit   Medication Sig    ALPRAZolam (XANAX) 0.25 mg tablet Take 1 tablet (0.25 mg total) by mouth 3 (three) times a day as needed for anxiety    Blood Glucose Monitoring Suppl (OneTouch Verio) w/Device KIT Use daily (Patient taking differently: Use as needed)    carbidopa-levodopa (SINEMET)  mg per tablet Take 1 tablet by mouth Three times a day    chlorhexidine (PERIDEX) 0.12 % solution     ezetimibe (ZETIA) 10 mg tablet TAKE 1 TABLET DAILY (Patient taking differently: Take 5 mg by mouth daily)    Lancet Devices (ONE TOUCH DELICA LANCING DEV) MISC Use daily    losartan (COZAAR) 100 MG tablet Take 1 tablet (100 mg total) by mouth daily    metFORMIN (GLUCOPHAGE) 500 mg tablet Take 2 tablets (1,000 mg total) by mouth 2 (two) times a day with meals    metoprolol succinate (TOPROL-XL) 25 mg 24 hr tablet Take 1 tablet (25 mg total) by mouth daily    Multiple Vitamins-Minerals (CENTRUM ADULTS PO) Take by mouth in the morning    nystatin (MYCOSTATIN) cream Apply topically 2 (two) times a day (Patient taking differently: Apply topically 2 (two) times a day as needed)    OneTouch Delica Lancets 28B MISC Use daily    OneTouch Verio test strip USE TO TEST DAILY    pantoprazole (PROTONIX) 40 mg tablet TAKE 1 TABLET TWICE A DAY (1 TABLET IN THE MORNING AND 1 TABLET IN THE EVENING)    rosuvastatin (CRESTOR) 5 mg tablet Take 1 tablet (5 mg total) by mouth once a week    semaglutide, 2 mg/dose, (Ozempic, 2 MG/DOSE,) 8 mg/ mL injection pen Inject 0.75 mL (2 mg total) under the skin every 7 days    trospium chloride (SANCTURA) 20 mg tablet Take 1 tablet (20 mg total) by mouth 2 (two) times a day    zolpidem (AMBIEN) 10 mg tablet Take 1 tablet (10 mg total) by mouth daily at bedtime as needed for sleep       Objective     /88   Temp 99.6 °F (37.6 °C) (Tympanic)   Resp 16   Ht 5' 9" (1.753 m)   Wt 135 kg (298 lb 9.6 oz)   BMI 44.10 kg/m²     Physical Exam  Alesia Ross MD

## 2023-10-19 NOTE — PROGRESS NOTES
For your review. If this is not an appropriate consult please let provider know or forward to intake as appropriate.

## 2023-10-19 NOTE — PROGRESS NOTES
Name: Brigette Crook      : 1955      MRN: [id number]  Encounter Provider: Connie Pérez MD  Encounter Date: 10/19/2023   Encounter department: [address] [place] [address],[address]     1. Stuttering  Assessment & Plan:  Refer to psychiatry. Orders:  -     AMB E-CONSULT TO PSYCHIATRY  -     Ambulatory referral to [facility]; Future    2. Depression with anxiety  Assessment & Plan:  Advised pt to go back on fluoxetine, 20mg daily for 1 week, then 40mg daily. Use xanax 0.25mg 3-4 times/day. Consider cymbalta. Refer to psychiatry. Orders:  -     AMB E-CONSULT TO PSYCHIATRY  -     Ambulatory referral to [facility]; Future    3. Parkinson's disease without dyskinesia, with fluctuating manifestations  Assessment & Plan:   neurology. Next appt will be next week. 4. Chronic bilateral low back pain without sciatica  Assessment & Plan:  2023 Lumbar xray showed mild DDD. Continue tylenol. Subjective      HPI    Pt is here by himself. Pt was on zyprexa and fluoxetine for stuttering/depression/anxiety for years. 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 He weaned off zyprexa and fluoxetine for 2 month. Pt saw [place] neurologist and had DaTscan which confirmed Parkinson syndrome. He started on sinemet 25-100mg tid per neurology. Pt felt hypersexuality since he start sinemet and stopped zyprexa/fluoxetine. Neurologist suggest not go back on zyprexa per pt. Anxiety/depression---- He was on fluoxetine 60mg QD for 25 years. He weaned off fluoxetine now. He is on xanax 0.25mg tid, sometimes used 4 times per day. Denies HI/SI. Pt feels frustrated that he cannot get a psychiatry appt. Also, pt would like to find a Parkinson support group locally, did not hear any information made him frustrated. Lower back pain for 6 months. Come and go. Worse in the morning. No falls. Denies fever. Denies BM incontinence.    Urine incontinence is better after shirley.  8/2023 lumbar xray showed mild degenerative disease. Tylenol helped. Review of Systems   Constitutional:  Negative for appetite change, chills and fever. HENT:  Negative for congestion, ear pain, sinus pain and sore throat. Eyes:  Negative for discharge and itching. Respiratory:  Negative for apnea, cough, chest tightness, shortness of breath and wheezing. Cardiovascular:  Negative for chest pain, palpitations and leg swelling. Gastrointestinal:  Negative for abdominal pain, anal bleeding, constipation, diarrhea, nausea and vomiting. Endocrine: Negative for cold intolerance, heat intolerance and polyuria. Genitourinary:  Negative for difficulty urinating and dysuria. Musculoskeletal:  Positive for back pain. Negative for arthralgias and myalgias. Skin:  Negative for rash. Neurological:  Negative for dizziness and headaches. Psychiatric/Behavioral:  Positive for sleep disturbance. Negative for agitation, self-injury and suicidal ideas. The patient is nervous/anxious.         Current Outpatient Medications on File Prior to Visit   Medication Sig    ALPRAZolam (XANAX) 0.25 mg tablet Take 1 tablet (0.25 mg total) by mouth 3 (three) times a day as needed for anxiety    Blood Glucose Monitoring Suppl (OneTouch Verio) w/Device KIT Use daily (Patient taking differently: Use as needed)    carbidopa-levodopa (SINEMET)  mg per tablet Take 1 tablet by mouth Three times a day    chlorhexidine (PERIDEX) 0.12 % solution     ezetimibe (ZETIA) 10 mg tablet TAKE 1 TABLET DAILY (Patient taking differently: Take 5 mg by mouth daily)    Lancet Devices (ONE TOUCH DELICA LANCING DEV) MISC Use daily    losartan (COZAAR) 100 MG tablet Take 1 tablet (100 mg total) by mouth daily    metFORMIN (GLUCOPHAGE) 500 mg tablet Take 2 tablets (1,000 mg total) by mouth 2 (two) times a day with meals    metoprolol succinate (TOPROL-XL) 25 mg 24 hr tablet Take 1 tablet (25 mg total) by mouth daily    Multiple Vitamins-Minerals (CENTRUM ADULTS PO) Take by mouth in the morning    nystatin (MYCOSTATIN) cream Apply topically 2 (two) times a day (Patient taking differently: Apply topically 2 (two) times a day as needed)    OneTouch Delica Lancets 26J MISC Use daily    OneTouch Verio test strip USE TO TEST DAILY    pantoprazole (PROTONIX) 40 mg tablet TAKE 1 TABLET TWICE A DAY (1 TABLET IN THE MORNING AND 1 TABLET IN THE EVENING)    rosuvastatin (CRESTOR) 5 mg tablet Take 1 tablet (5 mg total) by mouth once a week    semaglutide, 2 mg/dose, (Ozempic, 2 MG/DOSE,) 8 mg/ mL injection pen Inject 0.75 mL (2 mg total) under the skin every 7 days    trospium chloride (SANCTURA) 20 mg tablet Take 1 tablet (20 mg total) by mouth 2 (two) times a day    zolpidem (AMBIEN) 10 mg tablet Take 1 tablet (10 mg total) by mouth daily at bedtime as needed for sleep       Objective     /88   Temp 99.6 °F (37.6 °C) (Tympanic)   Resp 16   Ht 5' 9" (1.753 m)   Wt 135 kg (298 lb 9.6 oz)   BMI 44.10 kg/m²     Physical Exam  Constitutional:       Appearance: He is well-developed. He is obese. HENT:      Head: Normocephalic and atraumatic. Eyes:      General:         Right eye: No discharge. Left eye: No discharge. Conjunctiva/sclera: Conjunctivae normal.   Cardiovascular:      Rate and Rhythm: Normal rate and regular rhythm. Heart sounds: Normal heart sounds. No murmur heard. No friction rub. No gallop. Pulmonary:      Effort: Pulmonary effort is normal. No respiratory distress. Breath sounds: Normal breath sounds. No wheezing or rales. Abdominal:      General: Bowel sounds are normal. There is no distension. Palpations: Abdomen is soft. Tenderness: There is no abdominal tenderness. There is no guarding. Musculoskeletal:         General: Tenderness present. Cervical back: Normal range of motion and neck supple.       Comments: B/l lower back tenderness, no swollen or erythema   Neurological: Mental Status: He is alert.        Tuan Barlow MD

## 2023-10-19 NOTE — ASSESSMENT & PLAN NOTE
Advised pt to go back on fluoxetine, 20mg daily for 1 week, then 40mg daily. Use xanax 0.25mg 3-4 times/day. Consider cymbalta. Refer to psychiatry.

## 2023-10-20 ENCOUNTER — E-CONSULT (OUTPATIENT)
Dept: PSYCHIATRY | Facility: CLINIC | Age: 68
End: 2023-10-20

## 2023-10-20 NOTE — PROGRESS NOTES
E-Consult unable to be completed. Dr Olivares Marker: How can I help with the e consult? Need more specific information.

## 2023-10-20 NOTE — PROGRESS NOTES
E-Consult  Eleonora Mclaughlin 69 y.o. male MRN: [id number]  Encounter Date: 10/20/23      Reason for Consult / Principal Problem: Pt is a 69years old man who was on zyprexa and fluoxetine 60mg QD for stuttering/depression/anxiety for many years which helped. Recently he had gait problem. 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 He weaned off zyprexa and fluoxetine per neurology before DaTscan. Pt had DaTscan last week which confirmed Parkinson syndrome. He started on sinemet 25-100mg tid per neurology. Pt felt hypersexuality since he start sinemet and stopped zyprexa/fluoxetine. Neurologist suggest not go back on zyprexa. My questions are   1. What can he use for stuttering? 2. Will stopping fluoxetine cause hypersexuality since he was on fluoxetine for many years? 3. If he go back on fluoxetine, can he go back on 60mg QD directly or gradually increase dose? Consulting Provider: Rex Engel MD    Requesting Provider: Iliana Neal MD       ASSESSMENT:  69year old male treated with fluoxetine 60 mg for MDD and Olanzapine 2.5 mg for stuttering. Recently stopped both medications while completing work up for Parkinson's disease. Experiencing hypersexuality after stopping medications and starting carbi-levodopa for Parkinson's disease. RECOMMENDATIONS:  Resume Olanzapine 2.5 mg daily for stuttering, currently no FDA approved medication for stuttering but in general dopamine blocking agents are helpful in treating this condition. Patients with PD may develop levodopa-induced hypersexuality and or  other impulse control disorders. Restarting Fluoxetine can be helpful in reducing hypersexuality and recommend starting back at 20 mg and gradually tapering to previous maintenance dose. Total time spent >5 min, >50% time spent reviewing/analyzing record, written report will be generated in the EMR. Jimmy Cristobal

## 2023-10-29 DIAGNOSIS — E78.2 MIXED HYPERLIPIDEMIA: ICD-10-CM

## 2023-10-30 RX ORDER — ROSUVASTATIN CALCIUM 5 MG/1
5 TABLET, COATED ORAL WEEKLY
Qty: 12 TABLET | Refills: 3 | Status: SHIPPED | OUTPATIENT
Start: 2023-10-30

## 2023-11-01 ENCOUNTER — EVALUATION (OUTPATIENT)
Dept: PHYSICAL THERAPY | Age: 68
End: 2023-11-01
Payer: MEDICARE

## 2023-11-01 VITALS — DIASTOLIC BLOOD PRESSURE: 90 MMHG | SYSTOLIC BLOOD PRESSURE: 150 MMHG

## 2023-11-01 DIAGNOSIS — G20.A1 PARKINSON'S DISEASE, UNSPECIFIED WHETHER DYSKINESIA PRESENT, UNSPECIFIED WHETHER MANIFESTATIONS FLUCTUATE: Primary | ICD-10-CM

## 2023-11-01 DIAGNOSIS — R32 URINARY INCONTINENCE, UNSPECIFIED TYPE: ICD-10-CM

## 2023-11-01 DIAGNOSIS — R26.9 ABNORMALITY OF GAIT: ICD-10-CM

## 2023-11-01 PROCEDURE — 97110 THERAPEUTIC EXERCISES: CPT

## 2023-11-01 PROCEDURE — 97162 PT EVAL MOD COMPLEX 30 MIN: CPT

## 2023-11-02 NOTE — PROGRESS NOTES
PT Evaluation     Today's date: 2023  Patient name: Ellen Blackwood  : 1955  MRN: [id number]  Referring provider: Noemi Corcoran,*  Dx:   Encounter Diagnosis     ICD-10-CM    1. Parkinson's disease, unspecified whether dyskinesia present, unspecified whether manifestations fluctuate  G20. A1       2. Urinary incontinence, unspecified type  R32 Ambulatory Referral to Physical Therapy      3. Abnormality of gait  R26.9                      Assessment/Plan    Subjective Evaluation    History of Present Illness  Onset date: last 2 months worsening gait and balance.     Objective  Allergies  Reviewed by Delphine Ivory at 11:37 PM   Severity Reactions Comments   Celebrex [celecoxib] Not Specified Other (See Comments) Cardiologist stated he should not take   Chlorhexidine Not Specified Hives Is able to wash with hibiclens but not use michi cloths   Other Not Specified  "steroid eyedrops"   Prednisone Not Specified Other (See Comments) Prednisone eye drops- eye pressure increases            Precautions:      Manuals                                                                 Neuro Re-Ed                                                                                                        Ther Ex                                                                                                                     Ther Activity                                       Gait Training                                       Modalities

## 2023-11-09 ENCOUNTER — APPOINTMENT (OUTPATIENT)
Dept: PHYSICAL THERAPY | Age: 68
End: 2023-11-09
Payer: MEDICARE

## 2023-11-24 ENCOUNTER — APPOINTMENT (OUTPATIENT)
Dept: LAB | Age: 68
End: 2023-11-24
Payer: MEDICARE

## 2023-11-24 DIAGNOSIS — E78.2 MIXED HYPERLIPIDEMIA: ICD-10-CM

## 2023-11-24 DIAGNOSIS — I49.3 FREQUENT PVCS: ICD-10-CM

## 2023-11-24 DIAGNOSIS — I10 ESSENTIAL HYPERTENSION: ICD-10-CM

## 2023-11-24 DIAGNOSIS — E11.9 TYPE 2 DIABETES MELLITUS WITHOUT COMPLICATION, WITHOUT LONG-TERM CURRENT USE OF INSULIN (HCC): ICD-10-CM

## 2023-11-24 DIAGNOSIS — I25.10 CORONARY ARTERY DISEASE INVOLVING NATIVE CORONARY ARTERY OF NATIVE HEART WITHOUT ANGINA PECTORIS: ICD-10-CM

## 2023-11-24 DIAGNOSIS — K22.719 BARRETT'S ESOPHAGUS WITH DYSPLASIA: ICD-10-CM

## 2023-11-24 DIAGNOSIS — E13.9 DIABETES 1.5, MANAGED AS TYPE 1 (HCC): ICD-10-CM

## 2023-11-24 DIAGNOSIS — E66.01 CLASS 3 SEVERE OBESITY DUE TO EXCESS CALORIES WITH SERIOUS COMORBIDITY AND BODY MASS INDEX (BMI) OF 45.0 TO 49.9 IN ADULT (HCC): ICD-10-CM

## 2023-11-24 DIAGNOSIS — E78.00 PURE HYPERCHOLESTEROLEMIA: ICD-10-CM

## 2023-11-24 DIAGNOSIS — E11.641 TYPE 2 DIABETES MELLITUS WITH HYPOGLYCEMIA AND COMA, WITHOUT LONG-TERM CURRENT USE OF INSULIN (HCC): ICD-10-CM

## 2023-11-24 LAB
ALBUMIN SERPL BCP-MCNC: 3.9 G/DL (ref 3.5–5)
ALP SERPL-CCNC: 60 U/L (ref 34–104)
ALT SERPL W P-5'-P-CCNC: 7 U/L (ref 7–52)
ANION GAP SERPL CALCULATED.3IONS-SCNC: 8 MMOL/L
AST SERPL W P-5'-P-CCNC: 19 U/L (ref 13–39)
BASOPHILS # BLD AUTO: 0.06 THOUSANDS/ÂΜL (ref 0–0.1)
BASOPHILS NFR BLD AUTO: 1 % (ref 0–1)
BILIRUB SERPL-MCNC: 0.52 MG/DL (ref 0.2–1)
BUN SERPL-MCNC: 13 MG/DL (ref 5–25)
CALCIUM SERPL-MCNC: 8.7 MG/DL (ref 8.4–10.2)
CHLORIDE SERPL-SCNC: 102 MMOL/L (ref 96–108)
CHOLEST SERPL-MCNC: 132 MG/DL
CO2 SERPL-SCNC: 25 MMOL/L (ref 21–32)
CREAT SERPL-MCNC: 0.79 MG/DL (ref 0.6–1.3)
CREAT UR-MCNC: 145.2 MG/DL
EOSINOPHIL # BLD AUTO: 0.64 THOUSAND/ÂΜL (ref 0–0.61)
EOSINOPHIL NFR BLD AUTO: 10 % (ref 0–6)
ERYTHROCYTE [DISTWIDTH] IN BLOOD BY AUTOMATED COUNT: 13 % (ref 11.6–15.1)
EST. AVERAGE GLUCOSE BLD GHB EST-MCNC: 126 MG/DL
GFR SERPL CREATININE-BSD FRML MDRD: 91 ML/MIN/1.73SQ M
GLUCOSE P FAST SERPL-MCNC: 117 MG/DL (ref 65–99)
HBA1C MFR BLD: 6 %
HCT VFR BLD AUTO: 42 % (ref 36.5–49.3)
HDLC SERPL-MCNC: 47 MG/DL
HGB BLD-MCNC: 14.3 G/DL (ref 12–17)
IMM GRANULOCYTES # BLD AUTO: 0.02 THOUSAND/UL (ref 0–0.2)
IMM GRANULOCYTES NFR BLD AUTO: 0 % (ref 0–2)
LDLC SERPL CALC-MCNC: 75 MG/DL (ref 0–100)
LYMPHOCYTES # BLD AUTO: 1.46 THOUSANDS/ÂΜL (ref 0.6–4.47)
LYMPHOCYTES NFR BLD AUTO: 23 % (ref 14–44)
MCH RBC QN AUTO: 29.7 PG (ref 26.8–34.3)
MCHC RBC AUTO-ENTMCNC: 34 G/DL (ref 31.4–37.4)
MCV RBC AUTO: 87 FL (ref 82–98)
MICROALBUMIN UR-MCNC: <7 MG/L
MICROALBUMIN/CREAT 24H UR: <5 MG/G CREATININE (ref 0–30)
MONOCYTES # BLD AUTO: 0.53 THOUSAND/ÂΜL (ref 0.17–1.22)
MONOCYTES NFR BLD AUTO: 9 % (ref 4–12)
NEUTROPHILS # BLD AUTO: 3.53 THOUSANDS/ÂΜL (ref 1.85–7.62)
NEUTS SEG NFR BLD AUTO: 57 % (ref 43–75)
NONHDLC SERPL-MCNC: 85 MG/DL
NRBC BLD AUTO-RTO: 0 /100 WBCS
PLATELET # BLD AUTO: 250 THOUSANDS/UL (ref 149–390)
PMV BLD AUTO: 9 FL (ref 8.9–12.7)
POTASSIUM SERPL-SCNC: 4.1 MMOL/L (ref 3.5–5.3)
PROT SERPL-MCNC: 6.5 G/DL (ref 6.4–8.4)
RBC # BLD AUTO: 4.82 MILLION/UL (ref 3.88–5.62)
SODIUM SERPL-SCNC: 135 MMOL/L (ref 135–147)
TRIGL SERPL-MCNC: 50 MG/DL
TSH SERPL DL<=0.05 MIU/L-ACNC: 1.04 UIU/ML (ref 0.45–4.5)
WBC # BLD AUTO: 6.24 THOUSAND/UL (ref 4.31–10.16)

## 2023-11-24 PROCEDURE — 82570 ASSAY OF URINE CREATININE: CPT

## 2023-11-24 PROCEDURE — 82043 UR ALBUMIN QUANTITATIVE: CPT

## 2023-11-24 PROCEDURE — 85025 COMPLETE CBC W/AUTO DIFF WBC: CPT

## 2023-11-24 PROCEDURE — 80053 COMPREHEN METABOLIC PANEL: CPT

## 2023-11-24 PROCEDURE — 84443 ASSAY THYROID STIM HORMONE: CPT

## 2023-11-24 PROCEDURE — 80061 LIPID PANEL: CPT

## 2023-11-24 PROCEDURE — 83036 HEMOGLOBIN GLYCOSYLATED A1C: CPT

## 2023-11-24 PROCEDURE — 36415 COLL VENOUS BLD VENIPUNCTURE: CPT

## 2023-11-27 ENCOUNTER — TELEPHONE (OUTPATIENT)
Age: 68
End: 2023-11-27

## 2023-11-27 DIAGNOSIS — C61 MALIGNANT NEOPLASM OF PROSTATE (HCC): Primary | ICD-10-CM

## 2023-11-27 NOTE — TELEPHONE ENCOUNTER
Patient requesting PSA order to be placed in chart for his yearly apt in January. Please call patient once this is placed. I also do see in note that per Hugh Kiser he wanted patient to be seen in October. Patient canceled apt for 11/28/23 with Frandy Bang. CB: [phone]     Patient did have PSA in 4/20/2023 not sure if another is needed or will be covered.

## 2023-11-28 ENCOUNTER — OFFICE VISIT (OUTPATIENT)
Dept: PHYSICAL THERAPY | Age: 68
End: 2023-11-28
Payer: MEDICARE

## 2023-11-28 DIAGNOSIS — R26.9 ABNORMALITY OF GAIT: Primary | ICD-10-CM

## 2023-11-28 DIAGNOSIS — G20.A1 PARKINSON'S DISEASE, UNSPECIFIED WHETHER DYSKINESIA PRESENT, UNSPECIFIED WHETHER MANIFESTATIONS FLUCTUATE: ICD-10-CM

## 2023-11-28 PROCEDURE — 97530 THERAPEUTIC ACTIVITIES: CPT

## 2023-11-28 PROCEDURE — 97112 NEUROMUSCULAR REEDUCATION: CPT

## 2023-11-28 PROCEDURE — 97110 THERAPEUTIC EXERCISES: CPT

## 2023-11-28 NOTE — PROGRESS NOTES
Daily Note     Today's date: 2023  Patient name: Keily Schwarz  : 1955  MRN: [id number]  Referring provider: Jerzy Martinez,*  Dx:   Encounter Diagnosis     ICD-10-CM    1. Abnormality of gait  R26.9       2. Parkinson's disease, unspecified whether dyskinesia present, unspecified whether manifestations fluctuate  G20. A1                      Subjective: "the medication I am now on for Parkinson's disease is definitely helping me. "  MIKY Hernandez supervising units. Foto improved from 53 to 56 today . Pt was on vacation for 2 weeks returns to continue PT at this time. Objective: See treatment diary below      Assessment: Tolerated treatment well. Patient would benefit from continued PT, cybex weight training performed today in addition to high level balance activities. Plan: Continue per plan of care.       Precautions:      Manuals 23            I eval                                                    Neuro Re-Ed               squats 3 x 10 3 x 10           Tandem gait Cg of 10 ft x 4 10 ft x 4           Tandem stance 30 sec x 3            Braiding, sidestepping close superv  Cg /close sup 10 ft x 2           Fwd, bwd walking in ll bars  10 ft x 4           Step ups             Heelraises/ toe raises  3 x 10           Ther Ex             Standing hip flex, ext , knee flexion, hip abd  3 x 10           Nu step               Cybex wts le's              Leg ext  3 x 10 24#           Leg press   3 x 10 60#           Hamstring curls   40# 3 x 10           Hip abd   40# 3 x 10                        Ther Activity                                       Gait Training

## 2023-11-30 ENCOUNTER — OFFICE VISIT (OUTPATIENT)
Dept: FAMILY MEDICINE CLINIC | Facility: CLINIC | Age: 68
End: 2023-11-30
Payer: MEDICARE

## 2023-11-30 VITALS
SYSTOLIC BLOOD PRESSURE: 138 MMHG | TEMPERATURE: 97.1 F | HEIGHT: 69 IN | RESPIRATION RATE: 16 BRPM | WEIGHT: 291.3 LBS | BODY MASS INDEX: 43.14 KG/M2 | OXYGEN SATURATION: 98 % | DIASTOLIC BLOOD PRESSURE: 68 MMHG | HEART RATE: 60 BPM

## 2023-11-30 DIAGNOSIS — E78.5 HYPERLIPIDEMIA, UNSPECIFIED HYPERLIPIDEMIA TYPE: ICD-10-CM

## 2023-11-30 DIAGNOSIS — Z23 ENCOUNTER FOR IMMUNIZATION: ICD-10-CM

## 2023-11-30 DIAGNOSIS — F41.8 DEPRESSION WITH ANXIETY: ICD-10-CM

## 2023-11-30 DIAGNOSIS — F80.81 STUTTERING: ICD-10-CM

## 2023-11-30 DIAGNOSIS — G47.00 INSOMNIA, UNSPECIFIED TYPE: ICD-10-CM

## 2023-11-30 DIAGNOSIS — E11.42 CONTROLLED TYPE 2 DIABETES MELLITUS WITH DIABETIC POLYNEUROPATHY, WITHOUT LONG-TERM CURRENT USE OF INSULIN (HCC): Primary | ICD-10-CM

## 2023-11-30 DIAGNOSIS — E66.01 CLASS 3 SEVERE OBESITY DUE TO EXCESS CALORIES WITH SERIOUS COMORBIDITY AND BODY MASS INDEX (BMI) OF 40.0 TO 44.9 IN ADULT (HCC): ICD-10-CM

## 2023-11-30 DIAGNOSIS — I10 BENIGN ESSENTIAL HYPERTENSION: ICD-10-CM

## 2023-11-30 DIAGNOSIS — G20.A2 PARKINSON'S DISEASE WITHOUT DYSKINESIA, WITH FLUCTUATING MANIFESTATIONS: ICD-10-CM

## 2023-11-30 PROCEDURE — G0008 ADMIN INFLUENZA VIRUS VAC: HCPCS

## 2023-11-30 PROCEDURE — 90662 IIV NO PRSV INCREASED AG IM: CPT

## 2023-11-30 PROCEDURE — 99214 OFFICE O/P EST MOD 30 MIN: CPT | Performed by: NURSE PRACTITIONER

## 2023-11-30 RX ORDER — ZOLPIDEM TARTRATE 10 MG/1
10 TABLET ORAL
Qty: 90 TABLET | Refills: 0 | Status: SHIPPED | OUTPATIENT
Start: 2023-11-30 | End: 2024-02-28

## 2023-11-30 NOTE — PROGRESS NOTES
Name: Eleonora Mclaughlin      : 1955      MRN: [id number]  Encounter Provider: ED Hamilton  Encounter Date: 2023   Encounter department: [facility] [address]     1. Controlled type 2 diabetes mellitus with diabetic polyneuropathy, without long-term current use of insulin ([address])  Assessment & Plan:    Lab Results   Component Value Date    HGBA1C 6.0 (H) 2023     Well controlled  Managed by Endo   Continue Ozempic and Metformin       2. Parkinson's disease without dyskinesia, with fluctuating manifestations  Assessment & Plan:  Managed by [facility]   Curently on carbidopa-levodopa which he is tolerating       3. Benign essential hypertension  Assessment & Plan:  Stable, managed by Cardiology  Follow up with Cardiology as scheduled       4. Insomnia, unspecified type  Assessment & Plan:  Stable, may continue Ambien prn, SE reviewed     Orders:  -     zolpidem (AMBIEN) 10 mg tablet; Take 1 tablet (10 mg total) by mouth daily at bedtime as needed for sleep    5. Stuttering  Assessment & Plan:  Back on Prozac but no longer on Zyprexa per Neurology's recommendation   Stable overall       6. Depression with anxiety  Assessment & Plan:   Back on Prozac which he is happy with  Continue to follow with therapist as well       7. Class 3 severe obesity due to excess calories with serious comorbidity and body mass index (BMI) of 40.0 to 44.9 in adult [facility])  Assessment & Plan:  Continue with lifestyle modifications       8. Hyperlipidemia, unspecified hyperlipidemia type  Assessment & Plan:  Stable, continue Zetia       9. Encounter for immunization  -     influenza vaccine, high-dose, PF 0.7 mL (FLUZONE HIGH-DOSE)           Subjective     HPI    Pt presents by himself today for a routine follow up  Doing relatively well overall    He is now following with [facility] for Parkinson's disease, taking carbidopa-levodopa and seems to be tolerating this well. Back on Prozac and is alternating between 20mg and 40mg daily. Prior to having a WAYNE scan with Neurology, he had to stop his Zyprexa and Prozac. He noticed a significant increase in his libido following that. Since restarting Prozac, this has mildly improved. He is also following with a therapist now     When he was off of the Zyprexa and Prozac, his stutter did worsen and he ended up biting his tongue quite a few times. Ended up seeing [facility] and had a few biopsies which were normal     Following with Endo for his DMT2 which is well controlled on Ozempic, A1C recently stable at 6.0    Also seeing Cardiology and Urology on a regular basis     Review of Systems   Constitutional:  Negative for chills and fever. HENT:  Negative for ear pain and sore throat. Eyes:  Negative for pain and visual disturbance. Respiratory:  Negative for cough and shortness of breath. Cardiovascular:  Negative for chest pain and palpitations. Gastrointestinal:  Negative for abdominal pain and vomiting. Genitourinary:  Negative for dysuria and hematuria. Musculoskeletal:  Negative for arthralgias and back pain. Skin:  Negative for color change and rash. Neurological:  Positive for tremors. Negative for seizures, syncope and weakness. Hematological:  Negative for adenopathy. Does not bruise/bleed easily. Psychiatric/Behavioral:  Positive for dysphoric mood. Negative for self-injury, sleep disturbance and suicidal ideas. The patient is nervous/anxious. All other systems reviewed and are negative.       Past Medical History:   Diagnosis Date    Acute blood loss anemia 10/13/2016    Anxiety     Arthritis     knees    Arthritis     Asthma     stable for > 10 years    [place] esophagus     with BARRX/RFA    Cancer ([facility])     prostate    Cataract     Depression     Diabetes ([facility])     Diabetes mellitus ([facility])     pre diabetes    Diverticulitis of colon     Environmental allergies     Fall 12/30/2020    GERD (gastroesophageal reflux disease)     Hiatal hernia     History of anal fissures     Hyperlipidemia     Hypertension     Incisional hernia     Insomnia     Kidney stone     Malignant neoplasm of prostate (HCC)     Morbid obesity (HCC)     Peripheral neuropathy     PONV (postoperative nausea and vomiting)     Prolapsing mitral valve     prolapsing mitral valve leaflet syndrome    Prostate cancer ([address])     Retinal detachment     treated surgically at [facility] eye; resolved: 10/10/2012    Sleep apnea     diagnosed but unable to tolerate cpap.      Stuttering      Past Surgical History:   Procedure Laterality Date    ARTHROSCOPIC REPAIR ACL Right     BIOPSY CORE NEEDLE      prostate    CATARACT EXTRACTION Bilateral     COLONOSCOPY      HERNIA REPAIR      naval    JOINT REPLACEMENT      B/L knee    KNEE ARTHROSCOPY Left     LAPAROSCOPIC GASTRIC BANDING      VT ARTHRP KNE CONDYLE&PLATU MEDIAL&LAT COMPARTMENTS Left 02/15/2017    Procedure: TOTAL KNEE ARTHROPLASTY ;  Surgeon: Jackson Long MD;  Location: [facility];  Service: Orthopedics    VT ARTHRP KNE CONDYLE&PLATU MEDIAL&LAT COMPARTMENTS Right 10/10/2016    Procedure: ARTHROPLASTY KNEE TOTAL;  Surgeon: Jackson Long MD;  Location: [facility];  Service: Orthopedics    PROSTATECTOMY      robotic-assisted; SLBEULAH-Dr. Huey Cat    REMOVAL GASTRIC BAND LAPAROSCOPIC N/A 8/30/2022    Procedure: LAPAROSCOPIC REMOVAL OF ADJUSTABLE GASTRIC BAND AND PORT  WITH INTRAOPERATIVE EGD;  Surgeon: Doretha Hill MD;  Location: [facility];  Service: Bariatrics    RETINAL DETACHMENT SURGERY Bilateral     [facility]    SEPTOPLASTY      SINUS SURGERY      TONSILLECTOMY      over age 13    UPPER GASTROINTESTINAL ENDOSCOPY      mutiple with BARRX/RFA    VASECTOMY      vas deferens     Family History   Problem Relation Age of Onset    Heart disease Father     Coronary artery disease Father     Prostate cancer Father     Coronary artery disease Mother     Diabetes Mother         mellitus    Diabetes type II Mother Diabetes Maternal Grandmother         mellitus    Coronary artery disease Maternal Grandfather     Coronary artery disease Paternal Grandfather      Social History     Socioeconomic History    Marital status: /Civil Union     Spouse name: None    Number of children: 0    Years of education: None    Highest education level: None   Occupational History    Occupation:    Tobacco Use    Smoking status: Never     Passive exposure: Never    Smokeless tobacco: Never    Tobacco comments:     quit 38 years ago   Vaping Use    Vaping Use: Never used   Substance and Sexual Activity    Alcohol use: Not Currently     Alcohol/week: 1.0 standard drink of alcohol     Types: 1 Cans of beer per week     Comment: 1 case of beer per year    Drug use: Never    Sexual activity: Yes     Partners: Female     Birth control/protection: Inserts, Post-menopausal, Male Sterilization   Other Topics Concern    None   Social History Narrative    ** Merged History Encounter **          Social Determinants of Health     Financial Resource Strain: Low Risk  (5/27/2021)    Overall Financial Resource Strain (CARDIA)     Difficulty of Paying Living Expenses: Not hard at all   Food Insecurity: No Food Insecurity (5/27/2021)    Hunger Vital Sign     Worried About Running Out of Food in the Last Year: Never true     Ran Out of Food in the Last Year: Never true   Transportation Needs: No Transportation Needs (5/27/2021)    PRAPARE - Transportation     Lack of Transportation (Medical): No     Lack of Transportation (Non-Medical): No   Physical Activity: Insufficiently Active (11/7/2019)    Exercise Vital Sign     Days of Exercise per Week: 1 day     Minutes of Exercise per Session: 50 min   Stress: No Stress Concern Present (11/7/2019)    [facility]     Feeling of Stress : Not at all   Social Connections:  Moderately Isolated (11/7/2019)    Social Connection and Isolation Panel [NHANES]     Frequency of Communication with Friends and Family:  Three times a week     Frequency of Social Gatherings with Friends and Family: More than three times a week     Attends Sikh Services: Never     Active Member of Clubs or Organizations: No     Attends Club or Organization Meetings: Never     Marital Status:    Intimate Partner Violence: Not At Risk (11/7/2019)    Humiliation, Afraid, Rape, and Kick questionnaire     Fear of Current or Ex-Partner: No     Emotionally Abused: No     Physically Abused: No     Sexually Abused: No   Housing Stability: Not on file     Current Outpatient Medications on File Prior to Visit   Medication Sig    ALPRAZolam (XANAX) 0.25 mg tablet Take 1 tablet (0.25 mg total) by mouth 3 (three) times a day as needed for anxiety    amoxicillin (AMOXIL) 500 mg capsule TAKE 4 CAPSULES BY MOUTH 1 HOUR PRIOR TO PROCEDURE    Blood Glucose Monitoring Suppl (OneTouch Verio) w/Device KIT Use daily (Patient taking differently: Use as needed)    carbidopa-levodopa (SINEMET)  mg per tablet Take 1 tablet by mouth Three times a day    chlorhexidine (PERIDEX) 0.12 % solution     ezetimibe (ZETIA) 10 mg tablet TAKE 1 TABLET DAILY (Patient taking differently: Take 5 mg by mouth daily)    Lancet Devices (ONE TOUCH DELICA LANCING DEV) MISC Use daily    losartan (COZAAR) 100 MG tablet Take 1 tablet (100 mg total) by mouth daily    metFORMIN (GLUCOPHAGE) 500 mg tablet Take 2 tablets (1,000 mg total) by mouth 2 (two) times a day with meals    metoprolol succinate (TOPROL-XL) 25 mg 24 hr tablet Take 1 tablet (25 mg total) by mouth daily    Multiple Vitamins-Minerals (CENTRUM ADULTS PO) Take by mouth in the morning    nystatin (MYCOSTATIN) cream Apply topically 2 (two) times a day (Patient taking differently: Apply topically 2 (two) times a day as needed)    OneTouch Delica Lancets 53D MISC Use daily    OneTouch Verio test strip USE TO TEST DAILY    pantoprazole (PROTONIX) 40 mg tablet TAKE 1 TABLET TWICE A DAY (1 TABLET IN THE MORNING AND 1 TABLET IN THE EVENING)    rosuvastatin (CRESTOR) 5 mg tablet TAKE 1 TABLET ONCE A WEEK    semaglutide, 2 mg/dose, (Ozempic, 2 MG/DOSE,) 8 mg/ mL injection pen Inject 0.75 mL (2 mg total) under the skin every 7 days    trospium chloride (SANCTURA) 20 mg tablet Take 1 tablet (20 mg total) by mouth 2 (two) times a day    oxyCODONE-acetaminophen (PERCOCET) 5-325 mg per tablet     [DISCONTINUED] zolpidem (AMBIEN) 10 mg tablet Take 1 tablet (10 mg total) by mouth daily at bedtime as needed for sleep     Allergies   Allergen Reactions    Celebrex [Celecoxib] Other (See Comments)     Cardiologist stated he should not take      Chlorhexidine Hives     Is able to wash with hibiclens but not use michi cloths    Other      "steroid eyedrops"      Prednisone Other (See Comments)     Prednisone eye drops- eye pressure increases     Immunization History   Administered Date(s) Administered    COVID-19 MODERNA VACC 0.5 ML IM 02/20/2021, 03/19/2021    INFLUENZA 10/14/2022    Influenza Quadrivalent Preservative Free 3 years and older IM 10/20/2016, 10/26/2017    Influenza, high dose seasonal 0.7 mL 10/21/2019, 09/28/2020, 01/04/2022, 10/14/2022    Influenza, recombinant, quadrivalent,injectable, preservative free 11/01/2018    Influenza, seasonal, injectable 12/03/2009, 12/12/2012, 11/18/2013, 11/01/2014, 11/05/2015    Pneumococcal Conjugate 13-Valent 05/02/2019    Pneumococcal Polysaccharide PPV23 10/20/2016    Td (adult), adsorbed 07/16/1999    Zoster Vaccine Recombinant 12/06/2018, 02/04/2019       Objective     /68   Pulse 60   Temp (!) 97.1 °F (36.2 °C) (Tympanic)   Resp 16   Ht 5' 9" (1.753 m)   Wt 132 kg (291 lb 4.8 oz)   SpO2 98%   BMI 43.02 kg/m²     Physical Exam  Constitutional:       General: He is not in acute distress. Appearance: He is well-developed. He is obese. He is not ill-appearing, toxic-appearing or diaphoretic.    HENT:      Head: Normocephalic and atraumatic. Eyes:      Extraocular Movements: Extraocular movements intact. Conjunctiva/sclera: Conjunctivae normal.      Pupils: Pupils are equal, round, and reactive to light. Neck:      Thyroid: No thyromegaly. Cardiovascular:      Rate and Rhythm: Normal rate and regular rhythm. Heart sounds: Normal heart sounds. No murmur heard. Pulmonary:      Effort: Pulmonary effort is normal. No respiratory distress. Breath sounds: Normal breath sounds. No wheezing. Abdominal:      General: Bowel sounds are normal. There is no distension. Palpations: Abdomen is soft. Tenderness: There is no abdominal tenderness. Musculoskeletal:         General: Normal range of motion. Cervical back: Normal range of motion and neck supple. No rigidity or tenderness. Lymphadenopathy:      Cervical: No cervical adenopathy. Skin:     General: Skin is warm and dry. Neurological:      General: No focal deficit present. Mental Status: He is alert and oriented to person, place, and time. Mental status is at baseline. Psychiatric:         Mood and Affect: Mood normal.         Behavior: Behavior normal.         Thought Content:  Thought content normal.         Judgment: Judgment normal.       ED Julien

## 2023-11-30 NOTE — ASSESSMENT & PLAN NOTE
Lab Results   Component Value Date    HGBA1C 6.0 (H) 11/24/2023     Well controlled  Managed by Endo   Continue Ozempic and Metformin

## 2023-11-30 NOTE — ASSESSMENT & PLAN NOTE
Managed by [facility]   Curently on carbidopa-levodopa which he is tolerating

## 2023-12-01 ENCOUNTER — OFFICE VISIT (OUTPATIENT)
Dept: PHYSICAL THERAPY | Age: 68
End: 2023-12-01
Payer: MEDICARE

## 2023-12-01 DIAGNOSIS — R26.9 ABNORMALITY OF GAIT: Primary | ICD-10-CM

## 2023-12-01 DIAGNOSIS — G20.A1 PARKINSON'S DISEASE, UNSPECIFIED WHETHER DYSKINESIA PRESENT, UNSPECIFIED WHETHER MANIFESTATIONS FLUCTUATE: ICD-10-CM

## 2023-12-01 PROCEDURE — 97530 THERAPEUTIC ACTIVITIES: CPT

## 2023-12-01 PROCEDURE — 97110 THERAPEUTIC EXERCISES: CPT

## 2023-12-01 NOTE — PROGRESS NOTES
Daily Note     Today's date: 2023  Patient name: Renate Fernandez  : 1955  MRN: [id number]  Referring provider: Alicia Solomon,*  Dx:   Encounter Diagnosis     ICD-10-CM    1. Abnormality of gait  R26.9       2. Parkinson's disease, unspecified whether dyskinesia present, unspecified whether manifestations fluctuate  G20. A1                      Subjective: no new c/o's       Objective: See treatment diary below      Assessment: Tolerated treatment well. Patient demonstrated fatigue post treatment      Plan: Continue per plan of care.       Precautions:      Manuals 23           I eval                                                    Neuro Re-Ed               squats 3 x 10 3 x 10 3 x 10          Tandem gait Cg of 10 ft x 4 10 ft x 4           Tandem stance 30 sec x 3            Braiding, sidestepping close superv  Cg /close sup 10 ft x 2           Fwd, bwd walking in ll bars  10 ft x 4           Step ups             Heelraises/ toe raises  3 x 10           Ther Ex             Standing hip flex, ext , knee flexion, hip abd  3 x 10           Nu step     15 min r 5          Cybex wts le's              Leg ext  3 x 10 24# 24# 3 x 10          Leg press   3 x 10 60# 100# 3 x 10          Hamstring curls   40# 3 x 10 50# 3 x 10          Hip abd   40# 3 x 10 40# 3 x 10                       Ther Activity                                       Gait Training

## 2023-12-05 ENCOUNTER — OFFICE VISIT (OUTPATIENT)
Dept: PHYSICAL THERAPY | Age: 68
End: 2023-12-05
Payer: MEDICARE

## 2023-12-05 DIAGNOSIS — R26.9 ABNORMALITY OF GAIT: ICD-10-CM

## 2023-12-05 DIAGNOSIS — G20.A1 PARKINSON'S DISEASE, UNSPECIFIED WHETHER DYSKINESIA PRESENT, UNSPECIFIED WHETHER MANIFESTATIONS FLUCTUATE: Primary | ICD-10-CM

## 2023-12-05 PROCEDURE — 97112 NEUROMUSCULAR REEDUCATION: CPT

## 2023-12-05 PROCEDURE — 97530 THERAPEUTIC ACTIVITIES: CPT

## 2023-12-05 PROCEDURE — 97110 THERAPEUTIC EXERCISES: CPT

## 2023-12-05 NOTE — PROGRESS NOTES
Daily Note     Today's date: 2023  Patient name: Peyton Gauthier  : 1955  MRN: [id number]  Referring provider: Julian Arias,*  Dx:   Encounter Diagnosis     ICD-10-CM    1. Parkinson's disease, unspecified whether dyskinesia present, unspecified whether manifestations fluctuate  G20. A1       2. Abnormality of gait  R26.9                      Subjective: "I am not feeling that well today. My knees are sore and my balance is off."      Objective: See treatment diary below      Assessment: Tolerated treatment well. Patient would benefit from continued PT. Emphasis placed on balance today. Refrain from use of knee extension machine use free wts next session for leg strengthening due to excessive knee flexion angle of the machine. Plan: Continue per plan of care.       Precautions:      Manuals 23          I eval                                                    Neuro Re-Ed               squats 3 x 10 3 x 10 3 x 10 3 x 10         Tandem gait Cg of 10 ft x 4 10 ft x 4  10 ft x 4         Tandem stance 30 sec x 3   30 sec x 3         Braiding, sidestepping close superv  Cg /close sup 10 ft x 2  Close super  4 times x 10 ft          Fwd, bwd walking in ll bars  10 ft x 4  10 ft x 4         Step ups    2 x 10         Heelraises/ toe raises  3 x 10           Ther Ex             Standing hip flex, ext , knee flexion, hip abd  3 x 10           Nu step     15 min r 5 15 min r 5         Cybex wts le's              Leg ext  3 x 10 24# 24# 3 x 10          Leg press   3 x 10 60# 100# 3 x 10          Hamstring curls   40# 3 x 10 50# 3 x 10          Hip abd   40# 3 x 10 40# 3 x 10          Step over colored hurdles fwd ( single and double feet, and side stepping ,     10 ft x 4 each         Ther Activity             Hand to opposite knee gait cg of 1    10 ft x 4                      Gait Training

## 2023-12-07 ENCOUNTER — OFFICE VISIT (OUTPATIENT)
Dept: CARDIOLOGY CLINIC | Facility: CLINIC | Age: 68
End: 2023-12-07
Payer: MEDICARE

## 2023-12-07 ENCOUNTER — OFFICE VISIT (OUTPATIENT)
Dept: BARIATRICS | Facility: CLINIC | Age: 68
End: 2023-12-07
Payer: MEDICARE

## 2023-12-07 VITALS
WEIGHT: 293 LBS | HEART RATE: 65 BPM | HEIGHT: 69 IN | SYSTOLIC BLOOD PRESSURE: 132 MMHG | BODY MASS INDEX: 43.4 KG/M2 | OXYGEN SATURATION: 99 % | DIASTOLIC BLOOD PRESSURE: 72 MMHG

## 2023-12-07 VITALS
WEIGHT: 293.4 LBS | RESPIRATION RATE: 16 BRPM | HEIGHT: 69 IN | SYSTOLIC BLOOD PRESSURE: 144 MMHG | DIASTOLIC BLOOD PRESSURE: 76 MMHG | HEART RATE: 61 BPM | BODY MASS INDEX: 43.45 KG/M2

## 2023-12-07 DIAGNOSIS — I49.3 PVC (PREMATURE VENTRICULAR CONTRACTION): ICD-10-CM

## 2023-12-07 DIAGNOSIS — E66.01 CLASS 3 SEVERE OBESITY DUE TO EXCESS CALORIES WITH SERIOUS COMORBIDITY AND BODY MASS INDEX (BMI) OF 40.0 TO 44.9 IN ADULT (HCC): Primary | ICD-10-CM

## 2023-12-07 DIAGNOSIS — K22.719 BARRETT'S ESOPHAGUS WITH DYSPLASIA: ICD-10-CM

## 2023-12-07 DIAGNOSIS — G20.A2 PARKINSON'S DISEASE WITHOUT DYSKINESIA, WITH FLUCTUATING MANIFESTATIONS: ICD-10-CM

## 2023-12-07 DIAGNOSIS — E78.5 HYPERLIPIDEMIA, UNSPECIFIED HYPERLIPIDEMIA TYPE: ICD-10-CM

## 2023-12-07 DIAGNOSIS — I10 BENIGN ESSENTIAL HYPERTENSION: Primary | ICD-10-CM

## 2023-12-07 DIAGNOSIS — I25.10 CORONARY ARTERY DISEASE INVOLVING NATIVE CORONARY ARTERY OF NATIVE HEART WITHOUT ANGINA PECTORIS: ICD-10-CM

## 2023-12-07 DIAGNOSIS — I10 BENIGN ESSENTIAL HYPERTENSION: ICD-10-CM

## 2023-12-07 DIAGNOSIS — G47.33 OBSTRUCTIVE SLEEP APNEA SYNDROME: ICD-10-CM

## 2023-12-07 DIAGNOSIS — E11.9 TYPE 2 DIABETES MELLITUS WITHOUT COMPLICATION, WITHOUT LONG-TERM CURRENT USE OF INSULIN (HCC): ICD-10-CM

## 2023-12-07 PROCEDURE — 99214 OFFICE O/P EST MOD 30 MIN: CPT | Performed by: INTERNAL MEDICINE

## 2023-12-07 PROCEDURE — 99214 OFFICE O/P EST MOD 30 MIN: CPT | Performed by: NURSE PRACTITIONER

## 2023-12-07 RX ORDER — FLUOXETINE HYDROCHLORIDE 20 MG/1
20 CAPSULE ORAL DAILY
COMMUNITY

## 2023-12-07 NOTE — ASSESSMENT & PLAN NOTE
- Taking Crestor. May improve with weight loss and lifestyle modification. Continue management with prescribing provider.

## 2023-12-07 NOTE — ASSESSMENT & PLAN NOTE
- Patient is pursuing Conservative Program  - Initial weight loss goal of 5-10% weight loss for improved health  - Already on metformin 1000 mg twice a day through primary care. - Has been on Ozempic for about 2 years. Start weight was 318 lbs. Continue Ozempic 2 mg weekly. No negative side effects and helping with appetite. Discussed potentially changing to [facility], but he would like to continue Ozempic.    - Patient denies personal history of pancreatitis. Patient also denies personal and family history of thyroid cancer and multiple endocrine neoplasia type 2 (MEN 2 tumor). - Mobility has been better since starting Carbidopa-Levodopa for Parkinson's disease, has been able to increase walking.   - Labs reviewed: A1C, TSH, lipid, CBC, and CMP 11/24/2023. A1C controlled at 6.0, glucose elevated, which will likely improve with weight loss. Remainder of the blood work within acceptable range. Initial MWM: 298 lbs  Last visit: 298.6 lbs BMI 44.10  Current: 293.4 lbs BmI 43.96  Change: -5 lbs (-6 lbs since the last office visit)  Goal: 257.5 lbs LTG, 280 lbs STG    Goals:  Do not skip meals. Start food logging, weighing, and measuring food. 2000 snehal/day. Keep up the good work with water intake, at least 64 ounces daily. Gradually increase walking as tolerated. Continue Ozempic.

## 2023-12-07 NOTE — ASSESSMENT & PLAN NOTE
- Taking metformin and Ozempic. May improve with weight loss and lifestyle modification. Continue management with prescribing provider.        Lab Results   Component Value Date    HGBA1C 6.0 (H) 11/24/2023

## 2023-12-07 NOTE — PROGRESS NOTES
Cardiology Follow Up    Yamilka Darby [facility]  1955  [id number]  [id number] [facility] CATH LAB  [address]  [facility]  [phone]    1. Benign essential hypertension        2. Coronary artery disease involving native coronary artery of native heart without angina pectoris        3. Hyperlipidemia, unspecified hyperlipidemia type        4. PVC (premature ventricular contraction)            Interval History: Cardiology follow-up, continuation of cardiac care. Multiple records reviewed. Imaging was reviewed with available. 70-year-old male who has no clinical coronary disease, although his diagnosis is on the chart. There is family history of premature coronary disease, he does have history of premature ventricular tractions well-controlled on beta-blocker therapy. Echocardiogram 2019 revealed normal left ventricular systolic function with a stage I diastolic dysfunction and no significant valvular abnormalities. Pharmacological stress test 2019 revealed a fixed basal anterior and mid anterior defect described as artifactual.  Ejection fraction of 71%. The patient was diagnosed with Parkinson disease and is suffering from ambulatory dysfunction, he was admitted to the hospital on 2/22 with generalized weakness. Patient does have dyslipidemia he is on low intensity statin therapy plus Zetia, lipids this year total cholesterol 132, HDL 47 LDL 71. Recent potassium was normal at 4.1, normal creatinine of 0.7. Compliant with low-salt diet, his blood pressures been well-controlled. Patient is not on antiplatelet therapy.     Patient Active Problem List   Diagnosis    Status post total left knee replacement    Stuttering    Sleep apnea    Type 2 diabetes mellitus, without long-term current use of insulin (HCC)    Ambulatory dysfunction    Benign essential hypertension    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, without long-term current use of insulin (HCC)    Insomnia    Depression with anxiety    Onychomycosis    Hyperplasia of renal artery (HCC)    Molina's esophagus with dysplasia    Dysphagia    Exertional dyspnea    PONV (postoperative nausea and vomiting)    Gastroparesis    Ventral hernia without obstruction or gangrene    Gastroesophageal reflux disease with esophagitis without hemorrhage    Tremor    Neuropathy    Pure hypercholesterolemia    Dermatitis    Obesity, Class III, BMI 40-49.9 (morbid obesity) (HCC)    Hx of laparoscopic adjustable gastric banding    History of total knee arthroplasty, right    Syncope    Urinary urgency    Generalized weakness    Class 3 severe obesity due to excess calories with serious comorbidity and body mass index (BMI) of 40.0 to 44.9 in adult [facility])    CAD (coronary artery disease)    Carpal tunnel syndrome of right wrist    Family history of Parkinson disease    Parkinson disease    Hyperlipidemia    Chronic bilateral low back pain without sciatica    PVC (premature ventricular contraction)     Past Medical History:   Diagnosis Date    Acute blood loss anemia 10/13/2016    Anxiety     Arthritis     knees    Arthritis     Asthma     stable for > 10 years    Molina esophagus     with BARRX/RFA    Cancer ([address])     prostate    Cataract     Depression     Diabetes ([address])     Diabetes mellitus ([address])     pre diabetes    Diverticulitis of colon     Environmental allergies     Fall 12/30/2020    GERD (gastroesophageal reflux disease)     Hiatal hernia     History of anal fissures     Hyperlipidemia     Hypertension     Incisional hernia     Insomnia     Kidney stone     Malignant neoplasm of prostate ([address])     Morbid obesity (HCC)     Peripheral neuropathy     PONV (postoperative nausea and vomiting)     Prolapsing mitral valve     prolapsing mitral valve leaflet syndrome    Prostate cancer ([address])     Retinal detachment     treated surgically at [facility] eye; resolved: 10/10/2012    Sleep apnea     diagnosed but unable to tolerate cpap. Stuttering      Social History     Socioeconomic History    Marital status: /Civil Union     Spouse name: Not on file    Number of children: 0    Years of education: Not on file    Highest education level: Not on file   Occupational History    Occupation:    Tobacco Use    Smoking status: Never     Passive exposure: Never    Smokeless tobacco: Never    Tobacco comments:     quit 38 years ago   Vaping Use    Vaping Use: Never used   Substance and Sexual Activity    Alcohol use: Not Currently     Alcohol/week: 1.0 standard drink of alcohol     Types: 1 Cans of beer per week     Comment: 1 case of beer per year    Drug use: Never    Sexual activity: Yes     Partners: Female     Birth control/protection: Inserts, Post-menopausal, Male Sterilization   Other Topics Concern    Not on file   Social History Narrative    ** Merged History Encounter **          Social Determinants of Health     Financial Resource Strain: Low Risk  (5/27/2021)    Overall Financial Resource Strain (CARDIA)     Difficulty of Paying Living Expenses: Not hard at all   Food Insecurity: No Food Insecurity (5/27/2021)    Hunger Vital Sign     Worried About Running Out of Food in the Last Year: Never true     Ran Out of Food in the Last Year: Never true   Transportation Needs: No Transportation Needs (5/27/2021)    PRAPARE - Transportation     Lack of Transportation (Medical): No     Lack of Transportation (Non-Medical): No   Physical Activity: Insufficiently Active (11/7/2019)    Exercise Vital Sign     Days of Exercise per Week: 1 day     Minutes of Exercise per Session: 50 min   Stress: No Stress Concern Present (11/7/2019)    [facility]     Feeling of Stress : Not at all   Social Connections:  Moderately Isolated (11/7/2019)    Social Connection and Isolation Panel [NHANES]     Frequency of Communication with Friends and Family:  Three times a week     Frequency of Social Gatherings with Friends and Family: More than three times a week     Attends [employer] Services: Never     Active Member of Clubs or Organizations: No     Attends Club or Organization Meetings: Never     Marital Status:    Intimate Partner Violence: Not At Risk (11/7/2019)    Humiliation, Afraid, Rape, and Kick questionnaire     Fear of Current or Ex-Partner: No     Emotionally Abused: No     Physically Abused: No     Sexually Abused: No   Housing Stability: Not on file      Family History   Problem Relation Age of Onset    Heart disease Father     Coronary artery disease Father     Prostate cancer Father     Coronary artery disease Mother     Diabetes Mother         mellitus    Diabetes type II Mother     Diabetes Maternal Grandmother         mellitus    Coronary artery disease Maternal Grandfather     Coronary artery disease Paternal Grandfather      Past Surgical History:   Procedure Laterality Date    ARTHROSCOPIC REPAIR ACL Right     BIOPSY CORE NEEDLE      prostate    CATARACT EXTRACTION Bilateral     COLONOSCOPY      HERNIA REPAIR      [facility]    JOINT REPLACEMENT      B/L knee    KNEE ARTHROSCOPY Left     LAPAROSCOPIC GASTRIC BANDING      NC ARTHRP KNE CONDYLE&PLATU MEDIAL&LAT COMPARTMENTS Left 02/15/2017    Procedure: TOTAL KNEE ARTHROPLASTY ;  Surgeon: Kana Martinez MD;  Location: [facility];  Service: Orthopedics    NC ARTHRP KNE CONDYLE&PLATU MEDIAL&LAT COMPARTMENTS Right 10/10/2016    Procedure: ARTHROPLASTY KNEE TOTAL;  Surgeon: Kana Martinez MD;  Location: [facility];  Service: Orthopedics    PROSTATECTOMY      robotic-assisted; [facility]-Dr. Margaux Hale    REMOVAL GASTRIC BAND LAPAROSCOPIC N/A 8/30/2022    Procedure: LAPAROSCOPIC REMOVAL OF ADJUSTABLE GASTRIC BAND AND PORT  WITH INTRAOPERATIVE EGD;  Surgeon: Eze Singh MD;  Location: [facility];  Service: Bariatrics    RETINAL DETACHMENT SURGERY Bilateral     [facility]    SEPTOPLASTY      SINUS SURGERY      TONSILLECTOMY      over age 13    UPPER GASTROINTESTINAL ENDOSCOPY      mutiple with BARRX/RFA    VASECTOMY      vas deferens       Current Outpatient Medications:     ALPRAZolam (XANAX) 0.25 mg tablet, Take 1 tablet (0.25 mg total) by mouth 3 (three) times a day as needed for anxiety, Disp: 270 tablet, Rfl: 0    amoxicillin (AMOXIL) 500 mg capsule, TAKE 4 CAPSULES BY MOUTH 1 HOUR PRIOR TO PROCEDURE, Disp: , Rfl:     carbidopa-levodopa (SINEMET)  mg per tablet, Take 1 tablet by mouth Three times a day, Disp: , Rfl:     chlorhexidine (PERIDEX) 0.12 % solution, , Disp: , Rfl:     ezetimibe (ZETIA) 10 mg tablet, TAKE 1 TABLET DAILY (Patient taking differently: Take 5 mg by mouth daily), Disp: 90 tablet, Rfl: 3    FLUoxetine (PROzac) 20 mg capsule, Take 20 mg by mouth daily Alternated between 20 mg and 40 mg daily, Disp: , Rfl:     Lancet Devices (ONE TOUCH DELICA LANCING DEV) MISC, Use daily, Disp: 1 each, Rfl: 0    losartan (COZAAR) 100 MG tablet, Take 1 tablet (100 mg total) by mouth daily, Disp: 90 tablet, Rfl: 3    metFORMIN (GLUCOPHAGE) 500 mg tablet, Take 2 tablets (1,000 mg total) by mouth 2 (two) times a day with meals, Disp: 360 tablet, Rfl: 3    metoprolol succinate (TOPROL-XL) 25 mg 24 hr tablet, Take 1 tablet (25 mg total) by mouth daily, Disp: 90 tablet, Rfl: 3    Multiple Vitamins-Minerals (CENTRUM ADULTS PO), Take by mouth in the morning, Disp: , Rfl:     nystatin (MYCOSTATIN) cream, Apply topically 2 (two) times a day (Patient taking differently: Apply topically 2 (two) times a day as needed), Disp: 30 g, Rfl: 0    OneTouch Delica Lancets 57N MISC, Use daily, Disp: 300 each, Rfl: 1    OneTouch Verio test strip, USE TO TEST DAILY, Disp: 100 strip, Rfl: 3    pantoprazole (PROTONIX) 40 mg tablet, TAKE 1 TABLET TWICE A DAY (1 TABLET IN THE MORNING AND 1 TABLET IN THE EVENING), Disp: 180 tablet, Rfl: 3    rosuvastatin (CRESTOR) 5 mg tablet, TAKE 1 TABLET ONCE A WEEK, Disp: 12 tablet, Rfl: 3    semaglutide, 2 mg/dose, (Ozempic, 2 MG/DOSE,) 8 mg/ mL injection pen, Inject 0.75 mL (2 mg total) under the skin every 7 days, Disp: 9 mL, Rfl: 0    trospium chloride (SANCTURA) 20 mg tablet, Take 1 tablet (20 mg total) by mouth 2 (two) times a day, Disp: 180 tablet, Rfl: 3    zolpidem (AMBIEN) 10 mg tablet, Take 1 tablet (10 mg total) by mouth daily at bedtime as needed for sleep, Disp: 90 tablet, Rfl: 0    Blood Glucose Monitoring Suppl (OneTouch Verio) w/Device KIT, Use daily (Patient not taking: Reported on 12/7/2023), Disp: 1 kit, Rfl: 0    oxyCODONE-acetaminophen (PERCOCET) 5-325 mg per tablet, , Disp: , Rfl:   Allergies   Allergen Reactions    Celebrex [Celecoxib] Other (See Comments)     Cardiologist stated he should not take      Chlorhexidine Hives     Is able to wash with hibiclens but not use michi cloths    Other      "steroid eyedrops"      Prednisone Other (See Comments)     Prednisone eye drops- eye pressure increases       Labs:  Lab on 11/24/2023   Component Date Value    Sodium 11/24/2023 135     Potassium 11/24/2023 4.1     Chloride 11/24/2023 102     CO2 11/24/2023 25     ANION GAP 11/24/2023 8     BUN 11/24/2023 13     Creatinine 11/24/2023 0.79     Glucose, Fasting 11/24/2023 117 (H)     Calcium 11/24/2023 8.7     AST 11/24/2023 19     ALT 11/24/2023 7     Alkaline Phosphatase 11/24/2023 60     Total Protein 11/24/2023 6.5     Albumin 11/24/2023 3.9     Total Bilirubin 11/24/2023 0.52     eGFR 11/24/2023 91     Hemoglobin A1C 11/24/2023 6.0 (H)     EAG 11/24/2023 126     Creatinine, Ur 11/24/2023 145. 2     Albumin,U,Random 11/24/2023 <7.0     Albumin Creat Ratio 11/24/2023 <5     WBC 11/24/2023 6.24     RBC 11/24/2023 4.82     Hemoglobin 11/24/2023 14.3     Hematocrit 11/24/2023 42.0     MCV 11/24/2023 87     MCH 11/24/2023 29.7     MCHC 11/24/2023 34.0     RDW 11/24/2023 13.0     MPV 11/24/2023 9.0     Platelets 21/66/4565 250     nRBC 11/24/2023 0 Neutrophils Relative 11/24/2023 57     Immat GRANS % 11/24/2023 0     Lymphocytes Relative 11/24/2023 23     Monocytes Relative 11/24/2023 9     Eosinophils Relative 11/24/2023 10 (H)     Basophils Relative 11/24/2023 1     Neutrophils Absolute 11/24/2023 3.53     Immature Grans Absolute 11/24/2023 0.02     Lymphocytes Absolute 11/24/2023 1.46     Monocytes Absolute 11/24/2023 0.53     Eosinophils Absolute 11/24/2023 0.64 (H)     Basophils Absolute 11/24/2023 0.06     Cholesterol 11/24/2023 132     Triglycerides 11/24/2023 50     HDL, Direct 11/24/2023 47     LDL Calculated 11/24/2023 75     Non-HDL-Chol (CHOL-HDL) 11/24/2023 85     TSH 3RD GENERATON 11/24/2023 1.044    Office Visit on 08/18/2023   Component Date Value    Hemoglobin A1C 08/18/2023 5.7    Procedure visit on 08/02/2023   Component Date Value    POST-VOID RESIDUAL VOLUM* 08/02/2023 60    Telephone on 07/14/2023   Component Date Value    Urine Culture 07/24/2023 No Growth <1000 cfu/mL     Color, UA 07/24/2023 Light Yellow     Clarity, UA 07/24/2023 Clear     Specific Gravity, UA 07/24/2023 1.017     pH, UA 07/24/2023 6.5     Leukocytes, UA 07/24/2023 Negative     Nitrite, UA 07/24/2023 Negative     Protein, UA 07/24/2023 Negative     Glucose, UA 07/24/2023 Negative     Ketones, UA 07/24/2023 Negative     Urobilinogen, UA 07/24/2023 <2.0     Bilirubin, UA 07/24/2023 Negative     Occult Blood, UA 07/24/2023 Negative     RBC, UA 07/24/2023 1-2     WBC, UA 07/24/2023 None Seen     Epithelial Cells 07/24/2023 None Seen     Bacteria, UA 07/24/2023 Occasional      Imaging: No results found. Review of Systems:  Review of Systems   Constitutional:  Positive for activity change. Negative for fatigue, fever and unexpected weight change. HENT:  Negative for hearing loss and nosebleeds. Eyes:  Negative for visual disturbance. Respiratory:  Negative for apnea, shortness of breath, wheezing and stridor.     Cardiovascular:  Negative for chest pain and leg swelling. Gastrointestinal:  Negative for abdominal pain, anal bleeding and blood in stool. Endocrine: Negative for cold intolerance. Genitourinary:  Positive for frequency. Negative for hematuria. Musculoskeletal:  Positive for arthralgias and gait problem. Negative for myalgias. Skin:  Negative for pallor and rash. Allergic/Immunologic: Negative for immunocompromised state. Neurological:  Positive for dizziness, tremors and speech difficulty. Negative for syncope and weakness. Hematological:  Does not bruise/bleed easily. Psychiatric/Behavioral:  Negative for sleep disturbance. The patient is not nervous/anxious. Physical Exam:  Physical Exam  Vitals reviewed. Constitutional:       General: He is not in acute distress. Appearance: Normal appearance. He is obese. He is not ill-appearing, toxic-appearing or diaphoretic. Eyes:      General: No scleral icterus. Neck:      Vascular: No carotid bruit. Cardiovascular:      Rate and Rhythm: Normal rate and regular rhythm. Pulses: Normal pulses. Heart sounds: Normal heart sounds. No murmur heard. No friction rub. No gallop. Pulmonary:      Effort: Pulmonary effort is normal. No respiratory distress. Breath sounds: Normal breath sounds. No stridor. No wheezing, rhonchi or rales. Musculoskeletal:      Right lower leg: No edema. Left lower leg: No edema. Skin:     General: Skin is warm and dry. Capillary Refill: Capillary refill takes less than 2 seconds. Coloration: Skin is not jaundiced or pale. Findings: No bruising or erythema. Neurological:      Mental Status: He is alert and oriented to person, place, and time. Psychiatric:         Mood and Affect: Mood normal.         Discussion/Summary: Cardiac arrhythmia used to be metrorrhagia contractions, well-controlled, blood pressures been controlled as well as well as lipids.   No clinical history of coronary disease, favor no changes in medications. This note was completed in part utilizing [employer] direct voice recognition software. Grammatical errors, random word insertion, spelling mistakes, and incomplete sentences may be an occasional consequence of the system secondary to software limitations, ambient noise and hardware issues. At the time of dictation, efforts were made to edit, clarify and /or correct errors. Please read the chart carefully and recognize, using context, where substitutions have occurred. If you have any questions or concerns about the context, text or information contained within the body of this dictation, please contact myself, the provider, for further clarification.

## 2023-12-07 NOTE — PROGRESS NOTES
Assessment/Plan:     Class 3 severe obesity due to excess calories with serious comorbidity and body mass index (BMI) of 40.0 to 44.9 in adult [facility])  - Patient is pursuing Conservative Program  - Initial weight loss goal of 5-10% weight loss for improved health  - Already on metformin 1000 mg twice a day through primary care. - Has been on Ozempic for about 2 years. Start weight was 318 lbs. Continue Ozempic 2 mg weekly. No negative side effects and helping with appetite. Discussed potentially changing to [facility], but he would like to continue Ozempic.    - Patient denies personal history of pancreatitis. Patient also denies personal and family history of thyroid cancer and multiple endocrine neoplasia type 2 (MEN 2 tumor). - Mobility has been better since starting Carbidopa-Levodopa for Parkinson's disease, has been able to increase walking.   - Labs reviewed: A1C, TSH, lipid, CBC, and CMP 11/24/2023. A1C controlled at 6.0, glucose elevated, which will likely improve with weight loss. Remainder of the blood work within acceptable range. Initial MWM: 298 lbs  Last visit: 298.6 lbs BMI 44.10  Current: 293.4 lbs BmI 43.96  Change: -5 lbs (-6 lbs since the last office visit)  Goal: 257.5 lbs LTG, 280 lbs STG    Goals:  Do not skip meals. Start food logging, weighing, and measuring food. 2000 snehal/day. Keep up the good work with water intake, at least 64 ounces daily. Gradually increase walking as tolerated. Continue Ozempic. Hyperlipidemia  - Taking Crestor. May improve with weight loss and lifestyle modification. Continue management with prescribing provider. Parkinson disease ([address])  - Taking Carbidopa-Levodopa. Continue management with prescribing provider. Sleep apnea  - Could not tolerate CPAP. Discussed referral back to sleep medicine, but he declined. Type 2 diabetes mellitus, without long-term current use of insulin ([address])  - Taking metformin and Ozempic.  May improve with weight loss and lifestyle modification. Continue management with prescribing provider. Lab Results   Component Value Date    HGBA1C 6.0 (H) 11/24/2023       Molina's esophagus with dysplasia  - Taking Protonix. Continue management with prescribing provider. Benign essential hypertension  - Taking losartan. May improve with weight loss and lifestyle modification. Continue management with prescribing provider. Svetlana Kenny was seen today for follow-up. Diagnoses and all orders for this visit:    Class 3 severe obesity due to excess calories with serious comorbidity and body mass index (BMI) of 40.0 to 44.9 in adult [facility])    Hyperlipidemia, unspecified hyperlipidemia type    Parkinson's disease without dyskinesia, with fluctuating manifestations    Obstructive sleep apnea syndrome    Type 2 diabetes mellitus without complication, without long-term current use of insulin (HCC)    Molina's esophagus with dysplasia    Benign essential hypertension          Follow up in approximately  4 months  with Non-Surgical Physician/Advanced Practitioner. Subjective:   Chief Complaint   Patient presents with    Follow-up     [facility] f/u- Waist- 56in       Patient ID: Hiram Mcgrath  is a 77 y.o. male with excess weight/obesity here to pursue weight management. Patient is pursuing Conservative Program.   Most recent notes and records were reviewed. HPI    Wt Readings from Last 10 Encounters:   12/07/23 133 kg (293 lb 6.4 oz)   12/07/23 133 kg (293 lb)   11/30/23 132 kg (291 lb 4.8 oz)   10/19/23 135 kg (298 lb 9.6 oz)   10/10/23 135 kg (298 lb 9.6 oz)   08/21/23 134 kg (295 lb 6.4 oz)   08/18/23 133 kg (292 lb 8 oz)   08/15/23 132 kg (292 lb)   08/02/23 135 kg (297 lb 9.6 oz)   08/02/23 135 kg (298 lb)     Had Lap Band placement on 7/26/2010. He had developed Molina's esophagus and therefore band removal performed by Dr. Merline Brilliant on 8/30/2022. No further weight loss surgery planned.  Weight prior to band was 300 lbs and jaiden 258 lbs. Highest weight was 335 lbs about 2-3 years ago. Following with MWM due to weight regain. Started Ozempic about 2 years ago. Taking Ozempic 2 mg weekly. No negative side effects. Helping with appetite. Diagnosed with Parkinson's disease in July 2023 and was started on Carbidopa-Levodopa. Mobility and balance much better with Carbidopa-Levodopa. Feels that the medication has reduced his appetite, which he is happy about. Not food logging. Taking metformin 1000 mg twice a day through primary care. Hydration: 5-6 bottles of water, 2 cans diet soda   Alcohol: very rare  Smoking: denies  Exercise: walking more - getting about 2500 steps daily, in PT   Occupation: retired -  [employer], [employer], [employer]   Sleep: 8-9 hours  STOP bang: Has ANABELL, tried CPAP, but could not tolerate it. Colonoscopy: UTD, due 2026         The following portions of the patient's history were reviewed and updated as appropriate: allergies, current medications, past family history, past medical history, past social history, past surgical history, and problem list.    Family History   Problem Relation Age of Onset    Heart disease Father     Coronary artery disease Father     Prostate cancer Father     Coronary artery disease Mother     Diabetes Mother         mellitus    Diabetes type II Mother     Diabetes Maternal Grandmother         mellitus    Coronary artery disease Maternal Grandfather     Coronary artery disease Paternal Grandfather         Review of Systems   HENT:  Positive for sore throat (slight, chronic). Respiratory:  Positive for cough (intermittent, chronic). Negative for shortness of breath. Cardiovascular:  Negative for chest pain and palpitations. Gastrointestinal:  Positive for constipation. Negative for abdominal pain, diarrhea, nausea and vomiting.        + GERD taking Protonix, seeing GI.    Musculoskeletal: Positive for back pain (intermittent). Negative for arthralgias. Skin:  Negative for rash. Psychiatric/Behavioral:  Negative for suicidal ideas.         + anxiety and depression controlled with medication       Objective:  /76 (BP Location: Left arm, Patient Position: Sitting)   Pulse 61   Resp 16   Ht 5' 8.5" (1.74 m)   Wt 133 kg (293 lb 6.4 oz)   BMI 43.96 kg/m²     Physical Exam  Vitals and nursing note reviewed. Constitutional   General appearance: Abnormal.  well developed and morbidly obese. Eyes No conjunctival injection. Ears, Nose, Mouth, and Throat Oral mucosa moist.   Pulmonary   Respiratory effort: No increased work of breathing or signs of respiratory distress. Cardiovascular    Examination of extremities for edema and/or varicosities: Normal.  no edema. Abdomen   Abdomen: Abnormal.  The abdomen was obese. Musculoskeletal   Normal range of motion  Neurological   Gait and station: wide based, but ambulated independently.    Psychiatric   Orientation to person, place and time: Normal.    Affect: appropriate

## 2023-12-12 ENCOUNTER — OFFICE VISIT (OUTPATIENT)
Dept: PHYSICAL THERAPY | Age: 68
End: 2023-12-12
Payer: MEDICARE

## 2023-12-12 DIAGNOSIS — R26.9 ABNORMALITY OF GAIT: ICD-10-CM

## 2023-12-12 DIAGNOSIS — G20.A1 PARKINSON'S DISEASE, UNSPECIFIED WHETHER DYSKINESIA PRESENT, UNSPECIFIED WHETHER MANIFESTATIONS FLUCTUATE: Primary | ICD-10-CM

## 2023-12-12 PROCEDURE — 97530 THERAPEUTIC ACTIVITIES: CPT

## 2023-12-12 PROCEDURE — 97112 NEUROMUSCULAR REEDUCATION: CPT

## 2023-12-12 PROCEDURE — 97110 THERAPEUTIC EXERCISES: CPT

## 2023-12-13 NOTE — PROGRESS NOTES
Daily Note     Today's date: 2023  Patient name: Irving Isaac  : 1955  MRN: [id number]  Referring provider: Shan Lim,*  Dx:   Encounter Diagnosis     ICD-10-CM    1. Parkinson's disease, unspecified whether dyskinesia present, unspecified whether manifestations fluctuate  G20. A1       2. Abnormality of gait  R26.9                      Subjective: "I fell yesterday off of the commode when my right leg buckled on me."  Pt without injury to knee or body reported. Objective: See treatment diary below      Assessment: Tolerated treatment well. Patient would benefit from continued PT      Plan: Continue per plan of care.       Precautions:      Manuals 23         I eval                                                    Neuro Re-Ed               squats 3 x 10 3 x 10 3 x 10 3 x 10 3 x 10        Tandem gait Cg of 10 ft x 4 10 ft x 4  10 ft x 4         Tandem stance 30 sec x 3   30 sec x 3         Braiding, sidestepping close superv  Cg /close sup 10 ft x 2  Close super  4 times x 10 ft          Fwd, bwd walking in ll bars  10 ft x 4  10 ft x 4         Step ups    2 x 10         Heelraises/ toe raises  3 x 10   3 x 10        Ther Ex             Standing hip flex, ext , knee flexion, hip abd  3 x 10   3 x 10        Nu step     15 min r 5 15 min r 5 15 min r 5        Cybex wts le's              Leg ext  3 x 10 24# 24# 3 x 10          Leg press   3 x 10 60# 100# 3 x 10          Hamstring curls   40# 3 x 10 50# 3 x 10          Hip abd   40# 3 x 10 40# 3 x 10          Step over colored hurdles fwd ( single and double feet, and side stepping ,     10 ft x 4 each         Ther Activity             Hand to opposite knee gait cg of 1    10 ft x 4         Supine bridging,     3 x 10        Leg thrusts knee flexed, aktc to toe tapping     4 x 5 reps toe tapping         Hip abd blue theraband     3 x 10        Hip add ball squeezes isometric     30 reps

## 2023-12-15 ENCOUNTER — OFFICE VISIT (OUTPATIENT)
Dept: PHYSICAL THERAPY | Age: 68
End: 2023-12-15
Payer: MEDICARE

## 2023-12-15 DIAGNOSIS — G20.A1 PARKINSON'S DISEASE, UNSPECIFIED WHETHER DYSKINESIA PRESENT, UNSPECIFIED WHETHER MANIFESTATIONS FLUCTUATE: Primary | ICD-10-CM

## 2023-12-15 DIAGNOSIS — R26.9 ABNORMALITY OF GAIT: ICD-10-CM

## 2023-12-15 PROCEDURE — 97112 NEUROMUSCULAR REEDUCATION: CPT

## 2023-12-15 PROCEDURE — 97110 THERAPEUTIC EXERCISES: CPT

## 2023-12-15 PROCEDURE — 97530 THERAPEUTIC ACTIVITIES: CPT

## 2023-12-15 NOTE — PROGRESS NOTES
Daily Note     Today's date: 12/15/2023  Patient name: Oxana Gandara  : 1955  MRN: [id number]  Referring provider: Linda Spivey,*  Dx:   Encounter Diagnosis     ICD-10-CM    1. Parkinson's disease, unspecified whether dyskinesia present, unspecified whether manifestations fluctuate  G20. A1       2. Abnormality of gait  R26.9                      Subjective: no new c/o's      Objective: See treatment diary below      Assessment: Tolerated treatment well. Patient would benefit from continued PT      Plan: Continue per plan of care.       Precautions:      Manuals 23        I eval                                                    Neuro Re-Ed               squats 3 x 10 3 x 10 3 x 10 3 x 10 3 x 10 3 x 10       Tandem gait Cg of 10 ft x 4 10 ft x 4  10 ft x 4 10 ft x 4 10 ft x 4       Tandem stance 30 sec x 3   30 sec x 3 30 sec x 3 30 sec x 3       Braiding, sidestepping close superv  Cg /close sup 10 ft x 2  Close super  4 times x 10 ft  Close super4 x 10 ft        Fwd, bwd walking in ll bars  10 ft x 4  10 ft x 4 10 ft  x4 10 f t x 4       Step ups    2 x 10  2 x 10       Heelraises/ toe raises  3 x 10   3 x 10 3 x 10       Ther Ex             Standing hip flex, ext , knee flexion, hip abd  3 x 10   3 x 10        Nu step     15 min r 5 15 min r 5 15 min r 5 15 min r 5       Cybex wts le's              Leg ext  3 x 10 24# 24# 3 x 10          Leg press   3 x 10 60# 100# 3 x 10          Hamstring curls   40# 3 x 10 50# 3 x 10          Hip abd   40# 3 x 10 40# 3 x 10          Step over colored hurdles fwd ( single and double feet, and side stepping ,     10 ft x 4 each  10 ft x 4       Ther Activity             Hand to opposite knee gait cg of 1    10 ft x 4  10 ft x 4       Supine bridging,     3 x 10        Leg thrusts knee flexed, aktc to toe tapping     4 x 5 reps toe tapping         Hip abd blue theraband     3 x 10        Hip add ball squeezes isometric     30 reps

## 2023-12-19 ENCOUNTER — APPOINTMENT (OUTPATIENT)
Dept: PHYSICAL THERAPY | Age: 68
End: 2023-12-19
Payer: MEDICARE

## 2023-12-20 ENCOUNTER — APPOINTMENT (OUTPATIENT)
Dept: PHYSICAL THERAPY | Age: 68
End: 2023-12-20
Payer: MEDICARE

## 2023-12-26 ENCOUNTER — OFFICE VISIT (OUTPATIENT)
Dept: PHYSICAL THERAPY | Age: 68
End: 2023-12-26
Payer: MEDICARE

## 2023-12-26 DIAGNOSIS — R26.9 ABNORMALITY OF GAIT: Primary | ICD-10-CM

## 2023-12-26 DIAGNOSIS — G20.A1 PARKINSON'S DISEASE, UNSPECIFIED WHETHER DYSKINESIA PRESENT, UNSPECIFIED WHETHER MANIFESTATIONS FLUCTUATE: ICD-10-CM

## 2023-12-26 PROCEDURE — 97110 THERAPEUTIC EXERCISES: CPT

## 2023-12-26 PROCEDURE — 97530 THERAPEUTIC ACTIVITIES: CPT

## 2023-12-26 PROCEDURE — 97112 NEUROMUSCULAR REEDUCATION: CPT

## 2023-12-27 ENCOUNTER — OFFICE VISIT (OUTPATIENT)
Dept: PHYSICAL THERAPY | Age: 68
End: 2023-12-27
Payer: MEDICARE

## 2023-12-27 DIAGNOSIS — G20.A1 PARKINSON'S DISEASE, UNSPECIFIED WHETHER DYSKINESIA PRESENT, UNSPECIFIED WHETHER MANIFESTATIONS FLUCTUATE: Primary | ICD-10-CM

## 2023-12-27 DIAGNOSIS — R26.9 ABNORMALITY OF GAIT: ICD-10-CM

## 2023-12-27 PROCEDURE — 97530 THERAPEUTIC ACTIVITIES: CPT

## 2023-12-27 PROCEDURE — 97110 THERAPEUTIC EXERCISES: CPT

## 2023-12-27 NOTE — PROGRESS NOTES
Daily Note     Today's date: 2023  Patient name: Marcellus Fregoso  : 1955  MRN: [id number]  Referring provider: Natalee Whitney,*  Dx:   Encounter Diagnosis     ICD-10-CM    1. Parkinson's disease, unspecified whether dyskinesia present, unspecified whether manifestations fluctuate  G20.A1       2. Abnormality of gait  R26.9                      Subjective: no new c/o's       Objective: See treatment diary below      Assessment: Tolerated treatment well. Patient would benefit from continued PT      Plan: Continue per plan of care.      Precautions:      Manuals 23      I eval                                                    Neuro Re-Ed               squats 3 x 10 3 x 10 3 x 10 3 x 10 3 x 10 3 x 10 3 x 10 3 x 10     Tandem gait Cg of 10 ft x 4 10 ft x 4  10 ft x 4 10 ft x 4 10 ft x 4 10 ft x 4      Tandem stance 30 sec x 3   30 sec x 3 30 sec x 3 30 sec x 3 30 sec x 3      Braiding, sidestepping close superv  Cg /close sup 10 ft x 2  Close super  4 times x 10 ft  Close super4 x 10 ft   410 ft        Fwd, bwd walking in ll bars  10 ft x 4  10 ft x 4 10 ft  x4 10 f t x 4 10 ft x 4      Step ups    2 x 10  2 x 10       Heelraises/ toe raises  3 x 10   3 x 10 3 x 10 3 x 10      Ther Ex             Standing hip flex, ext , knee flexion, hip abd  3 x 10   3 x 10  3 x 10      Nu step     15 min r 5 15 min r 5 15 min r 5 15 min r 5 15 min r 5 20 min r 5     Cybex wts le's              Leg ext  3 x 10 24# 24# 3 x 10     10# wts each le no on machine     Leg press   3 x 10 60# 100# 3 x 10     70# 3 x 10     Hamstring curls   40# 3 x 10 50# 3 x 10     50# 3 x 10     Hip abd   40# 3 x 10 40# 3 x 10     50# 3 x 10     Step over colored hurdles fwd ( single and double feet, and side stepping ,     10 ft x 4 each  10 ft x 4 10 ft x 4      Ther Activity             Hand to opposite knee gait cg of 1    10 ft x 4  10 ft x 4       Supine bridging,     3 x 10        Leg  thrusts knee flexed, aktc to toe tapping     4 x 5 reps toe tapping         Hip abd blue theraband     3 x 10  3 x 10 3 x 10     Hip add ball squeezes isometric     30 reps   30 rep s 30 reps

## 2023-12-27 NOTE — PROGRESS NOTES
"Daily Note     Today's date: 2023  Patient name: Marcellus Fregoso  : 1955  MRN: [id number]  Referring provider: Natalee Whitney,*  Dx:   Encounter Diagnosis     ICD-10-CM    1. Abnormality of gait  R26.9       2. Parkinson's disease, unspecified whether dyskinesia present, unspecified whether manifestations fluctuate  G20.A1                      Subjective: \"I was trying to reduce the use of my medication and now I know I need to use it as I feel better with the dosage that was recommended.\"      Objective: See treatment diary below      Assessment: Tolerated treatment well. Patient would benefit from continued PT      Plan: Continue per plan of care.      Precautions:      Manuals 23       I eval                                                    Neuro Re-Ed               squats 3 x 10 3 x 10 3 x 10 3 x 10 3 x 10 3 x 10 3 x 10      Tandem gait Cg of 10 ft x 4 10 ft x 4  10 ft x 4 10 ft x 4 10 ft x 4 10 ft x 4      Tandem stance 30 sec x 3   30 sec x 3 30 sec x 3 30 sec x 3 30 sec x 3      Braiding, sidestepping close superv  Cg /close sup 10 ft x 2  Close super  4 times x 10 ft  Close super4 x 10 ft   410 ft        Fwd, bwd walking in ll bars  10 ft x 4  10 ft x 4 10 ft  x4 10 f t x 4 10 ft x 4      Step ups    2 x 10  2 x 10       Heelraises/ toe raises  3 x 10   3 x 10 3 x 10 3 x 10      Ther Ex             Standing hip flex, ext , knee flexion, hip abd  3 x 10   3 x 10  3 x 10      Nu step     15 min r 5 15 min r 5 15 min r 5 15 min r 5 15 min r 5      Cybex wts le's              Leg ext  3 x 10 24# 24# 3 x 10          Leg press   3 x 10 60# 100# 3 x 10          Hamstring curls   40# 3 x 10 50# 3 x 10          Hip abd   40# 3 x 10 40# 3 x 10          Step over colored hurdles fwd ( single and double feet, and side stepping ,     10 ft x 4 each  10 ft x 4 10 ft x 4      Ther Activity             Hand to opposite knee gait cg of 1    10 ft x 4  10 ft x 4   "     Supine bridging,     3 x 10        Leg thrusts knee flexed, aktc to toe tapping     4 x 5 reps toe tapping         Hip abd blue theraband     3 x 10  3 x 10      Hip add ball squeezes isometric     30 reps   30 rep s

## 2024-01-04 ENCOUNTER — OFFICE VISIT (OUTPATIENT)
Dept: PHYSICAL THERAPY | Age: 69
End: 2024-01-04
Payer: MEDICARE

## 2024-01-04 DIAGNOSIS — G20.A1 PARKINSON'S DISEASE, UNSPECIFIED WHETHER DYSKINESIA PRESENT, UNSPECIFIED WHETHER MANIFESTATIONS FLUCTUATE: Primary | ICD-10-CM

## 2024-01-04 PROCEDURE — 97112 NEUROMUSCULAR REEDUCATION: CPT

## 2024-01-04 NOTE — PROGRESS NOTES
Daily Note     Today's date: 2024  Patient name: Marcellus Fregoso  : 1955  MRN: [id number]  Referring provider: Natalee Whitney,*  Dx:   Encounter Diagnosis     ICD-10-CM    1. Parkinson's disease, unspecified whether dyskinesia present, unspecified whether manifestations fluctuate  G20.A1           Start Time: 1200  Stop Time: 1300  Total time in clinic (min): 60 minutes    Subjective: Without additional complaints.      Objective: See treatment diary below      Assessment: Tolerated treatment well. Patient would benefit from continued PT      Plan: Continue per plan of care.      Precautions:      Manuals 23     I sreeal                                                    Neuro Re-Ed               squats 3 x 10 3 x 10 3 x 10 3 x 10 3 x 10 3 x 10 3 x 10 3 x 10 30x    Tandem gait Cg of 10 ft x 4 10 ft x 4  10 ft x 4 10 ft x 4 10 ft x 4 10 ft x 4      Tandem stance 30 sec x 3   30 sec x 3 30 sec x 3 30 sec x 3 30 sec x 3      Braiding, sidestepping close superv  Cg /close sup 10 ft x 2  Close super  4 times x 10 ft  Close super4 x 10 ft   410 ft        Fwd, bwd walking in ll bars  10 ft x 4  10 ft x 4 10 ft  x4 10 f t x 4 10 ft x 4      Step ups    2 x 10  2 x 10       Heelraises/ toe raises  3 x 10   3 x 10 3 x 10 3 x 10  30x    Ther Ex             Standing hip flex, ext , knee flexion, hip abd  3 x 10   3 x 10  3 x 10  2# 30x    Nu step     15 min r 5 15 min r 5 15 min r 5 15 min r 5 15 min r 5 20 min r 5 20 min    Cybex wts le's              Leg ext  3 x 10 24# 24# 3 x 10     10# wts each le no on machine     Leg press   3 x 10 60# 100# 3 x 10     70# 3 x 10     Hamstring curls   40# 3 x 10 50# 3 x 10     50# 3 x 10     Hip abd   40# 3 x 10 40# 3 x 10     50# 3 x 10     Step over colored hurdles fwd ( single and double feet, and side stepping ,     10 ft x 4 each  10 ft x 4 10 ft x 4      Ther Activity             Hand to opposite knee gait cg of 1     10 ft x 4  10 ft x 4       Supine bridging,     3 x 10        Leg thrusts knee flexed, aktc to toe tapping     4 x 5 reps toe tapping         Hip abd blue theraband     3 x 10  3 x 10 3 x 10     Hip add ball squeezes isometric     30 reps   30 rep s 30 reps  30x

## 2024-01-04 NOTE — PROGRESS NOTES
Daily Note     Today's date: 2024  Patient name: Marcellus Fregoso  : 1955  MRN: [id number]  Referring provider: Natalee Whitney,*  Dx: No diagnosis found.               Subjective:       Objective: See treatment diary below      Assessment: Tolerated treatment well. Patient would benefit from continued PT      Plan: Continue per plan of care.      Precautions:      Manuals 23     I eval                                                    Neuro Re-Ed               squats 3 x 10 3 x 10 3 x 10 3 x 10 3 x 10 3 x 10 3 x 10 3 x 10 30x    Tandem gait Cg of 10 ft x 4 10 ft x 4  10 ft x 4 10 ft x 4 10 ft x 4 10 ft x 4      Tandem stance 30 sec x 3   30 sec x 3 30 sec x 3 30 sec x 3 30 sec x 3      Braiding, sidestepping close superv  Cg /close sup 10 ft x 2  Close super  4 times x 10 ft  Close super4 x 10 ft   410 ft        Fwd, bwd walking in ll bars  10 ft x 4  10 ft x 4 10 ft  x4 10 f t x 4 10 ft x 4      Step ups    2 x 10  2 x 10       Heelraises/ toe raises  3 x 10   3 x 10 3 x 10 3 x 10  30x    Ther Ex             Standing hip flex, ext , knee flexion, hip abd  3 x 10   3 x 10  3 x 10  2# 30x    Nu step     15 min r 5 15 min r 5 15 min r 5 15 min r 5 15 min r 5 20 min r 5 20 min    Cybex wts le's              Leg ext  3 x 10 24# 24# 3 x 10     10# wts each le no on machine     Leg press   3 x 10 60# 100# 3 x 10     70# 3 x 10     Hamstring curls   40# 3 x 10 50# 3 x 10     50# 3 x 10     Hip abd   40# 3 x 10 40# 3 x 10     50# 3 x 10     Step over colored hurdles fwd ( single and double feet, and side stepping ,     10 ft x 4 each  10 ft x 4 10 ft x 4      Ther Activity             Hand to opposite knee gait cg of 1    10 ft x 4  10 ft x 4       Supine bridging,     3 x 10        Leg thrusts knee flexed, aktc to toe tapping     4 x 5 reps toe tapping         Hip abd blue theraband     3 x 10  3 x 10 3 x 10     Hip add ball squeezes isometric     30 reps    30 rep s 30 reps  30x

## 2024-01-08 ENCOUNTER — OFFICE VISIT (OUTPATIENT)
Dept: PODIATRY | Facility: CLINIC | Age: 69
End: 2024-01-08

## 2024-01-08 VITALS
HEIGHT: 68 IN | SYSTOLIC BLOOD PRESSURE: 157 MMHG | HEART RATE: 73 BPM | DIASTOLIC BLOOD PRESSURE: 93 MMHG | RESPIRATION RATE: 18 BRPM | BODY MASS INDEX: 44.55 KG/M2

## 2024-01-08 DIAGNOSIS — B35.1 ONYCHOMYCOSIS: ICD-10-CM

## 2024-01-08 DIAGNOSIS — E11.9 CONTROLLED TYPE 2 DIABETES MELLITUS WITHOUT COMPLICATION, WITHOUT LONG-TERM CURRENT USE OF INSULIN (HCC): Primary | ICD-10-CM

## 2024-01-08 PROCEDURE — NCFTCARE PR NON-COVERED FOOT CARE: Performed by: PODIATRIST

## 2024-01-08 NOTE — PROGRESS NOTES
Patient presents for palliative footcare.  No acute disorder noted.  Pedal pulses are palpable.  Treatment consists of nail trimming.

## 2024-01-11 ENCOUNTER — OFFICE VISIT (OUTPATIENT)
Dept: PHYSICAL THERAPY | Age: 69
End: 2024-01-11
Payer: MEDICARE

## 2024-01-11 DIAGNOSIS — R26.9 ABNORMALITY OF GAIT: Primary | ICD-10-CM

## 2024-01-11 PROCEDURE — 97110 THERAPEUTIC EXERCISES: CPT

## 2024-01-11 NOTE — PROGRESS NOTES
Daily Note     Today's date: 2024  Patient name: Marcellus Fregoso  : 1955  MRN: [id number]  Referring provider: Natalee Whitney,*  Dx:   Encounter Diagnosis     ICD-10-CM    1. Abnormality of gait  R26.9                      Subjective: Pt. Has difficulty sleeping more than two hours at a time.      Objective: See treatment diary below      Assessment: Tolerated treatment well. Patient would benefit from continued PT      Plan: Continue per plan of care.      Precautions:      Manuals 23    I eval                                                    Neuro Re-Ed               squats 3 x 10 3 x 10 3 x 10 3 x 10 3 x 10 3 x 10 3 x 10 3 x 10 30x    Tandem gait Cg of 10 ft x 4 10 ft x 4  10 ft x 4 10 ft x 4 10 ft x 4 10 ft x 4      Tandem stance 30 sec x 3   30 sec x 3 30 sec x 3 30 sec x 3 30 sec x 3      Braiding, sidestepping close superv  Cg /close sup 10 ft x 2  Close super  4 times x 10 ft  Close super4 x 10 ft   410 ft        Fwd, bwd walking in ll bars  10 ft x 4  10 ft x 4 10 ft  x4 10 f t x 4 10 ft x 4      Step ups    2 x 10  2 x 10       Heelraises/ toe raises  3 x 10   3 x 10 3 x 10 3 x 10  30x    Ther Ex             Standing hip flex, ext , knee flexion, hip abd  3 x 10   3 x 10  3 x 10  2# 30x    Nu step     15 min r 5 15 min r 5 15 min r 5 15 min r 5 15 min r 5 20 min r 5 20 min 20 min   Cybex wts le's              Leg ext  3 x 10 24# 24# 3 x 10     10# wts each le no on machine     Leg press   3 x 10 60# 100# 3 x 10     70# 3 x 10     Hamstring curls   40# 3 x 10 50# 3 x 10     50# 3 x 10     Hip abd   40# 3 x 10 40# 3 x 10     50# 3 x 10     Step over colored hurdles fwd ( single and double feet, and side stepping ,     10 ft x 4 each  10 ft x 4 10 ft x 4      Ther Activity             Hand to opposite knee gait cg of 1    10 ft x 4  10 ft x 4       Supine bridging,     3 x 10        Leg thrusts knee flexed, aktc to toe tapping     4  x 5 reps toe tapping         Hip abd blue theraband     3 x 10  3 x 10 3 x 10     Hip add ball squeezes isometric     30 reps   30 rep s 30 reps  30x

## 2024-01-15 ENCOUNTER — OFFICE VISIT (OUTPATIENT)
Dept: PHYSICAL THERAPY | Age: 69
End: 2024-01-15
Payer: MEDICARE

## 2024-01-15 DIAGNOSIS — R26.9 ABNORMALITY OF GAIT: Primary | ICD-10-CM

## 2024-01-15 PROCEDURE — 97110 THERAPEUTIC EXERCISES: CPT

## 2024-01-15 NOTE — PROGRESS NOTES
Daily Note     Today's date: 1/15/2024  Patient name: Marcellus Fregoso  : 1955  MRN: [id number]  Referring provider: Natalee Whitney,*  Dx:   Encounter Diagnosis     ICD-10-CM    1. Abnormality of gait  R26.9                      Subjective: Pt. Fatigued end of session.  Higher level balance today.      Objective: See treatment diary below      Assessment: Tolerated treatment well. Patient would benefit from continued PT      Plan: Continue per plan of care.      Precautions:      Manuals 11/1/23 1/15 12/1 12/5 12/12 12/14 12/26 12/27 1/4 1/11    I eval                                                    Neuro Re-Ed               Squats # ball 3 x 10 2 x 10 3 x 10 3 x 10 3 x 10 3 x 10 3 x 10 3 x 10 30x    Tandem gait Cg of 10 ft x 4 10 ft x 4  10 ft x 4 10 ft x 4 10 ft x 4 10 ft x 4      Tandem stance 30 sec x 3 nt  30 sec x 3 30 sec x 3 30 sec x 3 30 sec x 3      Braiding, sidestepping close superv  nt  Close super  4 times x 10 ft  Close super4 x 10 ft   410 ft        Fwd/side over hurdles.  10 ft x 4 6x 10 ft x 4 10 ft  x4 10 f t x 4 10 ft x 4      Push/pull sled 10#   30ft x 5  2 x 10  2 x 10            3 x 10 3 x 10 3 x 10  30x    Ther Ex             Standing hip flex, ext , knee flexion, hip abd  3 x 10   3 x 10  3 x 10  2# 30x    Nu step    20 min 15 min r 5 15 min r 5 15 min r 5 15 min r 5 15 min r 5 20 min r 5 20 min 20 min   Cybex wts le's              Leg ext   24# 3 x 10     10# wts each le no on machine     Leg press    100# 3 x 10     70# 3 x 10     Hamstring curls    50# 3 x 10     50# 3 x 10     Hip abd    40# 3 x 10     50# 3 x 10     Step over colored hurdles fwd ( single and double feet, and side stepping ,     10 ft x 4 each  10 ft x 4 10 ft x 4      Ther Activity             Hand to opposite knee gait cg of 1    10 ft x 4  10 ft x 4       Supine bridging,     3 x 10        Leg thrusts knee flexed, aktc to toe tapping     4 x 5 reps toe tapping         Hip abd blue theraband      3 x 10  3 x 10 3 x 10     Hip add ball squeezes isometric     30 reps   30 rep s 30 reps  30x

## 2024-01-19 ENCOUNTER — APPOINTMENT (OUTPATIENT)
Dept: PHYSICAL THERAPY | Age: 69
End: 2024-01-19
Payer: MEDICARE

## 2024-01-22 ENCOUNTER — OFFICE VISIT (OUTPATIENT)
Dept: PHYSICAL THERAPY | Age: 69
End: 2024-01-22
Payer: MEDICARE

## 2024-01-22 DIAGNOSIS — R26.9 ABNORMALITY OF GAIT: Primary | ICD-10-CM

## 2024-01-22 PROCEDURE — 97110 THERAPEUTIC EXERCISES: CPT

## 2024-01-22 NOTE — PROGRESS NOTES
Daily Note     Today's date: 2024  Patient name: Marcellus Fregoso  : 1955  MRN: [id number]  Referring provider: Natalee Whitney,*  Dx:   Encounter Diagnosis     ICD-10-CM    1. Abnormality of gait  R26.9                      Subjective: Pt. Is able to take large steps over hurdles without hand hold.  Would like to do 6 min walk test next visit.      Objective: See treatment diary below      Assessment: Tolerated treatment well. Patient would benefit from continued PT      Plan: Continue per plan of care.      Precautions:      Manuals 1/22 1/15 12/1 12/5 12/12 12/14 12/26 12/27 1/4 1/11                                                       Neuro Re-Ed               Squats # ball 3 x 10 2 x 10 3 x 10 3 x 10 3 x 10 3 x 10 3 x 10 3 x 10 30x    Tandem gait Cg of 10 ft x 4 10 ft x 4  10 ft x 4 10 ft x 4 10 ft x 4 10 ft x 4      Tandem stance 30 sec x 3 nt  30 sec x 3 30 sec x 3 30 sec x 3 30 sec x 3      Braiding, sidestepping close superv  nt  Close super  4 times x 10 ft  Close super4 x 10 ft   410 ft        Fwd/side over hurdles. 10ft x 6 10 ft x 4 6x 10 ft x 4 10 ft  x4 10 f t x 4 10 ft x 4      Push/pull sled 35#  5x 30ft x 5  2 x 10  2 x 10            3 x 10 3 x 10 3 x 10  30x    Ther Ex             Standing hip flex, ext , knee flexion, hip abd  3 x 10   3 x 10  3 x 10  2# 30x    Nu step   20 min 20 min 15 min r 5 15 min r 5 15 min r 5 15 min r 5 15 min r 5 20 min r 5 20 min 20 min   Cybex wts le's              Leg ext   24# 3 x 10     10# wts each le no on machine     Leg press    100# 3 x 10     70# 3 x 10     Hamstring curls    50# 3 x 10     50# 3 x 10     Hip abd    40# 3 x 10     50# 3 x 10     Step over colored hurdles fwd ( single and double feet, and side stepping ,  6x   10 ft x 4 each  10 ft x 4 10 ft x 4      Ther Activity             Hand to opposite knee gait cg of 1    10 ft x 4  10 ft x 4       Supine bridging,     3 x 10        Leg thrusts knee flexed, aktc to toe tapping      4 x 5 reps toe tapping         Hip abd blue theraband     3 x 10  3 x 10 3 x 10     Hip add ball squeezes isometric     30 reps   30 rep s 30 reps  30x

## 2024-01-24 ENCOUNTER — OFFICE VISIT (OUTPATIENT)
Dept: PHYSICAL THERAPY | Age: 69
End: 2024-01-24
Payer: MEDICARE

## 2024-01-24 DIAGNOSIS — R26.9 ABNORMALITY OF GAIT: Primary | ICD-10-CM

## 2024-01-24 PROCEDURE — 97110 THERAPEUTIC EXERCISES: CPT

## 2024-01-24 NOTE — PROGRESS NOTES
Daily Note     Today's date: 2024  Patient name: Marcellus Fregoso  : 1955  MRN: [id number]  Referring provider: Natalee Whitney,*  Dx:   Encounter Diagnosis     ICD-10-CM    1. Abnormality of gait  R26.9                      Subjective: 6 minutes walk test today.  Pt. Missed chair and fell to floor without injury.      Objective: See treatment diary below      Assessment: Tolerated treatment well. Patient would benefit from continued PT      Plan: Continue per plan of care.      Precautions:      Manuals                 Walk test  600ft/5:30 min                                     Neuro Re-Ed               Squats # ball 3 x 10 2 x 10 3 x 10 3 x 10 3 x 10 3 x 10 3 x 10 3 x 10 30x    Tandem gait Cg of 10 ft x 4 10 ft x 4  10 ft x 4 10 ft x 4 10 ft x 4 10 ft x 4      Tandem stance 30 sec x 3 nt  30 sec x 3 30 sec x 3 30 sec x 3 30 sec x 3      Braiding, sidestepping close superv  nt  Close super  4 times x 10 ft  Close super4 x 10 ft   410 ft        Fwd/side over hurdles. 10ft x 6 10 ft x 4 6x 10 ft x 4 10 ft  x4 10 f t x 4 10 ft x 4      Push/pull sled 35#  5x 30ft x 5  2 x 10  2 x 10            3 x 10 3 x 10 3 x 10  30x    Ther Ex             Standing hip flex, ext , knee flexion, hip abd  3 x 10   3 x 10  3 x 10  2# 30x    Nu step   20 min 20 min 15 min r 5 15 min r 5 15 min r 5 15 min r 5 15 min r 5 20 min r 5 20 min 20 min   Cybex wts le's              Leg ext   24# 3 x 10     10# wts each le no on machine     Leg press    100# 3 x 10     70# 3 x 10     Hamstring curls    50# 3 x 10     50# 3 x 10     Hip abd    40# 3 x 10     50# 3 x 10     Step over colored hurdles fwd ( single and double feet, and side stepping ,  6x   10 ft x 4 each  10 ft x 4 10 ft x 4      Ther Activity             Hand to opposite knee gait cg of 1    10 ft x 4  10 ft x 4       Supine bridging,     3 x 10        Leg thrusts knee flexed, aktc to toe tapping     4 x 5  reps toe tapping         Hip abd blue theraband     3 x 10  3 x 10 3 x 10     Hip add ball squeezes isometric     30 reps   30 rep s 30 reps  30x

## 2024-01-27 DIAGNOSIS — E11.9 TYPE 2 DIABETES MELLITUS WITHOUT COMPLICATION, WITHOUT LONG-TERM CURRENT USE OF INSULIN (HCC): ICD-10-CM

## 2024-01-27 DIAGNOSIS — E78.00 PURE HYPERCHOLESTEROLEMIA: ICD-10-CM

## 2024-01-27 DIAGNOSIS — E66.01 OBESITY, CLASS III, BMI 40-49.9 (MORBID OBESITY) (HCC): ICD-10-CM

## 2024-01-28 RX ORDER — SEMAGLUTIDE 2.68 MG/ML
INJECTION, SOLUTION SUBCUTANEOUS
Qty: 9 ML | Refills: 1 | Status: SHIPPED | OUTPATIENT
Start: 2024-01-28

## 2024-01-29 ENCOUNTER — OFFICE VISIT (OUTPATIENT)
Dept: PHYSICAL THERAPY | Age: 69
End: 2024-01-29
Payer: MEDICARE

## 2024-01-29 DIAGNOSIS — G20.A1 PARKINSON'S DISEASE, UNSPECIFIED WHETHER DYSKINESIA PRESENT, UNSPECIFIED WHETHER MANIFESTATIONS FLUCTUATE: ICD-10-CM

## 2024-01-29 DIAGNOSIS — R26.9 ABNORMALITY OF GAIT: Primary | ICD-10-CM

## 2024-01-29 PROCEDURE — 97110 THERAPEUTIC EXERCISES: CPT

## 2024-01-29 PROCEDURE — 97112 NEUROMUSCULAR REEDUCATION: CPT

## 2024-01-29 PROCEDURE — 97530 THERAPEUTIC ACTIVITIES: CPT

## 2024-01-30 NOTE — PROGRESS NOTES
"Daily Note     Today's date: 2024  Patient name: Marcellus Fregoso  : 1955  MRN: [id number]  Referring provider: Natalee Whitney,*  Dx:   Encounter Diagnosis     ICD-10-CM    1. Abnormality of gait  R26.9       2. Parkinson's disease, unspecified whether dyskinesia present, unspecified whether manifestations fluctuate  G20.A1                      Subjective: \"I took a new sleeping pill rather late last night and I don't feel that well today.\"        Objective: See treatment diary below      Assessment: Tolerated treatment well. Patient would benefit from continued PT. Vital signs stable . Pt feeling sluggish due to sleeping pill per is report.      Plan: Continue per plan of care.      Precautions:      Manuals                 Walk test  600ft/5:30 min                                     Neuro Re-Ed               Squats W/ 5# ball 3 x 10 2 x 10 3 x 10 3 x 10 3 x 10 3 x 10 3 x 10 3 x 10 30x    Tandem gait Cg of 10 ft x 4 10 ft x 4 10 ft x 4 10 ft x 4 10 ft x 4 10 ft x 4 10 ft x 4      Tandem stance 30 sec x 3 nt 30 sec x 3 30 sec x 3 30 sec x 3 30 sec x 3 30 sec x 3      Braiding, sidestepping close superv  nt Close super sidestepping , fwd bwd walking 10 ft x 4 Close super  4 times x 10 ft  Close super4 x 10 ft   410 ft        Fwd/side over hurdles. 10ft x 6 10 ft x 4 6x 10 ft x 4 10 ft  x4 10 f t x 4 10 ft x 4      Push/pull sled 35#  5x 30ft x 5  2 x 10  2 x 10            3 x 10 3 x 10 3 x 10  30x    Ther Ex             Standing hip flex, ext , knee flexion, hip abd  3 x 10 3 x 10  3 x 10  3 x 10  2# 30x    Nu step   20 min 20 min 20min r 4 15 min r 5 15 min r 5 15 min r 5 15 min r 5 20 min r 5 20 min 20 min   Cybex wts le's              Leg ext        10# wts each le no on machine     Leg press         70# 3 x 10     Hamstring curls         50# 3 x 10     Hip abd         50# 3 x 10     Step over colored hurdles fwd ( single and double feet, and " side stepping ,  6x   10 ft x 4 each  10 ft x 4 10 ft x 4      Ther Activity             Hand to opposite knee gait cg of 1    10 ft x 4  10 ft x 4       Supine bridging,     3 x 10        Leg thrusts knee flexed, aktc to toe tapping     4 x 5 reps toe tapping         Hip abd blue theraband     3 x 10  3 x 10 3 x 10     Hip add ball squeezes isometric     30 reps   30 rep s 30 reps  30x

## 2024-02-01 ENCOUNTER — OFFICE VISIT (OUTPATIENT)
Dept: PHYSICAL THERAPY | Age: 69
End: 2024-02-01
Payer: MEDICARE

## 2024-02-01 DIAGNOSIS — G20.A1 PARKINSON'S DISEASE, UNSPECIFIED WHETHER DYSKINESIA PRESENT, UNSPECIFIED WHETHER MANIFESTATIONS FLUCTUATE: Primary | ICD-10-CM

## 2024-02-01 PROCEDURE — 97112 NEUROMUSCULAR REEDUCATION: CPT

## 2024-02-01 NOTE — PROGRESS NOTES
Daily Note     Today's date: 2024  Patient name: Marcellus Fregoso  : 1955  MRN: [id number]  Referring provider: Natalee Whitney,*  Dx:   Encounter Diagnosis     ICD-10-CM    1. Parkinson's disease, unspecified whether dyskinesia present, unspecified whether manifestations fluctuate  G20.A1                      Subjective: Pt. Reports fatigue today.      Objective: See treatment diary below      Assessment: Tolerated treatment well. Patient would benefit from continued PT      Plan: Continue per plan of care.      Precautions:      Manuals                 Walk test  600ft/5:30 min                                     Neuro Re-Ed               Squats W/ 5# ball 3 x 10 2 x 10 3 x 10 3 x 10 3 x 10 3 x 10 3 x 10 3 x 10 30x    Tandem gait Cg of 10 ft x 4 10 ft x 4 10 ft x 4 10 ft x 4 10 ft x 4 10 ft x 4 10 ft x 4      Tandem stance 30 sec x 3 nt 30 sec x 3 30 sec x 3 30 sec x 3 30 sec x 3 30 sec x 3      Braiding, sidestepping close superv  nt Close super sidestepping , fwd bwd walking 10 ft x 4 Close super  4 times x 10 ft  Close super4 x 10 ft   410 ft        Fwd/side over hurdles. 10ft x 6 10 ft x 4 6x 10 ft x 4 10 ft  x4 10 f t x 4 10 ft x 4      Push/pull sled 35#  5x 30ft x 5  2 x 10  2 x 10            3 x 10 3 x 10 3 x 10  30x    Ther Ex             Standing hip flex, ext , knee flexion, hip abd  3 x 10 3 x 10  3 x 10  3 x 10  2# 30x    Nu step   20 min 20 min 20min r 4 15 min r 5 15 min r 5 15 min r 5 15 min r 5 20 min r 5 20 min 20 min   Cybex wts le's              Leg ext        10# wts each le no on machine     Leg press         70# 3 x 10     Hamstring curls         50# 3 x 10     Hip abd         50# 3 x 10     Step over colored hurdles fwd ( single and double feet, and side stepping ,  6x   10 ft x 4 each  10 ft x 4 10 ft x 4      Ther Activity             Hand to opposite knee gait cg of 1    10 ft x 4  10 ft x 4       Supine bridging,     30x 3 x 10        Leg thrusts knee flexed, aktc to toe tapping     4 x 5 reps toe tapping         Hip abd blue theraband     3 x 10  3 x 10 3 x 10     Hip add ball squeezes isometric     30 reps   30 rep s 30 reps  30x

## 2024-02-05 ENCOUNTER — OFFICE VISIT (OUTPATIENT)
Dept: PHYSICAL THERAPY | Age: 69
End: 2024-02-05
Payer: MEDICARE

## 2024-02-05 DIAGNOSIS — G20.A1 PARKINSON'S DISEASE, UNSPECIFIED WHETHER DYSKINESIA PRESENT, UNSPECIFIED WHETHER MANIFESTATIONS FLUCTUATE: Primary | ICD-10-CM

## 2024-02-05 PROCEDURE — 97110 THERAPEUTIC EXERCISES: CPT

## 2024-02-05 NOTE — PROGRESS NOTES
Daily Note     Today's date: 2024  Patient name: Marcellus Fregoso  : 1955  MRN: [id number]  Referring provider: Natalee Whitney,*  Dx:   Encounter Diagnosis     ICD-10-CM    1. Parkinson's disease, unspecified whether dyskinesia present, unspecified whether manifestations fluctuate  G20.A1                      Subjective: Without further complaints.      Objective: See treatment diary below      Assessment: Tolerated treatment well. Patient would benefit from continued PT      Plan: Continue per plan of care.      Precautions:      Manuals                 Walk test  600ft/5:30 min                                     Neuro Re-Ed               Squats / 5# ball 3 x 10 2 x 10 3 x 10 3 x 10 3 x 10 3 x 10 3 x 10 3 x 10 30x    Tandem gait Cg of 10 ft x 4 10 ft x 4 10 ft x 4 10 ft x 4 10 ft x 4 10 ft x 4 10 ft x 4      Tandem stance 30 sec x 3 nt 30 sec x 3 30 sec x 3 30 sec x 3 30 sec x 3 30 sec x 3      Braiding, sidestepping close superv  nt Close super sidestepping , fwd bwd walking 10 ft x 4 Close super  4 times x 10 ft     410 ft        Fwd/side over hurdles. 10ft x 6 10 ft x 4 6x 10 ft x 4 10 ft  x4 10 f t x 4 10 ft x 4      Push/pull sled 35#  5x 30ft x 5  2 x 10  2 x 10            3 x 10 3 x 10 3 x 10  30x    Ther Ex             Standing hip flex, ext , knee flexion, hip abd  3 x 10 3 x 10    3 x 10  2# 30x    Nu step   20 min 20 min 20min r 4 15 min r 5 1/2 mile 15 min r 5 15 min r 5 20 min r 5 20 min 20 min   Cybex wts le's              Leg ext        10# wts each le no on machine     Leg press         70# 3 x 10     Hamstring curls         50# 3 x 10     Hip abd         50# 3 x 10     Step over colored hurdles fwd ( single and double feet, and side stepping ,  6x   10 ft x 4 each  10 ft x 4 10 ft x 4      Ther Activity             Hand to opposite knee gait cg of 1    10 ft x 4  10 ft x 4       Supine bridging,    30x 3 x 10        Leg thrusts  knee flexed, aktc to toe tapping     4 x 5 reps toe tapping         Hip abd blue theraband     3 x 10  3 x 10 3 x 10     Hip add ball squeezes isometric     30 reps   30 rep s 30 reps  30x

## 2024-02-08 ENCOUNTER — TELEPHONE (OUTPATIENT)
Dept: NEUROLOGY | Facility: CLINIC | Age: 69
End: 2024-02-08

## 2024-02-08 ENCOUNTER — OFFICE VISIT (OUTPATIENT)
Dept: PHYSICAL THERAPY | Age: 69
End: 2024-02-08
Payer: MEDICARE

## 2024-02-08 DIAGNOSIS — G20.A1 PARKINSON'S DISEASE, UNSPECIFIED WHETHER DYSKINESIA PRESENT, UNSPECIFIED WHETHER MANIFESTATIONS FLUCTUATE: Primary | ICD-10-CM

## 2024-02-08 PROCEDURE — 97112 NEUROMUSCULAR REEDUCATION: CPT

## 2024-02-08 NOTE — PROGRESS NOTES
Daily Note     Today's date: 2024  Patient name: Marcellus Fregoso  : 1955  MRN: [id number]  Referring provider: Natalee Whitney,*  Dx:   Encounter Diagnosis     ICD-10-CM    1. Parkinson's disease, unspecified whether dyskinesia present, unspecified whether manifestations fluctuate  G20.A1                      Subjective: Pt. Having difficult day with ambulation today with bent posture.  Discussed [employer] program with patient.      Objective: See treatment diary below      Assessment: Tolerated treatment well. Patient would benefit from continued PT      Plan: Continue per plan of care.      Precautions:      Manuals                 Walk test  600ft/5:30 min                                     Neuro Re-Ed               Squats W/ 5# ball 3 x 10 2 x 10 3 x 10 3 x 10 3 x 10 3 x 10 3 x 10 3 x 10 30x    Tandem gait Cg of 10 ft x 4 10 ft x 4 10 ft x 4 10 ft x 4 10 ft x 4 10 ft x 4 10 ft x 4      Tandem stance 30 sec x 3 nt 30 sec x 3 30 sec x 3 30 sec x 3 30 sec x 3 30 sec x 3      Braiding, sidestepping close superv  nt Close super sidestepping , fwd bwd walking 10 ft x 4 Close super  4 times x 10 ft     410 ft        Fwd/side over hurdles. 10ft x 6 10 ft x 4 6x 10 ft x 4 10 ft  x4 10 f t x 4 10 ft x 4      Push/pull sled 35#  5x 30ft x 5  2 x 10  2 x 10            3 x 10 3 x 10 3 x 10  30x    Ther Ex             Standing hip flex, ext , knee flexion, hip abd  3 x 10 3 x 10    3 x 10  2# 30x    Nu step   20 min 20 min 20min r 4 15 min r 5 1/2 mile 15 min r 5 15 min r 5 20 min r 5 20 min 20 min   Cybex wts le's              Leg ext        10# wts each le no on machine     Leg press         70# 3 x 10     Hamstring curls         50# 3 x 10     Hip abd         50# 3 x 10     Step over colored hurdles fwd ( single and double feet, and side stepping ,  6x   10 ft x 4 each  10 ft x 4 10 ft x 4      Ther Activity             Hand to opposite knee gait cg of 1     10 ft x 4  10 ft x 4       Supine bridging,    30x 3 x 10        Leg thrusts knee flexed, aktc to toe tapping     4 x 5 reps toe tapping         Hip abd blue theraband     3 x 10  3 x 10 3 x 10     Hip add ball squeezes isometric     30 reps   30 rep s 30 reps  30x

## 2024-02-08 NOTE — TELEPHONE ENCOUNTER
I called and left a voicemail message reminding the patient of there upcoming appointment with Mary Alice Álvarez PA-C on 2/12/24 @ 12:30 pm in the [place] [facility]. I requested a call back to our office to confirm the appointment or if the patient has any issues or concerns or cannot keep this appointment.

## 2024-02-12 ENCOUNTER — TELEPHONE (OUTPATIENT)
Dept: NEUROLOGY | Facility: CLINIC | Age: 69
End: 2024-02-12

## 2024-02-12 ENCOUNTER — OFFICE VISIT (OUTPATIENT)
Dept: PHYSICAL THERAPY | Age: 69
End: 2024-02-12
Payer: MEDICARE

## 2024-02-12 ENCOUNTER — OFFICE VISIT (OUTPATIENT)
Dept: NEUROLOGY | Facility: CLINIC | Age: 69
End: 2024-02-12
Payer: MEDICARE

## 2024-02-12 VITALS
HEIGHT: 68 IN | OXYGEN SATURATION: 96 % | TEMPERATURE: 97.2 F | WEIGHT: 288 LBS | BODY MASS INDEX: 43.65 KG/M2 | RESPIRATION RATE: 16 BRPM | HEART RATE: 65 BPM | DIASTOLIC BLOOD PRESSURE: 96 MMHG | SYSTOLIC BLOOD PRESSURE: 177 MMHG

## 2024-02-12 DIAGNOSIS — G20.A1 PARKINSON DISEASE: ICD-10-CM

## 2024-02-12 DIAGNOSIS — G20.A1 PARKINSON'S DISEASE, UNSPECIFIED WHETHER DYSKINESIA PRESENT, UNSPECIFIED WHETHER MANIFESTATIONS FLUCTUATE: Primary | ICD-10-CM

## 2024-02-12 DIAGNOSIS — G62.9 NEUROPATHY: Primary | ICD-10-CM

## 2024-02-12 PROCEDURE — 97112 NEUROMUSCULAR REEDUCATION: CPT

## 2024-02-12 PROCEDURE — 99214 OFFICE O/P EST MOD 30 MIN: CPT | Performed by: PHYSICIAN ASSISTANT

## 2024-02-12 RX ORDER — ESZOPICLONE 3 MG/1
TABLET, FILM COATED ORAL
COMMUNITY
Start: 2024-01-26

## 2024-02-12 NOTE — PROGRESS NOTES
Patient ID: Marcellus Fregoso is a 68 y.o. male.    Assessment/Plan:    Neuropathy  Patient presented with balance impairment with evidence of peripheral neuropathy on exam.  Bilateral LE EMG confirmed a chronic, length dependent peripheral neuropathy.  This is likely 2/2 DM.     He has no painful symptoms of neuropathy that would warrant medication.     He completed balance therapy and is now back in PT (see below).     We discussed the importance of controlling his diabetes under the direction of his PCP.  Proper foot care and hygiene advised.    Parkinson disease (HCC)  Patient had also been reporting UE tremor. On Dr. Carranza's prior exams, no concern for parkinsonism.  When I saw him for the first time in July 2023, I noticed a slight tremor of the fingers on his left hand at rest, as well as intermittent tremor of both UE with certain positions only. Patient continued to question PD and had made himself an appt with movement specialist at [facility], which I encouraged him to pursue.  Complicating the picture was long-term use of Zyprexa previously prescribed by a psychiatrist for stuttering.     He has now been following with [facility] movement disorders center and was diagnosed with Parkinson disease based on exam and positive DaTscan.  He is on Sinemet and has seen some improvement, but has some side effects.  I encouraged him to discuss with his neurologist at [facility].    Discussed Dr. Carranza and I are not movement disorder specialists, so I would encourage him to continue care at [facility].  He did inquire about having a local movement specialist and I told him I can reach out to the movement team to get him scheduled with someone in about 6 months for follow up, and can likely be seen yearly or so to keep a relationship with a local provider.    He is also now following with psychiatry and psychology and I encouraged him to continue care.     He is interested in local PD support groups, therefore will have social work  reach out and provide him with info.    Patient will follow up in 6 months with movement team (will request visit).     Diagnoses and all orders for this visit:    Neuropathy    Parkinson disease    Other orders             Subjective:    HPI    Marcellus Fregoso is a 68 year old male with PMH of prior lap band, Molina's esophagus, gastroparesis, DM type 2, depression and anxiety, stuttering, HTN, ANABELL, and prostate cancer s/p prostatectomy 2011, who presents today for follow up evaluation of several neurological issues.      Patient was first seen by Dr. Carranza in March 2022. He brought a list of active issues which were all addressed. Patient reported concern about his balance, feels off balance intermittently while walking, in the shower, and especially on uneven surfaces. Patient had a fall in Dec 2020 after taking Ambien and waiting too long to get into bed. Patient reported falling down 16 steps and hitting the back of his head. He was evaluated and diagnosed with concussion but no associated internal injury or fracture. Patient noted overall stamina has significantly decreased. Patient noted overall mobility and range of motion more limited as well. He denied any pain in hands or feet. He reported difficulty getting up from low chairs. Patient himself also brought up concern for Parkinson's disease. He notes a tremor mainly with action, mentioned trouble using a computer mouse at times. He reported 2 uncles with PD, although description of the symptoms more concerning for ET. Patient also has been on Zyprexa for many years for stuttering, prescribed by a psychiatrist previously. Question if this impacts tremor.   On exam patient noted to have decreased vibratory sensation and temp sensation in lower extremities concerning for peripheral neuropathy. No clear proximal weakness in arms or legs. No parkinsonian features on exam.      Labs completed 3/28/23. Normal SPEP, CK, B1, B6, folate. B12 slightly low at 328.  "Negative Lyme, Sjogren's.     EMG bilateral LE 8/22/22 demonstrated a chronic length dependent sensory motor peripheral neuropathy with axonal and demyelinative changes.     He was seen by Dr. Encarnacion in Aug 2022 and Nov 2022 and discussed tremor most consistent with ET, balance difficulty likely 2/2 neuropathy. No parkinsonian features on exam. PT was recommended for balance therapy.     At timing of visit with Dr. Carranza in March 2023, patient reported ongoing issues with balance, endurance with walking. He reported longstanding right leg weakness, encouraged to see PCP to investigate if related to lumbar spinal stenosis. He reported paresthesias in the finger tips and decreased dexterity in the right hand. He noted trouble opening bottles at times. He is left handed. Again, no parkinsonian features on exam. She recommended seeing psych to discuss Zyprexa, unclear of benefit for stuttering and patient on for many years (over 20 he estimated).      EMG of the mejia UE completed 6/26/23. This demonstrated moderate compression of the median nerve at the wrist, c/w CTS. Otherwise unremarkable study.    I saw the patient for the first time in July 2023.  At that time he reported ongoing concerns with his symptoms of balance difficulty, endurance issues, tremor and concerned he \"still does not have a diagnosis\" despite multiple appts reviewing studies and giving diagnosis of peripheral neuropathy, essential tremor possibly exacerbated by longterm antipsychotic use, and now CTS. He wanted to know if he had \"CMT\" but was confusing this with Parkinson's disease (after discussion). He reported occasional numbness in the feet, no painful symptoms. Still having balance difficulty. He recently completed PT in May 2023 working on gait and balance. He reported a \"new\" LUE tremor x 3 months, but this had been noted before. He made an appt to see a movement disorder specialist at [facility], which I encouraged him to pursue.    Today, " "patient reports he has been following with [facility] since our last visit.  He saw Dr. Codey Franks and Dr. Julita Whittaker in August 2023 and there were some parkinsonian features on exam, possible related to Zyprexa vs idiopathic Parkinson's disease.  He was advised to discontinue Zyprexa and was started on Sinemet.  He was seen by Dianne WARD in follow up in Oct 2023 who reviewed DaTscan from 10/18/23 which was positive (per report--Symmetrically depressed dopamine transporter distribution, suggestive of a Parkinsonian syndrome including Parkinson's disease, multi-systems atrophy and progressive supranuclear palsy).  He was last seen by [facility] on 12/28/23 with plan to continue Sinemet and follow up in 4 months (appt scheduled in April with Dr. Franks).  He reports initially the Sinemet was incredible and he felt a difference right away, but then wore off a little.  He is working with his team at [facility] for dosing.  When he went off prozac and started Sinemet he had some increased libido, and restarted prozac.  He is now following with a psychiatrist and a psychologist.  He reports some concerns over \"obsessions\" since starting Sinemet, which he is prone to.  He remains off Zyprexa.  He is wondering if he should see someone from movement team here at [facility] to have a local team, but still follow primarily at [facility].      The following portions of the patient's history were reviewed and updated as appropriate: current medications, past family history, past medical history, past social history, past surgical history, and problem list.       Objective:    Blood pressure (!) 177/96, pulse 65, temperature (!) 97.2 °F (36.2 °C), temperature source Temporal, resp. rate 16, height 5' 8\" (1.727 m), weight 131 kg (288 lb), SpO2 96%.    Physical Exam  Constitutional:       Appearance: Normal appearance.   Eyes:      Extraocular Movements: EOM normal.      Pupils: Pupils are equal, round, and reactive to light.   Neurological: "      Mental Status: He is alert.      Motor: Motor strength is normal.  Psychiatric:         Mood and Affect: Mood normal.         Speech: Speech normal.         Behavior: Behavior normal.         Neurological Exam  Mental Status  Alert. Oriented to person, place, time and situation. Speech is normal. Language: Stuttering speech.  No aphasia . Attention and concentration are normal.    Cranial Nerves  CN II: Visual fields full to confrontation.  CN III, IV, VI: Extraocular movements intact bilaterally. Pupils equal round and reactive to light bilaterally.  CN V: Facial sensation is normal.  CN VII: Full and symmetric facial movement.  CN VIII: Hearing is normal.  CN IX, X: Palate elevates symmetrically  CN XI: Shoulder shrug strength is normal.  CN XII: Tongue midline without atrophy or fasciculations.    Motor   The following abnormal movements were seen: Occasional rest tremor of RUE.  No bradykinesia .   Strength is 5/5 throughout all four extremities.    Sensory  Light touch is normal in upper and lower extremities.     Reflexes  Diffusely hyporeflexic .    Coordination  Right: Finger-to-nose normal.Left: Finger-to-nose normal.    Gait    Wide based, normal arm swing , no significant shuffling.        ROS:    Review of Systems   Constitutional: Negative.    HENT: Negative.     Eyes: Negative.    Respiratory: Negative.     Cardiovascular: Negative.    Gastrointestinal: Negative.    Endocrine: Negative.    Genitourinary: Negative.    Musculoskeletal: Negative.    Skin: Negative.    Allergic/Immunologic: Negative.    Neurological: Negative.    Hematological: Negative.    Psychiatric/Behavioral: Negative.       I personally reviewed and updated the ROS as appropriate

## 2024-02-12 NOTE — TELEPHONE ENCOUNTER
MSW phoned [employer] at [phone]  [place]     MSW phoned the [employer] at [phone]   And was informed that they were only able to find one Contact :Nikita Phillips   [phone]  [facility]       MSW phoned Nikita Phillips at [phone] who informed that the PD Support Group   It is now at [facility]  The group runs the last tuesday of every month from 10-12pm once a month. There is no need to sign up only just show up   To present at :  the administration building

## 2024-02-12 NOTE — TELEPHONE ENCOUNTER
MSW phoned [employer] at [phone]  [place]     MSW phoned the [employer] at [phone]   And was informed that they were only able to find one Contact :Nikita Phillips   [phone]  [facility]       MSW phoned Nikita Phillips at [phone] who informed that the PD Support Group   It is now at [facility]  The group runs the last tuesday of every month from 10-12pm once a month. There is no need to sign up only just show up   To present at :  the administration building

## 2024-02-12 NOTE — ASSESSMENT & PLAN NOTE
Patient had also been reporting UE tremor. On Dr. Carranza's prior exams, no concern for parkinsonism.  When I saw him for the first time in July 2023, I noticed a slight tremor of the fingers on his left hand at rest, as well as intermittent tremor of both UE with certain positions only. Patient continued to question PD and had made himself an appt with movement specialist at [facility], which I encouraged him to pursue.  Complicating the picture was long-term use of Zyprexa previously prescribed by a psychiatrist for stuttering.     He has now been following with [facility] movement disorders center and was diagnosed with Parkinson disease based on exam and positive DaTscan.  He is on Sinemet and has seen some improvement, but has some side effects.  I encouraged him to discuss with his neurologist at [facility].    Discussed Dr. Carranza and I are not movement disorder specialists, so I would encourage him to continue care at [facility].  He did inquire about having a local movement specialist and I told him I can reach out to the movement team to get him scheduled with someone in about 6 months for follow up, and can likely be seen yearly or so to keep a relationship with a local provider.    He is also now following with psychiatry and psychology and I encouraged him to continue care.     He is interested in local PD support groups, therefore will have social work reach out and provide him with info.

## 2024-02-12 NOTE — ASSESSMENT & PLAN NOTE
Patient presented with balance impairment with evidence of peripheral neuropathy on exam.  Bilateral LE EMG confirmed a chronic, length dependent peripheral neuropathy.  This is likely 2/2 DM.     He has no painful symptoms of neuropathy that would warrant medication.     He completed balance therapy and is now back in PT (see below).     We discussed the importance of controlling his diabetes under the direction of his PCP.  Proper foot care and hygiene advised.

## 2024-02-12 NOTE — TELEPHONE ENCOUNTER
Can you please provide patient with any info on PD groups locally?  He was recently diagnosed by [facility] and will continue to follow with them, but wanted some info on any local groups for PD, thanks!

## 2024-02-12 NOTE — PROGRESS NOTES
Daily Note     Today's date: 2024  Patient name: Marcellus Fregoso  : 1955  MRN: [id number]  Referring provider: Natalee Whitney,*  Dx:   Encounter Diagnosis     ICD-10-CM    1. Parkinson's disease, unspecified whether dyskinesia present, unspecified whether manifestations fluctuate  G20.A1                      Subjective: Pt. Able to walk seven minutes before needing to stop.  VC's for upright posture with ambulation.      Objective: See treatment diary below      Assessment: Tolerated treatment well. Patient would benefit from continued PT      Plan: Continue per plan of care.      Precautions:      Manuals                 Walk test  600ft/5:30 min     7 min walking                                Neuro Re-Ed               Squats W/ 5# ball 3 x 10 2 x 10 3 x 10 3 x 10 3 x 10 3 x 10 3 x 10 3 x 10 30x    Tandem gait Cg of 10 ft x 4 10 ft x 4 10 ft x 4 10 ft x 4 10 ft x 4 10 ft x 4 10 ft x 4      Tandem stance 30 sec x 3 nt 30 sec x 3 30 sec x 3 30 sec x 3 30 sec x 3 30 sec x 3      Braiding, sidestepping close superv  nt Close super sidestepping , fwd bwd walking 10 ft x 4 Close super  4 times x 10 ft     410 ft        Fwd/side over hurdles. 10ft x 6 10 ft x 4 6x 10 ft x 4 10 ft  x4 10 f t x 4 10 ft x 4      Push/pull sled 35#  5x 30ft x 5  2 x 10  2 x 10            3 x 10 3 x 10 3 x 10  30x    Ther Ex             Standing hip flex, ext , knee flexion, hip abd  3 x 10 3 x 10    3 x 10  2# 30x    Nu step   20 min 20 min 20min r 4 15 min r 5 1/2 mile 15 min r 5 15 min r 5 20 min r 5 20 min 20 min   Cybex wts le's              Leg ext        10# wts each le no on machine     Leg press         70# 3 x 10     Hamstring curls         50# 3 x 10     Hip abd         50# 3 x 10     Step over colored hurdles fwd ( single and double feet, and side stepping ,  6x   10 ft x 4 each  10 ft x 4 10 ft x 4      Ther Activity             Hand to opposite knee gait cg of 1     10 ft x 4  10 ft x 4       Supine bridging,    30x 3 x 10        Leg thrusts knee flexed, aktc to toe tapping     4 x 5 reps toe tapping         Hip abd blue theraband     3 x 10  3 x 10 3 x 10     Hip add ball squeezes isometric     30 reps   30 rep s 30 reps  30x

## 2024-02-12 NOTE — PATIENT INSTRUCTIONS
Continue to follow with [facility] neurology as indicated.  Let them know about the concerns with Sinemet  Continue to follow with psychiatry and psychology  Will ask our movement team here to see you in about 6 months for follow up and they can see you to keep local care while you primarily follow with [facility].  Our movement team includes Dr. Prachi Salazar, Cruz Eugene PA-C and Maci WARD

## 2024-02-13 NOTE — TELEPHONE ENCOUNTER
Communication sent to [facility] to learn more info about their program in order to share info with patient.

## 2024-02-15 ENCOUNTER — OFFICE VISIT (OUTPATIENT)
Dept: PHYSICAL THERAPY | Age: 69
End: 2024-02-15
Payer: MEDICARE

## 2024-02-15 DIAGNOSIS — G20.A1 PARKINSON'S DISEASE, UNSPECIFIED WHETHER DYSKINESIA PRESENT, UNSPECIFIED WHETHER MANIFESTATIONS FLUCTUATE: Primary | ICD-10-CM

## 2024-02-15 PROCEDURE — 97112 NEUROMUSCULAR REEDUCATION: CPT

## 2024-02-15 NOTE — TELEPHONE ENCOUNTER
LIANNE phoned Nikita Phillips at [phone] who confirmed PD Support group and informed that it is at [facility]. She informed that Wilberto Thurman and Kunal Salgado are now taking over. She does not have their phone number at the moment but this writer can call her back to obtain it.

## 2024-02-15 NOTE — PROGRESS NOTES
"Daily Note     Today's date: 2/15/2024  Patient name: Marcellus Fregoso  : 1955  MRN: [id number]  Referring provider: Natalee Whitney,*  Dx:   Encounter Diagnosis     ICD-10-CM    1. Parkinson's disease, unspecified whether dyskinesia present, unspecified whether manifestations fluctuate  G20.A1                      Subjective: Pt. To call for \"Big and Loud\" program.      Objective: See treatment diary below      Assessment: Tolerated treatment well. Patient would benefit from continued PT      Plan: Continue per plan of care.      Precautions:      Manuals 1/22 1/24 1/29 2/1 2/5 2/8 2/12 2/15 1/4 1/11                Walk test  600ft/5:30 min     7 min walking                                Neuro Re-Ed               Squats W/ 5# ball 3 x 10 2 x 10 3 x 10 3 x 10 3 x 10 3 x 10 3 x 10 3 x 10 30x    Tandem gait Cg of 10 ft x 4 10 ft x 4 10 ft x 4 10 ft x 4 10 ft x 4 10 ft x 4 10 ft x 4      Tandem stance 30 sec x 3 nt 30 sec x 3 30 sec x 3 30 sec x 3 30 sec x 3 30 sec x 3      Braiding, sidestepping close superv  nt Close super sidestepping , fwd bwd walking 10 ft x 4 Close super  4 times x 10 ft     410 ft        Fwd/side over hurdles. 10ft x 6 10 ft x 4 6x 10 ft x 4 10 ft  x4 10 f t x 4 10 ft x 4 10ft x 4     Push/pull sled 35#  5x 30ft x 5  2 x 10  2 x 10  30ft x3          3 x 10 3 x 10 3 x 10  30x    Ther Ex             Standing hip flex, ext , knee flexion, hip abd  3 x 10 3 x 10    3 x 10  2# 30x    Nu step   20 min 20 min 20min r 4 15 min r 5 1/2 mile 15 min r 5 15 min r 5 20 min r 5 20 min 20 min   Cybex wts le's              Leg ext        10# wts each le no on machine     Leg press         70# 3 x 10     Hamstring curls         50# 3 x 10     Hip abd         50# 3 x 10     Step over colored hurdles fwd ( single and double feet, and side stepping ,  6x   10 ft x 4 each  10 ft x 4 10 ft x 4      Ther Activity             Hand to opposite knee gait cg of 1    10 ft x 4  10 ft x 4       Supine " bridging,    30x 3 x 10        Leg thrusts knee flexed, aktc to toe tapping     4 x 5 reps toe tapping         Hip abd blue theraband     3 x 10  3 x 10 3 x 10     Hip add ball squeezes isometric     30 reps   30 rep s 30 reps  30x

## 2024-02-16 ENCOUNTER — TELEPHONE (OUTPATIENT)
Dept: FAMILY MEDICINE CLINIC | Facility: CLINIC | Age: 69
End: 2024-02-16

## 2024-02-16 NOTE — TELEPHONE ENCOUNTER
Voicemail: Hi, good afternoon. I'm sorry I'm calling so much today. I did ask for your advice, put a call in to a pain management doctor works office and I was told that I cannot make an appointment. He's going to have to review my charts and I did explain to him what the situation is and that he needs to review my charts and we'll decide in a few days whether I can come in and he'll see me as a as a patient or not. So I just wanted to bring you up to date on that. Thank you everyone. Have a great weekend. everett Carrington.

## 2024-02-16 NOTE — TELEPHONE ENCOUNTER
Voicemail: Hi there, I'm currently getting PT at [facility]. However, I have officially diagnosed with Parkinson's. I'm on Carbidopa Levodopa and my therapist out thinks I should get on this big and loud program at the [address] [place] [address] [facility] [place] - The thing is, I need a Doctor's referral to them for Parkinson's and I've had in the past. I've had a good history with the therapist named Mimi. If we could get her. Also, if you could call them and I'll stick it on my record so I could call and set it up, I would appreciate it. Can you call me back and confirm this? Thank you ever so much now. Goodbye.    - Please confirm if referral can be placed or contact patient if further information is needed.

## 2024-02-19 ENCOUNTER — OFFICE VISIT (OUTPATIENT)
Dept: PHYSICAL THERAPY | Age: 69
End: 2024-02-19
Payer: MEDICARE

## 2024-02-19 DIAGNOSIS — G20.A1 PARKINSON'S DISEASE, UNSPECIFIED WHETHER DYSKINESIA PRESENT, UNSPECIFIED WHETHER MANIFESTATIONS FLUCTUATE: Primary | ICD-10-CM

## 2024-02-19 DIAGNOSIS — R26.9 GAIT ABNORMALITY: ICD-10-CM

## 2024-02-19 DIAGNOSIS — G20.A2 PARKINSON'S DISEASE WITHOUT DYSKINESIA, WITH FLUCTUATING MANIFESTATIONS: Primary | ICD-10-CM

## 2024-02-19 PROCEDURE — 97112 NEUROMUSCULAR REEDUCATION: CPT

## 2024-02-19 NOTE — PROGRESS NOTES
"Daily Note     Today's date: 2024  Patient name: Marcellus Fregoso  : 1955  MRN: [id number]  Referring provider: Natalee Whitney,*  Dx:   Encounter Diagnosis     ICD-10-CM    1. Parkinson's disease, unspecified whether dyskinesia present, unspecified whether manifestations fluctuate  G20.A1                      Subjective: Pt. To participate in \"Big and Loud\" program.      Objective: See treatment diary below      Assessment: Tolerated treatment well. Patient would benefit from continued PT      Plan: Continue per plan of care.      Precautions:      Manuals 1/22 1/24 1/29 2/1 2/5 2/8 2/12 2/15 2/19 1/11                Walk test  600ft/5:30 min     7 min walking                                Neuro Re-Ed               Squats W/ 5# ball 3 x 10 2 x 10 3 x 10 3 x 10 3 x 10 3 x 10 3 x 10 3 x 10 30x    Tandem gait Cg of 10 ft x 4 10 ft x 4 10 ft x 4 10 ft x 4 10 ft x 4 10 ft x 4 10 ft x 4      Tandem stance 30 sec x 3 nt 30 sec x 3 30 sec x 3 30 sec x 3 30 sec x 3 30 sec x 3      Braiding, sidestepping close superv  nt Close super sidestepping , fwd bwd walking 10 ft x 4 Close super  4 times x 10 ft     410 ft        Fwd/side over hurdles. 10ft x 6 10 ft x 4 6x 10 ft x 4 10 ft  x4 10 f t x 4 10 ft x 4 10ft x 4 10ft x 4    Push/pull sled 35#  5x 30ft x 5  2 x 10  2 x 10  30ft x3 5x         3 x 10 3 x 10 3 x 10  30x    Ther Ex             Standing hip flex, ext , knee flexion, hip abd  3 x 10 3 x 10    3 x 10  2# 30x    Nu step   20 min 20 min 20min r 4 15 min r 5 1/2 mile 15 min r 5 15 min r 5 20 min r 5 20 min 20 min   Cybex wts le's              Leg ext        10# wts each le no on machine     Leg press         70# 3 x 10     Hamstring curls         50# 3 x 10     Hip abd         50# 3 x 10     Step over colored hurdles fwd ( single and double feet, and side stepping ,  6x   10 ft x 4 each  10 ft x 4 10 ft x 4      Ther Activity             Hand to opposite knee gait cg of 1    10 ft x 4  10 ft x 4     "   Supine bridging,    30x 3 x 10        Leg thrusts knee flexed, aktc to toe tapping     4 x 5 reps toe tapping         Hip abd blue theraband     3 x 10  3 x 10 3 x 10     Hip add ball squeezes isometric     30 reps   30 rep s 30 reps  30x

## 2024-02-19 NOTE — TELEPHONE ENCOUNTER
Referral done. When pt call to set it up, he should tell them that he would like to work with Mimi.

## 2024-02-20 NOTE — TELEPHONE ENCOUNTER
LIANNE phoned Nikita Phillips at [phone]  PD Support group [facility]. Nikita informed that she will ask Wilberto Thurman to contact this writer. She is out of town.

## 2024-02-21 NOTE — TELEPHONE ENCOUNTER
Tena info sent to patient     Lee Germain,     I hope you are doing well. I was in contact with [facility] therapy and they have a program for PD, it is called PD Steps.      PD Steps Meetings   1st half of 2024   PD Steps will continue to provide education to help people with Parkinson's (and family/caregivers) stay active and engaged     3/20/2024 - Deep Brain Stimulation (DBS) for PD - Dr. Kian Gonzalez MD, Neurosurgeon          Learn how some people with PD benefit from this surgery to address motor and non-motor symptoms of PD.  ([facility], 6:00pm doors open,   6:30-7:30pm lecture)     5/15/2024 - Build Your PD Team, Part II - Petty Andor, [facility]  Discover and discuss how to build your care team and how to live well with PD.  This is an interactive workshop.  ([facility], 6:00pm doors open,   6:30-7:30pm workshop)     Directions to [facility]:  [address] [place] [address]  [email or web address]  Park near the entrance to [facility].  (This is on the back side of the building marked “Cancer Center.”)  Enter the building using the fitness center entrance.  The conference rooms are near the end of the gordon, on the right.     If you would like to attend the next meeting, please RSVP, with the names of the people attending, to [email or web address]     Thank you,     Ras Funes      [phone]  [facility]     Last read by Marcellus Fregoso at 12:45 PM on 2/21/2024.

## 2024-02-23 NOTE — TELEPHONE ENCOUNTER
Support group info was sent to pt and received via [employer]. MSW remains available for any questions or future social needs.

## 2024-02-26 ENCOUNTER — APPOINTMENT (OUTPATIENT)
Dept: PHYSICAL THERAPY | Age: 69
End: 2024-02-26
Payer: MEDICARE

## 2024-02-29 ENCOUNTER — OFFICE VISIT (OUTPATIENT)
Dept: PHYSICAL THERAPY | Age: 69
End: 2024-02-29
Payer: MEDICARE

## 2024-02-29 DIAGNOSIS — G20.A1 PARKINSON'S DISEASE, UNSPECIFIED WHETHER DYSKINESIA PRESENT, UNSPECIFIED WHETHER MANIFESTATIONS FLUCTUATE: Primary | ICD-10-CM

## 2024-02-29 PROCEDURE — 97112 NEUROMUSCULAR REEDUCATION: CPT

## 2024-02-29 NOTE — PROGRESS NOTES
Daily Note     Today's date: 2024  Patient name: Marcellus Fregoso  : 1955  MRN: [id number]  Referring provider: Natalee Whitney,*  Dx:   Encounter Diagnosis     ICD-10-CM    1. Parkinson's disease, unspecified whether dyskinesia present, unspecified whether manifestations fluctuate  G20.A1                      Subjective: Pt. To participate in big and loud program.      Objective: See treatment diary below      Assessment: Tolerated treatment well. Patient would benefit from continued PT      Plan: Continue per plan of care.      Precautions:      Manuals 1/22 1/24 1/29 2/1 2/5 2/8 2/12 2/15 2/19 2/29                Walk test  600ft/5:30 min     7 min walking                                Neuro Re-Ed               Squats W/ 5# ball 3 x 10 2 x 10 3 x 10 3 x 10 3 x 10 3 x 10 3 x 10 3 x 10 30x 3 x 10   Tandem gait Cg of 10 ft x 4 10 ft x 4 10 ft x 4 10 ft x 4 10 ft x 4 10 ft x 4 10 ft x 4  10ft x 4 10ft x 5             Tandem stance 30 sec x 3 nt 30 sec x 3 30 sec x 3 30 sec x 3 30 sec x 3 30 sec x 3      Braiding, sidestepping close superv  nt Close super sidestepping , fwd bwd walking 10 ft x 4 Close super  4 times x 10 ft     410 ft        Fwd/side over hurdles. 10ft x 6 10 ft x 4 6x 10 ft x 4 10 ft  x4 10 f t x 4 10 ft x 4 10ft x 4 10ft x 4    Push/pull sled 35#  5x 30ft x 5  2 x 10  2 x 10  30ft x3 5x 35# 5x        3 x 10 3 x 10 3 x 10  30x    Ther Ex             Standing hip flex, ext , knee flexion, hip abd  3 x 10 3 x 10    3 x 10  2# 30x    Nu step   20 min 20 min 20min r 4 15 min r 5 1/2 mile 15 min r 5 15 min r 5 20 min r 5 20 min 20 min   Cybex wts le's              Leg ext        10# wts each le no on machine     Leg press         70# 3 x 10     Hamstring curls         50# 3 x 10     Hip abd         50# 3 x 10     Step over colored hurdles fwd ( single and double feet, and side stepping ,  6x   10 ft x 4 each  10 ft x 4 10 ft x 4   10ft x 4   Ther Activity             Hand to opposite  knee gait cg of 1    10 ft x 4  10 ft x 4       Supine bridging,    30x 3 x 10        Leg thrusts knee flexed, aktc to toe tapping     4 x 5 reps toe tapping         Hip abd blue theraband     3 x 10  3 x 10 3 x 10     Hip add ball squeezes isometric     30 reps   30 rep s 30 reps  30x

## 2024-03-06 ENCOUNTER — EVALUATION (OUTPATIENT)
Dept: PHYSICAL THERAPY | Facility: CLINIC | Age: 69
End: 2024-03-06
Payer: MEDICARE

## 2024-03-06 DIAGNOSIS — G20.A2 PARKINSON'S DISEASE WITHOUT DYSKINESIA, WITH FLUCTUATING MANIFESTATIONS: ICD-10-CM

## 2024-03-06 DIAGNOSIS — R26.9 GAIT ABNORMALITY: ICD-10-CM

## 2024-03-06 DIAGNOSIS — G20.A1 PARKINSON'S DISEASE, UNSPECIFIED WHETHER DYSKINESIA PRESENT, UNSPECIFIED WHETHER MANIFESTATIONS FLUCTUATE: Primary | ICD-10-CM

## 2024-03-06 PROCEDURE — 97164 PT RE-EVAL EST PLAN CARE: CPT | Performed by: PHYSICAL THERAPIST

## 2024-03-06 NOTE — PROGRESS NOTES
Daily Note     Today's date: 3/6/2024  Patient name: Marcellus Fregoso  : 1955  MRN: [id number]  Referring provider: Ehsan Calix MD  Dx:   Encounter Diagnosis     ICD-10-CM    1. Parkinson's disease, unspecified whether dyskinesia present, unspecified whether manifestations fluctuate  G20.A1       2. Parkinson's disease without dyskinesia, with fluctuating manifestations  G20.A2 Ambulatory Referral to Physical Therapy      3. Gait abnormality  R26.9 Ambulatory Referral to Physical Therapy                     Subjective: Pt reports to neuro physical therapy specialist. He reports having a few falls lately when getting in and out of the car. He was been recommended to Neuro PT. Pt reports his home exercise program involves 2 days a week walking on block, completing sit to stands daily for 2x10.       Objective: See treatment diary below  RE at [place]:  TUG 10 seconds  Functional gait assessment   Tandem gait close superv 10 ft   Sit to stand 10 reps from 20 inch height    Functional Outcomes 3/6/24  5x sts: 12.65 sec  TUG:        Assessment:       Plan: Continue per plan of care.      Precautions:      Manuals 1/22 1/24 1/29 2/1 2/5 2/8 2/12 2/15 2/19 2/29                Walk test  600ft/5:30 min     7 min walking                                Neuro Re-Ed               Squats W/ 5# ball 3 x 10 2 x 10 3 x 10 3 x 10 3 x 10 3 x 10 3 x 10 3 x 10 30x 3 x 10   Tandem gait Cg of 10 ft x 4 10 ft x 4 10 ft x 4 10 ft x 4 10 ft x 4 10 ft x 4 10 ft x 4  10ft x 4 10ft x 5             Tandem stance 30 sec x 3 nt 30 sec x 3 30 sec x 3 30 sec x 3 30 sec x 3 30 sec x 3      Braiding, sidestepping close superv  nt Close super sidestepping , fwd bwd walking 10 ft x 4 Close super  4 times x 10 ft     410 ft        Fwd/side over hurdles. 10ft x 6 10 ft x 4 6x 10 ft x 4 10 ft  x4 10 f t x 4 10 ft x 4 10ft x 4 10ft x 4    Push/pull sled 35#  5x 30ft x 5  2 x 10  2 x 10  30ft x3 5x 35# 5x        3 x 10 3 x 10 3 x 10  30x    Ther  Ex             Standing hip flex, ext , knee flexion, hip abd  3 x 10 3 x 10    3 x 10  2# 30x    Nu step   20 min 20 min 20min r 4 15 min r 5 1/2 mile 15 min r 5 15 min r 5 20 min r 5 20 min 20 min   Cybex wts le's              Leg ext        10# wts each le no on machine     Leg press         70# 3 x 10     Hamstring curls         50# 3 x 10     Hip abd         50# 3 x 10     Step over colored hurdles fwd ( single and double feet, and side stepping ,  6x   10 ft x 4 each  10 ft x 4 10 ft x 4   10ft x 4   Ther Activity             Hand to opposite knee gait cg of 1    10 ft x 4  10 ft x 4       Supine bridging,    30x 3 x 10        Leg thrusts knee flexed, aktc to toe tapping     4 x 5 reps toe tapping         Hip abd blue theraband     3 x 10  3 x 10 3 x 10     Hip add ball squeezes isometric     30 reps   30 rep s 30 reps  30x

## 2024-03-08 ENCOUNTER — TELEPHONE (OUTPATIENT)
Dept: PHYSICAL THERAPY | Facility: CLINIC | Age: 69
End: 2024-03-08

## 2024-03-08 NOTE — TELEPHONE ENCOUNTER
Patient called to reschedule his appointment time for visit date 3/12/24 from 3:45pm to 4:30pm because he has another appointment that he cannot get out of. Therapist aware of the time change.

## 2024-03-12 ENCOUNTER — APPOINTMENT (OUTPATIENT)
Dept: PHYSICAL THERAPY | Facility: CLINIC | Age: 69
End: 2024-03-12
Payer: MEDICARE

## 2024-03-13 ENCOUNTER — OFFICE VISIT (OUTPATIENT)
Dept: PHYSICAL THERAPY | Facility: CLINIC | Age: 69
End: 2024-03-13
Payer: MEDICARE

## 2024-03-13 DIAGNOSIS — R26.9 GAIT ABNORMALITY: ICD-10-CM

## 2024-03-13 DIAGNOSIS — G20.A2 PARKINSON'S DISEASE WITHOUT DYSKINESIA, WITH FLUCTUATING MANIFESTATIONS: Primary | ICD-10-CM

## 2024-03-13 PROCEDURE — 97112 NEUROMUSCULAR REEDUCATION: CPT | Performed by: PHYSICAL THERAPIST

## 2024-03-13 PROCEDURE — 97116 GAIT TRAINING THERAPY: CPT | Performed by: PHYSICAL THERAPIST

## 2024-03-13 NOTE — PROGRESS NOTES
Daily Note     Today's date: 3/13/2024  Patient name: Marcellus Fregoso  : 1955  MRN: [id number]  Referring provider: Ehsan Calix MD  Dx: No diagnosis found.                   Subjective:       Objective: See treatment diary     Functional outcomes: 3/13/24  ABC: 56%  6 mwt: 650 ft  Gait Speed: 0.88 m/s  FGA: 15/30     Assessment: Completd more outcome measure gathering this session. Pt tolerated treadmill well without UE but was very nervous. Few instances had to step to the sies of the tread to regain balance. Plan to attempt to continue to work on fluency of steps as well as upright posture.       Plan: Continue per plan of care.      Precautions:fall risk    Manuals 1/22 1/24 1/29 2/1 2/5 2/8 2/12 2/15 2/19 2/29                Walk test  600ft/5:30 min     7 min walking                                Neuro Re-Ed               Squats W/ 5# ball 3 x 10 2 x 10 3 x 10 3 x 10 3 x 10 3 x 10 3 x 10 3 x 10 30x 3 x 10   Tandem gait Cg of 10 ft x 4 10 ft x 4 10 ft x 4 10 ft x 4 10 ft x 4 10 ft x 4 10 ft x 4  10ft x 4 10ft x 5             Tandem stance 30 sec x 3 nt 30 sec x 3 30 sec x 3 30 sec x 3 30 sec x 3 30 sec x 3      Braiding, sidestepping close superv  nt Close super sidestepping , fwd bwd walking 10 ft x 4 Close super  4 times x 10 ft     410 ft        Fwd/side over hurdles. 10ft x 6 10 ft x 4 6x 10 ft x 4 10 ft  x4 10 f t x 4 10 ft x 4 10ft x 4 10ft x 4    Push/pull sled 35#  5x 30ft x 5  2 x 10  2 x 10  30ft x3 5x 35# 5x        3 x 10 3 x 10 3 x 10  30x    Ther Ex             Standing hip flex, ext , knee flexion, hip abd  3 x 10 3 x 10    3 x 10  2# 30x    Nu step   20 min 20 min 20min r 4 15 min r 5 1/2 mile 15 min r 5 15 min r 5 20 min r 5 20 min 20 min   Cybex wts le's              Leg ext        10# wts each le no on machine     Leg press         70# 3 x 10     Hamstring curls         50# 3 x 10     Hip abd         50# 3 x 10     Step over colored hurdles fwd ( single and double feet, and side  stepping ,  6x   10 ft x 4 each  10 ft x 4 10 ft x 4   10ft x 4   Ther Activity             Hand to opposite knee gait cg of 1    10 ft x 4  10 ft x 4       Supine bridging,    30x 3 x 10        Leg thrusts knee flexed, aktc to toe tapping     4 x 5 reps toe tapping         Hip abd blue theraband     3 x 10  3 x 10 3 x 10     Hip add ball squeezes isometric     30 reps   30 rep s 30 reps  30x

## 2024-03-15 ENCOUNTER — OFFICE VISIT (OUTPATIENT)
Dept: PHYSICAL THERAPY | Facility: CLINIC | Age: 69
End: 2024-03-15
Payer: MEDICARE

## 2024-03-15 DIAGNOSIS — R26.9 GAIT ABNORMALITY: ICD-10-CM

## 2024-03-15 DIAGNOSIS — G20.A2 PARKINSON'S DISEASE WITHOUT DYSKINESIA, WITH FLUCTUATING MANIFESTATIONS: Primary | ICD-10-CM

## 2024-03-15 PROCEDURE — 97112 NEUROMUSCULAR REEDUCATION: CPT | Performed by: PHYSICAL THERAPIST

## 2024-03-15 NOTE — PROGRESS NOTES
Daily Note     Today's date: 3/15/2024  Patient name: Marcellus Fregoso  : 1955  MRN: [id number]  Referring provider: Ehsan Calix MD  Dx: No diagnosis found.               Subjective: Felt good after last session, feeling well today, would like a program to do at the gym      Objective: See treatment diary below      Assessment: Patient was significantly fatigued at the end of the session. He struggled to complete treadmill without UE this session and frquently had to touch with UE to keep upright posture.       Plan: Continue per plan of care.        Short Term Goal Expiration Date:(24)  Long Term Goal Expiration Date: (24)  POC Expiration Date: (24)        POC expires Unit limit Auth Expiration date PT/OT/ST + Visit Limit?   24 bomn bomn bomn                           Visit/Unit Tracking  AUTH Status:  Date 3/15              Used 3/10              Remaining                     Precautions: neuropathy, fall risk       Manuals 3/15                                       Neuro Re-Ed         hurdles 5# aw b/l 3 laps fwd/lat ea solo step       Tidal tank carry Fwd 80 ft x 3    Backwards - 20 ft x 4    With 5# aw b/l       Sts  10x no UE       treadmill Prn UE 7 min with 0.8 mph focus on external cues for upright posture                               Ther Ex                                                                        Ther Activity                        Gait Training                        Modalities

## 2024-03-18 ENCOUNTER — OFFICE VISIT (OUTPATIENT)
Dept: PHYSICAL THERAPY | Facility: CLINIC | Age: 69
End: 2024-03-18
Payer: MEDICARE

## 2024-03-18 DIAGNOSIS — R26.9 GAIT ABNORMALITY: ICD-10-CM

## 2024-03-18 DIAGNOSIS — G20.A2 PARKINSON'S DISEASE WITHOUT DYSKINESIA, WITH FLUCTUATING MANIFESTATIONS: Primary | ICD-10-CM

## 2024-03-18 PROCEDURE — 97112 NEUROMUSCULAR REEDUCATION: CPT | Performed by: PHYSICAL THERAPIST

## 2024-03-18 PROCEDURE — 97110 THERAPEUTIC EXERCISES: CPT | Performed by: PHYSICAL THERAPIST

## 2024-03-18 NOTE — PROGRESS NOTES
Daily Note     Today's date: 3/18/2024  Patient name: Marcellus Fregoso  : 1955  MRN: [id number]  Referring provider: Ehsan Calix MD  Dx:   Encounter Diagnosis     ICD-10-CM    1. Parkinson's disease without dyskinesia, with fluctuating manifestations  G20.A2       2. Gait abnormality  R26.9                      Subjective: Did a lot of walking this weekend      Objective: See treatment diary below      Assessment: Patient was able to walk longer distance this session with tidal tank comapred to last. Continues to have difficulty keeping upright posture when walking on the treadmill.       Plan: Continue per plan of care.      Short Term Goal Expiration Date:(24)  Long Term Goal Expiration Date: (24)  POC Expiration Date: (24)        POC expires Unit limit Auth Expiration date PT/OT/ST + Visit Limit?   24 bomn bomn bomn                           Visit/Unit Tracking  AUTH Status:  Date 3/15              Used 3/10              Remaining                     Precautions: neuropathy, fall risk       Manuals 3/15 3/18                                      Neuro Re-Ed         hurdles 5# aw b/l 3 laps fwd/lat ea solo step 5# aw b/l 3 laps fwd/lat ea solo step      Tidal tank carry Fwd 80 ft x 3    Backwards - 20 ft x 4    With 5# aw b/l Fwd 80 ft x 4    Backwards - 20 ft x 4    With 5# aw b/l      Sts  10x no UE       treadmill Prn UE 7 min with 0.8 mph focus on external cues for upright posture PRN UE 5 min at 0.8 mph with anterior pull by therapist    5 min with min UE 0.8 mph 10% incline                               Ther Ex                                                                        Ther Activity                        Gait Training                        Modalities

## 2024-03-21 DIAGNOSIS — E11.9 CONTROLLED TYPE 2 DIABETES MELLITUS WITHOUT COMPLICATION, WITHOUT LONG-TERM CURRENT USE OF INSULIN (HCC): ICD-10-CM

## 2024-03-22 ENCOUNTER — OFFICE VISIT (OUTPATIENT)
Dept: PHYSICAL THERAPY | Facility: CLINIC | Age: 69
End: 2024-03-22
Payer: MEDICARE

## 2024-03-22 DIAGNOSIS — R26.9 GAIT ABNORMALITY: ICD-10-CM

## 2024-03-22 DIAGNOSIS — G20.A2 PARKINSON'S DISEASE WITHOUT DYSKINESIA, WITH FLUCTUATING MANIFESTATIONS: Primary | ICD-10-CM

## 2024-03-22 PROCEDURE — 97112 NEUROMUSCULAR REEDUCATION: CPT | Performed by: PHYSICAL THERAPIST

## 2024-03-22 PROCEDURE — 97110 THERAPEUTIC EXERCISES: CPT | Performed by: PHYSICAL THERAPIST

## 2024-03-22 NOTE — PROGRESS NOTES
"Daily Note     Today's date: 3/22/2024  Patient name: Marcellus Fregoso  : 1955  MRN: [id number]  Referring provider: Ehsan Calix MD  Dx:   Encounter Diagnosis     ICD-10-CM    1. Parkinson's disease without dyskinesia, with fluctuating manifestations  G20.A2       2. Gait abnormality  R26.9                      Subjective: Had a lot of pain in his knee yesterday. Requesitng to not increase in ankle weight weight overall to not cause knee pain.       Objective: See treatment diary below      Assessment:   Pt seems to be improving overall endurance, was able to complete step up and overs with tidal tank. When fatigued completing rapid turning appeared to have more festination when doing color catch. Plan next session to return to backwards walking with tidal tank carry. Encouraged pt to begin to engage in gym program as soon as possible so that transitoin can be made to [facility].     Plan: Continue per plan of care.      Short Term Goal Expiration Date:(24)  Long Term Goal Expiration Date: (24)  POC Expiration Date: (24)        POC expires Unit limit Auth Expiration date PT/OT/ST + Visit Limit?   24 bomn bomn bomn                           Visit/Unit Tracking  AUTH Status:  Date 3/15 3/22             Used 3/10 5/10             Remaining                     Precautions: neuropathy, fall risk       Manuals 3/15 3/18 3/22                                     Neuro Re-Ed         hurdles 5# aw b/l 3 laps fwd/lat ea solo step 5# aw b/l 3 laps fwd/lat ea solo step 5 laps fwd with solo step      Tidal tank carry Fwd 80 ft x 3    Backwards - 20 ft x 4    With 5# aw b/l Fwd 80 ft x 4    Backwards - 20 ft x 4    With 5# aw b/l      Sts  10x no UE       treadmill Prn UE 7 min with 0.8 mph focus on external cues for upright posture PRN UE 5 min at 0.8 mph with anterior pull by therapist    5 min with min UE 0.8 mph 10% incline       Step up and overs   2x6\" and 1x 8\" steps - 3 laps fwd without tidal tank, 3 laps " with tidal tank     Color catch   3 pods - 2 rounds fast as possible in solo step             Ther Ex                                                                        Ther Activity                        Gait Training                        Modalities

## 2024-03-26 ENCOUNTER — APPOINTMENT (OUTPATIENT)
Dept: PHYSICAL THERAPY | Facility: CLINIC | Age: 69
End: 2024-03-26
Payer: MEDICARE

## 2024-03-27 ENCOUNTER — OFFICE VISIT (OUTPATIENT)
Dept: PHYSICAL THERAPY | Facility: CLINIC | Age: 69
End: 2024-03-27
Payer: MEDICARE

## 2024-03-27 DIAGNOSIS — G20.A1 PARKINSON'S DISEASE, UNSPECIFIED WHETHER DYSKINESIA PRESENT, UNSPECIFIED WHETHER MANIFESTATIONS FLUCTUATE: ICD-10-CM

## 2024-03-27 DIAGNOSIS — G20.A2 PARKINSON'S DISEASE WITHOUT DYSKINESIA, WITH FLUCTUATING MANIFESTATIONS: Primary | ICD-10-CM

## 2024-03-27 DIAGNOSIS — R26.9 GAIT ABNORMALITY: ICD-10-CM

## 2024-03-27 PROCEDURE — 97112 NEUROMUSCULAR REEDUCATION: CPT | Performed by: PHYSICAL THERAPIST

## 2024-03-27 NOTE — PROGRESS NOTES
"Daily Note     Today's date: 3/27/2024  Patient name: Marcellus Fregoso  : 1955  MRN: [id number]  Referring provider: Ehsan Calix MD  Dx:   Encounter Diagnosis     ICD-10-CM    1. Parkinson's disease without dyskinesia, with fluctuating manifestations  G20.A2       2. Gait abnormality  R26.9       3. Parkinson's disease, unspecified whether dyskinesia present, unspecified whether manifestations fluctuate  G20.A1                      Subjective: Signed up for the gym, would like to have therapist assign tasks at the gym      Objective: See treatment diary below      Assessment: Patient had improvement with hurdles this date with stepping over with overall control and posture. Attempted walking with resistnce forward for encouragement of more upright posture. Pt had some episodes of festination during that task but quickly was bale to adjust.       Plan: Continue per plan of care.      Short Term Goal Expiration Date:(24)  Long Term Goal Expiration Date: (24)  POC Expiration Date: (24)        POC expires Unit limit Auth Expiration date PT/OT/ST + Visit Limit?   24 bomn bomn bomn                           Visit/Unit Tracking  AUTH Status:  Date 3/15 3/22 3/27            Used 3/10 5/10 6/10            Remaining                     Precautions: neuropathy, fall risk       Manuals 3/15 3/18 3/22 3/25                                    Neuro Re-Ed         hurdles 5# aw b/l 3 laps fwd/lat ea solo step 5# aw b/l 3 laps fwd/lat ea solo step 5 laps fwd with solo step  5 laps fwd solo step with tidal tank 6\" hurdles    Tidal tank carry Fwd 80 ft x 3    Backwards - 20 ft x 4    With 5# aw b/l Fwd 80 ft x 4    Backwards - 20 ft x 4    With 5# aw b/l  Fwd/bkwd - 5 laps ea    Sts  10x no UE   10x 2     treadmill Prn UE 7 min with 0.8 mph focus on external cues for upright posture PRN UE 5 min at 0.8 mph with anterior pull by therapist    5 min with min UE 0.8 mph 10% incline   PRN UE 5 min at 0.8 mph with " "anterior pull by therapist    5 min with min UE 0.8 mph 10% incline     Step up and overs   2x6\" and 1x 8\" steps - 3 laps fwd without tidal tank, 3 laps with tidal tank     Color catch   3 pods - 2 rounds fast as possible in solo step             Ther Ex                                                                        Ther Activity                        Gait Training                        Modalities                                                                                        "

## 2024-03-29 ENCOUNTER — OFFICE VISIT (OUTPATIENT)
Dept: PHYSICAL THERAPY | Facility: CLINIC | Age: 69
End: 2024-03-29
Payer: MEDICARE

## 2024-03-29 DIAGNOSIS — G20.A2 PARKINSON'S DISEASE WITHOUT DYSKINESIA, WITH FLUCTUATING MANIFESTATIONS: Primary | ICD-10-CM

## 2024-03-29 DIAGNOSIS — R26.9 GAIT ABNORMALITY: ICD-10-CM

## 2024-03-29 PROCEDURE — 97112 NEUROMUSCULAR REEDUCATION: CPT | Performed by: PHYSICAL THERAPIST

## 2024-03-29 NOTE — PROGRESS NOTES
"Daily Note     Today's date: 3/29/2024  Patient name: Marcellus Fregoso  : 1955  MRN: [id number]  Referring provider: Ehsan Calix MD  Dx:   Encounter Diagnosis     ICD-10-CM    1. Parkinson's disease without dyskinesia, with fluctuating manifestations  G20.A2       2. Gait abnormality  R26.9                      Subjective: No new changes to report      Objective: See treatment diary below      Assessment: Pt was very fatigued through the session and requested to end early. Was not able to walk on treadmill for very long due to extreme fatigue. However, completed treadmill toward end of session rather than beginning.       Plan: Continue per plan of care.      Short Term Goal Expiration Date:(24)  Long Term Goal Expiration Date: (24)  POC Expiration Date: (24)        POC expires Unit limit Auth Expiration date PT/OT/ST + Visit Limit?   24 bomn bomn bomn                           Visit/Unit Tracking  AUTH Status:  Date 3/15 3/22 3/27 3/29           Used 3/10 5/10 6/10 7/10           Remaining                     Precautions: neuropathy, fall risk       Manuals 3/15 3/18 3/22 3/25                                    Neuro Re-Ed         hurdles 5# aw b/l 3 laps fwd/lat ea solo step 5# aw b/l 3 laps fwd/lat ea solo step 5 laps fwd with solo step  5 laps fwd solo step with tidal tank 6\" hurdles    Tidal tank carry Fwd 80 ft x 3    Backwards - 20 ft x 4    With 5# aw b/l Fwd 80 ft x 4    Backwards - 20 ft x 4    With 5# aw b/l  Fwd/bkwd - 5 laps ea    Sts  10x no UE   10x 2     treadmill Prn UE 7 min with 0.8 mph focus on external cues for upright posture PRN UE 5 min at 0.8 mph with anterior pull by therapist    5 min with min UE 0.8 mph 10% incline   PRN UE 5 min at 0.8 mph with anterior pull by therapist    5 min with min UE 0.8 mph 10% incline     Step up and overs   2x6\" and 1x 8\" steps - 3 laps fwd without tidal tank, 3 laps with tidal tank     Color catch   3 pods - 2 rounds fast as possible in " solo step             Ther Ex                                                                        Ther Activity                        Gait Training                        Modalities

## 2024-04-01 ENCOUNTER — APPOINTMENT (OUTPATIENT)
Dept: PHYSICAL THERAPY | Facility: CLINIC | Age: 69
End: 2024-04-01
Payer: MEDICARE

## 2024-04-03 ENCOUNTER — APPOINTMENT (OUTPATIENT)
Dept: PHYSICAL THERAPY | Facility: CLINIC | Age: 69
End: 2024-04-03
Payer: MEDICARE

## 2024-04-08 ENCOUNTER — ESTABLISHED COMPREHENSIVE EXAM (OUTPATIENT)
Dept: URBAN - METROPOLITAN AREA CLINIC 6 | Facility: CLINIC | Age: 69
End: 2024-04-08

## 2024-04-08 DIAGNOSIS — H35.371: ICD-10-CM

## 2024-04-08 DIAGNOSIS — H40.053: ICD-10-CM

## 2024-04-08 DIAGNOSIS — H35.413: ICD-10-CM

## 2024-04-08 DIAGNOSIS — E11.9: ICD-10-CM

## 2024-04-08 DIAGNOSIS — H33.013: ICD-10-CM

## 2024-04-08 PROCEDURE — 92250 FUNDUS PHOTOGRAPHY W/I&R: CPT

## 2024-04-08 PROCEDURE — 92014 COMPRE OPH EXAM EST PT 1/>: CPT

## 2024-04-08 ASSESSMENT — TONOMETRY
OD_IOP_MMHG: 20
OS_IOP_MMHG: 20

## 2024-04-08 ASSESSMENT — VISUAL ACUITY
OU_CC: J1+
OU_CC: 20/25
OS_CC: 20/25
OD_CC: 20/20

## 2024-04-10 ENCOUNTER — OFFICE VISIT (OUTPATIENT)
Dept: PHYSICAL THERAPY | Facility: CLINIC | Age: 69
End: 2024-04-10
Payer: MEDICARE

## 2024-04-10 DIAGNOSIS — R26.9 GAIT ABNORMALITY: ICD-10-CM

## 2024-04-10 DIAGNOSIS — G20.A2 PARKINSON'S DISEASE WITHOUT DYSKINESIA, WITH FLUCTUATING MANIFESTATIONS: Primary | ICD-10-CM

## 2024-04-10 PROCEDURE — 97110 THERAPEUTIC EXERCISES: CPT | Performed by: PHYSICAL THERAPIST

## 2024-04-10 PROCEDURE — 97112 NEUROMUSCULAR REEDUCATION: CPT | Performed by: PHYSICAL THERAPIST

## 2024-04-10 NOTE — PROGRESS NOTES
"Discharge Note     Today's date: 4/10/2024  Patient name: Marcellus Fregoso  : 1955  MRN: [id number]  Referring provider: Ehsan Calix MD  Dx:   Encounter Diagnosis     ICD-10-CM    1. Parkinson's disease without dyskinesia, with fluctuating manifestations  G20.A2       2. Gait abnormality  R26.9                      Subjective: has been to the gym 3 times this week. Feeling very good about transition to HEP.      Objective: See treatment diary below    Functional Outcomes 4/10/24  6 mwt: 914 ft  ABC: 77%  Gait Speed: 1.06 m/s   FGA:   5x sts: 11.4     Functional outcomes: 3/13/24  ABC: 56%  6 mwt: 650 ft  Gait Speed: 0.88 m/s  FGA: 15/30   5x sts: 12.64 sec       Assessment: Patient is independent with HEP, has met goals, and has made significant progress toward improved balance, gait, and endurance and overall reduced fall risk. He will be d/c at this time. Pt is agreeable. Will reassess in 3 months.       Plan: Continue per plan of care.      Short Term Goal Expiration Date:(24)  Long Term Goal Expiration Date: (24)  POC Expiration Date: (24)        POC expires Unit limit Auth Expiration date PT/OT/ST + Visit Limit?   24 bomn bomn bomn                           Visit/Unit Tracking  AUTH Status:  Date 3/15 3/22 3/27 3/29           Used 3/10 5/10 6/10 7/10           Remaining                     Precautions: neuropathy, fall risk       Manuals 3/15 3/18 3/22 3/25                                    Neuro Re-Ed         hurdles 5# aw b/l 3 laps fwd/lat ea solo step 5# aw b/l 3 laps fwd/lat ea solo step 5 laps fwd with solo step  5 laps fwd solo step with tidal tank 6\" hurdles    Tidal tank carry Fwd 80 ft x 3    Backwards - 20 ft x 4    With 5# aw b/l Fwd 80 ft x 4    Backwards - 20 ft x 4    With 5# aw b/l  Fwd/bkwd - 5 laps ea    Sts  10x no UE   10x 2     treadmill Prn UE 7 min with 0.8 mph focus on external cues for upright posture PRN UE 5 min at 0.8 mph with anterior pull by " "therapist    5 min with min UE 0.8 mph 10% incline   PRN UE 5 min at 0.8 mph with anterior pull by therapist    5 min with min UE 0.8 mph 10% incline     Step up and overs   2x6\" and 1x 8\" steps - 3 laps fwd without tidal tank, 3 laps with tidal tank     Color catch   3 pods - 2 rounds fast as possible in solo step             Ther Ex                                                                        Ther Activity                        Gait Training                        Modalities                                                                                            "

## 2024-04-26 ENCOUNTER — TELEPHONE (OUTPATIENT)
Dept: BARIATRICS | Facility: CLINIC | Age: 69
End: 2024-04-26

## 2024-04-26 ENCOUNTER — OFFICE VISIT (OUTPATIENT)
Dept: BARIATRICS | Facility: CLINIC | Age: 69
End: 2024-04-26
Payer: MEDICARE

## 2024-04-26 VITALS
HEIGHT: 69 IN | HEART RATE: 54 BPM | TEMPERATURE: 97.8 F | SYSTOLIC BLOOD PRESSURE: 152 MMHG | WEIGHT: 288.2 LBS | BODY MASS INDEX: 42.69 KG/M2 | DIASTOLIC BLOOD PRESSURE: 92 MMHG

## 2024-04-26 DIAGNOSIS — K21.00 GASTROESOPHAGEAL REFLUX DISEASE WITH ESOPHAGITIS WITHOUT HEMORRHAGE: ICD-10-CM

## 2024-04-26 DIAGNOSIS — E11.9 TYPE 2 DIABETES MELLITUS, WITHOUT LONG-TERM CURRENT USE OF INSULIN (HCC): ICD-10-CM

## 2024-04-26 DIAGNOSIS — E78.5 HYPERLIPIDEMIA, UNSPECIFIED HYPERLIPIDEMIA TYPE: ICD-10-CM

## 2024-04-26 DIAGNOSIS — E66.01 OBESITY, CLASS III, BMI 40-49.9 (MORBID OBESITY) (HCC): Primary | ICD-10-CM

## 2024-04-26 DIAGNOSIS — I10 BENIGN ESSENTIAL HYPERTENSION: ICD-10-CM

## 2024-04-26 DIAGNOSIS — K31.84 GASTROPARESIS: ICD-10-CM

## 2024-04-26 PROCEDURE — 99214 OFFICE O/P EST MOD 30 MIN: CPT | Performed by: NURSE PRACTITIONER

## 2024-04-26 NOTE — TELEPHONE ENCOUNTER
"Lap band removal patient stopped after his appointment today with Slime Bernal at [facility] to let you know that she and the program have really helped him; since seeing Slime patient has dropped 35 lbs.  Wanted to say \"thank you\".  "

## 2024-04-26 NOTE — PROGRESS NOTES
Assessment/Plan:     Obesity, Class III, BMI 40-49.9 (morbid obesity) (HCC)  - Patient is pursuing Conservative Program  - Initial weight loss goal of 5-10% weight loss for improved health  - Already on metformin 1000 mg twice a day through primary care.   - Has been on Ozempic since 2021. Start weight was 318 lbs. Continue Ozempic. Tolerating well and helping with appetite. Taking 1.5 mg weekly instead of 2 mg weekly so the pen lasts longer due to cost. Discussed stopping Ozempic due to cost, but he does not want to do that, as he finds it helpful.    - Patient denies personal history of pancreatitis. Patient also denies personal and family history of thyroid cancer and multiple endocrine neoplasia type 2 (MEN 2 tumor).   - He has been working with the diabetes educator through endocrinology and he has found that very helpful.    - Labs reviewed: A1C, CMP, TSH, lipid, and CBC 11/24/2023. Glucose elevated and will likely improve with weight loss. A1C controlled at 6.0. The remainder of the blood work was within acceptable range.      Initial MWM: 298 lbs  Last visit: 297.8 lbs BMI 43.98  Current: 288.1 lbs BMI 43.18  Change: -10 lbs (-10 lbs since the last office visit)  Goal: 257.5 lbs LTG, 280 lbs STG    Goals:  Do not skip meals.   Start food logging.   2000 calories per day.   Try to reduce diet soda.  Keep up the great work with water intake - at least 64 oz daily.  Keep up the great work with exercise.   Continue Ozempic.    Hyperlipidemia  - Taking Crestor. May improve with weight loss and lifestyle modification. Continue management with prescribing provider.       Gastroesophageal reflux disease with esophagitis without hemorrhage  - Taking protonix. Continue management with prescribing provider.         Gastroparesis  Denies worsening of this since Ozempic started in 2021. Following with GI.  Gastric emptying study done 3/16/2022 (prior to lap band removal)  IMPRESSION:  Unchanged lap band in satisfactory  location  Normal esophageal and gastric emptying.  Fold thickening distal esophagus suggesting mild esophagitis.  Gastric folds within normal limits.  No obvious ulceration.  Spontaneous gastroesophageal reflux observed.       Type 2 diabetes mellitus, without long-term current use of insulin ([facility])  - Taking metformin and Ozempic. May improve with weight loss and lifestyle modification. Continue management with prescribing provider.       Lab Results   Component Value Date    HGBA1C 6.0 (H) 11/24/2023       Benign essential hypertension  - Taking losartan. May improve with weight loss and lifestyle modification. Continue management with prescribing provider.            Marcellus was seen today for follow-up.    Diagnoses and all orders for this visit:    Obesity, Class III, BMI 40-49.9 (morbid obesity) ([facility])    Hyperlipidemia, unspecified hyperlipidemia type    Gastroesophageal reflux disease with esophagitis without hemorrhage    Gastroparesis    Type 2 diabetes mellitus, without long-term current use of insulin ([facility])    Benign essential hypertension            Follow up in approximately  4 months  with Non-Surgical Physician/Advanced Practitioner.    Subjective:   Chief Complaint   Patient presents with    Follow-up       Patient ID: Marcellus Fregoso  is a 68 y.o. male with excess weight/obesity here to pursue weight management.  Patient is pursuing Conservative Program.   Most recent notes and records were reviewed.    HPI    Wt Readings from Last 10 Encounters:   04/26/24 131 kg (288 lb 3.2 oz)   02/12/24 131 kg (288 lb)   12/28/23 133 kg (293 lb)   12/07/23 133 kg (293 lb 6.4 oz)   12/07/23 133 kg (293 lb)   11/30/23 132 kg (291 lb 4.8 oz)   10/19/23 135 kg (298 lb 9.6 oz)   10/10/23 135 kg (298 lb 9.6 oz)   08/21/23 134 kg (295 lb 6.4 oz)   08/18/23 133 kg (292 lb 8 oz)     Had Lap Band placement on 7/26/2010. He had developed Molina's esophagus and therefore band removal performed by Dr. Neal Yang on  8/30/2022. No further weight loss surgery planned. Weight prior to band was 300 lbs and jaiden 258 lbs. Highest weight was 335 lbs. Following with MW due to weight regain.      Started Ozempic in 2021. Taking Ozempic 1.5 mg weekly. No negative side effects. Helping with appetite. Taking 1.5 mg rather than 2 mg so the pen lasts longer due to cost.     Diagnosed with Parkinson's disease in July 2023 and was started on Carbidopa-Levodopa. Mobility and balance improved with Carbidopa-Levodopa. Feels that the medication has reduced his appetite, which he is happy about.     Not food logging.         Taking metformin 1000 mg twice a day through primary care.     Sometimes skips meals.        Hydration: 5-6 bottles of water, 2-3 cans diet soda, diet cranberry juice, occ OJ   Alcohol: very rare  Smoking: denies  Exercise: gym twice a week 45 minutes - treadmill, additional walking, and weight training    Occupation: retired -  [employer], [employer]   Sleep: 8-10 hours  STOP bang: Has ANABELL, tried CPAP, but could not tolerate it.        Colonoscopy: UTD, due 2026         The following portions of the patient's history were reviewed and updated as appropriate: allergies, current medications, past family history, past medical history, past social history, past surgical history, and problem list.    Family History   Problem Relation Age of Onset    Heart disease Father     Coronary artery disease Father     Prostate cancer Father     Coronary artery disease Mother     Diabetes Mother         mellitus    Diabetes type II Mother     Diabetes Maternal Grandmother         mellitus    Coronary artery disease Maternal Grandfather     Coronary artery disease Paternal Grandfather         Review of Systems   HENT:  Negative for sore throat.    Respiratory:  Negative for cough and shortness of breath.    Cardiovascular:  Negative for chest pain and palpitations.   Gastrointestinal:  Positive for  "constipation (rare). Negative for abdominal pain, diarrhea, nausea and vomiting.        + GERD controlled with medication, seeing GI.   Musculoskeletal:  Positive for back pain (arthritis). Negative for arthralgias.   Skin:  Negative for rash.   Psychiatric/Behavioral:  Negative for suicidal ideas.         + anxiety and depression somewhat controlled with medication       Objective:  /92   Pulse (!) 54   Temp 97.8 °F (36.6 °C) (Tympanic)   Ht 5' 8.5\" (1.74 m)   Wt 131 kg (288 lb 3.2 oz)   BMI 43.18 kg/m²     Physical Exam  Vitals and nursing note reviewed.        Constitutional   General appearance: Abnormal.  well developed and morbidly obese.   Eyes No conjunctival injection.   Ears, Nose, Mouth, and Throat Oral mucosa moist.   Pulmonary   Respiratory effort: No increased work of breathing or signs of respiratory distress.    Cardiovascular    Examination of extremities for edema and/or varicosities: Normal.  no edema.   Abdomen   Abdomen: Abnormal.  The abdomen was obese.    Musculoskeletal   Normal range of motion  Neurological   Gait and station: wide based, but ambulated independently.   Psychiatric   Orientation to person, place and time: Normal. Stuttering speech.    Affect: appropriate      "

## 2024-04-28 NOTE — ASSESSMENT & PLAN NOTE
- Patient is pursuing Conservative Program  - Initial weight loss goal of 5-10% weight loss for improved health  - Already on metformin 1000 mg twice a day through primary care.   - Has been on Ozempic since 2021. Start weight was 318 lbs. Continue Ozempic. Tolerating well and helping with appetite. Taking 1.5 mg weekly instead of 2 mg weekly so the pen lasts longer due to cost. Discussed stopping Ozempic due to cost, but he does not want to do that, as he finds it helpful.    - Patient denies personal history of pancreatitis. Patient also denies personal and family history of thyroid cancer and multiple endocrine neoplasia type 2 (MEN 2 tumor).   - He has been working with the diabetes educator through endocrinology and he has found that very helpful.    - Labs reviewed: A1C, CMP, TSH, lipid, and CBC 11/24/2023. Glucose elevated and will likely improve with weight loss. A1C controlled at 6.0. The remainder of the blood work was within acceptable range.      Initial MWM: 298 lbs  Last visit: 297.8 lbs BMI 43.98  Current: 288.1 lbs BMI 43.18  Change: -10 lbs (-10 lbs since the last office visit)  Goal: 257.5 lbs LTG, 280 lbs STG    Goals:  Do not skip meals.   Start food logging.   2000 calories per day.   Try to reduce diet soda.  Keep up the great work with water intake - at least 64 oz daily.  Keep up the great work with exercise.   Continue Ozempic.

## 2024-04-28 NOTE — ASSESSMENT & PLAN NOTE
Denies worsening of this since Ozempic started in 2021. Following with GI.  Gastric emptying study done 3/16/2022 (prior to lap band removal)  IMPRESSION:  Unchanged lap band in satisfactory location  Normal esophageal and gastric emptying.  Fold thickening distal esophagus suggesting mild esophagitis.  Gastric folds within normal limits.  No obvious ulceration.  Spontaneous gastroesophageal reflux observed.

## 2024-04-29 ENCOUNTER — OFFICE VISIT (OUTPATIENT)
Dept: PODIATRY | Facility: CLINIC | Age: 69
End: 2024-04-29

## 2024-04-29 VITALS
HEIGHT: 68 IN | WEIGHT: 291 LBS | SYSTOLIC BLOOD PRESSURE: 145 MMHG | BODY MASS INDEX: 44.1 KG/M2 | DIASTOLIC BLOOD PRESSURE: 85 MMHG | RESPIRATION RATE: 18 BRPM

## 2024-04-29 DIAGNOSIS — B35.1 ONYCHOMYCOSIS: Primary | ICD-10-CM

## 2024-04-29 DIAGNOSIS — E11.9 CONTROLLED TYPE 2 DIABETES MELLITUS WITHOUT COMPLICATION, WITHOUT LONG-TERM CURRENT USE OF INSULIN (HCC): ICD-10-CM

## 2024-04-29 PROCEDURE — NCFTCARE PR NON-COVERED FOOT CARE: Performed by: PODIATRIST

## 2024-05-04 ENCOUNTER — NURSE TRIAGE (OUTPATIENT)
Dept: OTHER | Facility: OTHER | Age: 69
End: 2024-05-04

## 2024-05-04 ENCOUNTER — APPOINTMENT (EMERGENCY)
Dept: RADIOLOGY | Facility: HOSPITAL | Age: 69
End: 2024-05-04
Payer: MEDICARE

## 2024-05-04 ENCOUNTER — HOSPITAL ENCOUNTER (EMERGENCY)
Facility: HOSPITAL | Age: 69
Discharge: HOME | End: 2024-05-05
Attending: EMERGENCY MEDICINE
Payer: MEDICARE

## 2024-05-04 DIAGNOSIS — R07.9 CHEST PAIN: ICD-10-CM

## 2024-05-04 DIAGNOSIS — R51.9 HEADACHE: Primary | ICD-10-CM

## 2024-05-04 LAB
ALBUMIN SERPL BCP-MCNC: 4 G/DL (ref 3.5–5)
ALP SERPL-CCNC: 60 U/L (ref 34–104)
ALT SERPL W P-5'-P-CCNC: 13 U/L (ref 7–52)
ANION GAP SERPL CALCULATED.3IONS-SCNC: 6 MMOL/L (ref 4–13)
AST SERPL W P-5'-P-CCNC: 14 U/L (ref 13–39)
BASOPHILS # BLD AUTO: 0.07 THOUSANDS/ÂΜL (ref 0–0.1)
BASOPHILS NFR BLD AUTO: 1 % (ref 0–1)
BILIRUB SERPL-MCNC: 0.48 MG/DL (ref 0.2–1)
BUN SERPL-MCNC: 15 MG/DL (ref 5–25)
CALCIUM SERPL-MCNC: 9.1 MG/DL (ref 8.4–10.2)
CARDIAC TROPONIN I PNL SERPL HS: 4 NG/L
CHLORIDE SERPL-SCNC: 103 MMOL/L (ref 96–108)
CO2 SERPL-SCNC: 27 MMOL/L (ref 21–32)
CREAT SERPL-MCNC: 0.87 MG/DL (ref 0.6–1.3)
EOSINOPHIL # BLD AUTO: 0.64 THOUSAND/ÂΜL (ref 0–0.61)
EOSINOPHIL NFR BLD AUTO: 8 % (ref 0–6)
ERYTHROCYTE [DISTWIDTH] IN BLOOD BY AUTOMATED COUNT: 13.1 % (ref 11.6–15.1)
GFR SERPL CREATININE-BSD FRML MDRD: 87 ML/MIN/1.73SQ M
GLUCOSE SERPL-MCNC: 138 MG/DL (ref 65–140)
HCT VFR BLD AUTO: 40.5 % (ref 36.5–49.3)
HGB BLD-MCNC: 14 G/DL (ref 12–17)
IMM GRANULOCYTES # BLD AUTO: 0.02 THOUSAND/UL (ref 0–0.2)
IMM GRANULOCYTES NFR BLD AUTO: 0 % (ref 0–2)
LYMPHOCYTES # BLD AUTO: 1.99 THOUSANDS/ÂΜL (ref 0.6–4.47)
LYMPHOCYTES NFR BLD AUTO: 26 % (ref 14–44)
MCH RBC QN AUTO: 30 PG (ref 26.8–34.3)
MCHC RBC AUTO-ENTMCNC: 34.6 G/DL (ref 31.4–37.4)
MCV RBC AUTO: 87 FL (ref 82–98)
MONOCYTES # BLD AUTO: 0.64 THOUSAND/ÂΜL (ref 0.17–1.22)
MONOCYTES NFR BLD AUTO: 8 % (ref 4–12)
NEUTROPHILS # BLD AUTO: 4.33 THOUSANDS/ÂΜL (ref 1.85–7.62)
NEUTS SEG NFR BLD AUTO: 57 % (ref 43–75)
NRBC BLD AUTO-RTO: 0 /100 WBCS
PLATELET # BLD AUTO: 245 THOUSANDS/UL (ref 149–390)
PMV BLD AUTO: 8.5 FL (ref 8.9–12.7)
POTASSIUM SERPL-SCNC: 3.8 MMOL/L (ref 3.5–5.3)
PROT SERPL-MCNC: 6.7 G/DL (ref 6.4–8.4)
RBC # BLD AUTO: 4.66 MILLION/UL (ref 3.88–5.62)
SODIUM SERPL-SCNC: 136 MMOL/L (ref 135–147)
WBC # BLD AUTO: 7.69 THOUSAND/UL (ref 4.31–10.16)

## 2024-05-04 PROCEDURE — 96366 THER/PROPH/DIAG IV INF ADDON: CPT

## 2024-05-04 PROCEDURE — 99285 EMERGENCY DEPT VISIT HI MDM: CPT

## 2024-05-04 PROCEDURE — 96365 THER/PROPH/DIAG IV INF INIT: CPT

## 2024-05-04 PROCEDURE — 84484 ASSAY OF TROPONIN QUANT: CPT | Performed by: EMERGENCY MEDICINE

## 2024-05-04 PROCEDURE — 96375 TX/PRO/DX INJ NEW DRUG ADDON: CPT

## 2024-05-04 PROCEDURE — 99285 EMERGENCY DEPT VISIT HI MDM: CPT | Performed by: EMERGENCY MEDICINE

## 2024-05-04 PROCEDURE — 70496 CT ANGIOGRAPHY HEAD: CPT

## 2024-05-04 PROCEDURE — 93005 ELECTROCARDIOGRAM TRACING: CPT

## 2024-05-04 PROCEDURE — 85025 COMPLETE CBC W/AUTO DIFF WBC: CPT | Performed by: EMERGENCY MEDICINE

## 2024-05-04 PROCEDURE — 71045 X-RAY EXAM CHEST 1 VIEW: CPT

## 2024-05-04 PROCEDURE — 80053 COMPREHEN METABOLIC PANEL: CPT | Performed by: EMERGENCY MEDICINE

## 2024-05-04 PROCEDURE — 81003 URINALYSIS AUTO W/O SCOPE: CPT

## 2024-05-04 PROCEDURE — 36415 COLL VENOUS BLD VENIPUNCTURE: CPT

## 2024-05-04 PROCEDURE — 70498 CT ANGIOGRAPHY NECK: CPT

## 2024-05-04 RX ORDER — MAGNESIUM SULFATE HEPTAHYDRATE 40 MG/ML
2 INJECTION, SOLUTION INTRAVENOUS ONCE
Status: COMPLETED | OUTPATIENT
Start: 2024-05-04 | End: 2024-05-04

## 2024-05-04 RX ORDER — METOCLOPRAMIDE HYDROCHLORIDE 5 MG/ML
10 INJECTION INTRAMUSCULAR; INTRAVENOUS ONCE
Status: COMPLETED | OUTPATIENT
Start: 2024-05-04 | End: 2024-05-04

## 2024-05-04 RX ORDER — KETOROLAC TROMETHAMINE 30 MG/ML
15 INJECTION, SOLUTION INTRAMUSCULAR; INTRAVENOUS ONCE
Status: COMPLETED | OUTPATIENT
Start: 2024-05-04 | End: 2024-05-04

## 2024-05-04 RX ORDER — LORAZEPAM 2 MG/ML
1 INJECTION INTRAMUSCULAR ONCE
Status: DISCONTINUED | OUTPATIENT
Start: 2024-05-04 | End: 2024-05-04

## 2024-05-04 RX ADMIN — METOCLOPRAMIDE HYDROCHLORIDE 10 MG: 5 INJECTION INTRAMUSCULAR; INTRAVENOUS at 22:15

## 2024-05-04 RX ADMIN — KETOROLAC TROMETHAMINE 15 MG: 30 INJECTION, SOLUTION INTRAMUSCULAR; INTRAVENOUS at 22:15

## 2024-05-04 RX ADMIN — SODIUM CHLORIDE 1000 ML: 0.9 INJECTION, SOLUTION INTRAVENOUS at 22:09

## 2024-05-04 RX ADMIN — IOHEXOL 85 ML: 350 INJECTION, SOLUTION INTRAVENOUS at 22:46

## 2024-05-04 RX ADMIN — MAGNESIUM SULFATE HEPTAHYDRATE 2 G: 40 INJECTION, SOLUTION INTRAVENOUS at 22:15

## 2024-05-05 ENCOUNTER — TELEPHONE (OUTPATIENT)
Dept: OTHER | Facility: OTHER | Age: 69
End: 2024-05-05

## 2024-05-05 ENCOUNTER — NURSE TRIAGE (OUTPATIENT)
Dept: OTHER | Facility: OTHER | Age: 69
End: 2024-05-05

## 2024-05-05 VITALS
SYSTOLIC BLOOD PRESSURE: 152 MMHG | RESPIRATION RATE: 20 BRPM | TEMPERATURE: 98.4 F | OXYGEN SATURATION: 97 % | DIASTOLIC BLOOD PRESSURE: 79 MMHG | HEART RATE: 53 BPM

## 2024-05-05 LAB
ATRIAL RATE: 61 BPM
BILIRUB UR QL STRIP: NEGATIVE
CLARITY UR: CLEAR
COLOR UR: NORMAL
GLUCOSE UR STRIP-MCNC: NEGATIVE MG/DL
HGB UR QL STRIP.AUTO: NEGATIVE
KETONES UR STRIP-MCNC: NEGATIVE MG/DL
LEUKOCYTE ESTERASE UR QL STRIP: NEGATIVE
NITRITE UR QL STRIP: NEGATIVE
P AXIS: 10 DEGREES
PH UR STRIP.AUTO: 7 [PH]
PR INTERVAL: 198 MS
PROT UR STRIP-MCNC: NEGATIVE MG/DL
QRS AXIS: -28 DEGREES
QRSD INTERVAL: 98 MS
QT INTERVAL: 420 MS
QTC INTERVAL: 422 MS
SP GR UR STRIP.AUTO: 1.02 (ref 1–1.03)
T WAVE AXIS: 36 DEGREES
UROBILINOGEN UR STRIP-ACNC: <2 MG/DL
VENTRICULAR RATE: 61 BPM

## 2024-05-05 PROCEDURE — 93010 ELECTROCARDIOGRAM REPORT: CPT | Performed by: INTERNAL MEDICINE

## 2024-05-05 NOTE — DISCHARGE INSTRUCTIONS
Please follow-up with your family doctor and cardiology within 3 to 5 days.    Please take Motrin and Tylenol for any persistent pain.    Please return if you develop any new or concerning symptoms such as worsening chest pain, difficulty breathing, or heart palpitations.

## 2024-05-05 NOTE — ED PROVIDER NOTES
"History  Chief Complaint   Patient presents with    Chest Pain     Pt reports headache around noon. 30 min prior to calling 911 pt reports chest discomfort, and htn. Pt c/o headache and dizziness on arrival, denies chest pain. Hx of parkinsons     Patient is a 68-year-old male with history of Parkinson disease, hypertension, hyperlipidemia, diabetes, and prostate cancer status post prostate resection who presents for chest pain.  Patient states that last night he got into a verbal argument with his wife, he states that there has been high stress in his house since his Parkinson's diagnosis and that she is not coping well with it.  He says that this morning, he awoke with a severe global headache that has been worsening throughout the day.  He also notes that since about 4 5 hours prior to arrival he has been having intermittent mild chest discomfort.  He notes that he can usually tell if he is having a \"bad\" day for his Parkinson's disease if he has a lot of urinary incontinence and he says that today has been somewhat worse.  He denies any shortness of breath, palpitations, nausea, vomiting, vision changes, weakness, numbness, tingling, dysuria, hematuria.        Prior to Admission Medications   Prescriptions Last Dose Informant Patient Reported? Taking?   ALPRAZolam (XANAX) 0.25 mg tablet  Self No No   Sig: Take 1 tablet (0.25 mg total) by mouth 3 (three) times a day as needed for anxiety   Patient taking differently: Take 0.25 mg by mouth as needed for anxiety   Blood Glucose Monitoring Suppl (OneTouch Verio) w/Device KIT  Self No No   Sig: Use daily   FLUoxetine (PROzac) 20 mg capsule  Self Yes No   Sig: Take 80 mg by mouth daily Alternated between 20 mg and 40 mg daily   Lancet Devices (ONE TOUCH DELICA LANCING DEV) MISC  Self No No   Sig: Use daily   Multiple Vitamins-Minerals (CENTRUM ADULTS PO)  Self Yes No   Sig: Take by mouth in the morning   OneTouch Delica Lancets 33G MISC  Self No No   Sig: Use daily "   OneTouch Verio test strip  Self No No   Sig: USE TO TEST DAILY   carbidopa-levodopa (SINEMET)  mg per tablet  Self Yes No   Sig: Take 1 tablet by mouth Three times a day   chlorhexidine (PERIDEX) 0.12 % solution  Self Yes No   eszopiclone (LUNESTA) 3 MG tablet  Self Yes No   Sig: TAKE 1 TABLET BY MOUTH AT BEDTIME IN PLACE OF 2MG TAB   Patient not taking: Reported on 4/26/2024   ezetimibe (ZETIA) 10 mg tablet  Self No No   Sig: TAKE 1 TABLET DAILY   Patient taking differently: Take 5 mg by mouth daily   losartan (COZAAR) 100 MG tablet  Self No No   Sig: Take 1 tablet (100 mg total) by mouth daily   metFORMIN (GLUCOPHAGE) 500 mg tablet  Self No No   Sig: TAKE 2 TABLETS TWICE A DAY WITH MEALS   metoprolol succinate (TOPROL-XL) 25 mg 24 hr tablet  Self No No   Sig: Take 1 tablet (25 mg total) by mouth daily   nystatin (MYCOSTATIN) cream  Self No No   Sig: Apply topically 2 (two) times a day   Patient taking differently: Apply topically as needed (rash)   pantoprazole (PROTONIX) 40 mg tablet  Self No No   Sig: TAKE 1 TABLET TWICE A DAY (1 TABLET IN THE MORNING AND 1 TABLET IN THE EVENING)   rosuvastatin (CRESTOR) 5 mg tablet  Self No No   Sig: TAKE 1 TABLET ONCE A WEEK   semaglutide, 2 mg/dose, (Ozempic, 2 MG/DOSE,) 8 mg/ mL injection pen  Self No No   Sig: INJECT 2 MG UNDER THE SKIN EVERY 7 DAYS   trospium chloride (SANCTURA) 20 mg tablet  Self No No   Sig: Take 1 tablet (20 mg total) by mouth 2 (two) times a day   Patient not taking: Reported on 2/12/2024   zolpidem (AMBIEN) 10 mg tablet   No No   Sig: Take 1 tablet (10 mg total) by mouth daily at bedtime as needed for sleep   Patient not taking: Reported on 2/12/2024      Facility-Administered Medications: None       Past Medical History:   Diagnosis Date    Acute blood loss anemia 10/13/2016    Anxiety     Arthritis     knees    Arthritis     Asthma     stable for > 10 years    [place] esophagus     with BARRX/RFA    Cancer (HCC)     prostate    Cataract      Depression     Diabetes (HCC)     Diabetes mellitus (HCC)     pre diabetes    Diverticulitis of colon     Environmental allergies     Fall 12/30/2020    GERD (gastroesophageal reflux disease)     Hiatal hernia     History of anal fissures     Hyperlipidemia     Hypertension     Incisional hernia     Insomnia     Kidney stone     Malignant neoplasm of prostate (HCC)     Morbid obesity (HCC)     Peripheral neuropathy     PONV (postoperative nausea and vomiting)     Prolapsing mitral valve     prolapsing mitral valve leaflet syndrome    Prostate cancer (HCC)     Retinal detachment     treated surgically at [facility]; resolved: 10/10/2012    Sleep apnea     diagnosed but unable to tolerate cpap.     Stuttering        Past Surgical History:   Procedure Laterality Date    ARTHROSCOPIC REPAIR ACL Right     BIOPSY CORE NEEDLE      prostate    CATARACT EXTRACTION Bilateral     COLONOSCOPY      HERNIA REPAIR      [facility]    JOINT REPLACEMENT      B/L knee    KNEE ARTHROSCOPY Left     LAPAROSCOPIC GASTRIC BANDING      [place] ARTHRP KNE CONDYLE&PLATU MEDIAL&LAT COMPARTMENTS Left 02/15/2017    Procedure: TOTAL KNEE ARTHROPLASTY ;  Surgeon: Sal Ross MD;  Location: [facility];  Service: Orthopedics    [place] ARTHRP KNE CONDYLE&PLATU MEDIAL&LAT COMPARTMENTS Right 10/10/2016    Procedure: ARTHROPLASTY KNEE TOTAL;  Surgeon: Sal Ross MD;  Location: [facility];  Service: Orthopedics    PROSTATECTOMY      robotic-assisted; Lilly Tay    REMOVAL GASTRIC BAND LAPAROSCOPIC N/A 8/30/2022    Procedure: LAPAROSCOPIC REMOVAL OF ADJUSTABLE GASTRIC BAND AND PORT  WITH INTRAOPERATIVE EGD;  Surgeon: Neal Yang MD;  Location: [facility];  Service: Bariatrics    RETINAL DETACHMENT SURGERY Bilateral     [place] Eye    SEPTOPLASTY      SINUS SURGERY      TONSILLECTOMY      over age 10    UPPER GASTROINTESTINAL ENDOSCOPY      mutiple with BARRX/RFA    VASECTOMY      vas deferens       Family History   Problem Relation Age of Onset    Heart  disease Father     Coronary artery disease Father     Prostate cancer Father     Coronary artery disease Mother     Diabetes Mother         mellitus    Diabetes type II Mother     Diabetes Maternal Grandmother         mellitus    Coronary artery disease Maternal Grandfather     Coronary artery disease Paternal Grandfather      I have reviewed and agree with the history as documented.    E-Cigarette/Vaping    E-Cigarette Use Never User      E-Cigarette/Vaping Substances    Nicotine No     THC No     CBD No     Flavoring No     Other No     Unknown No      Social History     Tobacco Use    Smoking status: Never     Passive exposure: Never    Smokeless tobacco: Never    Tobacco comments:     quit 38 years ago   Vaping Use    Vaping status: Never Used   Substance Use Topics    Alcohol use: Not Currently     Alcohol/week: 1.0 standard drink of alcohol     Types: 1 Cans of beer per week     Comment: 1 case of beer per year    Drug use: Never        Review of Systems   Constitutional:  Negative for chills and fever.   HENT:  Negative for congestion, rhinorrhea and sore throat.    Eyes:  Negative for pain and redness.   Respiratory:  Negative for cough and shortness of breath.    Cardiovascular:  Positive for chest pain. Negative for palpitations.   Gastrointestinal:  Negative for abdominal pain, constipation, diarrhea, nausea and vomiting.   Genitourinary:  Negative for dysuria and hematuria.   Musculoskeletal:  Negative for back pain and neck pain.   Skin:  Negative for pallor and rash.   Neurological:  Positive for headaches. Negative for weakness and numbness.   All other systems reviewed and are negative.      Physical Exam  ED Triage Vitals [05/04/24 2137]   Temperature Pulse Respirations Blood Pressure SpO2   98.4 °F (36.9 °C) 59 20 (!) 222/99 97 %      Temp Source Heart Rate Source Patient Position - Orthostatic VS BP Location FiO2 (%)   Oral Monitor Lying Right arm --      Pain Score       7             Orthostatic  Vital Signs  Vitals:    05/04/24 2137 05/05/24 0011   BP: (!) 222/99 152/79   Pulse: 59 (!) 53   Patient Position - Orthostatic VS: Lying Lying       Physical Exam  Vitals and nursing note reviewed.   Constitutional:       General: He is not in acute distress.     Appearance: Normal appearance. He is well-developed. He is obese. He is not ill-appearing, toxic-appearing or diaphoretic.   HENT:      Head: Normocephalic and atraumatic.      Right Ear: External ear normal.      Left Ear: External ear normal.      Nose: Nose normal. No congestion or rhinorrhea.      Mouth/Throat:      Mouth: Mucous membranes are moist.      Pharynx: Oropharynx is clear. No oropharyngeal exudate or posterior oropharyngeal erythema.   Eyes:      General: No scleral icterus.        Right eye: No discharge.         Left eye: No discharge.      Extraocular Movements: Extraocular movements intact.      Conjunctiva/sclera: Conjunctivae normal.      Pupils: Pupils are equal, round, and reactive to light.   Cardiovascular:      Rate and Rhythm: Regular rhythm. Bradycardia present.      Pulses: Normal pulses.      Heart sounds: Normal heart sounds. Heart sounds not distant. No murmur heard.     No systolic murmur is present.      No friction rub. No gallop.   Pulmonary:      Effort: Pulmonary effort is normal. No respiratory distress.      Breath sounds: Normal breath sounds. No stridor. No decreased breath sounds, wheezing, rhonchi or rales.   Abdominal:      General: Abdomen is flat. Bowel sounds are normal. There is no distension.      Palpations: Abdomen is soft.      Tenderness: There is no abdominal tenderness. There is no right CVA tenderness, left CVA tenderness or guarding.   Musculoskeletal:         General: No tenderness.      Cervical back: Normal range of motion and neck supple. No rigidity or tenderness.      Right lower leg: No tenderness. No edema.      Left lower leg: No tenderness. No edema.   Skin:     General: Skin is warm and  dry.      Capillary Refill: Capillary refill takes less than 2 seconds.      Coloration: Skin is not jaundiced or pale.   Neurological:      General: No focal deficit present.      Mental Status: He is alert and oriented to person, place, and time.      Cranial Nerves: No cranial nerve deficit.      Sensory: No sensory deficit.      Motor: No weakness.   Psychiatric:         Mood and Affect: Mood normal.         Behavior: Behavior normal.         ED Medications  Medications   ketorolac (TORADOL) injection 15 mg (15 mg Intravenous Given 5/4/24 2215)   metoclopramide (REGLAN) injection 10 mg (10 mg Intravenous Given 5/4/24 2215)   magnesium sulfate 2 g/50 mL IVPB (premix) 2 g (0 g Intravenous Stopped 5/4/24 2351)   sodium chloride 0.9 % bolus 1,000 mL (0 mL Intravenous Stopped 5/5/24 0013)   iohexol (OMNIPAQUE) 350 MG/ML injection (MULTI-DOSE) 85 mL (85 mL Intravenous Given 5/4/24 2246)       Diagnostic Studies  Results Reviewed       Procedure Component Value Units Date/Time    UA w Reflex to Microscopic w Reflex to Culture [[id number]] Collected: 05/04/24 2358    Lab Status: Final result Specimen: Urine, Other Updated: 05/05/24 0006     Color, UA Light Yellow     Clarity, UA Clear     Specific Gravity, UA 1.016     pH, UA 7.0     Leukocytes, UA Negative     Nitrite, UA Negative     Protein, UA Negative mg/dl      Glucose, UA Negative mg/dl      Ketones, UA Negative mg/dl      Urobilinogen, UA <2.0 mg/dl      Bilirubin, UA Negative     Occult Blood, UA Negative    HS Troponin 0hr (reflex protocol) [[id number]]  (Normal) Collected: 05/04/24 2145    Lab Status: Final result Specimen: Blood from Arm, Left Updated: 05/04/24 2217     hs TnI 0hr 4 ng/L     Comprehensive metabolic panel [[id number]] Collected: 05/04/24 2145    Lab Status: Final result Specimen: Blood from Arm, Left Updated: 05/04/24 2216     Sodium 136 mmol/L      Potassium 3.8 mmol/L      Chloride 103 mmol/L      CO2 27 mmol/L      ANION GAP 6 mmol/L      BUN  15 mg/dL      Creatinine 0.87 mg/dL      Glucose 138 mg/dL      Calcium 9.1 mg/dL      AST 14 U/L      ALT 13 U/L      Alkaline Phosphatase 60 U/L      Total Protein 6.7 g/dL      Albumin 4.0 g/dL      Total Bilirubin 0.48 mg/dL      eGFR 87 ml/min/1.73sq m     Narrative:      National Kidney Disease Foundation guidelines for Chronic Kidney Disease (CKD):     Stage 1 with normal or high GFR (GFR > 90 mL/min/1.73 square meters)    Stage 2 Mild CKD (GFR = 60-89 mL/min/1.73 square meters)    Stage 3A Moderate CKD (GFR = 45-59 mL/min/1.73 square meters)    Stage 3B Moderate CKD (GFR = 30-44 mL/min/1.73 square meters)    Stage 4 Severe CKD (GFR = 15-29 mL/min/1.73 square meters)    Stage 5 End Stage CKD (GFR <15 mL/min/1.73 square meters)  Note: GFR calculation is accurate only with a steady state creatinine    CBC and differential [[id number]]  (Abnormal) Collected: 05/04/24 2145    Lab Status: Final result Specimen: Blood from Arm, Left Updated: 05/04/24 2155     WBC 7.69 Thousand/uL      RBC 4.66 Million/uL      Hemoglobin 14.0 g/dL      Hematocrit 40.5 %      MCV 87 fL      MCH 30.0 pg      MCHC 34.6 g/dL      RDW 13.1 %      MPV 8.5 fL      Platelets 245 Thousands/uL      nRBC 0 /100 WBCs      Segmented % 57 %      Immature Grans % 0 %      Lymphocytes % 26 %      Monocytes % 8 %      Eosinophils Relative 8 %      Basophils Relative 1 %      Absolute Neutrophils 4.33 Thousands/µL      Absolute Immature Grans 0.02 Thousand/uL      Absolute Lymphocytes 1.99 Thousands/µL      Absolute Monocytes 0.64 Thousand/µL      Eosinophils Absolute 0.64 Thousand/µL      Basophils Absolute 0.07 Thousands/µL                    CTA head and neck with and without contrast   Final Result by Malcolm Wagner MD (05/05 0001)      1.  No acute intracranial hemorrhage, mass effect or extra-axial collection.   2.  No hemodynamically significant stenosis, dissection or occlusion of the carotid or vertebral arteries.   3.  No intracranial  aneurysm.  No hemodynamically significant stenosis or occlusion of the major vessels of the [place].                  Workstation performed: [id number]         XR chest 1 view portable   ED Interpretation by Eric Banuelos MD (05/04 2248)   No acute cardiopulmonary abnormalities            Procedures  Procedures      ED Course  ED Course as of 05/05/24 0058   Sun May 05, 2024   0001 This EKG interpreted by me.  Rate 61, rhythm sinus rhythm with premature atrial complexes in bigeminal pattern.  Axis left.  Intervals normal.  No acute ST or T wave changes.             HEART Risk Score      Flowsheet Row Most Recent Value   Heart Score Risk Calculator    History 1 Filed at: 05/05/2024 0002   ECG 0 Filed at: 05/05/2024 0002   Age 2 Filed at: 05/05/2024 0002   Risk Factors 2 Filed at: 05/05/2024 0002   Troponin 0 Filed at: 05/05/2024 0002   HEART Score 5 Filed at: 05/05/2024 0002                        SBIRT 22yo+      Flowsheet Row Most Recent Value   Initial Alcohol Screen: US AUDIT-C     1. How often do you have a drink containing alcohol? 0 Filed at: 05/04/2024 2139   Audit-C Score 0 Filed at: 05/04/2024 2139   KIMMIE: How many times in the past year have you...    Used an illegal drug or used a prescription medication for non-medical reasons? Never Filed at: 05/04/2024 2139                  Medical Decision Making  Patient is a 68-year-old male with history of Parkinson's disease, hypertension, hyperlipidemia, diabetes, and prostate cancer status post resection who presents for chest pain.    DDx includes but not limited to subarachnoid hemorrhage, subdural hematoma, epidural hematoma, ACS, arrhythmia, pneumothorax, UTI.  Patient describes severe onset of headache upon waking 10 hours ago and is significantly hypertensive to 229/99.  He has many cardiac risk factors as well.  Will plan to obtain cardiac workup, UA, CT head and neck with and without contrast to rule out subarachnoid or other  bleeds.    Patient's labs are grossly unremarkable.  His initial opponent is 4 and onset of chest pain was at least 4 to 5 hours ago and does not do delta.  His CT was read as normal.  Patient reports significant improvement after migraine cocktail.  I advised close follow-up with PCP and cardiology.  Return precautions given, all questions answered.    Amount and/or Complexity of Data Reviewed  Labs: ordered.  Radiology: ordered and independent interpretation performed.    Risk  Prescription drug management.          Disposition  Final diagnoses:   Headache   Chest pain     Time reflects when diagnosis was documented in both MDM as applicable and the Disposition within this note       Time User Action Codes Description Comment    5/5/2024 12:12 AM Eric Banuelos Add [R51.9] Headache     5/5/2024 12:12 AM Eric Banuelos Add [R07.9] Chest pain           ED Disposition       ED Disposition   Discharge    Condition   Stable    Date/Time   Sun May 5, 2024 0012    Comment   Marcellus Fregoso discharge to home/self care.                   Follow-up Information       Follow up With Specialties Details Why Contact Info Additional Information    Ehsan Calix MD Family Medicine Call in 3 days  [address] [facility] [id number] [phone]       [facility] Emergency Department Emergency Medicine Go to  If symptoms worsen or if you have any other specific concerns [address] [place] [id number]  [phone] [facility] Emergency Department, [address] [place] [address], [id number]   [phone]            Discharge Medication List as of 5/5/2024 12:13 AM        CONTINUE these medications which have NOT CHANGED    Details   ALPRAZolam (XANAX) 0.25 mg tablet Take 1 tablet (0.25 mg total) by mouth 3 (three) times a day as needed for anxiety, Starting Tue 9/19/2023, Until Tue 6/25/2024 at 2359, Normal      Blood Glucose Monitoring Suppl (OneTouch Verio) w/Device  KIT Use daily, Starting Wed 4/28/2021, Normal      carbidopa-levodopa (SINEMET)  mg per tablet Take 1 tablet by mouth Three times a day, Starting Tue 8/8/2023, Historical Med      chlorhexidine (PERIDEX) 0.12 % solution Historical Med      eszopiclone (LUNESTA) 3 MG tablet TAKE 1 TABLET BY MOUTH AT BEDTIME IN PLACE OF 2MG TAB, Historical Med      ezetimibe (ZETIA) 10 mg tablet TAKE 1 TABLET DAILY, Normal      FLUoxetine (PROzac) 20 mg capsule Take 80 mg by mouth daily Alternated between 20 mg and 40 mg daily, Historical Med      Lancet Devices (ONE TOUCH DELICA LANCING DEV) MISC Use daily, Starting Wed 4/28/2021, Normal      losartan (COZAAR) 100 MG tablet Take 1 tablet (100 mg total) by mouth daily, Starting Mon 5/8/2023, Normal      metFORMIN (GLUCOPHAGE) 500 mg tablet TAKE 2 TABLETS TWICE A DAY WITH MEALS, Starting Thu 3/21/2024, Normal      metoprolol succinate (TOPROL-XL) 25 mg 24 hr tablet Take 1 tablet (25 mg total) by mouth daily, Starting Mon 5/8/2023, Normal      Multiple Vitamins-Minerals (CENTRUM ADULTS PO) Take by mouth in the morning, Historical Med      nystatin (MYCOSTATIN) cream Apply topically 2 (two) times a day, Starting Wed 7/5/2023, Normal      OneTouch Delica Lancets 33G MISC Use daily, Starting Wed 4/28/2021, Normal      OneTouch Verio test strip USE TO TEST DAILY, Normal      pantoprazole (PROTONIX) 40 mg tablet TAKE 1 TABLET TWICE A DAY (1 TABLET IN THE MORNING AND 1 TABLET IN THE EVENING), Normal      rosuvastatin (CRESTOR) 5 mg tablet TAKE 1 TABLET ONCE A WEEK, Starting Mon 10/30/2023, Normal      semaglutide, 2 mg/dose, (Ozempic, 2 MG/DOSE,) 8 mg/ mL injection pen INJECT 2 MG UNDER THE SKIN EVERY 7 DAYS, Normal      trospium chloride (SANCTURA) 20 mg tablet Take 1 tablet (20 mg total) by mouth 2 (two) times a day, Starting Tue 1/3/2023, Normal      zolpidem (AMBIEN) 10 mg tablet Take 1 tablet (10 mg total) by mouth daily at bedtime as needed for sleep, Starting Thu 11/30/2023,  Until Wed 2/28/2024 at 2359, Normal               PDMP Review         Value Time User    PDMP Reviewed  Yes 9/19/2023  5:16 PM Ehsan Calix MD             ED Provider  Attending physically available and evaluated Marcellus Fregoso. I managed the patient along with the ED Attending.    Electronically Signed by           Eric Banuelos MD  05/05/24 0058

## 2024-05-05 NOTE — ED ATTENDING ATTESTATION
5/4/2024  I, Charlie Parada MD, saw and evaluated the patient. I have discussed the patient with the resident/non-physician practitioner and agree with the resident's/non-physician practitioner's findings, Plan of Care, and MDM as documented in the resident's/non-physician practitioner's note, except where noted. All available labs and Radiology studies were reviewed.  I was present for key portions of any procedure(s) performed by the resident/non-physician practitioner and I was immediately available to provide assistance.       At this point I agree with the current assessment done in the Emergency Department.  I have conducted an independent evaluation of this patient a history and physical is as follows:    ED Course  ED Course as of 05/04/24 2347   Sat May 04, 2024   2242 Per resident h&p 69 YO M presents for evaluation of CP and HA; had HA onset this morning and has been unremitting through the day; elevated blood pressure at home O: M in no distress; rrr CTA non focal other than Parkinson's disease neuro I/P CP ACS 1st nurse work up; HA CT/CTA H&N; migraine cocktail; symptomatic Tx   Emergency Department Note- Marcellus Fregoso 68 y.o. male MRN: [id number]    Unit/Bed#: [facility] Encounter: [id number]    Marcellus Fregoso is a 68 y.o. male who presents with   Chief Complaint   Patient presents with    Chest Pain     Pt reports headache around noon. 30 min prior to calling 911 pt reports chest discomfort, and htn. Pt c/o headache and dizziness on arrival, denies chest pain. Hx of parkinsons         History of Present Illness   HPI:  Marcellus Fregoso is a 68 y.o. male who presents for evaluation of:  Bilateral bifrontal headache since noon.  He also has had some central chest discomfort.  The symptoms both started at rest.  His chest pain completely resolved prior to arrival in the emergency department.  He still has a mild bilateral bifrontal headache.  He notes some associated dizziness.  He has a history of  Parkinson's disease and chronic stutter.  He denies any associated fevers or chills.    Review of Systems   Constitutional:  Negative for fatigue and fever.   HENT:  Negative for congestion and sore throat.    Respiratory:  Negative for cough and shortness of breath.    Cardiovascular:  Positive for chest pain. Negative for palpitations.   Gastrointestinal:  Negative for abdominal pain and nausea.   Genitourinary:  Negative for flank pain and frequency.   Neurological:  Positive for headaches. Negative for light-headedness.   Psychiatric/Behavioral:  Negative for dysphoric mood and hallucinations.    All other systems reviewed and are negative.      Historical Information   Past Medical History:   Diagnosis Date    Acute blood loss anemia 10/13/2016    Anxiety     Arthritis     knees    Arthritis     Asthma     stable for > 10 years    [place] esophagus     with BARRX/RFA    Cancer (HCC)     prostate    Cataract     Depression     Diabetes (HCC)     Diabetes mellitus (HCC)     pre diabetes    Diverticulitis of colon     Environmental allergies     Fall 12/30/2020    GERD (gastroesophageal reflux disease)     Hiatal hernia     History of anal fissures     Hyperlipidemia     Hypertension     Incisional hernia     Insomnia     Kidney stone     Malignant neoplasm of prostate (HCC)     Morbid obesity (HCC)     Peripheral neuropathy     PONV (postoperative nausea and vomiting)     Prolapsing mitral valve     prolapsing mitral valve leaflet syndrome    Prostate cancer (HCC)     Retinal detachment     treated surgically at [facility]; resolved: 10/10/2012    Sleep apnea     diagnosed but unable to tolerate cpap.     Stuttering      Past Surgical History:   Procedure Laterality Date    ARTHROSCOPIC REPAIR ACL Right     BIOPSY CORE NEEDLE      prostate    CATARACT EXTRACTION Bilateral     COLONOSCOPY      HERNIA REPAIR      naval    JOINT REPLACEMENT      B/L knee    KNEE ARTHROSCOPY Left     LAPAROSCOPIC GASTRIC BANDING       SD ARTHRP KNE CONDYLE&PLATU MEDIAL&LAT COMPARTMENTS Left 02/15/2017    Procedure: TOTAL KNEE ARTHROPLASTY ;  Surgeon: Sal Ross MD;  Location: [facility];  Service: Orthopedics    SD ARTHRP KNE CONDYLE&PLATU MEDIAL&LAT COMPARTMENTS Right 10/10/2016    Procedure: ARTHROPLASTY KNEE TOTAL;  Surgeon: Sal Ross MD;  Location: [facility];  Service: Orthopedics    PROSTATECTOMY      robotic-assisted; Lilly Tay    REMOVAL GASTRIC BAND LAPAROSCOPIC N/A 8/30/2022    Procedure: LAPAROSCOPIC REMOVAL OF ADJUSTABLE GASTRIC BAND AND PORT  WITH INTRAOPERATIVE EGD;  Surgeon: Neal Yang MD;  Location: [facility];  Service: Bariatrics    RETINAL DETACHMENT SURGERY Bilateral     Lemus Eye    SEPTOPLASTY      SINUS SURGERY      TONSILLECTOMY      over age 10    UPPER GASTROINTESTINAL ENDOSCOPY      mutiple with BARRX/RFA    VASECTOMY      vas deferens     Social History   Social History     Substance and Sexual Activity   Alcohol Use Not Currently    Alcohol/week: 1.0 standard drink of alcohol    Types: 1 Cans of beer per week    Comment: 1 case of beer per year     Social History     Substance and Sexual Activity   Drug Use Never     Social History     Tobacco Use   Smoking Status Never    Passive exposure: Never   Smokeless Tobacco Never   Tobacco Comments    quit 38 years ago     Family History:   Family History   Problem Relation Age of Onset    Heart disease Father     Coronary artery disease Father     Prostate cancer Father     Coronary artery disease Mother     Diabetes Mother         mellitus    Diabetes type II Mother     Diabetes Maternal Grandmother         mellitus    Coronary artery disease Maternal Grandfather     Coronary artery disease Paternal Grandfather        Meds/Allergies   PTA meds:   Prior to Admission Medications   Prescriptions Last Dose Informant Patient Reported? Taking?   ALPRAZolam (XANAX) 0.25 mg tablet  Self No No   Sig: Take 1 tablet (0.25 mg total) by mouth 3 (three) times a day  as needed for anxiety   Patient taking differently: Take 0.25 mg by mouth as needed for anxiety   Blood Glucose Monitoring Suppl (OneTouch Verio) w/Device KIT  Self No No   Sig: Use daily   FLUoxetine (PROzac) 20 mg capsule  Self Yes No   Sig: Take 80 mg by mouth daily Alternated between 20 mg and 40 mg daily   Lancet Devices (ONE TOUCH DELICA LANCING DEV) MISC  Self No No   Sig: Use daily   Multiple Vitamins-Minerals (CENTRUM ADULTS PO)  Self Yes No   Sig: Take by mouth in the morning   OneTouch Delica Lancets 33G MISC  Self No No   Sig: Use daily   OneTouch Verio test strip  Self No No   Sig: USE TO TEST DAILY   carbidopa-levodopa (SINEMET)  mg per tablet  Self Yes No   Sig: Take 1 tablet by mouth Three times a day   chlorhexidine (PERIDEX) 0.12 % solution  Self Yes No   eszopiclone (LUNESTA) 3 MG tablet  Self Yes No   Sig: TAKE 1 TABLET BY MOUTH AT BEDTIME IN PLACE OF 2MG TAB   Patient not taking: Reported on 4/26/2024   ezetimibe (ZETIA) 10 mg tablet  Self No No   Sig: TAKE 1 TABLET DAILY   Patient taking differently: Take 5 mg by mouth daily   losartan (COZAAR) 100 MG tablet  Self No No   Sig: Take 1 tablet (100 mg total) by mouth daily   metFORMIN (GLUCOPHAGE) 500 mg tablet  Self No No   Sig: TAKE 2 TABLETS TWICE A DAY WITH MEALS   metoprolol succinate (TOPROL-XL) 25 mg 24 hr tablet  Self No No   Sig: Take 1 tablet (25 mg total) by mouth daily   nystatin (MYCOSTATIN) cream  Self No No   Sig: Apply topically 2 (two) times a day   Patient taking differently: Apply topically as needed (rash)   pantoprazole (PROTONIX) 40 mg tablet  Self No No   Sig: TAKE 1 TABLET TWICE A DAY (1 TABLET IN THE MORNING AND 1 TABLET IN THE EVENING)   rosuvastatin (CRESTOR) 5 mg tablet  Self No No   Sig: TAKE 1 TABLET ONCE A WEEK   semaglutide, 2 mg/dose, (Ozempic, 2 MG/DOSE,) 8 mg/ mL injection pen  Self No No   Sig: INJECT 2 MG UNDER THE SKIN EVERY 7 DAYS   trospium chloride (SANCTURA) 20 mg tablet  Self No No   Sig: Take 1  "tablet (20 mg total) by mouth 2 (two) times a day   Patient not taking: Reported on 2/12/2024   zolpidem (AMBIEN) 10 mg tablet   No No   Sig: Take 1 tablet (10 mg total) by mouth daily at bedtime as needed for sleep   Patient not taking: Reported on 2/12/2024      Facility-Administered Medications: None     Allergies   Allergen Reactions    Celebrex [Celecoxib] Other (See Comments)     Cardiologist stated he should not take      Chlorhexidine Hives     Is able to wash with hibiclens but not use michi cloths    Other      \"steroid eyedrops\"      Prednisone Other (See Comments)     Prednisone eye drops- eye pressure increases       Objective   First Vitals:   Blood Pressure: (!) 222/99 (05/04/24 2137)  Pulse: 59 (05/04/24 2137)  Temperature: 98.4 °F (36.9 °C) (05/04/24 2137)  Temp Source: Oral (05/04/24 2137)  Respirations: 20 (05/04/24 2137)  SpO2: 97 % (05/04/24 2137)    Current Vitals:   Blood Pressure: (!) 222/99 (05/04/24 2137)  Pulse: 59 (05/04/24 2137)  Temperature: 98.4 °F (36.9 °C) (05/04/24 2137)  Temp Source: Oral (05/04/24 2137)  Respirations: 20 (05/04/24 2137)  SpO2: 97 % (05/04/24 2137)    No intake or output data in the 24 hours ending 05/04/24 2348    Invasive Devices       Peripheral Intravenous Line  Duration             Peripheral IV 05/04/24 Left Antecubital <1 day                    Physical Exam  Vitals and nursing note reviewed.   Constitutional:       General: He is not in acute distress.     Appearance: Normal appearance. He is well-developed.   HENT:      Head: Normocephalic and atraumatic.      Right Ear: External ear normal.      Left Ear: External ear normal.      Nose: Nose normal.      Mouth/Throat:      Pharynx: No oropharyngeal exudate.   Eyes:      Conjunctiva/sclera: Conjunctivae normal.      Pupils: Pupils are equal, round, and reactive to light.   Cardiovascular:      Rate and Rhythm: Normal rate and regular rhythm.   Pulmonary:      Effort: Pulmonary effort is normal. No " respiratory distress.   Abdominal:      General: Abdomen is flat. There is no distension.      Palpations: Abdomen is soft.   Musculoskeletal:         General: No deformity. Normal range of motion.      Cervical back: Normal range of motion and neck supple.   Skin:     General: Skin is warm and dry.      Capillary Refill: Capillary refill takes less than 2 seconds.   Neurological:      General: No focal deficit present.      Mental Status: He is alert and oriented to person, place, and time. Mental status is at baseline.      Coordination: Coordination normal.   Psychiatric:         Mood and Affect: Mood normal.         Behavior: Behavior normal.         Thought Content: Thought content normal.         Judgment: Judgment normal.           Medical Decision Makin.  Acute nonspecific cephalgia: CT scan head rule out intracranial abnormality; CT scan head and neck rule out aneurysmal bleeding; ECG rule out arrhythmia; CBC rule out leukocytosis and anemia.  Complete metabolic profile rule out electrolyte disturbance, uremia, and disorders of glucose homeostasis.    2.  Chest discomfort: ECG rule out arrhythmia; troponin rule out MI.    Recent Results (from the past 36 hour(s))   ECG 12 lead    Collection Time: 24  9:44 PM   Result Value Ref Range    Ventricular Rate 61 BPM    Atrial Rate 61 BPM    OH Interval 198 ms    QRSD Interval 98 ms    QT Interval 420 ms    QTC Interval 422 ms    P [place] 10 degrees    QRS Axis -28 degrees    T Wave Axis 36 degrees   CBC and differential    Collection Time: 24  9:45 PM   Result Value Ref Range    WBC 7.69 4.31 - 10.16 Thousand/uL    RBC 4.66 3.88 - 5.62 Million/uL    Hemoglobin 14.0 12.0 - 17.0 g/dL    Hematocrit 40.5 36.5 - 49.3 %    MCV 87 82 - 98 fL    MCH 30.0 26.8 - 34.3 pg    MCHC 34.6 31.4 - 37.4 g/dL    RDW 13.1 11.6 - 15.1 %    MPV 8.5 (L) 8.9 - 12.7 fL    Platelets 245 149 - 390 Thousands/uL    nRBC 0 /100 WBCs    Segmented % 57 43 - 75 %    Immature Grans  "% 0 0 - 2 %    Lymphocytes % 26 14 - 44 %    Monocytes % 8 4 - 12 %    Eosinophils Relative 8 (H) 0 - 6 %    Basophils Relative 1 0 - 1 %    Absolute Neutrophils 4.33 1.85 - 7.62 Thousands/µL    Absolute Immature Grans 0.02 0.00 - 0.20 Thousand/uL    Absolute Lymphocytes 1.99 0.60 - 4.47 Thousands/µL    Absolute Monocytes 0.64 0.17 - 1.22 Thousand/µL    Eosinophils Absolute 0.64 (H) 0.00 - 0.61 Thousand/µL    Basophils Absolute 0.07 0.00 - 0.10 Thousands/µL   Comprehensive metabolic panel    Collection Time: 05/04/24  9:45 PM   Result Value Ref Range    Sodium 136 135 - 147 mmol/L    Potassium 3.8 3.5 - 5.3 mmol/L    Chloride 103 96 - 108 mmol/L    CO2 27 21 - 32 mmol/L    ANION GAP 6 4 - 13 mmol/L    BUN 15 5 - 25 mg/dL    Creatinine 0.87 0.60 - 1.30 mg/dL    Glucose 138 65 - 140 mg/dL    Calcium 9.1 8.4 - 10.2 mg/dL    AST 14 13 - 39 U/L    ALT 13 7 - 52 U/L    Alkaline Phosphatase 60 34 - 104 U/L    Total Protein 6.7 6.4 - 8.4 g/dL    Albumin 4.0 3.5 - 5.0 g/dL    Total Bilirubin 0.48 0.20 - 1.00 mg/dL    eGFR 87 ml/min/1.73sq m   HS Troponin 0hr (reflex protocol)    Collection Time: 05/04/24  9:45 PM   Result Value Ref Range    hs TnI 0hr 4 \"Refer to ACS Flowchart\"- see link ng/L     XR chest 1 view portable   ED Interpretation   No acute cardiopulmonary abnormalities      CTA head and neck with and without contrast    (Results Pending)         Portions of the record may have been created with voice recognition software. Occasional wrong word or \"sound a like\" substitutions may have occurred due to the inherent limitations of voice recognition software.  Read the chart carefully and recognize, using context, where substitutions have occurred.          Critical Care Time  Procedures      "

## 2024-05-05 NOTE — TELEPHONE ENCOUNTER
"Regarding: high blood pressure 160/80  ----- Message from Janna Li sent at 5/5/2024  4:54 PM EDT -----  Patient called, \" I was in the ED for high blood pressure and issues with my heart. My blood pressure is 160/80 and I have a slight headache.\"    "

## 2024-05-05 NOTE — TELEPHONE ENCOUNTER
"Regarding: /85, Headache  ----- Message from Mayra Napoels sent at 5/4/2024  8:39 PM EDT -----  \" My BP is 263/85, I have a headache and frequency with urination\"    "

## 2024-05-05 NOTE — TELEPHONE ENCOUNTER
"Reason for Disposition  • Sounds like a life-threatening emergency to the triager    Answer Assessment - Initial Assessment Questions  1. BLOOD PRESSURE: \"What is the blood pressure?\" \"Did you take at least two measurements 5 minutes apart?\"      263/85  2. ONSET: \"When did you take your blood pressure?\"      Started this morning  3. HOW: \"How did you obtain the blood pressure?\" (e.g., visiting nurse, automatic home BP monitor)      Home devise  4. HISTORY: \"Do you have a history of high blood pressure?\"      History of HTN  5. MEDICATIONS: \"Are you taking any medications for blood pressure?\" \"Have you missed any doses recently?\"      Losartan daily      Metoprolol daily taken at noon  6. OTHER SYMPTOMS: \"Do you have any symptoms?\" (e.g., headache, chest pain, blurred vision, difficulty breathing, weakness)      Headache and incontinence of urination is more frequent.  Denies numbness in extremities or face. No difficulty breathing.    Protocols used: Blood Pressure - High-ADULT-AH    "

## 2024-05-05 NOTE — TELEPHONE ENCOUNTER
"Reason for Disposition   Systolic BP  >= 160 OR Diastolic >= 100    Answer Assessment - Initial Assessment Questions  1. BLOOD PRESSURE: \"What is the blood pressure?\" \"Did you take at least two measurements 5 minutes apart?\"      263/88; 263/80 at home yesterday; seen in ED; treated with Mg for headache; discharged home    New reading of 160/95 approx 1 hour ago      2. ONSET: \"When did you take your blood pressure?\"      Approx 1 hour ago    3. HOW: \"How did you obtain the blood pressure?\" (e.g., visiting nurse, automatic home BP monitor)      Auto cuff    4. HISTORY: \"Do you have a history of high blood pressure?\"      Yes    5. MEDICATIONS: \"Are you taking any medications for blood pressure?\" \"Have you missed any doses recently?\"      Yes - took today     6. OTHER SYMPTOMS: \"Do you have any symptoms?\" (e.g., headache, chest pain, blurred vision, difficulty breathing, weakness)      Reports paramedics noticed abnormality on ECG yesterday  Headache currently a 3-4/10  Denies chest pain, SOB, blurry vision    7. PREGNANCY: \"Is there any chance you are pregnant?\" \"When was your last menstrual period?\"      N/A    ++ Pt is most concerned about troponin result that was released to [facility] [place] after his discharge from ED.++    Protocols used: Blood Pressure - High-ADULT-AH    Pt most concerned over troponin result that was released after his discharge from the ED.  Reviewed value with patient and explained the test.  Pt denies most symptoms of hypertension other than a mild headache.  Advised pt I would forward convo to office.  Please follow up with pt tomorrow.  "

## 2024-05-05 NOTE — TELEPHONE ENCOUNTER
Patient called, requesting a callback from office, at best convenience to schedule an appointment after recent ED visit

## 2024-05-07 NOTE — PROGRESS NOTES
Cardiology Follow Up    Marcellus Fregoso  1955  [id number]  [facility] [place]  [address] [facility]  [place] [id number] [phone]    1. Essential hypertension  metoprolol succinate (TOPROL-XL) 25 mg 24 hr tablet    losartan (COZAAR) 100 MG tablet      2. PAC (premature atrial contraction)  AMB extended holter monitor      3. Hyperlipidemia, unspecified hyperlipidemia type  ezetimibe (ZETIA) 10 mg tablet      4. Class 3 severe obesity with serious comorbidity and body mass index (BMI) of 40.0 to 44.9 in adult, unspecified obesity type (HCC)            Interval History:   Mr Marcellus Fregoso presents to our office for a recent ED follow up visit.  On 5/04/24 Marcellus presented to the ED with CP.  He had an argument with his wife.  He woke up in am with HA.  BP in /99.  Troponin WNL.  EKG NSR 61 BPM, PAC's bigeminal pattern, LAD.  He was instructed to follow-up with cardiology as an outpatient.    Marcellus presents to our office for a recent ED follow up visit.  He admits to continued HA.  He is not taking blood pressure at home.  He denies chest pain palpitations lightheadedness or dizziness.  Marcellus admits to running out of metoprolol succinate and losartan 1 week prior to presenting to the emergency room    Medical History   Primary Cardiologist Dr Haley  Hypertension  Hyperlipidemia 11/24/23 , RF 50, HDL 47, LDL 75   PVC   DM2 HgbA1C 5.9 on 5/08/24   Prostate CA sp resection   Patient Active Problem List   Diagnosis    Status post total left knee replacement    Stuttering    Sleep apnea    Type 2 diabetes mellitus, without long-term current use of insulin (HCC)    Ambulatory dysfunction    Benign essential hypertension    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, without long-term current use of insulin ([facility])    Insomnia    Depression with anxiety    Onychomycosis    Hyperplasia of renal artery  (HCC)    Molina's esophagus with dysplasia    Dysphagia    Exertional dyspnea    PONV (postoperative nausea and vomiting)    Gastroparesis    Ventral hernia without obstruction or gangrene    Gastroesophageal reflux disease with esophagitis without hemorrhage    Tremor    Neuropathy    Pure hypercholesterolemia    Dermatitis    Obesity, Class III, BMI 40-49.9 (morbid obesity) (HCC)    Hx of laparoscopic adjustable gastric banding    History of total knee arthroplasty, right    Syncope    Urinary urgency    Generalized weakness    Class 3 severe obesity due to excess calories with serious comorbidity and body mass index (BMI) of 40.0 to 44.9 in adult (HCC)    CAD (coronary artery disease)    Carpal tunnel syndrome of right wrist    Family history of Parkinson disease    Parkinson disease    Hyperlipidemia    Chronic bilateral low back pain without sciatica    PVC (premature ventricular contraction)     Past Medical History:   Diagnosis Date    Acute blood loss anemia 10/13/2016    Anxiety     Arthritis     knees    Arthritis     Asthma     stable for > 10 years    Molina esophagus     with BARRX/RFA    Cancer (HCC)     prostate    Cataract     Depression     Diabetes (HCC)     Diabetes mellitus (HCC)     pre diabetes    Diverticulitis of colon     Environmental allergies     Fall 12/30/2020    GERD (gastroesophageal reflux disease)     Hiatal hernia     History of anal fissures     Hyperlipidemia     Hypertension     Incisional hernia     Insomnia     Kidney stone     Malignant neoplasm of prostate (HCC)     Morbid obesity (HCC)     Peripheral neuropathy     PONV (postoperative nausea and vomiting)     Prolapsing mitral valve     prolapsing mitral valve leaflet syndrome    Prostate cancer (HCC)     Retinal detachment     treated surgically at [facility]; resolved: 10/10/2012    Sleep apnea     diagnosed but unable to tolerate cpap.     Stuttering      Social History     Socioeconomic History    Marital status:  /Civil Union     Spouse name: Not on file    Number of children: 0    Years of education: Not on file    Highest education level: Not on file   Occupational History    Occupation:    Tobacco Use    Smoking status: Never     Passive exposure: Never    Smokeless tobacco: Never    Tobacco comments:     quit 38 years ago   Vaping Use    Vaping status: Never Used   Substance and Sexual Activity    Alcohol use: Not Currently     Alcohol/week: 1.0 standard drink of alcohol     Types: 1 Cans of beer per week     Comment: 1 case of beer per year    Drug use: Never    Sexual activity: Yes     Partners: Female     Birth control/protection: Inserts, Post-menopausal, Male Sterilization   Other Topics Concern    Not on file   Social History Narrative    ** Merged History Encounter **          Social Determinants of Health     Financial Resource Strain: Low Risk  (5/27/2021)    Overall Financial Resource Strain (CARDIA)     Difficulty of Paying Living Expenses: Not hard at all   Food Insecurity: No Food Insecurity (5/27/2021)    Hunger Vital Sign     Worried About Running Out of Food in the Last Year: Never true     Ran Out of Food in the Last Year: Never true   Transportation Needs: No Transportation Needs (5/27/2021)    PRAPARE - Transportation     Lack of Transportation (Medical): No     Lack of Transportation (Non-Medical): No   Physical Activity: Insufficiently Active (11/7/2019)    Exercise Vital Sign     Days of Exercise per Week: 1 day     Minutes of Exercise per Session: 50 min   Stress: No Stress Concern Present (11/7/2019)    [employer] [place] [employer] - Occupational Stress Questionnaire     Feeling of Stress : Not at all   Social Connections: Moderately Isolated (11/7/2019)    Social Connection and Isolation Panel [NHANES]     Frequency of Communication with Friends and Family: Three times a week     Frequency of Social Gatherings with Friends and Family: More than three times a week      Attends Yazdanism Services: Never     Active Member of Clubs or Organizations: No     Attends Club or Organization Meetings: Never     Marital Status:    Intimate Partner Violence: Not At Risk (11/7/2019)    Humiliation, Afraid, Rape, and Kick questionnaire     Fear of Current or Ex-Partner: No     Emotionally Abused: No     Physically Abused: No     Sexually Abused: No   Housing Stability: Not on file      Family History   Problem Relation Age of Onset    Heart disease Father     Coronary artery disease Father     Prostate cancer Father     Coronary artery disease Mother     Diabetes Mother         mellitus    Diabetes type II Mother     Diabetes Maternal Grandmother         mellitus    Coronary artery disease Maternal Grandfather     Coronary artery disease Paternal Grandfather      Past Surgical History:   Procedure Laterality Date    ARTHROSCOPIC REPAIR ACL Right     BIOPSY CORE NEEDLE      prostate    CATARACT EXTRACTION Bilateral     COLONOSCOPY      HERNIA REPAIR      [facility]    JOINT REPLACEMENT      B/L knee    KNEE ARTHROSCOPY Left     LAPAROSCOPIC GASTRIC BANDING      WA ARTHRP KNE CONDYLE&PLATU MEDIAL&LAT COMPARTMENTS Left 02/15/2017    Procedure: TOTAL KNEE ARTHROPLASTY ;  Surgeon: Sal Ross MD;  Location: [facility];  Service: Orthopedics    WA ARTHRP KNE CONDYLE&PLATU MEDIAL&LAT COMPARTMENTS Right 10/10/2016    Procedure: ARTHROPLASTY KNEE TOTAL;  Surgeon: Sal Ross MD;  Location: [facility];  Service: Orthopedics    PROSTATECTOMY      robotic-assisted; Lilly Tay    REMOVAL GASTRIC BAND LAPAROSCOPIC N/A 8/30/2022    Procedure: LAPAROSCOPIC REMOVAL OF ADJUSTABLE GASTRIC BAND AND PORT  WITH INTRAOPERATIVE EGD;  Surgeon: Neal Yang MD;  Location: [facility];  Service: Bariatrics    RETINAL DETACHMENT SURGERY Bilateral     Lemus Eye    SEPTOPLASTY      SINUS SURGERY      TONSILLECTOMY      over age 10    UPPER GASTROINTESTINAL ENDOSCOPY      mutiple with BARRX/RFA    VASECTOMY       vas deferens       Current Outpatient Medications:     ALPRAZolam (XANAX) 0.25 mg tablet, Take 1 tablet (0.25 mg total) by mouth 3 (three) times a day as needed for anxiety (Patient taking differently: Take 0.25 mg by mouth as needed for anxiety), Disp: 270 tablet, Rfl: 0    Blood Glucose Monitoring Suppl (OneTouch Verio) w/Device KIT, Use daily, Disp: 1 kit, Rfl: 0    carbidopa-levodopa (SINEMET)  mg per tablet, Take 1 tablet by mouth Three times a day, Disp: , Rfl:     chlorhexidine (PERIDEX) 0.12 % solution, , Disp: , Rfl:     eszopiclone (LUNESTA) 3 MG tablet, TAKE 1 TABLET BY MOUTH AT BEDTIME IN PLACE OF 2MG TAB (Patient not taking: Reported on 4/26/2024), Disp: , Rfl:     ezetimibe (ZETIA) 10 mg tablet, TAKE 1 TABLET DAILY (Patient taking differently: Take 5 mg by mouth daily), Disp: 90 tablet, Rfl: 3    FLUoxetine (PROzac) 20 mg capsule, Take 80 mg by mouth daily Alternated between 20 mg and 40 mg daily, Disp: , Rfl:     Lancet Devices (ONE TOUCH DELICA LANCING DEV) MISC, Use daily, Disp: 1 each, Rfl: 0    losartan (COZAAR) 100 MG tablet, Take 1 tablet (100 mg total) by mouth daily, Disp: 90 tablet, Rfl: 3    metFORMIN (GLUCOPHAGE) 500 mg tablet, TAKE 2 TABLETS TWICE A DAY WITH MEALS, Disp: 360 tablet, Rfl: 3    metoprolol succinate (TOPROL-XL) 25 mg 24 hr tablet, Take 1 tablet (25 mg total) by mouth daily, Disp: 90 tablet, Rfl: 3    Multiple Vitamins-Minerals (CENTRUM ADULTS PO), Take by mouth in the morning, Disp: , Rfl:     nystatin (MYCOSTATIN) cream, Apply topically 2 (two) times a day (Patient taking differently: Apply topically as needed (rash)), Disp: 30 g, Rfl: 0    OneTouch Delica Lancets 33G MISC, Use daily, Disp: 300 each, Rfl: 1    OneTouch Verio test strip, USE TO TEST DAILY, Disp: 100 strip, Rfl: 3    pantoprazole (PROTONIX) 40 mg tablet, TAKE 1 TABLET TWICE A DAY (1 TABLET IN THE MORNING AND 1 TABLET IN THE EVENING), Disp: 180 tablet, Rfl: 3    rosuvastatin (CRESTOR) 5 mg tablet,  "TAKE 1 TABLET ONCE A WEEK, Disp: 12 tablet, Rfl: 3    semaglutide, 2 mg/dose, (Ozempic, 2 MG/DOSE,) 8 mg/ mL injection pen, INJECT 2 MG UNDER THE SKIN EVERY 7 DAYS, Disp: 9 mL, Rfl: 1    trospium chloride (SANCTURA) 20 mg tablet, Take 1 tablet (20 mg total) by mouth 2 (two) times a day (Patient not taking: Reported on 2/12/2024), Disp: 180 tablet, Rfl: 3    zolpidem (AMBIEN) 10 mg tablet, Take 1 tablet (10 mg total) by mouth daily at bedtime as needed for sleep (Patient not taking: Reported on 2/12/2024), Disp: 90 tablet, Rfl: 0  Allergies   Allergen Reactions    Celebrex [Celecoxib] Other (See Comments)     Cardiologist stated he should not take      Chlorhexidine Hives     Is able to wash with hibiclens but not use michi cloths    Other      \"steroid eyedrops\"      Prednisone Other (See Comments)     Prednisone eye drops- eye pressure increases       Labs:  Admission on 05/04/2024, Discharged on 05/05/2024   Component Date Value    WBC 05/04/2024 7.69     RBC 05/04/2024 4.66     Hemoglobin 05/04/2024 14.0     Hematocrit 05/04/2024 40.5     MCV 05/04/2024 87     MCH 05/04/2024 30.0     MCHC 05/04/2024 34.6     RDW 05/04/2024 13.1     MPV 05/04/2024 8.5 (L)     Platelets 05/04/2024 245     nRBC 05/04/2024 0     Segmented % 05/04/2024 57     Immature Grans % 05/04/2024 0     Lymphocytes % 05/04/2024 26     Monocytes % 05/04/2024 8     Eosinophils Relative 05/04/2024 8 (H)     Basophils Relative 05/04/2024 1     Absolute Neutrophils 05/04/2024 4.33     Absolute Immature Grans 05/04/2024 0.02     Absolute Lymphocytes 05/04/2024 1.99     Absolute Monocytes 05/04/2024 0.64     Eosinophils Absolute 05/04/2024 0.64 (H)     Basophils Absolute 05/04/2024 0.07     Sodium 05/04/2024 136     Potassium 05/04/2024 3.8     Chloride 05/04/2024 103     CO2 05/04/2024 27     ANION GAP 05/04/2024 6     BUN 05/04/2024 15     Creatinine 05/04/2024 0.87     Glucose 05/04/2024 138     Calcium 05/04/2024 9.1     AST 05/04/2024 14     ALT " 05/04/2024 13     Alkaline Phosphatase 05/04/2024 60     Total Protein 05/04/2024 6.7     Albumin 05/04/2024 4.0     Total Bilirubin 05/04/2024 0.48     eGFR 05/04/2024 87     hs TnI 0hr 05/04/2024 4     Ventricular Rate 05/04/2024 61     Atrial Rate 05/04/2024 61     AR Interval 05/04/2024 198     QRSD Interval 05/04/2024 98     QT Interval 05/04/2024 420     QTC Interval 05/04/2024 422     P Axis 05/04/2024 10     QRS [place] 05/04/2024 -28     T Wave Axis 05/04/2024 36     Color, UA 05/04/2024 Light Yellow     Clarity, UA 05/04/2024 Clear     Specific Gravity, UA 05/04/2024 1.016     pH, UA 05/04/2024 7.0     Leukocytes, UA 05/04/2024 Negative     Nitrite, UA 05/04/2024 Negative     Protein, UA 05/04/2024 Negative     Glucose, UA 05/04/2024 Negative     Ketones, UA 05/04/2024 Negative     Urobilinogen, UA 05/04/2024 <2.0     Bilirubin, UA 05/04/2024 Negative     Occult Blood, UA 05/04/2024 Negative      Imaging: XR chest 1 view portable    Result Date: 5/5/2024  Narrative: XR CHEST PORTABLE INDICATION: chest pain. COMPARISON: CXR 10/18/2022, CT neck 5/4/2024, chest CT 11/18/2021. FINDINGS: Clear lungs. No pneumothorax or pleural effusion. Normal cardiomediastinal silhouette. Bones are unremarkable for age. Normal upper abdomen.     Impression: No acute cardiopulmonary disease. Workstation performed: [id number]     CTA head and neck with and without contrast    Result Date: 5/5/2024  Narrative: CTA NECK AND BRAIN WITH AND WITHOUT CONTRAST INDICATION: severe HA starting at noon, hypertension and nausea COMPARISON:   None. TECHNIQUE:  Routine CT imaging of the Brain without contrast.  Post contrast imaging was performed after administration of iodinated contrast through the neck and brain. Post contrast axial 0.625 mm images timed to opacify the arterial system. 3D rendering was performed on an independent workstation.   MIP reconstructions performed. Coronal reconstructions were performed of the noncontrast portion of  the brain. Radiation dose length product (DLP) for this visit:  1589 mGy-cm .  This examination, like all CT scans performed in the [facility], was performed utilizing techniques to minimize radiation dose exposure, including the use of iterative reconstruction and automated exposure control. IV Contrast:  85 mL of iohexol (OMNIPAQUE) IMAGE QUALITY:   Diagnostic FINDINGS: NONCONTRAST BRAIN PARENCHYMA:  No intracranial mass, mass effect or midline shift. No CT signs of acute infarction.  No acute parenchymal hemorrhage. VENTRICLES AND EXTRA-AXIAL SPACES:  Normal for the patient's age. VISUALIZED ORBITS: Normal visualized orbits. PARANASAL SINUSES: Normal visualized paranasal sinuses. CERVICAL VASCULATURE AORTIC ARCH AND GREAT VESSELS:  Normal aortic arch and great vessel origins. Normal visualized subclavian vessels. RIGHT VERTEBRAL ARTERY CERVICAL SEGMENT:  Normal origin. The vessel is normal in caliber throughout the neck. LEFT VERTEBRAL ARTERY CERVICAL SEGMENT:  Normal origin. The vessel is normal in caliber throughout the neck. RIGHT EXTRACRANIAL CAROTID SEGMENT:  Normal caliber common carotid artery.  Normal bifurcation and cervical internal carotid artery.  No stenosis or dissection. LEFT EXTRACRANIAL CAROTID SEGMENT:  Normal caliber common carotid artery.  Normal bifurcation and cervical internal carotid artery.  No stenosis or dissection. NASCET criteria was used to determine the degree of internal carotid artery diameter stenosis. INTRACRANIAL VASCULATURE INTERNAL CAROTID ARTERIES:  Normal enhancement of the intracranial portions of the internal carotid arteries.  Normal ophthalmic artery origins.  Normal ICA terminus. ANTERIOR CIRCULATION:  Symmetric A1 segments and anterior cerebral arteries with normal enhancement.  Normal anterior communicating artery. MIDDLE CEREBRAL ARTERY CIRCULATION:  M1 segment and middle cerebral artery branches demonstrate normal enhancement bilaterally. DISTAL  VERTEBRAL ARTERIES:  Normal distal vertebral arteries.  Posterior inferior cerebellar artery origins are normal. Normal vertebral basilar junction. BASILAR ARTERY:  Basilar artery is normal in caliber.  Normal superior cerebellar arteries. POSTERIOR CEREBRAL ARTERIES: Both posterior cerebral arteries arises from the basilar tip.  Both arteries demonstrate normal enhancement.   Normal posterior communicating arteries. VENOUS STRUCTURES:  Normal. NON VASCULAR ANATOMY BONY STRUCTURES:  No acute osseous abnormality. SOFT TISSUES OF THE NECK:  Unremarkable. THORACIC INLET:  Normal.     Impression: 1.  No acute intracranial hemorrhage, mass effect or extra-axial collection. 2.  No hemodynamically significant stenosis, dissection or occlusion of the carotid or vertebral arteries. 3.  No intracranial aneurysm.  No hemodynamically significant stenosis or occlusion of the major vessels of the [place]. Workstation performed: [id number]       Review of Systems:  Review of Systems   Musculoskeletal:  Positive for arthralgias, gait problem and myalgias.   Neurological:  Positive for headaches.   All other systems reviewed and are negative.      Physical Exam:  Physical Exam  Vitals reviewed.   Constitutional:       Appearance: Normal appearance.   Cardiovascular:      Rate and Rhythm: Normal rate and regular rhythm.      Pulses: Normal pulses.      Heart sounds: Normal heart sounds.      Comments: Ectopy  Pulmonary:      Effort: Pulmonary effort is normal.      Breath sounds: Normal breath sounds.   Musculoskeletal:         General: Normal range of motion.      Cervical back: Normal range of motion and neck supple.      Right lower leg: No edema.      Left lower leg: No edema.   Skin:     General: Skin is warm and dry.      Capillary Refill: Capillary refill takes less than 2 seconds.   Neurological:      General: No focal deficit present.      Mental Status: He is alert and oriented to person, place, and time.    Psychiatric:         Mood and Affect: Mood normal.         Behavior: Behavior normal.         Discussion/Summary:  # Hypertension /90, continue on Losartan 100 mg daily, metoprolol succinate 25 mg daily, DASH diet home blood pressure monitoring keep a log  # PAC's one week Zio patch R/O burden of ectopy.  Zio placed during this office visit  # Hyperlipidemia 11/24/23 , RF 50, HDL 47, LDL 75 continue on Crestor 5 mg daily, heart healthy diet  # Obese BMI 43.91

## 2024-05-08 ENCOUNTER — OFFICE VISIT (OUTPATIENT)
Dept: FAMILY MEDICINE CLINIC | Facility: CLINIC | Age: 69
End: 2024-05-08
Payer: MEDICARE

## 2024-05-08 ENCOUNTER — OFFICE VISIT (OUTPATIENT)
Dept: CARDIOLOGY CLINIC | Facility: CLINIC | Age: 69
End: 2024-05-08
Payer: MEDICARE

## 2024-05-08 VITALS
OXYGEN SATURATION: 96 % | DIASTOLIC BLOOD PRESSURE: 86 MMHG | WEIGHT: 288.8 LBS | HEIGHT: 68 IN | SYSTOLIC BLOOD PRESSURE: 136 MMHG | BODY MASS INDEX: 43.77 KG/M2 | HEART RATE: 58 BPM

## 2024-05-08 VITALS
OXYGEN SATURATION: 96 % | SYSTOLIC BLOOD PRESSURE: 140 MMHG | RESPIRATION RATE: 16 BRPM | HEIGHT: 68 IN | HEART RATE: 57 BPM | WEIGHT: 292 LBS | BODY MASS INDEX: 44.25 KG/M2 | DIASTOLIC BLOOD PRESSURE: 85 MMHG | TEMPERATURE: 97.5 F

## 2024-05-08 DIAGNOSIS — F80.81 STUTTERING: ICD-10-CM

## 2024-05-08 DIAGNOSIS — E66.01 OBESITY, CLASS III, BMI 40-49.9 (MORBID OBESITY) (HCC): ICD-10-CM

## 2024-05-08 DIAGNOSIS — I10 ESSENTIAL HYPERTENSION: Primary | ICD-10-CM

## 2024-05-08 DIAGNOSIS — G20.A2 PARKINSON'S DISEASE WITHOUT DYSKINESIA, WITH FLUCTUATING MANIFESTATIONS: ICD-10-CM

## 2024-05-08 DIAGNOSIS — E11.42 CONTROLLED TYPE 2 DIABETES MELLITUS WITH DIABETIC POLYNEUROPATHY, WITHOUT LONG-TERM CURRENT USE OF INSULIN (HCC): ICD-10-CM

## 2024-05-08 DIAGNOSIS — I49.1 PAC (PREMATURE ATRIAL CONTRACTION): ICD-10-CM

## 2024-05-08 DIAGNOSIS — Z23 ENCOUNTER FOR IMMUNIZATION: ICD-10-CM

## 2024-05-08 DIAGNOSIS — E78.00 PURE HYPERCHOLESTEROLEMIA: ICD-10-CM

## 2024-05-08 DIAGNOSIS — R51.9 ACUTE NONINTRACTABLE HEADACHE, UNSPECIFIED HEADACHE TYPE: Primary | ICD-10-CM

## 2024-05-08 DIAGNOSIS — E66.01 CLASS 3 SEVERE OBESITY WITH SERIOUS COMORBIDITY AND BODY MASS INDEX (BMI) OF 40.0 TO 44.9 IN ADULT, UNSPECIFIED OBESITY TYPE (HCC): ICD-10-CM

## 2024-05-08 DIAGNOSIS — F41.8 DEPRESSION WITH ANXIETY: ICD-10-CM

## 2024-05-08 DIAGNOSIS — I10 BENIGN ESSENTIAL HYPERTENSION: ICD-10-CM

## 2024-05-08 DIAGNOSIS — E78.5 HYPERLIPIDEMIA, UNSPECIFIED HYPERLIPIDEMIA TYPE: ICD-10-CM

## 2024-05-08 DIAGNOSIS — Z00.00 MEDICARE ANNUAL WELLNESS VISIT, SUBSEQUENT: ICD-10-CM

## 2024-05-08 LAB — SL AMB POCT HEMOGLOBIN AIC: 5.9 (ref ?–6.5)

## 2024-05-08 PROCEDURE — G0009 ADMIN PNEUMOCOCCAL VACCINE: HCPCS

## 2024-05-08 PROCEDURE — 99215 OFFICE O/P EST HI 40 MIN: CPT | Performed by: NURSE PRACTITIONER

## 2024-05-08 PROCEDURE — 83036 HEMOGLOBIN GLYCOSYLATED A1C: CPT | Performed by: FAMILY MEDICINE

## 2024-05-08 PROCEDURE — 99214 OFFICE O/P EST MOD 30 MIN: CPT | Performed by: FAMILY MEDICINE

## 2024-05-08 PROCEDURE — G0439 PPPS, SUBSEQ VISIT: HCPCS | Performed by: FAMILY MEDICINE

## 2024-05-08 PROCEDURE — 90677 PCV20 VACCINE IM: CPT

## 2024-05-08 RX ORDER — SELEGILINE HYDROCHLORIDE 5 MG/1
5 CAPSULE ORAL
COMMUNITY
End: 2024-05-08 | Stop reason: SDUPTHER

## 2024-05-08 RX ORDER — LOSARTAN POTASSIUM 100 MG/1
100 TABLET ORAL DAILY
Qty: 90 TABLET | Refills: 3 | Status: SHIPPED | OUTPATIENT
Start: 2024-05-08

## 2024-05-08 RX ORDER — METOPROLOL SUCCINATE 25 MG/1
25 TABLET, EXTENDED RELEASE ORAL DAILY
Qty: 90 TABLET | Refills: 3 | Status: SHIPPED | OUTPATIENT
Start: 2024-05-08

## 2024-05-08 RX ORDER — QUETIAPINE FUMARATE 50 MG/1
50 TABLET, FILM COATED ORAL
COMMUNITY

## 2024-05-08 RX ORDER — EZETIMIBE 10 MG/1
10 TABLET ORAL DAILY
Qty: 90 TABLET | Refills: 3 | Status: SHIPPED | OUTPATIENT
Start: 2024-05-08

## 2024-05-08 RX ORDER — SELEGILINE HYDROCHLORIDE 5 MG/1
5 CAPSULE ORAL AS NEEDED
COMMUNITY

## 2024-05-08 RX ORDER — QUETIAPINE FUMARATE 50 MG/1
50 TABLET, FILM COATED ORAL
COMMUNITY
End: 2024-05-08 | Stop reason: SDUPTHER

## 2024-05-08 NOTE — PROGRESS NOTES
Assessment and Plan:     Problem List Items Addressed This Visit          Cardiovascular and Mediastinum    Benign essential hypertension     DASH diet. Continue metoprolol 25mg QD and losartan 100mg QD.          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 Low carb diet. Continue ozempic and metformin.          Relevant Orders    POCT hemoglobin A1c (Completed)    Pneumococcal Conjugate Vaccine 20-valent (Pcv20) (Completed)    CBC and differential    Comprehensive metabolic panel    Lipid Panel with Direct LDL reflex       Nervous and Auditory    Parkinson disease     [facility] neurology.          Relevant Medications    QUEtiapine (SEROquel) 50 mg tablet    selegiline (ELDEPRYL) 5 mg capsule       Behavioral Health    Depression with anxiety     Continue prozac and seroquel.          Relevant Medications    QUEtiapine (SEROquel) 50 mg tablet       Other    Stuttering     Continue prozac and seroquel per neurology.          Pure hypercholesterolemia    Relevant Orders    CBC and differential    Comprehensive metabolic panel    Lipid Panel with Direct LDL reflex    Obesity, Class III, BMI 40-49.9 (morbid obesity) (Formerly [facility])     Other Visit Diagnoses       Acute nonintractable headache, unspecified headache type    -  Primary    Reviewed ER report. Pt restart HTN meds and feels better.    Encounter for immunization        Relevant Orders    Pneumococcal Conjugate Vaccine 20-valent (Pcv20) (Completed)    Medicare annual wellness visit, subsequent                Depression Screening and Follow-up Plan: Patient was screened for depression during today's encounter. They screened negative with a PHQ-9 score of 4.    Falls Plan of Care: balance,  strength, and gait training instructions were provided.       Flu shot yearly.   Give PCV20 today.   Got shingrix.   PSA per urology.   Colonoscopy 3/2021, repeat in 10 years.     POA---wife  Living will---Full code        Preventive health issues were discussed with patient, and age appropriate screening tests were ordered as noted in patient's After Visit Summary.  Personalized health advice and appropriate referrals for health education or preventive services given if needed, as noted in patient's After Visit Summary.     History of Present Illness:     Patient presents for a Medicare Wellness Visit    HPI     Pt is here by himself.   Pt states he missed his losartan for 6 days.   Went to ER on 5/4/2024 for severe headache with hypertension 229/99.   Troponin and EKG negative. CTA negative. CXR negative.   Discharged home.   He feels fine today. Will see cardiology this afternoon.     HTN----He is on metoprolol 25mg QD and losartan 100mg QD per cardiology.     Hyperlipidemia---He is on zetia 5mg qhs and crestor 5mg qhs per cardiology.     DM---Today hgA1C 5.9 controlled. He is on ozempic and metformin 1000mg bid.    eye doc.    podiatry.     Parkinson---[facility] neurology. He is on sinemet and selegiline.     Stuttering/Depression/anxiety---He is on prozac and seroquel per neurology.     Lives with wife. Does all ADL's.   Fell recently. No injury.             Patient Care Team:  Ehsan Calix MD as PCP - General (Family Medicine)  Dianne Jimenez MD as PCP - Endocrinology (Endocrinology)  MD Taco Ceballos MD Sagar V. Mehta, MD Eric Mayer, MD Michael Abgott, MD Michael Abgott, MD Shweta Batra as Diabetes Educator (Nutrition)     Review of Systems:     Review of Systems   Constitutional:  Negative for appetite change, chills and fever.   HENT:  Negative for congestion, ear pain, sinus pain and sore throat.    Eyes:  Negative for discharge and itching.   Respiratory:  Negative for apnea,  cough, chest tightness, shortness of breath and wheezing.    Cardiovascular:  Negative for chest pain, palpitations and leg swelling.   Gastrointestinal:  Negative for abdominal pain, anal bleeding, constipation, diarrhea, nausea and vomiting.   Endocrine: Negative for cold intolerance, heat intolerance and polyuria.   Genitourinary:  Negative for difficulty urinating and dysuria.   Musculoskeletal:  Positive for gait problem. Negative for arthralgias, back pain and myalgias.   Skin:  Negative for rash.   Neurological:  Negative for dizziness and headaches.   Psychiatric/Behavioral:  Negative for agitation.         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, without long-term current use of insulin (Formerly [facility])    Insomnia    Depression with anxiety    Onychomycosis    Molina's esophagus with dysplasia    Dysphagia    Exertional dyspnea    PONV (postoperative nausea and vomiting)    Gastroparesis    Ventral hernia without obstruction or gangrene    Gastroesophageal reflux disease with esophagitis without hemorrhage    Tremor    Neuropathy    Pure hypercholesterolemia    Dermatitis    Obesity, Class III, BMI 40-49.9 (morbid obesity) (Formerly [facility])    Hx of laparoscopic adjustable gastric banding    History of total knee arthroplasty, right    Syncope    Urinary urgency    Generalized weakness    Class 3 severe obesity due to excess calories with serious comorbidity and body mass index (BMI) of 40.0 to 44.9 in adult (Formerly [facility])    CAD (coronary artery disease)    Carpal tunnel syndrome of right wrist    Family history of Parkinson disease    Parkinson disease    Hyperlipidemia    Chronic bilateral low back pain without sciatica    PVC (premature ventricular contraction)      Past Medical and Surgical History:     Past Medical  History:   Diagnosis Date    Acute blood loss anemia 10/13/2016    Anxiety     Arthritis     knees    Arthritis     Asthma     stable for > 10 years    Molina esophagus     with BARRX/RFA    Cancer (HCC)     prostate    Cataract     Depression     Diabetes (HCC)     Diabetes mellitus (HCC)     pre diabetes    Diverticulitis of colon     Environmental allergies     Fall 12/30/2020    GERD (gastroesophageal reflux disease)     Hiatal hernia     History of anal fissures     Hyperlipidemia     Hypertension     Incisional hernia     Insomnia     Kidney stone     Malignant neoplasm of prostate (HCC)     Morbid obesity (HCC)     Peripheral neuropathy     PONV (postoperative nausea and vomiting)     Prolapsing mitral valve     prolapsing mitral valve leaflet syndrome    Prostate cancer (HCC)     Retinal detachment     treated surgically at [facility]; resolved: 10/10/2012    Sleep apnea     diagnosed but unable to tolerate cpap.     Stuttering      Past Surgical History:   Procedure Laterality Date    ARTHROSCOPIC REPAIR ACL Right     BIOPSY CORE NEEDLE      prostate    CATARACT EXTRACTION Bilateral     COLONOSCOPY      HERNIA REPAIR      naval    JOINT REPLACEMENT      B/L knee    KNEE ARTHROSCOPY Left     LAPAROSCOPIC GASTRIC BANDING      MD ARTHRP KNE CONDYLE&PLATU MEDIAL&LAT COMPARTMENTS Left 02/15/2017    Procedure: TOTAL KNEE ARTHROPLASTY ;  Surgeon: Sal Ross MD;  [facility]: [facility];  Service: Orthopedics    MD ARTHRP KNE CONDYLE&PLATU MEDIAL&LAT COMPARTMENTS Right 10/10/2016    Procedure: ARTHROPLASTY KNEE TOTAL;  Surgeon: Sal Ross MD;  Location: [facility];  Service: Orthopedics    PROSTATECTOMY      robotic-assisted; Lilly Tay    REMOVAL GASTRIC BAND LAPAROSCOPIC N/A 8/30/2022    Procedure: LAPAROSCOPIC REMOVAL OF ADJUSTABLE GASTRIC BAND AND PORT  WITH INTRAOPERATIVE EGD;  Surgeon: Neal Yang MD;  Location: [facility];  Service: Bariatrics    RETINAL DETACHMENT SURGERY Bilateral     Lemus  Eye    SEPTOPLASTY      SINUS SURGERY      TONSILLECTOMY      over age 10    UPPER GASTROINTESTINAL ENDOSCOPY      mutiple with BARRX/RFA    VASECTOMY      vas deferens      Family History:     Family History   Problem Relation Age of Onset    Heart disease Father     Coronary artery disease Father     Prostate cancer Father     Coronary artery disease Mother     Diabetes Mother         mellitus    Diabetes type II Mother     Diabetes Maternal Grandmother         mellitus    Coronary artery disease Maternal Grandfather     Coronary artery disease Paternal Grandfather       Social History:     Social History     Socioeconomic History    Marital status: /Civil Union     Spouse name: None    Number of children: 0    Years of education: None    Highest education level: None   Occupational History    Occupation:    Tobacco Use    Smoking status: Never     Passive exposure: Never    Smokeless tobacco: Never    Tobacco comments:     quit 38 years ago   Vaping Use    Vaping status: Never Used   Substance and Sexual Activity    Alcohol use: Not Currently     Alcohol/week: 1.0 standard drink of alcohol     Types: 1 Cans of beer per week     Comment: 1 case of beer per year    Drug use: Never    Sexual activity: Yes     Partners: Female     Birth control/protection: Inserts, Post-menopausal, Male Sterilization   Other Topics Concern    None   Social History Narrative    ** Merged History Encounter **          Social Determinants of Health     Financial Resource Strain: Low Risk  (5/27/2021)    Overall Financial Resource Strain (CARDIA)     Difficulty of Paying Living Expenses: Not hard at all   Food Insecurity: No Food Insecurity (5/8/2024)    Hunger Vital Sign     Worried About Running Out of Food in the Last Year: Never true     Ran Out of Food in the Last Year: Never true   Transportation Needs: No Transportation Needs (5/8/2024)    PRAPARE - Transportation     Lack of Transportation (Medical): No      Lack of Transportation (Non-Medical): No   Physical Activity: Insufficiently Active (11/7/2019)    Exercise Vital Sign     Days of Exercise per Week: 1 day     Minutes of Exercise per Session: 50 min   Stress: No Stress Concern Present (11/7/2019)    [employer] [place] [employer] - Occupational Stress Questionnaire     Feeling of Stress : Not at all   Social Connections: Moderately Isolated (11/7/2019)    Social Connection and Isolation Panel [NHANES]     Frequency of Communication with Friends and Family: Three times a week     Frequency of Social Gatherings with Friends and Family: More than three times a week     Attends Rastafarian Services: Never     Active Member of Clubs or Organizations: No     Attends Club or Organization Meetings: Never     Marital Status:    Intimate Partner Violence: Not At Risk (11/7/2019)    Humiliation, Afraid, Rape, and Kick questionnaire     Fear of Current or Ex-Partner: No     Emotionally Abused: No     Physically Abused: No     Sexually Abused: No   Housing Stability: Low Risk  (5/8/2024)    Housing Stability Vital Sign     Unable to Pay for Housing in the Last Year: No     Number of Places Lived in the Last Year: 1     Unstable Housing in the Last Year: No      Medications and Allergies:     Current Outpatient Medications   Medication Sig Dispense Refill    ALPRAZolam (XANAX) 0.25 mg tablet Take 1 tablet (0.25 mg total) by mouth 3 (three) times a day as needed for anxiety (Patient taking differently: Take 0.25 mg by mouth as needed for anxiety) 270 tablet 0    Blood Glucose Monitoring Suppl (OneTouch Verio) w/Device KIT Use daily 1 kit 0    carbidopa-levodopa (SINEMET)  mg per tablet Take 1 tablet by mouth Three times a day      chlorhexidine (PERIDEX) 0.12 % solution       ezetimibe (ZETIA) 10 mg tablet TAKE 1 TABLET DAILY (Patient taking differently: Take 5 mg by mouth daily) 90 tablet 3    FLUoxetine (PROzac) 20 mg capsule Take 80 mg by mouth daily  "Alternated between 20 mg and 40 mg daily      Lancet Devices (ONE TOUCH DELICA LANCING DEV) MISC Use daily 1 each 0    losartan (COZAAR) 100 MG tablet Take 1 tablet (100 mg total) by mouth daily 90 tablet 3    metFORMIN (GLUCOPHAGE) 500 mg tablet TAKE 2 TABLETS TWICE A DAY WITH MEALS 360 tablet 3    metoprolol succinate (TOPROL-XL) 25 mg 24 hr tablet Take 1 tablet (25 mg total) by mouth daily 90 tablet 3    Multiple Vitamins-Minerals (CENTRUM ADULTS PO) Take by mouth in the morning      OneTouch Delica Lancets 33G MISC Use daily 300 each 1    OneTouch Verio test strip USE TO TEST DAILY 100 strip 3    pantoprazole (PROTONIX) 40 mg tablet TAKE 1 TABLET TWICE A DAY (1 TABLET IN THE MORNING AND 1 TABLET IN THE EVENING) 180 tablet 3    QUEtiapine (SEROquel) 50 mg tablet Take 50 mg by mouth daily at bedtime      rosuvastatin (CRESTOR) 5 mg tablet TAKE 1 TABLET ONCE A WEEK 12 tablet 3    selegiline (ELDEPRYL) 5 mg capsule Take 5 mg by mouth 2 (two) times a day before meals      semaglutide, 2 mg/dose, (Ozempic, 2 MG/DOSE,) 8 mg/ mL injection pen INJECT 2 MG UNDER THE SKIN EVERY 7 DAYS 9 mL 1     No current facility-administered medications for this visit.     Allergies   Allergen Reactions    Celebrex [Celecoxib] Other (See Comments)     Cardiologist stated he should not take      Chlorhexidine Hives     Is able to wash with hibiclens but not use michi cloths    Other      \"steroid eyedrops\"      Prednisone Other (See Comments)     Prednisone eye drops- eye pressure increases      Immunizations:     Immunization History   Administered Date(s) Administered    COVID-19 MODERNA VACC 0.5 ML IM 02/20/2021, 03/19/2021, 11/01/2021, 04/11/2022    INFLUENZA 10/14/2022    Influenza Quadrivalent Preservative Free 3 years and older IM 10/20/2016, 10/26/2017    Influenza, high dose seasonal 0.7 mL 10/21/2019, 09/28/2020, 01/04/2022, 10/14/2022, 11/30/2023    Influenza, recombinant, quadrivalent,injectable, preservative free 11/01/2018 "    Influenza, seasonal, injectable 12/03/2009, 12/12/2012, 11/18/2013, 11/01/2014, 11/05/2015    Pneumococcal Conjugate 13-Valent 05/02/2019    Pneumococcal Conjugate Vaccine 20-valent (Pcv20), Polysace 05/08/2024    Pneumococcal Polysaccharide PPV23 10/20/2016    Td (adult), adsorbed 07/16/1999    Zoster Vaccine Recombinant 12/06/2018, 02/04/2019      Health Maintenance:         Topic Date Due    Colorectal Cancer Screening  03/10/2026    Hepatitis C Screening  Completed         Topic Date Due    COVID-19 Vaccine (5 - 2023-24 season) 09/01/2023      Medicare Screening Tests and Risk Assessments:     Marcellus is here for his Subsequent Wellness visit.     Health Risk Assessment:   Patient rates overall health as fair. Patient feels that their physical health rating is slightly worse. Patient is satisfied with their life. Eyesight was rated as same. Hearing was rated as same. Patient feels that their emotional and mental health rating is same. Patients states they are never, rarely angry. Patient states they are sometimes unusually tired/fatigued. Pain experienced in the last 7 days has been none. Patient states that he has experienced no weight loss or gain in last 6 months. Dizziness.    Depression Screening:   PHQ-9 Score: 4      Fall Risk Screening:   In the past year, patient has experienced: history of falling in past year    Number of falls: 2 or more  Injured during fall?: No    Feels unsteady when standing or walking?: Yes    Worried about falling?: Yes      Home Safety:  Patient does not have trouble with stairs inside or outside of their home. Patient has working smoke alarms and has working carbon monoxide detector. Home safety hazards include: none.     Nutrition:   Current diet is Unhealthy and Frequent junk food.     Medications:   Patient is currently taking over-the-counter supplements. OTC medications include: see medication list. Patient is able to manage medications.     Activities of Daily Living  (ADLs)/Instrumental Activities of Daily Living (IADLs):   Walk and transfer into and out of bed and chair?: Yes  Dress and groom yourself?: Yes    Bathe or shower yourself?: Yes    Feed yourself? Yes  Do your laundry/housekeeping?: Yes  Manage your money, pay your bills and track your expenses?: Yes  Make your own meals?: Yes    Do your own shopping?: Yes    Previous Hospitalizations:   Any hospitalizations or ED visits within the last 12 months?: Yes    How many hospitalizations have you had in the last year?: 1-2    Advance Care Planning:   Living will: Yes    Durable POA for healthcare: Yes    Advanced directive: Yes    End of Life Decisions reviewed with patient: Yes    Provider agrees with end of life decisions: Yes      Cognitive Screening:   Provider or family/friend/caregiver concerned regarding cognition?: No    PREVENTIVE SCREENINGS      Cardiovascular Screening:    General: History Lipid Disorder and Screening Current      Diabetes Screening:     General: History Diabetes and Screening Current      Colorectal Cancer Screening:     General: Screening Current      Prostate Cancer Screening:    General: History Prostate Cancer      Abdominal Aortic Aneurysm (AAA) Screening:    Risk factors include: age between 65-76 yo        Lung Cancer Screening:     General: Screening Not Indicated      Hepatitis C Screening:    General: Screening Current    Screening, Brief Intervention, and Referral to Treatment (SBIRT)    Screening  Typical number of drinks in a day: 0  Typical number of drinks in a week: 0  Interpretation: Low risk drinking behavior.    Single Item Drug Screening:  How often have you used an illegal drug (including marijuana) or a prescription medication for non-medical reasons in the past year? never    Single Item Drug Screen Score: 0  Interpretation: Negative screen for possible drug use disorder    No results found.     Physical Exam:     /85   Pulse 57   Temp 97.5 °F (36.4 °C) (Temporal)   " Resp 16   Ht 5' 8\" (1.727 m)   Wt 132 kg (292 lb)   SpO2 96%   BMI 44.40 kg/m²     Physical Exam  Vitals reviewed.   Constitutional:       Appearance: Normal appearance.   HENT:      Head: Normocephalic and atraumatic.   Eyes:      General:         Right eye: No discharge.         Left eye: No discharge.      Conjunctiva/sclera: Conjunctivae normal.   Neck:      Vascular: No carotid bruit.   Cardiovascular:      Rate and Rhythm: Normal rate and regular rhythm.      Heart sounds: Normal heart sounds. No murmur heard.     No friction rub. No gallop.   Pulmonary:      Effort: Pulmonary effort is normal. No respiratory distress.      Breath sounds: Normal breath sounds. No wheezing or rales.   Abdominal:      General: Bowel sounds are normal. There is no distension.      Palpations: Abdomen is soft.      Tenderness: There is no abdominal tenderness.   Musculoskeletal:         General: No swelling, tenderness or deformity.      Cervical back: Normal range of motion and neck supple. No muscular tenderness.      Comments: Use cane   Lymphadenopathy:      Cervical: No cervical adenopathy.   Neurological:      Mental Status: He is alert.   Psychiatric:         Mood and Affect: Mood normal.          Ehsan Calix MD  "

## 2024-05-08 NOTE — ASSESSMENT & PLAN NOTE
Lab Results   Component Value Date    HGBA1C 5.9 05/08/2024     Controlled. Low carb diet. Continue ozempic and metformin.

## 2024-05-08 NOTE — PATIENT INSTRUCTIONS
Medicare Preventive Visit Patient Instructions  Thank you for completing your Welcome to Medicare Visit or Medicare Annual Wellness Visit today. Your next wellness visit will be due in one year (5/9/2025).  The screening/preventive services that you may require over the next 5-10 years are detailed below. Some tests may not apply to you based off risk factors and/or age. Screening tests ordered at today's visit but not completed yet may show as past due. Also, please note that scanned in results may not display below.  Preventive Screenings:  Service Recommendations Previous Testing/Comments   Colorectal Cancer Screening  Colonoscopy    Fecal Occult Blood Test (FOBT)/Fecal Immunochemical Test (FIT)  Fecal DNA/Cologuard Test  Flexible Sigmoidoscopy Age: 45-75 years old   Colonoscopy: every 10 years (May be performed more frequently if at higher risk)  OR  FOBT/FIT: every 1 year  OR  Cologuard: every 3 years  OR  Sigmoidoscopy: every 5 years  Screening may be recommended earlier than age 45 if at higher risk for colorectal cancer. Also, an individualized decision between you and your healthcare provider will decide whether screening between the ages of 76-85 would be appropriate. Colonoscopy: 03/10/2021  FOBT/FIT: Not on file  Cologuard: Not on file  Sigmoidoscopy: Not on file    Screening Current     Prostate Cancer Screening Individualized decision between patient and health care provider in men between ages of 55-69   Medicare will cover every 12 months beginning on the day after your 50th birthday PSA: <0.1 ng/mL     History Prostate Cancer     Hepatitis C Screening Once for adults born between 1945 and 1965  More frequently in patients at high risk for Hepatitis C Hep C Antibody: 04/29/2021    Screening Current   Diabetes Screening 1-2 times per year if you're at risk for diabetes or have pre-diabetes Fasting glucose: 117 mg/dL (11/24/2023)  A1C: 6.0 % (11/24/2023)  Screening Not Indicated  History Diabetes    Cholesterol Screening Once every 5 years if you don't have a lipid disorder. May order more often based on risk factors. Lipid panel: 11/24/2023  Screening Not Indicated  History Lipid Disorder      Other Preventive Screenings Covered by Medicare:  Abdominal Aortic Aneurysm (AAA) Screening: covered once if your at risk. You're considered to be at risk if you have a family history of AAA or a male between the age of 65-75 who smoking at least 100 cigarettes in your lifetime.  Lung Cancer Screening: covers low dose CT scan once per year if you meet all of the following conditions: (1) Age 55-77; (2) No signs or symptoms of lung cancer; (3) Current smoker or have quit smoking within the last 15 years; (4) You have a tobacco smoking history of at least 20 pack years (packs per day x number of years you smoked); (5) You get a written order from a healthcare provider.  Glaucoma Screening: covered annually if you're considered high risk: (1) You have diabetes OR (2) Family history of glaucoma OR (3)  aged 50 and older OR (4)  American aged 65 and older  Osteoporosis Screening: covered every 2 years if you meet one of the following conditions: (1) Have a vertebral abnormality; (2) On glucocorticoid therapy for more than 3 months; (3) Have primary hyperparathyroidism; (4) On osteoporosis medications and need to assess response to drug therapy.  HIV Screening: covered annually if you're between the age of 15-65. Also covered annually if you are younger than 15 and older than 65 with risk factors for HIV infection. For pregnant patients, it is covered up to 3 times per pregnancy.    Immunizations:  Immunization Recommendations   Influenza Vaccine Annual influenza vaccination during flu season is recommended for all persons aged >= 6 months who do not have contraindications   Pneumococcal Vaccine   * Pneumococcal conjugate vaccine = PCV13 (Prevnar 13), PCV15 (Vaxneuvance), PCV20 (Prevnar 20)  *  Pneumococcal polysaccharide vaccine = PPSV23 (Pneumovax) Adults 19-65 yo with certain risk factors or if 65+ yo  If never received any pneumonia vaccine: recommend Prevnar 20 (PCV20)  Give PCV20 if previously received 1 dose of PCV13 or PPSV23   Hepatitis B Vaccine 3 dose series if at intermediate or high risk (ex: diabetes, end stage renal disease, liver disease)   Respiratory syncytial virus (RSV) Vaccine - COVERED BY MEDICARE PART D  * RSVPreF3 (Arexvy) CDC recommends that adults 60 years of age and older may receive a single dose of RSV vaccine using shared clinical decision-making (SCDM)   Tetanus (Td) Vaccine - COST NOT COVERED BY MEDICARE PART B Following completion of primary series, a booster dose should be given every 10 years to maintain immunity against tetanus. Td may also be given as tetanus wound prophylaxis.   Tdap Vaccine - COST NOT COVERED BY MEDICARE PART B Recommended at least once for all adults. For pregnant patients, recommended with each pregnancy.   Shingles Vaccine (Shingrix) - COST NOT COVERED BY MEDICARE PART B  2 shot series recommended in those 19 years and older who have or will have weakened immune systems or those 50 years and older     Health Maintenance Due:      Topic Date Due   • Colorectal Cancer Screening  03/10/2026   • Hepatitis C Screening  Completed     Immunizations Due:      Topic Date Due   • Pneumococcal Vaccine: 65+ Years (3 of 3 - PPSV23 or PCV20) 10/20/2021   • COVID-19 Vaccine (5 - 2023-24 season) 09/01/2023     Advance Directives   What are advance directives?  Advance directives are legal documents that state your wishes and plans for medical care. These plans are made ahead of time in case you lose your ability to make decisions for yourself. Advance directives can apply to any medical decision, such as the treatments you want, and if you want to donate organs.   What are the types of advance directives?  There are many types of advance directives, and each state  has rules about how to use them. You may choose a combination of any of the following:  Living will:  This is a written record of the treatment you want. You can also choose which treatments you do not want, which to limit, and which to stop at a certain time. This includes surgery, medicine, IV fluid, and tube feedings.   Durable power of  for healthcare (DPAHC):  This is a written record that states who you want to make healthcare choices for you when you are unable to make them for yourself. This person, called a proxy, is usually a family member or a friend. You may choose more than 1 proxy.  Do not resuscitate (DNR) order:  A DNR order is used in case your heart stops beating or you stop breathing. It is a request not to have certain forms of treatment, such as CPR. A DNR order may be included in other types of advance directives.  Medical directive:  This covers the care that you want if you are in a coma, near death, or unable to make decisions for yourself. You can list the treatments you want for each condition. Treatment may include pain medicine, surgery, blood transfusions, dialysis, IV or tube feedings, and a ventilator (breathing machine).  Values history:  This document has questions about your views, beliefs, and how you feel and think about life. This information can help others choose the care that you would choose.  Why are advance directives important?  An advance directive helps you control your care. Although spoken wishes may be used, it is better to have your wishes written down. Spoken wishes can be misunderstood, or not followed. Treatments may be given even if you do not want them. An advance directive may make it easier for your family to make difficult choices about your care.   Fall Prevention    Fall prevention  includes ways to make your home and other areas safer. It also includes ways you can move more carefully to prevent a fall. Health conditions that cause changes in your  blood pressure, vision, or muscle strength and coordination may increase your risk for falls. Medicines may also increase your risk for falls if they make you dizzy, weak, or sleepy.   Fall prevention tips:   Stand or sit up slowly.    Use assistive devices as directed.    Wear shoes that fit well and have soles that .    Wear a personal alarm.    Stay active.    Manage your medical conditions.    Home Safety Tips:  Add items to prevent falls in the bathroom.    Keep paths clear.    Install bright lights in your home.    Keep items you use often on shelves within reach.    Paint or place reflective tape on the edges of your stairs.    Weight Management   Why it is important to manage your weight:  Being overweight increases your risk of health conditions such as heart disease, high blood pressure, type 2 diabetes, and certain types of cancer. It can also increase your risk for osteoarthritis, sleep apnea, and other respiratory problems. Aim for a slow, steady weight loss. Even a small amount of weight loss can lower your risk of health problems.  How to lose weight safely:  A safe and healthy way to lose weight is to eat fewer calories and get regular exercise. You can lose up about 1 pound a week by decreasing the number of calories you eat by 500 calories each day.   Healthy meal plan for weight management:  A healthy meal plan includes a variety of foods, contains fewer calories, and helps you stay healthy. A healthy meal plan includes the following:  Eat whole-grain foods more often.  A healthy meal plan should contain fiber. Fiber is the part of grains, fruits, and vegetables that is not broken down by your body. Whole-grain foods are healthy and provide extra fiber in your diet. Some examples of whole-grain foods are whole-wheat breads and pastas, oatmeal, brown rice, and bulgur.  Eat a variety of vegetables every day.  Include dark, leafy greens such as spinach, kale, amanda greens, and mustard greens.  Eat yellow and orange vegetables such as carrots, sweet potatoes, and winter squash.   Eat a variety of fruits every day.  Choose fresh or canned fruit (canned in its own juice or light syrup) instead of juice. Fruit juice has very little or no fiber.  Eat low-fat dairy foods.  Drink fat-free (skim) milk or 1% milk. Eat fat-free yogurt and low-fat cottage cheese. Try low-fat cheeses such as mozzarella and other reduced-fat cheeses.  Choose meat and other protein foods that are low in fat.  Choose beans or other legumes such as split peas or lentils. Choose fish, skinless poultry (chicken or turkey), or lean cuts of red meat (beef or pork). Before you cook meat or poultry, cut off any visible fat.   Use less fat and oil.  Try baking foods instead of frying them. Add less fat, such as margarine, sour cream, regular salad dressing and mayonnaise to foods. Eat fewer high-fat foods. Some examples of high-fat foods include french fries, doughnuts, ice cream, and cakes.  Eat fewer sweets.  Limit foods and drinks that are high in sugar. This includes candy, cookies, regular soda, and sweetened drinks.  Exercise:  Exercise at least 30 minutes per day on most days of the week. Some examples of exercise include walking, biking, dancing, and swimming. You can also fit in more physical activity by taking the stairs instead of the elevator or parking farther away from stores. Ask your healthcare provider about the best exercise plan for you.      © Copyright [employer] 2018 Information is for End User's use only and may not be sold, redistributed or otherwise used for commercial purposes. All illustrations and images included in CareNotes® are the copyrighted property of [employer].D.A[employer]M., Inc. or [employer]

## 2024-05-09 ENCOUNTER — NURSE TRIAGE (OUTPATIENT)
Age: 69
End: 2024-05-09

## 2024-05-09 NOTE — TELEPHONE ENCOUNTER
Patient calls to report that 30 minutes ago he fell.  Patient has Parkinsons and states he lost his balance.  Denies any LOC.  Patient denies injuries.      Patient states he falls often from the Parkinsos as he has trouble with moving around, he does use a cane.  Patient is wearing a Zio monitor and wanted to know if anything showed on there, as he did push the button when he fell.    I advised patient to monitor himself for any injuries that may develop, and if he has any worsening symptoms to go to ED for evaluation.    Please advise.

## 2024-05-09 NOTE — TELEPHONE ENCOUNTER
"Reason for Disposition   [1] Recent fall AND [2] no injury    Answer Assessment - Initial Assessment Questions  1. MECHANISM: \"How did the fall happen?\"      Had a headache. Lost balance and fell down.   2. DOMESTIC VIOLENCE AND ELDER ABUSE SCREENING: \"Did you fall because someone pushed you or tried to hurt you?\" If Yes, ask: \"Are you safe now?\"      Denies.   3. ONSET: \"When did the fall happen?\" (e.g., minutes, hours, or days ago)      30 minutes ago.   4. LOCATION: \"What part of the body hit the ground?\" (e.g., back, buttocks, head, hips, knees, hands, head, stomach)      Buttocks, left side   5. INJURY: \"Did you hurt (injure) yourself when you fell?\" If Yes, ask: \"What did you injure? Tell me more about this?\" (e.g., body area; type of injury; pain severity)\"      Feels stiff. Denies having injuries   6. PAIN: \"Is there any pain?\" If Yes, ask: \"How bad is the pain?\" (e.g., Scale 1-10; or mild,   moderate, severe)    - NONE (0): no pain    - MILD (1-3): doesn't interfere with normal activities     - MODERATE (4-7): interferes with normal activities or awakens from sleep     - SEVERE (8-10): excruciating pain, unable to do any normal activities       Headache- 4/10. Denies pain from fall  7. SIZE: For cuts, bruises, or swelling, ask: \"How large is it?\" (e.g., inches or centimeters)       N/a  9. OTHER SYMPTOMS: \"Do you have any other symptoms?\" (e.g., dizziness, fever, weakness; new onset or worsening).       Denies. Lost balance from Parkinson's  10. CAUSE: \"What do you think caused the fall (or falling)?\" (e.g., tripped, dizzy spell)        Balance issue from Parkinson's    Protocols used: Falls and Falling-ADULT-AH    "

## 2024-05-15 ENCOUNTER — NURSE TRIAGE (OUTPATIENT)
Age: 69
End: 2024-05-15

## 2024-05-15 ENCOUNTER — TELEPHONE (OUTPATIENT)
Age: 69
End: 2024-05-15

## 2024-05-15 NOTE — TELEPHONE ENCOUNTER
"Pt is still reporting elevated pressures and a headache and asking to see Dr Haley.   BP's at home with an [employer] cuff  177/90  150/80  Just now 158/90, 57    I confirmed he is taking his medications  Toprol 25 mg daily  Losartan 100 mg daily    5/4 in ED for HTN /99, pt did not take his meds for one week    Pt just saw Lorena last week BP in office 136/86, HR 58    Reason for Disposition  • Systolic BP >= 130 OR Diastolic >= 80, and is taking BP medications    Answer Assessment - Initial Assessment Questions  1. BLOOD PRESSURE: \"What is the blood pressure?\" \"Did you take at least two measurements 5 minutes apart?\"      160/80, 177/90, 150/80  2. ONSET: \"When did you take your blood pressure?\"      Two hours ago  3. HOW: \"How did you obtain the blood pressure?\" (e.g., visiting nurse, automatic home BP monitor)      Automatic arm cuff  4. HISTORY: \"Do you have a history of high blood pressure?\"      yes  5. MEDICATIONS: \"Are you taking any medications for blood pressure?\" \"Have you missed any doses recently?\"        6. OTHER SYMPTOMS: \"Do you have any symptoms?\" (e.g., headache, chest pain, blurred vision, difficulty breathing, weakness)      Headache, sometimes dizziness    Protocols used: Blood Pressure - High-ADULT-[place]    "

## 2024-05-15 NOTE — TELEPHONE ENCOUNTER
Caller: Patient     Doctor: Getachew Haley MD     Reason for call: PT experiencing high blood pressure & feels his balance is off.     Call back#: [phone]

## 2024-05-15 NOTE — TELEPHONE ENCOUNTER
Pt called back, gave provider's message. Pt understood. He would like to know if he should take the Amlodipine in the AM with Losartan and Metoprolol?    Please send Amlodipine to [facility] on [address]

## 2024-05-15 NOTE — TELEPHONE ENCOUNTER
Patient returned call.  Advised him of message from Dr. Gavi moses to take Amlodipine in the AM with Losartan and Metoprolol.    Dr. Haley, please send Amlodipine prescription to [facility] on [address].  Thank you.

## 2024-05-16 ENCOUNTER — TELEPHONE (OUTPATIENT)
Age: 69
End: 2024-05-16

## 2024-05-16 DIAGNOSIS — I10 ESSENTIAL HYPERTENSION: Primary | ICD-10-CM

## 2024-05-16 DIAGNOSIS — R26.2 AMBULATORY DYSFUNCTION: Primary | ICD-10-CM

## 2024-05-16 DIAGNOSIS — G20.A2 PARKINSON'S DISEASE WITHOUT DYSKINESIA, WITH FLUCTUATING MANIFESTATIONS: ICD-10-CM

## 2024-05-16 RX ORDER — AMLODIPINE BESYLATE 5 MG/1
5 TABLET ORAL DAILY
Qty: 90 TABLET | Refills: 3 | Status: SHIPPED | OUTPATIENT
Start: 2024-05-16

## 2024-05-16 NOTE — TELEPHONE ENCOUNTER
Patient requesting ambulatory referral for [facility] PT. Requesting [facility]. Please call to discuss.

## 2024-05-16 NOTE — TELEPHONE ENCOUNTER
Pt called wanting to know Zio results however he just mailed back his monitor on Monday. Advised pt to give it about 2 weeks until results are in from mailing zio back.     Pt understood.    He also had questions regarding physical therapy that he will discuss at his upcoming appointment with Lorena WARD.

## 2024-05-20 ENCOUNTER — CLINICAL SUPPORT (OUTPATIENT)
Dept: CARDIOLOGY CLINIC | Facility: CLINIC | Age: 69
End: 2024-05-20
Payer: MEDICARE

## 2024-05-20 ENCOUNTER — TELEPHONE (OUTPATIENT)
Dept: CARDIOLOGY CLINIC | Facility: CLINIC | Age: 69
End: 2024-05-20

## 2024-05-20 ENCOUNTER — NURSE TRIAGE (OUTPATIENT)
Age: 69
End: 2024-05-20

## 2024-05-20 DIAGNOSIS — I49.1 PAC (PREMATURE ATRIAL CONTRACTION): ICD-10-CM

## 2024-05-20 PROCEDURE — 93244 EXT ECG>48HR<7D REV&INTERPJ: CPT | Performed by: NURSE PRACTITIONER

## 2024-05-20 NOTE — TELEPHONE ENCOUNTER
LOV 3/3/23 Dr. Lerma (Molina's, GERD, gastroparesis), Procedure EGD 6/6/23, colon 3/10/21, currently scheduled for visit 8/27/24 and on wait list for earlier.    Called patient, no answer, left message requesting he call back.

## 2024-05-20 NOTE — TELEPHONE ENCOUNTER
Regarding: GERD symptoms  ----- Message from Alyssa GOULD sent at 5/20/2024  1:31 PM EDT -----  Patient states he is experiencing severe GERD symptoms.  States that his is having difficulty swallowing, pain/burning in his throat a few times a day even to the point where it wakes him up during the night and some burping. Patient is scheduled for providers first available appointment on 8/27/24 and is placed on the waitlist. Patient states he is unable to wait that long as he has had several surgeries on this throat and does not want to reverse what was completed.

## 2024-05-20 NOTE — TELEPHONE ENCOUNTER
Caller: Marcellus     Doctor: Sudha     Reason for call: Pt calling concerned about his zio results that uploaded into his chart.     Call back#: [phone]

## 2024-05-20 NOTE — TELEPHONE ENCOUNTER
SPOKE WITH PT, HX SANTOS'S ESOPHAGUS WITH DYSPLASIA, GERD, GASTROPARESIS. PT REPORTS MULTIPLE EGD/RFA, PT STATES HE IS EXPERIENCING SEVERE GERD SYMPTOMS. STATES THAT FOR THE PAST 2 WEEKS, HE IS HAVING DIFFICULTY SWALLOWING, PAIN/BURNING IN HIS THROAT A FEW TIMES A DAY EVEN TO THE POINT WHERE IT WAKES HIM UP DURING THE NIGHT AND SOME BURPING. PT IS CURRENTLY TAKING PANTOPRAZOLE 40MG BID, CARAFATE PRN. PT WAS TOLD TO CALL RIGHT AWAY WITH SYMPTOMS, LAST EGD/RFA PT DEVELOPED SEPSIS. PT LAP BAND HAS BEEN REMOVED. PLEASE ADVISE PLAN OF CARE FOR PT. THANK YOU.

## 2024-05-20 NOTE — TELEPHONE ENCOUNTER
Pt came to office today because he thought he had a visit. He did not it was the download of the Zio report.  Dr. Haley is his doctor and he is off for a while.    Would you be ablt to look at his Zio report and let me know the results and any intervention for the pt?      Please advise

## 2024-05-21 NOTE — TELEPHONE ENCOUNTER
Forwarding to provider to advise on previous message. Patient called again for review and plan. He would like a call back to update asap. Thank you.

## 2024-05-21 NOTE — TELEPHONE ENCOUNTER
Patients GI provider:  Dr. MORRISON    Number to return call: [phone]     Reason for call: Pt calling X2 requesting a response. Please return patients call.

## 2024-05-22 NOTE — TELEPHONE ENCOUNTER
Patient called in to check on the status of the message to move appt up please contact patient to discuss I made the patient an appt for tomorrow bernadette GIBBS he was ok to see her

## 2024-05-23 ENCOUNTER — OFFICE VISIT (OUTPATIENT)
Dept: GASTROENTEROLOGY | Facility: CLINIC | Age: 69
End: 2024-05-23
Payer: MEDICARE

## 2024-05-23 VITALS
BODY MASS INDEX: 43.8 KG/M2 | WEIGHT: 289 LBS | SYSTOLIC BLOOD PRESSURE: 130 MMHG | TEMPERATURE: 97 F | DIASTOLIC BLOOD PRESSURE: 70 MMHG | HEIGHT: 68 IN

## 2024-05-23 DIAGNOSIS — K22.719 BARRETT'S ESOPHAGUS WITH DYSPLASIA: Primary | ICD-10-CM

## 2024-05-23 DIAGNOSIS — K21.00 GASTROESOPHAGEAL REFLUX DISEASE WITH ESOPHAGITIS WITHOUT HEMORRHAGE: ICD-10-CM

## 2024-05-23 PROCEDURE — 99214 OFFICE O/P EST MOD 30 MIN: CPT | Performed by: PHYSICIAN ASSISTANT

## 2024-05-23 PROCEDURE — G2211 COMPLEX E/M VISIT ADD ON: HCPCS | Performed by: PHYSICIAN ASSISTANT

## 2024-05-23 RX ORDER — FAMOTIDINE 40 MG/1
40 TABLET, FILM COATED ORAL
Qty: 30 TABLET | Refills: 3 | Status: SHIPPED | OUTPATIENT
Start: 2024-05-23

## 2024-05-23 NOTE — PROGRESS NOTES
[facility] - Outpatient Follow-up Note  Marcellus Fregoso 68 y.o. male MRN: [id number]  Encounter: [id number]    ASSESSMENT AND PLAN:    1. Molina's esophagus with dysplasia  2. Gastroesophageal reflux disease with esophagitis without hemorrhage  Patient with known Molina's esophagus with dysplasia presenting with worsening GERD symptoms despite pantoprazole 40 mg twice daily    At this time I recommend patient continue with pantoprazole 40 mg twice daily.  Will add famotidine 40 mg once daily at bedtime in addition to this  We discussed the importance of a GERD diet/lifestyle.  I recommended patient avoid fatty, greasy, spicy foods, limit excess caffeine, chocolate, alcohol which she will do  Will plan for EGD in the hospital setting with intubation and ?RFA in room 4 with Dr. Lerma    - EGD; Future  - famotidine (PEPCID) 40 MG tablet; Take 1 tablet (40 mg total) by mouth daily at bedtime  Dispense: 30 tablet; Refill: 3      Patient was instructed to call the office with any questions, concerns, new/ worsening/ persisting GI symptoms. Advised patient go to the ER with any severe or worsening abdominal pain, fevers/ chills, intractable N/V, chest pain, SOB, dizziness, lightheadedness, feeling something stuck in esophagus that will not go down. Patient expressed understanding and is in agreement with treatment plan.     Will plan to follow up as scheduled   __________________________________________________________    SUBJECTIVE:    Patient is new to me.  Patient with past medical history of, coronary artery disease, sleep apnea, Molina's esophagus with dysplasia, GERD, type 2 diabetes mellitus, Parkinson's, depression with anxiety, obesity, hyperlipidemia, history of gastric lap band s/p removal  presents to the office today for follow-up to discuss EGD.  Patient was last seen in the office 3/3/2023, previous office note was reviewed.    Patient complains of worsening heartburn, acid  reflux, regurgitation, belching,  symptoms for the last few weeks.   He reports symptoms ~ 2  x/ week over the last 2 weeks.   Symptoms are occurring moreso at nighttime, can wake him up from sleep.   Patient is on pantoprazole 40 mg twice daily.He can use carafate prn with some relief.   Patient denies any fevers/ chills,, abdominal pain, nausea, vomiting, change in bowel habits, diarrhea, constipation, black or bloody stools,  dysphagia, odynophagia.   Denies chest pain, SOB      In 2021 patient underwent EGD but unfortunately developed complication of sepsis secondary to possible aspiration versus translocation.  Patient tells me he needs his procedure to be completed in the hospital setting, intubated in room 4 with Dr. Garcia    Review of Systems   Constitutional:  Negative for chills and fever.   HENT:  Negative for ear pain and sore throat.    Eyes:  Negative for pain and visual disturbance.   Respiratory:  Negative for cough and shortness of breath.    Cardiovascular:  Negative for chest pain and palpitations.   Gastrointestinal:  Negative for abdominal pain and vomiting.   Genitourinary:  Negative for dysuria and hematuria.   Musculoskeletal:  Negative for arthralgias and back pain.   Skin:  Negative for color change and rash.   Neurological:  Negative for seizures and syncope.   All other systems reviewed and are negative.         Historical Information   Past Medical History:   Diagnosis Date    Acute blood loss anemia 10/13/2016    Anxiety     Arthritis     knees    Arthritis     Asthma     stable for > 10 years    Molina esophagus     with BARRX/RFA    Cancer (HCC)     prostate    Cataract     Depression     Diabetes (HCC)     Diabetes mellitus (HCC)     pre diabetes    Diverticulitis of colon     Environmental allergies     Fall 12/30/2020    GERD (gastroesophageal reflux disease)     Hiatal hernia     History of anal fissures     Hyperlipidemia     Hypertension     Incisional hernia     Insomnia      Kidney stone     Malignant neoplasm of prostate (HCC)     Morbid obesity (HCC)     Peripheral neuropathy     PONV (postoperative nausea and vomiting)     Prolapsing mitral valve     prolapsing mitral valve leaflet syndrome    Prostate cancer (HCC)     Retinal detachment     treated surgically at [facility]; resolved: 10/10/2012    Sleep apnea     diagnosed but unable to tolerate cpap.     Stuttering      Past Surgical History:   Procedure Laterality Date    ARTHROSCOPIC REPAIR ACL Right     BIOPSY CORE NEEDLE      prostate    CATARACT EXTRACTION Bilateral     COLONOSCOPY      HERNIA REPAIR      naval    JOINT REPLACEMENT      B/L knee    KNEE ARTHROSCOPY Left     LAPAROSCOPIC GASTRIC BANDING      TN ARTHRP KNE CONDYLE&PLATU MEDIAL&LAT COMPARTMENTS Left 02/15/2017    Procedure: TOTAL KNEE ARTHROPLASTY ;  Surgeon: Sal Ross MD;  Location: [facility];  Service: Orthopedics    TN ARTHRP KNE CONDYLE&PLATU MEDIAL&LAT COMPARTMENTS Right 10/10/2016    Procedure: ARTHROPLASTY KNEE TOTAL;  Surgeon: Sal Ross MD;  Location: [facility];  Service: Orthopedics    PROSTATECTOMY      robotic-assisted; SLBPamela Tay    REMOVAL GASTRIC BAND LAPAROSCOPIC N/A 8/30/2022    Procedure: LAPAROSCOPIC REMOVAL OF ADJUSTABLE GASTRIC BAND AND PORT  WITH INTRAOPERATIVE EGD;  Surgeon: Neal Yang MD;  Location: [facility];  Service: Bariatrics    RETINAL DETACHMENT SURGERY Bilateral     Lemus Eye    SEPTOPLASTY      SINUS SURGERY      TONSILLECTOMY      over age 10    UPPER GASTROINTESTINAL ENDOSCOPY      mutiple with BARRX/RFA    VASECTOMY      vas deferens     Social History   Social History     Substance and Sexual Activity   Alcohol Use Not Currently    Alcohol/week: 1.0 standard drink of alcohol    Types: 1 Cans of beer per week    Comment: 1 case of beer per year     Social History     Substance and Sexual Activity   Drug Use Never     Social History     Tobacco Use   Smoking Status Never    Passive exposure: Never  "  Smokeless Tobacco Never   Tobacco Comments    quit 38 years ago     Family History   Problem Relation Age of Onset    Heart disease Father     Coronary artery disease Father     Prostate cancer Father     Coronary artery disease Mother     Diabetes Mother         mellitus    Diabetes type II Mother     Diabetes Maternal Grandmother         mellitus    Coronary artery disease Maternal Grandfather     Coronary artery disease Paternal Grandfather        Meds/Allergies       Current Outpatient Medications:     ALPRAZolam (XANAX) 0.25 mg tablet    amLODIPine (NORVASC) 5 mg tablet    Blood Glucose Monitoring Suppl (OneTouch Verio) w/Device KIT    carbidopa-levodopa (SINEMET)  mg per tablet    ezetimibe (ZETIA) 10 mg tablet    FLUoxetine (PROzac) 20 mg capsule    Lancet Devices (ONE TOUCH DELICA LANCING DEV) MISC    losartan (COZAAR) 100 MG tablet    metFORMIN (GLUCOPHAGE) 500 mg tablet    metoprolol succinate (TOPROL-XL) 25 mg 24 hr tablet    Multiple Vitamins-Minerals (CENTRUM ADULTS PO)    OneTouch Delica Lancets 33G MISC    OneTouch Verio test strip    pantoprazole (PROTONIX) 40 mg tablet    QUEtiapine (SEROquel) 50 mg tablet    rosuvastatin (CRESTOR) 5 mg tablet    selegiline (ELDEPRYL) 5 mg capsule    semaglutide, 2 mg/dose, (Ozempic, 2 MG/DOSE,) 8 mg/ mL injection pen    Allergies   Allergen Reactions    Celebrex [Celecoxib] Other (See Comments)     Cardiologist stated he should not take      Chlorhexidine Hives     Is able to wash with hibiclens but not use michi cloths    Other      \"steroid eyedrops\"      Prednisone Other (See Comments)     Prednisone eye drops- eye pressure increases           Objective     Wt Readings from Last 3 Encounters:   05/23/24 131 kg (289 lb)   05/08/24 131 kg (288 lb 12.8 oz)   05/08/24 132 kg (292 lb)     Temp Readings from Last 3 Encounters:   05/23/24 (!) 97 °F (36.1 °C) (Tympanic)   05/08/24 97.5 °F (36.4 °C) (Temporal)   05/04/24 98.4 °F (36.9 °C) (Oral)     BP Readings " from Last 3 Encounters:   05/23/24 130/70   05/08/24 136/86   05/08/24 140/85     Pulse Readings from Last 3 Encounters:   05/08/24 58   05/08/24 57   05/05/24 (!) 53          PHYSICAL EXAM:      Physical Exam  Vitals reviewed.   Constitutional:       General: He is not in acute distress.     Appearance: He is well-developed. He is obese. He is not ill-appearing.   HENT:      Head: Normocephalic and atraumatic.   Eyes:      Conjunctiva/sclera: Conjunctivae normal.   Cardiovascular:      Rate and Rhythm: Normal rate and regular rhythm.      Heart sounds: No murmur heard.  Pulmonary:      Effort: Pulmonary effort is normal. No respiratory distress.      Breath sounds: Normal breath sounds.   Musculoskeletal:         General: No swelling or tenderness.      Cervical back: Neck supple.   Skin:     General: Skin is warm and dry.      Capillary Refill: Capillary refill takes less than 2 seconds.   Neurological:      General: No focal deficit present.      Mental Status: He is alert and oriented to person, place, and time.   Psychiatric:         Mood and Affect: Mood normal.         Behavior: Behavior normal.         Thought Content: Thought content normal.          Lab Results:   No visits with results within 1 Day(s) from this visit.   Latest known visit with results is:   Office Visit on 05/08/2024   Component Date Value    Hemoglobin A1C 05/08/2024 5.9        Lab Results   Component Value Date    WBC 7.69 05/04/2024    HGB 14.0 05/04/2024    HCT 40.5 05/04/2024    MCV 87 05/04/2024     05/04/2024       Lab Results   Component Value Date     08/16/2015    SODIUM 136 05/04/2024    K 3.8 05/04/2024     05/04/2024    CO2 27 05/04/2024    ANIONGAP 7 08/16/2015    AGAP 6 05/04/2024    BUN 15 05/04/2024    CREATININE 0.87 05/04/2024    GLUC 138 05/04/2024    GLUF 117 (H) 11/24/2023    CALCIUM 9.1 05/04/2024    AST 14 05/04/2024    ALT 13 05/04/2024    ALKPHOS 60 05/04/2024    PROT 6.9 05/04/2015    TP 6.7  05/04/2024    BILITOT 0.60 05/04/2015    TBILI 0.48 05/04/2024    EGFR 87 05/04/2024         Radiology Results:   XR chest 1 view portable    Result Date: 5/5/2024  Narrative: XR CHEST PORTABLE INDICATION: chest pain. COMPARISON: CXR 10/18/2022, CT neck 5/4/2024, chest CT 11/18/2021. FINDINGS: Clear lungs. No pneumothorax or pleural effusion. Normal cardiomediastinal silhouette. Bones are unremarkable for age. Normal upper abdomen.     Impression: No acute cardiopulmonary disease. Workstation performed: [id number]     CTA head and neck with and without contrast    Result Date: 5/5/2024  Narrative: CTA NECK AND BRAIN WITH AND WITHOUT CONTRAST INDICATION: severe HA starting at noon, hypertension and nausea COMPARISON:   None. TECHNIQUE:  Routine CT imaging of the Brain without contrast.  Post contrast imaging was performed after administration of iodinated contrast through the neck and brain. Post contrast axial 0.625 mm images timed to opacify the arterial system. 3D rendering was performed on an independent workstation.   MIP reconstructions performed. Coronal reconstructions were performed of the noncontrast portion of the brain. Radiation dose length product (DLP) for this visit:  1589 mGy-cm .  This examination, like all CT scans performed in the [facility], was performed utilizing techniques to minimize radiation dose exposure, including the use of iterative reconstruction and automated exposure control. IV Contrast:  85 mL of iohexol (OMNIPAQUE) IMAGE QUALITY:   Diagnostic FINDINGS: NONCONTRAST BRAIN PARENCHYMA:  No intracranial mass, mass effect or midline shift. No CT signs of acute infarction.  No acute parenchymal hemorrhage. VENTRICLES AND EXTRA-AXIAL SPACES:  Normal for the patient's age. VISUALIZED ORBITS: Normal visualized orbits. PARANASAL SINUSES: Normal visualized paranasal sinuses. CERVICAL VASCULATURE AORTIC ARCH AND GREAT VESSELS:  Normal aortic arch and great vessel origins.  Normal visualized subclavian vessels. RIGHT VERTEBRAL ARTERY CERVICAL SEGMENT:  Normal origin. The vessel is normal in caliber throughout the neck. LEFT VERTEBRAL ARTERY CERVICAL SEGMENT:  Normal origin. The vessel is normal in caliber throughout the neck. RIGHT EXTRACRANIAL CAROTID SEGMENT:  Normal caliber common carotid artery.  Normal bifurcation and cervical internal carotid artery.  No stenosis or dissection. LEFT EXTRACRANIAL CAROTID SEGMENT:  Normal caliber common carotid artery.  Normal bifurcation and cervical internal carotid artery.  No stenosis or dissection. NASCET criteria was used to determine the degree of internal carotid artery diameter stenosis. INTRACRANIAL VASCULATURE INTERNAL CAROTID ARTERIES:  Normal enhancement of the intracranial portions of the internal carotid arteries.  Normal ophthalmic artery origins.  Normal ICA terminus. ANTERIOR CIRCULATION:  Symmetric A1 segments and anterior cerebral arteries with normal enhancement.  Normal anterior communicating artery. MIDDLE CEREBRAL ARTERY CIRCULATION:  M1 segment and middle cerebral artery branches demonstrate normal enhancement bilaterally. DISTAL VERTEBRAL ARTERIES:  Normal distal vertebral arteries.  Posterior inferior cerebellar artery origins are normal. Normal vertebral basilar junction. BASILAR ARTERY:  Basilar artery is normal in caliber.  Normal superior cerebellar arteries. POSTERIOR CEREBRAL ARTERIES: Both posterior cerebral arteries arises from the basilar tip.  Both arteries demonstrate normal enhancement.   Normal posterior communicating arteries. VENOUS STRUCTURES:  Normal. NON VASCULAR ANATOMY BONY STRUCTURES:  No acute osseous abnormality. SOFT TISSUES OF THE NECK:  Unremarkable. THORACIC INLET:  Normal.     Impression: 1.  No acute intracranial hemorrhage, mass effect or extra-axial collection. 2.  No hemodynamically significant stenosis, dissection or occlusion of the carotid or vertebral arteries. 3.  No intracranial  aneurysm.  No hemodynamically significant stenosis or occlusion of the major vessels of the [place]. Workstation performed: [id number]       Prior Procedure Results/Reports   Last EGD reviewed done 6/6/2023 for history of Molina's esophagus and showed M2 Molina's with 2 islands, status post RFA ablation with 25 applications followed by removal of slough and subsequent 13 applications.  2 cm hiatal hernia was noted.  Otherwise the esophagus, stomach, duodenum was normal.  It was recommended patient undergo repeat EGD in 1 year    Last Colonoscopy reviewed done 3/2021 with colorectal surgery and showed moderate left-sided diverticulosis, otherwise normal.  Repeat colonoscopy recommended in 10 years.    Shahnaz Crain PA-C   Available on [employer]

## 2024-05-23 NOTE — PATIENT INSTRUCTIONS
Scheduled date of EGD(as of today): 05/31/2024  Physician performing EGD: Dr. Lerma  Location of EGD: [facility]  4 Only/Okay per Annamarie  Instructions reviewed with patient by: Tequila HERNANDEZ   Clearances:  N/A     Pt provided Diabetes Medication Instructions   Pt to hold Ozempic 7 days

## 2024-05-29 NOTE — PROGRESS NOTES
Cardiology Follow Up    Marcellus Fregoso  1955  [id number]  [facility] [place]  [address] [facility]  [place] [id number] [phone]    1. PAC (premature atrial contraction)  Echo complete w/ contrast if indicated      2. Hypertension, unspecified type  Ambulatory Referral to Cardiology      3. Hyperlipidemia, unspecified hyperlipidemia type        4. Controlled type 2 diabetes mellitus with diabetic polyneuropathy, without long-term current use of insulin ([facility])        5. ANABELL (obstructive sleep apnea)        6. Class 3 severe obesity due to excess calories with serious comorbidity and body mass index (BMI) of 40.0 to 44.9 in adult ([facility])            Interval History:   Mr Marcellus Fregoso presents to our office for a recent ED follow up visit.  On 5/04/24 Marcellus presented to the ED with CP.  He had an argument with his wife.  He woke up in am with HA.  BP in /99.  Troponin WNL.  EKG NSR 61 BPM, PAC's bigeminal pattern, LAD.  He was instructed to follow-up with cardiology as an outpatient.     On 5/08/24 Marcellus was seen in our office for a recent ED follow up visit.  He admits to continued HA.  He is not taking blood pressure at home.  He denied chest pain palpitations lightheadedness or dizziness.  Marcellus admits to running out of metoprolol succinate and losartan 1 week prior to presenting to the emergency room.  Found to have PAC's   One week Zio placed at the office visit.      Marcellus presents to our office for follow-up.  He denies dyspnea with minimal moderate exertion chest pain palpitations lightheadedness or dizziness.  Patch showed minimal heart rate 45 bpm, maximal heart rate 129 bpm, average heart rate 61 bpm.  Predominantly under lying rhythm is normal sinus rhythm first-degree AV block.  1 run of supraventricular tachycardia occurred 7 beats at 129 bpm isolated VE's 41.3%, SVE couplets less than 1% SVE triplets less  than 1% isolated VE's were less than 1% couplets <% VE triplets less than 1%.  Marcellus is not able to tolerate CPAP.       Medical History   Primary Cardiologist Dr Haley  Hypertension  Hyperlipidemia 11/24/23 , RF 50, HDL 47, LDL 75   PVC   DM2 HgbA1C 5.9 on 5/08/24   Prostate CA sp resection   ANABELL not treated          Patient Active Problem List   Diagnosis    Status post total left knee replacement    Stuttering    Sleep apnea    Ambulatory dysfunction    Benign essential hypertension    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, without long-term current use of insulin ([facility])    Insomnia    Depression with anxiety    Onychomycosis    Molina's esophagus with dysplasia    Dysphagia    Exertional dyspnea    PONV (postoperative nausea and vomiting)    Gastroparesis    Ventral hernia without obstruction or gangrene    Gastroesophageal reflux disease with esophagitis without hemorrhage    Tremor    Neuropathy    Pure hypercholesterolemia    Dermatitis    Obesity, Class III, BMI 40-49.9 (morbid obesity) ([facility])    Hx of laparoscopic adjustable gastric banding    History of total knee arthroplasty, right    Syncope    Urinary urgency    Generalized weakness    Class 3 severe obesity due to excess calories with serious comorbidity and body mass index (BMI) of 40.0 to 44.9 in adult ([facility])    CAD (coronary artery disease)    Carpal tunnel syndrome of right wrist    Family history of Parkinson disease    Parkinson disease    Hyperlipidemia    Chronic bilateral low back pain without sciatica    PVC (premature ventricular contraction)     Past Medical History:   Diagnosis Date    Acute blood loss anemia 10/13/2016    Anxiety     Arthritis     knees    Arthritis     Asthma     stable for > 10 years    Molina esophagus     with BARRX/RFA    Cancer (HCC)     prostate    Cataract     Depression     Diabetes (HCC)     Diabetes mellitus ([facility])     pre diabetes    Diverticulitis of colon     Environmental  allergies     Fall 12/30/2020    GERD (gastroesophageal reflux disease)     Hiatal hernia     History of anal fissures     Hyperlipidemia     Hypertension     Incisional hernia     Insomnia     Kidney stone     Malignant neoplasm of prostate (HCC)     Morbid obesity (HCC)     Peripheral neuropathy     PONV (postoperative nausea and vomiting)     Prolapsing mitral valve     prolapsing mitral valve leaflet syndrome    Prostate cancer (HCC)     Retinal detachment     treated surgically at [facility]; resolved: 10/10/2012    Sleep apnea     diagnosed but unable to tolerate cpap.     Stuttering      Social History     Socioeconomic History    Marital status: /Civil Union     Spouse name: Not on file    Number of children: 0    Years of education: Not on file    Highest education level: Not on file   Occupational History    Occupation:    Tobacco Use    Smoking status: Never     Passive exposure: Never    Smokeless tobacco: Never    Tobacco comments:     quit 38 years ago   Vaping Use    Vaping status: Never Used   Substance and Sexual Activity    Alcohol use: Not Currently     Alcohol/week: 1.0 standard drink of alcohol     Types: 1 Cans of beer per week     Comment: 1 case of beer per year    Drug use: Never    Sexual activity: Yes     Partners: Female     Birth control/protection: Inserts, Post-menopausal, Male Sterilization   Other Topics Concern    Not on file   Social History Narrative    ** Merged History Encounter **          Social Determinants of Health     Financial Resource Strain: Low Risk  (5/27/2021)    Overall Financial Resource Strain (CARDIA)     Difficulty of Paying Living Expenses: Not hard at all   Food Insecurity: No Food Insecurity (5/8/2024)    Hunger Vital Sign     Worried About Running Out of Food in the Last Year: Never true     Ran Out of Food in the Last Year: Never true   Transportation Needs: No Transportation Needs (5/8/2024)    PRAPARE - Transportation     Lack of  Transportation (Medical): No     Lack of Transportation (Non-Medical): No   Physical Activity: Insufficiently Active (11/7/2019)    Exercise Vital Sign     Days of Exercise per Week: 1 day     Minutes of Exercise per Session: 50 min   Stress: No Stress Concern Present (11/7/2019)    [place] [employer] - Occupational Stress Questionnaire     Feeling of Stress : Not at all   Social Connections: Moderately Isolated (11/7/2019)    Social Connection and Isolation Panel [NHANES]     Frequency of Communication with Friends and Family: Three times a week     Frequency of Social Gatherings with Friends and Family: More than three times a week     Attends Latter day Services: Never     Active Member of Clubs or Organizations: No     Attends Club or Organization Meetings: Never     Marital Status:    Intimate Partner Violence: Not At Risk (11/7/2019)    Humiliation, Afraid, Rape, and Kick questionnaire     Fear of Current or Ex-Partner: No     Emotionally Abused: No     Physically Abused: No     Sexually Abused: No   Housing Stability: Low Risk  (5/8/2024)    Housing Stability Vital Sign     Unable to Pay for Housing in the Last Year: No     Number of Places Lived in the Last Year: 1     Unstable Housing in the Last Year: No      Family History   Problem Relation Age of Onset    Heart disease Father     Coronary artery disease Father     Prostate cancer Father     Coronary artery disease Mother     Diabetes Mother         mellitus    Diabetes type II Mother     Diabetes Maternal Grandmother         mellitus    Coronary artery disease Maternal Grandfather     Coronary artery disease Paternal Grandfather      Past Surgical History:   Procedure Laterality Date    ARTHROSCOPIC REPAIR ACL Right     BIOPSY CORE NEEDLE      prostate    CATARACT EXTRACTION Bilateral     COLONOSCOPY      HERNIA REPAIR      naval    JOINT REPLACEMENT      B/L knee    KNEE ARTHROSCOPY Left     LAPAROSCOPIC GASTRIC BANDING       MN ARTHRP KNE CONDYLE&PLATU MEDIAL&LAT COMPARTMENTS Left 02/15/2017    Procedure: TOTAL KNEE ARTHROPLASTY ;  Surgeon: Sal Ross MD;  Location: [facility];  Service: Orthopedics    MN ARTHRP KNE CONDYLE&PLATU MEDIAL&LAT COMPARTMENTS Right 10/10/2016    Procedure: ARTHROPLASTY KNEE TOTAL;  Surgeon: aSl Ross MD;  Location: [facility];  Service: Orthopedics    PROSTATECTOMY      robotic-assisted; Lilly Tay    REMOVAL GASTRIC BAND LAPAROSCOPIC N/A 8/30/2022    Procedure: LAPAROSCOPIC REMOVAL OF ADJUSTABLE GASTRIC BAND AND PORT  WITH INTRAOPERATIVE EGD;  Surgeon: Neal Yang MD;  Location: [facility];  Service: Bariatrics    RETINAL DETACHMENT SURGERY Bilateral     Lemus Eye    SEPTOPLASTY      SINUS SURGERY      TONSILLECTOMY      over age 10    UPPER GASTROINTESTINAL ENDOSCOPY      mutiple with BARRX/RFA    VASECTOMY      vas deferens       Current Outpatient Medications:     ALPRAZolam (XANAX) 0.25 mg tablet, Take 1 tablet (0.25 mg total) by mouth 3 (three) times a day as needed for anxiety (Patient taking differently: Take 0.25 mg by mouth as needed for anxiety), Disp: 270 tablet, Rfl: 0    amLODIPine (NORVASC) 5 mg tablet, Take 1 tablet (5 mg total) by mouth daily, Disp: 90 tablet, Rfl: 3    Blood Glucose Monitoring Suppl (OneTouch Verio) w/Device KIT, Use daily, Disp: 1 kit, Rfl: 0    carbidopa-levodopa (SINEMET)  mg per tablet, Take 1 tablet by mouth Three times a day, Disp: , Rfl:     ezetimibe (ZETIA) 10 mg tablet, Take 1 tablet (10 mg total) by mouth daily, Disp: 90 tablet, Rfl: 3    famotidine (PEPCID) 40 MG tablet, Take 1 tablet (40 mg total) by mouth daily at bedtime, Disp: 30 tablet, Rfl: 3    FLUoxetine (PROzac) 20 mg capsule, Take 40 mg by mouth daily Alternated between 20 mg and 40 mg daily, Disp: , Rfl:     Lancet Devices (ONE TOUCH DELICA LANCING DEV) MISC, Use daily, Disp: 1 each, Rfl: 0    losartan (COZAAR) 100 MG tablet, Take 1 tablet (100 mg total) by mouth daily, Disp:  "90 tablet, Rfl: 3    metFORMIN (GLUCOPHAGE) 500 mg tablet, TAKE 2 TABLETS TWICE A DAY WITH MEALS, Disp: 360 tablet, Rfl: 3    metoprolol succinate (TOPROL-XL) 25 mg 24 hr tablet, Take 1 tablet (25 mg total) by mouth daily, Disp: 90 tablet, Rfl: 3    Multiple Vitamins-Minerals (CENTRUM ADULTS PO), Take by mouth in the morning, Disp: , Rfl:     OneTouch Delica Lancets 33G MISC, Use daily, Disp: 300 each, Rfl: 1    OneTouch Verio test strip, USE TO TEST DAILY, Disp: 100 strip, Rfl: 3    pantoprazole (PROTONIX) 40 mg tablet, TAKE 1 TABLET TWICE A DAY (1 TABLET IN THE MORNING AND 1 TABLET IN THE EVENING), Disp: 180 tablet, Rfl: 3    QUEtiapine (SEROquel) 50 mg tablet, Take 50 mg by mouth daily at bedtime, Disp: , Rfl:     rosuvastatin (CRESTOR) 5 mg tablet, TAKE 1 TABLET ONCE A WEEK, Disp: 12 tablet, Rfl: 3    selegiline (ELDEPRYL) 5 mg capsule, Take 5 mg by mouth as needed, Disp: , Rfl:     semaglutide, 2 mg/dose, (Ozempic, 2 MG/DOSE,) 8 mg/ mL injection pen, INJECT 2 MG UNDER THE SKIN EVERY 7 DAYS, Disp: 9 mL, Rfl: 1  Allergies   Allergen Reactions    Celebrex [Celecoxib] Other (See Comments)     Cardiologist stated he should not take      Chlorhexidine Hives     Is able to wash with hibiclens but not use michi cloths    Other      \"steroid eyedrops\"      Prednisone Other (See Comments)     Prednisone eye drops- eye pressure increases       Labs:  Office Visit on 05/08/2024   Component Date Value    Hemoglobin A1C 05/08/2024 5.9    Admission on 05/04/2024, Discharged on 05/05/2024   Component Date Value    WBC 05/04/2024 7.69     RBC 05/04/2024 4.66     Hemoglobin 05/04/2024 14.0     Hematocrit 05/04/2024 40.5     MCV 05/04/2024 87     MCH 05/04/2024 30.0     MCHC 05/04/2024 34.6     RDW 05/04/2024 13.1     MPV 05/04/2024 8.5 (L)     Platelets 05/04/2024 245     nRBC 05/04/2024 0     Segmented % 05/04/2024 57     Immature Grans % 05/04/2024 0     Lymphocytes % 05/04/2024 26     Monocytes % 05/04/2024 8     Eosinophils " Relative 05/04/2024 8 (H)     Basophils Relative 05/04/2024 1     Absolute Neutrophils 05/04/2024 4.33     Absolute Immature Grans 05/04/2024 0.02     Absolute Lymphocytes 05/04/2024 1.99     Absolute Monocytes 05/04/2024 0.64     Eosinophils Absolute 05/04/2024 0.64 (H)     Basophils Absolute 05/04/2024 0.07     Sodium 05/04/2024 136     Potassium 05/04/2024 3.8     Chloride 05/04/2024 103     CO2 05/04/2024 27     ANION GAP 05/04/2024 6     BUN 05/04/2024 15     Creatinine 05/04/2024 0.87     Glucose 05/04/2024 138     Calcium 05/04/2024 9.1     AST 05/04/2024 14     ALT 05/04/2024 13     Alkaline Phosphatase 05/04/2024 60     Total Protein 05/04/2024 6.7     Albumin 05/04/2024 4.0     Total Bilirubin 05/04/2024 0.48     eGFR 05/04/2024 87     hs TnI 0hr 05/04/2024 4     Ventricular Rate 05/04/2024 61     Atrial Rate 05/04/2024 61     HI Interval 05/04/2024 198     QRSD Interval 05/04/2024 98     QT Interval 05/04/2024 420     QTC Interval 05/04/2024 422     P Axis 05/04/2024 10     QRS [place] 05/04/2024 -28     T Wave Axis 05/04/2024 36     Color, UA 05/04/2024 Light Yellow     Clarity, UA 05/04/2024 Clear     Specific Gravity, UA 05/04/2024 1.016     pH, UA 05/04/2024 7.0     Leukocytes, UA 05/04/2024 Negative     Nitrite, UA 05/04/2024 Negative     Protein, UA 05/04/2024 Negative     Glucose, UA 05/04/2024 Negative     Ketones, UA 05/04/2024 Negative     Urobilinogen, UA 05/04/2024 <2.0     Bilirubin, UA 05/04/2024 Negative     Occult Blood, UA 05/04/2024 Negative      Imaging: XR chest 1 view portable    Result Date: 5/5/2024  Narrative: XR CHEST PORTABLE INDICATION: chest pain. COMPARISON: CXR 10/18/2022, CT neck 5/4/2024, chest CT 11/18/2021. FINDINGS: Clear lungs. No pneumothorax or pleural effusion. Normal cardiomediastinal silhouette. Bones are unremarkable for age. Normal upper abdomen.     Impression: No acute cardiopulmonary disease. Workstation performed: [id number]     CTA head and neck with and  without contrast    Result Date: 5/5/2024  Narrative: CTA NECK AND BRAIN WITH AND WITHOUT CONTRAST INDICATION: severe HA starting at noon, hypertension and nausea COMPARISON:   None. TECHNIQUE:  Routine CT imaging of the Brain without contrast.  Post contrast imaging was performed after administration of iodinated contrast through the neck and brain. Post contrast axial 0.625 mm images timed to opacify the arterial system. 3D rendering was performed on an independent workstation.   MIP reconstructions performed. Coronal reconstructions were performed of the noncontrast portion of the brain. Radiation dose length product (DLP) for this visit:  1589 mGy-cm .  This examination, like all CT scans performed in the [facility], was performed utilizing techniques to minimize radiation dose exposure, including the use of iterative reconstruction and automated exposure control. IV Contrast:  85 mL of iohexol (OMNIPAQUE) IMAGE QUALITY:   Diagnostic FINDINGS: NONCONTRAST BRAIN PARENCHYMA:  No intracranial mass, mass effect or midline shift. No CT signs of acute infarction.  No acute parenchymal hemorrhage. VENTRICLES AND EXTRA-AXIAL SPACES:  Normal for the patient's age. VISUALIZED ORBITS: Normal visualized orbits. PARANASAL SINUSES: Normal visualized paranasal sinuses. CERVICAL VASCULATURE AORTIC ARCH AND GREAT VESSELS:  Normal aortic arch and great vessel origins. Normal visualized subclavian vessels. RIGHT VERTEBRAL ARTERY CERVICAL SEGMENT:  Normal origin. The vessel is normal in caliber throughout the neck. LEFT VERTEBRAL ARTERY CERVICAL SEGMENT:  Normal origin. The vessel is normal in caliber throughout the neck. RIGHT EXTRACRANIAL CAROTID SEGMENT:  Normal caliber common carotid artery.  Normal bifurcation and cervical internal carotid artery.  No stenosis or dissection. LEFT EXTRACRANIAL CAROTID SEGMENT:  Normal caliber common carotid artery.  Normal bifurcation and cervical internal carotid artery.  No  stenosis or dissection. NASCET criteria was used to determine the degree of internal carotid artery diameter stenosis. INTRACRANIAL VASCULATURE INTERNAL CAROTID ARTERIES:  Normal enhancement of the intracranial portions of the internal carotid arteries.  Normal ophthalmic artery origins.  Normal ICA terminus. ANTERIOR CIRCULATION:  Symmetric A1 segments and anterior cerebral arteries with normal enhancement.  Normal anterior communicating artery. MIDDLE CEREBRAL ARTERY CIRCULATION:  M1 segment and middle cerebral artery branches demonstrate normal enhancement bilaterally. DISTAL VERTEBRAL ARTERIES:  Normal distal vertebral arteries.  Posterior inferior cerebellar artery origins are normal. Normal vertebral basilar junction. BASILAR ARTERY:  Basilar artery is normal in caliber.  Normal superior cerebellar arteries. POSTERIOR CEREBRAL ARTERIES: Both posterior cerebral arteries arises from the basilar tip.  Both arteries demonstrate normal enhancement.   Normal posterior communicating arteries. VENOUS STRUCTURES:  Normal. NON VASCULAR ANATOMY BONY STRUCTURES:  No acute osseous abnormality. SOFT TISSUES OF THE NECK:  Unremarkable. THORACIC INLET:  Normal.     Impression: 1.  No acute intracranial hemorrhage, mass effect or extra-axial collection. 2.  No hemodynamically significant stenosis, dissection or occlusion of the carotid or vertebral arteries. 3.  No intracranial aneurysm.  No hemodynamically significant stenosis or occlusion of the major vessels of the [place]. Workstation performed: [id number]       Review of Systems:  Review of Systems   Musculoskeletal:  Positive for arthralgias and myalgias.   All other systems reviewed and are negative.      Physical Exam:  Physical Exam  Vitals reviewed.   Constitutional:       Appearance: He is obese.   Cardiovascular:      Rate and Rhythm: Normal rate and regular rhythm.      Pulses: Normal pulses.      Heart sounds: Normal heart sounds.      Comments: Ectopic  beats   Pulmonary:      Effort: Pulmonary effort is normal.      Breath sounds: Normal breath sounds.   Abdominal:      Comments: Ventral hernia    Musculoskeletal:         General: Normal range of motion.      Cervical back: Normal range of motion and neck supple.      Right lower leg: No edema.      Left lower leg: No edema.   Skin:     General: Skin is warm and dry.      Capillary Refill: Capillary refill takes less than 2 seconds.   Neurological:      General: No focal deficit present.      Mental Status: He is alert and oriented to person, place, and time.   Psychiatric:         Mood and Affect: Mood normal.         Behavior: Behavior normal.         Discussion/Summary:  # PAC's, SVE's 41% isolated SVE's on one week Zio.  AV HR 61 BPM, HR in the office 54 BPM.  Denies patient's lightheadedness or dizziness.  Continues on metoprolol succinate 25 mg daily. TTE to be done in the near future evaluate wall function and valvular function.  I will discuss with Dr Haley for possible ambulatory referral to EP.   Addendum:I spoke to Dr Yo who is in agreement to refer to EP For high burden of PAC's, unable to up titrate BB.     # Hypertension Controlled on Metoprolol succinate 25mg daily, losartan 100 mg daily, amlodipine 5 mg daily DASH Diet   # Hyperlipidemia 11/24/23 , RF 50, HDL 47, LDL 75 continue on Crestor 5mg daily, Zetia 5milligrams heart healthy diet   # DM2 HgbA1C 5.9 on 5/08/24 continue on current medical regimen  # ANABELL not treated   # Obesity BMI 43.81 working on weight loss with diet

## 2024-05-30 ENCOUNTER — OFFICE VISIT (OUTPATIENT)
Dept: CARDIOLOGY CLINIC | Facility: CLINIC | Age: 69
End: 2024-05-30
Payer: MEDICARE

## 2024-05-30 ENCOUNTER — OFFICE VISIT (OUTPATIENT)
Dept: FAMILY MEDICINE CLINIC | Facility: CLINIC | Age: 69
End: 2024-05-30
Payer: MEDICARE

## 2024-05-30 VITALS
DIASTOLIC BLOOD PRESSURE: 70 MMHG | SYSTOLIC BLOOD PRESSURE: 138 MMHG | BODY MASS INDEX: 43.66 KG/M2 | HEART RATE: 54 BPM | WEIGHT: 288.1 LBS | OXYGEN SATURATION: 97 % | HEIGHT: 68 IN

## 2024-05-30 VITALS
TEMPERATURE: 97.8 F | WEIGHT: 287.4 LBS | OXYGEN SATURATION: 97 % | RESPIRATION RATE: 18 BRPM | DIASTOLIC BLOOD PRESSURE: 82 MMHG | HEIGHT: 68 IN | SYSTOLIC BLOOD PRESSURE: 140 MMHG | BODY MASS INDEX: 43.56 KG/M2 | HEART RATE: 61 BPM

## 2024-05-30 DIAGNOSIS — I10 BENIGN ESSENTIAL HYPERTENSION: Primary | ICD-10-CM

## 2024-05-30 DIAGNOSIS — E78.5 HYPERLIPIDEMIA, UNSPECIFIED HYPERLIPIDEMIA TYPE: ICD-10-CM

## 2024-05-30 DIAGNOSIS — I49.1 PAC (PREMATURE ATRIAL CONTRACTION): Primary | ICD-10-CM

## 2024-05-30 DIAGNOSIS — G20.A2 PARKINSON'S DISEASE WITHOUT DYSKINESIA, WITH FLUCTUATING MANIFESTATIONS: ICD-10-CM

## 2024-05-30 DIAGNOSIS — G47.33 OBSTRUCTIVE SLEEP APNEA SYNDROME: ICD-10-CM

## 2024-05-30 DIAGNOSIS — K22.719 BARRETT'S ESOPHAGUS WITH DYSPLASIA: ICD-10-CM

## 2024-05-30 DIAGNOSIS — E66.01 CLASS 3 SEVERE OBESITY DUE TO EXCESS CALORIES WITH SERIOUS COMORBIDITY AND BODY MASS INDEX (BMI) OF 40.0 TO 44.9 IN ADULT (HCC): ICD-10-CM

## 2024-05-30 DIAGNOSIS — I49.3 PVC (PREMATURE VENTRICULAR CONTRACTION): ICD-10-CM

## 2024-05-30 DIAGNOSIS — F41.8 DEPRESSION WITH ANXIETY: ICD-10-CM

## 2024-05-30 DIAGNOSIS — E11.42 CONTROLLED TYPE 2 DIABETES MELLITUS WITH DIABETIC POLYNEUROPATHY, WITHOUT LONG-TERM CURRENT USE OF INSULIN (HCC): ICD-10-CM

## 2024-05-30 DIAGNOSIS — R26.2 AMBULATORY DYSFUNCTION: ICD-10-CM

## 2024-05-30 DIAGNOSIS — R25.1 TREMOR: ICD-10-CM

## 2024-05-30 DIAGNOSIS — I10 HYPERTENSION, UNSPECIFIED TYPE: ICD-10-CM

## 2024-05-30 DIAGNOSIS — G47.33 OSA (OBSTRUCTIVE SLEEP APNEA): ICD-10-CM

## 2024-05-30 PROCEDURE — 99214 OFFICE O/P EST MOD 30 MIN: CPT | Performed by: NURSE PRACTITIONER

## 2024-05-30 PROCEDURE — 99215 OFFICE O/P EST HI 40 MIN: CPT | Performed by: NURSE PRACTITIONER

## 2024-05-30 PROCEDURE — G2211 COMPLEX E/M VISIT ADD ON: HCPCS | Performed by: NURSE PRACTITIONER

## 2024-05-30 NOTE — PROGRESS NOTES
Ambulatory Visit  Name: Marcellus Fregoso      : 1955      MRN: [id number]  Encounter Provider: ED Howard  Encounter Date: 2024   Encounter department: [facility]    Assessment & Plan   1. Benign essential hypertension  Assessment & Plan:  Patient's blood pressure in the office today is 140/82.  He continues on his metoprolol 25 mg daily and losartan 100 mg daily as well as amlodipine 5 mg daily.  His blood pressure is managed by Dr. Haley in cardiology.  2. PVC (premature ventricular contraction)  Assessment & Plan:  Patient is followed by cardiology.  He recently had a Zio patch in place which showed the patient to be in sinus rhythm.  He does still occasionally have PVCs.  3. Obstructive sleep apnea syndrome  Assessment & Plan:  Patient with a diagnosis of sleep apnea.  He could not tolerate the CPAP.  He has declined referrals back to sleep medicine for further interventions.  He is aware of the risks of untreated sleep apnea.  4. Molina's esophagus with dysplasia  Assessment & Plan:  Patient recently saw gastroenterology and an EGD will be performed in the near future for surveillance purposes for his Molina's esophagus.  5. Controlled type 2 diabetes mellitus with diabetic polyneuropathy, without long-term current use of insulin ([facility])  Assessment & Plan:    Lab Results   Component Value Date    HGBA1C 5.9 2024   Patient's most recent hemoglobin A1c from 2024 is 5.9.  The patient continues on his metformin 1000 mg twice daily with meals.  He is trying to watch the carbs in his diet.  6. Parkinson's disease without dyskinesia, with fluctuating manifestations  Assessment & Plan:  Patient continues to follow with [facility] neurology.  His symptoms are stable at present time.  He has had several falls in the past few months without injury.  He does have a cane with him in the office today.  He also has roller walker or walkers and wheelchair  at home.  I did encourage the patient to continue to use an assistive device.  7. Depression with anxiety  Assessment & Plan:  Patient continues on his Prozac and Seroquel.  He does have Xanax prescribed as needed.          8. Ambulatory dysfunction  Assessment & Plan:  Ambulatory issues secondary to his Parkinson's disease.  He did recently attend physical therapy for gait training and balance.  He has had several falls at home.  He has had no injuries.  Encouraged the patient to continue with assistive device with walking.  He does have a cane with him in the office today.  He does have a walker and rollator at home.  9. Tremor  Assessment & Plan:  Secondary to Parkinson's.  10. Class 3 severe obesity due to excess calories with serious comorbidity and body mass index (BMI) of 40.0 to 44.9 in adult (HCC)  Assessment & Plan:  Patient is currently on Ozempic.  Patient states he has lost almost 60 pounds since starting the Ozempic.  He did have a history of gastric banding in the past but at the time of the patient's last EGD in 2020 his gastric band was removed.  Continue to follow with weight management.  11. Hyperlipidemia, unspecified hyperlipidemia type  Assessment & Plan:  Patient continues on his Zetia and Crestor per cardiology.  Low-fat diet.  Component      Latest Ref Rng 11/24/2023   Cholesterol      See Comment mg/dL 132    Triglycerides      See Comment mg/dL 50    HDL      >=40 mg/dL 47    LDL Calculated      0 - 100 mg/dL 75    Non-HDL Cholesterol      mg/dl 85               History of Present Illness   {Disappearing Hyperlinks I Encounters * My Last Note * Since Last Visit * History :61699}  Marcellus presents to the office today for follow-up.  Overall the patient is stable.  He continues to follow with neurology, cardiology, bariatrics, endocrinology and urology.  He has no new medical concerns.  He recently completed physical therapy for gait training and balance issues due to multiple falls  secondary to his Parkinson's disease.  He is ambulating with a cane in the office today.  He also has other assistive devices at home such as a walker and rollator.  He continues on his Ozempic which has been prescribed to the bariatric service.  He will be scheduled for surveillance EGD in the fall due to his history of Molina's esophagus.  Blood work performed on 5/4/2024 was reviewed with the patient.  He does have an upcoming appointment with podiatry and will have his diabetic foot exam performed at that visit.  He will be seen back in the office in 4 months.            Review of Systems   Constitutional: Negative.  Negative for fatigue.   HENT: Negative.  Negative for congestion, postnasal drip, rhinorrhea and trouble swallowing.    Eyes: Negative.  Negative for visual disturbance.   Respiratory: Negative.  Negative for choking and shortness of breath.    Cardiovascular: Negative.  Negative for chest pain.   Gastrointestinal: Negative.    Endocrine: Negative.    Genitourinary: Negative.    Musculoskeletal:  Positive for arthralgias and gait problem. Negative for back pain, myalgias and neck pain.   Skin: Negative.    Neurological:  Positive for tremors. Negative for dizziness and headaches.   Psychiatric/Behavioral: Negative.       Current Outpatient Medications on File Prior to Visit   Medication Sig Dispense Refill   • ALPRAZolam (XANAX) 0.25 mg tablet Take 1 tablet (0.25 mg total) by mouth 3 (three) times a day as needed for anxiety (Patient taking differently: Take 0.25 mg by mouth as needed for anxiety) 270 tablet 0   • amLODIPine (NORVASC) 5 mg tablet Take 1 tablet (5 mg total) by mouth daily 90 tablet 3   • Blood Glucose Monitoring Suppl (OneTouch Verio) w/Device KIT Use daily 1 kit 0   • carbidopa-levodopa (SINEMET)  mg per tablet Take 1 tablet by mouth Three times a day     • ezetimibe (ZETIA) 10 mg tablet Take 1 tablet (10 mg total) by mouth daily (Patient taking differently: Take 5 mg by  mouth daily) 90 tablet 3   • famotidine (PEPCID) 40 MG tablet Take 1 tablet (40 mg total) by mouth daily at bedtime 30 tablet 3   • FLUoxetine (PROzac) 20 mg capsule Take 40 mg by mouth daily Alternated between 20 mg and 40 mg daily (Patient not taking: Reported on 5/30/2024)     • Lancet Devices (ONE TOUCH DELICA LANCING DEV) MISC Use daily 1 each 0   • losartan (COZAAR) 100 MG tablet Take 1 tablet (100 mg total) by mouth daily 90 tablet 3   • metFORMIN (GLUCOPHAGE) 500 mg tablet TAKE 2 TABLETS TWICE A DAY WITH MEALS 360 tablet 3   • metoprolol succinate (TOPROL-XL) 25 mg 24 hr tablet Take 1 tablet (25 mg total) by mouth daily 90 tablet 3   • Multiple Vitamins-Minerals (CENTRUM ADULTS PO) Take by mouth in the morning     • OneTouch Delica Lancets 33G MISC Use daily 300 each 1   • OneTouch Verio test strip USE TO TEST DAILY 100 strip 3   • pantoprazole (PROTONIX) 40 mg tablet TAKE 1 TABLET TWICE A DAY (1 TABLET IN THE MORNING AND 1 TABLET IN THE EVENING) 180 tablet 3   • QUEtiapine (SEROquel) 50 mg tablet Take 25 mg by mouth daily at bedtime     • rosuvastatin (CRESTOR) 5 mg tablet TAKE 1 TABLET ONCE A WEEK 12 tablet 3   • selegiline (ELDEPRYL) 5 mg capsule Take 5 mg by mouth as needed (Patient not taking: Reported on 5/30/2024)     • semaglutide, 2 mg/dose, (Ozempic, 2 MG/DOSE,) 8 mg/ mL injection pen INJECT 2 MG UNDER THE SKIN EVERY 7 DAYS 9 mL 1     No current facility-administered medications on file prior to visit.      Social History     Tobacco Use   • Smoking status: Never     Passive exposure: Never   • Smokeless tobacco: Never   • Tobacco comments:     quit 38 years ago   Vaping Use   • Vaping status: Never Used   Substance and Sexual Activity   • Alcohol use: Not Currently     Alcohol/week: 1.0 standard drink of alcohol     Types: 1 Cans of beer per week     Comment: 1 case of beer per year   • Drug use: Never   • Sexual activity: Yes     Partners: Female     Birth control/protection: Inserts,  "Post-menopausal, Male Sterilization     Objective   {Disappearing Hyperlinks   Review Vitals * Enter New Vitals * Results Review * Labs * Imaging * Cardiology * Procedures * Lung Cancer Screening :[id number]}  /82   Pulse 61   Temp 97.8 °F (36.6 °C) (Tympanic)   Resp 18   Ht 5' 8\" (1.727 m)   Wt 130 kg (287 lb 6.4 oz)   SpO2 97%   BMI 43.70 kg/m²     Physical Exam  Vitals reviewed.   Constitutional:       Appearance: Normal appearance. He is obese.   HENT:      Head: Normocephalic and atraumatic.      Nose: Nose normal.      Mouth/Throat:      Mouth: Mucous membranes are moist.   Eyes:      Extraocular Movements: Extraocular movements intact.      Pupils: Pupils are equal, round, and reactive to light.   Cardiovascular:      Rate and Rhythm: Normal rate and regular rhythm.      Pulses: Normal pulses.      Heart sounds: Normal heart sounds.   Pulmonary:      Effort: Pulmonary effort is normal.      Breath sounds: Normal breath sounds.   Musculoskeletal:         General: Normal range of motion.   Skin:     General: Skin is warm.   Neurological:      General: No focal deficit present.      Mental Status: He is alert and oriented to person, place, and time.      Gait: Gait abnormal.   Psychiatric:         Mood and Affect: Mood normal.         Behavior: Behavior normal.         Thought Content: Thought content normal.         Judgment: Judgment normal.       Administrative Statements {Disappearing Hyperlinks I  [id number]  I have spent a total time of 45 minutes on 05/31/24 In caring for this patient including Diagnostic results, Risks and benefits of tx options, Instructions for management, Patient and family education, Importance of tx compliance, Risk factor reductions, Impressions, Counseling / Coordination of care, Documenting in the medical record, Reviewing / ordering tests, medicine, procedures  , and Obtaining or reviewing history  .  "

## 2024-05-31 NOTE — ASSESSMENT & PLAN NOTE
Patient continues on his Zetia and Crestor per cardiology.  Low-fat diet.  Component      Latest Ref Rng 11/24/2023   Cholesterol      See Comment mg/dL 132    Triglycerides      See Comment mg/dL 50    HDL      >=40 mg/dL 47    LDL Calculated      0 - 100 mg/dL 75    Non-HDL Cholesterol      mg/dl 85

## 2024-05-31 NOTE — ASSESSMENT & PLAN NOTE
Patient continues to follow with [facility] neurology.  His symptoms are stable at present time.  He has had several falls in the past few months without injury.  He does have a cane with him in the office today.  He also has roller walker or walkers and wheelchair at home.  I did encourage the patient to continue to use an assistive device.

## 2024-05-31 NOTE — ASSESSMENT & PLAN NOTE
Ambulatory issues secondary to his Parkinson's disease.  He did recently attend physical therapy for gait training and balance.  He has had several falls at home.  He has had no injuries.  Encouraged the patient to continue with assistive device with walking.  He does have a cane with him in the office today.  He does have a walker and rollator at home.

## 2024-05-31 NOTE — ASSESSMENT & PLAN NOTE
Patient recently saw gastroenterology and an EGD will be performed in the near future for surveillance purposes for his Molina's esophagus.

## 2024-05-31 NOTE — ASSESSMENT & PLAN NOTE
Patient's blood pressure in the office today is 140/82.  He continues on his metoprolol 25 mg daily and losartan 100 mg daily as well as amlodipine 5 mg daily.  His blood pressure is managed by Dr. Haley in cardiology.

## 2024-05-31 NOTE — ASSESSMENT & PLAN NOTE
Patient is currently on Ozempic.  Patient states he has lost almost 60 pounds since starting the Ozempic.  He did have a history of gastric banding in the past but at the time of the patient's last EGD in 2020 his gastric band was removed.  Continue to follow with weight management.

## 2024-05-31 NOTE — ASSESSMENT & PLAN NOTE
Lab Results   Component Value Date    HGBA1C 5.9 05/08/2024   Patient's most recent hemoglobin A1c from 5/9/2024 is 5.9.  The patient continues on his metformin 1000 mg twice daily with meals.  He is trying to watch the carbs in his diet.

## 2024-05-31 NOTE — ASSESSMENT & PLAN NOTE
Patient is followed by cardiology.  He recently had a Zio patch in place which showed the patient to be in sinus rhythm.  He does still occasionally have PVCs.

## 2024-05-31 NOTE — ASSESSMENT & PLAN NOTE
Patient with a diagnosis of sleep apnea.  He could not tolerate the CPAP.  He has declined referrals back to sleep medicine for further interventions.  He is aware of the risks of untreated sleep apnea.

## 2024-06-05 ENCOUNTER — EVALUATION (OUTPATIENT)
Dept: PHYSICAL THERAPY | Age: 69
End: 2024-06-05
Payer: MEDICARE

## 2024-06-05 DIAGNOSIS — G20.A2 PARKINSON'S DISEASE WITHOUT DYSKINESIA, WITH FLUCTUATING MANIFESTATIONS: ICD-10-CM

## 2024-06-05 DIAGNOSIS — R26.2 AMBULATORY DYSFUNCTION: ICD-10-CM

## 2024-06-05 DIAGNOSIS — R26.9 ABNORMALITY OF GAIT: Primary | ICD-10-CM

## 2024-06-05 DIAGNOSIS — G20.A1 PARKINSON'S DISEASE, UNSPECIFIED WHETHER DYSKINESIA PRESENT, UNSPECIFIED WHETHER MANIFESTATIONS FLUCTUATE: ICD-10-CM

## 2024-06-05 PROCEDURE — 97110 THERAPEUTIC EXERCISES: CPT

## 2024-06-05 PROCEDURE — 97163 PT EVAL HIGH COMPLEX 45 MIN: CPT

## 2024-06-06 NOTE — PROGRESS NOTES
"PT Evaluation     Today's date: 2024  Patient name: Marcellus Fregoso  : 1955  MRN: [id number]  Referring provider: Ehsan Calix MD  Dx:   Encounter Diagnosis     ICD-10-CM    1. Abnormality of gait  R26.9       2. Parkinson's disease, unspecified whether dyskinesia present, unspecified whether manifestations fluctuate  G20.A1                      Assessment/Plan    Subjective Evaluation    History of Present Illness  Onset date: 3 weeks ago pt felel down a step and fx 's his right rib seen in er, lack of taking BP meds noted approx 2 weeks ago.          Recurrent probem    Quality of life: fair    Pain  Current pain rating: 3  At best pain ratin  At worst pain ratin  Location: right lateral trunk hx rib fx per pt report  Quality: dull ache and pulling  Relieving factors: change in position, heat and rest  Aggravating factors: overhead activity and lifting  Progression: improved      Objective           Precautions:          Reaction Severity Reaction Type Noted          Allergies        Celebrex [Celecoxib] Other (See Comments) Not Specified  2/15/2017   Cardiologist stated he should not take        Chlorhexidine Hives Not Specified  2017   Is able to wash with hibiclens but not use michi cloths       Other  Not Specified  2020   \"steroid eyedrops\"        Prednisone Other (See Comments) Not Specified  10/10/2016   Prednisone eye drops- eye pressure increases             Manuals 24             I stefany                                                    Neuro Re-Ed nu step              Hamstring stretch             Heelcord stretech             Squats              Ltr stretch             Sidestepping with theraband in ll bars             Fwd, bwd walking in ll bars              Stepups 4 inch step  and progress             Ther Ex             Step over box drill             Hurdles fwd, sidestepping  cg of 1             Cybex leg press single double             Hip abd,              Knee " ext             Hamstring curls             Lunge stretch at steps for hip flexors                          Ther Activity                                       Gait Training             Reciprocal arm swing cueing and upright posture cueing  Perf today increased step length and hip flex increased encouraged. Pt to practice at home 10-20 ft at a time.                          Modalities

## 2024-06-17 ENCOUNTER — OFFICE VISIT (OUTPATIENT)
Dept: PHYSICAL THERAPY | Age: 69
End: 2024-06-17
Payer: MEDICARE

## 2024-06-17 DIAGNOSIS — R26.9 ABNORMALITY OF GAIT: ICD-10-CM

## 2024-06-17 DIAGNOSIS — G20.A1 PARKINSON'S DISEASE, UNSPECIFIED WHETHER DYSKINESIA PRESENT, UNSPECIFIED WHETHER MANIFESTATIONS FLUCTUATE: Primary | Chronic | ICD-10-CM

## 2024-06-17 PROCEDURE — 97112 NEUROMUSCULAR REEDUCATION: CPT

## 2024-06-17 PROCEDURE — 97110 THERAPEUTIC EXERCISES: CPT

## 2024-06-17 PROCEDURE — 97140 MANUAL THERAPY 1/> REGIONS: CPT

## 2024-06-17 NOTE — PROGRESS NOTES
"Daily Note     Today's date: 2024  Patient name: Marcellus Fregoso  : 1955  MRN: [id number]  Referring provider: Ehsan Calix MD  Dx:   Encounter Diagnosis     ICD-10-CM    1. Parkinson's disease, unspecified whether dyskinesia present, unspecified whether manifestations fluctuate  G20.A1       2. Abnormality of gait  R26.9                      Subjective: \"My right knee has pain in it today but this weekend it was fine. \"      Objective: See treatment diary below      Assessment: Tolerated treatment well. Patient would benefit from continued PT. Pt issued flow sheet for return to fitness and weight that were performed here in PT listed and aerobic exer, leobardo taping for relief of patellar fat pad right le performed.       Plan: Continue per plan of care.      Precautions:          Reaction Severity Reaction Type Noted          Allergies        Celebrex [Celecoxib] Other (See Comments) Not Specified  2/15/2017   Cardiologist stated he should not take        Chlorhexidine Hives Not Specified  2017   Is able to wash with hibiclens but not use michi cloths       Other  Not Specified  2020   \"steroid eyedrops\"        Prednisone Other (See Comments) Not Specified  10/10/2016   Prednisone eye drops- eye pressure increases             Manuals 24            I eval                                                    Neuro Re-Ed nu step              Hamstring stretch  20 sec x 5           Heelcord stretech  1 min x 1           Squats  tohi lo mat 24 inch ht  3 x 10            Ltr stretch             Sidestepping with theraband in ll bars             Fwd, bwd walking in ll bars              Stepups 4 inch step  and progress             Ther Ex             Step over box drill             Hurdles fwd, sidestepping  cg of 1             Cybex leg press single double  40# 2 sets 10 , 80# 2 sets of 10           Hip abd,   40# 2 sets of 10           Knee ext  12# 2 sets of 10           Hamstring curls  " hold           Lunge stretch at steps for hip flexors                          Ther Activity                                       Gait Training             Reciprocal arm swing cueing and upright posture cueing  Perf today increased step length and hip flex increased encouraged. Pt to practice at home 10-20 ft at a time.                          Modalities

## 2024-06-21 ENCOUNTER — OFFICE VISIT (OUTPATIENT)
Dept: PHYSICAL THERAPY | Age: 69
End: 2024-06-21
Payer: MEDICARE

## 2024-06-21 DIAGNOSIS — R26.9 ABNORMALITY OF GAIT: ICD-10-CM

## 2024-06-21 DIAGNOSIS — G20.A1 PARKINSON'S DISEASE, UNSPECIFIED WHETHER DYSKINESIA PRESENT, UNSPECIFIED WHETHER MANIFESTATIONS FLUCTUATE: Primary | ICD-10-CM

## 2024-06-21 PROCEDURE — 97110 THERAPEUTIC EXERCISES: CPT

## 2024-06-21 PROCEDURE — 97112 NEUROMUSCULAR REEDUCATION: CPT

## 2024-06-22 NOTE — PROGRESS NOTES
"Daily Note     Today's date: 2024  Patient name: Marcellus Fregoso  : 1955  MRN: [id number]  Referring provider: Ehsan Calix MD  Dx:   Encounter Diagnosis     ICD-10-CM    1. Parkinson's disease, unspecified whether dyskinesia present, unspecified whether manifestations fluctuate  G20.A1       2. Abnormality of gait  R26.9                      Subjective: \"I do feel like my legs are getting stronger.\"      Objective: See treatment diary below      Assessment: Tolerated treatment well. Patient demonstrated fatigue post treatment      Plan: Continue per plan of care.      Precautions:          Reaction Severity Reaction Type Noted          Allergies        Celebrex [Celecoxib] Other (See Comments) Not Specified  2/15/2017   Cardiologist stated he should not take        Chlorhexidine Hives Not Specified  2017   Is able to wash with hibiclens but not use michi cloths       Other  Not Specified  2020   \"steroid eyedrops\"        Prednisone Other (See Comments) Not Specified  10/10/2016   Prednisone eye drops- eye pressure increases             Manuals 24           I eval                                                    Neuro Re-Ed nu step              Hamstring stretch  20 sec x 5 20 sec x 5          Heelcord stretech  1 min x 1 1 min x 1          Squats  tohi lo mat 24 inch ht  3 x 10  3 x 10          Ltr stretch             Sidestepping with theraband in ll bars             Fwd, bwd walking in ll bars    10 ft x 4          Stepups 4 inch step  and progress             Ther Ex             Step over box drill             Hurdles fwd, sidestepping  cg of 1             Cybex leg press single double  40# 2 sets 10 , 80# 2 sets of 10 110# 3 x 10   60# 3 x 10 single          Hip abd,   40# 2 sets of 10 50 # 1 x  40# 2 x 10           Knee ext  12# 2 sets of 10 12# 3 x 10          Hamstring curls  hold 50# 3 x 10          Lunge stretch at steps for hip flexors                          Ther " Activity                                       Gait Training             Reciprocal arm swing cueing and upright posture cueing  Perf today increased step length and hip flex increased encouraged. Pt to practice at home 10-20 ft at a time.                          Modalities

## 2024-06-24 ENCOUNTER — APPOINTMENT (OUTPATIENT)
Dept: LAB | Age: 69
End: 2024-06-24
Payer: MEDICARE

## 2024-06-24 DIAGNOSIS — E11.42 CONTROLLED TYPE 2 DIABETES MELLITUS WITH DIABETIC POLYNEUROPATHY, WITHOUT LONG-TERM CURRENT USE OF INSULIN (HCC): ICD-10-CM

## 2024-06-24 DIAGNOSIS — I10 BENIGN ESSENTIAL HYPERTENSION: ICD-10-CM

## 2024-06-24 DIAGNOSIS — C61 MALIGNANT NEOPLASM OF PROSTATE (HCC): ICD-10-CM

## 2024-06-24 DIAGNOSIS — E78.00 PURE HYPERCHOLESTEROLEMIA: ICD-10-CM

## 2024-06-24 LAB
ALBUMIN SERPL BCG-MCNC: 4.1 G/DL (ref 3.5–5)
ALP SERPL-CCNC: 69 U/L (ref 34–104)
ALT SERPL W P-5'-P-CCNC: 8 U/L (ref 7–52)
ANION GAP SERPL CALCULATED.3IONS-SCNC: 9 MMOL/L (ref 4–13)
AST SERPL W P-5'-P-CCNC: 15 U/L (ref 13–39)
BASOPHILS # BLD AUTO: 0.06 THOUSANDS/ÂΜL (ref 0–0.1)
BASOPHILS NFR BLD AUTO: 1 % (ref 0–1)
BILIRUB SERPL-MCNC: 0.57 MG/DL (ref 0.2–1)
BUN SERPL-MCNC: 17 MG/DL (ref 5–25)
CALCIUM SERPL-MCNC: 9.6 MG/DL (ref 8.4–10.2)
CHLORIDE SERPL-SCNC: 100 MMOL/L (ref 96–108)
CHOLEST SERPL-MCNC: 124 MG/DL
CO2 SERPL-SCNC: 29 MMOL/L (ref 21–32)
CREAT SERPL-MCNC: 0.88 MG/DL (ref 0.6–1.3)
EOSINOPHIL # BLD AUTO: 0.69 THOUSAND/ÂΜL (ref 0–0.61)
EOSINOPHIL NFR BLD AUTO: 8 % (ref 0–6)
ERYTHROCYTE [DISTWIDTH] IN BLOOD BY AUTOMATED COUNT: 13.1 % (ref 11.6–15.1)
GFR SERPL CREATININE-BSD FRML MDRD: 87 ML/MIN/1.73SQ M
GLUCOSE P FAST SERPL-MCNC: 118 MG/DL (ref 65–99)
HCT VFR BLD AUTO: 43 % (ref 36.5–49.3)
HDLC SERPL-MCNC: 53 MG/DL
HGB BLD-MCNC: 14.6 G/DL (ref 12–17)
IMM GRANULOCYTES # BLD AUTO: 0.03 THOUSAND/UL (ref 0–0.2)
IMM GRANULOCYTES NFR BLD AUTO: 0 % (ref 0–2)
LDLC SERPL CALC-MCNC: 61 MG/DL (ref 0–100)
LYMPHOCYTES # BLD AUTO: 1.53 THOUSANDS/ÂΜL (ref 0.6–4.47)
LYMPHOCYTES NFR BLD AUTO: 18 % (ref 14–44)
MCH RBC QN AUTO: 30.2 PG (ref 26.8–34.3)
MCHC RBC AUTO-ENTMCNC: 34 G/DL (ref 31.4–37.4)
MCV RBC AUTO: 89 FL (ref 82–98)
MONOCYTES # BLD AUTO: 0.68 THOUSAND/ÂΜL (ref 0.17–1.22)
MONOCYTES NFR BLD AUTO: 8 % (ref 4–12)
NEUTROPHILS # BLD AUTO: 5.46 THOUSANDS/ÂΜL (ref 1.85–7.62)
NEUTS SEG NFR BLD AUTO: 65 % (ref 43–75)
NRBC BLD AUTO-RTO: 0 /100 WBCS
PLATELET # BLD AUTO: 254 THOUSANDS/UL (ref 149–390)
PMV BLD AUTO: 9.3 FL (ref 8.9–12.7)
POTASSIUM SERPL-SCNC: 4.9 MMOL/L (ref 3.5–5.3)
PROT SERPL-MCNC: 6.9 G/DL (ref 6.4–8.4)
PSA SERPL-MCNC: <0.008 NG/ML (ref 0–4)
RBC # BLD AUTO: 4.83 MILLION/UL (ref 3.88–5.62)
SODIUM SERPL-SCNC: 138 MMOL/L (ref 135–147)
TRIGL SERPL-MCNC: 50 MG/DL
WBC # BLD AUTO: 8.45 THOUSAND/UL (ref 4.31–10.16)

## 2024-06-24 PROCEDURE — 85025 COMPLETE CBC W/AUTO DIFF WBC: CPT

## 2024-06-24 PROCEDURE — 80061 LIPID PANEL: CPT

## 2024-06-24 PROCEDURE — 84153 ASSAY OF PSA TOTAL: CPT

## 2024-06-24 PROCEDURE — 80053 COMPREHEN METABOLIC PANEL: CPT

## 2024-06-24 PROCEDURE — 36415 COLL VENOUS BLD VENIPUNCTURE: CPT

## 2024-06-25 ENCOUNTER — OFFICE VISIT (OUTPATIENT)
Dept: PHYSICAL THERAPY | Age: 69
End: 2024-06-25
Payer: MEDICARE

## 2024-06-25 DIAGNOSIS — G20.A1 PARKINSON'S DISEASE, UNSPECIFIED WHETHER DYSKINESIA PRESENT, UNSPECIFIED WHETHER MANIFESTATIONS FLUCTUATE: Primary | ICD-10-CM

## 2024-06-25 DIAGNOSIS — F41.8 DEPRESSION WITH ANXIETY: ICD-10-CM

## 2024-06-25 DIAGNOSIS — R26.9 ABNORMALITY OF GAIT: ICD-10-CM

## 2024-06-25 PROCEDURE — 97110 THERAPEUTIC EXERCISES: CPT

## 2024-06-25 PROCEDURE — 97112 NEUROMUSCULAR REEDUCATION: CPT

## 2024-06-25 RX ORDER — ALPRAZOLAM 0.25 MG/1
0.25 TABLET ORAL 3 TIMES DAILY PRN
Qty: 270 TABLET | Refills: 0 | Status: SHIPPED | OUTPATIENT
Start: 2024-06-25 | End: 2025-04-01

## 2024-06-25 NOTE — TELEPHONE ENCOUNTER
Spoke to patient, Patient aware of results. Would also like a refill for xanax 0.25mg. ----- Message from Ehsan Calix MD sent at 6/25/2024  9:08 AM EDT -----  Electrolyte normal  Liver enzymes normal  Kidney function normal  White blood cell normal  Hemoglobin normal  Platelet normal  Lipid panel normal

## 2024-06-26 NOTE — PROGRESS NOTES
"Daily Note     Today's date: 2024  Patient name: Marcellus Fregoso  : 1955  MRN: [id number]  Referring provider: Ehsan Calix MD  Dx:   Encounter Diagnosis     ICD-10-CM    1. Parkinson's disease, unspecified whether dyskinesia present, unspecified whether manifestations fluctuate  G20.A1       2. Abnormality of gait  R26.9                      Subjective: \"I did well at the [employer] game this weekend.\"      Objective: See treatment diary below      Assessment: Tolerated treatment well. Patient would benefit from continued PT      Plan: Continue per plan of care.      Precautions:          Reaction Severity Reaction Type Noted          Allergies        Celebrex [Celecoxib] Other (See Comments) Not Specified  2/15/2017   Cardiologist stated he should not take        Chlorhexidine Hives Not Specified  2017   Is able to wash with hibiclens but not use michi cloths       Other  Not Specified  2020   \"steroid eyedrops\"        Prednisone Other (See Comments) Not Specified  10/10/2016   Prednisone eye drops- eye pressure increases             Manuals 24          I eval                                                    Neuro Re-Ed nu step              Hamstring stretch  20 sec x 5 20 sec x 5 20 sec x 5         Heelcord stretech  1 min x 1 1 min x 1 1 min x 1         Squats  tohi lo mat 24 inch ht  3 x 10  3 x 10 3 x 10         Ltr stretch             Sidestepping with theraband in ll bars             Fwd, bwd walking in ll bars    10 ft x 4          Stepups 4 inch step  and progress             Ther Ex             Step over box drill             Hurdles fwd, sidestepping  cg of 1             Cybex leg press single double  40# 2 sets 10 , 80# 2 sets of 10 110# 3 x 10   60# 3 x 10 single 120# 3 x 10   600 # 3 x 10         Hip abd,   40# 2 sets of 10 50 # 1 x  40# 2 x 10  50# 3 x 10          Knee ext  12# 2 sets of 10 12# 3 x 10 12# 3 x 10         Hamstring curls  hold 50# 3 x 10 " 50# 3 x 10          Lunge stretch at steps for hip flexors                          Ther Activity                                       Gait Training             Reciprocal arm swing cueing and upright posture cueing  Perf today increased step length and hip flex increased encouraged. Pt to practice at home 10-20 ft at a time.    Cueing and increase upright posturing                       Modalities

## 2024-06-27 ENCOUNTER — TELEPHONE (OUTPATIENT)
Dept: NON INVASIVE DIAGNOSTICS | Facility: HOSPITAL | Age: 69
End: 2024-06-27

## 2024-06-28 ENCOUNTER — APPOINTMENT (OUTPATIENT)
Dept: PHYSICAL THERAPY | Age: 69
End: 2024-06-28
Payer: MEDICARE

## 2024-06-28 ENCOUNTER — HOSPITAL ENCOUNTER (OUTPATIENT)
Dept: NON INVASIVE DIAGNOSTICS | Facility: HOSPITAL | Age: 69
Discharge: HOME | End: 2024-06-28
Payer: MEDICARE

## 2024-06-28 VITALS
DIASTOLIC BLOOD PRESSURE: 70 MMHG | HEART RATE: 54 BPM | HEIGHT: 68 IN | WEIGHT: 288 LBS | BODY MASS INDEX: 43.65 KG/M2 | SYSTOLIC BLOOD PRESSURE: 138 MMHG

## 2024-06-28 DIAGNOSIS — I49.1 PAC (PREMATURE ATRIAL CONTRACTION): ICD-10-CM

## 2024-06-28 LAB
AORTIC ROOT: 3.8 CM
APICAL FOUR CHAMBER EJECTION FRACTION: 74 %
ASCENDING AORTA: 4.2 CM
BSA FOR ECHO PROCEDURE: 2.39 M2
E WAVE DECELERATION TIME: 196 MS
E/A RATIO: 0.77
FRACTIONAL SHORTENING: 47 (ref 28–44)
INTERVENTRICULAR SEPTUM IN DIASTOLE (PARASTERNAL SHORT AXIS VIEW): 1.4 CM
INTERVENTRICULAR SEPTUM: 1.4 CM (ref 0.6–1.1)
LAAS-AP2: 23.7 CM2
LAAS-AP4: 30 CM2
LEFT ATRIUM SIZE: 4 CM
LEFT ATRIUM VOLUME (MOD BIPLANE): 98 ML
LEFT ATRIUM VOLUME INDEX (MOD BIPLANE): 41 ML/M2
LEFT INTERNAL DIMENSION IN SYSTOLE: 2.8 CM (ref 2.1–4)
LEFT VENTRICULAR INTERNAL DIMENSION IN DIASTOLE: 5.3 CM (ref 3.5–6)
LEFT VENTRICULAR POSTERIOR WALL IN END DIASTOLE: 1.3 CM
LEFT VENTRICULAR STROKE VOLUME: 107 ML
LVSV (TEICH): 107 ML
MV PEAK A VEL: 0.91 M/S
MV PEAK E VEL: 70 CM/S
MV STENOSIS PRESSURE HALF TIME: 57 MS
MV VALVE AREA P 1/2 METHOD: 3.86
RA PRESSURE ESTIMATED: 3 MMHG
RIGHT ATRIUM AREA SYSTOLE A4C: 15.1 CM2
RIGHT VENTRICLE ID DIMENSION: 4 CM
RV PSP: 35 MMHG
SL CV LEFT ATRIUM LENGTH A2C: 5.8 CM
SL CV LV EF: 65
SL CV PED ECHO LEFT VENTRICLE DIASTOLIC VOLUME (MOD BIPLANE) 2D: 136 ML
SL CV PED ECHO LEFT VENTRICLE SYSTOLIC VOLUME (MOD BIPLANE) 2D: 30 ML
TR MAX PG: 32 MMHG
TR PEAK VELOCITY: 2.8 M/S
TRICUSPID ANNULAR PLANE SYSTOLIC EXCURSION: 2.1 CM
TRICUSPID VALVE PEAK REGURGITATION VELOCITY: 2.81 M/S

## 2024-06-28 PROCEDURE — 93306 TTE W/DOPPLER COMPLETE: CPT | Performed by: INTERNAL MEDICINE

## 2024-06-28 PROCEDURE — 93306 TTE W/DOPPLER COMPLETE: CPT

## 2024-07-01 ENCOUNTER — TELEPHONE (OUTPATIENT)
Dept: CARDIOLOGY CLINIC | Facility: CLINIC | Age: 69
End: 2024-07-01

## 2024-07-01 ENCOUNTER — OFFICE VISIT (OUTPATIENT)
Dept: PHYSICAL THERAPY | Age: 69
End: 2024-07-01
Payer: MEDICARE

## 2024-07-01 DIAGNOSIS — R26.9 ABNORMALITY OF GAIT: ICD-10-CM

## 2024-07-01 DIAGNOSIS — G20.A1 PARKINSON'S DISEASE, UNSPECIFIED WHETHER DYSKINESIA PRESENT, UNSPECIFIED WHETHER MANIFESTATIONS FLUCTUATE: Primary | ICD-10-CM

## 2024-07-01 PROCEDURE — 97110 THERAPEUTIC EXERCISES: CPT

## 2024-07-01 PROCEDURE — 97112 NEUROMUSCULAR REEDUCATION: CPT

## 2024-07-01 NOTE — TELEPHONE ENCOUNTER
----- Message from ED Dong sent at 7/1/2024 12:10 PM EDT -----  Please call Marcellus and inform him TTE similar to previous. Only change is ascending aorta is mildly dilated from 4cm on previous TTE to 4.2cm this TTE.  BP control and continue with yearly monitoring. Thank you      Spoke with pts wife re: TTE. Similar to previous.

## 2024-07-01 NOTE — PROGRESS NOTES
"Daily Note     Today's date: 2024  Patient name: Marcellus Fregoso  : 1955  MRN: [id number]  Referring provider: Ehsan Calix MD  Dx:   Encounter Diagnosis     ICD-10-CM    1. Parkinson's disease, unspecified whether dyskinesia present, unspecified whether manifestations fluctuate  G20.A1       2. Abnormality of gait  R26.9                      Subjective: \"I do think the wt training helps me .\" Pt and his spouse to enroll in parkinson's dance class  at [facility] non credit course.       Objective: See treatment diary below      Assessment: Tolerated treatment with cueing for safety , pt with decreased eccentric control with sitting on wt machines. . Patient would benefit from continued PTwith transition to new balance 6 week programming at [address] for recovery from loss of balance emphasis.      Plan: Continue per plan of care.      Precautions:          Reaction Severity Reaction Type Noted          Allergies        Celebrex [Celecoxib] Other (See Comments) Not Specified  2/15/2017   Cardiologist stated he should not take        Chlorhexidine Hives Not Specified  2017   Is able to wash with hibiclens but not use michi cloths       Other  Not Specified  2020   \"steroid eyedrops\"        Prednisone Other (See Comments) Not Specified  10/10/2016   Prednisone eye drops- eye pressure increases             Manuals 24         I eval                                                    Neuro Re-Ed nu step              Hamstring stretch  20 sec x 5 20 sec x 5 20 sec x 5         Heelcord stretech  1 min x 1 1 min x 1 1 min x 1         Squats  tohi lo mat 24 inch ht  3 x 10  3 x 10 3 x 10         Ltr stretch             Sidestepping with theraband in ll bars             Fwd, bwd walking in ll bars    10 ft x 4          Stepups 4 inch step  and progress             Ther Ex             Step over box drill             Hurdles fwd, sidestepping  cg of 1             Cybex " leg press single double  40# 2 sets 10 , 80# 2 sets of 10 110# 3 x 10   60# 3 x 10 single 120# 3 x 10   60 # 3 x 10 120# 3 x 10   60# 3 x 10          Hip abd,   40# 2 sets of 10 50 # 1 x  40# 2 x 10  50# 3 x 10  50# 3 x 10         Knee ext  12# 2 sets of 10 12# 3 x 10 12# 3 x 10 12# 3 x 10         Hamstring curls  hold 50# 3 x 10 50# 3 x 10  50# 3 x 10         Lunge stretch at steps for hip flexors                          Ther Activity                                       Gait Training             Reciprocal arm swing cueing and upright posture cueing  Perf today increased step length and hip flex increased encouraged. Pt to practice at home 10-20 ft at a time.    Cueing and increase upright posturing                       Modalities

## 2024-07-03 ENCOUNTER — APPOINTMENT (OUTPATIENT)
Dept: PHYSICAL THERAPY | Age: 69
End: 2024-07-03
Payer: MEDICARE

## 2024-07-09 ENCOUNTER — OFFICE VISIT (OUTPATIENT)
Dept: PHYSICAL THERAPY | Age: 69
End: 2024-07-09
Payer: MEDICARE

## 2024-07-09 DIAGNOSIS — R26.9 ABNORMALITY OF GAIT: ICD-10-CM

## 2024-07-09 DIAGNOSIS — G20.A1 PARKINSON'S DISEASE, UNSPECIFIED WHETHER DYSKINESIA PRESENT, UNSPECIFIED WHETHER MANIFESTATIONS FLUCTUATE: Primary | ICD-10-CM

## 2024-07-09 PROCEDURE — 97112 NEUROMUSCULAR REEDUCATION: CPT

## 2024-07-09 PROCEDURE — 97110 THERAPEUTIC EXERCISES: CPT

## 2024-07-10 ENCOUNTER — TELEPHONE (OUTPATIENT)
Age: 69
End: 2024-07-10

## 2024-07-10 NOTE — PROGRESS NOTES
"Daily Note     Today's date: 2024  Patient name: Marcellus Fregoso  : 1955  MRN: [id number]  Referring provider: Ehsan Calix MD  Dx:   Encounter Diagnosis     ICD-10-CM    1. Parkinson's disease, unspecified whether dyskinesia present, unspecified whether manifestations fluctuate  G20.A1       2. Abnormality of gait  R26.9                      Subjective: pt awaiting info if pt may transfer to [address] [place] [address] for new balance training for fall reduction.      Objective: See treatment diary below      Assessment: Tolerated treatment well. Patient would benefit from continued PT      Plan: Continue per plan of care.      Precautions:          Reaction Severity Reaction Type Noted          Allergies        Celebrex [Celecoxib] Other (See Comments) Not Specified  2/15/2017   Cardiologist stated he should not take        Chlorhexidine Hives Not Specified  2017   Is able to wash with hibiclens but not use michi cloths       Other  Not Specified  2020   \"steroid eyedrops\"        Prednisone Other (See Comments) Not Specified  10/10/2016   Prednisone eye drops- eye pressure increases             Manuals 24 7        I eval                                                    Neuro Re-Ed nu step              Hamstring stretch  20 sec x 5 20 sec x 5 20 sec x 5  20 sec x 5       Heelcord stretech  1 min x 1 1 min x 1 1 min x 1   1minx1       Squats  tohi lo mat 24 inch ht  3 x 10  3 x 10 3 x 10  3 x10       Ltr stretch             Sidestepping with theraband in ll bars      10 ft x 4       Fwd, bwd walking in ll bars    10 ft x 4   10 ft x 4       Stepups 4 inch step  and progress      2 x 10       Ther Ex             Step over box drill             Hurdles fwd, sidestepping  cg of 1             Cybex leg press single double  40# 2 sets 10 , 80# 2 sets of 10 110# 3 x 10   60# 3 x 10 single 120# 3 x 10   60 # 3 x 10 120# 3 x 10   60# 3 x 10   120# 3 x 10   60 # 3 x 10       Hip abd,   40# 2 sets " of 10 50 # 1 x  40# 2 x 10  50# 3 x 10  50# 3 x 10  50# 3 x 10       Knee ext  12# 2 sets of 10 12# 3 x 10 12# 3 x 10 12# 3 x 10  12# 3 x 10       Hamstring curls  hold 50# 3 x 10 50# 3 x 10  50# 3 x 10  50 # 3 x 10        Lunge stretch at steps for hip flexors                          Ther Activity                                       Gait Training             Reciprocal arm swing cueing and upright posture cueing  Perf today increased step length and hip flex increased encouraged. Pt to practice at home 10-20 ft at a time.    Cueing and increase upright posturing                       Modalities

## 2024-07-11 ENCOUNTER — APPOINTMENT (OUTPATIENT)
Dept: PHYSICAL THERAPY | Age: 69
End: 2024-07-11
Payer: MEDICARE

## 2024-07-15 NOTE — TELEPHONE ENCOUNTER
Last script that was sent to the pharmacy was written for Fluoxetine 10mg BID. Script requested from pharmacy states one a day.   Spoke with patient notified

## 2024-07-17 DIAGNOSIS — K22.70 BARRETT'S ESOPHAGUS WITHOUT DYSPLASIA: ICD-10-CM

## 2024-07-17 RX ORDER — PANTOPRAZOLE SODIUM 40 MG/1
TABLET, DELAYED RELEASE ORAL
Qty: 180 TABLET | Refills: 1 | Status: SHIPPED | OUTPATIENT
Start: 2024-07-17

## 2024-07-18 ENCOUNTER — TELEPHONE (OUTPATIENT)
Age: 69
End: 2024-07-18

## 2024-07-18 DIAGNOSIS — G20.A2 PARKINSON'S DISEASE WITHOUT DYSKINESIA, WITH FLUCTUATING MANIFESTATIONS: Primary | ICD-10-CM

## 2024-07-18 DIAGNOSIS — R26.2 AMBULATORY DYSFUNCTION: ICD-10-CM

## 2024-07-18 NOTE — TELEPHONE ENCOUNTER
Patient had called in stating that the physical therapy facility he is going too on [address], stated that the facility has a new program for balancing and fall areas.     Patient states he has parkinson that might allow him to qualify for this new program.     Patient needs a referral to try and get approved.     Please advise back to patient for any additional information.

## 2024-07-19 ENCOUNTER — OFFICE VISIT (OUTPATIENT)
Dept: CARDIOLOGY CLINIC | Facility: CLINIC | Age: 69
End: 2024-07-19
Payer: MEDICARE

## 2024-07-19 VITALS
WEIGHT: 290.1 LBS | HEART RATE: 66 BPM | BODY MASS INDEX: 43.97 KG/M2 | OXYGEN SATURATION: 99 % | SYSTOLIC BLOOD PRESSURE: 148 MMHG | DIASTOLIC BLOOD PRESSURE: 90 MMHG | HEIGHT: 68 IN

## 2024-07-19 DIAGNOSIS — I25.10 CORONARY ARTERY DISEASE INVOLVING NATIVE CORONARY ARTERY OF NATIVE HEART WITHOUT ANGINA PECTORIS: ICD-10-CM

## 2024-07-19 DIAGNOSIS — R06.09 EXERTIONAL DYSPNEA: ICD-10-CM

## 2024-07-19 DIAGNOSIS — E78.5 HYPERLIPIDEMIA, UNSPECIFIED HYPERLIPIDEMIA TYPE: ICD-10-CM

## 2024-07-19 DIAGNOSIS — I10 BENIGN ESSENTIAL HYPERTENSION: ICD-10-CM

## 2024-07-19 DIAGNOSIS — I49.3 PVC (PREMATURE VENTRICULAR CONTRACTION): Primary | ICD-10-CM

## 2024-07-19 DIAGNOSIS — R07.9 CHEST PAIN SYNDROME: ICD-10-CM

## 2024-07-19 PROCEDURE — 99214 OFFICE O/P EST MOD 30 MIN: CPT | Performed by: INTERNAL MEDICINE

## 2024-07-19 NOTE — PROGRESS NOTES
Cardiology Follow Up    Marcellus Fregoso  1955  [id number]  [facility] CARDIAC CATH LAB  [address] [facility] [address]  [phone]    1. PVC (premature ventricular contraction)        2. Benign essential hypertension        3. Coronary artery disease involving native coronary artery of native heart without angina pectoris        4. Hyperlipidemia, unspecified hyperlipidemia type        5. Exertional dyspnea        6. Chest pain syndrome            Interval History: Pt seen at ER with CP, severe /99, EKG no ischemia, V ectopy.negative troponin.  2w Zio NSR, average 61 bpm,  with very frequent PVC, 41% of time, no sustained tachyarrhythmias. Lipids 2024 , H53,L61 on statin/zetia, adequate control. Echo nl LV, LVH, AA 42mm. No syncope.    Patient Active Problem List   Diagnosis    Status post total left knee replacement    Stuttering    Sleep apnea    Ambulatory dysfunction    Benign essential hypertension    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, without long-term current use of insulin ([facility])    Insomnia    Depression with anxiety    Onychomycosis    Molina's esophagus with dysplasia    Dysphagia    Exertional dyspnea    Gastroparesis    Ventral hernia without obstruction or gangrene    Gastroesophageal reflux disease with esophagitis without hemorrhage    Tremor    Neuropathy    Pure hypercholesterolemia    Dermatitis    Obesity, Class III, BMI 40-49.9 (morbid obesity) ([facility])    Hx of laparoscopic adjustable gastric banding    History of total knee arthroplasty, right    Syncope    Urinary urgency    Generalized weakness    Class 3 severe obesity due to excess calories with serious comorbidity and body mass index (BMI) of 40.0 to 44.9 in adult ([facility])    CAD (coronary artery disease)    Carpal tunnel syndrome of right wrist    Family history of Parkinson disease    Parkinson disease     Hyperlipidemia    Chronic bilateral low back pain without sciatica    PVC (premature ventricular contraction)    Chest pain syndrome     Past Medical History:   Diagnosis Date    Acute blood loss anemia 10/13/2016    Anxiety     Arthritis     knees    Arthritis     Asthma     stable for > 10 years    Molina esophagus     with BARRX/RFA    Cancer (HCC)     prostate    Cataract     Depression     Diabetes (HCC)     Diabetes mellitus (HCC)     pre diabetes    Diverticulitis of colon     Environmental allergies     Fall 12/30/2020    GERD (gastroesophageal reflux disease)     Hiatal hernia     History of anal fissures     Hyperlipidemia     Hypertension     Incisional hernia     Insomnia     Kidney stone     Malignant neoplasm of prostate (HCC)     Morbid obesity (HCC)     Peripheral neuropathy     PONV (postoperative nausea and vomiting)     Prolapsing mitral valve     prolapsing mitral valve leaflet syndrome    Prostate cancer (HCC)     Retinal detachment     treated surgically at [facility]; resolved: 10/10/2012    Sleep apnea     diagnosed but unable to tolerate cpap.     Stuttering      Social History     Socioeconomic History    Marital status: /Civil Union     Spouse name: Not on file    Number of children: 0    Years of education: Not on file    Highest education level: Not on file   Occupational History    Occupation:    Tobacco Use    Smoking status: Never     Passive exposure: Never    Smokeless tobacco: Never    Tobacco comments:     quit 38 years ago   Vaping Use    Vaping status: Never Used   Substance and Sexual Activity    Alcohol use: Not Currently     Alcohol/week: 1.0 standard drink of alcohol     Types: 1 Cans of beer per week     Comment: 1 case of beer per year    Drug use: Never    Sexual activity: Yes     Partners: Female     Birth control/protection: Inserts, Post-menopausal, Male Sterilization   Other Topics Concern    Not on file   Social History Narrative    ** Merged  History Encounter **          Social Determinants of Health     Financial Resource Strain: Low Risk  (5/27/2021)    Overall Financial Resource Strain (CARDIA)     Difficulty of Paying Living Expenses: Not hard at all   Food Insecurity: No Food Insecurity (5/8/2024)    Hunger Vital Sign     Worried About Running Out of Food in the Last Year: Never true     Ran Out of Food in the Last Year: Never true   Transportation Needs: No Transportation Needs (5/8/2024)    PRAPARE - Transportation     Lack of Transportation (Medical): No     Lack of Transportation (Non-Medical): No   Physical Activity: Insufficiently Active (11/7/2019)    Exercise Vital Sign     Days of Exercise per Week: 1 day     Minutes of Exercise per Session: 50 min   Stress: No Stress Concern Present (11/7/2019)    [employer] [place] [employer] - Occupational Stress Questionnaire     Feeling of Stress : Not at all   Social Connections: Moderately Isolated (11/7/2019)    Social Connection and Isolation Panel [NHANES]     Frequency of Communication with Friends and Family: Three times a week     Frequency of Social Gatherings with Friends and Family: More than three times a week     Attends Cheondoism Services: Never     Active Member of Clubs or Organizations: No     Attends Club or Organization Meetings: Never     Marital Status:    Intimate Partner Violence: Not At Risk (11/7/2019)    Humiliation, Afraid, Rape, and Kick questionnaire     Fear of Current or Ex-Partner: No     Emotionally Abused: No     Physically Abused: No     Sexually Abused: No   Housing Stability: Low Risk  (5/8/2024)    Housing Stability Vital Sign     Unable to Pay for Housing in the Last Year: No     Number of Times Moved in the Last Year: 1     Homeless in the Last Year: No      Family History   Problem Relation Age of Onset    Heart disease Father     Coronary artery disease Father     Prostate cancer Father     Coronary artery disease Mother     Diabetes Mother          mellitus    Diabetes type II Mother     Diabetes Maternal Grandmother         mellitus    Coronary artery disease Maternal Grandfather     Coronary artery disease Paternal Grandfather      Past Surgical History:   Procedure Laterality Date    ARTHROSCOPIC REPAIR ACL Right     BIOPSY CORE NEEDLE      prostate    CATARACT EXTRACTION Bilateral     COLONOSCOPY      HERNIA REPAIR      [facility]    JOINT REPLACEMENT      B/L knee    KNEE ARTHROSCOPY Left     LAPAROSCOPIC GASTRIC BANDING      NC ARTHRP KNE CONDYLE&PLATU MEDIAL&LAT COMPARTMENTS Left 02/15/2017    Procedure: TOTAL KNEE ARTHROPLASTY ;  Surgeon: Sal Ross MD;  Location: [facility];  Service: Orthopedics    NC ARTHRP KNE CONDYLE&PLATU MEDIAL&LAT COMPARTMENTS Right 10/10/2016    Procedure: ARTHROPLASTY KNEE TOTAL;  Surgeon: Sal Ross MD;  Location: [facility];  Service: Orthopedics    PROSTATECTOMY      robotic-assisted; Lilly Tay    REMOVAL GASTRIC BAND LAPAROSCOPIC N/A 8/30/2022    Procedure: LAPAROSCOPIC REMOVAL OF ADJUSTABLE GASTRIC BAND AND PORT  WITH INTRAOPERATIVE EGD;  Surgeon: Neal Yang MD;  Location: [facility];  Service: Bariatrics    RETINAL DETACHMENT SURGERY Bilateral     Lemus Eye    SEPTOPLASTY      SINUS SURGERY      TONSILLECTOMY      over age 10    UPPER GASTROINTESTINAL ENDOSCOPY      mutiple with BARRX/RFA    VASECTOMY      vas deferens       Current Outpatient Medications:     ALPRAZolam (XANAX) 0.25 mg tablet, Take 1 tablet (0.25 mg total) by mouth 3 (three) times a day as needed for anxiety, Disp: 270 tablet, Rfl: 0    amLODIPine (NORVASC) 5 mg tablet, Take 1 tablet (5 mg total) by mouth daily, Disp: 90 tablet, Rfl: 3    Blood Glucose Monitoring Suppl (OneTouch Verio) w/Device KIT, Use daily, Disp: 1 kit, Rfl: 0    carbidopa-levodopa (SINEMET)  mg per tablet, Take 1 tablet by mouth Three times a day, Disp: , Rfl:     ezetimibe (ZETIA) 10 mg tablet, Take 1 tablet (10 mg total) by mouth daily (Patient taking  "differently: Take 5 mg by mouth daily), Disp: 90 tablet, Rfl: 3    famotidine (PEPCID) 40 MG tablet, Take 1 tablet (40 mg total) by mouth daily at bedtime, Disp: 30 tablet, Rfl: 3    FLUoxetine (PROzac) 20 mg capsule, Take 40 mg by mouth daily Alternated between 20 mg and 40 mg daily (Patient not taking: Reported on 5/30/2024), Disp: , Rfl:     Lancet Devices (ONE TOUCH DELICA LANCING DEV) MISC, Use daily, Disp: 1 each, Rfl: 0    losartan (COZAAR) 100 MG tablet, Take 1 tablet (100 mg total) by mouth daily, Disp: 90 tablet, Rfl: 3    metFORMIN (GLUCOPHAGE) 500 mg tablet, TAKE 2 TABLETS TWICE A DAY WITH MEALS, Disp: 360 tablet, Rfl: 3    metoprolol succinate (TOPROL-XL) 25 mg 24 hr tablet, Take 1 tablet (25 mg total) by mouth daily, Disp: 90 tablet, Rfl: 3    Multiple Vitamins-Minerals (CENTRUM ADULTS PO), Take by mouth in the morning, Disp: , Rfl:     OneTouch Delica Lancets 33G MISC, Use daily, Disp: 300 each, Rfl: 1    OneTouch Verio test strip, USE TO TEST DAILY, Disp: 100 strip, Rfl: 3    pantoprazole (PROTONIX) 40 mg tablet, TAKE 1 TABLET TWICE A DAY (1 TABLET IN THE MORNING AND 1 TABLET IN THE EVENING), Disp: 180 tablet, Rfl: 1    QUEtiapine (SEROquel) 50 mg tablet, Take 25 mg by mouth daily at bedtime, Disp: , Rfl:     rosuvastatin (CRESTOR) 5 mg tablet, TAKE 1 TABLET ONCE A WEEK, Disp: 12 tablet, Rfl: 3    selegiline (ELDEPRYL) 5 mg capsule, Take 5 mg by mouth as needed (Patient not taking: Reported on 5/30/2024), Disp: , Rfl:     semaglutide, 2 mg/dose, (Ozempic, 2 MG/DOSE,) 8 mg/ mL injection pen, INJECT 2 MG UNDER THE SKIN EVERY 7 DAYS, Disp: 9 mL, Rfl: 1  Allergies   Allergen Reactions    Celebrex [Celecoxib] Other (See Comments)     Cardiologist stated he should not take      Chlorhexidine Hives     Is able to wash with hibiclens but not use michi cloths    Other      \"steroid eyedrops\"      Prednisone Other (See Comments)     Prednisone eye drops- eye pressure increases       Labs:  Hospital Outpatient " Visit on 06/28/2024   Component Date Value    Triscuspid Valve Regurgi* 06/28/2024 32.0     RAA A4C 06/28/2024 15.1     LA Volume Index (BP) 06/28/2024 41.0     MV Peak A Luis Armando 06/28/2024 0.91     MV stenosis pressure 1/2* 06/28/2024 57     MV Peak E Luis Armando 06/28/2024 70     E wave deceleration time 06/28/2024 196     E/A ratio 06/28/2024 0.77     MV valve area p 1/2 meth* 06/28/2024 3.86     TR Peak Luis Armando 06/28/2024 2.8     RVID d 06/28/2024 4.0     A4C EF 06/28/2024 74     Tricuspid valve peak reg* 06/28/2024 2.81     Left ventricular stroke * 06/28/2024 107.00     IVSd 06/28/2024 1.40     Tricuspid annular plane * 06/28/2024 2.10     Ao root 06/28/2024 3.80     LVPWd 06/28/2024 1.30     LA size 06/28/2024 4     Asc Ao 06/28/2024 4.2     LA volume (BP) 06/28/2024 98     FS 06/28/2024 47     LVIDS 06/28/2024 2.80     IVS 06/28/2024 1.4     LVIDd 06/28/2024 5.30     LA length (A2C) 06/28/2024 5.80     LEFT VENTRICLE SYSTOLIC * 06/28/2024 30     LV DIASTOLIC VOLUME (MOD* 06/28/2024 136     Left Atrium Area-systoli* 06/28/2024 30     Left Atrium Area-systoli* 06/28/2024 23.7     LVSV, 2D 06/28/2024 107     BSA 06/28/2024 2.39     LV EF 06/28/2024 65     Est. RA pres 06/28/2024 3.0     Right Ventricular Peak S* 06/28/2024 35.00    Appointment on 06/24/2024   Component Date Value    PSA, Diagnostic 06/24/2024 <0.008     WBC 06/24/2024 8.45     RBC 06/24/2024 4.83     Hemoglobin 06/24/2024 14.6     Hematocrit 06/24/2024 43.0     MCV 06/24/2024 89     MCH 06/24/2024 30.2     MCHC 06/24/2024 34.0     RDW 06/24/2024 13.1     MPV 06/24/2024 9.3     Platelets 06/24/2024 254     nRBC 06/24/2024 0     Segmented % 06/24/2024 65     Immature Grans % 06/24/2024 0     Lymphocytes % 06/24/2024 18     Monocytes % 06/24/2024 8     Eosinophils Relative 06/24/2024 8 (H)     Basophils Relative 06/24/2024 1     Absolute Neutrophils 06/24/2024 5.46     Absolute Immature Grans 06/24/2024 0.03     Absolute Lymphocytes 06/24/2024 1.53     Absolute  Monocytes 06/24/2024 0.68     Eosinophils Absolute 06/24/2024 0.69 (H)     Basophils Absolute 06/24/2024 0.06     Sodium 06/24/2024 138     Potassium 06/24/2024 4.9     Chloride 06/24/2024 100     CO2 06/24/2024 29     ANION GAP 06/24/2024 9     BUN 06/24/2024 17     Creatinine 06/24/2024 0.88     Glucose, Fasting 06/24/2024 118 (H)     Calcium 06/24/2024 9.6     AST 06/24/2024 15     ALT 06/24/2024 8     Alkaline Phosphatase 06/24/2024 69     Total Protein 06/24/2024 6.9     Albumin 06/24/2024 4.1     Total Bilirubin 06/24/2024 0.57     eGFR 06/24/2024 87     Cholesterol 06/24/2024 124     Triglycerides 06/24/2024 50     HDL, Direct 06/24/2024 53     LDL Calculated 06/24/2024 61    Office Visit on 05/08/2024   Component Date Value    Hemoglobin A1C 05/08/2024 5.9    Admission on 05/04/2024, Discharged on 05/05/2024   Component Date Value    WBC 05/04/2024 7.69     RBC 05/04/2024 4.66     Hemoglobin 05/04/2024 14.0     Hematocrit 05/04/2024 40.5     MCV 05/04/2024 87     MCH 05/04/2024 30.0     MCHC 05/04/2024 34.6     RDW 05/04/2024 13.1     MPV 05/04/2024 8.5 (L)     Platelets 05/04/2024 245     nRBC 05/04/2024 0     Segmented % 05/04/2024 57     Immature Grans % 05/04/2024 0     Lymphocytes % 05/04/2024 26     Monocytes % 05/04/2024 8     Eosinophils Relative 05/04/2024 8 (H)     Basophils Relative 05/04/2024 1     Absolute Neutrophils 05/04/2024 4.33     Absolute Immature Grans 05/04/2024 0.02     Absolute Lymphocytes 05/04/2024 1.99     Absolute Monocytes 05/04/2024 0.64     Eosinophils Absolute 05/04/2024 0.64 (H)     Basophils Absolute 05/04/2024 0.07     Sodium 05/04/2024 136     Potassium 05/04/2024 3.8     Chloride 05/04/2024 103     CO2 05/04/2024 27     ANION GAP 05/04/2024 6     BUN 05/04/2024 15     Creatinine 05/04/2024 0.87     Glucose 05/04/2024 138     Calcium 05/04/2024 9.1     AST 05/04/2024 14     ALT 05/04/2024 13     Alkaline Phosphatase 05/04/2024 60     Total Protein 05/04/2024 6.7      Albumin 05/04/2024 4.0     Total Bilirubin 05/04/2024 0.48     eGFR 05/04/2024 87     hs TnI 0hr 05/04/2024 4     Ventricular Rate 05/04/2024 61     Atrial Rate 05/04/2024 61     VT Interval 05/04/2024 198     QRSD Interval 05/04/2024 98     QT Interval 05/04/2024 420     QTC Interval 05/04/2024 422     P Axis 05/04/2024 10     QRS [place] 05/04/2024 -28     T Wave Axis 05/04/2024 36     Color, UA 05/04/2024 Light Yellow     Clarity, UA 05/04/2024 Clear     Specific Gravity, UA 05/04/2024 1.016     pH, UA 05/04/2024 7.0     Leukocytes, UA 05/04/2024 Negative     Nitrite, UA 05/04/2024 Negative     Protein, UA 05/04/2024 Negative     Glucose, UA 05/04/2024 Negative     Ketones, UA 05/04/2024 Negative     Urobilinogen, UA 05/04/2024 <2.0     Bilirubin, UA 05/04/2024 Negative     Occult Blood, UA 05/04/2024 Negative      Imaging: Echo complete w/ contrast if indicated    Result Date: 6/28/2024  Narrative:   Left Ventricle: Left ventricular cavity size is normal. Wall thickness is mildly increased. There is mild concentric hypertrophy. The left ventricular ejection fraction is 65%. Systolic function is normal. Wall motion is normal.   Right Ventricle: Right ventricular cavity size is normal. Systolic function is normal.   Left Atrium: The atrium is moderately dilated.   Aorta: The aortic root is normal in size when indexed to BSA. The ascending aorta is mildly dilated when indexed to BSA. The aortic root is 3.80 cm. The ascending aorta is 4.2 cm.       Review of Systems:  Review of Systems   Respiratory:  Positive for apnea, chest tightness and shortness of breath. Negative for wheezing and stridor.    Cardiovascular:  Positive for palpitations. Negative for chest pain and leg swelling.   Musculoskeletal:  Positive for gait problem.   Neurological:  Positive for tremors, speech difficulty and weakness. Negative for syncope.   Psychiatric/Behavioral:  Positive for sleep disturbance.        Physical Exam:  Physical  Exam  Constitutional:       Appearance: He is obese.   Neck:      Vascular: No carotid bruit.   Cardiovascular:      Rate and Rhythm: Normal rate and regular rhythm.      Heart sounds: Normal heart sounds. No murmur heard.     No friction rub. No gallop.      Comments: biggeminy  Pulmonary:      Effort: Pulmonary effort is normal. No respiratory distress.      Breath sounds: Normal breath sounds. No stridor. No wheezing, rhonchi or rales.   Neurological:      Mental Status: He is alert.   Psychiatric:         Mood and Affect: Mood normal.         Discussion/Summary:  coronary artery  disease, no clinical documentation. although his diagnosis is on the chart.  There is family history of premature coronary disease, he does have history of premature ventricular tractions, previously well-controlled on beta-blocker therapy.worsening burden.   Echocardiogram 2019 revealed normal left ventricular systolic function with a stage I diastolic dysfunction and no significant valvular abnormalities.  Pharmacological stress test 2019 revealed a fixed basal anterior and mid anterior defect described as artifactual.  Ejection fraction of 71%.  Echo 2024 as described, no cardiomyopathy. Awaiting EP evaluation, will order stress test, increase toprol to 50 mg qd.  The patient was diagnosed with Parkinson disease and is suffering from ambulatory dysfunction, he was admitted to the hospital on 2/22 with generalized weakness.  Patient does have dyslipidemia he is on low intensity statin therapy plus Zetia, lipids this year total cholesterol 132, HDL 47 LDL 71.  Recent potassium was normal at 4.1, normal creatinine of 0.7.  Compliant with low-salt diet, .  Patient is not on antiplatelet therapy.       This note was completed in part utilizing [employer] direct voice recognition software.   Grammatical errors, random word insertion, spelling mistakes, and incomplete sentences may be an occasional consequence of the system secondary  to software limitations, ambient noise and hardware issues. At the time of dictation, efforts were made to edit, clarify and /or correct errors.  Please read the chart carefully and recognize, using context, where substitutions have occurred.  If you have any questions or concerns about the context, text or information contained within the body of this dictation, please contact myself, the provider, for further clarification.

## 2024-07-25 ENCOUNTER — HOSPITAL ENCOUNTER (OUTPATIENT)
Dept: NON INVASIVE DIAGNOSTICS | Facility: CLINIC | Age: 69
Discharge: HOME | End: 2024-07-25
Payer: MEDICARE

## 2024-07-25 VITALS — BODY MASS INDEX: 43.95 KG/M2 | HEIGHT: 68 IN | WEIGHT: 290 LBS

## 2024-07-25 DIAGNOSIS — R06.09 EXERTIONAL DYSPNEA: ICD-10-CM

## 2024-07-25 DIAGNOSIS — I49.3 PVC (PREMATURE VENTRICULAR CONTRACTION): ICD-10-CM

## 2024-07-25 DIAGNOSIS — R07.9 CHEST PAIN SYNDROME: ICD-10-CM

## 2024-07-25 DIAGNOSIS — I25.10 CORONARY ARTERY DISEASE INVOLVING NATIVE CORONARY ARTERY OF NATIVE HEART WITHOUT ANGINA PECTORIS: ICD-10-CM

## 2024-07-25 LAB
MAX HR PERCENT: 53 %
MAX HR: 80 BPM
NUC STRESS EJECTION FRACTION: 69 %
RATE PRESSURE PRODUCT: NORMAL
SL CV REST NUCLEAR ISOTOPE DOSE: 15.22 MCI
SL CV STRESS NUCLEAR ISOTOPE DOSE: 46.3 MCI
SL CV STRESS RECOVERY BP: NORMAL MMHG
SL CV STRESS RECOVERY HR: 75 BPM
SL CV STRESS RECOVERY O2 SAT: 97 %
STRESS ANGINA INDEX: 0
STRESS BASELINE BP: NORMAL MMHG
STRESS BASELINE HR: 63 BPM
STRESS O2 SAT REST: 98 %
STRESS PEAK HR: 78 BPM
STRESS POST ESTIMATED WORKLOAD: 1 METS
STRESS POST O2 SAT PEAK: 97 %
STRESS POST PEAK BP: 142 MMHG
STRESS/REST PERFUSION RATIO: 0.93

## 2024-07-25 PROCEDURE — 78452 HT MUSCLE IMAGE SPECT MULT: CPT | Performed by: INTERNAL MEDICINE

## 2024-07-25 PROCEDURE — A9502 TC99M TETROFOSMIN: HCPCS

## 2024-07-25 PROCEDURE — 93017 CV STRESS TEST TRACING ONLY: CPT

## 2024-07-25 PROCEDURE — 93016 CV STRESS TEST SUPVJ ONLY: CPT | Performed by: INTERNAL MEDICINE

## 2024-07-25 PROCEDURE — 93018 CV STRESS TEST I&R ONLY: CPT | Performed by: INTERNAL MEDICINE

## 2024-07-25 PROCEDURE — 78452 HT MUSCLE IMAGE SPECT MULT: CPT

## 2024-07-25 RX ORDER — REGADENOSON 0.08 MG/ML
0.4 INJECTION, SOLUTION INTRAVENOUS ONCE
Status: COMPLETED | OUTPATIENT
Start: 2024-07-25 | End: 2024-07-25

## 2024-07-25 RX ADMIN — REGADENOSON 0.4 MG: 0.08 INJECTION, SOLUTION INTRAVENOUS at 13:40

## 2024-07-26 ENCOUNTER — EVALUATION (OUTPATIENT)
Dept: PHYSICAL THERAPY | Facility: CLINIC | Age: 69
End: 2024-07-26
Payer: MEDICARE

## 2024-07-26 ENCOUNTER — TELEPHONE (OUTPATIENT)
Age: 69
End: 2024-07-26

## 2024-07-26 DIAGNOSIS — R26.9 ABNORMALITY OF GAIT: ICD-10-CM

## 2024-07-26 DIAGNOSIS — R26.2 AMBULATORY DYSFUNCTION: ICD-10-CM

## 2024-07-26 DIAGNOSIS — G20.A1 PARKINSON'S DISEASE, UNSPECIFIED WHETHER DYSKINESIA PRESENT, UNSPECIFIED WHETHER MANIFESTATIONS FLUCTUATE: Primary | ICD-10-CM

## 2024-07-26 DIAGNOSIS — G20.A2 PARKINSON'S DISEASE WITHOUT DYSKINESIA, WITH FLUCTUATING MANIFESTATIONS: ICD-10-CM

## 2024-07-26 LAB
CHEST PAIN STATEMENT: NORMAL
MAX DIASTOLIC BP: 90 MMHG
MAX PREDICTED HEART RATE: 150 BPM
PROTOCOL NAME: NORMAL
REASON FOR TERMINATION: NORMAL
STRESS POST EXERCISE DUR MIN: 3 MIN
STRESS POST EXERCISE DUR SEC: 0 SEC
STRESS POST PEAK HR: 80 BPM
STRESS POST PEAK SYSTOLIC BP: 146 MMHG
TARGET HR FORMULA: NORMAL
TEST INDICATION: NORMAL

## 2024-07-26 PROCEDURE — 97162 PT EVAL MOD COMPLEX 30 MIN: CPT | Performed by: PHYSICAL THERAPIST

## 2024-07-31 ENCOUNTER — OFFICE VISIT (OUTPATIENT)
Dept: PHYSICAL THERAPY | Facility: CLINIC | Age: 69
End: 2024-07-31
Payer: MEDICARE

## 2024-07-31 DIAGNOSIS — G20.A2 PARKINSON'S DISEASE WITHOUT DYSKINESIA, WITH FLUCTUATING MANIFESTATIONS: Primary | ICD-10-CM

## 2024-07-31 DIAGNOSIS — R26.2 AMBULATORY DYSFUNCTION: ICD-10-CM

## 2024-07-31 PROCEDURE — 97112 NEUROMUSCULAR REEDUCATION: CPT | Performed by: PHYSICAL THERAPIST

## 2024-07-31 PROCEDURE — 97116 GAIT TRAINING THERAPY: CPT | Performed by: PHYSICAL THERAPIST

## 2024-07-31 NOTE — PROGRESS NOTES
Daily Note     Today's date: 2024  Patient name: Marcellus Fregoso  : 1955  MRN: [id number]  Referring provider: Ehsan Calix MD  Dx:   Encounter Diagnosis     ICD-10-CM    1. Parkinson's disease without dyskinesia, with fluctuating manifestations  G20.A2       2. Ambulatory dysfunction  R26.2                      Subjective: Patient reports feeling well today      Objective: See treatment diary below      Assessment: Focused sesson oin improving step length, upright posture and overall endurance. Pt was able to complete session with no instances of significant festination or freezing. He was significantly fatigued at end of session.       Plan: Continue per plan of care.      Short Term Goal Expiration Date:(24)  Long Term Goal Expiration Date: (10/26/24)  POC Expiration Date: (10/26/24)      POC expires Unit limit Auth Expiration date PT/OT/ST + Visit Limit?   10/26/24 bomn bomn bomn                           Visit/Unit Tracking  AUTH Status:  Date              Used 1 2             Remaining                     Precautions fall risk       Manuals                                       Neuro Re-Ed         STS  With tidal tank 2x10      hurdles  5 laps fwd      Walking with tidal tank  5 laps fwd                                      Ther Ex                                                                        Ther Activity                        Gait Training        treadmill  Solo step - no UE 1.0 mph - 5 min without UE ,5 min with UE              Modalities

## 2024-08-01 ENCOUNTER — OFFICE VISIT (OUTPATIENT)
Dept: UROLOGY | Facility: CLINIC | Age: 69
End: 2024-08-01
Payer: MEDICARE

## 2024-08-01 VITALS
HEIGHT: 68 IN | SYSTOLIC BLOOD PRESSURE: 150 MMHG | HEART RATE: 57 BPM | BODY MASS INDEX: 43.8 KG/M2 | DIASTOLIC BLOOD PRESSURE: 90 MMHG | OXYGEN SATURATION: 97 % | WEIGHT: 289 LBS

## 2024-08-01 DIAGNOSIS — N32.81 OAB (OVERACTIVE BLADDER): Primary | ICD-10-CM

## 2024-08-01 DIAGNOSIS — C61 PROSTATE CANCER (HCC): ICD-10-CM

## 2024-08-01 LAB
POST-VOID RESIDUAL VOLUME, ML POC: 43 ML
SL AMB  POCT GLUCOSE, UA: NORMAL
SL AMB LEUKOCYTE ESTERASE,UA: NORMAL
SL AMB POCT BILIRUBIN,UA: NORMAL
SL AMB POCT BLOOD,UA: NORMAL
SL AMB POCT CLARITY,UA: CLEAR
SL AMB POCT COLOR,UA: YELLOW
SL AMB POCT KETONES,UA: NORMAL
SL AMB POCT NITRITE,UA: NORMAL
SL AMB POCT PH,UA: 5
SL AMB POCT SPECIFIC GRAVITY,UA: 1.03
SL AMB POCT URINE PROTEIN: NORMAL
SL AMB POCT UROBILINOGEN: 0.2

## 2024-08-01 PROCEDURE — 99213 OFFICE O/P EST LOW 20 MIN: CPT | Performed by: PHYSICIAN ASSISTANT

## 2024-08-01 PROCEDURE — 51798 US URINE CAPACITY MEASURE: CPT | Performed by: PHYSICIAN ASSISTANT

## 2024-08-01 PROCEDURE — 81002 URINALYSIS NONAUTO W/O SCOPE: CPT | Performed by: PHYSICIAN ASSISTANT

## 2024-08-01 NOTE — PROGRESS NOTES
UROLOGY PROGRESS NOTE   Patient Identifiers: Marcellus Fregoso (MRN [id number])  Date of Service: 8/1/2024    Subjective:   68-year-old man history of prostate cancer.  He had a robotic prostatectomy by Dr. Tay 2011.  PSA remains undetectable.  Recently diagnosed with Parkinson's.  He does have some incontinence.  He had been on trospium twice a day which she has now restarted.  I also recommended he resume Kegel exercises.  He has poor mobility.    Reason for visit: Prostate cancer follow-up    Objective:     VITALS:    Vitals:    08/01/24 1421   BP: 150/90   Pulse: 57   SpO2: 97%           LABS:  Lab Results   Component Value Date    HGB 14.6 06/24/2024    HCT 43.0 06/24/2024    WBC 8.45 06/24/2024     06/24/2024   ]    Lab Results   Component Value Date     08/16/2015    K 4.9 06/24/2024     06/24/2024    CO2 29 06/24/2024    BUN 17 06/24/2024    CREATININE 0.88 06/24/2024    CALCIUM 9.6 06/24/2024    GLUCOSE 174 (H) 12/30/2020   ]        INPATIENT MEDS:    Current Outpatient Medications:     ALPRAZolam (XANAX) 0.25 mg tablet, Take 1 tablet (0.25 mg total) by mouth 3 (three) times a day as needed for anxiety, Disp: 270 tablet, Rfl: 0    amLODIPine (NORVASC) 5 mg tablet, Take 1 tablet (5 mg total) by mouth daily, Disp: 90 tablet, Rfl: 3    Blood Glucose Monitoring Suppl (OneTouch Verio) w/Device KIT, Use daily, Disp: 1 kit, Rfl: 0    carbidopa-levodopa (SINEMET)  mg per tablet, Take 1 tablet by mouth Three times a day, Disp: , Rfl:     ezetimibe (ZETIA) 10 mg tablet, Take 1 tablet (10 mg total) by mouth daily (Patient taking differently: Take 5 mg by mouth daily), Disp: 90 tablet, Rfl: 3    FLUoxetine (PROzac) 20 mg capsule, Take 40 mg by mouth daily Alternated between 20 mg and 40 mg daily, Disp: , Rfl:     Lancet Devices (ONE TOUCH DELICA LANCING DEV) MISC, Use daily, Disp: 1 each, Rfl: 0    losartan (COZAAR) 100 MG tablet, Take 1 tablet (100 mg total) by mouth daily, Disp: 90 tablet,  "Rfl: 3    metFORMIN (GLUCOPHAGE) 500 mg tablet, TAKE 2 TABLETS TWICE A DAY WITH MEALS, Disp: 360 tablet, Rfl: 3    metoprolol succinate (TOPROL-XL) 25 mg 24 hr tablet, Take 1 tablet (25 mg total) by mouth daily (Patient taking differently: Take 50 mg by mouth daily), Disp: 90 tablet, Rfl: 3    Multiple Vitamins-Minerals (CENTRUM ADULTS PO), Take by mouth in the morning, Disp: , Rfl:     OneTouch Delica Lancets 33G MISC, Use daily, Disp: 300 each, Rfl: 1    OneTouch Verio test strip, USE TO TEST DAILY, Disp: 100 strip, Rfl: 3    pantoprazole (PROTONIX) 40 mg tablet, TAKE 1 TABLET TWICE A DAY (1 TABLET IN THE MORNING AND 1 TABLET IN THE EVENING), Disp: 180 tablet, Rfl: 1    QUEtiapine (SEROquel) 50 mg tablet, Take 25 mg by mouth daily at bedtime, Disp: , Rfl:     rosuvastatin (CRESTOR) 5 mg tablet, TAKE 1 TABLET ONCE A WEEK, Disp: 12 tablet, Rfl: 3    semaglutide, 2 mg/dose, (Ozempic, 2 MG/DOSE,) 8 mg/ mL injection pen, INJECT 2 MG UNDER THE SKIN EVERY 7 DAYS, Disp: 9 mL, Rfl: 1    famotidine (PEPCID) 40 MG tablet, Take 1 tablet (40 mg total) by mouth daily at bedtime (Patient not taking: Reported on 7/19/2024), Disp: 30 tablet, Rfl: 3    selegiline (ELDEPRYL) 5 mg capsule, Take 5 mg by mouth as needed (Patient not taking: Reported on 5/30/2024), Disp: , Rfl:       Physical Exam:   /90 (BP Location: Left arm, Patient Position: Sitting, Cuff Size: Adult)   Pulse 57   Ht 5' 8\" (1.727 m)   Wt 131 kg (289 lb)   SpO2 97%   BMI 43.94 kg/m²   GEN: no acute distress    RESP: breathing comfortably with no accessory muscle use    ABD: soft, non-tender, non-distended   INCISION:    EXT: no significant peripheral edema       RADIOLOGY:   none     Assessment:   #1.  Prostate cancer  #2.  Incontinence    Plan:   -Follow-up 1 year with PSA prior to visit  -Trospium twice a day  -Kegel exercises  -          "

## 2024-08-01 NOTE — PROGRESS NOTES
S/p [facility] - Outpatient Follow-up Note  Marcellus Fregoso 68 y.o. male MRN: [id number]  Encounter: [id number]          ASSESSMENT AND PLAN:      1. Molina's esophagus with dysplasia  2.Gastroesophageal Reflux Disease   Status post multiple EGD with ablation  He is due for surveillance EGD.  Will do RFA if there is residual Molina's esophagus  Continue reflux precaution   Continue pantoprazole 40 mg twice daily  His EGD was be scheduled in [place] room 3 or 4 under general anesthesia    3. Obesity, Class III, BMI 40-49.9 (morbid obesity) ([facility])  4.Type II DM   On Ozempic   Counseled on not stopping Ozempic 2 weeks prior to his procedure    ______________________________________________________________________    SUBJECTIVE:    68-year-old male with long history of gastroesophageal reflux disease, C8M8 Molina's esophagus status post multiple EGD with radiofrequency ablation here for surveillance EGD consult.  Last EGD was in June 2023.  C0M2 Molina's esophagus status post radiofrequency ablation.      He canceled his EGD due to cardiac condition -he was noted to have PVCs  He is on pantoprazole 40 mg twice daily  He reports intermittent reflux otherwise overall doing well    Status post gastric lap band removal     up-to-date with his colonoscopy.  Last colonoscopy was in March 2021 by Dr. Hardy      REVIEW OF SYSTEMS IS OTHERWISE NEGATIVE.      Historical Information   Past Medical History:   Diagnosis Date    Acute blood loss anemia 10/13/2016    Anxiety     Arthritis     knees    Arthritis     Asthma     stable for > 10 years    Molina esophagus     with BARRX/RFA    Cancer (HCC)     prostate    Cataract     Depression     Diabetes (HCC)     Diabetes mellitus (HCC)     pre diabetes    Diverticulitis of colon     Environmental allergies     Fall 12/30/2020    GERD (gastroesophageal reflux disease)     Hiatal hernia     History of anal fissures     Hyperlipidemia     Hypertension      Incisional hernia     Insomnia     Kidney stone     Malignant neoplasm of prostate (HCC)     Morbid obesity (HCC)     Peripheral neuropathy     PONV (postoperative nausea and vomiting)     Prolapsing mitral valve     prolapsing mitral valve leaflet syndrome    Prostate cancer (HCC)     Retinal detachment     treated surgically at [facility]; resolved: 10/10/2012    Sleep apnea     diagnosed but unable to tolerate cpap.     Stuttering      Past Surgical History:   Procedure Laterality Date    ARTHROSCOPIC REPAIR ACL Right     BIOPSY CORE NEEDLE      prostate    CATARACT EXTRACTION Bilateral     COLONOSCOPY      HERNIA REPAIR      [facility]    JOINT REPLACEMENT      B/L knee    KNEE ARTHROSCOPY Left     LAPAROSCOPIC GASTRIC BANDING      WI ARTHRP KNE CONDYLE&PLATU MEDIAL&LAT COMPARTMENTS Left 02/15/2017    Procedure: TOTAL KNEE ARTHROPLASTY ;  Surgeon: Sal Ross MD;  Location: [facility];  Service: Orthopedics    WI ARTHRP KNE CONDYLE&PLATU MEDIAL&LAT COMPARTMENTS Right 10/10/2016    Procedure: ARTHROPLASTY KNEE TOTAL;  Surgeon: Sal Ross MD;  Location: [facility];  Service: Orthopedics    PROSTATECTOMY      robotic-assisted; JOHNNYBPamela Tay    REMOVAL GASTRIC BAND LAPAROSCOPIC N/A 8/30/2022    Procedure: LAPAROSCOPIC REMOVAL OF ADJUSTABLE GASTRIC BAND AND PORT  WITH INTRAOPERATIVE EGD;  Surgeon: Neal Yang MD;  Location: [facility];  Service: Bariatrics    RETINAL DETACHMENT SURGERY Bilateral     Lemus Eye    SEPTOPLASTY      SINUS SURGERY      TONSILLECTOMY      over age 10    UPPER GASTROINTESTINAL ENDOSCOPY      mutiple with BARRX/RFA    VASECTOMY      vas deferens     Social History   Social History     Substance and Sexual Activity   Alcohol Use Not Currently    Alcohol/week: 1.0 standard drink of alcohol    Types: 1 Cans of beer per week    Comment: 1 case of beer per year     Social History     Substance and Sexual Activity   Drug Use Never     Social History     Tobacco Use   Smoking Status  "Never    Passive exposure: Never   Smokeless Tobacco Never   Tobacco Comments    quit 38 years ago     Family History   Problem Relation Age of Onset    Heart disease Father     Coronary artery disease Father     Prostate cancer Father     Coronary artery disease Mother     Diabetes Mother         mellitus    Diabetes type II Mother     Diabetes Maternal Grandmother         mellitus    Coronary artery disease Maternal Grandfather     Coronary artery disease Paternal Grandfather        Meds/Allergies       Current Outpatient Medications:     ALPRAZolam (XANAX) 0.25 mg tablet    amLODIPine (NORVASC) 5 mg tablet    Blood Glucose Monitoring Suppl (OneTouch Verio) w/Device KIT    carbidopa-levodopa (SINEMET)  mg per tablet    ezetimibe (ZETIA) 10 mg tablet    famotidine (PEPCID) 40 MG tablet    FLUoxetine (PROzac) 20 mg capsule    Lancet Devices (ONE TOUCH DELICA LANCING DEV) MISC    losartan (COZAAR) 100 MG tablet    metFORMIN (GLUCOPHAGE) 500 mg tablet    metoprolol succinate (TOPROL-XL) 25 mg 24 hr tablet    Multiple Vitamins-Minerals (CENTRUM ADULTS PO)    OneTouch Delica Lancets 33G MISC    OneTouch Verio test strip    pantoprazole (PROTONIX) 40 mg tablet    QUEtiapine (SEROquel) 50 mg tablet    rosuvastatin (CRESTOR) 5 mg tablet    selegiline (ELDEPRYL) 5 mg capsule    semaglutide, 2 mg/dose, (Ozempic, 2 MG/DOSE,) 8 mg/ mL injection pen    Allergies   Allergen Reactions    Celebrex [Celecoxib] Other (See Comments)     Cardiologist stated he should not take      Chlorhexidine Hives     Is able to wash with hibiclens but not use michi cloths    Other      \"steroid eyedrops\"      Prednisone Other (See Comments)     Prednisone eye drops- eye pressure increases           Objective     There were no vitals taken for this visit. There is no height or weight on file to calculate BMI.      PHYSICAL EXAM:      General Appearance:   Alert, cooperative, no distress   HEENT:   Normocephalic, atraumatic, anicteric.   "   Neck:  Supple, symmetrical, trachea midline   Lungs:   Clear to auscultation bilaterally; no rales, rhonchi or wheezing; respirations unlabored    Heart::   Regular rate and rhythm; no murmur, rub, or gallop.   Abdomen:   Soft, non-tender, non-distended; normal bowel sounds; no masses, no organomegaly    Genitalia:   Deferred    Rectal:   Deferred    Extremities:  No cyanosis, clubbing or edema    Pulses:  2+ and symmetric    Skin:  No jaundice, rashes, or lesions    Lymph nodes:  No palpable cervical lymphadenopathy        Lab Results:   No visits with results within 1 Day(s) from this visit.   Latest known visit with results is:   Office Visit on 08/01/2024   Component Date Value    LEUKOCYTE ESTERASE,UA 08/01/2024 -     NITRITE,UA 08/01/2024 -     SL AMB POCT UROBILINOGEN 08/01/2024 0.2     POCT URINE PROTEIN 08/01/2024 -      PH,UA 08/01/2024 5.0     BLOOD,UA 08/01/2024 -     SPECIFIC GRAVITY,UA 08/01/2024 1.030     KETONES,UA 08/01/2024 -     BILIRUBIN,UA 08/01/2024 -     GLUCOSE, UA 08/01/2024 -      COLOR,UA 08/01/2024 yellow     CLARITY,UA 08/01/2024 clear     POST-VOID RESIDUAL VOLUM* 08/01/2024 43          Radiology Results:   Stress strip    Result Date: 7/26/2024  Narrative: Confirmed by NIXON SHEEHAN ([id number]),  Evelyn Benitez ([id number]) on 7/26/2024 10:24:00 AM    NM myocardial perfusion spect (rx stress and/or rest)    Result Date: 7/25/2024  Narrative:   Stress ECG: A pharmacological stress test was performed using regadenoson. The patient and had a maximal HR of 80 bpm (53% of MPHR) and 1.0 METS. The patient experienced no angina during the test. The patient reached the end of the protocol. The patient reported feeling drained/exhausted and mild dizziness during the stress test. Symptoms began during stress and ended during recovery.   Stress ECG: No ST deviation is noted. The ECG was not diagnostic due to pharmacological (vasodilator) stress. The stress ECG is equivocal for ischemia after  pharmacologic vasodilation.   Perfusion Defect Conclusion: There is no evidence of transient ischemic dilation (TID).   Stress Function: Left ventricular function post-stress is normal. Stress ejection fraction is 69%.   Perfusion: There is a left ventricular perfusion defect that is medium in size with mild reduction in uptake present in the mid inferior location(s) that is paradoxical with normal myocardial thickening. The defect appears to be an artifact caused by diaphragmatic activity and subdiaphragmatic activity.   Stress Combined Conclusion: There is image artifact, without diagnostic evidence for perfusion abnormality.

## 2024-08-02 ENCOUNTER — OFFICE VISIT (OUTPATIENT)
Dept: GASTROENTEROLOGY | Facility: CLINIC | Age: 69
End: 2024-08-02
Payer: MEDICARE

## 2024-08-02 ENCOUNTER — APPOINTMENT (OUTPATIENT)
Dept: PHYSICAL THERAPY | Facility: CLINIC | Age: 69
End: 2024-08-02
Payer: MEDICARE

## 2024-08-02 VITALS
HEIGHT: 68 IN | WEIGHT: 289 LBS | TEMPERATURE: 98.7 F | BODY MASS INDEX: 43.8 KG/M2 | SYSTOLIC BLOOD PRESSURE: 128 MMHG | DIASTOLIC BLOOD PRESSURE: 80 MMHG

## 2024-08-02 DIAGNOSIS — K22.719 BARRETT'S ESOPHAGUS WITH DYSPLASIA: Primary | ICD-10-CM

## 2024-08-02 DIAGNOSIS — R13.19 ESOPHAGEAL DYSPHAGIA: ICD-10-CM

## 2024-08-02 DIAGNOSIS — E66.01 OBESITY, CLASS III, BMI 40-49.9 (MORBID OBESITY) (HCC): ICD-10-CM

## 2024-08-02 PROCEDURE — 99214 OFFICE O/P EST MOD 30 MIN: CPT | Performed by: INTERNAL MEDICINE

## 2024-08-02 PROCEDURE — G2211 COMPLEX E/M VISIT ADD ON: HCPCS | Performed by: INTERNAL MEDICINE

## 2024-08-06 ENCOUNTER — OFFICE VISIT (OUTPATIENT)
Dept: PHYSICAL THERAPY | Facility: CLINIC | Age: 69
End: 2024-08-06
Payer: MEDICARE

## 2024-08-06 DIAGNOSIS — R26.2 AMBULATORY DYSFUNCTION: ICD-10-CM

## 2024-08-06 DIAGNOSIS — G20.A2 PARKINSON'S DISEASE WITHOUT DYSKINESIA, WITH FLUCTUATING MANIFESTATIONS: Primary | ICD-10-CM

## 2024-08-06 PROCEDURE — 97112 NEUROMUSCULAR REEDUCATION: CPT | Performed by: PHYSICAL THERAPIST

## 2024-08-07 ENCOUNTER — OFFICE VISIT (OUTPATIENT)
Dept: CARDIOLOGY CLINIC | Facility: CLINIC | Age: 69
End: 2024-08-07
Payer: MEDICARE

## 2024-08-07 VITALS
WEIGHT: 295.2 LBS | SYSTOLIC BLOOD PRESSURE: 136 MMHG | HEART RATE: 64 BPM | DIASTOLIC BLOOD PRESSURE: 76 MMHG | HEIGHT: 68 IN | BODY MASS INDEX: 44.74 KG/M2

## 2024-08-07 DIAGNOSIS — I49.1 PAC (PREMATURE ATRIAL CONTRACTION): Primary | ICD-10-CM

## 2024-08-07 PROCEDURE — 93000 ELECTROCARDIOGRAM COMPLETE: CPT | Performed by: INTERNAL MEDICINE

## 2024-08-07 PROCEDURE — 97150 GROUP THERAPEUTIC PROCEDURES: CPT | Performed by: PHYSICAL THERAPIST

## 2024-08-07 PROCEDURE — 99204 OFFICE O/P NEW MOD 45 MIN: CPT | Performed by: INTERNAL MEDICINE

## 2024-08-07 RX ORDER — TROSPIUM CHLORIDE 20 MG/1
20 TABLET, FILM COATED ORAL 2 TIMES DAILY
COMMUNITY

## 2024-08-07 NOTE — PROGRESS NOTES
HEART AND VASCULAR  CARDIAC ELECTROPHYSIOLOGY   HEART RHYTHM CENTER  [facility]    Outpatient New Consult    Today's Date: 08/07/24        Patient name: Marcellus Fregoso  YOB: 1955  Sex: male         Chief Complaint: for PACs      ASSESSMENT:  Problem List Items Addressed This Visit    None  Visit Diagnoses       PAC (premature atrial contraction)    -  Primary    Relevant Orders    POCT ECG          69 yo male  1) Frequent PACs (41% burden) on Zio, minimal symptoms. Dr Haley increased metoprolol. No PAC on EKG today.  Has had PAC bigeminy in past.  Normal echo. Neg nuclear stress  2) Parkinsons on sinemet  3) HTN well controlled  4_ Frequent falls      PLAN:  No further EP workup/treatment warranted, asymptomatic PACs seem better controlled on metop.    F/u prn        Orders Placed This Encounter   Procedures    POCT ECG     There are no discontinued medications.      .............................................................................................    HPI/Subjective:   Marcellus is 69 yo male. He has Parkinsons. He fell and broke ribs, was mechanical fall. Lead to Zio monitor which shed 43% PAC burden and also has atrial bigeminy and brief SVT on Zio, but no symptoms. Dr Haley increased metoprolol. No PAC on EKG or rhythm strip today.   He feels ok. NO CP/ no SOB> Nuclear stress and echo were normal.  He falls frequently and Parkinsons is main issue.    Please note HPI is listed by problem with with update following it, it is copied again in the assessment above and reflects medical decision making as well.       Complete 12 point ROS reviewed and otherwise non pertinent or negative except as per HPI pertinent positives in Cardiovascular and Respiratory emphasized. Please see paper chart for outpatient clinic patients where the patient completed the 12 point ROS survey.           Past Medical History:   Diagnosis Date    Acute blood loss anemia 10/13/2016    Anxiety      "Arthritis     knees    Arthritis     Asthma     stable for > 10 years    [place] esophagus     with BARRX/RFA    Cancer (HCC)     prostate    Cataract     Depression     Diabetes (HCC)     Diabetes mellitus (HCC)     pre diabetes    Diverticulitis of colon     Environmental allergies     Fall 12/30/2020    GERD (gastroesophageal reflux disease)     Hiatal hernia     History of anal fissures     Hyperlipidemia     Hypertension     Incisional hernia     Insomnia     Kidney stone     Malignant neoplasm of prostate (HCC)     Morbid obesity (HCC)     Peripheral neuropathy     PONV (postoperative nausea and vomiting)     Prolapsing mitral valve     prolapsing mitral valve leaflet syndrome    Prostate cancer (HCC)     Retinal detachment     treated surgically at [facility]; resolved: 10/10/2012    Sleep apnea     diagnosed but unable to tolerate cpap.     Stuttering        Allergies   Allergen Reactions    Celebrex [Celecoxib] Other (See Comments)     Cardiologist stated he should not take      Chlorhexidine Hives     Is able to wash with hibiclens but not use michi cloths    Other      \"steroid eyedrops\"      Prednisone Other (See Comments)     Prednisone eye drops- eye pressure increases     I reviewed the Home Medication list and Allergies in the chart.   Scheduled Meds:  Current Outpatient Medications   Medication Sig Dispense Refill    ALPRAZolam (XANAX) 0.25 mg tablet Take 1 tablet (0.25 mg total) by mouth 3 (three) times a day as needed for anxiety 270 tablet 0    amLODIPine (NORVASC) 5 mg tablet Take 1 tablet (5 mg total) by mouth daily 90 tablet 3    Blood Glucose Monitoring Suppl (OneTouch Verio) w/Device KIT Use daily 1 kit 0    carbidopa-levodopa (SINEMET)  mg per tablet Take 1 tablet by mouth Three times a day      ezetimibe (ZETIA) 10 mg tablet Take 1 tablet (10 mg total) by mouth daily (Patient taking differently: Take 5 mg by mouth daily) 90 tablet 3    famotidine (PEPCID) 40 MG tablet Take 1 tablet " (40 mg total) by mouth daily at bedtime 30 tablet 3    FLUoxetine (PROzac) 20 mg capsule Take 40 mg by mouth daily Alternated between 20 mg and 40 mg daily      Lancet Devices (ONE TOUCH DELICA LANCING DEV) MISC Use daily 1 each 0    losartan (COZAAR) 100 MG tablet Take 1 tablet (100 mg total) by mouth daily 90 tablet 3    metFORMIN (GLUCOPHAGE) 500 mg tablet TAKE 2 TABLETS TWICE A DAY WITH MEALS 360 tablet 3    metoprolol succinate (TOPROL-XL) 25 mg 24 hr tablet Take 1 tablet (25 mg total) by mouth daily (Patient taking differently: Take 50 mg by mouth daily) 90 tablet 3    Multiple Vitamins-Minerals (CENTRUM ADULTS PO) Take by mouth in the morning      OneTouch Delica Lancets 33G MISC Use daily 300 each 1    OneTouch Verio test strip USE TO TEST DAILY 100 strip 3    pantoprazole (PROTONIX) 40 mg tablet TAKE 1 TABLET TWICE A DAY (1 TABLET IN THE MORNING AND 1 TABLET IN THE EVENING) 180 tablet 1    QUEtiapine (SEROquel) 50 mg tablet Take 25 mg by mouth daily at bedtime      rosuvastatin (CRESTOR) 5 mg tablet TAKE 1 TABLET ONCE A WEEK 12 tablet 3    semaglutide, 2 mg/dose, (Ozempic, 2 MG/DOSE,) 8 mg/ mL injection pen INJECT 2 MG UNDER THE SKIN EVERY 7 DAYS 9 mL 1    trospium chloride (SANCTURA) 20 mg tablet Take 20 mg by mouth 2 (two) times a day      selegiline (ELDEPRYL) 5 mg capsule Take 5 mg by mouth as needed (Patient not taking: Reported on 8/7/2024)       No current facility-administered medications for this visit.     PRN Meds:.        Family History   Problem Relation Age of Onset    Heart disease Father     Coronary artery disease Father     Prostate cancer Father     Coronary artery disease Mother     Diabetes Mother         mellitus    Diabetes type II Mother     Diabetes Maternal Grandmother         mellitus    Coronary artery disease Maternal Grandfather     Coronary artery disease Paternal Grandfather        Social History     Socioeconomic History    Marital status: /Civil Union     Spouse  name: Not on file    Number of children: 0    Years of education: Not on file    Highest education level: Not on file   Occupational History    Occupation:    Tobacco Use    Smoking status: Never     Passive exposure: Never    Smokeless tobacco: Never    Tobacco comments:     quit 38 years ago   Vaping Use    Vaping status: Never Used   Substance and Sexual Activity    Alcohol use: Not Currently     Alcohol/week: 1.0 standard drink of alcohol     Types: 1 Cans of beer per week     Comment: 1 case of beer per year    Drug use: Never    Sexual activity: Yes     Partners: Female     Birth control/protection: Inserts, Post-menopausal, Male Sterilization   Other Topics Concern    Not on file   Social History Narrative    ** Merged History Encounter **          Social Determinants of Health     Financial Resource Strain: Low Risk  (5/27/2021)    Overall Financial Resource Strain (CARDIA)     Difficulty of Paying Living Expenses: Not hard at all   Food Insecurity: No Food Insecurity (5/8/2024)    Hunger Vital Sign     Worried About Running Out of Food in the Last Year: Never true     Ran Out of Food in the Last Year: Never true   Transportation Needs: No Transportation Needs (5/8/2024)    PRAPARE - Transportation     Lack of Transportation (Medical): No     Lack of Transportation (Non-Medical): No   Physical Activity: Insufficiently Active (11/7/2019)    Exercise Vital Sign     Days of Exercise per Week: 1 day     Minutes of Exercise per Session: 50 min   Stress: No Stress Concern Present (11/7/2019)    [employer] [place] [employer] - Occupational Stress Questionnaire     Feeling of Stress : Not at all   Social Connections: Moderately Isolated (11/7/2019)    Social Connection and Isolation Panel [NHANES]     Frequency of Communication with Friends and Family: Three times a week     Frequency of Social Gatherings with Friends and Family: More than three times a week     Attends Hoahaoism Services:  "Never     Active Member of Clubs or Organizations: No     Attends Club or Organization Meetings: Never     Marital Status:    Intimate Partner Violence: Not At Risk (11/7/2019)    Humiliation, Afraid, Rape, and Kick questionnaire     Fear of Current or Ex-Partner: No     Emotionally Abused: No     Physically Abused: No     Sexually Abused: No   Housing Stability: Low Risk  (5/8/2024)    Housing Stability Vital Sign     Unable to Pay for Housing in the Last Year: No     Number of Times Moved in the Last Year: 1     Homeless in the Last Year: No         OBJECTIVE:    /76 (BP Location: Right arm, Patient Position: Sitting, Cuff Size: Large)   Pulse 64   Ht 5' 8\" (1.727 m)   Wt 134 kg (295 lb 3.2 oz)   BMI 44.89 kg/m²   Vitals:    08/07/24 1507   Weight: 134 kg (295 lb 3.2 oz)     GEN: No acute distress, Alert and oriented, well appearing  HEENT:External ears normal, oral pharynx clear, mucous membranes moist  EYES: Pupils equal, sclera anicteric, midline, normal conjuctiva  NECK: No JVD, supple, no obvious masses or thryomegaly or goiter  CARDIOVASCULAR:  RRR, No murmur, rub, gallops S1,S2  LUNGS: Clear To auscultation bilaterally, normal effort, no rales, rhonchi, crackles   ABDOMEN:  nondistended,  without obvious organomegaly or ascites  EXTREMITIES/VASCULAR:  No edema. warm an well perfused.  PSYCH: Normal Affect,  linear speech pattern without evidence of psychosis.   NEURO: Grossly intact, moving all extremiteis equal, face symmetric, alert and responsive, no obvious focal defecits   GAIT:  Ambulates normally without difficulty  HEME: No bleeding, bruising, petechia, purpura   SKIN: No significant rashes on visibile skin, warm, no diaphoresis or pallor.     Lab Results:       LABS:      Chemistry        Component Value Date/Time     08/16/2015 1007    K 4.9 06/24/2024 1522    K 3.8 08/16/2015 1007     06/24/2024 1522     (H) 08/16/2015 1007    CO2 29 06/24/2024 1522    CO2 30 " "12/30/2020 0047    BUN 17 06/24/2024 1522    BUN 15 08/16/2015 1007    CREATININE 0.88 06/24/2024 1522    CREATININE 1.03 08/16/2015 1007        Component Value Date/Time    CALCIUM 9.6 06/24/2024 1522    CALCIUM 9.0 08/16/2015 1007    ALKPHOS 69 06/24/2024 1522    ALKPHOS 63 05/04/2015 1124    AST 15 06/24/2024 1522    AST 23 05/04/2015 1124    ALT 8 06/24/2024 1522    ALT 33 05/04/2015 1124    BILITOT 0.60 05/04/2015 1124            Lab Results   Component Value Date    CHOL 171 08/16/2015    CHOL 187 05/04/2015    CHOL 171 04/28/2014     Lab Results   Component Value Date    HDL 53 06/24/2024    HDL 47 11/24/2023    HDL 43 04/20/2023     Lab Results   Component Value Date    LDLCALC 61 06/24/2024    LDLCALC 75 11/24/2023    LDLCALC 48 04/20/2023     Lab Results   Component Value Date    TRIG 50 06/24/2024    TRIG 50 11/24/2023    TRIG 70 04/20/2023     No results found for: \"CHOLHDL\"    IMAGING: Stress strip    Result Date: 7/26/2024  Narrative: Confirmed by NIXON SHEEHAN ([id number]),  Evelyn Benitez ([id number]) on 7/26/2024 10:24:00 AM    NM myocardial perfusion spect (rx stress and/or rest)    Result Date: 7/25/2024  Narrative:   Stress ECG: A pharmacological stress test was performed using regadenoson. The patient and had a maximal HR of 80 bpm (53% of MPHR) and 1.0 METS. The patient experienced no angina during the test. The patient reached the end of the protocol. The patient reported feeling drained/exhausted and mild dizziness during the stress test. Symptoms began during stress and ended during recovery.   Stress ECG: No ST deviation is noted. The ECG was not diagnostic due to pharmacological (vasodilator) stress. The stress ECG is equivocal for ischemia after pharmacologic vasodilation.   Perfusion Defect Conclusion: There is no evidence of transient ischemic dilation (TID).   Stress Function: Left ventricular function post-stress is normal. Stress ejection fraction is 69%.   Perfusion: There is a left " ventricular perfusion defect that is medium in size with mild reduction in uptake present in the mid inferior location(s) that is paradoxical with normal myocardial thickening. The defect appears to be an artifact caused by diaphragmatic activity and subdiaphragmatic activity.   Stress Combined Conclusion: There is image artifact, without diagnostic evidence for perfusion abnormality.        Cardiac testing:   Results for orders placed during the hospital encounter of 19    Echo complete with contrast if indicated    [place] [address]  [phone]    Transthoracic Echocardiogram  2D, M-mode, Doppler, and Color Doppler    Study date:  2019    Patient: DARRYL DEWEY  MR number: [id number]  Account number: [id number]  : 1955  Age: 63 years  Gender: Male  Status: Outpatient  Location: [address] [place] [address]  Height: 67 in  Weight: 323 lb  BP: 130/ 76 mmHg    Indications: essential hypertension    Diagnoses: I10. - Essential (primary) hypertension    Sonographer:  Henrietta Ridley RDCS  Primary Physician:  Sorin Ortiz MD  Referring Physician:  Taco Bragg MD  Group:  [facility]  Cardiology Fellow:  Pari Rodriguez MD  Interpreting Physician:  CAROLEE Montes De Oca MD    SUMMARY    LEFT VENTRICLE:  Systolic function was normal. Ejection fraction was estimated to be 65 %.  Doppler parameters were consistent with abnormal left ventricular relaxation (grade 1 diastolic dysfunction).    RIGHT VENTRICLE:  The size was normal.  Systolic function was normal.    COMPARISONS:  There has been no significant interval change. Comparison was made with the previous study of 19-Oct-2017.    HISTORY: PRIOR HISTORY: hypertension, hyperlipidemia, PVC's, DM    PROCEDURE: The study was performed in the [address] [place] [address]. This was a routine study. The transthoracic approach was  used. The study included complete 2D imaging, M-mode, complete spectral Doppler, and color Doppler. The  heart rate was 68 bpm, at the start of the study. Images were obtained from the parasternal, apical, subcostal, and suprasternal notch acoustic windows. Image quality was adequate.    LEFT VENTRICLE: Size was normal. Systolic function was normal. Ejection fraction was estimated to be 65 %. There were no regional wall motion abnormalities. Wall thickness was normal. There was no evidence of concentric hypertrophy.  DOPPLER: Doppler parameters were consistent with abnormal left ventricular relaxation (grade 1 diastolic dysfunction).    RIGHT VENTRICLE: The size was normal. Systolic function was normal.    LEFT ATRIUM: The atrium was mildly dilated.    RIGHT ATRIUM: Size was normal.    MITRAL VALVE: Valve structure was normal. There was normal leaflet separation. DOPPLER: The transmitral velocity was within the normal range. There was no evidence for stenosis. There was trace regurgitation.    AORTIC VALVE: The valve was trileaflet. Leaflets exhibited normal cuspal separation and sclerosis. DOPPLER: Transaortic velocity was within the normal range. There was no evidence for stenosis. There was no regurgitation.    TRICUSPID VALVE: The valve structure was normal. There was normal leaflet separation. DOPPLER: The transtricuspid velocity was within the normal range. There was no evidence for stenosis. There was trace regurgitation. The tricuspid jet  envelope definition was inadequate for estimation of RV systolic pressure.    PULMONIC VALVE: Leaflets exhibited normal thickness, no calcification, and normal cuspal separation. DOPPLER: The transpulmonic velocity was within the normal range. There was no evidence for stenosis. There was no regurgitation.    PERICARDIUM: There was no pericardial effusion. The pericardium was normal in appearance.    AORTA: The root exhibited normal size. The ascending aorta was normal in  size for body surface area at 4 cm (16 mm/m2).    SYSTEMIC VEINS: IVC: The inferior vena cava was normal in size and course. Respirophasic changes were normal.    SYSTEM MEASUREMENT TABLES    2D  %FS: 44.25 %  Ao Diam: 3.61 cm  EDV(Teich): 149.41 ml  EF(Cube): 82.67 %  EF(Teich): 74.93 %  ESV(Cube): 29.34 ml  ESV(Teich): 37.45 ml  IVSd: 1.15 cm  LA Area: 24.76 cm2  LA Diam: 4.25 cm  LVEDV MOD A4C: 157.35 ml  LVEF MOD A4C: 67.57 %  LVESV MOD A4C: 51.03 ml  LVIDd: 5.53 cm  LVIDs: 3.08 cm  LVLd A4C: 8.71 cm  LVLs A4C: 7.1 cm  LVPWd: 1.18 cm  RA Area: 17.62 cm2  RV Diam.: 3.14 cm  SV MOD A4C: 106.32 ml  SV(Cube): 139.96 ml  SV(Teich): 111.96 ml    MM  TAPSE: 2.17 cm    PW  AVC: 318.86 ms  E': 0.07 m/s  E/E': 8.26  MV A Luis Armando: 0.71 m/s  MV Dec [place]: 1.85 m/s2  MV DecT: 328.49 ms  MV E Luis Armando: 0.61 m/s  MV E/A Ratio: 0.86    [employer] Accredited Echocardiography Laboratory    Prepared and electronically signed by    CAROLEE Montes De Oca MD  Signed 26-Jul-2019 14:19:43    No results found for this or any previous visit.    No results found for this or any previous visit.    No results found for this or any previous visit.          I reviewed and interpreted the following LABS/EKG/TELE/IMAGING and below is summary of my interpretation (if data available):      Current EKG and Rhythm Strip:NSR 65bpm,     Zio strips reviewed and show frequent PAC, brief SVT, fairly normal other than a lot of PACs.

## 2024-08-09 ENCOUNTER — OFFICE VISIT (OUTPATIENT)
Dept: PHYSICAL THERAPY | Facility: CLINIC | Age: 69
End: 2024-08-09
Payer: MEDICARE

## 2024-08-09 DIAGNOSIS — R26.2 AMBULATORY DYSFUNCTION: ICD-10-CM

## 2024-08-09 DIAGNOSIS — G20.A2 PARKINSON'S DISEASE WITHOUT DYSKINESIA, WITH FLUCTUATING MANIFESTATIONS: Primary | ICD-10-CM

## 2024-08-09 PROCEDURE — 97110 THERAPEUTIC EXERCISES: CPT | Performed by: PHYSICAL THERAPIST

## 2024-08-09 PROCEDURE — 97112 NEUROMUSCULAR REEDUCATION: CPT | Performed by: PHYSICAL THERAPIST

## 2024-08-09 NOTE — PROGRESS NOTES
Daily Note     Today's date: 2024  Patient name: Marcellus Fregoso  : 1955  MRN: [id number]  Referring provider: Ehsan Calix MD  Dx:   Encounter Diagnosis     ICD-10-CM    1. Parkinson's disease without dyskinesia, with fluctuating manifestations  G20.A2       2. Ambulatory dysfunction  R26.2                      Subjective: Patient reports having knee pain, went to the gym and did hte Nustep and treadmill.       Objective: See treatment diary below      Assessment:Therapist encouarged pt to complete wieght training on the gym and not just cardio and he has reported improvements in the past with resistance training. Pt did well with resisted training this session with tidal tank when walking forward. Plan next session to progress as able.       Plan: Continue per plan of care.      Short Term Goal Expiration Date:(24)  Long Term Goal Expiration Date: (10/26/24)  POC Expiration Date: (10/26/24)      POC expires Unit limit Auth Expiration date PT/OT/ST + Visit Limit?   10/26/24 bomn bomn bomn                           Visit/Unit Tracking  AUTH Status:  Date  8            Used 1 2 3            Remaining                     Precautions fall risk       Manuals                                      Neuro Re-Ed         STS  With tidal tank 2x10 2x10 with tidal tank     hurdles  5 laps fwd 5 laps fwd 3# aw     Walking with tidal tank  5 laps fwd 5 laps                                      Ther Ex                                                                        Ther Activity                        Gait Training        treadmill  Solo step - no UE 1.0 mph - 5 min without UE ,5 min with UE Solo step 5 min 1.0 mph with UE             Modalities

## 2024-08-09 NOTE — PROGRESS NOTES
Daily Note     Today's date: 2024  Patient name: Marcellus Fregoso  : 1955  MRN: [id number]  Referring provider: Ehsan Calix MD  Dx:   Encounter Diagnosis     ICD-10-CM    1. Parkinson's disease without dyskinesia, with fluctuating manifestations  G20.A2       2. Ambulatory dysfunction  R26.2                      Subjective: Patient reports feeling OK today, no new changes      Objective: See treatment diary below      Assessment: Patient was very fatigued with activities this session and when fatigued loss of his balance forward more often. He will continue to bneefit from challenge of balance in forward direction as sessions continue.       Plan: Continue per plan of care.      Short Term Goal Expiration Date:(24)  Long Term Goal Expiration Date: (10/26/24)  POC Expiration Date: (10/26/24)      POC expires Unit limit Auth Expiration date PT/OT/ST + Visit Limit?   10/26/24 bomn bomn bomn                           Visit/Unit Tracking  AUTH Status:  Date            Used 1 2 3 4           Remaining                     Precautions fall risk       Manuals                                     Neuro Re-Ed         STS  With tidal tank 2x10 2x10 with tidal tank     hurdles  5 laps fwd 5 laps fwd 3# aw 5 laps fwd 3# aw fwd    Walking with tidal tank  5 laps fwd 5 laps  3# aw fwd, 5x2 laps                                     Ther Ex                                                                        Ther Activity                        Gait Training        treadmill  Solo step - no UE 1.0 mph - 5 min without UE ,5 min with UE Solo step 5 min 1.0 mph with UE             Modalities

## 2024-08-13 ENCOUNTER — APPOINTMENT (OUTPATIENT)
Dept: PHYSICAL THERAPY | Facility: CLINIC | Age: 69
End: 2024-08-13
Payer: MEDICARE

## 2024-08-14 ENCOUNTER — APPOINTMENT (OUTPATIENT)
Dept: PHYSICAL THERAPY | Facility: CLINIC | Age: 69
End: 2024-08-14
Payer: MEDICARE

## 2024-08-16 ENCOUNTER — OFFICE VISIT (OUTPATIENT)
Dept: PHYSICAL THERAPY | Facility: CLINIC | Age: 69
End: 2024-08-16
Payer: MEDICARE

## 2024-08-16 DIAGNOSIS — G20.A2 PARKINSON'S DISEASE WITHOUT DYSKINESIA, WITH FLUCTUATING MANIFESTATIONS: Primary | ICD-10-CM

## 2024-08-16 DIAGNOSIS — R26.2 AMBULATORY DYSFUNCTION: ICD-10-CM

## 2024-08-16 PROCEDURE — 97112 NEUROMUSCULAR REEDUCATION: CPT | Performed by: PHYSICAL THERAPIST

## 2024-08-16 NOTE — PROGRESS NOTES
Daily Note     Today's date: 2024  Patient name: Marcellus Fregoso  : 1955  MRN: [id number]  Referring provider: Ehasn Calix MD  Dx:   Encounter Diagnosis     ICD-10-CM    1. Parkinson's disease without dyskinesia, with fluctuating manifestations  G20.A2       2. Ambulatory dysfunction  R26.2                      Subjective: Patient reports to physical therapy with no new changes to report      Objective: See treatment diary below      Assessment: Patient had difficulty with stepping over hurdles. Focused on upright posture and  challenged pt with bolster throughout the session.       Plan: Continue per plan of care.      Short Term Goal Expiration Date:(24)  Long Term Goal Expiration Date: (10/26/24)  POC Expiration Date: (10/26/24)      POC expires Unit limit Auth Expiration date PT/OT/ST + Visit Limit?   10/26/24 bomn bomn bomn                           Visit/Unit Tracking  AUTH Status:  Date            Used 1 2 3 4           Remaining                     Precautions fall risk       Manuals  8                                    Neuro Re-Ed         STS  With tidal tank 2x10 2x10 with tidal tank     hurdles  5 laps fwd 5 laps fwd 3# aw 5 laps fwd 3# aw fwd    Walking with tidal tank  5 laps fwd 5 laps  3# aw fwd, 5x2 laps                                     Ther Ex                                                                        Ther Activity                        Gait Training        treadmill  Solo step - no UE 1.0 mph - 5 min without UE ,5 min with UE Solo step 5 min 1.0 mph with UE             Modalities

## 2024-08-20 ENCOUNTER — OFFICE VISIT (OUTPATIENT)
Dept: PHYSICAL THERAPY | Facility: CLINIC | Age: 69
End: 2024-08-20
Payer: MEDICARE

## 2024-08-20 DIAGNOSIS — R26.2 AMBULATORY DYSFUNCTION: ICD-10-CM

## 2024-08-20 DIAGNOSIS — G20.A2 PARKINSON'S DISEASE WITHOUT DYSKINESIA, WITH FLUCTUATING MANIFESTATIONS: Primary | ICD-10-CM

## 2024-08-20 DIAGNOSIS — G20.A1 PARKINSON'S DISEASE, UNSPECIFIED WHETHER DYSKINESIA PRESENT, UNSPECIFIED WHETHER MANIFESTATIONS FLUCTUATE: ICD-10-CM

## 2024-08-20 PROCEDURE — 97112 NEUROMUSCULAR REEDUCATION: CPT | Performed by: PHYSICAL THERAPIST

## 2024-08-20 PROCEDURE — 97150 GROUP THERAPEUTIC PROCEDURES: CPT | Performed by: PHYSICAL THERAPIST

## 2024-08-21 ENCOUNTER — OFFICE VISIT (OUTPATIENT)
Dept: SURGERY | Facility: CLINIC | Age: 69
End: 2024-08-21
Payer: MEDICARE

## 2024-08-21 VITALS
HEIGHT: 68 IN | SYSTOLIC BLOOD PRESSURE: 158 MMHG | DIASTOLIC BLOOD PRESSURE: 87 MMHG | HEART RATE: 64 BPM | WEIGHT: 295.8 LBS | BODY MASS INDEX: 44.83 KG/M2

## 2024-08-21 DIAGNOSIS — K43.9 VENTRAL HERNIA WITHOUT OBSTRUCTION OR GANGRENE: Primary | ICD-10-CM

## 2024-08-21 PROCEDURE — 99204 OFFICE O/P NEW MOD 45 MIN: CPT | Performed by: SURGERY

## 2024-08-21 RX ORDER — TRAZODONE HYDROCHLORIDE 50 MG/1
TABLET, FILM COATED ORAL
COMMUNITY
Start: 2024-08-14

## 2024-08-21 RX ORDER — ISTRADEFYLLINE 40 MG/1
TABLET, FILM COATED ORAL
COMMUNITY
Start: 2024-08-14

## 2024-08-21 NOTE — ASSESSMENT & PLAN NOTE
I told him I really needed a CT scan of the abdomen to evaluate things more fully.  Based on the size of this presumed hernia, I told him would most likely be an open operation I explained this briefly to him.  Will see what the CT scan shows and then discussed with him further.  I told him he is at much higher risk because of his obesity and the size of the hernia being somewhat larger

## 2024-08-23 ENCOUNTER — OFFICE VISIT (OUTPATIENT)
Dept: PHYSICAL THERAPY | Facility: CLINIC | Age: 69
End: 2024-08-23
Payer: MEDICARE

## 2024-08-23 DIAGNOSIS — R26.2 AMBULATORY DYSFUNCTION: ICD-10-CM

## 2024-08-23 DIAGNOSIS — G20.A2 PARKINSON'S DISEASE WITHOUT DYSKINESIA, WITH FLUCTUATING MANIFESTATIONS: Primary | ICD-10-CM

## 2024-08-23 PROCEDURE — 97112 NEUROMUSCULAR REEDUCATION: CPT | Performed by: PHYSICAL THERAPIST

## 2024-08-23 NOTE — PROGRESS NOTES
Daily Note     Today's date: 2024  Patient name: Marcellus Fregoso  : 1955  MRN: [id number]  Referring provider: Ehsan Calix MD  Dx:   Encounter Diagnosis     ICD-10-CM    1. Parkinson's disease without dyskinesia, with fluctuating manifestations  G20.A2       2. Ambulatory dysfunction  R26.2                      Subjective: Pt reports feeling really strong for two days after last session. Would like to do the same activities today.       Objective: See treatment diary below      Assessment: Pt was able to do more this session compared to last session. Plan next session to return to treadmill to increase overall endurance.       Plan: Continue per plan of care.      Short Term Goal Expiration Date:(24)  Long Term Goal Expiration Date: (10/26/24)  POC Expiration Date: (10/26/24)      POC expires Unit limit Auth Expiration date PT/OT/ST + Visit Limit?   10/26/24 bomn bomn bomn                           Visit/Unit Tracking  AUTH Status:  Date          Used 1 2 3 4 5 6         Remaining                     Precautions fall risk       Manuals                                    Neuro Re-Ed         STS 2x10  2x10 with tidal tank     hurdles 5 laps fwd 3# aw, 2 laps laterally   5 laps fwd 3# aw 5 laps fwd 3# aw fwd 5 laps fwd 3# aw, 2 laps laterally    Walking with tidal tank 3# aw fwd, 5x2 laps   5 laps  3# aw fwd, 5x2 laps  3# aw 5 laps x 1   HKM 3# aw - 2 laps    3# aw - 2 laps                           Ther Ex                                                                        Ther Activity                        Gait Training        treadmill   Solo step 5 min 1.0 mph with UE  Solo step 1.0 mph 10 min total with occassional standing on the side           Modalities

## 2024-08-23 NOTE — PROGRESS NOTES
Daily Note     Today's date: 2024  Patient name: Marcellus Fregoso  : 1955  MRN: [id number]  Referring provider: Ehsan Calix MD  Dx:   Encounter Diagnosis     ICD-10-CM    1. Parkinson's disease without dyskinesia, with fluctuating manifestations  G20.A2       2. Ambulatory dysfunction  R26.2       3. Parkinson's disease, unspecified whether dyskinesia present, unspecified whether manifestations fluctuate  G20.A1                      Subjective: Patient reports to physical therapy this date not feeling well and tired      Objective: See treatment diary below      Assessment: Despite patient not feeling well he was able to perform similarly trough the session. Continues to be most limited by fatigued throughout the session. Plan to attempt resisted walking at next session to attempt more push off through hip extensors.       Plan: Continue per plan of care.      Short Term Goal Expiration Date:(24)  Long Term Goal Expiration Date: (10/26/24)  POC Expiration Date: (10/26/24)      POC expires Unit limit Auth Expiration date PT/OT/ST + Visit Limit?   10/26/24 bomn bomn bomn                           Visit/Unit Tracking  AUTH Status:  Date           Used 1 2 3 4 5          Remaining                     Precautions fall risk       Manuals                                    Neuro Re-Ed         STS  With tidal tank 2x10 2x10 with tidal tank     hurdles  5 laps fwd 5 laps fwd 3# aw 5 laps fwd 3# aw fwd 5 laps fwd 3# aw, 2 laps laterally    Walking with tidal tank  5 laps fwd 5 laps  3# aw fwd, 5x2 laps  3# aw 5 laps x 1   HKM     3# aw - 2 laps                           Ther Ex                                                                        Ther Activity                        Gait Training        treadmill  Solo step - no UE 1.0 mph - 5 min without UE ,5 min with UE Solo step 5 min 1.0 mph with UE  Solo step 1.0 mph 10 min total with occassional standing on  the side           Modalities

## 2024-08-26 ENCOUNTER — APPOINTMENT (OUTPATIENT)
Dept: PHYSICAL THERAPY | Facility: CLINIC | Age: 69
End: 2024-08-26
Payer: MEDICARE

## 2024-08-26 ENCOUNTER — OFFICE VISIT (OUTPATIENT)
Dept: PODIATRY | Facility: CLINIC | Age: 69
End: 2024-08-26

## 2024-08-26 VITALS — HEIGHT: 68 IN | BODY MASS INDEX: 44.98 KG/M2 | RESPIRATION RATE: 18 BRPM

## 2024-08-26 DIAGNOSIS — E78.2 MIXED HYPERLIPIDEMIA: ICD-10-CM

## 2024-08-26 DIAGNOSIS — B35.1 ONYCHOMYCOSIS: ICD-10-CM

## 2024-08-26 DIAGNOSIS — E11.9 CONTROLLED TYPE 2 DIABETES MELLITUS WITHOUT COMPLICATION, WITHOUT LONG-TERM CURRENT USE OF INSULIN (HCC): Primary | ICD-10-CM

## 2024-08-26 PROCEDURE — NCFTCARE PR NON-COVERED FOOT CARE: Performed by: PODIATRIST

## 2024-08-26 RX ORDER — ROSUVASTATIN CALCIUM 5 MG/1
5 TABLET, COATED ORAL WEEKLY
Qty: 12 TABLET | Refills: 1 | Status: SHIPPED | OUTPATIENT
Start: 2024-08-26

## 2024-08-26 NOTE — PROGRESS NOTES
Patient presents for palliative diabetic footcare.  No acute disorder noted.  Pedal pulses are palpable.  Treatment consists of trimming of multiple mycotic toenails.  Patient is rescheduled in 4 months.

## 2024-08-27 ENCOUNTER — HOSPITAL ENCOUNTER (OUTPATIENT)
Dept: RADIOLOGY | Facility: HOSPITAL | Age: 69
Discharge: HOME | End: 2024-08-27
Attending: SURGERY
Payer: MEDICARE

## 2024-08-27 DIAGNOSIS — K43.9 VENTRAL HERNIA WITHOUT OBSTRUCTION OR GANGRENE: ICD-10-CM

## 2024-08-27 PROCEDURE — 74176 CT ABD & PELVIS W/O CONTRAST: CPT

## 2024-08-28 ENCOUNTER — OFFICE VISIT (OUTPATIENT)
Dept: PHYSICAL THERAPY | Facility: CLINIC | Age: 69
End: 2024-08-28
Payer: MEDICARE

## 2024-08-28 DIAGNOSIS — R26.2 AMBULATORY DYSFUNCTION: ICD-10-CM

## 2024-08-28 DIAGNOSIS — G20.A2 PARKINSON'S DISEASE WITHOUT DYSKINESIA, WITH FLUCTUATING MANIFESTATIONS: Primary | ICD-10-CM

## 2024-08-28 PROCEDURE — 97112 NEUROMUSCULAR REEDUCATION: CPT | Performed by: PHYSICAL THERAPIST

## 2024-08-28 PROCEDURE — 97110 THERAPEUTIC EXERCISES: CPT | Performed by: PHYSICAL THERAPIST

## 2024-08-28 NOTE — PROGRESS NOTES
Daily Note     Today's date: 2024  Patient name: Marcellus Fregoso  : 1955  MRN: [id number]  Referring provider: Ehsan Calix MD  Dx:   Encounter Diagnosis     ICD-10-CM    1. Parkinson's disease without dyskinesia, with fluctuating manifestations  G20.A2       2. Ambulatory dysfunction  R26.2                      Subjective: Patient reports having a fall over the weekend      Objective: See treatment diary below      Assessment: Discussed the importance of either holding a walker or not reaching for things far out infront of him as it throws off his center of mass.       Plan: Continue per plan of care.      Short Term Goal Expiration Date:(24)  Long Term Goal Expiration Date: (10/26/24)  POC Expiration Date: (10/26/24)      POC expires Unit limit Auth Expiration date PT/OT/ST + Visit Limit?   10/26/24 bomn bomn bomn                           Visit/Unit Tracking  AUTH Status:  Date        Used 1 2 3 4 5 6 7 8       Remaining                     Precautions fall risk       Manuals                                    Neuro Re-Ed         STS 2x10 2x10 without UE 2x10 without UE     hurdles 5 laps fwd 3# aw, 2 laps laterally  5 laps fwd with 3# aw, 3 laps laterally -  5 laps fwd with 3# aw, 3 laps laterally -  5 laps fwd 3# aw fwd 5 laps fwd 3# aw, 2 laps laterally    Walking with tidal tank 3# aw fwd, 5x2 laps  With anterior pull solo step  With anterior pull solo step  3# aw fwd, 5x2 laps  3# aw 5 laps x 1   HKM 3# aw - 2 laps    3# aw - 2 laps                           Ther Ex                                                                        Ther Activity                        Gait Training        treadmill     Solo step 1.0 mph 10 min total with occassional standing on the side           Modalities

## 2024-09-04 ENCOUNTER — OFFICE VISIT (OUTPATIENT)
Dept: PHYSICAL THERAPY | Facility: CLINIC | Age: 69
End: 2024-09-04
Payer: MEDICARE

## 2024-09-04 DIAGNOSIS — R26.2 AMBULATORY DYSFUNCTION: ICD-10-CM

## 2024-09-04 DIAGNOSIS — G20.A2 PARKINSON'S DISEASE WITHOUT DYSKINESIA, WITH FLUCTUATING MANIFESTATIONS: Primary | ICD-10-CM

## 2024-09-04 PROCEDURE — 97112 NEUROMUSCULAR REEDUCATION: CPT | Performed by: PHYSICAL THERAPIST

## 2024-09-04 PROCEDURE — 97150 GROUP THERAPEUTIC PROCEDURES: CPT | Performed by: PHYSICAL THERAPIST

## 2024-09-04 NOTE — PROGRESS NOTES
Daily Note     Today's date: 2024  Patient name: Marcellus Fregoso  : 1955  MRN: [id number]  Referring provider: Ehsan Calix MD  Dx:   Encounter Diagnosis     ICD-10-CM    1. Parkinson's disease without dyskinesia, with fluctuating manifestations  G20.A2       2. Ambulatory dysfunction  R26.2                      Subjective: patient reports to physical therapy this date feeling well       Objective: See treatment diary below      Assessment: Pt tolerated 3# aw with hurdles well this session and was able to tolerate all laps without having to sit in between. He was able to tolerate 10 min on treadmill but on slow speed.       Plan: Continue per plan of care.      Short Term Goal Expiration Date:(24)  Long Term Goal Expiration Date: (10/26/24)  POC Expiration Date: (10/26/24)      POC expires Unit limit Auth Expiration date PT/OT/ST + Visit Limit?   10/26/24 bomn bomn bomn                           Visit/Unit Tracking  AUTH Status:  Date  8      Used 1 2 3 4 5 6 7 8 9      Remaining                     Precautions fall risk       Manuals  9                                    Neuro Re-Ed         STS 2x10 2x10 without UE 2x10 without UE     hurdles 5 laps fwd 3# aw, 2 laps laterally  5 laps fwd with 3# aw, 3 laps laterally -  5 laps fwd with 3# aw, 3 laps laterally -  6 hurdles with 3# aw 5 laps fwd, lat 4 laps    Walking with tidal tank 3# aw fwd, 5x2 laps  With anterior pull solo step  With anterior pull solo step  5 laps with 7.5# tidal tank solo step    HKM 3# aw - 2 laps                               Ther Ex                                                                        Ther Activity                        Gait Training        treadmill    1.0 mph with b/l UE 10 min solo step             Modalities

## 2024-09-09 ENCOUNTER — OFFICE VISIT (OUTPATIENT)
Dept: PHYSICAL THERAPY | Facility: CLINIC | Age: 69
End: 2024-09-09
Payer: MEDICARE

## 2024-09-09 DIAGNOSIS — R26.2 AMBULATORY DYSFUNCTION: ICD-10-CM

## 2024-09-09 DIAGNOSIS — G20.A2 PARKINSON'S DISEASE WITHOUT DYSKINESIA, WITH FLUCTUATING MANIFESTATIONS: Primary | ICD-10-CM

## 2024-09-09 PROCEDURE — 97112 NEUROMUSCULAR REEDUCATION: CPT | Performed by: PHYSICAL THERAPIST

## 2024-09-09 PROCEDURE — 97110 THERAPEUTIC EXERCISES: CPT | Performed by: PHYSICAL THERAPIST

## 2024-09-09 NOTE — PROGRESS NOTES
Progress Note     Today's date: 2024  Patient name: Marcellus Fregoso  : 1955  MRN: [id number]  Referring provider: Ehsan Calix MD  Dx:   Encounter Diagnosis     ICD-10-CM    1. Parkinson's disease without dyskinesia, with fluctuating manifestations  G20.A2       2. Ambulatory dysfunction  R26.2                      Subjective: Pt reports feeling like his endurance is better and that hurdles and treadmill really help him      Objective: See treatment diary below    Functional Outcomes 24  6mwt: 700 ft no AD  ABC:  Gait Speed: 1.04 m/s  FGA:  5x sts:    Functional Outcomes 24:  6 mwt: 3 min 20 seconds 400 ft   ABC:   Gait Speed: 0.92 m/s  FGA:  5x sts: 12 sec without UE     Functional Outcomes 4/10/24  6 mwt: 914 ft  ABC: 77%  Gait Speed: 1.06 m/s   FGA:   5x sts: 11.4    Assessment: Patient reports to physical therapy this date for progress update. At this time patient has improved significantly with overall endurance and gait efficiency per gait speed and 6 minute walk testing.       Plan: Continue per plan of care.      Short Term Goal Expiration Date:(24)  Long Term Goal Expiration Date: (10/26/24)  POC Expiration Date: (10/26/24)      POC expires Unit limit Auth Expiration date PT/OT/ST + Visit Limit?   10/26/24 bomn bomn bomn                           Visit/Unit Tracking  AUTH Status:  Date  8 8/      Used 1 2 3 4 5 6 7 8 9      Remaining                     Precautions fall risk       Manuals                                     Neuro Re-Ed         STS 2x10 2x10 without UE 2x10 without UE     hurdles 5 laps fwd 3# aw, 2 laps laterally  5 laps fwd with 3# aw, 3 laps laterally -  5 laps fwd with 3# aw, 3 laps laterally -  6 hurdles with 3# aw 5 laps fwd, lat 4 laps    Walking with tidal tank 3# aw fwd, 5x2 laps  With anterior pull solo step  With anterior pull solo step  5 laps with 7.5# tidal tank solo step    HKM 3# aw - 2  laps                               Ther Ex                                                                        Ther Activity                        Gait Training        treadmill    1.0 mph with b/l UE 10 min solo step             Modalities

## 2024-09-13 ENCOUNTER — APPOINTMENT (OUTPATIENT)
Dept: PHYSICAL THERAPY | Facility: CLINIC | Age: 69
End: 2024-09-13
Payer: MEDICARE

## 2024-09-18 ENCOUNTER — OFFICE VISIT (OUTPATIENT)
Dept: PHYSICAL THERAPY | Facility: CLINIC | Age: 69
End: 2024-09-18
Payer: MEDICARE

## 2024-09-18 ENCOUNTER — OFFICE VISIT (OUTPATIENT)
Dept: SURGERY | Facility: CLINIC | Age: 69
End: 2024-09-18
Payer: MEDICARE

## 2024-09-18 DIAGNOSIS — G20.A2 PARKINSON'S DISEASE WITHOUT DYSKINESIA, WITH FLUCTUATING MANIFESTATIONS (HCC): Primary | ICD-10-CM

## 2024-09-18 DIAGNOSIS — R26.2 AMBULATORY DYSFUNCTION: ICD-10-CM

## 2024-09-18 DIAGNOSIS — K43.9 VENTRAL HERNIA WITHOUT OBSTRUCTION OR GANGRENE: Primary | ICD-10-CM

## 2024-09-18 PROCEDURE — 99214 OFFICE O/P EST MOD 30 MIN: CPT | Performed by: SURGERY

## 2024-09-18 PROCEDURE — 97150 GROUP THERAPEUTIC PROCEDURES: CPT | Performed by: PHYSICAL THERAPIST

## 2024-09-18 PROCEDURE — 97112 NEUROMUSCULAR REEDUCATION: CPT | Performed by: PHYSICAL THERAPIST

## 2024-09-18 RX ORDER — HEPARIN SODIUM 5000 [USP'U]/ML
7500 INJECTION, SOLUTION INTRAVENOUS; SUBCUTANEOUS ONCE
OUTPATIENT
Start: 2024-09-18 | End: 2024-09-18

## 2024-09-18 NOTE — PROGRESS NOTES
Daily Note     Today's date: 2024  Patient name: Marcellus Fregoso  : 1955  MRN: [id number]  Referring provider: Ehsan Calix MD  Dx:   Encounter Diagnosis     ICD-10-CM    1. Parkinson's disease without dyskinesia, with fluctuating manifestations  G20.A2       2. Ambulatory dysfunction  R26.2                      Subjective: Pt reports no new changes since last session, feeling fine today      Objective: See treatment diary below      Assessment: Pt continues to be most limited by overall endurance. NO instances of falling forward and ctaching himself on the chair today and was better able to control his balance and posture when fatigued and ready to sit down.       Plan: Continue per plan of care.      Short Term Goal Expiration Date:(24)  Long Term Goal Expiration Date: (10/26/24)  POC Expiration Date: (10/26/24)      POC expires Unit limit Auth Expiration date PT/OT/ST + Visit Limit?   10/26/24 bomn bomn bomn                           Visit/Unit Tracking  AUTH Status:  Date      Used 1 2 3 4 5 6 7 8 9 10     Remaining                     Precautions fall risk       Manuals                                     Neuro Re-Ed         STS  2x10 without UE 2x10 without UE  2x10 without UE   hurdles  5 laps fwd with 3# aw, 3 laps laterally -  5 laps fwd with 3# aw, 3 laps laterally -  6 hurdles with 3# aw 5 laps fwd, lat 4 laps 6 hurdles with 5# aw 5 laps fwd, lat 4 laps   Walking with tidal tank  With anterior pull solo step  With anterior pull solo step  5 laps with 7.5# tidal tank solo step 5 laps with 10# tidal tank solo step   HKM                                Ther Ex                                                                        Ther Activity                        Gait Training        treadmill    1.0 mph with b/l UE 10 min solo step  1.4 mph 2 min, 1.0 mph up to 10 min solo step b/l UE with occassional quick stops for rest            Modalities

## 2024-09-18 NOTE — PROGRESS NOTES
Ambulatory Visit  Name: Marcellus Fregoso      : 1955      MRN: [id number]  Encounter Provider: Kenneth Mcfarland MD  Encounter Date: 2024   Encounter department: [facility] GENERAL SURGERY [place]    Assessment & Plan  Ventral hernia without obstruction or gangrene  I went over the CT findings with him showing that he does have a hernia and how big it was.  There is a loop of colon stuck in it.  I told him since he is symptomatic, it would be reasonable to repair this.  I told him with the multiple procedures and the size of this hernia, I would be most comfortable doing this open.  I explained the surgery in detail including risks, benefits, alternatives, and what to expect postoperatively.  He understands and is agreeable to go ahead.    Plan: Open incisional hernia repair possible component separation           History of Present Illness     Marcellus Fregoso is a 68 y.o. male who presents for follow-up status post CT scan to evaluate hernia further.  He still notices some discomfort at the hernia site.  No change in bowel or bladder habits however.      Review of Systems   Constitutional:  Negative for chills, fatigue and fever.   HENT:  Negative for congestion, ear pain, sneezing, trouble swallowing and voice change.    Eyes:  Negative for pain and discharge.   Respiratory:  Negative for cough, shortness of breath and wheezing.    Cardiovascular:  Negative for palpitations and leg swelling.   Gastrointestinal:  Negative for abdominal pain, constipation, diarrhea, nausea and vomiting.   Endocrine: Negative for cold intolerance, heat intolerance and polyuria.   Genitourinary:  Negative for decreased urine volume, difficulty urinating and dysuria.   Musculoskeletal:  Negative for arthralgias and back pain.   Skin:  Negative for rash and wound.   Allergic/Immunologic: Negative for environmental allergies and food allergies.   Neurological:  Positive for speech difficulty. Negative for dizziness and  weakness.        Tremors   Hematological:  Negative for adenopathy. Does not bruise/bleed easily.   Psychiatric/Behavioral:  Negative for confusion and sleep disturbance. The patient is not nervous/anxious.    All other systems reviewed and are negative.    Past Medical History   Past Medical History:   Diagnosis Date    Acute blood loss anemia 10/13/2016    Anxiety     Arthritis     knees    Arthritis     Asthma     stable for > 10 years    [place] esophagus     with BARRX/RFA    Cancer (HCC)     prostate    Cataract     Depression     Diabetes (HCC)     Diabetes mellitus (HCC)     pre diabetes    Diverticulitis of colon     Environmental allergies     Fall 12/30/2020    GERD (gastroesophageal reflux disease)     Hiatal hernia     History of anal fissures     Hyperlipidemia     Hypertension     Incisional hernia     Insomnia     Kidney stone     Malignant neoplasm of prostate (HCC)     Morbid obesity (HCC)     Peripheral neuropathy     PONV (postoperative nausea and vomiting)     Prolapsing mitral valve     prolapsing mitral valve leaflet syndrome    Prostate cancer (HCC)     Retinal detachment     treated surgically at [facility]; resolved: 10/10/2012    Sleep apnea     diagnosed but unable to tolerate cpap.     Stuttering      Past Surgical History:   Procedure Laterality Date    ARTHROSCOPIC REPAIR ACL Right     BIOPSY CORE NEEDLE      prostate    CATARACT EXTRACTION Bilateral     COLONOSCOPY      HERNIA REPAIR      naval    JOINT REPLACEMENT      B/L knee    KNEE ARTHROSCOPY Left     LAPAROSCOPIC GASTRIC BANDING      GA ARTHRP KNE CONDYLE&PLATU MEDIAL&LAT COMPARTMENTS Left 02/15/2017    Procedure: TOTAL KNEE ARTHROPLASTY ;  Surgeon: Sal Ross MD;  Location: [facility];  Service: Orthopedics    GA ARTHRP KNE CONDYLE&PLATU MEDIAL&LAT COMPARTMENTS Right 10/10/2016    Procedure: ARTHROPLASTY KNEE TOTAL;  Surgeon: Sal Ross MD;  Location: [facility];  Service: Orthopedics    PROSTATECTOMY       robotic-assisted; Lilly Tay    REMOVAL GASTRIC BAND LAPAROSCOPIC N/A 8/30/2022    Procedure: LAPAROSCOPIC REMOVAL OF ADJUSTABLE GASTRIC BAND AND PORT  WITH INTRAOPERATIVE EGD;  Surgeon: Neal Yang MD;  Location: [facility];  Service: Bariatrics    RETINAL DETACHMENT SURGERY Bilateral     Lemus Eye    SEPTOPLASTY      SINUS SURGERY      TONSILLECTOMY      over age 10    UPPER GASTROINTESTINAL ENDOSCOPY      mutiple with BARRX/RFA    VASECTOMY      vas deferens     Family History   Problem Relation Age of Onset    Heart disease Father     Coronary artery disease Father     Prostate cancer Father     Coronary artery disease Mother     Diabetes Mother         mellitus    Diabetes type II Mother     Diabetes Maternal Grandmother         mellitus    Coronary artery disease Maternal Grandfather     Coronary artery disease Paternal Grandfather      Current Outpatient Medications on File Prior to Visit   Medication Sig Dispense Refill    ALPRAZolam (XANAX) 0.25 mg tablet Take 1 tablet (0.25 mg total) by mouth 3 (three) times a day as needed for anxiety 270 tablet 0    amLODIPine (NORVASC) 5 mg tablet Take 1 tablet (5 mg total) by mouth daily 90 tablet 1    Blood Glucose Monitoring Suppl (OneTouch Verio) w/Device KIT Use daily 1 kit 0    carbidopa-levodopa (SINEMET)  mg per tablet Take 1 tablet by mouth Three times a day      ezetimibe (ZETIA) 10 mg tablet Take 1 tablet (10 mg total) by mouth daily (Patient taking differently: Take 5 mg by mouth daily) 90 tablet 3    famotidine (PEPCID) 40 MG tablet Take 1 tablet (40 mg total) by mouth daily at bedtime 30 tablet 3    FLUoxetine (PROzac) 20 mg capsule Take 40 mg by mouth daily Alternated between 20 mg and 40 mg daily      Lancet Devices (ONE TOUCH DELICA LANCING DEV) MISC Use daily 1 each 0    losartan (COZAAR) 100 MG tablet Take 1 tablet (100 mg total) by mouth daily 90 tablet 3    metFORMIN (GLUCOPHAGE) 500 mg tablet TAKE 2 TABLETS TWICE A DAY WITH MEALS 360  "tablet 3    metoprolol succinate (TOPROL-XL) 25 mg 24 hr tablet Take 1 tablet (25 mg total) by mouth daily (Patient taking differently: Take 50 mg by mouth daily) 90 tablet 3    Multiple Vitamins-Minerals (CENTRUM ADULTS PO) Take by mouth in the morning      Nourianz tablet 1 tablet daily      OneTouch Delica Lancets 33G MISC Use daily 300 each 1    OneTouch Verio test strip USE TO TEST DAILY 100 strip 3    pantoprazole (PROTONIX) 40 mg tablet TAKE 1 TABLET TWICE A DAY (1 TABLET IN THE MORNING AND 1 TABLET IN THE EVENING) 180 tablet 1    QUEtiapine (SEROquel) 50 mg tablet Take 25 mg by mouth daily at bedtime      rosuvastatin (CRESTOR) 5 mg tablet TAKE 1 TABLET ONCE A WEEK 12 tablet 1    selegiline (ELDEPRYL) 5 mg capsule Take 5 mg by mouth as needed (Patient not taking: Reported on 8/7/2024)      semaglutide, 2 mg/dose, (Ozempic, 2 MG/DOSE,) 8 mg/ mL injection pen INJECT 2 MG UNDER THE SKIN EVERY 7 DAYS 9 mL 1    traZODone (DESYREL) 50 mg tablet 1/2-1 tab at bedtime      trospium chloride (SANCTURA) 20 mg tablet Take 20 mg by mouth 2 (two) times a day       No current facility-administered medications on file prior to visit.     Allergies   Allergen Reactions    Celebrex [Celecoxib] Other (See Comments)     Cardiologist stated he should not take      Chlorhexidine Hives     Is able to wash with hibiclens but not use michi cloths    Other      \"steroid eyedrops\"      Prednisone Other (See Comments)     Prednisone eye drops- eye pressure increases          Objective     There were no vitals taken for this visit.    Physical Exam  Constitutional:       General: He is not in acute distress.     Appearance: He is well-developed. He is not diaphoretic.   HENT:      Head: Normocephalic and atraumatic.      Right Ear: External ear normal.      Left Ear: External ear normal.   Eyes:      General: No scleral icterus.     Conjunctiva/sclera: Conjunctivae normal.   Neck:      Thyroid: No thyromegaly.      Trachea: No tracheal " deviation.   Cardiovascular:      Rate and Rhythm: Normal rate and regular rhythm.      Heart sounds: Normal heart sounds. No murmur heard.  Pulmonary:      Effort: Pulmonary effort is normal. No respiratory distress.      Breath sounds: Normal breath sounds. No wheezing or rales.   Abdominal:      General: There is no distension.      Palpations: Abdomen is soft. There is no mass.      Tenderness: There is no abdominal tenderness. There is no guarding or rebound.   Musculoskeletal:         General: Normal range of motion.      Cervical back: Normal range of motion and neck supple.   Lymphadenopathy:      Cervical: No cervical adenopathy.   Skin:     General: Skin is warm and dry.   Neurological:      Mental Status: He is alert and oriented to person, place, and time.      Comments: Multiple involuntary movements   Psychiatric:         Behavior: Behavior normal.         Thought Content: Thought content normal.         Judgment: Judgment normal.

## 2024-09-18 NOTE — ASSESSMENT & PLAN NOTE
I went over the CT findings with him showing that he does have a hernia and how big it was.  There is a loop of colon stuck in it.  I told him since he is symptomatic, it would be reasonable to repair this.  I told him with the multiple procedures and the size of this hernia, I would be most comfortable doing this open.  I explained the surgery in detail including risks, benefits, alternatives, and what to expect postoperatively.  He understands and is agreeable to go ahead.    Plan: Open incisional hernia repair possible component separation

## 2024-09-19 ENCOUNTER — TELEPHONE (OUTPATIENT)
Dept: SURGERY | Facility: CLINIC | Age: 69
End: 2024-09-19

## 2024-09-19 NOTE — TELEPHONE ENCOUNTER
Patient called back to schedule hernia surgery with Dr. Lam. Attempted to reach Stephanie and Marilee, but they are not available at this time. Please call the patient back to schedule surgery. Thank you.

## 2024-09-20 ENCOUNTER — OFFICE VISIT (OUTPATIENT)
Dept: PHYSICAL THERAPY | Facility: CLINIC | Age: 69
End: 2024-09-20
Payer: MEDICARE

## 2024-09-20 ENCOUNTER — TELEPHONE (OUTPATIENT)
Age: 69
End: 2024-09-20

## 2024-09-20 DIAGNOSIS — G20.A2 PARKINSON'S DISEASE WITHOUT DYSKINESIA, WITH FLUCTUATING MANIFESTATIONS (HCC): Primary | ICD-10-CM

## 2024-09-20 DIAGNOSIS — B37.9 YEAST INFECTION: Primary | ICD-10-CM

## 2024-09-20 PROCEDURE — 97112 NEUROMUSCULAR REEDUCATION: CPT | Performed by: PHYSICAL THERAPIST

## 2024-09-20 PROCEDURE — 97150 GROUP THERAPEUTIC PROCEDURES: CPT | Performed by: PHYSICAL THERAPIST

## 2024-09-20 RX ORDER — NYSTATIN 100000 U/G
CREAM TOPICAL 2 TIMES DAILY
Qty: 30 G | Refills: 2 | Status: SHIPPED | OUTPATIENT
Start: 2024-09-20

## 2024-09-20 RX ORDER — NYSTATIN 100000 U/G
CREAM TOPICAL 2 TIMES DAILY
Qty: 30 G | Refills: 2 | Status: SHIPPED | OUTPATIENT
Start: 2024-09-20 | End: 2024-09-20 | Stop reason: SDUPTHER

## 2024-09-20 NOTE — PROGRESS NOTES
Daily Note     Today's date: 2024  Patient name: Marcellus Fregoso  : 1955  MRN: [id number]  Referring provider: Ehsan Calix MD  Dx:   Encounter Diagnosis     ICD-10-CM    1. Parkinson's disease without dyskinesia, with fluctuating manifestations  G20.A2                      Subjective: Feeling weak and tired today and almost cancelled.       Objective: See treatment diary below      Assessment: Pt was quickly fatigued and overall struggled to keep balance more during this session. Plan next session to attempt to return to previous levels of tolerance to exercise.       Plan: Continue per plan of care.      Short Term Goal Expiration Date:(24)  Long Term Goal Expiration Date: (10/26/24)  POC Expiration Date: (10/26/24)      POC expires Unit limit Auth Expiration date PT/OT/ST + Visit Limit?   10/26/24 bomn bomn bomn                           Visit/Unit Tracking  AUTH Status:  Date      Used 1 2 3 4 5 6 7 8 9 10     Remaining                     Precautions fall risk       Manuals                                    Neuro Re-Ed         STS 2x10 o UE 2x10 without UE 2x10 without UE  2x10 without UE   hurdles  5 laps fwd with 3# aw, 3 laps laterally -  5 laps fwd with 3# aw, 3 laps laterally -  6 hurdles with 3# aw 5 laps fwd, lat 4 laps 6 hurdles with 5# aw 5 laps fwd, lat 4 laps   Walking with tidal tank Fwd bkwd 3 laps ea solo step  With anterior pull solo step  With anterior pull solo step  5 laps with 7.5# tidal tank solo step 5 laps with 10# tidal tank solo step   HKM                                Ther Ex                                                                        Ther Activity                        Gait Training        treadmill 0.9 mph no incline b/l UE solo step, occassional quick stops for rest 5 min and 0.6 for 5 more inutes   1.0 mph with b/l UE 10 min solo step  1.4 mph 2 min, 1.0 mph up to 10 min solo step b/l  UE with occassional quick stops for rest           Modalities

## 2024-09-20 NOTE — TELEPHONE ENCOUNTER
Patient called the RX Refill Line. Message is being forwarded to the office.     Patient is requesting a refill on   nystatin (MYCOSTATIN) cream . It is no longer on his med list.  If appropriate please send to [facility] on [address].  He gets rashes in groin area. He doesn't want the powder, wants cream    Please contact patient at [phone]

## 2024-09-23 ENCOUNTER — APPOINTMENT (OUTPATIENT)
Dept: PHYSICAL THERAPY | Facility: CLINIC | Age: 69
End: 2024-09-23
Payer: MEDICARE

## 2024-09-23 DIAGNOSIS — I10 ESSENTIAL HYPERTENSION: ICD-10-CM

## 2024-09-23 NOTE — TELEPHONE ENCOUNTER
Reason for call:   [x] Refill   [] Prior Auth  [] Other:     Office:   [] PCP/Provider -   [x] Specialty/Provider - [facility]     Medication: metoprolol succinate (TOPROL-XL) 25 mg 24 hr tablet     Dose/Frequency: 50 mg daily     Quantity: 180    Pharmacy: [facility]     Does the patient have enough for 3 days?   [x] Yes   [] No - Send as HP to POD

## 2024-09-24 ENCOUNTER — PATIENT MESSAGE (OUTPATIENT)
Dept: GASTROENTEROLOGY | Facility: CLINIC | Age: 69
End: 2024-09-24

## 2024-09-24 RX ORDER — METOPROLOL SUCCINATE 25 MG/1
50 TABLET, EXTENDED RELEASE ORAL DAILY
Qty: 180 TABLET | Refills: 1 | Status: SHIPPED | OUTPATIENT
Start: 2024-09-24

## 2024-09-25 ENCOUNTER — APPOINTMENT (OUTPATIENT)
Dept: PHYSICAL THERAPY | Facility: CLINIC | Age: 69
End: 2024-09-25
Payer: MEDICARE

## 2024-09-27 ENCOUNTER — APPOINTMENT (OUTPATIENT)
Dept: PHYSICAL THERAPY | Facility: CLINIC | Age: 69
End: 2024-09-27
Payer: MEDICARE

## 2024-10-02 ENCOUNTER — TELEPHONE (OUTPATIENT)
Age: 69
End: 2024-10-02

## 2024-10-02 ENCOUNTER — OFFICE VISIT (OUTPATIENT)
Dept: FAMILY MEDICINE CLINIC | Facility: CLINIC | Age: 69
End: 2024-10-02
Payer: MEDICARE

## 2024-10-02 ENCOUNTER — OFFICE VISIT (OUTPATIENT)
Dept: PHYSICAL THERAPY | Facility: CLINIC | Age: 69
End: 2024-10-02
Payer: MEDICARE

## 2024-10-02 VITALS
HEART RATE: 79 BPM | SYSTOLIC BLOOD PRESSURE: 120 MMHG | TEMPERATURE: 97.5 F | DIASTOLIC BLOOD PRESSURE: 60 MMHG | HEIGHT: 68 IN | OXYGEN SATURATION: 95 % | WEIGHT: 290 LBS | BODY MASS INDEX: 43.95 KG/M2 | RESPIRATION RATE: 16 BRPM

## 2024-10-02 DIAGNOSIS — F41.8 DEPRESSION WITH ANXIETY: ICD-10-CM

## 2024-10-02 DIAGNOSIS — E11.42 CONTROLLED TYPE 2 DIABETES MELLITUS WITH DIABETIC POLYNEUROPATHY, WITHOUT LONG-TERM CURRENT USE OF INSULIN (HCC): ICD-10-CM

## 2024-10-02 DIAGNOSIS — Z23 ENCOUNTER FOR IMMUNIZATION: ICD-10-CM

## 2024-10-02 DIAGNOSIS — G20.A2 PARKINSON'S DISEASE WITHOUT DYSKINESIA, WITH FLUCTUATING MANIFESTATIONS (HCC): Primary | ICD-10-CM

## 2024-10-02 DIAGNOSIS — R26.2 AMBULATORY DYSFUNCTION: ICD-10-CM

## 2024-10-02 LAB — SL AMB POCT HEMOGLOBIN AIC: 5.9 (ref ?–6.5)

## 2024-10-02 PROCEDURE — 90662 IIV NO PRSV INCREASED AG IM: CPT

## 2024-10-02 PROCEDURE — 99214 OFFICE O/P EST MOD 30 MIN: CPT | Performed by: FAMILY MEDICINE

## 2024-10-02 PROCEDURE — 83036 HEMOGLOBIN GLYCOSYLATED A1C: CPT | Performed by: FAMILY MEDICINE

## 2024-10-02 PROCEDURE — 97150 GROUP THERAPEUTIC PROCEDURES: CPT | Performed by: PHYSICAL THERAPIST

## 2024-10-02 PROCEDURE — 97112 NEUROMUSCULAR REEDUCATION: CPT | Performed by: PHYSICAL THERAPIST

## 2024-10-02 PROCEDURE — G0008 ADMIN INFLUENZA VIRUS VAC: HCPCS

## 2024-10-02 NOTE — TELEPHONE ENCOUNTER
"Behavioral Health Outpatient Intake Questions    Referred By   : PCP    Please advise interviewee that they need to answer all questions truthfully to allow for best care, and any misrepresentations of information may affect their ability to be seen at this clinic   => Was this discussed? Yes     If Minor Child (under age 18)    Who is/are the legal guardian(s) of the child?     Is there a custody agreement? No     If \"YES\"- Custody orders must be obtained prior to scheduling the first appointment  In addition, Consent to Treatment must be signed by all legal guardians prior to scheduling the first appointment    If \"NO\"- Consent to Treatment must be signed by all legal guardians prior to scheduling the first appointment    Behavioral Health Outpatient Intake History -     Presenting Problem (in patient's own words): Parkinson Depression     Are there any communication barriers for this patient?     Yes                                               If yes, please describe barriers:  slight stutter  If there is a unique situation, please refer to John Munoz/Mellissa Ga for final determination.    Are you taking any psychiatric medications? Yes     If \"YES\" -What are they Prozac, Trazodone, seroquel      If \"YES\" -Who prescribes? PCP/Neurologist    Has the Patient previously received outpatient Talk Therapy or Medication Management from [facility]  No        If \"YES\"- When, Where and with Whom?         If \"NO\" -Has Patient received these services elsewhere?       If \"YES\" -When, Where, and with Whom?TT at a private practice years ago    Has the Patient abused alcohol or other substances in the last 6 months ? No  No concerns of substance abuse are reported.     If \"YES\" -What substance, How much, How often?     If illegal substance: Refer to [facility] (for PHONG) or [facility].   If Alcohol in excess of 10 drinks per week:  Refer to [facility] (for PHONG) or [facility] Off[facility]    Legal History-     Is " "this treatment court ordered? No   If \"yes \"send to :  Talk Therapy : Send to John Munoz/Mellissa Ga for final determination   Med Management: Send to Dr Calvo for final determination     Has the Patient been convicted of a felony?  No   If \"Yes\" send to -When, What?  Talk Therapy: Send to John Ga for final determination   Med Management: Send to Dr Calvo for final determination     ACCEPTED as a patient Yes  If \"Yes\" Appointment Date: 1/2/25(ady)  1/13/24 (Kunal WALDEN)    Referred Elsewhere? No  If “Yes” - (Where? Ex: [facility], [facility], [facility], [facility], etc.)       Name of Insurance Co:Medicare A B   Insurance ID#[id number]  Insurance Phone #  If ins is primary or secondary?Primary    Name of Insurance Co:[employer]  Insurance ID#[id number]  Insurance Phone #  If ins is primary or secondary?Secondary  If patient is a minor, parents information such as Name, D.O.B of guarantor.  "

## 2024-10-02 NOTE — TELEPHONE ENCOUNTER
Patient called inquiring about the wait list as he has been on the wait list since 7/14/2023, and he would like to get scheduled

## 2024-10-02 NOTE — ASSESSMENT & PLAN NOTE
Lab Results   Component Value Date    HGBA1C 5.9 10/02/2024       Orders:    POCT hemoglobin A1c    Albumin / creatinine urine ratio; Future

## 2024-10-02 NOTE — ASSESSMENT & PLAN NOTE
Refer to behavioral therapy. Also give list of psychiatry offices.     Orders:    Ambulatory referral to Psych Services; Future

## 2024-10-02 NOTE — PROGRESS NOTES
Ambulatory Visit  Name: Marcellus Fregoso      : 1955      MRN: [id number]  Encounter Provider: Ehsan Calix MD  Encounter Date: 10/2/2024   Encounter department: [facility]    Assessment & Plan  Parkinson's disease without dyskinesia, with fluctuating manifestations (HCC)  Advised pt to talk with neurology about Parkinson support group.        Depression with anxiety  Refer to behavioral therapy. Also give list of psychiatry offices.     Orders:    Ambulatory referral to Psych Services; Future    Controlled type 2 diabetes mellitus with diabetic polyneuropathy, without long-term current use of insulin (HCC)    Lab Results   Component Value Date    HGBA1C 5.9 10/02/2024       Orders:    POCT hemoglobin A1c    Albumin / creatinine urine ratio; Future    Encounter for immunization    Orders:    influenza vaccine, high-dose, PF 0.5 mL (Fluzone High Dose)       History of Present Illness     HPI    Pt is here by himself.     Parkinson disease----[facility] neurology in [facility] now. Will see [facility] neurology on 10/17/2024.   He is on Sinemet 25/100 tid which helped. SE include elevated libido etc.   Physical therapy helped gait.   Would like to join local Parkinson support group.     Depression/insomnia---He is on fluoxetine 40mg QD and seroquel 50mg qhs and trazodone 50mg qhs.   Stop xanax since 2024. Worry about bipolar.   [facility] psychiatry in [facility].   Would like to talk to therapist face to face, not online. Complain long waiting time.    DM----today HgA1C 5.9 controlled.   He is on ozempic 2mg/week and metformin.               Review of Systems   Constitutional:  Negative for appetite change, chills and fever.   HENT:  Negative for congestion, ear pain, sinus pain and sore throat.    Eyes:  Negative for discharge and itching.   Respiratory:  Negative for apnea, cough, chest tightness, shortness of breath and wheezing.    Cardiovascular:  Negative for chest pain, palpitations and leg swelling.  "  Gastrointestinal:  Negative for abdominal pain, anal bleeding, constipation, diarrhea, nausea and vomiting.   Endocrine: Negative for cold intolerance, heat intolerance and polyuria.   Genitourinary:  Negative for difficulty urinating and dysuria.   Musculoskeletal:  Negative for arthralgias, back pain and myalgias.   Skin:  Negative for rash.   Neurological:  Negative for dizziness and headaches.   Psychiatric/Behavioral:  Negative for agitation.            Objective     /60 (BP Location: Left arm, Patient Position: Sitting, Cuff Size: Large)   Pulse 79   Temp 97.5 °F (36.4 °C) (Tympanic)   Resp 16   Ht 5' 8\" (1.727 m)   Wt 132 kg (290 lb)   SpO2 95%   BMI 44.09 kg/m²     Physical Exam  Constitutional:       Appearance: He is well-developed.   HENT:      Head: Normocephalic and atraumatic.   Eyes:      General:         Right eye: No discharge.         Left eye: No discharge.      Conjunctiva/sclera: Conjunctivae normal.   Cardiovascular:      Rate and Rhythm: Normal rate and regular rhythm.      Pulses: Pulses are weak.           Dorsalis pedis pulses are 1+ on the right side and 1+ on the left side.      Heart sounds: Normal heart sounds. No murmur heard.     No friction rub. No gallop.   Pulmonary:      Effort: Pulmonary effort is normal. No respiratory distress.      Breath sounds: Normal breath sounds. No wheezing or rales.   Abdominal:      General: Bowel sounds are normal. There is no distension.      Palpations: Abdomen is soft.      Tenderness: There is no abdominal tenderness. There is no guarding.   Musculoskeletal:         General: Normal range of motion.      Cervical back: Normal range of motion and neck supple.      Right lower leg: No edema.      Left lower leg: No edema.        Feet:    Feet:      Right foot:      Skin integrity: No ulcer, skin breakdown, erythema, warmth, callus or dry skin.      Left foot:      Skin integrity: No ulcer, skin breakdown, erythema, warmth, callus or " dry skin.   Neurological:      Mental Status: He is alert.       Patient's shoes and socks removed.    Right Foot/Ankle   Right Foot Inspection  Skin Exam: skin normal and skin intact. No dry skin, no warmth, no callus, no erythema, no maceration, no abnormal color, no pre-ulcer, no ulcer and no callus.     Toe Exam: ROM and strength within normal limits.     Sensory   Monofilament testing: absent    Vascular  The right DP pulse is 1+.     Left Foot/Ankle  Left Foot Inspection  Skin Exam: skin normal and skin intact. No dry skin, no warmth, no erythema, no maceration, normal color, no pre-ulcer, no ulcer and no callus.     Toe Exam: ROM and strength within normal limits.     Sensory   Monofilament testing: absent    Vascular  The left DP pulse is 1+.     Assign Risk Category  No deformity present  Loss of protective sensation  Weak pulses  Risk: 2

## 2024-10-03 ENCOUNTER — TELEPHONE (OUTPATIENT)
Age: 69
End: 2024-10-03

## 2024-10-03 NOTE — TELEPHONE ENCOUNTER
Called and spoke to the patient and he just had a question about how many days to hold his ozempic. I informed him to hold for 7 days prior

## 2024-10-03 NOTE — TELEPHONE ENCOUNTER
Patients GI provider:  Dr. Lerma    Number to return call: [phone]    Reason for call: Pt returned call. Requested to speak to  in the office.     Scheduled procedure/appointment date if applicable: 10/18/2024

## 2024-10-04 NOTE — PROGRESS NOTES
Daily Note     Today's date: 10/4/2024  Patient name: Marcellus Fregoso  : 1955  MRN: [id number]  Referring provider: Ehsan Calix MD  Dx:   Encounter Diagnosis     ICD-10-CM    1. Parkinson's disease without dyskinesia, with fluctuating manifestations (HCC)  G20.A2       2. Ambulatory dysfunction  R26.2                      Subjective: Patient reports to therapy this date stating that he changed his beta blocker medication nad feels like he has more energy      Objective: See treatment diary below      Assessment: Patient did very well this session with improved endurance overall for activities. Pt was able to complete many reptitions and did not have instances of uncontrolled forward balance.       Plan: Continue per plan of care.      Short Term Goal Expiration Date:(24)  Long Term Goal Expiration Date: (10/26/24)  POC Expiration Date: (10/26/24)      POC expires Unit limit Auth Expiration date PT/OT/ST + Visit Limit?   10/26/24 bomn bomn bomn                           Visit/Unit Tracking  AUTH Status:  Date      Used 1 2 3 4 5 6 7 8 9 10     Remaining                     Precautions fall risk       Manuals 9/20 10/2 8/30 9/4 9/18                                   Neuro Re-Ed         STS 2x10 o UE 2x10 2x10 without UE  2x10 without UE   hurdles  Fwd 5 laps 5# aw, lat 3 laps 5# aw 5 laps fwd with 3# aw, 3 laps laterally -  6 hurdles with 3# aw 5 laps fwd, lat 4 laps 6 hurdles with 5# aw 5 laps fwd, lat 4 laps   Walking with tidal tank Fwd bkwd 3 laps ea solo step  5 laps fwd with 5# aw With anterior pull solo step  5 laps with 7.5# tidal tank solo step 5 laps with 10# tidal tank solo step   HKM  5 laps 5#aw                              Ther Ex                                                                        Ther Activity                        Gait Training        treadmill 0.9 mph no incline b/l UE solo step, occassional quick stops for rest 5 min and  0.6 for 5 more inutes   1.0 mph with b/l UE 10 min solo step  1.4 mph 2 min, 1.0 mph up to 10 min solo step b/l UE with occassional quick stops for rest           Modalities

## 2024-10-07 ENCOUNTER — INTERMEDIATE FOLLOW-UP (OUTPATIENT)
Dept: URBAN - METROPOLITAN AREA CLINIC 6 | Facility: CLINIC | Age: 69
End: 2024-10-07

## 2024-10-07 DIAGNOSIS — I10 BENIGN ESSENTIAL HYPERTENSION: Primary | ICD-10-CM

## 2024-10-07 DIAGNOSIS — H40.053: ICD-10-CM

## 2024-10-07 DIAGNOSIS — I49.3 PVC (PREMATURE VENTRICULAR CONTRACTION): ICD-10-CM

## 2024-10-07 PROCEDURE — 92012 INTRM OPH EXAM EST PATIENT: CPT

## 2024-10-07 RX ORDER — METOPROLOL SUCCINATE 25 MG/1
25 TABLET, EXTENDED RELEASE ORAL 2 TIMES DAILY
Qty: 14 TABLET | Refills: 0 | Status: SHIPPED | OUTPATIENT
Start: 2024-10-07

## 2024-10-07 ASSESSMENT — VISUAL ACUITY
OS_CC: 20/30
OU_CC: J1+
OD_CC: 20/25

## 2024-10-07 ASSESSMENT — TONOMETRY
OS_IOP_MMHG: 23
OD_IOP_MMHG: 23

## 2024-10-07 NOTE — TELEPHONE ENCOUNTER
Reason for call:   [x] Refill   [] Prior Auth  [] Other:     Office:   [] PCP/Provider -   [x] Specialty/Provider - [facility]    Medication: metoprolol succinate (TOPROL-XL) 25 mg 24 hr tablet     Dose/Frequency: 2x a day    Quantity: 14    Pharmacy: [facility] [place]    Does the patient have enough for 3 days?   [] Yes   [x] No - Send as HP to POD

## 2024-10-09 ENCOUNTER — TELEPHONE (OUTPATIENT)
Dept: PSYCHIATRY | Facility: CLINIC | Age: 69
End: 2024-10-09

## 2024-10-09 ENCOUNTER — APPOINTMENT (OUTPATIENT)
Dept: PHYSICAL THERAPY | Facility: CLINIC | Age: 69
End: 2024-10-09
Payer: MEDICARE

## 2024-10-09 NOTE — TELEPHONE ENCOUNTER
One week follow up call for New Patient appointment with Anaya Flower [[id number]] on 1/2/24  was made on 10/2/24. Writer informed patient of New Patient paperwork needing to be completed 5 days prior to the appointment. Writer confirmed paperwork has been sent via My chart.

## 2024-10-10 ENCOUNTER — OFFICE VISIT (OUTPATIENT)
Dept: FAMILY MEDICINE CLINIC | Facility: CLINIC | Age: 69
End: 2024-10-10
Payer: MEDICARE

## 2024-10-10 VITALS
RESPIRATION RATE: 18 BRPM | HEART RATE: 72 BPM | HEIGHT: 68 IN | WEIGHT: 292.8 LBS | TEMPERATURE: 98.8 F | BODY MASS INDEX: 44.38 KG/M2 | OXYGEN SATURATION: 96 % | DIASTOLIC BLOOD PRESSURE: 84 MMHG | SYSTOLIC BLOOD PRESSURE: 136 MMHG

## 2024-10-10 DIAGNOSIS — Z65.8 SUPPORT SYSTEM DEFICIT: ICD-10-CM

## 2024-10-10 DIAGNOSIS — E11.42 CONTROLLED TYPE 2 DIABETES MELLITUS WITH DIABETIC POLYNEUROPATHY, WITHOUT LONG-TERM CURRENT USE OF INSULIN (HCC): ICD-10-CM

## 2024-10-10 DIAGNOSIS — G20.A2 PARKINSON'S DISEASE WITHOUT DYSKINESIA, WITH FLUCTUATING MANIFESTATIONS (HCC): Primary | ICD-10-CM

## 2024-10-10 DIAGNOSIS — G47.33 OBSTRUCTIVE SLEEP APNEA SYNDROME: ICD-10-CM

## 2024-10-10 DIAGNOSIS — I10 BENIGN ESSENTIAL HYPERTENSION: ICD-10-CM

## 2024-10-10 DIAGNOSIS — K22.719 BARRETT'S ESOPHAGUS WITH DYSPLASIA: ICD-10-CM

## 2024-10-10 DIAGNOSIS — F41.8 DEPRESSION WITH ANXIETY: ICD-10-CM

## 2024-10-10 PROCEDURE — 99214 OFFICE O/P EST MOD 30 MIN: CPT | Performed by: NURSE PRACTITIONER

## 2024-10-10 PROCEDURE — G2211 COMPLEX E/M VISIT ADD ON: HCPCS | Performed by: NURSE PRACTITIONER

## 2024-10-10 RX ORDER — HYDROXYZINE HYDROCHLORIDE 25 MG/1
25 TABLET, FILM COATED ORAL 2 TIMES DAILY PRN
Qty: 30 TABLET | Refills: 1 | Status: SHIPPED | OUTPATIENT
Start: 2024-10-10

## 2024-10-10 NOTE — ASSESSMENT & PLAN NOTE
Being managed by Neurology at [facility]  He is interested in a support group for Parkinson's and I did place a referral to our  team for possible assistance     Orders:    Ambulatory Referral to Social Work Care Management Program; Future

## 2024-10-10 NOTE — ASSESSMENT & PLAN NOTE
Pt is scheduled with [facility] Psychiatry and therapy in January 2025  For his anxiety, he has stopped using Xanax as this caused more falls for him with his Parkinson's  He inquired about Ativan which I feel would have the same effect  We can do a trial of Hydroxyzine prn but we did review that this can cause fatigue- he'll keep me updated    Orders:    hydrOXYzine HCL (ATARAX) 25 mg tablet; Take 1 tablet (25 mg total) by mouth 2 (two) times a day as needed for itching or anxiety

## 2024-10-10 NOTE — ASSESSMENT & PLAN NOTE
Stable, managed by Cardiology  Follow up with Cardiology as scheduled   Continue Metoprolol and Losartan

## 2024-10-10 NOTE — ASSESSMENT & PLAN NOTE
Jordin does not tolerate CPAP and does not have interest in seeing Sleep Medicine again at this time  He is aware of the risks of untreated sleep apnea

## 2024-10-10 NOTE — ASSESSMENT & PLAN NOTE
Lab Results   Component Value Date    HGBA1C 5.9 10/02/2024     Well controlled  Managed by Endo   Continue Ozempic and Metformin

## 2024-10-10 NOTE — PROGRESS NOTES
Ambulatory Visit  Name: Marcellus Fregoso      : 1955      MRN: [id number]  Encounter Provider: ED Merritt  Encounter Date: 10/10/2024   Encounter department: [facility]    Assessment & Plan  Parkinson's disease without dyskinesia, with fluctuating manifestations (HCC)  Being managed by Neurology at [facility]  He is interested in a support group for Parkinson's and I did place a referral to our  team for possible assistance     Orders:    Ambulatory Referral to Social Work Care Management Program; Future    Controlled type 2 diabetes mellitus with diabetic polyneuropathy, without long-term current use of insulin (HCC)    Lab Results   Component Value Date    HGBA1C 5.9 10/02/2024     Well controlled  Managed by Endo   Continue Ozempic and Metformin          Depression with anxiety  Pt is scheduled with [facility] Psychiatry and therapy in 2025  For his anxiety, he has stopped using Xanax as this caused more falls for him with his Parkinson's  He inquired about Ativan which I feel would have the same effect  We can do a trial of Hydroxyzine prn but we did review that this can cause fatigue- he'll keep me updated    Orders:    hydrOXYzine HCL (ATARAX) 25 mg tablet; Take 1 tablet (25 mg total) by mouth 2 (two) times a day as needed for itching or anxiety    Support system deficit    Orders:    Ambulatory Referral to Social Work Care Management Program; Future    Benign essential hypertension  Stable, managed by Cardiology  Follow up with Cardiology as scheduled   Continue Metoprolol and Losartan          Obstructive sleep apnea syndrome  Jordin does not tolerate CPAP and does not have interest in seeing Sleep Medicine again at this time  He is aware of the risks of untreated sleep apnea          Molina's esophagus with dysplasia  Stable and managed by GI  On daily Protonix                History of Present Illness     HPI    Jordin presents by himself today  "for a routine follow up  He was just in our office last week with Dr. Calix regarding his depression and anxiety.  He was able to schedule an appointment with [facility] Psychiatry and therapy for January 2025.  He is happy with these appointments but is still looking for a support group for individuals with Parkinson's.  He notes feeling \"alone\" with this condition and would like a better support system.  His wife is supportive as well.  He notes that he was falling more whenever he would take Xanax.  He notes he was on Xanax for about 25 years.  He has since stopped taking Xanax all together but has noticed some days of increased panic/anxiety. He is inquiring if Ativan would be a better option     He is following with [facility] for the Parkinson's.  He finds his gait and stability have improved, having less frequent falls.  He uses a cane on occasion when in public and a walker at home if needed    He is also following with Cardiology, Bariatrics, Endocrinology, Urology and Podiatry    Review of Systems   Constitutional:  Negative for activity change, appetite change, chills, diaphoresis, fatigue, fever and unexpected weight change.   Eyes:  Negative for visual disturbance.   Respiratory:  Negative for cough, chest tightness, shortness of breath and wheezing.    Cardiovascular:  Negative for chest pain, palpitations and leg swelling.   Gastrointestinal:  Negative for abdominal pain, blood in stool, constipation, diarrhea and nausea.   Genitourinary:  Negative for dysuria.   Musculoskeletal:  Positive for arthralgias and gait problem. Negative for myalgias.   Skin:  Negative for rash and wound.   Neurological:  Positive for tremors. Negative for dizziness, weakness, numbness and headaches.   Psychiatric/Behavioral:  Positive for dysphoric mood. Negative for self-injury, sleep disturbance and suicidal ideas. The patient is nervous/anxious.      Past Medical History:   Diagnosis Date    Acute blood loss " anemia 10/13/2016    Anxiety     Arthritis     knees    Arthritis     Asthma     stable for > 10 years    Molina esophagus     with BARRX/RFA    Cancer (HCC)     prostate    Cataract     Depression     Diabetes (HCC)     Diabetes mellitus (HCC)     pre diabetes    Diverticulitis of colon     Environmental allergies     Fall 12/30/2020    GERD (gastroesophageal reflux disease)     Hiatal hernia     History of anal fissures     Hyperlipidemia     Hypertension     Incisional hernia     Insomnia     Kidney stone     Malignant neoplasm of prostate (HCC)     Morbid obesity (HCC)     Peripheral neuropathy     PONV (postoperative nausea and vomiting)     Prolapsing mitral valve     prolapsing mitral valve leaflet syndrome    Prostate cancer (HCC)     Retinal detachment     treated surgically at [facility]; resolved: 10/10/2012    Sleep apnea     diagnosed but unable to tolerate cpap.     Stuttering      Past Surgical History:   Procedure Laterality Date    ARTHROSCOPIC REPAIR ACL Right     BIOPSY CORE NEEDLE      prostate    CATARACT EXTRACTION Bilateral     COLONOSCOPY      HERNIA REPAIR      naval    JOINT REPLACEMENT      B/L knee    KNEE ARTHROSCOPY Left     LAPAROSCOPIC GASTRIC BANDING      SD ARTHRP KNE CONDYLE&PLATU MEDIAL&LAT COMPARTMENTS Left 02/15/2017    Procedure: TOTAL KNEE ARTHROPLASTY ;  Surgeon: Sal Ross MD;  Location: [facility];  Service: Orthopedics    SD ARTHRP KNE CONDYLE&PLATU MEDIAL&LAT COMPARTMENTS Right 10/10/2016    Procedure: ARTHROPLASTY KNEE TOTAL;  Surgeon: Sal Ross MD;  Location: [facility];  Service: Orthopedics    PROSTATECTOMY      robotic-assisted; Lilly Tay    REMOVAL GASTRIC BAND LAPAROSCOPIC N/A 8/30/2022    Procedure: LAPAROSCOPIC REMOVAL OF ADJUSTABLE GASTRIC BAND AND PORT  WITH INTRAOPERATIVE EGD;  Surgeon: Neal Yang MD;  Location: [facility];  Service: Bariatrics    RETINAL DETACHMENT SURGERY Bilateral     Lemus Eye    SEPTOPLASTY      SINUS SURGERY       TONSILLECTOMY      over age 10    UPPER GASTROINTESTINAL ENDOSCOPY      mutiple with BARRX/RFA    VASECTOMY      vas deferens     Family History   Problem Relation Age of Onset    Heart disease Father     Coronary artery disease Father     Prostate cancer Father     Coronary artery disease Mother     Diabetes Mother         mellitus    Diabetes type II Mother     Diabetes Maternal Grandmother         mellitus    Coronary artery disease Maternal Grandfather     Coronary artery disease Paternal Grandfather      Social History     Tobacco Use    Smoking status: Never     Passive exposure: Never    Smokeless tobacco: Never    Tobacco comments:     quit 38 years ago   Vaping Use    Vaping status: Never Used   Substance and Sexual Activity    Alcohol use: Not Currently     Alcohol/week: 1.0 standard drink of alcohol     Types: 1 Cans of beer per week     Comment: 1 case of beer per year    Drug use: Never    Sexual activity: Yes     Partners: Female     Birth control/protection: Inserts, Post-menopausal, Male Sterilization     Current Outpatient Medications on File Prior to Visit   Medication Sig    amLODIPine (NORVASC) 5 mg tablet Take 1 tablet (5 mg total) by mouth daily    Blood Glucose Monitoring Suppl (OneTouch Verio) w/Device KIT Use daily    carbidopa-levodopa (SINEMET)  mg per tablet Take 1 tablet by mouth Three times a day    ezetimibe (ZETIA) 10 mg tablet Take 1 tablet (10 mg total) by mouth daily (Patient taking differently: Take 5 mg by mouth daily)    FLUoxetine (PROzac) 20 mg capsule Take 40 mg by mouth daily Alternated between 20 mg and 40 mg daily    Lancet Devices (ONE TOUCH DELICA LANCING DEV) MISC Use daily    losartan (COZAAR) 100 MG tablet Take 1 tablet (100 mg total) by mouth daily    metFORMIN (GLUCOPHAGE) 500 mg tablet TAKE 2 TABLETS TWICE A DAY WITH MEALS    metoprolol succinate (TOPROL-XL) 25 mg 24 hr tablet Take 1 tablet (25 mg total) by mouth 2 (two) times a day    Multiple  "Vitamins-Minerals (CENTRUM ADULTS PO) Take by mouth in the morning    Nourianz tablet 1 tablet daily    nystatin (MYCOSTATIN) cream Apply topically 2 (two) times a day    OneTouch Delica Lancets 33G MISC Use daily    OneTouch Verio test strip USE TO TEST DAILY    pantoprazole (PROTONIX) 40 mg tablet TAKE 1 TABLET TWICE A DAY (1 TABLET IN THE MORNING AND 1 TABLET IN THE EVENING)    QUEtiapine (SEROquel) 50 mg tablet Take 25 mg by mouth daily at bedtime    rosuvastatin (CRESTOR) 5 mg tablet TAKE 1 TABLET ONCE A WEEK    semaglutide, 2 mg/dose, (Ozempic, 2 MG/DOSE,) 8 mg/ mL injection pen INJECT 2 MG UNDER THE SKIN EVERY 7 DAYS    traZODone (DESYREL) 50 mg tablet 1/2-1 tab at bedtime    trospium chloride (SANCTURA) 20 mg tablet Take 20 mg by mouth 2 (two) times a day    metoprolol succinate (TOPROL-XL) 25 mg 24 hr tablet Take 2 tablets (50 mg total) by mouth daily    selegiline (ELDEPRYL) 5 mg capsule Take 5 mg by mouth as needed (Patient not taking: Reported on 8/7/2024)     Allergies   Allergen Reactions    Celebrex [Celecoxib] Other (See Comments)     Cardiologist stated he should not take      Chlorhexidine Hives     Is able to wash with hibiclens but not use michi cloths    Other      \"steroid eyedrops\"      Prednisone Other (See Comments)     Prednisone eye drops- eye pressure increases     Immunization History   Administered Date(s) Administered    COVID-19 MODERNA VACC 0.5 ML IM 02/20/2021, 03/19/2021, 11/01/2021, 04/11/2022    INFLUENZA 10/14/2022    Influenza Quadrivalent Preservative Free 3 years and older IM 10/20/2016, 10/26/2017    Influenza Split High Dose Preservative Free IM 10/02/2024    Influenza, high dose seasonal 0.7 mL 10/21/2019, 09/28/2020, 01/04/2022, 10/14/2022, 11/30/2023    Influenza, recombinant, quadrivalent,injectable, preservative free 11/01/2018    Influenza, seasonal, injectable 12/03/2009, 12/12/2012, 11/18/2013, 11/01/2014, 11/05/2015    Pneumococcal Conjugate 13-Valent 05/02/2019    " "Pneumococcal Conjugate Vaccine 20-valent (Pcv20), Polysace 05/08/2024    Pneumococcal Polysaccharide PPV23 10/20/2016    Td (adult), adsorbed 07/16/1999    Zoster Vaccine Recombinant 12/06/2018, 02/04/2019     Objective     /84   Pulse 72   Temp 98.8 °F (37.1 °C) (Tympanic)   Resp 18   Ht 5' 8\" (1.727 m)   Wt 133 kg (292 lb 12.8 oz)   SpO2 96%   BMI 44.52 kg/m²     Physical Exam  Vitals reviewed.   Constitutional:       General: He is not in acute distress.     Appearance: He is obese. He is not ill-appearing, toxic-appearing or diaphoretic.   HENT:      Head: Normocephalic and atraumatic.      Mouth/Throat:      Mouth: Mucous membranes are moist.   Eyes:      Extraocular Movements: Extraocular movements intact.      Conjunctiva/sclera: Conjunctivae normal.      Pupils: Pupils are equal, round, and reactive to light.   Neck:      Vascular: No carotid bruit.   Cardiovascular:      Rate and Rhythm: Normal rate and regular rhythm.      Pulses: Normal pulses.      Heart sounds: Normal heart sounds. No murmur heard.  Pulmonary:      Effort: Pulmonary effort is normal. No respiratory distress.      Breath sounds: Normal breath sounds. No wheezing.   Abdominal:      General: Bowel sounds are normal. There is no distension.      Palpations: Abdomen is soft.      Tenderness: There is no abdominal tenderness.   Musculoskeletal:         General: Normal range of motion.      Cervical back: Normal range of motion and neck supple. No rigidity. No muscular tenderness.   Lymphadenopathy:      Cervical: No cervical adenopathy.   Skin:     General: Skin is warm and dry.      Capillary Refill: Capillary refill takes less than 2 seconds.      Findings: No bruising, erythema or rash.   Neurological:      General: No focal deficit present.      Mental Status: He is alert and oriented to person, place, and time.      Cranial Nerves: No cranial nerve deficit.   Psychiatric:         Mood and Affect: Mood normal.         " Behavior: Behavior normal.         Thought Content: Thought content normal.         Judgment: Judgment normal.

## 2024-10-11 ENCOUNTER — TELEPHONE (OUTPATIENT)
Age: 69
End: 2024-10-11

## 2024-10-11 NOTE — TELEPHONE ENCOUNTER
PA for HydrOXYzine HCL (ATARAX) 25 mg tablet APPROVED     Date(s) approved September 11, 2024 to October 11, 2025     Case #: [id number]     Patient advised by          []MyChart Message  [x]Phone call   []LMOM  []L/M to call office as no active Communication consent on file  [x]Unable to leave detailed message as VM not approved on Communication consent       Pharmacy advised by    [x]Fax  []Phone call    Approval letter scanned into Media No

## 2024-10-11 NOTE — TELEPHONE ENCOUNTER
PA for hydrOXYzine HCL (ATARAX) 25 mg SUBMITTED     via    []CMM-KEY:    [x]Surescripts-Case ID # [id number]   []Availity-Auth ID #  NDC #    []Faxed to plan   []Other website    []Phone call Case ID #      Office notes sent, clinical questions answered. Awaiting determination    Turnaround time for your insurance to make a decision on your Prior Authorization can take 7-21 business days.

## 2024-10-14 ENCOUNTER — PATIENT OUTREACH (OUTPATIENT)
Dept: CASE MANAGEMENT | Facility: OTHER | Age: 69
End: 2024-10-14

## 2024-10-14 NOTE — PROGRESS NOTES
OP CM rcvd referral for pt for Parkinsons Support Group.  Called to pt and LM.  Also went on Find Help and sent pt an email info for :       [employer]   Email [email or web address] to register   [address] [place] [address] [place] [address]    And     [facility] Support group for individuals living with Parkinson’s disease and their caregivers. Provides guest speakers and education. Location: [place] [address]- Meeting In-Person. For more information, contact Marcin Phillips at [phone] or [email or web address].

## 2024-10-16 ENCOUNTER — PATIENT OUTREACH (OUTPATIENT)
Dept: CASE MANAGEMENT | Facility: OTHER | Age: 69
End: 2024-10-16

## 2024-10-16 ENCOUNTER — OFFICE VISIT (OUTPATIENT)
Dept: PHYSICAL THERAPY | Facility: CLINIC | Age: 69
End: 2024-10-16
Payer: MEDICARE

## 2024-10-16 DIAGNOSIS — G20.A2 PARKINSON'S DISEASE WITHOUT DYSKINESIA, WITH FLUCTUATING MANIFESTATIONS (HCC): Primary | ICD-10-CM

## 2024-10-16 DIAGNOSIS — R26.2 AMBULATORY DYSFUNCTION: ICD-10-CM

## 2024-10-16 PROCEDURE — 97112 NEUROMUSCULAR REEDUCATION: CPT | Performed by: PHYSICAL THERAPIST

## 2024-10-16 PROCEDURE — 97110 THERAPEUTIC EXERCISES: CPT | Performed by: PHYSICAL THERAPIST

## 2024-10-16 NOTE — PROGRESS NOTES
Daily Note     Today's date: 10/16/2024  Patient name: Marcellus Fregoso  : 1955  MRN: [id number]  Referring provider: Ehsan Calix MD  Dx:   Encounter Diagnosis     ICD-10-CM    1. Parkinson's disease without dyskinesia, with fluctuating manifestations (HCC)  G20.A2       2. Ambulatory dysfunction  R26.2                      Subjective: Feeling well today. Has been doing Hep the last couple of days.       Objective: See treatment diary below    Functional Outcomes 10/16/24:  6 mwt: 400 ft 3 min   Gait Speed: 1.0 m/s  FGA:       Functional Outcomes 24:  6 mwt: 3 min 20 seconds 400 ft   ABC:   Gait Speed: 0.92 m/s  FGA:  5x sts: 12 sec without UE     Functional Outcomes 4/10/24  6 mwt: 914 ft  ABC: 77%  Gait Speed: 1.06 m/s   FGA:   5x sts: 11.4     Functional outcomes: 3/13/24  ABC: 56%  6 mwt: 650 ft  Gait Speed: 0.88 m/s  FGA: 15/30   5x sts: 12.64 sec       Assessment: Pt has declined once again since attending limited PT over the last few weeks. Discussed Hep and pt has been completing in the last few days. Plan to take on month for patient to focus on Hep and then will reassess for efficacy at that time. Overall since beginning PT he has largely returned to initial levels of endurance, strength, balance, transfer status.       Plan: Continue per plan of care.      Short Term Goal Expiration Date:(24)  Long Term Goal Expiration Date: (10/26/24)  POC Expiration Date: (10/26/24)      POC expires Unit limit Auth Expiration date PT/OT/ST + Visit Limit?   10/26/24 bomn bomn bomn                           Visit/Unit Tracking  AUTH Status:  Date      Used 1 2 3 4 5 6 7 8 9 10     Remaining                     Precautions fall risk       Manuals 9/20 10/2 8/30 9/4 9/18                                   Neuro Re-Ed         STS 2x10 o UE 2x10 2x10 without UE  2x10 without UE   hurdles  Fwd 5 laps 5# aw, lat 3 laps 5# aw 5 laps fwd with 3# aw, 3  laps laterally -  6 hurdles with 3# aw 5 laps fwd, lat 4 laps 6 hurdles with 5# aw 5 laps fwd, lat 4 laps   Walking with tidal tank Fwd bkwd 3 laps ea solo step  5 laps fwd with 5# aw With anterior pull solo step  5 laps with 7.5# tidal tank solo step 5 laps with 10# tidal tank solo step   HKM  5 laps 5#aw                              Ther Ex                                                                        Ther Activity                        Gait Training        treadmill 0.9 mph no incline b/l UE solo step, occassional quick stops for rest 5 min and 0.6 for 5 more inutes   1.0 mph with b/l UE 10 min solo step  1.4 mph 2 min, 1.0 mph up to 10 min solo step b/l UE with occassional quick stops for rest           Modalities

## 2024-10-16 NOTE — LETTER
October 16, 2024     Marcellus Fregoso    Patient: Marcellus Fregoso   YOB: 1955     Hello Mr Fregoso,     I am a  with the outpatient care management team.      I was advised to reach out to you in regards to support groups.   I found the [facility]   Email [email or web address] to register   [address] [place] [address] [place] [id number]     And      [facility] Support group for individuals living with Parkinson’s disease and their caregivers. Provides guest speakers and education. Location: [place] [address]- Meeting In-Person. For more information, contact Marcin Phillips at [phone] or [email or web address].     Also you can call: [phone] which is the Parkinsons Foundation.

## 2024-10-16 NOTE — PROGRESS NOTES
OP CM called to pt again in regards to seeking Parkinsons Support Groups.  Gave pt information for:    PD Steps through [facility]    2.  Parkinsons Association 1-800-4PD INFO    3 [employer]   Email [email or web address] to register [address] [facility] [place] [address],     4 [facility] Support group for individuals living with Parkinson's disease and their caregivers. Provides guest speakers and education. Location: [place] [address]- Meeting In-Person. For more information, contact Marcin Phillips at [phone] or [email or web address].      Pt has been going to [facility] but will be seeing [facility] Neurology tomorrow for a second opinion.     Pt resides with his wife.  Pt states wife is independent and walks 8000 steps a day.  Pt is driving.  Pt is able to bathe and dress himself.  Pt sometimes uses a cane and a rollator.  Pt states all his medication is affordable.

## 2024-10-17 ENCOUNTER — CONSULT (OUTPATIENT)
Dept: NEUROLOGY | Facility: CLINIC | Age: 69
End: 2024-10-17
Payer: MEDICARE

## 2024-10-17 VITALS — HEART RATE: 64 BPM | DIASTOLIC BLOOD PRESSURE: 70 MMHG | SYSTOLIC BLOOD PRESSURE: 131 MMHG

## 2024-10-17 DIAGNOSIS — G20.A2 PARKINSON'S DISEASE WITHOUT DYSKINESIA, WITH FLUCTUATING MANIFESTATIONS (HCC): Primary | ICD-10-CM

## 2024-10-17 DIAGNOSIS — R46.89 COGNITIVE AND BEHAVIORAL CHANGES: ICD-10-CM

## 2024-10-17 DIAGNOSIS — R41.89 COGNITIVE AND BEHAVIORAL CHANGES: ICD-10-CM

## 2024-10-17 DIAGNOSIS — G62.9 NEUROPATHY: ICD-10-CM

## 2024-10-17 PROCEDURE — 99215 OFFICE O/P EST HI 40 MIN: CPT | Performed by: PSYCHIATRY & NEUROLOGY

## 2024-10-17 NOTE — PROGRESS NOTES
Review of Systems   Constitutional:  Positive for fatigue. Negative for appetite change and fever.   HENT: Negative.  Negative for hearing loss, tinnitus, trouble swallowing and voice change.    Eyes: Negative.  Negative for photophobia, pain and visual disturbance.   Respiratory: Negative.  Negative for shortness of breath.    Cardiovascular: Negative.  Negative for palpitations.   Gastrointestinal: Negative.  Negative for nausea and vomiting.   Endocrine: Negative.  Negative for cold intolerance.   Genitourinary: Negative.  Negative for dysuria, frequency and urgency.   Musculoskeletal:  Positive for gait problem (Stumbles and shuffling a little bit). Negative for back pain, myalgias, neck pain and neck stiffness.        Balance Issues     Skin: Negative.  Negative for rash.   Allergic/Immunologic: Negative.    Neurological:  Positive for tremors (Sometimes Left Hand a little bit) and numbness (Feet and at times Left hand will go numb as well). Negative for dizziness, seizures, syncope, facial asymmetry, speech difficulty (Stuttering but is ongoing for a while due to other diagnosis), weakness (Legs, Only when tired), light-headedness and headaches.   Hematological: Negative.  Does not bruise/bleed easily.   Psychiatric/Behavioral:  Positive for sleep disturbance (Big Issue). Negative for confusion and hallucinations.         Personality Changes   Gets More Angry   Memory isn't the best , states the patient   All other systems reviewed and are negative.

## 2024-10-17 NOTE — ASSESSMENT & PLAN NOTE
Parkinson's disease complicated by medication intolerance with prior history impulsivity on levodopa. He is functioning well on 300mg-400mg daily along with selegiline 5mg qam but is concerned about recent episodes of agitation and questions whether this could be related to selegine. Discussed side effect is not common but it can occur   Can try stopping selegiline and seeing if episodes subside over the next month. If so that he would remain off.If while off selegiline he has more trouble getting started in the am, he can try adjusting levodopa to 1.5 tabs in am and 1 tab at 2pm and 6pm. Alternatively we would add Nourianz but there is concern regarding the $500 monthly co-pay.    Will continue to follow with [facility].  It seems that he is scheduled for December and April.  Will schedule him out to June of next year.

## 2024-10-17 NOTE — PROGRESS NOTES
Ambulatory Visit  Name: Marcellus Fregoso      : 1955      MRN: [id number]  Encounter Provider: Prachi Stover MD  Encounter Date: 10/17/2024   Encounter department: [facility] [place]    Assessment & Plan  Parkinson's disease without dyskinesia, with fluctuating manifestations (HCC)  Parkinson's disease complicated by medication intolerance with prior history impulsivity on levodopa. He is functioning well on 300mg-400mg daily along with selegiline 5mg qam but is concerned about recent episodes of agitation and questions whether this could be related to selegine. Discussed side effect is not common but it can occur   Can try stopping selegiline and seeing if episodes subside over the next month. If so that he would remain off.If while off selegiline he has more trouble getting started in the am, he can try adjusting levodopa to 1.5 tabs in am and 1 tab at 2pm and 6pm. Alternatively we would add Nourianz but there is concern regarding the $500 monthly co-pay.    Will continue to follow with [place] [facility].  It seems that he is scheduled for December and April.  Will schedule him out to  of next year.       Neuropathy  Symptoms stable.        Cognitive and behavioral changes  Reports, mild cognitive and behavioral changes being of concern.  Cognitive changes are not impacting daily function.  Not sure if dementia at this time.  Will perform [place] on follow-up.  With past 6 months he has had episodes of agitation which has been noted by his wife.  He questions whether this may be related to selegiline or for we are trying a trial off selegiline to see if this is in fact the case.           History of Present Illness   Marcellus Fregoso is a 68 year old  is with PMH of prior lap band, Molina's esophagus, gastroparesis, DM type 2, depression and anxiety, stuttering, HTN, ANABELL, and prostate cancer s/p prostatectomy , who presents for movement disorder evaluation to establish care  "for Parkinson's disease.    Review of chart reveals: She was seen by Dr. Carranza in March 2022 episodes of imbalance. Patient had a fall in Dec 2020 after taking Ambien and waiting too long to get into bed. Patient reported falling down 16 steps and hitting the back of his head. He was evaluated and diagnosed with concussion but no associated internal injury or fracture.  There is reports of tremor with action.  Family history of 2 uncles with PD.also had been on Zyprexa for many years for stuttering, prescribed by a psychiatrist. 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  He was seen by Dr. Codey Franks and Dr. Julita Whittaker in August 2023 at [facility].  Noted to have parkinsonian features on exam, possible related to Zyprexa vs idiopathic Parkinson's disease.  He was advised to discontinue Zyprexa and was started on Sinemet.  DaTscan from 10/18/23 which was positive showing symmetrically decreased radiotracer distribution in the posterior striata. Initially noted improvement on levodopa.  Over time this dissipated.  He follows with psychiatrist and psychologist.  Previously reported concerns over \"obsessions\" since starting levodopa.    He continues to follow at [facility] and looking to establish with someone locally.  From when he was last seen by Dr. Franks in August was reviewed.  At that time he was asked to limit levodopa to 300mg daily.  There is talk about consideration for DBS given medication intolerance and use of Nourianz.    He ambulates with Rolator or cane.   He has occasional left hand tremors.   He is concerned about side effects of levodopa. He has initial issues with increased libido.  He sees psychiatry who has him on trazodone and quetiapine which has helped. He has however noted he can be easily agitated and nasty. This is unlike him. This developed over the past 3-4 months.Selegiline taken off and on for 6 months he tells " me. He was questioning whether this is the cause.      He is concerned about memory issues described as short term issues. Notes having trouble remembering phone numbers. He manages his own medications with pill box. No hallucinations.He performs ADL's independently.   Sleep is variable. Tends to be interrupted some nights and he will watch tv.   Swallowing impaired. He has Molina's esophagus and is scheduled to have surgery.    Stuttering present since he was in 1st grade.     Current neuro related medication:   carbidopa/levodopa 25/100 1 tablet 3 times daily (q4 hours apart 10, 2, 6)  and occasionally will take extra 1/2 tab in am or afternoon    Selegiline 5mg qam- which has helped with shuffling and freezing, never tried bid as prescribed  Quetiapine 50mg nightly    Prior neuro related medications :   Nourianz - co-pay is high so has not tried yet, has month supply at home              Review of Systems  I have personally reviewed the MA's review of systems and made changes as necessary.      Objective     /70 (BP Location: Left arm, Patient Position: Sitting, Cuff Size: Large)   Pulse 64     Physical Exam  Vitals reviewed.   Eyes:      Extraocular Movements: EOM normal.      Pupils: Pupils are equal, round, and reactive to light.   Neurological:      Mental Status: He is oriented to person, place, and time.      Motor: Motor strength is normal.      Neurologic Exam     Mental Status   Oriented to person, place, and time.   Follows 3 step commands.   Attention: normal. Concentration: normal.   Speech: (Hypophonia  Significant stuttering which affect speed of communication )  Level of consciousness: alert  Normal comprehension.     Cranial Nerves     CN III, IV, VI   Pupils are equal, round, and reactive to light.  Extraocular motions are normal.     CN VII   Facial expression full, symmetric.     CN VIII   Hearing: intact    CN IX, X   Palate: symmetric    CN XI   Right trapezius strength: normal  Left  trapezius strength: normal    CN XII   Tongue deviation: none    Motor Exam   Overall muscle tone: normal    Strength   Strength 5/5 throughout.     Gait, Coordination, and Reflexes     Tremor   Resting tremor: absent  Intention tremor: presentGood speed, Shortened stride length. No freezing.         MDS UPDRS III                                       Time since last dose:    Speech  1   Facial Expression  1   Rigidity - Neck  0   Rigidity - Upper Extremity (R)  0   Rigidity - Upper Extremity (L)   0   Rigidity - Lower Extremity (R)     Rigidity - Lower Extremity (L)      Finger Taps (R)   1   Finger Taps (L)   1   Hand Movement (R)  0   Hand Movement (L)   0   Pronation/Supination (R)  1   Pronation/Supination (L)   1   Toe Tapping (R) 1   Toe Tapping (L) 1   Leg Agility (R)  1   Leg Agility (L)   1   Arising from Chair   2   Gait   1   Freezing of Gait 0   Postural Stability      Posture 2   Global spontaneity of movement 0   Postural Tremor (Ri 0   Postural Tremor (L) 0   Kinetic Tremor (R)  1   Kinetic Tremor (L)  1   Rest tremor amplitude RUE 0   Rest tremor amplitude LUE 0   Rest tremor amplitude RLE 0   Reset tremor amplitude LLE 0   Lip/Jaw Tremor  0   Consistency of tremor 0   Motor Exam Total:           I have reviewed radiology reports from   including: Carlo scan MRI brain.

## 2024-10-17 NOTE — PATIENT INSTRUCTIONS
Parkinson's disease complicated by medication intolerance with prior history impulsivity on levodopa. He is functioning well on 300mg-400mg daily along with selegiline 5mg qam but is concerned about recent episodes of agitation and questions whether this could be related to selegine. Discussed side effect is not common but it can occur   Can try stopping selegiline and seeing if episodes subside over the next month. If so that he would remain off.If while off selegiline he has more trouble getting started in the am, he can try adjusting levodopa to 1.5 tabs in am and 1 tab at 2pm and 6pm. Alternatively we would add Nourianz but there is concern regarding the $500 monthly co-pay.

## 2024-10-18 ENCOUNTER — ANESTHESIA EVENT (OUTPATIENT)
Dept: GASTROENTEROLOGY | Facility: HOSPITAL | Age: 69
End: 2024-10-18
Payer: MEDICARE

## 2024-10-18 ENCOUNTER — HOSPITAL ENCOUNTER (OUTPATIENT)
Dept: GASTROENTEROLOGY | Facility: HOSPITAL | Age: 69
Setting detail: OUTPATIENT SURGERY
End: 2024-10-18
Attending: INTERNAL MEDICINE
Payer: MEDICARE

## 2024-10-18 ENCOUNTER — ANESTHESIA (OUTPATIENT)
Dept: GASTROENTEROLOGY | Facility: HOSPITAL | Age: 69
End: 2024-10-18
Payer: MEDICARE

## 2024-10-18 VITALS
HEIGHT: 68 IN | BODY MASS INDEX: 43.65 KG/M2 | DIASTOLIC BLOOD PRESSURE: 73 MMHG | RESPIRATION RATE: 18 BRPM | HEART RATE: 60 BPM | OXYGEN SATURATION: 96 % | WEIGHT: 288 LBS | TEMPERATURE: 96.3 F | SYSTOLIC BLOOD PRESSURE: 133 MMHG

## 2024-10-18 DIAGNOSIS — K22.719 BARRETT'S ESOPHAGUS WITH DYSPLASIA: ICD-10-CM

## 2024-10-18 DIAGNOSIS — K21.00 GASTROESOPHAGEAL REFLUX DISEASE WITH ESOPHAGITIS WITHOUT HEMORRHAGE: ICD-10-CM

## 2024-10-18 DIAGNOSIS — K31.84 GASTROPARESIS: Primary | ICD-10-CM

## 2024-10-18 LAB — GLUCOSE SERPL-MCNC: 116 MG/DL (ref 65–140)

## 2024-10-18 PROCEDURE — 43270 EGD LESION ABLATION: CPT | Performed by: INTERNAL MEDICINE

## 2024-10-18 PROCEDURE — 82948 REAGENT STRIP/BLOOD GLUCOSE: CPT

## 2024-10-18 PROCEDURE — C1885 CATH, TRANSLUMIN ANGIO LASER: HCPCS

## 2024-10-18 RX ORDER — SUCCINYLCHOLINE/SOD CL,ISO/PF 100 MG/5ML
SYRINGE (ML) INTRAVENOUS AS NEEDED
Status: DISCONTINUED | OUTPATIENT
Start: 2024-10-18 | End: 2024-10-18

## 2024-10-18 RX ORDER — SUCRALFATE 1 G/1
1 TABLET ORAL 2 TIMES DAILY
Qty: 60 TABLET | Refills: 3 | Status: SHIPPED | OUTPATIENT
Start: 2024-10-18

## 2024-10-18 RX ORDER — FENTANYL CITRATE 50 UG/ML
INJECTION, SOLUTION INTRAMUSCULAR; INTRAVENOUS AS NEEDED
Status: DISCONTINUED | OUTPATIENT
Start: 2024-10-18 | End: 2024-10-18

## 2024-10-18 RX ORDER — EPHEDRINE SULFATE 50 MG/ML
INJECTION INTRAVENOUS AS NEEDED
Status: DISCONTINUED | OUTPATIENT
Start: 2024-10-18 | End: 2024-10-18

## 2024-10-18 RX ORDER — SUCRALFATE ORAL 1 G/10ML
1 SUSPENSION ORAL
Qty: 210 ML | Refills: 0 | Status: SHIPPED | OUTPATIENT
Start: 2024-10-18 | End: 2024-10-25

## 2024-10-18 RX ORDER — SODIUM CHLORIDE 9 MG/ML
INJECTION, SOLUTION INTRAVENOUS CONTINUOUS PRN
Status: DISCONTINUED | OUTPATIENT
Start: 2024-10-18 | End: 2024-10-18

## 2024-10-18 RX ORDER — PROPOFOL 10 MG/ML
INJECTION, EMULSION INTRAVENOUS AS NEEDED
Status: DISCONTINUED | OUTPATIENT
Start: 2024-10-18 | End: 2024-10-18

## 2024-10-18 RX ORDER — ONDANSETRON 2 MG/ML
INJECTION INTRAMUSCULAR; INTRAVENOUS AS NEEDED
Status: DISCONTINUED | OUTPATIENT
Start: 2024-10-18 | End: 2024-10-18

## 2024-10-18 RX ORDER — LIDOCAINE HYDROCHLORIDE 10 MG/ML
INJECTION, SOLUTION EPIDURAL; INFILTRATION; INTRACAUDAL; PERINEURAL AS NEEDED
Status: DISCONTINUED | OUTPATIENT
Start: 2024-10-18 | End: 2024-10-18

## 2024-10-18 RX ORDER — DIPHENHYDRAMINE HYDROCHLORIDE AND LIDOCAINE HYDROCHLORIDE AND ALUMINUM HYDROXIDE AND MAGNESIUM HYDRO
10 KIT EVERY 4 HOURS PRN
Qty: 237 ML | Refills: 0 | Status: SHIPPED | OUTPATIENT
Start: 2024-10-18 | End: 2024-11-01

## 2024-10-18 RX ADMIN — ONDANSETRON 4 MG: 2 INJECTION INTRAMUSCULAR; INTRAVENOUS at 09:27

## 2024-10-18 RX ADMIN — SODIUM CHLORIDE: 0.9 INJECTION, SOLUTION INTRAVENOUS at 09:25

## 2024-10-18 RX ADMIN — FENTANYL CITRATE 25 MCG: 50 INJECTION INTRAMUSCULAR; INTRAVENOUS at 09:29

## 2024-10-18 RX ADMIN — FENTANYL CITRATE 25 MCG: 50 INJECTION INTRAMUSCULAR; INTRAVENOUS at 09:41

## 2024-10-18 RX ADMIN — PROPOFOL 150 MG: 10 INJECTION, EMULSION INTRAVENOUS at 09:29

## 2024-10-18 RX ADMIN — Medication 80 MG: at 09:29

## 2024-10-18 RX ADMIN — LIDOCAINE HYDROCHLORIDE 50 MG: 10 INJECTION, SOLUTION EPIDURAL; INFILTRATION; INTRACAUDAL; PERINEURAL at 09:29

## 2024-10-18 RX ADMIN — PROPOFOL 50 MG: 10 INJECTION, EMULSION INTRAVENOUS at 09:38

## 2024-10-18 RX ADMIN — EPHEDRINE SULFATE 10 MG: 50 INJECTION, SOLUTION INTRAVENOUS at 09:52

## 2024-10-18 NOTE — ANESTHESIA POSTPROCEDURE EVALUATION
Post-Op Assessment Note    CV Status:  Stable    Pain management: adequate       Mental Status:  Alert and awake   Hydration Status:  Euvolemic   PONV Controlled:  Controlled   Airway Patency:  Patent     Post Op Vitals Reviewed: Yes    No anethesia notable event occurred.    Staff: Anesthesiologist           Last Filed PACU Vitals:  Vitals Value Taken Time   Temp 96.3 °F (35.7 °C) 10/18/24 1004   Pulse 60 10/18/24 1029   /73 10/18/24 1029   Resp 18 10/18/24 1029   SpO2 96 % 10/18/24 1029       Modified Kadi:  Activity: 2 (10/18/2024 10:29 AM)  Respiration: 2 (10/18/2024 10:29 AM)  Circulation: 2 (10/18/2024 10:29 AM)  Consciousness: 2 (10/18/2024 10:29 AM)  Oxygen Saturation: 2 (10/18/2024 10:29 AM)  Modified Kadi Score: 10 (10/18/2024 10:29 AM)

## 2024-10-18 NOTE — ANESTHESIA POSTPROCEDURE EVALUATION
Post-Op Assessment Note    CV Status:  Stable  Pain Score: 0    Pain management: adequate       Mental Status:  Awake and alert   Hydration Status:  Stable   PONV Controlled:  None   Airway Patency:  Patent     Post Op Vitals Reviewed: Yes    No anethesia notable event occurred.    Staff: CRNA           Last Filed PACU Vitals:  Vitals Value Taken Time   Temp 96.3    Pulse 64    /76    Resp     SpO2 96        Modified Kadi:  Activity: 2 (10/18/2024 10:04 AM)  Respiration: 2 (10/18/2024 10:04 AM)  Circulation: 2 (10/18/2024 10:04 AM)  Consciousness: 2 (10/18/2024 10:04 AM)  Oxygen Saturation: 2 (10/18/2024 10:04 AM)  Modified Kadi Score: 10 (10/18/2024 10:04 AM)

## 2024-10-18 NOTE — H&P
History and Physical - [facility]  Marcellus Fregoso 68 y.o. male MRN: [id number]    HPI: Marcellus Fregoso is a 68 y.o. year old male who presents for surveillance EGD for Molina's esophagus      Review of Systems    Historical Information   Past Medical History:   Diagnosis Date    Acute blood loss anemia 10/13/2016    Anxiety     Arthritis     knees    Arthritis     Asthma     stable for > 10 years    Molina esophagus     with BARRX/RFA    Cancer (HCC)     prostate    Cataract     Depression     Diabetes (HCC)     Diabetes mellitus (HCC)     pre diabetes    Diverticulitis of colon     Environmental allergies     Fall 12/30/2020    GERD (gastroesophageal reflux disease)     Hiatal hernia     History of anal fissures     Hyperlipidemia     Hypertension     Incisional hernia     Insomnia     Kidney stone     Malignant neoplasm of prostate (HCC)     Morbid obesity (HCC)     Parkinson disease (HCC)     Peripheral neuropathy     PONV (postoperative nausea and vomiting)     Prolapsing mitral valve     prolapsing mitral valve leaflet syndrome    Prostate cancer (HCC)     Retinal detachment     treated surgically at [facility]; resolved: 10/10/2012    Sleep apnea     diagnosed but unable to tolerate cpap.     Stuttering      Past Surgical History:   Procedure Laterality Date    ARTHROSCOPIC REPAIR ACL Right     BIOPSY CORE NEEDLE      prostate    CATARACT EXTRACTION Bilateral     COLONOSCOPY      HERNIA REPAIR      naval    JOINT REPLACEMENT      B/L knee    KNEE ARTHROSCOPY Left     LAPAROSCOPIC GASTRIC BANDING      NH ARTHRP KNE CONDYLE&PLATU MEDIAL&LAT COMPARTMENTS Left 02/15/2017    Procedure: TOTAL KNEE ARTHROPLASTY ;  Surgeon: Sal Ross MD;  Location: [facility];  Service: Orthopedics    NH ARTHRP KNE CONDYLE&PLATU MEDIAL&LAT COMPARTMENTS Right 10/10/2016    Procedure: ARTHROPLASTY KNEE TOTAL;  Surgeon: Sal Ross MD;  Location: [facility];  Service: Orthopedics    PROSTATECTOMY       "robotic-assisted; [facility]-Dr. Tay    REMOVAL GASTRIC BAND LAPAROSCOPIC N/A 8/30/2022    Procedure: LAPAROSCOPIC REMOVAL OF ADJUSTABLE GASTRIC BAND AND PORT  WITH INTRAOPERATIVE EGD;  Surgeon: Neal Yang MD;  Location: [facility];  Service: Bariatrics    RETINAL DETACHMENT SURGERY Bilateral     [place] [facility]    SEPTOPLASTY      SINUS SURGERY      TONSILLECTOMY      over age 10    UPPER GASTROINTESTINAL ENDOSCOPY      mutiple with BARRX/RFA    VASECTOMY      vas deferens     Social History   Social History     Substance and Sexual Activity   Alcohol Use Not Currently    Alcohol/week: 1.0 standard drink of alcohol    Types: 1 Cans of beer per week    Comment: 1 case of beer per year     Social History     Substance and Sexual Activity   Drug Use Never     Social History     Tobacco Use   Smoking Status Never    Passive exposure: Never   Smokeless Tobacco Never   Tobacco Comments    quit 38 years ago     Family History   Problem Relation Age of Onset    Heart disease Father     Coronary artery disease Father     Prostate cancer Father     Coronary artery disease Mother     Diabetes Mother         mellitus    Diabetes type II Mother     Diabetes Maternal Grandmother         mellitus    Coronary artery disease Maternal Grandfather     Coronary artery disease Paternal Grandfather        Meds/Allergies     Not in a hospital admission.    Allergies   Allergen Reactions    Celebrex [Celecoxib] Other (See Comments)     Cardiologist stated he should not take      Chlorhexidine Hives     Is able to wash with hibiclens but not use michi cloths    Other      \"steroid eyedrops\"      Prednisone Other (See Comments)     Prednisone eye drops- eye pressure increases       Objective     /76   Pulse 60   Temp 97.9 °F (36.6 °C)   Resp 20   Ht 5' 8\" (1.727 m)   Wt 131 kg (288 lb)   SpO2 94%   BMI 43.79 kg/m²       PHYSICAL EXAM    Gen: NAD  CV: RRR  CHEST: Clear  ABD: soft, NT/ND  EXT: no edema  Neuro: AAO      ASSESSMENT/PLAN: "  This is a 68 y.o. year old male here for EGD for surveillances of Molina's esophagus     PLAN:   Procedure: EGD with biopsy and possible RFA

## 2024-10-18 NOTE — ANESTHESIA PREPROCEDURE EVALUATION
"Procedure:  EGD    Relevant Problems   CARDIO   (+) Benign essential hypertension   (+) CAD (coronary artery disease)   (+) Chest pain syndrome   (+) Exertional dyspnea   (+) Hyperlipidemia   (+) PVC (premature ventricular contraction)   (+) Pure hypercholesterolemia      GI/HEPATIC   (+) Dysphagia   (+) Gastroesophageal reflux disease with esophagitis without hemorrhage      MUSCULOSKELETAL   (+) Chronic bilateral low back pain without sciatica      NEURO/PSYCH   (+) Chronic bilateral low back pain without sciatica   (+) Depression with anxiety      PULMONARY   (+) Exertional dyspnea   (+) Sleep apnea                Physical Exam    Airway    Mallampati score: II  TM Distance: >3 FB  Neck ROM: full     Dental   No notable dental hx implants    Cardiovascular  Rhythm: regular, Rate: normal, No weak pulses    Pulmonary   No stridor    Other Findings        Anesthesia Plan  ASA Score- 3     Anesthesia Type- general with ASA Monitors.         Additional Monitors:     Airway Plan: ETT.    Comment: Per prior anesthetic note \"Pt aspirated during EGD under sedation in 2021.  Pt would like to proceed with GETA.  We discussed that GA does not completely negate the risk of aspiration.  Additionally, it can increased other risks s/a cardiac/pulmonary complications.\" .       Plan Factors-    Chart reviewed.   Existing labs reviewed. Patient summary reviewed.                  Induction- intravenous.    Postoperative Plan- . Planned trial extubation        Informed Consent- Anesthetic plan and risks discussed with patient.  I personally reviewed this patient with the CRNA. Discussed and agreed on the Anesthesia Plan with the CRNA..        "

## 2024-10-23 ENCOUNTER — APPOINTMENT (OUTPATIENT)
Dept: PHYSICAL THERAPY | Facility: CLINIC | Age: 69
End: 2024-10-23
Payer: MEDICARE

## 2024-10-23 DIAGNOSIS — E11.9 CONTROLLED TYPE 2 DIABETES MELLITUS WITHOUT COMPLICATION, WITHOUT LONG-TERM CURRENT USE OF INSULIN (HCC): ICD-10-CM

## 2024-10-23 NOTE — TELEPHONE ENCOUNTER
Reason for call:   [x] Refill   [] Prior Auth  [] Other:     Office:   [x] PCP/Provider - [facility]-Patient stated this is the doctor who prescribes   [] Specialty/Provider -     Medication: one touch delica lancets 33G    Dose/Frequency: daily     Medication: onetouch Verio Test strips     Dose/Frequency: use to test daily     [facility]     Does the patient have enough for 3 days?   [x] Yes   [] No - Send as HP to POD

## 2024-10-24 RX ORDER — BLOOD SUGAR DIAGNOSTIC
STRIP MISCELLANEOUS
Qty: 100 STRIP | Refills: 3 | Status: SHIPPED | OUTPATIENT
Start: 2024-10-24 | End: 2024-10-25 | Stop reason: SDUPTHER

## 2024-10-24 RX ORDER — LANCETS 33 GAUGE
EACH MISCELLANEOUS DAILY
Qty: 100 EACH | Refills: 3 | Status: SHIPPED | OUTPATIENT
Start: 2024-10-24 | End: 2024-10-25 | Stop reason: SDUPTHER

## 2024-10-25 ENCOUNTER — TELEPHONE (OUTPATIENT)
Age: 69
End: 2024-10-25

## 2024-10-25 DIAGNOSIS — E11.9 CONTROLLED TYPE 2 DIABETES MELLITUS WITHOUT COMPLICATION, WITHOUT LONG-TERM CURRENT USE OF INSULIN (HCC): ICD-10-CM

## 2024-10-25 RX ORDER — BLOOD SUGAR DIAGNOSTIC
STRIP MISCELLANEOUS
Qty: 100 STRIP | Refills: 0 | Status: SHIPPED | OUTPATIENT
Start: 2024-10-25

## 2024-10-25 RX ORDER — LANCETS 33 GAUGE
EACH MISCELLANEOUS DAILY
Qty: 100 EACH | Refills: 0 | Status: SHIPPED | OUTPATIENT
Start: 2024-10-25

## 2024-10-25 NOTE — TELEPHONE ENCOUNTER
Caller transferred to myself by Daryl Tomlin, Pharmacist with [facility], is requesting clarification for dual sucralfate scripts sent on 10/18. Which of the 2 scripts is preferred?    Office: Please call [phone] to update.  Invoice # [id number]    sucralfate (CARAFATE) 1 g tablet [[id number]]    Order Details  Dose: 1 g Route: Oral Frequency: 2 times daily   Dispense Quantity: 60 tablet Refills: 3          Sig: Take 1 tablet (1 g total) by mouth 2 (two) times a day         Start Date: 10/18/24 End Date: --   Written Date: 10/18/24 Expiration Date: 10/18/25       Associated Diagnoses: Gastroesophageal reflux disease with esophagitis without hemorrhage [K21.00]; Gastroparesis [K31.84]        sucralfate (CARAFATE) 1 g/10 mL suspension [[id number]]    Order Details  Dose: 1 g Route: Oral Frequency: 3 times daily before meals   Dispense Quantity: 210 mL Refills: 0          Sig: Take 10 mL (1 g total) by mouth 3 (three) times a day before meals for 7 days         Start Date: 10/18/24 End Date: 10/25/24 after 21 doses   Written Date: 10/18/24 Expiration Date: 10/18/25       Associated Diagnoses: Molina's esophagus with dysplasia [K22.719]

## 2024-10-25 NOTE — TELEPHONE ENCOUNTER
Patient called stating he received notification from [facility] that these supplies are not covered and his order has been cancelled. Patient is asking to have his test strips and lancets sent to [facility] on [address] and that he would pay out of pocket, if needed. Please send to [facility] on [address].

## 2024-10-25 NOTE — TELEPHONE ENCOUNTER
Patient called stating he received notification from [facility] that these supplies are not covered and his order has been cancelled. Patient is asking to have his test strips and lancets sent to [facility] on [address] and that he would pay out of pocket, if needed. Please send to [facility] on [address].

## 2024-10-28 RX ORDER — BLOOD SUGAR DIAGNOSTIC
1 STRIP MISCELLANEOUS DAILY
Qty: 100 STRIP | Refills: 3 | Status: SHIPPED | OUTPATIENT
Start: 2024-10-28

## 2024-10-28 RX ORDER — LANCETS 33 GAUGE
1 EACH MISCELLANEOUS DAILY
Qty: 100 EACH | Refills: 3 | Status: SHIPPED | OUTPATIENT
Start: 2024-10-28

## 2024-10-28 NOTE — TELEPHONE ENCOUNTER
Hi,   I had filled out paper work and faxed it back to pharmacy. Had given the paper to spencer

## 2024-10-29 ENCOUNTER — APPOINTMENT (OUTPATIENT)
Dept: LAB | Age: 69
End: 2024-10-29
Payer: MEDICARE

## 2024-10-29 ENCOUNTER — NURSE TRIAGE (OUTPATIENT)
Age: 69
End: 2024-10-29

## 2024-10-29 DIAGNOSIS — R39.9 UTI SYMPTOMS: Primary | ICD-10-CM

## 2024-10-29 LAB
AMORPH URATE CRY URNS QL MICRO: NORMAL
BACTERIA UR QL AUTO: NORMAL /HPF
BILIRUB UR QL STRIP: NEGATIVE
CLARITY UR: CLEAR
COLOR UR: NORMAL
GLUCOSE UR STRIP-MCNC: NEGATIVE MG/DL
HGB UR QL STRIP.AUTO: NEGATIVE
KETONES UR STRIP-MCNC: NEGATIVE MG/DL
LEUKOCYTE ESTERASE UR QL STRIP: NEGATIVE
NITRITE UR QL STRIP: NEGATIVE
NON-SQ EPI CELLS URNS QL MICRO: NORMAL /HPF
PH UR STRIP.AUTO: 6 [PH]
PROT UR STRIP-MCNC: NEGATIVE MG/DL
RBC #/AREA URNS AUTO: NORMAL /HPF
SP GR UR STRIP.AUTO: 1.01 (ref 1–1.03)
UROBILINOGEN UR STRIP-ACNC: <2 MG/DL
WBC #/AREA URNS AUTO: NORMAL /HPF

## 2024-10-29 PROCEDURE — 81001 URINALYSIS AUTO W/SCOPE: CPT

## 2024-10-29 PROCEDURE — 87086 URINE CULTURE/COLONY COUNT: CPT

## 2024-10-29 NOTE — TELEPHONE ENCOUNTER
"Patient of Robin Erickmaritza, last seen 8/1/2024, called stating he is experiencing severe incontinence with loss of control, frequency, and urgency. Denies fever, chills, or pain. I ordered a UA and culture. I reviewed ED precautions, encouraged water intake, and avoiding bladder irritants. I also scheduled an appointment on 11/5/2024 with Michael. Please advise.    Reason for Disposition   All other urine symptoms    Answer Assessment - Initial Assessment Questions  1. SYMPTOM: \"What's the main symptom you're concerned about?\" (e.g., frequency, incontinence)      Urinary incontinence, loss of control, frequency, urgency    2. ONSET: \"When did the symptoms start?\"      2-3 weeks, has been noticeably more severe    3. PAIN: \"Is there any pain?\" If Yes, ask: \"How bad is it?\" (Scale: 1-10; mild, moderate, severe)      Denies        5. OTHER SYMPTOMS: \"Do you have any other symptoms?\" (e.g., blood in urine, fever, flank pain, pain with urination)      Denies    Protocols used: Urinary Symptoms-Adult-[place]    "

## 2024-10-30 LAB — BACTERIA UR CULT: NORMAL

## 2024-10-31 RX ORDER — LATANOPROST 50 UG/ML
SOLUTION/ DROPS OPHTHALMIC
COMMUNITY
Start: 2024-10-07

## 2024-10-31 RX ORDER — IBUPROFEN 200 MG
400 TABLET ORAL 3 TIMES DAILY
COMMUNITY

## 2024-11-01 NOTE — TELEPHONE ENCOUNTER
Message sent by JERICHO John on 10/31 letting pt know that urine testing was negative. Message seen by pt. Pt has f/u on 11/5.

## 2024-11-05 ENCOUNTER — OFFICE VISIT (OUTPATIENT)
Dept: UROLOGY | Facility: AMBULATORY SURGERY CENTER | Age: 69
End: 2024-11-05
Payer: MEDICARE

## 2024-11-05 VITALS
BODY MASS INDEX: 44.56 KG/M2 | SYSTOLIC BLOOD PRESSURE: 142 MMHG | HEIGHT: 68 IN | OXYGEN SATURATION: 96 % | WEIGHT: 294 LBS | DIASTOLIC BLOOD PRESSURE: 78 MMHG | HEART RATE: 65 BPM

## 2024-11-05 DIAGNOSIS — R39.9 UTI SYMPTOMS: Primary | ICD-10-CM

## 2024-11-05 LAB — POST-VOID RESIDUAL VOLUME, ML POC: 180 ML

## 2024-11-05 PROCEDURE — 51798 US URINE CAPACITY MEASURE: CPT

## 2024-11-05 PROCEDURE — 99213 OFFICE O/P EST LOW 20 MIN: CPT

## 2024-11-05 NOTE — PROGRESS NOTES
Office Visit- Urology  Marcellus Fregoso 1955 MRN: [id number]      Assessment/Discussion/Plan    68 y.o. male managed by     Prostate cancer  -History of robotic prostatectomy by Dr. Tay in 2011   -postoperatively PSAs remain undetectable with last measurement less than 0.008 in June 2024  -Due for updated PSA in June 2025     2.  Urinary incontinence  -In the context of Parkinson's disease  -Currently maintained on trospium 20 mg twice daily but patient states that he only takes on an as-needed basis  -Symptoms are not well-controlled.  Patient describes both stress and urgency urinary incontinence reviewed  -PVR today at 180 mL  -UA not able to be provided  -reviewed different types of incontinence and the different treatment modalities.  Reviewed Kegel exercises and gave patient handout for stress urinary incontinence.  Patient states that he has not been utilizing trospium 20 mg on a consistent basis.  Advised patient that his urgency with urge incontinence is likely multifactorial given his history of Parkinson's, type 2 diabetes.  Offered alternative pharmacotherapy but patient like to trial taking trospium on a consistent basis I think that this is reasonable  -We will have a close interval follow-up in approximately 6 weeks for reassessment      Chief Complaint:   Marcellus is a 68 y.o. male presenting to the office for a follow up visit regarding urinary incontinence patient is a 68-year-old male with a history of prostate cancer status post robotic prostatectomy by Dr. Tay in 2011 his PSAs postoperatively has remained undetectable measuring less than 0.008 in June 2024.        Subjective    patient is a 68-year-old male with a history of prostate cancer status post robotic prostatectomy by Dr. Tay in 2011 his PSAs postoperatively has remained undetectable measuring less than 0.008 in June 2024. He previously followed with EP team at the [facility] but presents today to the office for an urgent  follow-up given that he has had persistent urinary incontinence.  He describes both mixed and urgency urinary incontinence.  He states that he utilizes the trospium 20 mg on an as-needed basis.  He states that some days he has significant urinary symptoms and other days he seems to be fine.  He drinks predominantly water having cut out soda previously.  No dysuria, gross hematuria, flank pain          ROS:   Review of Systems   Constitutional: Negative.  Negative for chills, fatigue and fever.   HENT: Negative.     Respiratory:  Negative for shortness of breath.    Cardiovascular:  Negative for chest pain.   Gastrointestinal: Negative.  Negative for abdominal pain.   Endocrine: Negative.    Musculoskeletal: Negative.    Skin: Negative.    Neurological: Negative.  Negative for dizziness and light-headedness.   Hematological: Negative.    Psychiatric/Behavioral: Negative.           Past Medical History  Past Medical History:   Diagnosis Date    Acute blood loss anemia 10/13/2016    Anxiety     Arthritis     knees    Arthritis     Asthma     stable for > 10 years    Molina esophagus     with BARRX/RFA    Cancer (HCC)     prostate    Cataract     Depression     Diabetes (HCC)     Diabetes mellitus (HCC)     pre diabetes    Diverticulitis of colon     Environmental allergies     Fall 12/30/2020    GERD (gastroesophageal reflux disease)     Hiatal hernia     History of anal fissures     Hyperlipidemia     Hypertension     Incisional hernia     Insomnia     Kidney stone     Malignant neoplasm of prostate (HCC)     Morbid obesity (HCC)     Parkinson disease (HCC)     Peripheral neuropathy     PONV (postoperative nausea and vomiting)     Prolapsing mitral valve     prolapsing mitral valve leaflet syndrome    Prostate cancer (HCC)     Retinal detachment     treated surgically at [facility]; resolved: 10/10/2012    Sleep apnea     diagnosed but unable to tolerate cpap.     Stuttering        Past Surgical History  Past  Surgical History:   Procedure Laterality Date    ARTHROSCOPIC REPAIR ACL Right     BIOPSY CORE NEEDLE      prostate    CATARACT EXTRACTION Bilateral     COLONOSCOPY      HERNIA REPAIR      naval    JOINT REPLACEMENT      B/L knee    KNEE ARTHROSCOPY Left     LAPAROSCOPIC GASTRIC BANDING      OK ARTHRP KNE CONDYLE&PLATU MEDIAL&LAT COMPARTMENTS Left 02/15/2017    Procedure: TOTAL KNEE ARTHROPLASTY ;  Surgeon: Sal Ross MD;  Location: [facility];  Service: Orthopedics    OK ARTHRP KNE CONDYLE&PLATU MEDIAL&LAT COMPARTMENTS Right 10/10/2016    Procedure: ARTHROPLASTY KNEE TOTAL;  Surgeon: Sal Ross MD;  Location: [facility];  Service: Orthopedics    PROSTATECTOMY      robotic-assisted; Lilly Tay    REMOVAL GASTRIC BAND LAPAROSCOPIC N/A 8/30/2022    Procedure: LAPAROSCOPIC REMOVAL OF ADJUSTABLE GASTRIC BAND AND PORT  WITH INTRAOPERATIVE EGD;  Surgeon: Neal Yang MD;  Location: [facility];  Service: Bariatrics    RETINAL DETACHMENT SURGERY Bilateral     Lemus Eye    SEPTOPLASTY      SINUS SURGERY      TONSILLECTOMY      over age 10    UPPER GASTROINTESTINAL ENDOSCOPY      mutiple with BARRX/RFA    VASECTOMY      vas deferens       Past Family History  Family History   Problem Relation Age of Onset    Heart disease Father     Coronary artery disease Father     Prostate cancer Father     Coronary artery disease Mother     Diabetes Mother         mellitus    Diabetes type II Mother     Diabetes Maternal Grandmother         mellitus    Coronary artery disease Maternal Grandfather     Coronary artery disease Paternal Grandfather        Past Social history  Social History     Socioeconomic History    Marital status: /Civil Union     Spouse name: Not on file    Number of children: 0    Years of education: Not on file    Highest education level: Not on file   Occupational History    Occupation:    Tobacco Use    Smoking status: Never     Passive exposure: Never    Smokeless tobacco: Never     Tobacco comments:     quit 38 years ago   Vaping Use    Vaping status: Never Used   Substance and Sexual Activity    Alcohol use: Not Currently     Alcohol/week: 1.0 standard drink of alcohol     Types: 1 Cans of beer per week     Comment: 1 case of beer per year    Drug use: Never    Sexual activity: Yes     Partners: Female     Birth control/protection: Inserts, Post-menopausal, Male Sterilization   Other Topics Concern    Not on file   Social History Narrative    ** Merged History Encounter **          Social Determinants of Health     Financial Resource Strain: Low Risk  (5/27/2021)    Overall Financial Resource Strain (CARDIA)     Difficulty of Paying Living Expenses: Not hard at all   Food Insecurity: No Food Insecurity (5/8/2024)    Nursing - Inadequate Food Risk Classification     Worried About Running Out of Food in the Last Year: Never true     Ran Out of Food in the Last Year: Never true     Ran Out of Food in the Last Year: Not on file   Transportation Needs: No Transportation Needs (5/8/2024)    PRAPARE - Transportation     Lack of Transportation (Medical): No     Lack of Transportation (Non-Medical): No   Physical Activity: Insufficiently Active (11/7/2019)    Exercise Vital Sign     Days of Exercise per Week: 1 day     Minutes of Exercise per Session: 50 min   Stress: No Stress Concern Present (11/7/2019)    [employer] - Occupational Stress Questionnaire     Feeling of Stress : Not at all   Social Connections: Moderately Isolated (11/7/2019)    Social Connection and Isolation Panel [NHANES]     Frequency of Communication with Friends and Family: Three times a week     Frequency of Social Gatherings with Friends and Family: More than three times a week     Attends Gnosticism Services: Never     Active Member of Clubs or Organizations: No     Attends Club or Organization Meetings: Never     Marital Status:    Intimate Partner Violence: Not At Risk (11/7/2019)     Humiliation, Afraid, Rape, and Kick questionnaire     Fear of Current or Ex-Partner: No     Emotionally Abused: No     Physically Abused: No     Sexually Abused: No   Housing Stability: Low Risk  (5/8/2024)    Housing Stability Vital Sign     Unable to Pay for Housing in the Last Year: No     Number of Times Moved in the Last Year: 1     Homeless in the Last Year: No       Current Medications  Current Outpatient Medications   Medication Sig Dispense Refill    latanoprost (XALATAN) 0.005 % ophthalmic solution INSTILL 1 DROP INTO BOTH EYES IN THE EVENING      amLODIPine (NORVASC) 5 mg tablet Take 1 tablet (5 mg total) by mouth daily 90 tablet 1    Blood Glucose Monitoring Suppl (OneTouch Verio) w/Device KIT Use daily 1 kit 0    carbidopa-levodopa (SINEMET)  mg per tablet Take 1 tablet by mouth Three times a day      ezetimibe (ZETIA) 10 mg tablet Take 1 tablet (10 mg total) by mouth daily (Patient taking differently: Take 5 mg by mouth daily) 90 tablet 3    FLUoxetine (PROzac) 20 mg capsule Take 40 mg by mouth daily Alternated between 20 mg and 40 mg daily      glucose blood (OneTouch Verio) test strip Use 1 each in the morning USE TO TEST DAILY 100 strip 3    ibuprofen (MOTRIN) 200 mg tablet Take 400 mg by mouth Three times a day      Lancet Devices (ONE TOUCH DELICA LANCING DEV) MISC Use daily 1 each 0    Lancets (OneTouch Delica Plus [id number]) MISC Use 1 Application in the morning 100 each 3    losartan (COZAAR) 100 MG tablet Take 1 tablet (100 mg total) by mouth daily 90 tablet 3    metFORMIN (GLUCOPHAGE) 500 mg tablet TAKE 2 TABLETS TWICE A DAY WITH MEALS 360 tablet 3    metoprolol succinate (TOPROL-XL) 25 mg 24 hr tablet Take 1 tablet (25 mg total) by mouth 2 (two) times a day 14 tablet 0    Multiple Vitamins-Minerals (CENTRUM ADULTS PO) Take by mouth in the morning      Nourianz tablet 1 tablet daily (Patient not taking: Reported on 10/17/2024)      nystatin (MYCOSTATIN) cream Apply topically 2 (two)  "times a day 30 g 2    pantoprazole (PROTONIX) 40 mg tablet TAKE 1 TABLET TWICE A DAY (1 TABLET IN THE MORNING AND 1 TABLET IN THE EVENING) 180 tablet 1    QUEtiapine (SEROquel) 50 mg tablet Take 25 mg by mouth daily at bedtime      rosuvastatin (CRESTOR) 5 mg tablet TAKE 1 TABLET ONCE A WEEK (Patient not taking: Reported on 10/17/2024) 12 tablet 1    selegiline (ELDEPRYL) 5 mg capsule Take 5 mg by mouth as needed (Patient not taking: Reported on 8/7/2024)      semaglutide, 2 mg/dose, (Ozempic, 2 MG/DOSE,) 8 mg/ mL injection pen INJECT 2 MG UNDER THE SKIN EVERY 7 DAYS 9 mL 1    sucralfate (CARAFATE) 1 g tablet Take 1 tablet (1 g total) by mouth 2 (two) times a day 60 tablet 3    sucralfate (CARAFATE) 1 g/10 mL suspension Take 10 mL (1 g total) by mouth 3 (three) times a day before meals for 7 days 210 mL 0    traZODone (DESYREL) 50 mg tablet 1/2-1 tab at bedtime      trospium chloride (SANCTURA) 20 mg tablet Take 20 mg by mouth 2 (two) times a day       No current facility-administered medications for this visit.       Allergies  Allergies   Allergen Reactions    Celebrex [Celecoxib] Other (See Comments)     Cardiologist stated he should not take      Chlorhexidine Hives     Is able to wash with hibiclens but not use michi cloths    Other      \"steroid eyedrops\"      Prednisone Other (See Comments)     Prednisone eye drops- eye pressure increases    Selegiline Allergic Rhinitis     Hypertension       OBJECTIVE    Vitals   Vitals:    11/05/24 0738   BP: 142/78   BP Location: Left arm   Patient Position: Sitting   Cuff Size: Standard   Pulse: 65   SpO2: 96%   Weight: 133 kg (294 lb)   Height: 5' 8\" (1.727 m)       PVR:    Physical Exam  Constitutional:       General: He is not in acute distress.     Appearance: Normal appearance. He is normal weight. He is not ill-appearing or toxic-appearing.   HENT:      Head: Normocephalic and atraumatic.   Eyes:      Conjunctiva/sclera: Conjunctivae normal.   Cardiovascular:      " Rate and Rhythm: Normal rate.   Pulmonary:      Effort: Pulmonary effort is normal. No respiratory distress.   Skin:     General: Skin is warm and dry.   Neurological:      General: No focal deficit present.      Mental Status: He is alert and oriented to person, place, and time.      Cranial Nerves: No cranial nerve deficit.   Psychiatric:         Mood and Affect: Mood normal.         Behavior: Behavior normal.         Thought Content: Thought content normal.          Labs:     Lab Results   Component Value Date    PSA <0.008 06/24/2024    PSA <0.1 04/20/2023    PSA <0.1 03/06/2022    PSA <0.1 09/20/2021     Lab Results   Component Value Date    CREATININE 0.88 06/24/2024      Lab Results   Component Value Date    HGBA1C 5.9 10/02/2024     Lab Results   Component Value Date    GLUCOSE 174 (H) 12/30/2020    CALCIUM 9.6 06/24/2024     08/16/2015    K 4.9 06/24/2024    CO2 29 06/24/2024     06/24/2024    BUN 17 06/24/2024    CREATININE 0.88 06/24/2024       I have personally reviewed all pertinent lab results and reviewed with patient    Imaging       Micheal Mcrae PA-C  Date: 11/5/2024 Time: 7:43 AM  [facility]    This note was written using fluency dictation software. Please excuse any resulting minor grammatical errors.

## 2024-11-05 NOTE — PATIENT INSTRUCTIONS
Pelvic Muscle (Kegel) Exercises   WHAT ARE PELVIC FLOOR MUSCLES?    Pelvic muscles hold your bladder and bowel “in place.” In women, they also support the uterus (womb). Pelvic muscles can be weakened by common life events--straining repeatedly to empty your bladder or bowels, being overweight, having a baby, etc. Often, when the pelvic floor muscles are weak, you may have problems with losing urine when you cough or sneeze or exercise.    WHAT ARE PELVIC FLOOR MUSCLE (PME) EXERCISES?   Luckily, when these muscles do get weak, you can help make them strong again. Pelvic floor exercises (sometimes called Kegel’s exercises) are done to strengthen the muscles located around the opening of the bladder and the bowel.    Just like your other muscles in your body, exercises can make your pelvic floor muscles stronger. Doing the exercises correctly and regularly can strengthen the muscles. Stronger muscles can lead to little or no urine loss for many women and for some men. They are risk-free, low-cost and painless!   HOW DO I DO THEM?    Like many exercises, these take a little extra practice at the beginning. It is very important that you perform these exercises correctly.    Getting Started:    The first step is finding the right muscles. It may take several tries. In fact, many of us start out squeezing the wrong muscles. To locate the muscles, it is best to sit down.    Imagine that you are trying to stop passing gas or having a bowel motion. Squeeze the muscles you would use. These muscles are part of the same group that controls the opening of the bladder.    It is important to exercise the right muscles. It can be helpful to work with a doctor or nurse who can teach you the correct technique. For men, you may feel your penis pull in slightly toward your body. For women, you can check by doing the following: Lie down and put a finger inside your vagina. Squeeze the pelvic muscles. When you feel pressure around your  finger, you know you are using the correct muscle. See your health care provider if you have any difficulty identifying these muscles.    The next step, once you have found the right muscles, is to try to keep everything relaxed except these muscles. Be careful not to hold your breath or tighten the muscles in your buttocks or abdomen. Squeezing the wrong muscles can put more pressure on your bladder control muscles. Breathe slowly and deeply while you do the exercises.    Final step is to find a quiet spot to practice--usually your bathroom or bedroom--so you can concentrate. To start, you may want to lie down or sit down with your knees apart.    Getting into the Routine:   Be sure you are doing the exercises correctly before you start. Remember to imagine that you’re trying to stop from passing urine or gas.    Pull in or “squeeze” the pelvic muscles.      Hold the squeeze for a slow count of 3 (1-and-2-and-3).      Relax for a count of 3.      Repeat, but don’t overdo it. Each repeat of squeezing and then relaxing is one repetition.      Every day, you can do either   two “sets” of repetitions for 5 minutes each or   four or five “sets” of 10-15 repetitions per set.      Either of these plans will reach the target of at least 50 repetitions per day.    We recommend gradually “working up” to the full series of exercises. At first, you may not be able to hold the squeeze continuously for a slow count of 3 or to do the exercises for a full set of 10 or a whole 5 minutes. With practice, it will become easier as the muscles get stronger. As you get better, you might try increasing the “squeeze” count to 5 and the relax count to 5. Be patient to see results. Don’t give up. Remember it’s just a few minutes, a few times each day.    WHERE AND WHEN SHOULD I DO THE EXERCISES?   What usually works best is regular sessions, “built in” to your schedule at regular times each day. Many people report that 5 minutes before they  get up in the morning and 5 minutes before they sleep is a helpful routine. At the beginning, it usually does not work as well to try and remember to do them whenever you think of it.    Once you are “in the routine”, it then can help to do help to do extra exercises with other activities, such as watching television, ironing, or relaxing.    You might want to try adding the so-called “quick Kegel” when you are doing these other activities. In performing the “quick Kegel”, the pelvic muscles are rapidly tightened and relaxed 10 times.    HOW LONG DO I HAVE TO DO THE EXERCISES?    It usually takes from 6-12 weeks for most women or men to notice a change in urine loss.    Once you have attained your goal, you can do the exercises for 5 minutes, 3 times a week. If you start having problems again with urine loss, you may need to go back to the practice routine of several times each day.    Like most exercises, these may be most helpful if they become a lifelong practice. Regular periodic follow-ups with your physician will assess the benefits of these exercises as well as provide the opportunity to review your exercise technique.    BEWARE THE SNEEZE!!   You can protect yourself and your pelvic muscles by bracing yourself before sudden pressure from actions such as sneezing, lifting, or jumping.    Try to think ahead. Just before, squeeze your pelvic muscles tightly and hold on until after you sneeze, lift or jump. “Hold the squeeze til after the sneeze.” After you train yourself to tighten the pelvic muscles for these moments, you may have fewer accidents where you lose urine.             Self-Care Strategies to help reduce leaking of urine:    Practice double-voiding: empty your bladder as much as possible, relax for a minute, and try again.    Wear clothing that is easy to remove (e.g. pants with an elastic waistband).    Use absorbent undergarments or a pad to help capture leaking urine. Look for products that help  control odors. Take good care of the skin around the genital area.    For women: (1) Cross your legs to stop urine leakage from coughing, sneezing, or laughing. (2) Insert a tampon when exercising.    Retrain your bladder by urinating on a schedule that gradually increases the time between trips to the bathroom.      If your symptoms do not improve, ask your doctor. Also, if you are concerned about any difference in your treatment plan and the information in this handout, you are advised to contact the doctor, nurse or therapist who is helping you with the exercise plan.    HELPFUL HINTS:    Listening to music when you do the exercises can make it more fun!     Once you are “in the routine”, you can do the exercises while doing other things, such as driving, watching television or making dinner.      Remember not to regularly do pelvic floor exercises while you’re urinating. This could weaken the muscles.      Keep a calendar and give yourself a checkmark or star each time you do the exercises. This will help you keep track of your program and remind you when you forget.      If you do stop doing the exercises, start again! Just remember, it takes regular practice to see positive results.

## 2024-11-20 DIAGNOSIS — K43.2 INCISIONAL HERNIA: Primary | ICD-10-CM

## 2024-11-26 ENCOUNTER — HOSPITAL ENCOUNTER (EMERGENCY)
Facility: HOSPITAL | Age: 69
Discharge: HOME | End: 2024-11-26
Attending: EMERGENCY MEDICINE
Payer: MEDICARE

## 2024-11-26 ENCOUNTER — APPOINTMENT (EMERGENCY)
Dept: RADIOLOGY | Facility: HOSPITAL | Age: 69
End: 2024-11-26
Payer: MEDICARE

## 2024-11-26 ENCOUNTER — NURSE TRIAGE (OUTPATIENT)
Age: 69
End: 2024-11-26

## 2024-11-26 VITALS
OXYGEN SATURATION: 95 % | DIASTOLIC BLOOD PRESSURE: 87 MMHG | SYSTOLIC BLOOD PRESSURE: 153 MMHG | TEMPERATURE: 99 F | HEART RATE: 78 BPM | RESPIRATION RATE: 18 BRPM

## 2024-11-26 DIAGNOSIS — K20.90 ESOPHAGITIS: Primary | ICD-10-CM

## 2024-11-26 LAB
ALBUMIN SERPL BCG-MCNC: 4 G/DL (ref 3.5–5)
ALP SERPL-CCNC: 71 U/L (ref 34–104)
ALT SERPL W P-5'-P-CCNC: 12 U/L (ref 7–52)
ANION GAP SERPL CALCULATED.3IONS-SCNC: 6 MMOL/L (ref 4–13)
AST SERPL W P-5'-P-CCNC: 16 U/L (ref 13–39)
BASOPHILS # BLD AUTO: 0.08 THOUSANDS/ΜL (ref 0–0.1)
BASOPHILS NFR BLD AUTO: 1 % (ref 0–1)
BILIRUB SERPL-MCNC: 0.49 MG/DL (ref 0.2–1)
BUN SERPL-MCNC: 18 MG/DL (ref 5–25)
CALCIUM SERPL-MCNC: 9.2 MG/DL (ref 8.4–10.2)
CARDIAC TROPONIN I PNL SERPL HS: <2 NG/L (ref ?–50)
CHLORIDE SERPL-SCNC: 102 MMOL/L (ref 96–108)
CO2 SERPL-SCNC: 29 MMOL/L (ref 21–32)
CREAT SERPL-MCNC: 0.94 MG/DL (ref 0.6–1.3)
EOSINOPHIL # BLD AUTO: 0.95 THOUSAND/ΜL (ref 0–0.61)
EOSINOPHIL NFR BLD AUTO: 9 % (ref 0–6)
ERYTHROCYTE [DISTWIDTH] IN BLOOD BY AUTOMATED COUNT: 13.1 % (ref 11.6–15.1)
GFR SERPL CREATININE-BSD FRML MDRD: 81 ML/MIN/1.73SQ M
GLUCOSE SERPL-MCNC: 139 MG/DL (ref 65–140)
HCT VFR BLD AUTO: 41.9 % (ref 36.5–49.3)
HGB BLD-MCNC: 14.4 G/DL (ref 12–17)
IMM GRANULOCYTES # BLD AUTO: 0.04 THOUSAND/UL (ref 0–0.2)
IMM GRANULOCYTES NFR BLD AUTO: 0 % (ref 0–2)
LYMPHOCYTES # BLD AUTO: 1.76 THOUSANDS/ΜL (ref 0.6–4.47)
LYMPHOCYTES NFR BLD AUTO: 17 % (ref 14–44)
MCH RBC QN AUTO: 30.3 PG (ref 26.8–34.3)
MCHC RBC AUTO-ENTMCNC: 34.4 G/DL (ref 31.4–37.4)
MCV RBC AUTO: 88 FL (ref 82–98)
MONOCYTES # BLD AUTO: 0.76 THOUSAND/ΜL (ref 0.17–1.22)
MONOCYTES NFR BLD AUTO: 8 % (ref 4–12)
NEUTROPHILS # BLD AUTO: 6.52 THOUSANDS/ΜL (ref 1.85–7.62)
NEUTS SEG NFR BLD AUTO: 65 % (ref 43–75)
NRBC BLD AUTO-RTO: 0 /100 WBCS
PLATELET # BLD AUTO: 240 THOUSANDS/UL (ref 149–390)
PMV BLD AUTO: 8.5 FL (ref 8.9–12.7)
POTASSIUM SERPL-SCNC: 4.2 MMOL/L (ref 3.5–5.3)
PROT SERPL-MCNC: 6.6 G/DL (ref 6.4–8.4)
RBC # BLD AUTO: 4.76 MILLION/UL (ref 3.88–5.62)
SODIUM SERPL-SCNC: 137 MMOL/L (ref 135–147)
WBC # BLD AUTO: 10.11 THOUSAND/UL (ref 4.31–10.16)

## 2024-11-26 PROCEDURE — 71260 CT THORAX DX C+: CPT

## 2024-11-26 PROCEDURE — 36415 COLL VENOUS BLD VENIPUNCTURE: CPT | Performed by: STUDENT IN AN ORGANIZED HEALTH CARE EDUCATION/TRAINING PROGRAM

## 2024-11-26 PROCEDURE — 80053 COMPREHEN METABOLIC PANEL: CPT | Performed by: STUDENT IN AN ORGANIZED HEALTH CARE EDUCATION/TRAINING PROGRAM

## 2024-11-26 PROCEDURE — 85025 COMPLETE CBC W/AUTO DIFF WBC: CPT | Performed by: STUDENT IN AN ORGANIZED HEALTH CARE EDUCATION/TRAINING PROGRAM

## 2024-11-26 PROCEDURE — 99284 EMERGENCY DEPT VISIT MOD MDM: CPT

## 2024-11-26 PROCEDURE — 99285 EMERGENCY DEPT VISIT HI MDM: CPT | Performed by: EMERGENCY MEDICINE

## 2024-11-26 PROCEDURE — 93005 ELECTROCARDIOGRAM TRACING: CPT

## 2024-11-26 PROCEDURE — 94640 AIRWAY INHALATION TREATMENT: CPT

## 2024-11-26 PROCEDURE — 84484 ASSAY OF TROPONIN QUANT: CPT | Performed by: STUDENT IN AN ORGANIZED HEALTH CARE EDUCATION/TRAINING PROGRAM

## 2024-11-26 RX ORDER — ALBUTEROL SULFATE 5 MG/ML
5 SOLUTION RESPIRATORY (INHALATION) ONCE
Status: COMPLETED | OUTPATIENT
Start: 2024-11-26 | End: 2024-11-26

## 2024-11-26 RX ADMIN — IOHEXOL 100 ML: 350 INJECTION, SOLUTION INTRAVENOUS at 13:18

## 2024-11-26 RX ADMIN — ALBUTEROL SULFATE 5 MG: 2.5 SOLUTION RESPIRATORY (INHALATION) at 12:41

## 2024-11-26 RX ADMIN — IPRATROPIUM BROMIDE 0.5 MG: 0.5 SOLUTION RESPIRATORY (INHALATION) at 12:41

## 2024-11-26 NOTE — TELEPHONE ENCOUNTER
PT WITH HX GERD, SANTOS'S ESOPHAGUS, LAST OV 8/2/24, EGD 10/18/24.  WOKE UP OUT OF SLEEP LAST NIGHT, COUGHING AND CHOKING, UNSURE IF HE ASPIRATED. PT C/O ESOPHAGUS PAIN AND BURNING 8-9/10, PAIN WITH DEEP INSPIRATION, SOB. PT ADVISED ER FOR EVALUATION. PT WILL [facility] TO [facility].           Reason for Disposition   The patient is choking    Answer Assessment - Initial Assessment Questions  1. Do you have a history of GERD?  YES  2. When did your symptoms start? Please describe your symptoms and how often you experience these symptoms.  PT WOKE UP OUT OF SLEEP LAST NIGHT, COUGHING AND CHOKING, UNSURE IF HE ASPIRATED. PT C/O ESOPHAGUS PAIN AND BURNING 8-9/10, PAIN WITH DEEP INSPIRATION, SOB. PT ADVISED ER FOR EVALUATION.    Protocols used: GI-Gerd-ADULT-[place]

## 2024-11-26 NOTE — ED NOTES
Pt states he has a hx of Molina's esophagus  and is concerned he aspirated something last night.     Romina Baez RN  11/26/24 8448     normal...

## 2024-11-26 NOTE — ED PROVIDER NOTES
Time reflects when diagnosis was documented in both MDM as applicable and the Disposition within this note       Time User Action Codes Description Comment    11/26/2024  2:51 PM Matthew Jaime Add [K20.90] Esophagitis           ED Disposition       ED Disposition   Discharge    Condition   Stable    Date/Time   Tue Nov 26, 2024  2:51 PM    Comment   Marcellus BURLESON Zenobia discharge to home/self care.                   Assessment & Plan       Medical Decision Making  69-year-old male presents to the emergency department for evaluation of chest pain.  Patient complained of mediastinal pain.  Given recent EGD, concern for possible softgel injury or perforation.  He has had aspiration pneumonia in the past and he has been coughing some also concern for possible aspiration pneumonia.  PE unlikely.  He has wheezing in all lung fields.  Cardiac exam reveals regular rate and rhythm with no auscultated murmurs.  Given patient's history we will perform CAT scan of the chest which will evaluate for possible pneumonia versus PE versus mediastinitis/pneumomediastinum.  Lab workup included CBC CMP and troponin, all of which were largely unremarkable do not explain his symptoms.  EKG reviewed by myself and the attending physician which revealed a heart rate of 71 with no ST elevations or depressions consistent with ACS or other findings indicative of arrhythmia.  CT of the chest with contrast revealed diffuse esophageal wall thickening consistent with esophagitis.  He will need follow-up with his GI doctor for repeat evaluation.  We discussed emergency department turn precautions to which he verbalized her standing.  Patient remained hemodynamically stable while under my care and is appropriate for discharge home.    Amount and/or Complexity of Data Reviewed  Labs: ordered.  Radiology: ordered.    Risk  Prescription drug management.             Medications   albuterol inhalation solution 5 mg (5 mg Nebulization Given 11/26/24 1241)  "  ipratropium (ATROVENT) 0.02 % inhalation solution 0.5 mg (0.5 mg Nebulization Given 11/26/24 1241)   iohexol (OMNIPAQUE) 350 MG/ML injection (MULTI-DOSE) 100 mL (100 mL Intravenous Given 11/26/24 1318)       ED Risk Strat Scores                           SBIRT 22yo+      Flowsheet Row Most Recent Value   Initial Alcohol Screen: US AUDIT-C     1. How often do you have a drink containing alcohol? 0 Filed at: 11/26/2024 1116   2. How many drinks containing alcohol do you have on a typical day you are drinking?  0 Filed at: 11/26/2024 1116   3a. Male UNDER 65: How often do you have five or more drinks on one occasion? 0 Filed at: 11/26/2024 1116   3b. FEMALE Any Age, or MALE 65+: How often do you have 4 or more drinks on one occassion? 0 Filed at: 11/26/2024 1116   Audit-C Score 0 Filed at: 11/26/2024 1116   KIMMIE: How many times in the past year have you...    Used an illegal drug or used a prescription medication for non-medical reasons? Never Filed at: 11/26/2024 1116                            History of Present Illness       Chief Complaint   Patient presents with    Heartburn     Pt reports having barrets esophagus and acid reflux that \"takes his breath away\".        Past Medical History:   Diagnosis Date    Acute blood loss anemia 10/13/2016    Anxiety     Arthritis     knees    Arthritis     Asthma     stable for > 10 years    Molina esophagus     with BARRX/RFA    Cancer (HCC)     prostate    Cataract     Depression     Diabetes (HCC)     Diabetes mellitus (HCC)     pre diabetes    Diverticulitis of colon     Environmental allergies     Fall 12/30/2020    GERD (gastroesophageal reflux disease)     Hiatal hernia     History of anal fissures     Hyperlipidemia     Hypertension     Incisional hernia     Insomnia     Kidney stone     Malignant neoplasm of prostate (HCC)     Morbid obesity (HCC)     Parkinson disease (HCC)     Peripheral neuropathy     PONV (postoperative nausea and vomiting)     Prolapsing mitral " valve     prolapsing mitral valve leaflet syndrome    Prostate cancer (HCC)     Retinal detachment     treated surgically at [facility]; resolved: 10/10/2012    Sleep apnea     diagnosed but unable to tolerate cpap.     Stuttering       Past Surgical History:   Procedure Laterality Date    ARTHROSCOPIC REPAIR ACL Right     BIOPSY CORE NEEDLE      prostate    CATARACT EXTRACTION Bilateral     COLONOSCOPY      HERNIA REPAIR      naval    JOINT REPLACEMENT      B/L knee    KNEE ARTHROSCOPY Left     LAPAROSCOPIC GASTRIC BANDING      OK ARTHRP KNE CONDYLE&PLATU MEDIAL&LAT COMPARTMENTS Left 02/15/2017    Procedure: TOTAL KNEE ARTHROPLASTY ;  Surgeon: Sal Ross MD;  Location: [facility];  Service: Orthopedics    OK ARTHRP KNE CONDYLE&PLATU MEDIAL&LAT COMPARTMENTS Right 10/10/2016    Procedure: ARTHROPLASTY KNEE TOTAL;  Surgeon: Sal Ross MD;  Location: [facility];  Service: Orthopedics    PROSTATECTOMY      robotic-assisted; TEODORO-Dr. Tay    REMOVAL GASTRIC BAND LAPAROSCOPIC N/A 8/30/2022    Procedure: LAPAROSCOPIC REMOVAL OF ADJUSTABLE GASTRIC BAND AND PORT  WITH INTRAOPERATIVE EGD;  Surgeon: Neal Yang MD;  Location: [facility];  Service: Bariatrics    RETINAL DETACHMENT SURGERY Bilateral     Lemus Eye    SEPTOPLASTY      SINUS SURGERY      TONSILLECTOMY      over age 10    UPPER GASTROINTESTINAL ENDOSCOPY      mutiple with BARRX/RFA    VASECTOMY      vas deferens      Family History   Problem Relation Age of Onset    Heart disease Father     Coronary artery disease Father     Prostate cancer Father     Coronary artery disease Mother     Diabetes Mother         mellitus    Diabetes type II Mother     Diabetes Maternal Grandmother         mellitus    Coronary artery disease Maternal Grandfather     Coronary artery disease Paternal Grandfather       Social History     Tobacco Use    Smoking status: Never     Passive exposure: Never    Smokeless tobacco: Never    Tobacco comments:     quit 38 years ago    Vaping Use    Vaping status: Never Used   Substance Use Topics    Alcohol use: Not Currently     Alcohol/week: 1.0 standard drink of alcohol     Types: 1 Cans of beer per week     Comment: 1 case of beer per year    Drug use: Never      E-Cigarette/Vaping    E-Cigarette Use Never User       E-Cigarette/Vaping Substances    Nicotine No     THC No     CBD No     Flavoring No     Other No     Unknown No       I have reviewed and agree with the history as documented.     69-year-old male history of Molina's esophagus presents the emergency department today for evaluation of chest pain.  Patient tells him that he has biopsies once a year via EGD.  This happened approximate 3 weeks ago.  After the procedure he had no pain.  Approximate 2 weeks after the procedure(1 week ago) he began to have pain in his upper chest that radiates to his neck.  He does not read to his jaw arm or back.  No previous MIs and is not a blood thinners.  He does not have esophageal perforation past but has been told this is something to watch out for.  He says he called his GI doctor stating wanted to come in because they were concerned that he may have aspiration pneumonia.  When asked the patient why he thinks this is because he had a episode of severe pain last night feels like he was choking on something.  He said he has been coughing more recently in the past week has been more short of breath when ambulating.  No history of DVT or PE.  He is compliant with his medications.        Review of Systems   Constitutional:  Negative for activity change, appetite change, chills, fatigue and fever.   Eyes:  Negative for visual disturbance.   Respiratory:  Positive for shortness of breath. Negative for chest tightness.    Cardiovascular:  Positive for chest pain. Negative for palpitations.   Gastrointestinal:  Negative for abdominal pain, nausea and vomiting.   Musculoskeletal:  Positive for neck pain (Anterior neck pain). Negative for back pain and  neck stiffness.   Neurological:  Negative for dizziness, seizures, numbness and headaches.   Psychiatric/Behavioral:  Negative for agitation and confusion.            Objective       ED Triage Vitals [11/26/24 1028]   Temperature Pulse Blood Pressure Respirations SpO2 Patient Position - Orthostatic VS   99 °F (37.2 °C) 78 153/87 18 95 % Sitting      Temp src Heart Rate Source BP Location FiO2 (%) Pain Score    -- Monitor Left arm -- 6      Vitals      Date and Time Temp Pulse SpO2 Resp BP Pain Score FACES Pain Rating User   11/26/24 1028 99 °F (37.2 °C) 78 95 % 18 153/87 6 -- JS            Physical Exam  Constitutional:       General: He is not in acute distress.     Appearance: Normal appearance. He is not ill-appearing.   HENT:      Head: Normocephalic and atraumatic.   Cardiovascular:      Rate and Rhythm: Normal rate and regular rhythm.      Pulses: Normal pulses.      Heart sounds: Normal heart sounds. No murmur heard.  Pulmonary:      Effort: Pulmonary effort is normal. No respiratory distress.      Breath sounds: Wheezing present.      Comments: Wheezing noted in all lung fields.  Abdominal:      General: Abdomen is flat.      Palpations: Abdomen is soft.      Tenderness: There is no abdominal tenderness. There is no guarding.      Comments: Right lower quadrant abdominal hernia    Musculoskeletal:      Cervical back: Normal range of motion. No rigidity or tenderness.      Comments: No crepitation with palpation of the neck or chest.   Skin:     General: Skin is warm and dry.      Coloration: Skin is not jaundiced.      Findings: No erythema or rash.   Neurological:      Mental Status: He is alert and oriented to person, place, and time.      Motor: No weakness.      Comments: Tremor.   Psychiatric:         Mood and Affect: Mood normal.         Behavior: Behavior normal.         Results Reviewed       Procedure Component Value Units Date/Time    HS Troponin 0hr (reflex protocol) [[id number]]  (Normal)  Collected: 11/26/24 1159    Lab Status: Final result Specimen: Blood from Arm, Left Updated: 11/26/24 1242     hs TnI 0hr <2 ng/L     HS Troponin I 2hr [[id number]]     Lab Status: No result Specimen: Blood     HS Troponin I 4hr [[id number]]     Lab Status: No result Specimen: Blood     Comprehensive metabolic panel [[id number]] Collected: 11/26/24 1159    Lab Status: Final result Specimen: Blood from Arm, Left Updated: 11/26/24 1233     Sodium 137 mmol/L      Potassium 4.2 mmol/L      Chloride 102 mmol/L      CO2 29 mmol/L      ANION GAP 6 mmol/L      BUN 18 mg/dL      Creatinine 0.94 mg/dL      Glucose 139 mg/dL      Calcium 9.2 mg/dL      AST 16 U/L      ALT 12 U/L      Alkaline Phosphatase 71 U/L      Total Protein 6.6 g/dL      Albumin 4.0 g/dL      Total Bilirubin 0.49 mg/dL      eGFR 81 ml/min/1.73sq m     Narrative:      National Kidney Disease Foundation guidelines for Chronic Kidney Disease (CKD):     Stage 1 with normal or high GFR (GFR > 90 mL/min/1.73 square meters)    Stage 2 Mild CKD (GFR = 60-89 mL/min/1.73 square meters)    Stage 3A Moderate CKD (GFR = 45-59 mL/min/1.73 square meters)    Stage 3B Moderate CKD (GFR = 30-44 mL/min/1.73 square meters)    Stage 4 Severe CKD (GFR = 15-29 mL/min/1.73 square meters)    Stage 5 End Stage CKD (GFR <15 mL/min/1.73 square meters)  Note: GFR calculation is accurate only with a steady state creatinine    CBC and differential [[id number]]  (Abnormal) Collected: 11/26/24 1159    Lab Status: Final result Specimen: Blood from Arm, Left Updated: 11/26/24 1212     WBC 10.11 Thousand/uL      RBC 4.76 Million/uL      Hemoglobin 14.4 g/dL      Hematocrit 41.9 %      MCV 88 fL      MCH 30.3 pg      MCHC 34.4 g/dL      RDW 13.1 %      MPV 8.5 fL      Platelets 240 Thousands/uL      nRBC 0 /100 WBCs      Segmented % 65 %      Immature Grans % 0 %      Lymphocytes % 17 %      Monocytes % 8 %      Eosinophils Relative 9 %      Basophils Relative 1 %      Absolute Neutrophils 6.52  Thousands/µL      Absolute Immature Grans 0.04 Thousand/uL      Absolute Lymphocytes 1.76 Thousands/µL      Absolute Monocytes 0.76 Thousand/µL      Eosinophils Absolute 0.95 Thousand/µL      Basophils Absolute 0.08 Thousands/µL             CT chest with contrast   Final Interpretation by Jeremiah Garrido MD ( [id number])      Circumferential distal esophageal wall thickening consistent with esophagitis considering clinical history.   No mediastinal free air or fluid collection.      Pulmonary nodules as described measuring up to 4 mm, some of which appear slightly enlarged and/or new from a distant  comparison.   Consider history of prostate cancer, follow-up chest CT recommended in 3-6 months.      The study was marked in EPIC for immediate notification.            Workstation performed: [id number]             Procedures    ED Medication and Procedure Management   Prior to Admission Medications   Prescriptions Last Dose Informant Patient Reported? Taking?   Blood Glucose Monitoring Suppl (OneTouch Verio) w/Device KIT  Self No No   Sig: Use daily   FLUoxetine (PROzac) 20 mg capsule  Self Yes No   Sig: Take 40 mg by mouth daily Alternated between 20 mg and 40 mg daily   Lancet Devices (ONE TOUCH DELICA LANCING DEV) MISC  Self No No   Sig: Use daily   Lancets (OneTouch Delica Plus [id number]) MISC  Self No No   Sig: Use 1 Application in the morning   Multiple Vitamins-Minerals (CENTRUM ADULTS PO)  Self Yes No   Sig: Take by mouth in the morning   Nourianz tablet  Self Yes No   Si tablet daily   Patient not taking: Reported on 10/17/2024   QUEtiapine (SEROquel) 50 mg tablet  Self Yes No   Sig: Take 25 mg by mouth daily at bedtime   amLODIPine (NORVASC) 5 mg tablet  Self No No   Sig: Take 1 tablet (5 mg total) by mouth daily   carbidopa-levodopa (SINEMET)  mg per tablet  Self Yes No   Sig: Take 1 tablet by mouth Three times a day   ezetimibe (ZETIA) 10 mg tablet  Self No No   Sig: Take 1 tablet (10 mg  total) by mouth daily   Patient taking differently: Take 5 mg by mouth daily   glucose blood (OneTouch Verio) test strip  Self No No   Sig: Use 1 each in the morning USE TO TEST DAILY   ibuprofen (MOTRIN) 200 mg tablet  Self Yes No   Sig: Take 400 mg by mouth Three times a day   latanoprost (XALATAN) 0.005 % ophthalmic solution  Self Yes No   Sig: INSTILL 1 DROP INTO BOTH EYES IN THE EVENING   losartan (COZAAR) 100 MG tablet  Self No No   Sig: Take 1 tablet (100 mg total) by mouth daily   metFORMIN (GLUCOPHAGE) 500 mg tablet  Self No No   Sig: TAKE 2 TABLETS TWICE A DAY WITH MEALS   metoprolol succinate (TOPROL-XL) 25 mg 24 hr tablet  Self No No   Sig: Take 1 tablet (25 mg total) by mouth 2 (two) times a day   nystatin (MYCOSTATIN) cream  Self No No   Sig: Apply topically 2 (two) times a day   pantoprazole (PROTONIX) 40 mg tablet  Self No No   Sig: TAKE 1 TABLET TWICE A DAY (1 TABLET IN THE MORNING AND 1 TABLET IN THE EVENING)   rosuvastatin (CRESTOR) 5 mg tablet  Self No No   Sig: TAKE 1 TABLET ONCE A WEEK   Patient not taking: Reported on 10/17/2024   selegiline (ELDEPRYL) 5 mg capsule  Self Yes No   Sig: Take 5 mg by mouth as needed   Patient not taking: Reported on 2024   semaglutide, 2 mg/dose, (Ozempic, 2 MG/DOSE,) 8 mg/ mL injection pen  Self No No   Sig: INJECT 2 MG UNDER THE SKIN EVERY 7 DAYS   sucralfate (CARAFATE) 1 g tablet  Self No No   Sig: Take 1 tablet (1 g total) by mouth 2 (two) times a day   sucralfate (CARAFATE) 1 g/10 mL suspension   No No   Sig: Take 10 mL (1 g total) by mouth 3 (three) times a day before meals for 7 days   traZODone (DESYREL) 50 mg tablet  Self Yes No   Si/2-1 tab at bedtime   trospium chloride (SANCTURA) 20 mg tablet  Self Yes No   Sig: Take 20 mg by mouth 2 (two) times a day      Facility-Administered Medications: None     Patient's Medications   Discharge Prescriptions    No medications on file     No discharge procedures on file.  ED SEPSIS DOCUMENTATION   Time  reflects when diagnosis was documented in both MDM as applicable and the Disposition within this note       Time User Action Codes Description Comment    11/26/2024  2:51 PM Matthew Jaime Add [K20.90] Esophagitis                  Matthew Jaime MD  11/26/24 2567

## 2024-11-26 NOTE — TELEPHONE ENCOUNTER
Pt went to ED. CT of the chest with contrast revealed diffuse esophageal wall thickening consistent with esophagitis. Was advised to take Carafate QID. Pt would like to be sure Dr. Lerma feels it is ok to take 4 x daily along with Protonix BID. Also has GI appt 2/13/25. Please advise on medications and if sooner appt is needed.

## 2024-11-26 NOTE — ED ATTENDING ATTESTATION
11/26/2024  I, Kunal Malhotra MD, saw and evaluated the patient. I have discussed the patient with the resident/non-physician practitioner and agree with the resident's/non-physician practitioner's findings, Plan of Care, and MDM as documented in the resident's/non-physician practitioner's note, except where noted. All available labs and Radiology studies were reviewed.  I was present for key portions of any procedure(s) performed by the resident/non-physician practitioner and I was immediately available to provide assistance.       At this point I agree with the current assessment done in the Emergency Department.  I have conducted an independent evaluation of this patient a history and physical is as follows:  Barretts esophagus had a EGD about 3 weeks ago with biopsies he is here with heartburn  Sour taste in his mouth chest pain with no radiation feels as though he may have aspirated    Exam well-appearing no acute distress vital signs stable afebrile pulse ox is normal  On room air  HEENT is unremarkable throat clear lungs with expiratory wheezing faint good air movement heart regular abdomen soft nontender    Impression heartburn chest pain  Plan CT to rule out perforation rule out pneumonia  ED Course         Critical Care Time  Procedures

## 2024-11-27 LAB
ATRIAL RATE: 71 BPM
P AXIS: 24 DEGREES
PR INTERVAL: 212 MS
QRS AXIS: -36 DEGREES
QRSD INTERVAL: 98 MS
QT INTERVAL: 398 MS
QTC INTERVAL: 433 MS
T WAVE AXIS: 32 DEGREES
VENTRICULAR RATE: 71 BPM

## 2024-11-27 PROCEDURE — 93010 ELECTROCARDIOGRAM REPORT: CPT | Performed by: INTERNAL MEDICINE

## 2024-11-29 ENCOUNTER — TELEPHONE (OUTPATIENT)
Age: 69
End: 2024-11-29

## 2024-11-29 DIAGNOSIS — G20.A2 PARKINSON'S DISEASE WITHOUT DYSKINESIA, WITH FLUCTUATING MANIFESTATIONS (HCC): Primary | ICD-10-CM

## 2024-11-29 NOTE — TELEPHONE ENCOUNTER
Patient needs a new order placed for his PT sessions, please have provider place the referral/order and follow up with patient to confirm order was placed, patient has PT for his parkinson's therapy. Thank you

## 2024-12-02 ENCOUNTER — TELEPHONE (OUTPATIENT)
Dept: FAMILY MEDICINE CLINIC | Facility: CLINIC | Age: 69
End: 2024-12-02

## 2024-12-02 DIAGNOSIS — F41.8 DEPRESSION WITH ANXIETY: Primary | ICD-10-CM

## 2024-12-02 DIAGNOSIS — F41.9 ANXIETY: ICD-10-CM

## 2024-12-02 RX ORDER — HYDROXYZINE HYDROCHLORIDE 25 MG/1
25 TABLET, FILM COATED ORAL 2 TIMES DAILY
Qty: 180 TABLET | Refills: 3 | Status: ON HOLD | OUTPATIENT
Start: 2024-12-02

## 2024-12-02 NOTE — TELEPHONE ENCOUNTER
Patient called the RX Refill Line. Message is being forwarded to the office.     Patient is requesting a refill of Hydroxyzine 25 mg 2x a day for a 90 day supply [facility]     Patient stated it was a trial from Lali Bell, [id number]       Must be ordered from office it is not on med list       Please review

## 2024-12-12 ENCOUNTER — TELEPHONE (OUTPATIENT)
Dept: PSYCHIATRY | Facility: CLINIC | Age: 69
End: 2024-12-12

## 2024-12-12 NOTE — TELEPHONE ENCOUNTER
Called and left message for patient to inform 1/2/25 NP appointment  was cancelled due to provider no longer accepting NPs . Requested return call to reschedule. Please schedule upon return call. Thank you.

## 2024-12-13 ENCOUNTER — HOSPITAL ENCOUNTER (EMERGENCY)
Facility: HOSPITAL | Age: 69
DRG: 885 | End: 2024-12-14
Attending: EMERGENCY MEDICINE
Payer: MEDICARE

## 2024-12-13 DIAGNOSIS — T14.91XA SUICIDE ATTEMPT (HCC): ICD-10-CM

## 2024-12-13 DIAGNOSIS — T50.902A INTENTIONAL DRUG OVERDOSE, INITIAL ENCOUNTER (HCC): ICD-10-CM

## 2024-12-13 DIAGNOSIS — T50.901A OVERDOSE: Primary | ICD-10-CM

## 2024-12-13 LAB
ALBUMIN SERPL BCG-MCNC: 4.1 G/DL (ref 3.5–5)
ALP SERPL-CCNC: 79 U/L (ref 34–104)
ALT SERPL W P-5'-P-CCNC: 15 U/L (ref 7–52)
AMPHETAMINES SERPL QL SCN: NEGATIVE
ANION GAP SERPL CALCULATED.3IONS-SCNC: 6 MMOL/L (ref 4–13)
AST SERPL W P-5'-P-CCNC: 15 U/L (ref 13–39)
BARBITURATES UR QL: NEGATIVE
BASOPHILS # BLD AUTO: 0.07 THOUSANDS/ΜL (ref 0–0.1)
BASOPHILS NFR BLD AUTO: 1 % (ref 0–1)
BENZODIAZ UR QL: NEGATIVE
BILIRUB SERPL-MCNC: 0.46 MG/DL (ref 0.2–1)
BILIRUB UR QL STRIP: NEGATIVE
BUN SERPL-MCNC: 17 MG/DL (ref 5–25)
CALCIUM SERPL-MCNC: 9.7 MG/DL (ref 8.4–10.2)
CHLORIDE SERPL-SCNC: 102 MMOL/L (ref 96–108)
CLARITY UR: CLEAR
CO2 SERPL-SCNC: 28 MMOL/L (ref 21–32)
COCAINE UR QL: NEGATIVE
COLOR UR: NORMAL
CREAT SERPL-MCNC: 0.98 MG/DL (ref 0.6–1.3)
EOSINOPHIL # BLD AUTO: 0.71 THOUSAND/ΜL (ref 0–0.61)
EOSINOPHIL NFR BLD AUTO: 8 % (ref 0–6)
ERYTHROCYTE [DISTWIDTH] IN BLOOD BY AUTOMATED COUNT: 13.2 % (ref 11.6–15.1)
ETHANOL EXG-MCNC: 0 MG/DL
FENTANYL UR QL SCN: NEGATIVE
GFR SERPL CREATININE-BSD FRML MDRD: 77 ML/MIN/1.73SQ M
GLUCOSE SERPL-MCNC: 175 MG/DL (ref 65–140)
GLUCOSE UR STRIP-MCNC: NEGATIVE MG/DL
HCT VFR BLD AUTO: 43.6 % (ref 36.5–49.3)
HGB BLD-MCNC: 14.9 G/DL (ref 12–17)
HGB UR QL STRIP.AUTO: NEGATIVE
HYDROCODONE UR QL SCN: NEGATIVE
IMM GRANULOCYTES # BLD AUTO: 0.03 THOUSAND/UL (ref 0–0.2)
IMM GRANULOCYTES NFR BLD AUTO: 0 % (ref 0–2)
KETONES UR STRIP-MCNC: NEGATIVE MG/DL
LEUKOCYTE ESTERASE UR QL STRIP: NEGATIVE
LYMPHOCYTES # BLD AUTO: 1.55 THOUSANDS/ΜL (ref 0.6–4.47)
LYMPHOCYTES NFR BLD AUTO: 17 % (ref 14–44)
MCH RBC QN AUTO: 29.7 PG (ref 26.8–34.3)
MCHC RBC AUTO-ENTMCNC: 34.2 G/DL (ref 31.4–37.4)
MCV RBC AUTO: 87 FL (ref 82–98)
METHADONE UR QL: NEGATIVE
MONOCYTES # BLD AUTO: 0.73 THOUSAND/ΜL (ref 0.17–1.22)
MONOCYTES NFR BLD AUTO: 8 % (ref 4–12)
NEUTROPHILS # BLD AUTO: 5.86 THOUSANDS/ΜL (ref 1.85–7.62)
NEUTS SEG NFR BLD AUTO: 66 % (ref 43–75)
NITRITE UR QL STRIP: NEGATIVE
NRBC BLD AUTO-RTO: 0 /100 WBCS
OPIATES UR QL SCN: NEGATIVE
OXYCODONE+OXYMORPHONE UR QL SCN: NEGATIVE
PCP UR QL: NEGATIVE
PH UR STRIP.AUTO: 5 [PH]
PLATELET # BLD AUTO: 266 THOUSANDS/UL (ref 149–390)
PMV BLD AUTO: 8.4 FL (ref 8.9–12.7)
POTASSIUM SERPL-SCNC: 3.8 MMOL/L (ref 3.5–5.3)
PROT SERPL-MCNC: 7 G/DL (ref 6.4–8.4)
PROT UR STRIP-MCNC: NEGATIVE MG/DL
RBC # BLD AUTO: 5.02 MILLION/UL (ref 3.88–5.62)
SODIUM SERPL-SCNC: 136 MMOL/L (ref 135–147)
SP GR UR STRIP.AUTO: 1.01 (ref 1–1.03)
THC UR QL: NEGATIVE
UROBILINOGEN UR STRIP-ACNC: <2 MG/DL
WBC # BLD AUTO: 8.95 THOUSAND/UL (ref 4.31–10.16)

## 2024-12-13 PROCEDURE — 36415 COLL VENOUS BLD VENIPUNCTURE: CPT | Performed by: STUDENT IN AN ORGANIZED HEALTH CARE EDUCATION/TRAINING PROGRAM

## 2024-12-13 PROCEDURE — 81003 URINALYSIS AUTO W/O SCOPE: CPT | Performed by: STUDENT IN AN ORGANIZED HEALTH CARE EDUCATION/TRAINING PROGRAM

## 2024-12-13 PROCEDURE — 84443 ASSAY THYROID STIM HORMONE: CPT | Performed by: STUDENT IN AN ORGANIZED HEALTH CARE EDUCATION/TRAINING PROGRAM

## 2024-12-13 PROCEDURE — 82075 ASSAY OF BREATH ETHANOL: CPT | Performed by: STUDENT IN AN ORGANIZED HEALTH CARE EDUCATION/TRAINING PROGRAM

## 2024-12-13 PROCEDURE — 93005 ELECTROCARDIOGRAM TRACING: CPT

## 2024-12-13 PROCEDURE — 80053 COMPREHEN METABOLIC PANEL: CPT | Performed by: STUDENT IN AN ORGANIZED HEALTH CARE EDUCATION/TRAINING PROGRAM

## 2024-12-13 PROCEDURE — 85025 COMPLETE CBC W/AUTO DIFF WBC: CPT | Performed by: STUDENT IN AN ORGANIZED HEALTH CARE EDUCATION/TRAINING PROGRAM

## 2024-12-13 PROCEDURE — 99285 EMERGENCY DEPT VISIT HI MDM: CPT

## 2024-12-13 PROCEDURE — 80307 DRUG TEST PRSMV CHEM ANLYZR: CPT | Performed by: STUDENT IN AN ORGANIZED HEALTH CARE EDUCATION/TRAINING PROGRAM

## 2024-12-13 PROCEDURE — 99285 EMERGENCY DEPT VISIT HI MDM: CPT | Performed by: EMERGENCY MEDICINE

## 2024-12-13 RX ORDER — CARBIDOPA AND LEVODOPA 25; 100 MG/1; MG/1
1 TABLET, EXTENDED RELEASE ORAL 2 TIMES DAILY
Status: DISCONTINUED | OUTPATIENT
Start: 2024-12-13 | End: 2024-12-14 | Stop reason: HOSPADM

## 2024-12-14 ENCOUNTER — HOSPITAL ENCOUNTER (INPATIENT)
Facility: HOSPITAL | Age: 69
LOS: 5 days | Discharge: HOME | DRG: 885 | End: 2024-12-19
Attending: PSYCHIATRY & NEUROLOGY | Admitting: PSYCHIATRY & NEUROLOGY
Payer: MEDICARE

## 2024-12-14 VITALS
HEART RATE: 68 BPM | OXYGEN SATURATION: 97 % | DIASTOLIC BLOOD PRESSURE: 57 MMHG | TEMPERATURE: 98.7 F | RESPIRATION RATE: 18 BRPM | SYSTOLIC BLOOD PRESSURE: 124 MMHG

## 2024-12-14 DIAGNOSIS — T50.901A OVERDOSE: ICD-10-CM

## 2024-12-14 DIAGNOSIS — K59.00 CONSTIPATION: ICD-10-CM

## 2024-12-14 DIAGNOSIS — R39.15 URINARY URGENCY: ICD-10-CM

## 2024-12-14 DIAGNOSIS — E53.8 VITAMIN B12 DEFICIENCY: ICD-10-CM

## 2024-12-14 DIAGNOSIS — K21.00 GASTROESOPHAGEAL REFLUX DISEASE WITH ESOPHAGITIS WITHOUT HEMORRHAGE: ICD-10-CM

## 2024-12-14 DIAGNOSIS — K31.84 GASTROPARESIS: ICD-10-CM

## 2024-12-14 DIAGNOSIS — F33.2 SEVERE EPISODE OF RECURRENT MAJOR DEPRESSIVE DISORDER, WITHOUT PSYCHOTIC FEATURES (HCC): Primary | ICD-10-CM

## 2024-12-14 DIAGNOSIS — G47.00 INSOMNIA: ICD-10-CM

## 2024-12-14 DIAGNOSIS — E78.5 HYPERLIPIDEMIA, UNSPECIFIED HYPERLIPIDEMIA TYPE: ICD-10-CM

## 2024-12-14 LAB
GLUCOSE SERPL-MCNC: 137 MG/DL (ref 65–140)
TSH SERPL DL<=0.05 MIU/L-ACNC: 1.52 UIU/ML (ref 0.45–4.5)

## 2024-12-14 PROCEDURE — NC001 PR NO CHARGE: Performed by: EMERGENCY MEDICINE

## 2024-12-14 PROCEDURE — 82948 REAGENT STRIP/BLOOD GLUCOSE: CPT

## 2024-12-14 RX ORDER — ACETAMINOPHEN 325 MG/1
650 TABLET ORAL EVERY 4 HOURS PRN
Status: DISCONTINUED | OUTPATIENT
Start: 2024-12-14 | End: 2024-12-19 | Stop reason: HOSPADM

## 2024-12-14 RX ORDER — METOPROLOL SUCCINATE 25 MG/1
25 TABLET, EXTENDED RELEASE ORAL 2 TIMES DAILY
Status: DISCONTINUED | OUTPATIENT
Start: 2024-12-14 | End: 2024-12-19 | Stop reason: HOSPADM

## 2024-12-14 RX ORDER — QUETIAPINE FUMARATE 25 MG/1
50 TABLET, FILM COATED ORAL
Status: CANCELLED | OUTPATIENT
Start: 2024-12-14

## 2024-12-14 RX ORDER — AMOXICILLIN 250 MG
1 CAPSULE ORAL DAILY PRN
Status: DISCONTINUED | OUTPATIENT
Start: 2024-12-14 | End: 2024-12-19 | Stop reason: HOSPADM

## 2024-12-14 RX ORDER — PRAVASTATIN SODIUM 40 MG
40 TABLET ORAL WEEKLY
Status: DISCONTINUED | OUTPATIENT
Start: 2024-12-15 | End: 2024-12-19 | Stop reason: HOSPADM

## 2024-12-14 RX ORDER — QUETIAPINE FUMARATE 100 MG/1
100 TABLET, FILM COATED ORAL
Status: CANCELLED | OUTPATIENT
Start: 2024-12-14

## 2024-12-14 RX ORDER — LORAZEPAM 1 MG/1
1 TABLET ORAL
Status: DISCONTINUED | OUTPATIENT
Start: 2024-12-14 | End: 2024-12-19 | Stop reason: HOSPADM

## 2024-12-14 RX ORDER — OLANZAPINE 10 MG/2ML
5 INJECTION, POWDER, FOR SOLUTION INTRAMUSCULAR
Status: DISCONTINUED | OUTPATIENT
Start: 2024-12-14 | End: 2024-12-19 | Stop reason: HOSPADM

## 2024-12-14 RX ORDER — ACETAMINOPHEN 325 MG/1
975 TABLET ORAL EVERY 6 HOURS PRN
Status: CANCELLED | OUTPATIENT
Start: 2024-12-14

## 2024-12-14 RX ORDER — ACETAMINOPHEN 325 MG/1
650 TABLET ORAL EVERY 4 HOURS PRN
Status: CANCELLED | OUTPATIENT
Start: 2024-12-14

## 2024-12-14 RX ORDER — PANTOPRAZOLE SODIUM 40 MG/1
40 TABLET, DELAYED RELEASE ORAL
Status: DISCONTINUED | OUTPATIENT
Start: 2024-12-15 | End: 2024-12-19 | Stop reason: HOSPADM

## 2024-12-14 RX ORDER — LORAZEPAM 1 MG/1
1 TABLET ORAL
Status: CANCELLED | OUTPATIENT
Start: 2024-12-14

## 2024-12-14 RX ORDER — LORAZEPAM 2 MG/ML
1 INJECTION INTRAMUSCULAR
Status: CANCELLED | OUTPATIENT
Start: 2024-12-14

## 2024-12-14 RX ORDER — AMLODIPINE BESYLATE 5 MG/1
5 TABLET ORAL
Status: DISCONTINUED | OUTPATIENT
Start: 2024-12-14 | End: 2024-12-19 | Stop reason: HOSPADM

## 2024-12-14 RX ORDER — QUETIAPINE FUMARATE 25 MG/1
25 TABLET, FILM COATED ORAL
Status: DISCONTINUED | OUTPATIENT
Start: 2024-12-14 | End: 2024-12-19 | Stop reason: HOSPADM

## 2024-12-14 RX ORDER — AMOXICILLIN 250 MG
1 CAPSULE ORAL
Status: DISCONTINUED | OUTPATIENT
Start: 2024-12-14 | End: 2024-12-19 | Stop reason: HOSPADM

## 2024-12-14 RX ORDER — LOSARTAN POTASSIUM 50 MG/1
100 TABLET ORAL DAILY
Status: DISCONTINUED | OUTPATIENT
Start: 2024-12-14 | End: 2024-12-19 | Stop reason: HOSPADM

## 2024-12-14 RX ORDER — SUCRALFATE 1 G/1
1 TABLET ORAL
Status: DISCONTINUED | OUTPATIENT
Start: 2024-12-14 | End: 2024-12-15

## 2024-12-14 RX ORDER — HYDROXYZINE HYDROCHLORIDE 50 MG/1
50 TABLET, FILM COATED ORAL
Status: DISCONTINUED | OUTPATIENT
Start: 2024-12-14 | End: 2024-12-19 | Stop reason: HOSPADM

## 2024-12-14 RX ORDER — LORAZEPAM 2 MG/ML
1 INJECTION INTRAMUSCULAR
Status: DISCONTINUED | OUTPATIENT
Start: 2024-12-14 | End: 2024-12-19 | Stop reason: HOSPADM

## 2024-12-14 RX ORDER — HYDROXYZINE HYDROCHLORIDE 25 MG/1
50 TABLET, FILM COATED ORAL
Status: CANCELLED | OUTPATIENT
Start: 2024-12-14

## 2024-12-14 RX ORDER — CARBIDOPA AND LEVODOPA 25; 100 MG/1; MG/1
0.5 TABLET ORAL ONCE
Status: COMPLETED | OUTPATIENT
Start: 2024-12-14 | End: 2024-12-14

## 2024-12-14 RX ORDER — ACETAMINOPHEN 325 MG/1
975 TABLET ORAL EVERY 6 HOURS PRN
Status: DISCONTINUED | OUTPATIENT
Start: 2024-12-14 | End: 2024-12-19 | Stop reason: HOSPADM

## 2024-12-14 RX ORDER — EZETIMIBE 10 MG/1
5 TABLET ORAL DAILY
Status: DISCONTINUED | OUTPATIENT
Start: 2024-12-14 | End: 2024-12-19 | Stop reason: HOSPADM

## 2024-12-14 RX ORDER — BENZTROPINE MESYLATE 1 MG/1
0.5 TABLET ORAL
Status: CANCELLED | OUTPATIENT
Start: 2024-12-14

## 2024-12-14 RX ORDER — LATANOPROST 50 UG/ML
1 SOLUTION/ DROPS OPHTHALMIC
Status: DISCONTINUED | OUTPATIENT
Start: 2024-12-14 | End: 2024-12-19 | Stop reason: HOSPADM

## 2024-12-14 RX ORDER — HYDROXYZINE HYDROCHLORIDE 25 MG/1
25 TABLET, FILM COATED ORAL
Status: DISCONTINUED | OUTPATIENT
Start: 2024-12-14 | End: 2024-12-19 | Stop reason: HOSPADM

## 2024-12-14 RX ORDER — OLANZAPINE 10 MG/2ML
5 INJECTION, POWDER, FOR SOLUTION INTRAMUSCULAR
Status: CANCELLED | OUTPATIENT
Start: 2024-12-14

## 2024-12-14 RX ORDER — METOPROLOL SUCCINATE 25 MG/1
25 TABLET, EXTENDED RELEASE ORAL DAILY
Status: DISCONTINUED | OUTPATIENT
Start: 2024-12-14 | End: 2024-12-14 | Stop reason: HOSPADM

## 2024-12-14 RX ORDER — HYDROXYZINE HYDROCHLORIDE 25 MG/1
25 TABLET, FILM COATED ORAL
Status: CANCELLED | OUTPATIENT
Start: 2024-12-14

## 2024-12-14 RX ORDER — BENZTROPINE MESYLATE 0.5 MG/1
0.5 TABLET ORAL
Status: DISCONTINUED | OUTPATIENT
Start: 2024-12-14 | End: 2024-12-19 | Stop reason: HOSPADM

## 2024-12-14 RX ORDER — QUETIAPINE FUMARATE 100 MG/1
100 TABLET, FILM COATED ORAL
Status: DISCONTINUED | OUTPATIENT
Start: 2024-12-14 | End: 2024-12-19 | Stop reason: HOSPADM

## 2024-12-14 RX ORDER — TRAZODONE HYDROCHLORIDE 50 MG/1
50 TABLET, FILM COATED ORAL
Status: DISCONTINUED | OUTPATIENT
Start: 2024-12-14 | End: 2024-12-19 | Stop reason: HOSPADM

## 2024-12-14 RX ORDER — CARBIDOPA AND LEVODOPA 25; 100 MG/1; MG/1
1 TABLET ORAL 3 TIMES DAILY
Status: DISCONTINUED | OUTPATIENT
Start: 2024-12-14 | End: 2024-12-19 | Stop reason: HOSPADM

## 2024-12-14 RX ORDER — AMOXICILLIN 250 MG
1 CAPSULE ORAL DAILY PRN
Status: CANCELLED | OUTPATIENT
Start: 2024-12-14

## 2024-12-14 RX ORDER — QUETIAPINE FUMARATE 25 MG/1
25 TABLET, FILM COATED ORAL
Status: CANCELLED | OUTPATIENT
Start: 2024-12-14

## 2024-12-14 RX ORDER — OXYBUTYNIN CHLORIDE 5 MG/1
5 TABLET ORAL 2 TIMES DAILY
Status: DISCONTINUED | OUTPATIENT
Start: 2024-12-14 | End: 2024-12-19 | Stop reason: HOSPADM

## 2024-12-14 RX ORDER — QUETIAPINE FUMARATE 50 MG/1
50 TABLET, FILM COATED ORAL
Status: DISCONTINUED | OUTPATIENT
Start: 2024-12-14 | End: 2024-12-19 | Stop reason: HOSPADM

## 2024-12-14 RX ORDER — HYDROXYZINE HYDROCHLORIDE 25 MG/1
25 TABLET, FILM COATED ORAL ONCE
Status: COMPLETED | OUTPATIENT
Start: 2024-12-14 | End: 2024-12-14

## 2024-12-14 RX ADMIN — HYDROXYZINE HYDROCHLORIDE 25 MG: 25 TABLET, FILM COATED ORAL at 12:47

## 2024-12-14 RX ADMIN — METOPROLOL SUCCINATE 25 MG: 25 TABLET, FILM COATED, EXTENDED RELEASE ORAL at 12:47

## 2024-12-14 RX ADMIN — OXYBUTYNIN CHLORIDE 5 MG: 5 TABLET ORAL at 17:30

## 2024-12-14 RX ADMIN — CARBIDOPA AND LEVODOPA 1 TABLET: 25; 100 TABLET, EXTENDED RELEASE ORAL at 09:50

## 2024-12-14 RX ADMIN — SENNOSIDES AND DOCUSATE SODIUM 1 TABLET: 8.6; 5 TABLET ORAL at 17:30

## 2024-12-14 RX ADMIN — EZETIMIBE 5 MG: 10 TABLET ORAL at 17:30

## 2024-12-14 RX ADMIN — CARBIDOPA AND LEVODOPA 0.5 TABLET: 25; 100 TABLET ORAL at 18:50

## 2024-12-14 RX ADMIN — METFORMIN HYDROCHLORIDE 1000 MG: 500 TABLET, FILM COATED ORAL at 17:29

## 2024-12-14 RX ADMIN — Medication 1 TABLET: at 17:29

## 2024-12-14 RX ADMIN — TRAZODONE HYDROCHLORIDE 50 MG: 50 TABLET ORAL at 21:45

## 2024-12-14 RX ADMIN — CARBIDOPA AND LEVODOPA 1 TABLET: 25; 100 TABLET ORAL at 17:30

## 2024-12-14 RX ADMIN — METOPROLOL SUCCINATE 25 MG: 25 TABLET, EXTENDED RELEASE ORAL at 21:45

## 2024-12-14 RX ADMIN — LOSARTAN POTASSIUM 100 MG: 50 TABLET, FILM COATED ORAL at 17:30

## 2024-12-14 RX ADMIN — AMLODIPINE BESYLATE 5 MG: 5 TABLET ORAL at 21:45

## 2024-12-14 NOTE — ED ATTENDING ATTESTATION
12/13/2024  I, Mitra Joyner MD, saw and evaluated the patient. I have discussed the patient with the resident/non-physician practitioner and agree with the resident's/non-physician practitioner's findings, Plan of Care, and MDM as documented in the resident's/non-physician practitioner's note, except where noted. All available labs and Radiology studies were reviewed.  I was present for key portions of any procedure(s) performed by the resident/non-physician practitioner and I was immediately available to provide assistance.       At this point I agree with the current assessment done in the Emergency Department.  I have conducted an independent evaluation of this patient a history and physical is as follows:  This is a 69-year-old male who presents after intentional overdose of trazodone.  Patient has history of Parkinson's and believes that the medications have changed his personality.  He states that he cannot sleep through the night, and at least twice a week he gets in a fight with his wife at bedtime.  The patient states that he got an argument with her tonight, and then he overdosed on 6 or 750 mg trazodone tablets.  He states that he was hoping that he would either end it all or get a good night sleep.  The patient states that he feels suicidal, and he is still feeling that way.  He denies vomiting.  He denies coingestants.  Review of systems otherwise negative in 12 systems reviewed.  On exam the patient is tachycardic.  In HEENT exam, he has no signs of trauma.  His mucous members are moist.  His neck is supple.  The patient's heart is regular tachycardic without murmurs.  His lungs are clear.  His abdomen is benign.  He does not have any clonus.  He has no rigidity.  He has good cap refill and is well-perfused. MEDICAL DECISION MAKING    Number and Complexity of Problems  Differential diagnosis: Intentional trazodone overdose, suicidal ideation    Medical Decision Making Data  External documents  reviewed: Prior visits for depression reviewed  My EKG interpretation: Sinus tachycardia, narrow complex  My CT interpretation:   My X-ray interpretation:   My ultrasound interpretation:     No orders to display       Labs Reviewed   CBC AND DIFFERENTIAL - Abnormal       Result Value Ref Range Status    WBC 8.95  4.31 - 10.16 Thousand/uL Final    RBC 5.02  3.88 - 5.62 Million/uL Final    Hemoglobin 14.9  12.0 - 17.0 g/dL Final    Hematocrit 43.6  36.5 - 49.3 % Final    MCV 87  82 - 98 fL Final    MCH 29.7  26.8 - 34.3 pg Final    MCHC 34.2  31.4 - 37.4 g/dL Final    RDW 13.2  11.6 - 15.1 % Final    MPV 8.4 (*) 8.9 - 12.7 fL Final    Platelets 266  149 - 390 Thousands/uL Final    nRBC 0  /100 WBCs Final    Segmented % 66  43 - 75 % Final    Immature Grans % 0  0 - 2 % Final    Lymphocytes % 17  14 - 44 % Final    Monocytes % 8  4 - 12 % Final    Eosinophils Relative 8 (*) 0 - 6 % Final    Basophils Relative 1  0 - 1 % Final    Absolute Neutrophils 5.86  1.85 - 7.62 Thousands/µL Final    Absolute Immature Grans 0.03  0.00 - 0.20 Thousand/uL Final    Absolute Lymphocytes 1.55  0.60 - 4.47 Thousands/µL Final    Absolute Monocytes 0.73  0.17 - 1.22 Thousand/µL Final    Eosinophils Absolute 0.71 (*) 0.00 - 0.61 Thousand/µL Final    Basophils Absolute 0.07  0.00 - 0.10 Thousands/µL Final   COMPREHENSIVE METABOLIC PANEL - Abnormal    Sodium 136  135 - 147 mmol/L Final    Potassium 3.8  3.5 - 5.3 mmol/L Final    Chloride 102  96 - 108 mmol/L Final    CO2 28  21 - 32 mmol/L Final    ANION GAP 6  4 - 13 mmol/L Final    BUN 17  5 - 25 mg/dL Final    Creatinine 0.98  0.60 - 1.30 mg/dL Final    Comment: Standardized to IDMS reference method    Glucose 175 (*) 65 - 140 mg/dL Final    Comment: If the patient is fasting, the ADA then defines impaired fasting glucose as > 100 mg/dL and diabetes as > or equal to 123 mg/dL.    Calcium 9.7  8.4 - 10.2 mg/dL Final    AST 15  13 - 39 U/L Final    ALT 15  7 - 52 U/L Final    Comment:  Specimen collection should occur prior to Sulfasalazine administration due to the potential for falsely depressed results.     Alkaline Phosphatase 79  34 - 104 U/L Final    Total Protein 7.0  6.4 - 8.4 g/dL Final    Albumin 4.1  3.5 - 5.0 g/dL Final    Total Bilirubin 0.46  0.20 - 1.00 mg/dL Final    Comment: Use of this assay is not recommended for patients undergoing treatment with eltrombopag due to the potential for falsely elevated results.  N-acetyl-p-benzoquinone imine (metabolite of Acetaminophen) will generate erroneously low results in samples for patients that have taken an overdose of Acetaminophen.    eGFR 77  ml/min/1.73sq m Final    Narrative:     [employer] guidelines for Chronic Kidney Disease (CKD):     Stage 1 with normal or high GFR (GFR > 90 mL/min/1.73 square meters)    Stage 2 Mild CKD (GFR = 60-89 mL/min/1.73 square meters)    Stage 3A Moderate CKD (GFR = 45-59 mL/min/1.73 square meters)    Stage 3B Moderate CKD (GFR = 30-44 mL/min/1.73 square meters)    Stage 4 Severe CKD (GFR = 15-29 mL/min/1.73 square meters)    Stage 5 End Stage CKD (GFR <15 mL/min/1.73 square meters)  Note: GFR calculation is accurate only with a steady state creatinine   RAPID DRUG SCREEN, URINE - Normal    Amph/Meth UR Negative  Negative Final    Barbiturate Ur Negative  Negative Final    Benzodiazepine Urine Negative  Negative Final    Cocaine Urine Negative  Negative Final    Methadone Urine Negative  Negative Final    Opiate Urine Negative  Negative Final    PCP Ur Negative  Negative Final    THC Urine Negative  Negative Final    Oxycodone Urine Negative  Negative Final    Fentanyl Urine Negative  Negative Final    HYDROCODONE URINE Negative  Negative Final    Narrative:     FOR MEDICAL PURPOSES ONLY.   IF CONFIRMATION NEEDED PLEASE CONTACT THE LAB WITHIN 5 DAYS.    Drug Screen Cutoff Levels:  AMPHETAMINE/METHAMPHETAMINES  1000 ng/mL  BARBITURATES     200 ng/mL  BENZODIAZEPINES     200  ng/mL  COCAINE      300 ng/mL  METHADONE      300 ng/mL  OPIATES      300 ng/mL  PHENCYCLIDINE     25 ng/mL  THC       50 ng/mL  OXYCODONE      100 ng/mL  FENTANYL      5 ng/mL  HYDROCODONE     300 ng/mL   TSH, 3RD GENERATION - Normal    TSH 3RD GENERATON 1.517  0.450 - 4.500 uIU/mL Final    Comment: Adult TSH (3rd generation) reference range follows the recommended guidelines of the [employer], January, 2020.   POCT ALCOHOL BREATH TEST - Normal    EXTBreath Alcohol 0.000   Final   UA W REFLEX TO MICROSCOPIC WITH REFLEX TO CULTURE    Color, UA Light Yellow   Final    Clarity, UA Clear   Final    Specific Gravity, UA 1.011  1.003 - 1.030 Final    pH, UA 5.0  4.5, 5.0, 5.5, 6.0, 6.5, 7.0, 7.5, 8.0 Final    Leukocytes, UA Negative  Negative Final    Nitrite, UA Negative  Negative Final    Protein, UA Negative  Negative mg/dl Final    Glucose, UA Negative  Negative mg/dl Final    Ketones, UA Negative  Negative mg/dl Final    Urobilinogen, UA <2.0  <2.0 mg/dl mg/dl Final    Bilirubin, UA Negative  Negative Final    Occult Blood, UA Negative  Negative Final       Labs reviewed by me are significant for:     Clinical decision rules/scores are significant for:     Discussed case with:   Considered admission for:     Treatment and Disposition  ED course: Patient seen and examined.  EKG with narrow complex currently, patient is on the monitor.  Will do Kelle psych workup, observe for trazodone overdose  Shared decision making:   Code status:     ED Course         Critical Care Time  Procedures

## 2024-12-14 NOTE — ED NOTES
Patient wants wife to be updated consistently and would like all information released to her.         Pete Fregoso (Spouse)   [phone] (COLLEEN)

## 2024-12-14 NOTE — NURSING NOTE
Patient admitted to [facility] this afternoon at 1426 under a 201. Patient had taken 7 trazodone tablets last night in an attempt to overdose.  Upon arrival, patient is alert and oriented x4. He did state he feels he is having more problems with his memory and can be forgetful or feel confused at times. He is calm, cooperative, and pleasant. He denies suicidal thoughts at this time and expresses his remorse for attempting. He states it was impulsive and he had no plans to do it beforehand. He feels safe on the unit and agrees to come to staff if his thoughts change. He denies anxiety but appears mildly anxious. He endorses mild anxiety. States he takes Prozac at home. His stressors are that he feels his personality has changed since he has been diagnosed with Parkinson's disease and he isn't sure if this is because of the disease or side effects of his medications. He states he has been fighting with his wife more often lately due to less frustration tolerance and feeling more emotional. He reports poor sleep lately. Denies any changes in his appetite.  All PTA medications verified with a list typed up by his wife and patient was able to confirm the list.  He has no complaints of pain at this time. He uses a cane to ambulate. He was oriented to the unit. He is able to make needs known.

## 2024-12-14 NOTE — ED PROVIDER NOTES
ED Disposition       None          Assessment & Plan       Medical Decision Making  69-year-old male with extensive medical history including history of Parkinson's, diabetes, depression anxiety, presents to the emergency department today for evaluation of suicidal ideation with plan to harm himself.  Patient attempted overdose on his nightly trazodone medication.  He states he still has some vague SI and is unsure if he would harm himself if he went home.  Patient willing to sign 201.  Geriatric psych workup completed and large unremarkable.  EKG interpreted by myself and attending physician revealing no acute evaluation, ST elevations depressions consistent with ACS, or other significant arrhythmias.  Patient hemodynamically stable and is medically cleared for inpatient psychiatric treatment.    Amount and/or Complexity of Data Reviewed  Labs: ordered.    Risk  Prescription drug management.             Medications   carbidopa-levodopa (SINEMET CR)  mg per ER tablet 1 tablet (0 tablets Oral Hold 12/14/24 0010)       ED Risk Strat Scores                  Identification of Seniors at Risk      Flowsheet Row Most Recent Value   (ISAR) Identification of Seniors at Risk    Before the illness or injury that brought you to the Emergency, did you need someone to help you on a regular basis? 0 Filed at: 12/13/2024 2128   In the last 24 hours, have you needed more help than usual? 0 Filed at: 12/13/2024 2128   Have you been hospitalized for one or more nights during the past 6 months? 1 Filed at: 12/13/2024 2128   In general, do you see well? 0 Filed at: 12/13/2024 2128   In general, do you have serious problems with your memory? 0 Filed at: 12/13/2024 2128   Do you take more than three different medications every day? 1 Filed at: 12/13/2024 2128   ISAR Score 2 Filed at: 12/13/2024 2128                SBIRT 22yo+      Flowsheet Row Most Recent Value   Initial Alcohol Screen: US AUDIT-C     1. How often do you have a  drink containing alcohol? 0 Filed at: 12/13/2024 2128   2. How many drinks containing alcohol do you have on a typical day you are drinking?  0 Filed at: 12/13/2024 2128   3b. FEMALE Any Age, or MALE 65+: How often do you have 4 or more drinks on one occassion? 0 Filed at: 12/13/2024 2128   Audit-C Score 0 Filed at: 12/13/2024 2128   KIMMIE: How many times in the past year have you...    Used an illegal drug or used a prescription medication for non-medical reasons? Never Filed at: 12/13/2024 2128                            History of Present Illness       Chief Complaint   Patient presents with    Overdose - Intentional     Took 6-7 50mg trazodone, +SI       Past Medical History:   Diagnosis Date    Acute blood loss anemia 10/13/2016    Anxiety     Arthritis     knees    Arthritis     Asthma     stable for > 10 years    [place] esophagus     with BARRX/RFA    Cancer (HCC)     prostate    Cataract     Depression     Diabetes (HCC)     Diabetes mellitus (HCC)     pre diabetes    Diverticulitis of colon     Environmental allergies     Fall 12/30/2020    GERD (gastroesophageal reflux disease)     Hiatal hernia     History of anal fissures     Hyperlipidemia     Hypertension     Incisional hernia     Insomnia     Kidney stone     Malignant neoplasm of prostate (HCC)     Morbid obesity (HCC)     Parkinson disease (HCC)     Peripheral neuropathy     PONV (postoperative nausea and vomiting)     Prolapsing mitral valve     prolapsing mitral valve leaflet syndrome    Prostate cancer (HCC)     Retinal detachment     treated [facility] at [facility]; resolved: 10/10/2012    Sleep apnea     diagnosed but unable to tolerate cpap.     Stuttering       Past Surgical History:   Procedure Laterality Date    ARTHROSCOPIC REPAIR ACL Right     BIOPSY CORE NEEDLE      prostate    CATARACT EXTRACTION Bilateral     COLONOSCOPY      HERNIA REPAIR      naval    JOINT REPLACEMENT      B/L knee    KNEE ARTHROSCOPY Left     LAPAROSCOPIC GASTRIC  BANDING      NC ARTHRP KNE CONDYLE&PLATU MEDIAL&LAT COMPARTMENTS Left 02/15/2017    Procedure: TOTAL KNEE ARTHROPLASTY ;  Surgeon: Sal Ross MD;  Location: [facility];  Service: Orthopedics    NC ARTHRP KNE CONDYLE&PLATU MEDIAL&LAT COMPARTMENTS Right 10/10/2016    Procedure: ARTHROPLASTY KNEE TOTAL;  Surgeon: Sal Ross MD;  Location: [facility];  Service: Orthopedics    PROSTATECTOMY      robotic-assisted; Lilly Tay    REMOVAL GASTRIC BAND LAPAROSCOPIC N/A 8/30/2022    Procedure: LAPAROSCOPIC REMOVAL OF ADJUSTABLE GASTRIC BAND AND PORT  WITH INTRAOPERATIVE EGD;  Surgeon: Neal Yang MD;  Location: [facility];  Service: Bariatrics    RETINAL DETACHMENT SURGERY Bilateral     Lemus Eye    SEPTOPLASTY      SINUS SURGERY      TONSILLECTOMY      over age 10    UPPER GASTROINTESTINAL ENDOSCOPY      mutiple with BARRX/RFA    VASECTOMY      vas deferens      Family History   Problem Relation Age of Onset    Heart disease Father     Coronary artery disease Father     Prostate cancer Father     Coronary artery disease Mother     Diabetes Mother         mellitus    Diabetes type II Mother     Diabetes Maternal Grandmother         mellitus    Coronary artery disease Maternal Grandfather     Coronary artery disease Paternal Grandfather       Social History     Tobacco Use    Smoking status: Never     Passive exposure: Never    Smokeless tobacco: Never    Tobacco comments:     quit 38 years ago   Vaping Use    Vaping status: Never Used   Substance Use Topics    Alcohol use: Not Currently     Alcohol/week: 1.0 standard drink of alcohol     Types: 1 Cans of beer per week     Comment: 1 case of beer per year    Drug use: Never      E-Cigarette/Vaping    E-Cigarette Use Never User       E-Cigarette/Vaping Substances    Nicotine No     THC No     CBD No     Flavoring No     Other No     Unknown No       I have reviewed and agree with the history as documented.     69-year-old male with extensive medical history  including history of Parkinson's, diabetes, depression anxiety, presents to the emergency department today for evaluation of suicidal ideation with plan to harm himself.  Patient attempted overdose on his nightly trazodone medication.  He tells him that he took 6 or 7 pills for a total dose of 300-350 mg.  He was upset from fighting with his wife so he went to his room to try to sleep.  He says has not slept in over 24 hours so he fear that he would take a handful of his trazodone pills.  He tells me that he took that many because he felt that if he went to bed did not wake up his wife would be better off without him, and if he did wake up he would at least be able to sleep for longer amount of time.  When asked about further suicidal ideation he says if he goes home he is not sure if he would try to harm himself again.        Review of Systems   Constitutional:  Negative for chills and fever.   Eyes:  Negative for pain and visual disturbance.   Respiratory:  Negative for cough and shortness of breath.    Cardiovascular:  Negative for chest pain and palpitations.   Gastrointestinal:  Negative for abdominal pain and vomiting.   Genitourinary:  Negative for dysuria.   Musculoskeletal:  Negative for arthralgias and back pain.   Skin:  Negative for color change and rash.   Neurological:  Positive for tremors and speech difficulty. Negative for dizziness, seizures, syncope, weakness, light-headedness, numbness and headaches.   Psychiatric/Behavioral:  Negative for agitation and confusion.    All other systems reviewed and are negative.          Objective       ED Triage Vitals [12/13/24 2107]   Temperature Pulse Blood Pressure Respirations SpO2 Patient Position - Orthostatic VS   98.7 °F (37.1 °C) 96 158/98 15 95 % --      Temp Source Heart Rate Source BP Location FiO2 (%) Pain Score    Oral Monitor -- -- No Pain      Vitals      Date and Time Temp Pulse SpO2 Resp BP Pain Score FACES Pain Rating User   12/13/24 2300 --  86 96 % 18 165/99 -- -- JT   12/13/24 2107 98.7 °F (37.1 °C) 96 95 % 15 158/98 No Pain -- JT            Physical Exam  Constitutional:       General: He is not in acute distress.     Appearance: Normal appearance.   HENT:      Head: Normocephalic and atraumatic.      Mouth/Throat:      Mouth: Mucous membranes are moist.      Pharynx: Oropharynx is clear. No oropharyngeal exudate or posterior oropharyngeal erythema.   Eyes:      Extraocular Movements: Extraocular movements intact.      Pupils: Pupils are equal, round, and reactive to light.   Cardiovascular:      Rate and Rhythm: Normal rate and regular rhythm.      Pulses: Normal pulses.      Heart sounds: Normal heart sounds. No murmur heard.  Pulmonary:      Effort: Pulmonary effort is normal. No respiratory distress.   Abdominal:      General: Abdomen is flat.      Palpations: Abdomen is soft.      Tenderness: There is no abdominal tenderness. There is no guarding.   Skin:     General: Skin is warm and dry.      Coloration: Skin is not jaundiced or pale.      Findings: No bruising, erythema or rash.   Neurological:      General: No focal deficit present.      Mental Status: He is alert and oriented to person, place, and time.      Cranial Nerves: No cranial nerve deficit.         Results Reviewed       Procedure Component Value Units Date/Time    TSH [[id number]]  (Normal) Collected: 12/13/24 2140    Lab Status: Final result Specimen: Blood from Arm, Right Updated: 12/14/24 0211     TSH 3RD GENERATON 1.517 uIU/mL     Rapid drug screen, urine [[id number]]  (Normal) Collected: 12/13/24 2150    Lab Status: Final result Specimen: Urine, Clean Catch Updated: 12/13/24 2229     Amph/Meth UR Negative     Barbiturate Ur Negative     Benzodiazepine Urine Negative     Cocaine Urine Negative     Methadone Urine Negative     Opiate Urine Negative     PCP Ur Negative     THC Urine Negative     Oxycodone Urine Negative     Fentanyl Urine Negative     HYDROCODONE URINE Negative     Narrative:      FOR MEDICAL PURPOSES ONLY.   IF CONFIRMATION NEEDED PLEASE CONTACT THE LAB WITHIN 5 DAYS.    Drug Screen Cutoff Levels:  AMPHETAMINE/METHAMPHETAMINES  1000 ng/mL  BARBITURATES     200 ng/mL  BENZODIAZEPINES     200 ng/mL  COCAINE      300 ng/mL  METHADONE      300 ng/mL  OPIATES      300 ng/mL  PHENCYCLIDINE     25 ng/mL  THC       50 ng/mL  OXYCODONE      100 ng/mL  FENTANYL      5 ng/mL  HYDROCODONE     300 ng/mL    UA w Reflex to Microscopic w Reflex to Culture [[id number]] Collected: 12/13/24 2149    Lab Status: Final result Specimen: Urine, Clean Catch Updated: 12/13/24 2213     Color, UA Light Yellow     Clarity, UA Clear     Specific Gravity, UA 1.011     pH, UA 5.0     Leukocytes, UA Negative     Nitrite, UA Negative     Protein, UA Negative mg/dl      Glucose, UA Negative mg/dl      Ketones, UA Negative mg/dl      Urobilinogen, UA <2.0 mg/dl      Bilirubin, UA Negative     Occult Blood, UA Negative    Comprehensive metabolic panel [[id number]]  (Abnormal) Collected: 12/13/24 2140    Lab Status: Final result Specimen: Blood from Arm, Right Updated: 12/13/24 2207     Sodium 136 mmol/L      Potassium 3.8 mmol/L      Chloride 102 mmol/L      CO2 28 mmol/L      ANION GAP 6 mmol/L      BUN 17 mg/dL      Creatinine 0.98 mg/dL      Glucose 175 mg/dL      Calcium 9.7 mg/dL      AST 15 U/L      ALT 15 U/L      Alkaline Phosphatase 79 U/L      Total Protein 7.0 g/dL      Albumin 4.1 g/dL      Total Bilirubin 0.46 mg/dL      eGFR 77 ml/min/1.73sq m     Narrative:      National Kid[employer] Disease Foundation guidelines for Chronic Kidney Disease (CKD):     Stage 1 with normal or high GFR (GFR > 90 mL/min/1.73 square meters)    Stage 2 Mild CKD (GFR = 60-89 mL/min/1.73 square meters)    Stage 3A Moderate CKD (GFR = 45-59 mL/min/1.73 square meters)    Stage 3B Moderate CKD (GFR = 30-44 mL/min/1.73 square meters)    Stage 4 Severe CKD (GFR = 15-29 mL/min/1.73 square meters)    Stage 5 End Stage CKD (GFR <15  mL/min/1.73 square meters)  Note: GFR calculation is accurate only with a steady state creatinine    CBC and differential [[id number]]  (Abnormal) Collected: 24    Lab Status: Final result Specimen: Blood from Arm, Right Updated: 24     WBC 8.95 Thousand/uL      RBC 5.02 Million/uL      Hemoglobin 14.9 g/dL      Hematocrit 43.6 %      MCV 87 fL      MCH 29.7 pg      MCHC 34.2 g/dL      RDW 13.2 %      MPV 8.4 fL      Platelets 266 Thousands/uL      nRBC 0 /100 WBCs      Segmented % 66 %      Immature Grans % 0 %      Lymphocytes % 17 %      Monocytes % 8 %      Eosinophils Relative 8 %      Basophils Relative 1 %      Absolute Neutrophils 5.86 Thousands/µL      Absolute Immature Grans 0.03 Thousand/uL      Absolute Lymphocytes 1.55 Thousands/µL      Absolute Monocytes 0.73 Thousand/µL      Eosinophils Absolute 0.71 Thousand/µL      Basophils Absolute 0.07 Thousands/µL     POCT alcohol breath test [[id number]]  (Normal) Resulted: 24    Lab Status: Final result Updated: 24     EXTBreath Alcohol 0.000            No orders to display       Procedures    ED Medication and Procedure Management   Prior to Admission Medications   Prescriptions Last Dose Informant Patient Reported? Taking?   Blood Glucose Monitoring Suppl (OneTouch Verio) w/Device KIT  Self No No   Sig: Use daily   FLUoxetine (PROzac) 20 mg capsule  Self Yes No   Sig: Take 40 mg by mouth daily Alternated between 20 mg and 40 mg daily   Lancet Devices (ONE TOUCH DELICA LANCING DEV) MISC  Self No No   Sig: Use daily   Lancets (OneTouch Delica Plus [id number]) MISC  Self No No   Sig: Use 1 Application in the morning   Multiple Vitamins-Minerals (CENTRUM ADULTS PO)  Self Yes No   Sig: Take by mouth in the morning   Nourianz tablet  Self Yes No   Si tablet daily   Patient not taking: Reported on 10/17/2024   QUEtiapine (SEROquel) 50 mg tablet  Self Yes No   Sig: Take 25 mg by mouth daily at bedtime   amLODIPine  (NORVASC) 5 mg tablet  Self No No   Sig: Take 1 tablet (5 mg total) by mouth daily   carbidopa-levodopa (SINEMET)  mg per tablet  Self Yes No   Sig: Take 1 tablet by mouth Three times a day   ezetimibe (ZETIA) 10 mg tablet  Self No No   Sig: Take 1 tablet (10 mg total) by mouth daily   Patient taking differently: Take 5 mg by mouth daily   glucose blood (OneTouch Verio) test strip  Self No No   Sig: Use 1 each in the morning USE TO TEST DAILY   hydrOXYzine HCL (ATARAX) 25 mg tablet   No No   Sig: Take 1 tablet (25 mg total) by mouth 2 (two) times a day   ibuprofen (MOTRIN) 200 mg tablet  Self Yes No   Sig: Take 400 mg by mouth Three times a day   latanoprost (XALATAN) 0.005 % ophthalmic solution  Self Yes No   Sig: INSTILL 1 DROP INTO BOTH EYES IN THE EVENING   losartan (COZAAR) 100 MG tablet  Self No No   Sig: Take 1 tablet (100 mg total) by mouth daily   metFORMIN (GLUCOPHAGE) 500 mg tablet  Self No No   Sig: TAKE 2 TABLETS TWICE A DAY WITH MEALS   metoprolol succinate (TOPROL-XL) 25 mg 24 hr tablet  Self No No   Sig: Take 1 tablet (25 mg total) by mouth 2 (two) times a day   nystatin (MYCOSTATIN) cream  Self No No   Sig: Apply topically 2 (two) times a day   pantoprazole (PROTONIX) 40 mg tablet  Self No No   Sig: TAKE 1 TABLET TWICE A DAY (1 TABLET IN THE MORNING AND 1 TABLET IN THE EVENING)   rosuvastatin (CRESTOR) 5 mg tablet  Self No No   Sig: TAKE 1 TABLET ONCE A WEEK   Patient not taking: Reported on 10/17/2024   selegiline (ELDEPRYL) 5 mg capsule  Self Yes No   Sig: Take 5 mg by mouth as needed   Patient not taking: Reported on 8/7/2024   semaglutide, 2 mg/dose, (Ozempic, 2 MG/DOSE,) 8 mg/ mL injection pen  Self No No   Sig: INJECT 2 MG UNDER THE SKIN EVERY 7 DAYS   sucralfate (CARAFATE) 1 g tablet  Self No No   Sig: Take 1 tablet (1 g total) by mouth 2 (two) times a day   sucralfate (CARAFATE) 1 g/10 mL suspension   No No   Sig: Take 10 mL (1 g total) by mouth 3 (three) times a day before meals for 7  days   traZODone (DESYREL) 50 mg tablet  Self Yes No   Si/2-1 tab at bedtime   trospium chloride (SANCTURA) 20 mg tablet  Self Yes No   Sig: Take 20 mg by mouth 2 (two) times a day      Facility-Administered Medications: None     Patient's Medications   Discharge Prescriptions    No medications on file     No discharge procedures on file.  ED SEPSIS DOCUMENTATION            Matthew Jaime MD  24 [id number]

## 2024-12-14 NOTE — ED NOTES
69 year old male presents for intentional overdose on 6 or 7 trazodone.  Denies HI/AH/VH/paranoia.    Patient is pleasant and cooperative but has a significant speech impediment along with tangential speech which can delay answers. Patient states he had an argument with his wife which has been a lot more frequently and this lead to overdosing with the intent to end his life tonight. Major stressors in life are feeling unloved by wife, sexual issues, parkinson's disease, general health issues, general life stressors. Patient feels parkinson's exacerbated his psychiatric problems.   Besides today, last known self harm was decades ago. No harm to others.  Denies ever having psychiatric admission. States he has a psychologist in private practice, a psychiatrist with [facility] where a neurologist also works.    Psychiatric history is negative for psychotic disorders and patient takes medication everyday.        Medical history involves managed diabetes according to patient, no seizures or thyroid/head trauma.  Patient has a significant stutter which makes speaking difficult for patient and patient has parkinson's disease for which he is receiving treatment.  Diagnosed in summer of 2023.   Denies major issue with appetite but sleep is greatly impaired. Denies major weight loss. Denies substance abuse/legal issues/weapons/psychological trauma.       Patient was spoken to about signing in voluntarily and being admitted on a 201. Patient was entirely agreeable albeit being scared of psych units.  Had all questions and concerns addressed. Explained 72 hour rule.       Leonidas Hoffmann

## 2024-12-14 NOTE — H&P
"                              Anuradha Fregoso#  :1955 COLLEEN  MRN:[id number]    CSN:[id number]  Adm Date: 2024  2:26 PM   ATT PHY: Jose Manuel Felix Md  [facility]         Chief Complaint:   Worsening depression symptoms    History of Presenting Illness: Marcellus Fregoso is a(n) 69 y.o. year old male who was admitted through ED after intentional overdose of 6-7 trazodone tablets.  Patient admitted having worsening depression symptoms along with psychotic features.    Patient examined at bedside.  Patient denied any active suicidal homicidal ideations.    Allergies   Allergen Reactions    Celebrex [Celecoxib] Other (See Comments)     Cardiologist stated he should not take      Chlorhexidine Hives     Is able to wash with hibiclens but not use michi cloths    Other      \"steroid eyedrops\"      Prednisone Other (See Comments)     Prednisone eye drops- eye pressure increases    Selegiline Allergic Rhinitis     Hypertension       Current Facility-Administered Medications on File Prior to Encounter   Medication Dose Route Frequency Provider Last Rate Last Admin    [COMPLETED] hydrOXYzine HCL (ATARAX) tablet 25 mg  25 mg Oral Once Keegan Vo, DO   25 mg at 24 1247    [DISCONTINUED] carbidopa-levodopa (SINEMET CR)  mg per ER tablet 1 tablet  1 tablet Oral BID Matthew Jaime MD   1 tablet at 24 0950    [DISCONTINUED] metoprolol succinate (TOPROL-XL) 24 hr tablet 25 mg  25 mg Oral Daily Keegan Vo, DO   25 mg at 24 1247     Current Outpatient Medications on File Prior to Encounter   Medication Sig Dispense Refill    amLODIPine (NORVASC) 5 mg tablet Take 1 tablet (5 mg total) by mouth daily (Patient taking differently: Take 5 mg by mouth daily at bedtime) 90 tablet 1    carbidopa-levodopa (SINEMET)  mg per tablet Take 1 tablet by mouth Three times a day      ezetimibe (ZETIA) 10 mg tablet Take 1 tablet (10 mg total) by mouth daily (Patient taking differently: Take 5 mg " by mouth daily) 90 tablet 3    FLUoxetine (PROzac) 20 mg capsule Take 40 mg by mouth daily      hydrOXYzine HCL (ATARAX) 25 mg tablet Take 1 tablet (25 mg total) by mouth 2 (two) times a day (Patient taking differently: Take 25 mg by mouth 2 (two) times a day as needed for anxiety) 180 tablet 3    latanoprost (XALATAN) 0.005 % ophthalmic solution INSTILL 1 DROP INTO BOTH EYES IN THE EVENING      losartan (COZAAR) 100 MG tablet Take 1 tablet (100 mg total) by mouth daily 90 tablet 3    metFORMIN (GLUCOPHAGE) 500 mg tablet TAKE 2 TABLETS TWICE A DAY WITH MEALS 360 tablet 3    metoprolol succinate (TOPROL-XL) 25 mg 24 hr tablet Take 1 tablet (25 mg total) by mouth 2 (two) times a day 14 tablet 0    Multiple Vitamins-Minerals (CENTRUM ADULTS PO) Take by mouth in the morning      nystatin (MYCOSTATIN) cream Apply topically 2 (two) times a day (Patient taking differently: Apply 1 Application topically 2 (two) times a day) 30 g 2    pantoprazole (PROTONIX) 40 mg tablet TAKE 1 TABLET TWICE A DAY (1 TABLET IN THE MORNING AND 1 TABLET IN THE EVENING) 180 tablet 1    QUEtiapine (SEROquel) 50 mg tablet Take 50 mg by mouth daily at bedtime      rosuvastatin (CRESTOR) 5 mg tablet TAKE 1 TABLET ONCE A WEEK 12 tablet 1    semaglutide, 2 mg/dose, (Ozempic, 2 MG/DOSE,) 8 mg/ mL injection pen INJECT 2 MG UNDER THE SKIN EVERY 7 DAYS (Patient taking differently: Inject 1.5 mg under the skin every 7 days) 9 mL 1    sucralfate (CARAFATE) 1 g tablet Take 1 tablet (1 g total) by mouth 2 (two) times a day (Patient taking differently: Take 1 g by mouth daily at bedtime as needed) 60 tablet 3    trospium chloride (SANCTURA) 20 mg tablet Take 20 mg by mouth 2 (two) times a day      Blood Glucose Monitoring Suppl (OneTouch Verio) w/Device KIT Use daily 1 kit 0    glucose blood (OneTouch Verio) test strip Use 1 each in the morning USE TO TEST DAILY 100 strip 3    ibuprofen (MOTRIN) 200 mg tablet Take 400 mg by mouth Three times a day      Lancet  Devices (ONE TOUCH DELICA LANCING DEV) MISC Use daily 1 each 0    Lancets (OneTouch Delica Plus [id number]) MISC Use 1 Application in the morning 100 each 3    Nourianz tablet 1 tablet daily (Patient not taking: Reported on 10/17/2024)      selegiline (ELDEPRYL) 5 mg capsule Take 5 mg by mouth as needed (Patient not taking: Reported on 8/7/2024)      sucralfate (CARAFATE) 1 g/10 mL suspension Take 10 mL (1 g total) by mouth 3 (three) times a day before meals for 7 days 210 mL 0    traZODone (DESYREL) 50 mg tablet Take 50 mg by mouth daily at bedtime         Active Ambulatory Problems     Diagnosis Date Noted    Status post total left knee replacement 10/11/2016    Stuttering     Sleep apnea     Ambulatory dysfunction 02/18/2017    Benign essential hypertension 06/13/2012    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, without long-term current use of insulin ([facility]) 05/07/2015    Insomnia 04/26/2013    Depression with anxiety 05/07/2015    Onychomycosis 05/02/2019    Molina's esophagus with dysplasia 11/15/2020    Dysphagia 04/08/2021    Exertional dyspnea 05/27/2021    Gastroparesis 06/03/2021    Ventral hernia without obstruction or gangrene 11/23/2021    Gastroesophageal reflux disease with esophagitis without hemorrhage 03/01/2022    Tremor 03/18/2022    Neuropathy 03/18/2022    Pure hypercholesterolemia 04/21/2022    Dermatitis 05/31/2022    Obesity, Class III, BMI 40-49.9 (morbid obesity) ([facility]) 08/18/2022    Hx of laparoscopic adjustable gastric banding 08/18/2022    History of total knee arthroplasty, right 01/10/2023    Syncope 02/21/2023    Urinary urgency 02/22/2023    Generalized weakness 02/22/2023    Class 3 severe obesity due to excess calories with serious comorbidity and body mass index (BMI) of 40.0 to 44.9 in adult ([facility]) 03/01/2023    CAD (coronary artery disease) 06/06/2023    Carpal tunnel syndrome of right wrist 07/07/2023    Family history of Parkinson disease 08/02/2023     Parkinson disease (HCC) 08/18/2023    Hyperlipidemia 08/21/2023    Chronic bilateral low back pain without sciatica 10/19/2023    PVC (premature ventricular contraction) 12/07/2023    Chest pain syndrome 07/19/2024     Resolved Ambulatory Problems     Diagnosis Date Noted    Hypertension     Type 2 diabetes mellitus, without long-term current use of insulin (HCC)     Depression     Anxiety     Asthma     Cancer (HCC)     Cancer (HCC)     Hypotension 10/12/2016    Acute blood loss anemia 10/13/2016    Malignant neoplasm of prostate (HCC)     Left testicular pain 09/02/2020    Fall 12/30/2020    SIRS vs Sepsis  11/18/2021    Molina's esophagus determined by biopsy 08/18/2022    Acute cough 10/18/2022    Nausea and vomiting 02/21/2023     Past Medical History:   Diagnosis Date    Arthritis     Arthritis     Molina esophagus     Cataract     Diabetes (HCC)     Diabetes mellitus (HCC)     Diverticulitis of colon     Environmental allergies     GERD (gastroesophageal reflux disease)     Hiatal hernia     History of anal fissures     Incisional hernia     Kidney stone     Morbid obesity (HCC)     Peripheral neuropathy     PONV (postoperative nausea and vomiting)     Prolapsing mitral valve     Prostate cancer (HCC)     Retinal detachment        Past Surgical History:   Procedure Laterality Date    ARTHROSCOPIC REPAIR ACL Right     BIOPSY CORE NEEDLE      prostate    CATARACT EXTRACTION Bilateral     COLONOSCOPY      HERNIA REPAIR      naval    JOINT REPLACEMENT      B/L knee    KNEE ARTHROSCOPY Left     LAPAROSCOPIC GASTRIC BANDING      [place] ARTHRP KNE CONDYLE&PLATU MEDIAL&LAT COMPARTMENTS Left 02/15/2017    Procedure: TOTAL KNEE ARTHROPLASTY ;  Surgeon: Sal Ross MD;  Location: [facility];  Service: Orthopedics    [place] ARTHRP KNE CONDYLE&PLATU MEDIAL&LAT COMPARTMENTS Right 10/10/2016    Procedure: ARTHROPLASTY KNEE TOTAL;  Surgeon: Sal Ross MD;  Location: [facility];  Service: Orthopedics    PROSTATECTOMY       robotic-assisted; S[facility]rystal Tay    REMOVAL GASTRIC BAND LAPAROSCOPIC N/A 8/30/2022    Procedure: LAPAROSCOPIC REMOVAL OF ADJUSTABLE GASTRIC BAND AND PORT  WITH INTRAOPERATIVE EGD;  Surgeon: Neal Yang MD;  Location: [facility];  Service: Bariatrics    RETINAL DETACHMENT SURGERY Bilateral     Lemus Eye    SEPTOPLASTY      SINUS SURGERY      TONSILLECTOMY      over age 10    UPPER GASTROINTESTINAL ENDOSCOPY      mutiple with BARRX/RFA    VASECTOMY      vas deferens       Social History:   Social History     Socioeconomic History    Marital status: /Civil Union     Spouse name: None    Number of children: 0    Years of education: None    Highest education level: None   Occupational History    Occupation:    Tobacco Use    Smoking status: Never     Passive exposure: Never    Smokeless tobacco: Never    Tobacco comments:     quit 38 years ago   Vaping Use    Vaping status: Never Used   Substance and Sexual Activity    Alcohol use: Not Currently     Alcohol/week: 1.0 standard drink of alcohol     Types: 1 Cans of beer per week     Comment: 1 case of beer per year    Drug use: Never    Sexual activity: Yes     Partners: Female     Birth control/protection: Inserts, Post-menopausal, Male Sterilization   Other Topics Concern    None   Social History Narrative    ** Merged History Encounter **          Social Drivers of Health     Financial Resource Strain: Low Risk  (5/27/2021)    Overall Financial Resource Strain (CARDIA)     Difficulty of Paying Living Expenses: Not hard at all   Food Insecurity: No Food Insecurity (12/14/2024)    Nursing - Inadequate Food Risk Classification     Worried About Running Out of Food in the Last Year: Never true     Ran Out of Food in the Last Year: Never true     Ran Out of Food in the Last Year: 1   Transportation Needs: No Transportation Needs (12/14/2024)    Nursing - Transportation Risk Classification     Lack of Transportation: Not on file     Lack of  Transportation: 2   Physical Activity: Insufficiently Active (2019)    Exercise Vital Sign     Days of Exercise per Week: 1 day     Minutes of Exercise per Session: 50 min   Stress: No Stress Concern Present (2019)    [employer] [facility] - Occupational Stress Questionnaire     Feeling of Stress : Not at all   Social Connections: Moderately Isolated (2019)    Social Connection and Isolation Panel [NHANES]     Frequency of Communication with Friends and Family: Three times a week     Frequency of Social Gatherings with Friends and Family: More than three times a week     Attends Taoist Services: Never     Active Member of Clubs or Organizations: No     Attends Club or Organization Meetings: Never     Marital Status:    Intimate Partner Violence: Unknown (2024)    Nursing IPS     Feels Physically and Emotionally Safe: Not on file     Physically Hurt by Someone: Not on file     Humiliated or Emotionally Abused by Someone: Not on file     Physically Hurt by Someone: 2     Hurt or Threatened by Someone: 2   Housing Stability: Unknown (2024)    Nursing: Inadequate Housing Risk Classification     Has Housing: Not on file     Worried About Losing Housing: Not on file     Unable to Get Utilities: Not on file     Unable to Pay for Housing in the Last Year: 2     Has Housin       Family History:   Family History   Problem Relation Age of Onset    Heart disease Father     Coronary artery disease Father     Prostate cancer Father     Coronary artery disease Mother     Diabetes Mother         mellitus    Diabetes type II Mother     Diabetes Maternal Grandmother         mellitus    Coronary artery disease Maternal Grandfather     Coronary artery disease Paternal Grandfather        Review of Systems   Gastrointestinal:  Positive for constipation.   Musculoskeletal:  Positive for arthralgias and gait problem.   Neurological:  Positive for weakness and headaches.   All  "other systems reviewed and are negative.      Physical Exam   Vitals: Blood pressure 141/83, pulse 63, temperature 97.6 °F (36.4 °C), temperature source Temporal, resp. rate 17, height 5' 8\" (1.727 m), weight 125 kg (275 lb 9.6 oz), SpO2 95%.,Body mass index is 41.9 kg/m².  Constitutional: Awake and Alert. Well-developed and well-nourished. No distress.   HENT: PERR,EOMI, conjunctiva normal  Head: Normocephalic and atraumatic.   Mouth/Throat: Oropharynx is clear and moist.    Eyes: Conjunctivae and EOM are normal. Pupils are equal, round, and reactive to light. Right and left eye exhibits no discharge.  Neck: Neck supple. No tracheal deviation present. No thyromegaly present.   Cardiovascular: Normal rate, regular rhythm and normal heart sounds.  Exam reveals no friction rub. No murmur heard.  Pulmonary/Chest: Effort normal and breath sounds normal. No respiratory distress. She has no wheezes.   Abdominal: Soft. Bowel sounds are normal. She exhibits no distension. There is no tenderness. There is no rebound and no guarding.   Neurological: Cranial Nerves grossly intact. No sensory deficit. Coordination normal.   Musculoskeletal:   Nontender spine  Skin: Skin is warm and dry. No rash noted. No diaphoresis. No erythema. No edema. No cyanosis.    Assessment     Marcellus Fregoso is a(n) 69 y.o. year old male with MDD with suicidal attempt    Cardiac with history of pretension, dyslipidemia.  Currently patient is on amlodipine 5 mg daily, losartan 100 mg daily, Toprol-XL 25 mg twice daily, pravastatin 40 mg weekly, Zetia 5 mg daily.  Type 2 diabetes mellitus.  Currently patient is on metformin 1000 mg twice daily.  I will put the patient on carb controlled diet.  I will get Accu-Cheks twice daily.  Patient most recent A1c was 5.9 on 10/2/2024.  I will repeat hemoglobin A1c for the patient.  GERD, hiatal hernia, Molina's esophagus.  Patient is on Protonix 40 mg daily along with Carafate 1 g twice daily before " meals.  Parkinson's disease.  Patient is on Sinemet  mg 3 times daily.  Glaucoma.  Patient is on Xalatan ophthalmic drops at bedtime.  Constipation.  I will put the patient on Senokot-S at bedtime.  Urinary incontinence.  Patient is on Ditropan XL 5 mg twice daily as a substitute for Sanctura 20 mg twice daily.  Gait abnormality with falls.  I will consult PT OT for further evaluation and treatment.  DJD/osteoarthritis.  May get Tylenol as needed.  Psych with MDD.  Patient is being managed by psych.    Prognosis: Fair.    Discharge Plan: In progress.    Advanced Directives: I have discussed in detail the patient the advanced directives.  Patient has appointed his wife Pete Mora as his POA and her phone number is [phone].  Patient also claims that he has a living will with advanced healthcare directives.  When discussing cardiac and pulmonary resuscitation efforts with the patient, the patient wishes to be FULL CODE.    I have spent more than 50 minutes gathering data, doing physical examination, and discussing the advanced directives, which was witnessed by caring staff.

## 2024-12-14 NOTE — ED PROVIDER NOTES
Overdose - Intentional  Associated symptoms: no abdominal pain, no chest pain, no cough, no shortness of breath and no vomiting    HPI: 69 year old male with pmhx of parkinson's, DM, Depression, and anxiety presents to the ED for evaluation of SI with attempt of OD with Trazodone. Still with active SI.    Review of Systems   Constitutional:  Negative for chills and fever.   HENT:  Negative for ear pain and sore throat.    Eyes:  Negative for pain and visual disturbance.   Respiratory:  Negative for cough and shortness of breath.    Cardiovascular:  Negative for chest pain and palpitations.   Gastrointestinal:  Negative for abdominal pain and vomiting.   Genitourinary:  Negative for dysuria and hematuria.   Musculoskeletal:  Negative for arthralgias and back pain.   Skin:  Negative for color change and rash.   Neurological:  Positive for tremors. Negative for seizures and syncope.   Psychiatric/Behavioral:  The patient is nervous/anxious.    All other systems reviewed and are negative.    Physical Exam  Vitals and nursing note reviewed.   Constitutional:       General: He is not in acute distress.     Appearance: Normal appearance. He is normal weight. He is not ill-appearing.   Cardiovascular:      Rate and Rhythm: Normal rate and regular rhythm.      Heart sounds: No murmur heard.  Pulmonary:      Effort: Pulmonary effort is normal.      Breath sounds: Normal breath sounds.   Skin:     General: Skin is warm.      Coloration: Skin is not jaundiced.   Neurological:      General: No focal deficit present.      Mental Status: He is alert.   Psychiatric:         Mood and Affect: Affect is flat.         Thought Content: Thought content includes suicidal ideation.       MDM  Number of Diagnoses or Management Options  Diagnosis management comments: Patient wants wife to be updated consistently and would like all information released to her.       Pete Fregoso (Spouse)   [phone] (M)   Crisis evaluated pt at bedside.  Pt signed 201 pending placement.                    Keegan Alvarado, DO  12/14/24 0729

## 2024-12-14 NOTE — TREATMENT TEAM
12/14/24 1452   Status of Admission   72 Hour Notice Initiated   Date patient signed 72h Notice 12/14/24   Time patient signed 72h Notice 1451

## 2024-12-14 NOTE — ED NOTES
Patient is accepted at [facility] OA Unit  Patient is accepted by Dr. Fransico Goddard Attending Dr. Jose Manuel Ayala at Intake     Transportation is arranged with [facility].     Transportation is scheduled for 13:00 with [facility]  Patient may go to the floor upon arrival          Nurse report is to be called to [phone] prior to patient transfer.

## 2024-12-14 NOTE — EMTALA/ACUTE CARE TRANSFER
[facility] EMERGENCY DEPARTMENT  [address] [address]  Dept: [phone]      EMTALA TRANSFER CONSENT    NAME Marcellus Fregoso                                         1955                              MRN [id number]    I have been informed of my rights regarding examination, treatment, and transfer   by Dr. Alberto Minor MD    Benefits: Specialized equipment and/or services available at the receiving facility (Include comment)________________________ (Mental Health)    Risks: Potential for delay in receiving treatment, Potential deterioration of medical condition, Increased discomfort during transfer, Possible worsening of condition or death during transfer      Consent for Transfer:  I acknowledge that my medical condition has been evaluated and explained to me by the emergency department physician or other qualified medical person and/or my attending physician, who has recommended that I be transferred to the service of  Accepting Physician: Dr. Fransico Goddard at Accepting Facility Name, City & State : [facility]. The above potential benefits of such transfer, the potential risks associated with such transfer, and the probable risks of not being transferred have been explained to me, and I fully understand them.  The doctor has explained that, in my case, the benefits of transfer outweigh the risks.  I agree to be transferred.    I authorize the performance of emergency medical procedures and treatments upon me in both transit and upon arrival at the receiving facility.  Additionally, I authorize the release of any and all medical records to the receiving facility and request they be transported with me, if possible.  I understand that the safest mode of transportation during a medical emergency is an ambulance and that the Hospital advocates the use of this mode of transport. Risks of traveling to the receiving facility by car, including absence of medical  control, life sustaining equipment, such as oxygen, and medical personnel has been explained to me and I fully understand them.    (TAM CORRECT BOX BELOW)  [  ]  I consent to the stated transfer and to be transported by ambulance/helicopter.  [  ]  I consent to the stated transfer, but refuse transportation by ambulance and accept full responsibility for my transportation by car.  I understand the risks of non-ambulance transfers and I exonerate the Hospital and its staff from any deterioration in my condition that results from this refusal.    X___________________________________________    DATE  24  TIME________  Signature of patient or legally responsible individual signing on patient behalf           RELATIONSHIP TO PATIENT_________________________          Provider Certification    NAME Marcellus Fregoso                                        [facility] 1955                              MRN [id number]    A medical screening exam was performed on the above named patient.  Based on the examination:    Condition Necessitating Transfer The encounter diagnosis was Overdose.    Patient Condition: The patient has been stabilized such that within reasonable medical probability, no material deterioration of the patient condition is likely to result from a transfer.     Reason for Transfer: Level of Care needed not available at this facility    Transfer Requirements: Facility [facility] OA Unit   Space available and qualified personnel available for treatment as acknowledged by    Agreed to accept transfer and to provide appropriate medical treatment as acknowledged by       Dr. Fransico Goddard  Appropriate medical records of the examination and treatment of the patient are provided at the time of transfer   STAFF INITIAL WHEN COMPLETED _______  Transfer will be performed by qualified personnel from    and appropriate transfer equipment as required, including the use of necessary and appropriate life support  measures.    Provider Certification: I have examined the patient and explained the following risks and benefits of being transferred/refusing transfer to the patient/family:  General risk, such as traffic hazards, adverse weather conditions, rough terrain or turbulence, possible failure of equipment (including vehicle or aircraft), or consequences of actions of persons outside the control of the transport personnel, Unanticipated needs of medical equipment and personnel during transport, The patient is stable for psychiatric transfer because they are medically stable, and is protected from harming him/herself or others during transport      Based on these reasonable risks and benefits to the patient and/or the unborn child(chelle), and based upon the information available at the time of the patient’s examination, I certify that the medical benefits reasonably to be expected from the provision of appropriate medical treatments at another medical facility outweigh the increasing risks, if any, to the individual’s medical condition, and in the case of labor to the unborn child, from effecting the transfer.    X____________________________________________ DATE 12/14/24        TIME_______      ORIGINAL - SEND TO MEDICAL RECORDS   COPY - SEND WITH PATIENT DURING TRANSFER

## 2024-12-14 NOTE — PROGRESS NOTES
12/14/24 1536 12/14/24 1600   Provider Notification   Reason for Communication Admission  (PTA) Admission  (PTA/CSSRS)   Provider Name Dr. Gael Matthews   Provider Role Attending physician Attending physician   Method of Communication Other (Comment)  (EpicChat) Other (Comment)  (EpicChat)   Response See orders See orders   Notification Time 1536 1600

## 2024-12-14 NOTE — ED NOTES
Insurance Authorization for admission:   Phone call placed to Patient's primary insurance is Medicare A & B.  Pre cert not needed.      Secondary:  [employer]  Phone number: [phone]    Spoke to offices currently closed.  Need to call back during normal business hours which are Monday through Friday from 8 am to 6 pm.  Unable to complete COB     ** days approved.  Level of care: **.  Review on **.   Authorization # **.        Eligibility Verification System checked - ([phone]).  Online system / automated system indicates: **    Insurance Authorization for Transportation:    Phone call placed to **.   Phone number **.   Spoke to **.   Authorization #: **

## 2024-12-15 PROBLEM — F33.2 SEVERE EPISODE OF RECURRENT MAJOR DEPRESSIVE DISORDER, WITHOUT PSYCHOTIC FEATURES (HCC): Status: ACTIVE | Noted: 2024-12-15

## 2024-12-15 PROBLEM — F41.1 GAD (GENERALIZED ANXIETY DISORDER): Status: ACTIVE | Noted: 2024-12-15

## 2024-12-15 LAB
ALBUMIN SERPL BCG-MCNC: 3.6 G/DL (ref 3.5–5)
ALP SERPL-CCNC: 56 U/L (ref 34–104)
ALT SERPL W P-5'-P-CCNC: 13 U/L (ref 7–52)
ANION GAP SERPL CALCULATED.3IONS-SCNC: 5 MMOL/L (ref 4–13)
AST SERPL W P-5'-P-CCNC: 13 U/L (ref 13–39)
ATRIAL RATE: 99 BPM
BASOPHILS # BLD AUTO: 0.07 THOUSANDS/ΜL (ref 0–0.1)
BASOPHILS NFR BLD AUTO: 1 % (ref 0–1)
BILIRUB SERPL-MCNC: 0.53 MG/DL (ref 0.2–1)
BUN SERPL-MCNC: 15 MG/DL (ref 5–25)
CALCIUM SERPL-MCNC: 9.3 MG/DL (ref 8.4–10.2)
CHLORIDE SERPL-SCNC: 102 MMOL/L (ref 96–108)
CHOLEST SERPL-MCNC: 111 MG/DL (ref ?–200)
CO2 SERPL-SCNC: 29 MMOL/L (ref 21–32)
CREAT SERPL-MCNC: 1.06 MG/DL (ref 0.6–1.3)
EOSINOPHIL # BLD AUTO: 0.82 THOUSAND/ΜL (ref 0–0.61)
EOSINOPHIL NFR BLD AUTO: 11 % (ref 0–6)
ERYTHROCYTE [DISTWIDTH] IN BLOOD BY AUTOMATED COUNT: 12.9 % (ref 11.6–15.1)
GFR SERPL CREATININE-BSD FRML MDRD: 70 ML/MIN/1.73SQ M
GLUCOSE P FAST SERPL-MCNC: 91 MG/DL (ref 65–99)
GLUCOSE SERPL-MCNC: 120 MG/DL (ref 65–140)
GLUCOSE SERPL-MCNC: 91 MG/DL (ref 65–140)
GLUCOSE SERPL-MCNC: 95 MG/DL (ref 65–140)
HCT VFR BLD AUTO: 40.3 % (ref 36.5–49.3)
HDLC SERPL-MCNC: 40 MG/DL
HGB BLD-MCNC: 13.6 G/DL (ref 12–17)
IMM GRANULOCYTES # BLD AUTO: 0.02 THOUSAND/UL (ref 0–0.2)
IMM GRANULOCYTES NFR BLD AUTO: 0 % (ref 0–2)
LDLC SERPL CALC-MCNC: 55 MG/DL (ref 0–100)
LYMPHOCYTES # BLD AUTO: 2.55 THOUSANDS/ΜL (ref 0.6–4.47)
LYMPHOCYTES NFR BLD AUTO: 33 % (ref 14–44)
MCH RBC QN AUTO: 29.9 PG (ref 26.8–34.3)
MCHC RBC AUTO-ENTMCNC: 33.7 G/DL (ref 31.4–37.4)
MCV RBC AUTO: 89 FL (ref 82–98)
MONOCYTES # BLD AUTO: 0.71 THOUSAND/ΜL (ref 0.17–1.22)
MONOCYTES NFR BLD AUTO: 9 % (ref 4–12)
NEUTROPHILS # BLD AUTO: 3.64 THOUSANDS/ΜL (ref 1.85–7.62)
NEUTS SEG NFR BLD AUTO: 46 % (ref 43–75)
NONHDLC SERPL-MCNC: 71 MG/DL
NRBC BLD AUTO-RTO: 0 /100 WBCS
P AXIS: 44 DEGREES
PLATELET # BLD AUTO: 233 THOUSANDS/UL (ref 149–390)
PMV BLD AUTO: 8.6 FL (ref 8.9–12.7)
POTASSIUM SERPL-SCNC: 3.8 MMOL/L (ref 3.5–5.3)
PR INTERVAL: 212 MS
PROT SERPL-MCNC: 5.9 G/DL (ref 6.4–8.4)
QRS AXIS: -34 DEGREES
QRSD INTERVAL: 92 MS
QT INTERVAL: 350 MS
QTC INTERVAL: 449 MS
RBC # BLD AUTO: 4.55 MILLION/UL (ref 3.88–5.62)
SODIUM SERPL-SCNC: 136 MMOL/L (ref 135–147)
T WAVE AXIS: 23 DEGREES
TRIGL SERPL-MCNC: 82 MG/DL (ref ?–150)
TSH SERPL DL<=0.05 MIU/L-ACNC: 0.78 UIU/ML (ref 0.45–4.5)
VENTRICULAR RATE: 99 BPM
WBC # BLD AUTO: 7.81 THOUSAND/UL (ref 4.31–10.16)

## 2024-12-15 PROCEDURE — 99223 1ST HOSP IP/OBS HIGH 75: CPT

## 2024-12-15 PROCEDURE — 82948 REAGENT STRIP/BLOOD GLUCOSE: CPT

## 2024-12-15 PROCEDURE — 84443 ASSAY THYROID STIM HORMONE: CPT

## 2024-12-15 PROCEDURE — 80053 COMPREHEN METABOLIC PANEL: CPT

## 2024-12-15 PROCEDURE — 80061 LIPID PANEL: CPT

## 2024-12-15 PROCEDURE — 93010 ELECTROCARDIOGRAM REPORT: CPT | Performed by: INTERNAL MEDICINE

## 2024-12-15 PROCEDURE — 85025 COMPLETE CBC W/AUTO DIFF WBC: CPT

## 2024-12-15 RX ORDER — QUETIAPINE FUMARATE 50 MG/1
50 TABLET, FILM COATED ORAL
Status: DISCONTINUED | OUTPATIENT
Start: 2024-12-15 | End: 2024-12-19 | Stop reason: HOSPADM

## 2024-12-15 RX ORDER — SUCRALFATE 1 G/1
1 TABLET ORAL 3 TIMES DAILY PRN
Status: DISCONTINUED | OUTPATIENT
Start: 2024-12-15 | End: 2024-12-19 | Stop reason: HOSPADM

## 2024-12-15 RX ADMIN — CARBIDOPA AND LEVODOPA 1 TABLET: 25; 100 TABLET ORAL at 10:20

## 2024-12-15 RX ADMIN — EZETIMIBE 5 MG: 10 TABLET ORAL at 08:45

## 2024-12-15 RX ADMIN — CARBIDOPA AND LEVODOPA 1 TABLET: 25; 100 TABLET ORAL at 14:07

## 2024-12-15 RX ADMIN — LATANOPROST 1 DROP: 50 SOLUTION OPHTHALMIC at 20:52

## 2024-12-15 RX ADMIN — METFORMIN HYDROCHLORIDE 1000 MG: 500 TABLET, FILM COATED ORAL at 08:44

## 2024-12-15 RX ADMIN — LOSARTAN POTASSIUM 100 MG: 50 TABLET, FILM COATED ORAL at 08:45

## 2024-12-15 RX ADMIN — OXYBUTYNIN CHLORIDE 5 MG: 5 TABLET ORAL at 08:46

## 2024-12-15 RX ADMIN — METOPROLOL SUCCINATE 25 MG: 25 TABLET, EXTENDED RELEASE ORAL at 20:55

## 2024-12-15 RX ADMIN — FLUOXETINE HYDROCHLORIDE 40 MG: 20 CAPSULE ORAL at 10:20

## 2024-12-15 RX ADMIN — PANTOPRAZOLE SODIUM 40 MG: 40 TABLET, DELAYED RELEASE ORAL at 05:39

## 2024-12-15 RX ADMIN — HYDROXYZINE HYDROCHLORIDE 25 MG: 25 TABLET ORAL at 01:47

## 2024-12-15 RX ADMIN — QUETIAPINE FUMARATE 50 MG: 50 TABLET ORAL at 20:55

## 2024-12-15 RX ADMIN — Medication 1 TABLET: at 08:45

## 2024-12-15 RX ADMIN — METOPROLOL SUCCINATE 25 MG: 25 TABLET, EXTENDED RELEASE ORAL at 08:46

## 2024-12-15 RX ADMIN — PRAVASTATIN SODIUM 40 MG: 40 TABLET ORAL at 08:45

## 2024-12-15 RX ADMIN — OXYBUTYNIN CHLORIDE 5 MG: 5 TABLET ORAL at 17:10

## 2024-12-15 RX ADMIN — ACETAMINOPHEN 650 MG: 325 TABLET ORAL at 01:47

## 2024-12-15 RX ADMIN — CARBIDOPA AND LEVODOPA 1 TABLET: 25; 100 TABLET ORAL at 18:08

## 2024-12-15 RX ADMIN — AMLODIPINE BESYLATE 5 MG: 5 TABLET ORAL at 20:55

## 2024-12-15 RX ADMIN — METFORMIN HYDROCHLORIDE 1000 MG: 500 TABLET, FILM COATED ORAL at 17:11

## 2024-12-15 RX ADMIN — TRAZODONE HYDROCHLORIDE 50 MG: 50 TABLET ORAL at 20:56

## 2024-12-15 RX ADMIN — SENNOSIDES AND DOCUSATE SODIUM 1 TABLET: 8.6; 5 TABLET ORAL at 20:56

## 2024-12-15 NOTE — ASSESSMENT & PLAN NOTE
Prozac 40 mg daily for anxiety symptoms.  Atarax 25 mg every 6 hours as needed for severe anxiety and panic attacks.  PARQ discussed about hydroxyzine including arrhythmia/cardiovascular effects, anticholinergic effects, drowsiness, headaches, nausea, potential for drug interactions, and others.  No associated orders from this encounter found during lookback period of 72 hours.

## 2024-12-15 NOTE — PROGRESS NOTES
"Progress Note - Marcellus Fregoso 69 y.o. male MRN: [id number]    Unit/Bed#: [facility] 203-02 Encounter: [id number]        Subjective:   Patient seen and examined at bedside after reviewing the chart and discussing the case with the caring staff.      Patient examined at bedside.  Patient is requesting that if I can change his Carafate to as needed as he only takes it when he has burning esophageal pain with Molina's esophagus.    Patient's vitamin D and B12 levels are still pending.    Physical Exam   Vitals: Blood pressure 116/67, pulse 56, temperature 97.9 °F (36.6 °C), temperature source Temporal, resp. rate 16, height 5' 8\" (1.727 m), weight 125 kg (275 lb 9.6 oz), SpO2 94%.,Body mass index is 41.9 kg/m².  Constitutional: He appears well-developed.   HEENT: PERR, EOMI, MMM  Cardiovascular: Normal rate and regular rhythm.    Pulmonary/Chest: Effort normal and breath sounds normal.   Abdomen: Soft, + BS, NT    Assessment/Plan:  Marcellus Fregoso is a(n) 69 y.o. year old male with MDD with suicidal attempt     Cardiac with history of pretension, dyslipidemia.  Currently patient is on amlodipine 5 mg daily, losartan 100 mg daily, Toprol-XL 25 mg twice daily, pravastatin 40 mg weekly, Zetia 5 mg daily.  Type 2 diabetes mellitus.  Currently patient is on metformin 1000 mg twice daily.  I will put the patient on carb controlled diet.  I will get Accu-Cheks twice daily.  Patient most recent A1c was 5.9 on 10/2/2024.  I will repeat hemoglobin A1c for the patient.  GERD, hiatal hernia, Molina's esophagus.  Patient is on Protonix 40 mg daily along with Carafate 1 g 3 times daily as needed for esophageal burning pain.  Parkinson's disease.  Patient is on Sinemet  mg 3 times daily.  Glaucoma.  Patient is on Xalatan ophthalmic drops at bedtime.  Constipation.  I will put the patient on Senokot-S at bedtime.  Urinary incontinence.  Patient is on Ditropan XL 5 mg twice daily as a substitute for Sanctura 20 mg twice daily.  " Patient wants to go home on Ditropan XL as it works better than Sanctura.  Gait abnormality with falls.  I will consult PT OT for further evaluation and treatment.  DJD/osteoarthritis.  May get Tylenol as needed.

## 2024-12-15 NOTE — NURSING NOTE
Follow up Atarax 25 mg, effective. Pt observed resting in bed. No sign of pain or discomfort. Breathing calm and even. No SOB or labored breathing. Will continue to monitor. Safety checks continued.

## 2024-12-15 NOTE — ASSESSMENT & PLAN NOTE
Sinemet  mg tablet 3 times daily for Parkinson's disease, managed by gerontology.  Side effects may include increased perceptual disturbances, behavioral disturbances, impulsivity, increasing libido, and may have been contributing to symptomatology prior to admission, recommend caution with further dose increases.  No associated orders from this encounter found during lookback period of 72 hours.

## 2024-12-15 NOTE — H&P
Psychiatric Evaluation - Behavioral Health   Marcellus Fregoso 69 y.o. male MRN: [id number]  Unit/Bed#: [facility] 203-02 Encounter: [id number]    Assessment & Plan     Assessment & Plan  Severe episode of recurrent major depressive disorder, without psychotic features (HCC)  Restart Prozac 40 mg daily for depressive symptoms.  Consider Trintellix if poor response to continuing Prozac for additional cognitive benefit.  PARQ completed including serotonin syndrome, SIADH, worsening depression, suicidality, induction of kevin, GI upset, headaches, activation, sexual side effects, sedation, potential drug interactions, and others.  No associated orders from this encounter found during lookback period of 72 hours.  Parkinson disease (HCC)  Sinemet  mg tablet 3 times daily for Parkinson's disease, managed by gerontology.  Side effects may include increased perceptual disturbances, behavioral disturbances, impulsivity, increasing libido, and may have been contributing to symptomatology prior to admission, recommend caution with further dose increases.  No associated orders from this encounter found during lookback period of 72 hours.  AMY (generalized anxiety disorder)  Prozac 40 mg daily for anxiety symptoms.  Atarax 25 mg every 6 hours as needed for severe anxiety and panic attacks.  PARQ discussed about hydroxyzine including arrhythmia/cardiovascular effects, anticholinergic effects, drowsiness, headaches, nausea, potential for drug interactions, and others.  No associated orders from this encounter found during lookback period of 72 hours.  Insomnia  Trazodone 50 mg at bedtime for insomnia.  Seroquel 50 mg at bedtime for insomnia.  May confer additional benefit of decreased libido.  AIMS of 0 on today's assessment.   Hemoglobin A1c pending, lipid panel unremarkable as of 12/15/2024.  EKG reveals QTc of 449 as of 12/13/2024.  PARQ for second generation antipsychotics discussed with patient which includes but is not  "limited to cardiometabolic syndrome, extrapyramidal symptoms, weight gain, akathisia, sedation, orthostatic hypotension, liver and kidney impairment, neuroleptic malignant syndrome, myocarditis, agranulocytosis and decreased white blood cell count, anticholinergic symptoms, hyperprolactinemia, sexual dysfunction, and seizures.  No associated orders from this encounter found during lookback period of 72 hours.  Childhood onset fluency disorder  Patient presents with stuttering which she states is at baseline and has been present since childhood, likely childhood onset fluency disorder and likely unrelated to Parkinson's diagnosis, new CVA or psychotropics.  No associated orders from this encounter found during lookback period of 72 hours.    Plan:   Patient is currently admitted voluntarily as a 201  Encourage group, occupational, and milieu therapy.  Collaborate with case management for disposition planning  Discuss with family for baseline assessment and disposition planning.  Admission labs reviewed  Medicine consulted for medical clearance and further medical management as indicated    Treatment options and alternatives were reviewed with the patient, who concurs with the above plan.  Risks, benefits, and possible side effects of medications were explained to the patient, who verbalizes understanding.        -----------------------------------    Chief Complaint: \"I was feeling overwhelmed.\"    History of Present Illness     Marcellus is a 69 y.o. male with past medical history of Parkinson's disease, coronary artery disease, hypertension, hyperlipidemia, type 2 diabetes, GERD, Molina's esophagus, gastroparesis, obesity, urinary urgency, and prostate cancer and pertinent past psychiatric history of major depressive disorder, generalized anxiety disorder, insomnia, and child onset fluency disorder who presents voluntarily on a 201 commitment with worsening intra marital conflict leading to significant anxiety and " ultimately suicide attempt via toxic ingestion on 7 tablets of trazodone 50 mg.    Symptoms prior to admission included feeling depressed, suicidal ideation, suicide attempt, hopelessness, and anxiety symptoms. Symptom began gradual starting several months ago with gradually worsening course since that time. Stressors preceding admission included marital problems, medical problems, everyday stressors, and ongoing anxiety. Please see documentation from the ED/Crisis/Consulting physician for further details about initial presentation.    On initial evaluation, Marcellus states he was diagnosed with Parkinson's over 1 year ago in August and states he has since started Sinemet therapy which he states he has noticed significant alterations with regards to mood, endorsing increased libido over the past year which ultimately led to reconnection with prior romantic partner and significant intra marital conflict, stating he has been more impulsive.  He states he struggles with depression but that the driving factor for his suicide attempt was more so related to significant anxiety and feeling overwhelmed with regards to current marital conflict and states he felt his attempt was impulsive.  Throughout the interview, the patient is preoccupied with 72-hour notice and leaving, stating he needs to be out of the unit by Thursday.  Patient was easily redirected but demonstrates poor insight and judgment with regards to behaviors leading to admission.  Psychopharmacologic education provided in addition to supportive therapy.  Currently, the patient wishes to resume outpatient psychotropic regimen and states he follows up with a psychiatrist through the movement disorder at [facility] and denies any prior inpatient psychiatric hospitalizations through his lifetime.  Patient was advised on recommendations to continue with U admission for psychiatric stabilization.    The patient denies historical symptomatology suggestive of an  underlying psychotic process. The patient currently denies acute perceptual disturbances such as auditory hallucinations, visual hallucinations, paranoia, referential ideation, delusions. The patient denies acute and/or chronic Schneiderian symptoms, including: thought-broadcasting, thought-insertion, thought-withdrawal, audible thoughts. During today's evaluation, the patient does not exhibit objective evidence of min psychosis as the patient does not appear internally preoccupied and/or easily distracted. The patient's thoughts are goal oriented. The patient denies any acute and/or chronic history suggestive of an underlying affective (bipolar) organization. The patient denies previous episodes of elevated/expansive mood, lengthy periods without sleep, grandiosity, intense or prolonged irritability. The patient denies atypical periods of increased goal-directed behavior, excessive spending, sexual promiscuity prior to initiating Sinemet therapy. The patient has no history of pathologic impulsivity and/or extreme mood lability. During today's evaluation, The patient does not exhibit objective evidence of hypomania/kevin. The patient is mostly organized in thought without flight of ideas or loosening of associations. Speech does not appear to be pressured or rapid and the patient responds well to verbal redirecting. The patient currently endorses neurovegetative symptomatology suggestive of major depressive disorder or dysthymia. The patient endorses fragmented and/or non-restorative sleep. The patient endorses appropriate appetite, poor energy, no impairment of motivation. The patient endorses impaired concentration/memory, denies new-onset forgetfulness or inattentiveness. The patient denies experiencing daily crying spells and/or limited pleasure in activities previously found pleasurable. The patient adamantly denies acute thoughts of suicide or self-harm. The patient has no plans to harm others. There is a  "documented history of prior suicidal gestures or suicidal attempts just prior to this admission. The patient denies historical self injurious behavior without suicidal intent. The patient denies a pervasive history of worthlessness, hopelessness, and/or guilt. The patient currently endorses frequent and increasing anxiety that is pathologic in nature. The patient is unsure of excessive nervousness, irrational worry, and/or overt anxiousness. The patient endorses feeling tense, \"keyed up\". The patient does not experience disruption in energy or concentration secondary to baseline anxiety. There is some evidence to suggest that the patient experiences irritability, inability to relax, or disruption in sleep secondary to baseline, pathologic anxiety. The patient denies new-onset panic symptomatology and/or maladaptive behaviors. Throughout today's session, the patient does not appear visibly distressed.    Medical Review Of Systems:  Complete review of systems is negative except as noted above.    Psychiatric Review Of Systems:  Sleep: Yes, decreased  Interest/Anhedonia: no  Guilt/hopeless: Yes  Low energy/anergy: yes  Poor Concentration: no  Appetite changes: Denies  Weight changes: Denies  Somatic symptoms: no  Anxiety/panic: Yes, increased  Stacy: no  Self injurious behavior/risky behavior: no  Trauma: no   If yes: none  Hallucinations: None reported  Suicidal Ideation: Suicide attempt via toxic ingestion of trazodone, x7 50 mg tablets just prior to admission  Homicidal Ideation: Denies    Historical Information     Psychiatric History:   Inpatient hospitalizations: patient denies  Suicide attempts: patient denies  Self injurious behavior: no  Violent behavior: patient denies  Outpatient treatment: Patient endorses outpatient psychiatry through [facility] clinic  Psychiatric medication trial: Prozac, Sinemet, Atarax, Seroquel, trazodone  Eating Disorder:Denies  OCD:Denies    Substance Abuse " History:  Social History     Tobacco Use    Smoking status: Never     Passive exposure: Never    Smokeless tobacco: Never    Tobacco comments:     quit 38 years ago   Vaping Use    Vaping status: Never Used   Substance Use Topics    Alcohol use: Not Currently     Alcohol/week: 1.0 standard drink of alcohol     Types: 1 Cans of beer per week     Comment: 1 case of beer per year    Drug use: Never      Patient denies use of tobacco, alcohol, or illicit drugs.   I have assessed this patient for substance use within the past 12 months.    Family Psychiatric History:   Patient denies any known family history of psychiatric illness, suicide attempt, or substance abuse    Social History:  Education: college graduate  Learning Disabilities: denies  Marital history:   Children: Unknown  Living arrangement: Lives in a home with wife.  Occupational History: retired  Functioning Relationships: strained with spouse or significant others.  Access to Firearms: Patient denies  Other Pertinent History: None      Traumatic History:   Abuse: none reported  Other Traumatic Events: none reported    Past Medical History:   Seizure history: Patient denies  Head injury: Patient denies  Past Medical History:   Diagnosis Date    Acute blood loss anemia 10/13/2016    Anxiety     Arthritis     knees    Arthritis     Asthma     stable for > 10 years    Omlina esophagus     with BARRX/RFA    Cancer (HCC)     prostate    Cataract     Depression     Diabetes (HCC)     Diabetes mellitus (HCC)     pre diabetes    Diverticulitis of colon     Environmental allergies     Fall 12/30/2020    GERD (gastroesophageal reflux disease)     Hiatal hernia     History of anal fissures     Hyperlipidemia     Hypertension     Incisional hernia     Insomnia     Kidney stone     Malignant neoplasm of prostate (HCC)     Morbid obesity (HCC)     Parkinson disease (HCC)     Peripheral neuropathy     PONV (postoperative nausea and vomiting)     Prolapsing mitral  "valve     prolapsing mitral valve leaflet syndrome    Prostate cancer (HCC)     Retinal detachment     treated surgically at [facility]; resolved: 10/10/2012    Sleep apnea     diagnosed but unable to tolerate cpap.     Stuttering         -----------------------------------  Objective    Temp:  [97.5 °F (36.4 °C)-97.9 °F (36.6 °C)] 97.9 °F (36.6 °C)  HR:  [] 56  BP: (116-141)/(57-83) 116/67  Resp:  [16-18] 16  SpO2:  [94 %-97 %] 94 %  O2 Device: None (Room air)    Mental Status Evaluation:    Appearance:  age appropriate, adequate grooming, dressed in hospital attire   Behavior:  pleasant, cooperative, calm   Speech:  normal rate and volume, fluent, clear, coherent   Mood:  \"Okay\"   Affect:  constricted, mood-congruent   Language: Notable fluency disorder, no noted aphasia, anomia or apraxia   Thought Process:  goal directed, perseverative   Associations: intact associations   Thought Content:  No overt paranoia or delusional content elicited   Perceptual Disturbances: denies auditory or visual hallucinations when asked, does not appear responding to internal stimuli   Risk Potential: Suicidal ideation - None at present, status post suicide attempt  Homicidal ideation - None at present  Potential for aggression - Not at present   Sensorium:  oriented to person, place, and time/date   Memory:  recent and remote memory grossly intact   Consciousness:  alert and awake   Attention/Concentration: attention span and concentration are age appropriate   Intellect: within normal limits   Fund of Knowledge: awareness of current events: normal  past history: normal  vocabulary: normal   Insight:  poor   Judgment: poor   Muscle Strength:   Muscle Tone: No focal deficits or acute dystonias     Gait/Station: uses walker   Motor Activity: no abnormal movements     Patient strengths/assets: Ability for insight, average intelligence, communication skills, financially secure, general fund of knowledge, interpersonal skills, " "motivation for treatment, patient on voluntary commitment, special interests, supportive family, well-educated    Patient limitations/barriers: Impaired cognition, poor physical health    Meds/Allergies   Allergies   Allergen Reactions    Celebrex [Celecoxib] Other (See Comments)     Cardiologist stated he should not take      Chlorhexidine Hives     Is able to wash with hibiclens but not use michi cloths    Other      \"steroid eyedrops\"      Prednisone Other (See Comments)     Prednisone eye drops- eye pressure increases    Selegiline Allergic Rhinitis     Hypertension     all current active meds have been reviewed, current meds:   Current Facility-Administered Medications:     acetaminophen (TYLENOL) tablet 650 mg, Q4H PRN    acetaminophen (TYLENOL) tablet 650 mg, Q4H PRN    acetaminophen (TYLENOL) tablet 975 mg, Q6H PRN    amLODIPine (NORVASC) tablet 5 mg, HS    benztropine (COGENTIN) tablet 0.5 mg, Q4H PRN Max 6/day    carbidopa-levodopa (SINEMET)  mg per tablet 1 tablet, TID    ezetimibe (ZETIA) tablet 5 mg, Daily    FLUoxetine (PROzac) capsule 40 mg, Daily    hydrOXYzine HCL (ATARAX) tablet 25 mg, Q6H PRN Max 4/day    hydrOXYzine HCL (ATARAX) tablet 50 mg, Q6H PRN Max 4/day    latanoprost (XALATAN) 0.005 % ophthalmic solution 1 drop, HS    LORazepam (ATIVAN) injection 1 mg, Q6H PRN Max 3/day    LORazepam (ATIVAN) tablet 1 mg, Q6H PRN Max 3/day    losartan (COZAAR) tablet 100 mg, Daily    melatonin tablet 3 mg, HS PRN    metFORMIN (GLUCOPHAGE) tablet 1,000 mg, BID With Meals    metoprolol succinate (TOPROL-XL) 24 hr tablet 25 mg, BID    multivitamin-minerals (CENTRUM) tablet 1 tablet, Daily    OLANZapine (ZyPREXA) IM injection 5 mg, Q3H PRN Max 3/day    oxybutynin (DITROPAN) tablet 5 mg, BID    pantoprazole (PROTONIX) EC tablet 40 mg, Early Morning    pravastatin (PRAVACHOL) tablet 40 mg, Weekly    QUEtiapine (SEROquel) tablet 100 mg, Q3H PRN Max 3/day    QUEtiapine (SEROquel) tablet 25 mg, Q6H PRN Max " 4/day    QUEtiapine (SEROquel) tablet 50 mg, Q6H PRN Max 4/day    QUEtiapine (SEROquel) tablet 50 mg, HS    senna-docusate sodium (SENOKOT S) 8.6-50 mg per tablet 1 tablet, Daily PRN    senna-docusate sodium (SENOKOT S) 8.6-50 mg per tablet 1 tablet, HS    sucralfate (CARAFATE) tablet 1 g, BID AC    traZODone (DESYREL) tablet 50 mg, HS, and PTA meds:   Prior to Admission Medications   Prescriptions Last Dose Informant Patient Reported? Taking?   Blood Glucose Monitoring Suppl (OneTouch Verio) w/Device KIT  Self No No   Sig: Use daily   FLUoxetine (PROzac) 20 mg capsule 2024 Self Yes Yes   Sig: Take 40 mg by mouth daily   Lancet Devices (ONE TOUCH DELICA LANCING DEV) MISC  Self No No   Sig: Use daily   Lancets (OneTouch Delica Plus [id number]) MISC  Self No No   Sig: Use 1 Application in the morning   Multiple Vitamins-Minerals (CENTRUM ADULTS PO) 2024 Self Yes Yes   Sig: Take by mouth in the morning   Nourianz tablet  Self Yes No   Si tablet daily   Patient not taking: Reported on 10/17/2024   QUEtiapine (SEROquel) 50 mg tablet 2024 Self Yes Yes   Sig: Take 50 mg by mouth daily at bedtime   amLODIPine (NORVASC) 5 mg tablet 2024 Self No Yes   Sig: Take 1 tablet (5 mg total) by mouth daily   Patient taking differently: Take 5 mg by mouth daily at bedtime   carbidopa-levodopa (SINEMET)  mg per tablet 2024 at  9:50 AM Self, Outside Facility (Specify) Yes Yes   Sig: Take 1 tablet by mouth Three times a day   ezetimibe (ZETIA) 10 mg tablet 2024 Self No Yes   Sig: Take 1 tablet (10 mg total) by mouth daily   Patient taking differently: Take 5 mg by mouth daily   glucose blood (OneTouch Verio) test strip  Self No No   Sig: Use 1 each in the morning USE TO TEST DAILY   hydrOXYzine HCL (ATARAX) 25 mg tablet 2024 at 12:47 PM Self, Outside Facility (Specify) No Yes   Sig: Take 1 tablet (25 mg total) by mouth 2 (two) times a day   Patient taking differently: Take 25 mg by mouth  2 (two) times a day as needed for anxiety   ibuprofen (MOTRIN) 200 mg tablet  Self Yes No   Sig: Take 400 mg by mouth Three times a day   latanoprost (XALATAN) 0.005 % ophthalmic solution Past Month Self Yes Yes   Sig: INSTILL 1 DROP INTO BOTH EYES IN THE EVENING   losartan (COZAAR) 100 MG tablet 12/13/2024 Self No Yes   Sig: Take 1 tablet (100 mg total) by mouth daily   metFORMIN (GLUCOPHAGE) 500 mg tablet 12/13/2024 Self No Yes   Sig: TAKE 2 TABLETS TWICE A DAY WITH MEALS   metoprolol succinate (TOPROL-XL) 25 mg 24 hr tablet 12/14/2024 at 12:47 PM Self, Outside Facility (Specify) No Yes   Sig: Take 1 tablet (25 mg total) by mouth 2 (two) times a day   nystatin (MYCOSTATIN) cream 12/11/2024 Self No Yes   Sig: Apply topically 2 (two) times a day   Patient taking differently: Apply 1 Application topically 2 (two) times a day   pantoprazole (PROTONIX) 40 mg tablet 12/13/2024 Self No Yes   Sig: TAKE 1 TABLET TWICE A DAY (1 TABLET IN THE MORNING AND 1 TABLET IN THE EVENING)   rosuvastatin (CRESTOR) 5 mg tablet Past Week Self No Yes   Sig: TAKE 1 TABLET ONCE A WEEK   selegiline (ELDEPRYL) 5 mg capsule Not Taking Self Yes No   Sig: Take 5 mg by mouth as needed   Patient not taking: Reported on 8/7/2024   semaglutide, 2 mg/dose, (Ozempic, 2 MG/DOSE,) 8 mg/ mL injection pen 12/7/2024 Self No Yes   Sig: INJECT 2 MG UNDER THE SKIN EVERY 7 DAYS   Patient taking differently: Inject 1.5 mg under the skin every 7 days   sucralfate (CARAFATE) 1 g tablet Past Month Self No Yes   Sig: Take 1 tablet (1 g total) by mouth 2 (two) times a day   Patient taking differently: Take 1 g by mouth daily at bedtime as needed   sucralfate (CARAFATE) 1 g/10 mL suspension   No No   Sig: Take 10 mL (1 g total) by mouth 3 (three) times a day before meals for 7 days   traZODone (DESYREL) 50 mg tablet  Self Yes No   Sig: Take 50 mg by mouth daily at bedtime   trospium chloride (SANCTURA) 20 mg tablet 12/13/2024 Self Yes Yes   Sig: Take 20 mg by mouth 2  (two) times a day      Facility-Administered Medications: None       Behavioral Health Medications: all current active meds have been reviewed. Changes as above.    Laboratory results:  I have personally reviewed all pertinent laboratory/tests results.  Recent Results (from the past 48 hours)   ECG 12 lead    Collection Time: 12/13/24  9:11 PM   Result Value Ref Range    Ventricular Rate 99 BPM    Atrial Rate 99 BPM    AL Interval 212 ms    QRSD Interval 92 ms    QT Interval 350 ms    QTC Interval 449 ms    P [place] 44 degrees    QRS Axis -34 degrees    T Wave Axis 23 degrees   POCT alcohol breath test    Collection Time: 12/13/24  9:39 PM   Result Value Ref Range    EXTBreath Alcohol 0.000    CBC and differential    Collection Time: 12/13/24  9:40 PM   Result Value Ref Range    WBC 8.95 4.31 - 10.16 Thousand/uL    RBC 5.02 3.88 - 5.62 Million/uL    Hemoglobin 14.9 12.0 - 17.0 g/dL    Hematocrit 43.6 36.5 - 49.3 %    MCV 87 82 - 98 fL    MCH 29.7 26.8 - 34.3 pg    MCHC 34.2 31.4 - 37.4 g/dL    RDW 13.2 11.6 - 15.1 %    MPV 8.4 (L) 8.9 - 12.7 fL    Platelets 266 149 - 390 Thousands/uL    nRBC 0 /100 WBCs    Segmented % 66 43 - 75 %    Immature Grans % 0 0 - 2 %    Lymphocytes % 17 14 - 44 %    Monocytes % 8 4 - 12 %    Eosinophils Relative 8 (H) 0 - 6 %    Basophils Relative 1 0 - 1 %    Absolute Neutrophils 5.86 1.85 - 7.62 Thousands/µL    Absolute Immature Grans 0.03 0.00 - 0.20 Thousand/uL    Absolute Lymphocytes 1.55 0.60 - 4.47 Thousands/µL    Absolute Monocytes 0.73 0.17 - 1.22 Thousand/µL    Eosinophils Absolute 0.71 (H) 0.00 - 0.61 Thousand/µL    Basophils Absolute 0.07 0.00 - 0.10 Thousands/µL   Comprehensive metabolic panel    Collection Time: 12/13/24  9:40 PM   Result Value Ref Range    Sodium 136 135 - 147 mmol/L    Potassium 3.8 3.5 - 5.3 mmol/L    Chloride 102 96 - 108 mmol/L    CO2 28 21 - 32 mmol/L    ANION GAP 6 4 - 13 mmol/L    BUN 17 5 - 25 mg/dL    Creatinine 0.98 0.60 - 1.30 mg/dL    Glucose 175  (H) 65 - 140 mg/dL    Calcium 9.7 8.4 - 10.2 mg/dL    AST 15 13 - 39 U/L    ALT 15 7 - 52 U/L    Alkaline Phosphatase 79 34 - 104 U/L    Total Protein 7.0 6.4 - 8.4 g/dL    Albumin 4.1 3.5 - 5.0 g/dL    Total Bilirubin 0.46 0.20 - 1.00 mg/dL    eGFR 77 ml/min/1.73sq m   TSH    Collection Time: 12/13/24  9:40 PM   Result Value Ref Range    TSH 3RD GENERATON 1.517 0.450 - 4.500 uIU/mL   UA w Reflex to Microscopic w Reflex to Culture    Collection Time: 12/13/24  9:49 PM    Specimen: Urine, Clean Catch   Result Value Ref Range    Color, UA Light Yellow     Clarity, UA Clear     Specific Gravity, UA 1.011 1.003 - 1.030    pH, UA 5.0 4.5, 5.0, 5.5, 6.0, 6.5, 7.0, 7.5, 8.0    Leukocytes, UA Negative Negative    Nitrite, UA Negative Negative    Protein, UA Negative Negative mg/dl    Glucose, UA Negative Negative mg/dl    Ketones, UA Negative Negative mg/dl    Urobilinogen, UA <2.0 <2.0 mg/dl mg/dl    Bilirubin, UA Negative Negative    Occult Blood, UA Negative Negative   Rapid drug screen, urine    Collection Time: 12/13/24  9:50 PM   Result Value Ref Range    Amph/Meth UR Negative Negative    Barbiturate Ur Negative Negative    Benzodiazepine Urine Negative Negative    Cocaine Urine Negative Negative    Methadone Urine Negative Negative    Opiate Urine Negative Negative    PCP Ur Negative Negative    THC Urine Negative Negative    Oxycodone Urine Negative Negative    Fentanyl Urine Negative Negative    HYDROCODONE URINE Negative Negative   Fingerstick Glucose (POCT)    Collection Time: 12/14/24  4:56 PM   Result Value Ref Range    POC Glucose 137 65 - 140 mg/dl   CBC and differential    Collection Time: 12/15/24  5:37 AM   Result Value Ref Range    WBC 7.81 4.31 - 10.16 Thousand/uL    RBC 4.55 3.88 - 5.62 Million/uL    Hemoglobin 13.6 12.0 - 17.0 g/dL    Hematocrit 40.3 36.5 - 49.3 %    MCV 89 82 - 98 fL    MCH 29.9 26.8 - 34.3 pg    MCHC 33.7 31.4 - 37.4 g/dL    RDW 12.9 11.6 - 15.1 %    MPV 8.6 (L) 8.9 - 12.7 fL     Platelets 233 149 - 390 Thousands/uL    nRBC 0 /100 WBCs    Segmented % 46 43 - 75 %    Immature Grans % 0 0 - 2 %    Lymphocytes % 33 14 - 44 %    Monocytes % 9 4 - 12 %    Eosinophils Relative 11 (H) 0 - 6 %    Basophils Relative 1 0 - 1 %    Absolute Neutrophils 3.64 1.85 - 7.62 Thousands/µL    Absolute Immature Grans 0.02 0.00 - 0.20 Thousand/uL    Absolute Lymphocytes 2.55 0.60 - 4.47 Thousands/µL    Absolute Monocytes 0.71 0.17 - 1.22 Thousand/µL    Eosinophils Absolute 0.82 (H) 0.00 - 0.61 Thousand/µL    Basophils Absolute 0.07 0.00 - 0.10 Thousands/µL   TSH, 3rd generation with Free T4 reflex    Collection Time: 12/15/24  5:37 AM   Result Value Ref Range    TSH 3RD GENERATON 0.779 0.450 - 4.500 uIU/mL   Lipid panel    Collection Time: 12/15/24  5:37 AM   Result Value Ref Range    Cholesterol 111 See Comment mg/dL    Triglycerides 82 See Comment mg/dL    HDL, Direct 40 >=40 mg/dL    LDL Calculated 55 0 - 100 mg/dL    Non-HDL-Chol (CHOL-HDL) 71 mg/dl   Comprehensive metabolic panel    Collection Time: 12/15/24  5:37 AM   Result Value Ref Range    Sodium 136 135 - 147 mmol/L    Potassium 3.8 3.5 - 5.3 mmol/L    Chloride 102 96 - 108 mmol/L    CO2 29 21 - 32 mmol/L    ANION GAP 5 4 - 13 mmol/L    BUN 15 5 - 25 mg/dL    Creatinine 1.06 0.60 - 1.30 mg/dL    Glucose 91 65 - 140 mg/dL    Glucose, Fasting 91 65 - 99 mg/dL    Calcium 9.3 8.4 - 10.2 mg/dL    AST 13 13 - 39 U/L    ALT 13 7 - 52 U/L    Alkaline Phosphatase 56 34 - 104 U/L    Total Protein 5.9 (L) 6.4 - 8.4 g/dL    Albumin 3.6 3.5 - 5.0 g/dL    Total Bilirubin 0.53 0.20 - 1.00 mg/dL    eGFR 70 ml/min/1.73sq m   Fingerstick Glucose (POCT)    Collection Time: 12/15/24  7:05 AM   Result Value Ref Range    POC Glucose 95 65 - 140 mg/dl        Imaging Studies:   No orders to display        Code Status: Level 1 - Full Code  Advance Directive and Living Will: <no information>    Suicide/Homicide Risk Assessment:    Risk of Harm to Self:   The following ratings  are based on assessment at the time of the interview  Nursing Suicide Risk Assessment Last 24 hours: C-SSRS Risk (Since Last Contact)  Calculated C-SSRS Risk Score (Since Last Contact): No Risk Indicated  Demographic risk factors include: , male  Historical Risk Factors include: history of depression, chronic anxiety symptoms, history of cognitive impairment  Current Specific Risk Factors include: recent suicide attempt, diagnosis of depression, current anxiety symptoms, poor self care, health problems  Protective Factors: no current suicidal plan or intent, ability to communicate with staff on the unit, taking medications as ordered on the unit, responds to redirection, being , stable housing, safe and stable living environment  Weapons/Firearms: none. The following steps have been taken to ensure weapons are properly secured: not applicable  Based on today's assessment, Marcellus presents the following risk of harm to self: moderate    Risk of Harm to Others:  The following ratings are based on assessment at the time of the interview  Nursing Homicide Risk Assessment: Violence Risk to Others: Denies within past 6 months  Demographic Risk Factors include: male.  Historical Risk Factors include: none.  Current Specific Risk Factors include: none  Protective Factors: no current homicidal ideation, able to communicate with staff on the unit, compliant with medications on the unit as ordered, compliant with unit milieu, follows staff redirection, no current psychotic symptoms, willing to continue psychiatric treatment, no current substance use problems, supportive family, being , safe and stable living environment  Based on today's assessment, Marcellus presents the following risk of harm to others: low    The following interventions are recommended: Behavioral Health checks for safety monitoring, continued hospitalization on locked unit     -----------------------------------    Risks / Benefits of  Treatment:     Risks, benefits, and possible side effects of medications explained to patient and patient verbalizes understanding.       Counseling / Coordination of Care:     Patient's presentation on admission and proposed treatment plan were discussed with the treatment team.  Diagnosis, medication changes and treatment plan were reviewed with the patient.  Recent stressors and events leading to admission were discussed with the patient.  Importance of medication and treatment compliance was reviewed with the patient.          Inpatient Psychiatric Certification:     Certification: Based upon physical, mental and social evaluations, I certify that inpatient psychiatric services are medically necessary for this patient for a duration of 10 midnights for the treatment of Severe episode of recurrent major depressive disorder, without psychotic features (HCC)  Available alternative community resources do not meet the patient's mental health care needs.  I further attest that an established written individualized plan of care has been implemented and is outlined in the patient's medical records.    This note has been constructed using a voice recognition system. There may be translation, syntax, or grammatical errors. If you have any questions, please contact the dictating provider.    Efrain Pereira, DO  PGY-3

## 2024-12-15 NOTE — ASSESSMENT & PLAN NOTE
Restart Prozac 40 mg daily for depressive symptoms.  Consider Trintellix if poor response to continuing Prozac for additional cognitive benefit.  PARQ completed including serotonin syndrome, SIADH, worsening depression, suicidality, induction of kevin, GI upset, headaches, activation, sexual side effects, sedation, potential drug interactions, and others.  No associated orders from this encounter found during lookback period of 72 hours.

## 2024-12-15 NOTE — ASSESSMENT & PLAN NOTE
Trazodone 50 mg at bedtime for insomnia.  Seroquel 50 mg at bedtime for insomnia.  May confer additional benefit of decreased libido.  AIMS of 0 on today's assessment.   Hemoglobin A1c pending, lipid panel unremarkable as of 12/15/2024.  EKG reveals QTc of 449 as of 12/13/2024.  PARQ for second generation antipsychotics discussed with patient which includes but is not limited to cardiometabolic syndrome, extrapyramidal symptoms, weight gain, akathisia, sedation, orthostatic hypotension, liver and kidney impairment, neuroleptic malignant syndrome, myocarditis, agranulocytosis and decreased white blood cell count, anticholinergic symptoms, hyperprolactinemia, sexual dysfunction, and seizures.  No associated orders from this encounter found during lookback period of 72 hours.

## 2024-12-15 NOTE — TREATMENT TEAM
12/15/24 0147   Prasad Anxiety Scale   Anxious Mood 1   Tension 1   Fears 1   Insomnia 1   Intellectual 1   Depressed Mood 1   Somatic Complaints: Muscular 1   Somatic Complaints: Sensory 1   Cardiovascular Symptoms 1   Respiratory Symptoms 1   Gastrointestinal Symptoms 1   Genitourinary Symptoms 1   Autonomic Symptoms 1   Behavior at Interview 1   Prasad Anxiety Score 14     25 mg Atarax administered as ordered per pt's request for mild anxiety. Pt stated feeling anxious and having trouble relaxing. Attempted to use coping skills. Will continue to monitor. Safety checks continued.

## 2024-12-15 NOTE — TREATMENT PLAN
TREATMENT PLAN REVIEW - Behavioral Health Marcellus Fregoso 69 y.o. 1955 male MRN: [id number]    [facility] Room / Bed: [facility] 203/[facility] 203-02 Encounter: [id number]          Admit Date/Time:  12/14/2024  2:26 PM    Treatment Team:   MD Ruth Robb, RN  Anita Joseph RN    Diagnosis: Principal Problem:    Severe episode of recurrent major depressive disorder, without psychotic features (HCC)  Active Problems:    Parkinson disease (HCC)    AMY (generalized anxiety disorder)    Insomnia      Patient Strengths/Assets: ability for insight, average or above intelligence, communication skills, financially secure, general fund of knowledge, interpersonal skills, motivation for treatment/growth, patient is on a voluntary commitment, special hobby/interest, supportive family/friends, well educated    Patient Barriers/Limitations: impaired cognition, poor physical health    Short Term Goals: decrease in depressive symptoms    Long Term Goals: resolution of depressive symptoms, free of suicidal thoughts, adequate self care    Progress Towards Goals: continue psychiatric medications as prescribed, attends to more to ADLs    Recommended Treatment: medication management, patient medication education, group therapy, milieu therapy, continued Behavioral Health psychiatric evaluation/assessment process    Treatment Frequency: daily medication monitoring, group and milieu therapy daily, monitoring through interdisciplinary rounds, monitoring through weekly patient care conferences    Expected Discharge Date:  12/25/2024    Discharge Plan: referrals as indicated    Treatment Plan Created/Updated By: Efrain Pereira DO

## 2024-12-15 NOTE — NURSING NOTE
Patient is visible in milieu for breakfast meal. Presents as pleasant and brightens upon interaction. Patient denies SI/HI/AVH. Patient denies anxiety and depression. Patient states he usually doesn't sleep well but last night he slept very well. Patient is compliant with medications. No acute behaviors noted. Q 15 min safety checks maintained. Fall precautions maintained.

## 2024-12-15 NOTE — ASSESSMENT & PLAN NOTE
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  No associated orders from this encounter found during lookback period of 72 hours.

## 2024-12-15 NOTE — TREATMENT TEAM
12/15/24 0147   Pain Assessment   Pain Assessment Tool 0-10   Pain Score 5   Pain Location/Orientation Location: Generalized   Pain Radiating Towards no   Pain Onset/Description Onset: Ongoing   Effect of Pain on Daily Activities mood   Patient's Stated Pain Goal No pain   Hospital Pain Intervention(s) Medication (See MAR)   Multiple Pain Sites No   POSS Assessment   Pasero Opioid-Induced Sedation Scale (POSS) 1     650 mg Acetaminophen administered as ordered per request. Will continue to monitor. Safety checks continued.

## 2024-12-16 LAB
25(OH)D3 SERPL-MCNC: 32.8 NG/ML (ref 30–100)
EST. AVERAGE GLUCOSE BLD GHB EST-MCNC: 137 MG/DL
FOLATE SERPL-MCNC: >22.3 NG/ML
GLUCOSE SERPL-MCNC: 100 MG/DL (ref 65–140)
GLUCOSE SERPL-MCNC: 121 MG/DL (ref 65–140)
HBA1C MFR BLD: 6.4 %
VIT B12 SERPL-MCNC: 288 PG/ML (ref 180–914)

## 2024-12-16 PROCEDURE — 97167 OT EVAL HIGH COMPLEX 60 MIN: CPT

## 2024-12-16 PROCEDURE — 83036 HEMOGLOBIN GLYCOSYLATED A1C: CPT | Performed by: FAMILY MEDICINE

## 2024-12-16 PROCEDURE — 82306 VITAMIN D 25 HYDROXY: CPT | Performed by: FAMILY MEDICINE

## 2024-12-16 PROCEDURE — 97163 PT EVAL HIGH COMPLEX 45 MIN: CPT

## 2024-12-16 PROCEDURE — 82746 ASSAY OF FOLIC ACID SERUM: CPT | Performed by: FAMILY MEDICINE

## 2024-12-16 PROCEDURE — 99232 SBSQ HOSP IP/OBS MODERATE 35: CPT | Performed by: PSYCHIATRY & NEUROLOGY

## 2024-12-16 PROCEDURE — 82948 REAGENT STRIP/BLOOD GLUCOSE: CPT

## 2024-12-16 PROCEDURE — 82607 VITAMIN B-12: CPT | Performed by: FAMILY MEDICINE

## 2024-12-16 RX ADMIN — METFORMIN HYDROCHLORIDE 1000 MG: 500 TABLET, FILM COATED ORAL at 17:21

## 2024-12-16 RX ADMIN — CARBIDOPA AND LEVODOPA 1 TABLET: 25; 100 TABLET ORAL at 14:16

## 2024-12-16 RX ADMIN — CARBIDOPA AND LEVODOPA 1 TABLET: 25; 100 TABLET ORAL at 17:21

## 2024-12-16 RX ADMIN — Medication 1 TABLET: at 08:24

## 2024-12-16 RX ADMIN — LATANOPROST 1 DROP: 50 SOLUTION OPHTHALMIC at 21:21

## 2024-12-16 RX ADMIN — CARBIDOPA AND LEVODOPA 1 TABLET: 25; 100 TABLET ORAL at 09:22

## 2024-12-16 RX ADMIN — PANTOPRAZOLE SODIUM 40 MG: 40 TABLET, DELAYED RELEASE ORAL at 05:08

## 2024-12-16 RX ADMIN — EZETIMIBE 5 MG: 10 TABLET ORAL at 08:24

## 2024-12-16 RX ADMIN — TRAZODONE HYDROCHLORIDE 50 MG: 50 TABLET ORAL at 21:16

## 2024-12-16 RX ADMIN — FLUOXETINE HYDROCHLORIDE 40 MG: 20 CAPSULE ORAL at 08:24

## 2024-12-16 RX ADMIN — METOPROLOL SUCCINATE 25 MG: 25 TABLET, EXTENDED RELEASE ORAL at 08:23

## 2024-12-16 RX ADMIN — AMLODIPINE BESYLATE 5 MG: 5 TABLET ORAL at 21:16

## 2024-12-16 RX ADMIN — LOSARTAN POTASSIUM 100 MG: 50 TABLET, FILM COATED ORAL at 08:24

## 2024-12-16 RX ADMIN — METOPROLOL SUCCINATE 25 MG: 25 TABLET, EXTENDED RELEASE ORAL at 21:16

## 2024-12-16 RX ADMIN — METFORMIN HYDROCHLORIDE 1000 MG: 500 TABLET, FILM COATED ORAL at 08:23

## 2024-12-16 RX ADMIN — OXYBUTYNIN CHLORIDE 5 MG: 5 TABLET ORAL at 08:23

## 2024-12-16 RX ADMIN — QUETIAPINE FUMARATE 50 MG: 50 TABLET ORAL at 21:16

## 2024-12-16 RX ADMIN — OXYBUTYNIN CHLORIDE 5 MG: 5 TABLET ORAL at 17:21

## 2024-12-16 NOTE — ASSESSMENT & PLAN NOTE
Insomnia has improved, and the patient attributes that to the medications he has been treated with for insomnia.  Trazodone 50 mg at bedtime for insomnia.  Seroquel 50 mg at bedtime for insomnia.  May confer additional benefit of decreased libido.  PARQ for second generation antipsychotics discussed with patient which includes but is not limited to cardiometabolic syndrome, extrapyramidal symptoms, weight gain, akathisia, sedation, orthostatic hypotension, liver and kidney impairment, neuroleptic malignant syndrome, myocarditis, agranulocytosis and decreased white blood cell count, anticholinergic symptoms, hyperprolactinemia, sexual dysfunction, and seizures.

## 2024-12-16 NOTE — PROGRESS NOTES
Progress Note - Behavioral Health   Name: Marcellus Fregoso 69 y.o. male I MRN: [id number]  Unit/Bed#: [facility] 203-02 I Date of Admission: 12/14/2024   Date of Service: 12/16/2024 I Hospital Day: 2  Encounter: [id number]        Assessment & Plan  Severe episode of recurrent major depressive disorder, without psychotic features (HCC)  Patient denies suicidal ideation today and expresses his remorse about his recent suicidal attempt  States that the trigger for his SI was argument with his wife  The patient states that he feels safe on the unit, and has no intention of self-harm while here and also will not do any self-harm after his discharge  Has moderately labile affect, becomes tearful during the examination when recalling his recent SA.  States that he noticed improvement in his symptoms of depression after restarting Prozac.    Continue recently restarted Prozac 40 mg daily for depressive symptoms.  Consider Trintellix if poor response to continuing Prozac for additional cognitive benefit.    Parkinson disease ([facility])  Sinemet  mg tablet 3 times daily for Parkinson's disease, managed by gerontology.  Side effects may include increased perceptual disturbances, behavioral disturbances, impulsivity, increasing libido, and may have been contributing to symptomatology prior to admission, recommend caution with further dose increases.    AMY (generalized anxiety disorder)  Prozac 40 mg daily for anxiety symptoms.  Atarax 25 mg every 6 hours as needed for severe anxiety and panic attacks.  PARQ discussed about hydroxyzine including arrhythmia/cardiovascular effects, anticholinergic effects, drowsiness, headaches, nausea, potential for drug interactions, and others.    Insomnia  Insomnia has improved, and the patient attributes that to the medications he has been treated with for insomnia.  Trazodone 50 mg at bedtime for insomnia.  Seroquel 50 mg at bedtime for insomnia.  May confer additional benefit of decreased  libido.  PARQ for second generation antipsychotics discussed with patient which includes but is not limited to cardiometabolic syndrome, extrapyramidal symptoms, weight gain, akathisia, sedation, orthostatic hypotension, liver and kidney impairment, neuroleptic malignant syndrome, myocarditis, agranulocytosis and decreased white blood cell count, anticholinergic symptoms, hyperprolactinemia, sexual dysfunction, and seizures.    Childhood onset fluency disorder  Patient presents with stuttering which she states is at baseline and has been present since childhood, likely childhood onset fluency disorder and likely unrelated to Parkinson's diagnosis, new CVA or psychotropics.        Plan:  All current active meds have been reviewed.  No psychotropic medications changes at this time.  Continue current psychotropic medications.  Medical management per medical team.      Risks / Benefits of Treatment:  Risks, benefits, and possible side effects of medications explained to patient and patient verbalizes understanding and agreement for treatment.      Subjective:  The patient was evaluated this morning for continuity of care and no acute distress noted throughout the evaluation. Over the past 24 hours staff noted that Marcellus  slept well, denied all symptoms, has made sexually inappropriate comments.  Patient has been medication adherent.    Today on evaluation,Marcellus is in no acute distress, calm, cooperative, brightens up on approach.  Subjectively denies any symptoms of anxiety and depression.  Denies suicidal ideation and expresses his remorse regarding his recent suicide attempt saying that he will never do that again.  He also minimizes his suicide attempt by saying if he wanted to commit suicide he would take not less than 20 pills instead of taking 7 and stopping there.   The patient reported discord in his sexual life with his wife.  Shared his story of entertaining the wife with the idea of him going with his  "ex-girlfriend to [place], which caused problems in their marital relationships.  When confronted about the patient's allegedly inappropriate behavior here on the unit he denies that and also apologized if he had done anything that could present him as coming off as sexually inappropriate.  The patient states he feels safe here on the unit, denies suicidal ideation, homicidal ideation, denies AVH.      Psychiatric Review of Systems:  Behavior over the last 24 hours: improving.   Sleep: slept better  Appetite: fair  Medication side effects: No   ROS: no complaints    Mental Status Examination:  Appearance:  adequate grooming, looks older than stated age, uses walkers when walks   Behavior:  pleasant, cooperative, calm   Speech:  Stuttering   Mood:  \"good\"   Affect:  normal range and intensity, appropriate, at times tearful, mood-congruent   Thought Process:  goal directed, linear   Associations: intact associations   Thought Content:  no overt delusions   Perceptual Disturbances: no visual hallucinations, denies auditory hallucinations when asked, does not appear responding to internal stimuli   Risk Potential: Suicidal ideation - None at present, status post suicide attempt  Homicidal ideation - None   Sensorium:  oriented to person, place, and time/date   Memory:  recent and remote memory grossly intact   Consciousness:  alert and awake   Attention/Concentration: attention span and concentration are age appropriate   Insight:  improving and moderate   Judgment: partial   Gait/Station: uses walker   Motor Activity: no acute pathologial movements noted on this exam, but has a Dx of Parkinson's disease, controlled on meds.       Vital signs in last 24 hours:  Temp:  [97.6 °F (36.4 °C)-97.8 °F (36.6 °C)] 97.8 °F (36.6 °C)  HR:  [60-74] 60  BP: (120-144)/(57-85) 120/69  Resp:  [16-18] 16  SpO2:  [96 %-98 %] 96 %  O2 Device: None (Room air)       Laboratory results: I have personally reviewed all pertinent laboratory/tests " results  CBC:   Lab Results   Component Value Date    WBC 7.81 12/15/2024    RBC 4.55 12/15/2024    HGB 13.6 12/15/2024    HCT 40.3 12/15/2024    MCV 89 12/15/2024     12/15/2024    MCH 29.9 12/15/2024    MCHC 33.7 12/15/2024    RDW 12.9 12/15/2024    MPV 8.6 (L) 12/15/2024    NEUTROABS 3.64 12/15/2024    TOTANEUTABS 16.28 (H) 11/17/2021     CMP:   Lab Results   Component Value Date    SODIUM 136 12/15/2024    K 3.8 12/15/2024     12/15/2024    CO2 29 12/15/2024    AGAP 5 12/15/2024    BUN 15 12/15/2024    CREATININE 1.06 12/15/2024    GLUC 91 12/15/2024    GLUF 91 12/15/2024    CALCIUM 9.3 12/15/2024    AST 13 12/15/2024    ALT 13 12/15/2024    ALKPHOS 56 12/15/2024    TP 5.9 (L) 12/15/2024    ALB 3.6 12/15/2024    TBILI 0.53 12/15/2024    EGFR 70 12/15/2024       Progress Toward Goals: improving    Current Medications:  Current Facility-Administered Medications   Medication Dose Route Frequency Provider Last Rate    acetaminophen  650 mg Oral Q4H PRN ED Graham      acetaminophen  650 mg Oral Q4H PRN ED Graham      acetaminophen  975 mg Oral Q6H PRN ED Graham      amLODIPine  5 mg Oral HS Mariano Mayo MD      benztropine  0.5 mg Oral Q4H PRN Max 6/day ED Graham      carbidopa-levodopa  1 tablet Oral TID Mariano Mayo MD      ezetimibe  5 mg Oral Daily Mariano Mayo MD      FLUoxetine  40 mg Oral Daily Efrain Pereira DO      hydrOXYzine HCL  25 mg Oral Q6H PRN Max 4/day ED Graham      hydrOXYzine HCL  50 mg Oral Q6H PRN Max 4/day ED Graham      latanoprost  1 drop Both Eyes HS Mariano Mayo MD      LORazepam  1 mg Intramuscular Q6H PRN Max 3/day ED Graham      LORazepam  1 mg Oral Q6H PRN Max 3/day ED Graham      losartan  100 mg Oral Daily Mariano Mayo MD      melatonin  3 mg Oral HS PRN ED Graham      metFORMIN  1,000 mg Oral BID With Meals Mariano Mayo MD      metoprolol  succinate  25 mg Oral BID Mariano Mayo MD      multivitamin-minerals  1 tablet Oral Daily Mariaon Mayo MD      OLANZapine  5 mg Intramuscular Q3H PRN Max 3/day ED Graham      oxybutynin  5 mg Oral BID Mariano Mayo MD      pantoprazole  40 mg Oral Early Morning Mariano Mayo MD      pravastatin  40 mg Oral Weekly Mariano Mayo MD      QUEtiapine  100 mg Oral Q3H PRN Max 3/day ED Graham      QUEtiapine  25 mg Oral Q6H PRN Max 4/day ED Graham      QUEtiapine  50 mg Oral Q6H PRN Max 4/day ED Graham      QUEtiapine  50 mg Oral HS Efrain Pereira DO      senna-docusate sodium  1 tablet Oral Daily PRN ED Graham      senna-docusate sodium  1 tablet Oral HS Mariano Mayo MD      sucralfate  1 g Oral TID PRN Mariano Mayo MD      traZODone  50 mg Oral HS Efrain Pereira DO         Counseling / Coordination of Care:  Patient's progress discussed with staff in treatment team meeting.  Patient's diagnosis and treatment indicated reviewed with patient.        This note has been constructed using a voice recognition system. There may be translation, syntax,  or grammatical errors. If you have any questions, please contact the dictating provider.     Bal Keenan MD    12/16/24  Psychiatry Resident, PGY-2

## 2024-12-16 NOTE — CASE MANAGEMENT
CM rec'd a return call from patient's therapist, Umesh Guthrie: [phone]. The therapist was able to schedule an in-person f/u appt. For Thursday, 12/26/24 at 2:30 PM. Therapist will be notified for rescheduling in the event of continued inpatient stay.

## 2024-12-16 NOTE — ASSESSMENT & PLAN NOTE
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

## 2024-12-16 NOTE — NURSING NOTE
Patient visible in milieu with peers, pleasant upon approach, medication compliant and cooperative. Patient denies all, no anxiety/depression, no SI/HI or hallucinations. Patient attends most group therapies. Continued care and safety rounds in progress.

## 2024-12-16 NOTE — PROGRESS NOTES
Psycho Social  69 year old  male admitted to [facility] from home on 12/14/24  under a 201, Voluntary Commitment with reported increase in anxiety, sleep disturbance, irritability and SI with plan to overdose.    Information obtained during a pre-admission Crisis ED Eval is as follows:        69 year old male presents for intentional overdose on 6 or 7 trazodone.  Denies HI/AH/VH/paranoia.     Patient is pleasant and cooperative but has a significant speech impediment along with tangential speech which can delay answers. Patient states he had an argument with his wife which has been a lot more frequently and this lead to overdosing with the intent to end his life tonight. Major stressors in life are feeling unloved by wife, sexual issues, parkinson's disease, general health issues, general life stressors. Patient feels parkinson's exacerbated his psychiatric problems.   Besides today, last known self harm was decades ago. No harm to others.  Denies ever having psychiatric admission. States he has a psychologist in private practice, a psychiatrist with [facility] where a neurologist also works.    Psychiatric history is negative for psychotic disorders and patient takes medication everyday.         Medical history involves managed diabetes according to patient, no seizures or thyroid/head trauma.  Patient has a significant stutter which makes speaking difficult for patient and patient has parkinson's disease for which he is receiving treatment.  Diagnosed in summer of 2023.   Denies major issue with appetite but sleep is greatly impaired. Denies major weight loss. Denies substance abuse/legal issues/weapons/psychological trauma.        Patient was spoken to about signing in voluntarily and being admitted on a 201. Patient was entirely agreeable albeit being scared of psych units.  Had all questions and concerns addressed. Explained 72 hour rule.            On interview, the patient was alert, pleasant and  "cooperative. Relevant and goal-directed, though information gathering was somewhat restricted due to patient's marked speech impediment/delay. He did state he is feeling improved and behavior prior to admission was impulsive and a result of increased difficulty in dealing with Parkinson's limitations as well as life stressors. He stated that he does have treatment providers with whom he plans following up with post d/c.    Current SI: Denied  Current HI: Denied  AVH:           Denied  Depression: Denied  Anxiety:      Denied  Strengths: Cooperative, reasoning ability, family ties, able to negotiate needs  Stressors/Limitations: Medical issues/ physical limitations; relationship issues, poor past treatment response.  Coping skills: watching football, reading, music, varied sports casts  HX Mental Health: OP Med Mgmt past 9 months; OP Therapy past year +  Past Hospitalizations: None  Medication Compliance:  Compliant  SA/SI in last 12 months: Denied  HI/violence towards others in last 12 months: Denied  Access to Firearms: Denied  Hx abuse/trauma: Denied  Family HX Mental Health: Mother with Bipolar D/O  Family HX Suicide/Homicide: Denied  Family HX Substance Abuse: Denied  Family HX Dementia: Denied  Substance Abuse: Reported having used Marijuana \"in college.\"  Smoking Cessation: None  Legal Issues: Denied  Marital Status:  for the past 34 years. Denied significant relationship issues: \"my wife is my best friend.\"  Sexual Preference: Heterosexual  Children: None  Parents:   Siblings: None  Pets:  2 cats  Education HX: 3 1/2 years of college in [place] [employer]  Type of Work: . 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 Stated he was \"fortunate enough by be present in the [place]/ Oval Office with 2 separate presidents, in the presentce of the [place] on 2 occasions, and nominated for a " "Pulitzer Prize.   HX:  None  Judaism Preference: None reported  Cultural needs: None reported  Financial: Household combined income of \"$14,000.00 monthly.  POA/guardianship/advanced: directives: Patient has POA; stated he wants resuscitation; wife is POA  Pharmacy[facility] [address] [place] for emergency.    Housing Stability-Dispo/211: No issue  Transportation:  Patient drives  Food Insecurity:  None  Intimate Partner Violence: Denied  Utilities:  No issue    Psychiatrist: [place]. For Telehealth sessions q 2 months  Therapist: Umesh Hopkins at [place] weekly  PCP: Dr. Ehsan Calix  D&A: NA  Case Management: None  Family Contact: Spouse: Pete: [phone].     12/16/24 1057   Patient Intake   Living Arrangement House   Can patient return home? Yes   Address to be Discharge to: See Facesheet   Patient's Telephone Number See  Facesheet   Access to Firearms No   Type of Work Retired   Work History Retired   Admission Status   Status of Admission 201   [facility]   Patient History   Presenting Problems Reported feeling more sensitive/anxious/ limited by Parkinson's Disease; triggered by argument; impulsive plan to OD   Treatment History OP for Med Mgmt at [place]; Individual therapy in [place]   Currently in Treatment Yes   Current Psychiatrist/Therapist Psychologist at [place]; Psychiatrist at [facility]   Current Treatment Appt Info Psychologise weekly; Psychiatrist every 2 months via Telehealth   Community Agency Supports Neurologist at [place]   Medical Problems See Medical H&P   Legal Issues Denies   Probation/ Name (if applicable) NA   Substance Abuse No   Crisis Info   Release of Information Signed Yes  (Psych, Therapist, PCP, Spouse)       "

## 2024-12-16 NOTE — PHYSICAL THERAPY NOTE
PHYSICAL THERAPY EVALUATION  NAME:  Marcellus Fregoso  DATE: 12/16/24    AGE:   69 y.o.  Mrn:   [id number]  ADMIT DX:  Overdose [T50.901A]  Problem List:   Patient Active Problem List   Diagnosis    Status post total left knee replacement    Childhood onset fluency disorder    Sleep apnea    Ambulatory dysfunction    Benign essential hypertension    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, without long-term current use of insulin ([facility])    Insomnia    Depression with anxiety    Onychomycosis    Molina's esophagus with dysplasia    Dysphagia    Exertional dyspnea    Gastroparesis    Ventral hernia without obstruction or gangrene    Gastroesophageal reflux disease with esophagitis without hemorrhage    Tremor    Neuropathy    Pure hypercholesterolemia    Dermatitis    Obesity, Class III, BMI 40-49.9 (morbid obesity) ([facility])    Hx of laparoscopic adjustable gastric banding    History of total knee arthroplasty, right    Syncope    Urinary urgency    Generalized weakness    Class 3 severe obesity due to excess calories with serious comorbidity and body mass index (BMI) of 40.0 to 44.9 in adult ([facility])    CAD (coronary artery disease)    Carpal tunnel syndrome of right wrist    Family history of Parkinson disease    Parkinson disease ([facility])    Hyperlipidemia    Chronic bilateral low back pain without sciatica    PVC (premature ventricular contraction)    Chest pain syndrome    Severe episode of recurrent major depressive disorder, without psychotic features ([facility])    AMY (generalized anxiety disorder)       Past Medical History  Past Medical History:   Diagnosis Date    Acute blood loss anemia 10/13/2016    Anxiety     Arthritis     knees    Arthritis     Asthma     stable for > 10 years    Molina esophagus     with BARRX/RFA    Cancer ([facility])     prostate    Cataract     Depression     Diabetes ([facility])     Diabetes mellitus ([facility])     pre diabetes    Diverticulitis of colon     Environmental allergies      Fall 12/30/2020    GERD (gastroesophageal reflux disease)     Hiatal hernia     History of anal fissures     Hyperlipidemia     Hypertension     Incisional hernia     Insomnia     Kidney stone     Malignant neoplasm of prostate (HCC)     Morbid obesity (HCC)     Parkinson disease (HCC)     Peripheral neuropathy     PONV (postoperative nausea and vomiting)     Prolapsing mitral valve     prolapsing mitral valve leaflet syndrome    Prostate cancer (HCC)     Retinal detachment     treated surgically at [facility]; resolved: 10/10/2012    Sleep apnea     diagnosed but unable to tolerate cpap.     Stuttering        Past Surgical History  Past Surgical History:   Procedure Laterality Date    ARTHROSCOPIC REPAIR ACL Right     BIOPSY CORE NEEDLE      prostate    CATARACT EXTRACTION Bilateral     COLONOSCOPY      HERNIA REPAIR      naval    JOINT REPLACEMENT      B/L knee    KNEE ARTHROSCOPY Left     LAPAROSCOPIC GASTRIC BANDING      NJ ARTHRP KNE CONDYLE&PLATU MEDIAL&LAT COMPARTMENTS Left 02/15/2017    Procedure: TOTAL KNEE ARTHROPLASTY ;  Surgeon: Sal Ross MD;  Location: [facility];  Service: Orthopedics    NJ ARTHRP KNE CONDYLE&PLATU MEDIAL&LAT COMPARTMENTS Right 10/10/2016    Procedure: ARTHROPLASTY KNEE TOTAL;  Surgeon: Sal Ross MD;  Location: [facility];  Service: Orthopedics    PROSTATECTOMY      robotic-assisted; Lilly Tay    REMOVAL GASTRIC BAND LAPAROSCOPIC N/A 8/30/2022    Procedure: LAPAROSCOPIC REMOVAL OF ADJUSTABLE GASTRIC BAND AND PORT  WITH INTRAOPERATIVE EGD;  Surgeon: Neal Yang MD;  Location: [facility];  Service: Bariatrics    RETINAL DETACHMENT SURGERY Bilateral     Lemus Eye    SEPTOPLASTY      SINUS SURGERY      TONSILLECTOMY      over age 10    UPPER GASTROINTESTINAL ENDOSCOPY      mutiple with BARRX/RFA    VASECTOMY      vas deferens       Length Of Stay: 2  Performed at least 2 patient identifiers during session: Name and ID [email or web address]       12/16/24 0810   PT Last Visit   PT  Visit Date 12/16/24   Note Type   Note type Evaluation   Pain Assessment   Pain Assessment Tool 0-10   Pain Score 2   Pain Location/Orientation Orientation: Left;Location: [place]   Hospital Pain Intervention(s) Medication (See MAR)   Restrictions/Precautions   Weight Bearing Precautions Per Order No   Other Precautions Cognitive;Fall Risk;Pain   Home Living   Type of Home House   Home Layout Two level;Bed/bath upstairs;1/2 bath on main level  (3 FREDIS w/ HR; w/ basement)   Bathroom Shower/Tub Walk-in shower   Bathroom Toilet Standard   Bathroom Equipment Shower chair   Home Equipment Walker  (SPC used prn at baseline)   Prior Function   Level of [place] Independent with ADLs;Independent with functional mobility;Independent with IADLS   Lives With Spouse   Receives Help From Family   IADLs Independent with driving;Independent with meal prep;Independent with medication management   Falls in the last 6 months 5 to 10  (pt reports between 7-8 falls)   Vocational Retired  ()   General   Family/Caregiver Present No   Cognition   Overall Cognitive Status   (Questionable)   Arousal/Participation Alert   Attention Within functional limits   Orientation Level Oriented X4   Memory Decreased recall of precautions;Decreased recall of recent events   Following Commands Follows one step commands without difficulty   RUE Assessment   RUE Assessment WFL   RUE Strength   RUE Overall Strength   (3+/5)   LUE Assessment   LUE Assessment WFL   LUE Strength   LUE Overall Strength   (3+/5)   RLE Assessment   RLE Assessment WFL   LLE Assessment   LLE Assessment WFL   Vision-Basic Assessment   Current Vision Wears glasses all the time   Light Touch   RLE Light Touch Impaired   Bed Mobility   Supine to Sit 5  Supervision   Additional items Assist x 1;HOB elevated;Bedrails;Increased time required   Additional Comments pt denied dizziness with transitional movement   Transfers   Sit to Stand   (CGA)   Additional items Assist x  1;Increased time required;Verbal cues   Stand to Sit   (CGA)   Additional items Assist x 1;Increased time required;Verbal cues   Toilet transfer   (CGA)   Additional items Assist x 1;Increased time required;Verbal cues   Additional Comments pt required cues for proper hand placement   Ambulation/Elevation   Gait pattern Improper Weight shift;Decreased foot clearance;Excessively slow;Inconsistent catalino   Gait Assistance 4  Minimal assist   Additional items Assist x 1;Verbal cues   Assistive Device Rolling walker   Distance 80 ft   Ambulation/Elevation Additional Comments poor safety awareness noted  (multiple balance checks during ambulation)   Balance   Static Sitting Good   Dynamic Sitting Fair +   Static Standing Fair   Dynamic Standing Fair -   Ambulatory Fair -   Endurance Deficit   Endurance Deficit Yes   Endurance Deficit Description pt reported fatigue with activity   Activity Tolerance   Activity Tolerance Patient limited by fatigue;Patient limited by pain   Assessment   Prognosis Fair   Assessment Pt is 69 y.o. male seen for PT evaluation s/p admit to [facility] on 12/14/2024 w/ Severe episode of recurrent major depressive disorder, without psychotic features (HCC). PT consulted to assess pt's functional mobility and d/c needs. Order placed for PT eval and tx, w/ up and OOB as tolerated order. Pt agreeable to PT  session upon arrival, pt found supine in bed.  PTA, pt was independent w/ all functional mobility w/ occ use of SPC, has 3 FREDIS, lives w/ spouse in 2 level home, and retired.  Pt to benefit from continued PT tx to address deficits and maximize level of functional independent mobility and consistency. Upon conclusion pt  seated in chair. Complexity: Comorbidities affecting pt's physical performance at time of assessment include: obesity, neuropathy, and Parkinson's disease. Personal factors affecting pt at time of IE include: limited mobility, history of falls, lives in multistory home,  ambulating with assistive device, steps to enter home, depression, and anxiety. Please find objective findings from PT assessment regarding body systems outlined above with impairments and limitations including impaired balance, decreased endurance, gait deviations, pain, decreased activity tolerance, decreased functional mobility tolerance, altered sensation, decreased safety awareness, impaired judgement, and fall risk.  Pt's clinical presentation is currently unstable/unpredictable seen in pt's presentation of pain and poor safety awareness . The patient's AM-PAC Basic Mobility Inpatient Short Form Raw Score is 18.  Based on patient presentations and impairments, pt would most appropriately benefit from Level 3 resource intensity upon discharge. A Raw score of greater than 16 suggests the patient may benefit from discharge to home. Please also refer to the recommendation of the Physical Therapist for safe discharge planning. RN verbalized pt appropriate for PT session. Pt seen as a co-eval with OT due to the patient's co-morbidities, clinically unstable presentation, and present impairments which are a regression from the patient's baseline.   Barriers to Discharge Inaccessible home environment   Goals   Patient Goals to get juice   LTG Expiration Date 01/05/25   Long Term Goal #1 Pt will: Perform bed mobility tasks to modified I to improve ease of bed mobility. Perform transfers to modified I to improve ease of transfers. Perform ambulation with MI and RW for 250 feet to increase Indep in home environment. Increase dynamic standing balance to F+ to decrease fall risk. Increase OOB activity tolerance to 10 minutes without s/s of exertion to decrease fall risk. Navigate up and down 3 steps with MI so patient can enter and exit home.   Plan   Treatment/Interventions Functional transfer training;Therapeutic exercise;Endurance training;Gait training;Bed mobility;Equipment eval/education   PT Frequency 2-3x/wk    Discharge Recommendation   Rehab Resource Intensity Level, PT III (Minimum Resource Intensity)   Equipment Recommended Walker   Walker Package Recommended Wheeled walker   AM-PAC Basic Mobility Inpatient   Turning in Flat Bed Without Bedrails 3   Lying on Back to Sitting on Edge of Flat Bed Without Bedrails 3   Moving Bed to Chair 3   Standing Up From Chair Using Arms 3   Walk in Room 3   Climb 3-5 Stairs With Railing 3   Basic Mobility Inpatient Raw Score 18   Basic Mobility Standardized Score 41.05   [facility] Highest Level Of Mobility   -HLM Goal 6: Walk 10 steps or more   -HLM Achieved 7: Walk 25 feet or more       Time In: 0758  Time Out: 0810  Total Evaluation Minutes: 12    Sangeeta Barrera, PT

## 2024-12-16 NOTE — PROGRESS NOTES
"Progress Note - Marcellus Fregoso 69 y.o. male MRN: [id number]    Unit/Bed#: [facility] 203-02 Encounter: [id number]        Subjective:   Patient seen and examined at bedside after reviewing the chart and discussing the case with the caring staff.      Patient examined at bedside.  Patient he had a good bowel movement yesterday.    Patient's vitamin D and B12 levels are still pending.    Physical Exam   Vitals: Blood pressure 120/69, pulse 60, temperature 97.8 °F (36.6 °C), temperature source Temporal, resp. rate 16, height 5' 8\" (1.727 m), weight 125 kg (275 lb 9.6 oz), SpO2 96%.,Body mass index is 41.9 kg/m².  Constitutional: He appears well-developed.   HEENT: PERR, EOMI, MMM  Cardiovascular: Normal rate and regular rhythm.    Pulmonary/Chest: Effort normal and breath sounds normal.   Abdomen: Soft, + BS, NT    Assessment/Plan:  Marcellus Fregoso is a(n) 69 y.o. year old male with MDD with suicidal attempt     Cardiac with history of pretension, dyslipidemia.  Currently patient is on amlodipine 5 mg daily, losartan 100 mg daily, Toprol-XL 25 mg twice daily, pravastatin 40 mg weekly, Zetia 5 mg daily.  Type 2 diabetes mellitus.  Currently patient is on metformin 1000 mg twice daily.  I will put the patient on carb controlled diet.  I will get Accu-Cheks twice daily.  Patient most recent A1c was 5.9 on 10/2/2024.  I will repeat hemoglobin A1c for the patient.  GERD, hiatal hernia, Molina's esophagus.  Patient is on Protonix 40 mg daily along with Carafate 1 g 3 times daily as needed for esophageal burning pain.  Parkinson's disease.  Patient is on Sinemet  mg 3 times daily.  Glaucoma.  Patient is on Xalatan ophthalmic drops at bedtime.  Constipation.  I will put the patient on Senokot-S at bedtime.  Urinary incontinence.  Patient is on Ditropan XL 5 mg twice daily as a substitute for Sanctura 20 mg twice daily.  Patient wants to go home on Ditropan XL as it works better than Sanctura.  Gait abnormality with falls.  I " will consult PT OT for further evaluation and treatment.  DJD/osteoarthritis.  May get Tylenol as needed.

## 2024-12-16 NOTE — CASE MANAGEMENT
"BRETT spoke with patient's wife, Pete: [phone] for family notification. She confirmed circumstances of pre-admission report, stating that the patient has been having increased adjusment difficulties, worsened by stopping his medications a week ago stating they were making him worse. Subsequently, he became more irritable, angry, impulsive, experiencing marked sleep disturbance. She also reported recent uncharacteristic behaviors such as impulse buying, making rash decisions and various appointments he had not previously made.   Pete stated that he has \"not been handling his Parkinson's medication well\" and intends to speak with his Neurologist.   She also stated that his suicidal attempt was totally unexpected as \"he loves life and never thought of hurting himself before.\" She has no safety risk concerns for his return home \"if stable.\" She stated that she will be able to provide D/C transportation \"during daytime hours and weather permitting.\"    Relevant phone numbers were provided. She had no further questions or concerns. The call ended mutually.    CM placed call to patient's therapist, Umesh Torres: [phone] for admission notification. CM left VM message requesting a return call for collaboration and scheduling.    BRETT spoke with staff at  patient's medication mgmt provider's office: Dr. Mahi Tidwell: [phone] for admission notification. The office personnel provided an appt. For aftercare f/u via Telehealth on Monday, 25 at 10:45 AM with Dr. Deluna.     BRETT spoke with staff at patient's PCP office: Dr. Ehsan Calix: [phone] for admission notification. Office will schedule a GOPAL appt with D/C notification.  "

## 2024-12-16 NOTE — PLAN OF CARE
Problem: OCCUPATIONAL THERAPY ADULT  Goal: Performs self-care activities at highest level of function for planned discharge setting.  See evaluation for individualized goals.  Description: Treatment Interventions: ADL retraining, Functional transfer training, Endurance training, UE strengthening/ROM, Cognitive reorientation, Patient/family training, Energy conservation, Activityengagement      See flowsheet documentation for full assessment, interventions and recommendations.   Note: Limitation: Decreased ADL status, Decreased UE strength, Decreased Safe judgement during ADL, Decreased cognition, Decreased endurance, Decreased high-level ADLs  Prognosis: Good  Assessment: Pt is a 69 y.o. male seen for OT evaluation s/p admit to [facility] on 12/14/2024 w/ Severe episode of recurrent major depressive disorder, without psychotic features (HCC).  Comorbidities affecting pt's functional performance at time of assessment include: insomnia, Parkinson disease, AMY, sleep apnea, ambulatory dysfunction, dysphagia. Personal factors affecting pt at time of IE include:steps to enter environment, difficulty performing ADLS, difficulty performing IADLS , limited insight into deficits, decreased initiation and engagement , health management , and environment. Prior to admission, pt was I with ADLs and IADLs. Upon evaluation: the following deficits impact occupational performance: weakness, decreased strength, decreased balance, decreased tolerance, impaired problem solving, and decreased safety awareness. Pt to benefit from continued skilled OT tx while in the hospital to address deficits as defined above and maximize level of functional independence w ADL's and functional mobility. Occupational Performance areas to address include: grooming, bathing/shower, toilet hygiene, dressing, functional mobility, community mobility, and clothing management. From OT standpoint, recommendation at time of d/c would with minimal intensity OT  resources.     Rehab Resource Intensity Level, OT: III (Minimum Resource Intensity)     Romina Rooney OTR/L

## 2024-12-16 NOTE — ASSESSMENT & PLAN NOTE
Prozac 40 mg daily for anxiety symptoms.  Atarax 25 mg every 6 hours as needed for severe anxiety and panic attacks.  PARQ discussed about hydroxyzine including arrhythmia/cardiovascular effects, anticholinergic effects, drowsiness, headaches, nausea, potential for drug interactions, and others.

## 2024-12-16 NOTE — NURSING NOTE
Pt was visible on the unit and social with peers. Compliant with medication. Cooperative with staff during care. Pleasant and bright on approach. Denied SI, HI, AVH, depression, or anxiety. Eye contact was good. Pt speaks with a stutter. Appearance and hygiene was unremarkable. Made no c/o pain or discomfort. Safety checks continued.

## 2024-12-16 NOTE — PLAN OF CARE
Problem: DISCHARGE PLANNING - CARE MANAGEMENT  Goal: Discharge to post-acute care or home with appropriate resources  Description: INTERVENTIONS:  - Conduct assessment to determine patient/family and health care team treatment goals, and need for post-acute services based on payer coverage, community resources, and patient preferences, and barriers to discharge  - Address psychosocial, clinical, and financial barriers to discharge as identified in assessment in conjunction with the patient/family and health care team  - Arrange appropriate level of post-acute services according to patient’s   needs and preference and payer coverage in collaboration with the physician and health care team  - Communicate with and update the patient/family, physician, and health care team regarding progress on the discharge plan  - Arrange appropriate transportation to post-acute venues  Outcome: Progressing   New goal initiated 201

## 2024-12-16 NOTE — PROGRESS NOTES
12/16/24 1300   Team Meeting   Meeting Type Tx Team Meeting   Initial Conference Date 12/16/24   Next Conference Date 01/16/25   Team Members Present   Team Members Present Physician;Nurse;   Physician Team Member Dr. Jose Manuel Felix   Nursing Team Member Rachel Kingsley RN   Care Management Team Member Yajaira Molian RN   Patient/Family Present   Patient Present Yes   Patient's Family Present No     Initial Plan  Treatment Team met and reviewed patient strengths, limitations, coping skills, Treatment Plan and Goals; patient reported understanding and agreement and signed the Treatment Plan document. A copy was placed on the chart.

## 2024-12-16 NOTE — ASSESSMENT & PLAN NOTE
Sinemet  mg tablet 3 times daily for Parkinson's disease, managed by gerontology.  Side effects may include increased perceptual disturbances, behavioral disturbances, impulsivity, increasing libido, and may have been contributing to symptomatology prior to admission, recommend caution with further dose increases.

## 2024-12-16 NOTE — PLAN OF CARE
Problem: PHYSICAL THERAPY ADULT  Goal: Performs mobility at highest level of function for planned discharge setting.  See evaluation for individualized goals.  Description: Treatment/Interventions: Functional transfer training, Therapeutic exercise, Endurance training, Gait training, Bed mobility, Equipment eval/education  Equipment Recommended: Walker       See flowsheet documentation for full assessment, interventions and recommendations.  Outcome: Progressing  Note: Prognosis: Fair     Assessment: Pt is 69 y.o. male seen for PT evaluation s/p admit to [facility] on 12/14/2024 w/ Severe episode of recurrent major depressive disorder, without psychotic features (HCC). PT consulted to assess pt's functional mobility and d/c needs. Order placed for PT eval and tx, w/ up and OOB as tolerated order. Pt agreeable to PT  session upon arrival, pt found supine in bed.  PTA, pt was independent w/ all functional mobility w/ occ use of SPC, has 3 FREDIS, lives w/ spouse in 2 level home, and retired.  Pt to benefit from continued PT tx to address deficits and maximize level of functional independent mobility and consistency. Upon conclusion pt  seated in chair. Complexity: Comorbidities affecting pt's physical performance at time of assessment include: obesity, neuropathy, and Parkinson's disease. Personal factors affecting pt at time of IE include: limited mobility, history of falls, lives in multistory home, ambulating with assistive device, steps to enter home, depression, and anxiety. Please find objective findings from PT assessment regarding body systems outlined above with impairments and limitations including impaired balance, decreased endurance, gait deviations, pain, decreased activity tolerance, decreased functional mobility tolerance, altered sensation, decreased safety awareness, impaired judgement, and fall risk.  Pt's clinical presentation is currently unstable/unpredictable seen in pt's presentation  of pain and poor safety awareness . The patient's AM-PAC Basic Mobility Inpatient Short Form Raw Score is 18.  Based on patient presentations and impairments, pt would most appropriately benefit from Level 3 resource intensity upon discharge. A Raw score of greater than 16 suggests the patient may benefit from discharge to home. Please also refer to the recommendation of the Physical Therapist for safe discharge planning. RN verbalized pt appropriate for PT session. Pt seen as a co-eval with OT due to the patient's co-morbidities, clinically unstable presentation, and present impairments which are a regression from the patient's baseline.  Barriers to Discharge: Inaccessible home environment     Rehab Resource Intensity Level, PT: III (Minimum Resource Intensity)    See flowsheet documentation for full assessment.

## 2024-12-16 NOTE — PROGRESS NOTES
12/16/24    Team Meeting   Meeting Type Daily Rounds   Team Members Present   Team Members Present Physician;Nurse;Occupational Therapist;   Physician Team Member Elvira MCMAHON   Nursing Team Member Sancho BOB   Social Work Team Member Sanjeev ABDALLA   OT Team Member Bryn THRASHERA/CAMILLA   Patient/Family Present   Patient Present No   Patient's Family Present No   201, new admit, denies all, slept, OD on trazodone, pre-diabetic, Parkinsons causing anx/dep, hyper sexual and unable to get satisfaction with spouse, monitor around females, increased dep, no d/c date med adjustments

## 2024-12-16 NOTE — OCCUPATIONAL THERAPY NOTE
Occupational Therapy Evaluation     Patient Name: Marcellus Fregoso  Today's Date: 12/16/2024  Problem List  Principal Problem:    Severe episode of recurrent major depressive disorder, without psychotic features (HCC)  Active Problems:    Childhood onset fluency disorder    Insomnia    Parkinson disease (HCC)    AMY (generalized anxiety disorder)    Past Medical History  Past Medical History:   Diagnosis Date    Acute blood loss anemia 10/13/2016    Anxiety     Arthritis     knees    Arthritis     Asthma     stable for > 10 years    [place] esophagus     with BARRX/RFA    Cancer (HCC)     prostate    Cataract     Depression     Diabetes (HCC)     Diabetes mellitus (HCC)     pre diabetes    Diverticulitis of colon     Environmental allergies     Fall 12/30/2020    GERD (gastroesophageal reflux disease)     Hiatal hernia     History of anal fissures     Hyperlipidemia     Hypertension     Incisional hernia     Insomnia     Kidney stone     Malignant neoplasm of prostate (HCC)     Morbid obesity (HCC)     Parkinson disease (HCC)     Peripheral neuropathy     PONV (postoperative nausea and vomiting)     Prolapsing mitral valve     prolapsing mitral valve leaflet syndrome    Prostate cancer (HCC)     Retinal detachment     treated surgically at [facility]; resolved: 10/10/2012    Sleep apnea     diagnosed but unable to tolerate cpap.     Stuttering      Past Surgical History  Past Surgical History:   Procedure Laterality Date    ARTHROSCOPIC REPAIR ACL Right     BIOPSY CORE NEEDLE      prostate    CATARACT EXTRACTION Bilateral     COLONOSCOPY      HERNIA REPAIR      naval    JOINT REPLACEMENT      B/L knee    KNEE ARTHROSCOPY Left     LAPAROSCOPIC GASTRIC BANDING      KS ARTHRP KNE CONDYLE&PLATU MEDIAL&LAT COMPARTMENTS Left 02/15/2017    Procedure: TOTAL KNEE ARTHROPLASTY ;  Surgeon: Sal Ross MD;  [facility]: [facility];  Service: Orthopedics    KS ARTHRP KNE CONDYLE&PLATU MEDIAL&LAT COMPARTMENTS Right 10/10/2016  "   Procedure: ARTHROPLASTY KNEE TOTAL;  Surgeon: Sal Ross MD;  Location: [facility];  Service: Orthopedics    PROSTATECTOMY      robotic-assisted; Lilly Tay    REMOVAL GASTRIC BAND LAPAROSCOPIC N/A 8/30/2022    Procedure: LAPAROSCOPIC REMOVAL OF ADJUSTABLE GASTRIC BAND AND PORT  WITH INTRAOPERATIVE EGD;  Surgeon: Neal Yang MD;  Location: [facility];  Service: Bariatrics    RETINAL DETACHMENT SURGERY Bilateral     Lemus Eye    SEPTOPLASTY      SINUS SURGERY      TONSILLECTOMY      over age 10    UPPER GASTROINTESTINAL ENDOSCOPY      mutiple with BARRX/RFA    VASECTOMY      vas deferens        12/16/24 0758   OT Last Visit   OT Visit Date 12/16/24   Note Type   Note type Evaluation   Pain Assessment   Pain Assessment Tool 0-10   Pain Score 2   Pain Location/Orientation Orientation: Left;Location: Rib Cage   Pain Radiating Towards n/a   Pain Onset/Description Onset: Ongoing   Effect of Pain on Daily Activities n/a   Patient's Stated Pain Goal No pain   Hospital Pain Intervention(s) Medication (See MAR)   Multiple Pain Sites No   Restrictions/Precautions   Weight Bearing Precautions Per Order No   Other Precautions Cognitive;Fall Risk;Pain   Home Living   Type of Home House   Home Layout Two level;Bed/bath upstairs;1/2 bath on main level  (3 FREDIS w/ HR; w/ basement)   Bathroom Shower/Tub Walk-in shower   Bathroom Toilet Standard   Bathroom Equipment Shower chair   Bathroom Accessibility Accessible   Home Equipment Walker  (no AD used at baseline but availability of RW)   Prior Function   Level of [place] Independent with ADLs;Independent with functional mobility;Independent with IADLS   Lives With Spouse   Receives Help From Family   IADLs Independent with driving;Independent with meal prep;Independent with medication management   Falls in the last 6 months 5 to 10  (pt reports between 7-8 falls)   Vocational Retired  ()   Subjective   Subjective \"Oh I would like to get some coffee\" "   ADL   Where Assessed Edge of bed   Eating Assistance 6  Modified independent   Grooming Assistance 6  Modified Independent   UB Bathing Assistance 5  Supervision/Setup   LB Bathing Assistance 4  Minimal Assistance   UB Dressing Assistance 5  Supervision/Setup   LB Dressing Assistance 4  Minimal Assistance   Toileting Assistance  5  Supervision/Setup   Toileting Deficit Clothing management up;Clothing management down;Grab bar use  (S d/t poor safety awareness and balance concerns)   Bed Mobility   Supine to Sit 5  Supervision   Additional items Assist x 1;HOB elevated;Bedrails;Increased time required   Sit to Supine   (DNT; pt seated in dining room chair upon conclusion of session)   Additional Comments VC for bedrail utilization and proper body mechanics   Transfers   Sit to Stand   (CGA)   Additional items Assist x 1;Increased time required;Verbal cues   Stand to Sit   (CGA)   Additional items Assist x 1;Increased time required;Verbal cues   Toilet transfer   (CGA)   Additional items Assist x 1;Increased time required;Verbal cues   Additional Comments RW used; VC for safe hand placement, proper body mechanics and overall RW management d/t poor safety awareness observed   Functional Mobility   Functional Mobility   (CGA)   Additional Comments Pt completed long distance IADL mobility from room > dining room at CGA level w/ RW. No significant LOB observed, mild instability associated with poor safety awareness with RW use   Additional items Rolling walker   Balance   Static Sitting Good   Dynamic Sitting Fair +   Static Standing Fair   Dynamic Standing Fair -   Ambulatory Fair -   Activity Tolerance   Activity Tolerance Patient tolerated treatment well   Nurse Made Aware Yes, nursing staff made aware of session outcomes   RUE Assessment   RUE Assessment WFL   RUE Strength   RUE Overall Strength   (3+/5)   LUE Assessment   LUE Assessment WFL   LUE Strength   LUE Overall Strength   (3+/5)   Hand Function   Gross Motor  Coordination Functional   Fine Motor Coordination Functional   Sensation   Light Touch Partial deficits in the RLE  (R foot)   Vision-Basic Assessment   Current Vision Wears glasses all the time   Cognition   Overall Cognitive Status   (Questionable)   Arousal/Participation Alert;Responsive;Cooperative   Attention Within functional limits   Orientation Level Oriented X4   Memory Decreased recall of precautions;Decreased recall of recent events   Following Commands Follows one step commands without difficulty   Comments Pt agreeable to OT evaluation, pleasant   Assessment   Limitation Decreased ADL status;Decreased UE strength;Decreased Safe judgement during ADL;Decreased cognition;Decreased endurance;Decreased high-level ADLs   Prognosis Good   Assessment Pt is a 69 y.o. male seen for OT evaluation s/p admit to [facility] on 12/14/2024 w/ Severe episode of recurrent major depressive disorder, without psychotic features (HCC).  Comorbidities affecting pt's functional performance at time of assessment include: insomnia, Parkinson disease, AMY, sleep apnea, ambulatory dysfunction, dysphagia. Personal factors affecting pt at time of IE include:steps to enter environment, difficulty performing ADLS, difficulty performing IADLS , limited insight into deficits, decreased initiation and engagement , health management , and environment. Prior to admission, pt was I with ADLs and IADLs. Upon evaluation: the following deficits impact occupational performance: weakness, decreased strength, decreased balance, decreased tolerance, impaired problem solving, and decreased safety awareness. Pt to benefit from continued skilled OT tx while in the hospital to address deficits as defined above and maximize level of functional independence w ADL's and functional mobility. Occupational Performance areas to address include: grooming, bathing/shower, toilet hygiene, dressing, functional mobility, community mobility, and clothing  management. From OT standpoint, recommendation at time of d/c would with minimal intensity OT resources.   Goals   Patient Goals to get juice   Plan   Treatment Interventions ADL retraining;Functional transfer training;Endurance training;UE strengthening/ROM;Cognitive reorientation;Patient/family training;Energy conservation;Activityengagement   Goal Expiration Date 01/05/25   OT Treatment Day 0   OT Frequency   (1-3x)   Discharge Recommendation   Rehab Resource Intensity Level, OT III (Minimum Resource Intensity)   Additional Comments  The patient's raw score on the -PAC Daily Activity inpatient short form is 20, standardized score is 42.03, greater than 39.4. Patients at this level are likely to benefit from discharge with minimal intensity OT resources. Please refer to the recommendation of the Occupational Therapist for safe discharge planning.   AM-PAC Daily Activity Inpatient   Lower Body Dressing 3   Bathing 3   Toileting 3   Upper Body Dressing 3   Grooming 4   Eating 4   Daily Activity Raw Score 20   Daily Activity Standardized Score (Calc for Raw Score >=11) 42.03   [facility] Applied Cognition Inpatient   Following a Speech/Presentation 3   Understanding Ordinary Conversation 4   Taking Medications 4   Remembering Where Things Are Placed or Put Away 3   Remembering List of 4-5 Errands 3   Taking Care of Complicated Tasks 2   Applied Cognition Raw Score 19   Applied Cognition Standardized Score 39.77     GOALS:    Pt will achieve the following within specified time frame: LTG  Pt will achieve the following goals within 20 days    *ADL transfers with (I) for inc'd independence with ADLs/purposeful tasks    *UB ADL with (I) for inc'd independence with self cares    *LB ADL with (I) using AE prn for inc'd independence with self cares    *Toileting with (I) for clothing management and hygiene for return to PLOF with personal care    *Increase static stand balance and dyn stand balance to G- for inc'd safety with  standing purposeful tasks    *Increase stand tolerance x7 m for inc'd tolerance with standing purposeful tasks    *Bed mobility- (I) for inc'd independence to manage own comfort and initiate EOB & OOB purposeful tasks    *Participate in 10-15m UE therex to increase overall stamina/activity tolerance for purposeful tasks      Romina Rooney MS, OTR/L

## 2024-12-17 LAB
GLUCOSE SERPL-MCNC: 110 MG/DL (ref 65–140)
GLUCOSE SERPL-MCNC: 93 MG/DL (ref 65–140)

## 2024-12-17 PROCEDURE — 99232 SBSQ HOSP IP/OBS MODERATE 35: CPT | Performed by: PSYCHIATRY & NEUROLOGY

## 2024-12-17 PROCEDURE — 82948 REAGENT STRIP/BLOOD GLUCOSE: CPT

## 2024-12-17 PROCEDURE — 97116 GAIT TRAINING THERAPY: CPT

## 2024-12-17 PROCEDURE — 97530 THERAPEUTIC ACTIVITIES: CPT

## 2024-12-17 RX ORDER — XYLITOL/YERBA SANTA
5 AEROSOL, SPRAY WITH PUMP (ML) MUCOUS MEMBRANE 4 TIMES DAILY
Status: DISCONTINUED | OUTPATIENT
Start: 2024-12-17 | End: 2024-12-19 | Stop reason: HOSPADM

## 2024-12-17 RX ORDER — CYANOCOBALAMIN 1000 UG/ML
1000 INJECTION, SOLUTION INTRAMUSCULAR; SUBCUTANEOUS
Status: DISCONTINUED | OUTPATIENT
Start: 2024-12-18 | End: 2024-12-19 | Stop reason: HOSPADM

## 2024-12-17 RX ADMIN — QUETIAPINE FUMARATE 50 MG: 50 TABLET ORAL at 21:18

## 2024-12-17 RX ADMIN — TRAZODONE HYDROCHLORIDE 50 MG: 50 TABLET ORAL at 21:17

## 2024-12-17 RX ADMIN — AMLODIPINE BESYLATE 5 MG: 5 TABLET ORAL at 21:17

## 2024-12-17 RX ADMIN — OXYBUTYNIN CHLORIDE 5 MG: 5 TABLET ORAL at 08:46

## 2024-12-17 RX ADMIN — CARBIDOPA AND LEVODOPA 1 TABLET: 25; 100 TABLET ORAL at 11:41

## 2024-12-17 RX ADMIN — METFORMIN HYDROCHLORIDE 1000 MG: 500 TABLET, FILM COATED ORAL at 17:15

## 2024-12-17 RX ADMIN — FLUOXETINE HYDROCHLORIDE 40 MG: 20 CAPSULE ORAL at 08:46

## 2024-12-17 RX ADMIN — Medication 1 TABLET: at 08:46

## 2024-12-17 RX ADMIN — LATANOPROST 1 DROP: 50 SOLUTION OPHTHALMIC at 21:16

## 2024-12-17 RX ADMIN — SENNOSIDES AND DOCUSATE SODIUM 1 TABLET: 8.6; 5 TABLET ORAL at 21:17

## 2024-12-17 RX ADMIN — EZETIMIBE 5 MG: 10 TABLET ORAL at 08:45

## 2024-12-17 RX ADMIN — METOPROLOL SUCCINATE 25 MG: 25 TABLET, EXTENDED RELEASE ORAL at 21:17

## 2024-12-17 RX ADMIN — Medication 5 SPRAY: at 11:42

## 2024-12-17 RX ADMIN — METOPROLOL SUCCINATE 25 MG: 25 TABLET, EXTENDED RELEASE ORAL at 08:45

## 2024-12-17 RX ADMIN — Medication 5 SPRAY: at 21:16

## 2024-12-17 RX ADMIN — LOSARTAN POTASSIUM 100 MG: 50 TABLET, FILM COATED ORAL at 08:47

## 2024-12-17 RX ADMIN — Medication 5 SPRAY: at 14:12

## 2024-12-17 RX ADMIN — PANTOPRAZOLE SODIUM 40 MG: 40 TABLET, DELAYED RELEASE ORAL at 04:43

## 2024-12-17 RX ADMIN — HYDROXYZINE HYDROCHLORIDE 25 MG: 25 TABLET ORAL at 21:17

## 2024-12-17 RX ADMIN — METFORMIN HYDROCHLORIDE 1000 MG: 500 TABLET, FILM COATED ORAL at 08:47

## 2024-12-17 RX ADMIN — OXYBUTYNIN CHLORIDE 5 MG: 5 TABLET ORAL at 17:15

## 2024-12-17 RX ADMIN — CARBIDOPA AND LEVODOPA 1 TABLET: 25; 100 TABLET ORAL at 17:15

## 2024-12-17 NOTE — PHYSICAL THERAPY NOTE
PHYSICAL THERAPY TREATMENT NOTE  NAME:  Marcellus Fregoso  DATE: 12/17/24    Length Of Stay: 3  Performed at least 2 patient identifiers during session: Name and ID bracelet    TREATMENT:      12/17/24 0853   PT Last Visit   PT Visit Date 12/17/24   Note Type   Note Type Treatment   Pain Assessment   Pain Assessment Tool 0-10   Pain Score No Pain   Restrictions/Precautions   Weight Bearing Precautions Per Order No   Other Precautions Cognitive;Fall Risk   General   Family/Caregiver Present No   Cognition   Overall Cognitive Status   (questionable)   Arousal/Participation Alert;Responsive;Cooperative   Attention Within functional limits   Orientation Level Oriented X4   Memory Decreased recall of precautions;Decreased recall of recent events   Following Commands Follows one step commands without difficulty   Comments pt agreeable to PT treatment   Bed Mobility   Additional Comments N/T patient ambulating from BR upon arrival   Transfers   Sit to Stand 6  Modified independent   Stand to Sit 6  Modified independent   Stand pivot 6  Modified independent   Additional Comments pt completed STS and SPT with no AD, was able to stand to complete clothing management   Ambulation/Elevation   Gait pattern Wide MARQUITA;Decreased foot clearance;Short stride;Foward flexed   Gait Assistance 6  Modified independent   Assistive Device Rolling walker   Distance 150 ft x2   Ambulation/Elevation Additional Comments no LOB noted   Balance   Static Sitting Normal   Dynamic Sitting Good   Static Standing Fair +   Dynamic Standing Fair   Ambulatory Fair   Endurance Deficit   Endurance Deficit No   Activity Tolerance   Activity Tolerance Patient tolerated treatment well   Nurse Made Aware yes   Assessment   Prognosis Good   Problem List Impaired balance;Impaired judgement;Decreased safety awareness;Decreased endurance   Assessment Pt seen for PT treatment session this date with interventions consisting of gait training w/ emphasis on improving  pt's ability to ambulate level surfaces x 150 ft x2 with modified independent provided by therapist with RW and therapeutic activity consisting of training: sit<>stand transfers. Pt agreeable to PT treatment session upon arrival, pt found ambulating from BR, in no apparent distress and responsive. In comparison to previous session, pt with improvements in distance ambulated and amount of assistance required . Post session: all needs in reach and RN notified of session findings/recommendations. Continue to recommend Level III (Minimum Resource Intensity) at time of d/c in order to maximize pt's functional independence and safety w/ mobility. Pt continues to be functioning below baseline level. PT will continue to see pt during current hospitalization in order to address the deficits listed above and provide interventions consistent w/ POC in effort to achieve STGs.    Slime Diaz PTA   Barriers to Discharge Inaccessible home environment   Goals   Patient Goals to go home   LTG Expiration Date 01/05/24   Plan   Treatment/Interventions Elevations;Therapeutic exercise;Endurance training;Gait training   PT Frequency 2-3x/wk   Discharge Recommendation   Rehab Resource Intensity Level, PT III (Minimum Resource Intensity)   AM-PAC Basic Mobility Inpatient   Turning in Flat Bed Without Bedrails 3   Lying on Back to Sitting on Edge of Flat Bed Without Bedrails 3   Moving Bed to Chair 3   Standing Up From Chair Using Arms 3   Walk in Room 3   Climb 3-5 Stairs With Railing 3   Basic Mobility Inpatient Raw Score 18   Basic Mobility Standardized Score 41.05   [facility] Highest Level Of Mobility   -HLM Goal 6: Walk 10 steps or more   -HLM Achieved 7: Walk 25 feet or more   End of Consult   Patient Position at End of Consult All needs within reach         The patient's AM-PAC Basic Mobility Inpatient Short Form Raw Score is 18. A Raw score of greater than 16 suggests the patient may benefit from discharge to home.  Please also refer to the recommendation of the Physical Therapist for safe discharge planning.      Slime Diaz, PTA,PTA

## 2024-12-17 NOTE — ASSESSMENT & PLAN NOTE
Treat with:  Trazodone 50 mg at bedtime for insomnia.  Seroquel 50 mg at bedtime for insomnia.

## 2024-12-17 NOTE — PLAN OF CARE
Problem: Depression  Goal: Treatment Goal: Demonstrate behavioral control of depressive symptoms, verbalize feelings of improved mood/affect, and adopt new coping skills prior to discharge  Outcome: Progressing  Goal: Verbalize thoughts and feelings  Description: Interventions:  - Assess and re-assess patient's level of risk   - Engage patient in 1:1 interactions, daily, for a minimum of 15 minutes   - Encourage patient to express feelings, fears, frustrations, hopes   Outcome: Progressing  Goal: Refrain from harming self  Description: Interventions:  - Monitor patient closely, per order   - Supervise medication ingestion, monitor effects and side effects   Outcome: Progressing  Goal: Refrain from isolation  Description: Interventions:  - Develop a trusting relationship   - Encourage socialization   Outcome: Progressing  Goal: Refrain from self-neglect  Outcome: Progressing  Goal: Attend and participate in unit activities, including therapeutic, recreational, and educational groups  Description: Interventions:  - Provide therapeutic and educational activities daily, encourage attendance and participation, and document same in the medical record   Outcome: Progressing  Goal: Complete daily ADLs, including personal hygiene independently, as able  Description: Interventions:  - Observe, teach, and assist patient with ADLS  -  Monitor and promote a balance of rest/activity, with adequate nutrition and elimination   Outcome: Progressing     Problem: Anxiety  Goal: Anxiety is at manageable level  Description: Interventions:  - Assess and monitor patient's anxiety level.   - Monitor for signs and symptoms (heart palpitations, chest pain, shortness of breath, headaches, nausea, feeling jumpy, restlessness, irritable, apprehensive).   - Collaborate with interdisciplinary team and initiate plan and interventions as ordered.  - [place] patient to unit/surroundings  - Explain treatment plan  - Encourage participation in  care  - Encourage verbalization of concerns/fears  - Identify coping mechanisms  - Assist in developing anxiety-reducing skills  - Administer/offer alternative therapies  - Limit or eliminate stimulants  Outcome: Progressing     Problem: DISCHARGE PLANNING - CARE MANAGEMENT  Goal: Discharge to post-acute care or home with appropriate resources  Description: INTERVENTIONS:  - Conduct assessment to determine patient/family and health care team treatment goals, and need for post-acute services based on payer coverage, community resources, and patient preferences, and barriers to discharge  - Address psychosocial, clinical, and financial barriers to discharge as identified in assessment in conjunction with the patient/family and health care team  - Arrange appropriate level of post-acute services according to patient’s   needs and preference and payer coverage in collaboration with the physician and health care team  - Communicate with and update the patient/family, physician, and health care team regarding progress on the discharge plan  - Arrange appropriate transportation to post-acute venues  Outcome: Progressing

## 2024-12-17 NOTE — NURSING NOTE
Pt was visible on the unit, and social with peers. Cooperative with staff during care. Compliant with medication. Endorsed anxiety r/t discharge. Pt is unsure if he want's to sign a 72 hour release from care form, stated that he want's to talk with case management and the doctors before making a decision.  Denied SI, HI, or AVH.  Affect was appropriate to circumstance. Eye contact was good. Pt speaks with a stutter. Appearance and hygiene was unremarkable. Made no c/o pain or discomfort. Safety checks continued.

## 2024-12-17 NOTE — ASSESSMENT & PLAN NOTE
Patient presents with stuttering which he states is at baseline and has been present since childhood, likely childhood onset fluency disorder and likely unrelated to Parkinson's diagnosis, new CVA or psychotropics.

## 2024-12-17 NOTE — PROGRESS NOTES
Progress Note - Behavioral Health   Name: Marcellus Fregoso 69 y.o. male I MRN: [id number]  Unit/Bed#: [facility] 203-02 I Date of Admission: 12/14/2024   Date of Service: 12/17/2024 I Hospital Day: 3  Encounter: [id number]        Assessment & Plan  Severe episode of recurrent major depressive disorder, without psychotic features (HCC)  The patient continues to show improvement.   Subjectively, he denies experiencing any symptoms and expresses remorse regarding his suicide attempt.   Objectively, he remains somewhat guarded and distrustful of his treatment team, exhibiting suspicion when asked to sign his treatment plan. He is fixated on his discharge from the facility.   Additionally, he displays some demanding behaviors, though no agitation has been observed.  Patient rescinded his 72-hours notice  The patient is tentatively scheduled for discharge on Thursday, December 19, 2024, contingent on clinical stability and absence of any adverse events or deterioration in condition.    Continue Prozac 40 mg daily for depressive symptoms.    Parkinson disease (HCC)  Sinemet  mg tablet 3 times daily for Parkinson's disease, managed by gerontology.  Side effects may include increased perceptual disturbances, behavioral disturbances, impulsivity, increasing libido, and may have been contributing to symptomatology prior to admission, recommend caution with further dose increases.    AMY (generalized anxiety disorder)  Prozac 40 mg daily for anxiety symptoms.  Atarax 25 mg every 6 hours as needed for severe anxiety and panic attacks.  PARQ discussed about hydroxyzine including arrhythmia/cardiovascular effects, anticholinergic effects, drowsiness, headaches, nausea, potential for drug interactions, and others.    Insomnia  Treat with:  Trazodone 50 mg at bedtime for insomnia.  Seroquel 50 mg at bedtime for insomnia.      Plan:  All current active meds have been reviewed.  No psychotropic medications changes at this  "time.  Continue current psychotropic medications.  Medical management per medical team.    Continue treatment with group therapy, milieu therapy and occupational therapy  Discharge planning  Will observe if safe for discharge once 72 hour notice expires  Will observe if continues to improve  Possible discharge on Thursday, December 19, 2024, contingent on clinical stability and absence of any adverse events or deterioration in condition.    Risks / Benefits of Treatment:  Risks, benefits, and possible side effects of medications explained to patient and patient verbalizes understanding and agreement for treatment.      Subjective:  The patient was evaluated this morning for continuity of care and no acute distress noted throughout the evaluation. Over the past 24 hours staff noted that Marcellus has been demonstrating \"needy\" behavours, had interrupted sleep. Patient has been medication adherent.    Today on evaluation,Marcellus reports no current symptoms and expresses remorse concerning his prior suicide attempt.  The patient presents as somewhat guarded and exhibits a degree of distrust towards the treatment team, particularly when approached about signing his treatment plan. He remains preoccupied with the prospect of discharge from the facility. His behavior includes some demanding tendencies; however, there is no evidence of agitation at this time. Overall his conditon can be considered as improving.    In terms of safety, Marcellus Denies suicidal ideation and thoughts of wanting to harm self. Denies homicidal ideation. Marcellus Denies auditory hallucinations and visual hallucinations. Denies symptoms consistent with delusional thought content. Denies symptoms consistent with kevin/hypomania.      Psychiatric Review of Systems:  Behavior over the last 24 hours: improving.   Sleep: slept off and on  Appetite: improving  Medication side effects: No   ROS: no complaints    Mental Status Examination:  Appearance:  " "casually dressed, looks stated age   Behavior:  cooperative, calm, guarded, superficially pleasant   Speech:  dysarthric, stuttering   Mood:  \"I am feeling much better\"   Affect:  appropriate, mood-congruent   Thought Process:  goal directed, linear, perseverative   Associations: intact associations   Thought Content:  no overt delusions   Perceptual Disturbances: no visual hallucinations, denies auditory hallucinations when asked, does not appear responding to internal stimuli   Risk Potential: Suicidal ideation - None at present, remorseful about suicide attempt  Homicidal ideation - None   Sensorium:  oriented to person, place, and time/date   Memory:  recent and remote memory grossly intact   Consciousness:  alert and awake   Attention/Concentration: attention span and concentration are age appropriate   Insight:  improving and moderate   Judgment: improving and moderate   Gait/Station: unstable gait, slow gait, uses walker   Motor Activity: abnormal movement noted: minimal involuntary oral movements present, minimal hand tremor present       Vital signs in last 24 hours:  Temp:  [97.3 °F (36.3 °C)-97.8 °F (36.6 °C)] 97.3 °F (36.3 °C)  HR:  [55-69] 55  BP: (107-138)/(53-70) 113/59  Resp:  [18] 18  SpO2:  [95 %-97 %] 95 %       Laboratory results: I have personally reviewed all pertinent laboratory/tests results  Most Recent Labs:   Lab Results   Component Value Date    WBC 7.81 12/15/2024    RBC 4.55 12/15/2024    HGB 13.6 12/15/2024    HCT 40.3 12/15/2024     12/15/2024    RDW 12.9 12/15/2024    NEUTROABS 3.64 12/15/2024    TOTANEUTABS 16.28 (H) 11/17/2021    SODIUM 136 12/15/2024    K 3.8 12/15/2024     12/15/2024    CO2 29 12/15/2024    BUN 15 12/15/2024    CREATININE 1.06 12/15/2024    GLUC 91 12/15/2024    CALCIUM 9.3 12/15/2024    AST 13 12/15/2024    ALT 13 12/15/2024    ALKPHOS 56 12/15/2024    TP 5.9 (L) 12/15/2024    ALB 3.6 12/15/2024    TBILI 0.53 12/15/2024    CHOLESTEROL 111 12/15/2024 "    HDL 40 12/15/2024    TRIG 82 12/15/2024    LDLCALC 55 12/15/2024    NONHDLC 71 12/15/2024    MQQ7MGPMOZOG 0.779 12/15/2024    HGBA1C 6.4 (H) 12/16/2024     12/16/2024       Progress Toward Goals: improving    Current Medications:  Current Facility-Administered Medications   Medication Dose Route Frequency Provider Last Rate    acetaminophen  650 mg Oral Q4H PRN ED Graham      acetaminophen  650 mg Oral Q4H PRN ED Graham      acetaminophen  975 mg Oral Q6H PRN ED Graham      amLODIPine  5 mg Oral HS Mariano Mayo MD      benztropine  0.5 mg Oral Q4H PRN Max 6/day ED Graham      carbidopa-levodopa  1 tablet Oral TID Mariano Mayo MD      [START ON 12/18/2024] cyanocobalamin  1,000 mcg Intramuscular Q30 Days Mami Maria PA-C      ezetimibe  5 mg Oral Daily Mariano Mayo MD      FLUoxetine  40 mg Oral Daily Efrain Pereira DO      hydrOXYzine HCL  25 mg Oral Q6H PRN Max 4/day ED Graham      hydrOXYzine HCL  50 mg Oral Q6H PRN Max 4/day ED Graham      latanoprost  1 drop Both Eyes HS Mariano Mayo MD      LORazepam  1 mg Intramuscular Q6H PRN Max 3/day ED Graham      LORazepam  1 mg Oral Q6H PRN Max 3/day ED Graham      losartan  100 mg Oral Daily Mariano Mayo MD      melatonin  3 mg Oral HS PRN ED Graham      metFORMIN  1,000 mg Oral BID With Meals Mariano Mayo MD      metoprolol succinate  25 mg Oral BID Mariano Mayo MD      multivitamin-minerals  1 tablet Oral Daily Mariano Mayo MD      OLANZapine  5 mg Intramuscular Q3H PRN Max 3/day ED Graham      oxybutynin  5 mg Oral BID Mariano Mayo MD      pantoprazole  40 mg Oral Early Morning Mariano Mayo MD      pravastatin  40 mg Oral Weekly Mariano Mayo MD      QUEtiapine  100 mg Oral Q3H PRN Max 3/day ED Graham      QUEtiapine  25 mg Oral Q6H PRN Max 4/day Sohail Goddard, ED      QUEtiapine   50 mg Oral Q6H PRN Max 4/day ED Graham      QUEtiapine  50 mg Oral HS Efrain Pereira DO      saliva substitute  5 spray Mouth/Throat 4x daily Mami Maria PA-C      senna-docusate sodium  1 tablet Oral Daily PRN ED Graham      senna-docusate sodium  1 tablet Oral HS Mariano Mayo MD      sucralfate  1 g Oral TID PRN Mariano Mayo MD      traZODone  50 mg Oral HS Efrain Pereira DO         Counseling / Coordination of Care:  Patient's progress discussed with staff in treatment team meeting.  Medication education provided to patient.  Patient's progress reviewed with case management staff.  Importance of medication and treatment compliance reviewed with patient.        This note has been constructed using a voice recognition system. There may be translation, syntax,  or grammatical errors. If you have any questions, please contact the dictating provider.     Bal Keenan MD    12/17/24  Psychiatry Resident, PGY-2

## 2024-12-17 NOTE — NURSING NOTE
Patient visible in milieu with peers, medication compliant, pleasant upon approach, attends group therapies and interacts appropriately, and is cooperative. Patient denies all, no anxiety/depression, denies SI/HI and hallucinations. Continued care and safety rounds in progress.

## 2024-12-17 NOTE — SOCIAL WORK
Cm met with pt in his room. CM addressed pt questions/concerns.  Confirmed d/c plans for Thurs. Pt expressed gratitude.  CM to coordinate d/c planning with pt spouse. Pt was calm and appropriate. CM encouraged pt participation in group as group was starting.

## 2024-12-17 NOTE — ASSESSMENT & PLAN NOTE
The patient continues to show improvement.   Subjectively, he denies experiencing any symptoms and expresses remorse regarding his suicide attempt.   Objectively, he remains somewhat guarded and distrustful of his treatment team, exhibiting suspicion when asked to sign his treatment plan. He is fixated on his discharge from the facility.   Additionally, he displays some demanding behaviors, though no agitation has been observed.  Patient rescinded his 72-hours notice  The patient is tentatively scheduled for discharge on Thursday, December 19, 2024, contingent on clinical stability and absence of any adverse events or deterioration in condition.    Continue Prozac 40 mg daily for depressive symptoms.

## 2024-12-17 NOTE — PROGRESS NOTES
"Progress Note - Marcellus Fregoso 69 y.o. male MRN: [id number]    Unit/Bed#: [facility] 203-02 Encounter: [id number]        Subjective:   Patient seen and examined at bedside after reviewing the chart and discussing the case with the caring staff.      Patient examined at bedside.  Patient complains of dry mouth.  He states he uses a mouth spray at home to help with this and requesting while here.    Physical Exam   Vitals: Blood pressure 113/59, pulse 55, temperature (!) 97.3 °F (36.3 °C), temperature source Temporal, resp. rate 18, height 5' 8\" (1.727 m), weight 125 kg (275 lb 9.6 oz), SpO2 95%.,Body mass index is 41.9 kg/m².  Constitutional: Patient in no acute distress.  HEENT: PERR, EOMI, MMM.  Cardiovascular: Normal rate and regular rhythm.    Pulmonary/Chest: Effort normal and breath sounds normal.   Abdomen: Soft, + BS, NT.    Assessment/Plan:  Marcellus Fregoso is a(n) 69 y.o. male with MDD.     Cardiac with hx of HTN, HLD.  Patient is on amlodipine 5 mg daily, losartan 100 mg daily, Toprol-XL 25 mg twice daily, pravastatin 40 mg weekly (rosuvastatin nonform), Zetia 5 mg daily.  Type 2 diabetes mellitus.  Hgb A1c 6.4% on 12/16/24.  Continue home metformin 1000 mg twice daily.  Carb controlled diet.  Accu-Cheks twice daily.   GERD/Molina's esophagus/hiatal hernia.  Patient is on Protonix 40 mg daily.  Carafate 1 g TID as needed.  Parkinson disease.  Patient is on Sinemet  mg TID.  Glaucoma.  Continue Xalatan eye drops at bedtime.  Gait abnormality with falls.  PT OT consulted  DJD/OA. Tylenol as needed.  Urinary incontinence.  Patient is on Ditropan XL 5 mg twice daily (Sanctura 20 mg twice daily nonform).  Patient requested to be discharged on Ditropan XL as it works better than Sanctura.  Constipation.  Patient started on Senokot-S at bedtime 12/14.  Vitamin B12 deficiency.  Patient started on monthly vitamin B12 injections.  Dry mouth.  Patient started on mouth kote spray QID.     The patient was discussed " with Dr. Mayo and he is in agreement with the above note.

## 2024-12-17 NOTE — PROGRESS NOTES
12/17/24    Team Meeting   Meeting Type Daily Rounds   Team Members Present   Team Members Present Physician;Nurse;;Occupational Therapist   Physician Team Member Elvira WARD   Nursing Team Member Narciso BOB   Social Work Team Member Sanjeev ABDALLA   OT Team Member Bryn SAMPSON   Patient/Family Present   Patient Present No   Patient's Family Present No   201, irritable, signed 72 this am 8:30, suspicious, demanding, intrusive, grandiose, med compliant

## 2024-12-17 NOTE — PLAN OF CARE
Problem: PHYSICAL THERAPY ADULT  Goal: Performs mobility at highest level of function for planned discharge setting.  See evaluation for individualized goals.  Description: Treatment/Interventions: Functional transfer training, Therapeutic exercise, Endurance training, Gait training, Bed mobility, Equipment eval/education  Equipment Recommended: Walker       See flowsheet documentation for full assessment, interventions and recommendations.  Outcome: Progressing  Note: Prognosis: Good  Problem List: Impaired balance, Impaired judgement, Decreased safety awareness, Decreased endurance  Assessment: Pt seen for PT treatment session this date with interventions consisting of gait training w/ emphasis on improving pt's ability to ambulate level surfaces x 150 ft x2 with modified independent provided by therapist with RW and therapeutic activity consisting of training: sit<>stand transfers. Pt agreeable to PT treatment session upon arrival, pt found ambulating from BR, in no apparent distress and responsive. In comparison to previous session, pt with improvements in distance ambulated and amount of assistance required . Post session: all needs in reach and RN notified of session findings/recommendations. Continue to recommend Level III (Minimum Resource Intensity) at time of d/c in order to maximize pt's functional independence and safety w/ mobility. Pt continues to be functioning below baseline level. PT will continue to see pt during current hospitalization in order to address the deficits listed above and provide interventions consistent w/ POC in effort to achieve STGs.    Slime Diaz, MIKY  Barriers to Discharge: Inaccessible home environment     Rehab Resource Intensity Level, PT: III (Minimum Resource Intensity)    See flowsheet documentation for full assessment.

## 2024-12-18 LAB
GLUCOSE SERPL-MCNC: 102 MG/DL (ref 65–140)
GLUCOSE SERPL-MCNC: 162 MG/DL (ref 65–140)

## 2024-12-18 PROCEDURE — 97110 THERAPEUTIC EXERCISES: CPT

## 2024-12-18 PROCEDURE — 97116 GAIT TRAINING THERAPY: CPT

## 2024-12-18 PROCEDURE — 82948 REAGENT STRIP/BLOOD GLUCOSE: CPT

## 2024-12-18 PROCEDURE — 97530 THERAPEUTIC ACTIVITIES: CPT

## 2024-12-18 RX ORDER — EZETIMIBE 10 MG/1
5 TABLET ORAL DAILY
Start: 2024-12-18

## 2024-12-18 RX ORDER — FLUOXETINE 40 MG/1
40 CAPSULE ORAL DAILY
Qty: 30 CAPSULE | Refills: 1 | Status: SHIPPED | OUTPATIENT
Start: 2024-12-18

## 2024-12-18 RX ORDER — AMOXICILLIN 250 MG
1 CAPSULE ORAL
Start: 2024-12-18 | End: 2024-12-19

## 2024-12-18 RX ORDER — OXYBUTYNIN CHLORIDE 5 MG/1
5 TABLET ORAL 2 TIMES DAILY
Start: 2024-12-18 | End: 2024-12-19

## 2024-12-18 RX ORDER — LANOLIN ALCOHOL/MO/W.PET/CERES
1000 CREAM (GRAM) TOPICAL DAILY
Start: 2024-12-18 | End: 2024-12-19

## 2024-12-18 RX ORDER — QUETIAPINE FUMARATE 50 MG/1
50 TABLET, FILM COATED ORAL
Qty: 90 TABLET | Refills: 0 | Status: SHIPPED | OUTPATIENT
Start: 2024-12-18

## 2024-12-18 RX ADMIN — Medication 5 SPRAY: at 14:17

## 2024-12-18 RX ADMIN — Medication 5 SPRAY: at 12:02

## 2024-12-18 RX ADMIN — EZETIMIBE 5 MG: 10 TABLET ORAL at 08:36

## 2024-12-18 RX ADMIN — CYANOCOBALAMIN 1000 MCG: 1000 INJECTION, SOLUTION INTRAMUSCULAR; SUBCUTANEOUS at 08:35

## 2024-12-18 RX ADMIN — CARBIDOPA AND LEVODOPA 1 TABLET: 25; 100 TABLET ORAL at 18:02

## 2024-12-18 RX ADMIN — SENNOSIDES AND DOCUSATE SODIUM 1 TABLET: 8.6; 5 TABLET ORAL at 21:25

## 2024-12-18 RX ADMIN — HYDROXYZINE HYDROCHLORIDE 25 MG: 25 TABLET ORAL at 21:26

## 2024-12-18 RX ADMIN — Medication 3 MG: at 21:26

## 2024-12-18 RX ADMIN — CARBIDOPA AND LEVODOPA 1 TABLET: 25; 100 TABLET ORAL at 09:13

## 2024-12-18 RX ADMIN — METFORMIN HYDROCHLORIDE 1000 MG: 500 TABLET, FILM COATED ORAL at 17:15

## 2024-12-18 RX ADMIN — QUETIAPINE FUMARATE 50 MG: 50 TABLET ORAL at 21:26

## 2024-12-18 RX ADMIN — CARBIDOPA AND LEVODOPA 1 TABLET: 25; 100 TABLET ORAL at 14:17

## 2024-12-18 RX ADMIN — METFORMIN HYDROCHLORIDE 1000 MG: 500 TABLET, FILM COATED ORAL at 08:34

## 2024-12-18 RX ADMIN — Medication 5 SPRAY: at 21:25

## 2024-12-18 RX ADMIN — LATANOPROST 1 DROP: 50 SOLUTION OPHTHALMIC at 21:25

## 2024-12-18 RX ADMIN — TRAZODONE HYDROCHLORIDE 50 MG: 50 TABLET ORAL at 21:25

## 2024-12-18 RX ADMIN — OXYBUTYNIN CHLORIDE 5 MG: 5 TABLET ORAL at 17:15

## 2024-12-18 RX ADMIN — LOSARTAN POTASSIUM 100 MG: 50 TABLET, FILM COATED ORAL at 08:34

## 2024-12-18 RX ADMIN — Medication 1 TABLET: at 08:35

## 2024-12-18 RX ADMIN — Medication 5 SPRAY: at 17:17

## 2024-12-18 RX ADMIN — OXYBUTYNIN CHLORIDE 5 MG: 5 TABLET ORAL at 08:35

## 2024-12-18 RX ADMIN — FLUOXETINE HYDROCHLORIDE 40 MG: 20 CAPSULE ORAL at 08:35

## 2024-12-18 RX ADMIN — AMLODIPINE BESYLATE 5 MG: 5 TABLET ORAL at 21:25

## 2024-12-18 RX ADMIN — PANTOPRAZOLE SODIUM 40 MG: 40 TABLET, DELAYED RELEASE ORAL at 05:57

## 2024-12-18 RX ADMIN — METOPROLOL SUCCINATE 25 MG: 25 TABLET, EXTENDED RELEASE ORAL at 21:25

## 2024-12-18 NOTE — PROGRESS NOTES
12/18/24   Team Meeting   Meeting Type Daily Rounds   Team Members Present   Team Members Present Physician;Nurse;;Occupational Therapist   Physician Team Member Elvira MCMAHON   Nursing Team Member Bee BOB   Social Work Team Member Sanjeev ABDALLA   OT Team Member Briseida CHRISTOPHER   Patient/Family Present   Patient Present No   Patient's Family Present No   201, rescind 72 hr, denies all, d/c jonathan with psych, pleasant, visible, med compliant

## 2024-12-18 NOTE — SOCIAL WORK
"CM spoke to pt spouse Pete [phone]. CM reviewed d/c plan and supports. Pete in agreement. She states pt sounds \"rested\" from phone calls.  As per Pete, pt psych works closely with pt neurologist. She has no concerns with pt returning home and plans to  pt at [facility] at 11am.      CM placed call to pcp JOHNNY Bernstein [phone] and spoke to Zulma. CM secured [facility] appt on 12/30/2024 at 3:20pm with Dr Yogi SNATOYO.    CM confirmed med mgmt appt with Mahi Tidwell via telehealth secured on 1/6/24 at 10:45am.     CM confirmed therapy appt with Umesh at  [facility] [phone] on 12-26 at 2:30pm.    "

## 2024-12-18 NOTE — PHYSICAL THERAPY NOTE
PHYSICAL THERAPY TREATMENT NOTE  NAME:  Marcellus Fregoso  DATE: 12/18/24    Length Of Stay: 4  Performed at least 2 patient identifiers during session: Name and ID bracelet    TREATMENT:      12/18/24 1140   PT Last Visit   PT Visit Date 12/18/24   Note Type   Note Type Treatment   Pain Assessment   Pain Assessment Tool 0-10   Pain Score No Pain   Restrictions/Precautions   Other Precautions Fall Risk;Cognitive   General   Family/Caregiver Present No   Cognition   Overall Cognitive Status   (questionable)   Arousal/Participation Alert;Responsive;Cooperative   Attention Within functional limits   Memory Decreased recall of recent events;Decreased recall of precautions   Following Commands Follows one step commands without difficulty   Comments pt agreeable to PT treatment   Transfers   Sit to Stand 7  Independent   Stand to Sit 7  Independent   Stand pivot 6  Modified independent   Additional Comments completed functional transfers with no AD   Ambulation/Elevation   Gait pattern Decreased foot clearance;Inconsistent catalino   Gait Assistance 6  Modified independent   Assistive Device Straight cane   Distance 200 ft x1   Ambulation/Elevation Additional Comments steady gait noted   Balance   Static Sitting Normal   Dynamic Sitting Good   Static Standing Fair +   Dynamic Standing Fair +   Ambulatory Fair   Endurance Deficit   Endurance Deficit No   Activity Tolerance   Activity Tolerance Patient tolerated treatment well   Nurse Made Aware yes   Exercises   Heelslides Sitting;20 reps;AROM;Bilateral   Hip Abduction Sitting;20 reps;AROM;Bilateral   Hip Adduction Sitting;20 reps;AROM;Bilateral   Knee AROM Long Arc Quad Sitting;20 reps;AROM;Bilateral   Ankle Pumps Sitting;20 reps;AROM;Bilateral   Marching Sitting;20 reps;AROM;Bilateral   Assessment   Prognosis Good   Problem List Impaired judgement;Decreased safety awareness;Decreased cognition   Assessment Pt seen for PT treatment session this date with interventions  consisting of gait training w/ emphasis on improving pt's ability to ambulate level surfaces x 200 ft x1 with modified independent provided by therapist with SPC, Therapeutic exercise consisting of: AROM 20 reps B LE in sitting position, and therapeutic activity consisting of training: sit<>stand transfers. Pt agreeable to PT treatment session upon arrival, pt found seated OOB in chair, in no apparent distress and responsive. In comparison to previous session, pt with improvements in distance ambulated . Post session: all needs in reach and RN notified of session findings/recommendations. Continue to recommend Level III (Minimum Resource Intensity) at time of d/c in order to maximize pt's functional independence and safety w/ mobility. Pt continues to be functioning below baseline level. PT will continue to see pt during current hospitalization in order to address the deficits listed above and provide interventions consistent w/ POC in effort to achieve STGs.    Slime Diaz, PTA   Goals   Patient Goals to go home   LTG Expiration Date 01/05/24   Plan   PT Frequency 2-3x/wk   Discharge Recommendation   Rehab Resource Intensity Level, PT III (Minimum Resource Intensity)   AM-PAC Basic Mobility Inpatient   Turning in Flat Bed Without Bedrails 3   Lying on Back to Sitting on Edge of Flat Bed Without Bedrails 3   Moving Bed to Chair 3   Standing Up From Chair Using Arms 3   Walk in Room 3   Climb 3-5 Stairs With Railing 3   Basic Mobility Inpatient Raw Score 18   Basic Mobility Standardized Score 41.05   [facility] Highest Level Of Mobility   JH-HLM Goal 6: Walk 10 steps or more   JH-HLM Achieved 7: Walk 25 feet or more   End of Consult   Patient Position at End of Consult All needs within reach         The patient's AM-PAC Basic Mobility Inpatient Short Form Raw Score is 18. A Raw score of greater than 16 suggests the patient may benefit from discharge to home. Please also refer to the recommendation of the  Physical Therapist for safe discharge planning.      Slime Diaz, PTA,PTA

## 2024-12-18 NOTE — NURSING NOTE
Very pleasant and talkative during assessment. Patient out in community and social. Denied anxiety, depression and suicidal ideations. Talked freely about OD attempt, stated that this will never happen again.  No acute behaviors behaviors during shift.  No complaints of pain. Last BM stated as today.  Complaint with medications and HS snack.  Fall protocol continues. Medication education given with good understanding. Care Plan to be reviewed and amended.  Safe environment provided.  Maintained on q 15 minute safety checks.

## 2024-12-18 NOTE — OCCUPATIONAL THERAPY NOTE
12/18/24 1150   OT Last Visit   OT Visit Date 12/18/24   Note Type   Note Type Treatment   Pain Assessment   Pain Assessment Tool 0-10   Pain Score No Pain   Restrictions/Precautions   Weight Bearing Precautions Per Order No   Other Precautions Fall Risk;Cognitive   Transfers   Sit to Stand 7  Independent   Stand to Sit 7  Independent   Stand pivot 6  Modified independent   Functional Mobility   Functional Mobility 6  Modified independent   Additional Comments Pt ambulates with SPC and Mod I x 100 feet.   Additional items Rolling walker   Therapeutic Excerise-Strength   UE Strength Yes   Right Upper Extremity- Strength   R Shoulder Flexion;Extension;Horizontal ABduction  (horiz add, scap pro/ret)   R Elbow Elbow flexion;Elbow extension   R Wrist   (wrist rotation)   R Position Seated   Equipment Dumbbell   R Weight/Reps/Sets 1.5# weight x 20 reps   Left Upper Extremity-Strength   L Weights/Reps/Sets TE same as RUE above   Cognition   Overall Cognitive Status   (questionable)   Arousal/Participation Alert;Responsive;Cooperative   Attention Within functional limits   Orientation Level Oriented X4   Memory Decreased recall of precautions;Decreased recall of recent events   Following Commands Follows one step commands with increased time or repetition   Comments Pt agreeable to OT treatment   Activity Tolerance   Activity Tolerance Patient tolerated treatment well   Assessment   Assessment Patient participated in Skilled OT session this date with interventions consisting of functional transfer training, Energy Conservation techniques and therapeutic exercise to: increase functional use of BUEs, increase BUE muscle strength  . Patient agreeable to OT treatment session, upon arrival patient was found seated OOB to Chair, alert, responsive , and in no apparent distress.  Patient transfers sit to stand (I)ly and completes functional mobility x 100 feet with use of SPC with Mod I. Patient then performs UB TE using 1.5#  weight as detailed in flow sheet. Following TE, patient declined further activity. Session ended with patient seated OOB to chair, all needs met, and call bell within reach. In comparison to previous session, patient with improvements in UB strength, endurance, and functional transfers/mobility. Patient requiring verbal cues for correct technique, verbal cues for pacing thru activity steps, and frequent rest periods. Patient performance  demonstrated good carryover of learned techniques and strategies to facilitate safety during functional tasks. Patient continues to be functioning below baseline level, occupational performance remains limited secondary to factors listed above and increased risk for falls and injury.   From OT standpoint, recommendation at time of d/c would be Level III (Minimum Resource Intensity).  Patient to benefit from continued Occupational Therapy treatment while in the hospital to address deficits as defined above and maximize level of functional independence with ADLs and functional mobility in order to return to OF.   Plan   Treatment Interventions Functional transfer training;UE strengthening/ROM;Energy conservation   Goal Expiration Date 01/05/25   OT Treatment Day 1   OT Frequency   (1-3x/wk)   Discharge Recommendation   Rehab Resource Intensity Level, OT III (Minimum Resource Intensity)   Additional Comments  The patient's raw score on the AM-PAC Daily Activity Inpatient Short Form is 20. A raw score of greater than or equal to 19 suggests the patient may benefit from discharge to home. Please refer to the recommendation of the Occupational Therapist for safe discharge planning.   AM-PAC Daily Activity Inpatient   Lower Body Dressing 3   Bathing 3   Toileting 3   Upper Body Dressing 3   Grooming 4   Eating 4   Daily Activity Raw Score 20   Daily Activity Standardized Score (Calc for Raw Score >=11) 42.03   AM-[facility] Applied Cognition Inpatient   Following a Speech/Presentation 3    Understanding Ordinary Conversation 4   Taking Medications 4   Remembering Where Things Are Placed or Put Away 3   Remembering List of 4-5 Errands 3   Taking Care of Complicated Tasks 2   Applied Cognition Raw Score 19   Applied Cognition Standardized Score 39.77       Nicole Shah

## 2024-12-18 NOTE — PLAN OF CARE
Problem: OCCUPATIONAL THERAPY ADULT  Goal: Performs self-care activities at highest level of function for planned discharge setting.  See evaluation for individualized goals.  Description: Treatment Interventions: ADL retraining, Functional transfer training, Endurance training, UE strengthening/ROM, Cognitive reorientation, Patient/family training, Energy conservation, Activityengagement          See flowsheet documentation for full assessment, interventions and recommendations.   Outcome: Progressing  Note: Limitation: Decreased ADL status, Decreased UE strength, Decreased Safe judgement during ADL, Decreased cognition, Decreased endurance, Decreased high-level ADLs  Prognosis: Good  Assessment: Patient participated in Skilled OT session this date with interventions consisting of functional transfer training, Energy Conservation techniques and therapeutic exercise to: increase functional use of BUEs, increase BUE muscle strength  . Patient agreeable to OT treatment session, upon arrival patient was found seated OOB to Chair, alert, responsive , and in no apparent distress.  Patient transfers sit to stand (I)ly and completes functional mobility x 100 feet with use of SPC with Mod I. Patient then performs UB TE using 1.5# weight as detailed in flow sheet. Following TE, patient declined further activity. Session ended with patient seated OOB to chair, all needs met, and call bell within reach. In comparison to previous session, patient with improvements in UB strength, endurance, and functional transfers/mobility. Patient requiring verbal cues for correct technique, verbal cues for pacing thru activity steps, and frequent rest periods. Patient performance  demonstrated good carryover of learned techniques and strategies to facilitate safety during functional tasks. Patient continues to be functioning below baseline level, occupational performance remains limited secondary to factors listed above and increased risk  for falls and injury.   From OT standpoint, recommendation at time of d/c would be Level III (Minimum Resource Intensity).  Patient to benefit from continued Occupational Therapy treatment while in the hospital to address deficits as defined above and maximize level of functional independence with ADLs and functional mobility in order to return to PLOF.     Rehab Resource Intensity Level, OT: III (Minimum Resource Intensity)

## 2024-12-18 NOTE — PROGRESS NOTES
"Progress Note - Marcellus Fregoso 69 y.o. male MRN: [id number]    Unit/Bed#: [facility] 203-02 Encounter: [id number]        Subjective:   Patient seen and examined at bedside after reviewing the chart and discussing the case with the caring staff.      Patient examined at bedside.  Patient denies any acute symptoms.     Physical Exam   Vitals: Blood pressure 99/54, pulse (!) 52, temperature (!) 97.1 °F (36.2 °C), temperature source Temporal, resp. rate 16, height 5' 8\" (1.727 m), weight 125 kg (275 lb 9.6 oz), SpO2 92%.,Body mass index is 41.9 kg/m².  Constitutional: Patient in no acute distress.  HEENT: PERR, EOMI, MMM.  Cardiovascular: Normal rate and regular rhythm.    Pulmonary/Chest: Effort normal and breath sounds normal.   Abdomen: Soft, + BS, NT.    Assessment/Plan:  Marcellus Fregoso is a(n) 69 y.o. male with MDD.     Cardiac with hx of HTN, HLD.  Patient is on amlodipine 5 mg daily, losartan 100 mg daily, Toprol-XL 25 mg twice daily, pravastatin 40 mg weekly (rosuvastatin nonform), Zetia 5 mg daily.  Type 2 diabetes mellitus.  Hgb A1c 6.4% on 12/16/24.  Continue home metformin 1000 mg twice daily.  Carb controlled diet.  Accu-Cheks twice daily.   GERD/Molina's esophagus/hiatal hernia.  Patient is on Protonix 40 mg daily.  Carafate 1 g TID as needed.  Parkinson disease.  Patient is on Sinemet  mg TID.  Glaucoma.  Continue Xalatan eye drops at bedtime.  Gait abnormality with falls.  PT OT consulted  DJD/OA. Tylenol as needed.  Urinary incontinence.  Patient is on Ditropan 5 mg twice daily (Sanctura 20 mg twice daily nonform).  Patient requested to be discharged on Ditropan as it works better than Sanctura.  Constipation.  Patient started on Senokot-S at bedtime 12/14.  Vitamin B12 deficiency.  Patient started on monthly vitamin B12 injections.  Dry mouth.  Patient started on mouth kote spray QID.     The patient was discussed with Dr. Mayo and he is in agreement with the above note.  "

## 2024-12-18 NOTE — PLAN OF CARE
Problem: Depression  Goal: Attend and participate in unit activities, including therapeutic, recreational, and educational groups  Description: Interventions:  - Provide therapeutic and educational activities daily, encourage attendance and participation, and document same in the medical record   Outcome: Progressing     Problem: Ineffective Coping  Goal: Participates in unit activities  Description: Interventions:  - Provide therapeutic environment   - Provide required programming   - Redirect inappropriate behaviors   Outcome: Progressing   Patient out for groups and remains interactive in groups; is discharged focused.

## 2024-12-18 NOTE — PROGRESS NOTES
12/18/24 1504   Discharge Planning   Living Arrangements Lives w/ Spouse/significant other   Support Systems Self;Spouse/significant other;Psychiatrist;Therapist;Family members;Friends/neighbors   Assistance Needed continue with op (neurologist, psych, pcp, therapy)   Type of Current Residence Private residence   Current Home Care Services No   Other Referral/Resources/Interventions Provided:   Referrals Provided: Psychiatrist   Discharge Communications   Discharge planning discussed with: tx team pt family pcp psych therapist   Freedom of Choice Yes   CM contacted family/caregiver? Yes   Were Treatment Team discharge recommendations reviewed with patient/caregiver? Yes   Did patient/caregiver verbalize understanding of patient care needs? Yes   Were patient/caregiver advised of the risks associated with not following Treatment Team discharge recommendations? Yes   Contacts   Patient Contacts Pete Fregoso (spouse)   Relationship to Patient: Family   Contact Method Phone   Phone Number [phone]   Reason/Outcome Continuity of Care;Discharge Planning   [facility]   Would you like to participate in our [facility] service program?   No - Declined

## 2024-12-18 NOTE — NURSING NOTE
Sleep observed as sound during shift, no respiratory distress.  Continues on q 15 minute safety checks.  Clutter free environment continued to prevent falls. Fluids maintained at beside to promote hydration.  Will continue to monitor.

## 2024-12-18 NOTE — PROGRESS NOTES
12/18/24 1154   Activity/Group Checklist   Group Admission/Discharge   Attendance Attended   Attendance Duration (min) 0-15   Interactions Interacted appropriately   Affect/Mood Appropriate;Bright   Goals Achieved Identified feelings;Identified triggers;Identified relapse prevention strategies;Discussed safety plan;Discussed coping strategies;Discussed discharge plans;Identified resources and support systems;Able to listen to others;Able to engage in interactions;Able to reflect/comment on own behavior;Able to manage/cope with feelings;Able to self-disclose;Verbalized increased hopefulness;Able to recieve feedback;Able to experience relief/decrease in symptoms     Patient agreeable to meet and complete safety/relapse prevention plan with CTRS.  Patient information found in media.

## 2024-12-18 NOTE — NURSING NOTE
Patient is visible in milieu with peers, medication compliant, pleasant upon approach and cooperative. Patient denies all, no anxiety/depression, no SI/HI or hallucinations. Patient attends all group therapies. Continued care and safety rounds in progress.

## 2024-12-18 NOTE — PLAN OF CARE
Problem: Depression  Goal: Treatment Goal: Demonstrate behavioral control of depressive symptoms, verbalize feelings of improved mood/affect, and adopt new coping skills prior to discharge  Outcome: Progressing  Goal: Verbalize thoughts and feelings  Description: Interventions:  - Assess and re-assess patient's level of risk   - Engage patient in 1:1 interactions, daily, for a minimum of 15 minutes   - Encourage patient to express feelings, fears, frustrations, hopes   Outcome: Progressing  Goal: Refrain from harming self  Description: Interventions:  - Monitor patient closely, per order   - Supervise medication ingestion, monitor effects and side effects   Outcome: Progressing  Goal: Refrain from isolation  Description: Interventions:  - Develop a trusting relationship   - Encourage socialization   Outcome: Progressing  Goal: Refrain from self-neglect  Outcome: Progressing  Goal: Attend and participate in unit activities, including therapeutic, recreational, and educational groups  Description: Interventions:  - Provide therapeutic and educational activities daily, encourage attendance and participation, and document same in the medical record   Outcome: Progressing  Goal: Complete daily ADLs, including personal hygiene independently, as able  Description: Interventions:  - Observe, teach, and assist patient with ADLS  -  Monitor and promote a balance of rest/activity, with adequate nutrition and elimination   Outcome: Progressing     Problem: Anxiety  Goal: Anxiety is at manageable level  Description: Interventions:  - Assess and monitor patient's anxiety level.   - Monitor for signs and symptoms (heart palpitations, chest pain, shortness of breath, headaches, nausea, feeling jumpy, restlessness, irritable, apprehensive).   - Collaborate with interdisciplinary team and initiate plan and interventions as ordered.  - [place] patient to unit/surroundings  - Explain treatment plan  - Encourage participation in  care  - Encourage verbalization of concerns/fears  - Identify coping mechanisms  - Assist in developing anxiety-reducing skills  - Administer/offer alternative therapies  - Limit or eliminate stimulants  Outcome: Progressing     Problem: DISCHARGE PLANNING - CARE MANAGEMENT  Goal: Discharge to post-acute care or home with appropriate resources  Description: INTERVENTIONS:  - Conduct assessment to determine patient/family and health care team treatment goals, and need for post-acute services based on payer coverage, community resources, and patient preferences, and barriers to discharge  - Address psychosocial, clinical, and financial barriers to discharge as identified in assessment in conjunction with the patient/family and health care team  - Arrange appropriate level of post-acute services according to patient’s   needs and preference and payer coverage in collaboration with the physician and health care team  - Communicate with and update the patient/family, physician, and health care team regarding progress on the discharge plan  - Arrange appropriate transportation to post-acute venues  Outcome: Progressing

## 2024-12-18 NOTE — DISCHARGE INSTR - OTHER ORDERS
You are being discharged to your home [address] [address] TELEPHONE [phone]     Triggers you have identified during your hospitalization that led to your admission of a regressed mood include ineffective coping skills. Coping skills you have identified during your hospitalization include listening to music, reading and watching football. If you are unable to deal with your distressed mood alone please talk to your wife . If that is not effective and you continue to have (ex: suicidal ideation, homicidal ideation, distressed mood, overwhelmed, in crisis, please contact Crisis by dialing 381, call [facility] Crisis Hotline: [phone], dial 190 or go to the nearest emergency center.      *[facility] Crisis Hotline: 908.509.6177  *[place] Drug and Alcohol Commission: 948.636.9756   *[place] Alcohol Anonymous: 226.374.7761  *National Suicide Prevention Lifeline:  1-115.731.2644  *National [place] on Mental Illness ([place]ARTINA) HELPLINE: 162.596.3782/Website: www.martina.org  *Substance Abuse and Mental Health Services Administration([facility]) National Helpline, which is a confidential, free, 24-hour-a-day, 365-day-a-year, information service for individuals and family members facing mental health and/or substance use disorders. This service provides referrals to local treatment facilities, support groups, and community-based organizations. Callers can also order free publications and other information.  Call 1-291.152.5831/Website: www.[facility].gov  *[employer] 2-1-1: This is a toll free, confidential, 24-hour-a-day service which connects you to a community  in your area who can help you find services and resources that are available to you locally and provide critical services that can improve and save lives.  Call: 211  /Website: http://www.[employer]/       Danitza, or Jaja, our Behavioral Health Nurse Navigators, will be calling you after your discharge, on the phone  number that you provided.  They will be available as an additional support, if needed.   If you wish to speak with one of them, you may contact Danitza at [phone] or Katya at [phone]

## 2024-12-18 NOTE — PLAN OF CARE
Problem: PHYSICAL THERAPY ADULT  Goal: Performs mobility at highest level of function for planned discharge setting.  See evaluation for individualized goals.  Description: Treatment/Interventions: Functional transfer training, Therapeutic exercise, Endurance training, Gait training, Bed mobility, Equipment eval/education  Equipment Recommended: Walker       See flowsheet documentation for full assessment, interventions and recommendations.  Outcome: Progressing  Note: Prognosis: Good  Problem List: Impaired judgement, Decreased safety awareness, Decreased cognition  Assessment: Pt seen for PT treatment session this date with interventions consisting of gait training w/ emphasis on improving pt's ability to ambulate level surfaces x 200 ft x1 with modified independent provided by therapist with SPC, Therapeutic exercise consisting of: AROM 20 reps B LE in sitting position, and therapeutic activity consisting of training: sit<>stand transfers. Pt agreeable to PT treatment session upon arrival, pt found seated OOB in chair, in no apparent distress and responsive. In comparison to previous session, pt with improvements in distance ambulated . Post session: all needs in reach and RN notified of session findings/recommendations. Continue to recommend Level III (Minimum Resource Intensity) at time of d/c in order to maximize pt's functional independence and safety w/ mobility. Pt continues to be functioning below baseline level. PT will continue to see pt during current hospitalization in order to address the deficits listed above and provide interventions consistent w/ POC in effort to achieve STGs.    Slime Diaz PTA  Barriers to Discharge: Inaccessible home environment     Rehab Resource Intensity Level, PT: III (Minimum Resource Intensity)    See flowsheet documentation for full assessment.

## 2024-12-18 NOTE — PROGRESS NOTES
12/18/24 1503    Discharge Notification   Notification of Discharge Provided to: Family;PCP;Psychiatrist;Therapist   Family Notified via: Phone call   PCP Notified via: Phone call;Fax   Psychiatrist Notified via: Phone call;Fax   Therapist Notified via: Phone call;Fax

## 2024-12-19 VITALS
RESPIRATION RATE: 18 BRPM | BODY MASS INDEX: 41.77 KG/M2 | TEMPERATURE: 97.3 F | DIASTOLIC BLOOD PRESSURE: 57 MMHG | HEIGHT: 68 IN | SYSTOLIC BLOOD PRESSURE: 108 MMHG | HEART RATE: 55 BPM | WEIGHT: 275.6 LBS | OXYGEN SATURATION: 94 %

## 2024-12-19 LAB — GLUCOSE SERPL-MCNC: 122 MG/DL (ref 65–140)

## 2024-12-19 PROCEDURE — 82948 REAGENT STRIP/BLOOD GLUCOSE: CPT

## 2024-12-19 PROCEDURE — 99238 HOSP IP/OBS DSCHRG MGMT 30/<: CPT | Performed by: PSYCHIATRY & NEUROLOGY

## 2024-12-19 RX ORDER — LANOLIN ALCOHOL/MO/W.PET/CERES
1000 CREAM (GRAM) TOPICAL DAILY
Qty: 30 TABLET | Refills: 0 | Status: SHIPPED | OUTPATIENT
Start: 2024-12-19

## 2024-12-19 RX ORDER — OXYBUTYNIN CHLORIDE 5 MG/1
5 TABLET ORAL 2 TIMES DAILY
Qty: 60 TABLET | Refills: 0 | Status: SHIPPED | OUTPATIENT
Start: 2024-12-19 | End: 2024-12-24 | Stop reason: CLARIF

## 2024-12-19 RX ORDER — AMOXICILLIN 250 MG
1 CAPSULE ORAL
Qty: 30 TABLET | Refills: 0 | Status: SHIPPED | OUTPATIENT
Start: 2024-12-19

## 2024-12-19 RX ADMIN — ACETAMINOPHEN 650 MG: 325 TABLET ORAL at 07:01

## 2024-12-19 RX ADMIN — FLUOXETINE HYDROCHLORIDE 40 MG: 20 CAPSULE ORAL at 09:07

## 2024-12-19 RX ADMIN — EZETIMIBE 5 MG: 10 TABLET ORAL at 09:07

## 2024-12-19 RX ADMIN — PANTOPRAZOLE SODIUM 40 MG: 40 TABLET, DELAYED RELEASE ORAL at 06:17

## 2024-12-19 RX ADMIN — METFORMIN HYDROCHLORIDE 1000 MG: 500 TABLET, FILM COATED ORAL at 09:10

## 2024-12-19 RX ADMIN — CARBIDOPA AND LEVODOPA 1 TABLET: 25; 100 TABLET ORAL at 09:07

## 2024-12-19 RX ADMIN — Medication 1 TABLET: at 09:07

## 2024-12-19 RX ADMIN — OXYBUTYNIN CHLORIDE 5 MG: 5 TABLET ORAL at 09:06

## 2024-12-19 RX ADMIN — Medication 5 SPRAY: at 09:10

## 2024-12-19 NOTE — PROGRESS NOTES
"Progress Note - Marcellus Fregoso 69 y.o. male MRN: [id number]    Unit/Bed#: [facility] 203-02 Encounter: [id number]        Subjective:   Patient seen and examined at bedside after reviewing the chart and discussing the case with the caring staff.      Patient examined at bedside.  Patient denies any acute symptoms.     Patient is being discharged today, Thursday, 12/19/2024.    Physical Exam   Vitals: Blood pressure 108/57, pulse 55, temperature (!) 97.3 °F (36.3 °C), temperature source Temporal, resp. rate 18, height 5' 8\" (1.727 m), weight 125 kg (275 lb 9.6 oz), SpO2 94%.,Body mass index is 41.9 kg/m².  Constitutional: Patient in no acute distress.  HEENT: PERR, EOMI, MMM.  Cardiovascular: Normal rate and regular rhythm.    Pulmonary/Chest: Effort normal and breath sounds normal.   Abdomen: Soft, + BS, NT.    Assessment/Plan:  Marcellus Fregoso is a(n) 69 y.o. male with MDD.    Medical clearance.  Patient is medically cleared for discharge.  All scripts were sent for the patient.     Cardiac with hx of HTN, HLD.  Patient is on amlodipine 5 mg daily, losartan 100 mg daily, Toprol-XL 25 mg twice daily, pravastatin 40 mg weekly (rosuvastatin nonform), Zetia 5 mg daily.  Type 2 diabetes mellitus.  Hgb A1c 6.4% on 12/16/24.  Continue home metformin 1000 mg twice daily.  Carb controlled diet.  Accu-Cheks twice daily.   GERD/Molina's esophagus/hiatal hernia.  Patient is on Protonix 40 mg daily.  Carafate 1 g TID as needed.  Parkinson disease.  Patient is on Sinemet  mg TID.  Glaucoma.  Continue Xalatan eye drops at bedtime.  Gait abnormality with falls.  PT OT consulted  DJD/OA. Tylenol as needed.  Urinary incontinence.  Patient is on Ditropan 5 mg twice daily (Sanctura 20 mg twice daily nonform).  Patient requested to be discharged on Ditropan as it works better than Sanctura.  Constipation.  Patient started on Senokot-S at bedtime 12/14.  Vitamin B12 deficiency.  Patient started on monthly vitamin B12 injections.  Dry " mouth.  Patient started on mouth kote spray QID.     The patient was discussed with Dr. Mayo and he is in agreement with the above note.

## 2024-12-19 NOTE — PLAN OF CARE
Problem: Depression  Goal: Treatment Goal: Demonstrate behavioral control of depressive symptoms, verbalize feelings of improved mood/affect, and adopt new coping skills prior to discharge  Outcome: Progressing  Goal: Verbalize thoughts and feelings  Description: Interventions:  - Assess and re-assess patient's level of risk   - Engage patient in 1:1 interactions, daily, for a minimum of 15 minutes   - Encourage patient to express feelings, fears, frustrations, hopes   Outcome: Progressing  Goal: Refrain from harming self  Description: Interventions:  - Monitor patient closely, per order   - Supervise medication ingestion, monitor effects and side effects   Outcome: Progressing  Goal: Refrain from isolation  Description: Interventions:  - Develop a trusting relationship   - Encourage socialization   Outcome: Progressing  Goal: Refrain from self-neglect  Outcome: Progressing  Goal: Attend and participate in unit activities, including therapeutic, recreational, and educational groups  Description: Interventions:  - Provide therapeutic and educational activities daily, encourage attendance and participation, and document same in the medical record   Outcome: Progressing  Goal: Complete daily ADLs, including personal hygiene independently, as able  Description: Interventions:  - Observe, teach, and assist patient with ADLS  -  Monitor and promote a balance of rest/activity, with adequate nutrition and elimination   Outcome: Progressing     Problem: Anxiety  Goal: Anxiety is at manageable level  Description: Interventions:  - Assess and monitor patient's anxiety level.   - Monitor for signs and symptoms (heart palpitations, chest pain, shortness of breath, headaches, nausea, feeling jumpy, restlessness, irritable, apprehensive).   - Collaborate with interdisciplinary team and initiate plan and interventions as ordered.  - [place] patient to unit/surroundings  - Explain treatment plan  - Encourage participation in  care  - Encourage verbalization of concerns/fears  - Identify coping mechanisms  - Assist in developing anxiety-reducing skills  - Administer/offer alternative therapies  - Limit or eliminate stimulants  Outcome: Progressing     Problem: DISCHARGE PLANNING - CARE MANAGEMENT  Goal: Discharge to post-acute care or home with appropriate resources  Description: INTERVENTIONS:  - Conduct assessment to determine patient/family and health care team treatment goals, and need for post-acute services based on payer coverage, community resources, and patient preferences, and barriers to discharge  - Address psychosocial, clinical, and financial barriers to discharge as identified in assessment in conjunction with the patient/family and health care team  - Arrange appropriate level of post-acute services according to patient’s   needs and preference and payer coverage in collaboration with the physician and health care team  - Communicate with and update the patient/family, physician, and health care team regarding progress on the discharge plan  - Arrange appropriate transportation to post-acute venues  Outcome: Progressing

## 2024-12-19 NOTE — ASSESSMENT & PLAN NOTE
Patient will be discharged today on Prozac 40 mg PO daily for anxiety symptoms, and will continue at home.  Recommended outpatient follow up with psychiatrist for medication management after discharge.

## 2024-12-19 NOTE — SOCIAL WORK
CM met with pt to review d/c plan and f/u supports. IMM explained to pt who expressed verbal confirmation of understanding and verbalized understanding. pt declined the right to appeal d/c at this time. Pt signed IMM and verbalized readiness to d/c home tomorrow.  Pt expressed gratitude and stated that he felt his tx stay was a positive experience.

## 2024-12-19 NOTE — PROGRESS NOTES
12/19/24 1101   Discharge Charting   Discharge Via Walking   Accompanied by: Spouse/Significant Other   AVS Reviewed With & Written Instructions Given To: Patient   Prescription Given To: e-Prescribe;No Prescription Written     Reviewed AVS/discharge packet with patient. No further questions. Belongings and AVS/discharge packet given to patient upon discharge.

## 2024-12-19 NOTE — PROGRESS NOTES
12/19/24    Team Meeting   Meeting Type Daily Rounds   Team Members Present   Team Members Present Physician;Nurse;Occupational Therapist;;Other (Discipline and Name)   Physician Team Member Elvira Keenan MD   Nursing Team Member Bee BOB   Social Work Team Member Sanjeev ABDALLA   OT Team Member Briseida CHRISTOPHER   Other (Discipline and Name) Julissa PharmD   Patient/Family Present   Patient Present No   Patient's Family Present No   D/c today, pleasant, denies all, complaint

## 2024-12-19 NOTE — DISCHARGE SUMMARY
Discharge Summary - Behavioral Health  Name: Marcellus Fregoso, 69 y.o., male, : 1955, MRN: [id number]   Unit/Bed#: [facility] 203-02, Encounter: [id number]      Admission Date: 2024  Discharge Date: 24    Attending Psychiatrist: Jose Manuel Felix MD    Assessment & Plan  Severe episode of recurrent major depressive disorder, without psychotic features (HCC)  The patient's condition has improved.  Mood has been stabilized.  He denies symptoms of depression, anxiety.  Remorseful regarding his suicide attempt.   Denies SI, SA, AVH.  Pt states he would not harm himself if discharged today.    Plan:   Discharge today.  Discharge medications:  Prozac (Fluoxetine) 40 mg PO once daily  Seroquel (Quetiapine) 50 mg PO daily at bedtime  Discontinue Trazodone 50 mg PO daily at bedtime, due to high risk of adverse effects from inreraction with patient's other medications co-administered at present time, and recent suicide attempt by overdosing on this medicaiton.  Recommended outpatient follow up with psychiatry for medication management.    Parkinson disease (HCC)  Sinemet  mg tablet 3 times daily for Parkinson's disease, managed by gerontology.  Side effects may include increased perceptual disturbances, behavioral disturbances, impulsivity, increasing libido, and may have been contributing to symptomatology prior to admission, recommend caution with further dose increases.  Reccomendaton: outpatient follow up with neurology.  AMY (generalized anxiety disorder)  Patient will be discharged today on Prozac 40 mg PO daily for anxiety symptoms, and will continue at home.  Recommended outpatient follow up with psychiatrist for medication management after discharge.    Insomnia  Continue Seroquel 50 mg at bedtime after discharge at home.        Discharge / transition to a less restrictive environment today.  Continue the following medications at home after discharge:  Prozac (Fluoxetine) 40 mg orally once daily    Seroquel (Quetiapine) 50 mg orally at bedtime  Regular follow-ups, counseling, and medication monitoring in an outpatient setting to ensure continued stability.      History of Present Illness   Marcellus is a 69-year-old male with a complex medical and psychiatric history who was admitted voluntarily under a 201 commitment on 12/14/2024. He presented with worsening symptoms of depression and anxiety, which culminated in a suicide attempt through the ingestion of 7 tablets of Trazodone 50 mg. The patient reported significant intra-marital conflict and increased impulsivity following the initiation of Sinemet therapy for Parkinson's disease, which may have contributed to his recent psychiatric instability. Prior to admission, Marcellus experienced symptoms of depression, suicidal ideation, hopelessness, and anxiety, exacerbated by marital and medical stressors.    Hospital Course:  Upon admission, Marcellus was cooperative but expressed a strong desire to leave the unit. He demonstrated poor insight into the behaviors leading to his admission. Initial evaluations revealed no evidence of psychosis or bipolar disorder, but confirmed symptoms consistent with major depressive disorder and generalized anxiety disorder.  Marcellus was alert and oriented with goal-directed thought processes. He denied any current suicidal or homicidal ideation, hallucinations, or delusions. His speech was normal in rate and tone, and he responded appropriately to verbal cues. He endorsed ongoing anxiety and poor energy, with fragmented sleep patterns, but denied acute distress during evaluations.   Throughout his stay, Marcellus engaged in psychopharmacologic and supportive therapy. He was stabilized on a regimen of Prozac 40 mg daily for depression, Trazodone 50 mg at bedtime, and Seroquel 50 mg at bedtime for insomnia.    At the end of treatment Marcellus was doing well. His mood was stable at the time of discharge. Marcellus denied suicidal  ideation, intent or plan at the time of discharge and denied homicidal ideation, intent or plan at the time of discharge. He was now remorseful about suicide attempt and had more hope for the future. There was no overt psychosis at the time of discharge. The patient was participating appropriately in milieu at the time of discharge. Behavior was appropriate on the unit at the time of discharge. Sleep and appetite were improved.    We felt that Marcellus achieved the maximum benefit of inpatient stay at that point and could now follow up with outpatient treatment.    The outpatient follow up with family physician, a psychiatrist, and a therapist was arranged by the unit  upon discharge.    Mental Status at Time of Discharge:   Appearance:  age appropriate, casually dressed, looks stated age   Behavior:  pleasant, cooperative, calm   Speech:  Stuttering   Mood:  improved   Affect:  normal range and intensity, appropriate, mood-congruent   Thought Process:  goal directed, linear   Associations: intact associations   Thought Content:  no overt delusions   Perceptual Disturbances: no visual hallucinations, denies auditory hallucinations when asked, does not appear responding to internal stimuli   Risk Potential: Suicidal ideation - None at present, not remorseful about suicide attempt  Homicidal ideation - None   Sensorium:  oriented to person, place, and time/date   Memory:  recent and remote memory grossly intact   Consciousness:  alert and awake   Attention/Concentration: attention span and concentration are improved   Insight:  improved   Judgment: improved   Gait/Station: also uses walker   Motor Activity: no abnormal movements     Discharge Diagnosis:   Severe episode of recurrent major depressive disorder, without psychotic features ([facility])      Laboratory Results: I have personally reviewed all pertinent laboratory/tests results  Most Recent Labs:   Lab Results   Component Value Date    WBC 7.81  12/15/2024    RBC 4.55 12/15/2024    HGB 13.6 12/15/2024    HCT 40.3 12/15/2024     12/15/2024    RDW 12.9 12/15/2024    NEUTROABS 3.64 12/15/2024    TOTANEUTABS 16.28 (H) 11/17/2021    SODIUM 136 12/15/2024    K 3.8 12/15/2024     12/15/2024    CO2 29 12/15/2024    BUN 15 12/15/2024    CREATININE 1.06 12/15/2024    GLUC 91 12/15/2024    CALCIUM 9.3 12/15/2024    AST 13 12/15/2024    ALT 13 12/15/2024    ALKPHOS 56 12/15/2024    TP 5.9 (L) 12/15/2024    ALB 3.6 12/15/2024    TBILI 0.53 12/15/2024    CHOLESTEROL 111 12/15/2024    HDL 40 12/15/2024    TRIG 82 12/15/2024    LDLCALC 55 12/15/2024    NONHDLC 71 12/15/2024    AKP3XIQGAOOG 0.779 12/15/2024    HGBA1C 6.4 (H) 12/16/2024     12/16/2024       Discharge Medications:  Current Discharge Medication List        START taking these medications    Details   oxybutynin (DITROPAN) 5 mg tablet Take 1 tablet (5 mg total) by mouth 2 (two) times a day    Associated Diagnoses: Urinary urgency      senna-docusate sodium (SENOKOT S) 8.6-50 mg per tablet Take 1 tablet by mouth daily at bedtime    Associated Diagnoses: Constipation      vitamin B-12 (VITAMIN B-12) 1,000 mcg tablet Take 1 tablet (1,000 mcg total) by mouth daily    Associated Diagnoses: Vitamin B12 deficiency           Current Discharge Medication List        STOP taking these medications       trospium chloride (SANCTURA) 20 mg tablet Comments:   Reason for Stopping:         ibuprofen (MOTRIN) 200 mg tablet Comments:   Reason for Stopping:         Nourianz tablet Comments:   Reason for Stopping:         selegiline (ELDEPRYL) 5 mg capsule Comments:   Reason for Stopping:         sucralfate (CARAFATE) 1 g/10 mL suspension Comments:   Reason for Stopping:         traZODone (DESYREL) 50 mg tablet Comments:   Reason for Stopping:                Current Discharge Medication List        CONTINUE these medications which have CHANGED    Details   ezetimibe (ZETIA) 10 mg tablet Take 0.5 tablets (5 mg  total) by mouth daily    Associated Diagnoses: Hyperlipidemia, unspecified hyperlipidemia type      FLUoxetine (PROzac) 40 MG capsule Take 1 capsule (40 mg total) by mouth daily  Qty: 30 capsule, Refills: 1    Associated Diagnoses: Severe episode of recurrent major depressive disorder, without psychotic features ([facility])      QUEtiapine (SEROquel) 50 mg tablet Take 1 tablet (50 mg total) by mouth daily at bedtime  Qty: 90 tablet, Refills: 0    Associated Diagnoses: Insomnia              Current Discharge Medication List        CONTINUE these medications which have NOT CHANGED    Details   amLODIPine (NORVASC) 5 mg tablet Take 1 tablet (5 mg total) by mouth daily  Qty: 90 tablet, Refills: 1    Associated Diagnoses: Essential hypertension      carbidopa-levodopa (SINEMET)  mg per tablet Take 1 tablet by mouth Three times a day      hydrOXYzine HCL (ATARAX) 25 mg tablet Take 1 tablet (25 mg total) by mouth 2 (two) times a day  Qty: 180 tablet, Refills: 3    Associated Diagnoses: Anxiety      latanoprost (XALATAN) 0.005 % ophthalmic solution INSTILL 1 DROP INTO BOTH EYES IN THE EVENING      losartan (COZAAR) 100 MG tablet Take 1 tablet (100 mg total) by mouth daily  Qty: 90 tablet, Refills: 3    Associated Diagnoses: Essential hypertension      metFORMIN (GLUCOPHAGE) 500 mg tablet TAKE 2 TABLETS TWICE A DAY WITH MEALS  Qty: 360 tablet, Refills: 3    Associated Diagnoses: Controlled type 2 diabetes mellitus without complication, without long-term current use of insulin ([facility])      metoprolol succinate (TOPROL-XL) 25 mg 24 hr tablet Take 1 tablet (25 mg total) by mouth 2 (two) times a day  Qty: 14 tablet, Refills: 0    Associated Diagnoses: Benign essential hypertension; PVC (premature ventricular contraction)      Multiple Vitamins-Minerals (CENTRUM ADULTS PO) Take by mouth in the morning      nystatin (MYCOSTATIN) cream Apply topically 2 (two) times a day  Qty: 30 g, Refills: 2    Associated Diagnoses: Yeast infection       pantoprazole (PROTONIX) 40 mg tablet TAKE 1 TABLET TWICE A DAY (1 TABLET IN THE MORNING AND 1 TABLET IN THE EVENING)  Qty: 180 tablet, Refills: 1    Associated Diagnoses: Molina's esophagus without dysplasia      rosuvastatin (CRESTOR) 5 mg tablet TAKE 1 TABLET ONCE A WEEK  Qty: 12 tablet, Refills: 1    Associated Diagnoses: Mixed hyperlipidemia      semaglutide, 2 mg/dose, (Ozempic, 2 MG/DOSE,) 8 mg/ mL injection pen INJECT 2 MG UNDER THE SKIN EVERY 7 DAYS  Qty: 9 mL, Refills: 1    Associated Diagnoses: Obesity, Class III, BMI 40-49.9 (morbid obesity) ([facility]); Type 2 diabetes mellitus without complication, without long-term current use of insulin ([facility]); Pure hypercholesterolemia      sucralfate (CARAFATE) 1 g tablet Take 1 tablet (1 g total) by mouth 2 (two) times a day  Qty: 60 tablet, Refills: 3    Associated Diagnoses: Gastroesophageal reflux disease with esophagitis without hemorrhage; Gastroparesis      Blood Glucose Monitoring Suppl (OneTouch Verio) w/Device KIT Use daily  Qty: 1 kit, Refills: 0    Associated Diagnoses: Controlled type 2 diabetes mellitus without complication, without long-term current use of insulin ([facility])      glucose blood (OneTouch Verio) test strip Use 1 each in the morning USE TO TEST DAILY  Qty: 100 strip, Refills: 3    Associated Diagnoses: Controlled type 2 diabetes mellitus without complication, without long-term current use of insulin ([facility])      Lancet Devices (ONE TOUCH DELICA LANCING DEV) MISC Use daily  Qty: 1 each, Refills: 0    Associated Diagnoses: Controlled type 2 diabetes mellitus without complication, without long-term current use of insulin ([facility])      Lancets (OneTouch Delica Plus [id number]) MISC Use 1 Application in the morning  Qty: 100 each, Refills: 3    Associated Diagnoses: Controlled type 2 diabetes mellitus without complication, without long-term current use of insulin ([facility])            Discharge on Two Antipsychotic Medications: No    Discharge  instructions/Information to patient and family:   See after visit summary for information provided to patient and family.      Provisions for Follow-Up Care:  See after visit summary for information related to follow-up care and any pertinent home health orders.      This note has been constructed using a voice recognition system. There may be translation, syntax,  or grammatical errors. If you have any questions, please contact the dictating provider.     Bal Keenan MD                                       12/19/24  Psychiatry Resident, PGY-II

## 2024-12-19 NOTE — BH TRANSITION RECORD
Contact Information: If you have any questions, concerns, pended studies, tests and/or procedures, or emergencies regarding your inpatient behavioral health visit. Please contact [facility] [phone] and ask to speak to a , nurse or physician. A contact is available 24 hours/ 7 days a week at this number.     Summary of Procedures Performed During your Stay:  Below is a list of major procedures performed during your hospital stay and a summary of results:  - No major procedures performed.    If studies are pending at discharge, follow up with your PCP and/or referring provider.

## 2024-12-19 NOTE — NURSING NOTE
Patient is visible in milieu for breakfast meal. Presents as pleasant and brightens upon interaction. Patient denies SI/HI/AVH. Patient denies anxiety and depression. Patient is compliant with medications. No acute behaviors noted. Q 15 min safety checks maintained. Fall precautions maintained.

## 2024-12-19 NOTE — ASSESSMENT & PLAN NOTE
The patient's condition has improved.  Mood has been stabilized.  He denies symptoms of depression, anxiety.  Remorseful regarding his suicide attempt.   Denies SI, SA, AVH.  Pt states he would not harm himself if discharged today.    Plan:   Discharge today.  Discharge medications:  Prozac (Fluoxetine) 40 mg PO once daily  Seroquel (Quetiapine) 50 mg PO daily at bedtime  Discontinue Trazodone 50 mg PO daily at bedtime, due to high risk of adverse effects from inreraction with patient's other medications co-administered at present time, and recent suicide attempt by overdosing on this medicaiton.  Recommended outpatient follow up with psychiatry for medication management.

## 2024-12-19 NOTE — ASSESSMENT & PLAN NOTE
Sinemet  mg tablet 3 times daily for Parkinson's disease, managed by gerontology.  Side effects may include increased perceptual disturbances, behavioral disturbances, impulsivity, increasing libido, and may have been contributing to symptomatology prior to admission, recommend caution with further dose increases.  Reccomendaton: outpatient follow up with neurology.

## 2024-12-19 NOTE — NURSING NOTE
Patient social and out in the community.  Denied depression and anxiety.  Denied any suicidal thoughts or hallucinations.  Pleasant during assessment. Compliant with medications and snacks. Medication education given. Care Plan reviewed and amended. Maintained on q 15 minute checks.  Will continue to monitor.

## 2024-12-24 ENCOUNTER — OFFICE VISIT (OUTPATIENT)
Dept: UROLOGY | Facility: AMBULATORY SURGERY CENTER | Age: 69
End: 2024-12-24
Payer: MEDICARE

## 2024-12-24 VITALS
HEART RATE: 73 BPM | HEIGHT: 68 IN | OXYGEN SATURATION: 95 % | WEIGHT: 288 LBS | DIASTOLIC BLOOD PRESSURE: 84 MMHG | SYSTOLIC BLOOD PRESSURE: 156 MMHG | BODY MASS INDEX: 43.65 KG/M2

## 2024-12-24 DIAGNOSIS — C61 PROSTATE CANCER (HCC): ICD-10-CM

## 2024-12-24 DIAGNOSIS — R39.15 URGENCY OF URINATION: Primary | ICD-10-CM

## 2024-12-24 DIAGNOSIS — R32 URINARY INCONTINENCE, UNSPECIFIED TYPE: ICD-10-CM

## 2024-12-24 PROCEDURE — 99213 OFFICE O/P EST LOW 20 MIN: CPT

## 2024-12-24 RX ORDER — CLINDAMYCIN PHOSPHATE 10 MG/G
GEL TOPICAL
COMMUNITY
Start: 2024-12-11

## 2024-12-24 RX ORDER — TROSPIUM CHLORIDE 20 MG/1
20 TABLET, FILM COATED ORAL 2 TIMES DAILY
Qty: 180 TABLET | Refills: 1 | Status: SHIPPED | OUTPATIENT
Start: 2024-12-24

## 2024-12-24 NOTE — PROGRESS NOTES
PT Re-Evaluation     Today's date: 2024  Patient name: Marcellus Fregoso  : 1955  MRN: [id number]  Referring provider: Ehsan Calix MD  Dx: No diagnosis found.               Assessment    Assessment details: Pt is a *** year old *** presenting to [facility] physical therapy for an initial evaluation secondary to ***. Pt presents to clinic with gait and balance impairments inclusive of *** and utilizes *** assistive device for mobility (in the home/in the community) .      Additionally pt demonstrates strength deficits as seen with MMT scores of *** and 5x STS time of *** demonstrating dec LE strength and power as compared to age matched norms. Pt demonstrates endurance deficits as seen with 6mwt distance of *** as compared to age matched norms of ***. Pt is at inc risk for falls based on their FGA score of ***/30  with scores < 20/30 at inc risk for spontaneous falls and scores < 23/30 considered at risk for falls. pts TUG time of *** puts them at inc risk for falls as scores > 13.5 are considered at risk for falls. Pt ambulates at *** m/s making them a ***.  Additionally this score places the pt at inc risk for falls as scores < 1 m/s are considered at risk for falls. Pts 3m bwd walking time of *** s  indicates pt is at inc risk for falls as scores > 4.5 s are at risk for falls. Additionally pts 4 square step test time of *** indicates pt is at inc risk for falls as times >10.4s are considered pts identified as fallers and scores > 15 s indicate pt is as risk for multiple falls. Pts miniBEST test score of ***/28 dec as compared to age matched norms of ***/28 indcating impaired balance when compared to age matched peers. Pt reports an ABC score of *** indicating a dec confidence in their self perceived balance. Scores < 67% place pt at an inc risk of falls.    Pt would benefit from skilled physical therapy to improve  overall QOL inclusive of, strength, coordination, balance, endurance and functional mobility  in the home and in the community as well as reduce their fall risk for improved safety.      Goals  STG:  Patient will improve 6MWT by ***ft demonstrating significant improvements in endurance  w/in *** weeks  Patient will be independent with HEP within *** weeks  LTG:  Patient will improve 6MWT by ***ft demonstrating significant improvements in endurance  w/in *** weeks  Patient will demonstrated improved LE strength and endurance by improved 5xSTS to ***s or less within *** weeks  Patient will be able to perform STS without use of UE wihtin ***weeks   Patient will improve TUG by 6 secs or greater within ***weeks  Patient will improve TUG to 12s or less indicating they are no longer at risk for increased falls ***weeks  Patient will demonstrates significant improvements in dynamic balance by improving FGA score by 6 secs or more within ***weeks  Patient will improve FGA to 22/30 or greater indicating they are no longer at higher risk for falls within *** weeks   Patient will improve WIGGINS balance score to greater than 42/56 indicating they are no longer at higher risk for falls within ***weeks  Patient will be independent with progress HEP within ***weeks     Patient will improve gait speed to 1.0 m/s indicating they are no longer at higher risk for falls within ***weeks    Patient will be able to negotiates stairs with reciprocal pattern with use of handrail within ***weeks  Patient will be able to ambulate household distances (100ft) or greater with LRAD  within ***weeks  Patient will be able to ambulate community distances (300ft) or greater with LRAD within ***weeks         Subjective    Objective    Max Heart Rate 220 -age =      Balance Test 12/26 (IE)   6 Minute Walk Test (ft):    2 Minute Walk Test (ft):    Gait Speed (ft/s):    5x Sit To Stand (s):    TUG    FGA  WIGGINS        MCTSIB Number of Seconds (12/26 IE)   Feet Together, Eyes Open    Feet Together, Eyes Closed    Feet Together, Eyes Open Foam    Feet  Together, Eyes Closed Foam        Coordination Left Right   Heel To Shin     Finger To Nose     Rapid Alternating Movement     UE     LE         Sensation Left Right   Kinesthesia     Light Touch     Sharp/Dull     2 Point Discrimination         Muscle Tone Right Left   Modified Odette Scale     Hamstring     Gastroc     Quad         Manual Muscle Testing - Hip Right Left   Flexion {#:79492} {#:38186}   Extension {#:93901} {#:75091}   Abduction {#:53266} {#:08450}   External Rotation {#:87769} {#:39373}     Manual Muscle Testing - Knee Right Left    Flexion {#:90673} {#:23426}   Extension {#:06097} {#:33898}     Manual Muscle Testing - Ankle Right Left    Doriflexion {#:08801} {#:96937}   Plantarflexion {#:88717} {#:36097}     Gait Assessment:  Decreased ***heel strike, Decreased *** Push OFF  Decreased *** Step Height, Decreased *** Step Length,    *** Arm Swing, *** Trunk ROT          Short Term Goal Expiration Date:(01/23/25)  Long Term Goal Expiration Date: (02/20/25)  POC Expiration Date: (02/20/25)    Visit count: 1 of 10; PN due 01/23/25    POC expires Unit limit Auth Expiration date PT/OT/ST + Visit Limit?   02/20/25 *** *** ***                           Visit/Unit Tracking  AUTH Status:  Date 12/26             BOMN Used 1              Remaining  9                        Precautions Fall risk, T2DM, neuropathy, HTN, syncope, depression       Manuals 12/26                                       Neuro Re-Ed                                                                 Ther Ex                                                                        Ther Activity                        Gait Training                        Modalities

## 2024-12-24 NOTE — PROGRESS NOTES
"Name: Marcellus Fregoso      : 1955      MRN: [id number]  Encounter Provider: Michael Mcrae PA-C  Encounter Date: 2024   Encounter department: [facility] [place]  :  Assessment & Plan  Urinary incontinence, unspecified type  Currently tolerating trospium 20 mg twice a day which says has been helpful since he has been taking it daily.  He has noticed significant improvement with his incontinence    Symptoms are well-controlled patient defers PVR today.  UA was not provided.    Refilled trospium and will follow-up in 6 months.  Orders:    trospium chloride (SANCTURA) 20 mg tablet; Take 1 tablet (20 mg total) by mouth 2 (two) times a day    Prostate cancer (HCC)  History of robotic prostatectomy by Dr. Tay in   PSAs have remained undetectable last 1 was 0.008.  Next PSA will be due in 2025           History of Present Illness   Marcellus Fregoso is a 69 y.o. male who presents  history of prostate cancer status post robotic prostatectomy by Dr. Tay in . PSAS have remained undetectable. 2024 was 0.008. He reports his urinary incontinence has greatly improved since taking Trospium daily. Denies dysuria, gross hematuria, flank pain    Review of Systems   Constitutional:  Negative for chills and fever.   HENT:  Negative for ear pain and sore throat.    Eyes:  Negative for pain and visual disturbance.   Respiratory:  Negative for cough and shortness of breath.    Cardiovascular:  Negative for chest pain and palpitations.   Gastrointestinal:  Negative for abdominal pain and vomiting.   Genitourinary:  Negative for dysuria and hematuria.   Musculoskeletal:  Negative for arthralgias and back pain.   Skin:  Negative for color change and rash.   Neurological:  Negative for seizures and syncope.   All other systems reviewed and are negative.         Objective   /84 (BP Location: Left arm, Patient Position: Sitting, Cuff Size: Large)   Pulse 73   Ht 5' 8\" (1.727 " m)   Wt 131 kg (288 lb)   SpO2 95%   BMI 43.79 kg/m²     Physical Exam  Vitals and nursing note reviewed.   Constitutional:       General: He is not in acute distress.     Appearance: He is well-developed.   HENT:      Head: Normocephalic and atraumatic.   Eyes:      Conjunctiva/sclera: Conjunctivae normal.   Cardiovascular:      Rate and Rhythm: Normal rate and regular rhythm.      Heart sounds: No murmur heard.  Pulmonary:      Effort: Pulmonary effort is normal. No respiratory distress.      Breath sounds: Normal breath sounds.   Abdominal:      Palpations: Abdomen is soft.      Tenderness: There is no abdominal tenderness.   Musculoskeletal:         General: No swelling.      Cervical back: Neck supple.   Skin:     General: Skin is warm and dry.      Capillary Refill: Capillary refill takes less than 2 seconds.   Neurological:      Mental Status: He is alert.   Psychiatric:         Mood and Affect: Mood normal.          Results  Lab Results   Component Value Date    PSA <0.008 06/24/2024    PSA <0.1 04/20/2023    PSA <0.1 03/06/2022     Lab Results   Component Value Date    GLUCOSE 174 (H) 12/30/2020    CALCIUM 9.3 12/15/2024     08/16/2015    K 3.8 12/15/2024    CO2 29 12/15/2024     12/15/2024    BUN 15 12/15/2024    CREATININE 1.06 12/15/2024     Lab Results   Component Value Date    WBC 7.81 12/15/2024    HGB 13.6 12/15/2024    HCT 40.3 12/15/2024    MCV 89 12/15/2024     12/15/2024       Office Urine Dip  No results found for this or any previous visit (from the past hour).]

## 2024-12-26 ENCOUNTER — APPOINTMENT (OUTPATIENT)
Dept: PHYSICAL THERAPY | Facility: CLINIC | Age: 69
End: 2024-12-26
Payer: MEDICARE

## 2024-12-26 DIAGNOSIS — I10 ESSENTIAL HYPERTENSION: ICD-10-CM

## 2024-12-26 RX ORDER — AMLODIPINE BESYLATE 5 MG/1
5 TABLET ORAL DAILY
Qty: 90 TABLET | Refills: 1 | Status: SHIPPED | OUTPATIENT
Start: 2024-12-26

## 2024-12-30 ENCOUNTER — OFFICE VISIT (OUTPATIENT)
Dept: FAMILY MEDICINE CLINIC | Facility: CLINIC | Age: 69
End: 2024-12-30
Payer: MEDICARE

## 2024-12-30 VITALS
DIASTOLIC BLOOD PRESSURE: 76 MMHG | HEIGHT: 68 IN | TEMPERATURE: 97.9 F | HEART RATE: 72 BPM | WEIGHT: 288 LBS | SYSTOLIC BLOOD PRESSURE: 122 MMHG | RESPIRATION RATE: 16 BRPM | OXYGEN SATURATION: 98 % | BODY MASS INDEX: 43.65 KG/M2

## 2024-12-30 DIAGNOSIS — F33.2 SEVERE EPISODE OF RECURRENT MAJOR DEPRESSIVE DISORDER, WITHOUT PSYCHOTIC FEATURES (HCC): ICD-10-CM

## 2024-12-30 DIAGNOSIS — G20.A2 PARKINSON'S DISEASE WITHOUT DYSKINESIA, WITH FLUCTUATING MANIFESTATIONS (HCC): ICD-10-CM

## 2024-12-30 DIAGNOSIS — E11.42 CONTROLLED TYPE 2 DIABETES MELLITUS WITH DIABETIC POLYNEUROPATHY, WITHOUT LONG-TERM CURRENT USE OF INSULIN (HCC): ICD-10-CM

## 2024-12-30 DIAGNOSIS — E53.8 VITAMIN B12 DEFICIENCY: ICD-10-CM

## 2024-12-30 DIAGNOSIS — I10 BENIGN ESSENTIAL HYPERTENSION: ICD-10-CM

## 2024-12-30 DIAGNOSIS — Z09 HOSPITAL DISCHARGE FOLLOW-UP: Primary | ICD-10-CM

## 2024-12-30 DIAGNOSIS — E78.5 HYPERLIPIDEMIA, UNSPECIFIED HYPERLIPIDEMIA TYPE: ICD-10-CM

## 2024-12-30 DIAGNOSIS — K22.719 BARRETT'S ESOPHAGUS WITH DYSPLASIA: ICD-10-CM

## 2024-12-30 PROCEDURE — 99495 TRANSJ CARE MGMT MOD F2F 14D: CPT | Performed by: FAMILY MEDICINE

## 2024-12-30 PROCEDURE — 96372 THER/PROPH/DIAG INJ SC/IM: CPT | Performed by: FAMILY MEDICINE

## 2024-12-30 RX ORDER — CYANOCOBALAMIN 1000 UG/ML
1000 INJECTION, SOLUTION INTRAMUSCULAR; SUBCUTANEOUS
Status: SHIPPED | OUTPATIENT
Start: 2024-12-30 | End: 2025-12-25

## 2024-12-30 RX ADMIN — CYANOCOBALAMIN 1000 MCG: 1000 INJECTION, SOLUTION INTRAMUSCULAR; SUBCUTANEOUS at 16:07

## 2024-12-30 NOTE — ASSESSMENT & PLAN NOTE
Lab Results   Component Value Date    HGBA1C 6.4 (H) 12/16/2024     Controlled. Continue current meds.

## 2024-12-30 NOTE — PROGRESS NOTES
Transition of Care Visit  Name: Marcellus Fregoso      : 1955      MRN: [id number]  Encounter Provider: Ehsan Calix MD  Encounter Date: 2024   Encounter department: [facility]    Assessment & Plan  Hospital discharge follow-up  Reviewed hospital records.        Severe episode of recurrent major depressive disorder, without psychotic features (HCC)  Continue current meds. FU therapist and psychiatrist as scheduled.          Parkinson's disease without dyskinesia, with fluctuating manifestations (HCC)  Continue current meds per neurology.        Controlled type 2 diabetes mellitus with diabetic polyneuropathy, without long-term current use of insulin (HCC)    Lab Results   Component Value Date    HGBA1C 6.4 (H) 2024     Controlled. Continue current meds.        Benign essential hypertension  Controlled. DASH diet. Continue current meds. FU cardiology.        Hyperlipidemia, unspecified hyperlipidemia type  Low fat diet. Continue current meds per cardiology.        Molina's esophagus with dysplasia  Continue pantoprazole 40mg bid per GI.        Vitamin B12 deficiency  Give Vit B12 shot monthly.   Orders:    cyanocobalamin injection 1,000 mcg         History of Present Illness     Transitional Care Management Review:   Marcellus Fregoso is a 69 y.o. male here for TCM follow up.     During the TCM phone call patient stated:  TCM Call       Date and time call was made  2023  5:12 PM    Hospital care reviewed  Records reviewed    Patient was hospitialized at  [facility]    Date of Admission  23    Date of discharge  23    Diagnosis  Syncope    Disposition  Home    Were the patients medications reviewed and updated  No    Current Symptoms  None          TCM Call       Post hospital issues  None    Should patient be enrolled in anticoag monitoring?  No    Scheduled for follow up?  Yes    Did you obtain your prescribed medications  Yes    Do you need help  managing your prescriptions or medications  No    Is transportation to your appointment needed  No    I have advised the patient to call PCP with any new or worsening symptoms  Che Valdez     Living Arrangements  Spouse or Significiant other    Support System  Spouse    The type of support provided  Physical; Emotional    Do you have social support  Yes, as much as I need    Are you recieving any outpatient services  No    Are you recieving home care services  Yes    Types of home care services  Other (comment)    Comment  To be determined    Are you using any community resources  No    Current waiver services  No    Have you fallen in the last 12 months  No    Interperter language line needed  No    Counseling  Patient          HPI    Pt is here by himself.    Was in hospital 12/13-12/19/2024 for overdose trazodone.    Crisis evaluated pt and pt signed 201.   Throughout his stay, pt engaged in psychopharmacologic and supportive therapy.   He was stablized on prozac 40mg QD, trazodone 50mg qhs and seroquel 50mg qhs.   Discharged home.   He is on OTC melotonin 30mg QD. Pt states he can sleep 2 hours last night. Denies HI/SI.   Scheduled with psychiatry/therapist in 1/2025.     Urinary incontinence/hx of prostate cancer---FU urology. He is on trospium 20mg bid.     Parkinson's disease---FU neurology in [facility]. He is on sinemet 3-4 times/day.     DM---controlled. 10/2024 hgA1C 5.9 He is on ozempic 2mg/week and metformin 1000mg bid.     HTN---FU cardiology. He is on metoprolol 25mg bid, amlodipine 5mg QD prn and losartan 100mg QD.     Hyperlipidemia---He is on zetia 5mg qhs and crestor 5mg weekly per cardiology.     Molina's esophagus---FU GI. He is on pantoprazole 40mg bid and carafate prn.           Review of Systems   Constitutional:  Negative for appetite change, chills and fever.   HENT:  Negative for congestion, ear pain, sinus pain and sore throat.    Eyes:  Negative for discharge and  "itching.   Respiratory:  Negative for apnea, cough, chest tightness, shortness of breath and wheezing.    Cardiovascular:  Negative for chest pain, palpitations and leg swelling.   Gastrointestinal:  Negative for abdominal pain, anal bleeding, constipation, diarrhea, nausea and vomiting.   Endocrine: Negative for cold intolerance, heat intolerance and polyuria.   Genitourinary:  Negative for difficulty urinating and dysuria.   Musculoskeletal:  Negative for arthralgias, back pain and myalgias.   Skin:  Negative for rash.   Neurological:  Negative for dizziness and headaches.   Psychiatric/Behavioral:  Positive for sleep disturbance. Negative for agitation and suicidal ideas.      Objective   /76   Pulse 72   Temp 97.9 °F (36.6 °C) (Tympanic)   Resp 16   Ht 5' 8\" (1.727 m)   Wt 131 kg (288 lb)   SpO2 98%   BMI 43.79 kg/m²     Physical Exam  Constitutional:       Appearance: He is well-developed.   HENT:      Head: Normocephalic and atraumatic.   Eyes:      General:         Right eye: No discharge.         Left eye: No discharge.      Conjunctiva/sclera: Conjunctivae normal.   Cardiovascular:      Rate and Rhythm: Normal rate and regular rhythm.      Heart sounds: Normal heart sounds. No murmur heard.     No friction rub. No gallop.   Pulmonary:      Effort: Pulmonary effort is normal. No respiratory distress.      Breath sounds: Normal breath sounds. No wheezing or rales.   Abdominal:      General: Bowel sounds are normal. There is no distension.      Palpations: Abdomen is soft.      Tenderness: There is no abdominal tenderness. There is no guarding.   Musculoskeletal:         General: Normal range of motion.      Cervical back: Normal range of motion and neck supple.      Right lower leg: No edema.      Left lower leg: No edema.   Neurological:      Mental Status: He is alert.       Medications have been reviewed by provider in current encounter      "

## 2025-01-02 ENCOUNTER — TELEPHONE (OUTPATIENT)
Age: 70
End: 2025-01-02

## 2025-01-02 NOTE — TELEPHONE ENCOUNTER
Pt would like to postpone his hernia surgery until end of Feb.  His parkinson's is acting up.  I told him that Marilee the surgery coordinator is out of the office until Monday and I will have her give him a call back

## 2025-01-06 ENCOUNTER — APPOINTMENT (OUTPATIENT)
Dept: PHYSICAL THERAPY | Facility: CLINIC | Age: 70
End: 2025-01-06
Payer: MEDICARE

## 2025-01-06 NOTE — PROGRESS NOTES
PT Re-Evaluation     Today's date: 2025  Patient name: Marcellus Fregoso  : 1955  MRN: [id number]  Referring provider: Ehsan Calix MD  Dx: No diagnosis found.               Assessment    Assessment details: Pt is a *** year old *** presenting to [facility] physical therapy for an initial evaluation secondary to ***. Pt presents to clinic with gait and balance impairments inclusive of *** and utilizes *** assistive device for mobility (in the home/in the community) .      Additionally pt demonstrates strength deficits as seen with MMT scores of *** and 5x STS time of *** demonstrating dec LE strength and power as compared to age matched norms. Pt demonstrates endurance deficits as seen with 6mwt distance of *** as compared to age matched norms of ***. Pt is at inc risk for falls based on their FGA score of ***/30  with scores < 20/30 at inc risk for spontaneous falls and scores < 23/30 considered at risk for falls. pts TUG time of *** puts them at inc risk for falls as scores > 13.5 are considered at risk for falls. Pt ambulates at *** m/s making them a ***.  Additionally this score places the pt at inc risk for falls as scores < 1 m/s are considered at risk for falls. Pts 3m bwd walking time of *** s  indicates pt is at inc risk for falls as scores > 4.5 s are at risk for falls. Additionally pts 4 square step test time of *** indicates pt is at inc risk for falls as times >10.4s are considered pts identified as fallers and scores > 15 s indicate pt is as risk for multiple falls. Pts miniBEST test score of ***/28 dec as compared to age matched norms of ***/28 indcating impaired balance when compared to age matched peers. Pt reports an ABC score of *** indicating a dec confidence in their self perceived balance. Scores < 67% place pt at an inc risk of falls.    Pt would benefit from skilled physical therapy to improve  overall QOL inclusive of, strength, coordination, balance, endurance and functional mobility in  the home and in the community as well as reduce their fall risk for improved safety.      Goals  STG:  Patient will improve 6MWT by ***ft demonstrating significant improvements in endurance  w/in *** weeks  Patient will be independent with HEP within *** weeks  LTG:  Patient will improve 6MWT by ***ft demonstrating significant improvements in endurance  w/in *** weeks  Patient will demonstrated improved LE strength and endurance by improved 5xSTS to ***s or less within *** weeks  Patient will be able to perform STS without use of UE wihtin ***weeks   Patient will improve TUG by 6 secs or greater within ***weeks  Patient will improve TUG to 12s or less indicating they are no longer at risk for increased falls ***weeks  Patient will demonstrates significant improvements in dynamic balance by improving FGA score by 6 secs or more within ***weeks  Patient will improve FGA to 22/30 or greater indicating they are no longer at higher risk for falls within *** weeks   Patient will improve WIGGINS balance score to greater than 42/56 indicating they are no longer at higher risk for falls within ***weeks  Patient will be independent with progress HEP within ***weeks     Patient will improve gait speed to 1.0 m/s indicating they are no longer at higher risk for falls within ***weeks    Patient will be able to negotiates stairs with reciprocal pattern with use of handrail within ***weeks  Patient will be able to ambulate household distances (100ft) or greater with LRAD  within ***weeks  Patient will be able to ambulate community distances (300ft) or greater with LRAD within ***weeks         Subjective    Objective    Max Heart Rate 220 -age =      Balance Test 01/06 (IE)   6 Minute Walk Test (ft):    2 Minute Walk Test (ft):    Gait Speed (ft/s):    5x Sit To Stand (s):    TUG    FGA  WIGGINS        MCTSIB Number of Seconds (01/06 IE)   Feet Together, Eyes Open    Feet Together, Eyes Closed    Feet Together, Eyes Open Foam    Feet  Together, Eyes Closed Foam        Coordination Left Right   Heel To Shin     Finger To Nose     Rapid Alternating Movement     UE     LE         Sensation Left Right   Kinesthesia     Light Touch     Sharp/Dull     2 Point Discrimination         Muscle Tone Right Left   Modified Odette Scale     Hamstring     Gastroc     Quad         Manual Muscle Testing - Hip Right Left   Flexion {#:70959} {#:09450}   Extension {#:06032} {#:79263}   Abduction {#:44860} {#:51046}   External Rotation {#:89305} {#:52612}     Manual Muscle Testing - Knee Right Left    Flexion {#:39239} {#:10241}   Extension {#:88673} {#:49862}     Manual Muscle Testing - Ankle Right Left    Doriflexion {#:71226} {#:56317}   Plantarflexion {#:23279} {#:91883}     Gait Assessment:  Decreased ***heel strike, Decreased *** Push OFF  Decreased *** Step Height, Decreased *** Step Length,    *** Arm Swing, *** Trunk ROT          Short Term Goal Expiration Date:(01/23/25)  Long Term Goal Expiration Date: (02/20/25)  POC Expiration Date: (02/20/25)    Visit count: 1 of 10; PN due 01/23/25    POC expires Unit limit Auth Expiration date PT/OT/ST + Visit Limit?   02/20/25 *** *** ***                           Visit/Unit Tracking  AUTH Status:  Date 01/06             BOMN Used 1              Remaining  9                        Precautions Fall risk, T2DM, neuropathy, HTN, syncope, depression       Manuals 01/06                                       Neuro Re-Ed                                                                 Ther Ex                                                                        Ther Activity                        Gait Training                        Modalities

## 2025-01-08 NOTE — TELEPHONE ENCOUNTER
Patient calling back in regards to needing to postpone his surgery scheduled on 1/16/25 with  as his Parkinson's is acting up. Warm transferred call to Marilee who will assist patient with this.

## 2025-01-10 ENCOUNTER — EVALUATION (OUTPATIENT)
Dept: PHYSICAL THERAPY | Facility: CLINIC | Age: 70
End: 2025-01-10
Payer: MEDICARE

## 2025-01-10 DIAGNOSIS — K22.70 BARRETT'S ESOPHAGUS WITHOUT DYSPLASIA: ICD-10-CM

## 2025-01-10 DIAGNOSIS — G20.A1 PARKINSON'S DISEASE, UNSPECIFIED WHETHER DYSKINESIA PRESENT, UNSPECIFIED WHETHER MANIFESTATIONS FLUCTUATE (HCC): Primary | ICD-10-CM

## 2025-01-10 DIAGNOSIS — R26.89 BALANCE DISORDER: ICD-10-CM

## 2025-01-10 DIAGNOSIS — R26.2 AMBULATORY DYSFUNCTION: ICD-10-CM

## 2025-01-10 PROCEDURE — 97112 NEUROMUSCULAR REEDUCATION: CPT

## 2025-01-10 PROCEDURE — 97110 THERAPEUTIC EXERCISES: CPT

## 2025-01-10 PROCEDURE — 97164 PT RE-EVAL EST PLAN CARE: CPT

## 2025-01-10 RX ORDER — PANTOPRAZOLE SODIUM 40 MG/1
TABLET, DELAYED RELEASE ORAL
Qty: 180 TABLET | Refills: 1 | Status: SHIPPED | OUTPATIENT
Start: 2025-01-10

## 2025-01-10 NOTE — PROGRESS NOTES
PT Re-Evaluation     Today's date: 1/10/2025  Patient name: Marcellus Fregoso  : 1955  MRN: [id number]  Referring provider: Ehsan Calix MD  Dx:   Encounter Diagnosis     ICD-10-CM    1. Parkinson's disease, unspecified whether dyskinesia present, unspecified whether manifestations fluctuate (HCC)  G20.A1       2. Ambulatory dysfunction  R26.2       3. Balance disorder  R26.89           Start Time: 1400  Stop Time: 1445  Total time in clinic (min): 45 minutes    Assessment  Impairments: abnormal gait, abnormal movement, activity intolerance, impaired balance, impaired physical strength, lacks appropriate home exercise program, safety issue, participation limitations, activity limitations and endurance  Symptom irritability: moderate    Assessment details: Pt is a pleasant 69 year old male presenting to [facility] physical therapy for an initial evaluation secondary to deficits related to PD. Pt presents to clinic with gait and balance impairments inclusive of shuffling gait pattern, difficulty initiating movement, and strength deficits. Pt utilizes a SPC assistive device for mobility occasionally in the home and in the community. Upon evaluation, coordination in UE and LE is within normal limits. Pt demonstrates strength deficits as seen with MMT in b/l hip flexion and 5x STS time of 15.85s demonstrating dec LE strength and power as compared to age matched norms. Pt demonstrates endurance deficits as seen with inability to complete the 6MWT. Pt required to stop due to SOB after 4min with a distance of 400ft. O2 sats dropped to 91%, but normalized after 1 minute. Pt is at inc risk for falls based on their FGA score of 19/30  with scores < 20/30 at inc risk for spontaneous falls and scores < 23/30 considered at risk for falls. Pts TUG time of 9.94s puts them at a decreased risk for falls as scores > 13.5 are considered at risk for falls. However, pt did not have a safe sit with inability to fully turn to sit in the  chair demonstrating a fall risk. Pt ambulates at 0/90 m/s making them a community ambulator at this time.  Additionally this score places the pt at inc risk for falls as scores < 1 m/s are considered at risk for falls. Pt educated on HEP of STS and walking to improve LE strength and endurance. Pt verbalized understanding. Due to the following deficits, pt would benefit from skilled PT services at 2x/wk for 8 weeks to improve  overall QOL inclusive of, strength, coordination, balance, endurance and functional mobility in the home and in the community as well as reduce their fall risk for improved safety.    Barriers to intervention: participation  Understanding of Dx/Px/POC: good     Prognosis: good    Goals  STG:  Patient will be able to complete the 6MWT with SPC in 4 weeks demonstrating improved endurance and aerobic capacity.   Patient will improve 6MWT by 200 ft with SPC demonstrating significant improvements in endurance  w/in 4 weeks.  Patient will improve 5xSTS to <12s with no UE support within 4 weeks demonstrating improved LE strength and power.   Patient will be independent with HEP within 4 weeks for increased compliance with exercise at home.  Patient will improve gait speed to 1.0 m/s indicating they are no longer at higher risk for falls within 4 weeks.   LTG:  Patient will improve 6MWT by 400 ft with SPC demonstrating significant improvements in endurance  w/in 8 weeks  Patient will demonstrates significant improvements in dynamic balance by improving FGA score by 6 points or more within 8 weeks  Patient will improve FGA to 22/30 or greater without SPC indicating they are no longer at higher risk for falls within 8  weeks   Patient will be independent with progressed HEP within 8 weeks for increased compliance with exercise at home.   Patient will improve gait speed to 1.2 m/s indicating they are no longer at higher risk for falls and can safely cross a cross walk within 8 weeks.     Plan  Patient  would benefit from: skilled physical therapy and OT eval  Referral necessary: Yes    Planned therapy interventions: ADL training, balance, activity modification, balance/weight bearing training, body mechanics training, behavior modification, coordination, flexibility, functional ROM exercises, gait training, graded exercise, graded activity, home exercise program, transfer training, therapeutic training, therapeutic exercise, therapeutic activities, stretching, sensory integrative techniques, strengthening, patient/caregiver education, neuromuscular re-education and motor coordination training    Frequency: 2x week  Duration in weeks: 8  Plan of Care beginning date: 1/10/2025  Plan of Care expiration date: 3/7/2025  Treatment plan discussed with: patient        Subjective Evaluation    History of Present Illness  Mechanism of injury: Pt presents to PT after break from therapy. Was previously coming to PT for PD related balance and gait deficits. Felt like the bar was set high last time in therapy and was just about reaching that bar, but now feels like he is not at the same level as before. Had a hospital admission in early December with attempted suicide via PD psych meds. Has gotten help and no longer has thoughts of self harm. Was on a new PD pill which had very bad side effects where he was being nasty to his wife. Mood has been better since then. Noticing that he is having a lot more bad days than good days in function. Was getting bad sleep for sometimes days at a time, but has recently gotten on a good sleep schedule where he's getting about 7+ hours of sleep per day.     Planning on getting hernia surgery in March and really wants to have some exercises at home to prepare for surgery. Notices balance is off and that he has no energy. Hasn't done much exercises for the last 3-4 weeks. Used to walk more steps, but is only taking about 700-800 steps per day according to iphone. Noticing more shuffling and  harder to lift leg. Endurance has always been difficult, but having increased SOB with walking. Sometimes using the cane at home, but sometimes can walk household distances without it. Using can for community ambulation 60-70% of the time. Steps are not an issue with use of b/l railings.     Wants to have a solid home program. Doesn't think they he could handle increased therapy frequency of 3x/wk right now.           Recurrent probem    Quality of life: fair    Patient Goals  Patient goals for therapy: increased strength, independence with ADLs/IADLs, return to sport/leisure activities, improved balance and increased motion  Patient goal: Wants to improve walking, balance  Pain  No pain reported    Social Support  Stairs in house: yes   Lives in: multiple-level home  Lives with: spouse    Employment status: not working  Exercise history: Not active lately    Treatments  Previous treatment: physical therapy and medication  Current treatment: medication        Objective    Max Heart Rate 220 -age =      Balance Test 01/06 (IE)   6 Minute Walk Test (ft): 4 min and then needed to stop   400 ft with SPC   HR: 101 bpm  O2: 91%    2 Minute Walk Test (ft):    Gait Speed (ft/s): 20 ft/ 6.78s=  0.90 m/s    5x Sit To Stand (s): 15.85s, No UE   TUG 9.94s, no AD  TUGcog (count back by 3s), 18.79-9.94   FGA 19/30       MCTSIB Number of Seconds (01/06 IE)   Feet Together, Eyes Open NV   Feet Together, Eyes Closed    Feet Together, Eyes Open Foam    Feet Together, Eyes Closed Foam        Coordination Left Right   Heel To Zhou WNL WNL   Finger To Nose WNL WNL   Rapid Alternating Movement WNL in UE and LE  WNL in UE and LE      Manual Muscle Testing - Hip Right Left   Flexion 3+ 3     Manual Muscle Testing - Knee Right Left    Flexion 5 5   Extension 5 5     Manual Muscle Testing - Ankle Right Left    Doriflexion 5 5     Gait Assessment:  Decreased b/l heel strike, Decreased b/l Step Height, Decreased b/l Step Length,    decreased  Arm Swing, decreased Trunk ROT, shuffling gait pattern, slow turns, decreased upright posture          Short Term Goal Expiration Date:(02/07/25)  Long Term Goal Expiration Date: 03/07/25)  POC Expiration Date: (03/07/25)    Visit count: 1 of 10; PN due 02/07/25    POC expires Unit limit Auth Expiration date PT/OT/ST + Visit Limit?   02/20/25 N/A 12/31/25 BOMN                           Visit/Unit Tracking  AUTH Status:  Date 01/10             BOMN Used 1              Remaining  9                        Precautions Fall risk, T2DM, neuropathy, HTN, syncope, depression       Manuals 01/10                                       Neuro Re-Ed  Pt education on all neuro testing including coordination, TUG, FGA    Pt education on the benefits of PT to decrease PD disease progression                                                               Ther Ex Pt education on all testing including MMT, 5xSTS, 6MWT    Pt education on HEP of STS and daily walking                                                                        Ther Activity                        Gait Training                        Modalities

## 2025-01-13 ENCOUNTER — OFFICE VISIT (OUTPATIENT)
Dept: BEHAVIORAL/MENTAL HEALTH CLINIC | Facility: CLINIC | Age: 70
End: 2025-01-13
Payer: MEDICARE

## 2025-01-13 DIAGNOSIS — F41.8 DEPRESSION WITH ANXIETY: ICD-10-CM

## 2025-01-13 DIAGNOSIS — F33.2 SEVERE EPISODE OF RECURRENT MAJOR DEPRESSIVE DISORDER, WITHOUT PSYCHOTIC FEATURES (HCC): Primary | ICD-10-CM

## 2025-01-13 DIAGNOSIS — F33.8 SEASONAL AFFECTIVE DISORDER (HCC): ICD-10-CM

## 2025-01-13 PROCEDURE — 90791 PSYCH DIAGNOSTIC EVALUATION: CPT | Performed by: SOCIAL WORKER

## 2025-01-13 NOTE — BH TREATMENT PLAN
Outpatient Behavioral Health Psychotherapy Treatment Plan    Marcellus Fregoso  1955     Date of Initial Psychotherapy Assessment: 01/13/2025  Date of Current Treatment Plan: 01/13/25  Treatment Plan Target Date: 07/08/2025  Treatment Plan Expiration Date: 07/08/2025    Diagnosis:   1. Depression with anxiety  Ambulatory referral to Psych Services          Area(s) of Need: please see below    Long Term Goal 1 (in the client's own words): I need help managing my depression and my seasonal affective disorder    Stage of Change: Preparation    Target Date for completion: 07/08/2025     Anticipated therapeutic modalities: Mindfulness, CBT and sol;ution focused therapy strategies     People identified to complete this goal: myself with the help of my therapist      Objective 1: (identify the means of measuring success in meeting the objective): When my symptoms happen I will be able to keep them at a minimum.       Objective 2: (identify the means of measuring success in meeting the objective):       Long Term Goal 2 (in the client's own words): I need to learn to accept my diagnosis of Parkinsons.    Stage of Change: Preparation    Target Date for completion: 07/08/2025     Anticipated therapeutic modalities: mindfulness, CBT     People identified to complete this goal: myself with the help of my therapist.       Objective 1: (identify the means of measuring success in meeting the objective): I will cope more effectively with my diagnosis.       Objective 2: (identify the means of measuring success in meeting the objective):      Long Term Goal 3 (in the client's own words): I need to be nicer with my wife. Everything is on her terms. Now that I have Parkinsons I dont tolerate things like I did.     Stage of Change: Preparation    Target Date for completion: 07/08/2025     Anticipated therapeutic modalities: mindfulness and CBT     People identified to complete this goal: myself with the help of my therapist.        Objective 1: (identify the means of measuring success in meeting the objective): I will learn to filter my reactions to her.       Objective 2: (identify the means of measuring success in meeting the objective):      I am currently under the care of a [facility] psychiatric provider: no    My [facility] psychiatric provider is: his PCP. However he sees a psychiatrist at [facility] who is very familiar with Parkinson's.     I am currently taking psychiatric medications: Yes, as prescribed    I feel that I will be ready for discharge from mental health care when I reach the following (measurable goal/objective): when I make the progress I am comfortable with.     For children and adults who have a legal guardian:   Has there been any change to custody orders and/or guardianship status? NA. If yes, attach updated documentation.    I have created my Crisis Plan and have been offered a copy of this plan    Behavioral Health Treatment Plan [facility]: Diagnosis and Treatment Plan explained to Marcellus Fregoso acknowledges an understanding of their diagnosis. Marcellus Fregoso agrees to this treatment plan.    I have been offered a copy of this Treatment Plan. yes
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
## 2025-01-15 ENCOUNTER — ESTABLISHED COMPREHENSIVE EXAM (OUTPATIENT)
Dept: URBAN - METROPOLITAN AREA CLINIC 6 | Facility: CLINIC | Age: 70
End: 2025-01-15

## 2025-01-15 DIAGNOSIS — H35.371: ICD-10-CM

## 2025-01-15 DIAGNOSIS — H04.123: ICD-10-CM

## 2025-01-15 DIAGNOSIS — H40.053: ICD-10-CM

## 2025-01-15 PROCEDURE — 92015 DETERMINE REFRACTIVE STATE: CPT

## 2025-01-15 PROCEDURE — 92012 INTRM OPH EXAM EST PATIENT: CPT

## 2025-01-15 ASSESSMENT — VISUAL ACUITY
OU_CC: J1+
OS_CC: 20/30+2
OD_CC: 20/40
OD_PH: 20/25

## 2025-01-15 ASSESSMENT — TONOMETRY
OS_IOP_MMHG: 21
OD_IOP_MMHG: 19

## 2025-01-17 ENCOUNTER — OFFICE VISIT (OUTPATIENT)
Dept: PHYSICAL THERAPY | Facility: CLINIC | Age: 70
End: 2025-01-17
Payer: MEDICARE

## 2025-01-17 DIAGNOSIS — R26.2 AMBULATORY DYSFUNCTION: ICD-10-CM

## 2025-01-17 DIAGNOSIS — G20.A1 PARKINSON'S DISEASE, UNSPECIFIED WHETHER DYSKINESIA PRESENT, UNSPECIFIED WHETHER MANIFESTATIONS FLUCTUATE (HCC): Primary | ICD-10-CM

## 2025-01-17 DIAGNOSIS — R26.89 BALANCE DISORDER: ICD-10-CM

## 2025-01-17 PROCEDURE — 97110 THERAPEUTIC EXERCISES: CPT

## 2025-01-17 PROCEDURE — 97112 NEUROMUSCULAR REEDUCATION: CPT

## 2025-01-17 NOTE — PROGRESS NOTES
Daily Note     Today's date: 2025  Patient name: Marcellus Fregoso  : 1955  MRN: [id number]  Referring provider: Ehsan Calix MD  Dx:   Encounter Diagnosis     ICD-10-CM    1. Parkinson's disease, unspecified whether dyskinesia present, unspecified whether manifestations fluctuate ([facility])  G20.A1       2. Ambulatory dysfunction  R26.2       3. Balance disorder  R26.89           Start Time: 1515  Stop Time: 1600  Total time in clinic (min): 45 minutes    Subjective: Been walking more in the hallway, STS is getting better, talking as I exercise helps me stay motivated       Objective: See treatment diary below      Assessment: Pt tolerated the treatment well. Pt performed walking on the treadmill to promote cardiovascular endurance and neural priming for the session. Pt was challenged with mary beth navigation. Had some LOB on lateral stepping over hurdles but was able to use reactive stepping strategies to maintain balance.  Pt is progressing with STS with no UE support. Plan to challenge dynamic balance next session with transitioning from even/uneven surfaces and backwards walking with tidal tank. Pt demonstrated fatigue post-session. Pt would benefit from further skilled PT sessions to address deficits in endurance, strength, balance, activity tolerance, and overall safety needed to maximize the pt's function and quality of life.        Plan: Continue per plan of care.  Progress treatment as tolerated.       Short Term Goal Expiration Date:(25)  Long Term Goal Expiration Date: 25)  POC Expiration Date: (25)    Visit count: 2 of 10; PN due 25    POC expires Unit limit Auth Expiration date PT/OT/ST + Visit Limit?   25 N/A 25 BOMN                           Visit/Unit Tracking  AUTH Status:  Date 01/10 01/17            BOMN Used 1 2             Remaining  9 8                Access Code: [email or web address]  URL: [email or web address]  Date: 2025  Prepared by: Mine  "Pan    Exercises  - Sit to Stand with Arms Crossed  - 1 x daily - 7 x weekly - 3 sets - 10 reps  - Walking March  - 1 x daily - 7 x weekly - 1 sets - 3 reps  - Side Stepping with Counter Support  - 1 x daily - 7 x weekly - 1 sets - 3 reps      Precautions Fall risk, T2DM, neuropathy, HTN, syncope, depression       Manuals 01/10 01/17                                      Neuro Re-Ed  Pt education on all neuro testing including coordination, TUG, FGA    Pt education on the benefits of PT to decrease PD disease progression       STS        Hurdles  Solo  1/4 track   (6) 6\" hurdles   Fwd x 3 laps (reciprocal)  Lat x 3 laps     Review for HEP      HKM  Review for HEP      Lat stepping  Review for HEP                              Ther Ex Pt education on all testing including MMT, 5xSTS, 6MWT    Pt education on HEP of STS and daily walking        Treadmill  Solo   1.2 mph   0% incline   2 x 4 min     X 8 min total   Mod verbal cues for increased step height    To promote cardiovascular endurance                                                               Ther Activity                        Gait Training                        Modalities                                   "

## 2025-01-22 ENCOUNTER — TELEPHONE (OUTPATIENT)
Age: 70
End: 2025-01-22

## 2025-01-22 NOTE — TELEPHONE ENCOUNTER
Marcellus called requesting to have his procedure visit notes from his 8/22/22 and 6/26/23 EMGs sent to him via a [employer] message so that he can print.    Please contact to advise once completed.

## 2025-01-23 ENCOUNTER — OFFICE VISIT (OUTPATIENT)
Dept: PHYSICAL THERAPY | Facility: CLINIC | Age: 70
End: 2025-01-23
Payer: MEDICARE

## 2025-01-23 ENCOUNTER — OFFICE VISIT (OUTPATIENT)
Dept: CARDIOLOGY CLINIC | Facility: CLINIC | Age: 70
End: 2025-01-23
Payer: MEDICARE

## 2025-01-23 VITALS
SYSTOLIC BLOOD PRESSURE: 148 MMHG | DIASTOLIC BLOOD PRESSURE: 78 MMHG | BODY MASS INDEX: 44.4 KG/M2 | OXYGEN SATURATION: 94 % | WEIGHT: 292 LBS | HEART RATE: 71 BPM

## 2025-01-23 DIAGNOSIS — G20.A1 PARKINSON'S DISEASE, UNSPECIFIED WHETHER DYSKINESIA PRESENT, UNSPECIFIED WHETHER MANIFESTATIONS FLUCTUATE (HCC): Primary | ICD-10-CM

## 2025-01-23 DIAGNOSIS — R26.2 AMBULATORY DYSFUNCTION: ICD-10-CM

## 2025-01-23 DIAGNOSIS — R26.89 BALANCE DISORDER: ICD-10-CM

## 2025-01-23 DIAGNOSIS — I10 BENIGN ESSENTIAL HYPERTENSION: ICD-10-CM

## 2025-01-23 DIAGNOSIS — E78.00 PURE HYPERCHOLESTEROLEMIA: ICD-10-CM

## 2025-01-23 DIAGNOSIS — I49.3 PVC (PREMATURE VENTRICULAR CONTRACTION): Primary | ICD-10-CM

## 2025-01-23 PROCEDURE — 99213 OFFICE O/P EST LOW 20 MIN: CPT | Performed by: INTERNAL MEDICINE

## 2025-01-23 PROCEDURE — 97112 NEUROMUSCULAR REEDUCATION: CPT | Performed by: PHYSICAL THERAPY ASSISTANT

## 2025-01-23 NOTE — PROGRESS NOTES
Cardiology Follow Up    Marcellus Fregoso  1955  [id number]  [facility] CARDIAC CATH LAB  [address] [address]  [phone]    1. PVC (premature ventricular contraction)        2. Benign essential hypertension        3. Pure hypercholesterolemia            Interval History: Cardiology follow-up. Patient was hospitalized last month  with a psychiatric problem.  There appears to be a suicidal intent with overdose.  He continues to struggle with his Parkinson disease and significant ambulatory dysfunction.  He is currently experiencing no chest pain or dyspnea at this level of activity.  He complains of being fatigued, he thinks related to the increased metoprolol and addition of amlodipine for blood pressure control.  Not on antiplatelet therapy at the present time.  Lipids last checked total cholesterol 111, HDL 40, LDL 55, he is on low intensity statin therapy plus Zetia.  No syncope or presyncope or significant palpitations.  Previous 2-week monitor revealed high supraventricular ectopic burden of 41%.  No complex arrhythmias.  Recent EKG, personally reviewed, normal sinus rhythm with a first-degree AV block.  Left axis deviation.  No acute changes.  Patient is scheduled for incisional abdominal hernia repair.    Patient Active Problem List   Diagnosis    Status post total left knee replacement    Childhood onset fluency disorder    Sleep apnea    Ambulatory dysfunction    Benign essential hypertension    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, without long-term current use of insulin ([facility])    Insomnia    Depression with anxiety    Onychomycosis    Molina's esophagus with dysplasia    Dysphagia    Exertional dyspnea    Gastroparesis    Ventral hernia without obstruction or gangrene    Gastroesophageal reflux disease with esophagitis without hemorrhage    Tremor    Neuropathy    Pure  hypercholesterolemia    Dermatitis    Obesity, Class III, BMI 40-49.9 (morbid obesity) ([facility])    Hx of laparoscopic adjustable gastric banding    History of total knee arthroplasty, right    Syncope    Urinary urgency    Generalized weakness    Class 3 severe obesity due to excess calories with serious comorbidity and body mass index (BMI) of 40.0 to 44.9 in adult ([facility])    CAD (coronary artery disease)    Carpal tunnel syndrome of right wrist    Family history of Parkinson disease    Parkinson disease (HCC)    Hyperlipidemia    Chronic bilateral low back pain without sciatica    PVC (premature ventricular contraction)    Chest pain syndrome    Severe episode of recurrent major depressive disorder, without psychotic features ([facility])    AMY (generalized anxiety disorder)     Past Medical History:   Diagnosis Date    Acute blood loss anemia 10/13/2016    Anxiety     Arthritis     knees    Arthritis     Asthma     stable for > 10 years    Molina esophagus     with BARRX/RFA    Cancer ([facility])     prostate    Cataract     Depression     Diabetes (HCC)     Diabetes mellitus (HCC)     pre diabetes    Diverticulitis of colon     Environmental allergies     Fall 12/30/2020    GERD (gastroesophageal reflux disease)     Hiatal hernia     History of anal fissures     Hyperlipidemia     Hypertension     Incisional hernia     Insomnia     Kidney stone     Malignant neoplasm of prostate (HCC)     Morbid obesity (HCC)     Parkinson disease (HCC)     Peripheral neuropathy     PONV (postoperative nausea and vomiting)     Prolapsing mitral valve     prolapsing mitral valve leaflet syndrome    Prostate cancer (HCC)     Retinal detachment     treated surgically at [facility]; resolved: 10/10/2012    Sleep apnea     diagnosed but unable to tolerate cpap.     Stuttering      Social History     Socioeconomic History    Marital status: /Civil Union     Spouse name: Not on file    Number of children: 0    Years of education: Not on file     Highest education level: Not on file   Occupational History    Occupation:    Tobacco Use    Smoking status: Never     Passive exposure: Never    Smokeless tobacco: Never    Tobacco comments:     quit 38 years ago   Vaping Use    Vaping status: Never Used   Substance and Sexual Activity    Alcohol use: Not Currently     Alcohol/week: 1.0 standard drink of alcohol     Types: 1 Cans of beer per week     Comment: 1 case of beer per year    Drug use: Never    Sexual activity: Yes     Partners: Female     Birth control/protection: Inserts, Post-menopausal, Male Sterilization   Other Topics Concern    Not on file   Social History Narrative    ** Merged History Encounter **          Social Drivers of Health     Financial Resource Strain: Low Risk  (5/27/2021)    Overall Financial Resource Strain (CARDIA)     Difficulty of Paying Living Expenses: Not hard at all   Food Insecurity: No Food Insecurity (12/16/2024)    Hunger Vital Sign     Worried About Running Out of Food in the Last Year: Never true     Ran Out of Food in the Last Year: Never true   Transportation Needs: No Transportation Needs (12/16/2024)    PRAPARE - Transportation     Lack of Transportation (Medical): No     Lack of Transportation (Non-Medical): No   Physical Activity: Insufficiently Active (11/7/2019)    Exercise Vital Sign     Days of Exercise per Week: 1 day     Minutes of Exercise per Session: 50 min   Stress: No Stress Concern Present (11/7/2019)    [place] [employer] - Occupational Stress Questionnaire     Feeling of Stress : Not at all   Social Connections: Moderately Isolated (11/7/2019)    Social Connection and Isolation Panel [NHANES]     Frequency of Communication with Friends and Family: Three times a week     Frequency of Social Gatherings with Friends and Family: More than three times a week     Attends Bahai Services: Never     Active Member of Clubs or Organizations: No     Attends Club or  Organization Meetings: Never     Marital Status:    Intimate Partner Violence: Not At Risk (12/16/2024)    Humiliation, Afraid, Rape, and Kick questionnaire     Fear of Current or Ex-Partner: No     Emotionally Abused: No     Physically Abused: No     Sexually Abused: No   Housing Stability: Low Risk  (12/16/2024)    Housing Stability Vital Sign     Unable to Pay for Housing in the Last Year: No     Number of Times Moved in the Last Year: 0     Homeless in the Last Year: No      Family History   Problem Relation Age of Onset    Heart disease Father     Coronary artery disease Father     Prostate cancer Father     Coronary artery disease Mother     Diabetes Mother         mellitus    Diabetes type II Mother     Diabetes Maternal Grandmother         mellitus    Coronary artery disease Maternal Grandfather     Coronary artery disease Paternal Grandfather      Past Surgical History:   Procedure Laterality Date    ARTHROSCOPIC REPAIR ACL Right     BIOPSY CORE NEEDLE      prostate    CATARACT EXTRACTION Bilateral     COLONOSCOPY      HERNIA REPAIR      [facility]    JOINT REPLACEMENT      B/L knee    KNEE ARTHROSCOPY Left     LAPAROSCOPIC GASTRIC BANDING      ND ARTHRP KNE CONDYLE&PLATU MEDIAL&LAT COMPARTMENTS Left 02/15/2017    Procedure: TOTAL KNEE ARTHROPLASTY ;  Surgeon: Sal Ross MD;  Location: [facility];  Service: Orthopedics    ND ARTHRP KNE CONDYLE&PLATU MEDIAL&LAT COMPARTMENTS Right 10/10/2016    Procedure: ARTHROPLASTY KNEE TOTAL;  Surgeon: Sal Ross MD;  Location: [facility];  Service: Orthopedics    PROSTATECTOMY      robotic-assisted; Lilly Tay    REMOVAL GASTRIC BAND LAPAROSCOPIC N/A 8/30/2022    Procedure: LAPAROSCOPIC REMOVAL OF ADJUSTABLE GASTRIC BAND AND PORT  WITH INTRAOPERATIVE EGD;  Surgeon: Neal Yang MD;  Location: [facility];  Service: Bariatrics    RETINAL DETACHMENT SURGERY Bilateral     Lemus Eye    SEPTOPLASTY      SINUS SURGERY      TONSILLECTOMY      over age 10     UPPER GASTROINTESTINAL ENDOSCOPY      mutiple with BARRX/RFA    VASECTOMY      vas deferens       Current Outpatient Medications:     amLODIPine (NORVASC) 5 mg tablet, TAKE 1 TABLET DAILY, Disp: 90 tablet, Rfl: 1    Blood Glucose Monitoring Suppl (OneTouch Verio) w/Device KIT, Use daily, Disp: 1 kit, Rfl: 0    carbidopa-levodopa (SINEMET)  mg per tablet, Take 1 tablet by mouth Three times a day, Disp: , Rfl:     clindamycin 1 % gel, , Disp: , Rfl:     ezetimibe (ZETIA) 10 mg tablet, Take 0.5 tablets (5 mg total) by mouth daily, Disp: , Rfl:     FLUoxetine (PROzac) 40 MG capsule, Take 1 capsule (40 mg total) by mouth daily, Disp: 30 capsule, Rfl: 1    glucose blood (OneTouch Verio) test strip, Use 1 each in the morning USE TO TEST DAILY, Disp: 100 strip, Rfl: 3    hydrOXYzine HCL (ATARAX) 25 mg tablet, Take 1 tablet (25 mg total) by mouth 2 (two) times a day (Patient taking differently: Take 25 mg by mouth 2 (two) times a day as needed for anxiety), Disp: 180 tablet, Rfl: 3    Lancet Devices (ONE TOUCH DELICA LANCING DEV) MISC, Use daily, Disp: 1 each, Rfl: 0    Lancets (OneTouch Delica Plus [id number]) MISC, Use 1 Application in the morning, Disp: 100 each, Rfl: 3    latanoprost (XALATAN) 0.005 % ophthalmic solution, INSTILL 1 DROP INTO BOTH EYES IN THE EVENING, Disp: , Rfl:     losartan (COZAAR) 100 MG tablet, Take 1 tablet (100 mg total) by mouth daily, Disp: 90 tablet, Rfl: 3    metFORMIN (GLUCOPHAGE) 500 mg tablet, TAKE 2 TABLETS TWICE A DAY WITH MEALS, Disp: 360 tablet, Rfl: 3    metoprolol succinate (TOPROL-XL) 25 mg 24 hr tablet, Take 1 tablet (25 mg total) by mouth 2 (two) times a day, Disp: 14 tablet, Rfl: 0    Multiple Vitamins-Minerals (CENTRUM ADULTS PO), Take by mouth in the morning, Disp: , Rfl:     nystatin (MYCOSTATIN) cream, Apply topically 2 (two) times a day, Disp: 30 g, Rfl: 2    pantoprazole (PROTONIX) 40 mg tablet, TAKE 1 TABLET TWICE A DAY (1 TABLET IN THE MORNING AND 1 TABLET IN THE  "EVENING), Disp: 180 tablet, Rfl: 1    QUEtiapine (SEROquel) 50 mg tablet, Take 1 tablet (50 mg total) by mouth daily at bedtime, Disp: 90 tablet, Rfl: 0    rosuvastatin (CRESTOR) 5 mg tablet, TAKE 1 TABLET ONCE A WEEK, Disp: 12 tablet, Rfl: 1    semaglutide, 2 mg/dose, (Ozempic, 2 MG/DOSE,) 8 mg/ mL injection pen, INJECT 2 MG UNDER THE SKIN EVERY 7 DAYS, Disp: 9 mL, Rfl: 1    senna-docusate sodium (SENOKOT S) 8.6-50 mg per tablet, Take 1 tablet by mouth daily at bedtime (Patient taking differently: Take 1 tablet by mouth daily as needed for constipation), Disp: 30 tablet, Rfl: 0    sucralfate (CARAFATE) 1 g tablet, Take 1 tablet (1 g total) by mouth 2 (two) times a day, Disp: 60 tablet, Rfl: 3    trospium chloride (SANCTURA) 20 mg tablet, Take 1 tablet (20 mg total) by mouth 2 (two) times a day, Disp: 180 tablet, Rfl: 1    vitamin B-12 (VITAMIN B-12) 1,000 mcg tablet, Take 1 tablet (1,000 mcg total) by mouth daily, Disp: 30 tablet, Rfl: 0    Current Facility-Administered Medications:     cyanocobalamin injection 1,000 mcg, 1,000 mcg, Intramuscular, Q30 Days, , 1,000 mcg at 12/30/24 1607  Allergies   Allergen Reactions    Celebrex [Celecoxib] Other (See Comments)     Cardiologist stated he should not take      Chlorhexidine Hives     Is able to wash with hibiclens but not use michi cloths    Other      \"steroid eyedrops\"      Prednisone Other (See Comments)     Prednisone eye drops- eye pressure increases    Selegiline Allergic Rhinitis     Hypertension       Labs:  Admission on 12/13/2024, Discharged on 12/14/2024   Component Date Value    Ventricular Rate 12/13/2024 99     Atrial Rate 12/13/2024 99     [place] Interval 12/13/2024 212     QRSD Interval 12/13/2024 92     QT Interval 12/13/2024 350     QTC Interval 12/13/2024 449     P Axis 12/13/2024 44     QRS Axis 12/13/2024 -34     T Wave [place] 12/13/2024 23     Color, UA 12/13/2024 Light Yellow     Clarity, UA 12/13/2024 Clear     Specific Gravity, UA 12/13/2024 1.011  "    pH, UA 12/13/2024 5.0     Leukocytes, UA 12/13/2024 Negative     Nitrite, UA 12/13/2024 Negative     Protein, UA 12/13/2024 Negative     Glucose, UA 12/13/2024 Negative     Ketones, UA 12/13/2024 Negative     Urobilinogen, UA 12/13/2024 <2.0     Bilirubin, UA 12/13/2024 Negative     Occult Blood, UA 12/13/2024 Negative     Amph/Meth UR 12/13/2024 Negative     Barbiturate Ur 12/13/2024 Negative     Benzodiazepine Urine 12/13/2024 Negative     Cocaine Urine 12/13/2024 Negative     Methadone Urine 12/13/2024 Negative     Opiate Urine 12/13/2024 Negative     PCP Ur 12/13/2024 Negative     THC Urine 12/13/2024 Negative     Oxycodone Urine 12/13/2024 Negative     Fentanyl Urine 12/13/2024 Negative     HYDROCODONE URINE 12/13/2024 Negative     EXTBreath Alcohol 12/13/2024 0.000     WBC 12/13/2024 8.95     RBC 12/13/2024 5.02     Hemoglobin 12/13/2024 14.9     Hematocrit 12/13/2024 43.6     MCV 12/13/2024 87     MCH 12/13/2024 29.7     MCHC 12/13/2024 34.2     RDW 12/13/2024 13.2     MPV 12/13/2024 8.4 (L)     Platelets 12/13/2024 266     nRBC 12/13/2024 0     Segmented % 12/13/2024 66     Immature Grans % 12/13/2024 0     Lymphocytes % 12/13/2024 17     Monocytes % 12/13/2024 8     Eosinophils Relative 12/13/2024 8 (H)     Basophils Relative 12/13/2024 1     Absolute Neutrophils 12/13/2024 5.86     Absolute Immature Grans 12/13/2024 0.03     Absolute Lymphocytes 12/13/2024 1.55     Absolute Monocytes 12/13/2024 0.73     Eosinophils Absolute 12/13/2024 0.71 (H)     Basophils Absolute 12/13/2024 0.07     Sodium 12/13/2024 136     Potassium 12/13/2024 3.8     Chloride 12/13/2024 102     CO2 12/13/2024 28     ANION GAP 12/13/2024 6     BUN 12/13/2024 17     Creatinine 12/13/2024 0.98     Glucose 12/13/2024 175 (H)     Calcium 12/13/2024 9.7     AST 12/13/2024 15     ALT 12/13/2024 15     Alkaline Phosphatase 12/13/2024 79     Total Protein 12/13/2024 7.0     Albumin 12/13/2024 4.1     Total Bilirubin 12/13/2024 0.46      eGFR 12/13/2024 77     TSH 3RD GENERATON 12/13/2024 1.517    Admission on 11/26/2024, Discharged on 11/26/2024   Component Date Value    Ventricular Rate 11/26/2024 71     Atrial Rate 11/26/2024 71     [place] Interval 11/26/2024 212     QRSD Interval 11/26/2024 98     QT Interval 11/26/2024 398     QTC Interval 11/26/2024 433     P Axis 11/26/2024 24     QRS Axis 11/26/2024 -36     T Wave [place] 11/26/2024 32     Sodium 11/26/2024 137     Potassium 11/26/2024 4.2     Chloride 11/26/2024 102     CO2 11/26/2024 29     ANION GAP 11/26/2024 6     BUN 11/26/2024 18     Creatinine 11/26/2024 0.94     Glucose 11/26/2024 139     Calcium 11/26/2024 9.2     AST 11/26/2024 16     ALT 11/26/2024 12     Alkaline Phosphatase 11/26/2024 71     Total Protein 11/26/2024 6.6     Albumin 11/26/2024 4.0     Total Bilirubin 11/26/2024 0.49     eGFR 11/26/2024 81     WBC 11/26/2024 10.11     RBC 11/26/2024 4.76     Hemoglobin 11/26/2024 14.4     Hematocrit 11/26/2024 41.9     MCV 11/26/2024 88     MCH 11/26/2024 30.3     MCHC 11/26/2024 34.4     RDW 11/26/2024 13.1     MPV 11/26/2024 8.5 (L)     Platelets 11/26/2024 240     nRBC 11/26/2024 0     Segmented % 11/26/2024 65     Immature Grans % 11/26/2024 0     Lymphocytes % 11/26/2024 17     Monocytes % 11/26/2024 8     Eosinophils Relative 11/26/2024 9 (H)     Basophils Relative 11/26/2024 1     Absolute Neutrophils 11/26/2024 6.52     Absolute Immature Grans 11/26/2024 0.04     Absolute Lymphocytes 11/26/2024 1.76     Absolute Monocytes 11/26/2024 0.76     Eosinophils Absolute 11/26/2024 0.95 (H)     Basophils Absolute 11/26/2024 0.08     hs TnI 0hr 11/26/2024 <2    Office Visit on 11/05/2024   Component Date Value    POST-VOID RESIDUAL VOLUM* 11/05/2024 180    Nurse Triage on 10/29/2024   Component Date Value    Color, UA 10/29/2024 Light Yellow     Clarity, UA 10/29/2024 Clear     Specific Gravity, UA 10/29/2024 1.015     pH, UA 10/29/2024 6.0     Leukocytes, UA 10/29/2024 Negative      Nitrite, UA 10/29/2024 Negative     Protein, UA 10/29/2024 Negative     Glucose, UA 10/29/2024 Negative     Ketones, UA 10/29/2024 Negative     Urobilinogen, UA 10/29/2024 <2.0     Bilirubin, UA 10/29/2024 Negative     Occult Blood, UA 10/29/2024 Negative     RBC, UA 10/29/2024 1-2     WBC, UA 10/29/2024 None Seen     Epithelial Cells 10/29/2024 None Seen     Bacteria, UA 10/29/2024 Occasional     Amorphous Crystals, UA 10/29/2024 Occasional     Urine Culture 10/29/2024 No Growth <1000 cfu/mL    Hospital Outpatient Visit on 10/18/2024   Component Date Value    POC Glucose 10/18/2024 116    Office Visit on 10/02/2024   Component Date Value    Hemoglobin A1C 10/02/2024 5.9    Office Visit on 08/01/2024   Component Date Value    LEUKOCYTE ESTERASE,UA 08/01/2024 -     NITRITE,UA 08/01/2024 -     SL AMB POCT UROBILINOGEN 08/01/2024 0.2     POCT URINE PROTEIN 08/01/2024 -      PH,UA 08/01/2024 5.0     BLOOD,UA 08/01/2024 -     SPECIFIC GRAVITY,UA 08/01/2024 1.030     KETONES,UA 08/01/2024 -     BILIRUBIN,UA 08/01/2024 -     GLUCOSE, UA 08/01/2024 -      COLOR,UA 08/01/2024 yellow     CLARITY,UA 08/01/2024 clear     POST-VOID RESIDUAL VOLUM* 08/01/2024 43    Hospital Outpatient Visit on 07/25/2024   Component Date Value    Baseline HR 07/25/2024 63     Baseline BP 07/25/2024 120/78     O2 sat rest 07/25/2024 98     Stress peak HR 07/25/2024 78     Post peak BP 07/25/2024 142     O2 sat peak 07/25/2024 97     Recovery HR 07/25/2024 75     Recovery BP 07/25/2024 146/90     O2 sat recovery 07/25/2024 97     Max HR 07/25/2024 80     Max HR Percent 07/25/2024 53     Estimated workload 07/25/2024 1.0     Rate Pressure Product 07/25/2024 11,076.0     Angina Index 07/25/2024 0     Rest Nuclear Isotope Dose 07/25/2024 15.22     Stress Nuclear Isotope D* 07/25/2024 46.30     Stress/rest perfusion ra* 07/25/2024 0.93     EF (%) 07/25/2024 69     Protocol Name 07/25/2024 PALOMA SIT     Exercise duration (min) 07/25/2024 3     Exercise  duration (sec) 07/25/2024 0     Post Peak Systolic BP 07/25/2024 146     Max Diastolic Bp 07/25/2024 90     Peak HR 07/25/2024 80     Max Predicted Heart Rate 07/25/2024 150     Reason for Termination 07/25/2024 TEST COMPLETE...     Test Indication 07/25/2024 chest discomfort, BREEN     Target Hr Formular 07/25/2024 (220 - Age)*100%     Chest Pain Statement 07/25/2024 none      Imaging: No results found.    Review of Systems:  Review of Systems   Constitutional:  Positive for fatigue.   Respiratory:  Negative for apnea, shortness of breath, wheezing and stridor.    Cardiovascular:  Negative for chest pain, palpitations and leg swelling.   Musculoskeletal:  Positive for gait problem.   Neurological:  Positive for dizziness, tremors and weakness. Negative for syncope.   Hematological:  Does not bruise/bleed easily.   Psychiatric/Behavioral:  Positive for dysphoric mood.        Physical Exam:  Physical Exam  Vitals reviewed.   Neck:      Vascular: No carotid bruit.   Cardiovascular:      Rate and Rhythm: Normal rate and regular rhythm.      Heart sounds: Normal heart sounds. No murmur heard.     No friction rub. No gallop.   Pulmonary:      Effort: Pulmonary effort is normal. No respiratory distress.      Breath sounds: Normal breath sounds. No stridor. No wheezing, rhonchi or rales.         Discussion/Summary: Possible coronary artery  disease, no clinical documentation. although his diagnosis is on the chart.  There is family history of premature coronary disease, he does have history of premature ventricular contractions, previously well-controlled on beta-blocker therapy. worsening burden.   Echocardiogram 2019, revealed normal left ventricular systolic function with a stage I diastolic dysfunction and no significant valvular abnormalities.  Pharmacological stress test 2019 revealed a fixed basal anterior and mid anterior defect described as artifactual.  Ejection fraction of 71%.  Echo 2024, normal left ventricular  systolic function with mild ventricular hypertrophy., no cardiomyopathy.  Ascending aorta 43 mm.   EP evaluation no intervention needed, ectopy improved with increase beta-blocker.,  Pharmacological stress test 2024, no ischemia, diaphragmatic attenuation artifact noted.  CT of the chest revealed extensive coronary calcifications.  We will decrease the beta-blocker given possible side effects as well as decrease the amlodipine dose.  Both in half,  The patient was diagnosed with Parkinson disease and is suffering from ambulatory dysfunction, he was admitted to the hospital on 2/22 with generalized weakness.  Not a candidate for aggressive interventions..  Patient is not on antiplatelet therapy.  Patient is cleared for surgery.  No testing needed.       This note was completed in part utilizing [employer] direct voice recognition software.   Grammatical errors, random word insertion, spelling mistakes, and incomplete sentences may be an occasional consequence of the system secondary to software limitations, ambient noise and hardware issues. At the time of dictation, efforts were made to edit, clarify and /or correct errors.  Please read the chart carefully and recognize, using context, where substitutions have occurred.  If you have any questions or concerns about the context, text or information contained within the body of this dictation, please contact myself, the provider, for further clarification.

## 2025-01-23 NOTE — PROGRESS NOTES
Daily Note     Today's date: 2025  Patient name: Marcellus Fregoso  : 1955  MRN: [id number]  Referring provider: Ehsan Calix MD  Dx:   Encounter Diagnosis     ICD-10-CM    1. Parkinson's disease, unspecified whether dyskinesia present, unspecified whether manifestations fluctuate (HCC)  G20.A1       2. Ambulatory dysfunction  R26.2       3. Balance disorder  R26.89                      Subjective:  No new complaints.  Pt reports he is very motivated to work to get better.     Objective: See treatment diary below      Assessment: Pt tolerated the treatment well. Added tidal tank to STS this session with good tolerance.  Pt demonstrated most difficulty with increased speed and 180 degree turns while doing blaze pods..  Occasional LOB with pt requiring stabilizing assist of parallel bars next to him. Pt demonstrated fatigue post-session. Pt would benefit from further skilled PT sessions to address deficits in endurance, strength, balance, activity tolerance, and overall safety needed to maximize the pt's function and quality of life.        Plan: Continue per plan of care.  Progress treatment as tolerated.       Short Term Goal Expiration Date:(25)  Long Term Goal Expiration Date: 25)  POC Expiration Date: (25)    Visit count: 2 of 10; PN due 25    POC expires Unit limit Auth Expiration date PT/OT/ST + Visit Limit?   25 N/A 25 BOMN                           Visit/Unit Tracking  AUTH Status:  Date 01/10 01/17 1/23           BOMN Used 1 2 3            Remaining  9 8 7               Access Code: [email or web address]  URL: [email or web address]  Date: 2025  Prepared by: Mine Pan    Exercises  - Sit to Stand with Arms Crossed  - 1 x daily - 7 x weekly - 3 sets - 10 reps  - Walking March  - 1 x daily - 7 x weekly - 1 sets - 3 reps  - Side Stepping with Counter Support  - 1 x daily - 7 x weekly - 1 sets - 3 reps      Precautions Fall risk, T2DM, neuropathy, HTN,  "syncope, depression       Manuals 01/10 01/17 1/23                                     Neuro Re-Ed  Pt education on all neuro testing including coordination, TUG, FGA    Pt education on the benefits of PT to decrease PD disease progression  Blaze pods (4) x 1 min ea fwd    Trial 1 - 12  Trial 2 - 13     STS   Holding TT 10#   2x10 reps      Hurdles  Solo  1/4 track   (6) 6\" hurdles   Fwd x 3 laps (reciprocal)  Lat x 3 laps     Review for HEP      HKM  Review for HEP SOLO x 3 laps     Lat stepping  Review for HEP SOLO x 3 laps     Step up/foam   Up/over 4 foam 1/4 lap x 3 laps fwd    Up/over 2 foam and 2 6\" steps x 3 laps  ea fwd and lat                      Ther Ex Pt education on all testing including MMT, 5xSTS, 6MWT    Pt education on HEP of STS and daily walking        Treadmill  Solo   1.2 mph   0% incline   2 x 4 min     X 8 min total   Mod verbal cues for increased step height    To promote cardiovascular endurance                                                               Ther Activity                        Gait Training                        Modalities                                   "

## 2025-01-24 ENCOUNTER — APPOINTMENT (OUTPATIENT)
Dept: PHYSICAL THERAPY | Facility: CLINIC | Age: 70
End: 2025-01-24
Payer: MEDICARE

## 2025-01-27 ENCOUNTER — OFFICE VISIT (OUTPATIENT)
Dept: PODIATRY | Facility: CLINIC | Age: 70
End: 2025-01-27

## 2025-01-27 DIAGNOSIS — B35.1 ONYCHOMYCOSIS: Primary | ICD-10-CM

## 2025-01-27 PROCEDURE — NCFTCARE PR NON-COVERED FOOT CARE: Performed by: PODIATRIST

## 2025-01-27 NOTE — PROGRESS NOTES
Patient presents for palliative toenail care.  Patient is a 69-year-old type II diabetic with neuropathy.  Chief complaint are thick toenails on each great toe that he cannot trim.  Left nail is intermittently painful.  Pedal pulses are palpable.    Treatment consisted of debridement of each great toenail reducing nails in thickness and removing devitalized tissue and debris.  Reappoint 3 months.

## 2025-01-28 ENCOUNTER — APPOINTMENT (OUTPATIENT)
Dept: PHYSICAL THERAPY | Facility: CLINIC | Age: 70
End: 2025-01-28
Payer: MEDICARE

## 2025-01-29 NOTE — PROGRESS NOTES
Daily Note     Today's date: 2025  Patient name: Marcellus Fregoso  : 1955  MRN: [id number]  Referring provider: Ehsan Calix MD  Dx:   Encounter Diagnosis     ICD-10-CM    1. Parkinson's disease, unspecified whether dyskinesia present, unspecified whether manifestations fluctuate ([facility])  G20.A1       2. Ambulatory dysfunction  R26.2       3. Balance disorder  R26.89           Start Time: 1620  Stop Time: 1705  Total time in clinic (min): 45 minutes    Subjective: PT is really helping me with my balance, but my stamina isn't quite there yet. Trying to walk at home, but sometimes doesn't stay consistent.      Objective: See treatment diary below      Assessment: Pt tolerated the treatment well. Pt progressing well with STS performance, but continues to have difficulty with eccentric control. Pt was challenged with transitioning from even and uneven surfaces. Had about 1-2 LOB per lap, but was able to perform reactive stepping strategies to maintain balance. Pt demonstrated fatigue post-session. Pt would benefit from further skilled PT sessions to address deficits in endurance, strength, balance, activity tolerance, and overall safety needed to maximize the pt's function and quality of life.       Plan: Continue per plan of care.  Progress treatment as tolerated.       Short Term Goal Expiration Date:(25)  Long Term Goal Expiration Date: 25)  POC Expiration Date: (25)    Visit count: 4 of 10; PN due 25    POC expires Unit limit Auth Expiration date PT/OT/ST + Visit Limit?   25 N/A 25 BOMN                           Visit/Unit Tracking  AUTH Status:  Date 01/10 01/17 1/23 01/30          BOMN Used 1 2 3 4           Remaining  9 8 7 6              Access Code: [email or web address]  URL: [email or web address]  Date: 2025  Prepared by: Mine Pan    Exercises  - Sit to Stand with Arms Crossed  - 1 x daily - 7 x weekly - 3 sets - 10 reps  - Walking March  -  x daily - 7  "x weekly - 1 sets - 3 reps  - Side Stepping with Counter Support  - 1 x daily - 7 x weekly - 1 sets - 3 reps      Precautions Fall risk, T2DM, neuropathy, HTN, syncope, depression       Manuals 01/10 01/17 1/23 01/30                                    Neuro Re-Ed  Pt education on all neuro testing including coordination, TUG, FGA    Pt education on the benefits of PT to decrease PD disease progression  Blaze pods (4) x 1 min ea fwd    Trial 1 - 12  Trial 2 - 13     STS   Holding TT 10#   2x10 reps  10# tidal tank   2 x 10     Hurdles  Solo  1/4 track   (6) 6\" hurdles   Fwd x 3 laps (reciprocal)  Lat x 3 laps     Review for HEP      HKM  Review for HEP SOLO x 3 laps Solo  1/4 track   10# tidal tank   X 3 laps     Lat stepping  Review for HEP SOLO x 3 laps     Step up/foam   Up/over 4 foam 1/4 lap x 3 laps fwd    Up/over 2 foam and 2 6\" steps x 3 laps  ea fwd and lat  Solo  1/4 track   (3) airex  by (2) 6\" steps   Fwd x 2 laps   Lat x 2 laps     Blazepods    Solo  1/4 track   (6) pool noodles   4 pods in 4 corners   1:30 min round  Round 1: 15 hits   Round 2: 15 hits             Ther Ex Pt education on all testing including MMT, 5xSTS, 6MWT    Pt education on HEP of STS and daily walking        Treadmill  Solo   1.2 mph   0% incline   2 x 4 min     X 8 min total   Mod verbal cues for increased step height    To promote cardiovascular endurance                                                               Ther Activity                        Gait Training                        Modalities                                     "

## 2025-01-30 ENCOUNTER — TELEPHONE (OUTPATIENT)
Age: 70
End: 2025-01-30

## 2025-01-30 ENCOUNTER — OFFICE VISIT (OUTPATIENT)
Dept: PHYSICAL THERAPY | Facility: CLINIC | Age: 70
End: 2025-01-30
Payer: MEDICARE

## 2025-01-30 DIAGNOSIS — R26.2 AMBULATORY DYSFUNCTION: ICD-10-CM

## 2025-01-30 DIAGNOSIS — R26.89 BALANCE DISORDER: ICD-10-CM

## 2025-01-30 DIAGNOSIS — G20.A1 PARKINSON'S DISEASE, UNSPECIFIED WHETHER DYSKINESIA PRESENT, UNSPECIFIED WHETHER MANIFESTATIONS FLUCTUATE (HCC): Primary | ICD-10-CM

## 2025-01-30 PROCEDURE — 97112 NEUROMUSCULAR REEDUCATION: CPT

## 2025-01-30 NOTE — TELEPHONE ENCOUNTER
Caller: Patient    Doctor: Dr. Ross    Reason for call:     Patient had 2 knee replacements by Dr Ross in the year 2016 and 2017.  He received a notice in the mail about knee replacements.  He would like to know what was the companies name and brand, size of the replacements he had.  Please advise the patient.    Call back#: [phone]

## 2025-01-31 ENCOUNTER — APPOINTMENT (OUTPATIENT)
Dept: PHYSICAL THERAPY | Facility: CLINIC | Age: 70
End: 2025-01-31
Payer: MEDICARE

## 2025-02-03 ENCOUNTER — CLINICAL SUPPORT (OUTPATIENT)
Dept: FAMILY MEDICINE CLINIC | Facility: CLINIC | Age: 70
End: 2025-02-03
Payer: MEDICARE

## 2025-02-03 DIAGNOSIS — E53.8 VITAMIN B12 DEFICIENCY: Primary | ICD-10-CM

## 2025-02-03 PROCEDURE — 96372 THER/PROPH/DIAG INJ SC/IM: CPT

## 2025-02-03 RX ADMIN — CYANOCOBALAMIN 1000 MCG: 1000 INJECTION, SOLUTION INTRAMUSCULAR; SUBCUTANEOUS at 15:41

## 2025-02-06 ENCOUNTER — APPOINTMENT (OUTPATIENT)
Dept: PHYSICAL THERAPY | Facility: CLINIC | Age: 70
End: 2025-02-06
Payer: MEDICARE

## 2025-02-06 NOTE — PROGRESS NOTES
Progress Note     Today's date: 2025  Patient name: Marcellus Fregoso  : 1955  MRN: [id number]  Referring provider: Ehsan Calix MD  Dx:   Encounter Diagnosis     ICD-10-CM    1. Parkinson's disease, unspecified whether dyskinesia present, unspecified whether manifestations fluctuate (HCC)  G20.A1       2. Ambulatory dysfunction  R26.2       3. Balance disorder  R26.89           Start Time: 1445  Stop Time: 1530  Total time in clinic (min): 45 minutes    Individual Treatment ([id number]): 30 minutes   Self- Directed ([id number]): 5 minutes   Therapeutic Group ([id number]): 10 minutes     Assessment: Pt tolerated the session well today. Performed progress update this session to assess progress towards STG at [facility] point in plan of care. Pt has met 4/5 STG and 1/5 LTG at this time. Pt did not meet STG of gait speed of 1 m/s, but is progressing with gait speed of 0.94 m/s. Pt has improved with 6mwt testing with ability to complete the test this session (pt unable to complete at IE) with a distance of 600 feet indicating improved aerobic capacity and activity tolerance. However, continues to be below the norm for his age. Pt's gait speed has improved from 0.90 m/s to 0.94 m/s indicating pt is a community ambulator however is still at risk for falls based on their speed being < 1 m/s. Pts TUG time has improved from 9.94 s to 8.21 s indicating pt is at a decreased risk for falls based on this test as socres > 13.5 s are considered at risk. Pt's FGA score has improved from 19/30 to 24/30 reducing pts fall risk \as scores < 23 are considered at risk for falls and demonstrating improved dynamic balance. Pts 5x STS tests has improved from 15.85 s to 10.90 s indicating improved LE strength and power. Pt also had an improvement in MMT this session with improved b/l hip flexion strength. Pt also was able to perform mCTSIB testing in all conditions for 30 sec indicating improved static balance. However, continues to have  some sway with EC on foam indicating decreased vestibular cueing for balance. Pt would continue to benefit from skilled physical therapy to improve overall QOL inclusive of, strength, coordination, balance, endurance and functional mobility in the home and in the community as well as reduce their fall risk for improved safety.       Goals  STG:  Patient will be able to complete the 6MWT with SPC in 4 weeks demonstrating improved endurance and aerobic capacity. MET  Patient will improve 6MWT by 200 ft with SPC demonstrating significant improvements in endurance  w/in 4 weeks. MET  Patient will improve 5xSTS to <12s with no UE support within 4 weeks demonstrating improved LE strength and power. MET  Patient will be independent with HEP within 4 weeks for increased compliance with exercise at home. MET  Patient will improve gait speed to 1.0 m/s indicating they are no longer at higher risk for falls within 4 weeks. NOT MET, progressing  LTG:  Patient will improve 6MWT by 400 ft with SPC demonstrating significant improvements in endurance  w/in 8 weeks  Patient will demonstrates significant improvements in dynamic balance by improving FGA score by 6 points or more within 8 weeks Progressing   Patient will improve FGA to 22/30 or greater without SPC indicating they are no longer at higher risk for falls within 8  weeks MET  Patient will be independent with progressed HEP within 8 weeks for increased compliance with exercise at home.   Patient will improve gait speed to 1.2 m/s indicating they are no longer at higher risk for falls and can safely cross a cross walk within 8 weeks.     Plan  Patient would benefit from: skilled physical therapy and OT eval  Referral necessary: Yes    Planned therapy interventions: ADL training, balance, activity modification, balance/weight bearing training, body mechanics training, behavior modification, coordination, flexibility, functional ROM exercises, gait training, graded exercise,  graded activity, home exercise program, transfer training, therapeutic training, therapeutic exercise, therapeutic activities, stretching, sensory integrative techniques, strengthening, patient/caregiver education, neuromuscular re-education and motor coordination training    Frequency: 2x week  Duration in weeks: 8  Plan of Care beginning date: 1/10/2025  Plan of Care expiration date: 3/7/2025  Treatment plan discussed with: patient    Subjective: Therapy has been helping a lot, I can tell the difference, my stamina is not so good     Patient pain: 1.5/10 in R knee   Patient goals: Improve balance, endurance with walking, strength    Objective: See treatment diary below    Balance Test 01/06 (IE) 02/07   6 Minute Walk Test (ft): 4 min and then needed to stop   400 ft with SPC   HR: 101 bpm  O2: 91%  600 ft, no AD   HR: 81 bpm   O2: 97%    Gait Speed (ft/s): 20 ft/ 6.78s=  0.90 m/s  20 ft/6.47=  0.94 m/s    5x Sit To Stand (s): 15.85s, No UE 10.90s, No UE   Poor eccentric control    TUG 9.94s, no AD  TUGcog (count back by 3s), 18.79-9.94 8.21, no AD   TUGcog (count back by 3s): 7.34s    FGA 19/30 24/30       MCTSIB Number of Seconds (02/07)   Feet Together, Eyes Open 30 sec   Feet Together, Eyes Closed 30 sec   Feet Together, Eyes Open Foam 30 sec   Feet Together, Eyes Closed Foam 30 sec      Manual Muscle Testing - Hip Right Left 02/07   Flexion 3+ 3 R: 4+/5  L: 4/5          Short Term Goal Expiration Date:(02/07/25)  Long Term Goal Expiration Date: 03/07/25)  POC Expiration Date: (03/07/25)    Visit count: 4 of 10; PN due 03/07/25    POC expires Unit limit Auth Expiration date PT/OT/ST + Visit Limit?   02/20/25 N/A 12/31/25 BOMN                           Visit/Unit Tracking  AUTH Status:  Date 01/10 01/17 1/23 01/30 02/07         BOMN Used 1 2 3 4 5          Remaining  9 8 7 6 5             Access Code: [email or web address]  URL: [email or web address]  Date: 01/17/2025  Prepared by: Mine Pan    Exercises  -  "Sit to Stand with Arms Crossed  - 1 x daily - 7 x weekly - 3 sets - 10 reps  - Walking March  - 1 x daily - 7 x weekly - 1 sets - 3 reps  - Side Stepping with Counter Support  - 1 x daily - 7 x weekly - 1 sets - 3 reps      Precautions Fall risk, T2DM, neuropathy, HTN, syncope, depression       Manuals 01/10 01/17 1/23 01/30 02/07                                   Neuro Re-Ed  Pt education on all neuro testing including coordination, TUG, FGA    Pt education on the benefits of PT to decrease PD disease progression  Blaze pods (4) x 1 min ea fwd    Trial 1 - 12  Trial 2 - 13  Pt education on all neuro testing including FGA, mCTSIB, TUG and its relation to fall risk   STS   Holding TT 10#   2x10 reps  10# tidal tank   2 x 10     Hurdles  Solo  1/4 track   (6) 6\" hurdles   Fwd x 3 laps (reciprocal)  Lat x 3 laps     Review for HEP      HKM  Review for HEP SOLO x 3 laps Solo  1/4 track   10# tidal tank   X 3 laps     Lat stepping  Review for HEP SOLO x 3 laps     Step up/foam   Up/over 4 foam 1/4 lap x 3 laps fwd    Up/over 2 foam and 2 6\" steps x 3 laps  ea fwd and lat  Solo  1/4 track   (3) airex  by (2) 6\" steps   Fwd x 2 laps   Lat x 2 laps     Blazepods    Solo  1/4 track   (6) pool noodles   4 pods in 4 corners   1:30 min round  Round 1: 15 hits   Round 2: 15 hits             Ther Ex Pt education on all testing including MMT, 5xSTS, 6MWT    Pt education on HEP of STS and daily walking     Pt education on 5xSTS, 6MWT, and MMT testing     Pt education on continuing daily walking to improve endurance    Treadmill  Solo   1.2 mph   0% incline   2 x 4 min     X 8 min total   Mod verbal cues for increased step height    To promote cardiovascular endurance                                                               Ther Activity                        Gait Training                        Modalities                                       "

## 2025-02-07 ENCOUNTER — EVALUATION (OUTPATIENT)
Dept: PHYSICAL THERAPY | Facility: CLINIC | Age: 70
End: 2025-02-07
Payer: MEDICARE

## 2025-02-07 DIAGNOSIS — G20.A1 PARKINSON'S DISEASE, UNSPECIFIED WHETHER DYSKINESIA PRESENT, UNSPECIFIED WHETHER MANIFESTATIONS FLUCTUATE (HCC): Primary | ICD-10-CM

## 2025-02-07 DIAGNOSIS — R26.2 AMBULATORY DYSFUNCTION: ICD-10-CM

## 2025-02-07 DIAGNOSIS — R26.89 BALANCE DISORDER: ICD-10-CM

## 2025-02-07 PROCEDURE — 97110 THERAPEUTIC EXERCISES: CPT

## 2025-02-07 PROCEDURE — 97112 NEUROMUSCULAR REEDUCATION: CPT

## 2025-02-07 PROCEDURE — 97150 GROUP THERAPEUTIC PROCEDURES: CPT

## 2025-02-10 ENCOUNTER — OFFICE VISIT (OUTPATIENT)
Dept: PHYSICAL THERAPY | Facility: CLINIC | Age: 70
End: 2025-02-10
Payer: MEDICARE

## 2025-02-10 DIAGNOSIS — R26.89 BALANCE DISORDER: ICD-10-CM

## 2025-02-10 DIAGNOSIS — R26.2 AMBULATORY DYSFUNCTION: ICD-10-CM

## 2025-02-10 DIAGNOSIS — G20.A1 PARKINSON'S DISEASE, UNSPECIFIED WHETHER DYSKINESIA PRESENT, UNSPECIFIED WHETHER MANIFESTATIONS FLUCTUATE (HCC): Primary | ICD-10-CM

## 2025-02-10 PROCEDURE — 97112 NEUROMUSCULAR REEDUCATION: CPT

## 2025-02-10 PROCEDURE — 97110 THERAPEUTIC EXERCISES: CPT

## 2025-02-10 NOTE — PROGRESS NOTES
Daily Note     Today's date: 2/10/2025  Patient name: Marcellus Fregoso  : 1955  MRN: [id number]  Referring provider: Ehsan Calix MD  Dx:   Encounter Diagnosis     ICD-10-CM    1. Parkinson's disease, unspecified whether dyskinesia present, unspecified whether manifestations fluctuate ([facility])  G20.A1       2. Ambulatory dysfunction  R26.2       3. Balance disorder  R26.89           Start Time: 1415  Stop Time: 1500  Total time in clinic (min): 45 minutes    Subjective: Was really sore and tired after Friday's session with all the testing      Objective: See treatment diary below      Assessment: Pt tolerated the treatment well. Pt performed biking on the Nu-step to promote cardiovascular endurance and neural priming for the session. Pt was challenged with dynamic balance interventions to challenge foot clearance due to increased shuffling this session as well as transitioning from even/uneven surfaces.  Pt had difficulty with balance during HKM and backward walking. Pt demonstrated fatigue post-session. Pt would benefit from further skilled PT sessions to address deficits in endurance, strength, balance, activity tolerance, and overall safety needed to maximize the pt's function and quality of life.       Plan: Continue per plan of care.  Progress treatment as tolerated.       Short Term Goal Expiration Date:(25)  Long Term Goal Expiration Date: 25)  POC Expiration Date: (25)    Visit count: 6 of 10; PN due 25    POC expires Unit limit Auth Expiration date PT/OT/ST + Visit Limit?   25 N/A 25 BOMN                           Visit/Unit Tracking  AUTH Status:  Date 01/10 01/17 1/23 01/30 02/07 02/10        BOMN Used 1 2 3 4 5 6         Remaining  9 8 7 6 5 4            Access Code: [email or web address]  URL: [email or web address]  Date: 2025  Prepared by: Mine Pan    Exercises  - Sit to Stand with Arms Crossed  - 1 x daily - 7 x weekly - 3 sets - 10 reps  - Walking  "March  - 1 x daily - 7 x weekly - 1 sets - 3 reps  - Side Stepping with Counter Support  - 1 x daily - 7 x weekly - 1 sets - 3 reps      Precautions Fall risk, T2DM, neuropathy, HTN, syncope, depression       Manuals 02/10 01/17 1/23 01/30 02/07                                   Neuro Re-Ed    Blaze pods (4) x 1 min ea fwd    Trial 1 - 12  Trial 2 - 13  Pt education on all neuro testing including FGA, mCTSIB, TUG and its relation to fall risk   STS 10# tidal tank   2 x 10   Holding TT 10#   2x10 reps  10# tidal tank   2 x 10     Hurdles  Solo  1/4 track   (6) 6\" hurdles   Fwd x 3 laps (reciprocal)  Lat x 3 laps     Review for HEP      HKM Solo  1/4 track   Fwd HKM and backwards walking   10# tidal tank   X 4 laps  Review for HEP SOLO x 3 laps Solo  1/4 track   10# tidal tank   X 3 laps     Lat stepping  Review for HEP SOLO x 3 laps     Step up/foam Solo  1/4 track   (3) airex  by (4) 8\" yellow hurdles  Fwd x 2 laps   Lat x 2 laps   Up/over 4 foam 1/4 lap x 3 laps fwd    Up/over 2 foam and 2 6\" steps x 3 laps  ea fwd and lat  Solo  1/4 track   (3) airex  by (2) 6\" steps   Fwd x 2 laps   Lat x 2 laps     Blazepods    Solo  1/4 track   (6) pool noodles   4 pods in 4 corners   1:30 min round  Round 1: 15 hits   Round 2: 15 hits             Ther Ex     Pt education on 5xSTS, 6MWT, and MMT testing     Pt education on continuing daily walking to improve endurance    Treadmill  Solo   1.2 mph   0% incline   2 x 4 min     X 8 min total   Mod verbal cues for increased step height    To promote cardiovascular endurance       Nu-step L5 x 10 min     Cardiovascular endurance and ROM                                                        Ther Activity                        Gait Training                        Modalities                                         "

## 2025-02-13 DIAGNOSIS — E78.2 MIXED HYPERLIPIDEMIA: ICD-10-CM

## 2025-02-13 RX ORDER — ROSUVASTATIN CALCIUM 5 MG/1
5 TABLET, COATED ORAL WEEKLY
Qty: 12 TABLET | Refills: 1 | Status: SHIPPED | OUTPATIENT
Start: 2025-02-13

## 2025-02-14 ENCOUNTER — APPOINTMENT (OUTPATIENT)
Dept: PHYSICAL THERAPY | Facility: CLINIC | Age: 70
End: 2025-02-14
Payer: MEDICARE

## 2025-02-14 NOTE — PROGRESS NOTES
Daily Note     Today's date: 2025  Patient name: Marcellus Fregoso  : 1955  MRN: [id number]  Referring provider: Ehsan Calix MD  Dx: No diagnosis found.               Subjective: ***      Objective: See treatment diary below      Assessment: Pt tolerated the treatment ***. Pt performed biking on the Nu-step/walking on the treadmill to promote cardiovascular endurance/neural priming for the session. Pt was challenged with ***.  Pt is progressing with ***.  Pt had difficulty with ***. Pt demonstrated fatigue post-session. Pt would benefit from further skilled PT sessions to address deficits in endurance, strength, balance, activity tolerance, and overall safety needed to maximize the pt's function and quality of life.     Plan: Continue per plan of care.  Progress treatment as tolerated.       Short Term Goal Expiration Date:(25)  Long Term Goal Expiration Date: 25)  POC Expiration Date: (25)    Visit count: 6 of 10; PN due 25    POC expires Unit limit Auth Expiration date PT/OT/ST + Visit Limit?   25 N/A 25 BOMN                           Visit/Unit Tracking  AUTH Status:  Date 01/10 01/17 1/23 01/30 02/07 02/10 02/17       BOMN Used 1 2 3 4 5 6 7        Remaining  9 8 7 6 5 4 3           Access Code: [email or web address]  URL: [email or web address]  Date: 2025  Prepared by: Mine Pan    Exercises  - Sit to Stand with Arms Crossed  - 1 x daily - 7 x weekly - 3 sets - 10 reps  - Walking March  -  x daily - 7 x weekly - 1 sets - 3 reps  - Side Stepping with Counter Support  - 1 x daily - 7 x weekly - 1 sets - 3 reps      Precautions Fall risk, T2DM, neuropathy, HTN, syncope, depression       Manuals 02/10 02/17 1/23 01/30 02/07                                   Neuro Re-Ed    Blaze pods (4) x 1 min ea fwd    Trial 1 - 12  Trial 2 - 13  Pt education on all neuro testing including FGA, mCTSIB, TUG and its relation to fall risk   STS 10# tidal tank   2 x 10    "Holding TT 10#   2x10 reps  10# tidal tank   2 x 10     Hurdles        HKM Solo  1/4 track   Fwd HKM and backwards walking   10# tidal tank   X 4 laps   SOLO x 3 laps Solo  1/4 track   10# tidal tank   X 3 laps     Lat stepping   SOLO x 3 laps     Step up/foam Solo  1/4 track   (3) airex  by (4) 8\" yellow hurdles  Fwd x 2 laps   Lat x 2 laps   Up/over 4 foam 1/4 lap x 3 laps fwd    Up/over 2 foam and 2 6\" steps x 3 laps  ea fwd and lat  Solo  1/4 track   (3) airex  by (2) 6\" steps   Fwd x 2 laps   Lat x 2 laps     Blazepods    Solo  1/4 track   (6) pool noodles   4 pods in 4 corners   1:30 min round  Round 1: 15 hits   Round 2: 15 hits             Ther Ex     Pt education on 5xSTS, 6MWT, and MMT testing     Pt education on continuing daily walking to improve endurance    Treadmill        Nu-step L5 x 10 min     Cardiovascular endurance and ROM                                                        Ther Activity                        Gait Training                        Modalities                                           "

## 2025-02-17 ENCOUNTER — APPOINTMENT (OUTPATIENT)
Dept: PHYSICAL THERAPY | Facility: CLINIC | Age: 70
End: 2025-02-17
Payer: MEDICARE

## 2025-02-17 ENCOUNTER — SOCIAL WORK (OUTPATIENT)
Dept: BEHAVIORAL/MENTAL HEALTH CLINIC | Facility: CLINIC | Age: 70
End: 2025-02-17
Payer: MEDICARE

## 2025-02-17 DIAGNOSIS — F33.8 SEASONAL AFFECTIVE DISORDER (HCC): ICD-10-CM

## 2025-02-17 DIAGNOSIS — F41.8 DEPRESSION WITH ANXIETY: Primary | ICD-10-CM

## 2025-02-17 PROCEDURE — 90837 PSYTX W PT 60 MINUTES: CPT | Performed by: SOCIAL WORKER

## 2025-02-17 NOTE — PSYCH
"Behavioral Health Psychotherapy Progress Note    Psychotherapy Provided: Individual Psychotherapy     1. Depression with anxiety        2. Seasonal affective disorder (HCC)            Goals addressed in session: Goal 1, Goal 2, and Goal 3      DATA: Jordin as he prefers to be called shared his doc made med changes and he is doing better he feels with his depression and his seasonal affective diorder. He is also adjusting better to his parkinson's diagnosis. He shared his wife's best girlfriend is a psychiatrist very knowledgeable concerning Parkinsons and is helping Jordin's wife in adjusting to it. He shared he and his wife worked out strategies with each other when things get heated and they are doing a better job at handling conflict with each other. I provide support, encouragement and strategies to cope. He talked about he and his wife's past careers.   During this session, this clinician used the following therapeutic modalities: Client-centered Therapy, Cognitive Behavioral Therapy, Mindfulness-based Strategies, and Supportive Psychotherapy    Substance Abuse was not addressed during this session. If the client is diagnosed with a co-occurring substance use disorder, please indicate any changes in the frequency or amount of use: n/a. Stage of change for addressing substance use diagnoses: No substance use/Not applicable    ASSESSMENT:  Marcellus Fregoso presents with a Euthymic/ normal mood.     his affect is Normal range and intensity, which is congruent, with his mood and the content of the session. The client has made progress on their goals.     Marcellus Fregoso presents with a none risk of suicide, none risk of self-harm, and none risk of harm to others.    For any risk assessment that surpasses a \"low\" rating, a safety plan must be developed.    A safety plan was indicated: no  If yes, describe in detail n/a    PLAN: Between sessions, Marcellus Fregoso will use mindfulness and CBT. At the next session, the " therapist will use Client-centered Therapy, Cognitive Behavioral Therapy, Mindfulness-based Strategies, and Supportive Psychotherapy to address issues and symptoms as they may arise. .    Behavioral Health Treatment Plan and Discharge Planning: Marcellus Fregoso is aware of and agrees to continue to work on their treatment plan. They have identified and are working toward their discharge goals. yes    Depression Follow-up Plan Completed: Not applicable    Visit start and stop times:    02/17/25  Start Time: 1500  Stop Time: 1600  Total Visit Time: 60 minutes

## 2025-02-18 ENCOUNTER — TELEPHONE (OUTPATIENT)
Dept: FAMILY MEDICINE CLINIC | Facility: CLINIC | Age: 70
End: 2025-02-18

## 2025-02-18 ENCOUNTER — CLINICAL SUPPORT (OUTPATIENT)
Dept: FAMILY MEDICINE CLINIC | Facility: CLINIC | Age: 70
End: 2025-02-18
Payer: MEDICARE

## 2025-02-18 DIAGNOSIS — E53.8 VITAMIN B12 DEFICIENCY: Primary | ICD-10-CM

## 2025-02-18 PROCEDURE — 96372 THER/PROPH/DIAG INJ SC/IM: CPT

## 2025-02-18 RX ORDER — CYANOCOBALAMIN 1000 UG/ML
1000 INJECTION, SOLUTION INTRAMUSCULAR; SUBCUTANEOUS
Qty: 10 ML | Refills: 1 | Status: SHIPPED | OUTPATIENT
Start: 2025-02-18

## 2025-02-18 RX ADMIN — CYANOCOBALAMIN 1000 MCG: 1000 INJECTION, SOLUTION INTRAMUSCULAR; SUBCUTANEOUS at 15:00

## 2025-02-18 NOTE — TELEPHONE ENCOUNTER
Patient would like to administer his shots at home due to the distance and he is able to administer them. He will need syringe and needle.

## 2025-02-20 ENCOUNTER — APPOINTMENT (OUTPATIENT)
Dept: PHYSICAL THERAPY | Facility: CLINIC | Age: 70
End: 2025-02-20
Payer: MEDICARE

## 2025-02-20 NOTE — PROGRESS NOTES
Daily Note     Today's date: 2025  Patient name: Marcellus Fregoso  : 1955  MRN: [id number]  Referring provider: Ehsan Calix MD  Dx:   Encounter Diagnosis     ICD-10-CM    1. Parkinson's disease, unspecified whether dyskinesia present, unspecified whether manifestations fluctuate (HCC)  G20.A1       2. Ambulatory dysfunction  R26.2       3. Balance disorder  R26.89           Start Time: 1445  Stop Time: 1535  Total time in clinic (min): 50 minutes    Subjective: Was walking in the house, cat came in front of him and fell down, hit the head a touch, but mostly hit his bottom. No headache or symptoms besides some back pain,       Objective: See treatment diary below      Assessment: Pt tolerated the treatment well. Pt performed walking on the treadmill to promote cardiovascular endurance/neural priming for the session. Pt demonstrated improved act tolerance with walking without the need for a seated break this session. Pt was challenged with transitioning from even/uneven surfaces. Had 1 LOB with lateral stepping on foam pad, but able to use reactive balance strategies to maintain balance. Pt demonstrated fatigue post-session. Pt would benefit from further skilled PT sessions to address deficits in endurance, strength, balance, activity tolerance, and overall safety needed to maximize the pt's function and quality of life.        Plan: Continue per plan of care.  Progress treatment as tolerated.       Short Term Goal Expiration Date:(25)  Long Term Goal Expiration Date: 25)  POC Expiration Date: (25)    Visit count: 7 of 10; PN due 25    POC expires Unit limit Auth Expiration date PT/OT/ST + Visit Limit?   25 N/A 25 BOMN                           Visit/Unit Tracking  AUTH Status:  Date 01/10 01/17 1/23 01/30 02/07 02/10 02/21       BOMN Used 1 2 3 4 5 6 7        Remaining  9 8 7 6 5 4 3           Access Code: [email or web address]  URL: [email or web address]  Date:  "01/17/2025  Prepared by: Mine Pan    Exercises  - Sit to Stand with Arms Crossed  - 1 x daily - 7 x weekly - 3 sets - 10 reps  - Walking March  - 1 x daily - 7 x weekly - 1 sets - 3 reps  - Side Stepping with Counter Support  - 1 x daily - 7 x weekly - 1 sets - 3 reps      Precautions Fall risk, T2DM, neuropathy, HTN, syncope, depression       Manuals 02/10 02/21  01/30 02/07                                   Neuro Re-Ed      Pt education on all neuro testing including FGA, mCTSIB, TUG and its relation to fall risk   STS 10# tidal tank   2 x 10  10# tidal tank   2 x 10   10# tidal tank   2 x 10     Hurdles        HKM Solo  1/4 track   Fwd HKM and backwards walking   10# tidal tank   X 4 laps  Solo  1/4 track   Fwd HKM and backwards walking   10# tidal tank   X 2 laps   Solo  1/4 track   10# tidal tank   X 3 laps     Lat stepping        Step up/foam Solo  1/4 track   (3) airex  by (4) 8\" yellow hurdles  Fwd x 2 laps   Lat x 2 laps  Solo  (3) airex pads  by (2) 6\" steps   Fwd x 2 laps   Lat x 2 laps     Solo  1/4 track   (3) airex  by (2) 6\" steps   Fwd x 2 laps   Lat x 2 laps     Blazepods    Solo  1/4 track   (6) pool noodles   4 pods in 4 corners   1:30 min round  Round 1: 15 hits   Round 2: 15 hits     Dynamic balance   Solo  1/4 track   (6) cones   Fwd x 2 laps   For single leg stance      Ther Ex  Pt education on continuing daily walking to increase endurance and aerobic capacity    Pt education on 5xSTS, 6MWT, and MMT testing     Pt education on continuing daily walking to improve endurance    Treadmill  1.2-1.6 mph   0% incline   B/l UE   X 6 min    1.2 mph   X 4 min     X 10 min total    Verbal cues for upright posture       Nu-step L5 x 10 min     Cardiovascular endurance and ROM                                                        Ther Activity                        Gait Training                        Modalities                                             "

## 2025-02-21 ENCOUNTER — OFFICE VISIT (OUTPATIENT)
Dept: PHYSICAL THERAPY | Facility: CLINIC | Age: 70
End: 2025-02-21
Payer: MEDICARE

## 2025-02-21 DIAGNOSIS — R26.89 BALANCE DISORDER: ICD-10-CM

## 2025-02-21 DIAGNOSIS — G20.A1 PARKINSON'S DISEASE, UNSPECIFIED WHETHER DYSKINESIA PRESENT, UNSPECIFIED WHETHER MANIFESTATIONS FLUCTUATE (HCC): Primary | ICD-10-CM

## 2025-02-21 DIAGNOSIS — R26.2 AMBULATORY DYSFUNCTION: ICD-10-CM

## 2025-02-21 PROCEDURE — 97110 THERAPEUTIC EXERCISES: CPT

## 2025-02-21 PROCEDURE — 97112 NEUROMUSCULAR REEDUCATION: CPT

## 2025-02-23 ENCOUNTER — OFFICE VISIT (OUTPATIENT)
Dept: URGENT CARE | Age: 70
End: 2025-02-23
Payer: MEDICARE

## 2025-02-23 ENCOUNTER — APPOINTMENT (OUTPATIENT)
Dept: RADIOLOGY | Age: 70
End: 2025-02-23
Payer: MEDICARE

## 2025-02-23 VITALS
SYSTOLIC BLOOD PRESSURE: 133 MMHG | DIASTOLIC BLOOD PRESSURE: 66 MMHG | HEART RATE: 65 BPM | RESPIRATION RATE: 20 BRPM | OXYGEN SATURATION: 98 % | TEMPERATURE: 98.2 F

## 2025-02-23 DIAGNOSIS — R05.1 ACUTE COUGH: Primary | ICD-10-CM

## 2025-02-23 DIAGNOSIS — R05.1 ACUTE COUGH: ICD-10-CM

## 2025-02-23 PROCEDURE — G0463 HOSPITAL OUTPT CLINIC VISIT: HCPCS

## 2025-02-23 PROCEDURE — 71046 X-RAY EXAM CHEST 2 VIEWS: CPT

## 2025-02-23 PROCEDURE — 99203 OFFICE O/P NEW LOW 30 MIN: CPT

## 2025-02-23 RX ORDER — ALBUTEROL SULFATE 0.83 MG/ML
2.5 SOLUTION RESPIRATORY (INHALATION) ONCE
Status: COMPLETED | OUTPATIENT
Start: 2025-02-23 | End: 2025-02-23

## 2025-02-23 RX ORDER — ALBUTEROL SULFATE 90 UG/1
2 INHALANT RESPIRATORY (INHALATION) EVERY 6 HOURS PRN
Qty: 8.5 G | Refills: 0 | Status: SHIPPED | OUTPATIENT
Start: 2025-02-23

## 2025-02-23 RX ADMIN — ALBUTEROL SULFATE 2.5 MG: 0.83 SOLUTION RESPIRATORY (INHALATION) at 18:01

## 2025-02-23 NOTE — PROGRESS NOTES
[facility]  Name: Marcellus Fregoso      : 1955      MRN: [id number]  Encounter Provider: ED Machado  Encounter Date: 2025   Encounter [facility]  :  Chest x-ray reviewed, no acute abnormality noted, awaiting official read.   Albuterol nebulizer administered in office. Patient reporting significant improvement in subjective sense of chest tightness.   Please continue albuterol inhaler at home as directed.   Follow up with PCP within one week.   Go to ED for worsening symptoms.   Assessment & Plan  Acute cough    Orders:    XR chest pa and lateral; Future    albuterol inhalation solution 2.5 mg    albuterol (ProAir HFA) 90 mcg/act inhaler; Inhale 2 puffs every 6 (six) hours as needed for wheezing        Patient Instructions  Patient Education     Cough, Adult ED   General Information   You came to the Emergency Department (ED) for a cough. The doctors feel it is safe for you to recover at home. Many things can cause your cough. A cold or allergies can cause a cough. Other times, asthma or smoking can cause your cough. You can have a cough from mucus that drips down the back of your throat or from stomach acid that backs up into your throat. Sometimes a cough is a side effect from a drug. You may be waiting on some test results. If so, the staff will contact you if there are concerning results.  What care is needed at home?   Call your regular doctor to let them know you were in the ED. Make a follow-up appointment if you were told to.  To help you feel better:  Use a cool mist humidifier to avoid breathing dry air.  Use hard candy or cough drops to soothe sore throat and cough.  Gargle with salt water (mix 1/2 teaspoon salt with 1 cup warm water) a few times a day.  Spray saltwater mist in each nostril. Any normal saline spray works.  Sip warm liquids to keep your throat moist.  Take warm, steamy showers to help soothe the cough.  Do not smoke or be in  smoke-filled places. Avoid things that may cause breathing problems like vaping, fumes, pollution, dust, and other common allergens.  You may want to use over-the-counter medicines for allergies or acid reflux if your cough is due to one of these problems.  You can also use an over-the-counter cough medicine.  When do I need to get emergency help?   Return to the ED if:   You have chest pain when you cough or trouble breathing.  You start to cough up blood or yellow or green mucus.  When do I need to call the doctor?   You have a fever of 100.4°F (38°C) or higher or chills.  You cough so hard you throw up.  You are still coughing in 10 days.  You have new or worsening symptoms.  Last Reviewed Date   2020-07-15  Consumer Information Use and Disclaimer   This generalized information is a limited summary of diagnosis, treatment, and/or medication information. It is not meant to be comprehensive and should be used as a tool to help the user understand and/or assess potential diagnostic and treatment options. It does NOT include all information about conditions, treatments, medications, side effects, or risks that may apply to a specific patient. It is not intended to be medical advice or a substitute for the medical advice, diagnosis, or treatment of a health care provider based on the health care provider's examination and assessment of a patient’s specific and unique circumstances. Patients must speak with a health care provider for complete information about their health, medical questions, and treatment options, including any risks or benefits regarding use of medications. This information does not endorse any treatments or medications as safe, effective, or approved for treating a specific patient. [employer] and its affiliates disclaim any warranty or liability relating to this information or the use thereof. The use of this information is governed by the Terms of Use, available at  https://www.[employer].com/en/know/clinical-effectiveness-terms   Copyright   Copyright © 2024 [employer], Inc[employer] and its affiliates and/or licensors. All rights reserved.  Follow up with PCP in 3-5 days.  Proceed to  ER if symptoms worsen.    If tests are performed, our office will contact you with results only if changes need to made to the care plan discussed with you at the visit. You can review your full results on [facility] MyChart.    Chief Complaint:   Chief Complaint   Patient presents with    Shortness of Breath     Patient reports history of metz's recently had an episode of reflux on Friday . Reports started with  cough, wheezing and sob on Saturday. Improved slightly and by Saturday night symptoms worsened. History of aspiration which caused pneumonia. Requesting a chest xray and evaluation .     Cough    Wheezing     History of Present Illness   PMH significant for: GERD, Metz's esophagus, aspiration pneumonia. Patient concerned that he had an aspiration even on Friday, has noted some cough, wheeze and intermittent chest tightness since the episode.     Cough  This is a new problem. The current episode started in the past 7 days (x 2 days). The problem has been waxing and waning. The cough is Productive of sputum. Associated symptoms include shortness of breath and wheezing. Pertinent negatives include no chest pain, chills, ear congestion, ear pain, eye redness, fever, headaches, heartburn, hemoptysis, myalgias, nasal congestion, postnasal drip, rash, rhinorrhea, sore throat, sweats or weight loss. Nothing aggravates the symptoms. He has tried nothing for the symptoms. The treatment provided no relief. His past medical history is significant for asthma and pneumonia. There is no history of bronchiectasis, bronchitis, COPD, emphysema or environmental allergies.         Review of Systems   Constitutional:  Negative for chills, fatigue, fever and weight loss.   HENT:  Negative for congestion, ear  discharge, ear pain, postnasal drip, rhinorrhea, sinus pressure, sinus pain, sneezing and sore throat.    Eyes: Negative.  Negative for pain, discharge, redness and itching.   Respiratory:  Positive for cough, shortness of breath and wheezing. Negative for apnea, hemoptysis, choking, chest tightness and stridor.    Cardiovascular: Negative.  Negative for chest pain and palpitations.   Gastrointestinal: Negative.  Negative for diarrhea, heartburn, nausea and vomiting.   Endocrine: Negative.  Negative for polydipsia, polyphagia and polyuria.   Genitourinary: Negative.  Negative for decreased urine volume and flank pain.   Musculoskeletal: Negative.  Negative for arthralgias, back pain, myalgias, neck pain and neck stiffness.   Skin: Negative.  Negative for color change and rash.   Allergic/Immunologic: Negative.  Negative for environmental allergies.   Neurological: Negative.  Negative for dizziness, facial asymmetry, light-headedness, numbness and headaches.   Hematological: Negative.  Negative for adenopathy.   Psychiatric/Behavioral: Negative.       Past Medical History   Past Medical History:   Diagnosis Date    Acute blood loss anemia 10/13/2016    Anxiety     Arthritis     knees    Arthritis     Asthma     stable for > 10 years    [place] esophagus     with BARRX/RFA    Cancer (HCC)     prostate    Cataract     Depression     Diabetes (HCC)     Diabetes mellitus (HCC)     pre diabetes    Diverticulitis of colon     Environmental allergies     Fall 12/30/2020    GERD (gastroesophageal reflux disease)     Hiatal hernia     History of anal fissures     Hyperlipidemia     Hypertension     Incisional hernia     Insomnia     Kidney stone     Malignant neoplasm of prostate (HCC)     Morbid obesity (HCC)     Parkinson disease (HCC)     Peripheral neuropathy     PONV (postoperative nausea and vomiting)     Prolapsing mitral valve     prolapsing mitral valve leaflet syndrome    Prostate cancer (HCC)     Retinal detachment      treated surgically at [facility]; resolved: 10/10/2012    Sleep apnea     diagnosed but unable to tolerate cpap.     Stuttering      Past Surgical History:   Procedure Laterality Date    ARTHROSCOPIC REPAIR ACL Right     BIOPSY CORE NEEDLE      prostate    CATARACT EXTRACTION Bilateral     COLONOSCOPY      HERNIA REPAIR      naval    JOINT REPLACEMENT      B/L knee    KNEE ARTHROSCOPY Left     LAPAROSCOPIC GASTRIC BANDING      CT ARTHRP KNE CONDYLE&PLATU MEDIAL&LAT COMPARTMENTS Left 02/15/2017    Procedure: TOTAL KNEE ARTHROPLASTY ;  Surgeon: Sal Ross MD;  Location: [facility];  Service: Orthopedics    CT ARTHRP KNE CONDYLE&PLATU MEDIAL&LAT COMPARTMENTS Right 10/10/2016    Procedure: ARTHROPLASTY KNEE TOTAL;  Surgeon: Sal Ross MD;  Location: [facility];  Service: Orthopedics    PROSTATECTOMY      robotic-assisted; Lilly Tay    REMOVAL GASTRIC BAND LAPAROSCOPIC N/A 8/30/2022    Procedure: LAPAROSCOPIC REMOVAL OF ADJUSTABLE GASTRIC BAND AND PORT  WITH INTRAOPERATIVE EGD;  Surgeon: Neal Yang MD;  Location: [facility];  Service: Bariatrics    RETINAL DETACHMENT SURGERY Bilateral     Lemus Eye    SEPTOPLASTY      SINUS SURGERY      TONSILLECTOMY      over age 10    UPPER GASTROINTESTINAL ENDOSCOPY      mutiple with BARRX/RFA    VASECTOMY      vas deferens     Family History   Problem Relation Age of Onset    Heart disease Father     Coronary artery disease Father     Prostate cancer Father     Coronary artery disease Mother     Diabetes Mother         mellitus    Diabetes type II Mother     Diabetes Maternal Grandmother         mellitus    Coronary artery disease Maternal Grandfather     Coronary artery disease Paternal Grandfather      he reports that he has never smoked. He has never been exposed to tobacco smoke. He has never used smokeless tobacco. He reports that he does not currently use alcohol after a past usage of about 1.0 standard drink of alcohol per week. He reports that he does  "not use drugs.  Current Outpatient Medications   Medication Instructions    albuterol (ProAir HFA) 90 mcg/act inhaler 2 puffs, Inhalation, Every 6 hours PRN    amLODIPine (NORVASC) 5 mg, Oral, Daily    Blood Glucose Monitoring Suppl (OneTouch Verio) w/Device KIT Does not apply, Daily    carbidopa-levodopa (SINEMET)  mg per tablet 1 tablet, 3 times daily    clindamycin 1 % gel     cyanocobalamin 1,000 mcg, Intramuscular, Every 30 days    ezetimibe (ZETIA) 5 mg, Oral, Daily    FLUoxetine (PROZAC) 40 mg, Oral, Daily    glucose blood (OneTouch Verio) test strip 1 each, Other, Daily, USE TO TEST DAILY    hydrOXYzine HCL (ATARAX) 25 mg, Oral, 2 times daily    Lancet Devices (ONE TOUCH DELICA LANCING DEV) MISC Does not apply, Daily    Lancets (OneTouch Delica Plus [id number]) MISC 1 Application, Other, Daily    latanoprost (XALATAN) 0.005 % ophthalmic solution INSTILL 1 DROP INTO BOTH EYES IN THE EVENING    losartan (COZAAR) 100 mg, Oral, Daily    metFORMIN (GLUCOPHAGE) 1,000 mg, Oral, 2 times daily with meals    metoprolol succinate (TOPROL-XL) 25 mg, Oral, 2 times daily    Multiple Vitamins-Minerals (CENTRUM ADULTS PO) Daily    nystatin (MYCOSTATIN) cream Topical, 2 times daily    pantoprazole (PROTONIX) 40 mg tablet TAKE 1 TABLET TWICE A DAY (1 TABLET IN THE MORNING AND 1 TABLET IN THE EVENING)    QUEtiapine (SEROQUEL) 50 mg, Oral, Daily at bedtime    rosuvastatin (CRESTOR) 5 mg, Oral, Weekly    semaglutide, 2 mg/dose, (Ozempic, 2 MG/DOSE,) 8 mg/ mL injection pen INJECT 2 MG UNDER THE SKIN EVERY 7 DAYS    senna-docusate sodium (SENOKOT S) 8.6-50 mg per tablet 1 tablet, Oral, Daily at bedtime    sucralfate (CARAFATE) 1 g, Oral, 2 times daily    SYRINGE-NEEDLE, DISP, 3 ML 23G X 1\" 3 ML MISC Does not apply, Every 30 days    trospium chloride (SANCTURA) 20 mg, Oral, 2 times daily     Allergies   Allergen Reactions    Celebrex [Celecoxib] Other (See Comments)     Cardiologist stated he should not take      Chlorhexidine " "Hives     Is able to wash with hibiclens but not use michi cloths    Other      \"steroid eyedrops\"      Prednisone Other (See Comments)     Prednisone eye drops- eye pressure increases    Selegiline Allergic Rhinitis     Hypertension      Objective   /66 (Patient Position: Sitting, Cuff Size: Adult)   Pulse 65   Temp 98.2 °F (36.8 °C) (Temporal)   Resp 20   SpO2 95%      Physical Exam  Vitals and nursing note reviewed.   Constitutional:       General: He is not in acute distress.     Appearance: He is well-developed. He is not ill-appearing, toxic-appearing or diaphoretic.      Interventions: He is not intubated.  HENT:      Head: Normocephalic and atraumatic.   Eyes:      Conjunctiva/sclera: Conjunctivae normal.   Cardiovascular:      Rate and Rhythm: Normal rate and regular rhythm.      Heart sounds: Normal heart sounds, S1 normal and S2 normal. Heart sounds not distant. No murmur heard.  Pulmonary:      Effort: Pulmonary effort is normal. No tachypnea, bradypnea, accessory muscle usage, prolonged expiration, respiratory distress or retractions. He is not intubated.      Breath sounds: Normal breath sounds. No stridor, decreased air movement or transmitted upper airway sounds. No decreased breath sounds, wheezing, rhonchi or rales.   Abdominal:      Palpations: Abdomen is soft.      Tenderness: There is no abdominal tenderness.   Musculoskeletal:         General: No swelling.      Cervical back: Neck supple.   Skin:     General: Skin is warm and dry.      Capillary Refill: Capillary refill takes less than 2 seconds.   Neurological:      Mental Status: He is alert.   Psychiatric:         Mood and Affect: Mood normal.           "

## 2025-02-23 NOTE — PATIENT INSTRUCTIONS
Chest x-ray reviewed, no acute abnormality noted, awaiting official read.   Albuterol nebulizer administered in office. Patient reporting significant improvement in subjective sense of chest tightness.   Please continue albuterol inhaler at home as directed.   Follow up with PCP within one week.   Go to ED for worsening symptoms.

## 2025-02-25 ENCOUNTER — OFFICE VISIT (OUTPATIENT)
Dept: PHYSICAL THERAPY | Facility: CLINIC | Age: 70
End: 2025-02-25
Payer: MEDICARE

## 2025-02-25 DIAGNOSIS — R26.89 BALANCE DISORDER: ICD-10-CM

## 2025-02-25 DIAGNOSIS — G20.A1 PARKINSON'S DISEASE, UNSPECIFIED WHETHER DYSKINESIA PRESENT, UNSPECIFIED WHETHER MANIFESTATIONS FLUCTUATE (HCC): Primary | ICD-10-CM

## 2025-02-25 DIAGNOSIS — R26.2 AMBULATORY DYSFUNCTION: ICD-10-CM

## 2025-02-25 PROCEDURE — 97112 NEUROMUSCULAR REEDUCATION: CPT

## 2025-02-25 PROCEDURE — 97110 THERAPEUTIC EXERCISES: CPT

## 2025-02-25 NOTE — PROGRESS NOTES
Daily Note     Today's date: 2025  Patient name: Marcellus Fregoso  : 1955  MRN: [id number]  Referring provider: Ehsan Calix MD  Dx:   Encounter Diagnosis     ICD-10-CM    1. Parkinson's disease, unspecified whether dyskinesia present, unspecified whether manifestations fluctuate ([facility])  G20.A1       2. Ambulatory dysfunction  R26.2       3. Balance disorder  R26.89           Start Time:   Stop Time:   Total time in clinic (min): 45 minutes    Subjective: Feeling a little tired did a lot today, back is feeling better since fall, feels like therapy is really helping       Objective: See treatment diary below      Assessment: Pt tolerated the treatment well. Pt performed walking on the treadmill to promote cardiovascular endurance/neural priming for the session. Pt was challenged with dynamic balance on river rocks and airex pads. Pt had some LOB, but was able to recover with reactive stepping strategies as needed. Pt is progressing with STS with improved eccentric control. Pt demonstrated fatigue post-session. Pt would benefit from further skilled PT sessions to address deficits in endurance, strength, balance, activity tolerance, and overall safety needed to maximize the pt's function and quality of life.       Plan: Continue per plan of care.  Progress treatment as tolerated.       Short Term Goal Expiration Date:(25)  Long Term Goal Expiration Date: 25)  POC Expiration Date: (25)    Visit count: 8 of 10; PN due 25    POC expires Unit limit Auth Expiration date PT/OT/ST + Visit Limit?   25 N/A 25 BOMN                           Visit/Unit Tracking  AUTH Status:  Date 01/10 01/17 1/23 01/30 02/07 02/10 02/21 2/25      BOMN Used 1 2 3 4 5 6 7 8       Remaining  9 8 7 6 5 4 3 2          Access Code: [email or web address]  URL: [email or web address]  Date: 2025  Prepared by: Mine Pan    Exercises  - Sit to Stand with Arms Crossed  - 1 x daily - 7 x weekly  "- 3 sets - 10 reps  - Walking March  - 1 x daily - 7 x weekly - 1 sets - 3 reps  - Side Stepping with Counter Support  - 1 x daily - 7 x weekly - 1 sets - 3 reps      Precautions Fall risk, T2DM, neuropathy, HTN, syncope, depression       Manuals 02/10 02/21 2/25 01/30 02/07                                   Neuro Re-Ed      Pt education on all neuro testing including FGA, mCTSIB, TUG and its relation to fall risk   STS 10# tidal tank   2 x 10  10# tidal tank   2 x 10  10# tidal tank   1 x 10  10# tidal tank   2 x 10     Hurdles        HKM Solo  1/4 track   Fwd HKM and backwards walking   10# tidal tank   X 4 laps  Solo  1/4 track   Fwd HKM and backwards walking   10# tidal tank   X 2 laps   Solo  1/4 track   10# tidal tank   X 3 laps     Lat stepping        Step up/foam Solo  1/4 track   (3) airex  by (4) 8\" yellow hurdles  Fwd x 2 laps   Lat x 2 laps  Solo  (3) airex pads  by (2) 6\" steps   Fwd x 2 laps   Lat x 2 laps     Solo  1/4 track   (3) airex  by (2) 6\" steps   Fwd x 2 laps   Lat x 2 laps     Blazepods    Solo  1/4 track   (6) pool noodles   4 pods in 4 corners   1:30 min round  Round 1: 15 hits   Round 2: 15 hits     Dynamic balance   Solo  1/4 track   (6) cones   Fwd x 2 laps   For single leg stance Solo  1/4 track   (3) airex pads  by (6 small/med rocks)   Fwd x 2 laps     3) airex pads  by (6 small/med rocks)  by (3) 12\" hurdles   Fwd x 1 lap       Step-ups    Solo  8\" step   Fwd x 5 each leg      Ther Ex  Pt education on continuing daily walking to increase endurance and aerobic capacity    Pt education on 5xSTS, 6MWT, and MMT testing     Pt education on continuing daily walking to improve endurance    Treadmill  1.2-1.6 mph   0% incline   B/l UE   X 6 min    1.2 mph   X 4 min     X 10 min total    Verbal cues for upright posture  1.2 mph   0% incline   B/l UE   X 7 min     1.2 mph   X 3 min     X 10 min total    Cardiovascular endurance and neural " priming      Nu-step L5 x 10 min     Cardiovascular endurance and ROM                                                        Ther Activity                        Gait Training                        Modalities

## 2025-02-26 ENCOUNTER — TELEPHONE (OUTPATIENT)
Age: 70
End: 2025-02-26

## 2025-02-26 ENCOUNTER — OFFICE VISIT (OUTPATIENT)
Dept: FAMILY MEDICINE CLINIC | Facility: CLINIC | Age: 70
End: 2025-02-26
Payer: MEDICARE

## 2025-02-26 VITALS
HEIGHT: 68 IN | SYSTOLIC BLOOD PRESSURE: 128 MMHG | BODY MASS INDEX: 44.4 KG/M2 | DIASTOLIC BLOOD PRESSURE: 74 MMHG | RESPIRATION RATE: 18 BRPM | TEMPERATURE: 98.2 F | OXYGEN SATURATION: 94 % | HEART RATE: 76 BPM

## 2025-02-26 DIAGNOSIS — J40 BRONCHITIS: ICD-10-CM

## 2025-02-26 DIAGNOSIS — R06.2 WHEEZING: ICD-10-CM

## 2025-02-26 DIAGNOSIS — R06.2 WHEEZING: Primary | ICD-10-CM

## 2025-02-26 PROCEDURE — 94640 AIRWAY INHALATION TREATMENT: CPT

## 2025-02-26 PROCEDURE — 99213 OFFICE O/P EST LOW 20 MIN: CPT | Performed by: NURSE PRACTITIONER

## 2025-02-26 RX ORDER — ALBUTEROL SULFATE 0.83 MG/ML
SOLUTION RESPIRATORY (INHALATION)
Qty: 75 ML | Refills: 2 | Status: SHIPPED | OUTPATIENT
Start: 2025-02-26

## 2025-02-26 RX ORDER — PREDNISONE 10 MG/1
TABLET ORAL
Qty: 30 TABLET | Refills: 0 | Status: SHIPPED | OUTPATIENT
Start: 2025-02-26

## 2025-02-26 RX ORDER — ALBUTEROL SULFATE 0.83 MG/ML
2.5 SOLUTION RESPIRATORY (INHALATION) EVERY 6 HOURS PRN
Qty: 60 ML | Refills: 2 | Status: SHIPPED | OUTPATIENT
Start: 2025-02-26 | End: 2025-02-26

## 2025-02-26 RX ORDER — ALBUTEROL SULFATE 0.83 MG/ML
2.5 SOLUTION RESPIRATORY (INHALATION) ONCE
Status: COMPLETED | OUTPATIENT
Start: 2025-02-26 | End: 2025-02-26

## 2025-02-26 RX ADMIN — ALBUTEROL SULFATE 2.5 MG: 0.83 SOLUTION RESPIRATORY (INHALATION) at 15:37

## 2025-02-26 NOTE — PROGRESS NOTES
Name: Marcellus Fregoso      : 1955      MRN: [id number]  Encounter Provider: ED Howard  Encounter Date: 2025   Encounter department: [facility]  :  Assessment & Plan  Wheezing  Several day history of cough and wheezing. Was seen at urgent care at which time a chest x ray ws performed and was negative. Albuterol nebulizer treatment provided to patient with improvement. History of asthma currently on no medications.  On exam the patient does have wheezing present.  Lung sounds are decreased.  Albuterol nebulizer provided to the patient in the office today with improvement.  Orders:    albuterol inhalation solution 2.5 mg    predniSONE 10 mg tablet; 40 mg for three days, 30 mg for three days, 20 mg for 3 days, 10 mg for 3 days.    albuterol (2.5 mg/3 mL) 0.083 % nebulizer solution; Take 3 mL (2.5 mg total) by nebulization every 6 (six) hours as needed for wheezing or shortness of breath    Bronchitis  Patient seen in urgent care on  at which time a chest x-ray was performed which was negative.  The patient was having cough, postnasal drip and wheezing along with shortness of breath.  He did receive an albuterol nebulizer treatment there with improvement.  He was sent home with albuterol inhaler.  He presents to the office today with similar symptoms.  His pulse ox was 94 on room air.  We were not able to obtain an ambulatory pulse ox due to the patient's inability to walk long distances as he does have Parkinson's.  Patient with wheezing upon exam.  Albuterol nebulizer provided to the patient with improvement.  Does have a history of asthma.  Patient did purchase a nebulizer off [employer] and I will send albuterol solution to his pharmacy.  In addition prednisone was sent to the patient's pharmacy.  He has been on this in the past for similar symptoms.  Patient has been advised that if his symptoms do not improve by Friday or worsen over the weekend he should be  "seen in the emergency room.  Orders:    albuterol inhalation solution 2.5 mg    predniSONE 10 mg tablet; 40 mg for three days, 30 mg for three days, 20 mg for 3 days, 10 mg for 3 days.    albuterol (2.5 mg/3 mL) 0.083 % nebulizer solution; Take 3 mL (2.5 mg total) by nebulization every 6 (six) hours as needed for wheezing or shortness of breath           History of Present Illness   Patient presents to the office today with ongoing shortness of breath.  The patient does have a history of Molina's esophagus and during the night Sunday he had an episode of reflux.  He was fine on Monday and Tuesday he went to the urgent care due to symptoms of chest tightness, shortness of breath and coughing.  Patient also reported some wheezing.  The patient did have an albuterol nebulizer treatment at the urgent care and felt significantly better.  Chest x-ray done at the urgent care was normal.  He was sent home with a Ventolin inhaler.  Today the wheezing and chest tightness along with shortness of breath recurred.  Patient has a distant history of asthma.  Patient also has a history of aspiration pneumonia.           Review of Systems   Constitutional: Negative.  Negative for fatigue.   HENT: Negative.  Negative for congestion, postnasal drip, rhinorrhea and trouble swallowing.    Eyes: Negative.  Negative for visual disturbance.   Respiratory:  Positive for cough, chest tightness, shortness of breath and wheezing. Negative for choking.    Cardiovascular: Negative.  Negative for chest pain.   Gastrointestinal: Negative.    Endocrine: Negative.    Genitourinary: Negative.    Musculoskeletal: Negative.  Negative for arthralgias, back pain, myalgias and neck pain.   Skin: Negative.    Neurological:  Negative for dizziness and headaches.   Psychiatric/Behavioral: Negative.         Objective   /74   Pulse 76   Temp 98.2 °F (36.8 °C) (Tympanic)   Resp 18   Ht 5' 8\" (1.727 m)   SpO2 94%   BMI 44.40 kg/m²      Physical " Exam  Vitals and nursing note reviewed.   Constitutional:       General: He is not in acute distress.     Appearance: Normal appearance. He is well-developed. He is obese.   HENT:      Head: Normocephalic and atraumatic.      Nose: Nose normal.      Mouth/Throat:      Mouth: Mucous membranes are moist.   Eyes:      Extraocular Movements: Extraocular movements intact.      Conjunctiva/sclera: Conjunctivae normal.      Pupils: Pupils are equal, round, and reactive to light.   Cardiovascular:      Rate and Rhythm: Normal rate and regular rhythm.      Pulses: Normal pulses.      Heart sounds: Normal heart sounds. No murmur heard.  Pulmonary:      Effort: Pulmonary effort is normal. No respiratory distress.      Breath sounds: Wheezing present.   Abdominal:      Palpations: Abdomen is soft.      Tenderness: There is no abdominal tenderness.   Musculoskeletal:         General: No swelling. Normal range of motion.      Cervical back: Neck supple.   Skin:     General: Skin is warm and dry.      Capillary Refill: Capillary refill takes less than 2 seconds.   Neurological:      General: No focal deficit present.      Mental Status: He is alert and oriented to person, place, and time.      Gait: Gait abnormal.   Psychiatric:         Mood and Affect: Mood normal.         Behavior: Behavior normal.         Thought Content: Thought content normal.         Judgment: Judgment normal.

## 2025-02-26 NOTE — TELEPHONE ENCOUNTER
Patient call to see if he can come into the office for a nebulizer treatment. Please reach out to the patient if this is done at you office. Thank you

## 2025-02-27 ENCOUNTER — APPOINTMENT (OUTPATIENT)
Dept: PHYSICAL THERAPY | Facility: CLINIC | Age: 70
End: 2025-02-27
Payer: MEDICARE

## 2025-02-28 ENCOUNTER — TELEPHONE (OUTPATIENT)
Age: 70
End: 2025-02-28

## 2025-02-28 NOTE — TELEPHONE ENCOUNTER
PA for Albuterol (2.5 mg/3 mL) 0.083 % nebulizer solution SUBMITTED to [facility]    via    []CMM-KEY:   [x]Surescripts-Case ID #: [id number]   []Availity-Auth ID #   []Faxed to plan   []Other website   []Phone call Case ID #     []PA sent as URGENT    All office notes, labs and other pertaining documents and studies sent. Clinical questions answered. Awaiting determination from insurance company.     Turnaround time for your insurance to make a decision on your Prior Authorization can take 7-21 business days.

## 2025-03-03 ENCOUNTER — OFFICE VISIT (OUTPATIENT)
Dept: PHYSICAL THERAPY | Facility: CLINIC | Age: 70
End: 2025-03-03
Payer: MEDICARE

## 2025-03-03 DIAGNOSIS — E78.00 PURE HYPERCHOLESTEROLEMIA: ICD-10-CM

## 2025-03-03 DIAGNOSIS — R26.2 AMBULATORY DYSFUNCTION: ICD-10-CM

## 2025-03-03 DIAGNOSIS — R26.89 BALANCE DISORDER: ICD-10-CM

## 2025-03-03 DIAGNOSIS — G20.A1 PARKINSON'S DISEASE, UNSPECIFIED WHETHER DYSKINESIA PRESENT, UNSPECIFIED WHETHER MANIFESTATIONS FLUCTUATE (HCC): Primary | ICD-10-CM

## 2025-03-03 DIAGNOSIS — E66.01 OBESITY, CLASS III, BMI 40-49.9 (MORBID OBESITY) (HCC): ICD-10-CM

## 2025-03-03 DIAGNOSIS — E11.9 TYPE 2 DIABETES MELLITUS WITHOUT COMPLICATION, WITHOUT LONG-TERM CURRENT USE OF INSULIN (HCC): ICD-10-CM

## 2025-03-03 PROCEDURE — 97112 NEUROMUSCULAR REEDUCATION: CPT

## 2025-03-03 PROCEDURE — 97110 THERAPEUTIC EXERCISES: CPT

## 2025-03-03 NOTE — PROGRESS NOTES
Daily Note     Today's date: 3/3/2025  Patient name: Marcellus Fregoso  : 1955  MRN: [id number]  Referring provider: Ehsan Calix MD  Dx:   Encounter Diagnosis     ICD-10-CM    1. Parkinson's disease, unspecified whether dyskinesia present, unspecified whether manifestations fluctuate ([facility])  G20.A1       2. Ambulatory dysfunction  R26.2       3. Balance disorder  R26.89           Start Time: 1420  Stop Time: 1505  Total time in clinic (min): 45 minutes    Subjective: The stamina is better today, the balance not so much       Objective: See treatment diary below      Assessment: Pt tolerated the treatment well. Pt performed walking on the treadmill to promote cardiovascular endurance/neural priming for the session. Pt was challenged with ball slams to promote trunk rotation.   Pt had difficulty with obstacle course this session with decreased R foot clearance this session. Pt demonstrated fatigue post-session. Pt would benefit from further skilled PT sessions to address deficits in endurance, strength, balance, activity tolerance, and overall safety needed to maximize the pt's function and quality of life.       Plan: Continue per plan of care.  Progress note during next visit.  Progress treatment as tolerated.       Short Term Goal Expiration Date:(25)  Long Term Goal Expiration Date: 25)  POC Expiration Date: (25)    Visit count: 9 of 10; PN due 25    POC expires Unit limit Auth Expiration date PT/OT/ST + Visit Limit?   25 N/A 25 BOMN                           Visit/Unit Tracking  AUTH Status:  Date 01/10 01/17 1/23 01/30 02/07 02/10 02/21 2/25 03/03     BOMN Used 1 2 3 4 5 6 7 8 9      Remaining  9 8 7 6 5 4 3 2 1         Access Code: [email or web address]  URL: [email or web address]  Date: 2025  Prepared by: Mine Pan    Exercises  - Sit to Stand with Arms Crossed  - 1 x daily - 7 x weekly - 3 sets - 10 reps  - Walking March  -  x daily - 7 x weekly - 1 sets - 3  "reps  - Side Stepping with Counter Support  - 1 x daily - 7 x weekly - 1 sets - 3 reps      Precautions Fall risk, T2DM, neuropathy, HTN, syncope, depression       Manuals 02/10 02/21 2/25 03/03 02/07                                   Neuro Re-Ed      Pt education on all neuro testing including FGA, mCTSIB, TUG and its relation to fall risk   STS 10# tidal tank   2 x 10  10# tidal tank   2 x 10  10# tidal tank   1 x 10  X 15   NO UE    Hurdles        HKM Solo  1/4 track   Fwd HKM and backwards walking   10# tidal tank   X 4 laps  Solo  1/4 track   Fwd HKM and backwards walking   10# tidal tank   X 2 laps       Lat stepping        Step up/foam Solo 1/4 track   (3) airex  by (4) 8\" yellow hurdles  Fwd x 2 laps   Lat x 2 laps  Solo  (3) airex pads  by (2) 6\" steps   Fwd x 2 laps   Lat x 2 laps         Blazepods        Dynamic balance   Solo 1/4 track   (6) cones   Fwd x 2 laps   For single leg stance Solo  1/4 track   (3) airex pads  by (6 small/med rocks)   Fwd x 2 laps     3) airex pads  by (6 small/med rocks)  by (3) 12\" hurdles   Fwd x 1 lap   Solo  1/4 track  (3) airex pads  by (6 small/med rocks)  by (3) 12\" hurdles   Fwd x 2 laps    Step-ups    Solo  8\" step   Fwd x 5 each leg      Trunk rotation    Standing   6# med ball slams with trunk rotation   2 spots on floor for visual cue   X 20     Ther Ex  Pt education on continuing daily walking to increase endurance and aerobic capacity    Pt education on 5xSTS, 6MWT, and MMT testing     Pt education on continuing daily walking to improve endurance    Treadmill  1.2-1.6 mph   0% incline   B/l UE   X 6 min    1.2 mph   X 4 min     X 10 min total    Verbal cues for upright posture  1.2 mph   0% incline   B/l UE   X 7 min     1.2 mph   X 3 min     X 10 min total    Cardiovascular endurance and neural priming  1.0-1.2 mph   0% incline   B/l UE   X 6 min     1.1 mph   X 4 min     X 10 min " total    Cardiovascular endurance and neural priming     Nu-step L5 x 10 min     Cardiovascular endurance and ROM                                                        Ther Activity                        Gait Training                        Modalities

## 2025-03-04 ENCOUNTER — TELEPHONE (OUTPATIENT)
Age: 70
End: 2025-03-04

## 2025-03-04 DIAGNOSIS — M25.561 RIGHT KNEE PAIN, UNSPECIFIED CHRONICITY: Primary | ICD-10-CM

## 2025-03-04 DIAGNOSIS — E53.8 VITAMIN B12 DEFICIENCY: Primary | ICD-10-CM

## 2025-03-04 DIAGNOSIS — M25.562 LEFT KNEE PAIN, UNSPECIFIED CHRONICITY: ICD-10-CM

## 2025-03-04 DIAGNOSIS — E53.8 VITAMIN B12 DEFICIENCY: ICD-10-CM

## 2025-03-04 RX ORDER — SEMAGLUTIDE 2.68 MG/ML
INJECTION, SOLUTION SUBCUTANEOUS
Qty: 9 ML | Refills: 0 | Status: SHIPPED | OUTPATIENT
Start: 2025-03-04

## 2025-03-04 RX ORDER — CYANOCOBALAMIN 1000 UG/ML
1000 INJECTION, SOLUTION INTRAMUSCULAR; SUBCUTANEOUS ONCE
Qty: 10 ML | Refills: 0 | Status: SHIPPED | OUTPATIENT
Start: 2025-03-04 | End: 2025-03-13 | Stop reason: SDUPTHER

## 2025-03-04 NOTE — TELEPHONE ENCOUNTER
Patient called stating that he cancelled cyanocobalamin injection 1,000 mcg  order by accident because he did not know other name for B12.  Patient would like to know if can be reordered to [facility].  Please advise.

## 2025-03-04 NOTE — TELEPHONE ENCOUNTER
Pt transferred to [facility].    Pt is scheduled for Hernia surgery on 3/20/25 with gen surg for hernia repair. Pt is asking to have appt with Dr. Lerma to review risks of aspirating and things he can do to prevent it.   I informed him he should speak to gen surg about this as they are doing the surgery and should be able to explain the risks of aspiration. He also wanted to know how to reduce the risk of aspiration. I transferred him to Alyssa from gen [facility] to discuss. Pt is also on ozempic and is not sure if he is supposed to hold this.

## 2025-03-04 NOTE — TELEPHONE ENCOUNTER
Please ask pt if he has gastroparesis.   Please ask pt if he was on ozempic recently and if yes, where did he get it.   Let Mami know.

## 2025-03-04 NOTE — TELEPHONE ENCOUNTER
And he requests if you can reorder the vitamin B-12 with the syringes he accidentally canceled the order and they did not get get delivered.

## 2025-03-04 NOTE — TELEPHONE ENCOUNTER
PA for Albuterol (2.5 mg/3 mL) 0.083 % nebulizer solution DENIED    Reason:(Screenshot if applicable)        Message sent to office clinical pool Yes    Denial letter scanned into Media Yes    Appeal started No (Provider will need to decide if appeal is warranted and send clinical documentation to Prior Authorization Team for initiation.)    **Please follow up with your patient regarding denial and next steps**

## 2025-03-04 NOTE — TELEPHONE ENCOUNTER
Pt returning call; he stated Dr. Ortiz had prescribed Ozempic years ago and then it was taken over by [facility] WARD but then she went over to [facility] and that's why he was seeing if Dr. Calix could take over. Pt stated he has never stopped taking it.

## 2025-03-04 NOTE — TELEPHONE ENCOUNTER
Pt called stated he needs an appt with Dr. Lerma before 3-. I offered first available appt but was denied and stated he is going to be intubated and has gastric lung issue and need an appt with Dr. Lerma prior to procedure. Warm transferred over to triage nurse for further assistance

## 2025-03-04 NOTE — TELEPHONE ENCOUNTER
NOV 4.3.2025   Problem: Respiratory Impairment - Respiratory Therapy 253  Intervention: Inhaled medication delivery  Intervention Status  Done  Intervention: Respiratory support devices  Intervention Status  Done  Intervention: Inhaled gas administration  Intervention Status  Done

## 2025-03-04 NOTE — TELEPHONE ENCOUNTER
Pt transferred to this CTS. Scheduled pt for follow up appointment to discuss concerns of aspiration on 3/11/25.

## 2025-03-04 NOTE — TELEPHONE ENCOUNTER
Spoke with Pharmacy making them aware that PA was denied because of it being billed under Medicare Part D, they then put in for Part B but it was denied because of Diagnosis code of being Bronchitis

## 2025-03-04 NOTE — TELEPHONE ENCOUNTER
[facility]ami from [facility] was looking for further clarification in regard to Marcellus ozempic prescription. Stated that there was reports of gastroparesis from 10/24 and wanted to confirm this and if it is resolved as taking this medication with that could cause effects.   Also the last history of this med is from July 2024 and wanted to know if he was getting this prescription from somewhere else as there is a large gap with the dosage increase.    Ref # [id number]    Please advise out to Mami for clarification [phone]

## 2025-03-04 NOTE — TELEPHONE ENCOUNTER
Patient called stating that he cancelled previous order by accident because he did not know other name for B12.      Medication: syringes fro b12    Dose/Frequency: 3ml every 30 day    Quantity: 50    Pharmacy: [facility]    Office:   [x] PCP/Provider -   [] Speciality/Provider -     Does the patient have enough for 3 days?   [] Yes   [x] No - Send as HP to POD

## 2025-03-05 NOTE — PROGRESS NOTES
Progress Note     Today's date: 3/6/2025  Patient name: Marcellus Fregoso  : 1955  MRN: [id number]  Referring provider: Ehsan Calix MD  Dx:   Encounter Diagnosis     ICD-10-CM    1. Parkinson's disease, unspecified whether dyskinesia present, unspecified whether manifestations fluctuate (HCC)  G20.A1       2. Ambulatory dysfunction  R26.2       3. Balance disorder  R26.89           Start Time: 1615  Stop Time: 1700  Total time in clinic (min): 45 minutes    Assessment: Pt tolerated the session well today. Performed progress update this session to assess progress towards LTG in POC. Pt has met 4/5 STG and 3/5 LTG at this time. Pt did not meet STG of gait speed of 1 m/s, but was limited this session due to fatigue and SOB with walking. Pt did not meet his gait speed or 6MWT goal for the same concerns. Pt did not have a change in 6MWT despite gains in treatment session walking a total of 600ft without an SPC. Pt's gait speed has decreased from 0.94 m/s to 0.88 m/s indicating pt is a community ambulator however is still at risk for falls based on their speed being < 1 m/s. Pt's FGA score has improved from 24/30 to 28/30 reducing pts fall risk as scores < 23 are considered at risk for falls and demonstrating improved dynamic balance. Pts 5x STS tests has improved from 10.90 s to 9.53 s indicating improved LE strength and power and at the norm for the pt's age. Pt also had an improvement in MMT this session with improved b/l hip flexion strength as well. Pt also was able to perform mCTSIB testing in all conditions for 30 sec indicating improved static balance. However, required 2 trials to maintain balance with eyes closed. Pt demonstrated improved increased freezing this session. Due to progressive neurological diagnosis of PD, pt requires skilled PT care as well as the safety equipment in the clinic to adequately challenge balance. Pt would continue to benefit from skilled physical therapy 2x/wk for 8 weeks to  improve overall QOL inclusive of, strength, coordination, balance, endurance and functional mobility in the home and in the community as well as reduce their fall risk for improved safety.     NEW GOALS (3/6/25):  STG  Patient will improve 6MWT without AD by 200 ft within 4 weeks demonstrating improved endurance and aerobic capacity.   Patient will be able to perform tandem walking without LOB for improved dynamic balance within 4 weeks.   Patient will be independent with HEP within 4 weeks for increased compliance with exercise at home.   Patient will improve gait speed to 1.0 m/s indicating they are no longer at higher risk for falls within 4 weeks.     LTG  Patient will improve 6MWT by 400 ft without AD demonstrating significant improvements in endurance  w/in 8 weeks   Patient will be independent with progressed HEP within 8 weeks for increased compliance with exercise at home.   Patient will improve gait speed to 1.2 m/s indicating they are no longer at higher risk for falls and can safely cross a cross walk within 8 weeks.   Patient will be able to verbally explain strategies for decreased freezing within 8 weeks for improved safety.   Patient will be able to perform floor transfer independently for improved safety within 8 weeks.        Goals  STG:  Patient will be able to complete the 6MWT with SPC in 4 weeks demonstrating improved endurance and aerobic capacity. MET  Patient will improve 6MWT by 200 ft with SPC demonstrating significant improvements in endurance  w/in 4 weeks. MET  Patient will improve 5xSTS to <12s with no UE support within 4 weeks demonstrating improved LE strength and power. MET  Patient will be independent with HEP within 4 weeks for increased compliance with exercise at home. MET  Patient will improve gait speed to 1.0 m/s indicating they are no longer at higher risk for falls within 4 weeks. NOT MET, progressing    LTG:  Patient will improve 6MWT by 400 ft with SPC demonstrating  significant improvements in endurance  w/in 8 weeks NOT MET  Patient will demonstrates significant improvements in dynamic balance by improving FGA score by 6 points or more within 8 weeks MET  Patient will improve FGA to 22/30 or greater without SPC indicating they are no longer at higher risk for falls within 8  weeks MET  Patient will be independent with progressed HEP within 8 weeks for increased compliance with exercise at home. MET  Patient will improve gait speed to 1.2 m/s indicating they are no longer at higher risk for falls and can safely cross a cross walk within 8 weeks. NOT MET     Plan  Patient would benefit from: skilled physical therapy and OT eval  Referral necessary: Yes    Planned therapy interventions: ADL training, balance, activity modification, balance/weight bearing training, body mechanics training, behavior modification, coordination, flexibility, functional ROM exercises, gait training, graded exercise, graded activity, home exercise program, transfer training, therapeutic training, therapeutic exercise, therapeutic activities, stretching, sensory integrative techniques, strengthening, patient/caregiver education, neuromuscular re-education and motor coordination training    Frequency: 2x week  Duration in weeks: 8  Plan of Care beginning date: 03/06/2025  Plan of Care expiration date: 05/01/2025  Treatment plan discussed with: patient      Subjective: Therapy has really been helping me, I have been doing good with my walking, but I'm disappointed with how I did today     Patient pain: 4/10 off and on in the R knee   Patient goals: improve endurance and balance     Objective: See treatment diary below      Balance Test 01/06 (IE) 02/07 03/07   6 Minute Walk Test (ft): 4 min and then needed to stop   400 ft with SPC   HR: 101 bpm  O2: 91%  600 ft, no AD   HR: 81 bpm   O2: 97%  600 ft, no AD   HR: 100 bpm   O2: 94%    Gait Speed (ft/s): 20 ft/ 6.78s=  0.90 m/s  20 ft/6.47=  0.94 m/s  0.88  m/s    5x Sit To Stand (s): 15.85s, No UE 10.90s, No UE   Poor eccentric control  9.53s, no UE   Improved eccentric control   TUG 9.94s, no AD  TUGcog (count back by 3s), 18.79-9.94 8.21, no AD   TUGcog (count back by 3s): 7.34s  NT   FGA 19/30 24/30 28/30       MCTSIB Number of Seconds (02/07) 03/07   Feet Together, Eyes Open 30 sec 30 sec   Feet Together, Eyes Closed 30 sec 17 sec, 30 sec    Feet Together, Eyes Open Foam 30 sec 30 sec    Feet Together, Eyes Closed Foam 30 sec  8 sec, 30 sec      Manual Muscle Testing - Hip Right Left 02/07 03/07   Flexion 3+ 3 R: 4+/5  L: 4/5 R: 4+/5  L: 4+/5           Short Term Goal Expiration Date: 04/03/2025   Long Term Goal Expiration Date: 05/01/2025  POC Expiration Date: (05/01/2025    Visit count: 10 of 10; PN due 04/03/2025    POC expires Unit limit Auth Expiration date PT/OT/ST + Visit Limit?   02/20/25 N/A 12/31/25 BOMN                           Visit/Unit Tracking  AUTH Status:  Date 01/10 01/17 1/23 01/30 02/07 02/10 02/21 2/25 03/03 3/6    BOMN Used 1 2 3 4 5 6 7 8 9 10     Remaining  9 8 7 6 5 4 3 2 1 0        Access Code: [email or web address]  URL: [email or web address]  Date: 01/17/2025  Prepared by: Mine Pan    Exercises  - Sit to Stand with Arms Crossed  - 1 x daily - 7 x weekly - 3 sets - 10 reps  - Walking March  - 1 x daily - 7 x weekly - 1 sets - 3 reps  - Side Stepping with Counter Support  - 1 x daily - 7 x weekly - 1 sets - 3 reps      Precautions Fall risk, T2DM, neuropathy, HTN, syncope, depression       Manuals 02/10 02/21 2/25 03/03 03/06                                   Neuro Re-Ed      Pt education on all neuro testing including FGA, mCTSIB,    STS 10# tidal tank   2 x 10  10# tidal tank   2 x 10  10# tidal tank   1 x 10  X 15   NO UE    Hurdles        HKM Solo  1/4 track   Fwd HKM and backwards walking   10# tidal tank   X 4 laps  Solo  1/4 track   Fwd HKM and backwards walking   10# tidal tank   X 2 laps       Lat stepping        Step  "up/foam Solo  1/4 track   (3) airex  by (4) 8\" yellow hurdles  Fwd x 2 laps   Lat x 2 laps  Solo  (3) airex pads  by (2) 6\" steps   Fwd x 2 laps   Lat x 2 laps         Blazepods        Dynamic balance   Solo 1/4 track   (6) cones   Fwd x 2 laps   For single leg stance Solo  1/4 track   (3) airex pads  by (6 small/med rocks)   Fwd x 2 laps     3) airex pads  by (6 small/med rocks)  by (3) 12\" hurdles   Fwd x 1 lap   Solo  1/4 track  (3) airex pads  by (6 small/med rocks)  by (3) 12\" hurdles   Fwd x 2 laps    Step-ups    Solo  8\" step   Fwd x 5 each leg      Trunk rotation    Standing   6# med ball slams with trunk rotation   2 spots on floor for visual cue   X 20     Ther Ex  Pt education on continuing daily walking to increase endurance and aerobic capacity    Pt education on all testing including 6MWT, 5xSTS, and gait speed    Treadmill  1.2-1.6 mph   0% incline   B/l UE   X 6 min    1.2 mph   X 4 min     X 10 min total    Verbal cues for upright posture  1.2 mph   0% incline   B/l UE   X 7 min     1.2 mph   X 3 min     X 10 min total    Cardiovascular endurance and neural priming  1.0-1.2 mph   0% incline   B/l UE   X 6 min     1.1 mph   X 4 min     X 10 min total    Cardiovascular endurance and neural priming     Nu-step L5 x 10 min     Cardiovascular endurance and ROM                                                        Ther Activity                        Gait Training                        Modalities                                                   "

## 2025-03-05 NOTE — TELEPHONE ENCOUNTER
[facility] they will fill ozempic. After making them aware of patient being on medication for years

## 2025-03-06 ENCOUNTER — EVALUATION (OUTPATIENT)
Dept: PHYSICAL THERAPY | Facility: CLINIC | Age: 70
End: 2025-03-06
Payer: MEDICARE

## 2025-03-06 DIAGNOSIS — G20.A1 PARKINSON'S DISEASE, UNSPECIFIED WHETHER DYSKINESIA PRESENT, UNSPECIFIED WHETHER MANIFESTATIONS FLUCTUATE (HCC): Primary | ICD-10-CM

## 2025-03-06 DIAGNOSIS — R26.89 BALANCE DISORDER: ICD-10-CM

## 2025-03-06 DIAGNOSIS — R26.2 AMBULATORY DYSFUNCTION: ICD-10-CM

## 2025-03-06 PROCEDURE — 97110 THERAPEUTIC EXERCISES: CPT

## 2025-03-06 PROCEDURE — 97112 NEUROMUSCULAR REEDUCATION: CPT

## 2025-03-10 ENCOUNTER — HOSPITAL ENCOUNTER (OUTPATIENT)
Dept: RADIOLOGY | Facility: HOSPITAL | Age: 70
Discharge: HOME | End: 2025-03-10
Attending: ORTHOPAEDIC SURGERY
Payer: MEDICARE

## 2025-03-10 ENCOUNTER — OFFICE VISIT (OUTPATIENT)
Dept: OBGYN CLINIC | Facility: HOSPITAL | Age: 70
End: 2025-03-10
Payer: MEDICARE

## 2025-03-10 VITALS — WEIGHT: 293.4 LBS | BODY MASS INDEX: 44.47 KG/M2 | HEIGHT: 68 IN

## 2025-03-10 DIAGNOSIS — Z96.651 PAIN DUE TO TOTAL RIGHT KNEE REPLACEMENT, INITIAL ENCOUNTER (HCC): ICD-10-CM

## 2025-03-10 DIAGNOSIS — T84.84XA PAIN DUE TO TOTAL LEFT KNEE REPLACEMENT, INITIAL ENCOUNTER (HCC): Primary | ICD-10-CM

## 2025-03-10 DIAGNOSIS — M25.561 RIGHT KNEE PAIN, UNSPECIFIED CHRONICITY: ICD-10-CM

## 2025-03-10 DIAGNOSIS — M25.562 LEFT KNEE PAIN, UNSPECIFIED CHRONICITY: ICD-10-CM

## 2025-03-10 DIAGNOSIS — Z96.652 PAIN DUE TO TOTAL LEFT KNEE REPLACEMENT, INITIAL ENCOUNTER (HCC): Primary | ICD-10-CM

## 2025-03-10 DIAGNOSIS — T84.84XA PAIN DUE TO TOTAL RIGHT KNEE REPLACEMENT, INITIAL ENCOUNTER (HCC): ICD-10-CM

## 2025-03-10 PROCEDURE — 99213 OFFICE O/P EST LOW 20 MIN: CPT | Performed by: ORTHOPAEDIC SURGERY

## 2025-03-10 PROCEDURE — 73560 X-RAY EXAM OF KNEE 1 OR 2: CPT

## 2025-03-10 NOTE — PROGRESS NOTES
"Assessment:     1. Pain due to total left knee replacement, initial encounter ([facility])    2. Pain due to total right knee replacement, initial encounter ([facility])        Plan:     Problem List Items Addressed This Visit    None  Visit Diagnoses         Pain due to total left knee replacement, initial encounter ([facility])    -  Primary    Relevant Orders    Sedimentation rate, automated    Body fluid white cell count with differential    C-reactive protein    NM bone scan whole body      Pain due to total right knee replacement, initial encounter ([facility])        Relevant Orders    Sedimentation rate, automated    Body fluid white cell count with differential    C-reactive protein    NM bone scan whole body           Findings are consistent with pain after total knee arthroplasty, bilaterally. Findings and treatment options discussed with patient. X-rays of right knee and left knee were reviewed and discussed with patient.  Lab work was ordered at today's visit for CRP, ESR and White Blood Cell count with Diff.  NM Bone Scan ordered at today's visit.  Follow up after completion of Bone Scan.  Subjective:     Patient ID: Marcellus Fregoso is a 69 y.o. male.  Chief Complaint:  69 y.o. male presents today for follow up for bilateral knee pain after bilateral TKA. He reports having anterior knee pain for the last year. He has been using Advil to help with his pain, which he notes is helping. He reports a history of Parkinson's. He is ambulating with a cane. He notes the pain he has is mostly with weightbearing activities and minimal pain at rest. He denies any trauma or injury to the knees.       Allergy:  Allergies   Allergen Reactions    Celebrex [Celecoxib] Other (See Comments)     Cardiologist stated he should not take      Chlorhexidine Hives     Is able to wash with hibiclens but not use michi cloths    Other      \"steroid eyedrops\"      Prednisone Other (See Comments)     Prednisone eye drops- eye pressure increases    Selegiline " Allergic Rhinitis     Hypertension     Medications:  all current active meds have been reviewed  Past Medical History:  Past Medical History:   Diagnosis Date    Acute blood loss anemia 10/13/2016    Anxiety     Arthritis     knees    Arthritis     Asthma     stable for > 10 years    [place] esophagus     with BARRX/RFA    Cancer (HCC)     prostate    Cataract     Depression     Diabetes (HCC)     Diabetes mellitus (HCC)     pre diabetes    Diverticulitis of colon     Environmental allergies     Fall 12/30/2020    GERD (gastroesophageal reflux disease)     Hiatal hernia     History of anal fissures     Hyperlipidemia     Hypertension     Incisional hernia     Insomnia     Kidney stone     Malignant neoplasm of prostate (HCC)     Morbid obesity (HCC)     Parkinson disease (HCC)     Peripheral neuropathy     PONV (postoperative nausea and vomiting)     Prolapsing mitral valve     prolapsing mitral valve leaflet syndrome    Prostate cancer (HCC)     Retinal detachment     treated surgically at [facility]; resolved: 10/10/2012    Sleep apnea     diagnosed but unable to tolerate cpap.     Stuttering      Past Surgical History:  Past Surgical History:   Procedure Laterality Date    ARTHROSCOPIC REPAIR ACL Right     BIOPSY CORE NEEDLE      prostate    CATARACT EXTRACTION Bilateral     COLONOSCOPY      HERNIA REPAIR      naval    JOINT REPLACEMENT      B/L knee    KNEE ARTHROSCOPY Left     LAPAROSCOPIC GASTRIC BANDING      AL ARTHRP KNE CONDYLE&PLATU MEDIAL&LAT COMPARTMENTS Left 02/15/2017    Procedure: TOTAL KNEE ARTHROPLASTY ;  Surgeon: Sal Ross MD;  Location: [facility];  Service: Orthopedics    AL ARTHRP KNE CONDYLE&PLATU MEDIAL&LAT COMPARTMENTS Right 10/10/2016    Procedure: ARTHROPLASTY KNEE TOTAL;  Surgeon: Sal Ross MD;  Location: [facility];  Service: Orthopedics    PROSTATECTOMY      robotic-assisted; SLBEULAH-Dr. Tay    REMOVAL GASTRIC BAND LAPAROSCOPIC N/A 8/30/2022    Procedure: LAPAROSCOPIC REMOVAL  OF ADJUSTABLE GASTRIC BAND AND PORT  WITH INTRAOPERATIVE EGD;  Surgeon: Neal Yang MD;  Location: [facility];  Service: Bariatrics    RETINAL DETACHMENT SURGERY Bilateral     Lemus Eye    SEPTOPLASTY      SINUS SURGERY      TONSILLECTOMY      over age 10    UPPER GASTROINTESTINAL ENDOSCOPY      mutiple with BARRX/RFA    VASECTOMY      vas deferens     Family History:  Family History   Problem Relation Age of Onset    Heart disease Father     Coronary artery disease Father     Prostate cancer Father     Coronary artery disease Mother     Diabetes Mother         mellitus    Diabetes type II Mother     Diabetes Maternal Grandmother         mellitus    Coronary artery disease Maternal Grandfather     Coronary artery disease Paternal Grandfather      Social History:  Social History     Substance and Sexual Activity   Alcohol Use Not Currently    Alcohol/week: 1.0 standard drink of alcohol    Types: 1 Cans of beer per week    Comment: 1 case of beer per year     Social History     Substance and Sexual Activity   Drug Use Never     Social History     Tobacco Use   Smoking Status Never    Passive exposure: Never   Smokeless Tobacco Never   Tobacco Comments    quit 38 years ago     Review of Systems   Constitutional:  Negative for chills and fever.   HENT:  Negative for ear pain and sore throat.    Eyes:  Negative for pain and visual disturbance.   Respiratory:  Negative for cough and shortness of breath.    Cardiovascular:  Negative for chest pain and palpitations.   Gastrointestinal:  Negative for abdominal pain and vomiting.   Genitourinary:  Negative for dysuria and hematuria.   Musculoskeletal:  Negative for arthralgias and back pain.   Skin:  Negative for color change and rash.   Neurological:  Negative for seizures and syncope.   All other systems reviewed and are negative.        Objective:  BP Readings from Last 1 Encounters:   02/26/25 128/74      Wt Readings from Last 1 Encounters:   03/10/25 133 kg (293 lb  "6.4 oz)      BMI:   Estimated body mass index is 44.61 kg/m² as calculated from the following:    Height as of this encounter: 5' 8\" (1.727 m).    Weight as of this encounter: 133 kg (293 lb 6.4 oz).  BSA:   Estimated body surface area is 2.4 meters squared as calculated from the following:    Height as of this encounter: 5' 8\" (1.727 m).    Weight as of this encounter: 133 kg (293 lb 6.4 oz).   Physical Exam  Vitals and nursing note reviewed.   Constitutional:       Appearance: Normal appearance. He is well-developed.   HENT:      Head: Normocephalic and atraumatic.      Right Ear: External ear normal.      Left Ear: External ear normal.      Nose: Nose normal.   Eyes:      General: No scleral icterus.     Extraocular Movements: Extraocular movements intact.      Conjunctiva/sclera: Conjunctivae normal.   Cardiovascular:      Rate and Rhythm: Normal rate.   Pulmonary:      Effort: Pulmonary effort is normal. No respiratory distress.   Musculoskeletal:      Cervical back: Normal range of motion and neck supple.      Comments: See Ortho exam   Skin:     General: Skin is warm and dry.   Neurological:      General: No focal deficit present.      Mental Status: He is alert and oriented to person, place, and time.   Psychiatric:         Behavior: Behavior normal.       Ortho Exam    Right Knee:      Inspection: No erythema, ecchymosis, abrasions, muscle atrophy or scars present    Overall limb alignment: neutral    Effusion: no    ROM 0 to 140 without pain    Extensor Lag: Absent    Palpation: No joint line tenderness to palpation    Stable to AP translation at 90 deg    Coronal plane stable in full extension    Coronal plane stable in mid-flexion     Motor: 5/5 EHL/FHL/TA/GS/Qd/Hs    Vascular: Toes WWP with BCR    Sensory: SILT DP/SP/Jimena/Saph/Tib    Left Knee:      Inspection: No erythema, ecchymosis, abrasions, muscle atrophy or scars present    Overall limb alignment: neutral    Effusion: no    ROM 0 to 140 without " pain    Extensor Lag: Absent    Palpation: No joint line tenderness to palpation    Stable to AP translation at 90 deg    Coronal plane stable in full extension    Coronal plane stable in mid-flexion     Motor: 5/5 EHL/FHL/TA/GS/Qd/Hs    Vascular: Toes WWP with BCR    Sensory: SILT DP/SP/Jimena/Saph/Tib      I have personally reviewed pertinent films in PACS and my interpretation is x-rays of bilateral knees demonstrates stable prosthesis without signs of failure.    Scribe Attestation      I,:  Sohail Guzmán am acting as a scribe while in the presence of the attending physician.:       I,:  Jorge L Regalado MD personally performed the services described in this documentation    as scribed in my presence.:

## 2025-03-11 ENCOUNTER — OFFICE VISIT (OUTPATIENT)
Dept: SURGERY | Facility: CLINIC | Age: 70
End: 2025-03-11
Payer: MEDICARE

## 2025-03-11 DIAGNOSIS — K43.2 INCISIONAL HERNIA: Primary | ICD-10-CM

## 2025-03-11 DIAGNOSIS — K43.9 VENTRAL HERNIA WITHOUT OBSTRUCTION OR GANGRENE: Primary | ICD-10-CM

## 2025-03-11 PROCEDURE — 99212 OFFICE O/P EST SF 10 MIN: CPT | Performed by: SURGERY

## 2025-03-11 RX ORDER — TRAZODONE HYDROCHLORIDE 50 MG/1
25 TABLET ORAL
COMMUNITY
Start: 2025-01-06

## 2025-03-11 NOTE — PROGRESS NOTES
Name: Marcellus Fregoso      : 1955      MRN: [id number]  Encounter Provider: Kenneth Mcfarland MD  Encounter Date: 3/11/2025   Encounter department: [facility]  :  Assessment & Plan  Ventral hernia without obstruction or gangrene  Talked again about his upcoming surgery.  I went over risks, benefits, alternatives, and what to expect postoperatively.  Also talked about some of the complications that could occur.  He understands and is agreeable to go ahead with surgery next week.           History of Present Illness   HPI  Marcellus Fregoso is a 69 y.o. male who presents to discuss hernia surgery again.  He is already scheduled and wanted to go over some details again      Review of Systems       Objective   There were no vitals taken for this visit.     Physical Exam  Abdomen: Soft, midline bulging

## 2025-03-11 NOTE — PRE-PROCEDURE INSTRUCTIONS
Pre-Surgery Instructions:   Medication Instructions    albuterol (2.5 mg/3 mL) 0.083 % nebulizer solution Uses PRN- OK to take day of surgery    albuterol (ProAir HFA) 90 mcg/act inhaler Uses PRN- OK to take day of surgery    amLODIPine (NORVASC) 5 mg tablet Take day of surgery.    carbidopa-levodopa (SINEMET)  mg per tablet Take day of surgery.    clindamycin 1 % gel Hold day of surgery.    ezetimibe (ZETIA) 10 mg tablet Take day of surgery.    FLUoxetine (PROzac) 40 MG capsule Take day of surgery.    losartan (COZAAR) 100 MG tablet Hold day of surgery.    metFORMIN (GLUCOPHAGE) 500 mg tablet Hold day of surgery.    metoprolol succinate (TOPROL-XL) 25 mg 24 hr tablet Take day of surgery.    Multiple Vitamins-Minerals (CENTRUM ADULTS PO) Stop taking 7 days prior to surgery.    pantoprazole (PROTONIX) 40 mg tablet Take day of surgery.    QUEtiapine (SEROquel) 50 mg tablet Take night before surgery    rosuvastatin (CRESTOR) 5 mg tablet Takes Sundays-will not take DOS    semaglutide, 2 mg/dose, (Ozempic, 2 MG/DOSE,) 8 mg/ mL injection pen Stop taking 7 days prior to surgery.    senna-docusate sodium (SENOKOT S) 8.6-50 mg per tablet Take night before surgery    sucralfate (CARAFATE) 1 g tablet Uses PRN- DO NOT take day of surgery    traZODone (DESYREL) 50 mg tablet Take night before surgery    trospium chloride (SANCTURA) 20 mg tablet Hold day of surgery.   Medication instructions for day of surgery reviewed. Please take all instructed medications with only a sip of water.       You will receive a call one business day prior to surgery with an arrival time and hospital directions. If your surgery is scheduled on a Monday, the hospital will be calling you on the Friday prior to your surgery. If you have not heard from anyone by 8pm, please call the hospital supervisor through the hospital  at [phone]. ([place] [phone] or [place] [phone]).    Do not eat or drink anything after midnight the  night before your surgery, including candy, mints, lifesavers, or chewing gum. Do not drink alcohol 24hrs before your surgery. Try not to smoke at least 24hrs before your surgery.       Follow the pre surgery showering instructions as listed in the “My Surgical Experience Booklet” or otherwise provided by your surgeon's office. Do not use a blade to shave the surgical area 1 week before surgery. It is okay to use a clean electric clippers up to 24 hours before surgery. Do not apply any lotions, creams, including makeup, cologne, deodorant, or perfumes after showering on the day of your surgery. Do not use dry shampoo, hair spray, hair gel, or any type of hair products.     No contact lenses, eye make-up, or artificial eyelashes. Remove nail polish, including gel polish, and any artificial, gel, or acrylic nails if possible. Remove all jewelry including rings and body piercing jewelry.     Wear causal clothing that is easy to take on and off. Consider your type of surgery.    Keep any valuables, jewelry, piercings at home. Please bring any specially ordered equipment (sling, braces) if indicated.    Arrange for a responsible person to drive you to and from the hospital on the day of your surgery. Please confirm the visitor policy for the day of your procedure when you receive your phone call with an arrival time.     Call the surgeon's office with any new illnesses, exposures, or additional questions prior to surgery.    Please reference your “My Surgical Experience Booklet” for additional information to prepare for your upcoming surgery.

## 2025-03-11 NOTE — ASSESSMENT & PLAN NOTE
Talked again about his upcoming surgery.  I went over risks, benefits, alternatives, and what to expect postoperatively.  Also talked about some of the complications that could occur.  He understands and is agreeable to go ahead with surgery next week.

## 2025-03-12 ENCOUNTER — TELEPHONE (OUTPATIENT)
Age: 70
End: 2025-03-12

## 2025-03-12 NOTE — TELEPHONE ENCOUNTER
Padmini from [facility] calling pharmacist had questions about instructions on prescription.  She hung up before I got call back #. This prescription.        She called from [phone]

## 2025-03-12 NOTE — PROGRESS NOTES
Daily Note     Today's date: 3/13/2025  Patient name: Marcellus Fregoso  : 1955  MRN: [id number]  Referring provider: Ehsan Calix MD  Dx:   Encounter Diagnosis     ICD-10-CM    1. Parkinson's disease, unspecified whether dyskinesia present, unspecified whether manifestations fluctuate (HCC)  G20.A1       2. Ambulatory dysfunction  R26.2       3. Balance disorder  R26.89           Start Time: 1530  Stop Time: 1615  Total time in clinic (min): 45 minutes    Subjective: Hernia surgery on Thursday       Objective: See treatment diary below      Assessment: Pt tolerated the treatment well despite increased fatigue. Pt performed biking on the Nu-step to promote cardiovascular endurance/neural priming for the session. Pt was challenged with mary beth navigation. Pt demonstrated some R hip circumduction compensations, but able to correct with verbal cueing. Pt demonstrated fatigue post-session. Pt would benefit from further skilled PT sessions to address deficits in endurance, strength, balance, activity tolerance, and overall safety needed to maximize the pt's function and quality of life. However, will place pt on a 30 day hold at this time due to upcoming hernia surgery. Pt aware that he will be responsible to call the clinic within 30 days to continue therapy if not pt will be discharged after the 30 days. Pt verbalized understanding.       Plan: 30 day hold      Short Term Goal Expiration Date: 2025   Long Term Goal Expiration Date: 2025  POC Expiration Date: (2025    Visit count: 1 of 10; PN due 2025    POC expires Unit limit Auth Expiration date PT/OT/ST + Visit Limit?   25 N/A 25 BOMN                           Visit/Unit Tracking  AUTH Status:  Date 01/10 01/17 1/23 01/30 02/07 02/10 02/21 2/25 03/03 3/6 3/13   BOMN Used 1 2 3 4 5 6 7 8 9 10 1    Remaining  9 8 7 6 5 4 3 2 1 0 9       Access Code: [email or web address]  URL: [email or web address]  Date: 2025  Prepared by:  "Mine Pan    Exercises  - Sit to Stand with Arms Crossed  - 1 x daily - 7 x weekly - 3 sets - 10 reps  - Walking March  - 1 x daily - 7 x weekly - 1 sets - 3 reps  - Side Stepping with Counter Support  - 1 x daily - 7 x weekly - 1 sets - 3 reps      Precautions Fall risk, T2DM, neuropathy, HTN, syncope, depression       Manuals 03/13 02/21 2/25 03/03 03/06                                   Neuro Re-Ed      Pt education on all neuro testing including FGA, mCTSIB,    STS  10# tidal tank   2 x 10  10# tidal tank   1 x 10  X 15   NO UE    Hurdles Solo  1/4 track   (6) 8\" yellow hurdles   Fwd x 2 laps   Lat x 3 laps        HKM  Solo  1/4 track   Fwd HKM and backwards walking   10# tidal tank   X 2 laps       Lat stepping        Step up/foam  Solo  (3) airex pads  by (2) 6\" steps   Fwd x 2 laps   Lat x 2 laps         Blazepods        Dynamic balance   Solo  1/4 track   (6) cones   Fwd x 2 laps   For single leg stance Solo  1/4 track   (3) airex pads  by (6 small/med rocks)   Fwd x 2 laps     3) airex pads  by (6 small/med rocks)  by (3) 12\" hurdles   Fwd x 1 lap   Solo  1/4 track  (3) airex pads  by (6 small/med rocks)  by (3) 12\" hurdles   Fwd x 2 laps    Step-ups    Solo  8\" step   Fwd x 5 each leg      Trunk rotation    Standing   6# med ball slams with trunk rotation   2 spots on floor for visual cue   X 20     Ther Ex  Pt education on continuing daily walking to increase endurance and aerobic capacity    Pt education on all testing including 6MWT, 5xSTS, and gait speed    Treadmill  1.2-1.6 mph   0% incline   B/l UE   X 6 min    1.2 mph   X 4 min     X 10 min total    Verbal cues for upright posture  1.2 mph   0% incline   B/l UE   X 7 min     1.2 mph   X 3 min     X 10 min total    Cardiovascular endurance and neural priming  1.0-1.2 mph   0% incline   B/l UE   X 6 min     1.1 mph   X 4 min     X 10 min total    Cardiovascular endurance and neural priming   "   Nu-step L7 x 5 min   L5 x 11 min     X 16 minutes total    Cardiovascular endurance and neural priming                                                        Ther Activity                        Gait Training                        Modalities

## 2025-03-13 ENCOUNTER — OFFICE VISIT (OUTPATIENT)
Dept: PHYSICAL THERAPY | Facility: CLINIC | Age: 70
End: 2025-03-13
Payer: MEDICARE

## 2025-03-13 ENCOUNTER — APPOINTMENT (OUTPATIENT)
Dept: LAB | Age: 70
End: 2025-03-13
Payer: MEDICARE

## 2025-03-13 DIAGNOSIS — Z96.652 PAIN DUE TO TOTAL LEFT KNEE REPLACEMENT, INITIAL ENCOUNTER (HCC): ICD-10-CM

## 2025-03-13 DIAGNOSIS — K43.2 INCISIONAL HERNIA: ICD-10-CM

## 2025-03-13 DIAGNOSIS — G20.A1 PARKINSON'S DISEASE, UNSPECIFIED WHETHER DYSKINESIA PRESENT, UNSPECIFIED WHETHER MANIFESTATIONS FLUCTUATE (HCC): Primary | ICD-10-CM

## 2025-03-13 DIAGNOSIS — Z96.651 PAIN DUE TO TOTAL RIGHT KNEE REPLACEMENT, INITIAL ENCOUNTER (HCC): ICD-10-CM

## 2025-03-13 DIAGNOSIS — T84.84XA PAIN DUE TO TOTAL RIGHT KNEE REPLACEMENT, INITIAL ENCOUNTER (HCC): ICD-10-CM

## 2025-03-13 DIAGNOSIS — R26.89 BALANCE DISORDER: ICD-10-CM

## 2025-03-13 DIAGNOSIS — T84.84XA PAIN DUE TO TOTAL LEFT KNEE REPLACEMENT, INITIAL ENCOUNTER (HCC): ICD-10-CM

## 2025-03-13 DIAGNOSIS — E53.8 VITAMIN B12 DEFICIENCY: ICD-10-CM

## 2025-03-13 DIAGNOSIS — R26.2 AMBULATORY DYSFUNCTION: ICD-10-CM

## 2025-03-13 LAB
ALBUMIN SERPL BCG-MCNC: 4.1 G/DL (ref 3.5–5)
ALP SERPL-CCNC: 69 U/L (ref 34–104)
ALT SERPL W P-5'-P-CCNC: 11 U/L (ref 7–52)
ANION GAP SERPL CALCULATED.3IONS-SCNC: 9 MMOL/L (ref 4–13)
AST SERPL W P-5'-P-CCNC: 16 U/L (ref 13–39)
BASOPHILS # BLD AUTO: 0.05 THOUSANDS/ÂΜL (ref 0–0.1)
BASOPHILS NFR BLD AUTO: 1 % (ref 0–1)
BILIRUB SERPL-MCNC: 0.5 MG/DL (ref 0.2–1)
BUN SERPL-MCNC: 14 MG/DL (ref 5–25)
CALCIUM SERPL-MCNC: 9.2 MG/DL (ref 8.4–10.2)
CHLORIDE SERPL-SCNC: 100 MMOL/L (ref 96–108)
CO2 SERPL-SCNC: 27 MMOL/L (ref 21–32)
CREAT SERPL-MCNC: 0.91 MG/DL (ref 0.6–1.3)
CRP SERPL QL: 3.5 MG/L
EOSINOPHIL # BLD AUTO: 0.72 THOUSAND/ÂΜL (ref 0–0.61)
EOSINOPHIL NFR BLD AUTO: 10 % (ref 0–6)
ERYTHROCYTE [DISTWIDTH] IN BLOOD BY AUTOMATED COUNT: 13 % (ref 11.6–15.1)
ERYTHROCYTE [SEDIMENTATION RATE] IN BLOOD: 10 MM/HOUR (ref 0–19)
GFR SERPL CREATININE-BSD FRML MDRD: 84 ML/MIN/1.73SQ M
GLUCOSE P FAST SERPL-MCNC: 152 MG/DL (ref 65–99)
HCT VFR BLD AUTO: 41.9 % (ref 36.5–49.3)
HGB BLD-MCNC: 14.1 G/DL (ref 12–17)
IMM GRANULOCYTES # BLD AUTO: 0.04 THOUSAND/UL (ref 0–0.2)
IMM GRANULOCYTES NFR BLD AUTO: 1 % (ref 0–2)
LYMPHOCYTES # BLD AUTO: 1.84 THOUSANDS/ÂΜL (ref 0.6–4.47)
LYMPHOCYTES NFR BLD AUTO: 25 % (ref 14–44)
MCH RBC QN AUTO: 29.6 PG (ref 26.8–34.3)
MCHC RBC AUTO-ENTMCNC: 33.7 G/DL (ref 31.4–37.4)
MCV RBC AUTO: 88 FL (ref 82–98)
MONOCYTES # BLD AUTO: 0.54 THOUSAND/ÂΜL (ref 0.17–1.22)
MONOCYTES NFR BLD AUTO: 7 % (ref 4–12)
NEUTROPHILS # BLD AUTO: 4.06 THOUSANDS/ÂΜL (ref 1.85–7.62)
NEUTS SEG NFR BLD AUTO: 56 % (ref 43–75)
NRBC BLD AUTO-RTO: 0 /100 WBCS
PLATELET # BLD AUTO: 237 THOUSANDS/UL (ref 149–390)
PMV BLD AUTO: 9.4 FL (ref 8.9–12.7)
POTASSIUM SERPL-SCNC: 3.8 MMOL/L (ref 3.5–5.3)
PROT SERPL-MCNC: 6.3 G/DL (ref 6.4–8.4)
RBC # BLD AUTO: 4.76 MILLION/UL (ref 3.88–5.62)
SODIUM SERPL-SCNC: 136 MMOL/L (ref 135–147)
WBC # BLD AUTO: 7.25 THOUSAND/UL (ref 4.31–10.16)

## 2025-03-13 PROCEDURE — 97112 NEUROMUSCULAR REEDUCATION: CPT

## 2025-03-13 PROCEDURE — 97110 THERAPEUTIC EXERCISES: CPT

## 2025-03-13 PROCEDURE — 85025 COMPLETE CBC W/AUTO DIFF WBC: CPT

## 2025-03-13 PROCEDURE — 86140 C-REACTIVE PROTEIN: CPT

## 2025-03-13 PROCEDURE — 80053 COMPREHEN METABOLIC PANEL: CPT

## 2025-03-13 PROCEDURE — 36415 COLL VENOUS BLD VENIPUNCTURE: CPT

## 2025-03-13 PROCEDURE — 85652 RBC SED RATE AUTOMATED: CPT

## 2025-03-13 RX ORDER — CYANOCOBALAMIN 1000 UG/ML
1000 INJECTION, SOLUTION INTRAMUSCULAR; SUBCUTANEOUS
Qty: 10 ML | Refills: 3 | Status: SHIPPED | OUTPATIENT
Start: 2025-03-13

## 2025-03-17 DIAGNOSIS — E11.9 CONTROLLED TYPE 2 DIABETES MELLITUS WITHOUT COMPLICATION, WITHOUT LONG-TERM CURRENT USE OF INSULIN (HCC): ICD-10-CM

## 2025-03-18 DIAGNOSIS — I10 BENIGN ESSENTIAL HYPERTENSION: ICD-10-CM

## 2025-03-18 DIAGNOSIS — I10 ESSENTIAL HYPERTENSION: ICD-10-CM

## 2025-03-18 DIAGNOSIS — I49.3 PVC (PREMATURE VENTRICULAR CONTRACTION): ICD-10-CM

## 2025-03-18 RX ORDER — LOSARTAN POTASSIUM 100 MG/1
100 TABLET ORAL DAILY
Qty: 14 TABLET | Refills: 0 | Status: SHIPPED | OUTPATIENT
Start: 2025-03-18

## 2025-03-18 RX ORDER — METOPROLOL SUCCINATE 25 MG/1
25 TABLET, EXTENDED RELEASE ORAL 2 TIMES DAILY
Qty: 180 TABLET | Refills: 1 | Status: SHIPPED | OUTPATIENT
Start: 2025-03-18

## 2025-03-18 RX ORDER — METOPROLOL SUCCINATE 25 MG/1
25 TABLET, EXTENDED RELEASE ORAL 2 TIMES DAILY
Qty: 28 TABLET | Refills: 0 | Status: SHIPPED | OUTPATIENT
Start: 2025-03-18

## 2025-03-18 RX ORDER — LOSARTAN POTASSIUM 100 MG/1
100 TABLET ORAL DAILY
Qty: 90 TABLET | Refills: 1 | Status: SHIPPED | OUTPATIENT
Start: 2025-03-18

## 2025-03-18 NOTE — TELEPHONE ENCOUNTER
Send to multiple pharmacies pt needs short term supply sent to local pharmacy    Reason for call:   [x] Refill   [] Prior Auth  [] Other:     Office:   [] PCP/Provider -   [x] Specialty/Provider - Cardio    Medication: losartan (COZAAR) 100 MG tablet Take 1 tablet (100 mg total) by mouth daily     metoprolol succinate (TOPROL-XL) 25 mg 24 hr tablet Take 1 tablet (25 mg total) by mouth 2 (two) times a day,         Pharmacy: [facility] - [address]      Local Pharmacy   Does the patient have enough for 3 days?   [] Yes   [] No - Send as HP to POD    Mail Away Pharmacy   Does the patient have enough for 10 days?   [] Yes   [x] No - Send as HP to POD

## 2025-03-19 ENCOUNTER — ANESTHESIA EVENT (OUTPATIENT)
Dept: PERIOP | Facility: HOSPITAL | Age: 70
End: 2025-03-19
Payer: MEDICARE

## 2025-03-20 ENCOUNTER — ANESTHESIA (OUTPATIENT)
Dept: PERIOP | Facility: HOSPITAL | Age: 70
End: 2025-03-20
Payer: MEDICARE

## 2025-03-20 ENCOUNTER — HOSPITAL ENCOUNTER (OUTPATIENT)
Facility: HOSPITAL | Age: 70
Setting detail: OUTPATIENT SURGERY
Discharge: HOME | End: 2025-03-21
Attending: SURGERY | Admitting: SURGERY
Payer: MEDICARE

## 2025-03-20 DIAGNOSIS — K43.9 VENTRAL HERNIA WITHOUT OBSTRUCTION OR GANGRENE: Primary | ICD-10-CM

## 2025-03-20 LAB
GLUCOSE SERPL-MCNC: 155 MG/DL (ref 65–140)
GLUCOSE SERPL-MCNC: 302 MG/DL (ref 65–140)

## 2025-03-20 PROCEDURE — 49593 RPR AA HRN 1ST 3-10 RDC: CPT | Performed by: SURGERY

## 2025-03-20 PROCEDURE — 82948 REAGENT STRIP/BLOOD GLUCOSE: CPT

## 2025-03-20 PROCEDURE — NC001 PR NO CHARGE: Performed by: SURGERY

## 2025-03-20 PROCEDURE — C1781 MESH (IMPLANTABLE): HCPCS | Performed by: SURGERY

## 2025-03-20 DEVICE — VENTRALIGHT ST MESH WITH ECHO PS POSITIONING SYSTEM, 4.5" (11.4 CM), CIRCLE
Type: IMPLANTABLE DEVICE | Site: ABDOMEN | Status: FUNCTIONAL
Brand: VENTRALIGHT

## 2025-03-20 RX ORDER — TRAZODONE HYDROCHLORIDE 50 MG/1
25 TABLET ORAL
Status: DISCONTINUED | OUTPATIENT
Start: 2025-03-20 | End: 2025-03-21 | Stop reason: HOSPADM

## 2025-03-20 RX ORDER — ACETAMINOPHEN 325 MG/1
975 TABLET ORAL EVERY 8 HOURS PRN
Status: COMPLETED | OUTPATIENT
Start: 2025-03-20 | End: 2025-03-20

## 2025-03-20 RX ORDER — METOPROLOL SUCCINATE 25 MG/1
25 TABLET, EXTENDED RELEASE ORAL 2 TIMES DAILY
Status: DISCONTINUED | OUTPATIENT
Start: 2025-03-20 | End: 2025-03-21 | Stop reason: HOSPADM

## 2025-03-20 RX ORDER — MIDAZOLAM HYDROCHLORIDE 2 MG/2ML
INJECTION, SOLUTION INTRAMUSCULAR; INTRAVENOUS CONTINUOUS PRN
Status: DISCONTINUED | OUTPATIENT
Start: 2025-03-20 | End: 2025-03-20

## 2025-03-20 RX ORDER — ONDANSETRON 2 MG/ML
INJECTION INTRAMUSCULAR; INTRAVENOUS AS NEEDED
Status: DISCONTINUED | OUTPATIENT
Start: 2025-03-20 | End: 2025-03-20

## 2025-03-20 RX ORDER — QUETIAPINE FUMARATE 100 MG/1
100 TABLET, FILM COATED ORAL
Status: DISCONTINUED | OUTPATIENT
Start: 2025-03-20 | End: 2025-03-21 | Stop reason: HOSPADM

## 2025-03-20 RX ORDER — PROPOFOL 10 MG/ML
INJECTION, EMULSION INTRAVENOUS CONTINUOUS PRN
Status: DISCONTINUED | OUTPATIENT
Start: 2025-03-20 | End: 2025-03-20

## 2025-03-20 RX ORDER — INSULIN LISPRO 100 [IU]/ML
2-12 INJECTION, SOLUTION INTRAVENOUS; SUBCUTANEOUS
Status: DISCONTINUED | OUTPATIENT
Start: 2025-03-20 | End: 2025-03-21

## 2025-03-20 RX ORDER — HYDROMORPHONE HCL IN WATER/PF 6 MG/30 ML
0.2 PATIENT CONTROLLED ANALGESIA SYRINGE INTRAVENOUS
Status: DISCONTINUED | OUTPATIENT
Start: 2025-03-20 | End: 2025-03-20 | Stop reason: HOSPADM

## 2025-03-20 RX ORDER — ALBUTEROL SULFATE 0.83 MG/ML
2.5 SOLUTION RESPIRATORY (INHALATION) EVERY 6 HOURS PRN
Status: DISCONTINUED | OUTPATIENT
Start: 2025-03-20 | End: 2025-03-21

## 2025-03-20 RX ORDER — BUPIVACAINE HYDROCHLORIDE AND EPINEPHRINE 5; 5 MG/ML; UG/ML
INJECTION, SOLUTION EPIDURAL; INTRACAUDAL; PERINEURAL AS NEEDED
Status: DISCONTINUED | OUTPATIENT
Start: 2025-03-20 | End: 2025-03-20 | Stop reason: HOSPADM

## 2025-03-20 RX ORDER — AMLODIPINE BESYLATE 5 MG/1
5 TABLET ORAL DAILY
Status: DISCONTINUED | OUTPATIENT
Start: 2025-03-21 | End: 2025-03-21 | Stop reason: HOSPADM

## 2025-03-20 RX ORDER — DEXAMETHASONE SODIUM PHOSPHATE 10 MG/ML
INJECTION, SOLUTION INTRAMUSCULAR; INTRAVENOUS AS NEEDED
Status: DISCONTINUED | OUTPATIENT
Start: 2025-03-20 | End: 2025-03-20

## 2025-03-20 RX ORDER — TRAZODONE HYDROCHLORIDE 50 MG/1
25 TABLET ORAL
Status: DISCONTINUED | OUTPATIENT
Start: 2025-03-20 | End: 2025-03-20

## 2025-03-20 RX ORDER — PANTOPRAZOLE SODIUM 40 MG/1
40 TABLET, DELAYED RELEASE ORAL
Status: DISCONTINUED | OUTPATIENT
Start: 2025-03-21 | End: 2025-03-21 | Stop reason: HOSPADM

## 2025-03-20 RX ORDER — HYDROMORPHONE HCL/PF 1 MG/ML
SYRINGE (ML) INJECTION AS NEEDED
Status: DISCONTINUED | OUTPATIENT
Start: 2025-03-20 | End: 2025-03-20

## 2025-03-20 RX ORDER — FENTANYL CITRATE 50 UG/ML
INJECTION, SOLUTION INTRAMUSCULAR; INTRAVENOUS AS NEEDED
Status: DISCONTINUED | OUTPATIENT
Start: 2025-03-20 | End: 2025-03-20

## 2025-03-20 RX ORDER — ONDANSETRON 2 MG/ML
4 INJECTION INTRAMUSCULAR; INTRAVENOUS ONCE AS NEEDED
Status: DISCONTINUED | OUTPATIENT
Start: 2025-03-20 | End: 2025-03-20 | Stop reason: HOSPADM

## 2025-03-20 RX ORDER — HYDROMORPHONE HCL/PF 1 MG/ML
0.2 SYRINGE (ML) INJECTION EVERY 4 HOURS PRN
Refills: 0 | Status: DISCONTINUED | OUTPATIENT
Start: 2025-03-20 | End: 2025-03-21

## 2025-03-20 RX ORDER — HYDROMORPHONE HCL/PF 1 MG/ML
0.2 SYRINGE (ML) INJECTION
Refills: 0 | Status: DISCONTINUED | OUTPATIENT
Start: 2025-03-20 | End: 2025-03-21

## 2025-03-20 RX ORDER — OXYCODONE HYDROCHLORIDE 5 MG/1
5 TABLET ORAL EVERY 4 HOURS PRN
Qty: 20 TABLET | Refills: 0 | Status: SHIPPED | OUTPATIENT
Start: 2025-03-20

## 2025-03-20 RX ORDER — HYDRALAZINE HYDROCHLORIDE 20 MG/ML
INJECTION INTRAMUSCULAR; INTRAVENOUS AS NEEDED
Status: DISCONTINUED | OUTPATIENT
Start: 2025-03-20 | End: 2025-03-20

## 2025-03-20 RX ORDER — DIPHENHYDRAMINE HYDROCHLORIDE 50 MG/ML
12.5 INJECTION, SOLUTION INTRAMUSCULAR; INTRAVENOUS ONCE AS NEEDED
Status: DISCONTINUED | OUTPATIENT
Start: 2025-03-20 | End: 2025-03-20 | Stop reason: HOSPADM

## 2025-03-20 RX ORDER — FENTANYL CITRATE/PF 50 MCG/ML
25 SYRINGE (ML) INJECTION
Status: COMPLETED | OUTPATIENT
Start: 2025-03-20 | End: 2025-03-20

## 2025-03-20 RX ORDER — QUETIAPINE FUMARATE 100 MG/1
100 TABLET, FILM COATED ORAL
Status: DISCONTINUED | OUTPATIENT
Start: 2025-03-21 | End: 2025-03-20

## 2025-03-20 RX ORDER — SODIUM CHLORIDE, SODIUM LACTATE, POTASSIUM CHLORIDE, CALCIUM CHLORIDE 600; 310; 30; 20 MG/100ML; MG/100ML; MG/100ML; MG/100ML
INJECTION, SOLUTION INTRAVENOUS CONTINUOUS PRN
Status: DISCONTINUED | OUTPATIENT
Start: 2025-03-20 | End: 2025-03-20

## 2025-03-20 RX ORDER — ROCURONIUM BROMIDE 10 MG/ML
INJECTION, SOLUTION INTRAVENOUS AS NEEDED
Status: DISCONTINUED | OUTPATIENT
Start: 2025-03-20 | End: 2025-03-20

## 2025-03-20 RX ORDER — CEFAZOLIN SODIUM 2 G/50ML
2000 SOLUTION INTRAVENOUS ONCE
Status: DISCONTINUED | OUTPATIENT
Start: 2025-03-20 | End: 2025-03-20

## 2025-03-20 RX ORDER — LIDOCAINE HYDROCHLORIDE 10 MG/ML
INJECTION, SOLUTION EPIDURAL; INFILTRATION; INTRACAUDAL; PERINEURAL AS NEEDED
Status: DISCONTINUED | OUTPATIENT
Start: 2025-03-20 | End: 2025-03-20

## 2025-03-20 RX ORDER — SODIUM CHLORIDE, SODIUM LACTATE, POTASSIUM CHLORIDE, CALCIUM CHLORIDE 600; 310; 30; 20 MG/100ML; MG/100ML; MG/100ML; MG/100ML
125 INJECTION, SOLUTION INTRAVENOUS CONTINUOUS
Status: DISCONTINUED | OUTPATIENT
Start: 2025-03-20 | End: 2025-03-20

## 2025-03-20 RX ORDER — ENOXAPARIN SODIUM 100 MG/ML
40 INJECTION SUBCUTANEOUS DAILY
Status: DISCONTINUED | OUTPATIENT
Start: 2025-03-21 | End: 2025-03-21 | Stop reason: HOSPADM

## 2025-03-20 RX ORDER — CARBIDOPA AND LEVODOPA 25; 100 MG/1; MG/1
1 TABLET ORAL 3 TIMES DAILY
Status: DISCONTINUED | OUTPATIENT
Start: 2025-03-20 | End: 2025-03-21 | Stop reason: HOSPADM

## 2025-03-20 RX ORDER — OXYCODONE HYDROCHLORIDE 5 MG/1
5 TABLET ORAL EVERY 4 HOURS PRN
Refills: 0 | Status: DISCONTINUED | OUTPATIENT
Start: 2025-03-20 | End: 2025-03-21 | Stop reason: HOSPADM

## 2025-03-20 RX ORDER — QUETIAPINE FUMARATE 25 MG/1
50 TABLET, FILM COATED ORAL
Status: DISCONTINUED | OUTPATIENT
Start: 2025-03-20 | End: 2025-03-20

## 2025-03-20 RX ORDER — HYDROCODONE BITARTRATE AND ACETAMINOPHEN 5; 325 MG/1; MG/1
1 TABLET ORAL EVERY 4 HOURS PRN
Refills: 0 | Status: DISCONTINUED | OUTPATIENT
Start: 2025-03-20 | End: 2025-03-20

## 2025-03-20 RX ORDER — OXYBUTYNIN CHLORIDE 5 MG/1
5 TABLET ORAL 2 TIMES DAILY
Status: DISCONTINUED | OUTPATIENT
Start: 2025-03-20 | End: 2025-03-21 | Stop reason: HOSPADM

## 2025-03-20 RX ORDER — LATANOPROST 50 UG/ML
1 SOLUTION/ DROPS OPHTHALMIC
Status: DISCONTINUED | OUTPATIENT
Start: 2025-03-20 | End: 2025-03-21 | Stop reason: HOSPADM

## 2025-03-20 RX ADMIN — Medication 3000 MG: at 13:54

## 2025-03-20 RX ADMIN — Medication 2.5 MG: at 22:07

## 2025-03-20 RX ADMIN — FENTANYL CITRATE 25 MCG: 50 INJECTION INTRAMUSCULAR; INTRAVENOUS at 16:07

## 2025-03-20 RX ADMIN — HYDRALAZINE HYDROCHLORIDE 5 MG: 20 INJECTION INTRAMUSCULAR; INTRAVENOUS at 15:07

## 2025-03-20 RX ADMIN — TRAZODONE HYDROCHLORIDE 25 MG: 50 TABLET ORAL at 22:48

## 2025-03-20 RX ADMIN — METOPROLOL SUCCINATE 25 MG: 25 TABLET, EXTENDED RELEASE ORAL at 22:48

## 2025-03-20 RX ADMIN — FENTANYL CITRATE 25 MCG: 50 INJECTION INTRAMUSCULAR; INTRAVENOUS at 15:34

## 2025-03-20 RX ADMIN — HYDROMORPHONE HYDROCHLORIDE 0.5 MG: 1 INJECTION, SOLUTION INTRAMUSCULAR; INTRAVENOUS; SUBCUTANEOUS at 14:29

## 2025-03-20 RX ADMIN — LIDOCAINE HYDROCHLORIDE 50 MG: 10 INJECTION, SOLUTION EPIDURAL; INFILTRATION; INTRACAUDAL; PERINEURAL at 13:44

## 2025-03-20 RX ADMIN — DEXAMETHASONE SODIUM PHOSPHATE 10 MG: 10 INJECTION, SOLUTION INTRAMUSCULAR; INTRAVENOUS at 13:46

## 2025-03-20 RX ADMIN — SODIUM CHLORIDE, SODIUM LACTATE, POTASSIUM CHLORIDE, AND CALCIUM CHLORIDE: .6; .31; .03; .02 INJECTION, SOLUTION INTRAVENOUS at 13:39

## 2025-03-20 RX ADMIN — HYDROMORPHONE HYDROCHLORIDE 0.5 MG: 1 INJECTION, SOLUTION INTRAMUSCULAR; INTRAVENOUS; SUBCUTANEOUS at 14:22

## 2025-03-20 RX ADMIN — FENTANYL CITRATE 25 MCG: 50 INJECTION INTRAMUSCULAR; INTRAVENOUS at 15:52

## 2025-03-20 RX ADMIN — FENTANYL CITRATE 50 MCG: 50 INJECTION INTRAMUSCULAR; INTRAVENOUS at 13:44

## 2025-03-20 RX ADMIN — SODIUM CHLORIDE, SODIUM LACTATE, POTASSIUM CHLORIDE, AND CALCIUM CHLORIDE: .6; .31; .03; .02 INJECTION, SOLUTION INTRAVENOUS at 14:37

## 2025-03-20 RX ADMIN — FENTANYL CITRATE 50 MCG: 50 INJECTION INTRAMUSCULAR; INTRAVENOUS at 14:07

## 2025-03-20 RX ADMIN — INSULIN LISPRO 8 UNITS: 100 INJECTION, SOLUTION INTRAVENOUS; SUBCUTANEOUS at 22:12

## 2025-03-20 RX ADMIN — FENTANYL CITRATE 25 MCG: 50 INJECTION INTRAMUSCULAR; INTRAVENOUS at 16:16

## 2025-03-20 RX ADMIN — PROPOFOL 50 MG: 10 INJECTION, EMULSION INTRAVENOUS at 13:47

## 2025-03-20 RX ADMIN — OXYBUTYNIN CHLORIDE 5 MG: 5 TABLET ORAL at 23:03

## 2025-03-20 RX ADMIN — SUGAMMADEX 200 MG: 100 INJECTION, SOLUTION INTRAVENOUS at 14:56

## 2025-03-20 RX ADMIN — ROCURONIUM BROMIDE 50 MG: 10 INJECTION, SOLUTION INTRAVENOUS at 13:45

## 2025-03-20 RX ADMIN — PROPOFOL 150 MG: 10 INJECTION, EMULSION INTRAVENOUS at 13:44

## 2025-03-20 RX ADMIN — PROPOFOL 50 MCG/KG/MIN: 10 INJECTION, EMULSION INTRAVENOUS at 13:55

## 2025-03-20 RX ADMIN — QUETIAPINE FUMARATE 100 MG: 100 TABLET ORAL at 23:02

## 2025-03-20 RX ADMIN — SODIUM CHLORIDE, SODIUM LACTATE, POTASSIUM CHLORIDE, AND CALCIUM CHLORIDE 125 ML/HR: .6; .31; .03; .02 INJECTION, SOLUTION INTRAVENOUS at 11:09

## 2025-03-20 RX ADMIN — ONDANSETRON 4 MG: 2 INJECTION INTRAMUSCULAR; INTRAVENOUS at 14:50

## 2025-03-20 RX ADMIN — ACETAMINOPHEN 975 MG: 325 TABLET, FILM COATED ORAL at 18:47

## 2025-03-20 NOTE — ANESTHESIA PREPROCEDURE EVALUATION
Procedure:  REPAIR INCISIONAL  HERNIA ABDOMINAL WITH COMPONENT SEPARATION (Abdomen)    Relevant Problems   CARDIO   (+) Benign essential hypertension   (+) CAD (coronary artery disease)   (+) Chest pain syndrome   (+) Exertional dyspnea   (+) Hyperlipidemia   (+) PVC (premature ventricular contraction)   (+) Pure hypercholesterolemia      GI/HEPATIC   (+) Dysphagia   (+) Gastroesophageal reflux disease with esophagitis without hemorrhage      MUSCULOSKELETAL   (+) Chronic bilateral low back pain without sciatica      NEURO/PSYCH   (+) Chronic bilateral low back pain without sciatica   (+) Depression with anxiety   (+) AMY (generalized anxiety disorder)   (+) Severe episode of recurrent major depressive disorder, without psychotic features (HCC)      PULMONARY   (+) Exertional dyspnea   (+) Sleep apnea      Lab Results   Component Value Date    WBC 7.25 03/13/2025    HGB 14.1 03/13/2025    HCT 41.9 03/13/2025    MCV 88 03/13/2025     03/13/2025     Lab Results   Component Value Date     08/16/2015    K 3.8 03/13/2025    CO2 27 03/13/2025     03/13/2025    BUN 14 03/13/2025    CREATININE 0.91 03/13/2025     Lab Results   Component Value Date    INR 0.99 11/17/2021    INR 0.97 12/30/2020    PROTIME 12.7 11/17/2021    PROTIME 12.9 12/30/2020     Lab Results   Component Value Date    PTT 24 11/17/2021       Lab Results   Component Value Date    GLUCOSE 174 (H) 12/30/2020    GLUCOSE 137 08/16/2015    GLUCOSE 152 (H) 05/04/2015       Lab Results   Component Value Date    HGBA1C 6.4 (H) 12/16/2024       Type and Screen:  AB    Physical Exam    Airway    Mallampati score: II  TM Distance: >3 FB  Neck ROM: full     Dental       Cardiovascular      Pulmonary      Other Findings        Anesthesia Plan  ASA Score- 3     Anesthesia Type- general with ASA Monitors.         Additional Monitors:     Airway Plan: ETT.           Plan Factors-Exercise tolerance (METS): >4 METS.    Chart reviewed.   Existing labs  reviewed. Patient summary reviewed.                  Induction- intravenous.    Postoperative Plan- Plan for postoperative opioid use.         Informed Consent- Anesthetic plan and risks discussed with patient.  I personally reviewed this patient with the CRNA. Discussed and agreed on the Anesthesia Plan with the CRNA..      NPO Status:  Vitals Value Taken Time   Date of last liquid 03/20/25 03/20/25 1038   Time of last liquid 0600 03/20/25 1038   Date of last solid 03/19/25 03/20/25 1038   Time of last solid 2345 03/20/25 1038

## 2025-03-20 NOTE — H&P
Assessment & Plan  Ventral hernia without obstruction or gangrene  I went over the CT findings with him showing that he does have a hernia and how big it was.  There is a loop of colon stuck in it.  I told him since he is symptomatic, it would be reasonable to repair this.  I told him with the multiple procedures and the size of this hernia, I would be most comfortable doing this open.  I explained the surgery in detail including risks, benefits, alternatives, and what to expect postoperatively.  He understands and is agreeable to go ahead.     Plan: Open incisional hernia repair possible component separation           History of Present Illness     Marcellus Fregoso is a 68 y.o. male who presents for follow-up status post CT scan to evaluate hernia further.  He still notices some discomfort at the hernia site.  No change in bowel or bladder habits however.        Review of Systems   Constitutional:  Negative for chills, fatigue and fever.   HENT:  Negative for congestion, ear pain, sneezing, trouble swallowing and voice change.    Eyes:  Negative for pain and discharge.   Respiratory:  Negative for cough, shortness of breath and wheezing.    Cardiovascular:  Negative for palpitations and leg swelling.   Gastrointestinal:  Negative for abdominal pain, constipation, diarrhea, nausea and vomiting.   Endocrine: Negative for cold intolerance, heat intolerance and polyuria.   Genitourinary:  Negative for decreased urine volume, difficulty urinating and dysuria.   Musculoskeletal:  Negative for arthralgias and back pain.   Skin:  Negative for rash and wound.   Allergic/Immunologic: Negative for environmental allergies and food allergies.   Neurological:  Positive for speech difficulty. Negative for dizziness and weakness.        Tremors   Hematological:  Negative for adenopathy. Does not bruise/bleed easily.   Psychiatric/Behavioral:  Negative for confusion and sleep disturbance. The patient is not nervous/anxious.    All  other systems reviewed and are negative.     Past Medical History  Medical History        Past Medical History:   Diagnosis Date    Acute blood loss anemia 10/13/2016    Anxiety      Arthritis       knees    Arthritis      Asthma       stable for > 10 years    [place] esophagus       with BARRX/RFA    Cancer (HCC)       prostate    Cataract      Depression      Diabetes (HCC)      Diabetes mellitus (HCC)       pre diabetes    Diverticulitis of colon      Environmental allergies      Fall 12/30/2020    GERD (gastroesophageal reflux disease)      Hiatal hernia      History of anal fissures      Hyperlipidemia      Hypertension      Incisional hernia      Insomnia      Kidney stone      Malignant neoplasm of prostate (HCC)      Morbid obesity (HCC)      Peripheral neuropathy      PONV (postoperative nausea and vomiting)      Prolapsing mitral valve       prolapsing mitral valve leaflet syndrome    Prostate cancer (HCC)      Retinal detachment       treated surgically at [facility]; resolved: 10/10/2012    Sleep apnea       diagnosed but unable to tolerate cpap.     Stuttering           Surgical History         Past Surgical History:   Procedure Laterality Date    ARTHROSCOPIC REPAIR ACL Right      BIOPSY CORE NEEDLE         prostate    CATARACT EXTRACTION Bilateral      COLONOSCOPY        HERNIA REPAIR         naval    JOINT REPLACEMENT         B/L knee    KNEE ARTHROSCOPY Left      LAPAROSCOPIC GASTRIC BANDING        TX ARTHRP KNE CONDYLE&PLATU MEDIAL&LAT COMPARTMENTS Left 02/15/2017     Procedure: TOTAL KNEE ARTHROPLASTY ;  Surgeon: Sal Ross MD;  [facility]: [facility];  Service: Orthopedics    TX ARTHRP KNE CONDYLE&PLATU MEDIAL&LAT COMPARTMENTS Right 10/10/2016     Procedure: ARTHROPLASTY KNEE TOTAL;  Surgeon: Sal Ross MD;  Location: [facility];  Service: Orthopedics    PROSTATECTOMY         robotic-assisted; TEODORO-Dr. Tay    REMOVAL GASTRIC BAND LAPAROSCOPIC N/A 8/30/2022     Procedure: LAPAROSCOPIC  REMOVAL OF ADJUSTABLE GASTRIC BAND AND PORT  WITH INTRAOPERATIVE EGD;  Surgeon: Nela Yang MD;  Location: [facility];  Service: Bariatrics    RETINAL DETACHMENT SURGERY Bilateral       Lemus Eye    SEPTOPLASTY        SINUS SURGERY        TONSILLECTOMY         over age 10    UPPER GASTROINTESTINAL ENDOSCOPY         mutiple with BARRX/RFA    VASECTOMY         vas deferens         Family History         Family History   Problem Relation Age of Onset    Heart disease Father      Coronary artery disease Father      Prostate cancer Father      Coronary artery disease Mother      Diabetes Mother           mellitus    Diabetes type II Mother      Diabetes Maternal Grandmother           mellitus    Coronary artery disease Maternal Grandfather      Coronary artery disease Paternal Grandfather           Medications Ordered Prior to Encounter          Current Outpatient Medications on File Prior to Visit   Medication Sig Dispense Refill    ALPRAZolam (XANAX) 0.25 mg tablet Take 1 tablet (0.25 mg total) by mouth 3 (three) times a day as needed for anxiety 270 tablet 0    amLODIPine (NORVASC) 5 mg tablet Take 1 tablet (5 mg total) by mouth daily 90 tablet 1    Blood Glucose Monitoring Suppl (OneTouch Verio) w/Device KIT Use daily 1 kit 0    carbidopa-levodopa (SINEMET)  mg per tablet Take 1 tablet by mouth Three times a day        ezetimibe (ZETIA) 10 mg tablet Take 1 tablet (10 mg total) by mouth daily (Patient taking differently: Take 5 mg by mouth daily) 90 tablet 3    famotidine (PEPCID) 40 MG tablet Take 1 tablet (40 mg total) by mouth daily at bedtime 30 tablet 3    FLUoxetine (PROzac) 20 mg capsule Take 40 mg by mouth daily Alternated between 20 mg and 40 mg daily        Lancet Devices (ONE TOUCH DELICA LANCING DEV) MISC Use daily 1 each 0    losartan (COZAAR) 100 MG tablet Take 1 tablet (100 mg total) by mouth daily 90 tablet 3    metFORMIN (GLUCOPHAGE) 500 mg tablet TAKE 2 TABLETS TWICE A DAY WITH MEALS 360  "tablet 3    metoprolol succinate (TOPROL-XL) 25 mg 24 hr tablet Take 1 tablet (25 mg total) by mouth daily (Patient taking differently: Take 50 mg by mouth daily) 90 tablet 3    Multiple Vitamins-Minerals (CENTRUM ADULTS PO) Take by mouth in the morning        Nourianz tablet 1 tablet daily        OneTouch Delica Lancets 33G MISC Use daily 300 each 1    OneTouch Verio test strip USE TO TEST DAILY 100 strip 3    pantoprazole (PROTONIX) 40 mg tablet TAKE 1 TABLET TWICE A DAY (1 TABLET IN THE MORNING AND 1 TABLET IN THE EVENING) 180 tablet 1    QUEtiapine (SEROquel) 50 mg tablet Take 25 mg by mouth daily at bedtime        rosuvastatin (CRESTOR) 5 mg tablet TAKE 1 TABLET ONCE A WEEK 12 tablet 1    selegiline (ELDEPRYL) 5 mg capsule Take 5 mg by mouth as needed (Patient not taking: Reported on 8/7/2024)        semaglutide, 2 mg/dose, (Ozempic, 2 MG/DOSE,) 8 mg/ mL injection pen INJECT 2 MG UNDER THE SKIN EVERY 7 DAYS 9 mL 1    traZODone (DESYREL) 50 mg tablet 1/2-1 tab at bedtime        trospium chloride (SANCTURA) 20 mg tablet Take 20 mg by mouth 2 (two) times a day          No current facility-administered medications on file prior to visit.         Allergies         Allergies   Allergen Reactions    Celebrex [Celecoxib] Other (See Comments)       Cardiologist stated he should not take       Chlorhexidine Hives       Is able to wash with hibiclens but not use michi cloths    Other         \"steroid eyedrops\"       Prednisone Other (See Comments)       Prednisone eye drops- eye pressure increases               Objective[]Expand by Default     There were no vitals taken for this visit.     Physical Exam  Constitutional:       General: He is not in acute distress.     Appearance: He is well-developed. He is not diaphoretic.   HENT:      Head: Normocephalic and atraumatic.      Right Ear: External ear normal.      Left Ear: External ear normal.   Eyes:      General: No scleral icterus.     Conjunctiva/sclera: Conjunctivae " "normal.   Neck:      Thyroid: No thyromegaly.      Trachea: No tracheal deviation.   Cardiovascular:      Rate and Rhythm: Normal rate and regular rhythm.      Heart sounds: Normal heart sounds. No murmur heard.  Pulmonary:      Effort: Pulmonary effort is normal. No respiratory distress.      Breath sounds: Normal breath sounds. No wheezing or rales.   Abdominal:      General: There is no distension.      Palpations: Abdomen is soft. There is no mass.      Tenderness: There is no abdominal tenderness. There is no guarding or rebound.   Musculoskeletal:         General: Normal range of motion.      Cervical back: Normal range of motion and neck supple.   Lymphadenopathy:      Cervical: No cervical adenopathy.   Skin:     General: Skin is warm and dry.   Neurological:      Mental Status: He is alert and oriented to person, place, and time.      Comments: Multiple involuntary movements   Psychiatric:         Behavior: Behavior normal.         Thought Content: Thought content normal.         Judgment: Judgment normal.       BP (!) 161/102   Pulse 75   Temp 98 °F (36.7 °C) (Temporal)   Resp 20   Ht 5' 9\" (1.753 m)   Wt 132 kg (292 lb)   SpO2 94%   BMI 43.12 kg/m²     "

## 2025-03-20 NOTE — OP NOTE
OPERATIVE REPORT  PATIENT NAME: Marcellus Fregoso    :  1955  MRN: [id number]  Pt Location: [facility] RO[facility] 03    SURGERY DATE: 3/20/2025    Surgeons and Role:     * Kenneth Mcfarland MD - Primary     * Nelson Chanel DO - Assisting    Preop Diagnosis:  Incisional hernia [K43.2]    Post-Op Diagnosis Codes:     * Incisional hernia [K43.2]    Procedure(s):  OPEN. REPAIR INCISION ABDOMINAL HERNIA WITH MESH    Specimen(s):  * No specimens in log *    Estimated Blood Loss:   Minimal    Drains:  * No LDAs found *    Anesthesia Type:   General    Operative Indications:  Incisional hernia [K43.2]      Operative Findings:  Incisional hernia containing transverse colon and omentum  Prior to opening the fascia, or reducing the contents of the hernia, the hernia defect was measured. The defect measured 6 cm by 6 cm. This was repaired as described in the body of the report below.    Hernia repaired with [id number] ventralight sublay mesh      Complications:   None    Procedure and Technique:  The patient was met and identified in the preop holding area by name and patient identification bracelet. He was brought to the OR where he was placed supine. General anesthesia was induced. The abdomen was prepped and draped in usual sterile fashion. Preoperative abx were administered. A time out was called and all parties were in agreement.    We began by making a vertical incision over his previous incision. It was taken down through the subcutaneous tissues with electrocautery. We encountered the hernia sac and it was circumferentially dissected down to the level of fascia with a combination of blunt and bovie dissection. The hernia sac was entered and it contained omentum and transverse colon. Adhesions from the omentum and colon were lysed from the sac and the anterior abdominal wall. Redundant sac was transected and contents were reduced into the abdomen. The hernia defect measured 6x6cm. We placed an [id number] ventralight sublay  mesh. This was placed to ensure it was flat and in the middle of our defect. The defect was then closed in a horizontal fashion using 0 Ethibond suture. A small piece of mesh was taken with each bite. The wound was irrigated and hemostasis was excellent. We then placed 2-0 deep dermal vicryl sutures. Skin was closed with a running 4-0 monocryl subcuticular suture and skin glue. Instrument, needle, and sponge counts were correct and confirmed. Patient was extubated and brought to PACU in stable condition.    Dr. Mcfarland was present for the entire procedure.         Patient Disposition:  PACU              SIGNATURE: Nelson Chanel DO  DATE: March 20, 2025  TIME: 4:13 PM

## 2025-03-20 NOTE — ANESTHESIA POSTPROCEDURE EVALUATION
Post-Op Assessment Note    CV Status:  Stable  Pain Score: 3    Pain management: adequate       Mental Status:  Alert and awake   Hydration Status:  Euvolemic   PONV Controlled:  Controlled   Airway Patency:  Patent     Post Op Vitals Reviewed: Yes    No anethesia notable event occurred.    Staff: CRNA           Last Filed PACU Vitals:  Vitals Value Taken Time   Temp 97    Pulse 83 03/20/25 1529   /65 03/20/25 1530   Resp 22 03/20/25 1529   SpO2 93 % 03/20/25 1529   Vitals shown include unfiled device data.

## 2025-03-20 NOTE — DISCHARGE INSTR - AVS FIRST PAGE
After Surgery Instructions    ???When you return home, you may eat whatever you feel comfortable eating. It is common to not have much of an appetite. Do not force yourself to eat. Your appetite will usually return in 1 or 2 weeks. Some foods may still not agree with you, but this will usually pass in 4 to 6 weeks.  ???Resume all of your regular medications, including blood thinners and aspirin, after going home.  ???The day after surgery you may remove the dressings. Leave any skin tapes on the incisions in place. A dressing is then optional.  ???You may shower the day after surgery. Plain soap and water is fine. Special soaps, ointments, alcohol or peroxide is not necessary. No swimming in pools for 5 days, and do not go in the ocean until seen in the office.  ???You may become constipated, especially if taking pain medications, for the first 3 or 4 days. You may take any over the counter laxative. Chun Milk of Magnesia is good to start.  ???You will be given a prescription for pain medication. Take it as needed only. You may also take any over the counter medication for more mild pain.  ???Immediately after surgery, you may ride in a car, climb steps and walk as tolerated. When you are pain free, it is Ok to drive. Be aware that you will tire out very easily for the first few days.  ???Do not lift anything over 15 pounds for one week. Otherwise, there are not specific limitations to your activity and you may resume normal activity as tolerated.     Contact [facility] at [phone] if any of the following occur:    ???A fever over 101° that does not respond to Tylenol. A low grade fever is not unusual after surgery and is not necessarily a sign of infection.  ???Increasing abdominal pain. Some pain after surgery is normal. Pain that was improving but has now gotten increasingly worse may not be normal and should be reported  ???Persistent nausea and vomiting.      IF YOU HAVE ANY QUESTIONS  OR CONCERNS PLEASE FEEL FREE TO CONTACT US AT ANY TIME.

## 2025-03-21 VITALS
RESPIRATION RATE: 14 BRPM | TEMPERATURE: 98.8 F | BODY MASS INDEX: 43.25 KG/M2 | DIASTOLIC BLOOD PRESSURE: 70 MMHG | HEIGHT: 69 IN | WEIGHT: 292 LBS | SYSTOLIC BLOOD PRESSURE: 144 MMHG | HEART RATE: 77 BPM | OXYGEN SATURATION: 91 %

## 2025-03-21 LAB
GLUCOSE SERPL-MCNC: 142 MG/DL (ref 65–140)
GLUCOSE SERPL-MCNC: 187 MG/DL (ref 65–140)
GLUCOSE SERPL-MCNC: 261 MG/DL (ref 65–140)

## 2025-03-21 PROCEDURE — 97530 THERAPEUTIC ACTIVITIES: CPT

## 2025-03-21 PROCEDURE — 97535 SELF CARE MNGMENT TRAINING: CPT

## 2025-03-21 PROCEDURE — 99238 HOSP IP/OBS DSCHRG MGMT 30/<: CPT

## 2025-03-21 PROCEDURE — 82948 REAGENT STRIP/BLOOD GLUCOSE: CPT

## 2025-03-21 PROCEDURE — 99024 POSTOP FOLLOW-UP VISIT: CPT | Performed by: STUDENT IN AN ORGANIZED HEALTH CARE EDUCATION/TRAINING PROGRAM

## 2025-03-21 PROCEDURE — 94760 N-INVAS EAR/PLS OXIMETRY 1: CPT

## 2025-03-21 PROCEDURE — 97163 PT EVAL HIGH COMPLEX 45 MIN: CPT

## 2025-03-21 PROCEDURE — 97167 OT EVAL HIGH COMPLEX 60 MIN: CPT

## 2025-03-21 PROCEDURE — 97116 GAIT TRAINING THERAPY: CPT

## 2025-03-21 RX ORDER — HYDROMORPHONE HCL IN WATER/PF 6 MG/30 ML
0.2 PATIENT CONTROLLED ANALGESIA SYRINGE INTRAVENOUS
Status: DISCONTINUED | OUTPATIENT
Start: 2025-03-21 | End: 2025-03-21 | Stop reason: HOSPADM

## 2025-03-21 RX ORDER — HYDROMORPHONE HCL IN WATER/PF 6 MG/30 ML
0.2 PATIENT CONTROLLED ANALGESIA SYRINGE INTRAVENOUS EVERY 4 HOURS PRN
Status: DISCONTINUED | OUTPATIENT
Start: 2025-03-21 | End: 2025-03-21 | Stop reason: HOSPADM

## 2025-03-21 RX ORDER — HYDROMORPHONE HCL/PF 1 MG/ML
0.2 SYRINGE (ML) INJECTION EVERY 4 HOURS PRN
Status: DISCONTINUED | OUTPATIENT
Start: 2025-03-21 | End: 2025-03-21

## 2025-03-21 RX ORDER — ALBUTEROL SULFATE 90 UG/1
2 INHALANT RESPIRATORY (INHALATION) EVERY 4 HOURS PRN
Status: DISCONTINUED | OUTPATIENT
Start: 2025-03-21 | End: 2025-03-21 | Stop reason: HOSPADM

## 2025-03-21 RX ORDER — INSULIN LISPRO 100 [IU]/ML
2-12 INJECTION, SOLUTION INTRAVENOUS; SUBCUTANEOUS
Status: DISCONTINUED | OUTPATIENT
Start: 2025-03-21 | End: 2025-03-21 | Stop reason: HOSPADM

## 2025-03-21 RX ADMIN — OXYBUTYNIN CHLORIDE 5 MG: 5 TABLET ORAL at 08:34

## 2025-03-21 RX ADMIN — CARBIDOPA AND LEVODOPA 1 TABLET: 25; 100 TABLET ORAL at 17:15

## 2025-03-21 RX ADMIN — HYDROMORPHONE HYDROCHLORIDE 0.2 MG: 1 INJECTION, SOLUTION INTRAMUSCULAR; INTRAVENOUS; SUBCUTANEOUS at 01:27

## 2025-03-21 RX ADMIN — PANTOPRAZOLE SODIUM 40 MG: 40 TABLET, DELAYED RELEASE ORAL at 04:53

## 2025-03-21 RX ADMIN — CARBIDOPA AND LEVODOPA 1 TABLET: 25; 100 TABLET ORAL at 08:39

## 2025-03-21 RX ADMIN — Medication 2.5 MG: at 17:15

## 2025-03-21 RX ADMIN — ENOXAPARIN SODIUM 40 MG: 40 INJECTION SUBCUTANEOUS at 08:39

## 2025-03-21 RX ADMIN — OXYBUTYNIN CHLORIDE 5 MG: 5 TABLET ORAL at 17:15

## 2025-03-21 RX ADMIN — AMLODIPINE BESYLATE 5 MG: 5 TABLET ORAL at 08:33

## 2025-03-21 RX ADMIN — FLUOXETINE HYDROCHLORIDE 40 MG: 20 CAPSULE ORAL at 08:34

## 2025-03-21 RX ADMIN — Medication 2.5 MG: at 04:54

## 2025-03-21 RX ADMIN — INSULIN LISPRO 6 UNITS: 100 INJECTION, SOLUTION INTRAVENOUS; SUBCUTANEOUS at 10:49

## 2025-03-21 RX ADMIN — INSULIN LISPRO 2 UNITS: 100 INJECTION, SOLUTION INTRAVENOUS; SUBCUTANEOUS at 08:35

## 2025-03-21 RX ADMIN — METOPROLOL SUCCINATE 25 MG: 25 TABLET, EXTENDED RELEASE ORAL at 08:34

## 2025-03-21 NOTE — PLAN OF CARE
Problem: PHYSICAL THERAPY ADULT  Goal: Performs mobility at highest level of function for planned discharge setting.  See evaluation for individualized goals.  Description: Treatment/Interventions: ADL retraining, LE strengthening/ROM, Functional transfer training, Elevations, Therapeutic exercise, Endurance training, Patient/family training, Equipment eval/education, Bed mobility, Gait training, Spoke to nursing, Spoke to case management, OT  Equipment Recommended: Walker (pt reports owning)       See flowsheet documentation for full assessment, interventions and recommendations.  3/21/2025 1429 by Lesia Pan PT  Note: Prognosis: Good  Problem List: Decreased strength, Decreased endurance, Impaired balance, Decreased mobility, Impaired judgement, Decreased safety awareness, Pain  Assessment: Pt is a 69 y.o. male seen for PT evaluation s/p admit to [facility] on 3/20/2025. Pt was admitted with a primary dx of: ventral hernia s/p open ventral hernia repair.  PT now consulted for assessment of mobility and d/c needs. Pt with OOB to chair orders. Pt  has a past medical history of Acute blood loss anemia (10/13/2016), Ambulates with cane, Anxiety, Arthritis, Arthritis, Asthma, Molina esophagus, Cancer (HCC), Cataract, Depression, Diabetes (HCC), Diabetes mellitus (HCC), Diverticulitis of colon, Environmental allergies, Fall (12/30/2020), GERD (gastroesophageal reflux disease), Hiatal hernia, History of anal fissures, History of transfusion (2010), Hyperlipidemia, Hypertension, Incisional hernia, Insomnia, Kidney stone, Malignant neoplasm of prostate (HCC), Morbid obesity (HCC), Parkinson disease (HCC), Peripheral neuropathy, PONV (postoperative nausea and vomiting), Prolapsing mitral valve, Prostate cancer (HCC), Retinal detachment, Sleep apnea, Stuttering, and Uses roller walker. Pts current clinical presentation is Unstable/ Unpredictable (high complexity) due to Ongoing medical management for primary  dx, Increased reliance on more restrictive AD compared to baseline, Decreased activity tolerance compared to baseline, Fall risk, Increased assistance needed from caregiver at current time, Cog status, Trending lab values, Continuous pulse oximetry monitoring , s/p surgical intervention. Prior to admission, pt was residing with spouse in 2  with 3 FREDIS and was mod I using SPC. Upon evaluation, pt currently is requiring S level for bed mobility; S level for transfers; min A for ambulation w/ SPC. Pt presents at PT eval functioning below baseline and currently w/ overall mobility deficits 2* to: BLE weakness, impaired balance, decreased endurance, gait deviations, pain, decreased activity tolerance compared to baseline, decreased functional mobility tolerance compared to baseline, fall risk. Pt currently at a fall risk 2* to impairments listed above.  Pt will continue to benefit from skilled acute PT interventions to address stated impairments; to maximize functional mobility; for ongoing pt/ family training; and DME needs. At conclusion of PT session pt returned back in chair and chair alarm engaged with phone and call bell within reach. Pt denies any further questions at this time. The patient's AM-PAC Basic Mobility Inpatient Short Form Raw Score is 18. A Raw score of greater than 16 suggests the patient may benefit from discharge to home. Please also refer to the recommendation of the Physical Therapist for safe discharge planning. PT to continue to follow pt t/o hospital stay, recommend level 3 resource intensity upon hospital D/C.  Barriers to Discharge: Decreased caregiver support, Inaccessible home environment     Rehab Resource Intensity Level, PT: III (Minimum Resource Intensity) (pending continued progress)    See flowsheet documentation for full assessment.

## 2025-03-21 NOTE — RESPIRATORY THERAPY NOTE
"RT Protocol Note  Marcellus Fregoso 69 y.o. male MRN: [id number]  Unit/Bed#: [facility] 927-01 Encounter: [id number]    Assessment    Active Problems:  There are no active Hospital Problems.      Home Pulmonary Medications:  Albuterol mdi/udn prn       Past Medical History:   Diagnosis Date    Acute blood loss anemia 10/13/2016    Ambulates with cane     Anxiety     Arthritis     knees    Arthritis     Asthma     stable for > 10 years    Molina esophagus     with BARRX/RFA    Cancer (HCC)     prostate    Cataract     Depression     Diabetes (HCC)     Diabetes mellitus (HCC)     pre diabetes    Diverticulitis of colon     Environmental allergies     Fall 12/30/2020    \" falls a couple times a month\"    GERD (gastroesophageal reflux disease)     Hiatal hernia     History of anal fissures     History of transfusion 2010    Hyperlipidemia     Hypertension     Incisional hernia     Insomnia     Kidney stone     Malignant neoplasm of prostate (HCC)     Morbid obesity (HCC)     Parkinson disease (HCC)     Peripheral neuropathy     PONV (postoperative nausea and vomiting)     nausea one time    Prolapsing mitral valve     prolapsing mitral valve leaflet syndrome    Prostate cancer (HCC)     Retinal detachment     treated surgically at [facility]; resolved: 10/10/2012    Sleep apnea     diagnosed but unable to tolerate cpap.     Stuttering     Uses roller walker      Social History     Socioeconomic History    Marital status: /Civil Union     Spouse name: None    Number of children: 0    Years of education: None    Highest education level: None   Occupational History    Occupation:    Tobacco Use    Smoking status: Never     Passive exposure: Never    Smokeless tobacco: Never    Tobacco comments:     quit 38 years ago   Vaping Use    Vaping status: Never Used   Substance and Sexual Activity    Alcohol use: Not Currently     Comment: 1 case of beer per year    Drug use: Never    Sexual activity: Yes     Partners: " Female     Birth control/protection: Inserts, Post-menopausal, Male Sterilization   Other Topics Concern    None   Social History Narrative    ** Merged History Encounter **          Social Drivers of Health     Financial Resource Strain: Low Risk  (5/27/2021)    Overall Financial Resource Strain (CARDIA)     Difficulty of Paying Living Expenses: Not hard at all   Food Insecurity: No Food Insecurity (12/16/2024)    Hunger Vital Sign     Worried About Running Out of Food in the Last Year: Never true     Ran Out of Food in the Last Year: Never true   Transportation Needs: No Transportation Needs (12/16/2024)    PRAPARE - Transportation     Lack of Transportation (Medical): No     Lack of Transportation (Non-Medical): No   Physical Activity: Insufficiently Active (11/7/2019)    Exercise Vital Sign     Days of Exercise per Week: 1 day     Minutes of Exercise per Session: 50 min   Stress: No Stress Concern Present (11/7/2019)    [employer] - Occupational Stress Questionnaire     Feeling of Stress : Not at all   Social Connections: Moderately Isolated (11/7/2019)    Social Connection and Isolation Panel [NHANES]     Frequency of Communication with Friends and Family: Three times a week     Frequency of Social Gatherings with Friends and Family: More than three times a week     Attends Episcopal Services: Never     Active Member of Clubs or Organizations: No     Attends Club or Organization Meetings: Never     Marital Status:    Intimate Partner Violence: Not At Risk (12/16/2024)    Humiliation, Afraid, Rape, and Kick questionnaire     Fear of Current or Ex-Partner: No     Emotionally Abused: No     Physically Abused: No     Sexually Abused: No   Housing Stability: Low Risk  (12/16/2024)    Housing Stability Vital Sign     Unable to Pay for Housing in the Last Year: No     Number of Times Moved in the Last Year: 0     Homeless in the Last Year: No       Subjective      "    Objective    Physical Exam:   Assessment Type: Assess only  General Appearance: Awake, Alert  Respiratory Pattern: Normal  Chest Assessment: Chest expansion symmetrical  Bilateral Breath Sounds: Diminished, Clear  Cough: None  O2 Device: RA    Vitals:  Blood pressure (!) 174/87, pulse 89, temperature 99.4 °F (37.4 °C), resp. rate 14, height 5' 9\" (1.753 m), weight 132 kg (292 lb), SpO2 92%.          Imaging and other studies: Results Review Statement: No pertinent imaging studies reviewed.    O2 Device: RA     Plan    Respiratory Plan: Home Bronchodilator Patient pathway, Discontinue Protocol        Resp Comments: (P) Pt. evaluated at bedside per Respiratory protocol.  Pt. admitted S/P Abdominal Hernia repair.  Pt's breath sounds are diminished but clear bilaterally and he is in no distress at this time.  No CXR done this admission.  Pt. does take Albuterol in mdi and udn form prn.  Will order Albuterol mdi prn for sob/wheezing and D/C Respiratory protocol at this time.   "

## 2025-03-21 NOTE — QUICK NOTE
"Post-op check - General Surgery  Marcellus Frgeoso 69 y.o. male MRN: [id number]  Unit/Bed#: [facility] 927-01 Encounter: [id number]    Assessment:  69 y.o. male now * Day of Surgery * s/p Procedure(s) (LRB):  OPEN, REPAIR INCISION ABDOMINAL HERNIA WITH MESH (N/A)    Plan:  -Discharge Diet  Diet Regular; Regular House  -Maintain abdominal binder  -Multimodal pain regimen  -Encourage incentive spirometer use, 10 times per hour  -No medications restarted  -Anticipate discharge within 24 hours    Subjective/Objective     Subjective: Patient seen and examined at bedside.  Patient expressed no acute concerns.  Pain is controlled.  Patient tolerated diet postoperatively and denies nausea vomiting fevers chills or shortness of breath on room air.      Objective:   Vitals: Blood pressure (!) 174/87, pulse 89, temperature 99.4 °F (37.4 °C), resp. rate 14, height 5' 9\" (1.753 m), weight 132 kg (292 lb), SpO2 92%.,Body mass index is 43.12 kg/m².    I/O         03/19 0701  03/20 0700 03/20 0701  03/21 0700    I.V. (mL/kg)  1500 (11.4)    Total Intake(mL/kg)  1500 (11.4)    Urine (mL/kg/hr)  575    Total Output  575    Net  +925                  Physical Exam:  General: No acute distress, resting comfortably  Neuro: alert and oriented  HEENT: moist mucous membranes  CV: Well perfused, regular rate  Lungs: Normal work of breathing, no increased respiratory effort on room air  Abdomen: Surgical incision clean dry and intact with skin glue  Extremities: No edema, clubbing or cyanosis  Skin: Warm, dry      Lab, Imaging and other studies: I have personally reviewed pertinent reports.    VTE Pharmacologic Prophylaxis: VTE covered by:  [START ON 3/21/2025] enoxaparin, Subcutaneous     VTE Mechanical Prophylaxis: sequential compression device    "

## 2025-03-21 NOTE — PHYSICAL THERAPY NOTE
Physical Therapy Evaluation     Patient's Name: Marcellus Fregoso    Admitting Diagnosis  Incisional hernia [K43.2]    Problem List  Patient Active Problem List   Diagnosis    Status post total left knee replacement    Childhood onset fluency disorder    Sleep apnea    Ambulatory dysfunction    Benign essential hypertension    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, without long-term current use of insulin ([facility])    Insomnia    Depression with anxiety    Onychomycosis    Molina's esophagus with dysplasia    Dysphagia    Exertional dyspnea    Gastroparesis    Ventral hernia without obstruction or gangrene    Gastroesophageal reflux disease with esophagitis without hemorrhage    Tremor    Neuropathy    Pure hypercholesterolemia    Dermatitis    Obesity, Class III, BMI 40-49.9 (morbid obesity) ([facility])    Hx of laparoscopic adjustable gastric banding    History of total knee arthroplasty, right    Syncope    Urinary urgency    Generalized weakness    Class 3 severe obesity due to excess calories with serious comorbidity and body mass index (BMI) of 40.0 to 44.9 in adult ([facility])    CAD (coronary artery disease)    Carpal tunnel syndrome of right wrist    Family history of Parkinson disease    Parkinson disease ([facility])    Hyperlipidemia    Chronic bilateral low back pain without sciatica    PVC (premature ventricular contraction)    Chest pain syndrome    Severe episode of recurrent major depressive disorder, without psychotic features ([facility])    AMY (generalized anxiety disorder)       Past Medical History  Past Medical History:   Diagnosis Date    Acute blood loss anemia 10/13/2016    Ambulates with cane     Anxiety     Arthritis     knees    Arthritis     Asthma     stable for > 10 years    Molina esophagus     with BARRX/RFA    Cancer ([facility])     prostate    Cataract     Depression     Diabetes ([facility])     Diabetes mellitus ([facility])     pre diabetes    Diverticulitis of colon     Environmental allergies   "   Fall 12/30/2020    \" falls a couple times a month\"    GERD (gastroesophageal reflux disease)     Hiatal hernia     History of anal fissures     History of transfusion 2010    Hyperlipidemia     Hypertension     Incisional hernia     Insomnia     Kidney stone     Malignant neoplasm of prostate (HCC)     Morbid obesity (HCC)     Parkinson disease (HCC)     Peripheral neuropathy     PONV (postoperative nausea and vomiting)     nausea one time    Prolapsing mitral valve     prolapsing mitral valve leaflet syndrome    Prostate cancer (HCC)     Retinal detachment     treated surgically at [facility]; resolved: 10/10/2012    Sleep apnea     diagnosed but unable to tolerate cpap.     Stuttering     Uses roller walker        Past Surgical History  Past Surgical History:   Procedure Laterality Date    ARTHROSCOPIC REPAIR ACL Right     BIOPSY CORE NEEDLE      prostate    CATARACT EXTRACTION Bilateral     COLONOSCOPY      HERNIA REPAIR      umbilical    KNEE ARTHROSCOPY Left     LAPAROSCOPIC GASTRIC BANDING      IL ARTHRP KNE CONDYLE&PLATU MEDIAL&LAT COMPARTMENTS Left 02/15/2017    Procedure: TOTAL KNEE ARTHROPLASTY ;  Surgeon: Sal Ross MD;  [facility]: [facility];  Service: Orthopedics    IL ARTHRP KNE CONDYLE&PLATU MEDIAL&LAT COMPARTMENTS Right 10/10/2016    Procedure: ARTHROPLASTY KNEE TOTAL;  Surgeon: Sal Ross MD;  Location: [facility];  Service: Orthopedics    IL RPR AA HERNIA 1ST 3-10 CM REDUCIBLE N/A 3/20/2025    Procedure: OPEN, REPAIR INCISION ABDOMINAL HERNIA WITH MESH;  Surgeon: Kenneth Mcfarland MD;  Location: [facility];  Service: General    PROSTATECTOMY      robotic-assisted; [facility]-Dr. Tay    REMOVAL GASTRIC BAND LAPAROSCOPIC N/A 08/30/2022    Procedure: LAPAROSCOPIC REMOVAL OF ADJUSTABLE GASTRIC BAND AND PORT  WITH INTRAOPERATIVE EGD;  Surgeon: Neal Yang MD;  Location: [facility];  Service: Bariatrics    RETINAL DETACHMENT SURGERY Bilateral     Lemus Eye    SEPTOPLASTY      SINUS SURGERY   "    TONSILLECTOMY      over age 10    UPPER GASTROINTESTINAL ENDOSCOPY      mutiple with BARRX/RFA    VASECTOMY      vas deferens        03/21/25 1035   PT Last Visit   PT Visit Date 03/21/25   Note Type   Note type Evaluation  (+ treatment session)   Additional Comments eval: [id number]; treat: [id number]   Pain Assessment   Pain Assessment Tool 0-10   Pain Score 5   Pain Location/Orientation Location: Abdomen   Patient's Stated Pain Goal No pain   Hospital Pain Intervention(s) Repositioned;Ambulation/increased activity;Emotional support   Restrictions/Precautions   Weight Bearing Precautions Per Order No   Other Precautions Cognitive;Chair Alarm;Bed Alarm;Multiple lines;Fall Risk;Pain  (hx of Parkinson's disease, stuttering speech)   Home Living   Type of Home House   Home Layout Two level;1/2 bath on main level;Bed/bath upstairs  (3 FREDIS)   Bathroom Shower/Tub Walk-in shower   Bathroom Toilet Standard   Bathroom Equipment Shower chair   Home Equipment Walker;Cane  (rollator; uses SPC @ baseline)   Prior Function   Level of [place] Independent with ADLs;Independent with functional mobility   Lives With Spouse   Receives Help From Family;Outpatient therapy   Falls in the last 6 months (S)  5 to 10  (7-8 falls per pt report)   Vocational Retired   Cognition   Attention Attends with cues to redirect   Orientation Level Oriented to person;Oriented to place;Oriented to time   Memory Decreased recall of precautions   Following Commands Follows one step commands with increased time or repetition   Comments pleasant and cooperative, tangential at times. Requires cues for safety and attention t/o session   RLE Assessment   RLE Assessment   (functionally 3+/5)   LLE Assessment   LLE Assessment   (functionally 3+/5)   Bed Mobility   Supine to Sit 5  Supervision   Additional items HOB elevated;Bedrails;Increased time required   Sit to Supine Unable to assess   Additional Comments pt supine in bed upon arrival. Pt left  sitting OOB in recliner with all needs within reach - alarm on post session   Transfers   Sit to Stand 5  Supervision   Additional items Armrests;Increased time required;Verbal cues   Stand to Sit 5  Supervision   Additional items Armrests;Increased time required;Verbal cues   Additional Comments initially with SPC, progresses to RW during treatment session   Ambulation/Elevation   Gait pattern Short stride;Foward flexed;Inconsistent catalino;Decreased foot clearance;Improper Weight shift   Gait Assistance 4  Minimal assist   Additional items Assist x 1;Tactile cues;Verbal cues   Assistive Device SPC;Rolling walker   Distance 2 x 40'   Stair Management Assistance   (please see f/u therapy session below)   Balance   Static Sitting Fair +   Dynamic Sitting Fair   Static Standing Fair -   Dynamic Standing Poor +   Ambulatory Poor +   Endurance Deficit   Endurance Deficit Yes   Activity Tolerance   Activity Tolerance Patient tolerated treatment well   Medical Staff Made Aware MANDEEP sutton; co-session completed this date 2* increased medical complexity and multiple co-morbidities   Nurse Made Aware RN cleared   Assessment   Prognosis Good   Problem List Decreased strength;Decreased endurance;Impaired balance;Decreased mobility;Impaired judgement;Decreased safety awareness;Pain   Assessment add   Barriers to Discharge Decreased caregiver support;Inaccessible home environment   Goals   Patient Goals to get OOB   STG Expiration Date 04/04/25   Short Term Goal #1 STG 1. Pt will be able to perform bed mobility tasks with mod I level in order to improve overall functional mobility and assist in safe d/c. STG 2. Pt with sit EOB for at least 25 minutes at mod I level in order to strengthen abdominal musculature and assist in future transfers/ ambulation. STG 3. Pt will be able to perform functional transfer with mod I level in order to improve overall functional mobility and assist in safe d/c. STG 4. Pt will be able to ambulate at  least 250 feet with least restrictive device with mod I level A in order to improve overall functional mobility and assist in safe d/c. STG 5. Pt will improve sitting/standing static/dynamic balance 1/2 grade in order to improve functional mobility and assist in safe d/c. STG 6. Pt will improve LE strength by 1/2 grade in order to improve functional mobility and assist in safe d/c. STG 7. Pt will be able to negotiate at least 14 stairs with least restrictive device with S level A in order to improve overall functional mobility and assist in safe d/c.   PT Treatment Day 0   Plan   Treatment/Interventions ADL retraining;LE strengthening/ROM;Functional transfer training;Elevations;Therapeutic exercise;Endurance training;Patient/family training;Equipment eval/education;Bed mobility;Gait training;Spoke to nursing;Spoke to case management;OT   PT Frequency 3-5x/wk   Discharge Recommendation   Rehab Resource Intensity Level, PT III (Minimum Resource Intensity)  (pending continued progress + social support availability)   Equipment Recommended Walker  (pt reports owning)   AM-PAC Basic Mobility Inpatient   Turning in Flat Bed Without Bedrails 3   Lying on Back to Sitting on Edge of Flat Bed Without Bedrails 3   Moving Bed to Chair 3   Standing Up From Chair Using Arms 3   Walk in Room 3   Climb 3-5 Stairs With Railing 3   Basic Mobility Inpatient Raw Score 18   Basic Mobility Standardized Score 41.05   [place] Highest Level Of Mobility   -HLM Goal 6: Walk 10 steps or more   JH-HLM Achieved 7: Walk 25 feet or more   Modified Henderson Scale   Modified Henderson Scale 4   Additional Treatment Session   Start Time 1010   End Time 1035   Treatment Assessment Pt seen for PT treatment session this date. Therapy session focused on functional transfers, gait training and stair training in order to improve overall mobility and independence. Pt requires S level for transfers with RW. Ambulates an additional 75' with RW with improved  steadiness compared to SPC- would recommend continued RW use @ this time. Stair training initiated in which pt completes 7 steps with min A with b/l HR use. Would like to continue to practice/ train in upcoming sessions. Pt making steady progress toward goals. Pt was left sitting OOB in recliner with alarm on at the end of PT session with all needs in reach. Pt would benefit from continued PT services while in hospital to address remaining limitations. PT to continue to follow pt and recommends level 3. The patient's AM-PAC Basic Mobility Inpatient Short Form Raw Score is 18. A Raw score of greater than 16 suggests the patient may benefit from discharge to home. Please also refer to the recommendation of the Physical Therapist for safe discharge planning.           Lesia Pan, PT DPT

## 2025-03-21 NOTE — ASSESSMENT & PLAN NOTE
69 year old male s/p open ventral hernia repair, admitted post operatively, clinically improving    Plan  -Diet as tolerated   -Discontinue IVF  -Multimodal pain control  -Encourage IS use, 10 times per hour  -Patient requesting CM eval for VNA  -Discharge home today

## 2025-03-21 NOTE — RESTORATIVE TECHNICIAN NOTE
Restorative Technician Note      Patient Name: Marcellus Fregoso     Restorative Tech Visit Date: 03/21/25  Note Type: Mobility  Patient Position Upon Consult: Bedside chair  Activity Performed: Ambulated  Assistive Device: Roller walker  Patient Position at End of Consult: Bedside chair; All needs within reach    Ajith Malave Restorative Technician

## 2025-03-21 NOTE — PLAN OF CARE
Problem: OCCUPATIONAL THERAPY ADULT  Goal: Performs self-care activities at highest level of function for planned discharge setting.  See evaluation for individualized goals.  Description: Treatment Interventions: ADL retraining, Functional transfer training, Endurance training, Patient/family training, Equipment evaluation/education, Compensatory technique education, Continued evaluation, Energy conservation, Activityengagement          See flowsheet documentation for full assessment, interventions and recommendations.   Note: Limitation: Decreased ADL status, Decreased endurance, Decreased self-care trans, Decreased high-level ADLs     Assessment: Pt is a 69 y.o. male seen for OT evaluation s/p admission to [facility] on 3/20/2025. Pt diagnosed with Ventral hernia without obstruction or gangrene; s/p open ventral hernia repair on 3/20. Pt has a significant PMH impacting occupational performance including: Acute blood loss anemia, Ambulates with cane, Anxiety, Arthritis, Arthritis, Asthma, Molina esophagus, Cancer (HCC), Cataract, Depression, Diabetes (HCC), Diabetes mellitus (HCC), Diverticulitis of colon, Environmental allergies, Fall, GERD (gastroesophageal reflux disease), Hiatal hernia, History of anal fissures, History of transfusion, Hyperlipidemia, Hypertension, Incisional hernia, Insomnia, Kidney stone, Malignant neoplasm of prostate (HCC), Morbid obesity (HCC), Parkinson disease (HCC), Peripheral neuropathy, PONV (postoperative nausea and vomiting), Prolapsing mitral valve, Prostate cancer (HCC), Retinal detachment, Sleep apnea, and Stuttering. Pt with active OT evaluation and treatment orders and activity orders. PTA, pt living with his spouse in a 2 SH w/ 3 FREDIS. Pt reports I w/ ADLs and fxnl mobility w/ SPC at baseline. Pt agreeable and willing to participate in OT evaluation. During evaluation, pt was I for eating, S for grooming and UB ADLs, and min A for LB ADLs and toileting. Pt also  required S for bed mobility and transfers and min Ax1 for fxnl mobility w/ SPC. Pt required increased verbal cues for attention/re-direction to task. Performance deficits that affect the pt’s occupational performance during the initial evaluation include decreased ADL status, decreased activity tolerance, decreased endurance, decreased standing tolerance, decreased standing balance, decreased transfer skills, decreased fxnl mobility, and pain. Based on pt’s functional performance and deficits the following occupations will be addressed in OT treatments in order to maximize pt’s independence and overall occupational performance: grooming, bathing/showering, toileting and toilet hygiene, dressing, and functional mobility. Goals are listed below.  From OT perspective, recommend Level III (Minimum Resource Intensity) upon d/c when pt medically stable to d/c from acute care. Will continue to follow.     Rehab Resource Intensity Level, OT: III (Minimum Resource Intensity)

## 2025-03-21 NOTE — OCCUPATIONAL THERAPY NOTE
"    Occupational Therapy Evaluation and Treatment Note     Patient Name: Marcellus Fregoso  Today's Date: 3/21/2025  Problem List  Principal Problem:    Ventral hernia without obstruction or gangrene    Past Medical History  Past Medical History:   Diagnosis Date    Acute blood loss anemia 10/13/2016    Ambulates with cane     Anxiety     Arthritis     knees    Arthritis     Asthma     stable for > 10 years    Molina esophagus     with BARRX/RFA    Cancer (HCC)     prostate    Cataract     Depression     Diabetes (HCC)     Diabetes mellitus (HCC)     pre diabetes    Diverticulitis of colon     Environmental allergies     Fall 12/30/2020    \" falls a couple times a month\"    GERD (gastroesophageal reflux disease)     Hiatal hernia     History of anal fissures     History of transfusion 2010    Hyperlipidemia     Hypertension     Incisional hernia     Insomnia     Kidney stone     Malignant neoplasm of prostate (HCC)     Morbid obesity (HCC)     Parkinson disease (HCC)     Peripheral neuropathy     PONV (postoperative nausea and vomiting)     nausea one time    Prolapsing mitral valve     prolapsing mitral valve leaflet syndrome    Prostate cancer (HCC)     Retinal detachment     treated surgically at [facility]; resolved: 10/10/2012    Sleep apnea     diagnosed but unable to tolerate cpap.     Stuttering     Uses roller walker      Past Surgical History  Past Surgical History:   Procedure Laterality Date    ARTHROSCOPIC REPAIR ACL Right     BIOPSY CORE NEEDLE      prostate    CATARACT EXTRACTION Bilateral     COLONOSCOPY      HERNIA REPAIR      umbilical    KNEE ARTHROSCOPY Left     LAPAROSCOPIC GASTRIC BANDING      TX ARTHRP KNE CONDYLE&PLATU MEDIAL&LAT COMPARTMENTS Left 02/15/2017    Procedure: TOTAL KNEE ARTHROPLASTY ;  Surgeon: Sal Ross MD;  [facility]: [facility];  Service: Orthopedics    TX ARTHRP KNE CONDYLE&PLATU MEDIAL&LAT COMPARTMENTS Right 10/10/2016    Procedure: ARTHROPLASTY KNEE TOTAL;  Surgeon: " Sal Ross MD;  Location: [facility];  Service: Orthopedics    KY RPR AA HERNIA 1ST 3-10 CM REDUCIBLE N/A 3/20/2025    Procedure: OPEN, REPAIR INCISION ABDOMINAL HERNIA WITH MESH;  Surgeon: Kenneth Mcfarland MD;  Location: [facility];  Service: General    PROSTATECTOMY      robotic-assisted; Lilly Tay    REMOVAL GASTRIC BAND LAPAROSCOPIC N/A 08/30/2022    Procedure: LAPAROSCOPIC REMOVAL OF ADJUSTABLE GASTRIC BAND AND PORT  WITH INTRAOPERATIVE EGD;  Surgeon: Neal Yang MD;  Location: [facility];  Service: Bariatrics    RETINAL DETACHMENT SURGERY Bilateral     Lemus Eye    SEPTOPLASTY      SINUS SURGERY      TONSILLECTOMY      over age 10    UPPER GASTROINTESTINAL ENDOSCOPY      mutiple with BARRX/RFA    VASECTOMY      vas deferens        03/21/25 1034   OT Last Visit   OT Visit Date 03/21/25   Note Type   Note type Evaluation   Pain Assessment   Pain Assessment Tool 0-10   Pain Score 5   Pain Location/Orientation Location: Abdomen   Effect of Pain on Daily Activities limits comfort, activity tolerance, mobility, and I w/ ADLs   Patient's Stated Pain Goal No pain   Hospital Pain Intervention(s) Repositioned;Ambulation/increased activity;Emotional support   Restrictions/Precautions   Weight Bearing Precautions Per Order No   Other Precautions Cognitive;Chair Alarm;Bed Alarm;Multiple lines;Fall Risk;Pain  (hx of Parkinson's disease, stuttering speech)   Home Living   Type of Home House   Home Layout Two level;1/2 bath on main level;Bed/bath upstairs  (3 FREDIS)   Bathroom Shower/Tub Walk-in shower   Bathroom Toilet Standard   Bathroom Equipment Shower chair   Home Equipment Walker;Cane  (uses SPC at baseline)   Prior Function   Level of [place] Independent with ADLs;Independent with functional mobility   Lives With Spouse   Receives Help From Family;Outpatient therapy  (OPPT)   Falls in the last 6 months (S)  >10  (Pt reports 6-7 falls.)   Vocational Retired   Lifestyle   Autonomy Pt reports I w/ ADLs  "and fxnl mobility w/ SPC at baseline.   Reciprocal Relationships Pt lives w/ his spouse, and she is able to assist w/ ADLs and IADLs if needed.   Service to Others Pt is a retired .   Intrinsic Gratification Pt enjoys going to [employer] games.   General   Family/Caregiver Present No   Subjective   Subjective \"I'll go home if nursing and therapy can come to my house over the weekend.\"   ADL   Where Assessed Edge of bed   Eating Assistance 7  Independent   Grooming Assistance 5  Supervision/Setup   UB Bathing Assistance 5  Supervision/Setup   LB Bathing Assistance 4  Minimal Assistance   UB Dressing Assistance 5  Supervision/Setup   LB Dressing Assistance 4  Minimal Assistance   Toileting Assistance  4  Minimal Assistance   Bed Mobility   Supine to Sit 5  Supervision   Additional items HOB elevated;Bedrails;Increased time required   Sit to Supine Unable to assess   Additional Comments Pt seated OOB in chair at end of OT session w/ alarm activated and all needs within reach.   Transfers   Sit to Stand 5  Supervision   Additional items Increased time required;Verbal cues   Stand to Sit 5  Supervision   Additional items Increased time required;Verbal cues   Additional Comments w/ SPC for support   Functional Mobility   Functional Mobility 4  Minimal assistance   Additional Comments Pt ambulated household distance w/ min Ax1 using SPC for support.   Additional items SPC   Balance   Static Sitting Fair +   Dynamic Sitting Fair   Static Standing Fair -   Dynamic Standing Poor +   Ambulatory Poor +   Activity Tolerance   Activity Tolerance Patient tolerated treatment well   Medical Staff Made Aware PTLesia, due to pt's medical complexity and multiple comorbidities   Nurse Made Aware RN clearance prior to session   RUE Assessment   RUE Assessment WFL   LUE Assessment   LUE Assessment WFL   Hand Function   Gross Motor Coordination Functional   Fine Motor Coordination Functional   Cognition   Arousal/Participation " Alert;Responsive;Cooperative   Attention Attends with cues to redirect   Orientation Level Oriented to person;Oriented to place;Oriented to time   Memory Within functional limits   Following Commands Follows one step commands with increased time or repetition   Comments Pt was identified by name and . Pt was pleasant, cooperative, and willing to participate in OT evaluation. Pt tangential at times; retold same stories t/o session. Pt required verbal cues for attention/re-direction to task.   Assessment   Limitation Decreased ADL status;Decreased endurance;Decreased self-care trans;Decreased high-level ADLs   Assessment Pt is a 69 y.o. male seen for OT evaluation s/p admission to [facility] on 3/20[facility]. Pt diagnosed with Ventral hernia without obstruction or gangrene; s/p open ventral hernia repair on 3/20. Pt has a significant PMH impacting occupational performance including: Acute blood loss anemia, Ambulates with cane, Anxiety, Arthritis, Arthritis, Asthma, Molina esophagus, Cancer (HCC), Cataract, Depression, Diabetes (HCC), Diabetes mellitus (HCC), Diverticulitis of colon, Environmental allergies, Fall, GERD (gastroesophageal reflux disease), Hiatal hernia, History of anal fissures, History of transfusion, Hyperlipidemia, Hypertension, Incisional hernia, Insomnia, Kidney stone, Malignant neoplasm of prostate (HCC), Morbid obesity (HCC), Parkinson disease (HCC), Peripheral neuropathy, PONV (postoperative nausea and vomiting), Prolapsing mitral valve, Prostate cancer (HCC), Retinal detachment, Sleep apnea, and Stuttering. Pt with active OT evaluation and treatment orders and activity orders. PTA, pt living with his spouse in a 2 SH w/ 3 FREDIS. Pt reports I w/ ADLs and fxnl mobility w/ SPC at baseline. Pt agreeable and willing to participate in OT evaluation. During evaluation, pt was I for eating, S for grooming and UB ADLs, and min A for LB ADLs and toileting. Pt also required S for bed mobility and  transfers and min Ax1 for fxnl mobility w/ SPC. Pt required increased verbal cues for attention/re-direction to task. Performance deficits that affect the pt’s occupational performance during the initial evaluation include decreased ADL status, decreased activity tolerance, decreased endurance, decreased standing tolerance, decreased standing balance, decreased transfer skills, decreased fxnl mobility, and pain. Based on pt’s functional performance and deficits the following occupations will be addressed in OT treatments in order to maximize pt’s independence and overall occupational performance: grooming, bathing/showering, toileting and toilet hygiene, dressing, and functional mobility. Goals are listed below.  From OT perspective, recommend Level III (Minimum Resource Intensity) upon d/c when pt medically stable to d/c from acute care. Will continue to follow.   Goals   Patient Goals to go home   LTG Time Frame 10-14   Plan   Treatment Interventions ADL retraining;Functional transfer training;Endurance training;Patient/family training;Equipment evaluation/education;Compensatory technique education;Continued evaluation;Energy conservation;Activityengagement   Goal Expiration Date 04/04/25   OT Treatment Day 0   OT Frequency 2-3x/wk   Discharge Recommendation   Rehab Resource Intensity Level, OT III (Minimum Resource Intensity)   AM-PAC Daily Activity Inpatient   Lower Body Dressing 3   Bathing 3   Toileting 3   Upper Body Dressing 3   Grooming 3   Eating 4   Daily Activity Raw Score 19   Daily Activity Standardized Score (Calc for Raw Score >=11) 40.22   AM-PAC Applied Cognition Inpatient   Following a Speech/Presentation 3   Understanding Ordinary Conversation 4   Taking Medications 4   Remembering Where Things Are Placed or Put Away 4   Remembering List of 4-5 Errands 3   Taking Care of Complicated Tasks 3   Applied Cognition Raw Score 21   Applied Cognition Standardized Score 44.3   Additional Treatment Session    Start Time 1014   End Time 1034   Treatment Assessment Pt seen for an immediate, skilled OT treatment session from 1014 to 1034 w/ interventions focusing on ADL participation, activity tolerance, sitting tolerance, sitting balance, standing tolerance, standing balance, transfer skills, and fxnl mobility. Pt was agreeable and willing to participate in session. Pt engaged in the following tasks: min A for LB dressing (to don underwear) and S for toileting using urinal at EOB. Pt also required S for transfer from chair and CCG for fxnl mobility w/ RW (ambulated an additional household distance w/ RW). Pt will benefit from continued OT treatment while in acute care to address deficits as defined above and maximize level of functional independence with ADLs and functional mobility. Pt seated OOB in chair w/ alarm activated and all needs met at end of session.   Additional Treatment Day 1       The patient's raw score on the -PAC Daily Activity Inpatient Short Form is 19. A raw score of greater than or equal to 19 suggests the patient may benefit from discharge to home. Please refer to the recommendation of the Occupational Therapist for safe discharge planning.    Goals:      - Pt will complete UB ADLs w/ mod I to maximize independence and return home.    - Pt will complete LB ADLs w/ S to maximize independence and return home.     - Pt will complete toileting routine (transfers, hygiene, and clothing management) w/ S to maximize independence and return to prior level of function.    - Pt will complete bed mobility supine >< sit w/ mod I to maximize independence and return home.    - Pt will transfer to bed, chair, and toilet w/ mod I using AD / DME as needed to maximize independence and reduce burden of care.     - Pt will ambulate household distances w/ mod I using least restrictive device to maximize independence and return home.     - Pt will increase activity tolerance (and sitting tolerance) by eating all meals  OOB in the chair.     - Pt will increase standing tolerance to 4-5 minutes to maximize independence w/ ADLs and/or leisure activities.      - Pt will tolerate therapeutic activities for greater than 30 minutes in order to increase tolerance for functional activities.     - Pt will participate in ongoing OT evaluation of cognitive skills to assist with safe d/c planning/recommendations.      SHAHLA Heredia, OTR/L

## 2025-03-21 NOTE — PROGRESS NOTES
Progress Note - Surgery-General   Name: Marcellus Fregoso 69 y.o. male I MRN: [id number]  Unit/Bed#: [facility] 927-01 I Date of Admission: 3/20/2025   Date of Service: 3/21/2025 I Hospital Day: 0     Assessment & Plan  Ventral hernia without obstruction or gangrene  69 year old male s/p open ventral hernia repair, admitted post operatively, clinically improving    Plan  -Diet as tolerated   -Discontinue IVF  -Multimodal pain control  -Encourage IS use, 10 times per hour  -Patient requesting CM eval for VNA  -Discharge home today       Subjective   Patient seen and examined at bedside. Patient slept well. Patient tolerated  a diet post op. No nausea or vomiting. Pain controlled.     Objective :  Temp:  [97.8 °F (36.6 °C)-99.4 °F (37.4 °C)] 98.8 °F (37.1 °C)  HR:  [77-99] 77  BP: (129-174)/() 144/70  Resp:  [14-22] 14  SpO2:  [91 %-95 %] 91 %  O2 Device: None (Room air)  Nasal Cannula O2 Flow Rate (L/min):  [2 L/min-4 L/min] 2 L/min    I/O         03/19 0701  03/20 0700 03/20 0701  03/21 0700 03/21 0701  03/22 0700    P.O.   480    I.V. (mL/kg)  1500 (11.4)     Total Intake(mL/kg)  1500 (11.4) 480 (3.6)    Urine (mL/kg/hr)  575 100 (0.1)    Total Output  575 100    Net  +925 +380                   Physical Exam  Vitals and nursing note reviewed.   Constitutional:       General: He is not in acute distress.     Appearance: Normal appearance. He is not ill-appearing or toxic-appearing.   HENT:      Head: Normocephalic.   Eyes:      General: No scleral icterus.  Abdominal:      General: Abdomen is flat.      Palpations: Abdomen is soft.      Tenderness: There is abdominal tenderness. There is no guarding.      Hernia: No hernia is present.      Comments: Incision clean, dry and intact. Abdomen with binder in place   Musculoskeletal:      Right lower leg: No edema.      Left lower leg: No edema.   Skin:     General: Skin is warm.      Capillary Refill: Capillary refill takes less than 2 seconds.   Neurological:      Mental  "Status: He is alert and oriented to person, place, and time.   Psychiatric:         Mood and Affect: Mood normal.         Lab Results: I have reviewed the following results:  No results for input(s): \"WBC\", \"HGB\", \"HCT\", \"PLT\", \"BANDSPCT\", \"SODIUM\", \"K\", \"CL\", \"CO2\", \"BUN\", \"CREATININE\", \"GLUC\", \"CAIONIZED\", \"MG\", \"PHOS\", \"AST\", \"ALT\", \"ALB\", \"TBILI\", \"DBILI\", \"ALKPHOS\", \"PTT\", \"INR\", \"HSTNI0\", \"HSTNI2\", \"BNP\", \"LACTICACID\" in the last 72 hours.    Imaging Results Review: No pertinent imaging studies reviewed.  Other Study Results Review: No additional pertinent studies reviewed.    VTE Pharmacologic Prophylaxis: VTE covered by:  enoxaparin, Subcutaneous, 40 mg at 03/21/25 0867     VTE Mechanical Prophylaxis: sequential compression device  "

## 2025-03-23 DIAGNOSIS — R05.1 ACUTE COUGH: ICD-10-CM

## 2025-03-25 NOTE — ANESTHESIA POSTPROCEDURE EVALUATION
Post-Op Assessment Note    CV Status:  Stable    Pain management: adequate       Mental Status:  Alert and awake   Hydration Status:  Euvolemic   PONV Controlled:  Controlled   Airway Patency:  Patent     Post Op Vitals Reviewed: Yes    No anethesia notable event occurred.    Staff: Anesthesiologist           Last Filed PACU Vitals:  Vitals Value Taken Time   Temp 98.4 °F (36.9 °C) 03/20/25 1529   Pulse 82 03/20/25 1633   /65 03/20/25 1631   Resp 7 03/20/25 1633   SpO2 91 % 03/20/25 1633   Vitals shown include unfiled device data.    Modified Kadi:     Vitals Value Taken Time   Activity 2 03/20/25 1630   Respiration 2 03/20/25 1630   Circulation 2 03/20/25 1630   Consciousness 2 03/20/25 1630   Oxygen Saturation 1 03/20/25 1630     Modified Kadi Score: 9

## 2025-03-27 ENCOUNTER — TELEPHONE (OUTPATIENT)
Age: 70
End: 2025-03-27

## 2025-03-27 NOTE — TELEPHONE ENCOUNTER
Pt called. Had hernia surgery and stopped Ozempic for 2 weeks. Pt states Surgeon approved restarting the medication. Pt would like to know what dose he should be restarting?

## 2025-03-28 ENCOUNTER — CLINICAL SUPPORT (OUTPATIENT)
Dept: FAMILY MEDICINE CLINIC | Facility: CLINIC | Age: 70
End: 2025-03-28
Payer: MEDICARE

## 2025-03-28 DIAGNOSIS — E53.8 VITAMIN B12 DEFICIENCY: Primary | ICD-10-CM

## 2025-03-28 DIAGNOSIS — E53.8 VITAMIN B12 DEFICIENCY: ICD-10-CM

## 2025-03-28 PROCEDURE — 96372 THER/PROPH/DIAG INJ SC/IM: CPT

## 2025-03-28 RX ORDER — CYANOCOBALAMIN 1000 UG/ML
1000 INJECTION, SOLUTION INTRAMUSCULAR; SUBCUTANEOUS
Qty: 10 ML | Refills: 3 | Status: SHIPPED | OUTPATIENT
Start: 2025-03-28

## 2025-03-28 RX ADMIN — CYANOCOBALAMIN 1000 MCG: 1000 INJECTION, SOLUTION INTRAMUSCULAR; SUBCUTANEOUS at 15:35

## 2025-04-02 ENCOUNTER — OFFICE VISIT (OUTPATIENT)
Dept: SURGERY | Facility: CLINIC | Age: 70
End: 2025-04-02

## 2025-04-02 VITALS — WEIGHT: 296.4 LBS | BODY MASS INDEX: 43.9 KG/M2 | HEIGHT: 69 IN | TEMPERATURE: 98.1 F

## 2025-04-02 DIAGNOSIS — Z98.890 STATUS POST REPAIR OF VENTRAL HERNIA: Primary | ICD-10-CM

## 2025-04-02 DIAGNOSIS — Z87.19 STATUS POST REPAIR OF VENTRAL HERNIA: Primary | ICD-10-CM

## 2025-04-02 PROCEDURE — 99024 POSTOP FOLLOW-UP VISIT: CPT | Performed by: SURGERY

## 2025-04-02 NOTE — PROGRESS NOTES
"Name: Marcellus Fregoso      : 1955      MRN: [id number]  Encounter Provider: Kenneth Mcfarland MD  Encounter Date: 2025   [facility]  :  Assessment & Plan  Status post repair of ventral hernia  Overall doing well.  I told him it seems like he has a seroma.  I told him this should gradually resolve over the next month or 2.  If it does not, he is asked to give us a call.  Okay to get back to normal activities.           History of Present Illness   HPI  Marcellus Fregoso is a 69 y.o. male who presents status post open ventral hernia repair.  Feeling well at this point in time.  No major issues with pain or other issues.      Review of Systems       Objective   Temp 98.1 °F (36.7 °C) (Temporal)   Ht 5' 9\" (1.753 m)   Wt 134 kg (296 lb 6.4 oz)   BMI 43.77 kg/m²      Physical Exam  Abdomen: Supraumbilical incision healing well.  Fullness around the site otherwise soft nontender    "

## 2025-04-07 DIAGNOSIS — G47.00 INSOMNIA: ICD-10-CM

## 2025-04-07 RX ORDER — QUETIAPINE FUMARATE 50 MG/1
50 TABLET, FILM COATED ORAL
Qty: 90 TABLET | Refills: 3 | OUTPATIENT
Start: 2025-04-07

## 2025-04-10 ENCOUNTER — TELEPHONE (OUTPATIENT)
Age: 70
End: 2025-04-10

## 2025-04-10 NOTE — TELEPHONE ENCOUNTER
Jordin called today in regards to a fall he had earlier while out to lunch. He says that other than a bruised knee and a bruised ego, he is fine. The other reason for his call is that he and his wife are noticing a downward change in his condition. Patient has been diagnosed with Parkinson's and there has been a change to his health. There has been a significant change in his ability to balance. His eye sight is diminishing. He is more prone to incontinence and his wife says that he may be retaining fluid in his legs. Jordin is requesting an appointment with Dr Calix as soon as possible to review these changes. Patient states he only wishes to meet with Dr Calix.  Informed Jordin that there are no open appointments until May but advised him to call Monday at 3:00 pm and try and get one of the Tuesday same day appointments. He said he will try.    Please advise.

## 2025-04-15 ENCOUNTER — APPOINTMENT (OUTPATIENT)
Dept: PHYSICAL THERAPY | Facility: CLINIC | Age: 70
End: 2025-04-15
Payer: MEDICARE

## 2025-04-18 ENCOUNTER — APPOINTMENT (OUTPATIENT)
Dept: PHYSICAL THERAPY | Facility: CLINIC | Age: 70
End: 2025-04-18
Payer: MEDICARE

## 2025-04-18 ENCOUNTER — TELEPHONE (OUTPATIENT)
Dept: SURGERY | Facility: CLINIC | Age: 70
End: 2025-04-18

## 2025-04-18 NOTE — TELEPHONE ENCOUNTER
FOLLOW UP: Patient's CB# is [phone]    REASON FOR CONVERSATION: Post-op Problem    SYMPTOMS: Seroma is increasing in size and now with redness at site. Denies any pain    OTHER: Patient had  OPEN, REPAIR INCISION ABDOMINAL HERNIA WITH MESH (Abdomen) done on 3/20/25. Was then seen for his post op with  on 4/2/25. A seroma was noted at visit. Patient now feels it is becoming larger ans redness around area. No Pain. Please advise          DISPOSITION: Discuss with Provider and Call Back Patient

## 2025-04-18 NOTE — TELEPHONE ENCOUNTER
Per Dr. Lam, scheduled appt for Wed.  If he develops fever, he should go to ER.  Pt agreeable.    mb

## 2025-04-23 ENCOUNTER — OFFICE VISIT (OUTPATIENT)
Dept: SURGERY | Facility: CLINIC | Age: 70
End: 2025-04-23

## 2025-04-23 VITALS — TEMPERATURE: 97.2 F | HEIGHT: 69 IN | BODY MASS INDEX: 43.31 KG/M2 | WEIGHT: 292.4 LBS

## 2025-04-23 DIAGNOSIS — Z98.890 STATUS POST REPAIR OF VENTRAL HERNIA: Primary | ICD-10-CM

## 2025-04-23 DIAGNOSIS — Z87.19 STATUS POST REPAIR OF VENTRAL HERNIA: Primary | ICD-10-CM

## 2025-04-23 PROBLEM — K43.9 VENTRAL HERNIA WITHOUT OBSTRUCTION OR GANGRENE: Status: RESOLVED | Noted: 2021-11-23 | Resolved: 2025-04-23

## 2025-04-23 PROCEDURE — 99024 POSTOP FOLLOW-UP VISIT: CPT | Performed by: SURGERY

## 2025-04-23 NOTE — PROGRESS NOTES
"Name: Marcellus Fregoso      : 1955      MRN: [id number]  Encounter Provider: Kenneth Mcfarland MD  Encounter Date: 2025   Encounter department: [facility] GENERAL SURGERY [place]  :  Assessment & Plan  Status post repair of ventral hernia  A small seroma and old blood was removed.  I told him the firmness will be there and will take a couple months to resolve.  If he has any further questions he is asked to give us call.           History of Present Illness   HPI  Marcellus Fregoso is a 69 y.o. male who presents for follow-up status post ventral hernia repair.  I told him he may have his seroma that have accumulates.      Review of Systems       Objective   Temp (!) 97.2 °F (36.2 °C) (Temporal)   Ht 5' 9\" (1.753 m)   Wt 133 kg (292 lb 6.4 oz)   BMI 43.18 kg/m²      Physical Exam  Abdomen:  Incision healing well.  Fullness underneath the incision    Procedures  After permission, the area is prepped with alcohol.  Local anesthesia with 2% lidocaine was infiltrated with a total of 1 mL used.  An 18-gauge needle was then introduced into the area.  There seemed to be a couple of loculated areas where only old blood and small amount of seroma was removed.  A total of 7 mL was removed.  Band-Aid dressing applied.  Tolerated procedure well    "

## 2025-04-25 NOTE — PROGRESS NOTES
Re-Evaluation     Today's date: 2025  Patient name: Marcellus Fregoso  : 1955  MRN: [id number]  Referring provider: Ehsan Calix MD  Dx:   Encounter Diagnosis     ICD-10-CM    1. Parkinson's disease, unspecified whether dyskinesia present, unspecified whether manifestations fluctuate (HCC)  G20.A1       2. Ventral hernia without obstruction or gangrene  K43.9       3. Balance disorder  R26.89           Start Time: 1401  Stop Time: 1453  Total time in clinic (min): 52 minutes    Assessment: Pt returns to PT 5 weeks post-op from ventral hernia surgery after hold from therapy. All testing performed based on surgeon's orders of activity as tolerated, no lifting more than 10-15 pounds, and no strenuous exercise. Pt tolerated the session well today despite increased fatigue. Performed progress update to assess function following hold due to surgery. Pt able to complete 6MWT by walking 500ft without an SPC. However, demonstrates decreased endurance and aerobic capacity compared to age matched norms. Pt's gait speed was 0.81 m/s indicating pt is a community ambulator however is still at risk for falls based on their speed being < 1 m/s. Pt's FGA score was 23/30 reducing pts fall risk as scores < 23 are considered at risk for falls, but demonstrates decreased dynamic balance compared to previous sessions. Pts 5x STS test was 13.62s indicating decreased LE strength and power. Pt's TUGcog has declined from 7.34s to 17.78s placing him at a fall risk. During testing pt had 1 anterior LOB requiring modAx1 to recover. Pt also demonstrates increased freezing this session with turning as well worsening L hand tremor. Educated patient about the benefits of OT for hand function and tremor, but pt not agreeable at this time. Due to progressive neurological diagnosis of PD, pt requires skilled PT care as well as the safety equipment in the clinic to adequately challenge balance. Pt would continue to benefit from skilled  physical therapy 2x/wk for 8 weeks to improve overall QOL inclusive of, strength, coordination, balance, endurance and functional mobility in the home and in the community as well as reduce their fall risk for improved safety.     NEW GOALS (4/28/25):  STG  Patient will improve 6MWT without AD by 100 ft within 4 weeks demonstrating improved endurance and aerobic capacity.   Patient will improve TUGcog by 5 seconds to decrease fall risk and improve dual-tasking ability within 4 weeks.  Patient will be independent with HEP within 4 weeks for increased compliance with exercise at home.   Patient will improve gait speed to 0.91 m/s indicating a significant change in walking speed and decreased fall risk within 4 weeks.     LTG  Patient will improve 6MWT by 200 ft without AD demonstrating significant improvements in endurance  w/in 8 weeks   Patient will be independent with progressed HEP within 8 weeks for increased compliance with exercise at home.   Patient will improve gait speed to 1.0 m/s indicating they are no longer at higher risk for falls within 8 weeks.   Patient will be able to verbally explain strategies for decreased freezing within 8 weeks for improved safety.   Patient will be able to perform floor transfer independently for improved safety within 8 weeks.      Plan  Patient would benefit from: skilled physical therapy and OT eval  Referral necessary: Yes    Planned therapy interventions: ADL training, balance, activity modification, balance/weight bearing training, body mechanics training, behavior modification, coordination, flexibility, functional ROM exercises, gait training, graded exercise, graded activity, home exercise program, transfer training, therapeutic training, therapeutic exercise, therapeutic activities, stretching, sensory integrative techniques, strengthening, patient/caregiver education, neuromuscular re-education and motor coordination training    Frequency: 2x week  Duration in  weeks: 8  Plan of Care beginning date: 04/28/2025  Plan of Care expiration date: 06/23/2025  Treatment plan discussed with: patient      Subjective: Had a bit of a hard time after the surgery, but doing a lot better now. I have spectacular days and then some terrible days. I have noticed that my left hand is starting to tremor. Was doing it 10% of the time and now it's about 20% of the time. Had 2 falls recently. 1 was standing in dining room and just fell. Then the other one went to push the door shut in the parking lot. Walking hasn't bee awful at home, but stamina is the hardest part.    Patient pain: 0/10   Patient goals: improve stamina and balance, decrease freezing episodes  Falls: 2 falls in the last few weeks     Objective: See treatment diary below      Balance Test 01/06 (IE) 02/07 03/07 4/28   6 Minute Walk Test (ft): 4 min and then needed to stop   400 ft with SPC   HR: 101 bpm  O2: 91%  600 ft, no AD   HR: 81 bpm   O2: 97%  600 ft, no AD   HR: 100 bpm   O2: 94%  500 ft, no AD  HR: 94 bpm   O2: 95%   Gait Speed (ft/s): 20 ft/ 6.78s=  0.90 m/s  20 ft/6.47=  0.94 m/s  0.88 m/s  0.81 m/s    5x Sit To Stand (s): 15.85s, No UE 10.90s, No UE   Poor eccentric control  9.53s, no UE   Improved eccentric control 13.62s, no UE   Poor eccentric control on 1 rep    TUG 9.94s, no AD  TUGcog (count back by 3s), 18.79-9.94 8.21, no AD   TUGcog (count back by 3s): 7.34s  NT 10.28s, no AD  TUGcog (count back by 3s): 17.78 (anterior LOB modAX1)   FGA 19/30 24/30 28/30 23/30        MCTSIB Number of Seconds (02/07) 03/07 4/28   Feet Together, Eyes Open 30 sec 30 sec NV   Feet Together, Eyes Closed 30 sec 17 sec, 30 sec     Feet Together, Eyes Open Foam 30 sec 30 sec     Feet Together, Eyes Closed Foam 30 sec  8 sec, 30 sec            Short Term Goal Expiration Date: 05/26/2025   Long Term Goal Expiration Date: 06/23/2025  POC Expiration Date: (06/23/2025)    Visit count: 1 of 10; PN due 05/26/2025    POC expires Unit limit  "Auth Expiration date PT/OT/ST + Visit Limit?   02/20/25 N/A 12/31/25 BOMN   6/23/25 N/A 12/31/25 BOMN                     Visit/Unit Tracking  AUTH Status:  Date 4/28  RE           4/28  RE   BOMN Used 1           2    Remaining  9           8       Access Code: [email or web address]  URL: [email or web address]  Date: 01/17/2025  Prepared by: Mine Pan    Exercises  - Sit to Stand with Arms Crossed  - 1 x daily - 7 x weekly - 3 sets - 10 reps  - Walking March  - 1 x daily - 7 x weekly - 1 sets - 3 reps  - Side Stepping with Counter Support  - 1 x daily - 7 x weekly - 1 sets - 3 reps      Precautions Fall risk, T2DM, neuropathy, HTN, syncope, depression       Manuals 03/13 04/28 2/25 03/03 03/06                                   Neuro Re-Ed   Pt education on all neuro testing including FGA, TUG, gait speed and relation to fall risk     Pt education on signs and symptoms of PD and the benefits of PT   Pt education on all neuro testing including FGA, mCTSIB,    STS   10# tidal tank   1 x 10  X 15   NO UE    Hurdles Solo  1/4 track   (6) 8\" yellow hurdles   Fwd x 2 laps   Lat x 3 laps        HKM        Lat stepping        Step up/foam        Blazepods        Dynamic balance    Solo  1/4 track   (3) airex pads  by (6 small/med rocks)   Fwd x 2 laps     3) airex pads  by (6 small/med rocks)  by (3) 12\" hurdles   Fwd x 1 lap   Solo  1/4 track  (3) airex pads  by (6 small/med rocks)  by (3) 12\" hurdles   Fwd x 2 laps    Step-ups    Solo  8\" step   Fwd x 5 each leg      Trunk rotation    Standing   6# med ball slams with trunk rotation   2 spots on floor for visual cue   X 20     Ther Ex  Pt education on all testing including 6MWT, 5xSTS    Pt education on surgical precautions typically followed for 4-6 weeks following procedure     Pt education on the importance of daily walking to improve overall endurance    Pt education on all testing including 6MWT, 5xSTS, and gait speed  "   Treadmill   1.2 mph   0% incline   B/l UE   X 7 min     1.2 mph   X 3 min     X 10 min total    Cardiovascular endurance and neural priming  1.0-1.2 mph   0% incline   B/l UE   X 6 min     1.1 mph   X 4 min     X 10 min total    Cardiovascular endurance and neural priming     Nu-step L7 x 5 min   L5 x 11 min     X 16 minutes total    Cardiovascular endurance and neural priming                                                        Ther Activity                        Gait Training                        Modalities

## 2025-04-28 ENCOUNTER — EVALUATION (OUTPATIENT)
Dept: PHYSICAL THERAPY | Facility: CLINIC | Age: 70
End: 2025-04-28
Attending: FAMILY MEDICINE
Payer: MEDICARE

## 2025-04-28 DIAGNOSIS — G20.A1 PARKINSON'S DISEASE, UNSPECIFIED WHETHER DYSKINESIA PRESENT, UNSPECIFIED WHETHER MANIFESTATIONS FLUCTUATE (HCC): Primary | ICD-10-CM

## 2025-04-28 DIAGNOSIS — K43.9 VENTRAL HERNIA WITHOUT OBSTRUCTION OR GANGRENE: ICD-10-CM

## 2025-04-28 DIAGNOSIS — R26.89 BALANCE DISORDER: ICD-10-CM

## 2025-04-28 PROCEDURE — 97164 PT RE-EVAL EST PLAN CARE: CPT

## 2025-04-28 PROCEDURE — 97110 THERAPEUTIC EXERCISES: CPT

## 2025-04-28 PROCEDURE — 97112 NEUROMUSCULAR REEDUCATION: CPT

## 2025-05-02 NOTE — PROGRESS NOTES
Daily Note     Today's date: 2025  Patient name: Marcellus Fregoso  : 1955  MRN: [id number]  Referring provider: Ehsan Calix MD  Dx:   Encounter Diagnosis     ICD-10-CM    1. Ventral hernia without obstruction or gangrene  K43.9       2. Parkinson's disease, unspecified whether dyskinesia present, unspecified whether manifestations fluctuate ([facility])  G20.A1       3. Balance disorder  R26.89           Start Time: 1335  Stop Time: 1415  Total time in clinic (min): 40 minutes    Individual Treatment: 4733-0766 (25 min)   Self-directed: 6149-1037 (5 min)  Therapeutic Group: [id number]) 10 min)    Subjective: Knee is bothering him a little bit today, just achey like his arthritis pain. I want to work on turning.       Objective: See treatment diary below    MCTSIB Number of Seconds ()  5/5   Feet Together, Eyes Open 30 sec 30 sec 30 sec    Feet Together, Eyes Closed 30 sec 17 sec, 30 sec  30 sec    Feet Together, Eyes Open Foam 30 sec 30 sec  30 sec    Feet Together, Eyes Closed Foam 30 sec  8 sec, 30 sec  30 sec        Assessment: Pt tolerated the treatment fair. Pt performed biking on the Nu-step/walking on the treadmill to promote cardiovascular endurance/neural priming for the session. Pt educated on freezing and turning strategies. Pt responded well to marching to improve foot clearance with turning. Pt demonstrated fatigue with walking today with increased SOB and decreased act tolerance. Required verbal cueing for big stepping. Pt demonstrated fatigue post-session. Pt would benefit from further skilled PT sessions to address deficits in endurance, strength, balance, activity tolerance, and overall safety needed to maximize the pt's function and quality of life.        Plan: Continue per plan of care.  Progress treatment as tolerated.       Short Term Goal Expiration Date: 2025   Long Term Goal Expiration Date: 2025  POC Expiration Date: (2025)    Visit count: 2 of 10; PN due  "05/26/2025    POC expires Unit limit Auth Expiration date PT/OT/ST + Visit Limit?   02/20/25 N/A 12/31/25 BOMN   6/23/25 N/A 12/31/25 BOMN                     Visit/Unit Tracking  AUTH Status:  Date 4/28  RE 5/5             BOMN Used 1 2              Remaining  9 8                 Access Code: [email or web address]  URL: [email or web address]  Date: 01/17/2025  Prepared by: Mine Pan    Exercises  - Sit to Stand with Arms Crossed  - 1 x daily - 7 x weekly - 3 sets - 10 reps  - Walking March  - 1 x daily - 7 x weekly - 1 sets - 3 reps  - Side Stepping with Counter Support  - 1 x daily - 7 x weekly - 1 sets - 3 reps      Precautions Fall risk, T2DM, neuropathy, HTN, syncope, depression       Manuals 03/13 04/28 5/5 03/03 03/06                                   Neuro Re-Ed   Pt education on all neuro testing including FGA, TUG, gait speed and relation to fall risk     Pt education on signs and symptoms of PD and the benefits of PT Pt education on mCTSIB testing     Pt education on freezing strategies including rocking/marching   Pt education on all neuro testing including FGA, mCTSIB,    STS   X10   No UE X 15   NO UE    Hurdles Solo  1/4 track   (6) 8\" yellow hurdles   Fwd x 2 laps   Lat x 3 laps        HKM        HIGT   Hallway (50ft)   5 x 2 laps     VC for big stepping using carpet tiles as visual cue     Lat stepping        Step up/foam        Blazepods        Dynamic balance     Solo  1/4 track  (3) airex pads  by (6 small/med rocks)  by (3) 12\" hurdles   Fwd x 2 laps    Step-ups         Trunk rotation    Standing   6# med ball slams with trunk rotation   2 spots on floor for visual cue   X 20     Ther Ex  Pt education on all testing including 6MWT, 5xSTS    Pt education on surgical precautions typically followed for 4-6 weeks following procedure     Pt education on the importance of daily walking to improve overall endurance    Pt education on all testing including 6MWT, 5xSTS, and gait " speed    Treadmill    1.0-1.2 mph   0% incline   B/l UE   X 6 min     1.1 mph   X 4 min     X 10 min total    Cardiovascular endurance and neural priming     Nu-step L7 x 5 min   L5 x 11 min     X 16 minutes total    Cardiovascular endurance and neural priming                                                        Ther Activity                        Gait Training                        Modalities

## 2025-05-05 ENCOUNTER — OFFICE VISIT (OUTPATIENT)
Dept: PHYSICAL THERAPY | Facility: CLINIC | Age: 70
End: 2025-05-05
Attending: FAMILY MEDICINE
Payer: MEDICARE

## 2025-05-05 DIAGNOSIS — G20.A1 PARKINSON'S DISEASE, UNSPECIFIED WHETHER DYSKINESIA PRESENT, UNSPECIFIED WHETHER MANIFESTATIONS FLUCTUATE (HCC): ICD-10-CM

## 2025-05-05 DIAGNOSIS — K43.9 VENTRAL HERNIA WITHOUT OBSTRUCTION OR GANGRENE: Primary | ICD-10-CM

## 2025-05-05 DIAGNOSIS — R26.89 BALANCE DISORDER: ICD-10-CM

## 2025-05-05 PROCEDURE — 97150 GROUP THERAPEUTIC PROCEDURES: CPT

## 2025-05-05 PROCEDURE — 97112 NEUROMUSCULAR REEDUCATION: CPT

## 2025-05-08 NOTE — PROGRESS NOTES
Daily Note     Today's date: 2025  Patient name: Marcellus Fregoso  : 1955  MRN: [id number]  Referring provider: Ehsan Calix MD  Dx:   Encounter Diagnosis     ICD-10-CM    1. Ventral hernia without obstruction or gangrene  K43.9       2. Parkinson's disease, unspecified whether dyskinesia present, unspecified whether manifestations fluctuate ([facility])  G20.A1       3. Balance disorder  R26.89           Start Time: 1445  Stop Time: 1530  Total time in clinic (min): 45 minutes    Subjective: Why am I so tired? Is it my heart medication or something else?      Objective: See treatment diary below      Assessment: Pt tolerated the treatment fair despite poor act tolerance. Pt performed walking on the treadmill to promote cardiovascular endurance/neural priming for the session with emphasis on large movement pattern and upright posture. Pt was challenged with mary beth navigation and compliant surface navigation. Had most difficulty with lateral stepping and turning due to increased shuffling. Pt demonstrated fatigue post-session. Pt would benefit from further skilled PT sessions to address deficits in endurance, strength, balance, activity tolerance, and overall safety needed to maximize the pt's function and quality of life.        Plan: Continue per plan of care.  Progress treatment as tolerated.       Short Term Goal Expiration Date: 2025   Long Term Goal Expiration Date: 2025  POC Expiration Date: (2025)    Visit count: 3 of 10; PN due 2025    POC expires Unit limit Auth Expiration date PT/OT/ST + Visit Limit?   25 N/A 25 BOMN   25 N/A 25 BOMN                     Visit/Unit Tracking  AUTH Status:  Date   RE             BOMN Used 1 2 3             Remaining  9 8 7                Access Code: [email or web address]  URL: [email or web address]  Date: 2025  Prepared by: Mine Pan    Exercises  - Sit to Stand with Arms Crossed  - 1 x daily - 7 x weekly  "- 3 sets - 10 reps  - Walking March  - 1 x daily - 7 x weekly - 1 sets - 3 reps  - Side Stepping with Counter Support  - 1 x daily - 7 x weekly - 1 sets - 3 reps      Precautions Fall risk, T2DM, neuropathy, HTN, syncope, depression       Manuals 03/13 04/28 5/5 5/9 03/06                                   Neuro Re-Ed   Pt education on all neuro testing including FGA, TUG, gait speed and relation to fall risk     Pt education on signs and symptoms of PD and the benefits of PT Pt education on mCTSIB testing     Pt education on freezing strategies including rocking/marching  Pt education on freezing strategies including rocking/marching    Pt education on HR zone for neuroplasticity with PD   Pt education on all neuro testing including FGA, mCTSIB,    STS   X10   No UE     Hurdles Solo  1/4 track   (6) 8\" yellow hurdles   Fwd x 2 laps   Lat x 3 laps    Solo  1/4 track   (6) 6\" hurdles   Fwd x 4 laps   Lat x 2 laps     HKM        HIGT   Hallway (50ft)   5 x 2 laps     VC for big stepping using carpet tiles as visual cue     Lat stepping        Step up/foam    Solo  1/4 track   (4) airex   Fwd x 2 laps     Blazepods        Dynamic balance         Step-ups     Solo  8\" step   Fwd 2 x 10 each leg     Trunk rotation        Ther Ex  Pt education on all testing including 6MWT, 5xSTS    Pt education on surgical precautions typically followed for 4-6 weeks following procedure     Pt education on the importance of daily walking to improve overall endurance    Pt education on all testing including 6MWT, 5xSTS, and gait speed    Treadmill    Solo  1.4 mph   0% incline   B/l UE  X 5 min bout   X 2 min bout   X 3 min bout     X 10 min total     VC for upright posture and increased step length     Nu-step L7 x 5 min   L5 x 11 min     X 16 minutes total    Cardiovascular endurance and neural priming                                                        Ther Activity                        Gait Training                      "   Modalities

## 2025-05-09 ENCOUNTER — OFFICE VISIT (OUTPATIENT)
Dept: PHYSICAL THERAPY | Facility: CLINIC | Age: 70
End: 2025-05-09
Attending: FAMILY MEDICINE
Payer: MEDICARE

## 2025-05-09 DIAGNOSIS — G20.A1 PARKINSON'S DISEASE, UNSPECIFIED WHETHER DYSKINESIA PRESENT, UNSPECIFIED WHETHER MANIFESTATIONS FLUCTUATE (HCC): ICD-10-CM

## 2025-05-09 DIAGNOSIS — R26.89 BALANCE DISORDER: ICD-10-CM

## 2025-05-09 DIAGNOSIS — K43.9 VENTRAL HERNIA WITHOUT OBSTRUCTION OR GANGRENE: Primary | ICD-10-CM

## 2025-05-09 PROCEDURE — 97112 NEUROMUSCULAR REEDUCATION: CPT

## 2025-05-09 PROCEDURE — 97110 THERAPEUTIC EXERCISES: CPT

## 2025-05-12 ENCOUNTER — APPOINTMENT (OUTPATIENT)
Dept: PHYSICAL THERAPY | Facility: CLINIC | Age: 70
End: 2025-05-12
Attending: FAMILY MEDICINE
Payer: MEDICARE

## 2025-05-15 NOTE — PROGRESS NOTES
Daily Note     Today's date: 2025  Patient name: Marcellus Fregoso  : 1955  MRN: [id number]  Referring provider: Ehsan Calix MD  Dx:   Encounter Diagnosis     ICD-10-CM    1. Parkinson's disease, unspecified whether dyskinesia present, unspecified whether manifestations fluctuate (HCC)  G20.A1       2. Ventral hernia without obstruction or gangrene  K43.9       3. Balance disorder  R26.89           Start Time: 1430  Stop Time: 1515  Total time in clinic (min): 45 minutes    Subjective: Feeling really good today, had a bad day on Monday       Objective: See treatment diary below      Assessment: Pt tolerated the treatment well. Pt performed walking on the treadmill to promote cardiovascular endurance/neural priming for the session. Able to tolerate 2 x 5 min bouts with some complaints of fatigue and SOB. Pt was challenged with multidirectional stepping with blazepods. Had most difficulty with backwards stepping, but able to tolerate well.  Pt is progressing with STS balance with ability to perform on airex. Pt demonstrated fatigue post-session. Pt would benefit from further skilled PT sessions to address deficits in endurance, strength, balance, activity tolerance, and overall safety needed to maximize the pt's function and quality of life.     Plan: Continue per plan of care.  Progress treatment as tolerated.       Short Term Goal Expiration Date: 2025   Long Term Goal Expiration Date: 2025  POC Expiration Date: (2025)    Visit count: 4 of 10; PN due 2025    POC expires Unit limit Auth Expiration date PT/OT/ST + Visit Limit?   25 N/A 25 BOMN   25 N/A 25 BOMN                     Visit/Unit Tracking  AUTH Status:  Date   RE            BOMN Used 1 2 3 4            Remaining  9 8 7 6               Access Code: [email or web address]  URL: [email or web address]  Date: 2025  Prepared by: Mine Pan    Exercises  - Sit to Stand with Arms  "Crossed  - 1 x daily - 7 x weekly - 3 sets - 10 reps  - Walking March  - 1 x daily - 7 x weekly - 1 sets - 3 reps  - Side Stepping with Counter Support  - 1 x daily - 7 x weekly - 1 sets - 3 reps      Precautions Fall risk, T2DM, neuropathy, HTN, syncope, depression       Manuals 03/13 04/28 5/5 5/9 5/16                                   Neuro Re-Ed   Pt education on all neuro testing including FGA, TUG, gait speed and relation to fall risk     Pt education on signs and symptoms of PD and the benefits of PT Pt education on mCTSIB testing     Pt education on freezing strategies including rocking/marching  Pt education on freezing strategies including rocking/marching    Pt education on HR zone for neuroplasticity with PD     Pt education on HR zone for neuroplasticity with PD     STS   X10   No UE  2 x 10   Standing on airex   No UE    Hurdles Solo  1/4 track   (6) 8\" yellow hurdles   Fwd x 2 laps   Lat x 3 laps    Solo  1/4 track   (6) 6\" hurdles   Fwd x 4 laps   Lat x 2 laps     HKM        HIGT   Hallway (50ft)   5 x 2 laps     VC for big stepping using carpet tiles as visual cue     Lat stepping        Step up/foam    Solo  1/4 track   (4) airex   Fwd x 2 laps     Blazepods     Solo  1/4 track   4 directional stepping with 6\" hurdles   4 pods   4 rounds x 10 hits    Dynamic balance      Solo  1/4 track   Small/med river rocks with (2) airex   Fwd  2 laps    Step-ups     Solo  8\" step   Fwd 2 x 10 each leg     Trunk rotation        Ther Ex  Pt education on all testing including 6MWT, 5xSTS    Pt education on surgical precautions typically followed for 4-6 weeks following procedure     Pt education on the importance of daily walking to improve overall endurance       Treadmill    Solo  1.4 mph   0% incline   B/l UE  X 5 min bout   X 2 min bout   X 3 min bout     X 10 min total     VC for upright posture and increased step length  Solo  1/4 mph   0% incline   B/l UE   1.4 mph x 2 min   1.3 mph x 3 min     1.3 mph x 5 " min     X 10 min total   Neural priming and cardiovascular endurnace    Nu-step L7 x 5 min   L5 x 11 min     X 16 minutes total    Cardiovascular endurance and neural priming                                                        Ther Activity                        Gait Training                        Modalities

## 2025-05-16 ENCOUNTER — TELEPHONE (OUTPATIENT)
Age: 70
End: 2025-05-16

## 2025-05-16 ENCOUNTER — OFFICE VISIT (OUTPATIENT)
Dept: PHYSICAL THERAPY | Facility: CLINIC | Age: 70
End: 2025-05-16
Attending: FAMILY MEDICINE
Payer: MEDICARE

## 2025-05-16 DIAGNOSIS — R26.89 BALANCE DISORDER: ICD-10-CM

## 2025-05-16 DIAGNOSIS — G20.A1 PARKINSON'S DISEASE, UNSPECIFIED WHETHER DYSKINESIA PRESENT, UNSPECIFIED WHETHER MANIFESTATIONS FLUCTUATE (HCC): Primary | ICD-10-CM

## 2025-05-16 DIAGNOSIS — K43.9 VENTRAL HERNIA WITHOUT OBSTRUCTION OR GANGRENE: ICD-10-CM

## 2025-05-16 PROCEDURE — 97112 NEUROMUSCULAR REEDUCATION: CPT

## 2025-05-16 PROCEDURE — 97110 THERAPEUTIC EXERCISES: CPT

## 2025-05-19 ENCOUNTER — OFFICE VISIT (OUTPATIENT)
Dept: PHYSICAL THERAPY | Facility: CLINIC | Age: 70
End: 2025-05-19
Attending: FAMILY MEDICINE
Payer: MEDICARE

## 2025-05-19 DIAGNOSIS — K43.9 VENTRAL HERNIA WITHOUT OBSTRUCTION OR GANGRENE: ICD-10-CM

## 2025-05-19 DIAGNOSIS — R26.89 BALANCE DISORDER: ICD-10-CM

## 2025-05-19 DIAGNOSIS — G20.A1 PARKINSON'S DISEASE, UNSPECIFIED WHETHER DYSKINESIA PRESENT, UNSPECIFIED WHETHER MANIFESTATIONS FLUCTUATE (HCC): Primary | ICD-10-CM

## 2025-05-19 PROCEDURE — 97110 THERAPEUTIC EXERCISES: CPT

## 2025-05-19 PROCEDURE — 97112 NEUROMUSCULAR REEDUCATION: CPT

## 2025-05-19 PROCEDURE — 97150 GROUP THERAPEUTIC PROCEDURES: CPT

## 2025-05-19 NOTE — PROGRESS NOTES
"Daily Note     Today's date: 2025  Patient name: Marcellus Fregoso  : 1955  MRN: [id number]  Referring provider: Ehsan Calix MD  Dx:   Encounter Diagnosis     ICD-10-CM    1. Parkinson's disease, unspecified whether dyskinesia present, unspecified whether manifestations fluctuate (HCC)  G20.A1       2. Ventral hernia without obstruction or gangrene  K43.9       3. Balance disorder  R26.89           Start Time: 1400  Stop Time: 1445  Total time in clinic (min): 45 minutes    Individual treatment: 6816-8059, 1597-5639 (35 minutes)  Therapeutic Group: [id number] (10 minutes)      Subjective: I really like the bike, it really helps me feel stronger and get me started       Objective: See treatment diary below      Assessment: Pt tolerated the treatment well. Pt performed biking on the Nu-step to promote cardiovascular endurance/neural priming for the session with good activity tolerance. Pt was challenged with foam and mary beth navigation this session. Had increased difficulty going forward especially with RLE, but able to tolerate. Had most difficulty with STS on foam, but responded well to \"nose over toes\" to promote ant weightshift.  Pt demonstrated fatigue post-session. Pt would benefit from further skilled PT sessions to address deficits in endurance, strength, balance, activity tolerance, and overall safety needed to maximize the pt's function and quality of life.       Plan: Continue per plan of care.  Progress treatment as tolerated.       Short Term Goal Expiration Date: 2025   Long Term Goal Expiration Date: 2025  POC Expiration Date: (2025)    Visit count: 5 of 10; PN due 2025    POC expires Unit limit Auth Expiration date PT/OT/ST + Visit Limit?   25 N/A 25 BOMN   25 N/A 25 BOMN                     Visit/Unit Tracking  AUTH Status:  Date   RE           BOMN Used 1 2 3 4 5           Remaining  9 8 7 6 5              Access Code: " [email or web address]  URL: [email or web address]  Date: 01/17/2025  Prepared by: Mine Pan    Exercises  - Sit to Stand with Arms Crossed  - 1 x daily - 7 x weekly - 3 sets - 10 reps  - Walking March  - 1 x daily - 7 x weekly - 1 sets - 3 reps  - Side Stepping with Counter Support  - 1 x daily - 7 x weekly - 1 sets - 3 reps      Precautions Fall risk, T2DM, neuropathy, HTN, syncope, depression       Manuals 5/19 04/28 5/5 5/9 5/16                                   Neuro Re-Ed   Pt education on all neuro testing including FGA, TUG, gait speed and relation to fall risk     Pt education on signs and symptoms of PD and the benefits of PT Pt education on mCTSIB testing     Pt education on freezing strategies including rocking/marching  Pt education on freezing strategies including rocking/marching    Pt education on HR zone for neuroplasticity with PD     Pt education on HR zone for neuroplasticity with PD     STS X 12   Standing on airex   No UE  X10   No UE  2 x 10   Standing on airex   No UE    Hurdles    Solo  1/4 track   (6) 6\" hurdles   Fwd x 4 laps   Lat x 2 laps     HKM        HIGT   Hallway (50ft)   5 x 2 laps     VC for big stepping using carpet tiles as visual cue     Lat stepping        Step up/foam    Solo  1/4 track   (4) airex   Fwd x 2 laps     Blazepods     Solo  1/4 track   4 directional stepping with 6\" hurdles   4 pods   4 rounds x 10 hits    Dynamic balance  Solo  1/4 track   (4) WBOS foam beams   (5) 6\" hurdles   Fwd x 2 laps   Lat x 3 laps     Solo  1/4 track   Cones taps (6)   Emphasis on hip flexion   Fwd X 3 laps     Solo  1/4 track   Small/med river rocks with (2) airex   Fwd  2 laps    Step-ups     Solo  8\" step   Fwd 2 x 10 each leg     Trunk rotation        Ther Ex  Pt education on all testing including 6MWT, 5xSTS    Pt education on surgical precautions typically followed for 4-6 weeks following procedure     Pt education on the importance of daily walking to improve overall endurance  "      Treadmill    Solo  1.4 mph   0% incline   B/l UE  X 5 min bout   X 2 min bout   X 3 min bout     X 10 min total     VC for upright posture and increased step length  Solo  1/4 mph   0% incline   B/l UE   1.4 mph x 2 min   1.3 mph x 3 min     1.3 mph x 5 min     X 10 min total   Neural priming and cardiovascular endurnace    Nu-step L7 x 13 min     Cardiovascular endurance and neural priming                                                        Ther Activity                        Gait Training                        Modalities

## 2025-05-22 RX ORDER — ALBUTEROL SULFATE 90 UG/1
INHALANT RESPIRATORY (INHALATION)
OUTPATIENT
Start: 2025-05-22

## 2025-05-22 NOTE — PROGRESS NOTES
Daily Note     Today's date: 2025  Patient name: Marcellus Fregoso  : 1955  MRN: [id number]  Referring provider: Ehsan Calix MD  Dx:   Encounter Diagnosis     ICD-10-CM    1. Parkinson's disease, unspecified whether dyskinesia present, unspecified whether manifestations fluctuate (HCC)  G20.A1       2. Ventral hernia without obstruction or gangrene  K43.9       3. Balance disorder  R26.89           Start Time: 1436  Stop Time: 1515  Total time in clinic (min): 39 minutes    Subjective: I slept really well last night, feeling tired though      Objective: See treatment diary below      Assessment: Pt tolerated the treatment well. Pt performed walking on the treadmill to promote cardiovascular endurance/neural priming for the session. Pt was challenged with agility ladder this session to promote dynamic balance and LE coordination. Pt responded well, but had increased difficulty with coordination with lateral and bwd stepping.   Pt is progressing with STS with ability to perform with 10# tidal tank. Pt demonstrated fatigue post-session. Pt would benefit from further skilled PT sessions to address deficits in endurance, strength, balance, activity tolerance, and overall safety needed to maximize the pt's function and quality of life.       Plan: Continue per plan of care.  Progress treatment as tolerated.       Short Term Goal Expiration Date: 2025   Long Term Goal Expiration Date: 2025  POC Expiration Date: (2025)    Visit count: 6 of 10; PN due 2025    POC expires Unit limit Auth Expiration date PT/OT/ST + Visit Limit?   25 N/A 25 BOMN   25 N/A 25 BOMN                     Visit/Unit Tracking  AUTH Status:  Date   RE  5         BOMN Used 1 2 3 4 5 6          Remaining  9 8 7 6 5 4             Access Code: [email or web address]  URL: [email or web address]  Date: 2025  Prepared by: Mine Pan    Exercises  - Sit to Stand with Arms  "Crossed  - 1 x daily - 7 x weekly - 3 sets - 10 reps  - Walking March  - 1 x daily - 7 x weekly - 1 sets - 3 reps  - Side Stepping with Counter Support  - 1 x daily - 7 x weekly - 1 sets - 3 reps      Precautions Fall risk, T2DM, neuropathy, HTN, syncope, depression       Manuals 5/19 5/23 5/5 5/9 5/16                                   Neuro Re-Ed    Pt education on mCTSIB testing     Pt education on freezing strategies including rocking/marching  Pt education on freezing strategies including rocking/marching    Pt education on HR zone for neuroplasticity with PD     Pt education on HR zone for neuroplasticity with PD     STS X 12   Standing on airex   No UE 2 x 10   10# tidal tank    X10   No UE  2 x 10   Standing on airex   No UE    Hurdles    Solo  1/4 track   (6) 6\" hurdles   Fwd x 4 laps   Lat x 2 laps     HKM        HIGT   Hallway (50ft)   5 x 2 laps     VC for big stepping using carpet tiles as visual cue     Lat stepping        Step up/foam    Solo  1/4 track   (4) airex   Fwd x 2 laps     Blazepods     Solo  1/4 track   4 directional stepping with 6\" hurdles   4 pods   4 rounds x 10 hits    Dynamic balance  Solo  1/4 track   (4) WBOS foam beams   (5) 6\" hurdles   Fwd x 2 laps   Lat x 3 laps     Solo  1/4 track   Cones taps (6)   Emphasis on hip flexion   Fwd X 3 laps     Solo  1/4 track   Small/med river rocks with (2) airex   Fwd  2 laps    Step-ups     Solo  8\" step   Fwd 2 x 10 each leg     Agility ladder   Solo  1/4 track   Fwd with big stepping (Every other block) x 2 laps   Lat x 2 laps   Lat (2 feet in 2 feet bwd) x 2 laps       Trunk rotation        Ther Ex        Treadmill  Solo  B/l UE   1.3 mph   0% incline   X 3 min bout   X 2 min bout   X 3 min bout   X 2 min bout     X 10 total   Neural priming and cardiovascular endurance     HR: 112 bpm  O2: 96%   Solo  1.4 mph   0% incline   B/l UE  X 5 min bout   X 2 min bout   X 3 min bout     X 10 min total     VC for upright posture and increased step " length  Solo  1/4 mph   0% incline   B/l UE   1.4 mph x 2 min   1.3 mph x 3 min     1.3 mph x 5 min     X 10 min total   Neural priming and cardiovascular endurnace    Nu-step L7 x 13 min     Cardiovascular endurance and neural priming                                                        Ther Activity                        Gait Training                        Modalities

## 2025-05-23 ENCOUNTER — OFFICE VISIT (OUTPATIENT)
Dept: PHYSICAL THERAPY | Facility: CLINIC | Age: 70
End: 2025-05-23
Attending: FAMILY MEDICINE
Payer: MEDICARE

## 2025-05-23 DIAGNOSIS — K43.9 VENTRAL HERNIA WITHOUT OBSTRUCTION OR GANGRENE: ICD-10-CM

## 2025-05-23 DIAGNOSIS — G20.A1 PARKINSON'S DISEASE, UNSPECIFIED WHETHER DYSKINESIA PRESENT, UNSPECIFIED WHETHER MANIFESTATIONS FLUCTUATE (HCC): Primary | ICD-10-CM

## 2025-05-23 DIAGNOSIS — R26.89 BALANCE DISORDER: ICD-10-CM

## 2025-05-23 PROCEDURE — 97110 THERAPEUTIC EXERCISES: CPT

## 2025-05-23 PROCEDURE — 97112 NEUROMUSCULAR REEDUCATION: CPT

## 2025-05-27 ENCOUNTER — HOSPITAL ENCOUNTER (OUTPATIENT)
Dept: RADIOLOGY | Facility: HOSPITAL | Age: 70
Discharge: HOME | End: 2025-05-27
Attending: ORTHOPAEDIC SURGERY
Payer: MEDICARE

## 2025-05-27 DIAGNOSIS — Z96.651 PAIN DUE TO TOTAL RIGHT KNEE REPLACEMENT, INITIAL ENCOUNTER (HCC): ICD-10-CM

## 2025-05-27 DIAGNOSIS — T84.84XA PAIN DUE TO TOTAL RIGHT KNEE REPLACEMENT, INITIAL ENCOUNTER (HCC): ICD-10-CM

## 2025-05-27 DIAGNOSIS — T84.84XA PAIN DUE TO TOTAL LEFT KNEE REPLACEMENT, INITIAL ENCOUNTER (HCC): ICD-10-CM

## 2025-05-27 DIAGNOSIS — Z96.652 PAIN DUE TO TOTAL LEFT KNEE REPLACEMENT, INITIAL ENCOUNTER (HCC): ICD-10-CM

## 2025-05-27 PROCEDURE — A9503 TC99M MEDRONATE: HCPCS

## 2025-05-27 PROCEDURE — 78315 BONE IMAGING 3 PHASE: CPT

## 2025-05-28 ENCOUNTER — TELEPHONE (OUTPATIENT)
Age: 70
End: 2025-05-28

## 2025-05-28 ENCOUNTER — NURSE TRIAGE (OUTPATIENT)
Age: 70
End: 2025-05-28

## 2025-05-28 ENCOUNTER — RA CDI HCC (OUTPATIENT)
Dept: OTHER | Facility: HOSPITAL | Age: 70
End: 2025-05-28

## 2025-05-28 NOTE — TELEPHONE ENCOUNTER
"REASON FOR CONVERSATION: Difficulty Swallowing    SYMPTOMS: difficulty swallowing.  History of the same  Got a pill stuck about a week ago.  States then he is having a hard time eating and drinking.  It goes down but he has to be careful and has pain.     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  Last EGD was in June 2023.  C0M2 Molina's esophagus status post radiofrequency ablation. \"    PROTOCOL DISPOSITION: See Within 2 Weeks in Office    CARE ADVICE PROVIDED: Wait for guidance from providers     PRACTICE FOLLOW-UP: No available apt until sept  which he is already scheduled for a follow up in Sept.  Please discuss if patient should sooner apt with Dr. Lerma or if he can be seen by a PA               Reason for Disposition   Swallowing difficulty is a chronic symptom (recurrent or ongoing AND present > 4 weeks)    Answer Assessment - Initial Assessment Questions  1. DESCRIPTION: \"Tell me more about this problem.\" \"Are you  having trouble swallowing liquids, solids, or both?\" \"Any trouble with swallowing saliva (spit)?\"       States that he got a pill stuck about a week ago and now having increased in swallowing    2. SEVERITY: \"How bad is the swallowing difficulty?\"  (Scale 1-10; or mild, moderate, severe)      5/10 but was up to an 8 last week   3. ONSET: \"When did the swallowing problems begin?\" Started last week         4. CAUSE: \"What do you think is causing the problem?\"  (e.g., dry mouth, food or pill stuck in throat, mouth pain, sore throat, progression of disease process such as dementia or Parkinson's disease).         5. CHRONIC or RECURRENT: \"Is this a new problem for you?\"  If No, ask: \"How long have you had this problem?\" (e.g., days, weeks, months)       Recurrent   6. OTHER SYMPTOMS: \"Do you have any other symptoms?\" (e.g., chest " pain, difficulty breathing, mouth sores, sore throat, swollen tongue, chest pain)    Protocols used: Swallowing Difficulty-Adult-[place]

## 2025-05-28 NOTE — TELEPHONE ENCOUNTER
Pt called stated he has an appt and also want to add an additonal appt sooner due to severe pain and trouble swallowing. Warm transferred over to triage nurse for further assistance.

## 2025-05-29 ENCOUNTER — OFFICE VISIT (OUTPATIENT)
Dept: FAMILY MEDICINE CLINIC | Facility: CLINIC | Age: 70
End: 2025-05-29
Payer: MEDICARE

## 2025-05-29 ENCOUNTER — TELEPHONE (OUTPATIENT)
Age: 70
End: 2025-05-29

## 2025-05-29 ENCOUNTER — APPOINTMENT (OUTPATIENT)
Dept: PHYSICAL THERAPY | Facility: CLINIC | Age: 70
End: 2025-05-29
Attending: FAMILY MEDICINE
Payer: MEDICARE

## 2025-05-29 VITALS
OXYGEN SATURATION: 95 % | DIASTOLIC BLOOD PRESSURE: 82 MMHG | BODY MASS INDEX: 45.18 KG/M2 | RESPIRATION RATE: 18 BRPM | HEIGHT: 69 IN | SYSTOLIC BLOOD PRESSURE: 130 MMHG | WEIGHT: 305 LBS | TEMPERATURE: 97.4 F | HEART RATE: 74 BPM

## 2025-05-29 DIAGNOSIS — G47.00 INSOMNIA: ICD-10-CM

## 2025-05-29 DIAGNOSIS — I10 BENIGN ESSENTIAL HYPERTENSION: ICD-10-CM

## 2025-05-29 DIAGNOSIS — E11.42 CONTROLLED TYPE 2 DIABETES MELLITUS WITH DIABETIC POLYNEUROPATHY, WITHOUT LONG-TERM CURRENT USE OF INSULIN (HCC): ICD-10-CM

## 2025-05-29 DIAGNOSIS — Z00.00 MEDICARE ANNUAL WELLNESS VISIT, SUBSEQUENT: ICD-10-CM

## 2025-05-29 DIAGNOSIS — R32 URINARY INCONTINENCE, UNSPECIFIED TYPE: Primary | ICD-10-CM

## 2025-05-29 DIAGNOSIS — G20.A2 PARKINSON'S DISEASE WITHOUT DYSKINESIA, WITH FLUCTUATING MANIFESTATIONS (HCC): ICD-10-CM

## 2025-05-29 DIAGNOSIS — E78.5 HYPERLIPIDEMIA, UNSPECIFIED HYPERLIPIDEMIA TYPE: ICD-10-CM

## 2025-05-29 DIAGNOSIS — E53.8 VITAMIN B12 DEFICIENCY: ICD-10-CM

## 2025-05-29 DIAGNOSIS — E66.01 MORBID OBESITY WITH BMI OF 45.0-49.9, ADULT (HCC): ICD-10-CM

## 2025-05-29 DIAGNOSIS — C61 PROSTATE CANCER (HCC): ICD-10-CM

## 2025-05-29 DIAGNOSIS — F80.81 CHILDHOOD ONSET FLUENCY DISORDER: ICD-10-CM

## 2025-05-29 PROBLEM — I49.2 JUNCTIONAL PREMATURE DEPOLARIZATION (HCC): Status: ACTIVE | Noted: 2025-05-29

## 2025-05-29 LAB
CREAT UR-MCNC: 31.6 MG/DL
MICROALBUMIN UR-MCNC: <7 MG/L
SL AMB  POCT GLUCOSE, UA: NORMAL
SL AMB LEUKOCYTE ESTERASE,UA: NORMAL
SL AMB POCT BILIRUBIN,UA: NORMAL
SL AMB POCT BLOOD,UA: NORMAL
SL AMB POCT CLARITY,UA: CLEAR
SL AMB POCT COLOR,UA: NORMAL
SL AMB POCT HEMOGLOBIN AIC: 6.4 (ref ?–6.5)
SL AMB POCT KETONES,UA: NORMAL
SL AMB POCT NITRITE,UA: NORMAL
SL AMB POCT PH,UA: 6.5
SL AMB POCT SPECIFIC GRAVITY,UA: 1.01
SL AMB POCT URINE PROTEIN: NORMAL
SL AMB POCT UROBILINOGEN: 0.2

## 2025-05-29 PROCEDURE — G0439 PPPS, SUBSEQ VISIT: HCPCS | Performed by: FAMILY MEDICINE

## 2025-05-29 PROCEDURE — 83036 HEMOGLOBIN GLYCOSYLATED A1C: CPT | Performed by: FAMILY MEDICINE

## 2025-05-29 PROCEDURE — 82043 UR ALBUMIN QUANTITATIVE: CPT | Performed by: FAMILY MEDICINE

## 2025-05-29 PROCEDURE — 81002 URINALYSIS NONAUTO W/O SCOPE: CPT | Performed by: FAMILY MEDICINE

## 2025-05-29 PROCEDURE — 82570 ASSAY OF URINE CREATININE: CPT | Performed by: FAMILY MEDICINE

## 2025-05-29 PROCEDURE — G2211 COMPLEX E/M VISIT ADD ON: HCPCS | Performed by: FAMILY MEDICINE

## 2025-05-29 PROCEDURE — 99214 OFFICE O/P EST MOD 30 MIN: CPT | Performed by: FAMILY MEDICINE

## 2025-05-29 RX ORDER — HYDROXYZINE HYDROCHLORIDE 25 MG/1
25 TABLET, FILM COATED ORAL
Qty: 90 TABLET | Refills: 0 | Status: SHIPPED | OUTPATIENT
Start: 2025-05-29

## 2025-05-29 RX ORDER — CEPHALEXIN 500 MG/1
1 CAPSULE ORAL 2 TIMES DAILY
COMMUNITY
Start: 2025-04-16

## 2025-05-29 NOTE — PATIENT INSTRUCTIONS
Medicare Preventive Visit Patient Instructions  Thank you for completing your Welcome to Medicare Visit or Medicare Annual Wellness Visit today. Your next wellness visit will be due in one year (5/30/2026).  The screening/preventive services that you may require over the next 5-10 years are detailed below. Some tests may not apply to you based off risk factors and/or age. Screening tests ordered at today's visit but not completed yet may show as past due. Also, please note that scanned in results may not display below.  Preventive Screenings:  Service Recommendations Previous Testing/Comments   Colorectal Cancer Screening  Colonoscopy    Fecal Occult Blood Test (FOBT)/Fecal Immunochemical Test (FIT)  Fecal DNA/Cologuard Test  Flexible Sigmoidoscopy Age: 45-75 years old   Colonoscopy: every 10 years (May be performed more frequently if at higher risk)  OR  FOBT/FIT: every 1 year  OR  Cologuard: every 3 years  OR  Sigmoidoscopy: every 5 years  Screening may be recommended earlier than age 45 if at higher risk for colorectal cancer. Also, an individualized decision between you and your healthcare provider will decide whether screening between the ages of 76-85 would be appropriate. Colonoscopy: 03/10/2021  FOBT/FIT: Not on file  Cologuard: Not on file  Sigmoidoscopy: Not on file    Screening Current     Prostate Cancer Screening Individualized decision between patient and health care provider in men between ages of 55-69   Medicare will cover every 12 months beginning on the day after your 50th birthday PSA: <0.008 ng/mL     History Prostate Cancer     Hepatitis C Screening Once for adults born between 1945 and 1965  More frequently in patients at high risk for Hepatitis C Hep C Antibody: 04/29/2021    Screening Current   Diabetes Screening 1-2 times per year if you're at risk for diabetes or have pre-diabetes Fasting glucose: 152 mg/dL (3/13/2025)  A1C: 6.4 (5/29/2025)  Screening Not Indicated  History Diabetes    Cholesterol Screening Once every 5 years if you don't have a lipid disorder. May order more often based on risk factors. Lipid panel: 12/15/2024  Screening Not Indicated  History Lipid Disorder      Other Preventive Screenings Covered by Medicare:  Abdominal Aortic Aneurysm (AAA) Screening: covered once if your at risk. You're considered to be at risk if you have a family history of AAA or a male between the age of 65-75 who smoking at least 100 cigarettes in your lifetime.  Lung Cancer Screening: covers low dose CT scan once per year if you meet all of the following conditions: (1) Age 55-77; (2) No signs or symptoms of lung cancer; (3) Current smoker or have quit smoking within the last 15 years; (4) You have a tobacco smoking history of at least 20 pack years (packs per day x number of years you smoked); (5) You get a written order from a healthcare provider.  Glaucoma Screening: covered annually if you're considered high risk: (1) You have diabetes OR (2) Family history of glaucoma OR (3)  aged 50 and older OR (4)  American aged 65 and older  Osteoporosis Screening: covered every 2 years if you meet one of the following conditions: (1) Have a vertebral abnormality; (2) On glucocorticoid therapy for more than 3 months; (3) Have primary hyperparathyroidism; (4) On osteoporosis medications and need to assess response to drug therapy.  HIV Screening: covered annually if you're between the age of 15-65. Also covered annually if you are younger than 15 and older than 65 with risk factors for HIV infection. For pregnant patients, it is covered up to 3 times per pregnancy.    Immunizations:  Immunization Recommendations   Influenza Vaccine Annual influenza vaccination during flu season is recommended for all persons aged >= 6 months who do not have contraindications   Pneumococcal Vaccine   * Pneumococcal conjugate vaccine = PCV13 (Prevnar 13), PCV15 (Vaxneuvance), PCV20 (Prevnar 20)  *  Pneumococcal polysaccharide vaccine = PPSV23 (Pneumovax) Adults 19-65 yo with certain risk factors or if 65+ yo  If never received any pneumonia vaccine: recommend Prevnar 20 (PCV20)  Give PCV20 if previously received 1 dose of PCV13 or PPSV23   Hepatitis B Vaccine 3 dose series if at intermediate or high risk (ex: diabetes, end stage renal disease, liver disease)   Respiratory syncytial virus (RSV) Vaccine - COVERED BY MEDICARE PART D  * RSVPreF3 (Arexvy) CDC recommends that adults 60 years of age and older may receive a single dose of RSV vaccine using shared clinical decision-making (SCDM)   Tetanus (Td) Vaccine - COST NOT COVERED BY MEDICARE PART B Following completion of primary series, a booster dose should be given every 10 years to maintain immunity against tetanus. Td may also be given as tetanus wound prophylaxis.   Tdap Vaccine - COST NOT COVERED BY MEDICARE PART B Recommended at least once for all adults. For pregnant patients, recommended with each pregnancy.   Shingles Vaccine (Shingrix) - COST NOT COVERED BY MEDICARE PART B  2 shot series recommended in those 19 years and older who have or will have weakened immune systems or those 50 years and older     Health Maintenance Due:      Topic Date Due   • Colorectal Cancer Screening  03/10/2026   • Hepatitis C Screening  Completed     Immunizations Due:      Topic Date Due   • COVID-19 Vaccine (5 - 2024-25 season) 09/01/2024     Advance Directives   What are advance directives?  Advance directives are legal documents that state your wishes and plans for medical care. These plans are made ahead of time in case you lose your ability to make decisions for yourself. Advance directives can apply to any medical decision, such as the treatments you want, and if you want to donate organs.   What are the types of advance directives?  There are many types of advance directives, and each state has rules about how to use them. You may choose a combination of any of  the following:  Living will:  This is a written record of the treatment you want. You can also choose which treatments you do not want, which to limit, and which to stop at a certain time. This includes surgery, medicine, IV fluid, and tube feedings.   Durable power of  for healthcare (DPAHC):  This is a written record that states who you want to make healthcare choices for you when you are unable to make them for yourself. This person, called a proxy, is usually a family member or a friend. You may choose more than 1 proxy.  Do not resuscitate (DNR) order:  A DNR order is used in case your heart stops beating or you stop breathing. It is a request not to have certain forms of treatment, such as CPR. A DNR order may be included in other types of advance directives.  Medical directive:  This covers the care that you want if you are in a coma, near death, or unable to make decisions for yourself. You can list the treatments you want for each condition. Treatment may include pain medicine, surgery, blood transfusions, dialysis, IV or tube feedings, and a ventilator (breathing machine).  Values history:  This document has questions about your views, beliefs, and how you feel and think about life. This information can help others choose the care that you would choose.  Why are advance directives important?  An advance directive helps you control your care. Although spoken wishes may be used, it is better to have your wishes written down. Spoken wishes can be misunderstood, or not followed. Treatments may be given even if you do not want them. An advance directive may make it easier for your family to make difficult choices about your care.   Fall Prevention    Fall prevention  includes ways to make your home and other areas safer. It also includes ways you can move more carefully to prevent a fall. Health conditions that cause changes in your blood pressure, vision, or muscle strength and coordination may  increase your risk for falls. Medicines may also increase your risk for falls if they make you dizzy, weak, or sleepy.   Fall prevention tips:   Stand or sit up slowly.    Use assistive devices as directed.    Wear shoes that fit well and have soles that .    Wear a personal alarm.    Stay active.    Manage your medical conditions.    Home Safety Tips:  Add items to prevent falls in the bathroom.    Keep paths clear.    Install bright lights in your home.    Keep items you use often on shelves within reach.    Paint or place reflective tape on the edges of your stairs.    Weight Management   Why it is important to manage your weight:  Being overweight increases your risk of health conditions such as heart disease, high blood pressure, type 2 diabetes, and certain types of cancer. It can also increase your risk for osteoarthritis, sleep apnea, and other respiratory problems. Aim for a slow, steady weight loss. Even a small amount of weight loss can lower your risk of health problems.  How to lose weight safely:  A safe and healthy way to lose weight is to eat fewer calories and get regular exercise. You can lose up about 1 pound a week by decreasing the number of calories you eat by 500 calories each day.   Healthy meal plan for weight management:  A healthy meal plan includes a variety of foods, contains fewer calories, and helps you stay healthy. A healthy meal plan includes the following:  Eat whole-grain foods more often.  A healthy meal plan should contain fiber. Fiber is the part of grains, fruits, and vegetables that is not broken down by your body. Whole-grain foods are healthy and provide extra fiber in your diet. Some examples of whole-grain foods are whole-wheat breads and pastas, oatmeal, brown rice, and bulgur.  Eat a variety of vegetables every day.  Include dark, leafy greens such as spinach, kale, amanda greens, and mustard greens. Eat yellow and orange vegetables such as carrots, sweet potatoes,  and winter squash.   Eat a variety of fruits every day.  Choose fresh or canned fruit (canned in its own juice or light syrup) instead of juice. Fruit juice has very little or no fiber.  Eat low-fat dairy foods.  Drink fat-free (skim) milk or 1% milk. Eat fat-free yogurt and low-fat cottage cheese. Try low-fat cheeses such as mozzarella and other reduced-fat cheeses.  Choose meat and other protein foods that are low in fat.  Choose beans or other legumes such as split peas or lentils. Choose fish, skinless poultry (chicken or turkey), or lean cuts of red meat (beef or pork). Before you cook meat or poultry, cut off any visible fat.   Use less fat and oil.  Try baking foods instead of frying them. Add less fat, such as margarine, sour cream, regular salad dressing and mayonnaise to foods. Eat fewer high-fat foods. Some examples of high-fat foods include french fries, doughnuts, ice cream, and cakes.  Eat fewer sweets.  Limit foods and drinks that are high in sugar. This includes candy, cookies, regular soda, and sweetened drinks.  Exercise:  Exercise at least 30 minutes per day on most days of the week. Some examples of exercise include walking, biking, dancing, and swimming. You can also fit in more physical activity by taking the stairs instead of the elevator or parking farther away from stores. Ask your healthcare provider about the best exercise plan for you.   Narcotic (Opioid) Safety    Use narcotics safely:  Take prescribed narcotics exactly as directed  Do not give narcotics to others or take narcotics that belong to someone else  Do not mix narcotics without medicines or alcohol  Do not drive or operate heavy machinery after you take the narcotic  Monitor for side effects and notify your healthcare provider if you experienced side effects such as nausea, sleepiness, itching, or trouble thinking clearly.    Manage constipation:    Constipation is the most common side effect of narcotic medicine. Constipation  is when you have hard, dry bowel movements, or you go longer than usual between bowel movements. Tell your healthcare provider about all changes in your bowel movements while you are taking narcotics. He or she may recommend laxative medicine to help you have a bowel movement. He or she may also change the kind of narcotic you are taking, or change when you take it. The following are more ways you can prevent or relieve constipation:    Drink liquids as directed.  You may need to drink extra liquids to help soften and move your bowels. Ask how much liquid to drink each day and which liquids are best for you.  Eat high-fiber foods.  This may help decrease constipation by adding bulk to your bowel movements. High-fiber foods include fruits, vegetables, whole-grain breads and cereals, and beans. Your healthcare provider or dietitian can help you create a high-fiber meal plan. Your provider may also recommend a fiber supplement if you cannot get enough fiber from food.  Exercise regularly.  Regular physical activity can help stimulate your intestines. Walking is a good exercise to prevent or relieve constipation. Ask which exercises are best for you.  Schedule a time each day to have a bowel movement.  This may help train your body to have regular bowel movements. Bend forward while you are on the toilet to help move the bowel movement out. Sit on the toilet for at least 10 minutes, even if you do not have a bowel movement.    Store narcotics safely:   Store narcotics where others cannot easily get them.  Keep them in a locked cabinet or secure area. Do not  keep them in a purse or other bag you carry with you. A person may be looking for something else and find the narcotics.  Make sure narcotics are stored out of the reach of children.  A child can easily overdose on narcotics. Narcotics may look like candy to a small child.    The best way to dispose of narcotics:      The laws vary by country and area. In the [place], the best way is to return the narcotics through a take-back program. This program is offered by the [place] Drug Enforcement Agency (YOLANDA). The following are options for using the program:  Take the narcotics to a YOLANDA collection site.  The site is often a law enforcement center. Call your local law enforcement center for scheduled take-back days in your area. You will be given information on where to go if the collection site is in a different location.  Take the narcotics to an approved pharmacy or hospital.  A pharmacy or hospital may be set up as a collection site. You will need to ask if it is a YOLANDA collection site if you were not directed there. A pharmacy or doctor's office may not be able to take back narcotics unless it is a YOLANDA site.  Use a mail-back system.  This means you are given containers to put the narcotics into. You will then mail them in the containers.  Use a take-back drop box.  This is a place to leave the narcotics at any time. People and animals will not be able to get into the box. Your local law enforcement agency can tell you where to find a drop box in your area.    Other ways to manage pain:   Ask your healthcare provider about non-narcotic medicines to control pain.  Nonprescription medicines include NSAIDs (such as ibuprofen) and acetaminophen. Prescription medicines include muscle relaxers, antidepressants, and steroids.  Pain may be managed without any medicines.  Some ways to relieve pain include massage, aromatherapy, or meditation. Physical or occupational therapy may also help.    For more information:   Drug Enforcement Administration  [address] [place] [address]  Phone: 2- 585 - 048-7520  Web Address: https://www.deadiversion.[employer].gov/drug_disposal/    [place] Food and Drug Administration  [address] [place] [address]  Phone: 3- 377 - 908-7047  Web Address: http://www.fda.gov   © Copyright [employer] 2018 Information is for End  User's use only and may not be sold, redistributed or otherwise used for commercial purposes. All illustrations and images included in CareNotes® are the copyrighted property of [employer] or [employer]

## 2025-05-29 NOTE — ASSESSMENT & PLAN NOTE
UA today showed negative for leuk, nit or blood.   Pt tried trospium but no help.   Hx of robotic prostatectomy in 2011.   Advised pt to follow up with urology.   Orders:    POCT urine dip

## 2025-05-29 NOTE — PROGRESS NOTES
Progress Note     Today's date: 2025  Patient name: Marcellus Fregoso  : 1955  MRN: [id number]  Referring provider: Ehsan Calix MD  Dx: No diagnosis found.               Assessment: Pt returns to PT 5 weeks post-op from ventral hernia surgery after hold from therapy. All testing performed based on surgeon's orders of activity as tolerated, no lifting more than 10-15 pounds, and no strenuous exercise. Pt tolerated the session well today despite increased fatigue. Performed progress update to assess function following hold due to surgery. Pt able to complete 6MWT by walking 500ft without an SPC. However, demonstrates decreased endurance and aerobic capacity compared to age matched norms. Pt's gait speed was 0.81 m/s indicating pt is a community ambulator however is still at risk for falls based on their speed being < 1 m/s. Pt's FGA score was 23/30 reducing pts fall risk as scores < 23 are considered at risk for falls, but demonstrates decreased dynamic balance compared to previous sessions. Pts 5x STS test was 13.62s indicating decreased LE strength and power. Pt's TUGcog has declined from 7.34s to 17.78s placing him at a fall risk. During testing pt had 1 anterior LOB requiring modAx1 to recover. Pt also demonstrates increased freezing this session with turning as well worsening L hand tremor. Educated patient about the benefits of OT for hand function and tremor, but pt not agreeable at this time. Due to progressive neurological diagnosis of PD, pt requires skilled PT care as well as the safety equipment in the clinic to adequately challenge balance. Pt would continue to benefit from skilled physical therapy 2x/wk for 8 weeks to improve overall QOL inclusive of, strength, coordination, balance, endurance and functional mobility in the home and in the community as well as reduce their fall risk for improved safety.     GOALS (25):  STG  Patient will improve 6MWT without AD by 100 ft within 4 weeks  demonstrating improved endurance and aerobic capacity.   Patient will improve TUGcog by 5 seconds to decrease fall risk and improve dual-tasking ability within 4 weeks.  Patient will be independent with HEP within 4 weeks for increased compliance with exercise at home.   Patient will improve gait speed to 0.91 m/s indicating a significant change in walking speed and decreased fall risk within 4 weeks.     LTG  Patient will improve 6MWT by 200 ft without AD demonstrating significant improvements in endurance  w/in 8 weeks   Patient will be independent with progressed HEP within 8 weeks for increased compliance with exercise at home.   Patient will improve gait speed to 1.0 m/s indicating they are no longer at higher risk for falls within 8 weeks.   Patient will be able to verbally explain strategies for decreased freezing within 8 weeks for improved safety.   Patient will be able to perform floor transfer independently for improved safety within 8 weeks.      Plan  Patient would benefit from: skilled physical therapy and OT eval  Referral necessary: Yes    Planned therapy interventions: ADL training, balance, activity modification, balance/weight bearing training, body mechanics training, behavior modification, coordination, flexibility, functional ROM exercises, gait training, graded exercise, graded activity, home exercise program, transfer training, therapeutic training, therapeutic exercise, therapeutic activities, stretching, sensory integrative techniques, strengthening, patient/caregiver education, neuromuscular re-education and motor coordination training    Frequency: 2x week  Duration in weeks: 8  Plan of Care beginning date: 04/28/2025  Plan of Care expiration date: 06/23/2025  Treatment plan discussed with: patient      Subjective:     Patient pain: 0/10   Patient goals: improve stamina and balance, decrease freezing episodes  Falls:      Objective: See treatment diary below      Balance Test 01/06 (IE)  02/07 03/07 4/28 5/29   6 Minute Walk Test (ft): 4 min and then needed to stop   400 ft with SPC   HR: 101 bpm  O2: 91%  600 ft, no AD   HR: 81 bpm   O2: 97%  600 ft, no AD   HR: 100 bpm   O2: 94%  500 ft, no AD  HR: 94 bpm   O2: 95%    Gait Speed (ft/s): 20 ft/ 6.78s=  0.90 m/s  20 ft/6.47=  0.94 m/s  0.88 m/s  0.81 m/s     5x Sit To Stand (s): 15.85s, No UE 10.90s, No UE   Poor eccentric control  9.53s, no UE   Improved eccentric control 13.62s, no UE   Poor eccentric control on 1 rep     TUG 9.94s, no AD  TUGcog (count back by 3s), 18.79-9.94 8.21, no AD   TUGcog (count back by 3s): 7.34s  NT 10.28s, no AD  TUGcog (count back by 3s): 17.78 (anterior LOB modAX1)    FGA 19/30 24/30 28/30 23/30         MCTSIB Number of Seconds (02/07) 03/07 5/29   Feet Together, Eyes Open 30 sec 30 sec    Feet Together, Eyes Closed 30 sec 17 sec, 30 sec     Feet Together, Eyes Open Foam 30 sec 30 sec     Feet Together, Eyes Closed Foam 30 sec  8 sec, 30 sec          Short Term Goal Expiration Date: 05/26/2025   Long Term Goal Expiration Date: 06/23/2025  POC Expiration Date: (06/23/2025)    Visit count: 7 of 10; PN due 06/23/2025    POC expires Unit limit Auth Expiration date PT/OT/ST + Visit Limit?   02/20/25 N/A 12/31/25 BOMN   6/23/25 N/A 12/31/25 BOMN                     Visit/Unit Tracking  AUTH Status:  Date 4/28  RE 5/5 5/9 5/16 5/19 5/23 5/29  PN        BOMN Used 1 2 3 4 5 6 7         Remaining  9 8 7 6 5 4 3            Access Code: [email or web address]  URL: [email or web address]  Date: 01/17/2025  Prepared by: Mine Pan    Exercises  - Sit to Stand with Arms Crossed  - 1 x daily - 7 x weekly - 3 sets - 10 reps  - Walking March  - 1 x daily - 7 x weekly - 1 sets - 3 reps  - Side Stepping with Counter Support  - 1 x daily - 7 x weekly - 1 sets - 3 reps      Precautions Fall risk, T2DM, neuropathy, HTN, syncope, depression       Manuals 5/19 5/23 5/29 5/9 5/16                                   Neuro Re-Ed    Pt education  "on mCTSIB testing, FGA, TUG   Pt education on freezing strategies including rocking/marching    Pt education on HR zone for neuroplasticity with PD     Pt education on HR zone for neuroplasticity with PD     STS X 12   Standing on airex   No UE 2 x 10   10# tidal tank      2 x 10   Standing on airex   No UE    Hurdles    Solo  1/4 track   (6) 6\" hurdles   Fwd x 4 laps   Lat x 2 laps     HKM        HIGT        Lat stepping        Step up/foam    Solo  1/4 track   (4) airex   Fwd x 2 laps     Blazepods     Solo  1/4 track   4 directional stepping with 6\" hurdles   4 pods   4 rounds x 10 hits    Dynamic balance  Solo  1/4 track   (4) WBOS foam beams   (5) 6\" hurdles   Fwd x 2 laps   Lat x 3 laps     Solo  1/4 track   Cones taps (6)   Emphasis on hip flexion   Fwd X 3 laps     Solo  1/4 track   Small/med river rocks with (2) airex   Fwd  2 laps    Step-ups     Solo  8\" step   Fwd 2 x 10 each leg     Agility ladder   Solo  1/4 track   Fwd with big stepping (Every other block) x 2 laps   Lat x 2 laps   Lat (2 feet in 2 feet bwd) x 2 laps       Trunk rotation        Ther Ex   Pt education on testing including 6MWT, 5xSTS, gait speed      Treadmill  Solo  B/l UE   1.3 mph   0% incline   X 3 min bout   X 2 min bout   X 3 min bout   X 2 min bout     X 10 total   Neural priming and cardiovascular endurance     HR: 112 bpm  O2: 96%   Solo  1.4 mph   0% incline   B/l UE  X 5 min bout   X 2 min bout   X 3 min bout     X 10 min total     VC for upright posture and increased step length  Solo  1/4 mph   0% incline   B/l UE   1.4 mph x 2 min   1.3 mph x 3 min     1.3 mph x 5 min     X 10 min total   Neural priming and cardiovascular endurnace    Nu-step L7 x 13 min     Cardiovascular endurance and neural priming                                                        Ther Activity                        Gait Training                        Modalities                                                                   "

## 2025-05-29 NOTE — ASSESSMENT & PLAN NOTE
Lab Results   Component Value Date    HGBA1C 6.4 05/29/2025     Controlled. Low carb diet. Continue current meds.   Orders:    POCT hemoglobin A1c    Albumin / creatinine urine ratio    CBC; Future    Comprehensive metabolic panel; Future    Hemoglobin A1C; Future    Lipid panel; Future    Vitamin B12; Future

## 2025-05-29 NOTE — PROGRESS NOTES
Name: Marcellus Fregoso      : 1955      MRN: [id number]  Encounter Provider: Ehsan Calix MD  Encounter Date: 2025   Encounter department: [facility]  :  Assessment & Plan  Urinary incontinence, unspecified type  UA today showed negative for leuk, nit or blood.   Pt tried trospium but no help.   Hx of robotic prostatectomy in .   Advised pt to follow up with urology.   Orders:    POCT urine dip    Prostate cancer ([facility])  [facility] urology.        Controlled type 2 diabetes mellitus with diabetic polyneuropathy, without long-term current use of insulin ([facility])    Lab Results   Component Value Date    HGBA1C 6.4 2025     Controlled. Low carb diet. Continue current meds.   Orders:    POCT hemoglobin A1c    Albumin / creatinine urine ratio    CBC; Future    Comprehensive metabolic panel; Future    Hemoglobin A1C; Future    Lipid panel; Future    Vitamin B12; Future    Insomnia    Orders:    hydrOXYzine HCL (ATARAX) 25 mg tablet; Take 1 tablet (25 mg total) by mouth daily at bedtime    Childhood onset fluency disorder  FU psychiatry in [place].        Parkinson's disease without dyskinesia, with fluctuating manifestations ([facility])  [facility] neurology in [place].        Benign essential hypertension  Controlled. DASH diet. Continue current meds. FU cardiology.     Orders:    CBC; Future    Comprehensive metabolic panel; Future    Hemoglobin A1C; Future    Lipid panel; Future    Vitamin B12; Future     Hyperlipidemia, unspecified hyperlipidemia type  Low fat diet. Continue crestor 5mg weekly per cardiology.     Orders:    CBC; Future    Comprehensive metabolic panel; Future    Hemoglobin A1C; Future    Lipid panel; Future    Vitamin B12; Future     Vitamin B12 deficiency  Continue Vit B12 shot monthly at home.   Orders:    CBC; Future    Comprehensive metabolic panel; Future    Hemoglobin A1C; Future    Lipid panel; Future    Vitamin B12; Future    Morbid obesity with BMI of 45.0-49.9, adult ([facility])          Medicare annual wellness visit, subsequent              Flu shot yearly.   Refused covid19 booster.   Got PCV20 in 2024.   Got shingrix.   Colonoscopy 12/2020, recheck in 10 years.   RTO in 6 months.     POA---wife  Living will---Full code           Preventive health issues were discussed with patient, and age appropriate screening tests were ordered as noted in patient's After Visit Summary. Personalized health advice and appropriate referrals for health education or preventive services given if needed, as noted in patient's After Visit Summary.    History of Present Illness       Pt is here by himself.   Urinary incontinence/hx of prostate cancer---[facility] urology.   He is on trospium 20mg bid but no help.     DM---Today hgA1C 6.4 controlled.   He is on ozempic 2mg/week and metformin 1000mg bid.     Insomnia/stutter----[facility] [place] psychiatry/therapist. No more trazodone.   He is on seroquel 100mg qhs.   Would like to continue atarax prn. Need refills.      Parkinson's disease---[facility] neurology in [place]. He is on sinemet 3-4 times/day.      HTN---[facility] cardiology. He is on metoprolol 25mg bid, amlodipine 5mg QD prn and losartan 100mg QD.      Hyperlipidemia---He is on zetia 5mg qhs and crestor 5mg weekly per cardiology.      Molina's esophagus---[facility] GI. He is on pantoprazole 40mg bid and carafate prn.     Vit B12 deficiency---he is on vit B12 shot monthly.     Lives with wife. Does all ADL's. Use cane.  Fell several times.         Urinary Frequency   Associated symptoms include frequency. Pertinent negatives include no chills, nausea or vomiting.      Patient Care Team:  Ehsan Calix MD as PCP - General (Family Medicine)  Dianne Jimenez MD as PCP - Endocrinology (Endocrinology)  MD Taco Ceballos MD Sagar V. Mehta, MD Eric Mayer, MD Michael Abgott, MD Michael Abgott, MD Shweta Batra, ELIU as Diabetes Educator (Nutrition)    Review of Systems   Constitutional:  Negative for appetite change, chills and  fever.   HENT:  Negative for congestion, ear pain, sinus pain and sore throat.    Eyes:  Negative for discharge and itching.   Respiratory:  Negative for apnea, cough, chest tightness, shortness of breath and wheezing.    Cardiovascular:  Negative for chest pain, palpitations and leg swelling.   Gastrointestinal:  Negative for abdominal pain, anal bleeding, constipation, diarrhea, nausea and vomiting.   Endocrine: Negative for cold intolerance, heat intolerance and polyuria.   Genitourinary:  Positive for frequency. Negative for difficulty urinating and dysuria.   Musculoskeletal:  Negative for arthralgias, back pain and myalgias.   Skin:  Negative for rash.   Neurological:  Negative for dizziness and headaches.   Psychiatric/Behavioral:  Negative for agitation.      Medical History Reviewed by provider this encounter:  Tobacco  Problems  Med Hx  Surg Hx  Fam Hx  Soc Hx      Annual Wellness Visit Questionnaire   Marcellus is here for his Subsequent Wellness visit. Last Medicare Wellness visit information reviewed, patient interviewed and updates made to the record today.      Health Risk Assessment:   Patient rates overall health as poor. Patient feels that their physical health rating is same. Patient is satisfied with their life. Eyesight was rated as same. Hearing was rated as same. Patient feels that their emotional and mental health rating is same. Patients states they are sometimes angry. Patient states they are often unusually tired/fatigued. Pain experienced in the last 7 days has been none. Patient states that he has experienced no weight loss or gain in last 6 months.     Depression Screening:   PHQ-9 Score: 10      Fall Risk Screening:   In the past year, patient has experienced: history of falling in past year    Number of falls: 2 or more  Injured during fall?: Yes    Feels unsteady when standing or walking?: Yes    Worried about falling?: Yes      Home Safety:  Patient has trouble with stairs inside  or outside of their home. Patient has working smoke alarms and has working carbon monoxide detector. Home safety hazards include: none.     Medications:   Patient is currently taking over-the-counter supplements. OTC medications include: see medication list. Patient is able to manage medications.     Activities of Daily Living (ADLs)/Instrumental Activities of Daily Living (IADLs):   Walk and transfer into and out of bed and chair?: Yes  Dress and groom yourself?: Yes    Bathe or shower yourself?: Yes    Feed yourself? Yes  Do your laundry/housekeeping?: No  Manage your money, pay your bills and track your expenses?: Yes  Make your own meals?: Yes    Do your own shopping?: Yes    Previous Hospitalizations:   Any hospitalizations or ED visits within the last 12 months?: Yes    How many hospitalizations have you had in the last year?: 1-2    Advance Care Planning:   Living will: Yes    Durable POA for healthcare: Yes    Advanced directive: Yes    Provider agrees with end of life decisions: Yes      Cognitive Screening:   Provider or family/friend/caregiver concerned regarding cognition?: No    Preventive Screenings      Cardiovascular Screening:    General: History Lipid Disorder and Screening Current      Diabetes Screening:     General: History Diabetes and Screening Current      Colorectal Cancer Screening:     General: Screening Current      Prostate Cancer Screening:    General: History Prostate Cancer      Abdominal Aortic Aneurysm (AAA) Screening:    Risk factors include: age between 65-74 yo        Lung Cancer Screening:     General: Screening Not Indicated      Hepatitis C Screening:    General: Screening Current    Screening, Brief Intervention, and Referral to Treatment (SBIRT)     Screening    Typical number of drinks in a week: 0    Review of Current Opioid Use    Opioid Risk Tool (ORT) Interpretation: Complete Opioid Risk Tool (ORT)    Social Drivers of Health     Financial Resource Strain: Low Risk   "(5/27/2021)    Overall Financial Resource Strain (CARDIA)     Difficulty of Paying Living Expenses: Not hard at all   Food Insecurity: No Food Insecurity (5/29/2025)    Nursing - Inadequate Food Risk Classification     Worried About Running Out of Food in the Last Year: Never true     Ran Out of Food in the Last Year: Never true     Ran Out of Food in the Last Year: Never true   Transportation Needs: No Transportation Needs (5/29/2025)    PRAPARE - Transportation     Lack of Transportation (Medical): No     Lack of Transportation (Non-Medical): No   Housing Stability: Low Risk  (5/29/2025)    Housing Stability Vital Sign     Unable to Pay for Housing in the Last Year: No     Number of Times Moved in the Last Year: 0     Homeless in the Last Year: No   Utilities: Not At Risk (5/29/2025)    [facility] Utilities     Threatened with loss of utilities: No     No results found.    Objective   /82 (BP Location: Left arm, Patient Position: Sitting, Cuff Size: Large)   Pulse 74   Temp (!) 97.4 °F (36.3 °C) (Tympanic)   Resp 18   Ht 5' 9\" (1.753 m)   Wt (!) 138 kg (305 lb) Comment: Unable to obtain in office due to weakness, reports 305lb this am at home  SpO2 95%   BMI 45.04 kg/m²     Physical Exam  Constitutional:       Appearance: He is well-developed.   HENT:      Head: Normocephalic and atraumatic.     Eyes:      General:         Right eye: No discharge.         Left eye: No discharge.      Conjunctiva/sclera: Conjunctivae normal.       Cardiovascular:      Rate and Rhythm: Normal rate and regular rhythm.      Heart sounds: Normal heart sounds. No murmur heard.     No friction rub. No gallop.   Pulmonary:      Effort: Pulmonary effort is normal. No respiratory distress.      Breath sounds: Normal breath sounds. No wheezing or rales.   Abdominal:      General: Bowel sounds are normal. There is no distension.      Palpations: Abdomen is soft.      Tenderness: There is no abdominal tenderness. There is no guarding. "     Musculoskeletal:      Cervical back: Normal range of motion and neck supple.      Right lower leg: Edema present.      Left lower leg: Edema present.      Comments: Use cane     Neurological:      Mental Status: He is alert.

## 2025-05-29 NOTE — ASSESSMENT & PLAN NOTE
Controlled. DASH diet. Continue current meds. FU cardiology.     Orders:    CBC; Future    Comprehensive metabolic panel; Future    Hemoglobin A1C; Future    Lipid panel; Future    Vitamin B12; Future

## 2025-05-29 NOTE — TELEPHONE ENCOUNTER
Patient called to report that he has increased incontinence from his Parkinson's.  He is prescribed sanctura, which helped in the past but he feels no longer is working.  He had a PCP visit today and she stated he should call urology for an appointment. Patient had a negative urine dip in the PCP office. He reports no other urinary issues.  Patient requested an appointment as soon as possible.      Appointment scheduled for 5/30 @3:15

## 2025-05-29 NOTE — ASSESSMENT & PLAN NOTE
Orders:    hydrOXYzine HCL (ATARAX) 25 mg tablet; Take 1 tablet (25 mg total) by mouth daily at bedtime

## 2025-05-29 NOTE — ASSESSMENT & PLAN NOTE
Low fat diet. Continue crestor 5mg weekly per cardiology.     Orders:    CBC; Future    Comprehensive metabolic panel; Future    Hemoglobin A1C; Future    Lipid panel; Future    Vitamin B12; Future

## 2025-05-30 ENCOUNTER — TELEPHONE (OUTPATIENT)
Dept: UROLOGY | Facility: AMBULATORY SURGERY CENTER | Age: 70
End: 2025-05-30

## 2025-05-30 ENCOUNTER — OFFICE VISIT (OUTPATIENT)
Dept: GASTROENTEROLOGY | Facility: CLINIC | Age: 70
End: 2025-05-30
Payer: MEDICARE

## 2025-05-30 ENCOUNTER — TELEPHONE (OUTPATIENT)
Dept: ADMINISTRATIVE | Facility: OTHER | Age: 70
End: 2025-05-30

## 2025-05-30 ENCOUNTER — OFFICE VISIT (OUTPATIENT)
Dept: UROLOGY | Facility: AMBULATORY SURGERY CENTER | Age: 70
End: 2025-05-30
Payer: MEDICARE

## 2025-05-30 VITALS
BODY MASS INDEX: 45.62 KG/M2 | OXYGEN SATURATION: 94 % | WEIGHT: 308 LBS | DIASTOLIC BLOOD PRESSURE: 64 MMHG | HEIGHT: 69 IN | SYSTOLIC BLOOD PRESSURE: 134 MMHG | HEART RATE: 90 BPM

## 2025-05-30 VITALS
DIASTOLIC BLOOD PRESSURE: 78 MMHG | TEMPERATURE: 97.8 F | SYSTOLIC BLOOD PRESSURE: 136 MMHG | BODY MASS INDEX: 45.59 KG/M2 | WEIGHT: 307.8 LBS | HEIGHT: 69 IN

## 2025-05-30 DIAGNOSIS — K22.719 BARRETT'S ESOPHAGUS WITH DYSPLASIA: ICD-10-CM

## 2025-05-30 DIAGNOSIS — R13.19 ESOPHAGEAL DYSPHAGIA: Primary | ICD-10-CM

## 2025-05-30 DIAGNOSIS — K21.00 GASTROESOPHAGEAL REFLUX DISEASE WITH ESOPHAGITIS WITHOUT HEMORRHAGE: ICD-10-CM

## 2025-05-30 DIAGNOSIS — Z85.46 HISTORY OF PROSTATE CANCER: ICD-10-CM

## 2025-05-30 DIAGNOSIS — N32.81 OAB (OVERACTIVE BLADDER): Primary | ICD-10-CM

## 2025-05-30 PROCEDURE — 99214 OFFICE O/P EST MOD 30 MIN: CPT

## 2025-05-30 PROCEDURE — G2211 COMPLEX E/M VISIT ADD ON: HCPCS

## 2025-05-30 PROCEDURE — 99214 OFFICE O/P EST MOD 30 MIN: CPT | Performed by: INTERNAL MEDICINE

## 2025-05-30 PROCEDURE — G2211 COMPLEX E/M VISIT ADD ON: HCPCS | Performed by: INTERNAL MEDICINE

## 2025-05-30 RX ORDER — SODIUM CHLORIDE, SODIUM LACTATE, POTASSIUM CHLORIDE, CALCIUM CHLORIDE 600; 310; 30; 20 MG/100ML; MG/100ML; MG/100ML; MG/100ML
125 INJECTION, SOLUTION INTRAVENOUS CONTINUOUS
OUTPATIENT
Start: 2025-05-30

## 2025-05-30 RX ORDER — MIRABEGRON 25 MG/1
25 TABLET, FILM COATED, EXTENDED RELEASE ORAL DAILY
Qty: 30 TABLET | Refills: 6 | Status: SHIPPED | OUTPATIENT
Start: 2025-05-30 | End: 2025-06-02

## 2025-05-30 NOTE — LETTER
Diabetic Eye Exam Form    Date Requested: 25  Patient: Marcellus Fregoso  Patient : 1955   Referring Provider: Ehsan Calix MD      DIABETIC Eye Exam Date _______________________________      Type of Exam MUST be documented for Diabetic Eye Exams. Please CHECK ONE.     Retinal Exam       Dilated Retinal Exam       OCT       Optomap-Iris Exam      Fundus Photography       Left Eye - Please check Retinopathy or No Retinopathy        Exam did show retinopathy    Exam did not show retinopathy       Right Eye - Please check Retinopathy or No Retinopathy       Exam did show retinopathy    Exam did not show retinopathy       Comments __________________________________________________________    Practice Providing Exam ______________________________________________    Exam Performed By (print name) _______________________________________      Provider Signature ___________________________________________________      These reports are needed for  compliance.    Please fax this completed form and a copy of the Diabetic Eye Exam report to the [facility] Based Department as soon as possible via Fax [phone], attention [place]: Phone [phone]. Our office is located at [address] [place] [address].     We thank you for your assistance in treating our mutual patient.   [facility] - [phone] F [phone]

## 2025-05-30 NOTE — LETTER
Diabetic Eye Exam Form    Date Requested: 25  Patient: Marcellus Fregoso  Patient : 1955   Referring Provider: Ehsan Calix MD      DIABETIC Eye Exam Date _______________________________      Type of Exam MUST be documented for Diabetic Eye Exams. Please CHECK ONE.     Retinal Exam       Dilated Retinal Exam       OCT       Optomap-Iris Exam      Fundus Photography       Left Eye - Please check Retinopathy or No Retinopathy        Exam did show retinopathy    Exam did not show retinopathy       Right Eye - Please check Retinopathy or No Retinopathy       Exam did show retinopathy    Exam did not show retinopathy       Comments __________________________________________________________    Practice Providing Exam ______________________________________________    Exam Performed By (print name) _______________________________________      Provider Signature ___________________________________________________      These reports are needed for  compliance.    Please fax this completed form and a copy of the Diabetic Eye Exam report to the [facility] Based Department as soon as possible via Fax [phone], attention [place]: Phone [phone]. Our office is located at [address] [place] [address].     We thank you for your assistance in treating our mutual patient.   [facility] - [phone] F [phone]

## 2025-05-30 NOTE — H&P (VIEW-ONLY)
Name: Marcellus Fregoso      : 1955      MRN: [id number]  Encounter Provider: Valentino Lerma MD  Encounter Date: 2025   Encounter department: [facility] [place] [facility]  :  Assessment & Plan  Esophageal dysphagia    Orders:    FL barium swallow video w speech; Future    EGD; Future    Gastroesophageal reflux disease with esophagitis without hemorrhage    Orders:    Vonoprazan Fumarate 20 MG TABS; Take 20 mg by mouth in the morning    Molina's esophagus with dysplasia    Orders:    Vonoprazan Fumarate 20 MG TABS; Take 20 mg by mouth in the morning      Assessment & Plan  1. Dysphagia to solids:  2.  Segment Molina's esophagus status post radiofrequency ablation  3.  Gastroesophageal reflux disease  4.  Hiatal hernia  - Endoscopy to evaluate the esophagus and potential stricture; dilation if necessary.  - Barium swallow test to assess oropharyngeal dysphagia in the setting of parkinsonism  - Continue pantoprazole 40 mg twice daily and sucralfate if symptomatic relief is noted.  -I will switch his pantoprazole to vonoprazan due to an adequate response.  Vonoprazan 20 mg sent to [facility] pharmacy in [place]  - Dietary modifications: consume soft foods, cut meat and vegetables into small pieces, chew thoroughly, and include moist, gravy-rich foods.  - Prescription for vonoprazan sent to pharmacy; pursue prior authorization.  - I reviewed diet and lifestyle precautions. This includes limiting coffee, soda, tomatoes, citrus, fatty and spicy foods.  I recommend waiting 3 hours after dinner to lie down. I recommend eating small frequent meals as well as sleeping with head of bed elevated at night.          Follow-up: 2-3 weeks.      History of Present Illness   Marcellus Fregoso is a 69 y.o. male who presents valuation of dysphagia  History of Present Illness  The patient presents for evaluation of dysphagia.    Approximately 2 weeks ago, he experienced an incident where a pill became lodged  "in his throat, causing significant discomfort. He describes the sensation as akin to a sprained muscle. This is a new symptom that has not been previously reported during his regular visits over the past 6 months. He also reports a burning sensation, which is not constant but occurs intermittently. To alleviate this, he has been sleeping on a wedge, which provides some relief. He is currently on a regimen of pantoprazole, taken twice daily, and sucralfate, which he takes as needed. He has expressed interest in undergoing an endoscopy under general anesthesia. He has been proactive in managing his condition by cutting meat and vegetables into small pieces and ensuring thorough mastication. He has also incorporated pudding into his diet as a snack at night. He underwent a barium swallow test 5 years ago, which did not reveal any abnormalities.    He has Parkinson's disease and has speech issues.    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   HENT:  Negative for ear pain and sore throat.    Eyes:  Negative for pain and visual disturbance.   Respiratory:  Negative for cough and shortness of breath.    Cardiovascular:  Negative for chest pain and palpitations.   Gastrointestinal:  Negative for abdominal pain and vomiting.   Genitourinary:  Negative for dysuria and hematuria.   Musculoskeletal:  Negative for arthralgias and back pain.   Skin:  Negative for color change and rash.   Neurological:  Negative for seizures and syncope.   All other systems reviewed and are negative.   A complete review of systems is negative other than that noted above in the HPI.      Current Medications[1]  Objective   /78   Temp 97.8 °F (36.6 °C) (Oral)   Ht 5' 9\" (1.753 m)   Wt (!) 140 kg (307 lb 12.8 oz)   BMI 45.45 kg/m²     Physical Exam  Vitals and nursing note reviewed.   Constitutional:       General: He is not " in acute distress.     Appearance: He is well-developed.   HENT:      Head: Normocephalic and atraumatic.     Eyes:      Conjunctiva/sclera: Conjunctivae normal.       Cardiovascular:      Rate and Rhythm: Normal rate and regular rhythm.      Heart sounds: No murmur heard.  Pulmonary:      Effort: Pulmonary effort is normal. No respiratory distress.      Breath sounds: Normal breath sounds.   Abdominal:      Palpations: Abdomen is soft.      Tenderness: There is no abdominal tenderness.     Musculoskeletal:         General: No swelling.      Cervical back: Neck supple.     Skin:     General: Skin is warm and dry.      Capillary Refill: Capillary refill takes less than 2 seconds.     Neurological:      Mental Status: He is alert.     Psychiatric:         Mood and Affect: Mood normal.        Physical Exam  Neck: No lumps detected.  Heart: Heart sounds normal.  Lungs: Lungs sound clear.    Results    Lab Results: I personally reviewed relevant lab results.       Results for orders placed during the hospital encounter of 10/18/24    EGD    Impression  C0M2 Molina's esophagus; tissue was ablated with radiofrequency ablation.  3 cm hiatal hernia (Hill 4)  The stomach and duodenum appeared normal.    RECOMMENDATION:  Schedule repeat EGD  Molina's esophagus surveillance    Continue pantoprazole  Magic mouth wash as needed for pain  Carafate 1 g three times a day with meals for 1 week  Repeat EGD with RFA in 1 year            Valentino Lerma MD             [1]   Current Outpatient Medications   Medication Sig Dispense Refill    albuterol (2.5 mg/3 mL) 0.083 % nebulizer solution TAKE 3 ML (2.5 MG TOTAL) BY NEBULIZATION EVERY 6 HOURS AS NEEDED FOR WHEEZING OR SHORTNESS OF BREATH 75 mL 2    albuterol (ProAir HFA) 90 mcg/act inhaler Inhale 2 puffs every 6 (six) hours as needed for wheezing 8.5 g 0    amLODIPine (NORVASC) 5 mg tablet TAKE 1 TABLET DAILY 90 tablet 1    Blood Glucose Monitoring Suppl (OneTouch Verio) w/Device KIT  Use daily 1 kit 0    carbidopa-levodopa (SINEMET)  mg per tablet Take 1 tablet by mouth in the morning and 1 tablet at noon and 1 tablet in the evening.      cephalexin (KEFLEX) 500 mg capsule Take 1 capsule by mouth in the morning and 1 capsule before bedtime.      clindamycin 1 % gel       cyanocobalamin 1,000 mcg/mL Inject 1 mL (1,000 mcg total) into a muscle every 14 (fourteen) days 10 mL 3    ezetimibe (ZETIA) 10 mg tablet Take 0.5 tablets (5 mg total) by mouth daily      FLUoxetine (PROzac) 40 MG capsule Take 1 capsule (40 mg total) by mouth daily 30 capsule 1    glucose blood (OneTouch Verio) test strip Use 1 each in the morning USE TO TEST DAILY 100 strip 3    hydrOXYzine HCL (ATARAX) 25 mg tablet Take 1 tablet (25 mg total) by mouth daily at bedtime 90 tablet 0    Lancet Devices (ONE TOUCH DELICA LANCING DEV) MISC Use daily 1 each 0    Lancets (OneTouch Delica Plus [id number]) MISC Use 1 Application in the morning 100 each 3    latanoprost (XALATAN) 0.005 % ophthalmic solution       losartan (COZAAR) 100 MG tablet Take 1 tablet (100 mg total) by mouth daily 90 tablet 1    losartan (COZAAR) 100 MG tablet Take 1 tablet (100 mg total) by mouth daily 14 tablet 0    metFORMIN (GLUCOPHAGE) 500 mg tablet TAKE 2 TABLETS TWICE A DAY WITH MEALS 360 tablet 1    metoprolol succinate (TOPROL-XL) 25 mg 24 hr tablet Take 1 tablet (25 mg total) by mouth 2 (two) times a day 180 tablet 1    metoprolol succinate (TOPROL-XL) 25 mg 24 hr tablet Take 1 tablet (25 mg total) by mouth 2 (two) times a day 28 tablet 0    Multiple Vitamins-Minerals (CENTRUM ADULTS PO) Take by mouth in the morning      pantoprazole (PROTONIX) 40 mg tablet TAKE 1 TABLET TWICE A DAY (1 TABLET IN THE MORNING AND 1 TABLET IN THE EVENING) 180 tablet 1    QUEtiapine (SEROquel) 50 mg tablet Take 1 tablet (50 mg total) by mouth daily at bedtime 90 tablet 0    rosuvastatin (CRESTOR) 5 mg tablet TAKE 1 TABLET ONCE A WEEK 12 tablet 1    semaglutide, 2  "mg/dose, (Ozempic, 2 MG/DOSE,) 8 mg/ mL injection pen INJECT 2 MG UNDER THE SKIN EVERY 7 DAYS 9 mL 0    senna-docusate sodium (SENOKOT S) 8.6-50 mg per tablet Take 1 tablet by mouth daily at bedtime 30 tablet 0    sucralfate (CARAFATE) 1 g tablet Take 1 tablet (1 g total) by mouth 2 (two) times a day 60 tablet 3    SYRINGE-NEEDLE, DISP, 3 ML 23G X 1\" 3 ML MISC Use every 30 (thirty) days 50 each 0    trospium chloride (SANCTURA) 20 mg tablet Take 1 tablet (20 mg total) by mouth 2 (two) times a day 180 tablet 1    Vonoprazan Fumarate 20 MG TABS Take 20 mg by mouth in the morning 90 tablet 3     Current Facility-Administered Medications   Medication Dose Route Frequency Provider Last Rate Last Admin    cyanocobalamin injection 1,000 mcg  1,000 mcg Intramuscular Q30 Days    1,000 mcg at 03/28/25 1535     "

## 2025-05-30 NOTE — TELEPHONE ENCOUNTER
----- Message from Princess OQUENDO sent at 5/29/2025  2:53 PM EDT -----  Regarding: care gap request  05/29/25 2:53 PMHello, our patient attached above has had Diabetic Eye Exam completed/performed. Please assist in updating the patient chart by making an External outreach to [facility]  facility located in [place] . The date of service is 6 weeks ago.Thank you,Princess [facility]

## 2025-05-30 NOTE — PROGRESS NOTES
"Ambulatory Visit  Name: Marcellus Fregoso      : 1955      MRN: [id number]  Encounter Provider: Florencio Juárez MD  Encounter Date: 2025   Encounter department: [facility] UROLOGY [place]    Assessment & Plan  OAB (overactive bladder)  We discussed that his symptoms may be from overactive bladder but that he could have an element of anatomic obstruction if he has developed scar tissue since his prostatectomy.     He has had elevated post-void residuals, which is unusual after radical prostatectomy.     He describes his incontinence as urge incontinence. He has no stress incontinence.     I explained that it may be beneficial to do an anatomic assessment.     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     - Stop trospium   - Start myrbetriq 25mg daily   - Schedule for next available cystoscopy with me    Orders:    POCT Measure PVR    Mirabegron ER 25 MG TB24; Take 25 mg by mouth daily    History of prostate cancer  S/p RALP in . No evidence of recurrence.     Due for PSA in          History of Present Illness     Marcellus Fregoso is a 69 y.o. male who presents for follow up of lower urinary tract symptoms and prostate cancer.     History of RALP with Dr. Tay in . PSA has been undetectable since. He did not get radiation.     Some time in the past 6 years, he developed frequency and urgency. He was diagnosed with overactive bladder and placed on trospium.     Urinalyses have been negative. He denies gross hematuria.     For the past 6 months he has noticed worsening frequency and incontinence. Trospium is no longer effective for him as it once had been.     He has Parkinson's disease and has been more symptomatic of late.             History obtained from : patient        Objective     /64 (BP Location: Left arm, Patient Position: Sitting, Cuff Size: Standard)   Pulse 90   Ht 5' 9\" (1.753 m)   Wt (!) 140 kg (308 lb)   SpO2 94%   BMI 45.48 " kg/m²   Physical Exam  Vitals:    05/30/25 1445   BP: 134/64   Pulse: 90   SpO2: 94%      General: Well appearing, no acute distress   HEENT: normocephalic, atraumatic  Eyes: extraocular movements intact  Cardiovascular: normal rate   Respiratory: no respiratory distress, effort normal   Abdominal: Non-distended, non-tender   : Deferred  MSK: Grossly normal range of motion   Neurological: No gross focal deficits  Behavioral: Normal mood and affect     Results  Lab Results   Component Value Date    PSA <0.008 06/24/2024    PSA <0.1 04/20/2023    PSA <0.1 03/06/2022     Lab Results   Component Value Date    GLUCOSE 174 (H) 12/30/2020    CALCIUM 9.2 03/13/2025     08/16/2015    K 3.8 03/13/2025    CO2 27 03/13/2025     03/13/2025    BUN 14 03/13/2025    CREATININE 0.91 03/13/2025     Lab Results   Component Value Date    WBC 7.25 03/13/2025    HGB 14.1 03/13/2025    HCT 41.9 03/13/2025    MCV 88 03/13/2025     03/13/2025       Office Urine Dip  No results found for this or any previous visit (from the past hour).]    Administrative Statements

## 2025-05-30 NOTE — PROGRESS NOTES
Name: Marcellus Fregoso      : 1955      MRN: [id number]  Encounter Provider: Valentino Lerma MD  Encounter Date: 2025   Encounter department: [facility] [place]  :  Assessment & Plan  Esophageal dysphagia    Orders:    FL barium swallow video w speech; Future    EGD; Future    Gastroesophageal reflux disease with esophagitis without hemorrhage    Orders:    Vonoprazan Fumarate 20 MG TABS; Take 20 mg by mouth in the morning    Molina's esophagus with dysplasia    Orders:    Vonoprazan Fumarate 20 MG TABS; Take 20 mg by mouth in the morning      Assessment & Plan  1. Dysphagia to solids:  2.  Segment Molina's esophagus status post radiofrequency ablation  3.  Gastroesophageal reflux disease  4.  Hiatal hernia  - Endoscopy to evaluate the esophagus and potential stricture; dilation if necessary.  - Barium swallow test to assess oropharyngeal dysphagia in the setting of parkinsonism  - Continue pantoprazole 40 mg twice daily and sucralfate if symptomatic relief is noted.  -I will switch his pantoprazole to vonoprazan due to an adequate response.  Vonoprazan 20 mg sent to [facility] pharmacy in [place]  - Dietary modifications: consume soft foods, cut meat and vegetables into small pieces, chew thoroughly, and include moist, gravy-rich foods.  - Prescription for vonoprazan sent to pharmacy; pursue prior authorization.  - I reviewed diet and lifestyle precautions. This includes limiting coffee, soda, tomatoes, citrus, fatty and spicy foods.  I recommend waiting 3 hours after dinner to lie down. I recommend eating small frequent meals as well as sleeping with head of bed elevated at night.          Follow-up: 2-3 weeks.      History of Present Illness   Marcellus Fregoso is a 69 y.o. male who presents valuation of dysphagia  History of Present Illness  The patient presents for evaluation of dysphagia.    Approximately 2 weeks ago, he experienced an incident where a pill became lodged  "in his throat, causing significant discomfort. He describes the sensation as akin to a sprained muscle. This is a new symptom that has not been previously reported during his regular visits over the past 6 months. He also reports a burning sensation, which is not constant but occurs intermittently. To alleviate this, he has been sleeping on a wedge, which provides some relief. He is currently on a regimen of pantoprazole, taken twice daily, and sucralfate, which he takes as needed. He has expressed interest in undergoing an endoscopy under general anesthesia. He has been proactive in managing his condition by cutting meat and vegetables into small pieces and ensuring thorough mastication. He has also incorporated pudding into his diet as a snack at night. He underwent a barium swallow test 5 years ago, which did not reveal any abnormalities.    He has Parkinson's disease and has speech issues.    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   HENT:  Negative for ear pain and sore throat.    Eyes:  Negative for pain and visual disturbance.   Respiratory:  Negative for cough and shortness of breath.    Cardiovascular:  Negative for chest pain and palpitations.   Gastrointestinal:  Negative for abdominal pain and vomiting.   Genitourinary:  Negative for dysuria and hematuria.   Musculoskeletal:  Negative for arthralgias and back pain.   Skin:  Negative for color change and rash.   Neurological:  Negative for seizures and syncope.   All other systems reviewed and are negative.   A complete review of systems is negative other than that noted above in the HPI.      Current Medications[1]  Objective   /78   Temp 97.8 °F (36.6 °C) (Oral)   Ht 5' 9\" (1.753 m)   Wt (!) 140 kg (307 lb 12.8 oz)   BMI 45.45 kg/m²     Physical Exam  Vitals and nursing note reviewed.   Constitutional:       General: He is not " in acute distress.     Appearance: He is well-developed.   HENT:      Head: Normocephalic and atraumatic.     Eyes:      Conjunctiva/sclera: Conjunctivae normal.       Cardiovascular:      Rate and Rhythm: Normal rate and regular rhythm.      Heart sounds: No murmur heard.  Pulmonary:      Effort: Pulmonary effort is normal. No respiratory distress.      Breath sounds: Normal breath sounds.   Abdominal:      Palpations: Abdomen is soft.      Tenderness: There is no abdominal tenderness.     Musculoskeletal:         General: No swelling.      Cervical back: Neck supple.     Skin:     General: Skin is warm and dry.      Capillary Refill: Capillary refill takes less than 2 seconds.     Neurological:      Mental Status: He is alert.     Psychiatric:         Mood and Affect: Mood normal.        Physical Exam  Neck: No lumps detected.  Heart: Heart sounds normal.  Lungs: Lungs sound clear.    Results    Lab Results: I personally reviewed relevant lab results.       Results for orders placed during the hospital encounter of 10/18/24    EGD    Impression  C0M2 Molina's esophagus; tissue was ablated with radiofrequency ablation.  3 cm hiatal hernia ([place] 4)  The stomach and duodenum appeared normal.    RECOMMENDATION:  Schedule repeat EGD  Molina's esophagus surveillance    Continue pantoprazole  Magic mouth wash as needed for pain  Carafate 1 g three times a day with meals for 1 week  Repeat EGD with RFA in 1 year            Valentino Lerma MD             [1]   Current Outpatient Medications   Medication Sig Dispense Refill    albuterol (2.5 mg/3 mL) 0.083 % nebulizer solution TAKE 3 ML (2.5 MG TOTAL) BY NEBULIZATION EVERY 6 HOURS AS NEEDED FOR WHEEZING OR SHORTNESS OF BREATH 75 mL 2    albuterol (ProAir HFA) 90 mcg/act inhaler Inhale 2 puffs every 6 (six) hours as needed for wheezing 8.5 g 0    amLODIPine (NORVASC) 5 mg tablet TAKE 1 TABLET DAILY 90 tablet 1    Blood Glucose Monitoring Suppl (OneTouch Verio) w/Device KIT  Use daily 1 kit 0    carbidopa-levodopa (SINEMET)  mg per tablet Take 1 tablet by mouth in the morning and 1 tablet at noon and 1 tablet in the evening.      cephalexin (KEFLEX) 500 mg capsule Take 1 capsule by mouth in the morning and 1 capsule before bedtime.      clindamycin 1 % gel       cyanocobalamin 1,000 mcg/mL Inject 1 mL (1,000 mcg total) into a muscle every 14 (fourteen) days 10 mL 3    ezetimibe (ZETIA) 10 mg tablet Take 0.5 tablets (5 mg total) by mouth daily      FLUoxetine (PROzac) 40 MG capsule Take 1 capsule (40 mg total) by mouth daily 30 capsule 1    glucose blood (OneTouch Verio) test strip Use 1 each in the morning USE TO TEST DAILY 100 strip 3    hydrOXYzine HCL (ATARAX) 25 mg tablet Take 1 tablet (25 mg total) by mouth daily at bedtime 90 tablet 0    Lancet Devices (ONE TOUCH DELICA LANCING DEV) MISC Use daily 1 each 0    Lancets (OneTouch Delica Plus [id number]) MISC Use 1 Application in the morning 100 each 3    latanoprost (XALATAN) 0.005 % ophthalmic solution       losartan (COZAAR) 100 MG tablet Take 1 tablet (100 mg total) by mouth daily 90 tablet 1    losartan (COZAAR) 100 MG tablet Take 1 tablet (100 mg total) by mouth daily 14 tablet 0    metFORMIN (GLUCOPHAGE) 500 mg tablet TAKE 2 TABLETS TWICE A DAY WITH MEALS 360 tablet 1    metoprolol succinate (TOPROL-XL) 25 mg 24 hr tablet Take 1 tablet (25 mg total) by mouth 2 (two) times a day 180 tablet 1    metoprolol succinate (TOPROL-XL) 25 mg 24 hr tablet Take 1 tablet (25 mg total) by mouth 2 (two) times a day 28 tablet 0    Multiple Vitamins-Minerals (CENTRUM ADULTS PO) Take by mouth in the morning      pantoprazole (PROTONIX) 40 mg tablet TAKE 1 TABLET TWICE A DAY (1 TABLET IN THE MORNING AND 1 TABLET IN THE EVENING) 180 tablet 1    QUEtiapine (SEROquel) 50 mg tablet Take 1 tablet (50 mg total) by mouth daily at bedtime 90 tablet 0    rosuvastatin (CRESTOR) 5 mg tablet TAKE 1 TABLET ONCE A WEEK 12 tablet 1    semaglutide, 2  "mg/dose, (Ozempic, 2 MG/DOSE,) 8 mg/ mL injection pen INJECT 2 MG UNDER THE SKIN EVERY 7 DAYS 9 mL 0    senna-docusate sodium (SENOKOT S) 8.6-50 mg per tablet Take 1 tablet by mouth daily at bedtime 30 tablet 0    sucralfate (CARAFATE) 1 g tablet Take 1 tablet (1 g total) by mouth 2 (two) times a day 60 tablet 3    SYRINGE-NEEDLE, DISP, 3 ML 23G X 1\" 3 ML MISC Use every 30 (thirty) days 50 each 0    trospium chloride (SANCTURA) 20 mg tablet Take 1 tablet (20 mg total) by mouth 2 (two) times a day 180 tablet 1    Vonoprazan Fumarate 20 MG TABS Take 20 mg by mouth in the morning 90 tablet 3     Current Facility-Administered Medications   Medication Dose Route Frequency Provider Last Rate Last Admin    cyanocobalamin injection 1,000 mcg  1,000 mcg Intramuscular Q30 Days    1,000 mcg at 03/28/25 1535     "

## 2025-05-30 NOTE — PATIENT INSTRUCTIONS
Scheduled date of EGD(as of today): 6/17/25  Physician performing EGD: Dr Lerma  Location of EGD: [facility]  Instructions reviewed with patient by: Cherelle  Clearances: none    Ariasuisandra swallow scheduled for 6/12 at [facility]

## 2025-05-30 NOTE — TELEPHONE ENCOUNTER
Pt would greatly appreciate access to a wheelchair the day of his cysto procedure 6/3/25 at 3:00pm to help his wife get him to and from the car. He is assisted by a cane presently and it would alleviate a lot of his anxiety if we can reach out the day of to let him know we'll have the wheelchair downstairs when he arrives. Thank you!!!

## 2025-06-02 ENCOUNTER — TELEPHONE (OUTPATIENT)
Age: 70
End: 2025-06-02

## 2025-06-02 ENCOUNTER — TELEPHONE (OUTPATIENT)
Dept: OBGYN CLINIC | Facility: HOSPITAL | Age: 70
End: 2025-06-02

## 2025-06-02 DIAGNOSIS — N32.81 OAB (OVERACTIVE BLADDER): ICD-10-CM

## 2025-06-02 RX ORDER — MIRABEGRON 25 MG/1
25 TABLET, FILM COATED, EXTENDED RELEASE ORAL DAILY
Qty: 30 TABLET | Refills: 6 | Status: SHIPPED | OUTPATIENT
Start: 2025-06-02

## 2025-06-02 NOTE — TELEPHONE ENCOUNTER
Patient called today after receiving phone call from his health insurance. Patient states he was informed that the   Mirabegron ER 25 MG TB24 needs a prior authorization.     Please review.    Call back [phone]

## 2025-06-02 NOTE — TELEPHONE ENCOUNTER
"I had a long conversation with the patient rescheduling his appointment with Dr. Davidson to a later time per patient request. I also answered multiple questions including that Dr. Regalado is not \"blowing him off\" but he is just sending him to Dr. Davidson to follow up about a potential knee replacement which Dr. Regalado does not do. I also explained that different orthopedic doctors specialize in different surgical specialities such as trauma, joint replacement, or spine for example. Patient expressed understanding and has been given the address, and appointment date and time. I informed the patient that he is more than welcome to call back with any further questions.   "

## 2025-06-02 NOTE — PROGRESS NOTES
Ambulatory Visit  Name: Marcellus Fregoso      : 1955      MRN: [id number]  Encounter Provider: Florencio Juárez MD  Encounter Date: 6/3/2025   Encounter department: [facility] UROLOGY [place]    Assessment & Plan  Overactive bladder  After careful review of outside records, labs, and available imaging, the patient and I had a lengthy discussion regarding lower urinary tract symptoms.     I described that overactive bladder is a common issue and is likely significantly under diagnosed.   We discussed that symptoms can be exacerbated in patients with Parkinson's disease due to sphincter bradykinesia.     We then discussed management options. I impressed upon her that first line management is lifestyle modification including dietary modification, fluid management, and voiding pattern modification.     I stressed that medication management is second line and that it is unsurprising that medications along would be suboptimally effective in the absence of appropriate lifestyle modification.     Particularly, he should significantly reduce intake of citrus and carbonated beverages and replace with water.     We discussed medication management including anticholinergic medications, beta-3 agonist medications. He has been on anticholinergics which have been less effective recently    We then discussed third and fourth line options which include surgical interventions such as bladder botox injections, and sacral or pretibial neuromodulation.     - He will make dietary changes and reduce his soda intake   - Switch to Myrbetriq 25mg daily. Currently under prior auth  - Follow up in 3 months         History of radical prostatectomy  PSA has been undetectable for over a decade. No evidence of bladder neck contracture on cystoscopy.          History of Present Illness     Marcellus Fregoso is a 69 y.o. male who presents for follow up of lower urinary tract symptoms. History of radical prostatectomy.     Severe  "frequency and urgency with sensation of incomplete emptying.     Parkinson's disease that has flared recently. Also severe constipation.     He drinks a large volume of citrus and carbonated beverages throughout the day. 24 cans of diet cola every couple of days.       History obtained from : patient        Objective     /62 (BP Location: Left arm, Patient Position: Sitting, Cuff Size: Standard)   Pulse 71   Ht 5' 9\" (1.753 m)   Wt (!) 140 kg (308 lb)   SpO2 96%   BMI 45.48 kg/m²   Physical Exam  Vitals:    06/03/25 1446   BP: 142/62   Pulse: 71   SpO2: 96%      General: Well appearing, no acute distress   HEENT: normocephalic, atraumatic  Eyes: extraocular movements intact  Cardiovascular: normal rate   Respiratory: no respiratory distress, effort normal   Abdominal: Non-distended, non-tender   : see cystoscopy report  MSK: Grossly normal range of motion   Neurological: No gross focal deficits  Behavioral: Normal mood and affect     Results  Lab Results   Component Value Date    PSA <0.008 06/24/2024    PSA <0.1 04/20/2023    PSA <0.1 03/06/2022     Lab Results   Component Value Date    GLUCOSE 174 (H) 12/30/2020    CALCIUM 9.2 03/13/2025     08/16/2015    K 3.8 03/13/2025    CO2 27 03/13/2025     03/13/2025    BUN 14 03/13/2025    CREATININE 0.91 03/13/2025     Lab Results   Component Value Date    WBC 7.25 03/13/2025    HGB 14.1 03/13/2025    HCT 41.9 03/13/2025    MCV 88 03/13/2025     03/13/2025       Office Urine Dip  No results found for this or any previous visit (from the past hour).]    Administrative Statements        Cystoscopy     Date/Time  6/3/2025 3:00 PM     Performed by  Florencio Juárez MD   Authorized by  Florencio Juárez MD       Universal Protocol:  procedure performed by consultantConsent: Verbal consent obtained. Written consent obtained  Risks and benefits: risks, benefits and alternatives were discussed  Consent given by: patient  Patient understanding: patient " states understanding of the procedure being performed  Patient consent: the patient's understanding of the procedure matches consent given  Procedure consent: procedure consent matches procedure scheduled  Relevant documents: relevant documents present and verified  Test results: test results available and properly labeled  Site marked: the operative site was marked  Patient identity confirmed: verbally with patient      Procedure Details:  Procedure type: cystoscopy    Patient tolerance: Patient tolerated the procedure well with no immediate complications    Additional Procedure Details: Office Cystoscopy Procedure Note    Indication:     medically refractory lower urinary tract symptoms with history of prostatectomy    Informed consent   The risks, benefits, complications, treatment options, and expected outcomes were discussed with the patient. The patient concurred with the proposed plan and provided informed consent.    Anesthesia  Lidocaine jelly 2%    Antibiotic prophylaxis   None    Procedure  The patient was placed in the supineposition, was prepped and draped in the usual manner using sterile technique, and 2% lidocaine jelly instilled into the urethra.  A 17 F flexible cystoscope was then inserted into the urethra and the urethra and bladder carefully examined.  The following findings were noted:    Findings:  Urethra:  Normal. External sphincter active.  Prostate:  Surgically absent  Bladder:  Mild to moderate trabeculations.   Ureteral orifices:  orthotopic  Other findings:  None, retroflexed view confirms    Specimens: None                 Complications:    None; patient tolerated the procedure well           Disposition: To home            Condition: Stable

## 2025-06-02 NOTE — TELEPHONE ENCOUNTER
PA for MIRABEGRON 25 MG  CANCELLED due to       [x]Brand Name Preferred/MYRBETRIQ IS PREFERRED BY PLAN    Please send a new script BRAND NECESSARY MYRBETRIQ    Available to pt without a Prior auth

## 2025-06-03 ENCOUNTER — PROCEDURE VISIT (OUTPATIENT)
Dept: UROLOGY | Facility: AMBULATORY SURGERY CENTER | Age: 70
End: 2025-06-03
Payer: MEDICARE

## 2025-06-03 VITALS
HEIGHT: 69 IN | BODY MASS INDEX: 45.62 KG/M2 | SYSTOLIC BLOOD PRESSURE: 142 MMHG | HEART RATE: 71 BPM | DIASTOLIC BLOOD PRESSURE: 62 MMHG | OXYGEN SATURATION: 96 % | WEIGHT: 308 LBS

## 2025-06-03 DIAGNOSIS — Z90.79 HISTORY OF RADICAL PROSTATECTOMY: ICD-10-CM

## 2025-06-03 DIAGNOSIS — N32.81 OVERACTIVE BLADDER: Primary | ICD-10-CM

## 2025-06-03 PROCEDURE — 99214 OFFICE O/P EST MOD 30 MIN: CPT

## 2025-06-03 PROCEDURE — 52000 CYSTOURETHROSCOPY: CPT

## 2025-06-03 NOTE — PATIENT INSTRUCTIONS
What is OAB and Who Gets It?  Overactive bladder is the name for a group of bladder symptoms. There are three main symptoms:    A feeling that you have to go to the bathroom, urgently.    Sometimes incontinence, which means that you leak urine with the “gotta go” feeling.    Usually the need to go to the bathroom often (frequently), day and night.     With OAB, you feel that you need to empty your bladder - even when it's not full. This leads to the feeling that you need a bathroom quickly, right now. You can't control or ignore this feeling. (Although it may feel like your bladder muscle is squeezing to empty your bladder, your bladder muscle may not be squeezing.) If you “gotta go” eight or more times each day and night, or fear that urine will leak out before you're ready, you may have OAB.     OAB affects about 33 million Americans. It's not a normal part of aging. It's a health problem that can last for a long time if it's not treated. Many older men (30%) and women (40%) struggle with OAB symptoms. Often people don't know about treatments that can help, or they don't ask for help.     Stress urinary incontinence or JESENIA is a different bladder problem. People with JESENIA leak urine while sneezing, laughing or being active. It is not the same as that sudden “gotta go” feeling from OAB. To learn more about JESENIA, go to [email or web address]      How OAB Can Affect Your Life    Without treatment, OAB symptoms are uncomfortable. It can be hard to get through the day without many visits to the bathroom. OAB can impact relationships. You may not want to do things you enjoy because you worry about finding a bathroom in time. It can disrupt your sleep and sex life. It can leave you tired and short-tempered, or leaks can lead to a rash or infections. The whole experience can make anyone feel hopeless and very unhappy.   (Cited from www.[facility].org)      Conservative Options    Limit food and drinks that bother  your bladder: Many people feel better when they change the way they eat and drink. There are certain foods known to bother the bladder. You can try taking all of these things out of your diet, then add them back one at a time. Once you learn which foods and drinks make your symptoms worse, you can avoid them. Common foods to avoid:    Coffee / caffeine    Tea    Artificial sweeteners    Alcohol    Soda and other fizzy drinks    Citrus fruit    Food made with tomatoes    Chocolate (not white chocolate)    Spicy foods    Keep a bladder diary: Writing down when you make trips to the bathroom for a few days can help you understand your body better. This diary may show you things that make symptoms worse. For example, are your symptoms worse after eating or drinking a certain kind of food? Are they worse when you don't drink enough liquids?     Double voiding (emptying your bladder twice): This may be helpful for people who have trouble fully emptying their bladders. After you go to the bathroom, you wait a few seconds and then try to go again.    Timed urination: This means that you follow a daily bathroom schedule. Instead of going when you feel the urge, you go at set times during the day. You and your health care provider will create a reasonable schedule. You may try to urinate every two to four hours, whether you feel you have to go or not. The goal is to prevent that “urgent” feeling and to gain control.       Medications    Medications calm bladder muscles, reducing incidence of overactive bladder.   Your provider will choose the medication that is right for you.      If you are still unhappy after trying 2 different medications,   make sure to see your provider for advice on an advanced therapy  Advanced Therapy    Conservative treatments do not work for everyone. The next step is trying an advanced therapy.      Nerve Therapy     In OAB, the nerve signals between your bladder and brain may not work correctly.  One option to treat OAB is nerve therapy. With this therapy, light impulses are sent to the nerve to restore function. With this therapy, patient's with urgency and incontinence report getting more of a warning and making it to the bathroom without leaking. Patient's who have frequency report having more time in between voids without constantly running to the bathroom during the day and night.     This therapy can also help with bowel incontinence.     There are two different options for nerve therapy, ask your physician which one is right for you!    More information can be found at: [email or web address]      Botox Injections    The bladder muscle contracts too frequently with OAB. Botox Injections are given to calm the bladder muscle. Maintenance injections are necessary every 6-9 months.    More information can be found at: www.botoxforoab.com            There are side effects for all advanced therapies, please visit the websites and ask your provider for more details.

## 2025-06-04 NOTE — TELEPHONE ENCOUNTER
Upon review of the In Basket request and the patient's chart, initial outreach has been made via fax to facility. Please see Contacts section for details.     Thank you  Korin Ferrera MA

## 2025-06-10 ENCOUNTER — TELEPHONE (OUTPATIENT)
Age: 70
End: 2025-06-10

## 2025-06-10 NOTE — TELEPHONE ENCOUNTER
Pt called. He has an OV with Dr. Salazar tomorrow in the [facility] office. He was calling to see if there is a wheelchair available on the first floor that he would be able to use to get to the office on the second floor. Pt would like a message sent to him through [employer] regarding the wheelchair.    Called over to the [facility] office. Was on hold for several minutes with no answer. Will follow up.

## 2025-06-10 NOTE — TELEPHONE ENCOUNTER
Message sent to the provider's MA and a response was received that there are wheelchairs by the door of the first floor. Sent pt a message through [employer] making him aware.

## 2025-06-11 ENCOUNTER — OFFICE VISIT (OUTPATIENT)
Dept: NEUROLOGY | Facility: CLINIC | Age: 70
End: 2025-06-11
Payer: MEDICARE

## 2025-06-11 ENCOUNTER — TELEPHONE (OUTPATIENT)
Age: 70
End: 2025-06-11

## 2025-06-11 VITALS
WEIGHT: 309.6 LBS | DIASTOLIC BLOOD PRESSURE: 76 MMHG | HEIGHT: 69 IN | OXYGEN SATURATION: 98 % | HEART RATE: 70 BPM | BODY MASS INDEX: 45.85 KG/M2 | SYSTOLIC BLOOD PRESSURE: 132 MMHG

## 2025-06-11 DIAGNOSIS — G20.A2 PARKINSON'S DISEASE WITHOUT DYSKINESIA, WITH FLUCTUATING MANIFESTATIONS (HCC): Primary | ICD-10-CM

## 2025-06-11 DIAGNOSIS — F80.81 CHILDHOOD ONSET FLUENCY DISORDER: ICD-10-CM

## 2025-06-11 DIAGNOSIS — G47.9 SLEEP DISORDER: ICD-10-CM

## 2025-06-11 PROCEDURE — G2211 COMPLEX E/M VISIT ADD ON: HCPCS | Performed by: PSYCHIATRY & NEUROLOGY

## 2025-06-11 PROCEDURE — 99215 OFFICE O/P EST HI 40 MIN: CPT | Performed by: PSYCHIATRY & NEUROLOGY

## 2025-06-11 NOTE — PROGRESS NOTES
Name: Marcellus Fregoso      : 1955      MRN: [id number]  Encounter Provider: Prachi Stover MD  Encounter Date: 2025   Encounter department: [facility]  :  Assessment & Plan  Parkinson's disease without dyskinesia, with fluctuating manifestations (HCC)  He reports increased weakness and decreased activity tolerance over the past 6 months. He is currently taking carbidopa-levodopa at 6 AM, 10 AM, 2 PM, and 5 PM, but feels no effect by the end of the day. He sometimes takes an additional half tablet at 10 AM on bad days. He has increased difficulty ambulating at night, coinciding with his 9 PM Seroquel dose. It was explained that fatigue with activity is common in Parkinson's disease and can affect walking and mobility.     A trial of extended-release carbidopa-levodopa (Rytary) was recommended to see if it provides more stable symptom control throughout the day. If Rytary is not effective, he will inform us so we can consider alternative treatments.       Sleep disorder  He has a shifted sleep schedule, typically sleeping from 6 AM to noon. He feels that Seroquel does not help him sleep and may cause excessive sedation. He was advised to discuss adjusting the Seroquel dosage with his psychiatrist. If he wishes to work on shifting his sleep schedule, a referral to a sleep specialist for cognitive therapy could be considered.       Childhood onset fluency disorder           Assessment & Plan          History of Present Illness   Marcellus Fregoso is a 69 year old  is with PMH of prior lap band, Molina's esophagus, gastroparesis, DM type 2, depression and anxiety, fluency disorder with stuttering since childhood, HTN, ANABELL, and prostate cancer s/p prostatectomy , who presents for movement follow up for Parkinson's disease.    History of Parkinson's disease complicated by medication intolerance with prior history impulsivity on levodopa. He continues to follow at  "[facility] and looking to establish with someone locally. There has been discussion about consideration for DBS given medication intolerance and use  History of Present Illness  The patient presents for evaluation of Parkinson's disease, sleep issues, and mood disorder.    He was diagnosed with Parkinson's disease two years ago and was prescribed carbidopa-levodopa, 1 tablet four times daily. This medication significantly improved his mobility but also led to an increase in libido. He reports that the medication is not as effective as it used to be, although it does aid in standing up. He reports no noticeable effect by the end of the day. He experiences increased difficulty in ambulation at night, which coincides with his Seroquel administration around 9 PM. On days when he anticipates increased mobility challenges, he takes an additional half tablet of carbidopa-levodopa at 10 AM.    Over the past six months, he has been experiencing increased weakness and decreased activity tolerance. He recently underwent hernia repair and has been dealing with other health issues. His wife reports recent weight gain, which she believes may be contributing to his decreased stamina. Despite these issues, he appears to be walking well without shuffling and uses a cane for support. He performs daily exercises.    His sleep schedule has always been irregular, typically falling asleep on the couch around 8 or 9 PM and then staying awake all night before sleeping from 6 AM to noon. This pattern has remained consistent, except for falling asleep at 10 PM after taking Seroquel. He reports feeling \"stoned\" after taking Seroquel and prefers a lower dose. His wife reports that he is unable to walk when on Seroquel, even at 5 PM. He has been trying to adjust his sleep schedule to be more morning-oriented, but this has not been successful.    He has been taking Seroquel for the past year to manage potential side effects of " carbidopa-levodopa, including increased libido and inappropriate sexual comments. His psychiatrist has tried several medications, but Seroquel is the one he is currently on.     He reports a decline in cognitive function, describing it as not being as sharp as before. His wife notes that he tends to jump to conclusions and struggles with memory recall. He finds learning new computer processes challenging and feels his reaction time has slowed. He spends a significant amount of time on his computer, often working on photos and taking online courses.    He was previously on Zyprexa and Prozac, but both were discontinued due to their potential negative impact on Parkinson's disease. He believes the Zyprexa was beneficial for his speech. The discontinuation of these medications led to an increase in libido, which was managed with various treatments. However, he experienced manic episodes and was referred to a geriatric psychiatrist at [facility]. The psychiatrist suggested that he may have been misdiagnosed with bipolar disorder throughout his life. He has always been on Prozac and seen a psychologist, but functioned well and was not violent. He accidentally overdosed on trazodone in 12/2024, leading to its discontinuation and a reduction in his Seroquel dosage. He reports that he was doing better on a reduced dose of Seroquel and Prozac, but this was changed within the last three weeks. He has been off Ambien and is now on Seroquel, which he feels is not helping him sleep. He has been on Seroquel 100 mg for the past six weeks, but his wife feels this dose is too sedating. He has been taking 50 mg at 8 PM and 50 mg at night for the past week, which allows him to walk slightly but does not help him sleep.      Current neuro related medication:   carbidopa/levodopa 25/100 1 tablet 4 times daily (q4 hours apart 6. 10, 2, 5)  and occasionally will take extra 1/2 tab in am or afternoon    Selegiline 5mg qam- which has helped with  "shuffling and freezing, never tried bid as prescribed  Quetiapine 50mg nightly- for behaviors      Prior neuro related medications :   Nourianz - co-pay is high so has not tried yet, has month supply at home     Review of initial history: She was seen by Dr. Carranza in March 2022 episodes of imbalance. Patient had a fall in Dec 2020 after taking Ambien and waiting too long to get into bed. Patient reported falling down 16 steps and hitting the back of his head. He was evaluated and diagnosed with concussion but no associated internal injury or fracture.  There is reports of tremor with action.  Family history of 2 uncles with PD also had been on Zyprexa for many years for stuttering, prescribed by a psychiatrist. 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  He was seen by Dr. Codey Franks and Dr. Julita Whittaker in August 2023 at [facility].  Noted to have parkinsonian features on exam, possible related to Zyprexa vs idiopathic Parkinson's disease.  He was advised to discontinue Zyprexa and was started on Sinemet.  DaTscan from 10/18/23 which was positive showing symmetrically decreased radiotracer distribution in the posterior striata. Initially noted improvement on levodopa.  Over time this dissipated.  He follows with psychiatrist and psychologist.  Previously reported concerns over \"obsessions\" since starting levodopa.    Review of Systems I have personally reviewed the MA's review of systems and made changes as necessary.             Objective   There were no vitals taken for this visit.    Physical Exam  Neurological Exam  Physical Exam  Cranial Nerve Examination    CN XI: Shoulder shrug and head turn normal.    Motor Examination    Strength: Strength 5/5 in upper and lower extremities bilaterally.    Coordination: Finger-to-nose test normal.    Gait and Station    Gait: Normal gait with cane.  MDS UPDRS III                              6/11/25        "   Time since last dose:      Speech  1 stutters 1   Facial Expression  1 1   Rigidity - Neck   0   Rigidity - Upper Extremity (R)  0 0   Rigidity - Upper Extremity (L)   0 0   Rigidity - Lower Extremity (R)       Rigidity - Lower Extremity (L)        Finger Taps (R)   1 1   Finger Taps (L)   1 1   Hand Movement (R)  0 0   Hand Movement (L)   0 0   Pronation/Supination (R)  1 1   Pronation/Supination (L)   1 1   Toe Tapping (R)  1   Toe Tapping (L)  1   Leg Agility (R)  1 1   Leg Agility (L)   1 1   Arising from Chair   2 2   Gait   1 1   Freezing of Gait 0 0   Postural Stability        Posture 2 2   Global spontaneity of movement 0 0   Postural Tremor (Ri 0 0   Postural Tremor (L) 0 0   Kinetic Tremor (R)  0 1   Kinetic Tremor (L)  0 1   Rest tremor amplitude RUE 0 0   Rest tremor amplitude LUE 0 0   Rest tremor amplitude RLE 0 0   Reset tremor amplitude LLE 0 0   Lip/Jaw Tremor  0 0   Consistency of tremor 0 0   Motor Exam Total:             Results        Administrative Statements   I have spent a total time of 40 minutes in caring for this patient on the day of the visit/encounter including Risks and benefits of tx options, Instructions for management, Patient and family education, Impressions, Documenting in the medical record, and Obtaining or reviewing history  .

## 2025-06-11 NOTE — TELEPHONE ENCOUNTER
Pt under care of: Marquita  Office Location: [place]    Insurance   Current Insurance? Medicare   Insurance E-verified? Yes    History  Last Seen (include Follow Up recommendations of last visit- see Office Visit - Instructions): 6/3/25 - Follow up in 3 months     Pt calling due to receiving a reminder for his 6/25/25 appointment at 8am. Pt stated he would not schedule an appointment that early in the morning due to being disabled, he had it written down for 2:15pm.     Pt requesting a later morning or afternoon appointment with , please review for appropriate appointment.     Pt can be reached at: [phone]

## 2025-06-11 NOTE — TELEPHONE ENCOUNTER
According to the appt desk, patient's appt has always been at 8 am. Not sure if there is availability to move it.

## 2025-06-11 NOTE — PROGRESS NOTES
Name: Marcellus Fregoso      : 1955      MRN: [id number]  Encounter Provider: Prachi Stover MD  Encounter Date: 2025   Encounter department: [facility] [place]  :  Assessment & Plan          History of Present Illness   HPI   Review of Systems   Constitutional:  Positive for fatigue (Almost everyday). Negative for chills and fever.   HENT:  Positive for trouble swallowing (food and pills). Negative for ear pain and sore throat.    Eyes:  Negative for pain and visual disturbance.   Respiratory:  Negative for cough and shortness of breath.    Cardiovascular:  Negative for chest pain and palpitations.   Gastrointestinal:  Negative for abdominal pain and vomiting.   Genitourinary:  Negative for dysuria and hematuria.   Musculoskeletal:  Positive for gait problem (Shuffle and freezing when walking). Negative for arthralgias and back pain.        Balance issues when walking   Skin:  Negative for color change and rash.   Neurological:  Positive for tremors (Left hand), speech difficulty (Stutter), weakness and light-headedness. Negative for seizures and syncope.   Psychiatric/Behavioral:  Positive for confusion and sleep disturbance.    All other systems reviewed and are negative.   I have personally reviewed the MA's review of systems and made changes as necessary.         Objective   There were no vitals taken for this visit.    Physical Exam  Neurological Exam

## 2025-06-11 NOTE — TELEPHONE ENCOUNTER
This PT was just seen on 6/3 with Dr. Juárez and was to be scheduled for a FU in 3 months that was originally scheduled for 9/16 NP spot to be changed to FU.  This was canceled and now not available.    LVM for PT to be rescheduled with Dr. Juárez in September for a FU.  CB# was given to get scheduled.

## 2025-06-11 NOTE — ASSESSMENT & PLAN NOTE
He reports increased weakness and decreased activity tolerance over the past 6 months. He is currently taking carbidopa-levodopa at 6 AM, 10 AM, 2 PM, and 5 PM, but feels no effect by the end of the day. He sometimes takes an additional half tablet at 10 AM on bad days. He has increased difficulty ambulating at night, coinciding with his 9 PM Seroquel dose. It was explained that fatigue with activity is common in Parkinson's disease and can affect walking and mobility.     A trial of extended-release carbidopa-levodopa (Rytary) was recommended to see if it provides more stable symptom control throughout the day. If Rytary is not effective, he will inform us so we can consider alternative treatments.

## 2025-06-12 NOTE — TELEPHONE ENCOUNTER
"Appointment for 9/16 is still not able to be rescheduled as every opening on 10/10 needs an override. Patient was fine with 10/10 at 3, but I don't seem to be able to schedule him there with Dr Juárez.    Patient also states he wants Dr Juárez to know the knew medication is \"fantastic\" and he has absolutely no dry mouth with it.    Patient call back is [phone]  "

## 2025-06-13 NOTE — TELEPHONE ENCOUNTER
As a follow-up, a second attempt has been made for outreach via fax to facility. Please see Contacts section for details.    Thank you  Korin Ferrera MA

## 2025-06-16 NOTE — TELEPHONE ENCOUNTER
Pt called asking if his follow up had been rescheduled I informed him of appointment date/time/location/MD

## 2025-06-17 ENCOUNTER — HOSPITAL ENCOUNTER (OUTPATIENT)
Dept: GASTROENTEROLOGY | Facility: HOSPITAL | Age: 70
Setting detail: OUTPATIENT SURGERY
Discharge: HOME | End: 2025-06-17
Attending: INTERNAL MEDICINE
Payer: MEDICARE

## 2025-06-17 ENCOUNTER — ANESTHESIA EVENT (OUTPATIENT)
Dept: GASTROENTEROLOGY | Facility: HOSPITAL | Age: 70
End: 2025-06-17
Payer: MEDICARE

## 2025-06-17 ENCOUNTER — ANESTHESIA (OUTPATIENT)
Dept: GASTROENTEROLOGY | Facility: HOSPITAL | Age: 70
End: 2025-06-17
Payer: MEDICARE

## 2025-06-17 VITALS
TEMPERATURE: 97.5 F | HEART RATE: 61 BPM | OXYGEN SATURATION: 96 % | DIASTOLIC BLOOD PRESSURE: 70 MMHG | WEIGHT: 310 LBS | BODY MASS INDEX: 45.91 KG/M2 | SYSTOLIC BLOOD PRESSURE: 146 MMHG | HEIGHT: 69 IN | RESPIRATION RATE: 14 BRPM

## 2025-06-17 DIAGNOSIS — R13.19 ESOPHAGEAL DYSPHAGIA: ICD-10-CM

## 2025-06-17 DIAGNOSIS — K21.00 GASTROESOPHAGEAL REFLUX DISEASE WITH ESOPHAGITIS WITHOUT HEMORRHAGE: Primary | ICD-10-CM

## 2025-06-17 LAB — GLUCOSE SERPL-MCNC: 149 MG/DL (ref 65–140)

## 2025-06-17 PROCEDURE — 82948 REAGENT STRIP/BLOOD GLUCOSE: CPT

## 2025-06-17 PROCEDURE — 43248 EGD GUIDE WIRE INSERTION: CPT | Performed by: INTERNAL MEDICINE

## 2025-06-17 RX ORDER — SUCCINYLCHOLINE/SOD CL,ISO/PF 100 MG/5ML
SYRINGE (ML) INTRAVENOUS AS NEEDED
Status: DISCONTINUED | OUTPATIENT
Start: 2025-06-17 | End: 2025-06-17

## 2025-06-17 RX ORDER — LIDOCAINE HYDROCHLORIDE 10 MG/ML
0.5 INJECTION, SOLUTION EPIDURAL; INFILTRATION; INTRACAUDAL; PERINEURAL ONCE AS NEEDED
Status: DISCONTINUED | OUTPATIENT
Start: 2025-06-17 | End: 2025-06-21 | Stop reason: HOSPADM

## 2025-06-17 RX ORDER — SUCRALFATE 1 G/1
1 TABLET ORAL 4 TIMES DAILY
Qty: 360 TABLET | Refills: 1 | Status: SHIPPED | OUTPATIENT
Start: 2025-06-17 | End: 2025-06-17

## 2025-06-17 RX ORDER — LIDOCAINE HYDROCHLORIDE 10 MG/ML
0.5 INJECTION, SOLUTION EPIDURAL; INFILTRATION; INTRACAUDAL; PERINEURAL ONCE AS NEEDED
Status: CANCELLED | OUTPATIENT
Start: 2025-06-17

## 2025-06-17 RX ORDER — ONDANSETRON 2 MG/ML
INJECTION INTRAMUSCULAR; INTRAVENOUS AS NEEDED
Status: DISCONTINUED | OUTPATIENT
Start: 2025-06-17 | End: 2025-06-17

## 2025-06-17 RX ORDER — SODIUM CHLORIDE 9 MG/ML
25 INJECTION, SOLUTION INTRAVENOUS CONTINUOUS
Status: CANCELLED | OUTPATIENT
Start: 2025-06-17

## 2025-06-17 RX ORDER — SUCRALFATE 1 G/1
1 TABLET ORAL 4 TIMES DAILY
Qty: 360 TABLET | Refills: 0 | Status: SHIPPED | OUTPATIENT
Start: 2025-06-17

## 2025-06-17 RX ORDER — PROPOFOL 10 MG/ML
INJECTION, EMULSION INTRAVENOUS AS NEEDED
Status: DISCONTINUED | OUTPATIENT
Start: 2025-06-17 | End: 2025-06-17

## 2025-06-17 RX ORDER — SODIUM CHLORIDE 9 MG/ML
25 INJECTION, SOLUTION INTRAVENOUS CONTINUOUS
Status: DISCONTINUED | OUTPATIENT
Start: 2025-06-17 | End: 2025-06-21 | Stop reason: HOSPADM

## 2025-06-17 RX ORDER — FENTANYL CITRATE 50 UG/ML
INJECTION, SOLUTION INTRAMUSCULAR; INTRAVENOUS AS NEEDED
Status: DISCONTINUED | OUTPATIENT
Start: 2025-06-17 | End: 2025-06-17

## 2025-06-17 RX ORDER — LIDOCAINE HYDROCHLORIDE 10 MG/ML
INJECTION, SOLUTION EPIDURAL; INFILTRATION; INTRACAUDAL; PERINEURAL AS NEEDED
Status: DISCONTINUED | OUTPATIENT
Start: 2025-06-17 | End: 2025-06-17

## 2025-06-17 RX ORDER — DEXAMETHASONE SODIUM PHOSPHATE 10 MG/ML
INJECTION, SOLUTION INTRAMUSCULAR; INTRAVENOUS AS NEEDED
Status: DISCONTINUED | OUTPATIENT
Start: 2025-06-17 | End: 2025-06-17

## 2025-06-17 RX ORDER — SODIUM CHLORIDE, SODIUM LACTATE, POTASSIUM CHLORIDE, CALCIUM CHLORIDE 600; 310; 30; 20 MG/100ML; MG/100ML; MG/100ML; MG/100ML
125 INJECTION, SOLUTION INTRAVENOUS CONTINUOUS
Status: DISCONTINUED | OUTPATIENT
Start: 2025-06-17 | End: 2025-06-21 | Stop reason: HOSPADM

## 2025-06-17 RX ADMIN — ONDANSETRON 4 MG: 2 INJECTION INTRAMUSCULAR; INTRAVENOUS at 10:25

## 2025-06-17 RX ADMIN — SODIUM CHLORIDE: 0.9 INJECTION, SOLUTION INTRAVENOUS at 10:16

## 2025-06-17 RX ADMIN — PROPOFOL 150 MG: 10 INJECTION, EMULSION INTRAVENOUS at 10:22

## 2025-06-17 RX ADMIN — LIDOCAINE HYDROCHLORIDE 50 MG: 10 INJECTION, SOLUTION EPIDURAL; INFILTRATION; INTRACAUDAL; PERINEURAL at 10:22

## 2025-06-17 RX ADMIN — DEXAMETHASONE SODIUM PHOSPHATE 10 MG: 10 INJECTION, SOLUTION INTRAMUSCULAR; INTRAVENOUS at 10:25

## 2025-06-17 RX ADMIN — Medication 100 MG: at 10:22

## 2025-06-17 RX ADMIN — FENTANYL CITRATE 100 MCG: 50 INJECTION INTRAMUSCULAR; INTRAVENOUS at 10:22

## 2025-06-17 RX ADMIN — SODIUM CHLORIDE: 0.9 INJECTION, SOLUTION INTRAVENOUS at 10:09

## 2025-06-17 RX ADMIN — DEXAMETHASONE SODIUM PHOSPHATE 1 MG: 10 INJECTION, SOLUTION INTRAMUSCULAR; INTRAVENOUS at 10:27

## 2025-06-17 NOTE — ANESTHESIA POSTPROCEDURE EVALUATION
Post-Op Assessment Note    CV Status:  Stable    Pain management: adequate       Mental Status:  Sleepy   Hydration Status:  Euvolemic   PONV Controlled:  Controlled   Airway Patency:  Patent     Post Op Vitals Reviewed: Yes    No anethesia notable event occurred.    Staff: Anesthesiologist, with CRNAs           Last Filed PACU Vitals:  Vitals Value Taken Time   Temp 97.5    Pulse 65    /77    Resp 20    SpO2 99%

## 2025-06-17 NOTE — ANESTHESIA PREPROCEDURE EVALUATION
Procedure:  EGD    Relevant Problems   CARDIO   (+) Benign essential hypertension   (+) CAD (coronary artery disease)   (+) Exertional dyspnea   (+) Hyperlipidemia   (+) Junctional premature depolarization (HCC)   (+) PVC (premature ventricular contraction)   (+) Pure hypercholesterolemia      GI/HEPATIC   (+) Dysphagia   (+) Gastroesophageal reflux disease with esophagitis without hemorrhage      /RENAL   (+) Prostate cancer (HCC)      MUSCULOSKELETAL   (+) Chronic bilateral low back pain without sciatica      NEURO/PSYCH   (+) Chronic bilateral low back pain without sciatica   (+) Depression with anxiety      PULMONARY   (+) Exertional dyspnea   (+) Sleep apnea      Neurology/Sleep   (+) 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, without long-term current use of insulin (HCC)      FEN/Gastrointestinal   (+) Molina's esophagus with dysplasia      Other   (+) Class 3 severe obesity due to excess calories with serious comorbidity and body mass index (BMI) of 40.0 to 44.9 in adult      Denies recent fever, cough or other symptom of upper respiratory tract infection.    Cormack I with [facility] 3 on 10/18/24    Aspirated under [facility] in 2021 and EGDs have thereafter been done under [facility]    Physical Exam    Airway     Mallampati score: III  TM Distance: >3 FB  Neck ROM: full  Mouth opening: >= 4 cm      Cardiovascular      Dental       Pulmonary      Neurological    He appears awake, alert and oriented x3.      Other Findings    7/25/24 Regadenoson Stress: There is image artifact, without diagnostic evidence for perfusion abnormality.     Anesthesia Plan  ASA Score- 3     Anesthesia Type- general with ASA Monitors.         Additional Monitors:     Airway Plan: Oral ETT.           Plan Factors-Exercise comment: Walks with cane, unable to walk length of football field. Can climb flight of stairs without cardiopulmonary limitation.    Chart reviewed.   Existing labs reviewed.                    Induction- intravenous.    Postoperative Plan- .   Monitoring Plan - Monitoring plan - standard ASA monitoring  Post Operative Pain Plan - non-opiod analgesics        Informed Consent- Anesthetic plan and risks discussed with patient.        NPO Status:  Vitals Value Taken Time   Date of last liquid 06/17/25 06/17/25 08:06   Time of last liquid 0600 06/17/25 08:06   Date of last solid 06/16/25 06/17/25 08:06   Time of last solid 2100 06/17/25 08:06

## 2025-06-17 NOTE — INTERVAL H&P NOTE
H&P reviewed. After examining the patient I find no changes in the patients condition since the H&P had been written.    Vitals:    06/17/25 0816   BP: 148/70   Pulse: 62   Resp: 17   Temp: 97.7 °F (36.5 °C)   SpO2: 96%

## 2025-06-18 NOTE — TELEPHONE ENCOUNTER
As a final attempt, a third outreach has been made via telephone call to facility. Please see Contacts section for details. This encounter will be closed and completed by end of day. Should we receive the requested information because of previous outreach attempts, the requested patient's chart will be updated appropriately.     Thank you  Korin Ferrera MA

## 2025-06-23 ENCOUNTER — APPOINTMENT (OUTPATIENT)
Dept: LAB | Facility: CLINIC | Age: 70
End: 2025-06-23
Attending: FAMILY MEDICINE
Payer: MEDICARE

## 2025-06-23 ENCOUNTER — OFFICE VISIT (OUTPATIENT)
Dept: FAMILY MEDICINE CLINIC | Facility: CLINIC | Age: 70
End: 2025-06-23
Payer: MEDICARE

## 2025-06-23 VITALS
DIASTOLIC BLOOD PRESSURE: 78 MMHG | WEIGHT: 308.8 LBS | HEIGHT: 69 IN | OXYGEN SATURATION: 94 % | HEART RATE: 76 BPM | BODY MASS INDEX: 45.74 KG/M2 | SYSTOLIC BLOOD PRESSURE: 124 MMHG | RESPIRATION RATE: 18 BRPM | TEMPERATURE: 99 F

## 2025-06-23 DIAGNOSIS — B37.9 YEAST INFECTION: ICD-10-CM

## 2025-06-23 DIAGNOSIS — G20.A2 PARKINSON'S DISEASE WITHOUT DYSKINESIA, WITH FLUCTUATING MANIFESTATIONS (HCC): ICD-10-CM

## 2025-06-23 DIAGNOSIS — R60.0 LOCALIZED EDEMA: ICD-10-CM

## 2025-06-23 DIAGNOSIS — R39.9 UTI SYMPTOMS: Primary | ICD-10-CM

## 2025-06-23 LAB
ALBUMIN SERPL BCG-MCNC: 4.1 G/DL (ref 3.5–5)
ALP SERPL-CCNC: 80 U/L (ref 34–104)
ALT SERPL W P-5'-P-CCNC: 7 U/L (ref 7–52)
ANION GAP SERPL CALCULATED.3IONS-SCNC: 10 MMOL/L (ref 4–13)
AST SERPL W P-5'-P-CCNC: 15 U/L (ref 13–39)
BASOPHILS # BLD AUTO: 0.06 THOUSANDS/ÂΜL (ref 0–0.1)
BASOPHILS NFR BLD AUTO: 1 % (ref 0–1)
BILIRUB SERPL-MCNC: 0.61 MG/DL (ref 0.2–1)
BNP SERPL-MCNC: 21 PG/ML (ref 0–100)
BUN SERPL-MCNC: 16 MG/DL (ref 5–25)
CALCIUM SERPL-MCNC: 9.8 MG/DL (ref 8.4–10.2)
CHLORIDE SERPL-SCNC: 99 MMOL/L (ref 96–108)
CO2 SERPL-SCNC: 25 MMOL/L (ref 21–32)
CREAT SERPL-MCNC: 0.95 MG/DL (ref 0.6–1.3)
EOSINOPHIL # BLD AUTO: 0.59 THOUSAND/ÂΜL (ref 0–0.61)
EOSINOPHIL NFR BLD AUTO: 6 % (ref 0–6)
ERYTHROCYTE [DISTWIDTH] IN BLOOD BY AUTOMATED COUNT: 12.9 % (ref 11.6–15.1)
GFR SERPL CREATININE-BSD FRML MDRD: 80 ML/MIN/1.73SQ M
GLUCOSE SERPL-MCNC: 174 MG/DL (ref 65–140)
HCT VFR BLD AUTO: 41.4 % (ref 36.5–49.3)
HGB BLD-MCNC: 14.5 G/DL (ref 12–17)
IMM GRANULOCYTES # BLD AUTO: 0.05 THOUSAND/UL (ref 0–0.2)
IMM GRANULOCYTES NFR BLD AUTO: 1 % (ref 0–2)
LYMPHOCYTES # BLD AUTO: 1.87 THOUSANDS/ÂΜL (ref 0.6–4.47)
LYMPHOCYTES NFR BLD AUTO: 17 % (ref 14–44)
MCH RBC QN AUTO: 29.2 PG (ref 26.8–34.3)
MCHC RBC AUTO-ENTMCNC: 35 G/DL (ref 31.4–37.4)
MCV RBC AUTO: 83 FL (ref 82–98)
MONOCYTES # BLD AUTO: 0.68 THOUSAND/ÂΜL (ref 0.17–1.22)
MONOCYTES NFR BLD AUTO: 6 % (ref 4–12)
NEUTROPHILS # BLD AUTO: 7.51 THOUSANDS/ÂΜL (ref 1.85–7.62)
NEUTS SEG NFR BLD AUTO: 69 % (ref 43–75)
NRBC BLD AUTO-RTO: 0 /100 WBCS
PLATELET # BLD AUTO: 311 THOUSANDS/UL (ref 149–390)
PMV BLD AUTO: 9.3 FL (ref 8.9–12.7)
POTASSIUM SERPL-SCNC: 4.1 MMOL/L (ref 3.5–5.3)
PROT SERPL-MCNC: 7 G/DL (ref 6.4–8.4)
RBC # BLD AUTO: 4.97 MILLION/UL (ref 3.88–5.62)
SL AMB  POCT GLUCOSE, UA: NORMAL
SL AMB LEUKOCYTE ESTERASE,UA: NORMAL
SL AMB POCT BILIRUBIN,UA: NORMAL
SL AMB POCT BLOOD,UA: NORMAL
SL AMB POCT CLARITY,UA: NORMAL
SL AMB POCT COLOR,UA: YELLOW
SL AMB POCT KETONES,UA: NORMAL
SL AMB POCT NITRITE,UA: NORMAL
SL AMB POCT PH,UA: 6
SL AMB POCT SPECIFIC GRAVITY,UA: 1.01
SL AMB POCT URINE PROTEIN: NORMAL
SL AMB POCT UROBILINOGEN: NORMAL
SODIUM SERPL-SCNC: 134 MMOL/L (ref 135–147)
WBC # BLD AUTO: 10.76 THOUSAND/UL (ref 4.31–10.16)

## 2025-06-23 PROCEDURE — 81002 URINALYSIS NONAUTO W/O SCOPE: CPT | Performed by: FAMILY MEDICINE

## 2025-06-23 PROCEDURE — G2211 COMPLEX E/M VISIT ADD ON: HCPCS | Performed by: FAMILY MEDICINE

## 2025-06-23 PROCEDURE — 36415 COLL VENOUS BLD VENIPUNCTURE: CPT

## 2025-06-23 PROCEDURE — 87086 URINE CULTURE/COLONY COUNT: CPT | Performed by: FAMILY MEDICINE

## 2025-06-23 PROCEDURE — 83880 ASSAY OF NATRIURETIC PEPTIDE: CPT

## 2025-06-23 PROCEDURE — 99214 OFFICE O/P EST MOD 30 MIN: CPT | Performed by: FAMILY MEDICINE

## 2025-06-23 PROCEDURE — 85025 COMPLETE CBC W/AUTO DIFF WBC: CPT

## 2025-06-23 PROCEDURE — 80053 COMPREHEN METABOLIC PANEL: CPT

## 2025-06-23 RX ORDER — NYSTATIN 100000 [USP'U]/G
POWDER TOPICAL 4 TIMES DAILY
Qty: 60 G | Refills: 1 | Status: SHIPPED | OUTPATIENT
Start: 2025-06-23

## 2025-06-23 RX ORDER — QUETIAPINE FUMARATE 25 MG/1
125 TABLET, FILM COATED ORAL
COMMUNITY
Start: 2025-06-27

## 2025-06-23 RX ORDER — FUROSEMIDE 20 MG/1
20 TABLET ORAL DAILY
Qty: 30 TABLET | Refills: 0 | Status: SHIPPED | OUTPATIENT
Start: 2025-06-23

## 2025-06-23 NOTE — PROGRESS NOTES
Name: Marcellus Fregoso      : 1955      MRN: [id number]  Encounter Provider: Ehsan Calix MD  Encounter Date: 2025   Encounter department: [facility]  :  Assessment & Plan  UTI symptoms  UA today showed neg for leuk, nit or blood.   Send for urine culture.   Orders:    POCT urine dip    Urine culture    Yeast infection    Orders:    nystatin (MYCOSTATIN) powder; Apply topically 4 (four) times a day    Localized edema  Check labs.   Give lasix 20mg QD. SE educated pt.     Orders:    CBC and differential; Future    Comprehensive metabolic panel; Future    B-Type Natriuretic Peptide(BNP); Future    Referral to [facility]; Future    furosemide (LASIX) 20 mg tablet; Take 1 tablet (20 mg total) by mouth daily    Parkinson's disease without dyskinesia, with fluctuating manifestations (HCC)  Weakness, ambulatory difficulty.   Refer to [facility].   Orders:    Referral to [facility]; Future           History of Present Illness   HPI    Pt is here with his wife.     OAB/Urinary incontinence/hx of prostate cancer---[facility]. Cystoscope done on 6/3/2025.   Pt states he has urine frequency/urgency for a while. Gets up a lot at night which affect his sleep.   Rash in genital area and inner thighs. Would like to try nystatin powder.     Edema----B/L legs swollen for 1 month. Denies Sob, CP etc.   It affects his gait. He uses cane/walker.       Insomnia/stutter/Bipolar----[facility] [place] [facility]/therapist.  He tried seroquel 100mg qhs but felt unsteady. He decreased it to 50mg qhs by himself, but cannot sleep which affect his mood.   Discontinued atarax by psychiatry per pt.      Parkinson's disease---[facility] in [facility]. He is on sinemet 3-4 times/day, changed to extended-release carbidopa-levodopa recently.   Felt unsteady. Use cane/walker.   Decreased activity tolerance.   Pt felt he needs more help at home. Interested in [facility]/home care.      HTN---[facility]. He is on  "metoprolol 25mg bid, amlodipine 5mg QD prn and losartan 100mg QD.        Review of Systems   Constitutional:  Negative for appetite change, chills and fever.   HENT:  Negative for congestion, ear pain, sinus pain and sore throat.    Eyes:  Negative for discharge and itching.   Respiratory:  Negative for apnea, cough, chest tightness, shortness of breath and wheezing.    Cardiovascular:  Negative for chest pain, palpitations and leg swelling.   Gastrointestinal:  Negative for abdominal pain, anal bleeding, constipation, diarrhea, nausea and vomiting.   Endocrine: Negative for cold intolerance, heat intolerance and polyuria.   Genitourinary:  Positive for frequency. Negative for difficulty urinating and dysuria.   Musculoskeletal:  Positive for back pain and gait problem. Negative for arthralgias and myalgias.   Skin:  Positive for rash.   Neurological:  Positive for weakness. Negative for dizziness and headaches.   Psychiatric/Behavioral:  Positive for sleep disturbance. Negative for agitation.        Objective   /78   Pulse 76   Temp 99 °F (37.2 °C) (Tympanic)   Resp 18   Ht 5' 9\" (1.753 m)   Wt (!) 140 kg (308 lb 12.8 oz)   SpO2 94%   BMI 45.60 kg/m²      Physical Exam  Constitutional:       Appearance: He is well-developed.   HENT:      Head: Normocephalic and atraumatic.     Eyes:      General:         Right eye: No discharge.         Left eye: No discharge.      Conjunctiva/sclera: Conjunctivae normal.       Cardiovascular:      Rate and Rhythm: Normal rate and regular rhythm.      Heart sounds: Normal heart sounds. No murmur heard.     No friction rub. No gallop.   Pulmonary:      Effort: Pulmonary effort is normal. No respiratory distress.      Breath sounds: Normal breath sounds. No wheezing or rales.   Abdominal:      General: Bowel sounds are normal. There is no distension.      Palpations: Abdomen is soft.      Tenderness: There is no abdominal tenderness. There is no guarding. "     Musculoskeletal:         General: Swelling and tenderness present.      Cervical back: Normal range of motion and neck supple.      Right lower leg: Edema present.      Left lower leg: Edema present.      Comments: Use cane  B/L legs 1+ edema     Skin:     Findings: Rash present.      Comments: Macular rash in genital area, b/L inner thigh, rough skin, erythema, no exudate     Neurological:      Mental Status: He is alert.

## 2025-06-23 NOTE — ASSESSMENT & PLAN NOTE
Weakness, ambulatory difficulty.   Refer to [facility].   Orders:    [facility]; Future

## 2025-06-24 ENCOUNTER — HOME HEALTH ADMISSION (OUTPATIENT)
Dept: HOME HEALTH SERVICES | Facility: HOME HEALTHCARE | Age: 70
End: 2025-06-24
Payer: MEDICARE

## 2025-06-24 ENCOUNTER — RESULTS FOLLOW-UP (OUTPATIENT)
Dept: FAMILY MEDICINE CLINIC | Facility: CLINIC | Age: 70
End: 2025-06-24

## 2025-06-25 LAB — BACTERIA UR CULT: NORMAL

## 2025-06-26 ENCOUNTER — HOME CARE VISIT (OUTPATIENT)
Dept: HOME HEALTH SERVICES | Facility: HOME HEALTHCARE | Age: 70
End: 2025-06-26

## 2025-06-27 ENCOUNTER — HOME CARE VISIT (OUTPATIENT)
Dept: HOME HEALTH SERVICES | Facility: HOME HEALTHCARE | Age: 70
End: 2025-06-27
Attending: FAMILY MEDICINE
Payer: MEDICARE

## 2025-06-27 PROCEDURE — 10330081 VN NO-PAY CLAIM PROCEDURE

## 2025-06-27 PROCEDURE — G0299 HHS/HOSPICE OF RN EA 15 MIN: HCPCS

## 2025-06-27 PROCEDURE — 400013 VN SOC

## 2025-06-28 VITALS
OXYGEN SATURATION: 99 % | RESPIRATION RATE: 20 BRPM | SYSTOLIC BLOOD PRESSURE: 122 MMHG | HEART RATE: 72 BPM | DIASTOLIC BLOOD PRESSURE: 70 MMHG | TEMPERATURE: 97.4 F

## 2025-06-29 ENCOUNTER — APPOINTMENT (EMERGENCY)
Dept: RADIOLOGY | Facility: HOSPITAL | Age: 70
End: 2025-06-29
Payer: MEDICARE

## 2025-06-29 ENCOUNTER — HOSPITAL ENCOUNTER (EMERGENCY)
Facility: HOSPITAL | Age: 70
Discharge: HOME | End: 2025-06-29
Attending: EMERGENCY MEDICINE
Payer: MEDICARE

## 2025-06-29 VITALS
TEMPERATURE: 99 F | RESPIRATION RATE: 25 BRPM | HEART RATE: 70 BPM | SYSTOLIC BLOOD PRESSURE: 141 MMHG | DIASTOLIC BLOOD PRESSURE: 71 MMHG | OXYGEN SATURATION: 95 %

## 2025-06-29 DIAGNOSIS — T17.908A ASPIRATION INTO AIRWAY, INITIAL ENCOUNTER: Primary | ICD-10-CM

## 2025-06-29 DIAGNOSIS — R05.9 COUGH: ICD-10-CM

## 2025-06-29 DIAGNOSIS — R05.1 ACUTE COUGH: ICD-10-CM

## 2025-06-29 LAB
ALBUMIN SERPL BCG-MCNC: 3.7 G/DL (ref 3.5–5)
ALP SERPL-CCNC: 71 U/L (ref 34–104)
ALT SERPL W P-5'-P-CCNC: 14 U/L (ref 7–52)
ANION GAP SERPL CALCULATED.3IONS-SCNC: 8 MMOL/L (ref 4–13)
AST SERPL W P-5'-P-CCNC: 12 U/L (ref 13–39)
BASOPHILS # BLD AUTO: 0.05 THOUSANDS/ÂΜL (ref 0–0.1)
BASOPHILS NFR BLD AUTO: 1 % (ref 0–1)
BILIRUB SERPL-MCNC: 0.36 MG/DL (ref 0.2–1)
BUN SERPL-MCNC: 13 MG/DL (ref 5–25)
CALCIUM SERPL-MCNC: 8.7 MG/DL (ref 8.4–10.2)
CHLORIDE SERPL-SCNC: 101 MMOL/L (ref 96–108)
CO2 SERPL-SCNC: 26 MMOL/L (ref 21–32)
CREAT SERPL-MCNC: 0.89 MG/DL (ref 0.6–1.3)
EOSINOPHIL # BLD AUTO: 0.81 THOUSAND/ÂΜL (ref 0–0.61)
EOSINOPHIL NFR BLD AUTO: 9 % (ref 0–6)
ERYTHROCYTE [DISTWIDTH] IN BLOOD BY AUTOMATED COUNT: 12.6 % (ref 11.6–15.1)
GFR SERPL CREATININE-BSD FRML MDRD: 86 ML/MIN/1.73SQ M
GLUCOSE SERPL-MCNC: 216 MG/DL (ref 65–140)
HCT VFR BLD AUTO: 38.3 % (ref 36.5–49.3)
HGB BLD-MCNC: 13.2 G/DL (ref 12–17)
IMM GRANULOCYTES # BLD AUTO: 0.03 THOUSAND/UL (ref 0–0.2)
IMM GRANULOCYTES NFR BLD AUTO: 0 % (ref 0–2)
LYMPHOCYTES # BLD AUTO: 2.08 THOUSANDS/ÂΜL (ref 0.6–4.47)
LYMPHOCYTES NFR BLD AUTO: 23 % (ref 14–44)
MCH RBC QN AUTO: 29.7 PG (ref 26.8–34.3)
MCHC RBC AUTO-ENTMCNC: 34.5 G/DL (ref 31.4–37.4)
MCV RBC AUTO: 86 FL (ref 82–98)
MONOCYTES # BLD AUTO: 0.81 THOUSAND/ÂΜL (ref 0.17–1.22)
MONOCYTES NFR BLD AUTO: 9 % (ref 4–12)
NEUTROPHILS # BLD AUTO: 5.33 THOUSANDS/ÂΜL (ref 1.85–7.62)
NEUTS SEG NFR BLD AUTO: 58 % (ref 43–75)
NRBC BLD AUTO-RTO: 0 /100 WBCS
PLATELET # BLD AUTO: 255 THOUSANDS/UL (ref 149–390)
PMV BLD AUTO: 9.3 FL (ref 8.9–12.7)
POTASSIUM SERPL-SCNC: 3.6 MMOL/L (ref 3.5–5.3)
PROT SERPL-MCNC: 6.3 G/DL (ref 6.4–8.4)
RBC # BLD AUTO: 4.45 MILLION/UL (ref 3.88–5.62)
SODIUM SERPL-SCNC: 135 MMOL/L (ref 135–147)
WBC # BLD AUTO: 9.11 THOUSAND/UL (ref 4.31–10.16)

## 2025-06-29 PROCEDURE — 99283 EMERGENCY DEPT VISIT LOW MDM: CPT

## 2025-06-29 PROCEDURE — 93005 ELECTROCARDIOGRAM TRACING: CPT

## 2025-06-29 PROCEDURE — 85025 COMPLETE CBC W/AUTO DIFF WBC: CPT

## 2025-06-29 PROCEDURE — 94640 AIRWAY INHALATION TREATMENT: CPT

## 2025-06-29 PROCEDURE — 36415 COLL VENOUS BLD VENIPUNCTURE: CPT

## 2025-06-29 PROCEDURE — 99284 EMERGENCY DEPT VISIT MOD MDM: CPT | Performed by: EMERGENCY MEDICINE

## 2025-06-29 PROCEDURE — 71046 X-RAY EXAM CHEST 2 VIEWS: CPT

## 2025-06-29 PROCEDURE — 80053 COMPREHEN METABOLIC PANEL: CPT

## 2025-06-29 RX ORDER — FUROSEMIDE 20 MG/1
20 TABLET ORAL ONCE
Status: COMPLETED | OUTPATIENT
Start: 2025-06-29 | End: 2025-06-29

## 2025-06-29 RX ORDER — IPRATROPIUM BROMIDE AND ALBUTEROL SULFATE 2.5; .5 MG/3ML; MG/3ML
3 SOLUTION RESPIRATORY (INHALATION) ONCE
Status: COMPLETED | OUTPATIENT
Start: 2025-06-29 | End: 2025-06-29

## 2025-06-29 RX ORDER — ALBUTEROL SULFATE 90 UG/1
2 INHALANT RESPIRATORY (INHALATION) EVERY 6 HOURS PRN
Qty: 8.5 G | Refills: 0 | Status: SHIPPED | OUTPATIENT
Start: 2025-06-29

## 2025-06-29 RX ORDER — GUAIFENESIN 600 MG/1
600 TABLET, EXTENDED RELEASE ORAL ONCE
Status: COMPLETED | OUTPATIENT
Start: 2025-06-29 | End: 2025-06-29

## 2025-06-29 RX ORDER — ALBUTEROL SULFATE 90 UG/1
2 INHALANT RESPIRATORY (INHALATION) ONCE
Status: COMPLETED | OUTPATIENT
Start: 2025-06-29 | End: 2025-06-29

## 2025-06-29 RX ORDER — CARBIDOPA AND LEVODOPA 25; 100 MG/1; MG/1
1 TABLET ORAL ONCE
Status: COMPLETED | OUTPATIENT
Start: 2025-06-29 | End: 2025-06-29

## 2025-06-29 RX ADMIN — FUROSEMIDE 20 MG: 20 TABLET ORAL at 10:19

## 2025-06-29 RX ADMIN — IPRATROPIUM BROMIDE AND ALBUTEROL SULFATE 3 ML: 2.5; .5 SOLUTION RESPIRATORY (INHALATION) at 10:19

## 2025-06-29 RX ADMIN — GUAIFENESIN 600 MG: 600 TABLET, EXTENDED RELEASE ORAL at 10:19

## 2025-06-29 RX ADMIN — CARBIDOPA AND LEVODOPA 1 TABLET: 25; 100 TABLET ORAL at 10:19

## 2025-06-29 RX ADMIN — ALBUTEROL SULFATE 2 PUFF: 90 AEROSOL, METERED RESPIRATORY (INHALATION) at 12:05

## 2025-06-29 NOTE — ED PROVIDER NOTES
"Time reflects when diagnosis was documented in both MDM as applicable and the Disposition within this note       Time User Action Codes Description Comment    6/29/2025 11:40 AM Areli Marks Add [T17.908A] Aspiration into airway, initial encounter     6/29/2025 11:40 AM Areli Marks Add [R05.9] Cough     6/29/2025 12:02 PM DianneWolf salasga Add [R05.1] Acute cough           ED Disposition       ED Disposition   Discharge    Condition   Stable    Date/Time   Sun Jun 29, 2025 11:39 AM    Comment   Marcellus Sousamassimo discharge to home/self care.                   Assessment & Plan       Medical Decision Making  Patient is a 69 y.o. male  who presents to the ED with cough.    Vital signs tachypneic, otherwise stable. Exam as listed below.    History and physical exam are most consistent with aspiration pneumonitis  Differential diagnosis also includes but is not limited to asthma,  dysphagia, viral illness  Doubt pneumonia/aspiration pneumonia at this point in time given recent aspiration.    Plan   CBC to evaluate for anemia, evaluate for leukocytosis as sign of infectious source of symptoms.  CMP to evaluate for electrolyte derangement, assess renal and hepatic function.  ECG to evaluate for arrhythmia, heart block, ST changes to rule out STEMI, pericarditis.  CXR to evaluate for pneumonia, pneumothorax, pleural effusion, signs of fluid overload.  DuoNeb per symptomatic treatment  Mucinex for management of secretions    EKG: normal EKG, normal sinus rhythm, unchanged from previous tracings, normal sinus rhythm, left axis deviation    View ED course below for further discussion on patient workup.     Upon re-evaluation patient had significant improvement following DuoNeb. Discussed return precautions with patient and they expressed understanding.     Portions of the record may have been created with voice recognition software. Occasional wrong word or \"sound a like\" substitutions may have occurred due to the inherent " limitations of voice recognition software. Read the chart carefully and recognize, using context, where substitutions have occurred.     ED 05/ED 05     Amount and/or Complexity of Data Reviewed  Labs: ordered.  Radiology: ordered.    Risk  OTC drugs.  Prescription drug management.        ED Course as of 07/02/25 1558   Sun Jun 29, 2025   1052 Wheezing significantly improved       Medications   ipratropium-albuterol (DUO-NEB) 0.5-2.5 mg/3 mL inhalation solution 3 mL (3 mL Nebulization Given 6/29/25 1019)   guaiFENesin (MUCINEX) 12 hr tablet 600 mg (600 mg Oral Given 6/29/25 1019)   carbidopa-levodopa (SINEMET)  mg per tablet 1 tablet (1 tablet Oral Given 6/29/25 1019)   furosemide (LASIX) tablet 20 mg (20 mg Oral Given 6/29/25 1019)   albuterol (PROVENTIL HFA,VENTOLIN HFA) inhaler 2 puff (2 puffs Inhalation Given 6/29/25 1205)       ED Risk Strat Scores                    No data recorded        SBIRT 22yo+      Flowsheet Row Most Recent Value   Initial Alcohol Screen: US AUDIT-C     1. How often do you have a drink containing alcohol? 0 Filed at: 06/29/2025 0922   2. How many drinks containing alcohol do you have on a typical day you are drinking?  0 Filed at: 06/29/2025 0922   3a. Male UNDER 65: How often do you have five or more drinks on one occasion? 0 Filed at: 06/29/2025 0922   Audit-C Score 0 Filed at: 06/29/2025 0922   KIMMIE: How many times in the past year have you...    Used an illegal drug or used a prescription medication for non-medical reasons? Never Filed at: 06/29/2025 0922                            History of Present Illness       Chief Complaint   Patient presents with    Cough     Pt reports with coughing and potential aspiration. Pt woke up with reflux sensation and then felt as if he was choking. Pt reports having Barretts Esophagus.        Past Medical History[1]   Past Surgical History[2]   Family History[3]   Social History[4]   E-Cigarette/Vaping    E-Cigarette Use Never User        E-Cigarette/Vaping Substances    Nicotine No     THC No     CBD No     Flavoring No     Other No     Unknown No       I have reviewed and agree with the history as documented.     Patient is a 69 y.o. male with a past medical history of anemia, Molina's esophagus, depression, diabetes, HLD, HTN prostate cancer, Parkinson's disease, ANABELL, presenting today with aspiration event at 7 am today. Woke up and felt like he had acid reflux, took carafate. Became short of breath and aspirated large amount of saliva. He has dysphagia 2/2 parkinson's and states he is usually very careful to try not to aspirate. History of aspiration pneumonia with sepsis about 5 years ago. Frequent coughing, shallow breathing to prevent coughing. No fevers, chills, chest pain, vomiting.        Cough      Review of Systems   Respiratory:  Positive for cough.            Objective       ED Triage Vitals [06/29/25 0922]   Temperature Pulse Blood Pressure Respirations SpO2 Patient Position - Orthostatic VS   99 °F (37.2 °C) 82 168/78 (!) 24 93 % Sitting      Temp Source Heart Rate Source BP Location FiO2 (%) Pain Score    Oral Monitor Right arm -- No Pain      Vitals      Date and Time Temp Pulse SpO2 Resp BP Pain Score FACES Pain Rating User   06/29/25 1030 -- 70 95 % 25 141/71 No Pain -- MD   06/29/25 0958 -- 71 95 % 25 -- -- -- VS   06/29/25 0922 99 °F (37.2 °C) 82 93 % 24 168/78 No Pain -- BL            Physical Exam  Vitals and nursing note reviewed.   Constitutional:       General: He is not in acute distress.     Appearance: He is well-developed.   HENT:      Head: Normocephalic and atraumatic.     Eyes:      Conjunctiva/sclera: Conjunctivae normal.       Cardiovascular:      Rate and Rhythm: Normal rate and regular rhythm.      Heart sounds: No murmur heard.  Pulmonary:      Effort: Pulmonary effort is normal. Tachypnea present. No respiratory distress.      Breath sounds: Examination of the right-upper field reveals wheezing. Examination  of the left-upper field reveals wheezing. Examination of the right-lower field reveals wheezing. Examination of the left-lower field reveals wheezing. Wheezing present.   Abdominal:      Palpations: Abdomen is soft.      Tenderness: There is no abdominal tenderness.     Musculoskeletal:         General: No swelling.      Cervical back: Neck supple.     Skin:     General: Skin is warm and dry.      Capillary Refill: Capillary refill takes less than 2 seconds.     Neurological:      Mental Status: He is alert.     Psychiatric:         Mood and Affect: Mood normal.         Results Reviewed       Procedure Component Value Units Date/Time    Comprehensive metabolic panel [[id number]]  (Abnormal) Collected: 06/29/25 1028    Lab Status: Final result Specimen: Blood from Arm, Right Updated: 06/29/25 1108     Sodium 135 mmol/L      Potassium 3.6 mmol/L      Chloride 101 mmol/L      CO2 26 mmol/L      ANION GAP 8 mmol/L      BUN 13 mg/dL      Creatinine 0.89 mg/dL      Glucose 216 mg/dL      Calcium 8.7 mg/dL      AST 12 U/L      ALT 14 U/L      Alkaline Phosphatase 71 U/L      Total Protein 6.3 g/dL      Albumin 3.7 g/dL      Total Bilirubin 0.36 mg/dL      eGFR 86 ml/min/1.73sq m     Narrative:      National Kidney Disease Foundation guidelines for Chronic Kidney Disease (CKD):     Stage 1 with normal or high GFR (GFR > 90 mL/min/1.73 square meters)    Stage 2 Mild CKD (GFR = 60-89 mL/min/1.73 square meters)    Stage 3A Moderate CKD (GFR = 45-59 mL/min/1.73 square meters)    Stage 3B Moderate CKD (GFR = 30-44 mL/min/1.73 square meters)    Stage 4 Severe CKD (GFR = 15-29 mL/min/1.73 square meters)    Stage 5 End Stage CKD (GFR <15 mL/min/1.73 square meters)  Note: GFR calculation is accurate only with a steady state creatinine    CBC and differential [[id number]]  (Abnormal) Collected: 06/29/25 1028    Lab Status: Final result Specimen: Blood from Arm, Right Updated: 06/29/25 1054     WBC 9.11 Thousand/uL      RBC 4.45  "Million/uL      Hemoglobin 13.2 g/dL      Hematocrit 38.3 %      MCV 86 fL      MCH 29.7 pg      MCHC 34.5 g/dL      RDW 12.6 %      MPV 9.3 fL      Platelets 255 Thousands/uL      nRBC 0 /100 WBCs      Segmented % 58 %      Immature Grans % 0 %      Lymphocytes % 23 %      Monocytes % 9 %      Eosinophils Relative 9 %      Basophils Relative 1 %      Absolute Neutrophils 5.33 Thousands/µL      Absolute Immature Grans 0.03 Thousand/uL      Absolute Lymphocytes 2.08 Thousands/µL      Absolute Monocytes 0.81 Thousand/µL      Eosinophils Absolute 0.81 Thousand/µL      Basophils Absolute 0.05 Thousands/µL             XR chest 2 views   Final Interpretation by Juanito Barrett MD (06/30 1027)      No acute cardiopulmonary disease.            Workstation performed: [id number]             Procedures    ED Medication and Procedure Management   Prior to Admission Medications   Prescriptions Last Dose Informant Patient Reported? Taking?   Blood Glucose Monitoring Suppl (OneTouch Verio) w/Device KIT  Self No No   Sig: Use daily   Lancet Devices (ONE TOUCH DELICA LANCING DEV) MISC  Self No No   Sig: Use daily   Lancets (OneTouch Delica Plus [id number]) MISC  Self No No   Sig: Use 1 Application in the morning   Multiple Vitamins-Minerals (CENTRUM ADULTS PO)  Self Yes No   Sig: Take by mouth in the morning   QUEtiapine (SEROquel) 25 mg tablet   Yes No   Sig: Take 125 mg by mouth daily at bedtime as per [facility]  Do not start before June 27, 2025.   SYRINGE-NEEDLE, DISP, 3 ML 23G X 1\" 3 ML MISC  Self No No   Sig: Use every 30 (thirty) days   Vonoprazan Fumarate 20 MG TABS  Self No No   Sig: Take 20 mg by mouth in the morning   albuterol (2.5 mg/3 mL) 0.083 % nebulizer solution  Self No No   Sig: TAKE 3 ML (2.5 MG TOTAL) BY NEBULIZATION EVERY 6 HOURS AS NEEDED FOR WHEEZING OR SHORTNESS OF BREATH   Patient not taking: No sig reported   albuterol (ProAir HFA) 90 " mcg/act inhaler  Self No No   Sig: Inhale 2 puffs every 6 (six) hours as needed for wheezing   Patient not taking: Reported on 6/27/2025   albuterol (ProAir HFA) 90 mcg/act inhaler   No Yes   Sig: Inhale 2 puffs every 6 (six) hours as needed for wheezing   carbidopa-levodopa (SINEMET)  mg per tablet  Self, Outside Facility (Specify) Yes No   Sig: Take 1 tablet by mouth in the morning and 1 tablet at noon and 1 tablet in the evening. Takes the extended release tablet 52.5-210 mg 3 times a day .   cephalexin (KEFLEX) 500 mg capsule  Self Yes No   Sig: Take 1 capsule by mouth in the morning and 1 capsule before bedtime.   Patient not taking: Reported on 6/11/2025   cyanocobalamin 1,000 mcg/mL   No No   Sig: Inject 1 mL (1,000 mcg total) into a muscle every 14 (fourteen) days   Patient not taking: Reported on 6/27/2025   ezetimibe (ZETIA) 10 mg tablet  Self No No   Sig: Take 0.5 tablets (5 mg total) by mouth daily   furosemide (LASIX) 20 mg tablet   No No   Sig: Take 1 tablet (20 mg total) by mouth daily   glucose blood (OneTouch Verio) test strip  Self No No   Sig: Use 1 each in the morning USE TO TEST DAILY   latanoprost (XALATAN) 0.005 % ophthalmic solution  Self Yes No   Patient not taking: Reported on 6/23/2025   losartan (COZAAR) 100 MG tablet  Self No No   Sig: Take 1 tablet (100 mg total) by mouth daily   metFORMIN (GLUCOPHAGE) 500 mg tablet  Self No No   Sig: TAKE 2 TABLETS TWICE A DAY WITH MEALS   metoprolol succinate (TOPROL-XL) 25 mg 24 hr tablet   No No   Sig: Take 1 tablet (25 mg total) by mouth 2 (two) times a day   Patient taking differently: Take 50 mg by mouth daily.   nystatin (MYCOSTATIN) powder   No No   Sig: Apply topically 4 (four) times a day   Patient taking differently: Apply 1 Application topically 4 (four) times a day. to rash on bilateral groin areas   pantoprazole (PROTONIX) 40 mg tablet  Self No No   Sig: TAKE 1 TABLET TWICE A DAY (1 TABLET IN THE MORNING AND 1 TABLET IN THE EVENING)    rosuvastatin (CRESTOR) 5 mg tablet  Self No No   Sig: TAKE 1 TABLET ONCE A WEEK   semaglutide, 2 mg/dose, (Ozempic, 2 MG/DOSE,) 8 mg/ mL injection pen  Self No No   Sig: INJECT 2 MG UNDER THE SKIN EVERY 7 DAYS   senna-docusate sodium (SENOKOT S) 8.6-50 mg per tablet  Self No No   Sig: Take 1 tablet by mouth daily at bedtime   Patient taking differently: Take 1 tablet by mouth daily at bedtime as needed for constipation.   sucralfate (CARAFATE) 1 g tablet   No No   Sig: Take 1 tablet (1 g total) by mouth 4 (four) times a day   Patient taking differently: Take 1 g by mouth 4 (four) times a day as needed      Facility-Administered Medications Last Administration Doses Remaining   cyanocobalamin injection 1,000 mcg 3/28/2025  3:35 PM 8        Discharge Medication List as of 6/29/2025 11:43 AM        CONTINUE these medications which have NOT CHANGED    Details   albuterol (2.5 mg/3 mL) 0.083 % nebulizer solution TAKE 3 ML (2.5 MG TOTAL) BY NEBULIZATION EVERY 6 HOURS AS NEEDED FOR WHEEZING OR SHORTNESS OF BREATH, Normal      Blood Glucose Monitoring Suppl (OneTouch Verio) w/Device KIT Use daily, Starting Wed 4/28/2021, Normal      carbidopa-levodopa (SINEMET)  mg per tablet Take 1 tablet by mouth in the morning and 1 tablet at noon and 1 tablet in the evening. Takes the extended release tablet 52.5-210 mg 3 times a day ., Starting Tue 8/8/2023, Historical Med      cephalexin (KEFLEX) 500 mg capsule Take 1 capsule by mouth in the morning and 1 capsule before bedtime., Starting Wed 4/16/2025, Historical Med      cyanocobalamin 1,000 mcg/mL Inject 1 mL (1,000 mcg total) into a muscle every 14 (fourteen) days, Starting Fri 3/28/2025, Normal      ezetimibe (ZETIA) 10 mg tablet Take 0.5 tablets (5 mg total) by mouth daily, Starting Wed 12/18/2024, No Print      furosemide (LASIX) 20 mg tablet Take 1 tablet (20 mg total) by mouth daily, Starting Mon 6/23/2025, Normal      glucose blood (OneTouch Verio) test strip Use 1  "each in the morning USE TO TEST DAILY, Starting Mon 10/28/2024, Normal      Lancet Devices (ONE TOUCH DELICA LANCING DEV) MISC Use daily, Starting Wed 4/28/2021, Normal      Lancets (OneTouch Delica Plus [id number]) MISC Use 1 Application in the morning, Starting Mon 10/28/2024, Normal      latanoprost (XALATAN) 0.005 % ophthalmic solution Historical Med      losartan (COZAAR) 100 MG tablet Take 1 tablet (100 mg total) by mouth daily, Starting Tue 3/18/2025, Normal      metFORMIN (GLUCOPHAGE) 500 mg tablet TAKE 2 TABLETS TWICE A DAY WITH MEALS, Starting Mon 3/17/2025, Normal      metoprolol succinate (TOPROL-XL) 25 mg 24 hr tablet Take 1 tablet (25 mg total) by mouth 2 (two) times a day, Starting Tue 3/18/2025, Normal      Multiple Vitamins-Minerals (CENTRUM ADULTS PO) Take by mouth in the morning, Historical Med      nystatin (MYCOSTATIN) powder Apply topically 4 (four) times a day, Starting Mon 6/23/2025, Normal      pantoprazole (PROTONIX) 40 mg tablet TAKE 1 TABLET TWICE A DAY (1 TABLET IN THE MORNING AND 1 TABLET IN THE EVENING), Normal      QUEtiapine (SEROquel) 25 mg tablet Take 125 mg by mouth daily at bedtime as per [facility] Dr Mahi fajardo [facility] Psychiatrist  Do not start before June 27, 2025., Starting Fri 6/27/2025, Historical Med      rosuvastatin (CRESTOR) 5 mg tablet TAKE 1 TABLET ONCE A WEEK, Starting Thu 2/13/2025, Normal      semaglutide, 2 mg/dose, (Ozempic, 2 MG/DOSE,) 8 mg/ mL injection pen INJECT 2 MG UNDER THE SKIN EVERY 7 DAYS, Normal      senna-docusate sodium (SENOKOT S) 8.6-50 mg per tablet Take 1 tablet by mouth daily at bedtime, Starting Thu 12/19/2024, Normal      sucralfate (CARAFATE) 1 g tablet Take 1 tablet (1 g total) by mouth 4 (four) times a day, Starting Tue 6/17/2025, Normal      SYRINGE-NEEDLE, DISP, 3 ML 23G X 1\" 3 ML MISC Use every 30 (thirty) days, Starting Fri 3/28/2025, Normal      Vonoprazan Fumarate 20 MG TABS Take 20 mg by mouth in the " "morning, Starting Fri 5/30/2025, Normal      albuterol (ProAir HFA) 90 mcg/act inhaler Inhale 2 puffs every 6 (six) hours as needed for wheezing, Starting Sun 2/23/2025, Normal      amLODIPine (NORVASC) 5 mg tablet TAKE 1 TABLET DAILY, Starting Thu 12/26/2024, Normal      Myrbetriq 25 MG TB24 Take 25 mg by mouth in the morning, Starting Mon 6/2/2025, Normal           No discharge procedures on file.  ED SEPSIS DOCUMENTATION   Time reflects when diagnosis was documented in both MDM as applicable and the Disposition within this note       Time User Action Codes Description Comment    6/29/2025 11:40 AM Areli Marks Add [T17.908A] Aspiration into airway, initial encounter     6/29/2025 11:40 AM Areli Marks Add [R05.9] Cough     6/29/2025 12:02 PM Latanya Price Add [R05.1] Acute cough                      [1]   Past Medical History:  Diagnosis Date    Acute blood loss anemia 10/13/2016    Ambulates with cane     Anxiety     Arthritis     knees    Arthritis     Asthma     stable for > 10 years    Molina esophagus 1995 about    with BARRX/RFA    Cancer (HCC) 2000    prostate    Cataract     Depression     Diabetes (HCC)     Diabetes mellitus (HCC) Type 2    pre diabetes    Difficulty walking 1year ago    Diverticulitis of colon Not sure    Environmental allergies     Fall 12/30/2020    \" falls a couple times a month\"    GERD (gastroesophageal reflux disease)     Hiatal hernia     History of anal fissures     History of transfusion 2010    Hyperlipidemia 150    Is this blood sugar ?above figure is for Fasting blood surga    Hypertension 2000    Controled with meds    Incisional hernia     Insomnia     Kidney stone     Malignant neoplasm of prostate (HCC)     Morbid obesity (HCC)     Parkinson disease (HCC)     Peripheral neuropathy     PONV (postoperative nausea and vomiting)     nausea one time    Prolapsing mitral valve     prolapsing mitral valve leaflet syndrome    Prostate cancer (HCC)     Retinal " detachment     treated surgically at [facility]; resolved: 10/10/2012    Sleep apnea     diagnosed but unable to tolerate cpap.     Stuttering     Urinary incontinence June 2021q    Uses roller walker    [2]   Past Surgical History:  Procedure Laterality Date    ABDOMINAL SURGERY  1995 audelia    Gastric lap band    ARTHROSCOPIC REPAIR ACL Right     BARIATRIC SURGERY  1995    Lap band    BIOPSY CORE NEEDLE      prostate    CATARACT EXTRACTION Bilateral     COLONOSCOPY  April 2021    HERNIA REPAIR      umbilical    JOINT REPLACEMENT      KNEE ARTHROSCOPY Left     LAPAROSCOPIC GASTRIC BANDING      KS ARTHRP KNE CONDYLE&PLATU MEDIAL&LAT COMPARTMENTS Left 02/15/2017    Procedure: TOTAL KNEE ARTHROPLASTY ;  Surgeon: Sal Ross MD;  [facility]: [facility];  Service: Orthopedics    KS ARTHRP KNE CONDYLE&PLATU MEDIAL&LAT COMPARTMENTS Right 10/10/2016    Procedure: ARTHROPLASTY KNEE TOTAL;  Surgeon: Sal Ross MD;  [facility]: [facility];  Service: Orthopedics    KS RPR AA HERNIA 1ST 3-10 CM REDUCIBLE N/A 03/20/2025    Procedure: OPEN, REPAIR INCISION ABDOMINAL HERNIA WITH MESH;  Surgeon: Kenneth Mcfarland MD;  Location: [facility];  Service: General    PROSTATECTOMY      robotic-assisted; Lilly Tay    REMOVAL GASTRIC BAND LAPAROSCOPIC N/A 08/30/2022    Procedure: LAPAROSCOPIC REMOVAL OF ADJUSTABLE GASTRIC BAND AND PORT  WITH INTRAOPERATIVE EGD;  Surgeon: Neal Yang MD;  Location: [facility];  Service: Bariatrics    RETINAL DETACHMENT SURGERY Bilateral     Lemus [facility]    SEPTOPLASTY      SINUS SURGERY      TONSILLECTOMY      over age 10    UPPER GASTROINTESTINAL ENDOSCOPY  Sept 2020    mutiple with BARRX/RFA    VASECTOMY      vas deferens   [3]   Family History  Problem Relation Name Age of Onset    Heart disease Father Marcellus Fregoso         4 heart attacks and quad bypass    Coronary artery disease Father Marcellus Fregoso     Prostate cancer Father Marcellus Fregoso     Parkinsonism Father Marcellus Fregoso      Coronary artery disease Mother Harriett Fregoso     Diabetes Mother Harriett Fregoso         Tyoe 2    Diabetes type II Mother Harriett Fregoso     Diabetes Maternal Grandmother          mellitus    Coronary artery disease Maternal Grandfather      Coronary artery disease Paternal Grandfather      Parkinsonism Maternal Uncle Sal Luis Alfredo    [4]   Social History  Tobacco Use    Smoking status: Never     Passive exposure: Never    Smokeless tobacco: Never    Tobacco comments:     quit 38 years ago   Vaping Use    Vaping status: Never Used   Substance Use Topics    Alcohol use: Not Currently     Comment: 1 case of beer per year    Drug use: Never        Areli Marks DO  07/03/25 0132

## 2025-06-29 NOTE — DISCHARGE INSTRUCTIONS
You were seen in the emergency department today for aspiration.    You received your morning doses of both your sinemet and your lasix.    We did a chest xray that did not show signs of pneumonia. We treated your with a nebulizer and mucinex. We are not concerned for pneumonia at this time, but given the aspiration event, you should pay close attention to your symptoms over the next few days to be sure you don't develop signs.  You should return to the emergency department if you experience shortness of breath, cough productive of sputum, coughing up blood, fevers, chills, body aches, nausea, vomiting.    Thank you for choosing [facility]!

## 2025-06-30 ENCOUNTER — HOME CARE VISIT (OUTPATIENT)
Dept: HOME HEALTH SERVICES | Facility: HOME HEALTHCARE | Age: 70
End: 2025-06-30
Payer: MEDICARE

## 2025-06-30 VITALS — HEART RATE: 88 BPM | SYSTOLIC BLOOD PRESSURE: 134 MMHG | DIASTOLIC BLOOD PRESSURE: 86 MMHG | OXYGEN SATURATION: 96 %

## 2025-06-30 DIAGNOSIS — I10 ESSENTIAL HYPERTENSION: ICD-10-CM

## 2025-06-30 LAB
ATRIAL RATE: 68 BPM
P AXIS: 13 DEGREES
PR INTERVAL: 202 MS
QRS AXIS: -31 DEGREES
QRSD INTERVAL: 98 MS
QT INTERVAL: 394 MS
QTC INTERVAL: 418 MS
T WAVE AXIS: 58 DEGREES
VENTRICULAR RATE: 68 BPM

## 2025-06-30 PROCEDURE — 93010 ELECTROCARDIOGRAM REPORT: CPT | Performed by: INTERNAL MEDICINE

## 2025-06-30 PROCEDURE — G0151 HHCP-SERV OF PT,EA 15 MIN: HCPCS

## 2025-06-30 NOTE — CASE COMMUNICATION
Can you make sure you assess him on the stairs and with bed mobility? We didn't get to do anything upstairs as it took us a while to get through our assessments downstairs. Him and his wife stated that he is doing ok with the stairs and getting in and out of bed so I was saving it for last and then we just didn't have the time.

## 2025-06-30 NOTE — CASE COMMUNICATION
Patient is dealing with decreased LE strength, endurance, fall risk, balance issues, and coordination issues that lead to great difficulty with walking and stair climbing. Patient occasionally has difficulty with initiation. He moves fairly well but his stuttering is an issue in terms of keeping time with an appointment. I would encourage simple questions with simple answers that get to the point. Patient also has to

## 2025-07-01 DIAGNOSIS — N32.81 OAB (OVERACTIVE BLADDER): ICD-10-CM

## 2025-07-01 RX ORDER — AMLODIPINE BESYLATE 5 MG/1
5 TABLET ORAL DAILY
Qty: 90 TABLET | Refills: 1 | Status: SHIPPED | OUTPATIENT
Start: 2025-07-01

## 2025-07-02 ENCOUNTER — HOME CARE VISIT (OUTPATIENT)
Dept: HOME HEALTH SERVICES | Facility: HOME HEALTHCARE | Age: 70
End: 2025-07-02
Payer: MEDICARE

## 2025-07-02 ENCOUNTER — TELEPHONE (OUTPATIENT)
Age: 70
End: 2025-07-02

## 2025-07-02 DIAGNOSIS — R06.2 WHEEZING: Primary | ICD-10-CM

## 2025-07-02 PROCEDURE — G0299 HHS/HOSPICE OF RN EA 15 MIN: HCPCS

## 2025-07-02 PROCEDURE — G0152 HHCP-SERV OF OT,EA 15 MIN: HCPCS

## 2025-07-02 RX ORDER — METHYLPREDNISOLONE 4 MG/1
TABLET ORAL
Qty: 21 EACH | Refills: 0 | Status: SHIPPED | OUTPATIENT
Start: 2025-07-02

## 2025-07-02 RX ORDER — MIRABEGRON 25 MG/1
25 TABLET, FILM COATED, EXTENDED RELEASE ORAL DAILY
Qty: 90 TABLET | Refills: 1 | Status: SHIPPED | OUTPATIENT
Start: 2025-07-02

## 2025-07-02 NOTE — TELEPHONE ENCOUNTER
Spoke to patient. He confirmed he only has had reaction to eye drops. States he has taken oral prednisone and it helped with symptoms. Reports no allergic reaction to pill form.

## 2025-07-02 NOTE — TELEPHONE ENCOUNTER
"Allergy list showed \"Prednisone eye drops- eye pressure increases\". Can he take oral prednisone?   "

## 2025-07-02 NOTE — TELEPHONE ENCOUNTER
Mtira with [facility] called from patients house today stating patient was seen in the ER last Sunday for possible aspiration. He seems to be having a hard time getting the mucus in his throat up and out. Mitra said his lungs are clear but there is audible wheezing. She would like to know if Mucinex would be helpful for patient. Patient is also requesting a few days of prednisone.    Please let patient know.    [facility]/pharmacy #[id number] - [place], [place] - [phone] [phone]

## 2025-07-03 ENCOUNTER — HOME CARE VISIT (OUTPATIENT)
Dept: HOME HEALTH SERVICES | Facility: HOME HEALTHCARE | Age: 70
End: 2025-07-03
Payer: MEDICARE

## 2025-07-03 VITALS — HEART RATE: 64 BPM | OXYGEN SATURATION: 95 % | DIASTOLIC BLOOD PRESSURE: 80 MMHG | SYSTOLIC BLOOD PRESSURE: 160 MMHG

## 2025-07-03 VITALS
SYSTOLIC BLOOD PRESSURE: 154 MMHG | TEMPERATURE: 97.8 F | DIASTOLIC BLOOD PRESSURE: 78 MMHG | HEART RATE: 61 BPM | OXYGEN SATURATION: 98 % | RESPIRATION RATE: 16 BRPM

## 2025-07-03 VITALS — DIASTOLIC BLOOD PRESSURE: 64 MMHG | SYSTOLIC BLOOD PRESSURE: 126 MMHG | OXYGEN SATURATION: 96 % | HEART RATE: 63 BPM

## 2025-07-03 PROCEDURE — G0157 HHC PT ASSISTANT EA 15: HCPCS

## 2025-07-04 DIAGNOSIS — K22.70 BARRETT'S ESOPHAGUS WITHOUT DYSPLASIA: ICD-10-CM

## 2025-07-06 RX ORDER — PANTOPRAZOLE SODIUM 40 MG/1
TABLET, DELAYED RELEASE ORAL
Qty: 180 TABLET | Refills: 1 | Status: SHIPPED | OUTPATIENT
Start: 2025-07-06

## 2025-07-07 ENCOUNTER — HOME CARE VISIT (OUTPATIENT)
Dept: HOME HEALTH SERVICES | Facility: HOME HEALTHCARE | Age: 70
End: 2025-07-07
Payer: MEDICARE

## 2025-07-07 ENCOUNTER — TELEPHONE (OUTPATIENT)
Age: 70
End: 2025-07-07

## 2025-07-07 VITALS — HEART RATE: 64 BPM | DIASTOLIC BLOOD PRESSURE: 74 MMHG | OXYGEN SATURATION: 98 % | SYSTOLIC BLOOD PRESSURE: 128 MMHG

## 2025-07-07 PROCEDURE — G0180 MD CERTIFICATION HHA PATIENT: HCPCS | Performed by: FAMILY MEDICINE

## 2025-07-07 PROCEDURE — G0152 HHCP-SERV OF OT,EA 15 MIN: HCPCS

## 2025-07-07 NOTE — TELEPHONE ENCOUNTER
Patient called regarding prescribed Prednisone - states he took 6 pills on Friday (noticed immediate relief of symptoms), tapered to 4 pills on Saturday and 2 pills on Sunday. Asking if PCP feels it would be okay for him to stop taking medication now, taper differently or if he needs to take it in its entirety. Patient states he does not want to take this medication longer than he needs to. Please have provider review and further advise patient.

## 2025-07-08 ENCOUNTER — HOME CARE VISIT (OUTPATIENT)
Dept: HOME HEALTH SERVICES | Facility: HOME HEALTHCARE | Age: 70
End: 2025-07-08
Payer: MEDICARE

## 2025-07-08 VITALS — OXYGEN SATURATION: 97 % | HEART RATE: 71 BPM | SYSTOLIC BLOOD PRESSURE: 136 MMHG | DIASTOLIC BLOOD PRESSURE: 80 MMHG

## 2025-07-08 PROCEDURE — G0157 HHC PT ASSISTANT EA 15: HCPCS

## 2025-07-09 ENCOUNTER — HOME CARE VISIT (OUTPATIENT)
Dept: HOME HEALTH SERVICES | Facility: HOME HEALTHCARE | Age: 70
End: 2025-07-09
Payer: MEDICARE

## 2025-07-09 VITALS
TEMPERATURE: 97.7 F | HEART RATE: 72 BPM | OXYGEN SATURATION: 96 % | RESPIRATION RATE: 16 BRPM | SYSTOLIC BLOOD PRESSURE: 160 MMHG | DIASTOLIC BLOOD PRESSURE: 78 MMHG

## 2025-07-09 PROCEDURE — G0299 HHS/HOSPICE OF RN EA 15 MIN: HCPCS

## 2025-07-10 ENCOUNTER — HOME CARE VISIT (OUTPATIENT)
Dept: HOME HEALTH SERVICES | Facility: HOME HEALTHCARE | Age: 70
End: 2025-07-10
Payer: MEDICARE

## 2025-07-10 VITALS
DIASTOLIC BLOOD PRESSURE: 68 MMHG | BODY MASS INDEX: 45.63 KG/M2 | OXYGEN SATURATION: 96 % | SYSTOLIC BLOOD PRESSURE: 144 MMHG | HEART RATE: 68 BPM | WEIGHT: 309 LBS

## 2025-07-10 PROCEDURE — G0151 HHCP-SERV OF PT,EA 15 MIN: HCPCS

## 2025-07-11 ENCOUNTER — HOME CARE VISIT (OUTPATIENT)
Dept: HOME HEALTH SERVICES | Facility: HOME HEALTHCARE | Age: 70
End: 2025-07-11
Payer: MEDICARE

## 2025-07-14 ENCOUNTER — HOME CARE VISIT (OUTPATIENT)
Dept: HOME HEALTH SERVICES | Facility: HOME HEALTHCARE | Age: 70
End: 2025-07-14
Payer: MEDICARE

## 2025-07-14 VITALS — SYSTOLIC BLOOD PRESSURE: 140 MMHG | DIASTOLIC BLOOD PRESSURE: 82 MMHG | HEART RATE: 63 BPM | OXYGEN SATURATION: 94 %

## 2025-07-14 PROCEDURE — G0152 HHCP-SERV OF OT,EA 15 MIN: HCPCS

## 2025-07-15 ENCOUNTER — HOME CARE VISIT (OUTPATIENT)
Dept: HOME HEALTH SERVICES | Facility: HOME HEALTHCARE | Age: 70
End: 2025-07-15
Payer: MEDICARE

## 2025-07-15 ENCOUNTER — OFFICE VISIT (OUTPATIENT)
Dept: CARDIOLOGY CLINIC | Facility: CLINIC | Age: 70
End: 2025-07-15
Payer: MEDICARE

## 2025-07-15 VITALS
HEIGHT: 69 IN | WEIGHT: 311 LBS | SYSTOLIC BLOOD PRESSURE: 124 MMHG | BODY MASS INDEX: 46.06 KG/M2 | HEART RATE: 71 BPM | DIASTOLIC BLOOD PRESSURE: 70 MMHG | OXYGEN SATURATION: 96 %

## 2025-07-15 DIAGNOSIS — I25.10 CORONARY ARTERY DISEASE INVOLVING NATIVE CORONARY ARTERY OF NATIVE HEART WITHOUT ANGINA PECTORIS: ICD-10-CM

## 2025-07-15 DIAGNOSIS — I10 BENIGN ESSENTIAL HYPERTENSION: ICD-10-CM

## 2025-07-15 DIAGNOSIS — E78.00 PURE HYPERCHOLESTEROLEMIA: ICD-10-CM

## 2025-07-15 DIAGNOSIS — I49.3 PVC (PREMATURE VENTRICULAR CONTRACTION): Primary | ICD-10-CM

## 2025-07-15 PROCEDURE — 99213 OFFICE O/P EST LOW 20 MIN: CPT | Performed by: INTERNAL MEDICINE

## 2025-07-15 RX ORDER — FLUOXETINE 10 MG/1
10 CAPSULE ORAL DAILY
COMMUNITY
Start: 2025-06-27

## 2025-07-16 ENCOUNTER — HOME CARE VISIT (OUTPATIENT)
Dept: HOME HEALTH SERVICES | Facility: HOME HEALTHCARE | Age: 70
End: 2025-07-16
Payer: MEDICARE

## 2025-07-16 DIAGNOSIS — R60.0 LOCALIZED EDEMA: ICD-10-CM

## 2025-07-16 PROCEDURE — G0155 HHCP-SVS OF CSW,EA 15 MIN: HCPCS

## 2025-07-17 ENCOUNTER — HOME CARE VISIT (OUTPATIENT)
Dept: HOME HEALTH SERVICES | Facility: HOME HEALTHCARE | Age: 70
End: 2025-07-17
Payer: MEDICARE

## 2025-07-17 VITALS — OXYGEN SATURATION: 93 % | HEART RATE: 63 BPM | SYSTOLIC BLOOD PRESSURE: 152 MMHG | DIASTOLIC BLOOD PRESSURE: 70 MMHG

## 2025-07-17 VITALS — DIASTOLIC BLOOD PRESSURE: 84 MMHG | OXYGEN SATURATION: 95 % | SYSTOLIC BLOOD PRESSURE: 146 MMHG | HEART RATE: 77 BPM

## 2025-07-17 DIAGNOSIS — R60.0 LOCALIZED EDEMA: ICD-10-CM

## 2025-07-17 PROCEDURE — G0157 HHC PT ASSISTANT EA 15: HCPCS

## 2025-07-17 PROCEDURE — G0152 HHCP-SERV OF OT,EA 15 MIN: HCPCS

## 2025-07-17 PROCEDURE — G0156 HHCP-SVS OF AIDE,EA 15 MIN: HCPCS

## 2025-07-17 RX ORDER — FUROSEMIDE 40 MG/1
40 TABLET ORAL DAILY
Qty: 90 TABLET | Refills: 3 | Status: SHIPPED | OUTPATIENT
Start: 2025-07-17

## 2025-07-17 NOTE — TELEPHONE ENCOUNTER
Patient called for refill on Lasix 20mg, he stated Dr Haley is now managing this med and had increased the med to twice daily.    Please send to [facility] [address], he has 2 tabs left

## 2025-07-18 RX ORDER — FUROSEMIDE 20 MG/1
20 TABLET ORAL DAILY
Qty: 90 TABLET | Refills: 1 | OUTPATIENT
Start: 2025-07-18

## 2025-07-21 ENCOUNTER — HOME CARE VISIT (OUTPATIENT)
Dept: HOME HEALTH SERVICES | Facility: HOME HEALTHCARE | Age: 70
End: 2025-07-21
Payer: MEDICARE

## 2025-07-21 PROCEDURE — G0152 HHCP-SERV OF OT,EA 15 MIN: HCPCS

## 2025-07-22 ENCOUNTER — HOME CARE VISIT (OUTPATIENT)
Dept: HOME HEALTH SERVICES | Facility: HOME HEALTHCARE | Age: 70
End: 2025-07-22
Payer: MEDICARE

## 2025-07-22 VITALS — SYSTOLIC BLOOD PRESSURE: 120 MMHG | HEART RATE: 74 BPM | DIASTOLIC BLOOD PRESSURE: 78 MMHG | OXYGEN SATURATION: 97 %

## 2025-07-22 VITALS — DIASTOLIC BLOOD PRESSURE: 80 MMHG | SYSTOLIC BLOOD PRESSURE: 132 MMHG | HEART RATE: 73 BPM | OXYGEN SATURATION: 96 %

## 2025-07-22 PROCEDURE — G0157 HHC PT ASSISTANT EA 15: HCPCS

## 2025-07-23 ENCOUNTER — HOME CARE VISIT (OUTPATIENT)
Dept: HOME HEALTH SERVICES | Facility: HOME HEALTHCARE | Age: 70
End: 2025-07-23
Payer: MEDICARE

## 2025-07-23 PROCEDURE — G0156 HHCP-SVS OF AIDE,EA 15 MIN: HCPCS

## 2025-07-24 ENCOUNTER — TELEPHONE (OUTPATIENT)
Dept: CARDIOLOGY CLINIC | Facility: CLINIC | Age: 70
End: 2025-07-24

## 2025-07-24 ENCOUNTER — NURSE TRIAGE (OUTPATIENT)
Age: 70
End: 2025-07-24

## 2025-07-24 ENCOUNTER — HOME CARE VISIT (OUTPATIENT)
Dept: HOME HEALTH SERVICES | Facility: HOME HEALTHCARE | Age: 70
End: 2025-07-24
Payer: MEDICARE

## 2025-07-24 VITALS — SYSTOLIC BLOOD PRESSURE: 140 MMHG | DIASTOLIC BLOOD PRESSURE: 80 MMHG | OXYGEN SATURATION: 96 % | HEART RATE: 80 BPM

## 2025-07-24 PROCEDURE — G0151 HHCP-SERV OF PT,EA 15 MIN: HCPCS

## 2025-07-24 NOTE — CASE COMMUNICATION
PT services to continue for 1w2 starting next week to continue monitoring patient's CP management and follow up on recommendations and well as follow up on addressing recent fall.

## 2025-07-24 NOTE — CASE COMMUNICATION
Patient's BL LE's are swollen R ankle 4+ and L ankle 3+ and patient has weight gain of 3lbs from yesterday's weight check (at pm) compared to this mornings's weight check. Patient and his spouse advocate that his weight gain is also due to his uncontrolled eating habits overnight as certain medications increase his appetite overnight.     Please follow up with the patient or his spouse for further instruction.

## 2025-07-24 NOTE — TELEPHONE ENCOUNTER
I called the patient with Dr. Haley's instructions to increase Lasix 40 mg bid x 4 days. He will call us on Monday 7/28 with an update.

## 2025-07-24 NOTE — TELEPHONE ENCOUNTER
----- Message -----   From: Getachew Haley MD   Sent: 7/24/2025  12:13 PM EDT   To: [facility]     Increase furosemid 40 bid x 4 days   Improve Na intake habits   ----- Message -----   From: Jass Loaiza PT   Sent: 7/24/2025  11:53 AM EDT   To: Getachew Haley MD     Patient's BL LE's are swollen R ankle 4+ and L ankle 3+ and patient has weight gain of 3lbs from yesterday's weight check (at pm) compared to this mornings's weight check. Patient and his spouse advocate that his weight gain is also due to his uncontrolled eating habits overnight as certain medications increase his appetite overnight.     Please follow up with the patient or his spouse for further instruction.

## 2025-07-24 NOTE — TELEPHONE ENCOUNTER
"REASON FOR CONVERSATION: Leg Swelling  Pt called to report that he has had an increase in bilateral ankle edema, R ankle 4+ and L ankle 3+ and patient has weight gain of 3lbs over night. It was reported that the patient may be eating foods that are high in sodium and partaking in late night snacking as well per PT notes. Pt's vital signs are 140/80, 80, 96%. He is taking Furosemide 40 mg daily since 7/17.     SYMPTOMS: Increase in bilateral ankle edema, R> L, mild SOB with exertion; > 3 lb weight gain over night; denies chest pain, cough or fatigue     OTHER HEALTH INFORMATION: Pt was last seen in the office 7/17  Amlodipine was decreased to 2.5 mg daily  Lasix was increased from 20 mg to 40 mg daily    PROTOCOL DISPOSITION: Discuss with Provider and Call Back Patient (overriding See Today in Office)     CARE ADVICE PROVIDED: Advised the patient would inform the provider of symptoms and weight gain/ankle edema and would call the patient back with recommendations.     PRACTICE FOLLOW-UP: Please call the patient back      Reason for Disposition   MODERATE swelling of both ankles (e.g., swelling extends up to the knees) AND new-onset or getting worse    Answer Assessment - Initial Assessment Questions  1. ONSET: \"When did the swelling start?\" (e.g., minutes, hours, days)      Past few days   2. LOCATION: \"What part of the leg is swollen?\"  \"Are both legs swollen or just one leg?\"      Bilateral ankle swelling   3. SEVERITY: \"How bad is the swelling?\" (e.g., localized; mild, moderate, severe)     Severe per PT note R ankle 4+ and L ankle 3+  7. CAUSE: \"What do you think is causing the leg swelling?\"      Unsure, but non compliance with diet is reported   8. MEDICAL HISTORY: \"Do you have a history of blood clots (e.g., DVT), cancer, heart failure, kidney disease, or liver failure?\"      Parkinson's disease, unsure if CHF, CAD, PVC's   9. RECURRENT SYMPTOM: \"Have you had leg swelling before?\" If Yes, ask: \"When was the " "last time?\" \"What happened that time?\"      Unsure   10. OTHER SYMPTOMS: \"Do you have any other symptoms?\" (e.g., chest pain, difficulty breathing)        Denies CP, mild SOB chronic    Protocols used: Leg Swelling and Edema-Adult-[place]    "

## 2025-07-24 NOTE — CASE COMMUNICATION
Patient slipped off the edge of the bed last night upon waking to try to stand up and stretch as he got uncomfortable. Patient states he landed on his knees after sliding off the edge of the bed. Patient was not injured at the time and upon skin inspection of his knees there is no sign of bruising. Patient has a bar to hold onto at his bed. Patient's vitals are WNL. I reviewed safety procedures with bed transfers with the patient and fo llowed up with his wife who is present during visit.     Please advise the patient/spouse for follow up if needed.

## 2025-07-26 DIAGNOSIS — E78.00 PURE HYPERCHOLESTEROLEMIA: ICD-10-CM

## 2025-07-26 DIAGNOSIS — E11.9 TYPE 2 DIABETES MELLITUS WITHOUT COMPLICATION, WITHOUT LONG-TERM CURRENT USE OF INSULIN (HCC): ICD-10-CM

## 2025-07-26 DIAGNOSIS — E66.813 OBESITY, CLASS III, BMI 40-49.9 (MORBID OBESITY): ICD-10-CM

## 2025-07-28 LAB
LEFT EYE DIABETIC RETINOPATHY: NORMAL
RIGHT EYE DIABETIC RETINOPATHY: NORMAL

## 2025-07-28 RX ORDER — SEMAGLUTIDE 2.68 MG/ML
INJECTION, SOLUTION SUBCUTANEOUS
Qty: 9 ML | Refills: 3 | Status: SHIPPED | OUTPATIENT
Start: 2025-07-28

## 2025-07-28 NOTE — TELEPHONE ENCOUNTER
Pt states that he has been elevating his legs. He wants to know if he should continue Lasix 40mg BID?    Also please place echo order.

## 2025-07-28 NOTE — TELEPHONE ENCOUNTER
Patient called back with an update.   He reports swelling has increased especially in the right ankle and weight is up from 307 on Friday to 311 today.  He denies chest pain shortness of breath.  He said he has increased fatigue.  He has been taking increased Lasix dose since Friday. Taking Lasix 40mg BID.

## 2025-07-29 ENCOUNTER — TELEPHONE (OUTPATIENT)
Age: 70
End: 2025-07-29

## 2025-07-29 ENCOUNTER — HOME CARE VISIT (OUTPATIENT)
Dept: HOME HEALTH SERVICES | Facility: HOME HEALTHCARE | Age: 70
End: 2025-07-29
Payer: MEDICARE

## 2025-07-29 VITALS
TEMPERATURE: 97.6 F | SYSTOLIC BLOOD PRESSURE: 142 MMHG | DIASTOLIC BLOOD PRESSURE: 68 MMHG | BODY MASS INDEX: 45.72 KG/M2 | OXYGEN SATURATION: 95 % | WEIGHT: 309.6 LBS | HEART RATE: 69 BPM

## 2025-07-29 DIAGNOSIS — I10 ESSENTIAL HYPERTENSION: ICD-10-CM

## 2025-07-29 DIAGNOSIS — I49.1 PAC (PREMATURE ATRIAL CONTRACTION): ICD-10-CM

## 2025-07-29 DIAGNOSIS — E78.5 HYPERLIPIDEMIA, UNSPECIFIED HYPERLIPIDEMIA TYPE: ICD-10-CM

## 2025-07-29 DIAGNOSIS — I10 BENIGN ESSENTIAL HYPERTENSION: ICD-10-CM

## 2025-07-29 DIAGNOSIS — I49.3 PVC (PREMATURE VENTRICULAR CONTRACTION): ICD-10-CM

## 2025-07-29 DIAGNOSIS — E78.2 MIXED HYPERLIPIDEMIA: ICD-10-CM

## 2025-07-29 DIAGNOSIS — R60.0 LOCALIZED EDEMA: Primary | ICD-10-CM

## 2025-07-29 DIAGNOSIS — E78.00 PURE HYPERCHOLESTEROLEMIA: ICD-10-CM

## 2025-07-29 DIAGNOSIS — I25.10 CORONARY ARTERY DISEASE INVOLVING NATIVE CORONARY ARTERY OF NATIVE HEART WITHOUT ANGINA PECTORIS: ICD-10-CM

## 2025-07-29 PROCEDURE — G0151 HHCP-SERV OF PT,EA 15 MIN: HCPCS

## 2025-07-30 ENCOUNTER — TELEPHONE (OUTPATIENT)
Dept: ADMINISTRATIVE | Facility: OTHER | Age: 70
End: 2025-07-30

## 2025-07-30 NOTE — TELEPHONE ENCOUNTER
Pt sent Naytev message yesterday and I responded with Dr. Haley's message and ordered the Echo. I gave the pt the CS number.

## 2025-07-31 ENCOUNTER — TELEPHONE (OUTPATIENT)
Dept: NON INVASIVE DIAGNOSTICS | Facility: HOSPITAL | Age: 70
End: 2025-07-31

## 2025-08-04 ENCOUNTER — HOME CARE VISIT (OUTPATIENT)
Dept: HOME HEALTH SERVICES | Facility: HOME HEALTHCARE | Age: 70
End: 2025-08-04
Payer: MEDICARE

## 2025-08-04 VITALS
HEART RATE: 58 BPM | TEMPERATURE: 97.7 F | OXYGEN SATURATION: 96 % | SYSTOLIC BLOOD PRESSURE: 130 MMHG | DIASTOLIC BLOOD PRESSURE: 74 MMHG

## 2025-08-04 DIAGNOSIS — E78.2 MIXED HYPERLIPIDEMIA: ICD-10-CM

## 2025-08-04 PROCEDURE — G0151 HHCP-SERV OF PT,EA 15 MIN: HCPCS

## 2025-08-05 ENCOUNTER — TELEPHONE (OUTPATIENT)
Dept: CARDIOLOGY CLINIC | Facility: CLINIC | Age: 70
End: 2025-08-05

## 2025-08-05 ENCOUNTER — CLINICAL SUPPORT (OUTPATIENT)
Dept: CARDIOLOGY CLINIC | Facility: CLINIC | Age: 70
End: 2025-08-05
Payer: MEDICARE

## 2025-08-05 ENCOUNTER — TELEPHONE (OUTPATIENT)
Age: 70
End: 2025-08-05

## 2025-08-05 ENCOUNTER — DOCUMENTATION (OUTPATIENT)
Dept: CARDIOLOGY CLINIC | Facility: CLINIC | Age: 70
End: 2025-08-05

## 2025-08-05 VITALS
OXYGEN SATURATION: 96 % | DIASTOLIC BLOOD PRESSURE: 60 MMHG | BODY MASS INDEX: 46.3 KG/M2 | HEART RATE: 82 BPM | WEIGHT: 313.5 LBS | SYSTOLIC BLOOD PRESSURE: 100 MMHG

## 2025-08-05 DIAGNOSIS — R60.1 GENERALIZED EDEMA: ICD-10-CM

## 2025-08-05 DIAGNOSIS — R60.1 GENERALIZED EDEMA: Primary | ICD-10-CM

## 2025-08-05 DIAGNOSIS — I10 BENIGN ESSENTIAL HYPERTENSION: Primary | ICD-10-CM

## 2025-08-05 DIAGNOSIS — R06.02 SOB (SHORTNESS OF BREATH): Primary | ICD-10-CM

## 2025-08-05 DIAGNOSIS — R60.0 LOCALIZED EDEMA: Primary | ICD-10-CM

## 2025-08-05 DIAGNOSIS — I50.31 ACUTE DIASTOLIC CONGESTIVE HEART FAILURE (HCC): Primary | ICD-10-CM

## 2025-08-05 PROCEDURE — 96374 THER/PROPH/DIAG INJ IV PUSH: CPT

## 2025-08-05 PROCEDURE — 96374 THER/PROPH/DIAG INJ IV PUSH: CPT | Performed by: INTERNAL MEDICINE

## 2025-08-05 RX ORDER — POTASSIUM CHLORIDE 750 MG/1
10 TABLET, EXTENDED RELEASE ORAL DAILY
Qty: 30 TABLET | Refills: 3 | Status: SHIPPED | OUTPATIENT
Start: 2025-08-05

## 2025-08-05 RX ORDER — ROSUVASTATIN CALCIUM 5 MG/1
5 TABLET, COATED ORAL WEEKLY
Qty: 12 TABLET | Refills: 1 | Status: SHIPPED | OUTPATIENT
Start: 2025-08-05

## 2025-08-05 RX ORDER — FUROSEMIDE 10 MG/ML
80 INJECTION INTRAMUSCULAR; INTRAVENOUS ONCE
Status: COMPLETED | COMMUNITY
Start: 2025-08-05 | End: 2025-08-05

## 2025-08-05 RX ADMIN — FUROSEMIDE 80 MG: 10 INJECTION INTRAMUSCULAR; INTRAVENOUS at 12:01

## 2025-08-06 ENCOUNTER — HOSPITAL ENCOUNTER (OUTPATIENT)
Dept: NON INVASIVE DIAGNOSTICS | Facility: HOSPITAL | Age: 70
Discharge: HOME | End: 2025-08-06
Attending: INTERNAL MEDICINE
Payer: MEDICARE

## 2025-08-06 ENCOUNTER — TELEPHONE (OUTPATIENT)
Age: 70
End: 2025-08-06

## 2025-08-06 ENCOUNTER — APPOINTMENT (OUTPATIENT)
Dept: LAB | Facility: CLINIC | Age: 70
End: 2025-08-06
Attending: INTERNAL MEDICINE
Payer: MEDICARE

## 2025-08-06 VITALS
SYSTOLIC BLOOD PRESSURE: 100 MMHG | BODY MASS INDEX: 46.36 KG/M2 | DIASTOLIC BLOOD PRESSURE: 60 MMHG | WEIGHT: 313 LBS | HEART RATE: 82 BPM | HEIGHT: 69 IN

## 2025-08-06 DIAGNOSIS — I10 ESSENTIAL HYPERTENSION: ICD-10-CM

## 2025-08-06 DIAGNOSIS — R60.0 LOCALIZED EDEMA: ICD-10-CM

## 2025-08-06 DIAGNOSIS — I25.10 CORONARY ARTERY DISEASE INVOLVING NATIVE CORONARY ARTERY OF NATIVE HEART WITHOUT ANGINA PECTORIS: ICD-10-CM

## 2025-08-06 DIAGNOSIS — E78.2 MIXED HYPERLIPIDEMIA: ICD-10-CM

## 2025-08-06 DIAGNOSIS — I10 BENIGN ESSENTIAL HYPERTENSION: ICD-10-CM

## 2025-08-06 DIAGNOSIS — E78.5 HYPERLIPIDEMIA, UNSPECIFIED HYPERLIPIDEMIA TYPE: ICD-10-CM

## 2025-08-06 DIAGNOSIS — I49.1 PAC (PREMATURE ATRIAL CONTRACTION): ICD-10-CM

## 2025-08-06 DIAGNOSIS — E78.00 PURE HYPERCHOLESTEROLEMIA: ICD-10-CM

## 2025-08-06 DIAGNOSIS — I49.3 PVC (PREMATURE VENTRICULAR CONTRACTION): ICD-10-CM

## 2025-08-06 LAB
AORTIC ROOT: 4 CM
ASCENDING AORTA: 3.9 CM
BSA FOR ECHO PROCEDURE: 2.5 M2
E WAVE DECELERATION TIME: 231 MS
E/A RATIO: 0.73
FRACTIONAL SHORTENING: 35 (ref 28–44)
INTERVENTRICULAR SEPTUM IN DIASTOLE (PARASTERNAL SHORT AXIS VIEW): 1.3 CM
INTERVENTRICULAR SEPTUM: 1.3 CM (ref 0.6–1.1)
LAAS-AP2: 16.4 CM2
LAAS-AP4: 19.9 CM2
LEFT ATRIUM SIZE: 3.9 CM
LEFT ATRIUM VOLUME (MOD BIPLANE): 51 ML
LEFT ATRIUM VOLUME INDEX (MOD BIPLANE): 20.4 ML/M2
LEFT INTERNAL DIMENSION IN SYSTOLE: 3 CM (ref 2.1–4)
LEFT VENTRICULAR INTERNAL DIMENSION IN DIASTOLE: 4.6 CM (ref 3.5–6)
LEFT VENTRICULAR POSTERIOR WALL IN END DIASTOLE: 1.2 CM
LEFT VENTRICULAR STROKE VOLUME: 64 ML
LV EF US.2D.A4C+ESTIMATED: 66 %
LVSV (TEICH): 64 ML
MV E'TISSUE VEL-LAT: 10 CM/S
MV E'TISSUE VEL-SEP: 7 CM/S
MV PEAK A VEL: 0.93 M/S
MV PEAK E VEL: 68 CM/S
MV STENOSIS PRESSURE HALF TIME: 67 MS
MV VALVE AREA P 1/2 METHOD: 3.28
RIGHT ATRIUM AREA SYSTOLE A4C: 14 CM2
RIGHT VENTRICLE ID DIMENSION: 3.6 CM
SINOTUBULAR JUNCTION: 3.2 CM
SL CV LEFT ATRIUM LENGTH A2C: 5.1 CM
SL CV LV EF: 65
SL CV PED ECHO LEFT VENTRICLE DIASTOLIC VOLUME (MOD BIPLANE) 2D: 99 ML
SL CV PED ECHO LEFT VENTRICLE SYSTOLIC VOLUME (MOD BIPLANE) 2D: 35 ML
SL CV SINUS OF VALSALVA 2D: 3.9 CM
STJ: 3.2 CM
TRICUSPID ANNULAR PLANE SYSTOLIC EXCURSION: 2.5 CM

## 2025-08-06 PROCEDURE — 93306 TTE W/DOPPLER COMPLETE: CPT

## 2025-08-06 PROCEDURE — 93306 TTE W/DOPPLER COMPLETE: CPT | Performed by: STUDENT IN AN ORGANIZED HEALTH CARE EDUCATION/TRAINING PROGRAM

## 2025-08-06 RX ORDER — FUROSEMIDE 10 MG/ML
80 INJECTION INTRAMUSCULAR; INTRAVENOUS ONCE
Status: SHIPPED | OUTPATIENT
Start: 2025-08-06

## 2025-08-07 ENCOUNTER — TELEPHONE (OUTPATIENT)
Dept: CARDIOLOGY CLINIC | Facility: CLINIC | Age: 70
End: 2025-08-07

## 2025-08-15 ENCOUNTER — PROBLEM (OUTPATIENT)
Dept: URBAN - METROPOLITAN AREA CLINIC 6 | Facility: CLINIC | Age: 70
End: 2025-08-15

## 2025-08-15 DIAGNOSIS — H04.123: ICD-10-CM

## 2025-08-15 DIAGNOSIS — E11.9: ICD-10-CM

## 2025-08-15 DIAGNOSIS — H35.371: ICD-10-CM

## 2025-08-15 DIAGNOSIS — H33.013: ICD-10-CM

## 2025-08-15 DIAGNOSIS — H40.053: ICD-10-CM

## 2025-08-15 DIAGNOSIS — H35.413: ICD-10-CM

## 2025-08-15 PROCEDURE — 92012 INTRM OPH EXAM EST PATIENT: CPT

## 2025-08-15 ASSESSMENT — TONOMETRY
OD_IOP_MMHG: 12
OS_IOP_MMHG: 12
OS_IOP_MMHG: 12
OD_IOP_MMHG: 11

## 2025-08-15 ASSESSMENT — VISUAL ACUITY
OS_CC: 20/25+1
OD_CC: 20/30

## 2025-08-20 DIAGNOSIS — B37.9 YEAST INFECTION: ICD-10-CM

## 2025-08-20 RX ORDER — NYSTATIN 100000 [USP'U]/G
1 POWDER TOPICAL 4 TIMES DAILY
Qty: 120 G | Refills: 1 | Status: SHIPPED | OUTPATIENT
Start: 2025-08-20

## (undated) DEVICE — SUT ETHIBOND 0 SH 30 IN X834H

## (undated) DEVICE — 3M™ IOBAN™ 2 ANTIMICROBIAL INCISE DRAPE 6640EZ: Brand: IOBAN™ 2

## (undated) DEVICE — 10 MM BABCOCKS WITH RATCHET HANDLES: Brand: ENDOPATH

## (undated) DEVICE — SPONGE PVP SCRUB WING STERILE

## (undated) DEVICE — DRAPE EQUIPMENT RF WAND

## (undated) DEVICE — ACE WRAP 6 IN UNSTERILE

## (undated) DEVICE — TUBING SMOKE EVAC W/FILTRATION DEVICE PLUMEPORT ACTIV

## (undated) DEVICE — DRAPE SHEET X-LG

## (undated) DEVICE — ENDOPATH PNEUMONEEDLE INSUFFLATION NEEDLES WITH LUER LOCK CONNECTORS 120MM: Brand: ENDOPATH

## (undated) DEVICE — SUT VICRYL 3-0 REEL 54 IN J285G

## (undated) DEVICE — SUT VICRYL PLUS 1 CTB-1 36 IN VCPB947H

## (undated) DEVICE — SCD SEQUENTIAL COMPRESSION COMFORT SLEEVE MEDIUM KNEE LENGTH: Brand: KENDALL SCD

## (undated) DEVICE — INTENDED FOR TISSUE SEPARATION, AND OTHER PROCEDURES THAT REQUIRE A SHARP SURGICAL BLADE TO PUNCTURE OR CUT.: Brand: BARD-PARKER SAFETY BLADES SIZE 15, STERILE

## (undated) DEVICE — SUT ETHIBOND 0 CT-2 30 IN X412H

## (undated) DEVICE — NEEDLE HYPO 22G X 1-1/2 IN

## (undated) DEVICE — CHLORAPREP HI-LITE 10.5ML ORANGE

## (undated) DEVICE — ADHESIVE SKIN CLSR DERMABOND NX

## (undated) DEVICE — GLOVE SRG BIOGEL 7.5

## (undated) DEVICE — PACK TOTAL KNEE PBDS

## (undated) DEVICE — GLOVE INDICATOR PI UNDERGLOVE SZ 8 BLUE

## (undated) DEVICE — TIBURON LAPAROSCOPIC ABDOMINAL DRAPE: Brand: CONVERTORS

## (undated) DEVICE — ABDOMINAL PAD: Brand: DERMACEA

## (undated) DEVICE — BINDER ABDOMINAL 63-74 IN

## (undated) DEVICE — INTENDED FOR TISSUE SEPARATION, AND OTHER PROCEDURES THAT REQUIRE A SHARP SURGICAL BLADE TO PUNCTURE OR CUT.: Brand: BARD-PARKER SAFETY BLADES SIZE 10, STERILE

## (undated) DEVICE — JP 3-SPRING RES W/10FR PVC DRAIN/TR: Brand: CARDINAL HEALTH

## (undated) DEVICE — REM POLYHESIVE ADULT PATIENT RETURN ELECTRODE: Brand: VALLEYLAB

## (undated) DEVICE — [HIGH FLOW INSUFFLATOR,  DO NOT USE IF PACKAGE IS DAMAGED,  KEEP DRY,  KEEP AWAY FROM SUNLIGHT,  PROTECT FROM HEAT AND RADIOACTIVE SOURCES.]: Brand: PNEUMOSURE

## (undated) DEVICE — SPONGE LAP 18 X 18 IN STRL RFD

## (undated) DEVICE — HARMONIC 1100 SHEARS, 36CM SHAFT LENGTH: Brand: HARMONIC

## (undated) DEVICE — ANTIBACTERIAL UNDYED BRAIDED (POLYGLACTIN 910), SYNTHETIC ABSORBABLE SUTURE: Brand: COATED VICRYL

## (undated) DEVICE — HOOD: Brand: FLYTE, SURGICOOL

## (undated) DEVICE — CAPIT KNEE ATTUNE RP CEMENT - DEPUY

## (undated) DEVICE — GLOVE INDICATOR PI UNDERGLOVE SZ 8.5 BLUE

## (undated) DEVICE — BAG DECANTER

## (undated) DEVICE — SILVER-COATED ANTIMICROBIAL BARRIER DRESSING: Brand: ACTICOAT   4" X 8"

## (undated) DEVICE — SYRINGE 30ML LL

## (undated) DEVICE — 3000CC GUARDIAN II: Brand: GUARDIAN

## (undated) DEVICE — SUT MONOCRYL 4-0 PS-2 27 IN Y426H

## (undated) DEVICE — CEMENT MIXING TOWER

## (undated) DEVICE — PMI DISPOSABLE PUNCTURE CLOSURE DEVICE / SUTURE GRASPER: Brand: PMI

## (undated) DEVICE — ADHESIVE SKIN HIGH VISCOSITY EXOFIN 1ML

## (undated) DEVICE — WEBRIL 6 IN UNSTERILE

## (undated) DEVICE — DUAL CUT SAGITTAL BLADE

## (undated) DEVICE — THE SIMPULSE SOLO SYSTEM WITH ULTREX RETRACTABLE SPLASH SHIELD TIP: Brand: SIMPULSE SOLO

## (undated) DEVICE — IMPERVIOUS STOCKINETTE: Brand: DEROYAL

## (undated) DEVICE — PLUMEPEN PRO 10FT

## (undated) DEVICE — BIPOLAR SEALER 23-301-1 AQM MBS: Brand: AQUAMANTYS™

## (undated) DEVICE — ELECTRODE LAP J HOOK E-Z CLEAN 33CM-0021

## (undated) DEVICE — BETHLEHEM MAJOR GENERAL PACK: Brand: CARDINAL HEALTH

## (undated) DEVICE — TRAVELKIT CONTAINS FIRST STEP KIT (200ML EP-4 KIT) AND SOILED SCOPE BAG - 1 KIT: Brand: TRAVELKIT CONTAINS FIRST STEP KIT AND SOILED SCOPE BAG

## (undated) DEVICE — TUBING SUCTION 5MM X 12 FT

## (undated) DEVICE — 3M™ IOBAN™ 2 ANTIMICROBIAL INCISE DRAPE 6650EZ: Brand: IOBAN™ 2

## (undated) DEVICE — GLOVE SRG BIOGEL ORTHOPEDIC 8.5

## (undated) DEVICE — IRRIG ENDO FLO TUBING

## (undated) DEVICE — GAUZE SPONGES,16 PLY: Brand: CURITY

## (undated) DEVICE — U-DRAPE: Brand: CONVERTORS

## (undated) DEVICE — COBAN 4 IN STERILE

## (undated) DEVICE — TROCAR: Brand: KII FIOS FIRST ENTRY

## (undated) DEVICE — GLOVE SRG BIOGEL ORTHOPEDIC 7.5

## (undated) DEVICE — SURGICAL GOWN, XL SMARTSLEEVE: Brand: CONVERTORS

## (undated) DEVICE — RECIPROCATING BLADE, DOUBLE SIDED (70.0 X 0.96 X 12.5MM)

## (undated) DEVICE — VIOLET BRAIDED (POLYGLACTIN 910), SYNTHETIC ABSORBABLE SUTURE: Brand: COATED VICRYL

## (undated) DEVICE — GLOVE SRG BIOGEL 8

## (undated) DEVICE — MEDI-VAC YANK SUCT HNDL W/TPRD BULBOUS TIP: Brand: CARDINAL HEALTH

## (undated) DEVICE — TRAY FOLEY 16FR URIMETER SURESTEP

## (undated) DEVICE — ALLENTOWN LAP CHOLE APP PACK: Brand: CARDINAL HEALTH

## (undated) DEVICE — LAPAROSCOPIC TROCAR SLEEVE/SINGLE USE: Brand: KII® SLEEVE

## (undated) DEVICE — NON-STERILE REUSABLE TOURNIQUET CUFF SINGLE BLADDER, DUAL PORT AND QUICK CONNECT CONNECTOR: Brand: COLOR CUFF

## (undated) DEVICE — SUT VICRYL PLUS 2-0 CTB-1 27 IN VCPB259H

## (undated) DEVICE — PREP SURGICAL PURPREP 26ML

## (undated) DEVICE — NEPTUNE E-SEP SMOKE EVACUATION PENCIL, COATED, 70MM BLADE, PUSH BUTTON SWITCH: Brand: NEPTUNE E-SEP

## (undated) DEVICE — PADDING CAST 4 IN  COTTON STRL

## (undated) DEVICE — CHLORAPREP HI-LITE 26ML ORANGE

## (undated) RX ORDER — LATANOPROST 50 UG/ML: 1 SOLUTION/ DROPS OPHTHALMIC EVERY EVENING